# Patient Record
Sex: FEMALE | Race: WHITE | NOT HISPANIC OR LATINO | Employment: OTHER | URBAN - METROPOLITAN AREA
[De-identification: names, ages, dates, MRNs, and addresses within clinical notes are randomized per-mention and may not be internally consistent; named-entity substitution may affect disease eponyms.]

---

## 2017-01-12 ENCOUNTER — GENERIC CONVERSION - ENCOUNTER (OUTPATIENT)
Dept: OTHER | Facility: OTHER | Age: 62
End: 2017-01-12

## 2017-03-09 ENCOUNTER — TRANSCRIBE ORDERS (OUTPATIENT)
Dept: ADMINISTRATIVE | Facility: HOSPITAL | Age: 62
End: 2017-03-09

## 2017-03-09 ENCOUNTER — APPOINTMENT (OUTPATIENT)
Dept: LAB | Facility: HOSPITAL | Age: 62
End: 2017-03-09
Payer: COMMERCIAL

## 2017-03-09 DIAGNOSIS — I95.9 HYPOTENSION, UNSPECIFIED HYPOTENSION TYPE: Primary | ICD-10-CM

## 2017-03-09 DIAGNOSIS — I95.9 HYPOTENSION, UNSPECIFIED HYPOTENSION TYPE: ICD-10-CM

## 2017-03-09 LAB
ANION GAP SERPL CALCULATED.3IONS-SCNC: 7 MMOL/L (ref 4–13)
BUN SERPL-MCNC: 21 MG/DL (ref 5–25)
CALCIUM SERPL-MCNC: 9.7 MG/DL (ref 8.3–10.1)
CHLORIDE SERPL-SCNC: 102 MMOL/L (ref 100–108)
CO2 SERPL-SCNC: 32 MMOL/L (ref 21–32)
CREAT SERPL-MCNC: 0.96 MG/DL (ref 0.6–1.3)
GFR SERPL CREATININE-BSD FRML MDRD: 58.9 ML/MIN/1.73SQ M
GLUCOSE SERPL-MCNC: 84 MG/DL (ref 65–140)
POTASSIUM SERPL-SCNC: 4.3 MMOL/L (ref 3.5–5.3)
SODIUM SERPL-SCNC: 141 MMOL/L (ref 136–145)
TSH SERPL DL<=0.05 MIU/L-ACNC: 3.25 UIU/ML (ref 0.36–3.74)

## 2017-03-09 PROCEDURE — 80048 BASIC METABOLIC PNL TOTAL CA: CPT

## 2017-03-09 PROCEDURE — 84443 ASSAY THYROID STIM HORMONE: CPT

## 2017-03-09 PROCEDURE — 36415 COLL VENOUS BLD VENIPUNCTURE: CPT

## 2017-03-09 PROCEDURE — 84439 ASSAY OF FREE THYROXINE: CPT

## 2017-03-10 LAB — T4 FREE SERPL-MCNC: 1.09 NG/DL (ref 0.76–1.46)

## 2017-05-31 ENCOUNTER — APPOINTMENT (OUTPATIENT)
Dept: LAB | Facility: HOSPITAL | Age: 62
End: 2017-05-31
Payer: COMMERCIAL

## 2017-05-31 ENCOUNTER — TRANSCRIBE ORDERS (OUTPATIENT)
Dept: ADMINISTRATIVE | Facility: HOSPITAL | Age: 62
End: 2017-05-31

## 2017-05-31 ENCOUNTER — ALLSCRIPTS OFFICE VISIT (OUTPATIENT)
Dept: OTHER | Facility: OTHER | Age: 62
End: 2017-05-31

## 2017-05-31 DIAGNOSIS — E55.9 VITAMIN D DEFICIENCY DISEASE: ICD-10-CM

## 2017-05-31 DIAGNOSIS — D64.9 ANEMIA, UNSPECIFIED TYPE: ICD-10-CM

## 2017-05-31 DIAGNOSIS — D64.9 ANEMIA, UNSPECIFIED TYPE: Primary | ICD-10-CM

## 2017-05-31 LAB
ALBUMIN SERPL BCP-MCNC: 4.2 G/DL (ref 3.5–5)
ALP SERPL-CCNC: 97 U/L (ref 46–116)
ALT SERPL W P-5'-P-CCNC: 25 U/L (ref 12–78)
ANION GAP SERPL CALCULATED.3IONS-SCNC: 10 MMOL/L (ref 4–13)
AST SERPL W P-5'-P-CCNC: 14 U/L (ref 5–45)
BILIRUB SERPL-MCNC: 0.9 MG/DL (ref 0.2–1)
BUN SERPL-MCNC: 18 MG/DL (ref 5–25)
CALCIUM SERPL-MCNC: 9.5 MG/DL (ref 8.3–10.1)
CHLORIDE SERPL-SCNC: 102 MMOL/L (ref 100–108)
CO2 SERPL-SCNC: 27 MMOL/L (ref 21–32)
CREAT SERPL-MCNC: 1.03 MG/DL (ref 0.6–1.3)
ERYTHROCYTE [DISTWIDTH] IN BLOOD BY AUTOMATED COUNT: 12.4 % (ref 11.6–15.1)
GFR SERPL CREATININE-BSD FRML MDRD: 54.3 ML/MIN/1.73SQ M
GLUCOSE SERPL-MCNC: 88 MG/DL (ref 65–140)
HCT VFR BLD AUTO: 46.9 % (ref 34.8–46.1)
HGB BLD-MCNC: 15.6 G/DL (ref 11.5–15.4)
MCH RBC QN AUTO: 30 PG (ref 26.8–34.3)
MCHC RBC AUTO-ENTMCNC: 33.3 G/DL (ref 31.4–37.4)
MCV RBC AUTO: 90 FL (ref 82–98)
PLATELET # BLD AUTO: 257 THOUSANDS/UL (ref 149–390)
PMV BLD AUTO: 10.8 FL (ref 8.9–12.7)
POTASSIUM SERPL-SCNC: 4.4 MMOL/L (ref 3.5–5.3)
PROT SERPL-MCNC: 7 G/DL (ref 6.4–8.2)
RBC # BLD AUTO: 5.2 MILLION/UL (ref 3.81–5.12)
SODIUM SERPL-SCNC: 139 MMOL/L (ref 136–145)
T4 FREE SERPL-MCNC: 1.14 NG/DL (ref 0.76–1.46)
TSH SERPL DL<=0.05 MIU/L-ACNC: 1.96 UIU/ML (ref 0.36–3.74)
WBC # BLD AUTO: 5.63 THOUSAND/UL (ref 4.31–10.16)

## 2017-05-31 PROCEDURE — 80053 COMPREHEN METABOLIC PANEL: CPT

## 2017-05-31 PROCEDURE — 36415 COLL VENOUS BLD VENIPUNCTURE: CPT

## 2017-05-31 PROCEDURE — 85027 COMPLETE CBC AUTOMATED: CPT

## 2017-05-31 PROCEDURE — 82306 VITAMIN D 25 HYDROXY: CPT

## 2017-05-31 PROCEDURE — 84439 ASSAY OF FREE THYROXINE: CPT

## 2017-05-31 PROCEDURE — 84443 ASSAY THYROID STIM HORMONE: CPT

## 2017-06-04 LAB
25(OH)D2 SERPL-MCNC: 2 NG/ML
25(OH)D3 SERPL-MCNC: 59 NG/ML
25(OH)D3+25(OH)D2 SERPL-MCNC: 61 NG/ML

## 2017-06-22 ENCOUNTER — TRANSCRIBE ORDERS (OUTPATIENT)
Dept: ADMINISTRATIVE | Facility: HOSPITAL | Age: 62
End: 2017-06-22

## 2017-06-22 ENCOUNTER — ALLSCRIPTS OFFICE VISIT (OUTPATIENT)
Dept: OTHER | Facility: OTHER | Age: 62
End: 2017-06-22

## 2017-06-22 DIAGNOSIS — F32.A VASCULAR DEMENTIA WITH DEPRESSED MOOD (HCC): ICD-10-CM

## 2017-06-22 DIAGNOSIS — R07.9 CHEST PAIN: ICD-10-CM

## 2017-06-22 DIAGNOSIS — F01.50 VASCULAR DEMENTIA WITH DEPRESSED MOOD (HCC): ICD-10-CM

## 2017-06-22 DIAGNOSIS — R07.9 CHEST PAIN, UNSPECIFIED: Primary | ICD-10-CM

## 2017-06-22 DIAGNOSIS — F32.9 MAJOR DEPRESSIVE DISORDER, SINGLE EPISODE: ICD-10-CM

## 2017-06-26 ENCOUNTER — ALLSCRIPTS OFFICE VISIT (OUTPATIENT)
Dept: OTHER | Facility: OTHER | Age: 62
End: 2017-06-26

## 2017-08-01 ENCOUNTER — HOSPITAL ENCOUNTER (OUTPATIENT)
Dept: NON INVASIVE DIAGNOSTICS | Facility: CLINIC | Age: 62
Discharge: HOME | End: 2017-08-01
Attending: INTERNAL MEDICINE
Payer: COMMERCIAL

## 2017-08-01 DIAGNOSIS — R07.9 CHEST PAIN, UNSPECIFIED: ICD-10-CM

## 2017-08-01 DIAGNOSIS — F32.A VASCULAR DEMENTIA WITH DEPRESSED MOOD (HCC): ICD-10-CM

## 2017-08-01 DIAGNOSIS — F01.50 VASCULAR DEMENTIA WITH DEPRESSED MOOD (HCC): ICD-10-CM

## 2017-08-01 LAB
CHEST PAIN STATEMENT: NORMAL
MAX DIASTOLIC BP: 68 MMHG
MAX HEART RATE: 134 BPM
MAX PREDICTED HEART RATE: 158 BPM
MAX. SYSTOLIC BP: 122 MMHG
PROTOCOL NAME: NORMAL
REASON FOR TERMINATION: NORMAL
TARGET HR FORMULA: NORMAL
TEST INDICATION: NORMAL
TIME IN EXERCISE PHASE: 180 S

## 2017-08-01 PROCEDURE — 93017 CV STRESS TEST TRACING ONLY: CPT

## 2017-08-01 PROCEDURE — 78452 HT MUSCLE IMAGE SPECT MULT: CPT

## 2017-08-01 PROCEDURE — A9502 TC99M TETROFOSMIN: HCPCS

## 2017-08-01 RX ADMIN — REGADENOSON 0.4 MG: 0.08 INJECTION, SOLUTION INTRAVENOUS at 10:05

## 2017-08-07 ENCOUNTER — TRANSCRIBE ORDERS (OUTPATIENT)
Dept: ADMINISTRATIVE | Facility: HOSPITAL | Age: 62
End: 2017-08-07

## 2017-08-07 ENCOUNTER — ALLSCRIPTS OFFICE VISIT (OUTPATIENT)
Dept: OTHER | Facility: OTHER | Age: 62
End: 2017-08-07

## 2017-08-07 ENCOUNTER — APPOINTMENT (OUTPATIENT)
Dept: LAB | Facility: HOSPITAL | Age: 62
End: 2017-08-07
Payer: COMMERCIAL

## 2017-08-07 DIAGNOSIS — E03.9 UNSPECIFIED HYPOTHYROIDISM: ICD-10-CM

## 2017-08-07 DIAGNOSIS — E55.9 UNSPECIFIED VITAMIN D DEFICIENCY: Primary | ICD-10-CM

## 2017-08-07 DIAGNOSIS — E55.9 UNSPECIFIED VITAMIN D DEFICIENCY: ICD-10-CM

## 2017-08-07 LAB
ALBUMIN SERPL BCP-MCNC: 4.1 G/DL (ref 3.5–5)
ALP SERPL-CCNC: 69 U/L (ref 46–116)
ALT SERPL W P-5'-P-CCNC: 26 U/L (ref 12–78)
ANION GAP SERPL CALCULATED.3IONS-SCNC: 6 MMOL/L (ref 4–13)
AST SERPL W P-5'-P-CCNC: 15 U/L (ref 5–45)
BILIRUB SERPL-MCNC: 0.4 MG/DL (ref 0.2–1)
BUN SERPL-MCNC: 19 MG/DL (ref 5–25)
CALCIUM SERPL-MCNC: 9.4 MG/DL (ref 8.3–10.1)
CHLORIDE SERPL-SCNC: 102 MMOL/L (ref 100–108)
CO2 SERPL-SCNC: 31 MMOL/L (ref 21–32)
CREAT SERPL-MCNC: 0.8 MG/DL (ref 0.6–1.3)
ERYTHROCYTE [DISTWIDTH] IN BLOOD BY AUTOMATED COUNT: 13.6 % (ref 11.6–15.1)
GFR SERPL CREATININE-BSD FRML MDRD: 79 ML/MIN/1.73SQ M
GLUCOSE SERPL-MCNC: 81 MG/DL (ref 65–140)
HCT VFR BLD AUTO: 44.4 % (ref 34.8–46.1)
HGB BLD-MCNC: 14.8 G/DL (ref 11.5–15.4)
MCH RBC QN AUTO: 30.2 PG (ref 26.8–34.3)
MCHC RBC AUTO-ENTMCNC: 33.3 G/DL (ref 31.4–37.4)
MCV RBC AUTO: 91 FL (ref 82–98)
PLATELET # BLD AUTO: 265 THOUSANDS/UL (ref 149–390)
PMV BLD AUTO: 11.2 FL (ref 8.9–12.7)
POTASSIUM SERPL-SCNC: 4.5 MMOL/L (ref 3.5–5.3)
PROT SERPL-MCNC: 7 G/DL (ref 6.4–8.2)
RBC # BLD AUTO: 4.9 MILLION/UL (ref 3.81–5.12)
SODIUM SERPL-SCNC: 139 MMOL/L (ref 136–145)
TSH SERPL DL<=0.05 MIU/L-ACNC: 1.85 UIU/ML (ref 0.36–3.74)
WBC # BLD AUTO: 6.55 THOUSAND/UL (ref 4.31–10.16)

## 2017-08-07 PROCEDURE — 36415 COLL VENOUS BLD VENIPUNCTURE: CPT

## 2017-08-07 PROCEDURE — 84443 ASSAY THYROID STIM HORMONE: CPT

## 2017-08-07 PROCEDURE — 85027 COMPLETE CBC AUTOMATED: CPT

## 2017-08-07 PROCEDURE — 82306 VITAMIN D 25 HYDROXY: CPT

## 2017-08-07 PROCEDURE — 80053 COMPREHEN METABOLIC PANEL: CPT

## 2017-08-15 LAB
25(OH)D2 SERPL-MCNC: 1.7 NG/ML
25(OH)D3 SERPL-MCNC: 75 NG/ML
25(OH)D3+25(OH)D2 SERPL-MCNC: 77 NG/ML

## 2017-10-31 ENCOUNTER — TRANSCRIBE ORDERS (OUTPATIENT)
Dept: ADMINISTRATIVE | Facility: HOSPITAL | Age: 62
End: 2017-10-31

## 2017-10-31 DIAGNOSIS — Z12.31 ENCOUNTER FOR SCREENING MAMMOGRAM FOR MALIGNANT NEOPLASM OF BREAST: Primary | ICD-10-CM

## 2017-11-01 ENCOUNTER — TRANSCRIBE ORDERS (OUTPATIENT)
Dept: ADMINISTRATIVE | Facility: HOSPITAL | Age: 62
End: 2017-11-01

## 2017-11-01 ENCOUNTER — APPOINTMENT (OUTPATIENT)
Dept: LAB | Facility: HOSPITAL | Age: 62
End: 2017-11-01
Payer: COMMERCIAL

## 2017-11-01 DIAGNOSIS — E03.9 HYPOTHYROIDISM, ADULT: Primary | ICD-10-CM

## 2017-11-01 DIAGNOSIS — R53.83 FATIGUE, UNSPECIFIED TYPE: ICD-10-CM

## 2017-11-01 DIAGNOSIS — E55.9 VITAMIN D INSUFFICIENCY: ICD-10-CM

## 2017-11-01 DIAGNOSIS — E03.9 HYPOTHYROIDISM, ADULT: ICD-10-CM

## 2017-11-01 LAB
25(OH)D3 SERPL-MCNC: 60 NG/ML (ref 30–100)
ALBUMIN SERPL BCP-MCNC: 4.4 G/DL (ref 3.5–5)
ALP SERPL-CCNC: 91 U/L (ref 46–116)
ALT SERPL W P-5'-P-CCNC: 40 U/L (ref 12–78)
ANION GAP SERPL CALCULATED.3IONS-SCNC: 10 MMOL/L (ref 4–13)
AST SERPL W P-5'-P-CCNC: 20 U/L (ref 5–45)
BILIRUB SERPL-MCNC: 1 MG/DL (ref 0.2–1)
BUN SERPL-MCNC: 18 MG/DL (ref 5–25)
CALCIUM SERPL-MCNC: 9 MG/DL (ref 8.3–10.1)
CHLORIDE SERPL-SCNC: 103 MMOL/L (ref 100–108)
CO2 SERPL-SCNC: 28 MMOL/L (ref 21–32)
CREAT SERPL-MCNC: 0.89 MG/DL (ref 0.6–1.3)
ERYTHROCYTE [DISTWIDTH] IN BLOOD BY AUTOMATED COUNT: 12.8 % (ref 11.6–15.1)
GFR SERPL CREATININE-BSD FRML MDRD: 69 ML/MIN/1.73SQ M
GLUCOSE SERPL-MCNC: 74 MG/DL (ref 65–140)
HCT VFR BLD AUTO: 47.7 % (ref 34.8–46.1)
HGB BLD-MCNC: 16 G/DL (ref 11.5–15.4)
MCH RBC QN AUTO: 29.9 PG (ref 26.8–34.3)
MCHC RBC AUTO-ENTMCNC: 33.5 G/DL (ref 31.4–37.4)
MCV RBC AUTO: 89 FL (ref 82–98)
PLATELET # BLD AUTO: 287 THOUSANDS/UL (ref 149–390)
PMV BLD AUTO: 10.2 FL (ref 8.9–12.7)
POTASSIUM SERPL-SCNC: 4.1 MMOL/L (ref 3.5–5.3)
PROT SERPL-MCNC: 7.6 G/DL (ref 6.4–8.2)
RBC # BLD AUTO: 5.35 MILLION/UL (ref 3.81–5.12)
SODIUM SERPL-SCNC: 141 MMOL/L (ref 136–145)
T4 FREE SERPL-MCNC: 1.17 NG/DL (ref 0.76–1.46)
TSH SERPL DL<=0.05 MIU/L-ACNC: 2.43 UIU/ML (ref 0.36–3.74)
WBC # BLD AUTO: 5.99 THOUSAND/UL (ref 4.31–10.16)

## 2017-11-01 PROCEDURE — 36415 COLL VENOUS BLD VENIPUNCTURE: CPT

## 2017-11-01 PROCEDURE — 80053 COMPREHEN METABOLIC PANEL: CPT

## 2017-11-01 PROCEDURE — 84443 ASSAY THYROID STIM HORMONE: CPT

## 2017-11-01 PROCEDURE — 85027 COMPLETE CBC AUTOMATED: CPT

## 2017-11-01 PROCEDURE — 82306 VITAMIN D 25 HYDROXY: CPT

## 2017-11-01 PROCEDURE — 84439 ASSAY OF FREE THYROXINE: CPT

## 2017-11-22 ENCOUNTER — HOSPITAL ENCOUNTER (OUTPATIENT)
Dept: MAMMOGRAPHY | Facility: HOSPITAL | Age: 62
Discharge: HOME | End: 2017-11-22
Payer: COMMERCIAL

## 2017-11-22 DIAGNOSIS — Z12.31 ENCOUNTER FOR SCREENING MAMMOGRAM FOR MALIGNANT NEOPLASM OF BREAST: ICD-10-CM

## 2017-11-22 PROCEDURE — 77063 BREAST TOMOSYNTHESIS BI: CPT

## 2017-11-22 PROCEDURE — G0202 SCR MAMMO BI INCL CAD: HCPCS

## 2018-01-10 NOTE — PROGRESS NOTES
Chief Complaint  Patient here for Prolia injection as ordered by Cristhian Caicedo  Active Problems    1  Abnormal ECG (794 31) (R94 31)   2  Abnormal TSH (790 6) (R79 89)   3  Allergic rhinitis (477 9) (J30 9)   4  Chest pain, exertional (786 50) (R07 9)   5  Depression (311) (F32 9)   6  Dizziness (780 4) (R42)   7  Encounter for screening mammogram for breast cancer (V76 12) (Z12 31)   8  Esophageal reflux (530 81) (K21 9)   9  Factor V Leiden mutation (289 81) (D68 51)   10  Fatigue (780 79) (R53 83)   11  Fibromyalgia (729 1) (M79 7)   12  Hyperlipidemia (272 4) (E78 5)   13  Hypoglycemia (251 2) (E16 2)   14  Immunodeficiency disorder (279 3) (D84 9)   15  Insomnia (780 52) (G47 00)   16  Intervertebral Disc Degeneration (722 6)   17  Irritable bowel syndrome (564 1) (K58 9)   18  Leukocytosis (288 60) (D72 829)   19  Lipid screening (V77 91) (Z13 220)   20  Migraine headache (346 90) (G43 909)   21  Mitral valve prolapse (424 0) (I34 1)   22  Nausea with vomiting (787 01) (R11 2)   23  Need for influenza vaccination (V04 81) (Z23)   24  Obstructive sleep apnea (327 23) (G47 33)   25  Orthostatic hypotension (458 0) (I95 1)   26  Osteoarthritis (715 90) (M19 90)   27  Osteoporosis (733 00) (M81 0)   28  Palpitations (785 1) (R00 2)   29  Scoliosis (737 30) (M41 9)   30  Screening for condition (V82 9) (Z13 9)   31  Screening for malignant neoplasm of breast (V76 10) (Z12 39)   32  Skin lesion (709 9) (L98 9)   33  Sleep disorder (780 50) (G47 9)   34  Tachycardia (785 0) (R00 0)   35  Urge incontinence of urine (788 31) (N39 41)   36  Vitamin D deficiency (268 9) (E55 9)   37  Weight loss, unintentional (783 21) (R63 4)    Current Meds   1  Allegra Allergy 180 MG Oral Tablet Recorded   2  Clotrimazole 10 MG Mouth/Throat Jefe; allow 1 jefe to dissolve slowly in mouth 4   times daily Recorded   3  Compazine 10 MG TABS; Therapy: (Recorded:24Wec0344) to Recorded   4   Cyclobenzaprine HCl - 5 MG Oral Tablet; Take 1 tablet twice daily; Therapy: (Dierdre Pure) to Recorded   5  Fluticasone Propionate 50 MCG/ACT Nasal Suspension; USE 2 SPRAYS IN EACH   NOSTRIL ONCE DAILY  Requested for: 83OLB4221; Last Rx:95Hne7626 Ordered   6  Gaviscon CHEW; TAKE AS DIRECTED; Therapy: (Dierdre Pure) to Recorded   7  Levothyroxine Sodium 25 MCG Oral Tablet Recorded   8  Magnesium 500 MG Oral Tablet; take 2 tablet daily; Therapy: (Dierdre Pure) to Recorded   9  Midodrine HCl - 5 MG Oral Tablet; Take 2 tabs with breakfast and 1 tab with lunch and   dinner  Requested for: 21Wgf6414; Last Rx:50Amp2009 Ordered   10  Multiple Vitamins Oral Tablet Recorded   11  Omeprazole 20 MG Oral Capsule Delayed Release; TAKE 2 CAPSULES DAILY; Therapy: (Recorded:98Foz2404) to Recorded   12  Oxybutynin Chloride 5 MG Oral Tablet; TAKE 1/2 TABLET TWICE DAILY  Requested for:    55Ivr9002; Last Rx:89Etq4030 Ordered   13  Potassium 99 MG Oral Tablet Recorded   14  Qvar 80 MCG/ACT Inhalation Aerosol Solution Recorded   15  SUMAtriptan Succinate 4 MG/0 5ML Subcutaneous Solution Auto-injector Recorded   16  Vitamin D3 5000 UNIT Oral Capsule Recorded   17  Xopenex HFA 45 MCG/ACT Inhalation Aerosol; INHALE 2 PUFFS EVERY 4-6 HOURS    AS NEEDED; Therapy: 10IEJ1278 to 02 94 [phone] 46)  Requested for: 67ZVR5105; Last    Rx:07Akm3032 Ordered    Allergies    1  NSAIDs   2  Aspirin TABS   3  Ceftin TABS   4  Ciprofloxacin HCl TABS   5  Demerol TABS   6  Keflex CAPS   7  Levaquin TABS   8  LevoFLOXacin TABS   9  Macrodantin CAPS   10  Naproxen TABS   11  NexIUM CPDR   12  Sulfa Drugs   13  Zantac TABS    Assessment  Consent reviewed and signed  First injection, no questions  Patient currently taking Vitamin D3 5,000 iu daily  Prolia 60mg SQ given in right upper arm as ordered  Tolerated well       Plan  Osteoporosis    · Prolia 60 MG/ML Subcutaneous Solution    Future Appointments    Date/Time Provider Specialty Site   01/03/2018 01:00 PM EMERY Wood  Endocrinology [address] [facility] ENDOCRINOLOGY   01/03/2018 01:30 PM Endocrinology, Nurse Schedule  [address] [facility] ENDOCRINOLOGY   08/07/2017 12:40 PM EMERY Peace   Cardiology [address]     Signatures   Electronically signed by : Trey Bender, [place]; Jun 26 2017  1:55PM EST                       (Author)    Electronically signed by : EMERY Villalba ; Jun 26 2017  3:07PM EST                       (Author)

## 2018-01-12 NOTE — RESULT NOTES
Message   TSH and free T4 normal      Verified Results  (1) TSH [id number] 10:07AM Jerry Soriano   Patients undergoing fluorescein dye angiography may retain small amounts of fluorescein in the body for 48-72 hours post procedure  Samples containing fluorescein can produce falsely depressed TSH values  If the patient had this procedure,a specimen should be resubmitted post fluorescein clearance          The recommended reference ranges for TSH during pregnancy are as follows:  First trimester 0 1 to 2 5 uIU/mL  Second trimester  0 2 to 3 0 uIU/mL  Third trimester 0 3 to 3 0 uIU/m     Test Name Result Flag Reference   TSH 1 946 uIU/mL  0 358-3 740     (1) T4, FREE [id number] 10:07AM Jerry Soriano     Test Name Result Flag Reference   T4,FREE 0 93 ng/dL  0 76-1 46       Signatures   Electronically signed by : Catracho Mcgregor, ; Feb 7 2016  1:13PM EST                       (Author)

## 2018-01-13 VITALS
WEIGHT: 108.13 LBS | BODY MASS INDEX: 20.42 KG/M2 | HEART RATE: 72 BPM | HEIGHT: 61 IN | DIASTOLIC BLOOD PRESSURE: 70 MMHG | SYSTOLIC BLOOD PRESSURE: 110 MMHG

## 2018-01-14 VITALS
DIASTOLIC BLOOD PRESSURE: 70 MMHG | HEART RATE: 80 BPM | BODY MASS INDEX: 20.39 KG/M2 | SYSTOLIC BLOOD PRESSURE: 126 MMHG | WEIGHT: 108 LBS | HEIGHT: 61 IN

## 2018-01-15 VITALS
DIASTOLIC BLOOD PRESSURE: 68 MMHG | SYSTOLIC BLOOD PRESSURE: 117 MMHG | HEART RATE: 97 BPM | BODY MASS INDEX: 20.64 KG/M2 | HEIGHT: 61 IN | WEIGHT: 109.31 LBS

## 2018-01-15 NOTE — RESULT NOTES
Message   discuss on upcoming appointment      Verified Results  * DXA [address] [id number] 01:45PM Arch Sicard Order Number: [id number]    - Patient Instructions: To schedule this appointment, please contact Central Scheduling at [phone]  Test Name Result Flag Reference   DXA BONE DENSITY SPINE HIP AND PELVIS (Report)     CENTRAL DXA SCAN     CLINICAL HISTORY:  57year old post-menopausal  female  Osteoporosis  TECHNIQUE: Bone densitometry was performed using a [employer] A bone densitometer  Regions of interest appear properly placed  There is levoscoliosis and postoperative changes in lumbar spine which could limit the study  COMPARISON: 8/20/2014     RESULTS:    LUMBAR SPINE: L1-L3:   BMD 0 816 gm/cm2   T-score -1 8   Z-score -0 3     LEFT TOTAL HIP:   BMD 0 635 gm/cm2   T-score -2 5   Z-score -1 5     LEFT FEMORAL NECK:   BMD 0 565 gm/cm2   T-score -2 6   Z-score -1 2     RIGHT FOREARM :   BMD 0 616 gm/sq-cm,   T-score is -1 2   Z-score is 0 3          IMPRESSION:   1  Based on the [facility] classification, the T-score of -2 6 is consistent with osteoporosis  2  Since the prior study, there has been 0 048 g/sq cm (7 2%) decrease in the BMD of the forearm, 0 126 g/sq cm (13 4 %) decrease in the BMD of the spine, and 0 067 g/sq cm (9 6 %) decrease in the BMD of the hip  3  According to the [address] [place] [address], prescription therapy is recommended with a T-score of -2 5 or less in the spine or hip after appropriate evaluation to exclude secondary causes  4  A daily intake of at least 1200 mg Calcium and 800 - 1000 IU of Vitamin D, as well as weight bearing and muscle strengthening exercise, fall prevention and avoidance of tobacco and excessive alcohol intake as basic preventive measures are suggested  5  Repeat DXA scan in 18-24 months as clinically indicated            The 10 year risk of hip fracture is 4 2%, with the 10 year risk of major osteoporotic fracture being 32%, as calculated by the WHO fracture risk assessment tool (FRAX)  The current NOF guidelines recommend treating patients with FRAX 10 year risk score    of >3% for hip fracture and >20% for major osteoporotic fracture        WHO CLASSIFICATION:   Normal (a T-score of -1 0 or higher)   Low bone mineral density (a T-score of less than -1 0 but higher than -2 5)   Osteoporosis (a T-score of -2 5 or less)   Severe osteoporosis (a T-score of -2 5 or less with a fragility fracture)             Workstation performed: [id number]     Signed by:   Nina Gonzalez MD   10/19/16

## 2018-01-15 NOTE — RESULT NOTES
Message   vitamin d in good range , continue supplementations      Verified Results  (1) VITAMIN D 25-HYDROXY [id number] 10:45AM Jacquelyn Shorten     Test Name Result Flag Reference   VIT D 25-HYDROX 44 0 ng/mL  30 0-100 0   This assay is a certified procedure of the CDC Vitamin D Standardization Certification Program (VDSCP)     Deficiency <20ng/ml   Insufficiency 20-30ng/ml   Sufficient  ng/ml     *Patients undergoing fluorescein dye angiography may retain small amounts of fluorescein in the body for 48-72 hours post procedure  Samples containing fluorescein can produce falsely elevated Vitamin D values  If the patient had this procedure, a specimen should be resubmitted post fluorescein clearance

## 2018-03-05 ENCOUNTER — APPOINTMENT (EMERGENCY)
Dept: CT IMAGING | Facility: HOSPITAL | Age: 63
End: 2018-03-05
Payer: COMMERCIAL

## 2018-03-05 ENCOUNTER — HOSPITAL ENCOUNTER (EMERGENCY)
Facility: HOSPITAL | Age: 63
Discharge: HOME | End: 2018-03-05
Admitting: EMERGENCY MEDICINE
Payer: COMMERCIAL

## 2018-03-05 ENCOUNTER — APPOINTMENT (EMERGENCY)
Dept: RADIOLOGY | Facility: HOSPITAL | Age: 63
End: 2018-03-05
Payer: COMMERCIAL

## 2018-03-05 VITALS
HEART RATE: 83 BPM | SYSTOLIC BLOOD PRESSURE: 134 MMHG | WEIGHT: 111.99 LBS | TEMPERATURE: 97.2 F | OXYGEN SATURATION: 100 % | DIASTOLIC BLOOD PRESSURE: 87 MMHG | BODY MASS INDEX: 21.51 KG/M2 | RESPIRATION RATE: 23 BRPM

## 2018-03-05 DIAGNOSIS — M54.9 BACK PAIN: ICD-10-CM

## 2018-03-05 DIAGNOSIS — S40.022A ARM CONTUSION, LEFT, INITIAL ENCOUNTER: Primary | ICD-10-CM

## 2018-03-05 LAB
ANION GAP BLD CALC-SCNC: 15 MMOL/L (ref 4–13)
BUN BLD-MCNC: 14 MG/DL (ref 5–25)
CA-I BLD-SCNC: 1.25 MMOL/L (ref 1.12–1.32)
CHLORIDE BLD-SCNC: 104 MMOL/L (ref 100–108)
CREAT BLD-MCNC: 0.9 MG/DL (ref 0.6–1.3)
GFR SERPL CREATININE-BSD FRML MDRD: 68 ML/MIN/1.73SQ M
GLUCOSE SERPL-MCNC: 94 MG/DL (ref 65–140)
HCT VFR BLD CALC: 45 % (ref 34.8–46.1)
HGB BLDA-MCNC: 15.3 G/DL (ref 11.5–15.4)
PCO2 BLD: 28 MMOL/L (ref 21–32)
POTASSIUM BLD-SCNC: 4.1 MMOL/L (ref 3.5–5.3)
SODIUM BLD-SCNC: 142 MMOL/L (ref 136–145)
SPECIMEN SOURCE: ABNORMAL

## 2018-03-05 PROCEDURE — 73130 X-RAY EXAM OF HAND: CPT

## 2018-03-05 PROCEDURE — 74177 CT ABD & PELVIS W/CONTRAST: CPT

## 2018-03-05 PROCEDURE — 71260 CT THORAX DX C+: CPT

## 2018-03-05 PROCEDURE — 72125 CT NECK SPINE W/O DYE: CPT

## 2018-03-05 PROCEDURE — 96360 HYDRATION IV INFUSION INIT: CPT

## 2018-03-05 PROCEDURE — 85014 HEMATOCRIT: CPT

## 2018-03-05 PROCEDURE — 99284 EMERGENCY DEPT VISIT MOD MDM: CPT

## 2018-03-05 PROCEDURE — 80047 BASIC METABLC PNL IONIZED CA: CPT

## 2018-03-05 RX ADMIN — SODIUM CHLORIDE 1000 ML: 0.9 INJECTION, SOLUTION INTRAVENOUS at 12:44

## 2018-03-05 RX ADMIN — IOHEXOL 100 ML: 350 INJECTION, SOLUTION INTRAVENOUS at 12:42

## 2018-03-05 NOTE — ED PROVIDER NOTES
History  Chief Complaint   Patient presents with   Mavis Cousin Fall     pt lost footing and fell backwards 5 days ago  complaining of left wrist and low back pain     Patient presents to the emergency department today offering a chief complaint back pain and left wrist pain status post fall 5 days ago  Patient states she suffered a mechanical fall backwards striking her back as well as the left wrist off of an object  Patient denies striking her head however does describe a minor whiplash type incident  Also complains of some minimal left-sided muscular neck tenderness  Patient states pain is not subsiding over the last 5 days  She does take Flexeril via pain management for chronic back pain  She denies any headache blurred vision nausea or vomiting  She has been ambulatory since the incident and denies any lower extremity pain  Patient also admits to chronic abdominal pain however is unable to quantify if there is any new symptoms in the abdomen  Prior to Admission Medications   Prescriptions Last Dose Informant Patient Reported? Taking? HYDROcodone-acetaminophen (NORCO) 5-325 mg per tablet   Yes No   Sig: Take 1 tablet by mouth every 6 (six) hours as needed for moderate pain  clotrimazole (MYCELEX) 10 mg jefe   Yes No   Sig: Take 10 mg by mouth 5 (five) times a day  fentaNYL (DURAGESIC) 50 mcg/hr   Yes No   Sig: Place 1 patch on the skin every third day  levalbuterol (XOPENEX HFA) 45 mcg/act inhaler   Yes No   Sig: Inhale 1-2 puffs every 4 (four) hours as needed for wheezing  sertraline (ZOLOFT) 25 mg tablet   Yes No   Sig: Take 25 mg by mouth daily        Facility-Administered Medications: None       Past Medical History:   Diagnosis Date    Asthma     Back pain     Cardiac disease     Diverticulitis     Factor V Leiden ([facility] )     Fibromyalgia     GERD (gastroesophageal reflux disease)     Interstitial cystitis     Migraines     Osteoporosis     Sjogren's syndrome ([facility] ) Past Surgical History:   Procedure Laterality Date    BACK SURGERY      CHOLECYSTECTOMY      HYSTERECTOMY         History reviewed  No pertinent family history  I have reviewed and agree with the history as documented  Social History   Substance Use Topics    Smoking status: Never Smoker    Smokeless tobacco: Never Used    Alcohol use No        Review of Systems   Constitutional: Negative  HENT: Negative  Eyes: Negative  Respiratory: Negative  Cardiovascular: Negative  Gastrointestinal: Negative  Endocrine: Negative  Genitourinary: Negative  Musculoskeletal: Positive for back pain  Mid back pain as well as left hand pain   Skin: Positive for wound  Contusion midback   Allergic/Immunologic: Negative  Neurological: Negative  Hematological: Negative  Psychiatric/Behavioral: Negative  All other systems reviewed and are negative  Physical Exam  ED Triage Vitals   Temperature Pulse Respirations Blood Pressure SpO2   03/05/18 1159 03/05/18 1202 03/05/18 1202 03/05/18 1202 03/05/18 1202   (!) 97 2 °F (36 2 °C) 92 18 145/82 97 %      Temp Source Heart Rate Source Patient Position - Orthostatic VS BP Location FiO2 (%)   03/05/18 1159 03/05/18 1202 03/05/18 1202 03/05/18 1202 --   Temporal Right Sitting Right arm       Pain Score       03/05/18 1202       7           Orthostatic Vital Signs  Vitals:    03/05/18 1245 03/05/18 1300 03/05/18 1315 03/05/18 1330   BP: 120/58 128/65 128/68 134/87   Pulse: 79 79 71 83   Patient Position - Orthostatic VS: Sitting Lying Lying Lying       Physical Exam   Constitutional: She is oriented to person, place, and time  She appears well-developed and well-nourished  No distress  HENT:   Head: Normocephalic and atraumatic  Right Ear: External ear normal    Left Ear: External ear normal    Nose: Nose normal    Mouth/Throat: Oropharynx is clear and moist  No oropharyngeal exudate     Eyes: EOM are normal  Pupils are equal, round, and reactive to light  Neck: Normal range of motion  Neck supple  Cardiovascular: Normal rate  Pulmonary/Chest: Effort normal    Abdominal: Soft  Musculoskeletal:   Left hand tenderness over the metacarpal regions  There is no wrist tenderness  Patient has nontender contusion of the left distal humerus  Patient has contusion with moderate to severe tenderness over the mid thoracic spine  There is no reproducible cervical spine tenderness  Neurological: She is alert and oriented to person, place, and time  Skin: Skin is warm  Capillary refill takes less than 2 seconds  She is not diaphoretic  Psychiatric: She has a normal mood and affect  ED Medications  Medications   sodium chloride 0 9 % bolus 1,000 mL (0 mL Intravenous Stopped 3/5/18 1333)   iohexol (OMNIPAQUE) 350 MG/ML injection (SINGLE-DOSE) 100 mL (100 mL Intravenous Given 3/5/18 1242)       Diagnostic Studies  Results Reviewed     Procedure Component Value Units Date/Time    POCT Chem 8+ [[id number]]  (Abnormal) Collected:  03/05/18 1212    Lab Status:  Final result Updated:  03/05/18 1217     SODIUM, I-STAT 142 mmol/l      Potassium, i-STAT 4 1 mmol/L      Chloride, istat 104 mmol/L      CO2, i-STAT 28 mmol/L      Anion Gap, Istat 15 (H) mmol/L      Calcium, Ionized i-STAT 1 25 mmol/L      BUN, I-STAT 14 mg/dl      Creatinine, i-STAT 0 9 mg/dl      eGFR 68 ml/min/1 73sq m      Glucose, i-STAT 94 mg/dl      Hct, i-STAT 45 %      Hgb, i-STAT 15 3 g/dl      Specimen Type VENOUS                 XR hand 3+ views LEFT   ED Interpretation by Syed Page PA-C (03/05 1336)   Though I do not see any evidence of acute osseous abnormalities, probable chronic foreign body noted  Patient is tender after 5 days therefore will splint and recommend orthopedic follow-up      Final Result by KAROL Edmond MD (03/05 1341)      No acute osseous abnormality  Soft tissue metallic foreign body              Workstation performed: [id number] CT recon only thoracolumbar   Final Result by Thomas Bowling MD (03/05 1321)      No fracture or traumatic subluxation  Scoliotic deformity with moderate multilevel degenerative changes  Expected postsurgical changes at L4-L5 and L5-S1  Workstation performed: [id number]         CT chest abdomen pelvis w contrast   Final Result by Thomas Bowling MD (03/05 1318)      No visceral injury in the chest, abdomen or pelvis  No acute fracture  Workstation performed: [id number]         CT spine cervical without contrast   Final Result by Thomas Bowling MD (03/05 1303)      No cervical spine fracture or traumatic malalignment  Workstation performed: [id number]                    Procedures  Procedures       Phone Contacts  ED Phone Contact    ED Course  ED Course as of Mar 05 1347   Mon Mar 05, 2018   1238 Specimen Type: VENOUS   1238 Hemoglobin, Istat: 15 3   1238 Glucose, i-STAT: 94   1238 eGFR: 68   1238 BUN, I-STAT: 14   1238 Anion Gap, Istat: (!) 15   1238 CHLORIDE, ISTAT: 104   1238 SODIUM, I-STAT: 201   4619 FINDINGS:    ALIGNMENT:  There is straightening of cervical lordosis   There is left convex scoliotic curvature of lower cervical and upper thoracic spine   No compression deformity or subluxation  VERTEBRAL BODIES:  No fracture   No destructive osseous lesion  DEGENERATIVE CHANGES:  Moderate multilevel cervical degenerative changes are noted  No critical central canal stenosis  PREVERTEBRAL AND PARASPINAL SOFT TISSUES:  Unremarkable  THORACIC INLET:  Normal   Impression       No cervical spine fracture or traumatic malalignment          1331 FINDINGS:    CHEST    LUNGS:  Again noted is linear subsegmental atelectasis or scarring in the lateral left lung base   A punctate calcified 1 mm granuloma noted in the subpleural lateral right lung base   No suspicious pulmonary nodule or mass   No airspace opacity to   suggest pneumonia   No left proximal density in the lungs to suggest pulmonary contusion  PLEURA:  Unremarkable  HEART/GREAT VESSELS:  Unremarkable for patient's age  MEDIASTINUM AND HENRY:  Unremarkable  CHEST WALL AND LOWER NECK: A 14 mm lower pole left thyroid lobe nodule is noted   Incidental discovery of one or more thyroid nodule(s) measuring less than 1 5 cm and without suspicious features is noted in this patient who is above 28years old;   according to guidelines published in the February 2015 white paper on incidental thyroid nodules in the Journal of the [employer] of Radiology Betty Valero), no further evaluation is recommended  ABDOMEN    LIVER/BILIARY TREE:  Unremarkable  GALLBLADDER: Kendell Rosana is surgically absent  SPLEEN:  Unremarkable  PANCREAS:  Unremarkable  ADRENAL GLANDS:  Unremarkable  KIDNEYS/URETERS:  Unremarkable  No hydronephrosis  STOMACH AND BOWEL:  Unremarkable  APPENDIX:  No findings to suggest appendicitis  ABDOMINOPELVIC CAVITY:  No ascites or free intraperitoneal air  No lymphadenopathy  VESSELS:  Unremarkable for patient's age  PELVIS    REPRODUCTIVE ORGANS:  Patient is status post hysterectomy  URINARY BLADDER:  Unremarkable  ABDOMINAL WALL/INGUINAL REGIONS:  Unremarkable  OSSEOUS STRUCTURES:  No acute fracture or destructive osseous lesion   Specifically, no rib fractures   Lumbar surgical changes are noted   Scoliotic deformity is noted   Please see more accurate characterization of the spine in concurrent report of   reconstructed thoracolumbar CT  Impression       No visceral injury in the chest, abdomen or pelvis   No acute fracture  1332 Impression       No fracture or traumatic subluxation  Scoliotic deformity with moderate multilevel degenerative changes   Expected postsurgical changes at L4-L5 and L5-S1                                      [facility]  CritCare Time    Disposition  Final diagnoses:   Arm contusion, left, initial encounter   Back pain Time reflects when diagnosis was documented in both MDM as applicable and the Disposition within this note     Time User Action Codes Description Comment    3/5/2018  1:37 PM Petr Thomas Add [S40 022A] Arm contusion, left, initial encounter     3/5/2018  1:37 PM Petr Thomas Add [M54 9] Back pain       ED Disposition     ED Disposition Condition Comment    Discharge  Nereyda Crystal discharge to home/self care  Condition at discharge: Good        Follow-up Information     Follow up With Specialties Details Why Contact Info    Joslyn Guerrero MD Orthopedic Surgery Schedule an appointment as soon as possible for a visit  [address] [place] [address]  [phone]          Patient's Medications   Discharge Prescriptions    No medications on file     No discharge procedures on file      ED Provider  Electronically Signed by           Donta Henderson PA-C  03/05/18 9014

## 2018-03-05 NOTE — DISCHARGE INSTRUCTIONS
Back Pain   WHAT YOU NEED TO KNOW:   Back pain is common  It can be caused by many conditions, such as arthritis or the breakdown of spinal discs  Your risk for back pain is increased by injuries, lack of activity, or repeated bending and twisting  You may feel sore or stiff on one or both sides of your back  The pain may spread to your buttocks or thighs  DISCHARGE INSTRUCTIONS:   Medicines:   · NSAIDs  help decrease swelling and pain  This medicine is available with or without a doctor's order  NSAIDs can cause stomach bleeding or kidney problems in certain people  If you take blood thinner medicine, always ask your healthcare provider if NSAIDs are safe for you  Always read the medicine label and follow directions  · Acetaminophen  decreases pain  It is available without a doctor's order  Ask how much to take and how often to take it  Follow directions  Acetaminophen can cause liver damage if not taken correctly  · Prescription pain medicine  may be given  Ask your healthcare provider how to take this medicine safely  · Take your medicine as directed  Contact your healthcare provider if you think your medicine is not helping or if you have side effects  Tell him or her if you are allergic to any medicine  Keep a list of the medicines, vitamins, and herbs you take  Include the amounts, and when and why you take them  Bring the list or the pill bottles to follow-up visits  Carry your medicine list with you in case of an emergency  Follow up with your healthcare provider in 2 weeks, or as directed:  Write down your questions so you remember to ask them during your visits  How to manage your back pain:   · Apply ice  on your back or affected area for 15 to 20 minutes every hour or as directed  Use an ice pack, or put crushed ice in a plastic bag  Cover it with a towel  Ice helps prevent tissue damage and decreases pain      · Apply heat  on your back or affected area for 20 to 30 minutes every 2 hours for as many days as directed  Heat helps decrease pain and muscle spasms  · Stay active  as much as you can without causing more pain  Bed rest could make your back pain worse  Avoid heavy lifting until your pain is gone  Return to the emergency department if:   · You have pain, numbness, or weakness in one or both legs  · Your pain becomes so severe that you cannot walk  · You cannot control your urine or bowel movements  · You have severe back pain with chest pain  · You have severe back pain, nausea, and vomiting  · You have severe back pain that spreads to your side or genital area  Contact your healthcare provider if:   · You have back pain that does not get better with rest and pain medicine  · You have a fever  · You have pain that worsens when you are on your back or when you rest     · You have pain that worsens when you cough or sneeze  · You lose weight without trying  · You have questions or concerns about your condition or care  © 2017 [address] [employer] Information is for End User's use only and may not be sold, redistributed or otherwise used for commercial purposes  All illustrations and images included in CareNotes® are the copyrighted property of [employer] , [employer]  or Edmond Torres  The above information is an  only  It is not intended as medical advice for individual conditions or treatments  Talk to your doctor, nurse or pharmacist before following any medical regimen to see if it is safe and effective for you

## 2018-03-05 NOTE — ED PROCEDURE NOTE
Procedure  Static Splint Application  Date/Time: 3/5/2018 1:41 PM  Performed by: Nancy Sanchez  Authorized by: Nancy Sanchez     Patient location:  Bedside  Procedure performed by emergency physician: Yes    Consent:     Consent obtained:  Verbal    Consent given by:  Patient    Risks discussed:  Discoloration, numbness, pain and swelling    Alternatives discussed:  No treatment  Universal protocol:     Procedure explained and questions answered to patient or proxy's satisfaction: yes      Imaging studies available: yes      Immediately prior to procedure a time out was called: yes      Patient identity confirmed:  Verbally with patient and arm band  Indication:     Indications: sprain/strain    Pre-procedure details:     Sensation:  Normal  Procedure details:     Laterality:  Left    Location:  Hand    Hand:  L hand    Strapping: no      Splint type:  Ulnar gutter    Supplies:  Ortho-Glass  Post-procedure details:     Pain:  Unchanged    Sensation:  Normal    Neurovascular Exam: skin pink      Patient tolerance of procedure:   Tolerated well, no immediate complications                     Alek Ga PA-C  03/05/18 5347

## 2018-04-09 LAB
ALBUMIN SERPL BCP-MCNC: 5 G/DL (ref 3.5–5.7)
ALP SERPL-CCNC: 92 IU/L (ref 55–165)
ALT SERPL W P-5'-P-CCNC: 39 IU/L (ref 10–30)
ANION GAP SERPL CALCULATED.3IONS-SCNC: 12.7 MM/L
AST SERPL W P-5'-P-CCNC: 22 U/L (ref 7–26)
BILIRUB SERPL-MCNC: 0.6 MG/DL (ref 0.3–1)
BUN SERPL-MCNC: 20 MG/DL (ref 7–25)
CALCIUM SERPL-MCNC: 10.2 MG/DL (ref 8.6–10.5)
CHLORIDE SERPL-SCNC: 101 MM/L (ref 98–107)
CO2 SERPL-SCNC: 31 MM/L (ref 21–31)
CREAT SERPL-MCNC: 0.97 MG/DL (ref 0.6–1.2)
EGFR (HISTORICAL): 58 GFR
EGFR AFRICAN AMERICAN (HISTORICAL): > 60 GFR
GLUCOSE (HISTORICAL): 76 MG/DL (ref 65–99)
OSMOLALITY, SERUM (HISTORICAL): 283 MOSM (ref 262–291)
POTASSIUM SERPL-SCNC: 3.7 MM/L (ref 3.5–5.5)
SODIUM SERPL-SCNC: 141 MM/L (ref 134–143)
T4 FREE SERPL-MCNC: 0.99 NG/DL (ref 0.6–1.7)
TOTAL PROTEIN (HISTORICAL): 7.4 G/DL (ref 6.4–8.9)
TSH SERPL DL<=0.05 MIU/L-ACNC: 2.11 UIU/M (ref 0.45–5.33)

## 2018-06-04 LAB
ALBUMIN SERPL BCP-MCNC: 4.8 G/DL (ref 3.5–5.7)
ALP SERPL-CCNC: 79 IU/L (ref 55–165)
ALT SERPL W P-5'-P-CCNC: 25 IU/L (ref 10–30)
ANION GAP SERPL CALCULATED.3IONS-SCNC: 12.9 MM/L
AST SERPL W P-5'-P-CCNC: 21 U/L (ref 7–26)
BASOPHILS # BLD AUTO: 0 X3/UL (ref 0–0.3)
BASOPHILS # BLD AUTO: 0.4 % (ref 0–2)
BILIRUB SERPL-MCNC: 0.5 MG/DL (ref 0.3–1)
BUN SERPL-MCNC: 18 MG/DL (ref 7–25)
CALCIUM SERPL-MCNC: 9.7 MG/DL (ref 8.6–10.5)
CHLORIDE SERPL-SCNC: 102 MM/L (ref 98–107)
CO2 SERPL-SCNC: 30 MM/L (ref 21–31)
CREAT SERPL-MCNC: 0.89 MG/DL (ref 0.6–1.2)
DEPRECATED RDW RBC AUTO: 13.1 %
EGFR (HISTORICAL): > 60 GFR
EGFR AFRICAN AMERICAN (HISTORICAL): > 60 GFR
EOSINOPHIL # BLD AUTO: 0.2 X3/UL (ref 0–0.5)
EOSINOPHIL NFR BLD AUTO: 2.2 % (ref 0–5)
GLUCOSE (HISTORICAL): 79 MG/DL (ref 65–99)
HCT VFR BLD AUTO: 42.1 % (ref 37–47)
HGB BLD-MCNC: 14.5 G/DL (ref 12–16)
LYMPHOCYTES # BLD AUTO: 1.8 X3/UL (ref 1.2–4.2)
LYMPHOCYTES NFR BLD AUTO: 22.8 % (ref 20.5–51.1)
MCH RBC QN AUTO: 31.4 PG (ref 26–34)
MCHC RBC AUTO-ENTMCNC: 34.4 G/DL (ref 31–37)
MCV RBC AUTO: 91.3 FL (ref 81–99)
MONOCYTES # BLD AUTO: 0.6 X3/UL (ref 0–1)
MONOCYTES NFR BLD AUTO: 8.1 % (ref 1.7–12)
NEUTROPHILS # BLD AUTO: 5.2 X3/UL (ref 1.4–6.5)
NEUTS SEG NFR BLD AUTO: 66.5 % (ref 42.2–75.2)
OSMOLALITY, SERUM (HISTORICAL): 282 MOSM (ref 262–291)
PLATELET # BLD AUTO: 303 X3/UL (ref 130–400)
PMV BLD AUTO: 8 FL
POTASSIUM SERPL-SCNC: 3.9 MM/L (ref 3.5–5.5)
RBC # BLD AUTO: 4.62 X6/UL (ref 3.9–5.2)
SODIUM SERPL-SCNC: 141 MM/L (ref 134–143)
TOTAL PROTEIN (HISTORICAL): 7 G/DL (ref 6.4–8.9)
WBC # BLD AUTO: 7.9 X3/UL (ref 4.8–10.8)

## 2018-06-29 ENCOUNTER — OFFICE VISIT (OUTPATIENT)
Dept: CARDIOLOGY CLINIC | Facility: HOSPITAL | Age: 63
End: 2018-06-29
Payer: COMMERCIAL

## 2018-06-29 VITALS
DIASTOLIC BLOOD PRESSURE: 85 MMHG | WEIGHT: 120.8 LBS | SYSTOLIC BLOOD PRESSURE: 139 MMHG | HEIGHT: 62 IN | BODY MASS INDEX: 22.23 KG/M2 | HEART RATE: 86 BPM

## 2018-06-29 DIAGNOSIS — R07.89 CHEST PRESSURE: Primary | ICD-10-CM

## 2018-06-29 DIAGNOSIS — I95.1 ORTHOSTASIS: ICD-10-CM

## 2018-06-29 DIAGNOSIS — M79.7 FIBROMYALGIA: ICD-10-CM

## 2018-06-29 DIAGNOSIS — I44.7 LBBB (LEFT BUNDLE BRANCH BLOCK): ICD-10-CM

## 2018-06-29 PROCEDURE — 99213 OFFICE O/P EST LOW 20 MIN: CPT | Performed by: INTERNAL MEDICINE

## 2018-06-30 PROBLEM — M79.7 FIBROMYALGIA: Status: ACTIVE | Noted: 2018-06-30

## 2018-06-30 PROBLEM — I44.7 LBBB (LEFT BUNDLE BRANCH BLOCK): Status: ACTIVE | Noted: 2018-06-30

## 2018-06-30 PROBLEM — R07.89 CHEST PRESSURE: Status: ACTIVE | Noted: 2018-06-30

## 2018-06-30 PROBLEM — I95.1 ORTHOSTASIS: Status: ACTIVE | Noted: 2018-06-30

## 2018-06-30 NOTE — PROGRESS NOTES
Cardiology Follow Up    Braden Dana  [birth year]  [id number]  [address] [id number]    1  Chest pressure     2  LBBB (left bundle branch block)     3  Fibromyalgia     4  Orthostasis           Discussion/Summary: All of her assessed cardiac problems are stable  I have reviewed her medications and made no changes  No cardiac testing is ordered  RTO 1 year  Interval History: She has not had any cardiac problems since her last OV  She is more active and having much less chest tightness, palpitations, orthostasis  Both the Ranexa and Midodrine have helped  Previous cardiac testing including ECHO and stress testing have been unremarkable  Her last nuclear stress test was 6/2017  Patient Active Problem List   Diagnosis    Chest pressure    LBBB (left bundle branch block)    Fibromyalgia    Orthostasis     Past Medical History:   Diagnosis Date    Asthma     Back pain     Cardiac disease     Diverticulitis     Factor V Leiden ([facility] )     Fibromyalgia     GERD (gastroesophageal reflux disease)     Interstitial cystitis     Migraines     Osteoporosis     Sjogren's syndrome ([facility] )      Social History     Social History    Marital status:      Spouse name: N/A    Number of children: N/A    Years of education: N/A     Occupational History    Not on file  Social History Main Topics    Smoking status: Never Smoker    Smokeless tobacco: Never Used    Alcohol use No    Drug use: No    Sexual activity: Not on file     Other Topics Concern    Not on file     Social History Narrative    No narrative on file      History reviewed  No pertinent family history    Past Surgical History:   Procedure Laterality Date    BACK SURGERY      CHOLECYSTECTOMY      HYSTERECTOMY         Current Outpatient Prescriptions:     Alum Hydroxide-Mag Trisilicate (GAVISCON) 80-14 2 MG CHEW, Chew, Disp: , Rfl:     beclomethasone (QVAR) 80 MCG/ACT inhaler, Inhale, Disp: , Rfl:     Cholecalciferol (VITAMIN D3) 5000 units CAPS, Take by mouth, Disp: , Rfl:     cyclobenzaprine (FLEXERIL) 5 mg tablet, Take 1 tablet by mouth 2 (two) times a day, Disp: , Rfl:     fexofenadine (ALLEGRA) 180 MG tablet, Take by mouth, Disp: , Rfl:     fluticasone (FLONASE) 50 mcg/act nasal spray, 2 sprays into each nostril, Disp: , Rfl:     levalbuterol (XOPENEX HFA) 45 mcg/act inhaler, Inhale 1-2 puffs every 4 (four) hours as needed for wheezing , Disp: , Rfl:     levothyroxine 25 mcg tablet, Take by mouth, Disp: , Rfl:     Magnesium 500 MG CAPS, Take 2 tablets by mouth daily, Disp: , Rfl:     midodrine (PROAMATINE) 5 mg tablet, Take 5 mg by mouth  , Disp: , Rfl:     MULTIPLE VITAMINS-CALCIUM PO, Take by mouth, Disp: , Rfl:     omeprazole (PriLOSEC) 20 mg delayed release capsule, Take 2 capsules by mouth daily, Disp: , Rfl:     oxybutynin (DITROPAN) 5 mg tablet, Take 0 5 tablets by mouth 2 (two) times a day, Disp: , Rfl:     prochlorperazine (COMPAZINE) 10 mg tablet, Take by mouth, Disp: , Rfl:     SUMAtriptan (IMITREX) 4 mg/0 5 mL SOAJ, Inject under the skin, Disp: , Rfl:     clotrimazole (MYCELEX) 10 mg jefe, Take 10 mg by mouth 5 (five) times a day , Disp: , Rfl:     fentaNYL (DURAGESIC) 50 mcg/hr, Place 1 patch on the skin every third day , Disp: , Rfl:     HYDROcodone-acetaminophen (NORCO) 5-325 mg per tablet, Take 1 tablet by mouth every 6 (six) hours as needed for moderate pain , Disp: , Rfl:     Potassium 99 MG TABS, Take by mouth, Disp: , Rfl:     predniSONE 10 mg tablet, , Disp: , Rfl: 0    sertraline (ZOLOFT) 25 mg tablet, Take 25 mg by mouth daily  , Disp: , Rfl:   Allergies   Allergen Reactions    Aspirin     Ceftin [Cefuroxime]     Ciprofloxacin     Demerol [Meperidine]     Keflex [Cephalexin]     Levaquin [Levofloxacin]     Macrodantin [Nitrofurantoin]     Naprosyn [Naproxen]     Sulfa Antibiotics     Zithromax [Azithromycin]      Vitals:    06/29/18 1424   BP: 139/85   BP Location: Left arm   Patient Position: Sitting   Cuff Size: Standard   Pulse: 86   Weight: 54 8 kg (120 lb 12 8 oz)   Height: 5' 2" (1 575 m)     Weight (last 2 days)     Date/Time   Weight    06/29/18 1424  54 8 (120 8)             Blood pressure 139/85, pulse 86, height 5' 2" (1 575 m), weight 54 8 kg (120 lb 12 8 oz)  , Body mass index is 22 09 kg/m²      Labs:  Orders Only on 06/04/2018   Component Date Value    WBC 06/04/2018 7 9     RBC 06/04/2018 4 62     Hemoglobin 06/04/2018 14 5     Hematocrit 06/04/2018 42 1     MCV 06/04/2018 91 3     MCH 06/04/2018 31 4     MCHC 06/04/2018 34 4     RDW 06/04/2018 13 1     Platelets 97/33/5702 303     MPV 06/04/2018 8 0     Neutrophils Relative 06/04/2018 66 5     Lymphocytes Relative 06/04/2018 22 8     Monocytes Relative 06/04/2018 8 1     Eosinophils Relative 06/04/2018 2 2     Basophils Relative 06/04/2018 0 4     Neutrophils Absolute 06/04/2018 5 2     Lymphocytes Absolute 06/04/2018 1 8     Monocytes Absolute 06/04/2018 0 6     Eosinophils Absolute 06/04/2018 0 2     Basophils Absolute 06/04/2018 0 0     Glucose 06/04/2018 79     BUN 06/04/2018 18     Creatinine 06/04/2018 0 89     Sodium 06/04/2018 141     Potassium 06/04/2018 3 9     Chloride 06/04/2018 102     CO2 06/04/2018 30     Calcium 06/04/2018 9 7     Anion Gap 06/04/2018 12 9     OSMOLALITY, SERUM 06/04/2018 282     Total Protein 06/04/2018 7 0     Albumin 06/04/2018 4 8     Total Bilirubin 06/04/2018 0 5     AST 06/04/2018 21     ALT 06/04/2018 25     Alkaline Phosphatase 06/04/2018 79     EGFR (HISTORICAL) 06/04/2018 > 60     eGFR  06/04/2018 > 60    Orders Only on 04/09/2018   Component Date Value    Glucose 04/09/2018 76     BUN 04/09/2018 20     Creatinine 04/09/2018 0 97     Sodium 04/09/2018 141     Potassium 04/09/2018 3 7     Chloride 04/09/2018 101     CO2 04/09/2018 31     Calcium 04/09/2018 10 2     Anion Gap 04/09/2018 12 7     OSMOLALITY, SERUM 04/09/2018 283     Total Protein 04/09/2018 7 4     Albumin 04/09/2018 5 0     Total Bilirubin 04/09/2018 0 6     AST 04/09/2018 22     ALT 04/09/2018 39*    Alkaline Phosphatase 04/09/2018 92     EGFR (HISTORICAL) 04/09/2018 58*    eGFR  04/09/2018 > 60     TSH 3RD GENERATON 04/09/2018 2 11     Free T4 04/09/2018 0 99    Admission on 03/05/2018, Discharged on 03/05/2018   Component Date Value    SODIUM, I-STAT 03/05/2018 142     Potassium, i-STAT 03/05/2018 4 1     Chloride, istat 03/05/2018 104     CO2, i-STAT 03/05/2018 28     Anion Gap, Istat 03/05/2018 15*    Calcium, Ionized i-STAT 03/05/2018 1 25     BUN, I-STAT 03/05/2018 14     Creatinine, i-STAT 03/05/2018 0 9     eGFR 03/05/2018 68     Glucose, i-STAT 03/05/2018 94     Hct, i-STAT 03/05/2018 45     Hgb, i-STAT 03/05/2018 15 3     Specimen Type 03/05/2018 VENOUS      Imaging: No results found  Review of Systems:  Review of Systems   Constitutional: Negative for diaphoresis, fatigue, fever and unexpected weight change  HENT: Negative  Respiratory: Negative for cough, shortness of breath and wheezing  Cardiovascular: Negative for chest pain, palpitations and leg swelling  Gastrointestinal: Negative for abdominal pain, diarrhea and nausea  Musculoskeletal: Negative for gait problem and myalgias  Skin: Negative for rash  Neurological: Negative for dizziness and numbness  Psychiatric/Behavioral: Negative  Physical Exam:  Physical Exam   Constitutional: She is oriented to person, place, and time  She appears well-developed and well-nourished  HENT:   Head: Normocephalic and atraumatic  Eyes: Pupils are equal, round, and reactive to light  Neck: Normal range of motion  Neck supple  No JVD present     Cardiovascular: Regular rhythm, S1 normal, S2 normal and normal pulses  Pulses:       Carotid pulses are 2+ on the right side, and 2+ on the left side  Pulmonary/Chest: Effort normal and breath sounds normal  She has no wheezes  She has no rales  Abdominal: Soft  Bowel sounds are normal  There is no tenderness  Musculoskeletal: Normal range of motion  She exhibits no edema or tenderness  Neurological: She is alert and oriented to person, place, and time  She has normal reflexes  No cranial nerve deficit  Skin: Skin is warm  Psychiatric: She has a normal mood and affect

## 2018-08-01 ENCOUNTER — APPOINTMENT (OUTPATIENT)
Dept: LAB | Facility: CLINIC | Age: 63
End: 2018-08-01
Payer: COMMERCIAL

## 2018-08-01 ENCOUNTER — TRANSCRIBE ORDERS (OUTPATIENT)
Dept: LAB | Facility: CLINIC | Age: 63
End: 2018-08-01

## 2018-08-01 DIAGNOSIS — E05.90 HYPERTHYROIDISM: Primary | ICD-10-CM

## 2018-08-01 DIAGNOSIS — E05.90 HYPERTHYROIDISM: ICD-10-CM

## 2018-08-01 LAB
BASOPHILS # BLD AUTO: 0.05 THOUSANDS/ΜL (ref 0–0.1)
BASOPHILS NFR BLD AUTO: 1 % (ref 0–1)
EOSINOPHIL # BLD AUTO: 0.13 THOUSAND/ΜL (ref 0–0.61)
EOSINOPHIL NFR BLD AUTO: 2 % (ref 0–6)
ERYTHROCYTE [DISTWIDTH] IN BLOOD BY AUTOMATED COUNT: 12.3 % (ref 11.6–15.1)
HCT VFR BLD AUTO: 44 % (ref 34.8–46.1)
HGB BLD-MCNC: 14.3 G/DL (ref 11.5–15.4)
IMM GRANULOCYTES # BLD AUTO: 0.05 THOUSAND/UL (ref 0–0.2)
IMM GRANULOCYTES NFR BLD AUTO: 1 % (ref 0–2)
LYMPHOCYTES # BLD AUTO: 2.16 THOUSANDS/ΜL (ref 0.6–4.47)
LYMPHOCYTES NFR BLD AUTO: 30 % (ref 14–44)
MCH RBC QN AUTO: 30.2 PG (ref 26.8–34.3)
MCHC RBC AUTO-ENTMCNC: 32.5 G/DL (ref 31.4–37.4)
MCV RBC AUTO: 93 FL (ref 82–98)
MONOCYTES # BLD AUTO: 0.7 THOUSAND/ΜL (ref 0.17–1.22)
MONOCYTES NFR BLD AUTO: 10 % (ref 4–12)
NEUTROPHILS # BLD AUTO: 4.16 THOUSANDS/ΜL (ref 1.85–7.62)
NEUTS SEG NFR BLD AUTO: 56 % (ref 43–75)
NRBC BLD AUTO-RTO: 0 /100 WBCS
PLATELET # BLD AUTO: 307 THOUSANDS/UL (ref 149–390)
PMV BLD AUTO: 10 FL (ref 8.9–12.7)
RBC # BLD AUTO: 4.74 MILLION/UL (ref 3.81–5.12)
T4 FREE SERPL-MCNC: 1.15 NG/DL (ref 0.76–1.46)
TSH SERPL DL<=0.05 MIU/L-ACNC: 2.1 UIU/ML (ref 0.36–3.74)
WBC # BLD AUTO: 7.25 THOUSAND/UL (ref 4.31–10.16)

## 2018-08-01 PROCEDURE — 36415 COLL VENOUS BLD VENIPUNCTURE: CPT

## 2018-08-01 PROCEDURE — 84439 ASSAY OF FREE THYROXINE: CPT

## 2018-08-01 PROCEDURE — 84443 ASSAY THYROID STIM HORMONE: CPT

## 2018-08-01 PROCEDURE — 85025 COMPLETE CBC W/AUTO DIFF WBC: CPT

## 2018-10-31 ENCOUNTER — TRANSCRIBE ORDERS (OUTPATIENT)
Dept: LAB | Facility: CLINIC | Age: 63
End: 2018-10-31

## 2018-10-31 ENCOUNTER — APPOINTMENT (OUTPATIENT)
Dept: LAB | Facility: CLINIC | Age: 63
End: 2018-10-31
Payer: COMMERCIAL

## 2018-10-31 DIAGNOSIS — R53.83 FATIGUE, UNSPECIFIED TYPE: ICD-10-CM

## 2018-10-31 DIAGNOSIS — I51.9 MYXEDEMA HEART DISEASE: ICD-10-CM

## 2018-10-31 DIAGNOSIS — E03.9 MYXEDEMA HEART DISEASE: Primary | ICD-10-CM

## 2018-10-31 DIAGNOSIS — E03.9 MYXEDEMA HEART DISEASE: ICD-10-CM

## 2018-10-31 DIAGNOSIS — I51.9 MYXEDEMA HEART DISEASE: Primary | ICD-10-CM

## 2018-10-31 LAB
ALBUMIN SERPL BCP-MCNC: 4.3 G/DL (ref 3.5–5)
ALP SERPL-CCNC: 92 U/L (ref 46–116)
ALT SERPL W P-5'-P-CCNC: 33 U/L (ref 12–78)
ANION GAP SERPL CALCULATED.3IONS-SCNC: 4 MMOL/L (ref 4–13)
AST SERPL W P-5'-P-CCNC: 23 U/L (ref 5–45)
BILIRUB SERPL-MCNC: 0.56 MG/DL (ref 0.2–1)
BUN SERPL-MCNC: 21 MG/DL (ref 5–25)
CALCIUM SERPL-MCNC: 9.1 MG/DL (ref 8.3–10.1)
CHLORIDE SERPL-SCNC: 105 MMOL/L (ref 100–108)
CHOLEST SERPL-MCNC: 221 MG/DL (ref 50–200)
CO2 SERPL-SCNC: 27 MMOL/L (ref 21–32)
CREAT SERPL-MCNC: 1.07 MG/DL (ref 0.6–1.3)
ERYTHROCYTE [DISTWIDTH] IN BLOOD BY AUTOMATED COUNT: 13 % (ref 11.6–15.1)
GFR SERPL CREATININE-BSD FRML MDRD: 55 ML/MIN/1.73SQ M
GLUCOSE P FAST SERPL-MCNC: 94 MG/DL (ref 65–99)
HCT VFR BLD AUTO: 46.4 % (ref 34.8–46.1)
HDLC SERPL-MCNC: 65 MG/DL (ref 40–60)
HGB BLD-MCNC: 15.1 G/DL (ref 11.5–15.4)
LDLC SERPL CALC-MCNC: 137 MG/DL (ref 0–100)
MCH RBC QN AUTO: 29.8 PG (ref 26.8–34.3)
MCHC RBC AUTO-ENTMCNC: 32.5 G/DL (ref 31.4–37.4)
MCV RBC AUTO: 92 FL (ref 82–98)
NONHDLC SERPL-MCNC: 156 MG/DL
PLATELET # BLD AUTO: 294 THOUSANDS/UL (ref 149–390)
PMV BLD AUTO: 10.6 FL (ref 8.9–12.7)
POTASSIUM SERPL-SCNC: 4 MMOL/L (ref 3.5–5.3)
PROT SERPL-MCNC: 7.5 G/DL (ref 6.4–8.2)
RBC # BLD AUTO: 5.06 MILLION/UL (ref 3.81–5.12)
SODIUM SERPL-SCNC: 136 MMOL/L (ref 136–145)
T4 FREE SERPL-MCNC: 1.13 NG/DL (ref 0.76–1.46)
TRIGL SERPL-MCNC: 94 MG/DL
TSH SERPL DL<=0.05 MIU/L-ACNC: 2.03 UIU/ML (ref 0.36–3.74)
WBC # BLD AUTO: 6.81 THOUSAND/UL (ref 4.31–10.16)

## 2018-10-31 PROCEDURE — 80053 COMPREHEN METABOLIC PANEL: CPT

## 2018-10-31 PROCEDURE — 85027 COMPLETE CBC AUTOMATED: CPT

## 2018-10-31 PROCEDURE — 80061 LIPID PANEL: CPT

## 2018-10-31 PROCEDURE — 36415 COLL VENOUS BLD VENIPUNCTURE: CPT

## 2018-10-31 PROCEDURE — 84439 ASSAY OF FREE THYROXINE: CPT

## 2018-10-31 PROCEDURE — 84443 ASSAY THYROID STIM HORMONE: CPT

## 2018-12-11 ENCOUNTER — TRANSCRIBE ORDERS (OUTPATIENT)
Dept: LAB | Facility: CLINIC | Age: 63
End: 2018-12-11

## 2018-12-11 ENCOUNTER — APPOINTMENT (OUTPATIENT)
Dept: LAB | Facility: CLINIC | Age: 63
End: 2018-12-11
Payer: COMMERCIAL

## 2018-12-11 DIAGNOSIS — G47.30 SLEEP APNEA, UNSPECIFIED TYPE: ICD-10-CM

## 2018-12-11 DIAGNOSIS — E16.2 HYPOGLYCEMIA: ICD-10-CM

## 2018-12-11 DIAGNOSIS — R42 DIZZINESS: ICD-10-CM

## 2018-12-11 DIAGNOSIS — N39.0 URINARY TRACT INFECTION WITHOUT HEMATURIA, SITE UNSPECIFIED: ICD-10-CM

## 2018-12-11 DIAGNOSIS — N39.0 URINARY TRACT INFECTION WITHOUT HEMATURIA, SITE UNSPECIFIED: Primary | ICD-10-CM

## 2018-12-11 LAB
ALBUMIN SERPL BCP-MCNC: 4.1 G/DL (ref 3.5–5)
ALP SERPL-CCNC: 86 U/L (ref 46–116)
ALT SERPL W P-5'-P-CCNC: 28 U/L (ref 12–78)
ANION GAP SERPL CALCULATED.3IONS-SCNC: 5 MMOL/L (ref 4–13)
AST SERPL W P-5'-P-CCNC: 14 U/L (ref 5–45)
BILIRUB SERPL-MCNC: 0.41 MG/DL (ref 0.2–1)
BILIRUB UR QL STRIP: NEGATIVE
BUN SERPL-MCNC: 19 MG/DL (ref 5–25)
CALCIUM SERPL-MCNC: 9.7 MG/DL (ref 8.3–10.1)
CHLORIDE SERPL-SCNC: 106 MMOL/L (ref 100–108)
CLARITY UR: CLEAR
CO2 SERPL-SCNC: 28 MMOL/L (ref 21–32)
COLOR UR: YELLOW
CREAT SERPL-MCNC: 0.84 MG/DL (ref 0.6–1.3)
ERYTHROCYTE [DISTWIDTH] IN BLOOD BY AUTOMATED COUNT: 12.8 % (ref 11.6–15.1)
GFR SERPL CREATININE-BSD FRML MDRD: 74 ML/MIN/1.73SQ M
GLUCOSE P FAST SERPL-MCNC: 85 MG/DL (ref 65–99)
GLUCOSE UR STRIP-MCNC: NEGATIVE MG/DL
HCT VFR BLD AUTO: 42.6 % (ref 34.8–46.1)
HGB BLD-MCNC: 13.8 G/DL (ref 11.5–15.4)
HGB UR QL STRIP.AUTO: NEGATIVE
INSULIN SERPL-ACNC: 10.4 MU/L (ref 3–25)
KETONES UR STRIP-MCNC: NEGATIVE MG/DL
LEUKOCYTE ESTERASE UR QL STRIP: NEGATIVE
MCH RBC QN AUTO: 30.3 PG (ref 26.8–34.3)
MCHC RBC AUTO-ENTMCNC: 32.4 G/DL (ref 31.4–37.4)
MCV RBC AUTO: 93 FL (ref 82–98)
NITRITE UR QL STRIP: NEGATIVE
PH UR STRIP.AUTO: 6.5 [PH] (ref 4.5–8)
PLATELET # BLD AUTO: 265 THOUSANDS/UL (ref 149–390)
PMV BLD AUTO: 10 FL (ref 8.9–12.7)
POTASSIUM SERPL-SCNC: 3.9 MMOL/L (ref 3.5–5.3)
PROT SERPL-MCNC: 7.2 G/DL (ref 6.4–8.2)
PROT UR STRIP-MCNC: NEGATIVE MG/DL
RBC # BLD AUTO: 4.56 MILLION/UL (ref 3.81–5.12)
SODIUM SERPL-SCNC: 139 MMOL/L (ref 136–145)
SP GR UR STRIP.AUTO: 1.02 (ref 1–1.03)
UROBILINOGEN UR QL STRIP.AUTO: 0.2 E.U./DL
WBC # BLD AUTO: 7.96 THOUSAND/UL (ref 4.31–10.16)

## 2018-12-11 PROCEDURE — 84681 ASSAY OF C-PEPTIDE: CPT

## 2018-12-11 PROCEDURE — 85027 COMPLETE CBC AUTOMATED: CPT

## 2018-12-11 PROCEDURE — 83525 ASSAY OF INSULIN: CPT

## 2018-12-11 PROCEDURE — 36415 COLL VENOUS BLD VENIPUNCTURE: CPT

## 2018-12-11 PROCEDURE — 81003 URINALYSIS AUTO W/O SCOPE: CPT | Performed by: INTERNAL MEDICINE

## 2018-12-11 PROCEDURE — 87086 URINE CULTURE/COLONY COUNT: CPT

## 2018-12-11 PROCEDURE — 80053 COMPREHEN METABOLIC PANEL: CPT

## 2018-12-12 LAB
BACTERIA UR CULT: NORMAL
C PEPTIDE SERPL-MCNC: 3.7 NG/ML (ref 1.1–4.4)

## 2018-12-20 DIAGNOSIS — R00.0 TACHYCARDIA: Primary | ICD-10-CM

## 2018-12-20 DIAGNOSIS — I95.1 ORTHOSTASIS: Primary | ICD-10-CM

## 2018-12-21 RX ORDER — MIDODRINE HYDROCHLORIDE 5 MG/1
TABLET ORAL
Qty: 120 TABLET | Refills: 11 | Status: SHIPPED | OUTPATIENT
Start: 2018-12-21

## 2018-12-26 ENCOUNTER — HOSPITAL ENCOUNTER (OUTPATIENT)
Dept: NON INVASIVE DIAGNOSTICS | Facility: HOSPITAL | Age: 63
Discharge: HOME | End: 2018-12-26
Attending: INTERNAL MEDICINE
Payer: COMMERCIAL

## 2018-12-26 DIAGNOSIS — R00.0 TACHYCARDIA: ICD-10-CM

## 2018-12-26 PROCEDURE — 93225 XTRNL ECG REC<48 HRS REC: CPT

## 2018-12-26 PROCEDURE — 93226 XTRNL ECG REC<48 HR SCAN A/R: CPT

## 2018-12-28 PROCEDURE — 93227 XTRNL ECG REC<48 HR R&I: CPT | Performed by: INTERNAL MEDICINE

## 2019-01-22 ENCOUNTER — TELEPHONE (OUTPATIENT)
Dept: SLEEP CENTER | Facility: CLINIC | Age: 64
End: 2019-01-22

## 2019-01-24 ENCOUNTER — OFFICE VISIT (OUTPATIENT)
Dept: CARDIOLOGY CLINIC | Facility: HOSPITAL | Age: 64
End: 2019-01-24
Payer: COMMERCIAL

## 2019-01-24 VITALS
DIASTOLIC BLOOD PRESSURE: 80 MMHG | HEIGHT: 62 IN | SYSTOLIC BLOOD PRESSURE: 124 MMHG | HEART RATE: 73 BPM | WEIGHT: 128.8 LBS | BODY MASS INDEX: 23.7 KG/M2

## 2019-01-24 DIAGNOSIS — I44.7 LBBB (LEFT BUNDLE BRANCH BLOCK): Primary | ICD-10-CM

## 2019-01-24 DIAGNOSIS — R07.89 CHEST PRESSURE: ICD-10-CM

## 2019-01-24 DIAGNOSIS — I95.1 ORTHOSTATIC HYPOTENSION: ICD-10-CM

## 2019-01-24 PROCEDURE — 99214 OFFICE O/P EST MOD 30 MIN: CPT | Performed by: INTERNAL MEDICINE

## 2019-01-24 RX ORDER — GUAIFENESIN 600 MG
1200 TABLET, EXTENDED RELEASE 12 HR ORAL EVERY 12 HOURS SCHEDULED
COMMUNITY
End: 2019-01-31

## 2019-01-24 NOTE — LETTER
January 25, 2019     Norberto Coburn MD  [facility]    Patient: Tricia Jimenez   YOB: 1955   Date of Visit: 1/24/2019       Dear Dr JACOBSEN [facility]:    Thank you for referring Dorrie Gilford to me for evaluation  Below are my notes for this consultation  If you have questions, please do not hesitate to call me  I look forward to following your patient along with you  Sincerely,        Norberto Coburn MD        CC: MD Norberto Parrish MD  1/25/2019  9:36 AM  Sign at close encounter                                             Cardiology Follow Up    Tricia Jimenez  1955  [id number]  [address]  [address] [facility] [address] [place] [address]    1  LBBB (left bundle branch block)  POCT ECG   2  Chest pressure  POCT ECG   3  Orthostatic hypotension           Discussion/Summary: She is stable from a cardiac standpoint  Her CP is not cardiac and she is cleared to have injections for her chronic pain by Dr Lacy Melgoza  I have reviewed her medications and made no changes  No cardiac testing is ordered  Interval History: She has been battling an URI for the past several weeks associated with severe coughing and chest pain  She has noticed that her BP and HR have been higher  She denies any syncope and her orthostatic hypotension / dizziness are much improved since she is on Midodrine  She had a nuclear stress test in 8/2017 - normal EF, no ischemia  She has no cardiac arrhythmias from previous Holter Monitors  Previous ECHO was normal   ECG today - NSR, LAD, IVCD, criteria for old anteroseptal infarct    ( unchanged from previous ECG s )    Patient Active Problem List   Diagnosis    Chest pressure    LBBB (left bundle branch block)    Fibromyalgia    Orthostatic hypotension     Past Medical History:   Diagnosis Date    Asthma     Back pain     Cardiac disease     Diverticulitis     Factor TETO Johnson ([facility] )     Fibromyalgia     GERD (gastroesophageal reflux disease)     Interstitial cystitis     Migraines     Osteoporosis     Sjogren's syndrome ([facility] )      Social History     Social History    Marital status:      Spouse name: N/A    Number of children: N/A    Years of education: N/A     Occupational History    Not on file  Social History Main Topics    Smoking status: Never Smoker    Smokeless tobacco: Never Used    Alcohol use No    Drug use: No    Sexual activity: Not on file     Other Topics Concern    Not on file     Social History Narrative    No narrative on file      History reviewed  No pertinent family history    Past Surgical History:   Procedure Laterality Date    BACK SURGERY      CHOLECYSTECTOMY      HYSTERECTOMY         Current Outpatient Prescriptions:     Alum Hydroxide-Mag Trisilicate (GAVISCON) 01-46 8 MG CHEW, Chew, Disp: , Rfl:     beclomethasone (QVAR) 80 MCG/ACT inhaler, Inhale, Disp: , Rfl:     Cholecalciferol (VITAMIN D3) 5000 units CAPS, Take by mouth, Disp: , Rfl:     clotrimazole (MYCELEX) 10 mg jefe, Take 10 mg by mouth 5 (five) times a day , Disp: , Rfl:     fexofenadine (ALLEGRA) 180 MG tablet, Take by mouth, Disp: , Rfl:     fluticasone (FLONASE) 50 mcg/act nasal spray, 2 sprays into each nostril, Disp: , Rfl:     guaiFENesin (MUCINEX) 600 mg 12 hr tablet, Take 1,200 mg by mouth every 12 (twelve) hours, Disp: , Rfl:     levalbuterol (XOPENEX HFA) 45 mcg/act inhaler, Inhale 1-2 puffs every 4 (four) hours as needed for wheezing , Disp: , Rfl:     levothyroxine 25 mcg tablet, Take by mouth, Disp: , Rfl:     Magnesium 500 MG CAPS, Take 2 tablets by mouth daily, Disp: , Rfl:     midodrine (PROAMATINE) 5 mg tablet, TAKE 2 TABS PO IN AM, ONE TAB PO WITH LUNCH AND DINNER, Disp: 120 tablet, Rfl: 11    MULTIPLE VITAMINS-CALCIUM PO, Take by mouth, Disp: , Rfl:     omeprazole (PriLOSEC) 20 mg delayed release capsule, Take 2 capsules by mouth daily, Disp: , Rfl:     oxybutynin (DITROPAN) 5 mg tablet, Take 0 5 tablets by mouth 2 (two) times a day, Disp: , Rfl:     predniSONE 10 mg tablet, 8 mg  , Disp: , Rfl: 0    cyclobenzaprine (FLEXERIL) 5 mg tablet, Take 1 tablet by mouth 2 (two) times a day, Disp: , Rfl:     fentaNYL (DURAGESIC) 50 mcg/hr, Place 1 patch on the skin every third day , Disp: , Rfl:     HYDROcodone-acetaminophen (NORCO) 5-325 mg per tablet, Take 1 tablet by mouth every 6 (six) hours as needed for moderate pain , Disp: , Rfl:     Potassium 99 MG TABS, Take by mouth, Disp: , Rfl:     prochlorperazine (COMPAZINE) 10 mg tablet, Take by mouth, Disp: , Rfl:     sertraline (ZOLOFT) 25 mg tablet, Take 25 mg by mouth daily  , Disp: , Rfl:     SUMAtriptan (IMITREX) 4 mg/0 5 mL SOAJ, Inject under the skin, Disp: , Rfl:   Allergies   Allergen Reactions    Ambien [Zolpidem]     Aspirin     Ceftin [Cefuroxime]     Ciprofloxacin     Demerol [Meperidine]     Keflex [Cephalexin]     Levaquin [Levofloxacin]     Macrodantin [Nitrofurantoin]     Naprosyn [Naproxen]     Prolia [Denosumab]     Sulfa Antibiotics     Zithromax [Azithromycin]     Zoloft [Sertraline]      Vitals:    01/24/19 1520   BP: 124/80   BP Location: Left arm   Patient Position: Sitting   Cuff Size: Standard   Pulse: 73   Weight: 58 4 kg (128 lb 12 8 oz)   Height: 5' 1 5" (1 562 m)     Weight (last 2 days)     Date/Time   Weight    01/24/19 1520  58 4 (128 8)             Blood pressure 124/80, pulse 73, height 5' 1 5" (1 562 m), weight 58 4 kg (128 lb 12 8 oz)  , Body mass index is 23 94 kg/m²      Labs:  Appointment on 12/11/2018   Component Date Value    Urine Culture 12/11/2018 No Growth <1000 cfu/mL     WBC 12/11/2018 7 96     RBC 12/11/2018 4 56     Hemoglobin 12/11/2018 13 8     Hematocrit 12/11/2018 42 6     MCV 12/11/2018 93     MCH 12/11/2018 30 3     MCHC 12/11/2018 32 4     RDW 12/11/2018 12 8     Platelets 10/56/3772 265     MPV 12/11/2018 10 0  Sodium 12/11/2018 139     Potassium 12/11/2018 3 9     Chloride 12/11/2018 106     CO2 12/11/2018 28     ANION GAP 12/11/2018 5     BUN 12/11/2018 19     Creatinine 12/11/2018 0 84     Glucose, Fasting 12/11/2018 85     Calcium 12/11/2018 9 7     AST 12/11/2018 14     ALT 12/11/2018 28     Alkaline Phosphatase 12/11/2018 86     Total Protein 12/11/2018 7 2     Albumin 12/11/2018 4 1     Total Bilirubin 12/11/2018 0 41     eGFR 12/11/2018 74     C-Peptide 12/11/2018 3 7     Insulin 12/11/2018 10 4    Transcribe Orders on 12/11/2018   Component Date Value    Color, UA 12/11/2018 Yellow     Clarity, UA 12/11/2018 Clear     Specific Gravity, UA 12/11/2018 1 017     pH, UA 12/11/2018 6 5     Leukocytes, UA 12/11/2018 Negative     Nitrite, UA 12/11/2018 Negative     Protein, UA 12/11/2018 Negative     Glucose, UA 12/11/2018 Negative     Ketones, UA 12/11/2018 Negative     Urobilinogen, UA 12/11/2018 0 2     Bilirubin, UA 12/11/2018 Negative     Blood, UA 12/11/2018 Negative    Appointment on 10/31/2018   Component Date Value    Cholesterol 10/31/2018 221*    Triglycerides 10/31/2018 94     HDL, Direct 10/31/2018 65*    LDL Calculated 10/31/2018 137*    Non-HDL-Chol (CHOL-HDL) 10/31/2018 156     WBC 10/31/2018 6 81     RBC 10/31/2018 5 06     Hemoglobin 10/31/2018 15 1     Hematocrit 10/31/2018 46 4*    MCV 10/31/2018 92     MCH 10/31/2018 29 8     MCHC 10/31/2018 32 5     RDW 10/31/2018 13 0     Platelets 27/40/0932 294     MPV 10/31/2018 10 6     Sodium 10/31/2018 136     Potassium 10/31/2018 4 0     Chloride 10/31/2018 105     CO2 10/31/2018 27     ANION GAP 10/31/2018 4     BUN 10/31/2018 21     Creatinine 10/31/2018 1 07     Glucose, Fasting 10/31/2018 94     Calcium 10/31/2018 9 1     AST 10/31/2018 23     ALT 10/31/2018 33     Alkaline Phosphatase 10/31/2018 92     Total Protein 10/31/2018 7 5     Albumin 10/31/2018 4 3     Total Bilirubin 10/31/2018 0 56     eGFR 10/31/2018 55     TSH 3RD GENERATON 10/31/2018 2 030     Free T4 10/31/2018 1 13    Appointment on 08/01/2018   Component Date Value    TSH 3RD GENERATON 08/01/2018 2 100     Free T4 08/01/2018 1 15     WBC 08/01/2018 7 25     RBC 08/01/2018 4 74     Hemoglobin 08/01/2018 14 3     Hematocrit 08/01/2018 44 0     MCV 08/01/2018 93     MCH 08/01/2018 30 2     MCHC 08/01/2018 32 5     RDW 08/01/2018 12 3     MPV 08/01/2018 10 0     Platelets 63/51/9745 307     nRBC 08/01/2018 0     Neutrophils Relative 08/01/2018 56     Immat GRANS % 08/01/2018 1     Lymphocytes Relative 08/01/2018 30     Monocytes Relative 08/01/2018 10     Eosinophils Relative 08/01/2018 2     Basophils Relative 08/01/2018 1     Neutrophils Absolute 08/01/2018 4 16     Immature Grans Absolute 08/01/2018 0 05     Lymphocytes Absolute 08/01/2018 2 16     Monocytes Absolute 08/01/2018 0 70     Eosinophils Absolute 08/01/2018 0 13     Basophils Absolute 08/01/2018 0 05      Imaging: No results found  Review of Systems:  Review of Systems   Constitutional: Negative for diaphoresis, fatigue, fever and unexpected weight change  HENT: Negative  Respiratory: Negative for cough, shortness of breath and wheezing  Cardiovascular: Positive for chest pain  Negative for palpitations and leg swelling  Gastrointestinal: Negative for abdominal pain, diarrhea and nausea  Musculoskeletal: Negative for gait problem and myalgias  Skin: Negative for rash  Neurological: Negative for dizziness and numbness  Psychiatric/Behavioral: Negative  Physical Exam:  Physical Exam   Constitutional: She is oriented to person, place, and time  She appears well-developed and well-nourished  HENT:   Head: Normocephalic and atraumatic  Eyes: Pupils are equal, round, and reactive to light  Neck: Normal range of motion  Neck supple  No JVD present     Cardiovascular: Regular rhythm, S1 normal, S2 normal and normal pulses  Pulses:       Carotid pulses are 2+ on the right side, and 2+ on the left side  Pulmonary/Chest: Effort normal and breath sounds normal  She has no wheezes  She has no rales  Abdominal: Soft  Bowel sounds are normal  There is no tenderness  Musculoskeletal: Normal range of motion  She exhibits no edema or tenderness  Neurological: She is alert and oriented to person, place, and time  She has normal reflexes  No cranial nerve deficit  Skin: Skin is warm  Psychiatric: She has a normal mood and affect

## 2019-01-25 ENCOUNTER — TELEPHONE (OUTPATIENT)
Dept: RHEUMATOLOGY | Facility: CLINIC | Age: 64
End: 2019-01-25

## 2019-01-25 PROBLEM — I95.1 ORTHOSTASIS: Status: RESOLVED | Noted: 2018-06-30 | Resolved: 2019-01-25

## 2019-01-25 PROBLEM — I95.1 ORTHOSTATIC HYPOTENSION: Status: ACTIVE | Noted: 2019-01-25

## 2019-01-25 PROCEDURE — 93000 ELECTROCARDIOGRAM COMPLETE: CPT | Performed by: INTERNAL MEDICINE

## 2019-01-25 NOTE — PROGRESS NOTES
Cardiology Follow Up    Ally Nora  [birth year]  [id number]  [address] [facility]  [address] [facility] [place] [address]  [phone]    1  LBBB (left bundle branch block)  POCT ECG   2  Chest pressure  POCT ECG   3  Orthostatic hypotension           Discussion/Summary: She is stable from a cardiac standpoint  Her CP is not cardiac and she is cleared to have injections for her chronic pain by Dr Barrera Later  I have reviewed her medications and made no changes  No cardiac testing is ordered  Interval History: She has been battling an URI for the past several weeks associated with severe coughing and chest pain  She has noticed that her BP and HR have been higher  She denies any syncope and her orthostatic hypotension / dizziness are much improved since she is on Midodrine  She had a nuclear stress test in 8/2017 - normal EF, no ischemia  She has no cardiac arrhythmias from previous Holter Monitors  Previous ECHO was normal   ECG today - NSR, LAD, IVCD, criteria for old anteroseptal infarct  ( unchanged from previous ECG s )    Patient Active Problem List   Diagnosis    Chest pressure    LBBB (left bundle branch block)    Fibromyalgia    Orthostatic hypotension     Past Medical History:   Diagnosis Date    Asthma     Back pain     Cardiac disease     Diverticulitis     [facility] Leiden ([facility] )     Fibromyalgia     GERD (gastroesophageal reflux disease)     Interstitial cystitis     Migraines     Osteoporosis     [facility]      Social History     Social History    Marital status:      Spouse name: N/A    Number of children: N/A    Years of education: N/A     Occupational History    Not on file       Social History Main Topics    Smoking status: Never Smoker    Smokeless tobacco: Never Used    Alcohol use No    Drug use: No    Sexual activity: Not on file     Other Topics Concern    Not on file     Social History Narrative    No narrative on file      History reviewed  No pertinent family history    Past Surgical History:   Procedure Laterality Date    BACK SURGERY      CHOLECYSTECTOMY      HYSTERECTOMY         Current Outpatient Prescriptions:     Alum Hydroxide-Mag Trisilicate (GAVISCON) 37-34 4 MG CHEW, Chew, Disp: , Rfl:     beclomethasone (QVAR) 80 MCG/ACT inhaler, Inhale, Disp: , Rfl:     Cholecalciferol (VITAMIN D3) 5000 units CAPS, Take by mouth, Disp: , Rfl:     clotrimazole (MYCELEX) 10 mg jefe, Take 10 mg by mouth 5 (five) times a day , Disp: , Rfl:     fexofenadine (ALLEGRA) 180 MG tablet, Take by mouth, Disp: , Rfl:     fluticasone (FLONASE) 50 mcg/act nasal spray, 2 sprays into each nostril, Disp: , Rfl:     guaiFENesin (MUCINEX) 600 mg 12 hr tablet, Take 1,200 mg by mouth every 12 (twelve) hours, Disp: , Rfl:     levalbuterol (XOPENEX HFA) 45 mcg/act inhaler, Inhale 1-2 puffs every 4 (four) hours as needed for wheezing , Disp: , Rfl:     levothyroxine 25 mcg tablet, Take by mouth, Disp: , Rfl:     Magnesium 500 MG CAPS, Take 2 tablets by mouth daily, Disp: , Rfl:     midodrine (PROAMATINE) 5 mg tablet, TAKE 2 TABS PO IN AM, ONE TAB PO WITH LUNCH AND DINNER, Disp: 120 tablet, Rfl: 11    MULTIPLE VITAMINS-CALCIUM PO, Take by mouth, Disp: , Rfl:     omeprazole (PriLOSEC) 20 mg delayed release capsule, Take 2 capsules by mouth daily, Disp: , Rfl:     oxybutynin (DITROPAN) 5 mg tablet, Take 0 5 tablets by mouth 2 (two) times a day, Disp: , Rfl:     predniSONE 10 mg tablet, 8 mg  , Disp: , Rfl: 0    cyclobenzaprine (FLEXERIL) 5 mg tablet, Take 1 tablet by mouth 2 (two) times a day, Disp: , Rfl:     fentaNYL (DURAGESIC) 50 mcg/hr, Place 1 patch on the skin every third day , Disp: , Rfl:     HYDROcodone-acetaminophen (NORCO) 5-325 mg per tablet, Take 1 tablet by mouth every 6 (six) hours as needed for moderate pain , Disp: , Rfl:     Potassium 99 MG TABS, Take by mouth, Disp: , Rfl:     prochlorperazine (COMPAZINE) 10 mg tablet, Take by mouth, Disp: , Rfl:     sertraline (ZOLOFT) 25 mg tablet, Take 25 mg by mouth daily  , Disp: , Rfl:     SUMAtriptan (IMITREX) 4 mg/0 5 mL SOAJ, Inject under the skin, Disp: , Rfl:   Allergies   Allergen Reactions    Ambien [Zolpidem]     Aspirin     Ceftin [Cefuroxime]     Ciprofloxacin     Demerol [Meperidine]     Keflex [Cephalexin]     Levaquin [Levofloxacin]     Macrodantin [Nitrofurantoin]     Naprosyn [Naproxen]     Prolia [Denosumab]     Sulfa Antibiotics     Zithromax [Azithromycin]     Zoloft [Sertraline]      Vitals:    01/24/19 1520   BP: 124/80   BP Location: Left arm   Patient Position: Sitting   Cuff Size: Standard   Pulse: 73   Weight: 58 4 kg (128 lb 12 8 oz)   Height: 5' 1 5" (1 562 m)     Weight (last 2 days)     Date/Time   Weight    01/24/19 1520  58 4 (128 8)             Blood pressure 124/80, pulse 73, height 5' 1 5" (1 562 m), weight 58 4 kg (128 lb 12 8 oz)  , Body mass index is 23 94 kg/m²      Labs:  Appointment on 12/11/2018   Component Date Value    Urine Culture 12/11/2018 No Growth <1000 cfu/mL     WBC 12/11/2018 7 96     RBC 12/11/2018 4 56     Hemoglobin 12/11/2018 13 8     Hematocrit 12/11/2018 42 6     MCV 12/11/2018 93     MCH 12/11/2018 30 3     MCHC 12/11/2018 32 4     RDW 12/11/2018 12 8     Platelets 48/90/5919 265     MPV 12/11/2018 10 0     Sodium 12/11/2018 139     Potassium 12/11/2018 3 9     Chloride 12/11/2018 106     CO2 12/11/2018 28     ANION GAP 12/11/2018 5     BUN 12/11/2018 19     Creatinine 12/11/2018 0 84     Glucose, Fasting 12/11/2018 85     Calcium 12/11/2018 9 7     AST 12/11/2018 14     ALT 12/11/2018 28     Alkaline Phosphatase 12/11/2018 86     Total Protein 12/11/2018 7 2     Albumin 12/11/2018 4 1     Total Bilirubin 12/11/2018 0 41     eGFR 12/11/2018 74     C-Peptide 12/11/2018 3 7     Insulin 12/11/2018 10 4    Transcribe Orders on 12/11/2018   Component Date Value    Color, UA 12/11/2018 Yellow     Clarity, UA 12/11/2018 Clear     Specific Gravity, UA 12/11/2018 1 017     pH, UA 12/11/2018 6 5     Leukocytes, UA 12/11/2018 Negative     Nitrite, UA 12/11/2018 Negative     Protein, UA 12/11/2018 Negative     Glucose, UA 12/11/2018 Negative     Ketones, UA 12/11/2018 Negative     Urobilinogen, UA 12/11/2018 0 2     Bilirubin, UA 12/11/2018 Negative     Blood, UA 12/11/2018 Negative    Appointment on 10/31/2018   Component Date Value    Cholesterol 10/31/2018 221*    Triglycerides 10/31/2018 94     HDL, Direct 10/31/2018 65*    LDL Calculated 10/31/2018 137*    Non-HDL-Chol (CHOL-HDL) 10/31/2018 156     WBC 10/31/2018 6 81     RBC 10/31/2018 5 06     Hemoglobin 10/31/2018 15 1     Hematocrit 10/31/2018 46 4*    MCV 10/31/2018 92     MCH 10/31/2018 29 8     MCHC 10/31/2018 32 5     RDW 10/31/2018 13 0     Platelets 99/61/8934 294     MPV 10/31/2018 10 6     Sodium 10/31/2018 136     Potassium 10/31/2018 4 0     Chloride 10/31/2018 105     CO2 10/31/2018 27     ANION GAP 10/31/2018 4     BUN 10/31/2018 21     Creatinine 10/31/2018 1 07     Glucose, Fasting 10/31/2018 94     Calcium 10/31/2018 9 1     AST 10/31/2018 23     ALT 10/31/2018 33     Alkaline Phosphatase 10/31/2018 92     Total Protein 10/31/2018 7 5     Albumin 10/31/2018 4 3     Total Bilirubin 10/31/2018 0 56     eGFR 10/31/2018 55     TSH 3RD GENERATON 10/31/2018 2 030     Free T4 10/31/2018 1 13    Appointment on 08/01/2018   Component Date Value    TSH 3RD GENERATON 08/01/2018 2 100     Free T4 08/01/2018 1 15     WBC 08/01/2018 7 25     RBC 08/01/2018 4 74     Hemoglobin 08/01/2018 14 3     Hematocrit 08/01/2018 44 0     MCV 08/01/2018 93     MCH 08/01/2018 30 2     MCHC 08/01/2018 32 5     RDW 08/01/2018 12 3     MPV 08/01/2018 10 0     Platelets 93/97/6369 307     nRBC 08/01/2018 0     Neutrophils Relative 08/01/2018 56  Immat GRANS % 08/01/2018 1     Lymphocytes Relative 08/01/2018 30     Monocytes Relative 08/01/2018 10     Eosinophils Relative 08/01/2018 2     Basophils Relative 08/01/2018 1     Neutrophils Absolute 08/01/2018 4 16     Immature Grans Absolute 08/01/2018 0 05     Lymphocytes Absolute 08/01/2018 2 16     Monocytes Absolute 08/01/2018 0 70     Eosinophils Absolute 08/01/2018 0 13     Basophils Absolute 08/01/2018 0 05      Imaging: No results found  Review of Systems:  Review of Systems   Constitutional: Negative for diaphoresis, fatigue, fever and unexpected weight change  HENT: Negative  Respiratory: Negative for cough, shortness of breath and wheezing  Cardiovascular: Positive for chest pain  Negative for palpitations and leg swelling  Gastrointestinal: Negative for abdominal pain, diarrhea and nausea  Musculoskeletal: Negative for gait problem and myalgias  Skin: Negative for rash  Neurological: Negative for dizziness and numbness  Psychiatric/Behavioral: Negative  Physical Exam:  Physical Exam   Constitutional: She is oriented to person, place, and time  She appears well-developed and well-nourished  HENT:   Head: Normocephalic and atraumatic  Eyes: Pupils are equal, round, and reactive to light  Neck: Normal range of motion  Neck supple  No JVD present  Cardiovascular: Regular rhythm, S1 normal, S2 normal and normal pulses  Pulses:       Carotid pulses are 2+ on the right side, and 2+ on the left side  Pulmonary/Chest: Effort normal and breath sounds normal  She has no wheezes  She has no rales  Abdominal: Soft  Bowel sounds are normal  There is no tenderness  Musculoskeletal: Normal range of motion  She exhibits no edema or tenderness  Neurological: She is alert and oriented to person, place, and time  She has normal reflexes  No cranial nerve deficit  Skin: Skin is warm  Psychiatric: She has a normal mood and affect

## 2019-01-25 NOTE — TELEPHONE ENCOUNTER
Patient called looking to schedule an appointment for rheumatologist in the [facility], she have [facility]  She is requesting a call back with backup information on the doctor prior to scheduling please advise thanks                    Call back# [phone]

## 2019-01-28 ENCOUNTER — TELEPHONE (OUTPATIENT)
Dept: SLEEP CENTER | Facility: CLINIC | Age: 64
End: 2019-01-28

## 2019-01-28 NOTE — TELEPHONE ENCOUNTER
----- Message from Marky Garzon MD sent at 1/16/2019  3:59 PM EST -----  No data provided to justify study  ----- Message -----  From: Antonio Casarez MA  Sent: 1/16/2019   1:05 PM  To: Marky Garzon MD    This sleep study needs approval      If approved please sign and return to clerical pool  If denied please include reasons why  Also provide alternative testing if warranted  Please sign and return to clerical pool

## 2019-01-30 RX ORDER — LEVALBUTEROL INHALATION SOLUTION 1.25 MG/3ML
1.25 SOLUTION RESPIRATORY (INHALATION) EVERY 6 HOURS PRN
Refills: 2 | COMMUNITY
Start: 2019-01-07

## 2019-01-30 RX ORDER — BECLOMETHASONE DIPROPIONATE HFA 80 UG/1
AEROSOL, METERED RESPIRATORY (INHALATION)
Refills: 11 | COMMUNITY
Start: 2019-01-07 | End: 2019-04-18 | Stop reason: SDUPTHER

## 2019-01-31 ENCOUNTER — OFFICE VISIT (OUTPATIENT)
Dept: SLEEP CENTER | Facility: CLINIC | Age: 64
End: 2019-01-31
Payer: COMMERCIAL

## 2019-01-31 VITALS
WEIGHT: 131 LBS | SYSTOLIC BLOOD PRESSURE: 126 MMHG | DIASTOLIC BLOOD PRESSURE: 78 MMHG | OXYGEN SATURATION: 99 % | BODY MASS INDEX: 24.73 KG/M2 | HEIGHT: 61 IN

## 2019-01-31 DIAGNOSIS — G47.33 OSA (OBSTRUCTIVE SLEEP APNEA): Primary | ICD-10-CM

## 2019-01-31 DIAGNOSIS — J45.40 MODERATE PERSISTENT ASTHMA, UNSPECIFIED WHETHER COMPLICATED: ICD-10-CM

## 2019-01-31 DIAGNOSIS — M79.7 FIBROMYALGIA: ICD-10-CM

## 2019-01-31 DIAGNOSIS — F45.8 BRUXISM: ICD-10-CM

## 2019-01-31 DIAGNOSIS — F32.A ANXIETY AND DEPRESSION: ICD-10-CM

## 2019-01-31 DIAGNOSIS — F41.9 ANXIETY AND DEPRESSION: ICD-10-CM

## 2019-01-31 DIAGNOSIS — K21.9 GASTROESOPHAGEAL REFLUX DISEASE WITHOUT ESOPHAGITIS: ICD-10-CM

## 2019-01-31 DIAGNOSIS — I95.1 ORTHOSTATIC HYPOTENSION: ICD-10-CM

## 2019-01-31 DIAGNOSIS — G47.09 OTHER INSOMNIA: ICD-10-CM

## 2019-01-31 DIAGNOSIS — J31.0 CHRONIC RHINITIS: ICD-10-CM

## 2019-01-31 DIAGNOSIS — G25.81 RESTLESS LEG SYNDROME: ICD-10-CM

## 2019-01-31 DIAGNOSIS — G89.4 CHRONIC PAIN DISORDER: ICD-10-CM

## 2019-01-31 PROCEDURE — 99204 OFFICE O/P NEW MOD 45 MIN: CPT | Performed by: INTERNAL MEDICINE

## 2019-01-31 RX ORDER — OXYBUTYNIN CHLORIDE 5 MG/1
TABLET ORAL
Refills: 1 | COMMUNITY
Start: 2019-01-25 | End: 2019-04-18 | Stop reason: SDUPTHER

## 2019-01-31 NOTE — PROGRESS NOTES
Consultation - [facility]  58 y o  female  PWB:[id number]  QQN:[id number]    Physician Requesting Consult: Columba Moe MD     Reason for Consult : At your kind request I saw this patient for initial sleep evaluation today  She was diagnosed with severe sleep disordered breathing in the past  and prescribed BiPAP  Her machine was repossessed because of poor compliance  She returns because of more sleep issues  The patient had both diagnostic and therapeutic sleep studies in 2014: The diagnostic study confirmed severe obstructive sleep apnea:  RDI 94/hour  Intermittent snoring  of moderate intensity was noted  Minimum oxygen saturation 85 %  [and 62% of total sleep time was spent with saturations less than 90%  During the subsequent therapeutic study, sleep disordered breathing was sufficiently remediated with PAP at 16/10 cm H2O  She continued to have some central events for an index of 4 per hour but minimum oxygen saturation was maintained above 93%  PFSH, Problem List, Medications & Allergies were reviewed in EMR  She  has a past medical history of Asthma; Back pain; Cardiac disease; Diverticulitis; [facility] ); Fibromyalgia; GERD (gastroesophageal reflux disease); Interstitial cystitis; Migraines; Osteoporosis; and Sjogren's syndrome ([facility] )  She has a current medication list which includes the following prescription(s): alum hydroxide-mag trisilicate, beclomethasone, vitamin d3, clotrimazole, cyclobenzaprine, fexofenadine, fluticasone, levalbuterol, levalbuterol, levothyroxine, midodrine, multiple vitamins-calcium, omeprazole, oxybutynin, prochlorperazine, qvar redihaler, and virtussin a/c  HPI:  She reports awakening with coughing, choking or gasping and increased heart rate   (she has a diagnosis of POTS)  Her daughter has witnessed apneas  She is no longer on narcotics and is not using any sleep aids    Other Complaints:  Constant pain and excessive movement during sleep  Restless Leg Syndrome: has suggestive symptoms   Parasomnia activity: reports teeth grinding during sleep, but no other features of parasomnia  Sleep Routine: Typical Bedtime:  10:00 p m  Gets OOB:  6:00 a m   TIB:8 hrs Sleep latency: several hours Sleep Interruptions: 3-4 x/night and struggles to fall back asleep  Awakens: spontaneously and feels never [email or web address] hrs She has Excessive Daytime Sleepiness and is dozing off unintentionally  She estimates she is napping cumulatively for approximately 2 hours a day  [place] Sleepiness Scale rated at Total score: 10 /24  Habits: reports that she has never smoked  She has never used smokeless tobacco , reports that she does not drink alcohol ,  reports that she does not use drugs  ,Caffeine use: none , Exercise routine: regular    Family History:  She has suspects both her parents had obstructive sleep apnea  One of her siblings has obstructive sleep apnea  ROS: reviewed & multiple symptoms as attached  She has chronic pain but is intolerant of multiple medications  She has ongoing symptoms of anxiety and depression but also intolerant of medications that would trialed by her PCP  EXAM:    Vitals /78   Ht 5' 1" (1 549 m)   Wt 59 4 kg (131 lb)   SpO2 99%   BMI 24 75 kg/m²     General  Well groomed female, appears stated age, in no apparent distress  Psychiatric  Alert and cooperative  She is clearly anxious and has a constricted affect  Head   Craniofacial anatomy:normal Sinuses: non- tender  TMJ: Normal     Eyes   EOM's intact, conjunctiva/corneas clear         Nasal Airway  is patent Septum:central, Mucous membranes:appear normal     Turbinates:  are normal  There is no rhinorrhea; No PND     Oral   Airway   normal  Tongue:Modified Mallampati class II (hard and soft palate, upper portion of tonsils and uvula visible)  Palate: normal Tonsils: no hypertrophy  Teeth: normal       Neck    is lean; Neck Circumference: 34cm; Supple; no abnormal masses; Thyroid:normal  Trachea:central      Lymph    No Cervical or Submandibular Lymhadenopathy   Heart:    RRR; S1,S2 normal; no gallop; nomurmurs     Lungs   Respiratory Effort:normal  Air entry good bilaterally  No wheezes  No rales   Abdomen   Obese, Soft & non-tender     Extremities   No pedal edema  No clubbing or cyanosis  Skin   Skin is warm and dry; Color& Hydration good; no facial rashes or lesions    Neurologic  Speech is clear and coherent  CNII-XII intact  Muscskeltl    Muscle bulk, tone and power WNL Gait:  Ambulant with a cane          IMPRESSION: Primary Sleep/Secondary(to Medical or Psych conditions) & comorbidities   1  KELSI (obstructive sleep apnea)  CPAP Study   2  Other insomnia     3  Restless leg syndrome     4  Bruxism     5  Anxiety and depression     6  Chronic pain disorder     7  Fibromyalgia     8  Chronic rhinitis     9  Orthostatic hypotension     10  Moderate persistent asthma, unspecified whether complicated     11  Gastroesophageal reflux disease without esophagitis          PLAN:   1  Comprehensive counseling was provided on pathophysiology, diagnostic strategies & treatment options; effects on symptoms and comorbidities; risks of inadequate therapy; costs and insurance aspects  2  I advised on weight reduction, avoiding sleeping supine, using alcohol or sedating medications close to bed time and on safe driving practices  3  Cognitive behavioral therapy was initiated with advise on Sleep Hygiene and behavioral techniques to manage Insomnia  Specifically, keeping a regular routine, limiting time in bed 7 hours or less, avoiding daytime naps, regular exercise and on relaxation techniques  She will also need her underlying medical and psychiatric conditions that disturbing sleep to be addressed     Since options are limited, she may be a candidate for medical marijuana and will discuss this further with her care team   4  Repeat Nocturnal polysomnography is indicated and a diagnostic study has been scheduled by her PCP  5  Patient is willing to re-try Positive airway pressure therapy and will be scheduled for a titration study if indicated  6  Follow-up will be scheduled after the studies to review results, further details of treatment options and to initiate/adjust therapy  Thank you for allowing me to participate in the care of this patient  I will keep you apprised of developments      Sincerely,     Authenticated electronically by Zainab Salgado MD   on 89/23/38   Board Certified Specialist

## 2019-01-31 NOTE — PATIENT INSTRUCTIONS
What you can do to improve your sleep: (Sleep Hygiene) Basic rules for a good night's sleep    Create a regular sleep schedule  This will help you form a sleep routine  Keep a record of your sleep patterns, and any sleeping problems you have  Bring the record to follow-up visits with healthcare providers  Avoid prolonged use of light-emitting screens before bedtime or watching TV in bed  Avoid forcing sleep  Do not take naps  Naps could make it hard for you to fall asleep at bedtime  Deal with your worries before bedtime  Keep your bedroom cool, quiet, and dark  Turn on white noise, such as a fan, to help you relax  Do not use your bed for any activity that will keep you awake  Do not read, exercise, eat, or watch TV in your bedroom  Get up if you do not fall asleep within 20 minutes  Move to another room and do something relaxing until you become sleepy  Limit caffeine, alcohol, nicotine and food to earlier in the day  Only drink caffeine in the morning  Do not drink alcohol within 6 hours of bedtime  Do not eat a heavy meal right before you go to bed  Avoid smoking, especially in the evening  Exercise regularly  Daily exercise will help you sleep better  Do not exercise within 4 hours of bedtime  Stimulus control therapy rules  1  Go to bed only when sleepy  2  Do not watch television, read, eat, or worry while in bed  Use bed only for sleep and sex  3  Get out of bed if unable to fall asleep within 20 minutes and go to another room  Return to bed only when sleepy  Repeat this step as many times as necessary throughout the night  4  Set an alarm clock to wake up at a fixed time each morning, including weekends  5  Do not take a nap during the day  Data from: [address] [place] [address], 2200 scanR Nonpharmacologic treatments of insomnia  J Clin Psychiatry 7031; 53:37  Go to AASM website for more information: Sleepeducation  org     Recommended Reading:  Book by authors Rajni Lopez No More sleepless nights    What is KELSI? Obstructive sleep apnea is a common and serious sleep disorder that causes you to stop breathing during sleep  The airway repeatedly becomes blocked, limiting the amount of air that reaches your lungs  When this happens, you may snore loudly or making choking noises as you try to breathe  Your brain and body becomes oxygen deprived and you may wake up  This may happen a few times a night, or in more severe cases, several hundred times a night  Sleep apnea can make you wake up in the morning feeling tired or unrefreshed even though you have had a full night of sleep  During the day, you may feel fatigued, have difficulty concentrating or you may even unintentionally fall asleep  This is because your body is waking up numerous times throughout the night, even though you might not be conscious of each awakening  The lack of oxygen your body receives can have negative long-term consequences for your health  This includes:  High blood pressure  Heart disease  Irregular heart rhythms  Stroke  Pre-diabetes and diabetes  Depression    Testing  An objective evaluation of your sleep may be needed before your board certified sleep physician can make a diagnosis  Options include:   In-lab overnight sleep study  This type of sleep study requires you to stay overnight at a sleep center, in a bed that may resemble a hotel room  You will sleep with sensors hooked up to various parts of your body  These sensors record your brain waves, heartbeat, breathing and movement  An overnight sleep study also provides your doctor with the most complete information about your sleep   Learn more about an overnight sleep study      Home sleep apnea test  Some patients with high risk factors for obstructive sleep apnea and no other medical disorders may be candidates for a home sleep apnea test  The testing equipment differs in that it is less complicated than what is used in an overnight sleep study  As such, does not give all the data an in-lab will and if negative, may not mean you do not have the problem  Treatment for sleep apnea  includes using a continuous positive airway pressure (CPAP) machine to keep your airway open during sleep  A mask is placed over your nose and mouth, or just your nose  The mask is hooked to the CPAP machine that blows a gentle stream of air into the mask when you breathe  This helps keep your airway open so you can breathe more regularly  Extra oxygen may be given to you through the machine  You may be given a mouth device  It looks like a mouth guard or dental retainer and stops your tongue and mouth tissues from blocking your throat while you sleep  Surgery may be needed to remove extra tissues that block your mouth, throat, or nose  Manage sleep apnea:   Do not smoke  Nicotine and other chemicals in cigarettes and cigars can cause lung damage  Ask your healthcare provider for information if you currently smoke and need help to quit  E-cigarettes or smokeless tobacco still contain nicotine  Talk to your healthcare provider before you use these products  Do not drink alcohol or take sedative medicine before you go to sleep  Alcohol and sedatives can relax the muscles and tissues around your throat  This can block the airflow to your lungs  Maintain a healthy weight  Excess tissue around your throat may restrict your breathing  Ask your healthcare provider for information if you need to lose weight  Sleep on your side or use pillows designed to prevent sleep apnea  This prevents your tongue or other tissues from blocking your throat  You can also raise the head of your bed  Driving Safety  Refrain from driving when drowsy  Follow up with your healthcare provider as directed:  Write down your questions so you remember to ask them during your visits  Go to AASM website for more information: Sleepeducation  org     What is KELSI?    Obstructive sleep apnea is a common and serious sleep disorder that causes you to stop breathing during sleep  The airway repeatedly becomes blocked, limiting the amount of air that reaches your lungs  When this happens, you may snore loudly or making choking noises as you try to breathe  Your brain and body becomes oxygen deprived and you may wake up  This may happen a few times a night, or in more severe cases, several hundred times a night  Sleep apnea can make you wake up in the morning feeling tired or unrefreshed even though you have had a full night of sleep  During the day, you may feel fatigued, have difficulty concentrating or you may even unintentionally fall asleep  This is because your body is waking up numerous times throughout the night, even though you might not be conscious of each awakening  The lack of oxygen your body receives can have negative long-term consequences for your health  This includes:  High blood pressure  Heart disease  Irregular heart rhythms  Stroke  Pre-diabetes and diabetes  Depression    Testing  An objective evaluation of your sleep may be needed before your board certified sleep physician can make a diagnosis  Options include:   In-lab overnight sleep study  This type of sleep study requires you to stay overnight at a sleep center, in a bed that may resemble a hotel room  You will sleep with sensors hooked up to various parts of your body  These sensors record your brain waves, heartbeat, breathing and movement  An overnight sleep study also provides your doctor with the most complete information about your sleep  Learn more about an overnight sleep study      Home sleep apnea test  Some patients with high risk factors for obstructive sleep apnea and no other medical disorders may be candidates for a home sleep apnea test  The testing equipment differs in that it is less complicated than what is used in an overnight sleep study   As such, does not give all the data an in-lab will and if negative, may not mean you do not have the problem  Treatment for sleep apnea  includes using a continuous positive airway pressure (CPAP) machine to keep your airway open during sleep  A mask is placed over your nose and mouth, or just your nose  The mask is hooked to the CPAP machine that blows a gentle stream of air into the mask when you breathe  This helps keep your airway open so you can breathe more regularly  Extra oxygen may be given to you through the machine  You may be given a mouth device  It looks like a mouth guard or dental retainer and stops your tongue and mouth tissues from blocking your throat while you sleep  Surgery may be needed to remove extra tissues that block your mouth, throat, or nose  Manage sleep apnea:   Do not smoke  Nicotine and other chemicals in cigarettes and cigars can cause lung damage  Ask your healthcare provider for information if you currently smoke and need help to quit  E-cigarettes or smokeless tobacco still contain nicotine  Talk to your healthcare provider before you use these products  Do not drink alcohol or take sedative medicine before you go to sleep  Alcohol and sedatives can relax the muscles and tissues around your throat  This can block the airflow to your lungs  Maintain a healthy weight  Excess tissue around your throat may restrict your breathing  Ask your healthcare provider for information if you need to lose weight  Sleep on your side or use pillows designed to prevent sleep apnea  This prevents your tongue or other tissues from blocking your throat  You can also raise the head of your bed  Driving Safety  Refrain from driving when drowsy  Follow up with your healthcare provider as directed:  Write down your questions so you remember to ask them during your visits  Go to AASM website for more information: Sleepeducation  org

## 2019-01-31 NOTE — PROGRESS NOTES
Review of Systems      Genitourinary need to urinate more than twice a night, hot flashes at night and post menopausal (no peroids)   Cardiology Frequent chest pain or angina,  and palpitations/fluttering feeling in the chest   Gastrointestinal frequent heartburn/acid reflux and abdominal pain or cramping that disturb sleep    Neurology frequent headaches, awaken with headache, need to move extremities, numbness/tingling of an extremity, forgetfulness, poor concentration or confusion, , difficulty with memory and balance problems   Constitutional fatigue, excessive sweating at night and weight change   Integumentary rash or dry skin and itching   Psychiatry anxiety, depression, aggressiveness or irritability and mood change   Musculoskeletal joint pain, muscle aches, back pain, legs twitching/jerking, sciatica and leg cramps   Pulmonary shortness of breath with activity, chest tightness, wheezing, frequent cough and difficulty breathing when lying flat    ENT throat clearing and ringing in ears   Endocrine excessive thirst and frequent urination   Hematological none

## 2019-02-01 ENCOUNTER — TRANSCRIBE ORDERS (OUTPATIENT)
Dept: ADMINISTRATIVE | Facility: HOSPITAL | Age: 64
End: 2019-02-01

## 2019-02-01 DIAGNOSIS — Z12.39 SCREENING BREAST EXAMINATION: Primary | ICD-10-CM

## 2019-02-06 ENCOUNTER — HOSPITAL ENCOUNTER (OUTPATIENT)
Dept: SLEEP CENTER | Facility: HOSPITAL | Age: 64
Discharge: HOME | End: 2019-02-06
Attending: INTERNAL MEDICINE
Payer: COMMERCIAL

## 2019-02-06 DIAGNOSIS — G47.33 OSA (OBSTRUCTIVE SLEEP APNEA): ICD-10-CM

## 2019-02-06 PROCEDURE — 95810 POLYSOM 6/> YRS 4/> PARAM: CPT

## 2019-02-07 ENCOUNTER — HOSPITAL ENCOUNTER (OUTPATIENT)
Dept: MAMMOGRAPHY | Facility: HOSPITAL | Age: 64
Discharge: HOME | End: 2019-02-07
Payer: COMMERCIAL

## 2019-02-07 ENCOUNTER — TRANSCRIBE ORDERS (OUTPATIENT)
Dept: ADMINISTRATIVE | Facility: HOSPITAL | Age: 64
End: 2019-02-07

## 2019-02-07 ENCOUNTER — APPOINTMENT (OUTPATIENT)
Dept: LAB | Facility: HOSPITAL | Age: 64
End: 2019-02-07
Payer: COMMERCIAL

## 2019-02-07 VITALS — HEIGHT: 61 IN | BODY MASS INDEX: 24.73 KG/M2 | WEIGHT: 131 LBS

## 2019-02-07 DIAGNOSIS — I51.9 MYXEDEMA HEART DISEASE: Primary | ICD-10-CM

## 2019-02-07 DIAGNOSIS — E03.9 HYPOTHYROIDISM, UNSPECIFIED TYPE: ICD-10-CM

## 2019-02-07 DIAGNOSIS — R53.83 FATIGUE, UNSPECIFIED TYPE: ICD-10-CM

## 2019-02-07 DIAGNOSIS — E03.9 MYXEDEMA HEART DISEASE: Primary | ICD-10-CM

## 2019-02-07 DIAGNOSIS — Z12.39 SCREENING BREAST EXAMINATION: ICD-10-CM

## 2019-02-07 DIAGNOSIS — E03.9 MYXEDEMA HEART DISEASE: ICD-10-CM

## 2019-02-07 DIAGNOSIS — I51.9 MYXEDEMA HEART DISEASE: ICD-10-CM

## 2019-02-07 LAB
ALBUMIN SERPL BCP-MCNC: 4.1 G/DL (ref 3.5–5)
ALP SERPL-CCNC: 158 U/L (ref 46–116)
ALT SERPL W P-5'-P-CCNC: 81 U/L (ref 12–78)
ANION GAP SERPL CALCULATED.3IONS-SCNC: 12 MMOL/L (ref 4–13)
AST SERPL W P-5'-P-CCNC: 33 U/L (ref 5–45)
BILIRUB SERPL-MCNC: 0.6 MG/DL (ref 0.2–1)
BUN SERPL-MCNC: 21 MG/DL (ref 5–25)
CALCIUM SERPL-MCNC: 9.5 MG/DL (ref 8.3–10.1)
CHLORIDE SERPL-SCNC: 103 MMOL/L (ref 100–108)
CO2 SERPL-SCNC: 26 MMOL/L (ref 21–32)
CREAT SERPL-MCNC: 0.99 MG/DL (ref 0.6–1.3)
ERYTHROCYTE [DISTWIDTH] IN BLOOD BY AUTOMATED COUNT: 12.8 % (ref 11.6–15.1)
GFR SERPL CREATININE-BSD FRML MDRD: 60 ML/MIN/1.73SQ M
GLUCOSE P FAST SERPL-MCNC: 85 MG/DL (ref 65–99)
HCT VFR BLD AUTO: 45.8 % (ref 34.8–46.1)
HGB BLD-MCNC: 15.1 G/DL (ref 11.5–15.4)
MCH RBC QN AUTO: 31.1 PG (ref 26.8–34.3)
MCHC RBC AUTO-ENTMCNC: 33 G/DL (ref 31.4–37.4)
MCV RBC AUTO: 94 FL (ref 82–98)
PLATELET # BLD AUTO: 309 THOUSANDS/UL (ref 149–390)
PMV BLD AUTO: 9.8 FL (ref 8.9–12.7)
POTASSIUM SERPL-SCNC: 4 MMOL/L (ref 3.5–5.3)
PROT SERPL-MCNC: 7.3 G/DL (ref 6.4–8.2)
RBC # BLD AUTO: 4.86 MILLION/UL (ref 3.81–5.12)
SODIUM SERPL-SCNC: 141 MMOL/L (ref 136–145)
T4 FREE SERPL-MCNC: 1.06 NG/DL (ref 0.76–1.46)
TSH SERPL DL<=0.05 MIU/L-ACNC: 3.37 UIU/ML (ref 0.36–3.74)
WBC # BLD AUTO: 7.84 THOUSAND/UL (ref 4.31–10.16)

## 2019-02-07 PROCEDURE — 86376 MICROSOMAL ANTIBODY EACH: CPT

## 2019-02-07 PROCEDURE — 77067 SCR MAMMO BI INCL CAD: CPT

## 2019-02-07 PROCEDURE — 84439 ASSAY OF FREE THYROXINE: CPT

## 2019-02-07 PROCEDURE — 36415 COLL VENOUS BLD VENIPUNCTURE: CPT

## 2019-02-07 PROCEDURE — 77063 BREAST TOMOSYNTHESIS BI: CPT

## 2019-02-07 PROCEDURE — 85027 COMPLETE CBC AUTOMATED: CPT

## 2019-02-07 PROCEDURE — 80053 COMPREHEN METABOLIC PANEL: CPT

## 2019-02-07 PROCEDURE — 84443 ASSAY THYROID STIM HORMONE: CPT

## 2019-02-07 NOTE — PROGRESS NOTES
Sleep Study Documentation    Pre-Sleep Study       Sleep testing procedure explained to patient:YES    Patient napped prior to study:YES- more than 30 minutes  Napped after 2PM: no    Caffeine:Dayshift worker after 12PM   Caffeine use:NO    Alcohol:Dayshift workers after 5PM: Alcohol use:NO    Typical day for patient:YES       Study Documentation  Diagnostic   Snore:Mild  Supplemental O2: no    O2 flow rate (L/min) range N/A  O2 flow rate (L/min) final N/A  Minimum SaO2 89%  Baseline SaO2 94%        Mode of Therapy:    EKG abnormalities: no     EEG abnormalities: no    Study Terminated:no    Patient classification: disabled       Post-Sleep Study    Medication used at bedtime or during sleep study:NO    Patient reports time it took to fall asleep:20 to 30 minutes    Patient reports waking up during study:3 or more times  Patient reports returning to sleep in 10 to 30 minutes  Patient reports sleeping 4 to 6 hours without dreaming  Patient reports sleep during study:typical    Patient rated sleepiness: Very sleepy or tired    PAP treatment:no

## 2019-02-08 ENCOUNTER — TELEPHONE (OUTPATIENT)
Dept: SLEEP CENTER | Facility: CLINIC | Age: 64
End: 2019-02-08

## 2019-02-08 LAB — THYROPEROXIDASE AB SERPL-ACNC: 43 IU/ML (ref 0–34)

## 2019-02-08 NOTE — TELEPHONE ENCOUNTER
Left detailed message, sleep study does not reveal KELSI, will cancel 2/25/19 CPAP study, and keep 3/21/19 at 2 pm in [place] [facility] with Dr Stacy JUSTIN appt also cancelled 3/21/19

## 2019-03-11 ENCOUNTER — TRANSCRIBE ORDERS (OUTPATIENT)
Dept: ADMINISTRATIVE | Facility: HOSPITAL | Age: 64
End: 2019-03-11

## 2019-03-11 DIAGNOSIS — R19.7 DIARRHEA, UNSPECIFIED TYPE: ICD-10-CM

## 2019-03-11 DIAGNOSIS — R94.5 ABNORMAL LIVER FUNCTION: ICD-10-CM

## 2019-03-11 DIAGNOSIS — R10.11 RUQ PAIN: Primary | ICD-10-CM

## 2019-03-15 ENCOUNTER — HOSPITAL ENCOUNTER (OUTPATIENT)
Dept: ULTRASOUND IMAGING | Facility: HOSPITAL | Age: 64
Discharge: HOME | End: 2019-03-15
Payer: COMMERCIAL

## 2019-03-15 ENCOUNTER — TRANSCRIBE ORDERS (OUTPATIENT)
Dept: ADMINISTRATIVE | Facility: HOSPITAL | Age: 64
End: 2019-03-15

## 2019-03-15 ENCOUNTER — APPOINTMENT (OUTPATIENT)
Dept: LAB | Facility: HOSPITAL | Age: 64
End: 2019-03-15
Payer: COMMERCIAL

## 2019-03-15 DIAGNOSIS — R19.7 DIARRHEA, UNSPECIFIED TYPE: ICD-10-CM

## 2019-03-15 DIAGNOSIS — R89.9 ABNORMAL LABORATORY TEST: ICD-10-CM

## 2019-03-15 DIAGNOSIS — K21.00 REFLUX ESOPHAGITIS: ICD-10-CM

## 2019-03-15 DIAGNOSIS — M79.7 SCAPULOHUMERAL FIBROSITIS: Primary | ICD-10-CM

## 2019-03-15 DIAGNOSIS — R10.11 INTERMITTENT RIGHT UPPER QUADRANT ABDOMINAL PAIN: ICD-10-CM

## 2019-03-15 DIAGNOSIS — M79.7 SCAPULOHUMERAL FIBROSITIS: ICD-10-CM

## 2019-03-15 DIAGNOSIS — R94.5 ABNORMAL LIVER FUNCTION: ICD-10-CM

## 2019-03-15 DIAGNOSIS — R10.11 RUQ PAIN: ICD-10-CM

## 2019-03-15 LAB
ALBUMIN SERPL BCP-MCNC: 4 G/DL (ref 3.5–5)
ALP SERPL-CCNC: 117 U/L (ref 46–116)
ALT SERPL W P-5'-P-CCNC: 33 U/L (ref 12–78)
ANION GAP SERPL CALCULATED.3IONS-SCNC: 10 MMOL/L (ref 4–13)
AST SERPL W P-5'-P-CCNC: 27 U/L (ref 5–45)
BASOPHILS # BLD AUTO: 0.07 THOUSANDS/ΜL (ref 0–0.1)
BASOPHILS NFR BLD AUTO: 1 % (ref 0–1)
BILIRUB SERPL-MCNC: 0.5 MG/DL (ref 0.2–1)
BUN SERPL-MCNC: 23 MG/DL (ref 5–25)
CALCIUM SERPL-MCNC: 9.5 MG/DL (ref 8.3–10.1)
CHLORIDE SERPL-SCNC: 105 MMOL/L (ref 100–108)
CO2 SERPL-SCNC: 27 MMOL/L (ref 21–32)
CREAT SERPL-MCNC: 0.98 MG/DL (ref 0.6–1.3)
EOSINOPHIL # BLD AUTO: 0.15 THOUSAND/ΜL (ref 0–0.61)
EOSINOPHIL NFR BLD AUTO: 2 % (ref 0–6)
ERYTHROCYTE [DISTWIDTH] IN BLOOD BY AUTOMATED COUNT: 12.2 % (ref 11.6–15.1)
ERYTHROCYTE [SEDIMENTATION RATE] IN BLOOD: 5 MM/HOUR (ref 0–20)
GFR SERPL CREATININE-BSD FRML MDRD: 61 ML/MIN/1.73SQ M
GLUCOSE P FAST SERPL-MCNC: 91 MG/DL (ref 65–99)
HCT VFR BLD AUTO: 46.6 % (ref 34.8–46.1)
HGB BLD-MCNC: 15.3 G/DL (ref 11.5–15.4)
IMM GRANULOCYTES # BLD AUTO: 0.03 THOUSAND/UL (ref 0–0.2)
IMM GRANULOCYTES NFR BLD AUTO: 0 % (ref 0–2)
INR PPP: 1.02 (ref 0.86–1.17)
IRON SATN MFR SERPL: 33 %
IRON SERPL-MCNC: 107 UG/DL (ref 50–170)
LYMPHOCYTES # BLD AUTO: 1.94 THOUSANDS/ΜL (ref 0.6–4.47)
LYMPHOCYTES NFR BLD AUTO: 26 % (ref 14–44)
MCH RBC QN AUTO: 30.3 PG (ref 26.8–34.3)
MCHC RBC AUTO-ENTMCNC: 32.8 G/DL (ref 31.4–37.4)
MCV RBC AUTO: 92 FL (ref 82–98)
MONOCYTES # BLD AUTO: 0.73 THOUSAND/ΜL (ref 0.17–1.22)
MONOCYTES NFR BLD AUTO: 10 % (ref 4–12)
NEUTROPHILS # BLD AUTO: 4.7 THOUSANDS/ΜL (ref 1.85–7.62)
NEUTS SEG NFR BLD AUTO: 61 % (ref 43–75)
NRBC BLD AUTO-RTO: 0 /100 WBCS
PLATELET # BLD AUTO: 327 THOUSANDS/UL (ref 149–390)
PMV BLD AUTO: 10.1 FL (ref 8.9–12.7)
POTASSIUM SERPL-SCNC: 4 MMOL/L (ref 3.5–5.3)
PROT SERPL-MCNC: 7 G/DL (ref 6.4–8.2)
PROTHROMBIN TIME: 12.9 SECONDS (ref 11.8–14.2)
RBC # BLD AUTO: 5.05 MILLION/UL (ref 3.81–5.12)
SODIUM SERPL-SCNC: 142 MMOL/L (ref 136–145)
TIBC SERPL-MCNC: 323 UG/DL (ref 250–450)
WBC # BLD AUTO: 7.62 THOUSAND/UL (ref 4.31–10.16)

## 2019-03-15 PROCEDURE — 86148 ANTI-PHOSPHOLIPID ANTIBODY: CPT

## 2019-03-15 PROCEDURE — 80074 ACUTE HEPATITIS PANEL: CPT

## 2019-03-15 PROCEDURE — 86235 NUCLEAR ANTIGEN ANTIBODY: CPT

## 2019-03-15 PROCEDURE — 85732 THROMBOPLASTIN TIME PARTIAL: CPT

## 2019-03-15 PROCEDURE — 80053 COMPREHEN METABOLIC PANEL: CPT

## 2019-03-15 PROCEDURE — 86146 BETA-2 GLYCOPROTEIN ANTIBODY: CPT

## 2019-03-15 PROCEDURE — 86849 IMMUNOLOGY PROCEDURE: CPT

## 2019-03-15 PROCEDURE — 76705 ECHO EXAM OF ABDOMEN: CPT

## 2019-03-15 PROCEDURE — 86038 ANTINUCLEAR ANTIBODIES: CPT

## 2019-03-15 PROCEDURE — 86147 CARDIOLIPIN ANTIBODY EA IG: CPT

## 2019-03-15 PROCEDURE — 85705 THROMBOPLASTIN INHIBITION: CPT

## 2019-03-15 PROCEDURE — 85610 PROTHROMBIN TIME: CPT

## 2019-03-15 PROCEDURE — 86256 FLUORESCENT ANTIBODY TITER: CPT

## 2019-03-15 PROCEDURE — 85652 RBC SED RATE AUTOMATED: CPT

## 2019-03-15 PROCEDURE — 85613 RUSSELL VIPER VENOM DILUTED: CPT

## 2019-03-15 PROCEDURE — 36415 COLL VENOUS BLD VENIPUNCTURE: CPT

## 2019-03-15 PROCEDURE — 83550 IRON BINDING TEST: CPT

## 2019-03-15 PROCEDURE — 85025 COMPLETE CBC W/AUTO DIFF WBC: CPT

## 2019-03-15 PROCEDURE — 83540 ASSAY OF IRON: CPT

## 2019-03-16 LAB
ACTIN IGG SERPL-ACNC: 4 UNITS (ref 0–19)
HAV IGM SER QL: NORMAL
HBV CORE IGM SER QL: NORMAL
HBV SURFACE AG SER QL: NORMAL
HCV AB SER QL: NORMAL
MITOCHONDRIA M2 IGG SER-ACNC: <20 UNITS (ref 0–20)

## 2019-03-18 LAB — RYE IGE QN: NEGATIVE

## 2019-03-19 LAB
Lab: NORMAL
MISCELLANEOUS LAB TEST RESULT: NORMAL
STONE ANALYSIS-IMP: NORMAL

## 2019-03-21 ENCOUNTER — OFFICE VISIT (OUTPATIENT)
Dept: SLEEP CENTER | Facility: CLINIC | Age: 64
End: 2019-03-21
Payer: COMMERCIAL

## 2019-03-21 VITALS
OXYGEN SATURATION: 92 % | SYSTOLIC BLOOD PRESSURE: 116 MMHG | BODY MASS INDEX: 24.05 KG/M2 | WEIGHT: 127.4 LBS | HEIGHT: 61 IN | DIASTOLIC BLOOD PRESSURE: 70 MMHG

## 2019-03-21 DIAGNOSIS — F32.A ANXIETY AND DEPRESSION: ICD-10-CM

## 2019-03-21 DIAGNOSIS — G47.30 SLEEP-RELATED BREATHING DISORDER: ICD-10-CM

## 2019-03-21 DIAGNOSIS — F45.8 BRUXISM: ICD-10-CM

## 2019-03-21 DIAGNOSIS — F41.9 ANXIETY AND DEPRESSION: ICD-10-CM

## 2019-03-21 DIAGNOSIS — M79.7 FIBROMYALGIA: ICD-10-CM

## 2019-03-21 DIAGNOSIS — G89.4 CHRONIC PAIN DISORDER: ICD-10-CM

## 2019-03-21 DIAGNOSIS — J31.0 CHRONIC RHINITIS: ICD-10-CM

## 2019-03-21 DIAGNOSIS — G47.09 OTHER INSOMNIA: Primary | ICD-10-CM

## 2019-03-21 DIAGNOSIS — G25.81 RESTLESS LEG SYNDROME: ICD-10-CM

## 2019-03-21 DIAGNOSIS — J45.40 MODERATE PERSISTENT ASTHMA, UNSPECIFIED WHETHER COMPLICATED: ICD-10-CM

## 2019-03-21 PROCEDURE — 99214 OFFICE O/P EST MOD 30 MIN: CPT | Performed by: INTERNAL MEDICINE

## 2019-03-21 RX ORDER — TRAMADOL HYDROCHLORIDE 50 MG/1
50 TABLET ORAL EVERY 8 HOURS PRN
Status: ON HOLD | COMMUNITY
End: 2021-08-10 | Stop reason: ALTCHOICE

## 2019-03-21 RX ORDER — AMOXICILLIN 500 MG/1
500 CAPSULE ORAL EVERY 8 HOURS SCHEDULED
COMMUNITY
End: 2019-04-18 | Stop reason: ALTCHOICE

## 2019-03-21 NOTE — PROGRESS NOTES
Follow-up Note - [address] [facility]  58 y o  female  YOA:[birth year]  INJ:[id number]    I saw Niles Christianson in sleep clinic today for her sleep complaints & medical conditions  Patient recently had a diagnostic sleep study and is here to review results / treatment options  The study demonstrated no evidence for obstructive sleep apnea: AHI 2 /hour she had no stage REM  Nannette Byrd Intermittent snoring of moderate intensity was noted    Minimum oxygen saturation 89 %  sleep efficiency was reduced at 65 2%  Sleep latency was prolonged at 68 minutes  There was frequent spontaneous arousals for an index of 25 per hour  PFSH, Problem List, Medications & Allergies were reviewed in EMR  Interval changes: none reported  She  has a past medical history of Asthma, Back pain, Cardiac disease, Diverticulitis, Factor V Leiden ([facility] [address] ), Fibromyalgia, GERD (gastroesophageal reflux disease), Interstitial cystitis, Migraines, Osteoporosis, Scoliosis, and Sjogren's syndrome ([facility] [address] )  She has a current medication list which includes the following prescription(s): alum hydroxide-mag trisilicate, amoxicillin, beclomethasone, vitamin d3, clotrimazole, cyclobenzaprine, fexofenadine, fluticasone, levalbuterol, levalbuterol, levothyroxine, midodrine, multiple vitamins-calcium, omeprazole, oxybutynin, tramadol, prochlorperazine, qvar redihaler, and virtussin a/c     ROS: reviewed see attached  HPI:  She continues to report abdominal and back pain for which she is being evaluated  She has ongoing sleep difficulties and anxiety  She has chronic back pain and had recent radiofrequency ablation  She has peripheral neuropathy and restless leg symptoms  She just got a prescription for tramadol and after 1 nights use, felt she did sleep better  She uses a dental guard for bruxism    She is seeing rheumatologist for suspected Sjogren's, ENT, pain management, psychiatrists, neurologist and cardiologist   She attributes her anxiety and depression to her various maladies  Sleep Routine: No interval changes:  She continues to report difficulties initiating and maintaining sleep  She awakens feeling under refreshed  [She has excessive drowsiness and rated herself at Total score: 9 /24 on the [place] sleepiness scale ]  She tries to avoid napping  EXAM: /70   Ht 5' 1" (1 549 m)   Wt 57 8 kg (127 lb 6 4 oz)   SpO2 92%   BMI 24 07 kg/m²     Patient is well groomed; well appearing  Skin/Extrem: warm & dry; col & hydration normal; no edema  Psych: cooperativeand in no distress  Appears anxious and has a constricted affect  CNS: Alert, orientated, speech is pressured  H&N: EOMI; NC/AT:no facial pressure marks, no rashes  Neck Circumference: 36 cm  ENMT Mucus membranes appear normal Nasal airway:patent  Oral airway:  crowded  Resp:effort is normal CVS: RRR ABD:truncal obesity MSK:Gait normal     IMPRESSION: Primary Sleep/Secondary(to Medical or Psych conditions) & comorbidities   1  Other insomnia     2  Restless leg syndrome     3  Bruxism     4  Sleep-related breathing disorder     5  Anxiety and depression     6  Fibromyalgia     7  Chronic pain disorder     8  Chronic rhinitis     9  Moderate persistent asthma, unspecified whether complicated       PLAN:    1  I reviewed results of the Sleep studies with the patient  2  With respect to above conditions, I counseled on pathophysiology, diagnosis, treatment options, risks and benefits; inter-relationship and effects on symptoms and comorbidities; risks of no treatment; costs and insurance aspects  3  Positive airway pressure therapy is not warranted at this time  4  Cognitive behavioral therapy was continued, Sleep Hygiene and behavioral techniques to manage Insomnia were discussed and as outlined in her previous encounter  5  Tramadol is helping with her restless leg symptoms, pain and sleep       6  She working at getting her chronic pain, medical and psychiatric problems addressed  7  I have not scheduled any follow-up in Sleep Clinic at this time  Thank you for allowing me to participate in the care of this patient       Sincerely,    Authenticated electronically by Mian Duque MD on 32/10/93   Board Certified Specialist

## 2019-03-21 NOTE — PROGRESS NOTES
Review of Systems      Genitourinary need to urinate more than twice a night and post menopausal (no peroids)   Cardiology palpitations/fluttering feeling in the chest   Gastrointestinal frequent heartburn/acid reflux and abdominal pain or cramping that disturb sleep    Neurology frequent headaches, awaken with headache, need to move extremities, muscle weakness, numbness/tingling of an extremity, forgetfulness, poor concentration or confusion, , difficulty with memory and balance problems   Constitutional fatigue and excessive sweating at night   Integumentary rash or dry skin and itching   Psychiatry anxiety, aggressiveness or irritability and mood change   Musculoskeletal joint pain, muscle aches, back pain, legs twitching/jerking, sciatica and leg cramps   Pulmonary shortness of breath with activity, chest tightness, wheezing, frequent cough and difficulty breathing when lying flat    ENT throat clearing and ringing in ears   Endocrine excessive thirst   Hematological none

## 2019-04-04 ENCOUNTER — HOSPITAL ENCOUNTER (OUTPATIENT)
Dept: CT IMAGING | Facility: HOSPITAL | Age: 64
Discharge: HOME | End: 2019-04-04
Attending: OTOLARYNGOLOGY
Payer: COMMERCIAL

## 2019-04-04 DIAGNOSIS — G50.1 ATYPICAL FACIAL PAIN: ICD-10-CM

## 2019-04-04 PROCEDURE — 70486 CT MAXILLOFACIAL W/O DYE: CPT

## 2019-04-19 ENCOUNTER — ANESTHESIA EVENT (OUTPATIENT)
Dept: PERIOP | Facility: HOSPITAL | Age: 64
End: 2019-04-19
Payer: COMMERCIAL

## 2019-04-24 ENCOUNTER — ANESTHESIA (OUTPATIENT)
Dept: PERIOP | Facility: HOSPITAL | Age: 64
End: 2019-04-24
Payer: COMMERCIAL

## 2019-04-24 ENCOUNTER — APPOINTMENT (OUTPATIENT)
Dept: LAB | Facility: HOSPITAL | Age: 64
End: 2019-04-24
Attending: INTERNAL MEDICINE
Payer: COMMERCIAL

## 2019-04-24 ENCOUNTER — HOSPITAL ENCOUNTER (OUTPATIENT)
Facility: HOSPITAL | Age: 64
Setting detail: OUTPATIENT SURGERY
Discharge: HOME | End: 2019-04-24
Attending: INTERNAL MEDICINE | Admitting: INTERNAL MEDICINE
Payer: COMMERCIAL

## 2019-04-24 ENCOUNTER — TRANSCRIBE ORDERS (OUTPATIENT)
Dept: ADMINISTRATIVE | Facility: HOSPITAL | Age: 64
End: 2019-04-24

## 2019-04-24 VITALS
SYSTOLIC BLOOD PRESSURE: 125 MMHG | WEIGHT: 120 LBS | HEART RATE: 58 BPM | RESPIRATION RATE: 18 BRPM | BODY MASS INDEX: 22.66 KG/M2 | OXYGEN SATURATION: 99 % | HEIGHT: 61 IN | DIASTOLIC BLOOD PRESSURE: 63 MMHG | TEMPERATURE: 98 F

## 2019-04-24 DIAGNOSIS — E55.9 VITAMIN D DEFICIENCY: ICD-10-CM

## 2019-04-24 DIAGNOSIS — E03.9 HYPOTHYROIDISM, UNSPECIFIED TYPE: ICD-10-CM

## 2019-04-24 DIAGNOSIS — R11.0 NAUSEA: ICD-10-CM

## 2019-04-24 DIAGNOSIS — E03.9 HYPOTHYROIDISM, UNSPECIFIED TYPE: Primary | ICD-10-CM

## 2019-04-24 DIAGNOSIS — K21.9 GASTRO-ESOPHAGEAL REFLUX DISEASE WITHOUT ESOPHAGITIS: ICD-10-CM

## 2019-04-24 LAB
25(OH)D3 SERPL-MCNC: 69.6 NG/ML (ref 30–100)
ALBUMIN SERPL BCP-MCNC: 4.7 G/DL (ref 3.5–5.7)
ALP SERPL-CCNC: 86 U/L (ref 55–165)
ALT SERPL W P-5'-P-CCNC: 30 U/L (ref 7–52)
ANION GAP SERPL CALCULATED.3IONS-SCNC: 7 MMOL/L (ref 4–13)
AST SERPL W P-5'-P-CCNC: 33 U/L (ref 13–39)
BILIRUB SERPL-MCNC: 0.7 MG/DL (ref 0.2–1)
BUN SERPL-MCNC: 16 MG/DL (ref 7–25)
CALCIUM SERPL-MCNC: 9.9 MG/DL (ref 8.6–10.5)
CHLORIDE SERPL-SCNC: 104 MMOL/L (ref 98–107)
CO2 SERPL-SCNC: 28 MMOL/L (ref 21–31)
CREAT SERPL-MCNC: 0.95 MG/DL (ref 0.6–1.2)
ERYTHROCYTE [DISTWIDTH] IN BLOOD BY AUTOMATED COUNT: 12.5 % (ref 11.5–14.5)
GFR SERPL CREATININE-BSD FRML MDRD: 63 ML/MIN/1.73SQ M
GLUCOSE P FAST SERPL-MCNC: 98 MG/DL (ref 65–99)
HCT VFR BLD AUTO: 45.6 % (ref 42–47)
HGB BLD-MCNC: 15.4 G/DL (ref 12–16)
MCH RBC QN AUTO: 30.6 PG (ref 26–34)
MCHC RBC AUTO-ENTMCNC: 33.7 G/DL (ref 31–37)
MCV RBC AUTO: 91 FL (ref 81–99)
PLATELET # BLD AUTO: 287 THOUSANDS/UL (ref 149–390)
PMV BLD AUTO: 8.9 FL (ref 8.6–11.7)
POTASSIUM SERPL-SCNC: 4.1 MMOL/L (ref 3.5–5.5)
PROT SERPL-MCNC: 7 G/DL (ref 6.4–8.9)
RBC # BLD AUTO: 5.03 MILLION/UL (ref 3.9–5.2)
SODIUM SERPL-SCNC: 139 MMOL/L (ref 134–143)
T4 FREE SERPL-MCNC: 1.18 NG/DL (ref 0.76–1.46)
TSH SERPL DL<=0.05 MIU/L-ACNC: 4.51 UIU/ML (ref 0.45–5.33)
WBC # BLD AUTO: 8.6 THOUSAND/UL (ref 4.8–10.8)

## 2019-04-24 PROCEDURE — 80053 COMPREHEN METABOLIC PANEL: CPT

## 2019-04-24 PROCEDURE — 84439 ASSAY OF FREE THYROXINE: CPT

## 2019-04-24 PROCEDURE — 88305 TISSUE EXAM BY PATHOLOGIST: CPT | Performed by: PATHOLOGY

## 2019-04-24 PROCEDURE — C1726 CATH, BAL DIL, NON-VASCULAR: HCPCS | Performed by: INTERNAL MEDICINE

## 2019-04-24 PROCEDURE — 84443 ASSAY THYROID STIM HORMONE: CPT

## 2019-04-24 PROCEDURE — 36415 COLL VENOUS BLD VENIPUNCTURE: CPT

## 2019-04-24 PROCEDURE — 82306 VITAMIN D 25 HYDROXY: CPT

## 2019-04-24 PROCEDURE — 85027 COMPLETE CBC AUTOMATED: CPT

## 2019-04-24 RX ORDER — SODIUM CHLORIDE, SODIUM LACTATE, POTASSIUM CHLORIDE, CALCIUM CHLORIDE 600; 310; 30; 20 MG/100ML; MG/100ML; MG/100ML; MG/100ML
125 INJECTION, SOLUTION INTRAVENOUS CONTINUOUS
Status: DISCONTINUED | OUTPATIENT
Start: 2019-04-24 | End: 2019-04-24 | Stop reason: HOSPADM

## 2019-04-24 RX ORDER — CYCLOBENZAPRINE HCL 5 MG
5 TABLET ORAL 2 TIMES DAILY PRN
COMMUNITY
End: 2022-05-27 | Stop reason: SDUPTHER

## 2019-04-24 RX ORDER — LIDOCAINE HYDROCHLORIDE 10 MG/ML
INJECTION, SOLUTION INFILTRATION; PERINEURAL AS NEEDED
Status: DISCONTINUED | OUTPATIENT
Start: 2019-04-24 | End: 2019-04-24 | Stop reason: SURG

## 2019-04-24 RX ORDER — PROPOFOL 10 MG/ML
INJECTION, EMULSION INTRAVENOUS AS NEEDED
Status: DISCONTINUED | OUTPATIENT
Start: 2019-04-24 | End: 2019-04-24 | Stop reason: SURG

## 2019-04-24 RX ADMIN — LIDOCAINE HYDROCHLORIDE 100 MG: 10 INJECTION, SOLUTION INFILTRATION; PERINEURAL at 09:58

## 2019-04-24 RX ADMIN — SODIUM CHLORIDE, POTASSIUM CHLORIDE, SODIUM LACTATE AND CALCIUM CHLORIDE 125 ML/HR: 600; 310; 30; 20 INJECTION, SOLUTION INTRAVENOUS at 09:32

## 2019-04-24 RX ADMIN — PROPOFOL 120 MG: 10 INJECTION, EMULSION INTRAVENOUS at 09:58

## 2019-04-24 RX ADMIN — PROPOFOL 20 MG: 10 INJECTION, EMULSION INTRAVENOUS at 10:06

## 2019-07-15 ENCOUNTER — TELEPHONE (OUTPATIENT)
Dept: CARDIOLOGY CLINIC | Facility: HOSPITAL | Age: 64
End: 2019-07-15

## 2019-07-15 NOTE — TELEPHONE ENCOUNTER
Received a note via My chart stating Ebony Malloy will now be going to the [facility] for her cardiologist

## 2019-07-30 ENCOUNTER — APPOINTMENT (OUTPATIENT)
Dept: LAB | Facility: CLINIC | Age: 64
End: 2019-07-30
Payer: COMMERCIAL

## 2019-07-30 ENCOUNTER — TRANSCRIBE ORDERS (OUTPATIENT)
Dept: LAB | Facility: CLINIC | Age: 64
End: 2019-07-30

## 2019-07-30 DIAGNOSIS — E03.9 MYXEDEMA HEART DISEASE: ICD-10-CM

## 2019-07-30 DIAGNOSIS — E03.9 MYXEDEMA HEART DISEASE: Primary | ICD-10-CM

## 2019-07-30 DIAGNOSIS — I51.9 MYXEDEMA HEART DISEASE: Primary | ICD-10-CM

## 2019-07-30 DIAGNOSIS — I51.9 MYXEDEMA HEART DISEASE: ICD-10-CM

## 2019-07-30 LAB
ALBUMIN SERPL BCP-MCNC: 4 G/DL (ref 3.5–5)
ALP SERPL-CCNC: 101 U/L (ref 46–116)
ALT SERPL W P-5'-P-CCNC: 32 U/L (ref 12–78)
ANION GAP SERPL CALCULATED.3IONS-SCNC: 5 MMOL/L (ref 4–13)
AST SERPL W P-5'-P-CCNC: 18 U/L (ref 5–45)
BASOPHILS # BLD AUTO: 0.08 THOUSANDS/ΜL (ref 0–0.1)
BASOPHILS NFR BLD AUTO: 1 % (ref 0–1)
BILIRUB SERPL-MCNC: 0.37 MG/DL (ref 0.2–1)
BUN SERPL-MCNC: 22 MG/DL (ref 5–25)
CALCIUM SERPL-MCNC: 9.3 MG/DL (ref 8.3–10.1)
CHLORIDE SERPL-SCNC: 104 MMOL/L (ref 100–108)
CO2 SERPL-SCNC: 29 MMOL/L (ref 21–32)
CREAT SERPL-MCNC: 1.02 MG/DL (ref 0.6–1.3)
EOSINOPHIL # BLD AUTO: 0.15 THOUSAND/ΜL (ref 0–0.61)
EOSINOPHIL NFR BLD AUTO: 2 % (ref 0–6)
ERYTHROCYTE [DISTWIDTH] IN BLOOD BY AUTOMATED COUNT: 13 % (ref 11.6–15.1)
GFR SERPL CREATININE-BSD FRML MDRD: 58 ML/MIN/1.73SQ M
GLUCOSE P FAST SERPL-MCNC: 87 MG/DL (ref 65–99)
HCT VFR BLD AUTO: 46.5 % (ref 34.8–46.1)
HGB BLD-MCNC: 14.8 G/DL (ref 11.5–15.4)
IMM GRANULOCYTES # BLD AUTO: 0.06 THOUSAND/UL (ref 0–0.2)
IMM GRANULOCYTES NFR BLD AUTO: 1 % (ref 0–2)
LYMPHOCYTES # BLD AUTO: 2.94 THOUSANDS/ΜL (ref 0.6–4.47)
LYMPHOCYTES NFR BLD AUTO: 31 % (ref 14–44)
MCH RBC QN AUTO: 29.8 PG (ref 26.8–34.3)
MCHC RBC AUTO-ENTMCNC: 31.8 G/DL (ref 31.4–37.4)
MCV RBC AUTO: 94 FL (ref 82–98)
MONOCYTES # BLD AUTO: 0.72 THOUSAND/ΜL (ref 0.17–1.22)
MONOCYTES NFR BLD AUTO: 8 % (ref 4–12)
NEUTROPHILS # BLD AUTO: 5.46 THOUSANDS/ΜL (ref 1.85–7.62)
NEUTS SEG NFR BLD AUTO: 57 % (ref 43–75)
NRBC BLD AUTO-RTO: 0 /100 WBCS
PLATELET # BLD AUTO: 307 THOUSANDS/UL (ref 149–390)
PMV BLD AUTO: 11.1 FL (ref 8.9–12.7)
POTASSIUM SERPL-SCNC: 4 MMOL/L (ref 3.5–5.3)
PROT SERPL-MCNC: 7.4 G/DL (ref 6.4–8.2)
RBC # BLD AUTO: 4.97 MILLION/UL (ref 3.81–5.12)
SODIUM SERPL-SCNC: 138 MMOL/L (ref 136–145)
T3 SERPL-MCNC: 1.2 NG/ML (ref 0.6–1.8)
T4 FREE SERPL-MCNC: 1.06 NG/DL (ref 0.76–1.46)
TSH SERPL DL<=0.05 MIU/L-ACNC: 3.03 UIU/ML (ref 0.36–3.74)
WBC # BLD AUTO: 9.41 THOUSAND/UL (ref 4.31–10.16)

## 2019-07-30 PROCEDURE — 80053 COMPREHEN METABOLIC PANEL: CPT

## 2019-07-30 PROCEDURE — 84439 ASSAY OF FREE THYROXINE: CPT

## 2019-07-30 PROCEDURE — 84480 ASSAY TRIIODOTHYRONINE (T3): CPT

## 2019-07-30 PROCEDURE — 85025 COMPLETE CBC W/AUTO DIFF WBC: CPT

## 2019-07-30 PROCEDURE — 36415 COLL VENOUS BLD VENIPUNCTURE: CPT

## 2019-07-30 PROCEDURE — 84443 ASSAY THYROID STIM HORMONE: CPT

## 2019-07-30 PROCEDURE — 86376 MICROSOMAL ANTIBODY EACH: CPT

## 2019-07-31 LAB — THYROPEROXIDASE AB SERPL-ACNC: 39 IU/ML (ref 0–34)

## 2019-08-27 ENCOUNTER — TRANSCRIBE ORDERS (OUTPATIENT)
Dept: LAB | Facility: HOSPITAL | Age: 64
End: 2019-08-27

## 2019-08-27 ENCOUNTER — APPOINTMENT (OUTPATIENT)
Dept: LAB | Facility: HOSPITAL | Age: 64
End: 2019-08-27
Attending: INTERNAL MEDICINE
Payer: COMMERCIAL

## 2019-08-27 ENCOUNTER — TRANSCRIBE ORDERS (OUTPATIENT)
Dept: ADMINISTRATIVE | Facility: HOSPITAL | Age: 64
End: 2019-08-27

## 2019-08-27 ENCOUNTER — APPOINTMENT (OUTPATIENT)
Dept: RADIOLOGY | Facility: HOSPITAL | Age: 64
End: 2019-08-27
Attending: INTERNAL MEDICINE
Payer: COMMERCIAL

## 2019-08-27 DIAGNOSIS — E03.9 MYXEDEMA HEART DISEASE: Primary | ICD-10-CM

## 2019-08-27 DIAGNOSIS — R53.83 FATIGUE, UNSPECIFIED TYPE: ICD-10-CM

## 2019-08-27 DIAGNOSIS — I51.9 MYXEDEMA HEART DISEASE: Primary | ICD-10-CM

## 2019-08-27 DIAGNOSIS — R05.9 COUGH: Primary | ICD-10-CM

## 2019-08-27 DIAGNOSIS — E03.9 MYXEDEMA HEART DISEASE: ICD-10-CM

## 2019-08-27 DIAGNOSIS — I51.9 MYXEDEMA HEART DISEASE: ICD-10-CM

## 2019-08-27 DIAGNOSIS — R05.9 COUGH: ICD-10-CM

## 2019-08-27 LAB
ALBUMIN SERPL BCP-MCNC: 4.6 G/DL (ref 3.5–5.7)
ALP SERPL-CCNC: 74 U/L (ref 55–165)
ALT SERPL W P-5'-P-CCNC: 13 U/L (ref 7–52)
ANION GAP SERPL CALCULATED.3IONS-SCNC: 8 MMOL/L (ref 4–13)
AST SERPL W P-5'-P-CCNC: 12 U/L (ref 13–39)
BILIRUB SERPL-MCNC: 0.4 MG/DL (ref 0.2–1)
BUN SERPL-MCNC: 16 MG/DL (ref 7–25)
CALCIUM SERPL-MCNC: 10.1 MG/DL (ref 8.6–10.5)
CHLORIDE SERPL-SCNC: 100 MMOL/L (ref 98–107)
CO2 SERPL-SCNC: 30 MMOL/L (ref 21–31)
CREAT SERPL-MCNC: 1.09 MG/DL (ref 0.6–1.2)
ERYTHROCYTE [DISTWIDTH] IN BLOOD BY AUTOMATED COUNT: 13.2 % (ref 11.5–14.5)
GFR SERPL CREATININE-BSD FRML MDRD: 54 ML/MIN/1.73SQ M
GLUCOSE SERPL-MCNC: 139 MG/DL (ref 65–99)
HCT VFR BLD AUTO: 44 % (ref 42–47)
HGB BLD-MCNC: 14.9 G/DL (ref 12–16)
MCH RBC QN AUTO: 30.5 PG (ref 26–34)
MCHC RBC AUTO-ENTMCNC: 33.8 G/DL (ref 31–37)
MCV RBC AUTO: 90 FL (ref 81–99)
PLATELET # BLD AUTO: 318 THOUSANDS/UL (ref 149–390)
PMV BLD AUTO: 8.3 FL (ref 8.6–11.7)
POTASSIUM SERPL-SCNC: 4.1 MMOL/L (ref 3.5–5.5)
PROT SERPL-MCNC: 7 G/DL (ref 6.4–8.9)
RBC # BLD AUTO: 4.88 MILLION/UL (ref 3.9–5.2)
SODIUM SERPL-SCNC: 138 MMOL/L (ref 134–143)
T3 SERPL-MCNC: 0.8 NG/ML (ref 0.6–1.8)
T4 FREE SERPL-MCNC: 1.22 NG/DL (ref 0.76–1.46)
TSH SERPL DL<=0.05 MIU/L-ACNC: 0.45 UIU/ML (ref 0.45–5.33)
WBC # BLD AUTO: 12.6 THOUSAND/UL (ref 4.8–10.8)

## 2019-08-27 PROCEDURE — 80053 COMPREHEN METABOLIC PANEL: CPT

## 2019-08-27 PROCEDURE — 85027 COMPLETE CBC AUTOMATED: CPT

## 2019-08-27 PROCEDURE — 84480 ASSAY TRIIODOTHYRONINE (T3): CPT

## 2019-08-27 PROCEDURE — 36415 COLL VENOUS BLD VENIPUNCTURE: CPT

## 2019-08-27 PROCEDURE — 71046 X-RAY EXAM CHEST 2 VIEWS: CPT

## 2019-08-27 PROCEDURE — 84439 ASSAY OF FREE THYROXINE: CPT

## 2019-08-27 PROCEDURE — 84443 ASSAY THYROID STIM HORMONE: CPT

## 2019-10-31 ENCOUNTER — TRANSCRIBE ORDERS (OUTPATIENT)
Dept: LAB | Facility: CLINIC | Age: 64
End: 2019-10-31

## 2019-10-31 ENCOUNTER — APPOINTMENT (OUTPATIENT)
Dept: LAB | Facility: CLINIC | Age: 64
End: 2019-10-31
Payer: MEDICARE

## 2019-10-31 DIAGNOSIS — E03.9 HYPOTHYROIDISM, UNSPECIFIED TYPE: ICD-10-CM

## 2019-10-31 DIAGNOSIS — E03.9 HYPOTHYROIDISM, UNSPECIFIED TYPE: Primary | ICD-10-CM

## 2019-10-31 LAB
T4 FREE SERPL-MCNC: 0.91 NG/DL (ref 0.76–1.46)
TSH SERPL DL<=0.05 MIU/L-ACNC: 3.22 UIU/ML (ref 0.36–3.74)

## 2019-10-31 PROCEDURE — 84443 ASSAY THYROID STIM HORMONE: CPT

## 2019-10-31 PROCEDURE — 36415 COLL VENOUS BLD VENIPUNCTURE: CPT

## 2019-10-31 PROCEDURE — 84439 ASSAY OF FREE THYROXINE: CPT

## 2020-01-02 ENCOUNTER — APPOINTMENT (OUTPATIENT)
Dept: LAB | Facility: CLINIC | Age: 65
End: 2020-01-02
Payer: MEDICARE

## 2020-01-02 ENCOUNTER — TRANSCRIBE ORDERS (OUTPATIENT)
Dept: LAB | Facility: CLINIC | Age: 65
End: 2020-01-02

## 2020-01-02 DIAGNOSIS — J45.902 EXTRINSIC ASTHMA WITH STATUS ASTHMATICUS, UNSPECIFIED ASTHMA SEVERITY, UNSPECIFIED WHETHER PERSISTENT: ICD-10-CM

## 2020-01-02 DIAGNOSIS — R53.83 FATIGUE, UNSPECIFIED TYPE: Primary | ICD-10-CM

## 2020-01-02 DIAGNOSIS — R53.83 FATIGUE, UNSPECIFIED TYPE: ICD-10-CM

## 2020-01-02 LAB
ALBUMIN SERPL BCP-MCNC: 4 G/DL (ref 3.5–5)
ALP SERPL-CCNC: 122 U/L (ref 46–116)
ALT SERPL W P-5'-P-CCNC: 38 U/L (ref 12–78)
ANION GAP SERPL CALCULATED.3IONS-SCNC: 8 MMOL/L (ref 4–13)
AST SERPL W P-5'-P-CCNC: 26 U/L (ref 5–45)
BILIRUB SERPL-MCNC: 0.65 MG/DL (ref 0.2–1)
BUN SERPL-MCNC: 22 MG/DL (ref 5–25)
CALCIUM SERPL-MCNC: 9.4 MG/DL (ref 8.3–10.1)
CHLORIDE SERPL-SCNC: 109 MMOL/L (ref 100–108)
CO2 SERPL-SCNC: 25 MMOL/L (ref 21–32)
CREAT SERPL-MCNC: 0.96 MG/DL (ref 0.6–1.3)
ERYTHROCYTE [DISTWIDTH] IN BLOOD BY AUTOMATED COUNT: 12.6 % (ref 11.6–15.1)
GFR SERPL CREATININE-BSD FRML MDRD: 62 ML/MIN/1.73SQ M
GLUCOSE P FAST SERPL-MCNC: 94 MG/DL (ref 65–99)
HCT VFR BLD AUTO: 45.3 % (ref 34.8–46.1)
HGB BLD-MCNC: 14.4 G/DL (ref 11.5–15.4)
MCH RBC QN AUTO: 29.4 PG (ref 26.8–34.3)
MCHC RBC AUTO-ENTMCNC: 31.8 G/DL (ref 31.4–37.4)
MCV RBC AUTO: 93 FL (ref 82–98)
PLATELET # BLD AUTO: 299 THOUSANDS/UL (ref 149–390)
PMV BLD AUTO: 9.7 FL (ref 8.9–12.7)
POTASSIUM SERPL-SCNC: 3.8 MMOL/L (ref 3.5–5.3)
PROT SERPL-MCNC: 7.1 G/DL (ref 6.4–8.2)
RBC # BLD AUTO: 4.89 MILLION/UL (ref 3.81–5.12)
SODIUM SERPL-SCNC: 142 MMOL/L (ref 136–145)
WBC # BLD AUTO: 8.34 THOUSAND/UL (ref 4.31–10.16)

## 2020-01-02 PROCEDURE — 36415 COLL VENOUS BLD VENIPUNCTURE: CPT

## 2020-01-02 PROCEDURE — 85027 COMPLETE CBC AUTOMATED: CPT

## 2020-01-02 PROCEDURE — 80053 COMPREHEN METABOLIC PANEL: CPT

## 2020-01-29 ENCOUNTER — TRANSCRIBE ORDERS (OUTPATIENT)
Dept: ADMINISTRATIVE | Facility: HOSPITAL | Age: 65
End: 2020-01-29

## 2020-01-29 DIAGNOSIS — M81.0 OSTEOPOROSIS, UNSPECIFIED OSTEOPOROSIS TYPE, UNSPECIFIED PATHOLOGICAL FRACTURE PRESENCE: Primary | ICD-10-CM

## 2020-01-29 DIAGNOSIS — Z12.31 ENCOUNTER FOR SCREENING MAMMOGRAM FOR MALIGNANT NEOPLASM OF BREAST: Primary | ICD-10-CM

## 2020-02-21 ENCOUNTER — HOSPITAL ENCOUNTER (OUTPATIENT)
Dept: BONE DENSITY | Facility: HOSPITAL | Age: 65
Discharge: HOME | End: 2020-02-21
Payer: MEDICARE

## 2020-02-21 ENCOUNTER — TRANSCRIBE ORDERS (OUTPATIENT)
Dept: ADMINISTRATIVE | Facility: HOSPITAL | Age: 65
End: 2020-02-21

## 2020-02-21 ENCOUNTER — HOSPITAL ENCOUNTER (OUTPATIENT)
Dept: RADIOLOGY | Facility: HOSPITAL | Age: 65
Discharge: HOME | End: 2020-02-21
Payer: MEDICARE

## 2020-02-21 ENCOUNTER — HOSPITAL ENCOUNTER (OUTPATIENT)
Dept: MAMMOGRAPHY | Facility: HOSPITAL | Age: 65
Discharge: HOME | End: 2020-02-21
Payer: MEDICARE

## 2020-02-21 VITALS — WEIGHT: 120 LBS | BODY MASS INDEX: 22.66 KG/M2 | HEIGHT: 61 IN

## 2020-02-21 DIAGNOSIS — J98.4 LUNG DENSITY ON X-RAY: ICD-10-CM

## 2020-02-21 DIAGNOSIS — Z12.31 ENCOUNTER FOR SCREENING MAMMOGRAM FOR MALIGNANT NEOPLASM OF BREAST: ICD-10-CM

## 2020-02-21 DIAGNOSIS — M81.0 OSTEOPOROSIS, UNSPECIFIED OSTEOPOROSIS TYPE, UNSPECIFIED PATHOLOGICAL FRACTURE PRESENCE: ICD-10-CM

## 2020-02-21 DIAGNOSIS — J98.4 LUNG DENSITY ON X-RAY: Primary | ICD-10-CM

## 2020-02-21 PROCEDURE — 77080 DXA BONE DENSITY AXIAL: CPT

## 2020-02-21 PROCEDURE — 71046 X-RAY EXAM CHEST 2 VIEWS: CPT

## 2020-02-21 PROCEDURE — 77067 SCR MAMMO BI INCL CAD: CPT

## 2020-02-21 PROCEDURE — 77063 BREAST TOMOSYNTHESIS BI: CPT

## 2020-04-28 ENCOUNTER — TRANSCRIBE ORDERS (OUTPATIENT)
Dept: ADMINISTRATIVE | Facility: HOSPITAL | Age: 65
End: 2020-04-28

## 2020-04-28 ENCOUNTER — APPOINTMENT (OUTPATIENT)
Dept: LAB | Facility: CLINIC | Age: 65
End: 2020-04-28
Payer: MEDICARE

## 2020-04-28 DIAGNOSIS — E55.9 VITAMIN D DEFICIENCY: ICD-10-CM

## 2020-04-28 DIAGNOSIS — E03.9 HYPOTHYROIDISM, UNSPECIFIED TYPE: ICD-10-CM

## 2020-04-28 DIAGNOSIS — I25.10 CVD (CARDIOVASCULAR DISEASE): ICD-10-CM

## 2020-04-28 DIAGNOSIS — E55.9 VITAMIN D DEFICIENCY: Primary | ICD-10-CM

## 2020-04-28 LAB
25(OH)D3 SERPL-MCNC: 41.6 NG/ML (ref 30–100)
ALBUMIN SERPL BCP-MCNC: 4.1 G/DL (ref 3.5–5)
ALP SERPL-CCNC: 128 U/L (ref 46–116)
ALT SERPL W P-5'-P-CCNC: 57 U/L (ref 12–78)
ANION GAP SERPL CALCULATED.3IONS-SCNC: 5 MMOL/L (ref 4–13)
AST SERPL W P-5'-P-CCNC: 32 U/L (ref 5–45)
BILIRUB SERPL-MCNC: 0.5 MG/DL (ref 0.2–1)
BUN SERPL-MCNC: 16 MG/DL (ref 5–25)
CALCIUM SERPL-MCNC: 9.8 MG/DL (ref 8.3–10.1)
CHLORIDE SERPL-SCNC: 105 MMOL/L (ref 100–108)
CO2 SERPL-SCNC: 30 MMOL/L (ref 21–32)
CREAT SERPL-MCNC: 1.01 MG/DL (ref 0.6–1.3)
ERYTHROCYTE [DISTWIDTH] IN BLOOD BY AUTOMATED COUNT: 13.3 % (ref 11.6–15.1)
GFR SERPL CREATININE-BSD FRML MDRD: 59 ML/MIN/1.73SQ M
GLUCOSE P FAST SERPL-MCNC: 77 MG/DL (ref 65–99)
HCT VFR BLD AUTO: 45.3 % (ref 34.8–46.1)
HGB BLD-MCNC: 14.6 G/DL (ref 11.5–15.4)
MCH RBC QN AUTO: 29.9 PG (ref 26.8–34.3)
MCHC RBC AUTO-ENTMCNC: 32.2 G/DL (ref 31.4–37.4)
MCV RBC AUTO: 93 FL (ref 82–98)
PLATELET # BLD AUTO: 332 THOUSANDS/UL (ref 149–390)
PMV BLD AUTO: 10 FL (ref 8.9–12.7)
POTASSIUM SERPL-SCNC: 3.9 MMOL/L (ref 3.5–5.3)
PROT SERPL-MCNC: 7.6 G/DL (ref 6.4–8.2)
RBC # BLD AUTO: 4.89 MILLION/UL (ref 3.81–5.12)
SODIUM SERPL-SCNC: 140 MMOL/L (ref 136–145)
T4 FREE SERPL-MCNC: 1.09 NG/DL (ref 0.76–1.46)
TSH SERPL DL<=0.05 MIU/L-ACNC: 3.31 UIU/ML (ref 0.36–3.74)
WBC # BLD AUTO: 9.63 THOUSAND/UL (ref 4.31–10.16)

## 2020-04-28 PROCEDURE — 36415 COLL VENOUS BLD VENIPUNCTURE: CPT

## 2020-04-28 PROCEDURE — 84439 ASSAY OF FREE THYROXINE: CPT

## 2020-04-28 PROCEDURE — 85027 COMPLETE CBC AUTOMATED: CPT

## 2020-04-28 PROCEDURE — 82306 VITAMIN D 25 HYDROXY: CPT

## 2020-04-28 PROCEDURE — 84443 ASSAY THYROID STIM HORMONE: CPT

## 2020-04-28 PROCEDURE — 80053 COMPREHEN METABOLIC PANEL: CPT

## 2020-05-14 ENCOUNTER — APPOINTMENT (OUTPATIENT)
Dept: LAB | Facility: CLINIC | Age: 65
End: 2020-05-14
Payer: MEDICARE

## 2020-05-14 ENCOUNTER — TRANSCRIBE ORDERS (OUTPATIENT)
Dept: ADMINISTRATIVE | Facility: HOSPITAL | Age: 65
End: 2020-05-14

## 2020-05-14 DIAGNOSIS — J45.40 MODERATE PERSISTENT ASTHMA WITHOUT COMPLICATION: Primary | ICD-10-CM

## 2020-05-14 DIAGNOSIS — J31.0 CHRONIC RHINITIS: ICD-10-CM

## 2020-05-14 DIAGNOSIS — J45.40 MODERATE PERSISTENT ASTHMA WITHOUT COMPLICATION: ICD-10-CM

## 2020-05-14 DIAGNOSIS — J98.4 DISORDER OF LUNG PARENCHYMA: Primary | ICD-10-CM

## 2020-05-14 LAB
ALBUMIN SERPL BCP-MCNC: 4.1 G/DL (ref 3.5–5)
ALP SERPL-CCNC: 122 U/L (ref 46–116)
ALT SERPL W P-5'-P-CCNC: 47 U/L (ref 12–78)
ANION GAP SERPL CALCULATED.3IONS-SCNC: 3 MMOL/L (ref 4–13)
AST SERPL W P-5'-P-CCNC: 27 U/L (ref 5–45)
BACTERIA UR QL AUTO: NORMAL /HPF
BASOPHILS # BLD AUTO: 0.09 THOUSANDS/ΜL (ref 0–0.1)
BASOPHILS NFR BLD AUTO: 1 % (ref 0–1)
BILIRUB SERPL-MCNC: 0.52 MG/DL (ref 0.2–1)
BILIRUB UR QL STRIP: NEGATIVE
BUN SERPL-MCNC: 14 MG/DL (ref 5–25)
CALCIUM SERPL-MCNC: 9.1 MG/DL (ref 8.3–10.1)
CHLORIDE SERPL-SCNC: 104 MMOL/L (ref 100–108)
CLARITY UR: CLEAR
CO2 SERPL-SCNC: 31 MMOL/L (ref 21–32)
COLOR UR: YELLOW
CREAT SERPL-MCNC: 1.01 MG/DL (ref 0.6–1.3)
EOSINOPHIL # BLD AUTO: 0.14 THOUSAND/ΜL (ref 0–0.61)
EOSINOPHIL NFR BLD AUTO: 1 % (ref 0–6)
ERYTHROCYTE [DISTWIDTH] IN BLOOD BY AUTOMATED COUNT: 12.9 % (ref 11.6–15.1)
GFR SERPL CREATININE-BSD FRML MDRD: 59 ML/MIN/1.73SQ M
GLUCOSE SERPL-MCNC: 82 MG/DL (ref 65–140)
GLUCOSE UR STRIP-MCNC: NEGATIVE MG/DL
HCT VFR BLD AUTO: 43.1 % (ref 34.8–46.1)
HGB BLD-MCNC: 14.2 G/DL (ref 11.5–15.4)
HGB UR QL STRIP.AUTO: NEGATIVE
IMM GRANULOCYTES # BLD AUTO: 0.06 THOUSAND/UL (ref 0–0.2)
IMM GRANULOCYTES NFR BLD AUTO: 1 % (ref 0–2)
KETONES UR STRIP-MCNC: NEGATIVE MG/DL
LEUKOCYTE ESTERASE UR QL STRIP: NEGATIVE
LYMPHOCYTES # BLD AUTO: 2.4 THOUSANDS/ΜL (ref 0.6–4.47)
LYMPHOCYTES NFR BLD AUTO: 23 % (ref 14–44)
MCH RBC QN AUTO: 30 PG (ref 26.8–34.3)
MCHC RBC AUTO-ENTMCNC: 32.9 G/DL (ref 31.4–37.4)
MCV RBC AUTO: 91 FL (ref 82–98)
MONOCYTES # BLD AUTO: 0.84 THOUSAND/ΜL (ref 0.17–1.22)
MONOCYTES NFR BLD AUTO: 8 % (ref 4–12)
NEUTROPHILS # BLD AUTO: 6.83 THOUSANDS/ΜL (ref 1.85–7.62)
NEUTS SEG NFR BLD AUTO: 66 % (ref 43–75)
NITRITE UR QL STRIP: NEGATIVE
NON-SQ EPI CELLS URNS QL MICRO: NORMAL /HPF
NRBC BLD AUTO-RTO: 0 /100 WBCS
PH UR STRIP.AUTO: 6.5 [PH]
PLATELET # BLD AUTO: 353 THOUSANDS/UL (ref 149–390)
PMV BLD AUTO: 10.3 FL (ref 8.9–12.7)
POTASSIUM SERPL-SCNC: 3.9 MMOL/L (ref 3.5–5.3)
PROT SERPL-MCNC: 7.2 G/DL (ref 6.4–8.2)
PROT UR STRIP-MCNC: NEGATIVE MG/DL
RBC # BLD AUTO: 4.73 MILLION/UL (ref 3.81–5.12)
RBC #/AREA URNS AUTO: NORMAL /HPF
SODIUM SERPL-SCNC: 138 MMOL/L (ref 136–145)
SP GR UR STRIP.AUTO: 1.01 (ref 1–1.03)
UROBILINOGEN UR QL STRIP.AUTO: 0.2 E.U./DL
WBC # BLD AUTO: 10.36 THOUSAND/UL (ref 4.31–10.16)
WBC #/AREA URNS AUTO: NORMAL /HPF

## 2020-05-14 PROCEDURE — 36415 COLL VENOUS BLD VENIPUNCTURE: CPT

## 2020-05-14 PROCEDURE — 85025 COMPLETE CBC W/AUTO DIFF WBC: CPT

## 2020-05-14 PROCEDURE — 86255 FLUORESCENT ANTIBODY SCREEN: CPT

## 2020-05-14 PROCEDURE — 86602 ANTINOMYCES ANTIBODY: CPT

## 2020-05-14 PROCEDURE — 86331 IMMUNODIFFUSION OUCHTERLONY: CPT

## 2020-05-14 PROCEDURE — 80053 COMPREHEN METABOLIC PANEL: CPT

## 2020-05-14 PROCEDURE — 86606 ASPERGILLUS ANTIBODY: CPT

## 2020-05-14 PROCEDURE — 81001 URINALYSIS AUTO W/SCOPE: CPT | Performed by: INTERNAL MEDICINE

## 2020-05-14 PROCEDURE — 86671 FUNGUS NES ANTIBODY: CPT

## 2020-05-14 PROCEDURE — 82785 ASSAY OF IGE: CPT

## 2020-05-14 PROCEDURE — 82787 IGG 1 2 3 OR 4 EACH: CPT

## 2020-05-14 PROCEDURE — 82784 ASSAY IGA/IGD/IGG/IGM EACH: CPT

## 2020-05-15 LAB
IGG SERPL-MCNC: 773 MG/DL (ref 586–1602)
IGG1 SER-MCNC: 367 MG/DL (ref 248–810)
IGG2 SER-MCNC: 306 MG/DL (ref 130–555)
IGG3 SER-MCNC: 10 MG/DL (ref 15–102)
IGG4 SER-MCNC: 10 MG/DL (ref 2–96)
TOTAL IGE SMQN RAST: 10.4 KU/L (ref 0–113)

## 2020-05-20 ENCOUNTER — HOSPITAL ENCOUNTER (OUTPATIENT)
Dept: CT IMAGING | Facility: HOSPITAL | Age: 65
Discharge: HOME | End: 2020-05-20

## 2020-05-20 DIAGNOSIS — J98.4 DISORDER OF LUNG PARENCHYMA: ICD-10-CM

## 2020-05-20 LAB
A FUMIGATUS1 AB SER QL ID: NEGATIVE
A PULLULANS AB SER QL: NEGATIVE
C-ANCA TITR SER IF: NORMAL TITER
LACEYELLA SACCHARI AB SER QL: NEGATIVE
MYELOPEROXIDASE AB SER IA-ACNC: <9 U/ML (ref 0–9)
P-ANCA ATYPICAL TITR SER IF: NORMAL TITER
P-ANCA TITR SER IF: NORMAL TITER
PIGEON SERUM AB QL ID: NEGATIVE
PROTEINASE3 AB SER IA-ACNC: <3.5 U/ML (ref 0–3.5)
S RECTIVIRGULA AB SER QL ID: NEGATIVE
T VULGARIS AB SER QL ID: NEGATIVE

## 2020-05-26 ENCOUNTER — HOSPITAL ENCOUNTER (OUTPATIENT)
Dept: CT IMAGING | Facility: HOSPITAL | Age: 65
Discharge: HOME | End: 2020-05-26
Payer: MEDICARE

## 2020-05-26 DIAGNOSIS — J98.4 DISORDER OF LUNG PARENCHYMA: ICD-10-CM

## 2020-05-26 PROCEDURE — 71250 CT THORAX DX C-: CPT

## 2020-06-04 ENCOUNTER — DOCUMENTATION (OUTPATIENT)
Dept: OTHER | Facility: HOSPITAL | Age: 65
End: 2020-06-04

## 2020-06-04 ENCOUNTER — TRANSCRIBE ORDERS (OUTPATIENT)
Dept: ADMINISTRATIVE | Facility: HOSPITAL | Age: 65
End: 2020-06-04

## 2020-06-04 DIAGNOSIS — K22.2 STRICTURE OF ESOPHAGUS: ICD-10-CM

## 2020-06-04 DIAGNOSIS — R13.10 DYSPHAGIA, UNSPECIFIED TYPE: ICD-10-CM

## 2020-06-04 DIAGNOSIS — R13.10 PROBLEMS WITH SWALLOWING AND MASTICATION: ICD-10-CM

## 2020-06-04 DIAGNOSIS — K21.9 GASTROESOPHAGEAL REFLUX DISEASE WITHOUT ESOPHAGITIS: ICD-10-CM

## 2020-06-04 DIAGNOSIS — R13.10 DYSPHAGIA, UNSPECIFIED TYPE: Primary | ICD-10-CM

## 2020-06-04 DIAGNOSIS — K22.2 ESOPHAGEAL OBSTRUCTION: ICD-10-CM

## 2020-06-04 DIAGNOSIS — R74.8 ABNORMAL LEVELS OF OTHER SERUM ENZYMES: ICD-10-CM

## 2020-06-04 DIAGNOSIS — R10.11 RIGHT UPPER QUADRANT PAIN: ICD-10-CM

## 2020-06-04 PROCEDURE — U0003 INFECTIOUS AGENT DETECTION BY NUCLEIC ACID (DNA OR RNA); SEVERE ACUTE RESPIRATORY SYNDROME CORONAVIRUS 2 (SARS-COV-2) (CORONAVIRUS DISEASE [COVID-19]), AMPLIFIED PROBE TECHNIQUE, MAKING USE OF HIGH THROUGHPUT TECHNOLOGIES AS DESCRIBED BY CMS-2020-01-R: HCPCS

## 2020-06-07 LAB — SARS-COV-2 RNA SPEC QL NAA+PROBE: NOT DETECTED

## 2020-06-08 ENCOUNTER — APPOINTMENT (OUTPATIENT)
Dept: LAB | Facility: CLINIC | Age: 65
End: 2020-06-08
Payer: MEDICARE

## 2020-06-08 ENCOUNTER — TRANSCRIBE ORDERS (OUTPATIENT)
Dept: LAB | Facility: CLINIC | Age: 65
End: 2020-06-08

## 2020-06-08 DIAGNOSIS — R74.8 ABNORMAL LEVELS OF OTHER SERUM ENZYMES: ICD-10-CM

## 2020-06-08 DIAGNOSIS — R10.11 ABDOMINAL PAIN, RIGHT UPPER QUADRANT: ICD-10-CM

## 2020-06-08 DIAGNOSIS — R13.10 DYSPHAGIA, UNSPECIFIED TYPE: Primary | ICD-10-CM

## 2020-06-08 DIAGNOSIS — K21.9 GASTRO-ESOPHAGEAL REFLUX DISEASE WITHOUT ESOPHAGITIS: ICD-10-CM

## 2020-06-08 DIAGNOSIS — K22.2 ESOPHAGEAL OBSTRUCTION: ICD-10-CM

## 2020-06-08 DIAGNOSIS — R13.10 DYSPHAGIA, UNSPECIFIED TYPE: ICD-10-CM

## 2020-06-10 ENCOUNTER — ANESTHESIA EVENT (OUTPATIENT)
Dept: GASTROENTEROLOGY | Facility: HOSPITAL | Age: 65
End: 2020-06-10

## 2020-06-10 ENCOUNTER — HOSPITAL ENCOUNTER (OUTPATIENT)
Dept: GASTROENTEROLOGY | Facility: HOSPITAL | Age: 65
Setting detail: OUTPATIENT SURGERY
Discharge: HOME | End: 2020-06-10
Attending: INTERNAL MEDICINE | Admitting: INTERNAL MEDICINE
Payer: MEDICARE

## 2020-06-10 ENCOUNTER — ANESTHESIA (OUTPATIENT)
Dept: GASTROENTEROLOGY | Facility: HOSPITAL | Age: 65
End: 2020-06-10

## 2020-06-10 VITALS
BODY MASS INDEX: 22.28 KG/M2 | HEIGHT: 61 IN | TEMPERATURE: 96.1 F | DIASTOLIC BLOOD PRESSURE: 63 MMHG | HEART RATE: 95 BPM | OXYGEN SATURATION: 100 % | SYSTOLIC BLOOD PRESSURE: 137 MMHG | WEIGHT: 118 LBS | RESPIRATION RATE: 18 BRPM

## 2020-06-10 DIAGNOSIS — R13.10 DYSPHAGIA, UNSPECIFIED: ICD-10-CM

## 2020-06-10 PROCEDURE — 88305 TISSUE EXAM BY PATHOLOGIST: CPT | Performed by: PATHOLOGY

## 2020-06-10 PROCEDURE — C1726 CATH, BAL DIL, NON-VASCULAR: HCPCS

## 2020-06-10 RX ORDER — LIDOCAINE HYDROCHLORIDE 20 MG/ML
INJECTION, SOLUTION EPIDURAL; INFILTRATION; INTRACAUDAL; PERINEURAL AS NEEDED
Status: DISCONTINUED | OUTPATIENT
Start: 2020-06-10 | End: 2020-06-10 | Stop reason: SURG

## 2020-06-10 RX ORDER — ONDANSETRON 2 MG/ML
4 INJECTION INTRAMUSCULAR; INTRAVENOUS ONCE AS NEEDED
Status: CANCELLED | OUTPATIENT
Start: 2020-06-10

## 2020-06-10 RX ORDER — DIPHENHYDRAMINE HYDROCHLORIDE 50 MG/ML
12.5 INJECTION INTRAMUSCULAR; INTRAVENOUS ONCE AS NEEDED
Status: CANCELLED | OUTPATIENT
Start: 2020-06-10

## 2020-06-10 RX ORDER — PANTOPRAZOLE SODIUM 40 MG/1
40 TABLET, DELAYED RELEASE ORAL 2 TIMES DAILY
COMMUNITY
End: 2022-04-04

## 2020-06-10 RX ORDER — LIDOCAINE HYDROCHLORIDE 10 MG/ML
0.5 INJECTION, SOLUTION EPIDURAL; INFILTRATION; INTRACAUDAL; PERINEURAL ONCE AS NEEDED
Status: DISCONTINUED | OUTPATIENT
Start: 2020-06-10 | End: 2020-06-14 | Stop reason: HOSPADM

## 2020-06-10 RX ORDER — METOCLOPRAMIDE HYDROCHLORIDE 5 MG/ML
10 INJECTION INTRAMUSCULAR; INTRAVENOUS ONCE AS NEEDED
Status: CANCELLED | OUTPATIENT
Start: 2020-06-10

## 2020-06-10 RX ORDER — SODIUM CHLORIDE, SODIUM LACTATE, POTASSIUM CHLORIDE, CALCIUM CHLORIDE 600; 310; 30; 20 MG/100ML; MG/100ML; MG/100ML; MG/100ML
125 INJECTION, SOLUTION INTRAVENOUS CONTINUOUS
Status: DISCONTINUED | OUTPATIENT
Start: 2020-06-10 | End: 2020-06-14 | Stop reason: HOSPADM

## 2020-06-10 RX ORDER — PROPOFOL 10 MG/ML
INJECTION, EMULSION INTRAVENOUS AS NEEDED
Status: DISCONTINUED | OUTPATIENT
Start: 2020-06-10 | End: 2020-06-10 | Stop reason: SURG

## 2020-06-10 RX ADMIN — PROPOFOL 20 MG: 10 INJECTION, EMULSION INTRAVENOUS at 09:23

## 2020-06-10 RX ADMIN — LIDOCAINE HYDROCHLORIDE 100 MG: 20 INJECTION, SOLUTION EPIDURAL; INFILTRATION; INTRACAUDAL; PERINEURAL at 09:16

## 2020-06-10 RX ADMIN — PROPOFOL 120 MG: 10 INJECTION, EMULSION INTRAVENOUS at 09:16

## 2020-06-10 RX ADMIN — PROPOFOL 30 MG: 10 INJECTION, EMULSION INTRAVENOUS at 09:22

## 2020-06-10 RX ADMIN — PROPOFOL 30 MG: 10 INJECTION, EMULSION INTRAVENOUS at 09:19

## 2020-06-10 RX ADMIN — SODIUM CHLORIDE, SODIUM LACTATE, POTASSIUM CHLORIDE, AND CALCIUM CHLORIDE 125 ML/HR: .6; .31; .03; .02 INJECTION, SOLUTION INTRAVENOUS at 08:31

## 2020-06-15 ENCOUNTER — HOSPITAL ENCOUNTER (OUTPATIENT)
Dept: ULTRASOUND IMAGING | Facility: HOSPITAL | Age: 65
Discharge: HOME | End: 2020-06-15
Payer: MEDICARE

## 2020-06-15 DIAGNOSIS — R10.11 RIGHT UPPER QUADRANT PAIN: ICD-10-CM

## 2020-06-15 DIAGNOSIS — R74.8 ABNORMAL LEVELS OF OTHER SERUM ENZYMES: ICD-10-CM

## 2020-06-15 DIAGNOSIS — K21.9 GASTROESOPHAGEAL REFLUX DISEASE WITHOUT ESOPHAGITIS: ICD-10-CM

## 2020-06-15 DIAGNOSIS — R13.10 DYSPHAGIA, UNSPECIFIED TYPE: ICD-10-CM

## 2020-06-15 DIAGNOSIS — K22.2 ESOPHAGEAL OBSTRUCTION: ICD-10-CM

## 2020-06-15 DIAGNOSIS — R13.10 PROBLEMS WITH SWALLOWING AND MASTICATION: ICD-10-CM

## 2020-06-15 PROCEDURE — 76700 US EXAM ABDOM COMPLETE: CPT

## 2020-06-16 ENCOUNTER — TRANSCRIBE ORDERS (OUTPATIENT)
Dept: ADMINISTRATIVE | Facility: HOSPITAL | Age: 65
End: 2020-06-16

## 2020-06-16 DIAGNOSIS — K76.9 LESION OF LIVER: ICD-10-CM

## 2020-06-16 DIAGNOSIS — R74.8 ELEVATED ALKALINE PHOSPHATASE LEVEL: ICD-10-CM

## 2020-06-16 DIAGNOSIS — R93.5 ABNORMAL US (ULTRASOUND) OF ABDOMEN: Primary | ICD-10-CM

## 2020-06-27 ENCOUNTER — HOSPITAL ENCOUNTER (OUTPATIENT)
Dept: MRI IMAGING | Facility: HOSPITAL | Age: 65
Discharge: HOME | End: 2020-06-27
Payer: MEDICARE

## 2020-06-27 DIAGNOSIS — R74.8 ELEVATED ALKALINE PHOSPHATASE LEVEL: ICD-10-CM

## 2020-06-27 DIAGNOSIS — R93.5 ABNORMAL US (ULTRASOUND) OF ABDOMEN: ICD-10-CM

## 2020-06-27 DIAGNOSIS — K76.9 LESION OF LIVER: ICD-10-CM

## 2020-06-27 PROCEDURE — A9585 GADOBUTROL INJECTION: HCPCS | Performed by: PHYSICIAN ASSISTANT

## 2020-06-27 PROCEDURE — 76376 3D RENDER W/INTRP POSTPROCES: CPT

## 2020-06-27 PROCEDURE — 74183 MRI ABD W/O CNTR FLWD CNTR: CPT

## 2020-06-27 RX ADMIN — GADOBUTROL 5 ML: 604.72 INJECTION INTRAVENOUS at 11:15

## 2020-07-06 ENCOUNTER — TRANSCRIBE ORDERS (OUTPATIENT)
Dept: LAB | Facility: CLINIC | Age: 65
End: 2020-07-06

## 2020-07-06 ENCOUNTER — APPOINTMENT (OUTPATIENT)
Dept: LAB | Facility: CLINIC | Age: 65
End: 2020-07-06
Payer: MEDICARE

## 2020-07-06 DIAGNOSIS — Z80.0 FAMILY HISTORY OF PANCREATIC CANCER: ICD-10-CM

## 2020-07-06 DIAGNOSIS — K86.2 PANCREATIC CYST: ICD-10-CM

## 2020-07-06 DIAGNOSIS — Z80.0 FAMILY HISTORY OF PANCREATIC CANCER: Primary | ICD-10-CM

## 2020-07-06 PROCEDURE — 36415 COLL VENOUS BLD VENIPUNCTURE: CPT

## 2020-07-06 PROCEDURE — 86301 IMMUNOASSAY TUMOR CA 19-9: CPT

## 2020-07-08 LAB — CANCER AG19-9 SERPL-ACNC: 76 U/ML (ref 0–35)

## 2020-09-29 ENCOUNTER — TRANSCRIBE ORDERS (OUTPATIENT)
Dept: LAB | Facility: CLINIC | Age: 65
End: 2020-09-29

## 2020-09-29 ENCOUNTER — APPOINTMENT (OUTPATIENT)
Dept: LAB | Facility: CLINIC | Age: 65
End: 2020-09-29
Payer: MEDICARE

## 2020-09-29 DIAGNOSIS — E78.5 HYPERLIPIDEMIA, UNSPECIFIED HYPERLIPIDEMIA TYPE: ICD-10-CM

## 2020-09-29 DIAGNOSIS — E03.9 MYXEDEMA HEART DISEASE: ICD-10-CM

## 2020-09-29 DIAGNOSIS — I51.9 MYXEDEMA HEART DISEASE: ICD-10-CM

## 2020-09-29 DIAGNOSIS — E78.5 HYPERLIPIDEMIA, UNSPECIFIED HYPERLIPIDEMIA TYPE: Primary | ICD-10-CM

## 2020-09-29 DIAGNOSIS — I10 ESSENTIAL HYPERTENSION, MALIGNANT: ICD-10-CM

## 2020-09-29 LAB
ALBUMIN SERPL BCP-MCNC: 4.3 G/DL (ref 3.5–5)
ALP SERPL-CCNC: 120 U/L (ref 46–116)
ALT SERPL W P-5'-P-CCNC: 33 U/L (ref 12–78)
ANION GAP SERPL CALCULATED.3IONS-SCNC: 6 MMOL/L (ref 4–13)
AST SERPL W P-5'-P-CCNC: 17 U/L (ref 5–45)
BILIRUB SERPL-MCNC: 0.51 MG/DL (ref 0.2–1)
BUN SERPL-MCNC: 15 MG/DL (ref 5–25)
CALCIUM SERPL-MCNC: 9.8 MG/DL (ref 8.3–10.1)
CHLORIDE SERPL-SCNC: 106 MMOL/L (ref 100–108)
CHOLEST SERPL-MCNC: 238 MG/DL (ref 50–200)
CO2 SERPL-SCNC: 29 MMOL/L (ref 21–32)
CREAT SERPL-MCNC: 0.98 MG/DL (ref 0.6–1.3)
ERYTHROCYTE [DISTWIDTH] IN BLOOD BY AUTOMATED COUNT: 12.7 % (ref 11.6–15.1)
GFR SERPL CREATININE-BSD FRML MDRD: 60 ML/MIN/1.73SQ M
GLUCOSE P FAST SERPL-MCNC: 104 MG/DL (ref 65–99)
HCT VFR BLD AUTO: 44 % (ref 34.8–46.1)
HDLC SERPL-MCNC: 84 MG/DL
HGB BLD-MCNC: 14.4 G/DL (ref 11.5–15.4)
LDLC SERPL CALC-MCNC: 138 MG/DL (ref 0–100)
MCH RBC QN AUTO: 30.4 PG (ref 26.8–34.3)
MCHC RBC AUTO-ENTMCNC: 32.7 G/DL (ref 31.4–37.4)
MCV RBC AUTO: 93 FL (ref 82–98)
NONHDLC SERPL-MCNC: 154 MG/DL
PLATELET # BLD AUTO: 295 THOUSANDS/UL (ref 149–390)
PMV BLD AUTO: 10.3 FL (ref 8.9–12.7)
POTASSIUM SERPL-SCNC: 3.8 MMOL/L (ref 3.5–5.3)
PROT SERPL-MCNC: 7.6 G/DL (ref 6.4–8.2)
RBC # BLD AUTO: 4.73 MILLION/UL (ref 3.81–5.12)
SODIUM SERPL-SCNC: 141 MMOL/L (ref 136–145)
T4 FREE SERPL-MCNC: 1.18 NG/DL (ref 0.76–1.46)
TRIGL SERPL-MCNC: 79 MG/DL
TSH SERPL DL<=0.05 MIU/L-ACNC: 3.48 UIU/ML (ref 0.36–3.74)
WBC # BLD AUTO: 7.64 THOUSAND/UL (ref 4.31–10.16)

## 2020-09-29 PROCEDURE — 85027 COMPLETE CBC AUTOMATED: CPT

## 2020-09-29 PROCEDURE — 84439 ASSAY OF FREE THYROXINE: CPT

## 2020-09-29 PROCEDURE — 84443 ASSAY THYROID STIM HORMONE: CPT

## 2020-09-29 PROCEDURE — 80053 COMPREHEN METABOLIC PANEL: CPT

## 2020-09-29 PROCEDURE — 36415 COLL VENOUS BLD VENIPUNCTURE: CPT

## 2020-09-29 PROCEDURE — 80061 LIPID PANEL: CPT

## 2020-09-30 ENCOUNTER — TELEPHONE (OUTPATIENT)
Dept: INFUSION CENTER | Facility: HOSPITAL | Age: 65
End: 2020-09-30

## 2020-10-01 ENCOUNTER — TELEPHONE (OUTPATIENT)
Dept: INTERVENTIONAL RADIOLOGY/VASCULAR | Facility: HOSPITAL | Age: 65
End: 2020-10-01

## 2020-10-02 ENCOUNTER — HOSPITAL ENCOUNTER (OUTPATIENT)
Dept: INTERVENTIONAL RADIOLOGY/VASCULAR | Facility: HOSPITAL | Age: 65
Discharge: HOME | End: 2020-10-02
Attending: RADIOLOGY
Payer: MEDICARE

## 2020-10-02 DIAGNOSIS — E86.9: ICD-10-CM

## 2020-10-02 DIAGNOSIS — E86.0 DEHYDRATION SYNDROME: Primary | ICD-10-CM

## 2020-10-02 PROCEDURE — 36573 INSJ PICC RS&I 5 YR+: CPT

## 2020-10-02 PROCEDURE — NC001 PR NO CHARGE: Performed by: RADIOLOGY

## 2020-10-02 PROCEDURE — 36556 INSERT NON-TUNNEL CV CATH: CPT | Performed by: RADIOLOGY

## 2020-10-02 PROCEDURE — 77001 FLUOROGUIDE FOR VEIN DEVICE: CPT | Performed by: RADIOLOGY

## 2020-10-02 PROCEDURE — C1751 CATH, INF, PER/CENT/MIDLINE: HCPCS

## 2020-10-02 PROCEDURE — 76937 US GUIDE VASCULAR ACCESS: CPT | Performed by: RADIOLOGY

## 2020-10-02 RX ORDER — LIDOCAINE HYDROCHLORIDE 10 MG/ML
INJECTION, SOLUTION EPIDURAL; INFILTRATION; INTRACAUDAL; PERINEURAL CODE/TRAUMA/SEDATION MEDICATION
Status: COMPLETED | OUTPATIENT
Start: 2020-10-02 | End: 2020-10-02

## 2020-10-02 RX ADMIN — LIDOCAINE HYDROCHLORIDE 10 ML: 10 INJECTION, SOLUTION EPIDURAL; INFILTRATION; INTRACAUDAL; PERINEURAL at 11:09

## 2020-10-09 ENCOUNTER — HOSPITAL ENCOUNTER (OUTPATIENT)
Dept: INFUSION CENTER | Facility: HOSPITAL | Age: 65
Discharge: HOME | End: 2020-10-09
Payer: MEDICARE

## 2020-10-09 PROCEDURE — 96360 HYDRATION IV INFUSION INIT: CPT

## 2020-10-09 PROCEDURE — 96361 HYDRATE IV INFUSION ADD-ON: CPT

## 2020-10-09 RX ADMIN — SODIUM CHLORIDE 1000 ML: 0.9 INJECTION, SOLUTION INTRAVENOUS at 10:16

## 2020-10-12 ENCOUNTER — HOSPITAL ENCOUNTER (OUTPATIENT)
Dept: INFUSION CENTER | Facility: HOSPITAL | Age: 65
Discharge: HOME | End: 2020-10-12
Payer: MEDICARE

## 2020-10-12 VITALS
HEART RATE: 68 BPM | SYSTOLIC BLOOD PRESSURE: 140 MMHG | RESPIRATION RATE: 16 BRPM | DIASTOLIC BLOOD PRESSURE: 74 MMHG | TEMPERATURE: 97.2 F

## 2020-10-12 PROCEDURE — 96361 HYDRATE IV INFUSION ADD-ON: CPT

## 2020-10-12 PROCEDURE — 96360 HYDRATION IV INFUSION INIT: CPT

## 2020-10-12 RX ADMIN — SODIUM CHLORIDE 1000 ML: 0.9 INJECTION, SOLUTION INTRAVENOUS at 11:25

## 2020-10-14 ENCOUNTER — HOSPITAL ENCOUNTER (OUTPATIENT)
Dept: GASTROENTEROLOGY | Facility: HOSPITAL | Age: 65
Setting detail: OUTPATIENT SURGERY
Discharge: HOME | End: 2020-10-14
Attending: INTERNAL MEDICINE
Payer: MEDICARE

## 2020-10-14 ENCOUNTER — ANESTHESIA (OUTPATIENT)
Dept: GASTROENTEROLOGY | Facility: HOSPITAL | Age: 65
End: 2020-10-14

## 2020-10-14 ENCOUNTER — ANESTHESIA EVENT (OUTPATIENT)
Dept: GASTROENTEROLOGY | Facility: HOSPITAL | Age: 65
End: 2020-10-14

## 2020-10-14 VITALS
OXYGEN SATURATION: 98 % | WEIGHT: 118 LBS | HEIGHT: 61 IN | SYSTOLIC BLOOD PRESSURE: 140 MMHG | HEART RATE: 72 BPM | RESPIRATION RATE: 16 BRPM | DIASTOLIC BLOOD PRESSURE: 73 MMHG | BODY MASS INDEX: 22.28 KG/M2 | TEMPERATURE: 98.9 F

## 2020-10-14 VITALS — HEART RATE: 119 BPM

## 2020-10-14 DIAGNOSIS — R13.10 DYSPHAGIA, UNSPECIFIED TYPE: ICD-10-CM

## 2020-10-14 PROCEDURE — C1726 CATH, BAL DIL, NON-VASCULAR: HCPCS

## 2020-10-14 RX ORDER — LEVOTHYROXINE SODIUM 25 UG/1
CAPSULE ORAL DAILY
Status: ON HOLD | COMMUNITY
End: 2021-08-10 | Stop reason: ALTCHOICE

## 2020-10-14 RX ORDER — SODIUM CHLORIDE, SODIUM LACTATE, POTASSIUM CHLORIDE, CALCIUM CHLORIDE 600; 310; 30; 20 MG/100ML; MG/100ML; MG/100ML; MG/100ML
125 INJECTION, SOLUTION INTRAVENOUS CONTINUOUS
Status: DISCONTINUED | OUTPATIENT
Start: 2020-10-14 | End: 2020-10-18 | Stop reason: HOSPADM

## 2020-10-14 RX ORDER — PROPOFOL 10 MG/ML
INJECTION, EMULSION INTRAVENOUS AS NEEDED
Status: DISCONTINUED | OUTPATIENT
Start: 2020-10-14 | End: 2020-10-14

## 2020-10-14 RX ADMIN — PROPOFOL 50 MG: 10 INJECTION, EMULSION INTRAVENOUS at 11:56

## 2020-10-14 RX ADMIN — PROPOFOL 50 MG: 10 INJECTION, EMULSION INTRAVENOUS at 12:01

## 2020-10-14 RX ADMIN — PROPOFOL 50 MG: 10 INJECTION, EMULSION INTRAVENOUS at 11:58

## 2020-10-14 RX ADMIN — SODIUM CHLORIDE, SODIUM LACTATE, POTASSIUM CHLORIDE, AND CALCIUM CHLORIDE 125 ML/HR: .6; .31; .03; .02 INJECTION, SOLUTION INTRAVENOUS at 11:26

## 2020-10-16 ENCOUNTER — TRANSCRIBE ORDERS (OUTPATIENT)
Dept: INFUSION CENTER | Facility: HOSPITAL | Age: 65
End: 2020-10-16

## 2020-10-16 ENCOUNTER — HOSPITAL ENCOUNTER (OUTPATIENT)
Dept: INFUSION CENTER | Facility: HOSPITAL | Age: 65
Discharge: HOME | End: 2020-10-16
Payer: MEDICARE

## 2020-10-16 VITALS
RESPIRATION RATE: 18 BRPM | SYSTOLIC BLOOD PRESSURE: 146 MMHG | TEMPERATURE: 97.2 F | HEART RATE: 76 BPM | DIASTOLIC BLOOD PRESSURE: 80 MMHG

## 2020-10-16 LAB
ALBUMIN SERPL BCP-MCNC: 4.4 G/DL (ref 3.5–5.7)
ALP SERPL-CCNC: 87 U/L (ref 55–165)
ALT SERPL W P-5'-P-CCNC: 17 U/L (ref 7–52)
ANION GAP SERPL CALCULATED.3IONS-SCNC: 9 MMOL/L (ref 4–13)
AST SERPL W P-5'-P-CCNC: 17 U/L (ref 13–39)
BILIRUB SERPL-MCNC: 0.7 MG/DL (ref 0.2–1)
BUN SERPL-MCNC: 12 MG/DL (ref 7–25)
CALCIUM SERPL-MCNC: 9.4 MG/DL (ref 8.6–10.5)
CHLORIDE SERPL-SCNC: 105 MMOL/L (ref 98–107)
CO2 SERPL-SCNC: 27 MMOL/L (ref 21–31)
CREAT SERPL-MCNC: 0.84 MG/DL (ref 0.6–1.2)
GFR SERPL CREATININE-BSD FRML MDRD: 73 ML/MIN/1.73SQ M
GLUCOSE SERPL-MCNC: 90 MG/DL (ref 65–99)
POTASSIUM SERPL-SCNC: 3.7 MMOL/L (ref 3.5–5.5)
PROT SERPL-MCNC: 6.7 G/DL (ref 6.4–8.9)
SODIUM SERPL-SCNC: 141 MMOL/L (ref 134–143)

## 2020-10-16 PROCEDURE — 80053 COMPREHEN METABOLIC PANEL: CPT | Performed by: INTERNAL MEDICINE

## 2020-10-16 PROCEDURE — 96361 HYDRATE IV INFUSION ADD-ON: CPT

## 2020-10-16 PROCEDURE — 96360 HYDRATION IV INFUSION INIT: CPT

## 2020-10-16 RX ADMIN — SODIUM CHLORIDE 1000 ML: 0.9 INJECTION, SOLUTION INTRAVENOUS at 11:23

## 2020-10-19 ENCOUNTER — HOSPITAL ENCOUNTER (OUTPATIENT)
Dept: INFUSION CENTER | Facility: HOSPITAL | Age: 65
Discharge: HOME | End: 2020-10-19
Payer: MEDICARE

## 2020-10-19 VITALS
RESPIRATION RATE: 16 BRPM | SYSTOLIC BLOOD PRESSURE: 145 MMHG | DIASTOLIC BLOOD PRESSURE: 80 MMHG | HEART RATE: 68 BPM | TEMPERATURE: 98.6 F

## 2020-10-19 PROCEDURE — 96360 HYDRATION IV INFUSION INIT: CPT

## 2020-10-19 PROCEDURE — 96361 HYDRATE IV INFUSION ADD-ON: CPT

## 2020-10-19 RX ADMIN — SODIUM CHLORIDE 1000 ML: 0.9 INJECTION, SOLUTION INTRAVENOUS at 11:23

## 2020-10-22 ENCOUNTER — TELEPHONE (OUTPATIENT)
Dept: INFUSION CENTER | Facility: HOSPITAL | Age: 65
End: 2020-10-22

## 2020-10-23 ENCOUNTER — HOSPITAL ENCOUNTER (OUTPATIENT)
Dept: INFUSION CENTER | Facility: HOSPITAL | Age: 65
Discharge: HOME | End: 2020-10-23

## 2020-10-27 ENCOUNTER — HOSPITAL ENCOUNTER (OUTPATIENT)
Dept: INFUSION CENTER | Facility: HOSPITAL | Age: 65
Discharge: HOME | End: 2020-10-27
Payer: MEDICARE

## 2020-10-27 VITALS
TEMPERATURE: 98.2 F | RESPIRATION RATE: 18 BRPM | HEART RATE: 77 BPM | SYSTOLIC BLOOD PRESSURE: 136 MMHG | OXYGEN SATURATION: 97 % | DIASTOLIC BLOOD PRESSURE: 76 MMHG

## 2020-10-27 PROCEDURE — 96360 HYDRATION IV INFUSION INIT: CPT

## 2020-10-27 PROCEDURE — 96361 HYDRATE IV INFUSION ADD-ON: CPT

## 2020-10-27 RX ADMIN — SODIUM CHLORIDE 1000 ML: 0.9 INJECTION, SOLUTION INTRAVENOUS at 09:38

## 2020-10-30 ENCOUNTER — HOSPITAL ENCOUNTER (OUTPATIENT)
Dept: INFUSION CENTER | Facility: HOSPITAL | Age: 65
Discharge: HOME | End: 2020-10-30
Payer: MEDICARE

## 2020-10-30 VITALS
SYSTOLIC BLOOD PRESSURE: 137 MMHG | HEART RATE: 102 BPM | RESPIRATION RATE: 18 BRPM | DIASTOLIC BLOOD PRESSURE: 79 MMHG | TEMPERATURE: 97.9 F | OXYGEN SATURATION: 98 %

## 2020-10-30 PROCEDURE — 96361 HYDRATE IV INFUSION ADD-ON: CPT

## 2020-10-30 PROCEDURE — 96360 HYDRATION IV INFUSION INIT: CPT

## 2020-10-30 RX ADMIN — SODIUM CHLORIDE 1000 ML: 0.9 INJECTION, SOLUTION INTRAVENOUS at 09:17

## 2020-11-02 ENCOUNTER — HOSPITAL ENCOUNTER (OUTPATIENT)
Dept: INFUSION CENTER | Facility: HOSPITAL | Age: 65
Discharge: HOME | End: 2020-11-02
Payer: MEDICARE

## 2020-11-02 VITALS
TEMPERATURE: 97.7 F | RESPIRATION RATE: 16 BRPM | DIASTOLIC BLOOD PRESSURE: 73 MMHG | HEART RATE: 99 BPM | SYSTOLIC BLOOD PRESSURE: 142 MMHG

## 2020-11-02 PROCEDURE — 96361 HYDRATE IV INFUSION ADD-ON: CPT

## 2020-11-02 PROCEDURE — 96360 HYDRATION IV INFUSION INIT: CPT

## 2020-11-02 RX ADMIN — SODIUM CHLORIDE 1000 ML: 0.9 INJECTION, SOLUTION INTRAVENOUS at 09:35

## 2020-11-06 ENCOUNTER — HOSPITAL ENCOUNTER (OUTPATIENT)
Dept: INFUSION CENTER | Facility: HOSPITAL | Age: 65
Discharge: HOME | End: 2020-11-06
Payer: MEDICARE

## 2020-11-06 VITALS
DIASTOLIC BLOOD PRESSURE: 72 MMHG | HEART RATE: 99 BPM | TEMPERATURE: 96.5 F | RESPIRATION RATE: 16 BRPM | SYSTOLIC BLOOD PRESSURE: 135 MMHG | OXYGEN SATURATION: 98 %

## 2020-11-06 PROCEDURE — 96361 HYDRATE IV INFUSION ADD-ON: CPT

## 2020-11-06 PROCEDURE — 96360 HYDRATION IV INFUSION INIT: CPT

## 2020-11-06 RX ORDER — SODIUM CHLORIDE 9 MG/ML
250 INJECTION, SOLUTION INTRAVENOUS ONCE
Status: DISCONTINUED | OUTPATIENT
Start: 2020-11-10 | End: 2020-11-10

## 2020-11-06 RX ADMIN — SODIUM CHLORIDE 1000 ML: 0.9 INJECTION, SOLUTION INTRAVENOUS at 09:14

## 2020-11-10 ENCOUNTER — HOSPITAL ENCOUNTER (OUTPATIENT)
Dept: INFUSION CENTER | Facility: HOSPITAL | Age: 65
Discharge: HOME | End: 2020-11-10
Attending: INTERNAL MEDICINE | Admitting: INTERNAL MEDICINE
Payer: MEDICARE

## 2020-11-10 VITALS
RESPIRATION RATE: 16 BRPM | SYSTOLIC BLOOD PRESSURE: 140 MMHG | HEART RATE: 74 BPM | DIASTOLIC BLOOD PRESSURE: 66 MMHG | TEMPERATURE: 99.7 F

## 2020-11-10 LAB — PTH-INTACT SERPL-MCNC: 73.4 PG/ML (ref 18.4–80.1)

## 2020-11-10 PROCEDURE — 83970 ASSAY OF PARATHORMONE: CPT | Performed by: INTERNAL MEDICINE

## 2020-11-10 PROCEDURE — 96360 HYDRATION IV INFUSION INIT: CPT

## 2020-11-10 PROCEDURE — 96361 HYDRATE IV INFUSION ADD-ON: CPT

## 2020-11-10 RX ORDER — SODIUM CHLORIDE 9 MG/ML
250 INJECTION, SOLUTION INTRAVENOUS ONCE
Status: COMPLETED | OUTPATIENT
Start: 2020-11-10 | End: 2020-11-10

## 2020-11-10 RX ADMIN — SODIUM CHLORIDE 250 ML/HR: 0.9 INJECTION, SOLUTION INTRAVENOUS at 10:20

## 2020-11-11 RX ORDER — SODIUM CHLORIDE 9 MG/ML
250 INJECTION, SOLUTION INTRAVENOUS ONCE
Status: COMPLETED | OUTPATIENT
Start: 2020-11-12 | End: 2020-11-12

## 2020-11-12 ENCOUNTER — HOSPITAL ENCOUNTER (OUTPATIENT)
Dept: INFUSION CENTER | Facility: HOSPITAL | Age: 65
Discharge: HOME | End: 2020-11-12
Attending: INTERNAL MEDICINE | Admitting: INTERNAL MEDICINE
Payer: MEDICARE

## 2020-11-12 VITALS
TEMPERATURE: 98.7 F | HEART RATE: 66 BPM | DIASTOLIC BLOOD PRESSURE: 74 MMHG | SYSTOLIC BLOOD PRESSURE: 166 MMHG | RESPIRATION RATE: 16 BRPM

## 2020-11-12 PROCEDURE — 96360 HYDRATION IV INFUSION INIT: CPT

## 2020-11-12 PROCEDURE — 96361 HYDRATE IV INFUSION ADD-ON: CPT

## 2020-11-12 RX ADMIN — SODIUM CHLORIDE 250 ML/HR: 0.9 INJECTION, SOLUTION INTRAVENOUS at 09:41

## 2020-11-16 ENCOUNTER — HOSPITAL ENCOUNTER (OUTPATIENT)
Dept: INFUSION CENTER | Facility: HOSPITAL | Age: 65
Discharge: HOME | End: 2020-11-16
Payer: MEDICARE

## 2020-11-16 VITALS
OXYGEN SATURATION: 95 % | HEART RATE: 72 BPM | DIASTOLIC BLOOD PRESSURE: 69 MMHG | SYSTOLIC BLOOD PRESSURE: 143 MMHG | TEMPERATURE: 97.9 F | RESPIRATION RATE: 18 BRPM

## 2020-11-16 PROCEDURE — 96361 HYDRATE IV INFUSION ADD-ON: CPT

## 2020-11-16 PROCEDURE — 96360 HYDRATION IV INFUSION INIT: CPT

## 2020-11-16 RX ADMIN — SODIUM CHLORIDE 1000 ML: 0.9 INJECTION, SOLUTION INTRAVENOUS at 08:16

## 2020-11-18 RX ORDER — SODIUM CHLORIDE 9 MG/ML
250 INJECTION, SOLUTION INTRAVENOUS ONCE
Status: COMPLETED | OUTPATIENT
Start: 2020-11-20 | End: 2020-11-20

## 2020-11-20 ENCOUNTER — HOSPITAL ENCOUNTER (OUTPATIENT)
Dept: INFUSION CENTER | Facility: HOSPITAL | Age: 65
Discharge: HOME | End: 2020-11-20
Payer: MEDICARE

## 2020-11-20 VITALS
RESPIRATION RATE: 18 BRPM | SYSTOLIC BLOOD PRESSURE: 144 MMHG | HEART RATE: 109 BPM | OXYGEN SATURATION: 96 % | DIASTOLIC BLOOD PRESSURE: 84 MMHG | TEMPERATURE: 98.8 F

## 2020-11-20 PROCEDURE — 96360 HYDRATION IV INFUSION INIT: CPT

## 2020-11-20 PROCEDURE — 96361 HYDRATE IV INFUSION ADD-ON: CPT

## 2020-11-20 RX ADMIN — SODIUM CHLORIDE 250 ML/HR: 0.9 INJECTION, SOLUTION INTRAVENOUS at 09:59

## 2020-11-21 RX ORDER — SODIUM CHLORIDE 9 MG/ML
250 INJECTION, SOLUTION INTRAVENOUS ONCE
Status: DISCONTINUED | OUTPATIENT
Start: 2020-11-21 | End: 2020-11-21

## 2020-11-24 ENCOUNTER — HOSPITAL ENCOUNTER (OUTPATIENT)
Dept: INFUSION CENTER | Facility: HOSPITAL | Age: 65
Discharge: HOME | End: 2020-11-24
Payer: MEDICARE

## 2020-11-24 VITALS
TEMPERATURE: 97.5 F | RESPIRATION RATE: 16 BRPM | OXYGEN SATURATION: 97 % | HEART RATE: 90 BPM | SYSTOLIC BLOOD PRESSURE: 148 MMHG | DIASTOLIC BLOOD PRESSURE: 75 MMHG

## 2020-11-24 PROCEDURE — 96360 HYDRATION IV INFUSION INIT: CPT

## 2020-11-24 PROCEDURE — 96361 HYDRATE IV INFUSION ADD-ON: CPT

## 2020-11-24 RX ADMIN — SODIUM CHLORIDE 1000 ML: 0.9 INJECTION, SOLUTION INTRAVENOUS at 08:19

## 2020-11-27 ENCOUNTER — HOSPITAL ENCOUNTER (OUTPATIENT)
Dept: INFUSION CENTER | Facility: HOSPITAL | Age: 65
Discharge: HOME | End: 2020-11-27
Payer: MEDICARE

## 2020-11-27 VITALS
TEMPERATURE: 98.4 F | DIASTOLIC BLOOD PRESSURE: 76 MMHG | SYSTOLIC BLOOD PRESSURE: 169 MMHG | HEART RATE: 78 BPM | RESPIRATION RATE: 18 BRPM | OXYGEN SATURATION: 95 %

## 2020-11-27 PROCEDURE — 96360 HYDRATION IV INFUSION INIT: CPT

## 2020-11-27 PROCEDURE — 96361 HYDRATE IV INFUSION ADD-ON: CPT

## 2020-11-27 RX ORDER — SODIUM CHLORIDE 9 MG/ML
250 INJECTION, SOLUTION INTRAVENOUS ONCE
Status: COMPLETED | OUTPATIENT
Start: 2020-11-30 | End: 2020-11-30

## 2020-11-27 RX ADMIN — SODIUM CHLORIDE 1000 ML: 0.9 INJECTION, SOLUTION INTRAVENOUS at 11:50

## 2020-11-30 ENCOUNTER — HOSPITAL ENCOUNTER (OUTPATIENT)
Dept: INFUSION CENTER | Facility: HOSPITAL | Age: 65
Discharge: HOME | End: 2020-11-30
Payer: MEDICARE

## 2020-11-30 VITALS
HEART RATE: 80 BPM | SYSTOLIC BLOOD PRESSURE: 146 MMHG | RESPIRATION RATE: 20 BRPM | TEMPERATURE: 99 F | OXYGEN SATURATION: 99 % | DIASTOLIC BLOOD PRESSURE: 75 MMHG

## 2020-11-30 PROCEDURE — 96361 HYDRATE IV INFUSION ADD-ON: CPT

## 2020-11-30 PROCEDURE — 96360 HYDRATION IV INFUSION INIT: CPT

## 2020-11-30 RX ADMIN — SODIUM CHLORIDE 250 ML/HR: 0.9 INJECTION, SOLUTION INTRAVENOUS at 09:33

## 2020-12-04 ENCOUNTER — HOSPITAL ENCOUNTER (OUTPATIENT)
Dept: INFUSION CENTER | Facility: HOSPITAL | Age: 65
Discharge: HOME | End: 2020-12-04
Payer: MEDICARE

## 2020-12-04 VITALS
SYSTOLIC BLOOD PRESSURE: 137 MMHG | OXYGEN SATURATION: 96 % | RESPIRATION RATE: 18 BRPM | DIASTOLIC BLOOD PRESSURE: 79 MMHG | TEMPERATURE: 98 F | HEART RATE: 94 BPM

## 2020-12-04 PROCEDURE — 96361 HYDRATE IV INFUSION ADD-ON: CPT

## 2020-12-04 PROCEDURE — 96360 HYDRATION IV INFUSION INIT: CPT

## 2020-12-04 RX ORDER — SODIUM CHLORIDE 9 MG/ML
250 INJECTION, SOLUTION INTRAVENOUS ONCE
Status: COMPLETED | OUTPATIENT
Start: 2020-12-07 | End: 2020-12-07

## 2020-12-04 RX ADMIN — SODIUM CHLORIDE 1000 ML: 0.9 INJECTION, SOLUTION INTRAVENOUS at 10:04

## 2020-12-07 ENCOUNTER — HOSPITAL ENCOUNTER (OUTPATIENT)
Dept: INFUSION CENTER | Facility: HOSPITAL | Age: 65
Discharge: HOME | End: 2020-12-07
Payer: MEDICARE

## 2020-12-07 VITALS
TEMPERATURE: 97.8 F | HEART RATE: 113 BPM | OXYGEN SATURATION: 96 % | RESPIRATION RATE: 18 BRPM | SYSTOLIC BLOOD PRESSURE: 143 MMHG | DIASTOLIC BLOOD PRESSURE: 88 MMHG

## 2020-12-07 PROCEDURE — 96360 HYDRATION IV INFUSION INIT: CPT

## 2020-12-07 PROCEDURE — 96361 HYDRATE IV INFUSION ADD-ON: CPT

## 2020-12-07 RX ADMIN — SODIUM CHLORIDE 250 ML/HR: 0.9 INJECTION, SOLUTION INTRAVENOUS at 09:58

## 2020-12-10 RX ORDER — SODIUM CHLORIDE 9 MG/ML
250 INJECTION, SOLUTION INTRAVENOUS ONCE
Status: DISCONTINUED | OUTPATIENT
Start: 2020-12-14 | End: 2020-12-18 | Stop reason: HOSPADM

## 2020-12-11 ENCOUNTER — HOSPITAL ENCOUNTER (OUTPATIENT)
Dept: INFUSION CENTER | Facility: HOSPITAL | Age: 65
Discharge: HOME | End: 2020-12-11
Payer: MEDICARE

## 2020-12-11 VITALS
OXYGEN SATURATION: 98 % | SYSTOLIC BLOOD PRESSURE: 140 MMHG | RESPIRATION RATE: 20 BRPM | HEART RATE: 86 BPM | DIASTOLIC BLOOD PRESSURE: 83 MMHG | TEMPERATURE: 96.8 F

## 2020-12-11 PROCEDURE — 96360 HYDRATION IV INFUSION INIT: CPT

## 2020-12-11 PROCEDURE — 96361 HYDRATE IV INFUSION ADD-ON: CPT

## 2020-12-11 RX ADMIN — SODIUM CHLORIDE 1000 ML: 0.9 INJECTION, SOLUTION INTRAVENOUS at 10:59

## 2020-12-14 ENCOUNTER — HOSPITAL ENCOUNTER (OUTPATIENT)
Dept: INFUSION CENTER | Facility: HOSPITAL | Age: 65
Discharge: HOME | End: 2020-12-14

## 2020-12-18 ENCOUNTER — HOSPITAL ENCOUNTER (OUTPATIENT)
Dept: INFUSION CENTER | Facility: HOSPITAL | Age: 65
Discharge: HOME | End: 2020-12-18
Payer: MEDICARE

## 2020-12-18 VITALS
OXYGEN SATURATION: 98 % | RESPIRATION RATE: 18 BRPM | SYSTOLIC BLOOD PRESSURE: 166 MMHG | DIASTOLIC BLOOD PRESSURE: 74 MMHG | TEMPERATURE: 97.8 F | HEART RATE: 79 BPM

## 2020-12-18 PROCEDURE — 96361 HYDRATE IV INFUSION ADD-ON: CPT

## 2020-12-18 PROCEDURE — 96360 HYDRATION IV INFUSION INIT: CPT

## 2020-12-18 RX ADMIN — SODIUM CHLORIDE 1000 ML: 0.9 INJECTION, SOLUTION INTRAVENOUS at 08:40

## 2020-12-21 ENCOUNTER — HOSPITAL ENCOUNTER (OUTPATIENT)
Dept: INFUSION CENTER | Facility: HOSPITAL | Age: 65
Discharge: HOME | End: 2020-12-21
Payer: MEDICARE

## 2020-12-21 VITALS
HEART RATE: 85 BPM | SYSTOLIC BLOOD PRESSURE: 144 MMHG | TEMPERATURE: 97.3 F | RESPIRATION RATE: 16 BRPM | DIASTOLIC BLOOD PRESSURE: 76 MMHG

## 2020-12-21 PROCEDURE — 96360 HYDRATION IV INFUSION INIT: CPT

## 2020-12-21 PROCEDURE — 96361 HYDRATE IV INFUSION ADD-ON: CPT

## 2020-12-21 RX ADMIN — SODIUM CHLORIDE 1000 ML: 0.9 INJECTION, SOLUTION INTRAVENOUS at 10:37

## 2020-12-24 ENCOUNTER — HOSPITAL ENCOUNTER (OUTPATIENT)
Dept: INFUSION CENTER | Facility: HOSPITAL | Age: 65
Discharge: HOME | End: 2020-12-24
Payer: MEDICARE

## 2020-12-24 VITALS
TEMPERATURE: 97.6 F | SYSTOLIC BLOOD PRESSURE: 151 MMHG | RESPIRATION RATE: 16 BRPM | DIASTOLIC BLOOD PRESSURE: 71 MMHG | HEART RATE: 85 BPM

## 2020-12-24 PROCEDURE — 96360 HYDRATION IV INFUSION INIT: CPT

## 2020-12-24 PROCEDURE — 96361 HYDRATE IV INFUSION ADD-ON: CPT

## 2020-12-24 RX ORDER — VENLAFAXINE HYDROCHLORIDE 37.5 MG/1
37.5 CAPSULE, EXTENDED RELEASE ORAL DAILY
Status: ON HOLD | COMMUNITY
End: 2021-08-10 | Stop reason: ALTCHOICE

## 2020-12-24 RX ADMIN — SODIUM CHLORIDE 1000 ML: 0.9 INJECTION, SOLUTION INTRAVENOUS at 08:11

## 2020-12-28 ENCOUNTER — HOSPITAL ENCOUNTER (OUTPATIENT)
Dept: INFUSION CENTER | Facility: HOSPITAL | Age: 65
Discharge: HOME | End: 2020-12-28
Payer: MEDICARE

## 2020-12-28 VITALS
RESPIRATION RATE: 17 BRPM | DIASTOLIC BLOOD PRESSURE: 73 MMHG | SYSTOLIC BLOOD PRESSURE: 141 MMHG | HEART RATE: 79 BPM | TEMPERATURE: 97.2 F

## 2020-12-28 PROCEDURE — 96360 HYDRATION IV INFUSION INIT: CPT

## 2020-12-28 PROCEDURE — 96361 HYDRATE IV INFUSION ADD-ON: CPT

## 2020-12-28 RX ADMIN — SODIUM CHLORIDE 1000 ML: 0.9 INJECTION, SOLUTION INTRAVENOUS at 09:35

## 2020-12-31 ENCOUNTER — HOSPITAL ENCOUNTER (OUTPATIENT)
Dept: INFUSION CENTER | Facility: HOSPITAL | Age: 65
Discharge: HOME | End: 2020-12-31
Payer: MEDICARE

## 2020-12-31 VITALS
SYSTOLIC BLOOD PRESSURE: 135 MMHG | RESPIRATION RATE: 17 BRPM | DIASTOLIC BLOOD PRESSURE: 66 MMHG | OXYGEN SATURATION: 99 % | TEMPERATURE: 97.5 F | HEART RATE: 80 BPM

## 2020-12-31 PROCEDURE — 96361 HYDRATE IV INFUSION ADD-ON: CPT

## 2020-12-31 PROCEDURE — 96360 HYDRATION IV INFUSION INIT: CPT

## 2020-12-31 RX ADMIN — SODIUM CHLORIDE 1000 ML: 0.9 INJECTION, SOLUTION INTRAVENOUS at 08:15

## 2020-12-31 NOTE — PLAN OF CARE
Problem: INFECTION - ADULT  Goal: Absence of fever/infection during neutropenic period  Description: INTERVENTIONS:  - Monitor WBC    Outcome: Progressing

## 2020-12-31 NOTE — PROGRESS NOTES
Pt tolerated infusion well  Dressing and end cap changed  Pt discharged in satisfactory condition  Next appointment 1/4/2021 at [facility]

## 2021-01-04 ENCOUNTER — HOSPITAL ENCOUNTER (OUTPATIENT)
Dept: INFUSION CENTER | Facility: HOSPITAL | Age: 66
Discharge: HOME | End: 2021-01-04
Payer: MEDICARE

## 2021-01-04 PROCEDURE — 96361 HYDRATE IV INFUSION ADD-ON: CPT

## 2021-01-04 PROCEDURE — 96360 HYDRATION IV INFUSION INIT: CPT

## 2021-01-04 RX ORDER — SODIUM CHLORIDE 9 MG/ML
250 INJECTION, SOLUTION INTRAVENOUS CONTINUOUS
Status: DISCONTINUED | OUTPATIENT
Start: 2021-01-06 | End: 2021-01-04

## 2021-01-04 RX ADMIN — SODIUM CHLORIDE 1000 ML: 0.9 INJECTION, SOLUTION INTRAVENOUS at 12:20

## 2021-01-04 NOTE — PLAN OF CARE
Problem: Potential for Falls  Goal: Patient will remain free of falls  Description: INTERVENTIONS:  - Assess patient frequently for physical needs  -  Identify cognitive and physical deficits and behaviors that affect risk of falls    -  [place] fall precautions as indicated by assessment   - Educate patient/family on patient safety including physical limitations  - Instruct patient to call for assistance with activity based on assessment  - Modify environment to reduce risk of injury  - Consider OT/PT consult to assist with strengthening/mobility  Outcome: Progressing

## 2021-01-06 ENCOUNTER — HOSPITAL ENCOUNTER (OUTPATIENT)
Dept: INFUSION CENTER | Facility: HOSPITAL | Age: 66
Discharge: HOME | End: 2021-01-06
Payer: MEDICARE

## 2021-01-06 VITALS
SYSTOLIC BLOOD PRESSURE: 162 MMHG | DIASTOLIC BLOOD PRESSURE: 98 MMHG | TEMPERATURE: 98.3 F | RESPIRATION RATE: 18 BRPM | HEART RATE: 102 BPM

## 2021-01-06 PROCEDURE — 96361 HYDRATE IV INFUSION ADD-ON: CPT

## 2021-01-06 PROCEDURE — 96360 HYDRATION IV INFUSION INIT: CPT

## 2021-01-06 RX ADMIN — SODIUM CHLORIDE 1000 ML: 0.9 INJECTION, SOLUTION INTRAVENOUS at 11:59

## 2021-01-06 NOTE — PLAN OF CARE
Problem: Potential for Falls  Goal: Patient will remain free of falls  Description: INTERVENTIONS:  - Assess patient frequently for physical needs  -  Identify cognitive and physical deficits and behaviors that affect risk of falls    -  [place] fall precautions as indicated by assessment   - Educate patient/family on patient safety including physical limitations  - Instruct patient to call for assistance with activity based on assessment  - Modify environment to reduce risk of injury  - Consider OT/PT consult to assist with strengthening/mobility  1/6/2021 1458 by Shira Royal RN  Outcome: Progressing  1/6/2021 1452 by Shira Royal RN  Outcome: Progressing

## 2021-01-06 NOTE — PLAN OF CARE
Problem: Potential for Falls  Goal: Patient will remain free of falls  Description: INTERVENTIONS:  - Assess patient frequently for physical needs  -  Identify cognitive and physical deficits and behaviors that affect risk of falls    -  [place] fall precautions as indicated by assessment   - Educate patient/family on patient safety including physical limitations  - Instruct patient to call for assistance with activity based on assessment  - Modify environment to reduce risk of injury  - Consider OT/PT consult to assist with strengthening/mobility  Outcome: Progressing

## 2021-01-07 RX ORDER — SODIUM CHLORIDE 9 MG/ML
250 INJECTION, SOLUTION INTRAVENOUS ONCE
Status: COMPLETED | OUTPATIENT
Start: 2021-01-08 | End: 2021-01-08

## 2021-01-08 ENCOUNTER — HOSPITAL ENCOUNTER (OUTPATIENT)
Dept: INFUSION CENTER | Facility: HOSPITAL | Age: 66
Discharge: HOME | End: 2021-01-08
Payer: MEDICARE

## 2021-01-08 VITALS
DIASTOLIC BLOOD PRESSURE: 93 MMHG | SYSTOLIC BLOOD PRESSURE: 154 MMHG | HEART RATE: 99 BPM | TEMPERATURE: 98.7 F | RESPIRATION RATE: 18 BRPM

## 2021-01-08 PROCEDURE — 96361 HYDRATE IV INFUSION ADD-ON: CPT

## 2021-01-08 PROCEDURE — 96360 HYDRATION IV INFUSION INIT: CPT

## 2021-01-08 RX ORDER — SODIUM CHLORIDE 9 MG/ML
250 INJECTION, SOLUTION INTRAVENOUS ONCE
Status: COMPLETED | OUTPATIENT
Start: 2021-01-11 | End: 2021-01-11

## 2021-01-08 RX ADMIN — SODIUM CHLORIDE 250 ML/HR: 0.9 INJECTION, SOLUTION INTRAVENOUS at 11:16

## 2021-01-08 NOTE — PROGRESS NOTES
Pt given 4 hr hydration as per orders  PICC line redressed using sterile technique  Caps changed  Pt refused chlorahexadine dressing as she feels she gets "itchy and scratches in her sleep"   Many allergies noted by pt  Discharged ambulatory aware f upcoming appts

## 2021-01-11 ENCOUNTER — HOSPITAL ENCOUNTER (OUTPATIENT)
Dept: INFUSION CENTER | Facility: HOSPITAL | Age: 66
Discharge: HOME | End: 2021-01-11
Payer: MEDICARE

## 2021-01-11 VITALS
HEART RATE: 108 BPM | TEMPERATURE: 98.3 F | SYSTOLIC BLOOD PRESSURE: 166 MMHG | DIASTOLIC BLOOD PRESSURE: 93 MMHG | RESPIRATION RATE: 18 BRPM

## 2021-01-11 PROCEDURE — 96360 HYDRATION IV INFUSION INIT: CPT

## 2021-01-11 PROCEDURE — 96361 HYDRATE IV INFUSION ADD-ON: CPT

## 2021-01-11 RX ADMIN — SODIUM CHLORIDE 250 ML/HR: 0.9 INJECTION, SOLUTION INTRAVENOUS at 10:39

## 2021-01-11 NOTE — PLAN OF CARE
Problem: INFECTION - ADULT  Goal: Absence or prevention of progression during hospitalization  Description: INTERVENTIONS:  - Assess and monitor for signs and symptoms of infection  - Monitor lab/diagnostic results  - Monitor all insertion sites, i e  indwelling lines, tubes, and drains  - Monitor endotracheal if appropriate and nasal secretions for changes in amount and color  - [place] appropriate cooling/warming therapies per order  - Administer medications as ordered  - Instruct and encourage patient and family to use good hand hygiene technique  - Identify and instruct in appropriate isolation precautions for identified infection/condition  Outcome: Progressing

## 2021-01-11 NOTE — PROGRESS NOTES
Pt tolerated hydration well  pICC line dressing dry & intact due for change on Friday  Pt reports "a slight pink area where the catheter goes in the skin" however site is without redness, swelling, or drainage  Did discuss with pt signs of infection to watch for as she deferred her chlorahexadine patch this past week  Pt verbalized understanding   Discharged ambulatory with avs

## 2021-01-13 ENCOUNTER — HOSPITAL ENCOUNTER (OUTPATIENT)
Dept: INFUSION CENTER | Facility: HOSPITAL | Age: 66
Discharge: HOME | End: 2021-01-13
Payer: MEDICARE

## 2021-01-13 VITALS
SYSTOLIC BLOOD PRESSURE: 142 MMHG | RESPIRATION RATE: 16 BRPM | DIASTOLIC BLOOD PRESSURE: 70 MMHG | TEMPERATURE: 98.4 F | HEART RATE: 83 BPM | OXYGEN SATURATION: 95 %

## 2021-01-13 PROCEDURE — 96361 HYDRATE IV INFUSION ADD-ON: CPT

## 2021-01-13 PROCEDURE — 96360 HYDRATION IV INFUSION INIT: CPT

## 2021-01-13 RX ADMIN — SODIUM CHLORIDE 1000 ML: 0.9 INJECTION, SOLUTION INTRAVENOUS at 10:35

## 2021-01-13 NOTE — PLAN OF CARE
Problem: INFECTION - ADULT  Goal: Absence or prevention of progression during hospitalization  Description: INTERVENTIONS:  - Assess and monitor for signs and symptoms of infection  - Monitor lab/diagnostic results  - Monitor all insertion sites, i e  indwelling lines, tubes, and drains  - Monitor endotracheal if appropriate and nasal secretions for changes in amount and color  - [place] appropriate cooling/warming therapies per order  - Administer medications as ordered  - Instruct and encourage patient and family to use good hand hygiene technique  - Identify and instruct in appropriate isolation precautions for identified infection/condition  Outcome: Progressing     Problem: Knowledge Deficit  Goal: Patient/family/caregiver demonstrates understanding of disease process, treatment plan, medications, and discharge instructions  Description: Complete learning assessment and assess knowledge base    Interventions:  - Provide teaching at level of understanding  - Provide teaching via preferred learning methods  Outcome: Progressing

## 2021-01-15 ENCOUNTER — HOSPITAL ENCOUNTER (OUTPATIENT)
Dept: INFUSION CENTER | Facility: HOSPITAL | Age: 66
Discharge: HOME | End: 2021-01-15
Payer: MEDICARE

## 2021-01-15 VITALS
SYSTOLIC BLOOD PRESSURE: 137 MMHG | TEMPERATURE: 98.4 F | DIASTOLIC BLOOD PRESSURE: 63 MMHG | RESPIRATION RATE: 16 BRPM | HEART RATE: 93 BPM

## 2021-01-15 PROCEDURE — 96361 HYDRATE IV INFUSION ADD-ON: CPT

## 2021-01-15 PROCEDURE — 96360 HYDRATION IV INFUSION INIT: CPT

## 2021-01-15 RX ORDER — SODIUM CHLORIDE 9 MG/ML
250 INJECTION, SOLUTION INTRAVENOUS ONCE
Status: COMPLETED | OUTPATIENT
Start: 2021-01-18 | End: 2021-01-18

## 2021-01-15 RX ORDER — GALCANEZUMAB 120 MG/ML
INJECTION, SOLUTION SUBCUTANEOUS
COMMUNITY

## 2021-01-15 RX ADMIN — SODIUM CHLORIDE 1000 ML: 0.9 INJECTION, SOLUTION INTRAVENOUS at 10:03

## 2021-01-15 NOTE — PLAN OF CARE
Problem: INFECTION - ADULT  Goal: Absence or prevention of progression during hospitalization  Description: INTERVENTIONS:  - Assess and monitor for signs and symptoms of infection  - Monitor lab/diagnostic results  - Monitor all insertion sites, i e  indwelling lines, tubes, and drains  - Monitor endotracheal if appropriate and nasal secretions for changes in amount and color  - [place] appropriate cooling/warming therapies per order  - Administer medications as ordered  - Instruct and encourage patient and family to use good hand hygiene technique  - Identify and instruct in appropriate isolation precautions for identified infection/condition  Outcome: Progressing     Problem: Knowledge Deficit  Goal: Patient/family/caregiver demonstrates understanding of disease process, treatment plan, medications, and discharge instructions  Description: Complete learning assessment and assess knowledge base    Interventions:  - Provide teaching at level of understanding  - Provide teaching via preferred learning methods  Outcome: Progressing

## 2021-01-18 ENCOUNTER — HOSPITAL ENCOUNTER (OUTPATIENT)
Dept: INFUSION CENTER | Facility: HOSPITAL | Age: 66
Discharge: HOME | End: 2021-01-18
Payer: MEDICARE

## 2021-01-18 VITALS — HEART RATE: 99 BPM | TEMPERATURE: 97.5 F | DIASTOLIC BLOOD PRESSURE: 87 MMHG | SYSTOLIC BLOOD PRESSURE: 145 MMHG

## 2021-01-18 LAB
ALBUMIN SERPL BCP-MCNC: 4.6 G/DL (ref 3.5–5.7)
ALP SERPL-CCNC: 121 U/L (ref 55–165)
ALT SERPL W P-5'-P-CCNC: 29 U/L (ref 7–52)
ANION GAP SERPL CALCULATED.3IONS-SCNC: 7 MMOL/L (ref 4–13)
AST SERPL W P-5'-P-CCNC: 24 U/L (ref 13–39)
BASOPHILS # BLD AUTO: 0.1 THOUSANDS/ΜL (ref 0–0.1)
BASOPHILS NFR BLD AUTO: 1 % (ref 0–2)
BILIRUB SERPL-MCNC: 0.5 MG/DL (ref 0.2–1)
BUN SERPL-MCNC: 15 MG/DL (ref 7–25)
CALCIUM SERPL-MCNC: 10 MG/DL (ref 8.6–10.5)
CHLORIDE SERPL-SCNC: 102 MMOL/L (ref 98–107)
CO2 SERPL-SCNC: 28 MMOL/L (ref 21–31)
CREAT SERPL-MCNC: 0.91 MG/DL (ref 0.6–1.2)
EOSINOPHIL # BLD AUTO: 0.1 THOUSAND/ΜL (ref 0–0.61)
EOSINOPHIL NFR BLD AUTO: 2 % (ref 0–5)
ERYTHROCYTE [DISTWIDTH] IN BLOOD BY AUTOMATED COUNT: 13 % (ref 11.5–14.5)
GFR SERPL CREATININE-BSD FRML MDRD: 66 ML/MIN/1.73SQ M
GLUCOSE SERPL-MCNC: 101 MG/DL (ref 65–99)
HCT VFR BLD AUTO: 43 % (ref 42–47)
HGB BLD-MCNC: 14.6 G/DL (ref 12–16)
LYMPHOCYTES # BLD AUTO: 1.9 THOUSANDS/ΜL (ref 0.6–4.47)
LYMPHOCYTES NFR BLD AUTO: 24 % (ref 21–51)
MCH RBC QN AUTO: 29.7 PG (ref 26–34)
MCHC RBC AUTO-ENTMCNC: 33.9 G/DL (ref 31–37)
MCV RBC AUTO: 88 FL (ref 81–99)
MONOCYTES # BLD AUTO: 0.7 THOUSAND/ΜL (ref 0.17–1.22)
MONOCYTES NFR BLD AUTO: 9 % (ref 2–12)
NEUTROPHILS # BLD AUTO: 5 THOUSANDS/ΜL (ref 1.4–6.5)
NEUTS SEG NFR BLD AUTO: 65 % (ref 42–75)
PLATELET # BLD AUTO: 276 THOUSANDS/UL (ref 149–390)
PMV BLD AUTO: 8.2 FL (ref 8.6–11.7)
POTASSIUM SERPL-SCNC: 3.9 MMOL/L (ref 3.5–5.5)
PROT SERPL-MCNC: 7.2 G/DL (ref 6.4–8.9)
RBC # BLD AUTO: 4.91 MILLION/UL (ref 3.9–5.2)
SODIUM SERPL-SCNC: 137 MMOL/L (ref 134–143)
TSH SERPL DL<=0.05 MIU/L-ACNC: 2.54 UIU/ML (ref 0.45–5.33)
WBC # BLD AUTO: 7.7 THOUSAND/UL (ref 4.8–10.8)

## 2021-01-18 PROCEDURE — 96360 HYDRATION IV INFUSION INIT: CPT

## 2021-01-18 PROCEDURE — 80053 COMPREHEN METABOLIC PANEL: CPT | Performed by: INTERNAL MEDICINE

## 2021-01-18 PROCEDURE — 85025 COMPLETE CBC W/AUTO DIFF WBC: CPT | Performed by: INTERNAL MEDICINE

## 2021-01-18 PROCEDURE — 96361 HYDRATE IV INFUSION ADD-ON: CPT

## 2021-01-18 PROCEDURE — 84443 ASSAY THYROID STIM HORMONE: CPT | Performed by: INTERNAL MEDICINE

## 2021-01-18 RX ADMIN — SODIUM CHLORIDE 250 ML/HR: 0.9 INJECTION, SOLUTION INTRAVENOUS at 12:00

## 2021-01-18 NOTE — PROGRESS NOTES
Patient tolerated infusion without adverse reaction  Voices no questions or concerns  Declined AVS aware of future appointments

## 2021-01-18 NOTE — PLAN OF CARE
Problem: PAIN - ADULT  Goal: Verbalizes/displays adequate comfort level or baseline comfort level  Description: Interventions:  - Encourage patient to monitor pain and request assistance  - Assess pain using appropriate pain scale  - Administer analgesics based on type and severity of pain and evaluate response  - Implement non-pharmacological measures as appropriate and evaluate response  - Consider cultural and social influences on pain and pain management  - Notify physician/advanced practitioner if interventions unsuccessful or patient reports new pain  Outcome: Progressing     Problem: SAFETY ADULT  Goal: Patient will remain free of falls  Description: INTERVENTIONS:  - Assess patient frequently for physical needs  -  Identify cognitive and physical deficits and behaviors that affect risk of falls    -  [place] fall precautions as indicated by assessment   - Educate patient/family on patient safety including physical limitations  - Instruct patient to call for assistance with activity based on assessment  - Modify environment to reduce risk of injury  - Consider OT/PT consult to assist with strengthening/mobility  Outcome: Progressing     Problem: DISCHARGE PLANNING  Goal: Discharge to home or other facility with appropriate resources  Description: INTERVENTIONS:  - Identify barriers to discharge w/patient and caregiver  - Arrange for needed discharge resources and transportation as appropriate  - Identify discharge learning needs (meds, wound care, etc )  - Arrange for interpretive services to assist at discharge as needed  - Refer to Case Management Department for coordinating discharge planning if the patient needs post-hospital services based on physician/advanced practitioner order or complex needs related to functional status, cognitive ability, or social support system  Outcome: Progressing

## 2021-01-20 ENCOUNTER — ANESTHESIA (OUTPATIENT)
Dept: GASTROENTEROLOGY | Facility: HOSPITAL | Age: 66
End: 2021-01-20

## 2021-01-20 ENCOUNTER — ANESTHESIA EVENT (OUTPATIENT)
Dept: GASTROENTEROLOGY | Facility: HOSPITAL | Age: 66
End: 2021-01-20

## 2021-01-20 ENCOUNTER — HOSPITAL ENCOUNTER (OUTPATIENT)
Dept: GASTROENTEROLOGY | Facility: HOSPITAL | Age: 66
Setting detail: OUTPATIENT SURGERY
Discharge: HOME | End: 2021-01-20
Attending: INTERNAL MEDICINE | Admitting: INTERNAL MEDICINE
Payer: MEDICARE

## 2021-01-20 VITALS
OXYGEN SATURATION: 98 % | SYSTOLIC BLOOD PRESSURE: 131 MMHG | RESPIRATION RATE: 18 BRPM | WEIGHT: 119 LBS | BODY MASS INDEX: 22.47 KG/M2 | HEART RATE: 88 BPM | HEIGHT: 61 IN | DIASTOLIC BLOOD PRESSURE: 72 MMHG | TEMPERATURE: 97.2 F

## 2021-01-20 VITALS — HEART RATE: 88 BPM

## 2021-01-20 DIAGNOSIS — R13.10 DYSPHAGIA, UNSPECIFIED: ICD-10-CM

## 2021-01-20 PROCEDURE — 88305 TISSUE EXAM BY PATHOLOGIST: CPT | Performed by: PATHOLOGY

## 2021-01-20 PROCEDURE — C1726 CATH, BAL DIL, NON-VASCULAR: HCPCS

## 2021-01-20 RX ORDER — PROPOFOL 10 MG/ML
INJECTION, EMULSION INTRAVENOUS AS NEEDED
Status: DISCONTINUED | OUTPATIENT
Start: 2021-01-20 | End: 2021-01-20

## 2021-01-20 RX ORDER — LIDOCAINE HYDROCHLORIDE 10 MG/ML
INJECTION, SOLUTION EPIDURAL; INFILTRATION; INTRACAUDAL; PERINEURAL AS NEEDED
Status: DISCONTINUED | OUTPATIENT
Start: 2021-01-20 | End: 2021-01-20

## 2021-01-20 RX ORDER — SODIUM CHLORIDE, SODIUM LACTATE, POTASSIUM CHLORIDE, CALCIUM CHLORIDE 600; 310; 30; 20 MG/100ML; MG/100ML; MG/100ML; MG/100ML
INJECTION, SOLUTION INTRAVENOUS CONTINUOUS PRN
Status: DISCONTINUED | OUTPATIENT
Start: 2021-01-20 | End: 2021-01-20

## 2021-01-20 RX ORDER — SODIUM CHLORIDE, SODIUM LACTATE, POTASSIUM CHLORIDE, CALCIUM CHLORIDE 600; 310; 30; 20 MG/100ML; MG/100ML; MG/100ML; MG/100ML
125 INJECTION, SOLUTION INTRAVENOUS CONTINUOUS
Status: DISCONTINUED | OUTPATIENT
Start: 2021-01-20 | End: 2021-01-24 | Stop reason: HOSPADM

## 2021-01-20 RX ADMIN — SODIUM CHLORIDE, SODIUM LACTATE, POTASSIUM CHLORIDE, AND CALCIUM CHLORIDE: .6; .31; .03; .02 INJECTION, SOLUTION INTRAVENOUS at 08:12

## 2021-01-20 RX ADMIN — LIDOCAINE HYDROCHLORIDE 80 MG: 10 INJECTION, SOLUTION EPIDURAL; INFILTRATION; INTRACAUDAL; PERINEURAL at 08:16

## 2021-01-20 RX ADMIN — PROPOFOL 30 MG: 10 INJECTION, EMULSION INTRAVENOUS at 08:21

## 2021-01-20 RX ADMIN — PROPOFOL 30 MG: 10 INJECTION, EMULSION INTRAVENOUS at 08:22

## 2021-01-20 RX ADMIN — PROPOFOL 20 MG: 10 INJECTION, EMULSION INTRAVENOUS at 08:12

## 2021-01-20 RX ADMIN — PROPOFOL 40 MG: 10 INJECTION, EMULSION INTRAVENOUS at 08:20

## 2021-01-20 RX ADMIN — PROPOFOL 100 MG: 10 INJECTION, EMULSION INTRAVENOUS at 08:19

## 2021-01-20 NOTE — INTERVAL H&P NOTE
H&P reviewed  After examining the patient I find no changes in the patients condition since the H&P had been written      Vitals:    01/20/21 0732   BP: 155/75   Pulse: 72   Resp: 16   Temp: 97 8 °F (36 6 °C)   SpO2: 98%

## 2021-01-20 NOTE — DISCHARGE INSTRUCTIONS
Upper Endoscopy   WHAT YOU NEED TO KNOW:   An upper endoscopy is also called an upper gastrointestinal (GI) endoscopy, or an esophagogastroduodenoscopy (EGD)  You may feel bloated, gassy, or have some abdominal discomfort after your procedure  Your throat may be sore for 24 to 36 hours  You may burp or pass gas from air that is still inside your body  DISCHARGE INSTRUCTIONS:   Call 911 for any of the following:   · You have sudden chest pain or trouble breathing  Seek care immediately if:   · You feel dizzy or faint  · You have trouble swallowing  · Your bowel movements are very dark or black  · Your abdomen is hard and firm and you have severe pain  · You vomit blood  Contact your healthcare provider if:   · You feel full or bloated and cannot burp or pass gas  · You have not had a bowel movement for 3 days after your procedure  · You have neck pain  · You have a fever or chills  · You have nausea or are vomiting  · You have a rash or hives  · You have questions or concerns about your endoscopy  Relieve a sore throat:  Suck on throat lozenges or crushed ice  Gargle with a small amount of warm salt water  Mix 1 teaspoon of salt and 1 cup of warm water to make salt water  Relieve gas and discomfort from bloating:  Lie on your right side with a heating pad on your abdomen  Take short walks to help pass gas  Eat small meals until bloating is relieved  Rest after your procedure: You have been given medicine to relax you  Do not  drive or make important decisions until the day after your procedure  Return to your normal activity as directed  You can usually return to work the day after your procedure  Follow up with your healthcare provider as directed:  Write down your questions so you remember to ask them during your visits     © 2017 [address] is for End User's use only and may not be sold, redistributed or otherwise used for commercial purposes  All illustrations and images included in CareNotes® are the copyrighted property of [employer] D [employer] M , Inc  or Edmond Torres  The above information is an  only  It is not intended as medical advice for individual conditions or treatments  Talk to your doctor, nurse or pharmacist before following any medical regimen to see if it is safe and effective for you  Esophageal Dilation   WHAT YOU NEED TO KNOW:   Esophageal dilation is a procedure to widen a narrow part of your esophagus  Your healthcare provider will use a dilator (inflatable balloon or another tool that expands) to make the area wider  He or she may also do an endoscopy before or during your esophageal dilation  During an endoscopy, your healthcare provider will use a scope to see inside your esophagus  DISCHARGE INSTRUCTIONS:   Call your local emergency number (911 in the [address] [place] [address],[address]) if:   · You vomit blood  · You have a fast heartbeat, chest pain, or sudden trouble breathing  · Your abdomen suddenly becomes tender and hard  Call your doctor if:   · You are not able to swallow any food  · You have a fever  · You feel very full or bloated  · You have nausea or are vomiting  · You have questions or concerns about your condition or care  Medicines:   · Medicines  may be given to decrease stomach acid that can irritate your esophagus  · Take your medicine as directed  Contact your healthcare provider if you think your medicine is not helping or if you have side effects  Tell him or her if you are allergic to any medicine  Keep a list of the medicines, vitamins, and herbs you take  Include the amounts, and when and why you take them  Bring the list or the pill bottles to follow-up visits  Carry your medicine list with you in case of an emergency  Nutrition:  Your healthcare provider will tell you how long to wait after the procedure before you eat or drink anything   You may need to wait until any numbness in your throat is gone  When it is okay to eat, chew your food well  Eat soft foods if you still have problems swallowing  Soft foods include applesauce, bananas, cooked cereal, cottage cheese, eggs, pudding, and yogurt  Follow up with your healthcare provider as directed:  Write down your questions so you remember to ask them during your visits  © Copyright [address] Information is for End User's use only and may not be sold, redistributed or otherwise used for commercial purposes  All illustrations and images included in CareNotes® are the copyrighted property of A D A M , Inc  or [employer]   The above information is an  only  It is not intended as medical advice for individual conditions or treatments  Talk to your doctor, nurse or pharmacist before following any medical regimen to see if it is safe and effective for you  Esophageal Dilation   WHAT YOU NEED TO KNOW:   Esophageal dilation is a procedure to widen a narrow part of your esophagus  Your healthcare provider will use a dilator (inflatable balloon or another tool that expands) to make the area wider  He or she may also do an endoscopy before or during your esophageal dilation  During an endoscopy, your healthcare provider will use a scope to see inside your esophagus  DISCHARGE INSTRUCTIONS:   Call your local emergency number (911 in the [address] [facility],[address]) if:   · You vomit blood  · You have a fast heartbeat, chest pain, or sudden trouble breathing  · Your abdomen suddenly becomes tender and hard  Call your doctor if:   · You are not able to swallow any food  · You have a fever  · You feel very full or bloated  · You have nausea or are vomiting  · You have questions or concerns about your condition or care  Medicines:   · Medicines  may be given to decrease stomach acid that can irritate your esophagus  · Take your medicine as directed    Contact your healthcare provider if you think your medicine is not helping or if you have side effects  Tell him or her if you are allergic to any medicine  Keep a list of the medicines, vitamins, and herbs you take  Include the amounts, and when and why you take them  Bring the list or the pill bottles to follow-up visits  Carry your medicine list with you in case of an emergency  Nutrition:  Your healthcare provider will tell you how long to wait after the procedure before you eat or drink anything  You may need to wait until any numbness in your throat is gone  When it is okay to eat, chew your food well  Eat soft foods if you still have problems swallowing  Soft foods include applesauce, bananas, cooked cereal, cottage cheese, eggs, pudding, and yogurt  Follow up with your healthcare provider as directed:  Write down your questions so you remember to ask them during your visits  © Copyright [address] Information is for End User's use only and may not be sold, redistributed or otherwise used for commercial purposes  All illustrations and images included in CareNotes® are the copyrighted property of [employer] , Inc  or [place] Reece Sadler   The above information is an  only  It is not intended as medical advice for individual conditions or treatments  Talk to your doctor, nurse or pharmacist before following any medical regimen to see if it is safe and effective for you

## 2021-01-20 NOTE — ANESTHESIA PREPROCEDURE EVALUATION
Procedure:  EGD    Relevant Problems   CARDIO   (+) LBBB (left bundle branch block)      GI/HEPATIC   (+) Gastroesophageal reflux disease without esophagitis      MUSCULOSKELETAL   (+) Fibromyalgia      NEURO/PSYCH   (+) Anxiety and depression   (+) Chronic pain disorder   (+) Fibromyalgia      PULMONARY   (+) Moderate persistent asthma      Other   (+) Bruxism   (+) Chest pressure   (+) Chronic rhinitis   (+) Orthostatic hypotension   (+) Restless leg syndrome   (+) Sleep-related breathing disorder        Physical Exam    Airway    Mallampati score: II  TM Distance: >3 FB  Neck ROM: full     Dental   No notable dental hx     Cardiovascular  Cardiovascular exam normal    Pulmonary  Pulmonary exam normal Breath sounds clear to auscultation,     Other Findings        Anesthesia Plan  ASA Score- 2     Anesthesia Type- IV sedation with anesthesia with ASA Monitors  Additional Monitors:   Airway Plan:           Plan Factors-Exercise tolerance (METS): >4 METS  EKG reviewed  Imaging results reviewed  Existing labs reviewed  Patient summary reviewed  Patient is not a current smoker  Patient instructed to abstain from smoking on day of procedure  Patient did not smoke on day of surgery  Obstructive sleep apnea risk education given perioperatively  Induction- intravenous  Postoperative Plan-     Informed Consent- Anesthetic plan and risks discussed with patient  I personally reviewed this patient with the CRNA  Discussed and agreed on the Anesthesia Plan with the CRNA             Lab Results   Component Value Date    WBC 7 70 01/18/2021    HGB 14 6 01/18/2021    HCT 43 0 01/18/2021    MCV 88 01/18/2021     01/18/2021     Lab Results   Component Value Date    GLUCOSE 94 03/05/2018    CALCIUM 10 0 01/18/2021     06/04/2018    K 3 9 01/18/2021    CO2 28 01/18/2021     01/18/2021    BUN 15 01/18/2021    CREATININE 0 91 01/18/2021     Lab Results   Component Value Date    INR 1 02 03/15/2019    PROTIME 12 9 03/15/2019     No results found for: PTT        Discussed with pt the benefits/alternatives and risks or General Anesthesia including breathing tube remaining in place if not strong enough, PONV, damage to lips and teeth, sore throat, eye injury or blindness    I, Dr Shandra Hernandez, the attending physician, have personally seen and evaluated the patient prior to anesthetic care  I have reviewed the pre-anesthetic record, and other medical records if appropriate to the anesthetic care  If a CRNA is involved in the case, I have reviewed the CRNA assessment, if present, and agree  The patient is in a suitable condition to proceed with my formulated anesthetic plan

## 2021-01-20 NOTE — ANESTHESIA POSTPROCEDURE EVALUATION
Post-Op Assessment Note    CV Status:  Stable       Mental Status:  Sleepy   Hydration Status:  Stable   PONV Controlled:  None      Post Op Vitals Reviewed: Yes      Staff: Anesthesiologist, with CRNAs         No complications documented      BP      Temp      Pulse     Resp      SpO2

## 2021-01-20 NOTE — ANESTHESIA POSTPROCEDURE EVALUATION
Post-Op Assessment Note    No complications documented      BP   128/64   Temp      Pulse  92   Resp      SpO2   96

## 2021-01-20 NOTE — NURSING NOTE
I flushed pt's picc line-it flushed easily and had excellent blood return-and I flushed the line with another 10cc of NS-drsg intact and clean

## 2021-01-21 RX ORDER — SODIUM CHLORIDE 9 MG/ML
250 INJECTION, SOLUTION INTRAVENOUS ONCE
Status: COMPLETED | OUTPATIENT
Start: 2021-01-25 | End: 2021-01-25

## 2021-01-22 ENCOUNTER — HOSPITAL ENCOUNTER (OUTPATIENT)
Dept: INFUSION CENTER | Facility: HOSPITAL | Age: 66
Discharge: HOME | End: 2021-01-22
Payer: MEDICARE

## 2021-01-22 VITALS
RESPIRATION RATE: 18 BRPM | SYSTOLIC BLOOD PRESSURE: 161 MMHG | HEART RATE: 83 BPM | DIASTOLIC BLOOD PRESSURE: 89 MMHG | OXYGEN SATURATION: 96 % | TEMPERATURE: 97.5 F

## 2021-01-22 PROCEDURE — 96361 HYDRATE IV INFUSION ADD-ON: CPT

## 2021-01-22 PROCEDURE — 96360 HYDRATION IV INFUSION INIT: CPT

## 2021-01-22 RX ADMIN — SODIUM CHLORIDE 1000 ML: 0.9 INJECTION, SOLUTION INTRAVENOUS at 10:59

## 2021-01-22 NOTE — PLAN OF CARE
Problem: INFECTION - ADULT  Goal: Absence or prevention of progression during hospitalization  Description: INTERVENTIONS:  - Assess and monitor for signs and symptoms of infection  - Monitor lab/diagnostic results  - Monitor all insertion sites, i e  indwelling lines, tubes, and drains  - Monitor endotracheal if appropriate and nasal secretions for changes in amount and color  - [place] appropriate cooling/warming therapies per order  - Administer medications as ordered  - Instruct and encourage patient and family to use good hand hygiene technique  - Identify and instruct in appropriate isolation precautions for identified infection/condition  Outcome: Progressing     Problem: Knowledge Deficit  Goal: Patient/family/caregiver demonstrates understanding of disease process, treatment plan, medications, and discharge instructions  Description: Complete learning assessment and assess knowledge base    Interventions:  - Provide teaching at level of understanding  - Provide teaching via preferred learning methods  Outcome: Progressing

## 2021-01-25 ENCOUNTER — HOSPITAL ENCOUNTER (OUTPATIENT)
Dept: INFUSION CENTER | Facility: HOSPITAL | Age: 66
Discharge: HOME | End: 2021-01-25
Payer: MEDICARE

## 2021-01-25 VITALS
OXYGEN SATURATION: 98 % | SYSTOLIC BLOOD PRESSURE: 172 MMHG | DIASTOLIC BLOOD PRESSURE: 88 MMHG | HEART RATE: 83 BPM | TEMPERATURE: 98.3 F | RESPIRATION RATE: 18 BRPM

## 2021-01-25 PROCEDURE — 96361 HYDRATE IV INFUSION ADD-ON: CPT

## 2021-01-25 PROCEDURE — 96360 HYDRATION IV INFUSION INIT: CPT

## 2021-01-25 RX ORDER — SODIUM CHLORIDE 9 MG/ML
250 INJECTION, SOLUTION INTRAVENOUS ONCE
Status: COMPLETED | OUTPATIENT
Start: 2021-01-27 | End: 2021-01-27

## 2021-01-25 RX ORDER — SUCRALFATE ORAL 1 G/10ML
1 SUSPENSION ORAL 2 TIMES DAILY
Status: ON HOLD | COMMUNITY
End: 2021-08-10 | Stop reason: ALTCHOICE

## 2021-01-25 RX ADMIN — SODIUM CHLORIDE 250 ML/HR: 0.9 INJECTION, SOLUTION INTRAVENOUS at 10:14

## 2021-01-25 NOTE — PLAN OF CARE
Problem: Potential for Falls  Goal: Patient will remain free of falls  Description: INTERVENTIONS:  - Assess patient frequently for physical needs  -  Identify cognitive and physical deficits and behaviors that affect risk of falls    -  [place] fall precautions as indicated by assessment   - Educate patient/family on patient safety including physical limitations  - Instruct patient to call for assistance with activity based on assessment  - Modify environment to reduce risk of injury  - Consider OT/PT consult to assist with strengthening/mobility  Outcome: Progressing

## 2021-01-27 ENCOUNTER — HOSPITAL ENCOUNTER (OUTPATIENT)
Dept: INFUSION CENTER | Facility: HOSPITAL | Age: 66
Discharge: HOME | End: 2021-01-27
Payer: MEDICARE

## 2021-01-27 VITALS
SYSTOLIC BLOOD PRESSURE: 164 MMHG | DIASTOLIC BLOOD PRESSURE: 74 MMHG | TEMPERATURE: 96.9 F | RESPIRATION RATE: 18 BRPM | HEART RATE: 86 BPM | OXYGEN SATURATION: 99 %

## 2021-01-27 PROCEDURE — 96361 HYDRATE IV INFUSION ADD-ON: CPT

## 2021-01-27 PROCEDURE — 96360 HYDRATION IV INFUSION INIT: CPT

## 2021-01-27 RX ORDER — SODIUM CHLORIDE 9 MG/ML
250 INJECTION, SOLUTION INTRAVENOUS ONCE
Status: COMPLETED | OUTPATIENT
Start: 2021-01-29 | End: 2021-01-29

## 2021-01-27 RX ADMIN — SODIUM CHLORIDE 250 ML/HR: 0.9 INJECTION, SOLUTION INTRAVENOUS at 08:54

## 2021-01-27 NOTE — PLAN OF CARE
Problem: Potential for Falls  Goal: Patient will remain free of falls  Description: INTERVENTIONS:  - Assess patient frequently for physical needs  -  Identify cognitive and physical deficits and behaviors that affect risk of falls    -  [place] fall precautions as indicated by assessment   - Educate patient/family on patient safety including physical limitations  - Instruct patient to call for assistance with activity based on assessment  - Modify environment to reduce risk of injury  - Consider OT/PT consult to assist with strengthening/mobility  Outcome: Progressing

## 2021-01-29 ENCOUNTER — HOSPITAL ENCOUNTER (OUTPATIENT)
Dept: INFUSION CENTER | Facility: HOSPITAL | Age: 66
Discharge: HOME | End: 2021-01-29
Payer: MEDICARE

## 2021-01-29 VITALS
SYSTOLIC BLOOD PRESSURE: 158 MMHG | RESPIRATION RATE: 18 BRPM | HEART RATE: 95 BPM | OXYGEN SATURATION: 99 % | DIASTOLIC BLOOD PRESSURE: 81 MMHG | TEMPERATURE: 97.6 F

## 2021-01-29 PROCEDURE — 96360 HYDRATION IV INFUSION INIT: CPT

## 2021-01-29 PROCEDURE — 96361 HYDRATE IV INFUSION ADD-ON: CPT

## 2021-01-29 RX ADMIN — SODIUM CHLORIDE 250 ML/HR: 0.9 INJECTION, SOLUTION INTRAVENOUS at 11:20

## 2021-01-29 NOTE — PLAN OF CARE
Problem: Potential for Falls  Goal: Patient will remain free of falls  Description: INTERVENTIONS:  - Assess patient frequently for physical needs  -  Identify cognitive and physical deficits and behaviors that affect risk of falls    -  [place] fall precautions as indicated by assessment   - Educate patient/family on patient safety including physical limitations  - Instruct patient to call for assistance with activity based on assessment  - Modify environment to reduce risk of injury  - Consider OT/PT consult to assist with strengthening/mobility  Outcome: Progressing

## 2021-02-01 ENCOUNTER — HOSPITAL ENCOUNTER (OUTPATIENT)
Dept: INFUSION CENTER | Facility: HOSPITAL | Age: 66
Discharge: HOME | End: 2021-02-01

## 2021-02-04 RX ORDER — SODIUM CHLORIDE 9 MG/ML
250 INJECTION, SOLUTION INTRAVENOUS CONTINUOUS
Status: DISCONTINUED | OUTPATIENT
Start: 2021-02-05 | End: 2021-02-09 | Stop reason: HOSPADM

## 2021-02-05 ENCOUNTER — HOSPITAL ENCOUNTER (OUTPATIENT)
Dept: INFUSION CENTER | Facility: HOSPITAL | Age: 66
Discharge: HOME | End: 2021-02-05
Payer: MEDICARE

## 2021-02-05 VITALS
RESPIRATION RATE: 18 BRPM | DIASTOLIC BLOOD PRESSURE: 82 MMHG | TEMPERATURE: 98.7 F | SYSTOLIC BLOOD PRESSURE: 134 MMHG | HEART RATE: 90 BPM

## 2021-02-05 PROCEDURE — 96360 HYDRATION IV INFUSION INIT: CPT

## 2021-02-05 PROCEDURE — 96361 HYDRATE IV INFUSION ADD-ON: CPT

## 2021-02-05 RX ORDER — SODIUM CHLORIDE 9 MG/ML
250 INJECTION, SOLUTION INTRAVENOUS ONCE
Status: COMPLETED | OUTPATIENT
Start: 2021-02-08 | End: 2021-02-08

## 2021-02-05 RX ADMIN — SODIUM CHLORIDE 250 ML/HR: 0.9 INJECTION, SOLUTION INTRAVENOUS at 10:03

## 2021-02-08 ENCOUNTER — HOSPITAL ENCOUNTER (OUTPATIENT)
Dept: INFUSION CENTER | Facility: HOSPITAL | Age: 66
Discharge: HOME | End: 2021-02-08
Payer: MEDICARE

## 2021-02-08 VITALS
SYSTOLIC BLOOD PRESSURE: 139 MMHG | DIASTOLIC BLOOD PRESSURE: 81 MMHG | TEMPERATURE: 98.4 F | HEART RATE: 78 BPM | OXYGEN SATURATION: 99 % | RESPIRATION RATE: 18 BRPM

## 2021-02-08 PROCEDURE — 96360 HYDRATION IV INFUSION INIT: CPT

## 2021-02-08 PROCEDURE — 96361 HYDRATE IV INFUSION ADD-ON: CPT

## 2021-02-08 RX ADMIN — SODIUM CHLORIDE 250 ML/HR: 0.9 INJECTION, SOLUTION INTRAVENOUS at 11:27

## 2021-02-08 NOTE — PLAN OF CARE
Problem: Potential for Falls  Goal: Patient will remain free of falls  Description: INTERVENTIONS:  - Assess patient frequently for physical needs  -  Identify cognitive and physical deficits and behaviors that affect risk of falls    -  [place] fall precautions as indicated by assessment   - Educate patient/family on patient safety including physical limitations  - Instruct patient to call for assistance with activity based on assessment  - Modify environment to reduce risk of injury  - Consider OT/PT consult to assist with strengthening/mobility  Outcome: Progressing

## 2021-02-09 RX ORDER — SODIUM CHLORIDE 9 MG/ML
250 INJECTION, SOLUTION INTRAVENOUS ONCE
Status: COMPLETED | OUTPATIENT
Start: 2021-02-10 | End: 2021-02-10

## 2021-02-10 ENCOUNTER — APPOINTMENT (OUTPATIENT)
Dept: LAB | Facility: HOSPITAL | Age: 66
End: 2021-02-10
Payer: MEDICARE

## 2021-02-10 ENCOUNTER — HOSPITAL ENCOUNTER (OUTPATIENT)
Dept: INFUSION CENTER | Facility: HOSPITAL | Age: 66
Discharge: HOME | End: 2021-02-10
Payer: MEDICARE

## 2021-02-10 ENCOUNTER — TRANSCRIBE ORDERS (OUTPATIENT)
Dept: LAB | Facility: HOSPITAL | Age: 66
End: 2021-02-10

## 2021-02-10 DIAGNOSIS — M35.00 SICCA SYNDROME (HCC): Primary | ICD-10-CM

## 2021-02-10 DIAGNOSIS — M35.00 SICCA SYNDROME (HCC): ICD-10-CM

## 2021-02-10 LAB
25(OH)D3 SERPL-MCNC: 42 NG/ML (ref 30–100)
ALBUMIN SERPL BCP-MCNC: 4.8 G/DL (ref 3.5–5.7)
ALP SERPL-CCNC: 107 U/L (ref 55–165)
ALT SERPL W P-5'-P-CCNC: 27 U/L (ref 7–52)
ANION GAP SERPL CALCULATED.3IONS-SCNC: 10 MMOL/L (ref 4–13)
AST SERPL W P-5'-P-CCNC: 28 U/L (ref 13–39)
BASOPHILS # BLD AUTO: 0 THOUSANDS/ΜL (ref 0–0.1)
BASOPHILS NFR BLD AUTO: 1 % (ref 0–2)
BILIRUB SERPL-MCNC: 0.5 MG/DL (ref 0.2–1)
BILIRUB UR QL STRIP: NEGATIVE
BUN SERPL-MCNC: 11 MG/DL (ref 7–25)
C3 SERPL-MCNC: 118 MG/DL (ref 90–180)
C4 SERPL-MCNC: 30 MG/DL (ref 10–40)
CALCIUM SERPL-MCNC: 10.4 MG/DL (ref 8.6–10.5)
CHLORIDE SERPL-SCNC: 98 MMOL/L (ref 98–107)
CLARITY UR: CLEAR
CO2 SERPL-SCNC: 30 MMOL/L (ref 21–31)
COLOR UR: YELLOW
CREAT SERPL-MCNC: 0.94 MG/DL (ref 0.6–1.2)
CREAT UR-MCNC: 24.3 MG/DL
CRP SERPL QL: <5 MG/L
EOSINOPHIL # BLD AUTO: 0.2 THOUSAND/ΜL (ref 0–0.61)
EOSINOPHIL NFR BLD AUTO: 3 % (ref 0–5)
ERYTHROCYTE [DISTWIDTH] IN BLOOD BY AUTOMATED COUNT: 12.9 % (ref 11.5–14.5)
ERYTHROCYTE [SEDIMENTATION RATE] IN BLOOD: 14 MM/HOUR (ref 0–29)
GFR SERPL CREATININE-BSD FRML MDRD: 63 ML/MIN/1.73SQ M
GLUCOSE P FAST SERPL-MCNC: 83 MG/DL (ref 65–99)
GLUCOSE UR STRIP-MCNC: NEGATIVE MG/DL
HBV CORE AB SER QL: NORMAL
HBV CORE IGM SER QL: NORMAL
HBV SURFACE AB SER-ACNC: <3.1 MIU/ML
HBV SURFACE AG SER QL: NORMAL
HCT VFR BLD AUTO: 42 % (ref 42–47)
HGB BLD-MCNC: 14.4 G/DL (ref 12–16)
HGB UR QL STRIP.AUTO: NEGATIVE
KETONES UR STRIP-MCNC: NEGATIVE MG/DL
LEUKOCYTE ESTERASE UR QL STRIP: NEGATIVE
LYMPHOCYTES # BLD AUTO: 2.4 THOUSANDS/ΜL (ref 0.6–4.47)
LYMPHOCYTES NFR BLD AUTO: 31 % (ref 21–51)
MCH RBC QN AUTO: 30.1 PG (ref 26–34)
MCHC RBC AUTO-ENTMCNC: 34.2 G/DL (ref 31–37)
MCV RBC AUTO: 88 FL (ref 81–99)
MONOCYTES # BLD AUTO: 0.7 THOUSAND/ΜL (ref 0.17–1.22)
MONOCYTES NFR BLD AUTO: 9 % (ref 2–12)
NEUTROPHILS # BLD AUTO: 4.5 THOUSANDS/ΜL (ref 1.4–6.5)
NEUTS SEG NFR BLD AUTO: 58 % (ref 42–75)
NITRITE UR QL STRIP: NEGATIVE
PH UR STRIP.AUTO: 6 [PH]
PLATELET # BLD AUTO: 289 THOUSANDS/UL (ref 149–390)
PMV BLD AUTO: 8.8 FL (ref 8.6–11.7)
POTASSIUM SERPL-SCNC: 4 MMOL/L (ref 3.5–5.5)
PROT SERPL-MCNC: 7.5 G/DL (ref 6.4–8.9)
PROT UR STRIP-MCNC: NEGATIVE MG/DL
PROT UR-MCNC: <6 MG/DL
PROT/CREAT UR: <0.25 MG/G{CREAT} (ref 0–0.1)
RBC # BLD AUTO: 4.77 MILLION/UL (ref 3.9–5.2)
SODIUM SERPL-SCNC: 138 MMOL/L (ref 134–143)
SP GR UR STRIP.AUTO: <=1.005 (ref 1–1.03)
UROBILINOGEN UR QL STRIP.AUTO: 0.2 E.U./DL
WBC # BLD AUTO: 7.8 THOUSAND/UL (ref 4.8–10.8)

## 2021-02-10 PROCEDURE — 96360 HYDRATION IV INFUSION INIT: CPT

## 2021-02-10 PROCEDURE — 87340 HEPATITIS B SURFACE AG IA: CPT

## 2021-02-10 PROCEDURE — 81003 URINALYSIS AUTO W/O SCOPE: CPT

## 2021-02-10 PROCEDURE — 86160 COMPLEMENT ANTIGEN: CPT

## 2021-02-10 PROCEDURE — 86705 HEP B CORE ANTIBODY IGM: CPT

## 2021-02-10 PROCEDURE — 86140 C-REACTIVE PROTEIN: CPT

## 2021-02-10 PROCEDURE — 86704 HEP B CORE ANTIBODY TOTAL: CPT

## 2021-02-10 PROCEDURE — 84165 PROTEIN E-PHORESIS SERUM: CPT

## 2021-02-10 PROCEDURE — 84165 PROTEIN E-PHORESIS SERUM: CPT | Performed by: PATHOLOGY

## 2021-02-10 PROCEDURE — 36415 COLL VENOUS BLD VENIPUNCTURE: CPT

## 2021-02-10 PROCEDURE — 86706 HEP B SURFACE ANTIBODY: CPT

## 2021-02-10 PROCEDURE — 96361 HYDRATE IV INFUSION ADD-ON: CPT

## 2021-02-10 PROCEDURE — 82570 ASSAY OF URINE CREATININE: CPT

## 2021-02-10 PROCEDURE — 86803 HEPATITIS C AB TEST: CPT

## 2021-02-10 PROCEDURE — 85652 RBC SED RATE AUTOMATED: CPT

## 2021-02-10 PROCEDURE — 86430 RHEUMATOID FACTOR TEST QUAL: CPT

## 2021-02-10 PROCEDURE — 85025 COMPLETE CBC W/AUTO DIFF WBC: CPT

## 2021-02-10 PROCEDURE — 86200 CCP ANTIBODY: CPT

## 2021-02-10 PROCEDURE — 80053 COMPREHEN METABOLIC PANEL: CPT

## 2021-02-10 PROCEDURE — 86235 NUCLEAR ANTIGEN ANTIBODY: CPT

## 2021-02-10 PROCEDURE — 86480 TB TEST CELL IMMUN MEASURE: CPT

## 2021-02-10 PROCEDURE — 82306 VITAMIN D 25 HYDROXY: CPT

## 2021-02-10 PROCEDURE — 84156 ASSAY OF PROTEIN URINE: CPT

## 2021-02-10 RX ORDER — SODIUM CHLORIDE 9 MG/ML
250 INJECTION, SOLUTION INTRAVENOUS ONCE
Status: COMPLETED | OUTPATIENT
Start: 2021-02-12 | End: 2021-02-12

## 2021-02-10 RX ADMIN — SODIUM CHLORIDE 250 ML/HR: 0.9 INJECTION, SOLUTION INTRAVENOUS at 12:41

## 2021-02-11 LAB
ALBUMIN SERPL ELPH-MCNC: 4.88 G/DL (ref 3.5–5)
ALBUMIN SERPL ELPH-MCNC: 65 % (ref 52–65)
ALPHA1 GLOB SERPL ELPH-MCNC: 0.32 G/DL (ref 0.1–0.4)
ALPHA1 GLOB SERPL ELPH-MCNC: 4.3 % (ref 2.5–5)
ALPHA2 GLOB SERPL ELPH-MCNC: 0.74 G/DL (ref 0.4–1.2)
ALPHA2 GLOB SERPL ELPH-MCNC: 9.9 % (ref 7–13)
BETA GLOB ABNORMAL SERPL ELPH-MCNC: 0.42 G/DL (ref 0.4–0.8)
BETA1 GLOB SERPL ELPH-MCNC: 5.6 % (ref 5–13)
BETA2 GLOB SERPL ELPH-MCNC: 4.2 % (ref 2–8)
BETA2+GAMMA GLOB SERPL ELPH-MCNC: 0.32 G/DL (ref 0.2–0.5)
ENA SS-A AB SER-ACNC: <0.2 AI (ref 0–0.9)
ENA SS-B AB SER-ACNC: <0.2 AI (ref 0–0.9)
GAMMA GLOB ABNORMAL SERPL ELPH-MCNC: 0.83 G/DL (ref 0.5–1.6)
GAMMA GLOB SERPL ELPH-MCNC: 11 % (ref 12–22)
IGG/ALB SER: 1.86 {RATIO} (ref 1.1–1.8)
PROT PATTERN SERPL ELPH-IMP: ABNORMAL
PROT SERPL-MCNC: 7.5 G/DL (ref 6.4–8.2)
RHEUMATOID FACT SER QL LA: NEGATIVE

## 2021-02-12 ENCOUNTER — HOSPITAL ENCOUNTER (OUTPATIENT)
Dept: INFUSION CENTER | Facility: HOSPITAL | Age: 66
Discharge: HOME | End: 2021-02-12
Payer: MEDICARE

## 2021-02-12 VITALS
SYSTOLIC BLOOD PRESSURE: 145 MMHG | DIASTOLIC BLOOD PRESSURE: 84 MMHG | HEART RATE: 90 BPM | TEMPERATURE: 97.8 F | RESPIRATION RATE: 18 BRPM

## 2021-02-12 LAB
CCP IGA+IGG SERPL IA-ACNC: 5 UNITS (ref 0–19)
GAMMA INTERFERON BACKGROUND BLD IA-ACNC: 0.04 IU/ML
M TB IFN-G BLD-IMP: NEGATIVE
M TB IFN-G CD4+ BCKGRND COR BLD-ACNC: 0.01 IU/ML
M TB IFN-G CD4+ BCKGRND COR BLD-ACNC: 0.02 IU/ML
MITOGEN IGNF BCKGRD COR BLD-ACNC: >10 IU/ML

## 2021-02-12 PROCEDURE — 96361 HYDRATE IV INFUSION ADD-ON: CPT

## 2021-02-12 PROCEDURE — 96360 HYDRATION IV INFUSION INIT: CPT

## 2021-02-12 RX ORDER — SODIUM CHLORIDE 9 MG/ML
250 INJECTION, SOLUTION INTRAVENOUS ONCE
Status: COMPLETED | OUTPATIENT
Start: 2021-02-15 | End: 2021-02-15

## 2021-02-12 RX ADMIN — SODIUM CHLORIDE 250 ML/HR: 0.9 INJECTION, SOLUTION INTRAVENOUS at 11:15

## 2021-02-12 NOTE — PLAN OF CARE
Problem: Potential for Falls  Goal: Patient will remain free of falls  Description: INTERVENTIONS:  - Assess patient frequently for physical needs  -  Identify cognitive and physical deficits and behaviors that affect risk of falls    -  [place] fall precautions as indicated by assessment   - Educate patient/family on patient safety including physical limitations  - Instruct patient to call for assistance with activity based on assessment  - Modify environment to reduce risk of injury  - Consider OT/PT consult to assist with strengthening/mobility  Outcome: Progressing

## 2021-02-12 NOTE — PROGRESS NOTES
Pt tolerated IV hydration and dressing change without incident  Aware of next appointment   Declines AVS

## 2021-02-15 ENCOUNTER — HOSPITAL ENCOUNTER (OUTPATIENT)
Dept: INFUSION CENTER | Facility: HOSPITAL | Age: 66
Discharge: HOME | End: 2021-02-15
Payer: MEDICARE

## 2021-02-15 VITALS
DIASTOLIC BLOOD PRESSURE: 90 MMHG | SYSTOLIC BLOOD PRESSURE: 142 MMHG | TEMPERATURE: 97.6 F | RESPIRATION RATE: 18 BRPM | OXYGEN SATURATION: 99 % | HEART RATE: 88 BPM

## 2021-02-15 PROCEDURE — 96361 HYDRATE IV INFUSION ADD-ON: CPT

## 2021-02-15 PROCEDURE — 96360 HYDRATION IV INFUSION INIT: CPT

## 2021-02-15 RX ORDER — SODIUM CHLORIDE 9 MG/ML
250 INJECTION, SOLUTION INTRAVENOUS ONCE
Status: COMPLETED | OUTPATIENT
Start: 2021-02-17 | End: 2021-02-17

## 2021-02-15 RX ADMIN — SODIUM CHLORIDE 250 ML/HR: 0.9 INJECTION, SOLUTION INTRAVENOUS at 08:11

## 2021-02-15 NOTE — PLAN OF CARE
Problem: Potential for Falls  Goal: Patient will remain free of falls  Description: INTERVENTIONS:  - Assess patient frequently for physical needs  -  Identify cognitive and physical deficits and behaviors that affect risk of falls    -  [place] fall precautions as indicated by assessment   - Educate patient/family on patient safety including physical limitations  - Instruct patient to call for assistance with activity based on assessment  - Modify environment to reduce risk of injury  - Consider OT/PT consult to assist with strengthening/mobility  Outcome: Progressing

## 2021-02-17 ENCOUNTER — HOSPITAL ENCOUNTER (OUTPATIENT)
Dept: INFUSION CENTER | Facility: HOSPITAL | Age: 66
Discharge: HOME | End: 2021-02-17
Payer: MEDICARE

## 2021-02-17 VITALS
RESPIRATION RATE: 16 BRPM | DIASTOLIC BLOOD PRESSURE: 74 MMHG | TEMPERATURE: 96.7 F | OXYGEN SATURATION: 97 % | HEART RATE: 97 BPM | SYSTOLIC BLOOD PRESSURE: 147 MMHG

## 2021-02-17 PROCEDURE — 96361 HYDRATE IV INFUSION ADD-ON: CPT

## 2021-02-17 PROCEDURE — 96360 HYDRATION IV INFUSION INIT: CPT

## 2021-02-17 RX ADMIN — SODIUM CHLORIDE 250 ML/HR: 0.9 INJECTION, SOLUTION INTRAVENOUS at 10:11

## 2021-02-17 NOTE — PLAN OF CARE
Problem: INFECTION - ADULT  Goal: Absence or prevention of progression during hospitalization  Description: INTERVENTIONS:  - Assess and monitor for signs and symptoms of infection  - Monitor lab/diagnostic results  - Monitor all insertion sites, i e  indwelling lines, tubes, and drains  - Monitor endotracheal if appropriate and nasal secretions for changes in amount and color  - [place] appropriate cooling/warming therapies per order  - Administer medications as ordered  - Instruct and encourage patient and family to use good hand hygiene technique  - Identify and instruct in appropriate isolation precautions for identified infection/condition  Outcome: Progressing     Problem: Knowledge Deficit  Goal: Patient/family/caregiver demonstrates understanding of disease process, treatment plan, medications, and discharge instructions  Description: Complete learning assessment and assess knowledge base    Interventions:  - Provide teaching at level of understanding  - Provide teaching via preferred learning methods  Outcome: Progressing

## 2021-02-17 NOTE — PROGRESS NOTES
Patient tolerated infusion well, patient's dressing changed today due to impending weather conditions  Patient left in stable condition  AVS declined

## 2021-02-18 ENCOUNTER — TRANSCRIBE ORDERS (OUTPATIENT)
Dept: ADMINISTRATIVE | Facility: HOSPITAL | Age: 66
End: 2021-02-18

## 2021-02-18 DIAGNOSIS — Z12.31 ENCOUNTER FOR SCREENING MAMMOGRAM FOR MALIGNANT NEOPLASM OF BREAST: Primary | ICD-10-CM

## 2021-02-18 RX ORDER — SODIUM CHLORIDE 9 MG/ML
250 INJECTION, SOLUTION INTRAVENOUS ONCE
Status: DISCONTINUED | OUTPATIENT
Start: 2021-02-19 | End: 2021-02-22 | Stop reason: HOSPADM

## 2021-02-19 ENCOUNTER — HOSPITAL ENCOUNTER (OUTPATIENT)
Dept: INFUSION CENTER | Facility: HOSPITAL | Age: 66
Discharge: HOME | End: 2021-02-19

## 2021-02-19 RX ORDER — SODIUM CHLORIDE 9 MG/ML
250 INJECTION, SOLUTION INTRAVENOUS ONCE
Status: COMPLETED | OUTPATIENT
Start: 2021-02-22 | End: 2021-02-22

## 2021-02-22 ENCOUNTER — HOSPITAL ENCOUNTER (OUTPATIENT)
Dept: INFUSION CENTER | Facility: HOSPITAL | Age: 66
Discharge: HOME | End: 2021-02-22
Payer: MEDICARE

## 2021-02-22 VITALS
DIASTOLIC BLOOD PRESSURE: 92 MMHG | RESPIRATION RATE: 16 BRPM | TEMPERATURE: 99 F | HEART RATE: 101 BPM | SYSTOLIC BLOOD PRESSURE: 165 MMHG

## 2021-02-22 PROCEDURE — 96360 HYDRATION IV INFUSION INIT: CPT

## 2021-02-22 PROCEDURE — 96361 HYDRATE IV INFUSION ADD-ON: CPT

## 2021-02-22 RX ORDER — HYDROXYZINE HCL 10 MG/5 ML
20 SOLUTION, ORAL ORAL
COMMUNITY

## 2021-02-22 RX ADMIN — SODIUM CHLORIDE 250 ML/HR: 0.9 INJECTION, SOLUTION INTRAVENOUS at 10:01

## 2021-02-22 NOTE — PROGRESS NOTES
Pt in unit today for 4 hours of hydration as per paper order  No recent changes in health except for physician adding hydroxyzine po at home due to depression  Pt tolerated po well  Ambulating with steady gait   Discharged ambulatory deferring avs

## 2021-02-22 NOTE — PLAN OF CARE
Problem: Potential for Falls  Goal: Patient will remain free of falls  Description: INTERVENTIONS:  - Assess patient frequently for physical needs  -  Identify cognitive and physical deficits and behaviors that affect risk of falls    -  [place] fall precautions as indicated by assessment   - Educate patient/family on patient safety including physical limitations  - Instruct patient to call for assistance with activity based on assessment  - Modify environment to reduce risk of injury  - Consider OT/PT consult to assist with strengthening/mobility  Outcome: Progressing

## 2021-02-24 ENCOUNTER — HOSPITAL ENCOUNTER (OUTPATIENT)
Dept: INFUSION CENTER | Facility: HOSPITAL | Age: 66
Discharge: HOME | End: 2021-02-24
Payer: MEDICARE

## 2021-02-24 VITALS
RESPIRATION RATE: 16 BRPM | DIASTOLIC BLOOD PRESSURE: 79 MMHG | HEART RATE: 94 BPM | TEMPERATURE: 98 F | SYSTOLIC BLOOD PRESSURE: 157 MMHG

## 2021-02-24 PROCEDURE — 96360 HYDRATION IV INFUSION INIT: CPT

## 2021-02-24 PROCEDURE — 96361 HYDRATE IV INFUSION ADD-ON: CPT

## 2021-02-24 RX ORDER — SODIUM CHLORIDE 9 MG/ML
250 INJECTION, SOLUTION INTRAVENOUS ONCE
Status: COMPLETED | OUTPATIENT
Start: 2021-02-24 | End: 2021-02-24

## 2021-02-24 RX ADMIN — SODIUM CHLORIDE 250 ML/HR: 0.9 INJECTION, SOLUTION INTRAVENOUS at 12:00

## 2021-02-25 LAB — MISCELLANEOUS LAB TEST RESULT: NORMAL

## 2021-02-25 RX ORDER — SODIUM CHLORIDE 9 MG/ML
250 INJECTION, SOLUTION INTRAVENOUS ONCE
Status: COMPLETED | OUTPATIENT
Start: 2021-02-26 | End: 2021-02-26

## 2021-02-26 ENCOUNTER — HOSPITAL ENCOUNTER (OUTPATIENT)
Dept: INFUSION CENTER | Facility: HOSPITAL | Age: 66
Discharge: HOME | End: 2021-02-26
Payer: MEDICARE

## 2021-02-26 VITALS
SYSTOLIC BLOOD PRESSURE: 147 MMHG | TEMPERATURE: 98.5 F | HEART RATE: 87 BPM | RESPIRATION RATE: 16 BRPM | DIASTOLIC BLOOD PRESSURE: 88 MMHG

## 2021-02-26 PROCEDURE — 96361 HYDRATE IV INFUSION ADD-ON: CPT

## 2021-02-26 PROCEDURE — 96360 HYDRATION IV INFUSION INIT: CPT

## 2021-02-26 RX ORDER — SODIUM CHLORIDE 9 MG/ML
250 INJECTION, SOLUTION INTRAVENOUS ONCE
Status: DISCONTINUED | OUTPATIENT
Start: 2021-03-01 | End: 2021-03-05 | Stop reason: HOSPADM

## 2021-02-26 RX ADMIN — SODIUM CHLORIDE 250 ML/HR: 0.9 INJECTION, SOLUTION INTRAVENOUS at 10:26

## 2021-03-01 ENCOUNTER — HOSPITAL ENCOUNTER (OUTPATIENT)
Dept: INFUSION CENTER | Facility: HOSPITAL | Age: 66
Discharge: HOME | End: 2021-03-01

## 2021-03-01 RX ORDER — SODIUM CHLORIDE 9 MG/ML
250 INJECTION, SOLUTION INTRAVENOUS ONCE
Status: COMPLETED | OUTPATIENT
Start: 2021-03-03 | End: 2021-03-03

## 2021-03-03 ENCOUNTER — HOSPITAL ENCOUNTER (OUTPATIENT)
Dept: INFUSION CENTER | Facility: HOSPITAL | Age: 66
Discharge: HOME | End: 2021-03-03
Payer: MEDICARE

## 2021-03-03 VITALS
HEART RATE: 94 BPM | SYSTOLIC BLOOD PRESSURE: 166 MMHG | DIASTOLIC BLOOD PRESSURE: 81 MMHG | RESPIRATION RATE: 16 BRPM | OXYGEN SATURATION: 99 % | TEMPERATURE: 97.2 F

## 2021-03-03 PROCEDURE — 96361 HYDRATE IV INFUSION ADD-ON: CPT

## 2021-03-03 PROCEDURE — 96360 HYDRATION IV INFUSION INIT: CPT

## 2021-03-03 RX ORDER — SODIUM CHLORIDE 9 MG/ML
250 INJECTION, SOLUTION INTRAVENOUS ONCE
Status: COMPLETED | OUTPATIENT
Start: 2021-03-05 | End: 2021-03-05

## 2021-03-03 RX ADMIN — SODIUM CHLORIDE 250 ML/HR: 0.9 INJECTION, SOLUTION INTRAVENOUS at 10:54

## 2021-03-03 NOTE — PLAN OF CARE
Problem: Potential for Falls  Goal: Patient will remain free of falls  Description: INTERVENTIONS:  - Assess patient frequently for physical needs  -  Identify cognitive and physical deficits and behaviors that affect risk of falls    -  [place] fall precautions as indicated by assessment   - Educate patient/family on patient safety including physical limitations  - Instruct patient to call for assistance with activity based on assessment  - Modify environment to reduce risk of injury  - Consider OT/PT consult to assist with strengthening/mobility  Outcome: Progressing

## 2021-03-05 ENCOUNTER — HOSPITAL ENCOUNTER (OUTPATIENT)
Dept: INFUSION CENTER | Facility: HOSPITAL | Age: 66
Discharge: HOME | End: 2021-03-05
Payer: MEDICARE

## 2021-03-05 VITALS
TEMPERATURE: 98.3 F | DIASTOLIC BLOOD PRESSURE: 97 MMHG | HEART RATE: 88 BPM | RESPIRATION RATE: 20 BRPM | SYSTOLIC BLOOD PRESSURE: 175 MMHG

## 2021-03-05 PROCEDURE — 96360 HYDRATION IV INFUSION INIT: CPT

## 2021-03-05 PROCEDURE — 96361 HYDRATE IV INFUSION ADD-ON: CPT

## 2021-03-05 RX ORDER — SODIUM CHLORIDE 9 MG/ML
250 INJECTION, SOLUTION INTRAVENOUS ONCE
Status: COMPLETED | OUTPATIENT
Start: 2021-03-08 | End: 2021-03-08

## 2021-03-05 RX ADMIN — SODIUM CHLORIDE 250 ML/HR: 0.9 INJECTION, SOLUTION INTRAVENOUS at 12:01

## 2021-03-05 NOTE — PLAN OF CARE
Problem: INFECTION - ADULT  Goal: Absence or prevention of progression during hospitalization  Description: INTERVENTIONS:  - Assess and monitor for signs and symptoms of infection  - Monitor lab/diagnostic results  - Monitor all insertion sites, i e  indwelling lines, tubes, and drains  - Monitor endotracheal if appropriate and nasal secretions for changes in amount and color  - [place] appropriate cooling/warming therapies per order  - Administer medications as ordered  - Instruct and encourage patient and family to use good hand hygiene technique  - Identify and instruct in appropriate isolation precautions for identified infection/condition  Outcome: Progressing

## 2021-03-08 ENCOUNTER — HOSPITAL ENCOUNTER (OUTPATIENT)
Dept: INFUSION CENTER | Facility: HOSPITAL | Age: 66
Discharge: HOME | End: 2021-03-08
Payer: MEDICARE

## 2021-03-08 VITALS
OXYGEN SATURATION: 99 % | TEMPERATURE: 96.8 F | RESPIRATION RATE: 20 BRPM | SYSTOLIC BLOOD PRESSURE: 140 MMHG | HEART RATE: 84 BPM | DIASTOLIC BLOOD PRESSURE: 95 MMHG

## 2021-03-08 PROCEDURE — 96360 HYDRATION IV INFUSION INIT: CPT

## 2021-03-08 PROCEDURE — 96361 HYDRATE IV INFUSION ADD-ON: CPT

## 2021-03-08 RX ADMIN — SODIUM CHLORIDE 250 ML/HR: 0.9 INJECTION, SOLUTION INTRAVENOUS at 11:04

## 2021-03-08 NOTE — PLAN OF CARE
Problem: Potential for Falls  Goal: Patient will remain free of falls  Description: INTERVENTIONS:  - Assess patient frequently for physical needs  -  Identify cognitive and physical deficits and behaviors that affect risk of falls    -  [place] fall precautions as indicated by assessment   - Educate patient/family on patient safety including physical limitations  - Instruct patient to call for assistance with activity based on assessment  - Modify environment to reduce risk of injury  - Consider OT/PT consult to assist with strengthening/mobility  Outcome: Progressing

## 2021-03-08 NOTE — PLAN OF CARE
Problem: Potential for Falls  Goal: Patient will remain free of falls  Description: INTERVENTIONS:  - Assess patient frequently for physical needs  -  Identify cognitive and physical deficits and behaviors that affect risk of falls    -  [place] fall precautions as indicated by assessment   - Educate patient/family on patient safety including physical limitations  - Instruct patient to call for assistance with activity based on assessment  - Modify environment to reduce risk of injury  - Consider OT/PT consult to assist with strengthening/mobility  Outcome: Progressing

## 2021-03-09 RX ORDER — SODIUM CHLORIDE 9 MG/ML
250 INJECTION, SOLUTION INTRAVENOUS ONCE
Status: COMPLETED | OUTPATIENT
Start: 2021-03-11 | End: 2021-03-11

## 2021-03-10 DIAGNOSIS — Z23 ENCOUNTER FOR IMMUNIZATION: ICD-10-CM

## 2021-03-11 ENCOUNTER — HOSPITAL ENCOUNTER (OUTPATIENT)
Dept: MAMMOGRAPHY | Facility: HOSPITAL | Age: 66
Discharge: HOME | End: 2021-03-11
Attending: INTERNAL MEDICINE
Payer: MEDICARE

## 2021-03-11 ENCOUNTER — HOSPITAL ENCOUNTER (OUTPATIENT)
Dept: INFUSION CENTER | Facility: HOSPITAL | Age: 66
Discharge: HOME | End: 2021-03-11
Payer: MEDICARE

## 2021-03-11 VITALS
DIASTOLIC BLOOD PRESSURE: 73 MMHG | RESPIRATION RATE: 20 BRPM | SYSTOLIC BLOOD PRESSURE: 143 MMHG | TEMPERATURE: 97.6 F | HEART RATE: 91 BPM

## 2021-03-11 VITALS — WEIGHT: 118 LBS | BODY MASS INDEX: 22.28 KG/M2 | HEIGHT: 61 IN

## 2021-03-11 DIAGNOSIS — Z12.31 ENCOUNTER FOR SCREENING MAMMOGRAM FOR MALIGNANT NEOPLASM OF BREAST: ICD-10-CM

## 2021-03-11 PROCEDURE — 77063 BREAST TOMOSYNTHESIS BI: CPT

## 2021-03-11 PROCEDURE — 96360 HYDRATION IV INFUSION INIT: CPT

## 2021-03-11 PROCEDURE — 77067 SCR MAMMO BI INCL CAD: CPT

## 2021-03-11 PROCEDURE — 96361 HYDRATE IV INFUSION ADD-ON: CPT

## 2021-03-11 RX ORDER — SODIUM CHLORIDE 9 MG/ML
250 INJECTION, SOLUTION INTRAVENOUS ONCE
Status: COMPLETED | OUTPATIENT
Start: 2021-03-12 | End: 2021-03-12

## 2021-03-11 RX ADMIN — SODIUM CHLORIDE 250 ML/HR: 0.9 INJECTION, SOLUTION INTRAVENOUS at 12:22

## 2021-03-11 NOTE — PLAN OF CARE
Problem: Potential for Falls  Goal: Patient will remain free of falls  Description: INTERVENTIONS:  - Assess patient frequently for physical needs  -  Identify cognitive and physical deficits and behaviors that affect risk of falls    -  [place] fall precautions as indicated by assessment   - Educate patient/family on patient safety including physical limitations  - Instruct patient to call for assistance with activity based on assessment  - Modify environment to reduce risk of injury  - Consider OT/PT consult to assist with strengthening/mobility  Outcome: Progressing

## 2021-03-12 ENCOUNTER — HOSPITAL ENCOUNTER (OUTPATIENT)
Dept: INFUSION CENTER | Facility: HOSPITAL | Age: 66
Discharge: HOME | End: 2021-03-12
Payer: MEDICARE

## 2021-03-12 VITALS
RESPIRATION RATE: 18 BRPM | HEART RATE: 95 BPM | SYSTOLIC BLOOD PRESSURE: 162 MMHG | DIASTOLIC BLOOD PRESSURE: 71 MMHG | TEMPERATURE: 98.3 F

## 2021-03-12 PROCEDURE — 96360 HYDRATION IV INFUSION INIT: CPT

## 2021-03-12 PROCEDURE — 96361 HYDRATE IV INFUSION ADD-ON: CPT

## 2021-03-12 RX ORDER — SODIUM CHLORIDE 9 MG/ML
250 INJECTION, SOLUTION INTRAVENOUS ONCE
Status: COMPLETED | OUTPATIENT
Start: 2021-03-15 | End: 2021-03-15

## 2021-03-12 RX ADMIN — SODIUM CHLORIDE 250 ML/HR: 0.9 INJECTION, SOLUTION INTRAVENOUS at 11:44

## 2021-03-12 NOTE — PLAN OF CARE
Problem: Potential for Falls  Goal: Patient will remain free of falls  Description: INTERVENTIONS:  - Assess patient frequently for physical needs  -  Identify cognitive and physical deficits and behaviors that affect risk of falls    -  [place] fall precautions as indicated by assessment   - Educate patient/family on patient safety including physical limitations  - Instruct patient to call for assistance with activity based on assessment  - Modify environment to reduce risk of injury  - Consider OT/PT consult to assist with strengthening/mobility  Outcome: Progressing

## 2021-03-15 ENCOUNTER — HOSPITAL ENCOUNTER (OUTPATIENT)
Dept: INFUSION CENTER | Facility: HOSPITAL | Age: 66
Discharge: HOME | End: 2021-03-15
Payer: MEDICARE

## 2021-03-15 VITALS
RESPIRATION RATE: 18 BRPM | OXYGEN SATURATION: 98 % | HEART RATE: 96 BPM | SYSTOLIC BLOOD PRESSURE: 150 MMHG | TEMPERATURE: 97.3 F | DIASTOLIC BLOOD PRESSURE: 75 MMHG

## 2021-03-15 PROCEDURE — 96360 HYDRATION IV INFUSION INIT: CPT

## 2021-03-15 PROCEDURE — 96361 HYDRATE IV INFUSION ADD-ON: CPT

## 2021-03-15 RX ADMIN — SODIUM CHLORIDE 250 ML/HR: 0.9 INJECTION, SOLUTION INTRAVENOUS at 10:49

## 2021-03-15 NOTE — PLAN OF CARE
Problem: Potential for Falls  Goal: Patient will remain free of falls  Description: INTERVENTIONS:  - Assess patient frequently for physical needs  -  Identify cognitive and physical deficits and behaviors that affect risk of falls    -  [place] fall precautions as indicated by assessment   - Educate patient/family on patient safety including physical limitations  - Instruct patient to call for assistance with activity based on assessment  - Modify environment to reduce risk of injury  - Consider OT/PT consult to assist with strengthening/mobility  Outcome: Progressing

## 2021-03-16 ENCOUNTER — TRANSCRIBE ORDERS (OUTPATIENT)
Dept: ADMINISTRATIVE | Facility: HOSPITAL | Age: 66
End: 2021-03-16

## 2021-03-16 DIAGNOSIS — N30.10 CHRONIC INTERSTITIAL CYSTITIS: ICD-10-CM

## 2021-03-16 DIAGNOSIS — R31.9 HEMATURIA, UNSPECIFIED TYPE: Primary | ICD-10-CM

## 2021-03-17 RX ORDER — SODIUM CHLORIDE 9 MG/ML
250 INJECTION, SOLUTION INTRAVENOUS ONCE
Status: COMPLETED | OUTPATIENT
Start: 2021-03-19 | End: 2021-03-19

## 2021-03-17 RX ORDER — SODIUM CHLORIDE 9 MG/ML
250 INJECTION, SOLUTION INTRAVENOUS ONCE
Status: COMPLETED | OUTPATIENT
Start: 2021-03-18 | End: 2021-03-18

## 2021-03-18 ENCOUNTER — HOSPITAL ENCOUNTER (OUTPATIENT)
Dept: INFUSION CENTER | Facility: HOSPITAL | Age: 66
Discharge: HOME | End: 2021-03-18
Payer: MEDICARE

## 2021-03-18 VITALS
DIASTOLIC BLOOD PRESSURE: 70 MMHG | SYSTOLIC BLOOD PRESSURE: 150 MMHG | HEART RATE: 93 BPM | OXYGEN SATURATION: 100 % | TEMPERATURE: 96.5 F | RESPIRATION RATE: 18 BRPM

## 2021-03-18 PROCEDURE — 96361 HYDRATE IV INFUSION ADD-ON: CPT

## 2021-03-18 PROCEDURE — 96360 HYDRATION IV INFUSION INIT: CPT

## 2021-03-18 RX ADMIN — SODIUM CHLORIDE 250 ML/HR: 0.9 INJECTION, SOLUTION INTRAVENOUS at 10:20

## 2021-03-18 NOTE — PLAN OF CARE
Problem: Potential for Falls  Goal: Patient will remain free of falls  Description: INTERVENTIONS:  - Assess patient frequently for physical needs  -  Identify cognitive and physical deficits and behaviors that affect risk of falls    -  [place] fall precautions as indicated by assessment   - Educate patient/family on patient safety including physical limitations  - Instruct patient to call for assistance with activity based on assessment  - Modify environment to reduce risk of injury  - Consider OT/PT consult to assist with strengthening/mobility  Outcome: Progressing

## 2021-03-19 ENCOUNTER — HOSPITAL ENCOUNTER (OUTPATIENT)
Dept: INFUSION CENTER | Facility: HOSPITAL | Age: 66
Discharge: HOME | End: 2021-03-19
Payer: MEDICARE

## 2021-03-19 ENCOUNTER — HOSPITAL ENCOUNTER (OUTPATIENT)
Dept: INFUSION CENTER | Facility: HOSPITAL | Age: 66
End: 2021-03-19

## 2021-03-19 VITALS
OXYGEN SATURATION: 99 % | DIASTOLIC BLOOD PRESSURE: 68 MMHG | RESPIRATION RATE: 16 BRPM | HEART RATE: 100 BPM | SYSTOLIC BLOOD PRESSURE: 135 MMHG | TEMPERATURE: 96.8 F

## 2021-03-19 PROCEDURE — 96361 HYDRATE IV INFUSION ADD-ON: CPT

## 2021-03-19 PROCEDURE — 96360 HYDRATION IV INFUSION INIT: CPT

## 2021-03-19 RX ORDER — SODIUM CHLORIDE 9 MG/ML
250 INJECTION, SOLUTION INTRAVENOUS ONCE
Status: COMPLETED | OUTPATIENT
Start: 2021-03-22 | End: 2021-03-22

## 2021-03-19 RX ADMIN — SODIUM CHLORIDE 250 ML/HR: 0.9 INJECTION, SOLUTION INTRAVENOUS at 08:51

## 2021-03-22 ENCOUNTER — HOSPITAL ENCOUNTER (OUTPATIENT)
Dept: INFUSION CENTER | Facility: HOSPITAL | Age: 66
Discharge: HOME | End: 2021-03-22
Payer: MEDICARE

## 2021-03-22 PROCEDURE — 96361 HYDRATE IV INFUSION ADD-ON: CPT

## 2021-03-22 PROCEDURE — 96360 HYDRATION IV INFUSION INIT: CPT

## 2021-03-22 RX ADMIN — SODIUM CHLORIDE 250 ML/HR: 0.9 INJECTION, SOLUTION INTRAVENOUS at 09:19

## 2021-03-22 NOTE — PROGRESS NOTES
Pt tolerated todays hydration well  Voiding frequently  picc line dressing d&i  Flushed well  Good blood return  Order obtained for 6 hr hydration twice weekly  All appts adjusted to accommodate new physician orders  Discharged ambulatory  avs provided

## 2021-03-23 RX ORDER — SODIUM CHLORIDE 9 MG/ML
333 INJECTION, SOLUTION INTRAVENOUS CONTINUOUS
Status: DISPENSED | OUTPATIENT
Start: 2021-03-24 | End: 2021-03-24

## 2021-03-24 ENCOUNTER — HOSPITAL ENCOUNTER (OUTPATIENT)
Dept: INFUSION CENTER | Facility: HOSPITAL | Age: 66
Discharge: HOME | End: 2021-03-24
Payer: MEDICARE

## 2021-03-24 ENCOUNTER — APPOINTMENT (OUTPATIENT)
Dept: LAB | Facility: HOSPITAL | Age: 66
End: 2021-03-24
Payer: MEDICARE

## 2021-03-24 ENCOUNTER — HOSPITAL ENCOUNTER (OUTPATIENT)
Dept: CT IMAGING | Facility: HOSPITAL | Age: 66
Discharge: HOME | End: 2021-03-24
Payer: MEDICARE

## 2021-03-24 ENCOUNTER — TRANSCRIBE ORDERS (OUTPATIENT)
Dept: LAB | Facility: HOSPITAL | Age: 66
End: 2021-03-24

## 2021-03-24 VITALS
SYSTOLIC BLOOD PRESSURE: 143 MMHG | RESPIRATION RATE: 16 BRPM | DIASTOLIC BLOOD PRESSURE: 84 MMHG | TEMPERATURE: 97.3 F | HEART RATE: 94 BPM

## 2021-03-24 DIAGNOSIS — R31.9 HEMATURIA, UNSPECIFIED TYPE: ICD-10-CM

## 2021-03-24 DIAGNOSIS — N30.10 CHRONIC INTERSTITIAL CYSTITIS: ICD-10-CM

## 2021-03-24 DIAGNOSIS — N30.10 CHRONIC INTERSTITIAL CYSTITIS: Primary | ICD-10-CM

## 2021-03-24 LAB
ANION GAP SERPL CALCULATED.3IONS-SCNC: 8 MMOL/L (ref 4–13)
APTT PPP: 30 SECONDS (ref 23–37)
BASOPHILS # BLD AUTO: 0.1 THOUSANDS/ΜL (ref 0–0.1)
BASOPHILS NFR BLD AUTO: 1 % (ref 0–2)
BILIRUB UR QL STRIP: NEGATIVE
BUN SERPL-MCNC: 20 MG/DL (ref 7–25)
CALCIUM SERPL-MCNC: 9.6 MG/DL (ref 8.6–10.5)
CHLORIDE SERPL-SCNC: 101 MMOL/L (ref 98–107)
CLARITY UR: CLEAR
CO2 SERPL-SCNC: 29 MMOL/L (ref 21–31)
COLOR UR: YELLOW
CREAT SERPL-MCNC: 0.95 MG/DL (ref 0.6–1.2)
EOSINOPHIL # BLD AUTO: 0.1 THOUSAND/ΜL (ref 0–0.61)
EOSINOPHIL NFR BLD AUTO: 1 % (ref 0–5)
ERYTHROCYTE [DISTWIDTH] IN BLOOD BY AUTOMATED COUNT: 13.2 % (ref 11.5–14.5)
GFR SERPL CREATININE-BSD FRML MDRD: 63 ML/MIN/1.73SQ M
GLUCOSE P FAST SERPL-MCNC: 105 MG/DL (ref 65–99)
GLUCOSE UR STRIP-MCNC: NEGATIVE MG/DL
HCT VFR BLD AUTO: 40.5 % (ref 42–47)
HGB BLD-MCNC: 13.6 G/DL (ref 12–16)
HGB UR QL STRIP.AUTO: NEGATIVE
INR PPP: 0.96 (ref 0.84–1.19)
KETONES UR STRIP-MCNC: NEGATIVE MG/DL
LEUKOCYTE ESTERASE UR QL STRIP: NEGATIVE
LYMPHOCYTES # BLD AUTO: 2.2 THOUSANDS/ΜL (ref 0.6–4.47)
LYMPHOCYTES NFR BLD AUTO: 24 % (ref 21–51)
MCH RBC QN AUTO: 29.9 PG (ref 26–34)
MCHC RBC AUTO-ENTMCNC: 33.6 G/DL (ref 31–37)
MCV RBC AUTO: 89 FL (ref 81–99)
MONOCYTES # BLD AUTO: 0.8 THOUSAND/ΜL (ref 0.17–1.22)
MONOCYTES NFR BLD AUTO: 9 % (ref 2–12)
NEUTROPHILS # BLD AUTO: 5.8 THOUSANDS/ΜL (ref 1.4–6.5)
NEUTS SEG NFR BLD AUTO: 65 % (ref 42–75)
NITRITE UR QL STRIP: NEGATIVE
PH UR STRIP.AUTO: 6.5 [PH]
PLATELET # BLD AUTO: 287 THOUSANDS/UL (ref 149–390)
PMV BLD AUTO: 8 FL (ref 8.6–11.7)
POTASSIUM SERPL-SCNC: 3.9 MMOL/L (ref 3.5–5.5)
PROT UR STRIP-MCNC: NEGATIVE MG/DL
PROTHROMBIN TIME: 12.7 SECONDS (ref 11.6–14.5)
RBC # BLD AUTO: 4.56 MILLION/UL (ref 3.9–5.2)
SODIUM SERPL-SCNC: 138 MMOL/L (ref 134–143)
SP GR UR STRIP.AUTO: <=1.005 (ref 1–1.03)
UROBILINOGEN UR QL STRIP.AUTO: 0.2 E.U./DL
WBC # BLD AUTO: 8.9 THOUSAND/UL (ref 4.8–10.8)

## 2021-03-24 PROCEDURE — 81003 URINALYSIS AUTO W/O SCOPE: CPT

## 2021-03-24 PROCEDURE — 80048 BASIC METABOLIC PNL TOTAL CA: CPT

## 2021-03-24 PROCEDURE — 96361 HYDRATE IV INFUSION ADD-ON: CPT

## 2021-03-24 PROCEDURE — 85730 THROMBOPLASTIN TIME PARTIAL: CPT

## 2021-03-24 PROCEDURE — 74178 CT ABD&PLV WO CNTR FLWD CNTR: CPT

## 2021-03-24 PROCEDURE — 36415 COLL VENOUS BLD VENIPUNCTURE: CPT

## 2021-03-24 PROCEDURE — 87086 URINE CULTURE/COLONY COUNT: CPT

## 2021-03-24 PROCEDURE — 96360 HYDRATION IV INFUSION INIT: CPT

## 2021-03-24 PROCEDURE — 85610 PROTHROMBIN TIME: CPT

## 2021-03-24 PROCEDURE — 85025 COMPLETE CBC W/AUTO DIFF WBC: CPT

## 2021-03-24 RX ADMIN — IOHEXOL 100 ML: 350 INJECTION, SOLUTION INTRAVENOUS at 15:55

## 2021-03-24 RX ADMIN — SODIUM CHLORIDE 333 ML/HR: 0.9 INJECTION, SOLUTION INTRAVENOUS at 08:59

## 2021-03-24 NOTE — PLAN OF CARE
Problem: Potential for Falls  Goal: Patient will remain free of falls  Description: INTERVENTIONS:  - Assess patient frequently for physical needs  -  Identify cognitive and physical deficits and behaviors that affect risk of falls  -  [place] fall precautions as indicated by assessment   - Educate patient/family on patient safety including physical limitations  - Instruct patient to call for assistance with activity based on assessment  - Modify environment to reduce risk of injury  - Consider OT/PT consult to assist with strengthening/mobility  Outcome: Progressing     Problem: Knowledge Deficit  Goal: Patient/family/caregiver demonstrates understanding of disease process, treatment plan, medications, and discharge instructions  Description: Complete learning assessment and assess knowledge base    Interventions:  - Provide teaching at level of understanding  - Provide teaching via preferred learning methods  Outcome: Progressing

## 2021-03-24 NOTE — PROGRESS NOTES
Pt tolerated IV hydration well without incident  Pt discharged in stable condition and is aware of next infusion appointment  AVS declined

## 2021-03-24 NOTE — PROGRESS NOTES
PICC line dressing changed per protocol without incident  Pt refuses chlorhexidine dressing - transparent dressing applied  Central labs drawn via PICC line per pt's request  No complaints noted

## 2021-03-25 LAB — BACTERIA UR CULT: NORMAL

## 2021-03-26 RX ORDER — SODIUM CHLORIDE 9 MG/ML
333 INJECTION, SOLUTION INTRAVENOUS CONTINUOUS
Status: DISPENSED | OUTPATIENT
Start: 2021-03-29 | End: 2021-03-29

## 2021-03-29 ENCOUNTER — HOSPITAL ENCOUNTER (OUTPATIENT)
Dept: INFUSION CENTER | Facility: HOSPITAL | Age: 66
Discharge: HOME | End: 2021-03-29
Payer: MEDICARE

## 2021-03-29 ENCOUNTER — OFFICE VISIT (OUTPATIENT)
Dept: LAB | Facility: HOSPITAL | Age: 66
End: 2021-03-29
Payer: MEDICARE

## 2021-03-29 VITALS
DIASTOLIC BLOOD PRESSURE: 65 MMHG | SYSTOLIC BLOOD PRESSURE: 133 MMHG | HEART RATE: 66 BPM | TEMPERATURE: 98.3 F | RESPIRATION RATE: 18 BRPM

## 2021-03-29 DIAGNOSIS — N30.10 CHRONIC INTERSTITIAL CYSTITIS: ICD-10-CM

## 2021-03-29 PROCEDURE — 96361 HYDRATE IV INFUSION ADD-ON: CPT

## 2021-03-29 PROCEDURE — 96360 HYDRATION IV INFUSION INIT: CPT

## 2021-03-29 PROCEDURE — 93005 ELECTROCARDIOGRAM TRACING: CPT

## 2021-03-29 RX ADMIN — SODIUM CHLORIDE 333 ML/HR: 0.9 INJECTION, SOLUTION INTRAVENOUS at 09:40

## 2021-03-29 NOTE — PROGRESS NOTES
Pt received hydration as per orders for 6 hours  Voided frequently  picc line dressing d&i with good blood return from line   picc flushed and pt discharged ambulatory deferring avs

## 2021-03-29 NOTE — PLAN OF CARE
Problem: Potential for Falls  Goal: Patient will remain free of falls  Description: INTERVENTIONS:  - Assess patient frequently for physical needs  -  Identify cognitive and physical deficits and behaviors that affect risk of falls    -  [place] fall precautions as indicated by assessment   - Educate patient/family on patient safety including physical limitations  - Instruct patient to call for assistance with activity based on assessment  - Modify environment to reduce risk of injury  - Consider OT/PT consult to assist with strengthening/mobility  Outcome: Progressing

## 2021-03-30 LAB
ATRIAL RATE: 77 BPM
P AXIS: 35 DEGREES
PR INTERVAL: 150 MS
QRS AXIS: -35 DEGREES
QRSD INTERVAL: 114 MS
QT INTERVAL: 432 MS
QTC INTERVAL: 488 MS
T WAVE AXIS: 95 DEGREES
VENTRICULAR RATE: 77 BPM

## 2021-03-30 PROCEDURE — 93010 ELECTROCARDIOGRAM REPORT: CPT | Performed by: INTERNAL MEDICINE

## 2021-04-01 RX ORDER — SODIUM CHLORIDE 9 MG/ML
333.33 INJECTION, SOLUTION INTRAVENOUS ONCE
Status: DISCONTINUED | OUTPATIENT
Start: 2021-04-02 | End: 2021-04-05

## 2021-04-02 ENCOUNTER — HOSPITAL ENCOUNTER (OUTPATIENT)
Dept: INFUSION CENTER | Facility: HOSPITAL | Age: 66
End: 2021-04-02

## 2021-04-02 ENCOUNTER — HOSPITAL ENCOUNTER (OUTPATIENT)
Dept: INFUSION CENTER | Facility: HOSPITAL | Age: 66
Discharge: HOME | End: 2021-04-02

## 2021-04-02 ENCOUNTER — HOSPITAL ENCOUNTER (OUTPATIENT)
Dept: INFUSION CENTER | Facility: HOSPITAL | Age: 66
Discharge: HOME | End: 2021-04-02
Payer: MEDICARE

## 2021-04-02 VITALS
OXYGEN SATURATION: 99 % | TEMPERATURE: 97.2 F | SYSTOLIC BLOOD PRESSURE: 157 MMHG | HEART RATE: 66 BPM | DIASTOLIC BLOOD PRESSURE: 70 MMHG | RESPIRATION RATE: 18 BRPM

## 2021-04-02 PROCEDURE — 96361 HYDRATE IV INFUSION ADD-ON: CPT

## 2021-04-02 PROCEDURE — 96360 HYDRATION IV INFUSION INIT: CPT

## 2021-04-02 RX ORDER — SODIUM CHLORIDE 9 MG/ML
333.33 INJECTION, SOLUTION INTRAVENOUS ONCE
Status: DISCONTINUED | OUTPATIENT
Start: 2021-04-05 | End: 2021-04-05

## 2021-04-02 RX ORDER — SODIUM CHLORIDE 9 MG/ML
333.33 INJECTION, SOLUTION INTRAVENOUS ONCE
Status: COMPLETED | OUTPATIENT
Start: 2021-04-02 | End: 2021-04-02

## 2021-04-02 RX ADMIN — SODIUM CHLORIDE 333.33 ML/HR: 0.9 INJECTION, SOLUTION INTRAVENOUS at 08:51

## 2021-04-02 NOTE — PLAN OF CARE
Problem: Potential for Falls  Goal: Patient will remain free of falls  Description: INTERVENTIONS:  - Assess patient frequently for physical needs  -  Identify cognitive and physical deficits and behaviors that affect risk of falls    -  [place] fall precautions as indicated by assessment   - Educate patient/family on patient safety including physical limitations  - Instruct patient to call for assistance with activity based on assessment  - Modify environment to reduce risk of injury  - Consider OT/PT consult to assist with strengthening/mobility  Outcome: Progressing

## 2021-04-02 NOTE — PROGRESS NOTES
Pt tolerated 6 hours of hydration without incident  PICC line redressed per routine with the exception that pt refuses chlorahexadine dressing  Central line redress kit without chlorahexadine used  picc flushed well with good blood return and no signs of infection  Discharged ambulatory

## 2021-04-05 ENCOUNTER — HOSPITAL ENCOUNTER (OUTPATIENT)
Dept: INFUSION CENTER | Facility: HOSPITAL | Age: 66
Discharge: HOME | End: 2021-04-05
Payer: MEDICARE

## 2021-04-05 VITALS
RESPIRATION RATE: 18 BRPM | OXYGEN SATURATION: 99 % | HEART RATE: 92 BPM | DIASTOLIC BLOOD PRESSURE: 79 MMHG | TEMPERATURE: 98.1 F | SYSTOLIC BLOOD PRESSURE: 143 MMHG

## 2021-04-05 PROCEDURE — 96361 HYDRATE IV INFUSION ADD-ON: CPT

## 2021-04-05 PROCEDURE — 96360 HYDRATION IV INFUSION INIT: CPT

## 2021-04-05 RX ORDER — SODIUM CHLORIDE 9 MG/ML
333.33 INJECTION, SOLUTION INTRAVENOUS ONCE
Status: COMPLETED | OUTPATIENT
Start: 2021-04-05 | End: 2021-04-05

## 2021-04-05 RX ADMIN — SODIUM CHLORIDE 333.33 ML/HR: 0.9 INJECTION, SOLUTION INTRAVENOUS at 09:39

## 2021-04-05 NOTE — PLAN OF CARE
Problem: Potential for Falls  Goal: Patient will remain free of falls  Description: INTERVENTIONS:  - Assess patient frequently for physical needs  -  Identify cognitive and physical deficits and behaviors that affect risk of falls    -  [place] fall precautions as indicated by assessment   - Educate patient/family on patient safety including physical limitations  - Instruct patient to call for assistance with activity based on assessment  - Modify environment to reduce risk of injury  - Consider OT/PT consult to assist with strengthening/mobility  Outcome: Progressing

## 2021-04-07 RX ORDER — SODIUM CHLORIDE 9 MG/ML
333.33 INJECTION, SOLUTION INTRAVENOUS ONCE
Status: COMPLETED | OUTPATIENT
Start: 2021-04-08 | End: 2021-04-08

## 2021-04-08 ENCOUNTER — HOSPITAL ENCOUNTER (OUTPATIENT)
Dept: INFUSION CENTER | Facility: HOSPITAL | Age: 66
Discharge: HOME | End: 2021-04-08
Payer: MEDICARE

## 2021-04-08 VITALS
HEART RATE: 87 BPM | RESPIRATION RATE: 18 BRPM | SYSTOLIC BLOOD PRESSURE: 129 MMHG | OXYGEN SATURATION: 99 % | DIASTOLIC BLOOD PRESSURE: 77 MMHG | TEMPERATURE: 97.9 F

## 2021-04-08 PROCEDURE — 96360 HYDRATION IV INFUSION INIT: CPT

## 2021-04-08 PROCEDURE — 96361 HYDRATE IV INFUSION ADD-ON: CPT

## 2021-04-08 RX ADMIN — SODIUM CHLORIDE 333.33 ML/HR: 0.9 INJECTION, SOLUTION INTRAVENOUS at 09:52

## 2021-04-08 NOTE — PLAN OF CARE
Problem: Potential for Falls  Goal: Patient will remain free of falls  Description: INTERVENTIONS:  - Assess patient frequently for physical needs  -  Identify cognitive and physical deficits and behaviors that affect risk of falls    -  [place] fall precautions as indicated by assessment   - Educate patient/family on patient safety including physical limitations  - Instruct patient to call for assistance with activity based on assessment  - Modify environment to reduce risk of injury  - Consider OT/PT consult to assist with strengthening/mobility  Outcome: Progressing

## 2021-04-09 RX ORDER — SODIUM CHLORIDE 9 MG/ML
333.33 INJECTION, SOLUTION INTRAVENOUS ONCE
Status: COMPLETED | OUTPATIENT
Start: 2021-04-12 | End: 2021-04-12

## 2021-04-12 ENCOUNTER — HOSPITAL ENCOUNTER (OUTPATIENT)
Dept: INFUSION CENTER | Facility: HOSPITAL | Age: 66
Discharge: HOME | End: 2021-04-12
Payer: MEDICARE

## 2021-04-12 VITALS
HEART RATE: 92 BPM | OXYGEN SATURATION: 99 % | RESPIRATION RATE: 18 BRPM | TEMPERATURE: 97 F | SYSTOLIC BLOOD PRESSURE: 139 MMHG | DIASTOLIC BLOOD PRESSURE: 67 MMHG

## 2021-04-12 PROCEDURE — 96360 HYDRATION IV INFUSION INIT: CPT

## 2021-04-12 PROCEDURE — 96361 HYDRATE IV INFUSION ADD-ON: CPT

## 2021-04-12 RX ADMIN — SODIUM CHLORIDE 333.33 ML/HR: 0.9 INJECTION, SOLUTION INTRAVENOUS at 09:13

## 2021-04-12 NOTE — PLAN OF CARE
Problem: Potential for Falls  Goal: Patient will remain free of falls  Description: INTERVENTIONS:  - Assess patient frequently for physical needs  -  Identify cognitive and physical deficits and behaviors that affect risk of falls  -  [place] fall precautions as indicated by assessment   - Educate patient/family on patient safety including physical limitations  - Instruct patient to call for assistance with activity based on assessment  - Modify environment to reduce risk of injury  - Consider OT/PT consult to assist with strengthening/mobility  Outcome: Progressing     Problem: INFECTION - ADULT  Goal: Absence or prevention of progression during hospitalization  Description: INTERVENTIONS:  - Assess and monitor for signs and symptoms of infection  - Monitor lab/diagnostic results  - Monitor all insertion sites, i e  indwelling lines, tubes, and drains  - Monitor endotracheal if appropriate and nasal secretions for changes in amount and color  - [place] appropriate cooling/warming therapies per order  - Administer medications as ordered  - Instruct and encourage patient and family to use good hand hygiene technique  - Identify and instruct in appropriate isolation precautions for identified infection/condition  Outcome: Progressing     Problem: Knowledge Deficit  Goal: Patient/family/caregiver demonstrates understanding of disease process, treatment plan, medications, and discharge instructions  Description: Complete learning assessment and assess knowledge base    Interventions:  - Provide teaching at level of understanding  - Provide teaching via preferred learning methods  Outcome: Progressing

## 2021-04-14 RX ORDER — SODIUM CHLORIDE 9 MG/ML
333.33 INJECTION, SOLUTION INTRAVENOUS CONTINUOUS
Status: DISPENSED | OUTPATIENT
Start: 2021-04-16 | End: 2021-04-16

## 2021-04-16 ENCOUNTER — HOSPITAL ENCOUNTER (OUTPATIENT)
Dept: ULTRASOUND IMAGING | Facility: HOSPITAL | Age: 66
Discharge: HOME | End: 2021-04-16
Payer: MEDICARE

## 2021-04-16 ENCOUNTER — HOSPITAL ENCOUNTER (OUTPATIENT)
Dept: INFUSION CENTER | Facility: HOSPITAL | Age: 66
Discharge: HOME | End: 2021-04-16
Payer: MEDICARE

## 2021-04-16 VITALS
RESPIRATION RATE: 18 BRPM | OXYGEN SATURATION: 99 % | HEART RATE: 82 BPM | DIASTOLIC BLOOD PRESSURE: 79 MMHG | SYSTOLIC BLOOD PRESSURE: 131 MMHG | TEMPERATURE: 97.8 F

## 2021-04-16 DIAGNOSIS — N13.30 HYDRONEPHROSIS OF RIGHT KIDNEY: ICD-10-CM

## 2021-04-16 DIAGNOSIS — Z87.898 HISTORY OF URINARY RETENTION: ICD-10-CM

## 2021-04-16 PROCEDURE — 96361 HYDRATE IV INFUSION ADD-ON: CPT

## 2021-04-16 PROCEDURE — 76770 US EXAM ABDO BACK WALL COMP: CPT

## 2021-04-16 PROCEDURE — 96360 HYDRATION IV INFUSION INIT: CPT

## 2021-04-16 RX ORDER — SODIUM CHLORIDE 9 MG/ML
333.33 INJECTION, SOLUTION INTRAVENOUS CONTINUOUS
Status: DISPENSED | OUTPATIENT
Start: 2021-04-19 | End: 2021-04-19

## 2021-04-16 RX ADMIN — SODIUM CHLORIDE 333.33 ML/HR: 0.9 INJECTION, SOLUTION INTRAVENOUS at 09:43

## 2021-04-16 NOTE — PLAN OF CARE
Problem: Potential for Falls  Goal: Patient will remain free of falls  Description: INTERVENTIONS:  - Assess patient frequently for physical needs  -  Identify cognitive and physical deficits and behaviors that affect risk of falls    -  [place] fall precautions as indicated by assessment   - Educate patient/family on patient safety including physical limitations  - Instruct patient to call for assistance with activity based on assessment  - Modify environment to reduce risk of injury  - Consider OT/PT consult to assist with strengthening/mobility  Outcome: Progressing

## 2021-04-19 ENCOUNTER — HOSPITAL ENCOUNTER (OUTPATIENT)
Dept: INFUSION CENTER | Facility: HOSPITAL | Age: 66
Discharge: HOME | End: 2021-04-19
Payer: MEDICARE

## 2021-04-19 VITALS
OXYGEN SATURATION: 98 % | HEART RATE: 79 BPM | TEMPERATURE: 97 F | DIASTOLIC BLOOD PRESSURE: 81 MMHG | SYSTOLIC BLOOD PRESSURE: 153 MMHG | RESPIRATION RATE: 18 BRPM

## 2021-04-19 PROCEDURE — 96360 HYDRATION IV INFUSION INIT: CPT

## 2021-04-19 PROCEDURE — 96361 HYDRATE IV INFUSION ADD-ON: CPT

## 2021-04-19 RX ADMIN — SODIUM CHLORIDE 333.33 ML/HR: 0.9 INJECTION, SOLUTION INTRAVENOUS at 10:01

## 2021-04-19 NOTE — PLAN OF CARE
Problem: Potential for Falls  Goal: Patient will remain free of falls  Description: INTERVENTIONS:  - Assess patient frequently for physical needs  -  Identify cognitive and physical deficits and behaviors that affect risk of falls    -  [place] fall precautions as indicated by assessment   - Educate patient/family on patient safety including physical limitations  - Instruct patient to call for assistance with activity based on assessment  - Modify environment to reduce risk of injury  - Consider OT/PT consult to assist with strengthening/mobility  Outcome: Progressing

## 2021-04-20 ENCOUNTER — TRANSCRIBE ORDERS (OUTPATIENT)
Dept: RADIOLOGY | Facility: HOSPITAL | Age: 66
End: 2021-04-20

## 2021-04-20 ENCOUNTER — TRANSCRIBE ORDERS (OUTPATIENT)
Dept: ADMINISTRATIVE | Facility: HOSPITAL | Age: 66
End: 2021-04-20

## 2021-04-20 DIAGNOSIS — N28.82 MEGALOURETER: Primary | ICD-10-CM

## 2021-04-21 ENCOUNTER — HOSPITAL ENCOUNTER (OUTPATIENT)
Dept: RADIOLOGY | Facility: HOSPITAL | Age: 66
Discharge: HOME | End: 2021-04-21
Payer: MEDICARE

## 2021-04-21 DIAGNOSIS — N28.82 MEGALOURETER: ICD-10-CM

## 2021-04-21 PROCEDURE — A9562 TC99M MERTIATIDE: HCPCS

## 2021-04-21 PROCEDURE — 78708 K FLOW/FUNCT IMAGE W/DRUG: CPT

## 2021-04-21 RX ORDER — SODIUM CHLORIDE 9 MG/ML
333.33 INJECTION, SOLUTION INTRAVENOUS CONTINUOUS
Status: DISPENSED | OUTPATIENT
Start: 2021-04-23 | End: 2021-04-23

## 2021-04-23 ENCOUNTER — HOSPITAL ENCOUNTER (OUTPATIENT)
Dept: INFUSION CENTER | Facility: HOSPITAL | Age: 66
Discharge: HOME | End: 2021-04-23
Payer: MEDICARE

## 2021-04-23 VITALS
DIASTOLIC BLOOD PRESSURE: 87 MMHG | SYSTOLIC BLOOD PRESSURE: 154 MMHG | RESPIRATION RATE: 18 BRPM | HEART RATE: 91 BPM | TEMPERATURE: 97.7 F

## 2021-04-23 PROCEDURE — 96360 HYDRATION IV INFUSION INIT: CPT

## 2021-04-23 PROCEDURE — 96361 HYDRATE IV INFUSION ADD-ON: CPT

## 2021-04-23 RX ORDER — SODIUM CHLORIDE 9 MG/ML
333.33 INJECTION, SOLUTION INTRAVENOUS ONCE
Status: COMPLETED | OUTPATIENT
Start: 2021-04-26 | End: 2021-04-26

## 2021-04-23 RX ADMIN — SODIUM CHLORIDE 333.33 ML/HR: 0.9 INJECTION, SOLUTION INTRAVENOUS at 09:46

## 2021-04-23 NOTE — PLAN OF CARE
Problem: Potential for Falls  Goal: Patient will remain free of falls  Description: INTERVENTIONS:  - Assess patient frequently for physical needs  -  Identify cognitive and physical deficits and behaviors that affect risk of falls    -  [place] fall precautions as indicated by assessment   - Educate patient/family on patient safety including physical limitations  - Instruct patient to call for assistance with activity based on assessment  - Modify environment to reduce risk of injury  - Consider OT/PT consult to assist with strengthening/mobility  Outcome: Progressing

## 2021-04-26 ENCOUNTER — APPOINTMENT (OUTPATIENT)
Dept: LAB | Facility: HOSPITAL | Age: 66
End: 2021-04-26
Attending: INTERNAL MEDICINE
Payer: MEDICARE

## 2021-04-26 ENCOUNTER — HOSPITAL ENCOUNTER (OUTPATIENT)
Dept: INFUSION CENTER | Facility: HOSPITAL | Age: 66
Discharge: HOME | End: 2021-04-26
Payer: MEDICARE

## 2021-04-26 ENCOUNTER — TRANSCRIBE ORDERS (OUTPATIENT)
Dept: LAB | Facility: HOSPITAL | Age: 66
End: 2021-04-26

## 2021-04-26 VITALS
HEART RATE: 87 BPM | RESPIRATION RATE: 18 BRPM | DIASTOLIC BLOOD PRESSURE: 85 MMHG | TEMPERATURE: 97.4 F | SYSTOLIC BLOOD PRESSURE: 151 MMHG

## 2021-04-26 DIAGNOSIS — I10 HYPERTENSION, UNSPECIFIED TYPE: ICD-10-CM

## 2021-04-26 DIAGNOSIS — E03.9 HYPOTHYROIDISM, UNSPECIFIED TYPE: ICD-10-CM

## 2021-04-26 DIAGNOSIS — I10 HYPERTENSION, UNSPECIFIED TYPE: Primary | ICD-10-CM

## 2021-04-26 LAB
25(OH)D3 SERPL-MCNC: 24.8 NG/ML (ref 30–100)
ALBUMIN SERPL BCP-MCNC: 4.4 G/DL (ref 3.5–5.7)
ALP SERPL-CCNC: 131 U/L (ref 55–165)
ALT SERPL W P-5'-P-CCNC: 34 U/L (ref 7–52)
ANION GAP SERPL CALCULATED.3IONS-SCNC: 6 MMOL/L (ref 4–13)
AST SERPL W P-5'-P-CCNC: 24 U/L (ref 13–39)
BILIRUB SERPL-MCNC: 0.5 MG/DL (ref 0.2–1)
BUN SERPL-MCNC: 15 MG/DL (ref 7–25)
CALCIUM SERPL-MCNC: 9.6 MG/DL (ref 8.6–10.5)
CHLORIDE SERPL-SCNC: 104 MMOL/L (ref 98–107)
CO2 SERPL-SCNC: 29 MMOL/L (ref 21–31)
CREAT SERPL-MCNC: 0.99 MG/DL (ref 0.6–1.2)
GFR SERPL CREATININE-BSD FRML MDRD: 60 ML/MIN/1.73SQ M
GLUCOSE P FAST SERPL-MCNC: 102 MG/DL (ref 65–99)
MAGNESIUM SERPL-MCNC: 2.1 MG/DL (ref 1.9–2.7)
POTASSIUM SERPL-SCNC: 4.1 MMOL/L (ref 3.5–5.5)
PROT SERPL-MCNC: 6.7 G/DL (ref 6.4–8.9)
SODIUM SERPL-SCNC: 139 MMOL/L (ref 134–143)
T4 FREE SERPL-MCNC: 0.99 NG/DL (ref 0.76–1.46)
TSH SERPL DL<=0.05 MIU/L-ACNC: 1.91 UIU/ML (ref 0.45–5.33)

## 2021-04-26 PROCEDURE — 36415 COLL VENOUS BLD VENIPUNCTURE: CPT

## 2021-04-26 PROCEDURE — 80053 COMPREHEN METABOLIC PANEL: CPT

## 2021-04-26 PROCEDURE — 96360 HYDRATION IV INFUSION INIT: CPT

## 2021-04-26 PROCEDURE — 96361 HYDRATE IV INFUSION ADD-ON: CPT

## 2021-04-26 PROCEDURE — 84439 ASSAY OF FREE THYROXINE: CPT

## 2021-04-26 PROCEDURE — 82306 VITAMIN D 25 HYDROXY: CPT

## 2021-04-26 PROCEDURE — 83735 ASSAY OF MAGNESIUM: CPT

## 2021-04-26 PROCEDURE — 84443 ASSAY THYROID STIM HORMONE: CPT

## 2021-04-26 RX ADMIN — SODIUM CHLORIDE 333.33 ML/HR: 0.9 INJECTION, SOLUTION INTRAVENOUS at 09:05

## 2021-04-26 NOTE — PROGRESS NOTES
Pt hydrated as per orders for 6 hours  Picc line dressing dry and intact  Picc with good blood return    Discharged ambulatory deferring avs

## 2021-04-26 NOTE — PLAN OF CARE
Problem: Potential for Falls  Goal: Patient will remain free of falls  Description: INTERVENTIONS:  - Assess patient frequently for physical needs  -  Identify cognitive and physical deficits and behaviors that affect risk of falls    -  [place] fall precautions as indicated by assessment   - Educate patient/family on patient safety including physical limitations  - Instruct patient to call for assistance with activity based on assessment  - Modify environment to reduce risk of injury  - Consider OT/PT consult to assist with strengthening/mobility  Outcome: Progressing

## 2021-04-28 RX ORDER — SODIUM CHLORIDE 9 MG/ML
333.33 INJECTION, SOLUTION INTRAVENOUS ONCE
Status: COMPLETED | OUTPATIENT
Start: 2021-04-29 | End: 2021-04-29

## 2021-04-29 ENCOUNTER — HOSPITAL ENCOUNTER (OUTPATIENT)
Dept: INFUSION CENTER | Facility: HOSPITAL | Age: 66
Discharge: HOME | End: 2021-04-29
Payer: MEDICARE

## 2021-04-29 VITALS
OXYGEN SATURATION: 98 % | SYSTOLIC BLOOD PRESSURE: 145 MMHG | HEART RATE: 102 BPM | DIASTOLIC BLOOD PRESSURE: 69 MMHG | TEMPERATURE: 98.3 F | RESPIRATION RATE: 18 BRPM

## 2021-04-29 PROCEDURE — 96361 HYDRATE IV INFUSION ADD-ON: CPT

## 2021-04-29 PROCEDURE — 96360 HYDRATION IV INFUSION INIT: CPT

## 2021-04-29 RX ADMIN — SODIUM CHLORIDE 333.33 ML/HR: 0.9 INJECTION, SOLUTION INTRAVENOUS at 09:20

## 2021-05-03 RX ORDER — SODIUM CHLORIDE 9 MG/ML
333.33 INJECTION, SOLUTION INTRAVENOUS CONTINUOUS
Status: DISPENSED | OUTPATIENT
Start: 2021-05-04 | End: 2021-05-04

## 2021-05-04 ENCOUNTER — HOSPITAL ENCOUNTER (OUTPATIENT)
Dept: INFUSION CENTER | Facility: HOSPITAL | Age: 66
Discharge: HOME | End: 2021-05-04
Payer: MEDICARE

## 2021-05-04 VITALS
TEMPERATURE: 98.1 F | DIASTOLIC BLOOD PRESSURE: 95 MMHG | HEART RATE: 85 BPM | SYSTOLIC BLOOD PRESSURE: 154 MMHG | RESPIRATION RATE: 18 BRPM

## 2021-05-04 PROCEDURE — 96360 HYDRATION IV INFUSION INIT: CPT

## 2021-05-04 PROCEDURE — 96361 HYDRATE IV INFUSION ADD-ON: CPT

## 2021-05-04 RX ADMIN — SODIUM CHLORIDE 333.33 ML/HR: 0.9 INJECTION, SOLUTION INTRAVENOUS at 09:37

## 2021-05-05 RX ORDER — SODIUM CHLORIDE 9 MG/ML
333.33 INJECTION, SOLUTION INTRAVENOUS CONTINUOUS
Status: DISPENSED | OUTPATIENT
Start: 2021-05-07 | End: 2021-05-07

## 2021-05-07 ENCOUNTER — HOSPITAL ENCOUNTER (OUTPATIENT)
Dept: INFUSION CENTER | Facility: HOSPITAL | Age: 66
Discharge: HOME | End: 2021-05-07
Payer: MEDICARE

## 2021-05-07 VITALS
RESPIRATION RATE: 18 BRPM | DIASTOLIC BLOOD PRESSURE: 79 MMHG | HEART RATE: 90 BPM | TEMPERATURE: 98.7 F | SYSTOLIC BLOOD PRESSURE: 134 MMHG

## 2021-05-07 PROCEDURE — 96360 HYDRATION IV INFUSION INIT: CPT

## 2021-05-07 PROCEDURE — 96361 HYDRATE IV INFUSION ADD-ON: CPT

## 2021-05-07 RX ORDER — SODIUM CHLORIDE 9 MG/ML
333.33 INJECTION, SOLUTION INTRAVENOUS CONTINUOUS
Status: DISPENSED | OUTPATIENT
Start: 2021-05-10 | End: 2021-05-10

## 2021-05-07 RX ADMIN — SODIUM CHLORIDE 333.33 ML/HR: 0.9 INJECTION, SOLUTION INTRAVENOUS at 08:28

## 2021-05-07 NOTE — PROGRESS NOTES
Hydrated as per orders for 6 hrs  PICC line to rt chest redressed for7 day central line protocol  Pt refuses chlorahexadine dressing therefore sterile central line dressing applied without patch  Discharged ambulatory aware of appts next week

## 2021-05-10 ENCOUNTER — APPOINTMENT (OUTPATIENT)
Dept: LAB | Facility: HOSPITAL | Age: 66
End: 2021-05-10
Attending: INTERNAL MEDICINE
Payer: MEDICARE

## 2021-05-10 ENCOUNTER — TRANSCRIBE ORDERS (OUTPATIENT)
Dept: LAB | Facility: HOSPITAL | Age: 66
End: 2021-05-10

## 2021-05-10 ENCOUNTER — HOSPITAL ENCOUNTER (OUTPATIENT)
Dept: INFUSION CENTER | Facility: HOSPITAL | Age: 66
Discharge: HOME | End: 2021-05-10
Payer: MEDICARE

## 2021-05-10 VITALS
SYSTOLIC BLOOD PRESSURE: 146 MMHG | HEART RATE: 87 BPM | DIASTOLIC BLOOD PRESSURE: 84 MMHG | RESPIRATION RATE: 18 BRPM | TEMPERATURE: 97.8 F

## 2021-05-10 DIAGNOSIS — D64.9 ANEMIA, UNSPECIFIED TYPE: Primary | ICD-10-CM

## 2021-05-10 DIAGNOSIS — D64.9 ANEMIA, UNSPECIFIED TYPE: ICD-10-CM

## 2021-05-10 LAB
ERYTHROCYTE [DISTWIDTH] IN BLOOD BY AUTOMATED COUNT: 12.7 % (ref 11.5–14.5)
FERRITIN SERPL-MCNC: 54 NG/ML (ref 8–388)
HCT VFR BLD AUTO: 40 % (ref 42–47)
HGB BLD-MCNC: 13.6 G/DL (ref 12–16)
IRON SATN MFR SERPL: 31 %
IRON SERPL-MCNC: 89 UG/DL (ref 50–170)
MCH RBC QN AUTO: 29.9 PG (ref 26–34)
MCHC RBC AUTO-ENTMCNC: 34 G/DL (ref 31–37)
MCV RBC AUTO: 88 FL (ref 81–99)
PLATELET # BLD AUTO: 298 THOUSANDS/UL (ref 149–390)
PMV BLD AUTO: 8.5 FL (ref 8.6–11.7)
RBC # BLD AUTO: 4.55 MILLION/UL (ref 3.9–5.2)
TIBC SERPL-MCNC: 285 UG/DL (ref 250–450)
WBC # BLD AUTO: 8.7 THOUSAND/UL (ref 4.8–10.8)

## 2021-05-10 PROCEDURE — 83540 ASSAY OF IRON: CPT

## 2021-05-10 PROCEDURE — 36415 COLL VENOUS BLD VENIPUNCTURE: CPT

## 2021-05-10 PROCEDURE — 96361 HYDRATE IV INFUSION ADD-ON: CPT

## 2021-05-10 PROCEDURE — 85027 COMPLETE CBC AUTOMATED: CPT

## 2021-05-10 PROCEDURE — 96360 HYDRATION IV INFUSION INIT: CPT

## 2021-05-10 PROCEDURE — 83550 IRON BINDING TEST: CPT

## 2021-05-10 PROCEDURE — 82728 ASSAY OF FERRITIN: CPT

## 2021-05-10 RX ADMIN — SODIUM CHLORIDE 333.33 ML/HR: 0.9 INJECTION, SOLUTION INTRAVENOUS at 09:12

## 2021-05-10 NOTE — PLAN OF CARE
Problem: INFECTION - ADULT  Goal: Absence or prevention of progression during hospitalization  Description: INTERVENTIONS:  - Assess and monitor for signs and symptoms of infection  - Monitor lab/diagnostic results  - Monitor all insertion sites, i e  indwelling lines, tubes, and drains  - Monitor endotracheal if appropriate and nasal secretions for changes in amount and color  - [place] appropriate cooling/warming therapies per order  - Administer medications as ordered  - Instruct and encourage patient and family to use good hand hygiene technique  - Identify and instruct in appropriate isolation precautions for identified infection/condition  Outcome: Progressing     Problem: Knowledge Deficit  Goal: Patient/family/caregiver demonstrates understanding of disease process, treatment plan, medications, and discharge instructions  Description: Complete learning assessment and assess knowledge base    Interventions:  - Provide teaching at level of understanding  - Provide teaching via preferred learning methods  Outcome: Progressing

## 2021-05-12 RX ORDER — SODIUM CHLORIDE 9 MG/ML
333.33 INJECTION, SOLUTION INTRAVENOUS ONCE
Status: COMPLETED | OUTPATIENT
Start: 2021-05-14 | End: 2021-05-14

## 2021-05-14 ENCOUNTER — HOSPITAL ENCOUNTER (OUTPATIENT)
Dept: INFUSION CENTER | Facility: HOSPITAL | Age: 66
Discharge: HOME | End: 2021-05-14
Payer: MEDICARE

## 2021-05-14 VITALS
RESPIRATION RATE: 18 BRPM | HEART RATE: 111 BPM | DIASTOLIC BLOOD PRESSURE: 76 MMHG | TEMPERATURE: 97.7 F | SYSTOLIC BLOOD PRESSURE: 147 MMHG

## 2021-05-14 RX ADMIN — SODIUM CHLORIDE 333.33 ML/HR: 0.9 INJECTION, SOLUTION INTRAVENOUS at 09:12

## 2021-05-14 NOTE — PROGRESS NOTES
PICC line redressed as per protocol  Insertion site without signs of infection  Flushed well with great blood return  Hydration as per orders  Next appts reviewed   Discharged ambulatory

## 2021-05-15 RX ORDER — SODIUM CHLORIDE 9 MG/ML
333.33 INJECTION, SOLUTION INTRAVENOUS ONCE
Status: DISCONTINUED | OUTPATIENT
Start: 2021-05-17 | End: 2021-05-15

## 2021-05-17 ENCOUNTER — HOSPITAL ENCOUNTER (OUTPATIENT)
Dept: INFUSION CENTER | Facility: HOSPITAL | Age: 66
Discharge: HOME | End: 2021-05-17
Payer: MEDICARE

## 2021-05-17 VITALS
SYSTOLIC BLOOD PRESSURE: 150 MMHG | TEMPERATURE: 98.2 F | DIASTOLIC BLOOD PRESSURE: 72 MMHG | RESPIRATION RATE: 16 BRPM | HEART RATE: 91 BPM

## 2021-05-17 PROCEDURE — 96360 HYDRATION IV INFUSION INIT: CPT

## 2021-05-17 PROCEDURE — 96361 HYDRATE IV INFUSION ADD-ON: CPT

## 2021-05-17 RX ADMIN — SODIUM CHLORIDE 2000 ML: 0.9 INJECTION, SOLUTION INTRAVENOUS at 08:52

## 2021-05-17 NOTE — PLAN OF CARE
Problem: INFECTION - ADULT  Goal: Absence or prevention of progression during hospitalization  Description: INTERVENTIONS:  - Assess and monitor for signs and symptoms of infection  - Monitor lab/diagnostic results  - Monitor all insertion sites, i e  indwelling lines, tubes, and drains  - Monitor endotracheal if appropriate and nasal secretions for changes in amount and color  - [place] appropriate cooling/warming therapies per order  - Administer medications as ordered  - Instruct and encourage patient and family to use good hand hygiene technique  - Identify and instruct in appropriate isolation precautions for identified infection/condition  Outcome: Progressing     Problem: Knowledge Deficit  Goal: Patient/family/caregiver demonstrates understanding of disease process, treatment plan, medications, and discharge instructions  Description: Complete learning assessment and assess knowledge base    Interventions:  - Provide teaching at level of understanding  - Provide teaching via preferred learning methods  Outcome: Progressing

## 2021-05-20 RX ORDER — SODIUM CHLORIDE 9 MG/ML
333.33 INJECTION, SOLUTION INTRAVENOUS ONCE
Status: COMPLETED | OUTPATIENT
Start: 2021-05-21 | End: 2021-05-21

## 2021-05-21 ENCOUNTER — HOSPITAL ENCOUNTER (OUTPATIENT)
Dept: INFUSION CENTER | Facility: HOSPITAL | Age: 66
Discharge: HOME | End: 2021-05-21
Payer: MEDICARE

## 2021-05-21 VITALS
TEMPERATURE: 97.3 F | SYSTOLIC BLOOD PRESSURE: 149 MMHG | RESPIRATION RATE: 16 BRPM | HEART RATE: 93 BPM | DIASTOLIC BLOOD PRESSURE: 93 MMHG

## 2021-05-21 PROCEDURE — 96360 HYDRATION IV INFUSION INIT: CPT

## 2021-05-21 PROCEDURE — 96361 HYDRATE IV INFUSION ADD-ON: CPT

## 2021-05-21 RX ORDER — FAMOTIDINE 10 MG
40 TABLET ORAL 2 TIMES DAILY
COMMUNITY

## 2021-05-21 RX ORDER — MELATONIN
1000 DAILY
COMMUNITY

## 2021-05-21 RX ADMIN — SODIUM CHLORIDE 333.33 ML/HR: 0.9 INJECTION, SOLUTION INTRAVENOUS at 09:13

## 2021-05-21 NOTE — PROGRESS NOTES
Pt offers no complaints  Tolerated IV hydration well without incident and pt discharged in stable condition  AVS declined but pt aware of next infusion appointment on Monday 5/24 at [facility]

## 2021-05-24 ENCOUNTER — HOSPITAL ENCOUNTER (OUTPATIENT)
Dept: INFUSION CENTER | Facility: HOSPITAL | Age: 66
Discharge: HOME | End: 2021-05-24
Payer: MEDICARE

## 2021-05-24 VITALS
TEMPERATURE: 96.5 F | DIASTOLIC BLOOD PRESSURE: 85 MMHG | HEART RATE: 80 BPM | SYSTOLIC BLOOD PRESSURE: 165 MMHG | RESPIRATION RATE: 16 BRPM

## 2021-05-24 PROCEDURE — 96360 HYDRATION IV INFUSION INIT: CPT

## 2021-05-24 PROCEDURE — 96361 HYDRATE IV INFUSION ADD-ON: CPT

## 2021-05-24 RX ADMIN — SODIUM CHLORIDE 2000 ML: 0.9 INJECTION, SOLUTION INTRAVENOUS at 08:20

## 2021-05-24 NOTE — PLAN OF CARE
Problem: INFECTION - ADULT  Goal: Absence or prevention of progression during hospitalization  Description: INTERVENTIONS:  - Assess and monitor for signs and symptoms of infection  - Monitor lab/diagnostic results  - Monitor all insertion sites, i e  indwelling lines, tubes, and drains  - Monitor endotracheal if appropriate and nasal secretions for changes in amount and color  - [place] appropriate cooling/warming therapies per order  - Administer medications as ordered  - Instruct and encourage patient and family to use good hand hygiene technique  - Identify and instruct in appropriate isolation precautions for identified infection/condition  Outcome: Progressing     Problem: Knowledge Deficit  Goal: Patient/family/caregiver demonstrates understanding of disease process, treatment plan, medications, and discharge instructions  Description: Complete learning assessment and assess knowledge base    Interventions:  - Provide teaching at level of understanding  - Provide teaching via preferred learning methods  Outcome: Progressing

## 2021-05-27 ENCOUNTER — HOSPITAL ENCOUNTER (OUTPATIENT)
Dept: INFUSION CENTER | Facility: HOSPITAL | Age: 66
Discharge: HOME | End: 2021-05-27
Payer: MEDICARE

## 2021-05-27 VITALS
TEMPERATURE: 97.7 F | DIASTOLIC BLOOD PRESSURE: 83 MMHG | HEART RATE: 90 BPM | SYSTOLIC BLOOD PRESSURE: 140 MMHG | RESPIRATION RATE: 16 BRPM

## 2021-05-27 PROCEDURE — 96360 HYDRATION IV INFUSION INIT: CPT

## 2021-05-27 PROCEDURE — 96361 HYDRATE IV INFUSION ADD-ON: CPT

## 2021-05-27 RX ADMIN — SODIUM CHLORIDE 2000 ML: 0.9 INJECTION, SOLUTION INTRAVENOUS at 08:25

## 2021-05-27 NOTE — PLAN OF CARE
Problem: Potential for Falls  Goal: Patient will remain free of falls  Description: INTERVENTIONS:  - Assess patient frequently for physical needs  -  Identify cognitive and physical deficits and behaviors that affect risk of falls    -  [place] fall precautions as indicated by assessment   - Educate patient/family on patient safety including physical limitations  - Instruct patient to call for assistance with activity based on assessment  - Modify environment to reduce risk of injury  - Consider OT/PT consult to assist with strengthening/mobility  Outcome: Progressing

## 2021-06-02 ENCOUNTER — HOSPITAL ENCOUNTER (OUTPATIENT)
Dept: INFUSION CENTER | Facility: HOSPITAL | Age: 66
Discharge: HOME | End: 2021-06-02
Payer: MEDICARE

## 2021-06-02 VITALS
HEART RATE: 84 BPM | TEMPERATURE: 97.3 F | DIASTOLIC BLOOD PRESSURE: 90 MMHG | SYSTOLIC BLOOD PRESSURE: 145 MMHG | RESPIRATION RATE: 16 BRPM

## 2021-06-02 PROCEDURE — 96361 HYDRATE IV INFUSION ADD-ON: CPT

## 2021-06-02 PROCEDURE — 96360 HYDRATION IV INFUSION INIT: CPT

## 2021-06-02 RX ADMIN — SODIUM CHLORIDE 2000 ML: 0.9 INJECTION, SOLUTION INTRAVENOUS at 09:04

## 2021-06-02 NOTE — PLAN OF CARE
Problem: Potential for Falls  Goal: Patient will remain free of falls  Description: INTERVENTIONS:  - Assess patient frequently for physical needs  -  Identify cognitive and physical deficits and behaviors that affect risk of falls    -  [place] fall precautions as indicated by assessment   - Educate patient/family on patient safety including physical limitations  - Instruct patient to call for assistance with activity based on assessment  - Modify environment to reduce risk of injury  - Consider OT/PT consult to assist with strengthening/mobility  Outcome: Progressing

## 2021-06-04 ENCOUNTER — HOSPITAL ENCOUNTER (OUTPATIENT)
Dept: INFUSION CENTER | Facility: HOSPITAL | Age: 66
Discharge: HOME | End: 2021-06-04
Payer: MEDICARE

## 2021-06-04 VITALS
RESPIRATION RATE: 16 BRPM | SYSTOLIC BLOOD PRESSURE: 147 MMHG | HEART RATE: 77 BPM | TEMPERATURE: 96.8 F | DIASTOLIC BLOOD PRESSURE: 68 MMHG

## 2021-06-04 PROCEDURE — 96360 HYDRATION IV INFUSION INIT: CPT

## 2021-06-04 PROCEDURE — 96361 HYDRATE IV INFUSION ADD-ON: CPT

## 2021-06-04 RX ADMIN — SODIUM CHLORIDE 2000 ML: 0.9 INJECTION, SOLUTION INTRAVENOUS at 08:43

## 2021-06-04 NOTE — PLAN OF CARE
Problem: INFECTION - ADULT  Goal: Absence or prevention of progression during hospitalization  Description: INTERVENTIONS:  - Assess and monitor for signs and symptoms of infection  - Monitor lab/diagnostic results  - Monitor all insertion sites, i e  indwelling lines, tubes, and drains  - Monitor endotracheal if appropriate and nasal secretions for changes in amount and color  - [place] appropriate cooling/warming therapies per order  - Administer medications as ordered  - Instruct and encourage patient and family to use good hand hygiene technique  - Identify and instruct in appropriate isolation precautions for identified infection/condition  Outcome: Progressing

## 2021-06-07 RX ORDER — SODIUM CHLORIDE 9 MG/ML
333.3 INJECTION, SOLUTION INTRAVENOUS ONCE
Status: COMPLETED | OUTPATIENT
Start: 2021-06-08 | End: 2021-06-08

## 2021-06-08 ENCOUNTER — HOSPITAL ENCOUNTER (OUTPATIENT)
Dept: INFUSION CENTER | Facility: HOSPITAL | Age: 66
Discharge: HOME | End: 2021-06-08
Payer: MEDICARE

## 2021-06-08 VITALS
SYSTOLIC BLOOD PRESSURE: 134 MMHG | HEART RATE: 88 BPM | RESPIRATION RATE: 16 BRPM | DIASTOLIC BLOOD PRESSURE: 83 MMHG | TEMPERATURE: 98.5 F

## 2021-06-08 PROCEDURE — 96360 HYDRATION IV INFUSION INIT: CPT

## 2021-06-08 PROCEDURE — 96361 HYDRATE IV INFUSION ADD-ON: CPT

## 2021-06-08 RX ADMIN — SODIUM CHLORIDE 333.3 ML/HR: 0.9 INJECTION, SOLUTION INTRAVENOUS at 08:39

## 2021-06-08 NOTE — PROGRESS NOTES
Pt tolerated IV hydration well without incident  Discharged in stable condition and pt aware of next infusion appointment  AVS declined

## 2021-06-08 NOTE — PLAN OF CARE
Problem: Potential for Falls  Goal: Patient will remain free of falls  Description: INTERVENTIONS:  - Assess patient frequently for physical needs  -  Identify cognitive and physical deficits and behaviors that affect risk of falls  -  [place] fall precautions as indicated by assessment   - Educate patient/family on patient safety including physical limitations  - Instruct patient to call for assistance with activity based on assessment  - Modify environment to reduce risk of injury  - Consider OT/PT consult to assist with strengthening/mobility  Outcome: Progressing     Problem: Knowledge Deficit  Goal: Patient/family/caregiver demonstrates understanding of disease process, treatment plan, medications, and discharge instructions  Description: Complete learning assessment and assess knowledge base    Interventions:  - Provide teaching at level of understanding  - Provide teaching via preferred learning methods  Outcome: Progressing

## 2021-06-09 RX ORDER — SODIUM CHLORIDE 9 MG/ML
333.3 INJECTION, SOLUTION INTRAVENOUS ONCE
Status: COMPLETED | OUTPATIENT
Start: 2021-06-10 | End: 2021-06-10

## 2021-06-10 ENCOUNTER — HOSPITAL ENCOUNTER (OUTPATIENT)
Dept: INFUSION CENTER | Facility: HOSPITAL | Age: 66
Discharge: HOME | End: 2021-06-10
Payer: MEDICARE

## 2021-06-10 VITALS
SYSTOLIC BLOOD PRESSURE: 128 MMHG | HEART RATE: 81 BPM | RESPIRATION RATE: 16 BRPM | DIASTOLIC BLOOD PRESSURE: 83 MMHG | TEMPERATURE: 97.6 F

## 2021-06-10 PROCEDURE — 96360 HYDRATION IV INFUSION INIT: CPT

## 2021-06-10 PROCEDURE — 96361 HYDRATE IV INFUSION ADD-ON: CPT

## 2021-06-10 RX ADMIN — SODIUM CHLORIDE 333.3 ML/HR: 0.9 INJECTION, SOLUTION INTRAVENOUS at 09:17

## 2021-06-10 NOTE — PLAN OF CARE
Problem: Potential for Falls  Goal: Patient will remain free of falls  Description: INTERVENTIONS:  - Assess patient frequently for physical needs  -  Identify cognitive and physical deficits and behaviors that affect risk of falls    -  [place] fall precautions as indicated by assessment   - Educate patient/family on patient safety including physical limitations  - Instruct patient to call for assistance with activity based on assessment  - Modify environment to reduce risk of injury  - Consider OT/PT consult to assist with strengthening/mobility  Outcome: Progressing

## 2021-06-10 NOTE — PROGRESS NOTES
Pt hydrated as per orders  7 day PICC line dressing changed per policy  Good blood return noted  New caps applied    Discharged ambulatory deferring avs

## 2021-06-11 RX ORDER — SODIUM CHLORIDE 9 MG/ML
333.3 INJECTION, SOLUTION INTRAVENOUS ONCE
Status: COMPLETED | OUTPATIENT
Start: 2021-06-14 | End: 2021-06-14

## 2021-06-14 ENCOUNTER — HOSPITAL ENCOUNTER (OUTPATIENT)
Dept: INFUSION CENTER | Facility: HOSPITAL | Age: 66
Discharge: HOME | End: 2021-06-14
Payer: MEDICARE

## 2021-06-14 VITALS
TEMPERATURE: 97.2 F | RESPIRATION RATE: 16 BRPM | DIASTOLIC BLOOD PRESSURE: 78 MMHG | HEART RATE: 85 BPM | SYSTOLIC BLOOD PRESSURE: 120 MMHG

## 2021-06-14 PROCEDURE — 96361 HYDRATE IV INFUSION ADD-ON: CPT

## 2021-06-14 PROCEDURE — 96360 HYDRATION IV INFUSION INIT: CPT

## 2021-06-14 RX ADMIN — SODIUM CHLORIDE 333.3 ML/HR: 0.9 INJECTION, SOLUTION INTRAVENOUS at 09:12

## 2021-06-14 NOTE — PROGRESS NOTES
Pt offers no complaints  Tolerated IV hydration well without incident and pt discharged in stable condition  AVS declined and pt aware of next infusion appointment

## 2021-06-14 NOTE — PLAN OF CARE
Problem: Potential for Falls  Goal: Patient will remain free of falls  Description: INTERVENTIONS:  - Educate patient/family on patient safety including physical limitations  - Instruct patient to call for assistance with activity   - Consult OT/PT to assist with strengthening/mobility   - Keep Call bell within reach  - Keep bed low and locked with side rails adjusted as appropriate  - Keep care items and personal belongings within reach  - Initiate and maintain comfort rounds  - Make Fall Risk Sign visible to staff  - Apply yellow socks and bracelet for high fall risk patients  - Consider moving patient to room near nurses station  Outcome: Progressing     Problem: Knowledge Deficit  Goal: Patient/family/caregiver demonstrates understanding of disease process, treatment plan, medications, and discharge instructions  Description: Complete learning assessment and assess knowledge base    Interventions:  - Provide teaching at level of understanding  - Provide teaching via preferred learning methods  Outcome: Progressing

## 2021-06-15 ENCOUNTER — TRANSCRIBE ORDERS (OUTPATIENT)
Dept: ADMINISTRATIVE | Facility: HOSPITAL | Age: 66
End: 2021-06-15

## 2021-06-15 DIAGNOSIS — K22.2 ESOPHAGEAL OBSTRUCTION: ICD-10-CM

## 2021-06-15 DIAGNOSIS — K44.9 DIAPHRAGMATIC HERNIA WITHOUT OBSTRUCTION OR GANGRENE: ICD-10-CM

## 2021-06-15 DIAGNOSIS — K58.2 MIXED IRRITABLE BOWEL SYNDROME: ICD-10-CM

## 2021-06-15 DIAGNOSIS — K21.9 GASTROESOPHAGEAL REFLUX DISEASE WITHOUT ESOPHAGITIS: Primary | ICD-10-CM

## 2021-06-15 DIAGNOSIS — Z80.0 FAMILY HISTORY OF MALIGNANT NEOPLASM OF DIGESTIVE ORGAN: ICD-10-CM

## 2021-06-15 DIAGNOSIS — K86.2 CYST OF PANCREAS: ICD-10-CM

## 2021-06-17 ENCOUNTER — HOSPITAL ENCOUNTER (OUTPATIENT)
Dept: INFUSION CENTER | Facility: HOSPITAL | Age: 66
Discharge: HOME | End: 2021-06-17
Payer: MEDICARE

## 2021-06-17 VITALS
RESPIRATION RATE: 16 BRPM | SYSTOLIC BLOOD PRESSURE: 134 MMHG | HEART RATE: 91 BPM | DIASTOLIC BLOOD PRESSURE: 83 MMHG | TEMPERATURE: 97.8 F

## 2021-06-17 PROCEDURE — 96360 HYDRATION IV INFUSION INIT: CPT

## 2021-06-17 PROCEDURE — 96361 HYDRATE IV INFUSION ADD-ON: CPT

## 2021-06-17 RX ADMIN — SODIUM CHLORIDE 2000 ML: 0.9 INJECTION, SOLUTION INTRAVENOUS at 08:47

## 2021-06-17 NOTE — PROGRESS NOTES
Pt tolerated todays hydration without incident  picc line flushed and redressed per policy  New cap applied   Discharged ambulatory deferring avs

## 2021-06-21 ENCOUNTER — HOSPITAL ENCOUNTER (OUTPATIENT)
Dept: INFUSION CENTER | Facility: HOSPITAL | Age: 66
Discharge: HOME | End: 2021-06-21
Payer: MEDICARE

## 2021-06-21 VITALS
RESPIRATION RATE: 16 BRPM | DIASTOLIC BLOOD PRESSURE: 85 MMHG | HEART RATE: 94 BPM | TEMPERATURE: 98 F | SYSTOLIC BLOOD PRESSURE: 157 MMHG

## 2021-06-21 PROCEDURE — 96361 HYDRATE IV INFUSION ADD-ON: CPT

## 2021-06-21 PROCEDURE — 96360 HYDRATION IV INFUSION INIT: CPT

## 2021-06-21 RX ADMIN — SODIUM CHLORIDE 2000 ML: 0.9 INJECTION, SOLUTION INTRAVENOUS at 09:03

## 2021-06-21 NOTE — PROGRESS NOTES
Pt tolerated IV hydration well without incident  Central line dressing changed per protocol without incident - pt allergic to chlorhexidine - transparent dressing applied  Pt discharged in stable condition and is aware of next infusion appointment  AVS declined

## 2021-06-25 ENCOUNTER — HOSPITAL ENCOUNTER (OUTPATIENT)
Dept: INFUSION CENTER | Facility: HOSPITAL | Age: 66
Discharge: HOME | End: 2021-06-25
Payer: MEDICARE

## 2021-06-25 VITALS
RESPIRATION RATE: 16 BRPM | TEMPERATURE: 97.9 F | SYSTOLIC BLOOD PRESSURE: 150 MMHG | HEART RATE: 83 BPM | DIASTOLIC BLOOD PRESSURE: 95 MMHG

## 2021-06-25 PROCEDURE — 96360 HYDRATION IV INFUSION INIT: CPT

## 2021-06-25 PROCEDURE — 96361 HYDRATE IV INFUSION ADD-ON: CPT

## 2021-06-25 RX ADMIN — SODIUM CHLORIDE 2000 ML: 0.9 INJECTION, SOLUTION INTRAVENOUS at 08:04

## 2021-06-25 NOTE — PROGRESS NOTES
Pt tolerated IV hydration well without incident  Discharged in stable condition offering no complaints   AVS declined but pt aware of next infusion appointment on Monday 6/28 at [address]

## 2021-06-28 ENCOUNTER — APPOINTMENT (OUTPATIENT)
Dept: LAB | Facility: HOSPITAL | Age: 66
End: 2021-06-28
Attending: PHYSICIAN ASSISTANT
Payer: MEDICARE

## 2021-06-28 ENCOUNTER — HOSPITAL ENCOUNTER (OUTPATIENT)
Dept: MRI IMAGING | Facility: HOSPITAL | Age: 66
Discharge: HOME | End: 2021-06-28
Attending: PHYSICIAN ASSISTANT
Payer: MEDICARE

## 2021-06-28 ENCOUNTER — HOSPITAL ENCOUNTER (OUTPATIENT)
Dept: INFUSION CENTER | Facility: HOSPITAL | Age: 66
Discharge: HOME | End: 2021-06-28
Payer: MEDICARE

## 2021-06-28 VITALS
TEMPERATURE: 97.8 F | SYSTOLIC BLOOD PRESSURE: 142 MMHG | HEART RATE: 84 BPM | DIASTOLIC BLOOD PRESSURE: 82 MMHG | RESPIRATION RATE: 16 BRPM

## 2021-06-28 DIAGNOSIS — K21.9 GASTROESOPHAGEAL REFLUX DISEASE WITHOUT ESOPHAGITIS: ICD-10-CM

## 2021-06-28 DIAGNOSIS — K58.2 MIXED IRRITABLE BOWEL SYNDROME: ICD-10-CM

## 2021-06-28 DIAGNOSIS — Z80.0 FAMILY HISTORY OF MALIGNANT NEOPLASM OF DIGESTIVE ORGAN: ICD-10-CM

## 2021-06-28 DIAGNOSIS — K44.9 DIAPHRAGMATIC HERNIA WITHOUT OBSTRUCTION OR GANGRENE: ICD-10-CM

## 2021-06-28 DIAGNOSIS — K22.2 ESOPHAGEAL OBSTRUCTION: ICD-10-CM

## 2021-06-28 DIAGNOSIS — K86.2 CYST OF PANCREAS: ICD-10-CM

## 2021-06-28 PROCEDURE — 96360 HYDRATION IV INFUSION INIT: CPT

## 2021-06-28 PROCEDURE — 36415 COLL VENOUS BLD VENIPUNCTURE: CPT

## 2021-06-28 PROCEDURE — 96361 HYDRATE IV INFUSION ADD-ON: CPT

## 2021-06-28 PROCEDURE — 86301 IMMUNOASSAY TUMOR CA 19-9: CPT

## 2021-06-28 PROCEDURE — 74181 MRI ABDOMEN W/O CONTRAST: CPT

## 2021-06-28 RX ORDER — SODIUM CHLORIDE 9 MG/ML
333.3 INJECTION, SOLUTION INTRAVENOUS ONCE
Status: COMPLETED | OUTPATIENT
Start: 2021-06-28 | End: 2021-06-28

## 2021-06-28 RX ADMIN — SODIUM CHLORIDE 333.3 ML/HR: 0.9 INJECTION, SOLUTION INTRAVENOUS at 08:13

## 2021-06-28 NOTE — PROGRESS NOTES
Pt tolerated IV hydration well without incident  Central line dressing changed per protocol without incident, and blood work collected  Pt discharged in stable condition and is aware of next infusion appointment  AVS declined

## 2021-06-29 LAB — CANCER AG19-9 SERPL-ACNC: 17 U/ML (ref 0–35)

## 2021-07-01 ENCOUNTER — HOSPITAL ENCOUNTER (OUTPATIENT)
Dept: INFUSION CENTER | Facility: HOSPITAL | Age: 66
Discharge: HOME | End: 2021-07-01
Payer: MEDICARE

## 2021-07-01 VITALS
SYSTOLIC BLOOD PRESSURE: 141 MMHG | RESPIRATION RATE: 16 BRPM | DIASTOLIC BLOOD PRESSURE: 84 MMHG | TEMPERATURE: 98.2 F | HEART RATE: 74 BPM

## 2021-07-01 PROCEDURE — 96361 HYDRATE IV INFUSION ADD-ON: CPT

## 2021-07-01 PROCEDURE — 96360 HYDRATION IV INFUSION INIT: CPT

## 2021-07-01 RX ADMIN — SODIUM CHLORIDE 2000 ML: 0.9 INJECTION, SOLUTION INTRAVENOUS at 08:14

## 2021-07-01 NOTE — PROGRESS NOTES
Pt hydrated as per orders  No current changes in health  picc line to rt chest flushed and capped  Discharged ambulatory aware of upcoming appts  [place]

## 2021-07-01 NOTE — PLAN OF CARE
Problem: Potential for Falls  Goal: Patient will remain free of falls  Description: INTERVENTIONS:  - Educate patient/family on patient safety including physical limitations  - Instruct patient to call for assistance with activity   - Consult OT/PT to assist with strengthening/mobility   - Keep Call bell within reach  - Keep bed low and locked with side rails adjusted as appropriate  - Keep care items and personal belongings within reach  - Initiate and maintain comfort rounds  - Make Fall Risk Sign visible to staff  - Offer Toileting every 1 Hours, in advance of need  - Apply yellow socks and bracelet for high fall risk patients  - Consider moving patient to room near nurses station  Outcome: Progressing

## 2021-07-06 ENCOUNTER — HOSPITAL ENCOUNTER (OUTPATIENT)
Dept: INFUSION CENTER | Facility: HOSPITAL | Age: 66
Discharge: HOME | End: 2021-07-06
Payer: MEDICARE

## 2021-07-06 VITALS
HEART RATE: 79 BPM | SYSTOLIC BLOOD PRESSURE: 129 MMHG | TEMPERATURE: 98 F | RESPIRATION RATE: 16 BRPM | DIASTOLIC BLOOD PRESSURE: 82 MMHG

## 2021-07-06 PROCEDURE — 96361 HYDRATE IV INFUSION ADD-ON: CPT

## 2021-07-06 PROCEDURE — 96360 HYDRATION IV INFUSION INIT: CPT

## 2021-07-06 RX ADMIN — SODIUM CHLORIDE 2000 ML: 0.9 INJECTION, SOLUTION INTRAVENOUS at 09:12

## 2021-07-08 ENCOUNTER — HOSPITAL ENCOUNTER (OUTPATIENT)
Dept: INFUSION CENTER | Facility: HOSPITAL | Age: 66
Discharge: HOME | End: 2021-07-08
Payer: MEDICARE

## 2021-07-08 VITALS
DIASTOLIC BLOOD PRESSURE: 86 MMHG | RESPIRATION RATE: 16 BRPM | SYSTOLIC BLOOD PRESSURE: 159 MMHG | TEMPERATURE: 98 F | HEART RATE: 81 BPM

## 2021-07-08 PROCEDURE — 96361 HYDRATE IV INFUSION ADD-ON: CPT

## 2021-07-08 PROCEDURE — 96360 HYDRATION IV INFUSION INIT: CPT

## 2021-07-08 RX ADMIN — SODIUM CHLORIDE 2000 ML: 0.9 INJECTION, SOLUTION INTRAVENOUS at 08:40

## 2021-07-08 NOTE — PLAN OF CARE
Problem: INFECTION - ADULT  Goal: Absence or prevention of progression during hospitalization  Description: INTERVENTIONS:  - Assess and monitor for signs and symptoms of infection  - Monitor lab/diagnostic results  - Monitor all insertion sites, i e  indwelling lines, tubes, and drains  - Monitor endotracheal if appropriate and nasal secretions for changes in amount and color  - [place] appropriate cooling/warming therapies per order  - Administer medications as ordered  - Instruct and encourage patient and family to use good hand hygiene technique  - Identify and instruct in appropriate isolation precautions for identified infection/condition  Outcome: Progressing     Problem: Knowledge Deficit  Goal: Patient/family/caregiver demonstrates understanding of disease process, treatment plan, medications, and discharge instructions  Description: Complete learning assessment and assess knowledge base    Interventions:  - Provide teaching at level of understanding  - Provide teaching via preferred learning methods  Outcome: Progressing

## 2021-07-12 ENCOUNTER — HOSPITAL ENCOUNTER (OUTPATIENT)
Dept: INFUSION CENTER | Facility: HOSPITAL | Age: 66
Discharge: HOME | End: 2021-07-12
Payer: MEDICARE

## 2021-07-12 VITALS
DIASTOLIC BLOOD PRESSURE: 68 MMHG | RESPIRATION RATE: 16 BRPM | TEMPERATURE: 96.8 F | SYSTOLIC BLOOD PRESSURE: 145 MMHG | HEART RATE: 86 BPM

## 2021-07-12 PROCEDURE — 96361 HYDRATE IV INFUSION ADD-ON: CPT

## 2021-07-12 PROCEDURE — 96360 HYDRATION IV INFUSION INIT: CPT

## 2021-07-12 RX ADMIN — SODIUM CHLORIDE 2000 ML: 0.9 INJECTION, SOLUTION INTRAVENOUS at 09:03

## 2021-07-15 ENCOUNTER — HOSPITAL ENCOUNTER (OUTPATIENT)
Dept: INFUSION CENTER | Facility: HOSPITAL | Age: 66
Discharge: HOME | End: 2021-07-15
Payer: MEDICARE

## 2021-07-15 VITALS
SYSTOLIC BLOOD PRESSURE: 144 MMHG | DIASTOLIC BLOOD PRESSURE: 79 MMHG | RESPIRATION RATE: 18 BRPM | HEART RATE: 85 BPM | TEMPERATURE: 97.3 F

## 2021-07-15 PROCEDURE — 96360 HYDRATION IV INFUSION INIT: CPT

## 2021-07-15 PROCEDURE — 96361 HYDRATE IV INFUSION ADD-ON: CPT

## 2021-07-15 RX ADMIN — SODIUM CHLORIDE 2000 ML: 0.9 INJECTION, SOLUTION INTRAVENOUS at 08:17

## 2021-07-19 ENCOUNTER — HOSPITAL ENCOUNTER (OUTPATIENT)
Dept: INFUSION CENTER | Facility: HOSPITAL | Age: 66
Discharge: HOME | End: 2021-07-19
Payer: MEDICARE

## 2021-07-19 VITALS
RESPIRATION RATE: 18 BRPM | SYSTOLIC BLOOD PRESSURE: 132 MMHG | TEMPERATURE: 97 F | HEART RATE: 80 BPM | DIASTOLIC BLOOD PRESSURE: 88 MMHG

## 2021-07-19 PROCEDURE — 96361 HYDRATE IV INFUSION ADD-ON: CPT

## 2021-07-19 PROCEDURE — 96360 HYDRATION IV INFUSION INIT: CPT

## 2021-07-19 RX ADMIN — SODIUM CHLORIDE 2000 ML: 0.9 INJECTION, SOLUTION INTRAVENOUS at 08:27

## 2021-07-19 NOTE — PROGRESS NOTES
Pt tolerated IV hydration without difficulty  PICC dsg changed per protocol  Flushed post hydration with brisk blood return noted  Next appt confirmed    Left ambulatory declining AVS

## 2021-07-22 ENCOUNTER — HOSPITAL ENCOUNTER (OUTPATIENT)
Dept: INFUSION CENTER | Facility: HOSPITAL | Age: 66
Discharge: HOME | End: 2021-07-22
Payer: MEDICARE

## 2021-07-22 VITALS
RESPIRATION RATE: 18 BRPM | DIASTOLIC BLOOD PRESSURE: 92 MMHG | HEART RATE: 92 BPM | SYSTOLIC BLOOD PRESSURE: 163 MMHG | TEMPERATURE: 97.7 F

## 2021-07-22 PROCEDURE — 96361 HYDRATE IV INFUSION ADD-ON: CPT

## 2021-07-22 PROCEDURE — 96360 HYDRATION IV INFUSION INIT: CPT

## 2021-07-22 RX ADMIN — SODIUM CHLORIDE 2000 ML: 0.9 INJECTION, SOLUTION INTRAVENOUS at 08:19

## 2021-07-26 ENCOUNTER — APPOINTMENT (OUTPATIENT)
Dept: LAB | Facility: HOSPITAL | Age: 66
End: 2021-07-26
Attending: INTERNAL MEDICINE
Payer: MEDICARE

## 2021-07-26 ENCOUNTER — HOSPITAL ENCOUNTER (OUTPATIENT)
Dept: INFUSION CENTER | Facility: HOSPITAL | Age: 66
Discharge: HOME | End: 2021-07-26
Payer: MEDICARE

## 2021-07-26 VITALS
HEART RATE: 79 BPM | RESPIRATION RATE: 18 BRPM | TEMPERATURE: 97.6 F | DIASTOLIC BLOOD PRESSURE: 83 MMHG | SYSTOLIC BLOOD PRESSURE: 140 MMHG

## 2021-07-26 DIAGNOSIS — D64.9 ANEMIA, UNSPECIFIED TYPE: ICD-10-CM

## 2021-07-26 DIAGNOSIS — E03.9 MYXEDEMA HEART DISEASE: ICD-10-CM

## 2021-07-26 DIAGNOSIS — E55.9 VITAMIN D DEFICIENCY: ICD-10-CM

## 2021-07-26 DIAGNOSIS — I51.9 MYXEDEMA HEART DISEASE: ICD-10-CM

## 2021-07-26 LAB
25(OH)D3 SERPL-MCNC: 56.2 NG/ML (ref 30–100)
ALBUMIN SERPL BCP-MCNC: 4.3 G/DL (ref 3.5–5.7)
ALP SERPL-CCNC: 96 U/L (ref 55–165)
ALT SERPL W P-5'-P-CCNC: 22 U/L (ref 7–52)
ANION GAP SERPL CALCULATED.3IONS-SCNC: 6 MMOL/L (ref 4–13)
AST SERPL W P-5'-P-CCNC: 21 U/L (ref 13–39)
BASOPHILS # BLD AUTO: 0.1 THOUSANDS/ΜL (ref 0–0.1)
BASOPHILS NFR BLD AUTO: 1 % (ref 0–2)
BILIRUB SERPL-MCNC: 0.4 MG/DL (ref 0.2–1)
BUN SERPL-MCNC: 18 MG/DL (ref 7–25)
CALCIUM SERPL-MCNC: 9.4 MG/DL (ref 8.6–10.5)
CHLORIDE SERPL-SCNC: 99 MMOL/L (ref 98–107)
CO2 SERPL-SCNC: 30 MMOL/L (ref 21–31)
CREAT SERPL-MCNC: 0.99 MG/DL (ref 0.6–1.2)
EOSINOPHIL # BLD AUTO: 0.1 THOUSAND/ΜL (ref 0–0.61)
EOSINOPHIL NFR BLD AUTO: 1 % (ref 0–5)
ERYTHROCYTE [DISTWIDTH] IN BLOOD BY AUTOMATED COUNT: 13.4 % (ref 11.5–14.5)
GFR SERPL CREATININE-BSD FRML MDRD: 60 ML/MIN/1.73SQ M
GLUCOSE SERPL-MCNC: 98 MG/DL (ref 65–99)
HCT VFR BLD AUTO: 41.2 % (ref 42–47)
HGB BLD-MCNC: 13.7 G/DL (ref 12–16)
LYMPHOCYTES # BLD AUTO: 3.1 THOUSANDS/ΜL (ref 0.6–4.47)
LYMPHOCYTES NFR BLD AUTO: 30 % (ref 21–51)
MCH RBC QN AUTO: 29.1 PG (ref 26–34)
MCHC RBC AUTO-ENTMCNC: 33.2 G/DL (ref 31–37)
MCV RBC AUTO: 88 FL (ref 81–99)
MONOCYTES # BLD AUTO: 0.8 THOUSAND/ΜL (ref 0.17–1.22)
MONOCYTES NFR BLD AUTO: 8 % (ref 2–12)
NEUTROPHILS # BLD AUTO: 6.1 THOUSANDS/ΜL (ref 1.4–6.5)
NEUTS SEG NFR BLD AUTO: 61 % (ref 42–75)
PLATELET # BLD AUTO: 336 THOUSANDS/UL (ref 149–390)
PMV BLD AUTO: 8.4 FL (ref 8.6–11.7)
POTASSIUM SERPL-SCNC: 3.9 MMOL/L (ref 3.5–5.5)
PROT SERPL-MCNC: 7 G/DL (ref 6.4–8.9)
RBC # BLD AUTO: 4.69 MILLION/UL (ref 3.9–5.2)
SODIUM SERPL-SCNC: 135 MMOL/L (ref 134–143)
T4 FREE SERPL-MCNC: 0.92 NG/DL (ref 0.76–1.46)
TSH SERPL DL<=0.05 MIU/L-ACNC: 3.76 UIU/ML (ref 0.45–5.33)
WBC # BLD AUTO: 10.1 THOUSAND/UL (ref 4.8–10.8)

## 2021-07-26 PROCEDURE — 84443 ASSAY THYROID STIM HORMONE: CPT

## 2021-07-26 PROCEDURE — 82306 VITAMIN D 25 HYDROXY: CPT

## 2021-07-26 PROCEDURE — 84439 ASSAY OF FREE THYROXINE: CPT

## 2021-07-26 PROCEDURE — 96361 HYDRATE IV INFUSION ADD-ON: CPT

## 2021-07-26 PROCEDURE — 96360 HYDRATION IV INFUSION INIT: CPT

## 2021-07-26 PROCEDURE — 85025 COMPLETE CBC W/AUTO DIFF WBC: CPT

## 2021-07-26 PROCEDURE — 80053 COMPREHEN METABOLIC PANEL: CPT

## 2021-07-26 PROCEDURE — 36415 COLL VENOUS BLD VENIPUNCTURE: CPT

## 2021-07-26 RX ADMIN — SODIUM CHLORIDE 2000 ML: 0.9 INJECTION, SOLUTION INTRAVENOUS at 08:41

## 2021-07-26 NOTE — PROGRESS NOTES
Patient tolerated IV hydration without issue  PICC dressing changed   Discharged in stable condition, declined AVS

## 2021-07-29 ENCOUNTER — HOSPITAL ENCOUNTER (OUTPATIENT)
Dept: INFUSION CENTER | Facility: HOSPITAL | Age: 66
Discharge: HOME | End: 2021-07-29
Payer: MEDICARE

## 2021-07-29 VITALS
DIASTOLIC BLOOD PRESSURE: 77 MMHG | SYSTOLIC BLOOD PRESSURE: 160 MMHG | TEMPERATURE: 97.3 F | RESPIRATION RATE: 18 BRPM | HEART RATE: 84 BPM

## 2021-07-29 PROCEDURE — 96361 HYDRATE IV INFUSION ADD-ON: CPT

## 2021-07-29 PROCEDURE — 96360 HYDRATION IV INFUSION INIT: CPT

## 2021-07-29 RX ADMIN — SODIUM CHLORIDE 2000 ML: 0.9 INJECTION, SOLUTION INTRAVENOUS at 08:29

## 2021-07-29 NOTE — PROGRESS NOTES
Pt offers no complaints  Tolerated IV hydration well without incident  Discharged in stable condition and AVS declined  Pt aware of next infusion appointment tomorrow at [facility]

## 2021-07-30 ENCOUNTER — HOSPITAL ENCOUNTER (OUTPATIENT)
Dept: INFUSION CENTER | Facility: HOSPITAL | Age: 66
Discharge: HOME | End: 2021-07-30
Payer: MEDICARE

## 2021-07-30 VITALS
DIASTOLIC BLOOD PRESSURE: 85 MMHG | OXYGEN SATURATION: 97 % | SYSTOLIC BLOOD PRESSURE: 134 MMHG | TEMPERATURE: 96.9 F | HEART RATE: 106 BPM | RESPIRATION RATE: 18 BRPM

## 2021-07-30 PROCEDURE — 96360 HYDRATION IV INFUSION INIT: CPT

## 2021-07-30 PROCEDURE — 96361 HYDRATE IV INFUSION ADD-ON: CPT

## 2021-07-30 RX ADMIN — SODIUM CHLORIDE 2000 ML: 0.9 INJECTION, SOLUTION INTRAVENOUS at 09:06

## 2021-07-30 NOTE — PROGRESS NOTES
Tolerated hydration well  Discharged in stable condition 
no nasal obstruction/no hearing difficulty/no post-nasal discharge/no tinnitus/no vertigo/no nasal congestion/no nose bleeds/no sinus symptoms/no ear pain/no nasal discharge

## 2021-08-02 ENCOUNTER — HOSPITAL ENCOUNTER (OUTPATIENT)
Dept: INFUSION CENTER | Facility: HOSPITAL | Age: 66
Discharge: HOME | End: 2021-08-02
Payer: MEDICARE

## 2021-08-02 VITALS
RESPIRATION RATE: 18 BRPM | HEART RATE: 81 BPM | DIASTOLIC BLOOD PRESSURE: 86 MMHG | SYSTOLIC BLOOD PRESSURE: 142 MMHG | TEMPERATURE: 97.3 F

## 2021-08-02 PROCEDURE — 96361 HYDRATE IV INFUSION ADD-ON: CPT

## 2021-08-02 PROCEDURE — 96360 HYDRATION IV INFUSION INIT: CPT

## 2021-08-02 RX ADMIN — SODIUM CHLORIDE 2000 ML: 0.9 INJECTION, SOLUTION INTRAVENOUS at 09:15

## 2021-08-04 ENCOUNTER — HOSPITAL ENCOUNTER (OUTPATIENT)
Dept: INFUSION CENTER | Facility: HOSPITAL | Age: 66
Discharge: HOME | End: 2021-08-04
Payer: MEDICARE

## 2021-08-04 VITALS
TEMPERATURE: 98.3 F | HEART RATE: 88 BPM | RESPIRATION RATE: 16 BRPM | DIASTOLIC BLOOD PRESSURE: 71 MMHG | SYSTOLIC BLOOD PRESSURE: 142 MMHG

## 2021-08-04 PROCEDURE — 96360 HYDRATION IV INFUSION INIT: CPT

## 2021-08-04 PROCEDURE — 96361 HYDRATE IV INFUSION ADD-ON: CPT

## 2021-08-04 RX ADMIN — SODIUM CHLORIDE 2000 ML: 0.9 INJECTION, SOLUTION INTRAVENOUS at 08:03

## 2021-08-04 NOTE — PLAN OF CARE
Problem: INFECTION - ADULT  Goal: Absence or prevention of progression during hospitalization  Description: INTERVENTIONS:  - Assess and monitor for signs and symptoms of infection  - Monitor lab/diagnostic results  - Monitor all insertion sites, i e  indwelling lines, tubes, and drains  - Monitor endotracheal if appropriate and nasal secretions for changes in amount and color  - [place] appropriate cooling/warming therapies per order  - Administer medications as ordered  - Instruct and encourage patient and family to use good hand hygiene technique  - Identify and instruct in appropriate isolation precautions for identified infection/condition  Outcome: Progressing     Problem: Knowledge Deficit  Goal: Patient/family/caregiver demonstrates understanding of disease process, treatment plan, medications, and discharge instructions  Description: Complete learning assessment and assess knowledge base    Interventions:  - Provide teaching at level of understanding  - Provide teaching via preferred learning methods  Outcome: Progressing

## 2021-08-05 RX ORDER — SODIUM CHLORIDE 9 MG/ML
333.3 INJECTION, SOLUTION INTRAVENOUS ONCE
Status: COMPLETED | OUTPATIENT
Start: 2021-08-06 | End: 2021-08-06

## 2021-08-06 ENCOUNTER — HOSPITAL ENCOUNTER (OUTPATIENT)
Dept: INFUSION CENTER | Facility: HOSPITAL | Age: 66
Discharge: HOME | End: 2021-08-06
Payer: MEDICARE

## 2021-08-06 VITALS
DIASTOLIC BLOOD PRESSURE: 64 MMHG | HEART RATE: 87 BPM | TEMPERATURE: 98.2 F | SYSTOLIC BLOOD PRESSURE: 136 MMHG | RESPIRATION RATE: 16 BRPM

## 2021-08-06 PROCEDURE — 96360 HYDRATION IV INFUSION INIT: CPT

## 2021-08-06 PROCEDURE — 96361 HYDRATE IV INFUSION ADD-ON: CPT

## 2021-08-06 RX ORDER — SODIUM CHLORIDE 9 MG/ML
333.3 INJECTION, SOLUTION INTRAVENOUS ONCE
Status: DISCONTINUED | OUTPATIENT
Start: 2021-08-09 | End: 2021-08-13 | Stop reason: HOSPADM

## 2021-08-06 RX ADMIN — SODIUM CHLORIDE 333.3 ML/HR: 0.9 INJECTION, SOLUTION INTRAVENOUS at 08:57

## 2021-08-06 NOTE — PROGRESS NOTES
Pt offers no complaints  Tolerated IV hydration well without incident  Discharged in stable condition and pt aware of next infusion appointment  AVS declined

## 2021-08-09 ENCOUNTER — HOSPITAL ENCOUNTER (OUTPATIENT)
Dept: INFUSION CENTER | Facility: HOSPITAL | Age: 66
Discharge: HOME | End: 2021-08-09

## 2021-08-10 ENCOUNTER — APPOINTMENT (EMERGENCY)
Dept: CT IMAGING | Facility: HOSPITAL | Age: 66
DRG: 314 | End: 2021-08-10
Payer: MEDICARE

## 2021-08-10 ENCOUNTER — APPOINTMENT (EMERGENCY)
Dept: RADIOLOGY | Facility: HOSPITAL | Age: 66
DRG: 314 | End: 2021-08-10
Payer: MEDICARE

## 2021-08-10 ENCOUNTER — HOSPITAL ENCOUNTER (INPATIENT)
Facility: HOSPITAL | Age: 66
LOS: 1 days | Discharge: HOME | DRG: 314 | End: 2021-08-12
Attending: EMERGENCY MEDICINE | Admitting: INTERNAL MEDICINE
Payer: MEDICARE

## 2021-08-10 DIAGNOSIS — Z95.828 S/P PICC CENTRAL LINE PLACEMENT: ICD-10-CM

## 2021-08-10 DIAGNOSIS — R10.9 ABDOMINAL PAIN: Primary | ICD-10-CM

## 2021-08-10 DIAGNOSIS — A41.9 SEPSIS (HCC): ICD-10-CM

## 2021-08-10 DIAGNOSIS — R11.2 NAUSEA AND VOMITING: ICD-10-CM

## 2021-08-10 DIAGNOSIS — R74.01 TRANSAMINITIS: ICD-10-CM

## 2021-08-10 PROBLEM — I49.8 POSTURAL ORTHOSTATIC TACHYCARDIA SYNDROME: Chronic | Status: ACTIVE | Noted: 2019-01-25

## 2021-08-10 PROBLEM — K21.9 GASTROESOPHAGEAL REFLUX DISEASE WITHOUT ESOPHAGITIS: Chronic | Status: ACTIVE | Noted: 2019-01-31

## 2021-08-10 PROBLEM — R07.89 CHEST PRESSURE: Status: RESOLVED | Noted: 2018-06-30 | Resolved: 2021-08-10

## 2021-08-10 PROBLEM — F41.9 ANXIETY AND DEPRESSION: Chronic | Status: ACTIVE | Noted: 2019-01-31

## 2021-08-10 PROBLEM — F32.A ANXIETY AND DEPRESSION: Chronic | Status: ACTIVE | Noted: 2019-01-31

## 2021-08-10 PROBLEM — G90.A POSTURAL ORTHOSTATIC TACHYCARDIA SYNDROME: Chronic | Status: ACTIVE | Noted: 2019-01-25

## 2021-08-10 PROBLEM — J45.40 MODERATE PERSISTENT ASTHMA: Chronic | Status: ACTIVE | Noted: 2019-01-31

## 2021-08-10 LAB
ALBUMIN SERPL BCP-MCNC: 4.5 G/DL (ref 3.5–5.7)
ALP SERPL-CCNC: 194 U/L (ref 55–165)
ALT SERPL W P-5'-P-CCNC: 106 U/L (ref 7–52)
ANION GAP SERPL CALCULATED.3IONS-SCNC: 12 MMOL/L (ref 4–13)
APTT PPP: 23 SECONDS (ref 23–37)
AST SERPL W P-5'-P-CCNC: 64 U/L (ref 13–39)
BILIRUB SERPL-MCNC: 0.7 MG/DL (ref 0.2–1)
BILIRUB UR QL STRIP: NEGATIVE
BUN SERPL-MCNC: 14 MG/DL (ref 7–25)
CALCIUM SERPL-MCNC: 10 MG/DL (ref 8.6–10.5)
CHLORIDE SERPL-SCNC: 101 MMOL/L (ref 98–107)
CLARITY UR: CLEAR
CO2 SERPL-SCNC: 26 MMOL/L (ref 21–31)
COLOR UR: YELLOW
CREAT SERPL-MCNC: 0.98 MG/DL (ref 0.6–1.2)
ERYTHROCYTE [DISTWIDTH] IN BLOOD BY AUTOMATED COUNT: 13.1 % (ref 11.5–14.5)
GFR SERPL CREATININE-BSD FRML MDRD: 60 ML/MIN/1.73SQ M
GLUCOSE SERPL-MCNC: 111 MG/DL (ref 65–99)
GLUCOSE UR STRIP-MCNC: NEGATIVE MG/DL
HCT VFR BLD AUTO: 43.1 % (ref 42–47)
HGB BLD-MCNC: 14.2 G/DL (ref 12–16)
HGB UR QL STRIP.AUTO: NEGATIVE
INR PPP: 0.85 (ref 0.84–1.19)
KETONES UR STRIP-MCNC: NEGATIVE MG/DL
LACTATE SERPL-SCNC: 1.9 MMOL/L (ref 0.5–2)
LEUKOCYTE ESTERASE UR QL STRIP: NEGATIVE
LIPASE SERPL-CCNC: 34 U/L (ref 11–82)
LYMPHOCYTES # BLD AUTO: 0.94 THOUSAND/UL (ref 0.6–4.47)
LYMPHOCYTES # BLD AUTO: 8 % (ref 20–51)
MCH RBC QN AUTO: 28.9 PG (ref 26–34)
MCHC RBC AUTO-ENTMCNC: 33 G/DL (ref 31–37)
MCV RBC AUTO: 88 FL (ref 81–99)
MONOCYTES # BLD AUTO: 0.7 THOUSAND/UL (ref 0–1.22)
MONOCYTES NFR BLD AUTO: 6 % (ref 4–12)
NEUTS BAND NFR BLD MANUAL: 3 % (ref 0–8)
NEUTS SEG # BLD: 10.06 THOUSAND/UL (ref 1.81–6.82)
NEUTS SEG NFR BLD AUTO: 83 % (ref 42–75)
NITRITE UR QL STRIP: NEGATIVE
PH UR STRIP.AUTO: 7.5 [PH]
PLATELET # BLD AUTO: 204 THOUSANDS/UL (ref 149–390)
PLATELET BLD QL SMEAR: ADEQUATE
PMV BLD AUTO: 7.3 FL (ref 8.6–11.7)
POTASSIUM SERPL-SCNC: 3.6 MMOL/L (ref 3.5–5.5)
PROCALCITONIN SERPL-MCNC: 0.88 NG/ML
PROT SERPL-MCNC: 6.9 G/DL (ref 6.4–8.9)
PROT UR STRIP-MCNC: NEGATIVE MG/DL
PROTHROMBIN TIME: 11.6 SECONDS (ref 11.6–14.5)
RBC # BLD AUTO: 4.92 MILLION/UL (ref 3.9–5.2)
RBC MORPH BLD: NORMAL
SARS-COV-2 RNA RESP QL NAA+PROBE: NEGATIVE
SODIUM SERPL-SCNC: 139 MMOL/L (ref 134–143)
SP GR UR STRIP.AUTO: 1.01 (ref 1–1.03)
TOTAL CELLS COUNTED SPEC: 100
TROPONIN I SERPL-MCNC: <0.03 NG/ML
UROBILINOGEN UR QL STRIP.AUTO: 0.2 E.U./DL
WBC # BLD AUTO: 11.7 THOUSAND/UL (ref 4.8–10.8)

## 2021-08-10 PROCEDURE — 80053 COMPREHEN METABOLIC PANEL: CPT | Performed by: EMERGENCY MEDICINE

## 2021-08-10 PROCEDURE — 96365 THER/PROPH/DIAG IV INF INIT: CPT

## 2021-08-10 PROCEDURE — 85027 COMPLETE CBC AUTOMATED: CPT | Performed by: EMERGENCY MEDICINE

## 2021-08-10 PROCEDURE — 93005 ELECTROCARDIOGRAM TRACING: CPT

## 2021-08-10 PROCEDURE — 94640 AIRWAY INHALATION TREATMENT: CPT

## 2021-08-10 PROCEDURE — 71275 CT ANGIOGRAPHY CHEST: CPT

## 2021-08-10 PROCEDURE — 83605 ASSAY OF LACTIC ACID: CPT | Performed by: EMERGENCY MEDICINE

## 2021-08-10 PROCEDURE — 85730 THROMBOPLASTIN TIME PARTIAL: CPT | Performed by: EMERGENCY MEDICINE

## 2021-08-10 PROCEDURE — U0005 INFEC AGEN DETEC AMPLI PROBE: HCPCS | Performed by: EMERGENCY MEDICINE

## 2021-08-10 PROCEDURE — 87040 BLOOD CULTURE FOR BACTERIA: CPT | Performed by: EMERGENCY MEDICINE

## 2021-08-10 PROCEDURE — 96361 HYDRATE IV INFUSION ADD-ON: CPT

## 2021-08-10 PROCEDURE — 1123F ACP DISCUSS/DSCN MKR DOCD: CPT | Performed by: EMERGENCY MEDICINE

## 2021-08-10 PROCEDURE — 87070 CULTURE OTHR SPECIMN AEROBIC: CPT | Performed by: INTERNAL MEDICINE

## 2021-08-10 PROCEDURE — 87186 SC STD MICRODIL/AGAR DIL: CPT | Performed by: INTERNAL MEDICINE

## 2021-08-10 PROCEDURE — 99220 PR INITIAL OBSERVATION CARE/DAY 70 MINUTES: CPT | Performed by: FAMILY MEDICINE

## 2021-08-10 PROCEDURE — 94664 DEMO&/EVAL PT USE INHALER: CPT

## 2021-08-10 PROCEDURE — 99285 EMERGENCY DEPT VISIT HI MDM: CPT | Performed by: EMERGENCY MEDICINE

## 2021-08-10 PROCEDURE — G1004 CDSM NDSC: HCPCS

## 2021-08-10 PROCEDURE — 85007 BL SMEAR W/DIFF WBC COUNT: CPT | Performed by: EMERGENCY MEDICINE

## 2021-08-10 PROCEDURE — 36415 COLL VENOUS BLD VENIPUNCTURE: CPT | Performed by: EMERGENCY MEDICINE

## 2021-08-10 PROCEDURE — 81003 URINALYSIS AUTO W/O SCOPE: CPT | Performed by: EMERGENCY MEDICINE

## 2021-08-10 PROCEDURE — 74177 CT ABD & PELVIS W/CONTRAST: CPT

## 2021-08-10 PROCEDURE — 70450 CT HEAD/BRAIN W/O DYE: CPT

## 2021-08-10 PROCEDURE — 85610 PROTHROMBIN TIME: CPT | Performed by: EMERGENCY MEDICINE

## 2021-08-10 PROCEDURE — U0003 INFECTIOUS AGENT DETECTION BY NUCLEIC ACID (DNA OR RNA); SEVERE ACUTE RESPIRATORY SYNDROME CORONAVIRUS 2 (SARS-COV-2) (CORONAVIRUS DISEASE [COVID-19]), AMPLIFIED PROBE TECHNIQUE, MAKING USE OF HIGH THROUGHPUT TECHNOLOGIES AS DESCRIBED BY CMS-2020-01-R: HCPCS | Performed by: EMERGENCY MEDICINE

## 2021-08-10 PROCEDURE — 71045 X-RAY EXAM CHEST 1 VIEW: CPT

## 2021-08-10 PROCEDURE — 84484 ASSAY OF TROPONIN QUANT: CPT | Performed by: EMERGENCY MEDICINE

## 2021-08-10 PROCEDURE — 99285 EMERGENCY DEPT VISIT HI MDM: CPT

## 2021-08-10 PROCEDURE — 84145 PROCALCITONIN (PCT): CPT | Performed by: EMERGENCY MEDICINE

## 2021-08-10 PROCEDURE — 96376 TX/PRO/DX INJ SAME DRUG ADON: CPT

## 2021-08-10 PROCEDURE — 94760 N-INVAS EAR/PLS OXIMETRY 1: CPT

## 2021-08-10 PROCEDURE — 87077 CULTURE AEROBIC IDENTIFY: CPT | Performed by: INTERNAL MEDICINE

## 2021-08-10 PROCEDURE — 83690 ASSAY OF LIPASE: CPT | Performed by: EMERGENCY MEDICINE

## 2021-08-10 PROCEDURE — 96375 TX/PRO/DX INJ NEW DRUG ADDON: CPT

## 2021-08-10 RX ORDER — FLUTICASONE PROPIONATE 110 UG/1
1 AEROSOL, METERED RESPIRATORY (INHALATION) EVERY 12 HOURS SCHEDULED
Status: DISCONTINUED | OUTPATIENT
Start: 2021-08-10 | End: 2021-08-11

## 2021-08-10 RX ORDER — HYDROXYZINE HYDROCHLORIDE 10 MG/1
10 TABLET, FILM COATED ORAL EVERY 6 HOURS PRN
Status: DISCONTINUED | OUTPATIENT
Start: 2021-08-10 | End: 2021-08-11

## 2021-08-10 RX ORDER — ONDANSETRON 2 MG/ML
4 INJECTION INTRAMUSCULAR; INTRAVENOUS ONCE
Status: COMPLETED | OUTPATIENT
Start: 2021-08-10 | End: 2021-08-10

## 2021-08-10 RX ORDER — MINERAL OIL AND PETROLATUM 150; 830 MG/G; MG/G
OINTMENT OPHTHALMIC
Status: DISCONTINUED | OUTPATIENT
Start: 2021-08-10 | End: 2021-08-11

## 2021-08-10 RX ORDER — LEVOTHYROXINE SODIUM 0.03 MG/1
25 TABLET ORAL
Status: DISCONTINUED | OUTPATIENT
Start: 2021-08-11 | End: 2021-08-11

## 2021-08-10 RX ORDER — IPRATROPIUM BROMIDE 21 UG/1
2 SPRAY, METERED NASAL 4 TIMES DAILY PRN
COMMUNITY

## 2021-08-10 RX ORDER — MIDODRINE HYDROCHLORIDE 5 MG/1
15 TABLET ORAL 3 TIMES DAILY
Status: DISCONTINUED | OUTPATIENT
Start: 2021-08-10 | End: 2021-08-10

## 2021-08-10 RX ORDER — MORPHINE SULFATE 4 MG/ML
4 INJECTION, SOLUTION INTRAMUSCULAR; INTRAVENOUS ONCE
Status: COMPLETED | OUTPATIENT
Start: 2021-08-10 | End: 2021-08-10

## 2021-08-10 RX ORDER — TRAMADOL HYDROCHLORIDE 50 MG/1
50 TABLET ORAL EVERY 6 HOURS PRN
Status: DISCONTINUED | OUTPATIENT
Start: 2021-08-10 | End: 2021-08-11

## 2021-08-10 RX ORDER — HYDROCODONE BITARTRATE AND ACETAMINOPHEN 5; 325 MG/1; MG/1
1 TABLET ORAL EVERY 6 HOURS PRN
Status: DISCONTINUED | OUTPATIENT
Start: 2021-08-10 | End: 2021-08-11

## 2021-08-10 RX ORDER — CYCLOBENZAPRINE HCL 10 MG
5 TABLET ORAL 2 TIMES DAILY PRN
Status: DISCONTINUED | OUTPATIENT
Start: 2021-08-10 | End: 2021-08-12 | Stop reason: HOSPADM

## 2021-08-10 RX ORDER — FLUTICASONE PROPIONATE 44 UG/1
1 AEROSOL, METERED RESPIRATORY (INHALATION) EVERY 12 HOURS SCHEDULED
Status: DISCONTINUED | OUTPATIENT
Start: 2021-08-10 | End: 2021-08-10 | Stop reason: SDUPTHER

## 2021-08-10 RX ORDER — SODIUM CHLORIDE 9 MG/ML
125 INJECTION, SOLUTION INTRAVENOUS CONTINUOUS
Status: DISCONTINUED | OUTPATIENT
Start: 2021-08-10 | End: 2021-08-12 | Stop reason: HOSPADM

## 2021-08-10 RX ORDER — ACETAMINOPHEN 325 MG/1
650 TABLET ORAL EVERY 4 HOURS PRN
Status: DISCONTINUED | OUTPATIENT
Start: 2021-08-10 | End: 2021-08-12 | Stop reason: HOSPADM

## 2021-08-10 RX ORDER — LEVALBUTEROL 1.25 MG/.5ML
1.25 SOLUTION, CONCENTRATE RESPIRATORY (INHALATION) EVERY 8 HOURS PRN
Status: DISCONTINUED | OUTPATIENT
Start: 2021-08-10 | End: 2021-08-12 | Stop reason: HOSPADM

## 2021-08-10 RX ORDER — VANCOMYCIN HYDROCHLORIDE 1 G/200ML
20 INJECTION, SOLUTION INTRAVENOUS EVERY 24 HOURS
Status: DISCONTINUED | OUTPATIENT
Start: 2021-08-10 | End: 2021-08-12 | Stop reason: HOSPADM

## 2021-08-10 RX ORDER — MIDODRINE HYDROCHLORIDE 5 MG/1
15 TABLET ORAL
Status: DISCONTINUED | OUTPATIENT
Start: 2021-08-11 | End: 2021-08-12 | Stop reason: HOSPADM

## 2021-08-10 RX ORDER — BUDESONIDE 0.5 MG/2ML
0.5 INHALANT ORAL 2 TIMES DAILY
COMMUNITY

## 2021-08-10 RX ORDER — SODIUM CHLORIDE FOR INHALATION 0.9 %
3 VIAL, NEBULIZER (ML) INHALATION EVERY 8 HOURS PRN
Status: DISCONTINUED | OUTPATIENT
Start: 2021-08-10 | End: 2021-08-12 | Stop reason: HOSPADM

## 2021-08-10 RX ORDER — POLYETHYLENE GLYCOL 3350 17 G/17G
17 POWDER, FOR SOLUTION ORAL DAILY PRN
COMMUNITY

## 2021-08-10 RX ORDER — CEFEPIME HYDROCHLORIDE 2 G/50ML
2000 INJECTION, SOLUTION INTRAVENOUS EVERY 12 HOURS
Status: DISCONTINUED | OUTPATIENT
Start: 2021-08-10 | End: 2021-08-10

## 2021-08-10 RX ORDER — OMEGA-3-ACID ETHYL ESTERS 1 G/1
1600 CAPSULE, LIQUID FILLED ORAL DAILY
COMMUNITY
End: 2022-04-04

## 2021-08-10 RX ORDER — AZELASTINE 1 MG/ML
1 SPRAY, METERED NASAL 2 TIMES DAILY
Status: DISCONTINUED | OUTPATIENT
Start: 2021-08-10 | End: 2021-08-11

## 2021-08-10 RX ORDER — PANTOPRAZOLE SODIUM 40 MG/1
40 TABLET, DELAYED RELEASE ORAL 2 TIMES DAILY
Status: DISCONTINUED | OUTPATIENT
Start: 2021-08-10 | End: 2021-08-12 | Stop reason: HOSPADM

## 2021-08-10 RX ORDER — HYDROCODONE BITARTRATE AND ACETAMINOPHEN 5; 325 MG/1; MG/1
0.5 TABLET ORAL EVERY 6 HOURS PRN
COMMUNITY
End: 2022-04-11

## 2021-08-10 RX ORDER — AZELASTINE 1 MG/ML
2 SPRAY, METERED NASAL 2 TIMES DAILY
COMMUNITY

## 2021-08-10 RX ORDER — FLUTICASONE PROPIONATE 50 MCG
2 SPRAY, SUSPENSION (ML) NASAL DAILY
COMMUNITY

## 2021-08-10 RX ORDER — FLUTICASONE PROPIONATE 50 MCG
1 SPRAY, SUSPENSION (ML) NASAL DAILY
Status: DISCONTINUED | OUTPATIENT
Start: 2021-08-10 | End: 2021-08-11

## 2021-08-10 RX ORDER — ONDANSETRON 2 MG/ML
4 INJECTION INTRAMUSCULAR; INTRAVENOUS EVERY 6 HOURS PRN
Status: DISCONTINUED | OUTPATIENT
Start: 2021-08-10 | End: 2021-08-12 | Stop reason: HOSPADM

## 2021-08-10 RX ADMIN — HYDROCODONE BITARTRATE AND ACETAMINOPHEN 1 TABLET: 5; 325 TABLET ORAL at 22:54

## 2021-08-10 RX ADMIN — VANCOMYCIN HYDROCHLORIDE 1000 MG: 1 INJECTION, SOLUTION INTRAVENOUS at 14:17

## 2021-08-10 RX ADMIN — SODIUM CHLORIDE 125 ML/HR: 0.9 INJECTION, SOLUTION INTRAVENOUS at 16:32

## 2021-08-10 RX ADMIN — PANTOPRAZOLE SODIUM 40 MG: 40 TABLET, DELAYED RELEASE ORAL at 14:04

## 2021-08-10 RX ADMIN — UBROGEPANT 100 MG: 100 TABLET ORAL at 15:08

## 2021-08-10 RX ADMIN — SODIUM CHLORIDE 1000 ML: 0.9 INJECTION, SOLUTION INTRAVENOUS at 04:25

## 2021-08-10 RX ADMIN — MORPHINE SULFATE 4 MG: 4 INJECTION INTRAVENOUS at 06:18

## 2021-08-10 RX ADMIN — ONDANSETRON 4 MG: 2 INJECTION INTRAMUSCULAR; INTRAVENOUS at 06:19

## 2021-08-10 RX ADMIN — PIPERACILLIN AND TAZOBACTAM 3.38 G: 3; .375 INJECTION, POWDER, FOR SOLUTION INTRAVENOUS at 18:20

## 2021-08-10 RX ADMIN — PANTOPRAZOLE SODIUM 40 MG: 40 TABLET, DELAYED RELEASE ORAL at 18:21

## 2021-08-10 RX ADMIN — HYDROCODONE BITARTRATE AND ACETAMINOPHEN 1 TABLET: 5; 325 TABLET ORAL at 14:05

## 2021-08-10 RX ADMIN — FLUTICASONE PROPIONATE 1 SPRAY: 50 SPRAY, METERED NASAL at 15:06

## 2021-08-10 RX ADMIN — SODIUM CHLORIDE 125 ML/HR: 0.9 INJECTION, SOLUTION INTRAVENOUS at 07:09

## 2021-08-10 RX ADMIN — Medication 3.38 G: at 04:58

## 2021-08-10 RX ADMIN — PIPERACILLIN AND TAZOBACTAM 3.38 G: 3; .375 INJECTION, POWDER, FOR SOLUTION INTRAVENOUS at 23:46

## 2021-08-10 RX ADMIN — ONDANSETRON 4 MG: 2 INJECTION INTRAMUSCULAR; INTRAVENOUS at 14:05

## 2021-08-10 RX ADMIN — HYDROXYZINE HYDROCHLORIDE 10 MG: 10 TABLET, FILM COATED ORAL at 22:16

## 2021-08-10 RX ADMIN — MORPHINE SULFATE 4 MG: 4 INJECTION INTRAVENOUS at 04:29

## 2021-08-10 RX ADMIN — LEVALBUTEROL HYDROCHLORIDE 1.25 MG: 1.25 SOLUTION, CONCENTRATE RESPIRATORY (INHALATION) at 23:22

## 2021-08-10 RX ADMIN — AZELASTINE HYDROCHLORIDE 1 SPRAY: 137 SPRAY, METERED NASAL at 15:06

## 2021-08-10 RX ADMIN — IOHEXOL 100 ML: 350 INJECTION, SOLUTION INTRAVENOUS at 05:22

## 2021-08-10 RX ADMIN — PIPERACILLIN AND TAZOBACTAM 3.38 G: 3; .375 INJECTION, POWDER, FOR SOLUTION INTRAVENOUS at 12:15

## 2021-08-10 RX ADMIN — ONDANSETRON 4 MG: 2 INJECTION INTRAMUSCULAR; INTRAVENOUS at 04:28

## 2021-08-10 RX ADMIN — SODIUM CHLORIDE 125 ML/HR: 0.9 INJECTION, SOLUTION INTRAVENOUS at 08:17

## 2021-08-10 RX ADMIN — SODIUM CHLORIDE 1000 ML: 0.9 INJECTION, SOLUTION INTRAVENOUS at 05:00

## 2021-08-10 RX ADMIN — ISODIUM CHLORIDE 3 ML: 0.03 SOLUTION RESPIRATORY (INHALATION) at 23:21

## 2021-08-10 RX ADMIN — MIDODRINE HYDROCHLORIDE 15 MG: 5 TABLET ORAL at 16:22

## 2021-08-10 RX ADMIN — CYCLOBENZAPRINE HYDROCHLORIDE 5 MG: 10 TABLET, FILM COATED ORAL at 14:05

## 2021-08-10 NOTE — ASSESSMENT & PLAN NOTE
· Noted by tachycardia and fever present on admission  · Unclear of infection  Possible PICC line disorders  · Continue antibiotics and IV fluids started in the ER  · Follow-up blood culture procalcitonin level  · Discussed  with ID  Advised to pull picc line today  · Will consult IR for PICC replacement on Thursday if blood cultures negative    Will continue with vancomycin and Zosyn

## 2021-08-10 NOTE — ED PROCEDURE NOTE
PROCEDURE  CriticalCare Time  Performed by: Devendra Kahn MD  Authorized by: Devendra Kahn MD     Critical care provider statement:     Critical care time (minutes):  35    Critical care start time:  8/10/2021 4:10 AM    Critical care end time:  8/10/2021 6:19 AM    Critical care was necessary to treat or prevent imminent or life-threatening deterioration of the following conditions:  Dehydration and sepsis    Critical care was time spent personally by me on the following activities:  Review of old charts, re-evaluation of patient's condition, ordering and review of radiographic studies, ordering and review of laboratory studies, ordering and performing treatments and interventions, examination of patient, evaluation of patient's response to treatment, discussions with primary provider, discussions with consultants, development of treatment plan with patient or surrogate and obtaining history from patient or surrogate         Devendra Kahn MD  08/10/21 0709

## 2021-08-10 NOTE — H&P
[facility]  H&P- Campbell Velasco 1955, 76 y o  female MRN: [id number]  Unit/Bed#: ED 09 Encounter: [id number]  Primary Care Provider: Aram Ramos MD   Date and time admitted to hospital: 8/10/2021  3:47 AM    * Sepsis [facility])  Assessment & Plan  · Noted by tachycardia and fever present on admission  · Unclear of infection  · Continue antibiotics and IV fluids started in the ER  · Follow-up blood culture procalcitonin level    Hypothyroidism  Assessment & Plan  · Continue levothyroxine    Gastroesophageal reflux disease without esophagitis  Assessment & Plan  · Continue PPI    Moderate persistent asthma  Assessment & Plan  · No exacerbation  · Continue home medication    Anxiety and depression  Assessment & Plan  · Continue home medications    Postural orthostatic tachycardia syndrome  Assessment & Plan  · Receives IV hydration several times a week    VTE Prophylaxis: Enoxaparin (Lovenox)  / sequential compression device   Code Status:  Full code  POLST: POLST form is not discussed and not completed at this time  Discussion with family:  With patient    Anticipated Length of Stay:  Patient will be admitted on an Observation basis with an anticipated length of stay of less than 2 midnights  Justification for Hospital Stay:  As above    Total Time for Visit, including Counseling / Coordination of Care: 45 minutes  Greater than 50% of this total time spent on direct patient counseling and coordination of care  Chief Complaint:   Muscle aches, runny nose    History of Present Illness: Campbell Velasco is a 76 y o  female with past medical history significant for post to rule orthostatic tachycardia syndrome, anxiety depression, hypothyroidism who presents with 4 day history of muscle aches, runny nose and fever today  Denies any nausea or vomiting  Denies any abdominal pain, dizziness, cough denies any dysuria or frequency  Patient does have a PICC line on for transfusions  He was placed by Dr Ochoa Has heard in October 2020  Review of Systems:    Review of Systems   Constitutional: Positive for fatigue and fever  Negative for chills and unexpected weight change  HENT: Negative for hearing loss, nosebleeds and sore throat  Eyes: Negative for pain, redness and visual disturbance  Respiratory: Negative for cough, shortness of breath and wheezing  Cardiovascular: Negative for chest pain, palpitations and leg swelling  Gastrointestinal: Negative for abdominal pain, nausea and vomiting  Endocrine: Negative for polydipsia and polyuria  Genitourinary: Negative for dysuria and hematuria  Musculoskeletal: Positive for myalgias  Negative for arthralgias and joint swelling  Skin: Negative for rash and wound  Neurological: Negative for dizziness, numbness and headaches  Psychiatric/Behavioral: Negative for decreased concentration and suicidal ideas  The patient is not nervous/anxious          Past Medical and Surgical History:     Past Medical History:   Diagnosis Date    Allergic rhinitis     Anxiety     Asthma     Back pain     Cardiac disease     Cardiopathy     EF 45%    Cough     Diverticulitis     Factor V Leiden (HCC)     Fibromyalgia     GERD (gastroesophageal reflux disease)     Hashimoto's thyroiditis     Hx of degenerative disc disease     Hypotension     pots - postural orthostatic hypotension    Interstitial cystitis     Irregular heart beat     LBBB    Irritable bowel syndrome     Migraines     Mitral valve disease     "thickening"    Myocardial infarction ([facility] [address] )     possible but not sure when    Neuropathy     bilateral legs    Osteoporosis     Postural orthostatic tachycardia syndrome     must drink a lot of water and salt    Rheumatic fever 1967    Scoliosis     Sjogren's syndrome (HCC)     TMJ (dislocation of temporomandibular joint)        Past Surgical History:   Procedure Laterality Date    ABLATION MICROWAVE Left     lumbar area  BACK SURGERY  10/1998     SECTION  1992    CHOLECYSTECTOMY  2016    COLONOSCOPY  2016    DILATION AND CURETTAGE OF UTERUS  1996    EGD  2016    FOOT SURGERY  1968    removal of bone and neuroma    HYSTERECTOMY  1997    age 54  PRICE ooph    IR PICC PLACEMENT SINGLE LUMEN  10/2/2020    LAPAROSCOPY  1996    endometriosis    UT EGD TRANSORAL BIOPSY SINGLE/MULTIPLE N/A 2019    Procedure: ESOPHAGOGASTRODUODENOSCOPY (EGD) with multiple bx and dilation;  Surgeon: Sanjiv Sandoval MD;  Location: [facility] GI LAB; Service: Gastroenterology    TUNNELED VENOUS CATHETER PLACEMENT         Meds/Allergies:    Prior to Admission medications    Medication Sig Start Date End Date Taking? Authorizing Provider   beclomethasone (QVAR) 40 MCG/ACT inhaler Inhale 1 puff 2 (two) times a day as needed Rinse mouth after use  Historical Provider, MD   cholecalciferol (VITAMIN D3) 1,000 units tablet Take 50,000 Units by mouth once a week Weekly x8 weeks starting 5/15/21    Historical Provider, MD   cyclobenzaprine (FLEXERIL) 5 mg tablet Take 5 mg by mouth 2 (two) times a day as needed for muscle spasms    Historical Provider, MD   dexlansoprazole (Dexilant) 60 MG capsule Take 60 mg by mouth daily    Historical Provider, MD   famotidine (PEPCID) 10 mg tablet Take 10 mg by mouth 2 (two) times a day    Historical Provider, MD   fexofenadine (ALLEGRA) 180 MG tablet Take 180 mg by mouth 2 (two) times a day     Historical Provider, MD   fluticasone (Arnuity Ellipta) 200 MCG/ACT AEPB inhaler Inhale 1 puff daily Rinse mouth after use  Historical Provider, MD   Galcanezumab-gnlm (Emgality) 120 MG/ML SOAJ Inject under the skin    Historical Provider, MD   hydrOXYzine (ATARAX) 10 mg/5 mL syrup Take 10 mg by mouth 3 (three) times a day    Historical Provider, MD   levalbuterol (XOPENEX HFA) 45 mcg/act inhaler Inhale 1-2 puffs every 4 (four) hours as needed for wheezing      Historical Provider, MD   levalbuterol Monica Latham) 1 25 mg/3 mL nebulizer solution Take 1 25 mg by nebulization every 8 (eight) hours as needed  1/7/19   Historical Provider, MD   Levothyroxine Sodium (Tirosint) 25 MCG CAPS Take by mouth daily    Historical Provider, MD   Magnesium 300 MG CAPS Take 2 capsules by mouth 2 (two) times a day     Historical Provider, MD   midodrine (PROAMATINE) 5 mg tablet TAKE 2 TABS PO IN AM, ONE TAB PO WITH LUNCH AND DINNER  Patient taking differently: TAKE 2 TABS PO IN AM, ONE TAB PO WITH LUNCH AND DINNER 12/21/18   Yana Taylor MD   MULTIPLE VITAMINS-CALCIUM PO Take 1 capsule by mouth every morning     Historical Provider, MD   oxybutynin (DITROPAN) 5 mg tablet Take 1 tablet by mouth daily as needed     Historical Provider, MD   pantoprazole (PROTONIX) 40 mg tablet Take 40 mg by mouth 2 (two) times a day    Historical Provider, MD   Polyethyl Glycol-Propyl Glycol (SYSTANE OP) Apply 2 drops to eye 4 (four) times a day    Historical Provider, MD   Probiotic Product (PROBIOTIC DAILY PO) Take 1 tablet by mouth daily    Historical Provider, MD   sucralfate (CARAFATE) 1 g/10 mL suspension Take 1 g by mouth 2 (two) times a day    Historical Provider, MD   thyroid (ARMOUR) 32 5 MG tablet Take 32 5 mg by mouth daily    Historical Provider, MD   traMADol (ULTRAM) 50 mg tablet Take 50 mg by mouth every 8 (eight) hours as needed 1/2 tab     Historical Provider, MD   Ubrogepant Georganne Faes) 100 MG tablet Take 100 mg by mouth Take 1 tablet (100 mg) one time as needed for migraine  May repeat one additional tablet (100 mg) at least two hours after the first dose  Do not use more than two doses per day, or for more than eight days per month  Historical Provider, MD   venlafaxine (EFFEXOR-XR) 37 5 mg 24 hr capsule Take 37 5 mg by mouth daily    Historical Provider, MD   Vortioxetine HBr (Trintellix) 5 MG tablet Take 5 mg by mouth daily    Historical Provider, MD ANDRADE have reviewed home medications with patient personally      Allergies: Allergies   Allergen Reactions    Melatonin Shortness Of Breath    Nexium [Esomeprazole] Shortness Of Breath    Nsaids Shortness Of Breath    Singulair [Montelukast] Shortness Of Breath and Cough    Zomig [Zolmitriptan] Shortness Of Breath    Dexilant [Dexlansoprazole] Nausea Only and Vomiting    Albuterol     Ambien [Zolpidem]     Amitriptyline Drowsiness    Aspirin     Bactrim [Sulfamethoxazole-Trimethoprim] Hives    Ceftin [Cefuroxime]     Celebrex [Celecoxib]     Ciprofloxacin     Demerol [Meperidine]     Epinephrine Dizziness    Ergotamine Nausea Only and Headache    Keflex [Cephalexin]     Klonopin [Clonazepam] Nausea Only and Dizziness    Levaquin [Levofloxacin]     Lexapro [Escitalopram]     Lyrica [Pregabalin] Fatigue    Macrodantin [Nitrofurantoin]     Morphine Nausea Only    Movantik [Naloxegol] Nausea Only    Mushroom Extract Complex - Food Allergy      Eye itchy, asthma  attack    Naprosyn [Naproxen]     Neurontin [Gabapentin] Dizziness    Prolia [Denosumab]     Prozac [Fluoxetine]     Serevent [Salmeterol] Dizziness    Sudafed [Pseudoephedrine] Hives    Sulfa Antibiotics     Trazodone     Ultram [Tramadol] Nausea Only, Dizziness and Headache    Vioxx [Rofecoxib]     Zithromax [Azithromycin] Hives    Zoloft [Sertraline]     Zantac [Ranitidine] Rash and Dizziness       Social History:     Marital Status:    Occupation:   Patient Pre-hospital Living Situation:  Living with daughter  Patient Pre-hospital Level of Mobility:  Normal  Patient Pre-hospital Diet Restrictions:  None  Substance Use History:   Social History     Substance and Sexual Activity   Alcohol Use No     Social History     Tobacco Use   Smoking Status Never Smoker   Smokeless Tobacco Never Used     Social History     Substance and Sexual Activity   Drug Use No       Family History:    non-contributory    Physical Exam:     Vitals:   Blood Pressure: 110/61 (08/10/21 0800)  Pulse: (!) 106 (08/10/21 0800)  Temperature: 98 2 °F (36 8 °C) (08/10/21 0800)  Temp Source: Oral (08/10/21 0800)  Respirations: 15 (08/10/21 0800)  SpO2: 97 % (08/10/21 0800)    Physical Exam  Vitals and nursing note reviewed  Constitutional:       General: She is not in acute distress  HENT:      Head: Normocephalic  Nose: Nose normal       Mouth/Throat:      Pharynx: Oropharynx is clear  Eyes:      Conjunctiva/sclera: Conjunctivae normal    Cardiovascular:      Rate and Rhythm: Normal rate and regular rhythm  Heart sounds: No murmur heard  Comments: PICC line in place  Pulmonary:      Effort: Pulmonary effort is normal  No respiratory distress  Breath sounds: Normal breath sounds  No wheezing  Abdominal:      General: There is no distension  Tenderness: There is no abdominal tenderness  There is no guarding  Musculoskeletal:         General: No swelling  Normal range of motion  Right lower leg: No edema  Skin:     General: Skin is warm  Capillary Refill: Capillary refill takes less than 2 seconds  Neurological:      General: No focal deficit present  Mental Status: She is alert and oriented to person, place, and time  Cranial Nerves: No cranial nerve deficit  Psychiatric:         Mood and Affect: Mood normal              Additional Data:     Lab Results: I have personally reviewed pertinent reports        Results from last 7 days   Lab Units 08/10/21  0404   WBC Thousand/uL 11 70*   HEMOGLOBIN g/dL 14 2   HEMATOCRIT % 43 1   PLATELETS Thousands/uL 204   BANDS PCT % 3   LYMPHO PCT % 8*   MONO PCT % 6     Results from last 7 days   Lab Units 08/10/21  0404   SODIUM mmol/L 139   POTASSIUM mmol/L 3 6   CHLORIDE mmol/L 101   CO2 mmol/L 26   BUN mg/dL 14   CREATININE mg/dL 0 98   ANION GAP mmol/L 12   CALCIUM mg/dL 10 0   ALBUMIN g/dL 4 5   TOTAL BILIRUBIN mg/dL 0 70   ALK PHOS U/L 194*   ALT U/L 106*   AST U/L 64*   GLUCOSE RANDOM mg/dL 111*     Results from last 7 days Lab Units 08/10/21  0404   INR  0 85             Results from last 7 days   Lab Units 08/10/21  0404   LACTIC ACID mmol/L 1 9   PROCALCITONIN ng/ml 0 88*       Imaging: I have personally reviewed pertinent reports  PE Study with CT abdomen & pelvis with contrast   Final Result by Grace Box MD (08/10 5168)      No evidence of acute pathology throughout the chest, abdomen or pelvis  Stable appearance of right renal collecting system with right hydronephrosis and moderate to severe right mid hydroureter without evidence of obstructing etiology                     Workstation performed: [id number]         CT head without contrast   Final Result by Ni Swete MD (08/10 6909)      No acute intracranial abnormality  Workstation performed: [id number]         XR chest portable   ED Interpretation by Alyce Leon MD (02/35 4531)   COMPARISON: NONE    EXAM PERFORMED/VIEWS:  XR CHEST PA/LAT        FINDINGS:     Cardiomediastinal silhouette appears unremarkable      The lungs are clear  No pneumothorax or pleural effusion      Visualized osseous structures appear unremarkable for the patient's age      IMPRESSION:     No focal consolidation, pleural effusion, or pneumothorax                  Final Result by Zeke Tolliver MD (08/10 2148)    IMPRESSION:       No acute cardiopulmonary disease  Workstation performed: [id number]             EKG, Pathology, and Other Studies Reviewed on Admission:   · EKG:  Sinus tachycardia, nonspecific ST abnormality    Allscripts / Epic Records Reviewed: Yes     ** Please Note: This note has been constructed using a voice recognition system   **

## 2021-08-10 NOTE — PROGRESS NOTES
Vancomycin Assessment    Sunita Martinez is a 76 y o  female who is currently receiving vancomycin 750mg IV Q12H for other sepsis   Relevant clinical data and objective history reviewed:  Creatinine   Date Value Ref Range Status   08/10/2021 0 98 0 60 - 1 20 mg/dL Final     Comment:     Standardized to IDMS reference method   07/26/2021 0 99 0 60 - 1 20 mg/dL Final     Comment:     Standardized to IDMS reference method   04/26/2021 0 99 0 60 - 1 20 mg/dL Final     Comment:     Standardized to IDMS reference method   06/04/2018 0 89 0 6 - 1 2 MG/DL Final     Comment:     IDMS method performed  The drugs Metamizole, Sulfasalazine, and Sulfapyridine may interfere and  result in false low results  04/09/2018 0 97 0 6 - 1 2 MG/DL Final     Comment:     IDMS method performed  The drugs Metamizole, Sulfasalazine, and Sulfapyridine may interfere and  result in false low results  12/16/2015 0 88 0 60 - 1 30 mg/dL Final     Comment:     Standardized to IDMS reference method     /83 (BP Location: Right arm)   Pulse 102   Temp 98 7 °F (37 1 °C) (Tympanic)   Resp 18   Ht 5' 1" (1 549 m)   Wt 55 8 kg (123 lb 2 oz)   SpO2 98%   BMI 23 26 kg/m²   No intake/output data recorded    Lab Results   Component Value Date/Time    BUN 14 08/10/2021 04:04 AM    BUN 18 06/04/2018 04:23 PM    WBC 11 70 (H) 08/10/2021 04:04 AM    WBC 7 9 06/04/2018 04:23 PM    HGB 14 2 08/10/2021 04:04 AM    HGB 14 5 06/04/2018 04:23 PM    HCT 43 1 08/10/2021 04:04 AM    HCT 42 1 06/04/2018 04:23 PM    MCV 88 08/10/2021 04:04 AM    MCV 91 3 06/04/2018 04:23 PM     08/10/2021 04:04 AM     06/04/2018 04:23 PM     Temp Readings from Last 3 Encounters:   08/10/21 98 7 °F (37 1 °C) (Tympanic)   08/06/21 98 2 °F (36 8 °C) (Temporal)   08/04/21 98 3 °F (36 8 °C) (Tympanic)     Vancomycin Days of Therapy: 1    Assessment/Plan  The patient is currently on vancomycin utilizing scheduled dosing based on actual body weight  Baseline risks associated with therapy include: pre-existing renal impairment, advanced age, and dehydration  The patient is currently receiving 750mg IV Q12H and after clinical evaluation will be changed to 1000mg IV Q24H  Pharmacy will also follow closely for s/sx of nephrotoxicity, infusion reactions, and appropriateness of therapy  BMP and CBC will be ordered per protocol  Plan for trough as patient approaches steady state, prior to the 4th  dose at approximately 1300 on 08/13/2021  Due to infection severity, will target a trough of 15-20 (appropriate for most indications)   Pharmacy will continue to follow the patients culture results and clinical progress daily      Alphonso Leyden, Pharmacist

## 2021-08-10 NOTE — ED PROCEDURE NOTE
PROCEDURE  ECG 12 Lead Documentation Only    Date/Time: 8/10/2021 4:17 AM  Performed by: Dorothy Irving MD  Authorized by: Dorothy Irving MD     Indications / Diagnosis:  Sepsis   ECG reviewed by me, the ED Provider: yes    Patient location:  ED  Previous ECG:     Comparison to cardiac monitor: Yes    Interpretation:     Interpretation: abnormal    Rate:     ECG rate:  133    ECG rate assessment: tachycardic    Rhythm:     Rhythm: sinus tachycardia    Ectopy:     Ectopy: none    ST segments:     ST segments:  Non-specific  T waves:     T waves: non-specific           Dorothy Irving MD  08/10/21 2070

## 2021-08-10 NOTE — ED PROVIDER NOTES
History  Chief Complaint   Patient presents with    Vomiting    Pain       75 yo F    Here for flu like symptoms that have been worsening for several days  She has a np cough and malaise for several days, headache, body aches  She has a picc line in the right chest where she gets weekly infusions for Guzman syndrome    States that she also gets epidural injections for cervical radiculopathy    States that she had some left sided abdominal pain with several eisodoes of non bloody vomiting pta      History provided by:  Patient  Fever - 9 weeks to 74 years  Temp source:  Oral  Severity:  Moderate  Onset quality:  Gradual  Progression:  Worsening  Worsened by:  Nothing  Associated symptoms: chills, cough, headaches, myalgias, nausea and vomiting    Associated symptoms: no chest pain, no confusion, no congestion, no diarrhea, no dysuria, no ear pain, no rash, no rhinorrhea, no somnolence and no sore throat        Prior to Admission Medications   Prescriptions Last Dose Informant Patient Reported? Taking? Galcanezumab-gnlm (Emgality) 120 MG/ML SOAJ   Yes No   Sig: Inject under the skin   Levothyroxine Sodium (Tirosint) 25 MCG CAPS  Self Yes No   Sig: Take by mouth daily   MULTIPLE VITAMINS-CALCIUM PO  Self Yes No   Sig: Take 1 capsule by mouth every morning    Magnesium 300 MG CAPS  Self Yes No   Sig: Take 2 capsules by mouth 2 (two) times a day    Polyethyl Glycol-Propyl Glycol (SYSTANE OP)  Self Yes No   Sig: Apply 2 drops to eye 4 (four) times a day   Probiotic Product (PROBIOTIC DAILY PO)  Self Yes No   Sig: Take 1 tablet by mouth daily   Ubrogepant (Ubrelvy) 100 MG tablet   Yes No   Sig: Take 100 mg by mouth Take 1 tablet (100 mg) one time as needed for migraine  May repeat one additional tablet (100 mg) at least two hours after the first dose  Do not use more than two doses per day, or for more than eight days per month     Vortioxetine HBr (Trintellix) 5 MG tablet  Self Yes No   Sig: Take 5 mg by mouth daily beclomethasone (QVAR) 40 MCG/ACT inhaler  Self Yes No   Sig: Inhale 1 puff 2 (two) times a day as needed Rinse mouth after use  cholecalciferol (VITAMIN D3) 1,000 units tablet   Yes No   Sig: Take 50,000 Units by mouth once a week Weekly x8 weeks starting 5/15/21   cyclobenzaprine (FLEXERIL) 5 mg tablet  Self Yes No   Sig: Take 5 mg by mouth 2 (two) times a day as needed for muscle spasms   dexlansoprazole (Dexilant) 60 MG capsule   Yes No   Sig: Take 60 mg by mouth daily   famotidine (PEPCID) 10 mg tablet   Yes No   Sig: Take 10 mg by mouth 2 (two) times a day   fexofenadine (ALLEGRA) 180 MG tablet  Self Yes No   Sig: Take 180 mg by mouth 2 (two) times a day    fluticasone (Arnuity Ellipta) 200 MCG/ACT AEPB inhaler  Self Yes No   Sig: Inhale 1 puff daily Rinse mouth after use    hydrOXYzine (ATARAX) 10 mg/5 mL syrup  Self Yes No   Sig: Take 10 mg by mouth 3 (three) times a day   levalbuterol (XOPENEX HFA) 45 mcg/act inhaler  Self Yes No   Sig: Inhale 1-2 puffs every 4 (four) hours as needed for wheezing     levalbuterol (XOPENEX) 1 25 mg/3 mL nebulizer solution  Self Yes No   Sig: Take 1 25 mg by nebulization every 8 (eight) hours as needed    midodrine (PROAMATINE) 5 mg tablet  Self No No   Sig: TAKE 2 TABS PO IN AM, ONE TAB PO WITH LUNCH AND DINNER   Patient taking differently: TAKE 2 TABS PO IN AM, ONE TAB PO WITH LUNCH AND DINNER   oxybutynin (DITROPAN) 5 mg tablet  Self Yes No   Sig: Take 1 tablet by mouth daily as needed    pantoprazole (PROTONIX) 40 mg tablet  Self Yes No   Sig: Take 40 mg by mouth 2 (two) times a day   sucralfate (CARAFATE) 1 g/10 mL suspension  Self Yes No   Sig: Take 1 g by mouth 2 (two) times a day   thyroid (ARMOUR) 32 5 MG tablet  Self Yes No   Sig: Take 32 5 mg by mouth daily   traMADol (ULTRAM) 50 mg tablet  Self Yes No   Sig: Take 50 mg by mouth every 8 (eight) hours as needed 1/2 tab    venlafaxine (EFFEXOR-XR) 37 5 mg 24 hr capsule  Self Yes No   Sig: Take 37 5 mg by mouth daily      Facility-Administered Medications: None       Past Medical History:   Diagnosis Date    Allergic rhinitis     Anxiety     Asthma     Back pain     Cardiac disease     Cardiopathy     EF 45%    Cough     Diverticulitis     Factor V Leiden (HCC)     Fibromyalgia     GERD (gastroesophageal reflux disease)     Hashimoto's thyroiditis     Hx of degenerative disc disease     Hypotension     pots - postural orthostatic hypotension    Interstitial cystitis     Irregular heart beat     LBBB    Irritable bowel syndrome     Migraines     Mitral valve disease     "thickening"    Myocardial infarction [facility])     possible but not sure when    Neuropathy     bilateral legs    Osteoporosis     Postural orthostatic tachycardia syndrome     must drink a lot of water and salt    Rheumatic fever 1967    Scoliosis     Sjogren's syndrome ([address] )     TMJ (dislocation of temporomandibular joint)        Past Surgical History:   Procedure Laterality Date    ABLATION MICROWAVE Left     lumbar area    BACK SURGERY  10/1998     SECTION      CHOLECYSTECTOMY  2016    COLONOSCOPY      DILATION AND CURETTAGE OF UTERUS  1996    EGD      FOOT SURGERY  1968    removal of bone and neuroma    HYSTERECTOMY  1997    age 54  PRICE ooph    IR PICC PLACEMENT SINGLE LUMEN  10/2/2020    LAPAROSCOPY  1996    endometriosis    [place] EGD TRANSORAL BIOPSY SINGLE/MULTIPLE N/A 2019    Procedure: ESOPHAGOGASTRODUODENOSCOPY (EGD) with multiple bx and dilation;  Surgeon: Brendia Oppenheim, MD;  Location: [address] [place] [address] GI LAB;   Service: Gastroenterology    TUNNELED VENOUS CATHETER PLACEMENT         Family History   Problem Relation Age of Onset    Cancer Mother         urinary bladder     Thyroid cancer Sister     Colon cancer Maternal Grandfather     Breast cancer Maternal Aunt         over 48 yrs old     Endometrial cancer Maternal Aunt     Multiple myeloma Maternal Aunt     No Known Problems Father  No Known Problems Daughter     Heart attack Sister     No Known Problems Sister     No Known Problems Sister     No Known Problems Sister     No Known Problems Sister     No Known Problems Daughter     Hypertension Paternal Aunt      I have reviewed and agree with the history as documented  E-Cigarette/Vaping    E-Cigarette Use Never User      E-Cigarette/Vaping Substances    Nicotine No     THC No     CBD No     Flavoring No     Other No     Unknown No      Social History     Tobacco Use    Smoking status: Never Smoker    Smokeless tobacco: Never Used   Vaping Use    Vaping Use: Never used   Substance Use Topics    Alcohol use: No    Drug use: No       Review of Systems   Constitutional: Positive for chills and fever  HENT: Negative for congestion, ear pain, facial swelling, rhinorrhea, sinus pressure, sinus pain, sneezing, sore throat and trouble swallowing  Eyes: Negative for photophobia, pain, discharge, redness, itching and visual disturbance  Respiratory: Positive for cough  Cardiovascular: Negative for chest pain  Gastrointestinal: Positive for nausea and vomiting  Negative for diarrhea  Genitourinary: Negative for difficulty urinating, dysuria, flank pain and frequency  Musculoskeletal: Positive for myalgias  Skin: Negative for rash and wound  Neurological: Positive for headaches  Psychiatric/Behavioral: Negative for confusion  All other systems reviewed and are negative  Physical Exam  Physical Exam  Constitutional:       General: She is not in acute distress  Appearance: She is well-developed  She is ill-appearing  She is not toxic-appearing or diaphoretic  HENT:      Head: Normocephalic and atraumatic  Nose: Nose normal  No congestion or rhinorrhea  Mouth/Throat:      Pharynx: No oropharyngeal exudate  Eyes:      General: No scleral icterus  Right eye: No discharge  Left eye: No discharge        Conjunctiva/sclera: Conjunctivae normal       Pupils: Pupils are equal, round, and reactive to light  Neck:      Vascular: No JVD  Trachea: No tracheal deviation  Cardiovascular:      Rate and Rhythm: Regular rhythm  Tachycardia present  Comments: Full exam deferred due to use of PAPR  Pulmonary:      Effort: Pulmonary effort is normal  No respiratory distress  Breath sounds: No stridor  Comments: Full exam deferred due to use of PAPR  Abdominal:      General: Bowel sounds are normal  There is no distension  Palpations: Abdomen is soft  There is no mass  Tenderness: There is no abdominal tenderness  There is no right CVA tenderness, left CVA tenderness, guarding or rebound  Hernia: No hernia is present  Comments: Full exam deferred due to use of PAPR   Musculoskeletal:         General: No swelling, tenderness, deformity or signs of injury  Normal range of motion  Cervical back: Normal range of motion and neck supple  No rigidity or tenderness  Right lower leg: No edema  Left lower leg: No edema  Lymphadenopathy:      Cervical: No cervical adenopathy  Skin:     General: Skin is warm  Capillary Refill: Capillary refill takes less than 2 seconds  Coloration: Skin is not jaundiced or pale  Findings: No bruising, erythema, lesion or rash  Neurological:      General: No focal deficit present  Mental Status: She is alert and oriented to person, place, and time  Mental status is at baseline  Cranial Nerves: No cranial nerve deficit  Sensory: No sensory deficit  Motor: No weakness or abnormal muscle tone  Coordination: Coordination normal    Psychiatric:         Behavior: Behavior normal          Thought Content:  Thought content normal          Judgment: Judgment normal          Vital Signs  ED Triage Vitals [08/10/21 0352]   Temperature Pulse Respirations Blood Pressure SpO2   (!) 102 7 °F (39 3 °C) (!) 149 18 137/84 96 %      Temp Source Heart Rate Source Patient Position - Orthostatic VS BP Location FiO2 (%)   Oral Monitor Lying Left arm --      Pain Score       Worst Possible Pain           Vitals:    08/10/21 0352   BP: 137/84   Pulse: (!) 149   Patient Position - Orthostatic VS: Lying         Visual Acuity      ED Medications  Medications   sodium chloride 0 9 % bolus 1,000 mL (0 mL Intravenous Stopped 8/10/21 0458)     Followed by   sodium chloride 0 9 % bolus 1,000 mL (1,000 mL Intravenous New Bag 8/10/21 0500)   ondansetron (ZOFRAN) injection 4 mg (4 mg Intravenous Given 8/10/21 0428)   morphine (PF) 4 mg/mL injection 4 mg (4 mg Intravenous Given 8/10/21 0429)   piperacillin-tazobactam (ZOSYN) 3 375 g in sodium chloride 0 9 % 100 mL IVPB (0 g Intravenous Stopped 8/10/21 0608)   iohexol (OMNIPAQUE) 350 MG/ML injection (SINGLE-DOSE) 100 mL (100 mL Intravenous Given 8/10/21 0522)   morphine (PF) 4 mg/mL injection 4 mg (4 mg Intravenous Given 8/10/21 0618)   ondansetron (ZOFRAN) injection 4 mg (4 mg Intravenous Given 8/10/21 0049)       Diagnostic Studies  Results Reviewed     Procedure Component Value Units Date/Time    UA w Reflex to Microscopic w Reflex to Culture [[id number]]  (Normal) Collected: 08/10/21 0543    Lab Status: Final result Specimen: Urine, Clean Catch Updated: 08/10/21 0551     Color, UA Yellow     Clarity, UA Clear     Specific Gravity, UA 1 010     pH, UA 7 5     Leukocytes, UA Negative     Nitrite, UA Negative     Protein, UA Negative mg/dl      Glucose, UA Negative mg/dl      Ketones, UA Negative mg/dl      Urobilinogen, UA 0 2 E U /dl      Bilirubin, UA Negative     Blood, UA Negative    Blood culture #2 [[id number]] Collected: 08/10/21 0539    Lab Status:  In process Specimen: Blood from Arm, Left Updated: 08/10/21 0546    Novel Coronavirus (Covid-19),PCR [facility] - 2 Hour Stat [[id number]]  (Normal) Collected: 08/10/21 0429    Lab Status: Final result Specimen: Nares from Nose Updated: 08/10/21 0529     SARS-CoV-2 Negative Narrative: The specimen collection materials, transport medium, and/or testing methodology utilized in the production of these test results have been proven to be reliable in a limited validation with an abbreviated program under the Emergency Utilization Authorization provided by the FDA  Testing reported as "Presumptive positive" will be confirmed with secondary testing to ensure result accuracy  Clinical caution and judgement should be used with the interpretation of these results with consideration of the clinical impression and other laboratory testing  Testing reported as "Positive" or "Negative" has been proven to be accurate according to standard laboratory validation requirements  All testing is performed with control materials showing appropriate reactivity at standard intervals        [facility] [[id number]]  (Abnormal) Collected: 08/10/21 0404    Lab Status: Final result Specimen: Blood from Arm, Left Updated: 08/10/21 0432     Sodium 139 mmol/L      Potassium 3 6 mmol/L      Chloride 101 mmol/L      CO2 26 mmol/L      ANION GAP 12 mmol/L      BUN 14 mg/dL      Creatinine 0 98 mg/dL      Glucose 111 mg/dL      Calcium 10 0 mg/dL      AST 64 U/L       U/L      Alkaline Phosphatase 194 U/L      Total Protein 6 9 g/dL      Albumin 4 5 g/dL      Total Bilirubin 0 70 mg/dL      eGFR 60 ml/min/1 73sq m     Narrative:      National Kidney Disease Foundation guidelines for Chronic Kidney Disease (CKD):     Stage 1 with normal or high GFR (GFR > 90 mL/min/1 73 square meters)    Stage 2 Mild CKD (GFR = 60-89 mL/min/1 73 square meters)    Stage 3A Moderate CKD (GFR = 45-59 mL/min/1 73 square meters)    Stage 3B Moderate CKD (GFR = 30-44 mL/min/1 73 square meters)    Stage 4 Severe CKD (GFR = 15-29 mL/min/1 73 square meters)    Stage 5 End Stage CKD (GFR <15 mL/min/1 73 square meters)  Note: GFR calculation is accurate only with a steady state creatinine    Manual Differential (Non Wam) [[id number]] (Abnormal) Collected: 08/10/21 0404    Lab Status: Final result Specimen: Blood from Arm, Left Updated: 08/10/21 0432     Segmented % 83 %      Bands % 3 %      Lymphocytes % 8 %      Monocytes % 6 %      Neutrophils Absolute 10 06 Thousand/uL      Lymphocytes Absolute 0 94 Thousand/uL      Monocytes Absolute 0 70 Thousand/uL      Total Counted 100     RBC Morphology Normal     Platelet Estimate Adequate    Lipase [[id number]]  (Normal) Collected: 08/10/21 0404    Lab Status: Final result Specimen: Blood from Arm, Left Updated: 08/10/21 0432     Lipase 34 u/L     Lactic acid [[id number]]  (Normal) Collected: 08/10/21 0404    Lab Status: Final result Specimen: Blood from Arm, Left Updated: 08/10/21 0432     LACTIC ACID 1 9 mmol/L     Narrative:      Result may be elevated if tourniquet was used during collection  Troponin I [[id number]]  (Normal) Collected: 08/10/21 0404    Lab Status: Final result Specimen: Blood from Arm, Left Updated: 08/10/21 0432     Troponin I <0 03 ng/mL     Protime-INR [[id number]]  (Normal) Collected: 08/10/21 0404    Lab Status: Final result Specimen: Blood from Arm, Left Updated: 08/10/21 0425     Protime 11 6 seconds      INR 0 85    APTT [[id number]]  (Normal) Collected: 08/10/21 0404    Lab Status: Final result Specimen: Blood from Arm, Left Updated: 08/10/21 0425     PTT 23 seconds     CBC and differential [[id number]]  (Abnormal) Collected: 08/10/21 0404    Lab Status: Final result Specimen: Blood from Arm, Left Updated: 08/10/21 0418     WBC 11 70 Thousand/uL      RBC 4 92 Million/uL      Hemoglobin 14 2 g/dL      Hematocrit 43 1 %      MCV 88 fL      MCH 28 9 pg      MCHC 33 0 g/dL      RDW 13 1 %      MPV 7 3 fL      Platelets 571 Thousands/uL     Procalcitonin with AM Reflex [[id number]] Collected: 08/10/21 0404    Lab Status: In process Specimen: Blood from Arm, Left Updated: 08/10/21 0412    Blood culture #1 [[id number]] Collected: 08/10/21 0404    Lab Status:  In process Specimen: Blood from Arm, Left Updated: 08/10/21 0412                 PE Study with CT abdomen & pelvis with contrast   Final Result by Mahad العراقي MD (08/10 0404)      No evidence of acute pathology throughout the chest, abdomen or pelvis  Stable appearance of right renal collecting system with right hydronephrosis and moderate to severe right mid hydroureter without evidence of obstructing etiology                     Workstation performed: [id number]         CT head without contrast   Final Result by Tasia Rosen MD (08/10 1193)      No acute intracranial abnormality  Workstation performed: [id number]         XR chest portable   ED Interpretation by Ji Hogue MD (55/92 06)   COMPARISON: NONE    EXAM PERFORMED/VIEWS:  XR CHEST PA/LAT        FINDINGS:     Cardiomediastinal silhouette appears unremarkable      The lungs are clear  No pneumothorax or pleural effusion      Visualized osseous structures appear unremarkable for the patient's age      IMPRESSION:     No focal consolidation, pleural effusion, or pneumothorax                             Procedures  Procedures         ED Course  ED Course as of Aug 10 0627   Tue Aug 10, 2021   0453 Troponin I: <0 03   0453 PTT: 23   0453 CBC and differential(!)   0453 INR: 0 85   0453 Lipase: 34   0453 LACTIC ACID: 1 9   0453 CMP(!)   0453 Manual Differential (Non Wam)(!)   0455 Cxr unremarkable  0544 CT BRAIN - WITHOUT CONTRAST     INDICATION:   Headache, intracranial hemorrhage suspected  headache      COMPARISON:  4/4/2019      TECHNIQUE:  CT examination of the brain was performed  In addition to axial images, sagittal and coronal 2D reformatted images were created and submitted for interpretation      Radiation dose length product (DLP) for this visit:  813 2 mGy-cm     This examination, like all CT scans performed in the [facility], was performed utilizing techniques to minimize radiation dose exposure, including the use of iterative   reconstruction and automated exposure control        IMAGE QUALITY:  Diagnostic      FINDINGS:     PARENCHYMA: Decreased attenuation is noted in periventricular and subcortical white matter demonstrating an appearance that is statistically most likely to represent mild microangiopathic change      No CT signs of acute infarction  No intracranial mass, mass effect or midline shift  No acute parenchymal hemorrhage      VENTRICLES AND EXTRA-AXIAL SPACES:  Normal for the patient's age      VISUALIZED ORBITS AND PARANASAL SINUSES:  Unremarkable      CALVARIUM AND EXTRACRANIAL SOFT TISSUES:  Normal      IMPRESSION:     No acute intracranial abnormality       0606 UA w Reflex to Microscopic w Reflex to Culture   0606 SARS-COV-2: Negative   0606 CT PULMONARY ANGIOGRAM OF THE CHEST AND CT ABDOMEN AND PELVIS WITH INTRAVENOUS CONTRAST       FINDINGS:     CHEST     PULMONARY ARTERIAL TREE:  No pulmonary embolus is seen      LUNGS:  Mild scarring/atelectasis at the bilateral lower lobes  There is no tracheal or endobronchial lesion      PLEURA:  Unremarkable      HEART/AORTA:  Unremarkable for patient's age      MEDIASTINUM AND HENRY:  Unremarkable      CHEST WALL AND LOWER NECK:   Unremarkable      ABDOMEN     LIVER/BILIARY TREE:  There is prominence of common bile duct and central intrahepatic biliary tree most consistent with the sequela of prior cholecystectomy  Liver is otherwise unremarkable      GALLBLADDER:  Gallbladder is surgically absent      SPLEEN:  Unremarkable      PANCREAS:  Unremarkable      ADRENAL GLANDS:  Unremarkable      KIDNEYS/URETERS:  Right hydronephrosis with moderate to severe hydroureter at the level of the mid ureter without evidence of obstructing etiology     STOMACH AND BOWEL:  Prominent stool retention     APPENDIX:  No findings to suggest appendicitis      ABDOMINOPELVIC CAVITY:  No ascites  No pneumoperitoneum    No lymphadenopathy      VESSELS:  Unremarkable for patient's age      PELVIS     REPRODUCTIVE ORGANS:  Surgical changes of prior hysterectomy      URINARY BLADDER:  Unremarkable      ABDOMINAL WALL/INGUINAL REGIONS:  Unremarkable      OSSEOUS STRUCTURES:  No acute fracture or destructive osseous lesion  Severe lumbar levoscoliosis    Intervertebral disc spaces at L4-L5 and L5-S1      IMPRESSION:     No evidence of acute pathology throughout the chest, abdomen or pelvis      Stable appearance of right renal collecting system with right hydronephrosis and moderate to severe right mid hydroureter without evidence of obstructing etiology      0610 Pt stable  She understands work up results  She still feels very ill  I d/w pt plan of admission for sepsis work up  She is agreeable      [address] for admission       0618 Joi Maciel will pass along the admission to day shift                 HEART Risk Score      Most Recent Value   Heart Score Risk Calculator   History  0 Filed at: 08/10/2021 0421   ECG  1 Filed at: 08/10/2021 0421   Age  2 Filed at: 08/10/2021 0421   Risk Factors  1 Filed at: 08/10/2021 0421   Troponin  0 Filed at: 08/10/2021 0421   HEART Score  4 Filed at: 08/10/2021 4355                          Wells' Criteria for PE      Most Recent Value   Wells' Criteria for PE   Clinical signs and symptoms of DVT  3 Filed at: 08/10/2021 5398   PE is primary diagnosis or equally likely  3 Filed at: 08/10/2021 0421   HR >100  1 5 Filed at: 08/10/2021 0421   Immobilization at least 3 days or Surgery in the previous 4 weeks  1 5 Filed at: 08/10/2021 0421   Previous, objectively diagnosed PE or DVT  0 Filed at: 08/10/2021 0421   Hemoptysis  0 Filed at: 08/10/2021 0421   Malignancy with treatment within 6 months or palliative  0 Filed at: 08/10/2021 0421   Wells' Criteria Total  9 Filed at: 08/10/2021 0421                MDM    Disposition  Final diagnoses:   Abdominal pain   Nausea and vomiting   Sepsis ([facility] [address] )     Time reflects when diagnosis was documented in both MDM as applicable and the Disposition within this note     Time User Action Codes Description Comment    8/10/2021  6:18 AM Brian Benes Add [R10 9] Abdominal pain     8/10/2021  6:18 AM Brian Benes Add [R11 2] Nausea and vomiting     8/10/2021  6:18 AM Brian Benes Add [A41 9] Sepsis [facility])       ED Disposition     ED Disposition Condition Date/Time Comment    Admit Stable Tue Aug 10, 2021  6:18 AM Case was discussed with Everette Herrera and the patient's admission status was agreed to be Admission Status: observation status to the service of Dr Elsy Zaman   Follow-up Information    None         Patient's Medications   Discharge Prescriptions    No medications on file     No discharge procedures on file      PDMP Review     None          ED Provider  Electronically Signed by           Nancy Cramer MD  08/10/21 9904

## 2021-08-10 NOTE — NURSING NOTE
picc removed as ordered according to protocol without complications  cathether measures 18cm  2 inch tip of catheter cut at a diagonal  Sent for culture  Pt denies pain to site  Pt made comfortable, personal needs and aliya bell within reach, will continue to monitor

## 2021-08-10 NOTE — PLAN OF CARE
Problem: Nutrition/Hydration-ADULT  Goal: Nutrient/Hydration intake appropriate for improving, restoring or maintaining nutritional needs  Description: Monitor and assess patient's nutrition/hydration status for malnutrition  Collaborate with interdisciplinary team and initiate plan and interventions as ordered  Monitor patient's weight and dietary intake as ordered or per policy  Utilize nutrition screening tool and intervene as necessary  Determine patient's food preferences and provide high-protein, high-caloric foods as appropriate       INTERVENTIONS:  - Monitor oral intake, urinary output, labs, and treatment plans  - Assess nutrition and hydration status and recommend course of action  - Evaluate amount of meals eaten  - Assist patient with eating if necessary   - Allow adequate time for meals  - Recommend/ encourage appropriate diets, oral nutritional supplements, and vitamin/mineral supplements  - Order, calculate, and assess calorie counts as needed  - Recommend, monitor, and adjust tube feedings and TPN/PPN based on assessed needs  - Assess need for intravenous fluids  - Provide specific nutrition/hydration education as appropriate  - Include patient/family/caregiver in decisions related to nutrition  Outcome: Progressing     Problem: MOBILITY - ADULT  Goal: Maintain or return to baseline ADL function  Description: INTERVENTIONS:  -  Assess patient's ability to carry out ADLs; assess patient's baseline for ADL function and identify physical deficits which impact ability to perform ADLs (bathing, care of mouth/teeth, toileting, grooming, dressing, etc )  - Assess/evaluate cause of self-care deficits   - Assess range of motion  - Assess patient's mobility; develop plan if impaired  - Assess patient's need for assistive devices and provide as appropriate  - Encourage maximum independence but intervene and supervise when necessary  - Involve family in performance of ADLs  - Assess for home care needs following discharge   - Consider OT consult to assist with ADL evaluation and planning for discharge  - Provide patient education as appropriate  Outcome: Progressing       Problem: PAIN - ADULT  Goal: Verbalizes/displays adequate comfort level or baseline comfort level  Description: Interventions:  - Encourage patient to monitor pain and request assistance  - Assess pain using appropriate pain scale  - Administer analgesics based on type and severity of pain and evaluate response  - Implement non-pharmacological measures as appropriate and evaluate response  - Consider cultural and social influences on pain and pain management  - Notify physician/advanced practitioner if interventions unsuccessful or patient reports new pain  Outcome: Progressing     Problem: INFECTION - ADULT  Goal: Absence or prevention of progression during hospitalization  Description: INTERVENTIONS:  - Assess and monitor for signs and symptoms of infection  - Monitor lab/diagnostic results  - Monitor all insertion sites, i e  indwelling lines, tubes, and drains  - Monitor endotracheal if appropriate and nasal secretions for changes in amount and color  - [place] appropriate cooling/warming therapies per order  - Administer medications as ordered  - Instruct and encourage patient and family to use good hand hygiene technique  - Identify and instruct in appropriate isolation precautions for identified infection/condition  Outcome: Progressing  Goal: Absence of fever/infection during neutropenic period  Description: INTERVENTIONS:  - Monitor WBC    Outcome: Progressing     Problem: SAFETY ADULT  Goal: Maintain or return to baseline ADL function  Description: INTERVENTIONS:  -  Assess patient's ability to carry out ADLs; assess patient's baseline for ADL function and identify physical deficits which impact ability to perform ADLs (bathing, care of mouth/teeth, toileting, grooming, dressing, etc )  - Assess/evaluate cause of self-care deficits - Assess range of motion  - Assess patient's mobility; develop plan if impaired  - Assess patient's need for assistive devices and provide as appropriate  - Encourage maximum independence but intervene and supervise when necessary  - Involve family in performance of ADLs  - Assess for home care needs following discharge   - Consider OT consult to assist with ADL evaluation and planning for discharge  - Provide patient education as appropriate  Outcome: Progressing       Problem: DISCHARGE PLANNING  Goal: Discharge to home or other facility with appropriate resources  Description: INTERVENTIONS:  - Identify barriers to discharge w/patient and caregiver  - Arrange for needed discharge resources and transportation as appropriate  - Identify discharge learning needs (meds, wound care, etc )  - Arrange for interpretive services to assist at discharge as needed  - Refer to Case Management Department for coordinating discharge planning if the patient needs post-hospital services based on physician/advanced practitioner order or complex needs related to functional status, cognitive ability, or social support system  Outcome: Progressing     Problem: Knowledge Deficit  Goal: Patient/family/caregiver demonstrates understanding of disease process, treatment plan, medications, and discharge instructions  Description: Complete learning assessment and assess knowledge base  Interventions:  - Provide teaching at level of understanding  - Provide teaching via preferred learning methods  Outcome: Progressing     Problem: NEUROSENSORY - ADULT  Goal: Achieves maximal functionality and self care  Description: INTERVENTIONS  - Monitor swallowing and airway patency with patient fatigue and changes in neurological status  - Encourage and assist patient to increase activity and self care     - Encourage visually impaired, hearing impaired and aphasic patients to use assistive/communication devices  Outcome: Progressing     Problem: CARDIOVASCULAR - ADULT  Goal: Maintains optimal cardiac output and hemodynamic stability  Description: INTERVENTIONS:  - Monitor I/O, vital signs and rhythm  - Monitor for S/S and trends of decreased cardiac output  - Administer and titrate ordered vasoactive medications to optimize hemodynamic stability  - Assess quality of pulses, skin color and temperature  - Assess for signs of decreased coronary artery perfusion  - Instruct patient to report change in severity of symptoms  Outcome: Progressing     Problem: RESPIRATORY - ADULT  Goal: Achieves optimal ventilation and oxygenation  Description: INTERVENTIONS:  - Assess for changes in respiratory status  - Assess for changes in mentation and behavior  - Position to facilitate oxygenation and minimize respiratory effort  - Oxygen administered by appropriate delivery if ordered  - Initiate smoking cessation education as indicated  - Encourage broncho-pulmonary hygiene including cough, deep breathe, Incentive Spirometry  - Assess the need for suctioning and aspirate as needed  - Assess and instruct to report SOB or any respiratory difficulty  - Respiratory Therapy support as indicated  Outcome: Progressing     Problem: GASTROINTESTINAL - ADULT  Goal: Minimal or absence of nausea and/or vomiting  Description: INTERVENTIONS:  - Administer IV fluids if ordered to ensure adequate hydration  - Maintain NPO status until nausea and vomiting are resolved  - Nasogastric tube if ordered  - Administer ordered antiemetic medications as needed  - Provide nonpharmacologic comfort measures as appropriate  - Advance diet as tolerated, if ordered  - Consider nutrition services referral to assist patient with adequate nutrition and appropriate food choices  Outcome: Progressing  Goal: Maintains or returns to baseline bowel function  Description: INTERVENTIONS:  - Assess bowel function  - Encourage oral fluids to ensure adequate hydration  - Administer IV fluids if ordered to ensure adequate hydration  - Administer ordered medications as needed  - Encourage mobilization and activity  - Consider nutritional services referral to assist patient with adequate nutrition and appropriate food choices  Outcome: Progressing  Goal: Maintains adequate nutritional intake  Description: INTERVENTIONS:  - Monitor percentage of each meal consumed  - Identify factors contributing to decreased intake, treat as appropriate  - Assist with meals as needed  - Monitor I&O, weight, and lab values if indicated  - Obtain nutrition services referral as needed  Outcome: Progressing  Goal: Oral mucous membranes remain intact  Description: INTERVENTIONS  - Assess oral mucosa and hygiene practices  - Implement preventative oral hygiene regimen  - Implement oral medicated treatments as ordered  - Initiate Nutrition services referral as needed  Outcome: Progressing     Problem: GENITOURINARY - ADULT  Goal: Maintains or returns to baseline urinary function  Description: INTERVENTIONS:  - Assess urinary function  - Encourage oral fluids to ensure adequate hydration if ordered  - Administer IV fluids as ordered to ensure adequate hydration  - Administer ordered medications as needed  - Offer frequent toileting  - Follow urinary retention protocol if ordered  Outcome: Progressing  Goal: Absence of urinary retention  Description: INTERVENTIONS:  - Assess patients ability to void and empty bladder  - Monitor I/O  - Bladder scan as needed  - Discuss with physician/AP medications to alleviate retention as needed  - Discuss catheterization for long term situations as appropriate  Outcome: Progressing     Problem: METABOLIC, FLUID AND ELECTROLYTES - ADULT  Goal: Electrolytes maintained within normal limits  Description: INTERVENTIONS:  - Monitor labs and assess patient for signs and symptoms of electrolyte imbalances  - Administer electrolyte replacement as ordered  - Monitor response to electrolyte replacements, including repeat lab results as appropriate  - Instruct patient on fluid and nutrition as appropriate  Outcome: Progressing  Goal: Fluid balance maintained  Description: INTERVENTIONS:  - Monitor labs   - Monitor I/O and WT  - Instruct patient on fluid and nutrition as appropriate  - Assess for signs & symptoms of volume excess or deficit  Outcome: Progressing  Goal: Glucose maintained within target range  Description: INTERVENTIONS:  - Monitor Blood Glucose as ordered  - Assess for signs and symptoms of hyperglycemia and hypoglycemia  - Administer ordered medications to maintain glucose within target range  - Assess nutritional intake and initiate nutrition service referral as needed  Outcome: Progressing        Problem: MUSCULOSKELETAL - ADULT  Goal: Maintain or return mobility to safest level of function  Description: INTERVENTIONS:  - Assess patient's ability to carry out ADLs; assess patient's baseline for ADL function and identify physical deficits which impact ability to perform ADLs (bathing, care of mouth/teeth, toileting, grooming, dressing, etc )  - Assess/evaluate cause of self-care deficits   - Assess range of motion  - Assess patient's mobility  - Assess patient's need for assistive devices and provide as appropriate  - Encourage maximum independence but intervene and supervise when necessary  - Involve family in performance of ADLs  - Assess for home care needs following discharge   - Consider OT consult to assist with ADL evaluation and planning for discharge  - Provide patient education as appropriate  Outcome: Progressing

## 2021-08-11 ENCOUNTER — APPOINTMENT (INPATIENT)
Dept: ULTRASOUND IMAGING | Facility: HOSPITAL | Age: 66
DRG: 314 | End: 2021-08-11
Payer: MEDICARE

## 2021-08-11 PROBLEM — R74.01 TRANSAMINITIS: Status: ACTIVE | Noted: 2021-08-11

## 2021-08-11 PROBLEM — N13.39 OTHER HYDRONEPHROSIS: Status: ACTIVE | Noted: 2021-08-11

## 2021-08-11 LAB
ALBUMIN SERPL BCP-MCNC: 2.8 G/DL (ref 3.5–5)
ALP SERPL-CCNC: 171 U/L (ref 46–116)
ALT SERPL W P-5'-P-CCNC: 105 U/L (ref 12–78)
ANION GAP SERPL CALCULATED.3IONS-SCNC: 5 MMOL/L (ref 4–13)
AST SERPL W P-5'-P-CCNC: 45 U/L (ref 5–45)
ATRIAL RATE: 133 BPM
BILIRUB DIRECT SERPL-MCNC: 0.19 MG/DL (ref 0–0.2)
BILIRUB SERPL-MCNC: 0.53 MG/DL (ref 0.2–1)
BUN SERPL-MCNC: 9 MG/DL (ref 7–25)
CALCIUM SERPL-MCNC: 8.9 MG/DL (ref 8.6–10.5)
CHLORIDE SERPL-SCNC: 109 MMOL/L (ref 98–107)
CO2 SERPL-SCNC: 27 MMOL/L (ref 21–31)
CREAT SERPL-MCNC: 0.97 MG/DL (ref 0.6–1.2)
ERYTHROCYTE [DISTWIDTH] IN BLOOD BY AUTOMATED COUNT: 13.4 % (ref 11.5–14.5)
GFR SERPL CREATININE-BSD FRML MDRD: 61 ML/MIN/1.73SQ M
GLUCOSE SERPL-MCNC: 93 MG/DL (ref 65–99)
HCT VFR BLD AUTO: 35.2 % (ref 42–47)
HGB BLD-MCNC: 11.7 G/DL (ref 12–16)
MCH RBC QN AUTO: 29.6 PG (ref 26–34)
MCHC RBC AUTO-ENTMCNC: 33.3 G/DL (ref 31–37)
MCV RBC AUTO: 89 FL (ref 81–99)
P AXIS: 67 DEGREES
PLATELET # BLD AUTO: 168 THOUSANDS/UL (ref 149–390)
PMV BLD AUTO: 7.8 FL (ref 8.6–11.7)
POTASSIUM SERPL-SCNC: 4.1 MMOL/L (ref 3.5–5.5)
PR INTERVAL: 136 MS
PROCALCITONIN SERPL-MCNC: 6.44 NG/ML
PROT SERPL-MCNC: 5.9 G/DL (ref 6.4–8.2)
QRS AXIS: 13 DEGREES
QRSD INTERVAL: 102 MS
QT INTERVAL: 322 MS
QTC INTERVAL: 479 MS
RBC # BLD AUTO: 3.97 MILLION/UL (ref 3.9–5.2)
SODIUM SERPL-SCNC: 141 MMOL/L (ref 134–143)
T WAVE AXIS: 129 DEGREES
VENTRICULAR RATE: 133 BPM
WBC # BLD AUTO: 9.4 THOUSAND/UL (ref 4.8–10.8)

## 2021-08-11 PROCEDURE — 76700 US EXAM ABDOM COMPLETE: CPT

## 2021-08-11 PROCEDURE — 85027 COMPLETE CBC AUTOMATED: CPT | Performed by: FAMILY MEDICINE

## 2021-08-11 PROCEDURE — 94640 AIRWAY INHALATION TREATMENT: CPT

## 2021-08-11 PROCEDURE — 93010 ELECTROCARDIOGRAM REPORT: CPT | Performed by: INTERNAL MEDICINE

## 2021-08-11 PROCEDURE — 94760 N-INVAS EAR/PLS OXIMETRY 1: CPT

## 2021-08-11 PROCEDURE — 99233 SBSQ HOSP IP/OBS HIGH 50: CPT | Performed by: PHYSICIAN ASSISTANT

## 2021-08-11 PROCEDURE — 80076 HEPATIC FUNCTION PANEL: CPT | Performed by: PHYSICIAN ASSISTANT

## 2021-08-11 PROCEDURE — 80048 BASIC METABOLIC PNL TOTAL CA: CPT | Performed by: FAMILY MEDICINE

## 2021-08-11 PROCEDURE — 84145 PROCALCITONIN (PCT): CPT | Performed by: FAMILY MEDICINE

## 2021-08-11 RX ORDER — THYROID,PORK 100 % USP
32 POWDER (GRAM) MISCELLANEOUS DAILY
COMMUNITY

## 2021-08-11 RX ORDER — HYDROCODONE BITARTRATE AND ACETAMINOPHEN 5; 325 MG/1; MG/1
0.5 TABLET ORAL EVERY 6 HOURS PRN
Status: DISCONTINUED | OUTPATIENT
Start: 2021-08-11 | End: 2021-08-12 | Stop reason: HOSPADM

## 2021-08-11 RX ORDER — AZELASTINE 1 MG/ML
2 SPRAY, METERED NASAL 2 TIMES DAILY
Status: DISCONTINUED | OUTPATIENT
Start: 2021-08-11 | End: 2021-08-12 | Stop reason: HOSPADM

## 2021-08-11 RX ORDER — MINERAL OIL AND PETROLATUM 150; 830 MG/G; MG/G
OINTMENT OPHTHALMIC 4 TIMES DAILY PRN
Status: DISCONTINUED | OUTPATIENT
Start: 2021-08-11 | End: 2021-08-12 | Stop reason: HOSPADM

## 2021-08-11 RX ORDER — FAMOTIDINE 10 MG
10 TABLET ORAL 2 TIMES DAILY
Status: DISCONTINUED | OUTPATIENT
Start: 2021-08-11 | End: 2021-08-12 | Stop reason: HOSPADM

## 2021-08-11 RX ORDER — FAMOTIDINE 10 MG
10 TABLET ORAL 2 TIMES DAILY
Status: DISCONTINUED | OUTPATIENT
Start: 2021-08-11 | End: 2021-08-11

## 2021-08-11 RX ORDER — CHLORAL HYDRATE 500 MG
1000 CAPSULE ORAL DAILY
Status: DISCONTINUED | OUTPATIENT
Start: 2021-08-11 | End: 2021-08-12 | Stop reason: HOSPADM

## 2021-08-11 RX ORDER — POLYETHYLENE GLYCOL 3350 17 G/17G
17 POWDER, FOR SOLUTION ORAL DAILY PRN
Status: DISCONTINUED | OUTPATIENT
Start: 2021-08-11 | End: 2021-08-12 | Stop reason: HOSPADM

## 2021-08-11 RX ORDER — BUDESONIDE 0.5 MG/2ML
0.5 INHALANT ORAL
Status: DISCONTINUED | OUTPATIENT
Start: 2021-08-11 | End: 2021-08-12 | Stop reason: HOSPADM

## 2021-08-11 RX ORDER — BUDESONIDE 0.5 MG/2ML
0.5 INHALANT ORAL
Status: DISCONTINUED | OUTPATIENT
Start: 2021-08-11 | End: 2021-08-11

## 2021-08-11 RX ORDER — HYDROXYZINE HYDROCHLORIDE 10 MG/1
20 TABLET, FILM COATED ORAL
Status: DISCONTINUED | OUTPATIENT
Start: 2021-08-12 | End: 2021-08-11

## 2021-08-11 RX ORDER — SACCHAROMYCES BOULARDII 250 MG
250 CAPSULE ORAL
Status: DISCONTINUED | OUTPATIENT
Start: 2021-08-11 | End: 2021-08-12 | Stop reason: HOSPADM

## 2021-08-11 RX ORDER — ALUMINUM HYDROXIDE AND MAGNESIUM TRISILICATE 80; 14.2 MG/1; MG/1
1 TABLET, CHEWABLE ORAL 4 TIMES DAILY PRN
COMMUNITY

## 2021-08-11 RX ORDER — LEVALBUTEROL TARTRATE 45 UG/1
2 AEROSOL, METERED ORAL EVERY 4 HOURS PRN
COMMUNITY

## 2021-08-11 RX ORDER — OXYBUTYNIN CHLORIDE 5 MG/1
2.5 TABLET ORAL DAILY PRN
Status: DISCONTINUED | OUTPATIENT
Start: 2021-08-11 | End: 2021-08-12 | Stop reason: HOSPADM

## 2021-08-11 RX ORDER — HYDROXYZINE HYDROCHLORIDE 10 MG/1
20 TABLET, FILM COATED ORAL
Status: DISCONTINUED | OUTPATIENT
Start: 2021-08-11 | End: 2021-08-12 | Stop reason: HOSPADM

## 2021-08-11 RX ORDER — HYDROXYZINE HYDROCHLORIDE 10 MG/1
20 TABLET, FILM COATED ORAL EVERY 6 HOURS PRN
Status: DISCONTINUED | OUTPATIENT
Start: 2021-08-11 | End: 2021-08-11

## 2021-08-11 RX ORDER — LEVALBUTEROL 1.25 MG/.5ML
1.25 SOLUTION, CONCENTRATE RESPIRATORY (INHALATION)
Status: DISCONTINUED | OUTPATIENT
Start: 2021-08-11 | End: 2021-08-12 | Stop reason: HOSPADM

## 2021-08-11 RX ORDER — FEXOFENADINE HCL 180 MG/1
180 TABLET ORAL DAILY
Status: DISCONTINUED | OUTPATIENT
Start: 2021-08-11 | End: 2021-08-12 | Stop reason: HOSPADM

## 2021-08-11 RX ORDER — FLUTICASONE PROPIONATE 50 MCG
2 SPRAY, SUSPENSION (ML) NASAL DAILY
Status: DISCONTINUED | OUTPATIENT
Start: 2021-08-11 | End: 2021-08-12 | Stop reason: HOSPADM

## 2021-08-11 RX ORDER — MELATONIN
1000 DAILY
Status: DISCONTINUED | OUTPATIENT
Start: 2021-08-11 | End: 2021-08-12 | Stop reason: HOSPADM

## 2021-08-11 RX ORDER — OXYBUTYNIN CHLORIDE 5 MG/1
2.5 TABLET ORAL 2 TIMES DAILY
COMMUNITY

## 2021-08-11 RX ORDER — OXYBUTYNIN CHLORIDE 5 MG/1
2.5 TABLET ORAL
Status: DISCONTINUED | OUTPATIENT
Start: 2021-08-11 | End: 2021-08-12 | Stop reason: HOSPADM

## 2021-08-11 RX ORDER — FAMOTIDINE 20 MG/1
10 TABLET, FILM COATED ORAL
Status: DISCONTINUED | OUTPATIENT
Start: 2021-08-11 | End: 2021-08-11

## 2021-08-11 RX ORDER — THYROID,PORK 100 % USP
32 POWDER (GRAM) MISCELLANEOUS
Status: DISCONTINUED | OUTPATIENT
Start: 2021-08-11 | End: 2021-08-12 | Stop reason: HOSPADM

## 2021-08-11 RX ORDER — DOCUSATE SODIUM 100 MG/1
100 CAPSULE, LIQUID FILLED ORAL DAILY PRN
COMMUNITY

## 2021-08-11 RX ADMIN — PIPERACILLIN AND TAZOBACTAM 3.38 G: 3; .375 INJECTION, POWDER, FOR SOLUTION INTRAVENOUS at 06:13

## 2021-08-11 RX ADMIN — CYCLOBENZAPRINE HYDROCHLORIDE 5 MG: 10 TABLET, FILM COATED ORAL at 09:17

## 2021-08-11 RX ADMIN — MIDODRINE HYDROCHLORIDE 15 MG: 5 TABLET ORAL at 11:58

## 2021-08-11 RX ADMIN — SODIUM CHLORIDE 125 ML/HR: 0.9 INJECTION, SOLUTION INTRAVENOUS at 23:14

## 2021-08-11 RX ADMIN — Medication 32 MG: at 18:16

## 2021-08-11 RX ADMIN — OMEGA-3 FATTY ACIDS CAP 1000 MG 1000 MG: 1000 CAP at 09:17

## 2021-08-11 RX ADMIN — PANTOPRAZOLE SODIUM 40 MG: 40 TABLET, DELAYED RELEASE ORAL at 08:30

## 2021-08-11 RX ADMIN — Medication 2 SPRAY: at 09:19

## 2021-08-11 RX ADMIN — HYDROXYZINE HYDROCHLORIDE 20 MG: 10 TABLET, FILM COATED ORAL at 21:21

## 2021-08-11 RX ADMIN — IBUPROFEN, DIPHENHYDRAMINE HCL 10 MG: 200; 25 CAPSULE, LIQUID FILLED ORAL at 11:58

## 2021-08-11 RX ADMIN — BUDESONIDE 0.5 MG: 0.5 INHALANT ORAL at 19:35

## 2021-08-11 RX ADMIN — BUDESONIDE 0.5 MG: 0.5 INHALANT ORAL at 07:40

## 2021-08-11 RX ADMIN — VANCOMYCIN HYDROCHLORIDE 1000 MG: 1 INJECTION, SOLUTION INTRAVENOUS at 14:30

## 2021-08-11 RX ADMIN — LEVALBUTEROL HYDROCHLORIDE 1.25 MG: 1.25 SOLUTION, CONCENTRATE RESPIRATORY (INHALATION) at 19:34

## 2021-08-11 RX ADMIN — AZELASTINE 2 SPRAY: 1 SPRAY, METERED NASAL at 09:19

## 2021-08-11 RX ADMIN — Medication 1000 UNITS: at 09:17

## 2021-08-11 RX ADMIN — SODIUM CHLORIDE 125 ML/HR: 0.9 INJECTION, SOLUTION INTRAVENOUS at 11:49

## 2021-08-11 RX ADMIN — ISODIUM CHLORIDE 3 ML: 0.03 SOLUTION RESPIRATORY (INHALATION) at 17:19

## 2021-08-11 RX ADMIN — LEVALBUTEROL HYDROCHLORIDE 1.25 MG: 1.25 SOLUTION, CONCENTRATE RESPIRATORY (INHALATION) at 17:19

## 2021-08-11 RX ADMIN — HYDROCODONE BITARTRATE AND ACETAMINOPHEN 0.5 TABLET: 5; 325 TABLET ORAL at 09:17

## 2021-08-11 RX ADMIN — PANTOPRAZOLE SODIUM 40 MG: 40 TABLET, DELAYED RELEASE ORAL at 17:03

## 2021-08-11 RX ADMIN — PIPERACILLIN AND TAZOBACTAM 3.38 G: 3; .375 INJECTION, POWDER, FOR SOLUTION INTRAVENOUS at 12:21

## 2021-08-11 RX ADMIN — HYDROCODONE BITARTRATE AND ACETAMINOPHEN 0.5 TABLET: 5; 325 TABLET ORAL at 14:49

## 2021-08-11 RX ADMIN — LEVALBUTEROL HYDROCHLORIDE 1.25 MG: 1.25 SOLUTION, CONCENTRATE RESPIRATORY (INHALATION) at 07:40

## 2021-08-11 RX ADMIN — OXYBUTYNIN CHLORIDE 2.5 MG: 5 TABLET ORAL at 00:33

## 2021-08-11 RX ADMIN — AZELASTINE 2 SPRAY: 1 SPRAY, METERED NASAL at 17:04

## 2021-08-11 RX ADMIN — FEXOFENADINE HYDROCHLORIDE 180 MG: 180 TABLET, FILM COATED ORAL at 10:26

## 2021-08-11 RX ADMIN — POLYETHYLENE GLYCOL 3350 17 G: 17 POWDER, FOR SOLUTION ORAL at 09:20

## 2021-08-11 RX ADMIN — PIPERACILLIN AND TAZOBACTAM 3.38 G: 3; .375 INJECTION, POWDER, FOR SOLUTION INTRAVENOUS at 23:15

## 2021-08-11 RX ADMIN — CYCLOBENZAPRINE HYDROCHLORIDE 5 MG: 10 TABLET, FILM COATED ORAL at 20:17

## 2021-08-11 RX ADMIN — OXYBUTYNIN CHLORIDE 2.5 MG: 5 TABLET ORAL at 21:25

## 2021-08-11 RX ADMIN — SODIUM CHLORIDE 125 ML/HR: 0.9 INJECTION, SOLUTION INTRAVENOUS at 02:51

## 2021-08-11 RX ADMIN — MIDODRINE HYDROCHLORIDE 15 MG: 5 TABLET ORAL at 16:52

## 2021-08-11 RX ADMIN — Medication 250 MG: at 11:58

## 2021-08-11 RX ADMIN — IBUPROFEN, DIPHENHYDRAMINE HCL 10 MG: 200; 25 CAPSULE, LIQUID FILLED ORAL at 21:24

## 2021-08-11 RX ADMIN — PIPERACILLIN AND TAZOBACTAM 3.38 G: 3; .375 INJECTION, POWDER, FOR SOLUTION INTRAVENOUS at 17:47

## 2021-08-11 NOTE — CASE MANAGEMENT
Case Management Assessment & Discharge Planning Note    Patient name Ayaan Peters  Location /-83 MRN [id number]  : 1955 Date 2021       Current Admission Date: 8/10/2021  Current Admission Diagnosis:  Sepsis [facility])   Patient Active Problem List   Diagnosis    LBBB (left bundle branch block)    Fibromyalgia    Postural orthostatic tachycardia syndrome    Sleep-related breathing disorder    Other insomnia    Restless leg syndrome    Bruxism    Anxiety and depression    Chronic rhinitis    Moderate persistent asthma    Gastroesophageal reflux disease without esophagitis    Chronic pain disorder    Sepsis ([facility] )    Factor V Leiden mutation ([facility] )    Hypothyroidism    Previous Admission - Discharge Date:19   LOS (days): 0  Geometric Mean LOS (GMLOS) (days):   Days to GMLOS: Previous Discharge Diagnosis:  There are no discharge diagnoses documented for the most recent discharge  Risk of Unplanned Readmission Score  Predictive Model Details   No score data available for Risk of Unplanned Readmission        BUNDLE:      Reason for Referral:    OBJECTIVE:  Pt is a 76y o  year old , white or  [1], female with Orthodox preference of [address] admitted on 5344  3:47 AM  Pt is admitted to [facility] 112-[address] at [address] [place] [address] with complaints of Sepsis ([facility] )     Current admission status: Observation  Referral Reason: Other (Discharge planning)    Preferred Pharmacy:   Elba Peoples [address] [place] [id number]  Phone: [phone] Fax: [phone]    [facility]/pharmacy #[id number]- [place], [place] - [address] [place] [address]  [address] [place] [address] Ellen Meeks [address]  Phone: [phone] Fax: [phone]    Primary Care Provider: Chen Watt MD    Primary Insurance: MEDICARE  Secondary Insurance: Asad Junk:  Prabhu Carlisle, [address] [employer] [place] Representative - Daughter   Primary Phone: [phone] (Mobile)  Home Phone: [address] [place] of Residence: [place]    Readmission Root Cause  30 Day Readmission: No    Patient Information  Mental Status: Alert  During Assessment patient was accompanied by: Not accompanied during assessment  Assessment information provided by[de-identified] Patient  Primary Caregiver: Self  Support Systems: Daughter  Home entry access options   Select all that apply : Other access (Comment) (ramp)  Type of Current Residence: Apartment  Floor Level:  (elevator)  Upon entering residence, is there a bedroom on the main floor (no further steps)?: Yes  Upon entering residence, is there a bathroom on the main floor (no further steps)?: Yes  Living Arrangements: Lives w/ Daughter  Is patient a ?: No    Activities of Daily Living Prior to Admission  Functional Status: Independent  Completes ADLs independently?: Yes  Ambulates independently?: Yes  Does patient use assisted devices?: No  Does patient currently own DME?: Yes  What DME does the patient currently own?: Nebulizer  Does patient have a history of Outpatient Therapy (PT/OT)?: No  Does the patient have a history of Short-Term Rehab?: No  Does patient currently have [address]?: No         Patient Information Continued  Income Source: SSI/SSD  Does patient have prescription coverage?: Yes  Does patient receive dialysis treatments?: No  Does patient have a history of substance abuse?: No  Does patient have a history of Mental Health Diagnosis?: Yes  Is patient receiving treatment for mental health?: Yes  Has patient received inpatient treatment related to mental health in the last 2 years?: No         Means of Transportation  [facility] Drives Self    DISCHARGE DETAILS:    Discharge planning discussed with[de-identified] Patient  Freedom of Choice: Yes         [employer]         Is the patient interested in [address] at discharge?: No    DME Referral Provided  Referral made for DME?: No    Other Referral/Resources/Interventions Provided:  Referrals Provided[de-identified] [employer] (housing and financial assistance information provided)  Government Services[de-identified] Housing    We would like to be able to fill any required prescriptions on discharge at our [address] [place] [address] and have them delivered to you at discharge in your room    Would you like to participate in this program? : No - Declined    Discharge Destination Plan[de-identified] Home  Transportation at Discharge?: Yes (Daughter)

## 2021-08-11 NOTE — ASSESSMENT & PLAN NOTE
· Tachycardia, fever   · Unclear of infection    Possible PICC line disorder - has been removed, picc tip cultured - too young to grow, reincubate  · Continue Zosyn & Vanco and continue IVF hydration  · Follow-up blood culture - No growth @ 24 hours  · procalcitonin 0 88 - 6 44 - continue to trend with WBC & fever  · IR has been consulted for picc replacement Thursday

## 2021-08-11 NOTE — ASSESSMENT & PLAN NOTE
· Receives IV hydration several times a week, asking if she can know dispo between tomorrow vs Friday so she can call infusion center to cancel the Friday appointment  · Continue IVF  · Tachycardia has improved since treatment for sepsis with IVF & abx    · Continue to monitor  · Follows with Dr Isael Crockett in [facility]

## 2021-08-11 NOTE — ASSESSMENT & PLAN NOTE
· Continue PPI  · Has h/o intermittent nausea and vomiting   Has OP gastro f/u for which she has underwent multiple EGD in the last year showing gastritis  · Continue OP follow up

## 2021-08-11 NOTE — ASSESSMENT & PLAN NOTE
AST 45 (64)   (106)  Alk phos 171 (194)    · S/p IVF today, improving liver enzymes  · Follows with gastro in OP setting [place]  Consult GI  · US abdomen today - pending read    · Abdominal exam is benign, monitor sx & labs

## 2021-08-11 NOTE — ASSESSMENT & PLAN NOTE
· No urinary complaints  · CT chest/abd&pelvis - Stable appearance of right renal collecting system with right hydronephrosis and moderate to severe right mid hydroureter without evidence of obstructing etiology  · Follows with urology at [facility], Dr Virginia Bentley  · Recently had extensive work up for [employer], intermittent cystitis including Cystoscopy in May 2021 which patient explains showed decreased filling capacity of bladder & adhesions between bladder walls which were removed & Bx  Bx non-revealing according to pt  · UA is without evidence of UTI  · Bladder scan once

## 2021-08-11 NOTE — ASSESSMENT & PLAN NOTE
· Continue home medications  · This patient sees OP gastro, dysautonomia doctor, palliative care, pulm, cardiology, rheum, allergist, pain management, pain psychology, neurology for migraines, urology  She is researching OP appointments to go to [facility] for mast cell disorder

## 2021-08-11 NOTE — NURSING NOTE
Patient down for ultrasound in wheelchair  IV disconnected for patient to go down  Pain controlled at this time  Will continue to monitor

## 2021-08-11 NOTE — PROGRESS NOTES
Vancomycin IV Pharmacy-to-Dose Consultation    Paul Hernandez is a 76 y o  female who is currently receiving Vancomycin IV with management by the Pharmacy Consult service  Assessment/Plan:  The patient was reviewed  Renal function is stable and no signs or symptoms of nephrotoxicity and/or infusion reactions were documented in the chart  Based on todays assessment, continue current vancomycin (day # 2) dosing of 1000mg IV Q12H, with a plan for trough to be drawn at 1300 on 08/13/2021  We will continue to follow the patients culture results and clinical progress daily      Nighat Aguiar, Pharmacist

## 2021-08-11 NOTE — PLAN OF CARE
Problem: Nutrition/Hydration-ADULT  Goal: Nutrient/Hydration intake appropriate for improving, restoring or maintaining nutritional needs  Description: Monitor and assess patient's nutrition/hydration status for malnutrition  Collaborate with interdisciplinary team and initiate plan and interventions as ordered  Monitor patient's weight and dietary intake as ordered or per policy  Utilize nutrition screening tool and intervene as necessary  Determine patient's food preferences and provide high-protein, high-caloric foods as appropriate       INTERVENTIONS:  - Monitor oral intake, urinary output, labs, and treatment plans  - Assess nutrition and hydration status and recommend course of action  - Evaluate amount of meals eaten  - Assist patient with eating if necessary   - Allow adequate time for meals  - Recommend/ encourage appropriate diets, oral nutritional supplements, and vitamin/mineral supplements  - Order, calculate, and assess calorie counts as needed  - Recommend, monitor, and adjust tube feedings and TPN/PPN based on assessed needs  - Assess need for intravenous fluids  - Provide specific nutrition/hydration education as appropriate  - Include patient/family/caregiver in decisions related to nutrition  Outcome: Progressing     Problem: PAIN - ADULT  Goal: Verbalizes/displays adequate comfort level or baseline comfort level  Description: Interventions:  - Encourage patient to monitor pain and request assistance  - Assess pain using appropriate pain scale  - Administer analgesics based on type and severity of pain and evaluate response  - Implement non-pharmacological measures as appropriate and evaluate response  - Consider cultural and social influences on pain and pain management  - Notify physician/advanced practitioner if interventions unsuccessful or patient reports new pain  Outcome: Progressing

## 2021-08-11 NOTE — RESPIRATORY THERAPY NOTE
RT Protocol Note  Marbin De Los Santos 76 y o  female MRN: [id number]  Unit/Bed#: -01 Encounter: [id number]    Assessment    Principal Problem:    Sepsis ([facility] )  Active Problems:    Postural orthostatic tachycardia syndrome    Anxiety and depression    Moderate persistent asthma    Gastroesophageal reflux disease without esophagitis    Hypothyroidism      Home Pulmonary Medications:  Pulmicort BID  Levalbuterol inhalation solution Q8PRN  Levalbuterol HFA MDI Q4PRN  Ipatropium Bromide Spray  Qvar BID       Past Medical History:   Diagnosis Date    Allergic rhinitis     Anxiety     Asthma     Back pain     Cardiac disease     Cardiopathy     EF 45%    Cough     Diverticulitis     [facility] V Leiden ([facility])     Fibromyalgia     GERD (gastroesophageal reflux disease)     Hashimoto's thyroiditis     Hx of degenerative disc disease     Hypotension     pots - postural orthostatic hypotension    Interstitial cystitis     Irregular heart beat     LBBB    Irritable bowel syndrome     Migraines     Mitral valve disease     "thickening"    Myocardial infarction ([facility] )     possible but not sure when    Neuropathy     bilateral legs    Osteoporosis     Postural orthostatic tachycardia syndrome     must drink a lot of water and salt    Rheumatic fever 1967    Scoliosis     Sjogren's syndrome ([facility])     TMJ (dislocation of temporomandibular joint)      Social History     Socioeconomic History    Marital status:      Spouse name: Not on file    Number of children: Not on file    Years of education: Not on file    Highest education level: Not on file   Occupational History    Not on file   Tobacco Use    Smoking status: Never Smoker    Smokeless tobacco: Never Used   Vaping Use    Vaping Use: Never used   Substance and Sexual Activity    Alcohol use: Never    Drug use: No    Sexual activity: Not on file   Other Topics Concern    Not on file   Social History Narrative    Not on file     Social Determinants of Health     Financial Resource Strain:     Difficulty of Paying Living Expenses:    Food Insecurity:     Worried About Running Out of Food in the Last Year:     [address] in the Last Year:    Transportation Needs:     Lack of Transportation (Medical):  Lack of Transportation (Non-Medical):    Physical Activity:     Days of Exercise per Week:     Minutes of Exercise per Session:    Stress:     Feeling of Stress :    Social Connections:     Frequency of Communication with Friends and Family:     Frequency of Social Gatherings with Friends and Family:     Attends Voodoo Services:     Active Member of Clubs or Organizations:     Attends Club or Organization Meetings:     Marital Status:    Intimate Partner Violence:     Fear of Current or Ex-Partner:     Emotionally Abused:     Physically Abused:     Sexually Abused:        Subjective         Objective    Physical Exam:   Assessment Type: Post-treatment  General Appearance: Alert, Awake  Respiratory Pattern: Spontaneous, Normal  Chest Assessment: Chest expansion symmetrical, Trachea midline  Bilateral Breath Sounds: Clear  Cough: Strong, Dry, Non-productive  O2 Device: RA    Vitals:  Blood pressure 129/65, pulse 82, temperature 98 8 °F (37 1 °C), temperature source Temporal, resp  rate 18, height 5' 1" (1 549 m), weight 55 8 kg (123 lb 2 oz), SpO2 99 %  Imaging and other studies: I have personally reviewed pertinent reports  O2 Device: RA     Plan    Respiratory Plan: No distress/Pulmonary history, Home Bronchodilator Patient pathway        Resp Comments: Pt assessed as per protocol for new PRN udn orders  Pt observed resting on room air; complains of feeling "tight" after not having a UDN since admission early this AM    Prior hospitalist, Dr Mariela Porter,  did not initiate respiratory protocol  Maxim Liang PA-C current Hospitalist, placed respiratory protocol orders at this time    BBS clear/decreased with a strong dry NPC  Pt uses several MDI's at home and UDN at least BID  PRN tx given as per pt request   Kimberlee Varela from Pharmacy along with Maybelle Gowers are working to unravel pt's medications for future use  She will use her own Qvar while an inpatient with Pulmicort and Levalbuterol UDN BID  Will continue to monitor as per protocol

## 2021-08-11 NOTE — NURSING NOTE
Patient voided in toilet and then bladder scanned for 134 ml  Patient currently fasting at this time for ultrasound in the afternoon

## 2021-08-11 NOTE — PROGRESS NOTES
[facility]  Progress Note - Meka Tong 1955, 76 y o  female MRN: [id number]  Unit/Bed#: -01 Encounter: [id number]  Primary Care Provider: Evert Morin MD   Date and time admitted to hospital: 8/10/2021  3:47 AM    * Sepsis [facility])  Assessment & Plan  · Tachycardia, fever   · Unclear of infection  Possible PICC line disorder - has been removed, picc tip cultured - too young to grow, reincubate  · Continue Zosyn & Vanco and continue IVF hydration  · Follow-up blood culture - No growth @ 24 hours  · procalcitonin 0 88 - 6 44 - continue to trend with WBC & fever  · IR has been consulted for picc replacement Thursday    Other hydronephrosis  Assessment & Plan  · No urinary complaints  · CT chest/abd&pelvis - Stable appearance of right renal collecting system with right hydronephrosis and moderate to severe right mid hydroureter without evidence of obstructing etiology  · Follows with urology at [facility], Dr Wilber Oro  · Recently had extensive work up for [employer], intermittent cystitis including Cystoscopy in May 2021 which patient explains showed decreased filling capacity of bladder & adhesions between bladder walls which were removed & Bx  Bx non-revealing according to pt  · UA is without evidence of UTI  · Bladder scan once  Transaminitis  Assessment & Plan  AST 45 (64)   (106)  Alk phos 171 (194)    · S/p IVF today, improving liver enzymes  · Follows with gastro in OP setting [place]  Consult GI  · US abdomen today - pending read  · Abdominal exam is benign, monitor sx & labs    Gastroesophageal reflux disease without esophagitis  Assessment & Plan  · Continue PPI  · Has h/o intermittent nausea and vomiting   Has OP gastro f/u for which she has underwent multiple EGD in the last year showing gastritis  · Continue OP follow up    Moderate persistent asthma  Assessment & Plan  · No exacerbation  · Continue home medication which is pulmicort & xopenex scheduled nebs    Anxiety and depression  Assessment & Plan  · Continue home medications  · This patient sees OP gastro, dysautonomia doctor, palliative care, pulm, cardiology, rheum, allergist, pain management, pain psychology, neurology for migraines, urology  She is researching OP appointments to go to [facility] for mast cell disorder  Postural orthostatic tachycardia syndrome  Assessment & Plan  · Receives IV hydration several times a week, asking if she can know dispo between tomorrow vs Friday so she can call infusion center to cancel the Friday appointment  · Continue IVF  · Tachycardia has improved since treatment for sepsis with IVF & abx  · Continue to monitor  · Follows with Dr Sly Ledezma in [facility]      VTE Pharmacologic Prophylaxis: VTE Score: 3 Moderate Risk (Score 3-4) - Pharmacological DVT Prophylaxis Ordered: enoxaparin (Lovenox)  Patient Centered Rounds: I performed bedside rounds with nursing staff today  Discussions with Specialists or Other Care Team Provider: urology, gi    Education and Discussions with Family / Patient: Patient declined call to   Time Spent for Care: 30 minutes  More than 50% of total time spent on counseling and coordination of care as described above  Current Length of Stay: 0 day(s)  Current Patient Status: Inpatient   Certification Statement: The patient will continue to require additional inpatient hospital stay due to growth of blood cultures, iv abx, gi consult  Discharge Plan: Anticipate discharge tomorrow to home  Code Status: Level 1 - Full Code    Subjective:   Patient denies nausea or vomiting  Patient gets waves of feeling cool and then feeling clammy which is normal for her she says since she has POTS disease  Has been eating, has good appetite  Denies abd pain, flank pain, denies urinary complaints including urinary urgency, frequency, pelvic pain or pressure, denies dysuria  Denies headache      Objective:     Vitals:   Temp (24hrs), Av 2 °F (36 8 °C), Min:97 6 °F (36 4 °C), Max:98 8 °F (37 1 °C)    Temp:  [97 6 °F (36 4 °C)-98 8 °F (37 1 °C)] 98 2 °F (36 8 °C)  HR:  [74-83] 74  Resp:  [18] 18  BP: (123-169)/(65-84) 169/81  SpO2:  [95 %-99 %] 99 %  Body mass index is 23 26 kg/m²  Input and Output Summary (last 24 hours):   No intake or output data in the 24 hours ending 21 1703    Physical Exam:   Physical Exam  Vitals and nursing note reviewed  Constitutional:       General: She is not in acute distress  Appearance: She is well-developed  Comments: NAD, isidoro   HENT:      Head: Normocephalic and atraumatic  Mouth/Throat:      Mouth: Mucous membranes are moist    Eyes:      Conjunctiva/sclera: Conjunctivae normal    Cardiovascular:      Rate and Rhythm: Normal rate and regular rhythm  Heart sounds: Normal heart sounds  No murmur heard  Pulmonary:      Effort: Pulmonary effort is normal  No respiratory distress  Breath sounds: Normal breath sounds  Abdominal:      General: There is no distension  Palpations: Abdomen is soft  Tenderness: There is no abdominal tenderness  Musculoskeletal:         General: No swelling or tenderness  Cervical back: Neck supple  Skin:     General: Skin is warm and dry  Neurological:      General: No focal deficit present  Mental Status: She is alert  Psychiatric:         Behavior: Behavior normal       Comments: Patient tells me her entire medial history since 2019   She is anxious          Additional Data:     Labs:  Results from last 7 days   Lab Units 21  0606 08/10/21  0404   WBC Thousand/uL 9 40 11 70*   HEMOGLOBIN g/dL 11 7* 14 2   HEMATOCRIT % 35 2* 43 1   PLATELETS Thousands/uL 168 204   BANDS PCT %  --  3   LYMPHO PCT %  --  8*   MONO PCT %  --  6     Results from last 7 days   Lab Units 21  0606   SODIUM mmol/L 141   POTASSIUM mmol/L 4 1   CHLORIDE mmol/L 109*   CO2 mmol/L 27   BUN mg/dL 9   CREATININE mg/dL 0 97 ANION GAP mmol/L 5   CALCIUM mg/dL 8 9   ALBUMIN g/dL 2 8*   TOTAL BILIRUBIN mg/dL 0 53   ALK PHOS U/L 171*   ALT U/L 105*   AST U/L 45   GLUCOSE RANDOM mg/dL 93     Results from last 7 days   Lab Units 08/10/21  0404   INR  0 85             Results from last 7 days   Lab Units 08/11/21  0606 08/10/21  0404   LACTIC ACID mmol/L  --  1 9   PROCALCITONIN ng/ml 6 44* 0 88*       Lines/Drains:  Invasive Devices     Peripheral Intravenous Line            Peripheral IV 08/10/21 Left Wrist 1 day                      Imaging: No pertinent imaging reviewed  Recent Cultures (last 7 days):   Results from last 7 days   Lab Units 08/10/21  0539 08/10/21  0404   BLOOD CULTURE  No Growth at 24 hrs  No Growth at 24 hrs         Last 24 Hours Medication List:   Current Facility-Administered Medications   Medication Dose Route Frequency Provider Last Rate    acetaminophen  650 mg Oral Q4H PRN Hank Lesches, MD      artificial tear   Both Eyes 4x Daily PRN Ameena Keita PA-C      azelastine  2 spray Each Nare BID Danii Levine PA-C      budesonide  0 5 mg Nebulization Q12H Teofilo Aguilar MD      cholecalciferol  1,000 Units Oral Daily [place], [place]      cyclobenzaprine  5 mg Oral BID PRN Hank Lesches, MD      enoxaparin  40 mg Subcutaneous Daily Hank Lesches, MD      famotidine  10 mg Oral BID Ameena Keita PA-C      fexofenadine  180 mg Oral Daily [place], [place]      fish oil  1,000 mg Oral Daily [place], [place]      fluticasone  2 spray Each Nare Daily [place], [place]      HYDROcodone-acetaminophen  0 5 tablet Oral Q6H PRN Danii Levine PA-C      [START ON 8/12/2021] hydrOXYzine HCL  20 mg Oral HS PRN Danii Levine PA-C      levalbuterol  1 25 mg Nebulization Q8H PRN [place], [place]      levalbuterol  1 25 mg Nebulization BID Teofilo Aguilar MD      midodrine  15 mg Oral BID AC Merrill Cates PA-C      ondansetron  4 mg Intravenous Q6H PRN Juwan Dubois MD      oxybutynin  2 5 mg Oral HS [place], [place]      oxybutynin  2 5 mg Oral Daily PRN Merrill Cates PA-C      pantoprazole  40 mg Oral BID Juwan Dubois MD      piperacillin-tazobactam  3 375 g Intravenous Q6H Juwan Dubois MD 3 375 g (08/11/21 1221)    polyethylene glycol  17 g Oral Daily PRN Merrill Cates PA-C      saccharomyces boulardii  250 mg Oral Daily [address] [place], [place]      sodium chloride  125 mL/hr Intravenous Continuous Juwan Dubois  mL/hr (08/11/21 1149)    sodium chloride  3 mL Nebulization Q8H PRN Gemma Caldwell MD      Ubrogepant  100 mg Oral BID PRN Juwan Dubois MD      vancomycin  20 mg/kg Intravenous Q24H Juwan Dubois MD 1,000 mg (08/11/21 1430)     Facility-Administered Medications Ordered in Other Encounters   Medication Dose Route Frequency Provider Last Rate    sodium chloride  333 3 mL/hr Intravenous Once Naomy Rincon MD          Today, Patient Was Seen By: Linda Sierra PA-C    **Please Note: This note may have been constructed using a voice recognition system  **

## 2021-08-11 NOTE — ASSESSMENT & PLAN NOTE
AST 24 (45)  ALT 68 (105)  Alk phos 152 (171)    · S/p IVF, improving liver enzymes suspect this was secondary to dehydration as well as reactive with sepsis  · US abdomen today - Normal  · Abdominal exam is benign, monitor sx & labs  · Hepatits panel acute nl, Mono spot nl, EBV & CMV abs - in process

## 2021-08-12 ENCOUNTER — HOSPITAL ENCOUNTER (OUTPATIENT)
Dept: INFUSION CENTER | Facility: HOSPITAL | Age: 66
Discharge: HOME | End: 2021-08-12

## 2021-08-12 ENCOUNTER — APPOINTMENT (INPATIENT)
Dept: INTERVENTIONAL RADIOLOGY/VASCULAR | Facility: HOSPITAL | Age: 66
DRG: 314 | End: 2021-08-12
Attending: FAMILY MEDICINE
Payer: MEDICARE

## 2021-08-12 VITALS
OXYGEN SATURATION: 97 % | DIASTOLIC BLOOD PRESSURE: 73 MMHG | WEIGHT: 123.13 LBS | TEMPERATURE: 97.5 F | BODY MASS INDEX: 23.25 KG/M2 | SYSTOLIC BLOOD PRESSURE: 146 MMHG | RESPIRATION RATE: 18 BRPM | HEIGHT: 61 IN | HEART RATE: 77 BPM

## 2021-08-12 PROBLEM — A41.9 SEPSIS (HCC): Status: RESOLVED | Noted: 2021-08-10 | Resolved: 2021-08-12

## 2021-08-12 PROBLEM — R74.01 TRANSAMINITIS: Status: RESOLVED | Noted: 2021-08-11 | Resolved: 2021-08-12

## 2021-08-12 PROBLEM — N13.39 OTHER HYDRONEPHROSIS: Status: RESOLVED | Noted: 2021-08-11 | Resolved: 2021-08-12

## 2021-08-12 LAB
ALBUMIN SERPL BCP-MCNC: 4 G/DL (ref 3.5–5.7)
ALP SERPL-CCNC: 152 U/L (ref 55–165)
ALT SERPL W P-5'-P-CCNC: 68 U/L (ref 7–52)
ANION GAP SERPL CALCULATED.3IONS-SCNC: 8 MMOL/L (ref 4–13)
AST SERPL W P-5'-P-CCNC: 24 U/L (ref 13–39)
BILIRUB SERPL-MCNC: 0.5 MG/DL (ref 0.2–1)
BUN SERPL-MCNC: 9 MG/DL (ref 7–25)
CALCIUM SERPL-MCNC: 9.4 MG/DL (ref 8.6–10.5)
CHLORIDE SERPL-SCNC: 107 MMOL/L (ref 98–107)
CO2 SERPL-SCNC: 26 MMOL/L (ref 21–31)
CREAT SERPL-MCNC: 0.91 MG/DL (ref 0.6–1.2)
ERYTHROCYTE [DISTWIDTH] IN BLOOD BY AUTOMATED COUNT: 13.4 % (ref 11.5–14.5)
GFR SERPL CREATININE-BSD FRML MDRD: 66 ML/MIN/1.73SQ M
GLUCOSE SERPL-MCNC: 96 MG/DL (ref 65–99)
HAV IGM SER QL: NORMAL
HBV CORE IGM SER QL: NORMAL
HBV SURFACE AG SER QL: NORMAL
HCT VFR BLD AUTO: 39 % (ref 42–47)
HCV AB SER QL: NORMAL
HETEROPH AB SER QL: NEGATIVE
HGB BLD-MCNC: 13.2 G/DL (ref 12–16)
MAGNESIUM SERPL-MCNC: 2 MG/DL (ref 1.9–2.7)
MCH RBC QN AUTO: 29.5 PG (ref 26–34)
MCHC RBC AUTO-ENTMCNC: 33.7 G/DL (ref 31–37)
MCV RBC AUTO: 88 FL (ref 81–99)
PLATELET # BLD AUTO: 205 THOUSANDS/UL (ref 149–390)
PMV BLD AUTO: 7.5 FL (ref 8.6–11.7)
POTASSIUM SERPL-SCNC: 3.7 MMOL/L (ref 3.5–5.5)
PROCALCITONIN SERPL-MCNC: 3.3 NG/ML
PROT SERPL-MCNC: 6.2 G/DL (ref 6.4–8.9)
RBC # BLD AUTO: 4.45 MILLION/UL (ref 3.9–5.2)
SODIUM SERPL-SCNC: 141 MMOL/L (ref 134–143)
WBC # BLD AUTO: 7.1 THOUSAND/UL (ref 4.8–10.8)

## 2021-08-12 PROCEDURE — 99239 HOSP IP/OBS DSCHRG MGMT >30: CPT | Performed by: PHYSICIAN ASSISTANT

## 2021-08-12 PROCEDURE — 80053 COMPREHEN METABOLIC PANEL: CPT | Performed by: PHYSICIAN ASSISTANT

## 2021-08-12 PROCEDURE — 05HM33Z INSERTION OF INFUSION DEVICE INTO RIGHT INTERNAL JUGULAR VEIN, PERCUTANEOUS APPROACH: ICD-10-PCS | Performed by: RADIOLOGY

## 2021-08-12 PROCEDURE — 84145 PROCALCITONIN (PCT): CPT | Performed by: PHYSICIAN ASSISTANT

## 2021-08-12 PROCEDURE — 86665 EPSTEIN-BARR CAPSID VCA: CPT | Performed by: PHYSICIAN ASSISTANT

## 2021-08-12 PROCEDURE — 36558 INSERT TUNNELED CV CATH: CPT | Performed by: RADIOLOGY

## 2021-08-12 PROCEDURE — 75827 VEIN X-RAY CHEST: CPT

## 2021-08-12 PROCEDURE — 76937 US GUIDE VASCULAR ACCESS: CPT | Performed by: RADIOLOGY

## 2021-08-12 PROCEDURE — 85027 COMPLETE CBC AUTOMATED: CPT | Performed by: PHYSICIAN ASSISTANT

## 2021-08-12 PROCEDURE — 36558 INSERT TUNNELED CV CATH: CPT

## 2021-08-12 PROCEDURE — 77001 FLUOROGUIDE FOR VEIN DEVICE: CPT

## 2021-08-12 PROCEDURE — 83735 ASSAY OF MAGNESIUM: CPT | Performed by: PHYSICIAN ASSISTANT

## 2021-08-12 PROCEDURE — 76937 US GUIDE VASCULAR ACCESS: CPT

## 2021-08-12 PROCEDURE — 86664 EPSTEIN-BARR NUCLEAR ANTIGEN: CPT | Performed by: PHYSICIAN ASSISTANT

## 2021-08-12 PROCEDURE — 86645 CMV ANTIBODY IGM: CPT | Performed by: PHYSICIAN ASSISTANT

## 2021-08-12 PROCEDURE — NC001 PR NO CHARGE: Performed by: RADIOLOGY

## 2021-08-12 PROCEDURE — 0JH63XZ INSERTION OF TUNNELED VASCULAR ACCESS DEVICE INTO CHEST SUBCUTANEOUS TISSUE AND FASCIA, PERCUTANEOUS APPROACH: ICD-10-PCS | Performed by: RADIOLOGY

## 2021-08-12 PROCEDURE — C1894 INTRO/SHEATH, NON-LASER: HCPCS

## 2021-08-12 PROCEDURE — 86663 EPSTEIN-BARR ANTIBODY: CPT | Performed by: PHYSICIAN ASSISTANT

## 2021-08-12 PROCEDURE — 86644 CMV ANTIBODY: CPT | Performed by: PHYSICIAN ASSISTANT

## 2021-08-12 PROCEDURE — 77001 FLUOROGUIDE FOR VEIN DEVICE: CPT | Performed by: RADIOLOGY

## 2021-08-12 PROCEDURE — 86308 HETEROPHILE ANTIBODY SCREEN: CPT | Performed by: PHYSICIAN ASSISTANT

## 2021-08-12 PROCEDURE — 80074 ACUTE HEPATITIS PANEL: CPT | Performed by: PHYSICIAN ASSISTANT

## 2021-08-12 PROCEDURE — 94760 N-INVAS EAR/PLS OXIMETRY 1: CPT

## 2021-08-12 RX ORDER — LIDOCAINE WITH 8.4% SOD BICARB 0.9%(10ML)
SYRINGE (ML) INJECTION CODE/TRAUMA/SEDATION MEDICATION
Status: COMPLETED | OUTPATIENT
Start: 2021-08-12 | End: 2021-08-12

## 2021-08-12 RX ADMIN — Medication 10 ML: at 09:20

## 2021-08-12 RX ADMIN — FEXOFENADINE HYDROCHLORIDE 180 MG: 180 TABLET, FILM COATED ORAL at 08:16

## 2021-08-12 RX ADMIN — Medication 32 MG: at 05:19

## 2021-08-12 RX ADMIN — IBUPROFEN, DIPHENHYDRAMINE HCL 10 MG: 200; 25 CAPSULE, LIQUID FILLED ORAL at 11:50

## 2021-08-12 RX ADMIN — PANTOPRAZOLE SODIUM 40 MG: 40 TABLET, DELAYED RELEASE ORAL at 08:16

## 2021-08-12 RX ADMIN — AZELASTINE 2 SPRAY: 1 SPRAY, METERED NASAL at 08:17

## 2021-08-12 RX ADMIN — Medication 1000 UNITS: at 08:16

## 2021-08-12 RX ADMIN — HYDROCODONE BITARTRATE AND ACETAMINOPHEN 0.5 TABLET: 5; 325 TABLET ORAL at 08:24

## 2021-08-12 RX ADMIN — MIDODRINE HYDROCHLORIDE 15 MG: 5 TABLET ORAL at 11:50

## 2021-08-12 RX ADMIN — PIPERACILLIN AND TAZOBACTAM 3.38 G: 3; .375 INJECTION, POWDER, FOR SOLUTION INTRAVENOUS at 11:54

## 2021-08-12 RX ADMIN — CYCLOBENZAPRINE HYDROCHLORIDE 5 MG: 10 TABLET, FILM COATED ORAL at 12:01

## 2021-08-12 RX ADMIN — Medication 2 SPRAY: at 08:16

## 2021-08-12 RX ADMIN — Medication 250 MG: at 11:50

## 2021-08-12 RX ADMIN — BUDESONIDE 1 MG: 0.5 INHALANT ORAL at 07:25

## 2021-08-12 RX ADMIN — LEVALBUTEROL HYDROCHLORIDE 1.25 MG: 1.25 SOLUTION, CONCENTRATE RESPIRATORY (INHALATION) at 07:25

## 2021-08-12 RX ADMIN — PIPERACILLIN AND TAZOBACTAM 3.38 G: 3; .375 INJECTION, POWDER, FOR SOLUTION INTRAVENOUS at 05:20

## 2021-08-12 RX ADMIN — IOHEXOL 10 ML: 300 INJECTION, SOLUTION INTRAVENOUS at 09:52

## 2021-08-12 RX ADMIN — OMEGA-3 FATTY ACIDS CAP 1000 MG 1000 MG: 1000 CAP at 08:16

## 2021-08-12 NOTE — ASSESSMENT & PLAN NOTE
· Continue home medications  · This patient sees OP gastro, dysautonomia doctor, palliative care, pulm, cardiology, rheum, allergist, pain management, pain psychology, neurology for migraines, urology  She is researching OP appointments to go to [facility] for mast cell disorder

## 2021-08-12 NOTE — PROCEDURES
Central Line Insertion    Date/Time: 8/12/2021 2:24 PM  Performed by: Eben Garcia MD  Authorized by: Eben Garcia MD     Patient location:  IR  Consent:     Consent obtained:  Written    Consent given by:  Patient    Risks discussed:  Bleeding, infection and pneumothorax    Alternatives discussed:  No treatment and delayed treatment  Universal protocol:     Procedure explained and questions answered to patient or proxy's satisfaction: yes      Relevant documents present and verified: yes      Test results available and properly labeled: yes      Radiology Images displayed and confirmed  If images not available, report reviewed: yes      Required blood products, implants, devices, and special equipment available: yes      Site/side marked: yes      Immediately prior to procedure, a time out was called: yes      Patient identity confirmed:  Verbally with patient and arm band  Pre-procedure details:     Hand hygiene: Hand hygiene performed prior to insertion      Sterile barrier technique: All elements of maximal sterile technique followed      Skin preparation:  2% chlorhexidine    Skin preparation agent: Skin preparation agent completely dried prior to procedure    Indications:     Central line indications: medications requiring central line      Site selection rationale:  No peripheral vein  Sedation:     Sedation type: Moderate (conscious) sedation  Anesthesia (see MAR for exact dosages):      Anesthesia method:  Local infiltration    Local anesthetic:  Lidocaine 1% w/o epi  Procedure details:     Location:  Right internal jugular    Vessel type: vein      Laterality:  Right    Approach: percutaneous technique used      Patient position:  Flat    Catheter type:  Single lumen    Catheter size:  4 Fr    Landmarks identified: yes      Ultrasound guidance: yes      Sterile ultrasound techniques: Sterile gel and sterile probe covers were used      Number of attempts:  1    Successful placement: yes      Vessel of catheter tip end:  SVC/RA  Post-procedure details:     Post-procedure:  Dressing applied and line sutured    Assessment:  Blood return through all ports, no pneumothorax on x-ray and placement verified by x-ray    Post-procedure complications: none      Patient tolerance of procedure:   Tolerated well, no immediate complications

## 2021-08-12 NOTE — DISCHARGE INSTRUCTIONS
[address]  Interventional Radiology  [address]: [phone] (M-F 7:30am - 4:00pm)  Off hours or no answer: [phone] (Ask for IR on call)       Peripherally Inserted Central Catheter     WHAT [place]:   A PICC is an IV placed into a large blood vessel near your heart  It is usually inserted through a blood vessel in your arm  Your PICC may have multiple ports  Ports are tubes where you can inject medicine  A PICC can stay in place for several weeks or months  You may need a PICC to get nutrition, medicine, or fluids  Blood samples can be removed from your PICC and sent to the lab for tests  DISCHARGE INSTRUCTIONS:    [facility] patients,    Contact Interventional Radiology at [phone] PATIENTS: Contact Interventional Radiology at [phone]   [facility] PATIENTS: Contact Interventional Radiology at [phone] if:  · Blood soaks through your bandage  · Your arm or leg feels warm, tender, and painful  It may look swollen and red  · You have trouble moving your arm  · Your catheter falls out  · You have a fever or swelling, redness, pain, or pus where the catheter was inserted  · Persistent nausea or vomiting  · You cannot flush your catheter, or you feel pain when you flush your catheter  · You see a hole or crack in the tubing of your catheter  · You see fluid leaking from the insertion site  · You run out of supplies to care for your catheter  · You have questions or concerns about your condition or care

## 2021-08-12 NOTE — DISCHARGE SUMMARY
[facility]  Discharge- Ana Cristina Settdeepa 1955, 76 y o  female MRN: [id number]  Unit/Bed#: -01 Encounter: [id number]  Primary Care Provider: Germán Singh MD   Date and time admitted to hospital: 8/10/2021  3:47 AM    * Sepsis (HCC)-resolved as of 8/12/2021  Assessment & Plan  · Tachycardia, fever   · PICC line culture growing Gram-negative rods as well as greater than 100 colonies of Staph coag-negative  Since then it was exchanged by IR today  · Number need for IV antibiotics  · Follow-up blood culture - No growth @ 48 hours  · procalcitonin 0 88 - 6 44 - continue to trend with WBC & fever    Gastroesophageal reflux disease without esophagitis  Assessment & Plan  · Continue PPI  · Has h/o intermittent nausea and vomiting  Has OP gastro f/u for which she has underwent multiple EGD in the last year showing gastritis  · Continue OP follow up    Moderate persistent asthma  Assessment & Plan  · No exacerbation  · Continue home medication which is pulmicort & xopenex scheduled nebs    Anxiety and depression  Assessment & Plan  · Continue home medications  · This patient sees OP gastro, dysautonomia doctor, palliative care, pulm, cardiology, rheum, allergist, pain management, pain psychology, neurology for migraines, urology  She is researching OP appointments to [facility] for mast cell disorder  Postural orthostatic tachycardia syndrome  Assessment & Plan  · Continue IVF  · Tachycardia has improved since treatment for sepsis with IVF & abx  · Continue to monitor  · Follows with Dr Edmundo Rivas in [facility]    Other hydronephrosis-resolved as of 8/12/2021  Assessment & Plan  · No urinary complaints  · CT chest/abd&pelvis - Stable appearance of right renal collecting system with right hydronephrosis and moderate to severe right mid hydroureter without evidence of obstructing etiology  · Follows with urology at [facility], Dr Joyce Segal    · Recently had extensive work up for [employer], intermittent cystitis including Cystoscopy in May 2021 which patient explains showed decreased filling capacity of bladder & adhesions between bladder walls which were removed & Bx  Bx non-revealing according to pt  · UA is without evidence of UTI  · Ultrasound of the abdomen does not show hydronephrosis  Discussed with Urology and hydronephrosis on CT likely artifact, either way compared to previous CT, this seems to be a chronic issue  Transaminitis-resolved as of 8/12/2021  Assessment & Plan  AST 24 (45)  ALT 68 (105)  Alk phos 152 (171)    · S/p IVF, improving liver enzymes suspect this was secondary to dehydration as well as reactive with sepsis  · US abdomen today - Normal  · Abdominal exam is benign, monitor sx & labs  · Hepatits panel acute nl, Mono spot nl, EBV & CMV abs - in process      Medical Problems     Resolved Problems  Date Reviewed: 1/20/2021        Resolved    * (Principal) Sepsis ([facility] [address] ) 8/12/2021     Resolved by  Tamar Thomas PA-C    Transaminitis 8/12/2021     Resolved by  Tamar Thomas PA-C    Other hydronephrosis 8/12/2021     Resolved by  Tamar Thomas PA-C              Discharging Physician / Practitioner: Tamar Thomas PA-C  PCP: Wilfrido Gunderson MD  Admission Date:   Admission Orders (From admission, onward)     Ordered        08/11/21 1333  Inpatient Admission  Once         08/10/21 0619  Place in Observation  Once                   Discharge Date: 08/12/21    Consultations During Hospital Stay:  · IR    Procedures Performed:   · None    Significant Findings / Test Results:   · Initial CBC with leukocytosis of 11 7, resolved prior to day of discharge  · CMP with elevated liver enzymes on admission, improved or resolved before discharge    · Procalcitonin on admission 0 8, up trended to 6 44   · PICC catheter tip culture growing 70 colonies Gram-negative melita, greater than 100 colonies Staphylococcus coagulase negative  · Mono screen normal  · Hepatitis acute panel normal  · CXR 8/10 no acute cardiopulmonary disease  · CT head no acute intracranial abnormality  · CT abdomen and pelvis, no evidence of acute pathology throughout the chest abdomen or pelvis  Stable appearance of right renal collecting system with right hydronephrosis moderate to severe right mid hydroureter without evidence of obstructing etiology  · US abdomen complete is normal    Incidental Findings:   · None    Test Results Pending at Discharge (will require follow up): · Blood cultures x2 negative at 48 hours  · CMV antibodies in process  · EBV acute panel in process     Outpatient Tests Requested:  · None    Complications:  None    Reason for Admission: n/v    Hospital Course: Carlos Garcia is a 76 y o  female patient past medical history of hypothyroidism, GERD, asthma, postural-orthostatic tachycardia syndrome, anxiety who originally presented to the hospital on 8/10/2021 due to myalgia, runny nose, fever, nausea and vomiting  Patient found to meet sepsis criteria, started on IV fluids and IV antibiotics  This patient has PICC line in place for infusion therapy for POTS  The PICC line was pulled in the ED, catheter tip was cultured  Imaging from the emergency room showed stable hydronephrosis of the right kidney, did not identify other infectious processes in the chest abdomen or pelvis  Chest x-ray was normal   This patient had leukocytosis on original lab work, which resolved with IV fluids  The patient also had elevated liver enzymes on CMP  She was evaluated with ultrasound of the abdomen which showed no hydronephrosis, also showed no other abnormality in the abdomen or the liver, gallbladder or pancreas  This patient's liver enzymes down trended with IV fluid  The PICC catheter tip culture started growing Gram-negative rods as well as Staphylococcus coagulase negative  Blood cultures are negative at 48 hours  This patient had new PICC placed by IR on 08/12 successfully    Since this patient had good source control and blood cultures are continually negative, she does not need to be discharged on antibiotics  This hospitalist service will follow with blood culture results for 5 days as it grows out and call the patient with any changes to the plan  At this point patient's symptoms have resolved with IV fluid  She has had all of her questions answered and she is amenable to discharge to home today  Please see above list of diagnoses and related plan for additional information  Condition at Discharge: good    Discharge Day Visit / Exam:   Subjective:  No complaints today besides some soreness  At site of picc line placement improved with ice pack & tylenol  Vitals: Blood Pressure: 146/73 (08/12/21 0755)  Pulse: 77 (08/12/21 0755)  Temperature: 97 5 °F (36 4 °C) (08/12/21 0755)  Temp Source: Temporal (08/12/21 0755)  Respirations: 18 (08/12/21 0755)  Height: 5' 1" (154 9 cm) (08/10/21 1230)  Weight - Scale: 55 8 kg (123 lb 2 oz) (08/10/21 1230)  SpO2: 97 % (08/12/21 0755)  Exam:   Physical Exam  Vitals and nursing note reviewed  Constitutional:       General: She is not in acute distress  Appearance: Normal appearance  She is well-developed  She is not ill-appearing, toxic-appearing or diaphoretic  Comments: NAD, awake and alert   HENT:      Head: Normocephalic and atraumatic  Nose: Nose normal       Mouth/Throat:      Mouth: Mucous membranes are moist    Eyes:      Conjunctiva/sclera: Conjunctivae normal    Neck:      Comments: PICC line at the site of right IJ with small purple colored surrounding bruise, no bleeding, erythema, mass or edema at the site  Cardiovascular:      Rate and Rhythm: Regular rhythm  Tachycardia present  Heart sounds: No murmur heard  Pulmonary:      Effort: Pulmonary effort is normal  No respiratory distress  Breath sounds: Normal breath sounds  Abdominal:      General: Bowel sounds are normal  There is no distension        Palpations: Abdomen is soft       Tenderness: There is no abdominal tenderness  There is no right CVA tenderness or left CVA tenderness  Musculoskeletal:         General: No swelling or tenderness  Cervical back: Neck supple  Comments: Compression stockings in place   Skin:     General: Skin is warm and dry  Coloration: Skin is pale  Neurological:      General: No focal deficit present  Mental Status: She is alert  Psychiatric:         Mood and Affect: Mood normal          Behavior: Behavior normal           Discussion with Family: Patient declined call to   Discharge instructions/Information to patient and family:   See after visit summary for information provided to patient and family  Provisions for Follow-Up Care:  See after visit summary for information related to follow-up care and any pertinent home health orders  Disposition:   Home    Planned Readmission: none     Discharge Statement:  I spent 45 minutes discharging the patient  This time was spent on the day of discharge  I had direct contact with the patient on the day of discharge  Greater than 50% of the total time was spent examining patient, answering all patient questions, arranging and discussing plan of care with patient as well as directly providing post-discharge instructions  Additional time then spent on discharge activities  Discharge Medications:  See after visit summary for reconciled discharge medications provided to patient and/or family        **Please Note: This note may have been constructed using a voice recognition system**

## 2021-08-12 NOTE — ASSESSMENT & PLAN NOTE
· Continue IVF  · Tachycardia has improved since treatment for sepsis with IVF & abx    · Continue to monitor  · Follows with Dr Holli Fish in [facility]

## 2021-08-12 NOTE — ASSESSMENT & PLAN NOTE
· Tachycardia, fever   · PICC line culture growing Gram-negative rods as well as greater than 100 colonies of Staph coag-negative  Since then it was exchanged by IR today    · Number need for IV antibiotics  · Follow-up blood culture - No growth @ 48 hours  · procalcitonin 0 88 - 6 44 - continue to trend with WBC & fever

## 2021-08-12 NOTE — DISCHARGE INSTR - AVS FIRST PAGE
Dear Sabrina Earl,     It was our pleasure to care for you here at [facility], [facility]  It is our hope that we were always able to exceed the expected standards for your care during your stay  You were hospitalized due to sepsis  You were cared for on the 1st floor by Tamar Thomas PA-C under the service of Tacho Reeves, * with the [facility] Hospitalist Group who covers for your primary care physician (PCP), Wilfrido Gunderson MD, while you were hospitalized  If you have any questions or concerns related to this hospitalization, you may contact us at [phone]  For follow up as well as any medication refills, we recommend that you follow up with your primary care physician  A registered nurse will reach out to you by phone within a few days after your discharge to answer any additional questions that you may have after going home  However, at this time we provide for you here, the most important instructions / recommendations at discharge:     · Notable Medication Adjustments -   · None  · Testing Required after Discharge -   · None  · Important follow up information -   · Please call your pcp to alert him of your recent hospitalization in case he wants to have a post-discharge follow-up visit  · [facility] hospitalist group will call you if blood culture results change from negative to positive  The blood cultures grow for 5 days total and we will continue to monitor for growth  Any change and you will get a phone call , but as of now, you can be safely discharged without antibiotics! · Other Instructions -   · None  · Please review this entire after visit summary as additional general instructions including medication list, appointments, activity, diet, any pertinent wound care, and other additional recommendations from your care team that may be provided for you        Sincerely,     Tamar Thomas PA-C

## 2021-08-12 NOTE — RESPIRATORY THERAPY NOTE
RT Protocol Note  Usama Mena 76 y o  female MRN: [id number]  Unit/Bed#: -01 Encounter: [id number]    Assessment    Principal Problem:    [facility]  Active Problems:    Postural orthostatic tachycardia syndrome    Anxiety and depression    Moderate persistent asthma    Gastroesophageal reflux disease without esophagitis    Transaminitis    Other hydronephrosis      Home Pulmonary Medications:         Past Medical History:   Diagnosis Date    Allergic rhinitis     Anxiety     Asthma     Back pain     Cardiac disease     Cardiopathy     EF 45%    Cough     Diverticulitis     [facility]     Fibromyalgia     GERD (gastroesophageal reflux disease)     Hashimoto's thyroiditis     Hx of degenerative disc disease     Hypotension     pots - postural orthostatic hypotension    Interstitial cystitis     Irregular heart beat     LBBB    Irritable bowel syndrome     Migraines     Mitral valve disease     "thickening"    [facility]     possible but not sure when    Neuropathy     bilateral legs    Osteoporosis     Postural orthostatic tachycardia syndrome     must drink a lot of water and salt    Rheumatic fever 1967    Scoliosis     S[facility]ogren's syndrome [facility])     TMJ (dislocation of temporomandibular joint)      Social History     Socioeconomic History    Marital status:      Spouse name: Not on file    Number of children: Not on file    Years of education: Not on file    Highest education level: Not on file   Occupational History    Not on file   Tobacco Use    Smoking status: Never Smoker    Smokeless tobacco: Never Used   Vaping Use    Vaping Use: Never used   Substance and Sexual Activity    Alcohol use: Never    Drug use: No    Sexual activity: Not on file   Other Topics Concern    Not on file   Social History Narrative    Not on file     Social Determinants of Health     Financial Resource Strain:     Difficulty of Paying Living Expenses:    Food Insecurity:     Worried About Running Out of Food in the Last Year:     [address] in the Last Year:    Transportation Needs:     Lack of Transportation (Medical):  Lack of Transportation (Non-Medical):    Physical Activity:     Days of Exercise per Week:     Minutes of Exercise per Session:    Stress:     Feeling of Stress :    Social Connections:     Frequency of Communication with Friends and Family:     Frequency of Social Gatherings with Friends and Family:     Attends [employer] Services:     Active Member of Clubs or Organizations:     Attends Club or Organization Meetings:     Marital Status:    Intimate Partner Violence:     Fear of Current or Ex-Partner:     Emotionally Abused:     Physically Abused:     Sexually Abused:        Subjective         Objective    Physical Exam:   Assessment Type: Pre-treatment  General Appearance: Alert, Awake  Respiratory Pattern: Normal  Chest Assessment: Chest expansion symmetrical  Bilateral Breath Sounds: Clear  Cough: Dry, Non-productive    Vitals:  Blood pressure 146/73, pulse 77, temperature 97 5 °F (36 4 °C), temperature source Temporal, resp  rate 18, height 5' 1" (1 549 m), weight 55 8 kg (123 lb 2 oz), SpO2 97 %  Imaging and other studies: I have personally reviewed pertinent reports        O2 Device: RA     Plan    Respiratory Plan: No distress/Pulmonary history, Home Bronchodilator Patient pathway        Resp Comments: Pt c/o "tightness" No s/s of resp distress

## 2021-08-12 NOTE — ASSESSMENT & PLAN NOTE
· No urinary complaints  · CT chest/abd&pelvis - Stable appearance of right renal collecting system with right hydronephrosis and moderate to severe right mid hydroureter without evidence of obstructing etiology  · Follows with urology at [facility], Dr Kody Preston  · Recently had extensive work up for [employer], intermittent cystitis including Cystoscopy in May 2021 which patient explains showed decreased filling capacity of bladder & adhesions between bladder walls which were removed & Bx  Bx non-revealing according to pt  · UA is without evidence of UTI  · Ultrasound of the abdomen does not show hydronephrosis  Discussed with Urology and hydronephrosis on CT likely artifact, either way compared to previous CT, this seems to be a chronic issue

## 2021-08-12 NOTE — NURSING NOTE
Discharge instructions given to pt by prior RN  Pt denies any questions about discharge  Daughter picked up pt

## 2021-08-12 NOTE — PROGRESS NOTES
Vancomycin IV Pharmacy-to-Dose Consultation    Tarik Mohan is a 76 y o  female who is currently receiving Vancomycin IV with management by the Pharmacy Consult service  Assessment/Plan:  The patient was reviewed  Renal function is stable and no signs or symptoms of nephrotoxicity and/or infusion reactions were documented in the chart  Based on todays assessment, continue current vancomycin (day # 3) dosing of 1000 mg IV q24, with a plan for trough to be drawn at 1300 on 8/13/21  We will continue to follow the patients culture results and clinical progress daily      Yanira Anderson, Pharmacist

## 2021-08-13 ENCOUNTER — HOSPITAL ENCOUNTER (OUTPATIENT)
Dept: INFUSION CENTER | Facility: HOSPITAL | Age: 66
Discharge: HOME | End: 2021-08-13
Payer: MEDICARE

## 2021-08-13 VITALS
SYSTOLIC BLOOD PRESSURE: 170 MMHG | TEMPERATURE: 97.5 F | DIASTOLIC BLOOD PRESSURE: 78 MMHG | RESPIRATION RATE: 18 BRPM | HEART RATE: 77 BPM | OXYGEN SATURATION: 98 %

## 2021-08-13 LAB
CMV IGG SERPL IA-ACNC: <0.6 U/ML (ref 0–0.59)
CMV IGM SERPL IA-ACNC: <30 AU/ML (ref 0–29.9)
EBV NA IGG SER IA-ACNC: 24.8 U/ML (ref 0–17.9)
EBV VCA IGG SER IA-ACNC: >600 U/ML (ref 0–17.9)
EBV VCA IGM SER IA-ACNC: <36 U/ML (ref 0–35.9)
INTERPRETATION: ABNORMAL

## 2021-08-13 PROCEDURE — 96360 HYDRATION IV INFUSION INIT: CPT

## 2021-08-13 PROCEDURE — 96361 HYDRATE IV INFUSION ADD-ON: CPT

## 2021-08-13 RX ADMIN — SODIUM CHLORIDE 2000 ML: 0.9 INJECTION, SOLUTION INTRAVENOUS at 10:27

## 2021-08-13 NOTE — PLAN OF CARE
Problem: Potential for Falls  Goal: Patient will remain free of falls  Description: INTERVENTIONS:  - Educate patient/family on patient safety including physical limitations  - Instruct patient to call for assistance with activity   - Consult OT/PT to assist with strengthening/mobility   - Keep Call bell within reach  - Keep bed low and locked with side rails adjusted as appropriate  - Keep care items and personal belongings within reach  - Initiate and maintain comfort rounds  - Make Fall Risk Sign visible to staff  - Apply yellow socks and bracelet for high fall risk patients  - Consider moving patient to room near nurses station  Outcome: Progressing     Problem: INFECTION - ADULT  Goal: Absence or prevention of progression during hospitalization  Description: INTERVENTIONS:  - Assess and monitor for signs and symptoms of infection  - Monitor lab/diagnostic results  - Monitor all insertion sites, i e  indwelling lines, tubes, and drains  - Monitor endotracheal if appropriate and nasal secretions for changes in amount and color  - Allen appropriate cooling/warming therapies per order  - Administer medications as ordered  - Instruct and encourage patient and family to use good hand hygiene technique  - Identify and instruct in appropriate isolation precautions for identified infection/condition  Outcome: Progressing     Problem: Knowledge Deficit  Goal: Patient/family/caregiver demonstrates understanding of disease process, treatment plan, medications, and discharge instructions  Description: Complete learning assessment and assess knowledge base    Interventions:  - Provide teaching at level of understanding  - Provide teaching via preferred learning methods  Outcome: Progressing

## 2021-08-14 LAB
BACTERIA CATH TIP CULT: ABNORMAL

## 2021-08-15 LAB
BACTERIA BLD CULT: NORMAL
BACTERIA BLD CULT: NORMAL

## 2021-08-16 ENCOUNTER — HOSPITAL ENCOUNTER (OUTPATIENT)
Dept: INFUSION CENTER | Facility: HOSPITAL | Age: 66
Discharge: HOME | End: 2021-08-16
Payer: MEDICARE

## 2021-08-16 VITALS
SYSTOLIC BLOOD PRESSURE: 168 MMHG | HEART RATE: 87 BPM | TEMPERATURE: 96.9 F | OXYGEN SATURATION: 99 % | DIASTOLIC BLOOD PRESSURE: 89 MMHG

## 2021-08-16 PROCEDURE — 96361 HYDRATE IV INFUSION ADD-ON: CPT

## 2021-08-16 PROCEDURE — 96360 HYDRATION IV INFUSION INIT: CPT

## 2021-08-16 RX ADMIN — SODIUM CHLORIDE 2000 ML: 0.9 INJECTION, SOLUTION INTRAVENOUS at 08:32

## 2021-08-16 NOTE — PROGRESS NOTES
Patient presented for hydration and stated that the sutures had poked through her PICC dressing  She had placed a sterile gauze and tegaderm dressing over the sutures  PICC dressing changed, due to chlorhexidine allergy, site cleaned with alcohol  Patient stated that the hospital had applied a chlorhexidine dressing after PICC placement and that she has not had an issue with it so far  Chlorhexidine dressing applied  Discharged in stable condition, declined AVS but aware of next appointment

## 2021-08-16 NOTE — PHYSICIAN ADVISOR
Current patient class: Inpatient  The patient is currently on Hospital Day: 3 at [address]      The patient was admitted to the hospital at  1:33 PM on 8/11/21 for the following diagnosis:  Vomiting [R11 10]  Abdominal pain [R10 9]  Nausea and vomiting [R11 2]  Sepsis ([address] ) [A41 9]       There is documentation in the medical record of an expected length of stay of at least 2 midnights  The patient is therefore expected to satisfy the 2 midnight benchmark and given the 2 midnight presumption is appropriate for INPATIENT ADMISSION  Given this expectation of a satisfying stay, CMS instructs us that the patient is most often appropriate for inpatient admission under part A provided medical necessity is documented in the chart  After review of the relevant documentation, labs, vital signs and test results, the patient is appropriate for INPATIENT ADMISSION  Admission to the hospital as an inpatient is a complex decision making process which requires the practitioner to consider the patients presenting complaint, history and physical examination and all relevant testing  With this in mind, in this case, the patient was deemed appropriate for INPATIENT ADMISSION  After review of the documentation and testing available at the time of the admission I concur with this clinical determination of medical necessity  Rationale is as follows: The patient is a 76 yrs old Female who presented to the ED at 8/10/2021  3:47 AM with a chief complaint of Vomiting and Pain Patient noted to be febrile to 102 7 on admission with tachycardia to 149  Patient past medical history of hypothyroidism, GERD, asthma, postural-orthostatic tachycardia syndrome, anxiety  Patient found to meet sepsis criteria, started on IV fluids and IV Zosyn  This patient has PICC line in place for infusion therapy for postural orthostatic tachycardia syndrome    The PICC line was pulled in the ED, catheter tip was cultured  Imaging from the emergency room showed stable hydronephrosis of the right kidney, did not identify other infectious processes in the chest abdomen or pelvis  Chest x-ray was normal   This patient had leukocytosis on original lab work  The patient also had elevated liver enzymes on CMP  She was evaluated with ultrasound of the abdomen which showed no hydronephrosis, also showed no other abnormality in the abdomen or the liver, gallbladder or pancreas  The PICC catheter tip culture started growing Gram-negative rods as well as Staphylococcus coagulase negative  Blood cultures are negative at 48 hours  This patient had new PICC placed by IR on 08/12 successfully  She was treated with IVF and IV zosyn throughout her hospital course  She remained afebrile and was discharged home        The patients vitals on arrival were   ED Triage Vitals [08/10/21 0352]   Temperature Pulse Respirations Blood Pressure SpO2   (!) 102 7 °F (39 3 °C) (!) 149 18 137/84 96 %      Temp Source Heart Rate Source Patient Position - Orthostatic VS BP Location FiO2 (%)   Oral Monitor Lying Left arm --      Pain Score       Worst Possible Pain           Past Medical History:   Diagnosis Date    Allergic rhinitis     Anxiety     Asthma     Back pain     Cardiac disease     Cardiopathy     EF 45%    Cough     Diverticulitis     Factor V Leiden (HCC)     Fibromyalgia     GERD (gastroesophageal reflux disease)     Hashimoto's thyroiditis     Hx of degenerative disc disease     Hypotension     pots - postural orthostatic hypotension    Interstitial cystitis     Irregular heart beat     LBBB    Irritable bowel syndrome     Migraines     Mitral valve disease     "thickening"    Myocardial infarction [facility])     possible but not sure when    Neuropathy     bilateral legs    Osteoporosis     Postural orthostatic tachycardia syndrome     must drink a lot of water and salt    Rheumatic fever 1967    Scoliosis     Sjogren's syndrome ([facility] [address] )     TMJ (dislocation of temporomandibular joint)      Past Surgical History:   Procedure Laterality Date    ABLATION MICROWAVE Left     lumbar area    BACK SURGERY  10/1998     SECTION  1992    CHOLECYSTECTOMY  2016    COLONOSCOPY  2016   [facility] [address]    EGD     [facility]    removal of bone and neuroma    HYSTERECTOMY  1997    age 54  PRICE ooph    IR PICC PLACEMENT SINGLE LUMEN  10/2/2020    IR PICC PLACEMENT SINGLE LUMEN  2021    LAPAROSCOPY  1996    endometriosis    MD EGD TRANSORAL BIOPSY SINGLE/MULTIPLE N/A 2019    Procedure: ESOPHAGOGASTRODUODENOSCOPY (EGD) with multiple bx and dilation;  Surgeon: Mirian Alex MD;  Location: [address] [place] [address] GI LAB; Service: Gastroenterology    TUNNELED VENOUS CATHETER PLACEMENT             Consults have been placed to:   None    Vitals:    21 1935 21 2255 21 0729 21 0755   BP:  150/69  146/73   BP Location:    Right arm   Pulse:  90  77   Resp:  18  18   Temp:  (!) 97 4 °F (36 3 °C)  97 5 °F (36 4 °C)   TempSrc:  Temporal  Temporal   SpO2: 100% 99% 98% 97%   Weight:       Height:           Most recent labs:    No results for input(s): WBC, HGB, HCT, PLT, K, NA, CALCIUM, BUN, CREATININE, LIPASE, AMYLASE, INR, TROPONINI, CKTOTAL, AST, ALT, ALKPHOS, BILITOT in the last 72 hours  Scheduled Meds:  Facility-Administered Medications Ordered in Other Encounters   Medication Dose Route Frequency Provider Last Rate    sodium chloride  2,000 mL Intravenous Once Gray Raman MD 2,000 mL (21 [id number])     Continuous Infusions:No current facility-administered medications for this encounter  PRN Meds:      Surgical procedures (if appropriate):

## 2021-08-19 ENCOUNTER — HOSPITAL ENCOUNTER (OUTPATIENT)
Dept: INFUSION CENTER | Facility: HOSPITAL | Age: 66
Discharge: HOME | End: 2021-08-19
Payer: MEDICARE

## 2021-08-19 VITALS
TEMPERATURE: 97.3 F | RESPIRATION RATE: 18 BRPM | SYSTOLIC BLOOD PRESSURE: 140 MMHG | HEART RATE: 75 BPM | DIASTOLIC BLOOD PRESSURE: 87 MMHG

## 2021-08-19 PROCEDURE — 96360 HYDRATION IV INFUSION INIT: CPT

## 2021-08-19 PROCEDURE — 96361 HYDRATE IV INFUSION ADD-ON: CPT

## 2021-08-19 RX ORDER — CEFDINIR 300 MG/1
300 CAPSULE ORAL EVERY 12 HOURS SCHEDULED
COMMUNITY
Start: 2021-08-18 | End: 2021-08-25

## 2021-08-19 RX ADMIN — SODIUM CHLORIDE 2000 ML: 0.9 INJECTION, SOLUTION INTRAVENOUS at 08:09

## 2021-08-19 NOTE — PROGRESS NOTES
Pt tolerated IV hydration well without incident and was discharged in stable condition  Aware of next infusion appointment tomorrow at [facility] infusion and AVS declined

## 2021-08-20 ENCOUNTER — HOSPITAL ENCOUNTER (OUTPATIENT)
Dept: INFUSION CENTER | Facility: HOSPITAL | Age: 66
Discharge: HOME | End: 2021-08-20
Payer: MEDICARE

## 2021-08-20 VITALS
SYSTOLIC BLOOD PRESSURE: 169 MMHG | HEART RATE: 80 BPM | DIASTOLIC BLOOD PRESSURE: 81 MMHG | TEMPERATURE: 97.4 F | RESPIRATION RATE: 16 BRPM

## 2021-08-20 PROCEDURE — 96361 HYDRATE IV INFUSION ADD-ON: CPT

## 2021-08-20 PROCEDURE — 96360 HYDRATION IV INFUSION INIT: CPT

## 2021-08-20 RX ADMIN — SODIUM CHLORIDE 2000 ML: 0.9 INJECTION, SOLUTION INTRAVENOUS at 08:01

## 2021-08-20 NOTE — PLAN OF CARE
Problem: INFECTION - ADULT  Goal: Absence or prevention of progression during hospitalization  Description: INTERVENTIONS:  - Assess and monitor for signs and symptoms of infection  - Monitor lab/diagnostic results  - Monitor all insertion sites, i e  indwelling lines, tubes, and drains  - Monitor endotracheal if appropriate and nasal secretions for changes in amount and color  - [place] appropriate cooling/warming therapies per order  - Administer medications as ordered  - Instruct and encourage patient and family to use good hand hygiene technique  - Identify and instruct in appropriate isolation precautions for identified infection/condition  Outcome: Progressing     Problem: Knowledge Deficit  Goal: Patient/family/caregiver demonstrates understanding of disease process, treatment plan, medications, and discharge instructions  Description: Complete learning assessment and assess knowledge base    Interventions:  - Provide teaching at level of understanding  - Provide teaching via preferred learning methods  Outcome: Progressing

## 2021-08-23 ENCOUNTER — PATIENT OUTREACH (OUTPATIENT)
Dept: CASE MANAGEMENT | Facility: HOSPITAL | Age: 66
End: 2021-08-23

## 2021-08-23 ENCOUNTER — HOSPITAL ENCOUNTER (OUTPATIENT)
Dept: INFUSION CENTER | Facility: HOSPITAL | Age: 66
Discharge: HOME | End: 2021-08-23
Payer: MEDICARE

## 2021-08-23 VITALS
HEART RATE: 84 BPM | SYSTOLIC BLOOD PRESSURE: 156 MMHG | OXYGEN SATURATION: 97 % | DIASTOLIC BLOOD PRESSURE: 90 MMHG | RESPIRATION RATE: 16 BRPM | TEMPERATURE: 98.8 F

## 2021-08-23 PROCEDURE — 96360 HYDRATION IV INFUSION INIT: CPT

## 2021-08-23 PROCEDURE — 96361 HYDRATE IV INFUSION ADD-ON: CPT

## 2021-08-23 RX ADMIN — SODIUM CHLORIDE 2000 ML: 0.9 INJECTION, SOLUTION INTRAVENOUS at 08:30

## 2021-08-23 NOTE — PROGRESS NOTES
Oncology LSW was approached by infusion staff who explained that PT had reported that her saline IV that she gets three times per week for six hours each time was not covered by her insurance  LSW explained that, while she was not able to directly assist PT due to her not being a cancer patient, LSW did stated that she would reach out to the [address] [place] [address] and request that she be mailed a pauline care application  LSW also explained that PT could look into the PACE program for medication coverage assistance  LSW emailed Erving Duverney with the [address] [place] [address] and requested that PT be sent a pauline care application

## 2021-08-26 ENCOUNTER — HOSPITAL ENCOUNTER (OUTPATIENT)
Dept: INFUSION CENTER | Facility: HOSPITAL | Age: 66
Discharge: HOME | End: 2021-08-26

## 2021-08-26 RX ORDER — SODIUM CHLORIDE 9 MG/ML
333.33 INJECTION, SOLUTION INTRAVENOUS ONCE
Status: DISCONTINUED | OUTPATIENT
Start: 2021-08-27 | End: 2021-08-31 | Stop reason: HOSPADM

## 2021-08-27 ENCOUNTER — HOSPITAL ENCOUNTER (OUTPATIENT)
Dept: INFUSION CENTER | Facility: HOSPITAL | Age: 66
Discharge: HOME | End: 2021-08-27

## 2021-08-27 RX ORDER — SODIUM CHLORIDE 9 MG/ML
333 INJECTION, SOLUTION INTRAVENOUS ONCE
Status: COMPLETED | OUTPATIENT
Start: 2021-08-30 | End: 2021-08-30

## 2021-08-30 ENCOUNTER — HOSPITAL ENCOUNTER (OUTPATIENT)
Dept: INFUSION CENTER | Facility: HOSPITAL | Age: 66
Discharge: HOME | End: 2021-08-30
Payer: MEDICARE

## 2021-08-30 PROCEDURE — 96361 HYDRATE IV INFUSION ADD-ON: CPT

## 2021-08-30 PROCEDURE — 96360 HYDRATION IV INFUSION INIT: CPT

## 2021-08-30 RX ADMIN — SODIUM CHLORIDE 333 ML/HR: 0.9 INJECTION, SOLUTION INTRAVENOUS at 09:11

## 2021-08-30 NOTE — PROGRESS NOTES
Pt arrived to dept today for 2 liters NSS  PICC line redressed however pt refused chlorahexadine dressing as she has pruritis when using  Is aware of benefits to chg dressings  PICC line flushed well  Good blood return noted  Tolerated fluids well  Out of room to void without issue   Discharged ambulatory deferring avs

## 2021-09-01 ENCOUNTER — HOSPITAL ENCOUNTER (OUTPATIENT)
Dept: INFUSION CENTER | Facility: HOSPITAL | Age: 66
Discharge: HOME | End: 2021-09-01

## 2021-09-02 RX ORDER — SODIUM CHLORIDE 9 MG/ML
333.33 INJECTION, SOLUTION INTRAVENOUS ONCE
Status: COMPLETED | OUTPATIENT
Start: 2021-09-03 | End: 2021-09-03

## 2021-09-03 ENCOUNTER — HOSPITAL ENCOUNTER (OUTPATIENT)
Dept: INFUSION CENTER | Facility: HOSPITAL | Age: 66
Discharge: HOME | End: 2021-09-03
Payer: MEDICARE

## 2021-09-03 VITALS
OXYGEN SATURATION: 97 % | DIASTOLIC BLOOD PRESSURE: 78 MMHG | RESPIRATION RATE: 16 BRPM | SYSTOLIC BLOOD PRESSURE: 140 MMHG | HEART RATE: 103 BPM | TEMPERATURE: 97.6 F

## 2021-09-03 PROCEDURE — 96360 HYDRATION IV INFUSION INIT: CPT

## 2021-09-03 PROCEDURE — 96361 HYDRATE IV INFUSION ADD-ON: CPT

## 2021-09-03 RX ORDER — SODIUM CHLORIDE 9 MG/ML
333.33 INJECTION, SOLUTION INTRAVENOUS ONCE
Status: COMPLETED | OUTPATIENT
Start: 2021-09-07 | End: 2021-09-07

## 2021-09-03 RX ADMIN — SODIUM CHLORIDE 333.33 ML/HR: 0.9 INJECTION, SOLUTION INTRAVENOUS at 08:49

## 2021-09-03 NOTE — PROGRESS NOTES
Patient tolerated 2 liters NSS hydration without issue  Discharged in stable condition, declined AVS but aware of next appointment

## 2021-09-07 ENCOUNTER — HOSPITAL ENCOUNTER (OUTPATIENT)
Dept: INFUSION CENTER | Facility: HOSPITAL | Age: 66
Discharge: HOME | End: 2021-09-07
Payer: MEDICARE

## 2021-09-07 VITALS
HEART RATE: 82 BPM | SYSTOLIC BLOOD PRESSURE: 157 MMHG | TEMPERATURE: 98.6 F | RESPIRATION RATE: 16 BRPM | OXYGEN SATURATION: 97 % | DIASTOLIC BLOOD PRESSURE: 71 MMHG

## 2021-09-07 PROCEDURE — 96361 HYDRATE IV INFUSION ADD-ON: CPT

## 2021-09-07 PROCEDURE — 96360 HYDRATION IV INFUSION INIT: CPT

## 2021-09-07 RX ADMIN — SODIUM CHLORIDE 333.33 ML/HR: 0.9 INJECTION, SOLUTION INTRAVENOUS at 08:19

## 2021-09-07 NOTE — PROGRESS NOTES
Hydrated for 6 hours as per orders  PICC line also redressed as per policy  Excellent blood return noted   Discharged ambulatory deferring avs

## 2021-09-08 RX ORDER — SODIUM CHLORIDE 9 MG/ML
333.33 INJECTION, SOLUTION INTRAVENOUS ONCE
Status: COMPLETED | OUTPATIENT
Start: 2021-09-09 | End: 2021-09-09

## 2021-09-09 ENCOUNTER — HOSPITAL ENCOUNTER (OUTPATIENT)
Dept: INFUSION CENTER | Facility: HOSPITAL | Age: 66
Discharge: HOME | End: 2021-09-09
Payer: MEDICARE

## 2021-09-09 VITALS
RESPIRATION RATE: 16 BRPM | DIASTOLIC BLOOD PRESSURE: 63 MMHG | OXYGEN SATURATION: 98 % | SYSTOLIC BLOOD PRESSURE: 138 MMHG | HEART RATE: 91 BPM | TEMPERATURE: 97.4 F

## 2021-09-09 PROCEDURE — 96360 HYDRATION IV INFUSION INIT: CPT

## 2021-09-09 PROCEDURE — 96361 HYDRATE IV INFUSION ADD-ON: CPT

## 2021-09-09 RX ORDER — SODIUM CHLORIDE 9 MG/ML
333.33 INJECTION, SOLUTION INTRAVENOUS ONCE
Status: COMPLETED | OUTPATIENT
Start: 2021-09-10 | End: 2021-09-10

## 2021-09-09 RX ADMIN — SODIUM CHLORIDE 333.33 ML/HR: 0.9 INJECTION, SOLUTION INTRAVENOUS at 08:14

## 2021-09-10 ENCOUNTER — HOSPITAL ENCOUNTER (OUTPATIENT)
Dept: INFUSION CENTER | Facility: HOSPITAL | Age: 66
Discharge: HOME | End: 2021-09-10
Payer: MEDICARE

## 2021-09-10 VITALS
RESPIRATION RATE: 16 BRPM | OXYGEN SATURATION: 100 % | HEART RATE: 96 BPM | SYSTOLIC BLOOD PRESSURE: 143 MMHG | TEMPERATURE: 97.8 F | DIASTOLIC BLOOD PRESSURE: 73 MMHG

## 2021-09-10 PROCEDURE — 96360 HYDRATION IV INFUSION INIT: CPT

## 2021-09-10 PROCEDURE — 96361 HYDRATE IV INFUSION ADD-ON: CPT

## 2021-09-10 RX ORDER — SODIUM CHLORIDE 9 MG/ML
333.33 INJECTION, SOLUTION INTRAVENOUS ONCE
Status: COMPLETED | OUTPATIENT
Start: 2021-09-13 | End: 2021-09-13

## 2021-09-10 RX ADMIN — SODIUM CHLORIDE 333.33 ML/HR: 0.9 INJECTION, SOLUTION INTRAVENOUS at 08:18

## 2021-09-13 ENCOUNTER — HOSPITAL ENCOUNTER (OUTPATIENT)
Dept: INFUSION CENTER | Facility: HOSPITAL | Age: 66
Discharge: HOME | End: 2021-09-13
Payer: MEDICARE

## 2021-09-13 VITALS
TEMPERATURE: 97.5 F | OXYGEN SATURATION: 96 % | RESPIRATION RATE: 16 BRPM | SYSTOLIC BLOOD PRESSURE: 152 MMHG | HEART RATE: 88 BPM | DIASTOLIC BLOOD PRESSURE: 85 MMHG

## 2021-09-13 PROCEDURE — 96360 HYDRATION IV INFUSION INIT: CPT

## 2021-09-13 PROCEDURE — 96361 HYDRATE IV INFUSION ADD-ON: CPT

## 2021-09-13 RX ADMIN — SODIUM CHLORIDE 333.33 ML/HR: 0.9 INJECTION, SOLUTION INTRAVENOUS at 08:55

## 2021-09-13 NOTE — PROGRESS NOTES
^ hour hydration infused as ordered  No recent changes in health  PICC line redressed without chlorahexadine dressing per pt request  Good blood return noted   Discharged ambulatory deferring avs

## 2021-09-14 RX ORDER — SODIUM CHLORIDE 9 MG/ML
333.33 INJECTION, SOLUTION INTRAVENOUS ONCE
Status: DISCONTINUED | OUTPATIENT
Start: 2021-09-15 | End: 2021-09-19 | Stop reason: HOSPADM

## 2021-09-15 ENCOUNTER — HOSPITAL ENCOUNTER (OUTPATIENT)
Dept: INFUSION CENTER | Facility: HOSPITAL | Age: 66
Discharge: HOME | End: 2021-09-15

## 2021-09-15 PROCEDURE — U0005 INFEC AGEN DETEC AMPLI PROBE: HCPCS | Performed by: INTERNAL MEDICINE

## 2021-09-15 PROCEDURE — U0003 INFECTIOUS AGENT DETECTION BY NUCLEIC ACID (DNA OR RNA); SEVERE ACUTE RESPIRATORY SYNDROME CORONAVIRUS 2 (SARS-COV-2) (CORONAVIRUS DISEASE [COVID-19]), AMPLIFIED PROBE TECHNIQUE, MAKING USE OF HIGH THROUGHPUT TECHNOLOGIES AS DESCRIBED BY CMS-2020-01-R: HCPCS | Performed by: INTERNAL MEDICINE

## 2021-09-16 RX ORDER — SODIUM CHLORIDE 9 MG/ML
333.3 INJECTION, SOLUTION INTRAVENOUS ONCE
Status: DISCONTINUED | OUTPATIENT
Start: 2021-09-17 | End: 2021-09-21 | Stop reason: HOSPADM

## 2021-09-17 ENCOUNTER — HOSPITAL ENCOUNTER (OUTPATIENT)
Dept: INFUSION CENTER | Facility: HOSPITAL | Age: 66
Discharge: HOME | End: 2021-09-17

## 2021-09-17 RX ORDER — SODIUM CHLORIDE 9 MG/ML
333.33 INJECTION, SOLUTION INTRAVENOUS ONCE
Status: DISCONTINUED | OUTPATIENT
Start: 2021-09-20 | End: 2021-09-24 | Stop reason: HOSPADM

## 2021-09-20 ENCOUNTER — APPOINTMENT (EMERGENCY)
Dept: RADIOLOGY | Facility: HOSPITAL | Age: 66
DRG: 948 | End: 2021-09-20
Payer: MEDICARE

## 2021-09-20 ENCOUNTER — HOSPITAL ENCOUNTER (OUTPATIENT)
Dept: INFUSION CENTER | Facility: HOSPITAL | Age: 66
Discharge: HOME | End: 2021-09-20

## 2021-09-20 ENCOUNTER — HOSPITAL ENCOUNTER (INPATIENT)
Facility: HOSPITAL | Age: 66
LOS: 1 days | Discharge: HOME | DRG: 948 | End: 2021-09-21
Attending: EMERGENCY MEDICINE | Admitting: INTERNAL MEDICINE
Payer: MEDICARE

## 2021-09-20 DIAGNOSIS — R68.83 CHILLS: ICD-10-CM

## 2021-09-20 DIAGNOSIS — R53.1 GENERALIZED WEAKNESS: Primary | ICD-10-CM

## 2021-09-20 DIAGNOSIS — J06.9 URI (UPPER RESPIRATORY INFECTION): ICD-10-CM

## 2021-09-20 PROBLEM — R05.9 COUGH: Status: ACTIVE | Noted: 2021-09-20

## 2021-09-20 LAB
ALBUMIN SERPL BCP-MCNC: 4.6 G/DL (ref 3.5–5.7)
ALP SERPL-CCNC: 108 U/L (ref 55–165)
ALT SERPL W P-5'-P-CCNC: 35 U/L (ref 7–52)
ANION GAP SERPL CALCULATED.3IONS-SCNC: 7 MMOL/L (ref 4–13)
APTT PPP: 29 SECONDS (ref 23–37)
AST SERPL W P-5'-P-CCNC: 42 U/L (ref 13–39)
BASOPHILS # BLD AUTO: 0.1 THOUSANDS/ΜL (ref 0–0.1)
BASOPHILS NFR BLD AUTO: 1 % (ref 0–2)
BILIRUB SERPL-MCNC: 0.5 MG/DL (ref 0.2–1)
BILIRUB UR QL STRIP: NEGATIVE
BUN SERPL-MCNC: 20 MG/DL (ref 7–25)
CALCIUM SERPL-MCNC: 9.9 MG/DL (ref 8.6–10.5)
CHLORIDE SERPL-SCNC: 102 MMOL/L (ref 98–107)
CLARITY UR: CLEAR
CO2 SERPL-SCNC: 30 MMOL/L (ref 21–31)
COLOR UR: YELLOW
CREAT SERPL-MCNC: 1.05 MG/DL (ref 0.6–1.2)
D DIMER PPP FEU-MCNC: 0.32 UG/ML FEU
EOSINOPHIL # BLD AUTO: 0.2 THOUSAND/ΜL (ref 0–0.61)
EOSINOPHIL NFR BLD AUTO: 2 % (ref 0–5)
ERYTHROCYTE [DISTWIDTH] IN BLOOD BY AUTOMATED COUNT: 13.6 % (ref 11.5–14.5)
GFR SERPL CREATININE-BSD FRML MDRD: 55 ML/MIN/1.73SQ M
GLUCOSE SERPL-MCNC: 83 MG/DL (ref 65–99)
GLUCOSE UR STRIP-MCNC: NEGATIVE MG/DL
HCT VFR BLD AUTO: 43.1 % (ref 42–47)
HGB BLD-MCNC: 14.4 G/DL (ref 12–16)
HGB UR QL STRIP.AUTO: NEGATIVE
INR PPP: 0.92 (ref 0.84–1.19)
KETONES UR STRIP-MCNC: NEGATIVE MG/DL
LACTATE SERPL-SCNC: 0.5 MMOL/L (ref 0.5–2)
LEUKOCYTE ESTERASE UR QL STRIP: NEGATIVE
LYMPHOCYTES # BLD AUTO: 2.3 THOUSANDS/ΜL (ref 0.6–4.47)
LYMPHOCYTES NFR BLD AUTO: 27 % (ref 21–51)
MCH RBC QN AUTO: 29.6 PG (ref 26–34)
MCHC RBC AUTO-ENTMCNC: 33.3 G/DL (ref 31–37)
MCV RBC AUTO: 89 FL (ref 81–99)
MONOCYTES # BLD AUTO: 0.6 THOUSAND/ΜL (ref 0.17–1.22)
MONOCYTES NFR BLD AUTO: 7 % (ref 2–12)
NEUTROPHILS # BLD AUTO: 5.4 THOUSANDS/ΜL (ref 1.4–6.5)
NEUTS SEG NFR BLD AUTO: 64 % (ref 42–75)
NITRITE UR QL STRIP: NEGATIVE
PH UR STRIP.AUTO: 6 [PH]
PLATELET # BLD AUTO: 288 THOUSANDS/UL (ref 149–390)
PMV BLD AUTO: 8 FL (ref 8.6–11.7)
POTASSIUM SERPL-SCNC: 4.6 MMOL/L (ref 3.5–5.5)
PROT SERPL-MCNC: 7.3 G/DL (ref 6.4–8.9)
PROT UR STRIP-MCNC: NEGATIVE MG/DL
PROTHROMBIN TIME: 12.5 SECONDS (ref 11.6–14.5)
RBC # BLD AUTO: 4.85 MILLION/UL (ref 3.9–5.2)
SARS-COV-2 RNA RESP QL NAA+PROBE: NEGATIVE
SODIUM SERPL-SCNC: 139 MMOL/L (ref 134–143)
SP GR UR STRIP.AUTO: <=1.005 (ref 1–1.03)
TROPONIN I SERPL-MCNC: <0.03 NG/ML
UROBILINOGEN UR QL STRIP.AUTO: 0.2 E.U./DL
WBC # BLD AUTO: 8.5 THOUSAND/UL (ref 4.8–10.8)

## 2021-09-20 PROCEDURE — 85379 FIBRIN DEGRADATION QUANT: CPT | Performed by: EMERGENCY MEDICINE

## 2021-09-20 PROCEDURE — 94760 N-INVAS EAR/PLS OXIMETRY 1: CPT

## 2021-09-20 PROCEDURE — 85730 THROMBOPLASTIN TIME PARTIAL: CPT | Performed by: EMERGENCY MEDICINE

## 2021-09-20 PROCEDURE — U0005 INFEC AGEN DETEC AMPLI PROBE: HCPCS | Performed by: EMERGENCY MEDICINE

## 2021-09-20 PROCEDURE — 83605 ASSAY OF LACTIC ACID: CPT | Performed by: EMERGENCY MEDICINE

## 2021-09-20 PROCEDURE — 36415 COLL VENOUS BLD VENIPUNCTURE: CPT | Performed by: EMERGENCY MEDICINE

## 2021-09-20 PROCEDURE — 99285 EMERGENCY DEPT VISIT HI MDM: CPT | Performed by: EMERGENCY MEDICINE

## 2021-09-20 PROCEDURE — 81003 URINALYSIS AUTO W/O SCOPE: CPT | Performed by: EMERGENCY MEDICINE

## 2021-09-20 PROCEDURE — 94664 DEMO&/EVAL PT USE INHALER: CPT

## 2021-09-20 PROCEDURE — 99222 1ST HOSP IP/OBS MODERATE 55: CPT | Performed by: INTERNAL MEDICINE

## 2021-09-20 PROCEDURE — 84484 ASSAY OF TROPONIN QUANT: CPT | Performed by: EMERGENCY MEDICINE

## 2021-09-20 PROCEDURE — 85610 PROTHROMBIN TIME: CPT | Performed by: EMERGENCY MEDICINE

## 2021-09-20 PROCEDURE — 94640 AIRWAY INHALATION TREATMENT: CPT

## 2021-09-20 PROCEDURE — 87040 BLOOD CULTURE FOR BACTERIA: CPT | Performed by: EMERGENCY MEDICINE

## 2021-09-20 PROCEDURE — 85025 COMPLETE CBC W/AUTO DIFF WBC: CPT | Performed by: EMERGENCY MEDICINE

## 2021-09-20 PROCEDURE — 93005 ELECTROCARDIOGRAM TRACING: CPT

## 2021-09-20 PROCEDURE — 96365 THER/PROPH/DIAG IV INF INIT: CPT

## 2021-09-20 PROCEDURE — 71045 X-RAY EXAM CHEST 1 VIEW: CPT

## 2021-09-20 PROCEDURE — 84145 PROCALCITONIN (PCT): CPT | Performed by: EMERGENCY MEDICINE

## 2021-09-20 PROCEDURE — U0003 INFECTIOUS AGENT DETECTION BY NUCLEIC ACID (DNA OR RNA); SEVERE ACUTE RESPIRATORY SYNDROME CORONAVIRUS 2 (SARS-COV-2) (CORONAVIRUS DISEASE [COVID-19]), AMPLIFIED PROBE TECHNIQUE, MAKING USE OF HIGH THROUGHPUT TECHNOLOGIES AS DESCRIBED BY CMS-2020-01-R: HCPCS | Performed by: EMERGENCY MEDICINE

## 2021-09-20 PROCEDURE — 80053 COMPREHEN METABOLIC PANEL: CPT | Performed by: EMERGENCY MEDICINE

## 2021-09-20 PROCEDURE — 99285 EMERGENCY DEPT VISIT HI MDM: CPT

## 2021-09-20 RX ORDER — LEVALBUTEROL INHALATION SOLUTION 1.25 MG/3ML
1.25 SOLUTION RESPIRATORY (INHALATION) EVERY 6 HOURS PRN
Status: DISCONTINUED | OUTPATIENT
Start: 2021-09-20 | End: 2021-09-20

## 2021-09-20 RX ORDER — HEPARIN SODIUM 5000 [USP'U]/ML
5000 INJECTION, SOLUTION INTRAVENOUS; SUBCUTANEOUS EVERY 8 HOURS SCHEDULED
Status: DISCONTINUED | OUTPATIENT
Start: 2021-09-20 | End: 2021-09-21 | Stop reason: HOSPADM

## 2021-09-20 RX ORDER — OXYBUTYNIN CHLORIDE 5 MG/1
5 TABLET ORAL DAILY
Status: DISCONTINUED | OUTPATIENT
Start: 2021-09-20 | End: 2021-09-21 | Stop reason: HOSPADM

## 2021-09-20 RX ORDER — FEXOFENADINE HCL 180 MG/1
180 TABLET ORAL 2 TIMES DAILY
Status: DISCONTINUED | OUTPATIENT
Start: 2021-09-20 | End: 2021-09-21 | Stop reason: HOSPADM

## 2021-09-20 RX ORDER — DOCUSATE SODIUM 100 MG/1
100 CAPSULE, LIQUID FILLED ORAL DAILY PRN
Status: DISCONTINUED | OUTPATIENT
Start: 2021-09-20 | End: 2021-09-21 | Stop reason: HOSPADM

## 2021-09-20 RX ORDER — LEVALBUTEROL 1.25 MG/.5ML
1.25 SOLUTION, CONCENTRATE RESPIRATORY (INHALATION)
Status: DISCONTINUED | OUTPATIENT
Start: 2021-09-20 | End: 2021-09-21 | Stop reason: HOSPADM

## 2021-09-20 RX ORDER — CYCLOBENZAPRINE HCL 10 MG
5 TABLET ORAL 2 TIMES DAILY PRN
Status: DISCONTINUED | OUTPATIENT
Start: 2021-09-20 | End: 2021-09-21 | Stop reason: HOSPADM

## 2021-09-20 RX ORDER — MIDODRINE HYDROCHLORIDE 5 MG/1
15 TABLET ORAL
Status: DISCONTINUED | OUTPATIENT
Start: 2021-09-20 | End: 2021-09-21 | Stop reason: HOSPADM

## 2021-09-20 RX ORDER — SODIUM CHLORIDE FOR INHALATION 0.9 %
3 VIAL, NEBULIZER (ML) INHALATION
Status: DISCONTINUED | OUTPATIENT
Start: 2021-09-20 | End: 2021-09-21 | Stop reason: HOSPADM

## 2021-09-20 RX ORDER — IPRATROPIUM BROMIDE 21 UG/1
2 SPRAY, METERED NASAL 4 TIMES DAILY PRN
Status: DISCONTINUED | OUTPATIENT
Start: 2021-09-20 | End: 2021-09-21 | Stop reason: HOSPADM

## 2021-09-20 RX ORDER — HYDROCODONE BITARTRATE AND ACETAMINOPHEN 5; 325 MG/1; MG/1
0.5 TABLET ORAL EVERY 12 HOURS PRN
Status: DISCONTINUED | OUTPATIENT
Start: 2021-09-20 | End: 2021-09-21 | Stop reason: HOSPADM

## 2021-09-20 RX ORDER — PANTOPRAZOLE SODIUM 40 MG/1
40 TABLET, DELAYED RELEASE ORAL
Status: DISCONTINUED | OUTPATIENT
Start: 2021-09-21 | End: 2021-09-21 | Stop reason: HOSPADM

## 2021-09-20 RX ORDER — SODIUM CHLORIDE FOR INHALATION 0.9 %
3 VIAL, NEBULIZER (ML) INHALATION EVERY 6 HOURS PRN
Status: DISCONTINUED | OUTPATIENT
Start: 2021-09-20 | End: 2021-09-20

## 2021-09-20 RX ORDER — FLUTICASONE PROPIONATE 110 UG/1
1 AEROSOL, METERED RESPIRATORY (INHALATION) EVERY 12 HOURS SCHEDULED
Status: DISCONTINUED | OUTPATIENT
Start: 2021-09-20 | End: 2021-09-20

## 2021-09-20 RX ORDER — BUDESONIDE 0.5 MG/2ML
0.5 INHALANT ORAL 2 TIMES DAILY
Status: DISCONTINUED | OUTPATIENT
Start: 2021-09-20 | End: 2021-09-21

## 2021-09-20 RX ORDER — LEVALBUTEROL 1.25 MG/.5ML
1.25 SOLUTION, CONCENTRATE RESPIRATORY (INHALATION) EVERY 6 HOURS PRN
Status: DISCONTINUED | OUTPATIENT
Start: 2021-09-20 | End: 2021-09-20

## 2021-09-20 RX ORDER — FAMOTIDINE 20 MG/1
10 TABLET, FILM COATED ORAL DAILY
Status: DISCONTINUED | OUTPATIENT
Start: 2021-09-21 | End: 2021-09-21 | Stop reason: HOSPADM

## 2021-09-20 RX ORDER — LEVALBUTEROL INHALATION SOLUTION 0.63 MG/3ML
0.63 SOLUTION RESPIRATORY (INHALATION) EVERY 6 HOURS PRN
Status: DISCONTINUED | OUTPATIENT
Start: 2021-09-20 | End: 2021-09-21 | Stop reason: HOSPADM

## 2021-09-20 RX ADMIN — LEVALBUTEROL HYDROCHLORIDE 1.25 MG: 1.25 SOLUTION, CONCENTRATE RESPIRATORY (INHALATION) at 21:50

## 2021-09-20 RX ADMIN — PIPERACILLIN AND TAZOBACTAM 3.38 G: 3; .375 INJECTION, POWDER, FOR SOLUTION INTRAVENOUS at 18:14

## 2021-09-20 RX ADMIN — OXYBUTYNIN CHLORIDE 5 MG: 5 TABLET ORAL at 22:30

## 2021-09-20 RX ADMIN — SODIUM CHLORIDE 1000 ML: 0.9 INJECTION, SOLUTION INTRAVENOUS at 17:46

## 2021-09-20 RX ADMIN — BUDESONIDE 0.5 MG: 0.5 INHALANT RESPIRATORY (INHALATION) at 21:50

## 2021-09-20 NOTE — ASSESSMENT & PLAN NOTE
Patient has history of Pott's disease and has a PICC line in place for infusions she receives 3 times a week  -admission from August reviewed, noted Pseudomonas bacteremia from a culture from her PICC line  -patient stated she has not been feeling well and has been on 3 weeks of oral antibiotics and is not improving  She states that her PCP told to come to the emergency room for evaluation    She was given a dose of Zosyn and admitted, blood cultures were drawn prior to antibiotic administration     -as the patient has absolutely no signs of clinical infection or any lab abnormalities, I will not treat the patient with antibiotic  -I will observe the patient off antibiotics and wait for blood culture results

## 2021-09-20 NOTE — ED PROVIDER NOTES
History  Chief Complaint   Patient presents with    Cough     Patient is a 41-year-old female with history of anxiety, fibromyalgia, heterozygous factor 5 Leiden, POTS that presents for evaluation of weakness  Patient says that she was diagnosed with Pseudomonas bacteremia off of a culture from her PICC line in August when she was admitted  Patient presented with sepsis at that time and had PICC catheter replaced  Patient has a PICC line in place because she requires 2 L IV infusion 3 times a week secondary to history of POTS  Patient says that she has continued to have generalized weakness, fatigue, rigors, chills, night sweats since the discharge from the hospital   This is been worsening over the past 2 weeks  Patient has been on 3 courses of [address] by her PCP she tells me  I am unable to confirm that as the patient's PCP is out of the office right now  Patient also says that she has frequent episodes of dyspnea that is exertional in nature with sharp/stabbing chest pain particularly at night  Patient takes her albuterol with some relief  Patient is heterozygous factor 5 Leiden, otherwise denies PE or DVT risk factors  T-max has been 99 she tells me  Patient has also had nonproductive cough  Essentially, patient is sent in to rule out bacteremia secondary to PICC line  Prior to Admission Medications   Prescriptions Last Dose Informant Patient Reported? Taking?    Alum Hydroxide-Mag Trisilicate (Gaviscon) 85-51 5 MG CHEW   Yes No   Sig: Chew 1 tablet 4 (four) times a day as needed With meals and as needed   Galcanezumab-gnlm (Emgality) 120 MG/ML SOAJ   Yes No   Sig: Inject under the skin every 30 (thirty) days    HYDROcodone-acetaminophen (NORCO) 5-325 mg per tablet  Self Yes No   Sig: Take 0 5 tablets by mouth every 6 (six) hours as needed for pain Allowed 2 pills a day   MULTIPLE VITAMINS-CALCIUM PO  Self Yes No   Sig: Take 1 capsule by mouth every morning    Magnesium 400 MG CAPS  Self Yes No   Sig: Take 1 capsule by mouth 2 (two) times a day    Probiotic Product (PROBIOTIC DAILY PO)  Self Yes No   Sig: Take 1 tablet by mouth daily At lunch   Thyroid, Porcine, POWD  Self Yes No   Sig: Use 32 mg daily   Ubrogepant (Ubrelvy) 100 MG tablet   Yes No   Sig: Take 100 mg by mouth Take 1 tablet (100 mg) one time as needed for migraine  May repeat one additional tablet (100 mg) at least two hours after the first dose  Do not use more than two doses per day, or for more than eight days per month  azelastine (ASTELIN) 0 1 % nasal spray   Yes No   Si sprays into each nostril 2 (two) times a day Use in each nostril as directed    beclomethasone (QVAR) 40 MCG/ACT inhaler  Self Yes No   Sig: Inhale 1 puff daily as needed (only when unable to nebulize pulmicort) Rinse mouth after use  budesonide (PULMICORT) 0 5 mg/2 mL nebulizer solution  Self Yes No   Sig: Take 0 5 mg by nebulization 2 (two) times a day Rinse mouth after use     cholecalciferol (VITAMIN D3) 1,000 units tablet   Yes No   Sig: Take 1,000 Units by mouth daily    cyclobenzaprine (FLEXERIL) 5 mg tablet  Self Yes No   Sig: Take 5 mg by mouth 2 (two) times a day as needed for muscle spasms   docusate sodium (COLACE) 100 mg capsule   Yes No   Sig: Take 100 mg by mouth daily as needed for constipation   famotidine (PEPCID) 10 mg tablet   Yes No   Sig: Take 10 mg by mouth 2 (two) times a day   fexofenadine (ALLEGRA) 180 MG tablet  Self Yes No   Sig: Take 180 mg by mouth 2 (two) times a day    fluticasone (FLONASE) 50 mcg/act nasal spray  Self Yes No   Si sprays into each nostril daily    hydrOXYzine (ATARAX) 10 mg/5 mL syrup  Self Yes No   Sig: Take 20 mg by mouth daily at bedtime    ipratropium (ATROVENT) 0 03 % nasal spray  Self Yes No   Si sprays into each nostril 4 (four) times a day as needed for rhinitis    levalbuterol (XOPENEX HFA) 45 mcg/act inhaler   Yes No   Sig: Inhale 2 puffs every 4 (four) hours as needed (when unable to nebulize)   levalbuterol (XOPENEX) 1 25 mg/3 mL nebulizer solution  Self Yes No   Sig: Take 1 25 mg by nebulization every 6 (six) hours as needed    midodrine (PROAMATINE) 5 mg tablet  Self No No   Sig: TAKE 2 TABS PO IN AM, ONE TAB PO WITH LUNCH AND DINNER   Patient taking differently: 15 mg 3 (three) times a day One hour after thyroid medication, around 1200 and 1600, TAKE 2 TABS PO IN AM, ONE TAB PO WITH LUNCH AND DINNER   omega-3-acid ethyl esters (LOVAZA) 1 g capsule  Self Yes No   Sig: Take 1,600 mg by mouth daily    oxybutynin (DITROPAN) 5 mg tablet  Self Yes No   Sig: Take 2 5 mg by mouth daily at bedtime    oxybutynin (DITROPAN) 5 mg tablet   Yes No   Sig: Take 2 5 mg by mouth daily as needed (overactive bladder) In addition to the HS dose   pantoprazole (PROTONIX) 40 mg tablet  Self Yes No   Sig: Take 40 mg by mouth 2 (two) times a day   polyethylene glycol (MIRALAX) 17 g packet  Self Yes No   Sig: Take 17 g by mouth daily as needed    thyroid (ARMOUR) 32 5 MG tablet  Self Yes No   Sig: Take 32 5 mg by mouth daily      Facility-Administered Medications: None       Past Medical History:   Diagnosis Date    Allergic rhinitis     Anxiety     Asthma     Back pain     Cardiac disease     Cardiopathy     EF 45%    Cough     Diverticulitis     Factor V Leiden (HCC)     Fibromyalgia     GERD (gastroesophageal reflux disease)     Hashimoto's thyroiditis     Hx of degenerative disc disease     Hypotension     pots - postural orthostatic hypotension    Interstitial cystitis     Irregular heart beat     LBBB    Irritable bowel syndrome     Migraines     Mitral valve disease     "thickening"    Myocardial infarction ([address] )     possible but not sure when    Neuropathy     bilateral legs    Osteoporosis     Postural orthostatic tachycardia syndrome     must drink a lot of water and salt    Rheumatic fever 1967    Scoliosis     Sjogren's syndrome (HCC)     TMJ (dislocation of temporomandibular joint)        Past Surgical History:   Procedure Laterality Date    ABLATION MICROWAVE Left     lumbar area    BACK SURGERY  10/1998     SECTION  1992    CHOLECYSTECTOMY  2016    COLONOSCOPY  2016    DILATION AND CURETTAGE OF UTERUS  1996    EGD     [facility]    removal of bone and neuroma    HYSTERECTOMY  1997    age 54  PRICE ooph    IR PICC PLACEMENT SINGLE LUMEN  10/2/2020    IR PICC PLACEMENT SINGLE LUMEN  2021    LAPAROSCOPY  1996    endometriosis    MD EGD TRANSORAL BIOPSY SINGLE/MULTIPLE N/A 2019    Procedure: ESOPHAGOGASTRODUODENOSCOPY (EGD) with multiple bx and dilation;  Surgeon: Ubaldo Libman, MD;  Location: [address] [place] [address] GI LAB; Service: Gastroenterology    TUNNELED VENOUS CATHETER PLACEMENT         Family History   Problem Relation Age of Onset    Cancer Mother         urinary bladder     Thyroid cancer Sister     Colon cancer Maternal Grandfather     Breast cancer Maternal Aunt         over 48 yrs old     Endometrial cancer Maternal Aunt     Multiple myeloma Maternal Aunt     No Known Problems Father     No Known Problems Daughter     Heart attack Sister     No Known Problems Sister     No Known Problems Sister     No Known Problems Sister     No Known Problems Sister     No Known Problems Daughter     Hypertension Paternal Aunt      I have reviewed and agree with the history as documented  E-Cigarette/Vaping    E-Cigarette Use Never User      E-Cigarette/Vaping Substances    Nicotine No     THC No     CBD No     Flavoring No     Other No     Unknown No      Social History     Tobacco Use    Smoking status: Never Smoker    Smokeless tobacco: Never Used   Vaping Use    Vaping Use: Never used   Substance Use Topics    Alcohol use: Never    Drug use: No       Review of Systems   Constitutional: Positive for chills and fatigue  Negative for fever  HENT: Negative for sore throat      Respiratory: Positive for cough and shortness of breath  Cardiovascular: Positive for chest pain  Gastrointestinal: Negative for abdominal pain  Genitourinary: Negative for dysuria  Musculoskeletal: Negative for back pain  Skin: Negative for rash  Neurological: Positive for weakness  Negative for light-headedness  Psychiatric/Behavioral: Negative for agitation  All other systems reviewed and are negative  Physical Exam  Physical Exam  Vitals reviewed  Constitutional:       General: She is not in acute distress  Appearance: She is well-developed  HENT:      Head: Normocephalic  Eyes:      Pupils: Pupils are equal, round, and reactive to light  Cardiovascular:      Rate and Rhythm: Normal rate and regular rhythm  Heart sounds: Normal heart sounds  Pulmonary:      Effort: Pulmonary effort is normal       Breath sounds: Normal breath sounds  Abdominal:      General: Bowel sounds are normal  There is no distension  Palpations: Abdomen is soft  Tenderness: There is no abdominal tenderness  There is no guarding  Musculoskeletal:         General: No tenderness or deformity  Normal range of motion  Cervical back: Normal range of motion and neck supple  Skin:     General: Skin is warm and dry  Capillary Refill: Capillary refill takes less than 2 seconds  Neurological:      Mental Status: She is alert and oriented to person, place, and time  Cranial Nerves: No cranial nerve deficit  Sensory: No sensory deficit  Psychiatric:         Behavior: Behavior normal          Thought Content:  Thought content normal          Judgment: Judgment normal          Vital Signs  ED Triage Vitals [09/20/21 1633]   Temperature Pulse Respirations Blood Pressure SpO2   98 6 °F (37 °C) (!) 112 19 118/80 98 %      Temp Source Heart Rate Source Patient Position - Orthostatic VS BP Location FiO2 (%)   Oral Monitor Sitting Left arm --      Pain Score       --           Vitals:    09/20/21 1633 09/20/21 2123   BP: 118/80 Pulse: (!) 112 92   Patient Position - Orthostatic VS: Sitting          Visual Acuity      ED Medications  Medications   cyclobenzaprine (FLEXERIL) tablet 5 mg (has no administration in time range)   budesonide (PULMICORT) inhalation solution 0 5 mg (0 5 mg Nebulization Given 9/20/21 2150)   docusate sodium (COLACE) capsule 100 mg (has no administration in time range)   famotidine (PEPCID) tablet 10 mg (has no administration in time range)   fexofenadine (ALLEGRA) tablet 180 mg (180 mg Oral Not Given 9/20/21 2109)   HYDROcodone-acetaminophen (NORCO) 5-325 mg per tablet 0 5 tablet (has no administration in time range)   ipratropium (ATROVENT) 0 03 % nasal spray 2 spray (has no administration in time range)   midodrine (PROAMATINE) tablet 15 mg (15 mg Oral Not Given 9/20/21 2116)   pantoprazole (PROTONIX) EC tablet 40 mg (has no administration in time range)   oxybutynin (DITROPAN) tablet 5 mg (has no administration in time range)   heparin (porcine) subcutaneous injection 5,000 Units (5,000 Units Subcutaneous Not Given 9/20/21 2108)   levalbuterol (XOPENEX) inhalation solution 1 25 mg (1 25 mg Nebulization Given 9/20/21 2150)     And   sodium chloride 0 9 % inhalation solution 3 mL ( Nebulization Canceled Entry 9/20/21 2150)   levalbuterol (XOPENEX) inhalation solution 0 63 mg (has no administration in time range)   sodium chloride 0 9 % bolus 1,000 mL (0 mL Intravenous Stopped 9/20/21 2027)   piperacillin-tazobactam (ZOSYN) IVPB 3 375 g (0 g Intravenous Stopped 9/20/21 1909)       Diagnostic Studies  Results Reviewed     Procedure Component Value Units Date/Time    Novel Coronavirus [facility] [[id number]]  (Normal) Collected: 09/20/21 1730    Lab Status: Final result Specimen: Nares from Nose Updated: 09/20/21 1838     SARS-CoV-2 Negative    Narrative:       The specimen collection materials, transport medium, and/or testing methodology utilized in the production of these test results have been proven to be reliable in a limited validation with an abbreviated program under the Emergency Utilization Authorization provided by the FDA  Testing reported as "Presumptive positive" will be confirmed with secondary testing to ensure result accuracy  Clinical caution and judgement should be used with the interpretation of these results with consideration of the clinical impression and other laboratory testing  Testing reported as "Positive" or "Negative" has been proven to be accurate according to standard laboratory validation requirements  All testing is performed with control materials showing appropriate reactivity at standard intervals        Comprehensive metabolic panel [[id number]]  (Abnormal) Collected: 09/20/21 1730    Lab Status: Final result Specimen: Blood from Arm, Right Updated: 09/20/21 1808     Sodium 139 mmol/L      Potassium 4 6 mmol/L      Chloride 102 mmol/L      CO2 30 mmol/L      ANION GAP 7 mmol/L      BUN 20 mg/dL      Creatinine 1 05 mg/dL      Glucose 83 mg/dL      Calcium 9 9 mg/dL      AST 42 U/L      ALT 35 U/L      Alkaline Phosphatase 108 U/L      Total Protein 7 3 g/dL      Albumin 4 6 g/dL      Total Bilirubin 0 50 mg/dL      eGFR 55 ml/min/1 73sq m     Narrative:      [place] guidelines for Chronic Kidney Disease (CKD):     Stage 1 with normal or high GFR (GFR > 90 mL/min/1 73 square meters)    Stage 2 Mild CKD (GFR = 60-89 mL/min/1 73 square meters)    Stage 3A Moderate CKD (GFR = 45-59 mL/min/1 73 square meters)    Stage 3B Moderate CKD (GFR = 30-44 mL/min/1 73 square meters)    Stage 4 Severe CKD (GFR = 15-29 mL/min/1 73 square meters)    Stage 5 End Stage CKD (GFR <15 mL/min/1 73 square meters)  Note: GFR calculation is accurate only with a steady state creatinine    Troponin I [[id number]]  (Normal) Collected: 09/20/21 1730    Lab Status: Final result Specimen: Blood from Arm, Right Updated: 09/20/21 1805     Troponin I <0 03 ng/mL     Lactic acid [[id number]]  (Normal) Collected: 09/20/21 1730    Lab Status: Final result Specimen: Blood from Arm, Right Updated: 09/20/21 1802     LACTIC ACID 0 5 mmol/L     Narrative:      Result may be elevated if tourniquet was used during collection  D-dimer, quantitative [[id number]]  (Normal) Collected: 09/20/21 1730    Lab Status: Final result Specimen: Blood from Arm, Right Updated: 09/20/21 1800     D-Dimer, Quant 0 32 ug/ml FEU     Protime-INR [[id number]]  (Normal) Collected: 09/20/21 1730    Lab Status: Final result Specimen: Blood from Arm, Right Updated: 09/20/21 1758     Protime 12 5 seconds      INR 0 92    APTT [[id number]]  (Normal) Collected: 09/20/21 1730    Lab Status: Final result Specimen: Blood from Arm, Right Updated: 09/20/21 1758     PTT 29 seconds     Blood culture [[id number]] Collected: 09/20/21 1730    Lab Status: In process Specimen: Blood from Arm, Right Updated: 09/20/21 1750    Blood culture #1 [[id number]] Collected: 09/20/21 1731    Lab Status: In process Specimen: Blood from Line, Venous Updated: 09/20/21 1750    Blood culture #2 [[id number]] Collected: 09/20/21 1731    Lab Status:  In process Specimen: Blood from Hand, Left Updated: 09/20/21 1750    CBC and differential [[id number]]  (Abnormal) Collected: 09/20/21 1730    Lab Status: Final result Specimen: Blood from Arm, Right Updated: 09/20/21 1748     WBC 8 50 Thousand/uL      RBC 4 85 Million/uL      Hemoglobin 14 4 g/dL      Hematocrit 43 1 %      MCV 89 fL      MCH 29 6 pg      MCHC 33 3 g/dL      RDW 13 6 %      MPV 8 0 fL      Platelets 072 Thousands/uL      Neutrophils Relative 64 %      Lymphocytes Relative 27 %      Monocytes Relative 7 %      Eosinophils Relative 2 %      Basophils Relative 1 %      Neutrophils Absolute 5 40 Thousands/µL      Lymphocytes Absolute 2 30 Thousands/µL      Monocytes Absolute 0 60 Thousand/µL      Eosinophils Absolute 0 20 Thousand/µL      Basophils Absolute 0 10 Thousands/µL     Procalcitonin with AM Reflex [[id number]] Collected: 09/20/21 1730    Lab Status: In process Specimen: Blood from Arm, Right Updated: 09/20/21 1742    UA w Reflex to Microscopic w Reflex to Culture [[id number]]  (Abnormal) Collected: 09/20/21 1713    Lab Status: Final result Specimen: Urine, Clean Catch Updated: 09/20/21 1725     Color, UA Yellow     Clarity, UA Clear     Specific Gravity, UA <=1 005     pH, UA 6 0     Leukocytes, UA Negative     Nitrite, UA Negative     Protein, UA Negative mg/dl      Glucose, UA Negative mg/dl      Ketones, UA Negative mg/dl      Urobilinogen, UA 0 2 E U /dl      Bilirubin, UA Negative     Blood, UA Negative                 XR chest 1 view portable    (Results Pending)              Procedures  ECG 12 Lead Documentation Only    Date/Time: 9/20/2021 6:39 PM  Performed by: Rosalind Galo MD  Authorized by: Rosalind Galo MD     ECG reviewed by me, the ED Provider: yes    Patient location:  ED  Previous ECG:     Previous ECG:  Compared to current    Similarity:  No change    Comparison to cardiac monitor: Yes    Interpretation:     Interpretation: normal    Rate:     ECG rate assessment: normal    Ectopy:     Ectopy: none    QRS:     QRS axis:  Left    QRS intervals:  Normal  Conduction:     Conduction: abnormal      Abnormal conduction: incomplete LBBB    ST segments:     ST segments:  Normal  T waves:     T waves: normal               ED Course  ED Course as of Sep 20 2209   Mon Sep 20, 2021   1801 D-Dimer, Quant: 0 32                             SBIRT 22yo+      Most Recent Value   SBIRT (25 yo +)   In order to provide better care to our patients, we are screening all of our patients for alcohol and drug use  Would it be okay to ask you these screening questions?   Unable to answer at this time Filed at: 09/20/2021 2027          Wells' Criteria for PE      Most Recent Value   Wells' Criteria for PE   Clinical signs and symptoms of DVT  0 Filed at: 09/20/2021 2292   PE is primary diagnosis or equally likely  0 Filed at: 09/20/2021 1802   HR >100  1 5 Filed at: 09/20/2021 1802   Immobilization at least 3 days or Surgery in the previous 4 weeks  0 Filed at: 09/20/2021 1802   Previous, objectively diagnosed PE or DVT  0 Filed at: 09/20/2021 1802   Hemoptysis  0 Filed at: 09/20/2021 1802   Malignancy with treatment within 6 months or palliative  0 Filed at: 09/20/2021 1802   Wells' Criteria Total  1 5 Filed at: 09/20/2021 1802                [facility]  Number of Diagnoses or Management Options  Chills  Generalized weakness  URI (upper respiratory infection)  Diagnosis management comments: Patient is a 41-year-old female who appears clinically well that presents for evaluation of generalized weakness, chills, rigors  Patient tells me that PCP is sending her in for rule out bacteremia  Patient appears very clinically well and I doubt significantly the patient is bacteremic  However, patient has been on prolonged p o  Antibiotic regimen and will require IV antibiotics pending culture results  Patient does not meet sirs criteria and is not currently septic  Disposition  Final diagnoses:   Generalized weakness   URI (upper respiratory infection)   Chills     Time reflects when diagnosis was documented in both MDM as applicable and the Disposition within this note     Time User Action Codes Description Comment    9/20/2021  6:28 PM Joseph Shrestha Add [R53 [id number]] Generalized weakness     9/20/2021  6:28 PM Vonnie Lepe Add [J06 9] URI (upper respiratory infection)     9/20/2021  6:28 PM Joseph Shrestha Add [R68 [id number]] Chills       ED Disposition     ED Disposition Condition Date/Time Comment    Admit Stable Mon Sep 20, 2021  6:28 PM Case was discussed with FERCHO and the patient's admission status was agreed to be Admission Status: inpatient status to the service of Dr Erica Ramirez           Follow-up Information    None         Patient's Medications   Discharge Prescriptions    No medications on file     No discharge procedures on file      PDMP Review     None          ED Provider  Electronically Signed by           Latha Alfredo MD  09/20/21 200 Commodore Sujey MD  09/20/21 8324

## 2021-09-21 VITALS
RESPIRATION RATE: 18 BRPM | SYSTOLIC BLOOD PRESSURE: 147 MMHG | HEART RATE: 88 BPM | DIASTOLIC BLOOD PRESSURE: 78 MMHG | BODY MASS INDEX: 23.22 KG/M2 | OXYGEN SATURATION: 98 % | WEIGHT: 123 LBS | TEMPERATURE: 98.2 F | HEIGHT: 61 IN

## 2021-09-21 LAB
ATRIAL RATE: 89 BPM
P AXIS: 66 DEGREES
PR INTERVAL: 156 MS
PROCALCITONIN SERPL-MCNC: <0.05 NG/ML
PROCALCITONIN SERPL-MCNC: <0.05 NG/ML
QRS AXIS: -47 DEGREES
QRSD INTERVAL: 112 MS
QT INTERVAL: 388 MS
QTC INTERVAL: 472 MS
T WAVE AXIS: 91 DEGREES
VENTRICULAR RATE: 89 BPM

## 2021-09-21 PROCEDURE — 84145 PROCALCITONIN (PCT): CPT | Performed by: EMERGENCY MEDICINE

## 2021-09-21 PROCEDURE — 36415 COLL VENOUS BLD VENIPUNCTURE: CPT | Performed by: EMERGENCY MEDICINE

## 2021-09-21 PROCEDURE — 93010 ELECTROCARDIOGRAM REPORT: CPT | Performed by: INTERNAL MEDICINE

## 2021-09-21 PROCEDURE — 99239 HOSP IP/OBS DSCHRG MGMT >30: CPT | Performed by: INTERNAL MEDICINE

## 2021-09-21 PROCEDURE — 94760 N-INVAS EAR/PLS OXIMETRY 1: CPT

## 2021-09-21 PROCEDURE — 94640 AIRWAY INHALATION TREATMENT: CPT

## 2021-09-21 RX ORDER — BUDESONIDE 0.5 MG/2ML
0.5 INHALANT ORAL
Status: DISCONTINUED | OUTPATIENT
Start: 2021-09-21 | End: 2021-09-21 | Stop reason: HOSPADM

## 2021-09-21 RX ADMIN — BUDESONIDE 0.5 MG: 0.5 INHALANT ORAL at 09:17

## 2021-09-21 RX ADMIN — PANTOPRAZOLE SODIUM 40 MG: 40 TABLET, DELAYED RELEASE ORAL at 06:00

## 2021-09-21 RX ADMIN — LEVALBUTEROL HYDROCHLORIDE 1.25 MG: 1.25 SOLUTION, CONCENTRATE RESPIRATORY (INHALATION) at 09:18

## 2021-09-21 RX ADMIN — ISODIUM CHLORIDE 3 ML: 0.03 SOLUTION RESPIRATORY (INHALATION) at 09:19

## 2021-09-21 NOTE — RESPIRATORY THERAPY NOTE
RT Protocol Note  Sunita Martinez 78 y o  female MRN: [id number]  Unit/Bed#: Z1 H2 Encounter: [id number]    Assessment    Principal Problem:    Weakness  Active Problems:    Fibromyalgia    Moderate persistent asthma    Gastroesophageal reflux disease without esophagitis    Factor V Leiden mutation (HCC)    Hypothyroidism    Cough      Home Pulmonary Medications:    Home Devices/Therapy: Other (Comment) (nebs bid and mdi prn)    Past Medical History:   Diagnosis Date    Allergic rhinitis     Anxiety     Asthma     Back pain     Cardiac disease     Cardiopathy     EF 45%    Cough     Diverticulitis     Factor V Leiden (HCC)     Fibromyalgia     GERD (gastroesophageal reflux disease)     Hashimoto's thyroiditis     Hx of degenerative disc disease     Hypotension     pots - postural orthostatic hypotension    Interstitial cystitis     Irregular heart beat     LBBB    Irritable bowel syndrome     Migraines     Mitral valve disease     "thickening"    Myocardial infarction ([address] )     possible but not sure when    Neuropathy     bilateral legs    Osteoporosis     Postural orthostatic tachycardia syndrome     must drink a lot of water and salt    Rheumatic fever 1967    Scoliosis     Sjogren's syndrome (HCC)     TMJ (dislocation of temporomandibular joint)      Social History     Socioeconomic History    Marital status:      Spouse name: None    Number of children: None    Years of education: None    Highest education level: None   Occupational History    None   Tobacco Use    Smoking status: Never Smoker    Smokeless tobacco: Never Used   Vaping Use    Vaping Use: Never used   Substance and Sexual Activity    Alcohol use: Never    Drug use: No    Sexual activity: None   Other Topics Concern    None   Social History Narrative    None     Social Determinants of Health     Financial Resource Strain:     Difficulty of Paying Living Expenses:    Food Insecurity:     Worried About Running Out of Food in the Last Year:     Pam of Food in the Last Year:    Transportation Needs:     Lack of Transportation (Medical):  Lack of Transportation (Non-Medical):    Physical Activity:     Days of Exercise per Week:     Minutes of Exercise per Session:    Stress:     Feeling of Stress :    Social Connections:     Frequency of Communication with Friends and Family:     Frequency of Social Gatherings with Friends and Family:     Attends Restorationist Services:     Active Member of Clubs or Organizations:     Attends Club or Organization Meetings:     Marital Status:    Intimate Partner Violence:     Fear of Current or Ex-Partner:     Emotionally Abused:     Physically Abused:     Sexually Abused:        Subjective         Objective    Physical Exam:   Assessment Type: Pre-treatment  General Appearance: Alert, Awake  Respiratory Pattern: Normal  Chest Assessment: Chest expansion symmetrical  Cough: None    Vitals:  Blood pressure 118/80, pulse 92, temperature 98 6 °F (37 °C), temperature source Oral, resp  rate 16, height 5' 1" (1 549 m), weight 55 8 kg (123 lb), SpO2 99 %  Imaging and other studies: I have personally reviewed pertinent reports              Plan    Respiratory Plan: Home Bronchodilator Patient pathway        Resp Comments: pt assessed as per protocol, pt denies having any home o2 or cpap/bipap, pt does state she uses a nebulizer at least twice a day w/ pulmicort and xopenex, she also uses the xopenex more frequently t/o the day if needed which she states she has increased her use of that recently to about every 6 hours, pt also has a xopenex mdi which she states she uses when she leaves home, pt states she was switched to xopenex from albdue to alb increasing her hr, adjustments made to pts orders to match her home use, pt is currently in ed as a hold awaiting a bed, pt has clear bs and sao2 is high 90's on ra, pt is in no distress currently

## 2021-09-21 NOTE — DISCHARGE SUMMARY
[address] [facility]  Discharge- Paul Hernandez 1955, 78 y o  female MRN: [id number]  Unit/Bed#: ED 08 Encounter: [id number]  Primary Care Provider: Ashvin Gillette MD   Date and time admitted to hospital: 9/20/2021  5:19 PM    * Weakness  Assessment & Plan  · Patient with recent bacteremia secondary to PICC line which was replaced during last admission last month  · Patient had blood cultures drawn in the ER and given a dose of IV antibiotics  · Infectious workup has been negative thus far including normal WBC count, normal procalcitonin, and patient is currently afebrile  · Spoke with patient's PCP over the phone regarding outpatient follow-up  · Patient discharged home and advised to follow up with PCP for blood cultures    Hypothyroidism  Assessment & Plan  Stable  Continue home thyroid supplementation    Gastroesophageal reflux disease without esophagitis  Assessment & Plan  Stable  Continue Protonix    Moderate persistent asthma  Assessment & Plan  Stable  Continue home medications    Fibromyalgia  Assessment & Plan  Stable  Continue home medications    Discharging Physician / Practitioner: Joy Robison MD  PCP: Ashvin Gillette MD  Admission Date:   Admission Orders (From admission, onward)     Ordered        09/20/21 [address]  Inpatient Admission  Once                   Discharge Date: 09/21/21    Medical Problems     Resolved Problems  Date Reviewed: 9/20/2021    None                Consultations During Hospital Stay:  · None    Procedures Performed:   · None    Significant Findings / Test Results:   · Weakness    Incidental Findings:   · None     Test Results Pending at Discharge (will require follow up): · Blood culture results     Outpatient Tests Requested:  · Routine labs with PCP as outpatient    Complications:     None    Reason for Admission:  Generalized weakness    Hospital Course:      Paul Hernandez is a 78 y o  female patient who originally presented to the hospital on 9/20/2021 due to weakness  Patient with recent bacterial infection secondary to PICC line was replaced last month by IR  Patient completed course of antibiotics and was started on another course as outpatient with PCP  Patient sent to the ER for evaluation of possible recurrent bacterial infection  Cultures were drawn  Patient currently afebrile with no leukocytosis  Patient discharged home and advised to follow up with PCP regarding blood culture results  Please see above list of diagnoses and related plan for additional information  Condition at Discharge: stable     Discharge Day Visit / Exam:     Subjective:  Patient doing well today  Currently afebrile  Tolerating diet  Ambulating well    Vitals: Blood Pressure: 147/78 (09/21/21 1212)  Pulse: 88 (09/21/21 1212)  Temperature: 98 2 °F (36 8 °C) (09/21/21 1212)  Temp Source: Oral (09/21/21 1212)  Respirations: 18 (09/21/21 1212)  Height: 5' 1" (154 9 cm) (09/20/21 1633)  Weight - Scale: 55 8 kg (123 lb) (09/20/21 1633)  SpO2: 98 % (09/21/21 1212)  Exam:   Physical Exam  Constitutional:       General: She is not in acute distress  HENT:      Head: Normocephalic and atraumatic  Nose: Nose normal       Mouth/Throat:      Mouth: Mucous membranes are moist    Eyes:      Extraocular Movements: Extraocular movements intact  Conjunctiva/sclera: Conjunctivae normal    Cardiovascular:      Rate and Rhythm: Normal rate and regular rhythm  Pulmonary:      Effort: Pulmonary effort is normal  No respiratory distress  Abdominal:      Palpations: Abdomen is soft  Tenderness: There is no abdominal tenderness  Musculoskeletal:         General: Normal range of motion  Cervical back: Normal range of motion and neck supple  Skin:     General: Skin is warm and dry  Neurological:      General: No focal deficit present  Mental Status: She is alert  Cranial Nerves: No cranial nerve deficit     Psychiatric:         Mood and Affect: Mood normal          Behavior: Behavior normal          Discharge instructions/Information to patient and family:   See after visit summary for information provided to patient and family  Provisions for Follow-Up Care:  See after visit summary for information related to follow-up care and any pertinent home health orders  Disposition:     Home      Planned Readmission:    no     Discharge Statement:  I spent 35 minutes discharging the patient  This time was spent on the day of discharge  I had direct contact with the patient on the day of discharge  Greater than 50% of the total time was spent examining patient, answering all patient questions, arranging and discussing plan of care with patient as well as directly providing post-discharge instructions  Additional time then spent on discharge activities  Discharge Medications:  See after visit summary for reconciled discharge medications provided to patient and family        ** Please Note: This note has been constructed using a voice recognition system **

## 2021-09-21 NOTE — ASSESSMENT & PLAN NOTE
· Patient with recent bacteremia secondary to PICC line which was replaced during last admission last month  · Patient had blood cultures drawn in the ER and given a dose of IV antibiotics  · Infectious workup has been negative thus far including normal WBC count, normal procalcitonin, and patient is currently afebrile  · Spoke with patient's PCP over the phone regarding outpatient follow-up  · Patient discharged home and advised to follow up with PCP for blood cultures

## 2021-09-21 NOTE — DISCHARGE INSTR - AVS FIRST PAGE
Dear Ana Cristina Blankenship,     It was our pleasure to care for you here at [facility]  It is our hope that we were always able to exceed the expected standards for your care during your stay  You were hospitalized due to rule out infection  You were cared for on the medical floor by Segun Norris MD with the [facility] who covers for your primary care physician (PCP), Germán Singh MD, while you were hospitalized  If you have any questions or concerns related to this hospitalization, you may contact us at [phone]  For follow up as well as any medication refills, we recommend that you follow up with your primary care physician  A registered nurse will reach out to you by phone within a few days after your discharge to answer any additional questions that you may have after going home  However, at this time we provide for you here, the most important instructions / recommendations at discharge:     · Notable Medication Adjustments -   · No medication changes  · Testing Required after Discharge -   · Routine labs with PCP  · Important follow up information -   · Follow-up with primary care doctor regarding blood culture results  · Follow-up with primary care doctor within 1 week of discharge for routine follow-up  · Other Instructions -   · Please see discharge instructions  · Please review this entire after visit summary as additional general instructions including medication list, appointments, activity, diet, any pertinent wound care, and other additional recommendations from your care team that may be provided for you        Sincerely,     Segun Norris MD

## 2021-09-21 NOTE — H&P
[facility]  H&P- Orlando Belcher 1955, 78 y o  female MRN: [id number]  Unit/Bed#: Michelle Adam Encounter: [id number]  Primary Care Provider: Renata Britton MD   Date and time admitted to hospital: 9/20/2021  5:19 PM    Hypothyroidism  Assessment & Plan  Patient brought in her own thyroid supplementation    Gastroesophageal reflux disease without esophagitis  Assessment & Plan  Continue home medications    Moderate persistent asthma  Assessment & Plan  Continue home meds respiratory protocol    Fibromyalgia  Assessment & Plan  Noted history of fibromyalgia    * Weakness  Assessment & Plan  Patient has history of Pott's disease and has a PICC line in place for infusions she receives 3 times a week  -admission from August reviewed, noted Pseudomonas bacteremia from a culture from her PICC line  -patient stated she has not been feeling well and has been on 3 weeks of oral antibiotics and is not improving  She states that her PCP told to come to the emergency room for evaluation  She was given a dose of Zosyn and admitted, blood cultures were drawn prior to antibiotic administration     -as the patient has absolutely no signs of clinical infection or any lab abnormalities, I will not treat the patient with antibiotic  -I will observe the patient off antibiotics and wait for blood culture results         I also see no reason the currently remove the PICC line unless blood cultures returned positive  VTE Pharmacologic Prophylaxis: VTE Score: 3 Moderate Risk (Score 3-4) - Pharmacological DVT Prophylaxis Ordered: heparin  Code Status: Prior level 1  Discussion with family: Patient declined call to   Anticipated Length of Stay: Patient will be admitted on an inpatient basis with an anticipated length of stay of greater than 2 midnights secondary to concern for bacteremia, blood cultures pending      Total Time for Visit, including Counseling / Coordination of Care: 30 minutes Greater than 50% of this total time spent on direct patient counseling and coordination of care  Chief Complaint: weakness    History of Present Illness: Peña Membreno is a 78 y o  female with a PMH of factor 5 Leiden, pots, anxiety, asthma, fibromyalgia presents with weakness and other complaints/   Patient states that she has a PICC line in place because she requires infusion due to her history of POTS  Patient reports that she was admitted in August for sepsis and was treated for Pseudomonas  She she reports that she was diagnosed with Pseudomonas bacteremia based on a culture from her PICC line at that time  Unfortunately the patient developed weakness, fatigue, fever chills and night sweats for the past several weeks, which have not been responding to cefdinir which was prescribed by her PCP  None of this has been confirmed as needed in knee or the ED physician could reach the patient's physician  Patient received 1 dose of Zosyn  The patient is requesting more IV antibiotics, I discussed with the patient that I do not currently see any need for further antibiotic therapy pending culture results and observation of her clinical status  Review of Systems:  Review of Systems   Constitutional: Positive for chills and fatigue  Negative for fever  HENT: Negative for ear pain and sore throat  Eyes: Negative for pain and visual disturbance  Respiratory: Positive for cough and shortness of breath  Cardiovascular: Positive for chest pain  Negative for palpitations  Gastrointestinal: Negative for abdominal pain, constipation, diarrhea, nausea and vomiting  Endocrine: Negative for polydipsia, polyphagia and polyuria  Genitourinary: Negative for dysuria, frequency, hematuria and urgency  Musculoskeletal: Negative for arthralgias and back pain  Skin: Negative for color change and rash  Neurological: Negative for dizziness, seizures, syncope, light-headedness and headaches     All other systems reviewed and are negative  Past Medical and Surgical History:   Past Medical History:   Diagnosis Date    Allergic rhinitis     Anxiety     Asthma     Back pain     Cardiac disease     Cardiopathy     EF 45%    Cough     Diverticulitis     Factor V Leiden ([facility] [address] [facility]     Fibromyalgia     GERD (gastroesophageal reflux disease)     Hashimoto's thyroiditis     Hx of degenerative disc disease     Hypotension     pots - postural orthostatic hypotension    Interstitial cystitis     Irregular heart beat     LBBB    Irritable bowel syndrome     Migraines     Mitral valve disease     "thickening"    Myocardial infarction ([facility] [address] )     possible but not sure when    Neuropathy     bilateral legs    Osteoporosis     Postural orthostatic tachycardia syndrome     must drink a lot of water and salt    Rheumatic fever 1967    Scoliosis     Sjogren's syndrome (HCC)     TMJ (dislocation of temporomandibular joint)        Past Surgical History:   Procedure Laterality Date    ABLATION MICROWAVE Left     lumbar area    BACK SURGERY  10/1998   [address]    CHOLECYSTECTOMY  04/2016    COLONOSCOPY  2016    DILATION AND CURETTAGE OF UTERUS  1996    EGD  2016    FOOT SURGERY  1968    removal of bone and neuroma    HYSTERECTOMY  1997    age 54  PRICE ooph    IR PICC PLACEMENT SINGLE LUMEN  10/2/2020    IR PICC PLACEMENT SINGLE LUMEN  8/12/2021    LAPAROSCOPY  1996    endometriosis    LA EGD TRANSORAL BIOPSY SINGLE/MULTIPLE N/A 4/24/2019    Procedure: ESOPHAGOGASTRODUODENOSCOPY (EGD) with multiple bx and dilation;  Surgeon: Trevin English MD;  Location: [address] [place] [address] GI LAB; Service: Gastroenterology    TUNNELED VENOUS CATHETER PLACEMENT  2016       Meds/Allergies:  Prior to Admission medications    Medication Sig Start Date End Date Taking?  Authorizing Provider   Alum Hydroxide-Mag Trisilicate (Gaviscon) 40-25 8 MG CHEW Chew 1 tablet 4 (four) times a day as needed With meals and as needed Historical Provider, MD   azelastine (ASTELIN) 0 1 % nasal spray 2 sprays into each nostril 2 (two) times a day Use in each nostril as directed     Historical Provider, MD   beclomethasone (QVAR) 40 MCG/ACT inhaler Inhale 1 puff daily as needed (only when unable to nebulize pulmicort) Rinse mouth after use  Historical Provider, MD   budesonide (PULMICORT) 0 5 mg/2 mL nebulizer solution Take 0 5 mg by nebulization 2 (two) times a day Rinse mouth after use      Historical Provider, MD   cholecalciferol (VITAMIN D3) 1,000 units tablet Take 1,000 Units by mouth daily     Historical Provider, MD   cyclobenzaprine (FLEXERIL) 5 mg tablet Take 5 mg by mouth 2 (two) times a day as needed for muscle spasms    Historical Provider, MD   docusate sodium (COLACE) 100 mg capsule Take 100 mg by mouth daily as needed for constipation    Historical Provider, MD   famotidine (PEPCID) 10 mg tablet Take 10 mg by mouth 2 (two) times a day    Historical Provider, MD   fexofenadine (ALLEGRA) 180 MG tablet Take 180 mg by mouth 2 (two) times a day     Historical Provider, MD   fluticasone (FLONASE) 50 mcg/act nasal spray 2 sprays into each nostril daily     Historical Provider, MD   Galcanezumab-gnlm (Emgality) 120 MG/ML SOAJ Inject under the skin every 30 (thirty) days     Historical Provider, MD   HYDROcodone-acetaminophen (NORCO) 5-325 mg per tablet Take 0 5 tablets by mouth every 6 (six) hours as needed for pain Allowed 2 pills a day    Historical Provider, MD   hydrOXYzine (ATARAX) 10 mg/5 mL syrup Take 20 mg by mouth daily at bedtime     Historical Provider, MD   ipratropium (ATROVENT) 0 03 % nasal spray 2 sprays into each nostril 4 (four) times a day as needed for rhinitis     Historical Provider, MD   levalbuterol (XOPENEX HFA) 45 mcg/act inhaler Inhale 2 puffs every 4 (four) hours as needed (when unable to nebulize)    Historical Provider, MD   levalbuterol (XOPENEX) 1 25 mg/3 mL nebulizer solution Take 1 25 mg by nebulization every 6 (six) hours as needed  1/7/19   Historical Provider, MD   Magnesium 400 MG CAPS Take 1 capsule by mouth 2 (two) times a day     Historical Provider, MD   midodrine (PROAMATINE) 5 mg tablet TAKE 2 TABS PO IN AM, ONE TAB PO WITH LUNCH AND DINNER  Patient taking differently: 15 mg 3 (three) times a day One hour after thyroid medication, around 1200 and 1600, TAKE 2 TABS PO IN AM, ONE TAB PO WITH LUNCH AND DINNER 12/21/18   Lorna Wharton MD   MULTIPLE VITAMINS-CALCIUM PO Take 1 capsule by mouth every morning     Historical Provider, MD   omega-3-acid ethyl esters (LOVAZA) 1 g capsule Take 1,600 mg by mouth daily     Historical Provider, MD   oxybutynin (DITROPAN) 5 mg tablet Take 2 5 mg by mouth daily at bedtime     Historical Provider, MD   oxybutynin (DITROPAN) 5 mg tablet Take 2 5 mg by mouth daily as needed (overactive bladder) In addition to the HS dose    Historical Provider, MD   pantoprazole (PROTONIX) 40 mg tablet Take 40 mg by mouth 2 (two) times a day    Historical Provider, MD   polyethylene glycol (MIRALAX) 17 g packet Take 17 g by mouth daily as needed     Historical Provider, MD   Probiotic Product (PROBIOTIC DAILY PO) Take 1 tablet by mouth daily At lunch    Historical Provider, MD   thyroid (ARMOUR) 32 5 MG tablet Take 32 5 mg by mouth daily    Historical Provider, MD   Thyroid, Porcine, POWD Use 32 mg daily    Historical Provider, MD   Ubrogepant Andre Broussard) 100 MG tablet Take 100 mg by mouth Take 1 tablet (100 mg) one time as needed for migraine  May repeat one additional tablet (100 mg) at least two hours after the first dose  Do not use more than two doses per day, or for more than eight days per month  Historical Provider, MD     I have reviewed home medications with patient personally  Allergies:    Allergies   Allergen Reactions    Melatonin Shortness Of Breath    Nexium [Esomeprazole] Shortness Of Breath    Nsaids Shortness Of Breath    Singulair [Montelukast] Shortness Of Breath and Cough    Zomig [Zolmitriptan] Shortness Of Breath    Dexilant [Dexlansoprazole] Nausea Only and Vomiting    Albuterol     Ambien [Zolpidem]     Amitriptyline Drowsiness    Aspirin     Bactrim [Sulfamethoxazole-Trimethoprim] Hives    Banana - Food Allergy Dermatitis    Ceftin [Cefuroxime]     Celebrex [Celecoxib]     Ciprofloxacin     Cranberry-C [Ascorbate - Food Allergy] GI Intolerance    Demerol [Meperidine]     Epinephrine Dizziness    Ergotamine Nausea Only and Headache    Keflex [Cephalexin]     Klonopin [Clonazepam] Nausea Only and Dizziness    Levaquin [Levofloxacin]     Lexapro [Escitalopram]     Lyrica [Pregabalin] Fatigue    Macrodantin [Nitrofurantoin]     Morphine Nausea Only    Movantik [Naloxegol] Nausea Only    Mushroom Extract Complex - Food Allergy      Eye itchy, asthma  attack    Naprosyn [Naproxen]     Neurontin [Gabapentin] Dizziness    Pineapple - Food Allergy GI Intolerance    Prolia [Denosumab]     Prozac [Fluoxetine]     Serevent [Salmeterol] Dizziness    Sudafed [Pseudoephedrine] Hives    Sulfa Antibiotics     Tomato - Food Allergy GI Intolerance    Trazodone     Ultram [Tramadol] Nausea Only, Dizziness and Headache    Vioxx [Rofecoxib]     Zithromax [Azithromycin] Hives    Zoloft [Sertraline]     Zantac [Ranitidine] Rash and Dizziness       Social History:  Marital Status:    Occupation:   Patient Pre-hospital Living Situation: Home  Patient Pre-hospital Level of Mobility: walks  Patient Pre-hospital Diet Restrictions:   Substance Use History:   Social History     Substance and Sexual Activity   Alcohol Use Never     Social History     Tobacco Use   Smoking Status Never Smoker   Smokeless Tobacco Never Used     Social History     Substance and Sexual Activity   Drug Use No       Family History:  Family History   Problem Relation Age of Onset    Cancer Mother         urinary bladder     Thyroid cancer Sister     Colon cancer Maternal Grandfather     Breast cancer Maternal Aunt         over 48 yrs old     Endometrial cancer Maternal Aunt     Multiple myeloma Maternal Aunt     No Known Problems Father     No Known Problems Daughter     Heart attack Sister     No Known Problems Sister     No Known Problems Sister     No Known Problems Sister     No Known Problems Sister     No Known Problems Daughter     Hypertension Paternal Aunt        Physical Exam:     Vitals:   Blood Pressure: 118/80 (09/20/21 1633)  Pulse: (!) 112 (09/20/21 1633)  Temperature: 98 6 °F (37 °C) (09/20/21 1633)  Temp Source: Oral (09/20/21 1633)  Respirations: 19 (09/20/21 1633)  Height: 5' 1" (154 9 cm) (09/20/21 1633)  Weight - Scale: 55 8 kg (123 lb) (09/20/21 1633)  SpO2: 98 % (09/20/21 1633)    Physical Exam  Vitals and nursing note reviewed  Constitutional:       General: She is not in acute distress  Appearance: Normal appearance  She is well-developed  She is not ill-appearing, toxic-appearing or diaphoretic  HENT:      Head: Normocephalic and atraumatic  Eyes:      General: No scleral icterus  Conjunctiva/sclera: Conjunctivae normal    Cardiovascular:      Rate and Rhythm: Normal rate and regular rhythm  Heart sounds: No murmur heard  No friction rub  No gallop  Pulmonary:      Effort: Pulmonary effort is normal  No respiratory distress  Breath sounds: Normal breath sounds  No stridor  No wheezing, rhonchi or rales  Chest:      Chest wall: No tenderness  Abdominal:      General: There is no distension  Palpations: Abdomen is soft  There is no mass  Tenderness: There is no abdominal tenderness  There is no right CVA tenderness, left CVA tenderness, guarding or rebound  Hernia: No hernia is present  Musculoskeletal:         General: No swelling, tenderness, deformity or signs of injury  Cervical back: Neck supple  Right lower leg: No edema  Left lower leg: No edema        Comments: picc line without any surrounding erythema   Skin:     General: Skin is warm and dry  Coloration: Skin is not jaundiced or pale  Findings: No bruising, erythema, lesion or rash  Neurological:      Mental Status: She is alert and oriented to person, place, and time  Additional Data:     Lab Results:  Results from last 7 days   Lab Units 09/20/21  1730   WBC Thousand/uL 8 50   HEMOGLOBIN g/dL 14 4   HEMATOCRIT % 43 1   PLATELETS Thousands/uL 288   NEUTROS PCT % 64   LYMPHS PCT % 27   MONOS PCT % 7   EOS PCT % 2     Results from last 7 days   Lab Units 09/20/21  1730   SODIUM mmol/L 139   POTASSIUM mmol/L 4 6   CHLORIDE mmol/L 102   CO2 mmol/L 30   BUN mg/dL 20   CREATININE mg/dL 1 05   ANION GAP mmol/L 7   CALCIUM mg/dL 9 9   ALBUMIN g/dL 4 6   TOTAL BILIRUBIN mg/dL 0 50   ALK PHOS U/L 108   ALT U/L 35   AST U/L 42*   GLUCOSE RANDOM mg/dL 83     Results from last 7 days   Lab Units 09/20/21  1730   INR  0 92             Results from last 7 days   Lab Units 09/20/21  1730   LACTIC ACID mmol/L 0 5       Imaging: Reviewed radiology reports from this admission including: chest xray  XR chest 1 view portable    (Results Pending)       EKG and Other Studies Reviewed on Admission:   · EKG:    ** Please Note: This note has been constructed using a voice recognition system   **

## 2021-09-22 ENCOUNTER — HOSPITAL ENCOUNTER (OUTPATIENT)
Dept: INFUSION CENTER | Facility: HOSPITAL | Age: 66
Discharge: HOME | End: 2021-09-22
Payer: MEDICARE

## 2021-09-22 PROCEDURE — 96360 HYDRATION IV INFUSION INIT: CPT

## 2021-09-22 PROCEDURE — 96361 HYDRATE IV INFUSION ADD-ON: CPT

## 2021-09-22 RX ORDER — SODIUM CHLORIDE 9 MG/ML
333.33 INJECTION, SOLUTION INTRAVENOUS ONCE
Status: COMPLETED | OUTPATIENT
Start: 2021-09-22 | End: 2021-09-22

## 2021-09-22 RX ADMIN — SODIUM CHLORIDE 333.33 ML/HR: 0.9 INJECTION, SOLUTION INTRAVENOUS at 09:01

## 2021-09-22 NOTE — PROGRESS NOTES
6 hours of IVF given as per orders  PICC line redressed as per routine however pt defers chlorahexadine dressing due to potential allergy   Discharged ambulatory deferring avs

## 2021-09-23 RX ORDER — SODIUM CHLORIDE 9 MG/ML
333.33 INJECTION, SOLUTION INTRAVENOUS ONCE
Status: COMPLETED | OUTPATIENT
Start: 2021-09-24 | End: 2021-09-24

## 2021-09-24 ENCOUNTER — HOSPITAL ENCOUNTER (OUTPATIENT)
Dept: INFUSION CENTER | Facility: HOSPITAL | Age: 66
Discharge: HOME | End: 2021-09-24
Payer: MEDICARE

## 2021-09-24 VITALS
DIASTOLIC BLOOD PRESSURE: 88 MMHG | TEMPERATURE: 98.9 F | RESPIRATION RATE: 18 BRPM | HEART RATE: 92 BPM | SYSTOLIC BLOOD PRESSURE: 154 MMHG

## 2021-09-24 PROCEDURE — 96360 HYDRATION IV INFUSION INIT: CPT

## 2021-09-24 PROCEDURE — 96361 HYDRATE IV INFUSION ADD-ON: CPT

## 2021-09-24 RX ORDER — SODIUM CHLORIDE 9 MG/ML
333.33 INJECTION, SOLUTION INTRAVENOUS ONCE
Status: COMPLETED | OUTPATIENT
Start: 2021-09-27 | End: 2021-09-27

## 2021-09-24 RX ADMIN — SODIUM CHLORIDE 333.33 ML/HR: 0.9 INJECTION, SOLUTION INTRAVENOUS at 08:07

## 2021-09-24 NOTE — PROGRESS NOTES
Pt offers no complaints  Tolerated IV hydration well without incident  Discharged in stable condition and aware of next infusion appointment  AVS declined

## 2021-09-26 LAB
BACTERIA BLD CULT: NORMAL

## 2021-09-27 ENCOUNTER — HOSPITAL ENCOUNTER (OUTPATIENT)
Dept: INFUSION CENTER | Facility: HOSPITAL | Age: 66
Discharge: HOME | End: 2021-09-27
Payer: MEDICARE

## 2021-09-27 VITALS
RESPIRATION RATE: 18 BRPM | SYSTOLIC BLOOD PRESSURE: 152 MMHG | DIASTOLIC BLOOD PRESSURE: 69 MMHG | TEMPERATURE: 97 F | OXYGEN SATURATION: 99 % | HEART RATE: 84 BPM

## 2021-09-27 PROCEDURE — 96360 HYDRATION IV INFUSION INIT: CPT

## 2021-09-27 PROCEDURE — 96361 HYDRATE IV INFUSION ADD-ON: CPT

## 2021-09-27 RX ADMIN — SODIUM CHLORIDE 333.33 ML/HR: 0.9 INJECTION, SOLUTION INTRAVENOUS at 08:00

## 2021-09-29 ENCOUNTER — HOSPITAL ENCOUNTER (OUTPATIENT)
Dept: INFUSION CENTER | Facility: HOSPITAL | Age: 66
Discharge: HOME | End: 2021-09-29
Payer: MEDICARE

## 2021-09-29 VITALS
HEART RATE: 103 BPM | DIASTOLIC BLOOD PRESSURE: 65 MMHG | TEMPERATURE: 98.2 F | RESPIRATION RATE: 16 BRPM | SYSTOLIC BLOOD PRESSURE: 149 MMHG

## 2021-09-29 PROCEDURE — 96361 HYDRATE IV INFUSION ADD-ON: CPT

## 2021-09-29 PROCEDURE — 96360 HYDRATION IV INFUSION INIT: CPT

## 2021-09-29 RX ADMIN — SODIUM CHLORIDE 2000 ML: 0.9 INJECTION, SOLUTION INTRAVENOUS at 08:12

## 2021-09-29 NOTE — PHYSICIAN ADVISOR
Current patient class: Inpatient  The patient is currently on Hospital Day: 2 at [address]      The patient was admitted to the hospital  on 9/20/21 at  6:35 PM for the following diagnosis:  Cough [R05]     After review of the relevant documentation, labs, vital signs and test results, this is a PROVIDER LIABLE case  In this particular case the patient was admitted to the hospital as an inpatient  The patient however failed to satisfy the 2 midnight benchmark and closer scrutiny of the case was warranted  After review of the patient presentation and relevant labs the patient was most appropriate for observation or outpatient class at the time of admission  Given that this patient has already been discharged prior to this review they become a provider liable case  Rationale is as follows: The patient is a 78 yrs   Female who presented to the ED at 9/20/2021  5:19 PM with a chief complaint of Cough  Patient came in for weakness as well  The patient does have a history of bacteremia with chronic PICC line  Concern was for bacteremia however the patient did not meet any sepsis criteria  The patient was then discharged next day with outpatient follow-up with blood cultures  Given the above the patient was most appropriate for observation status on admission  This case is liable      The patients vitals on arrival were   ED Triage Vitals   Temperature Pulse Respirations Blood Pressure SpO2   09/20/21 1633 09/20/21 1633 09/20/21 1633 09/20/21 1633 09/20/21 1633   98 6 °F (37 °C) (!) 112 19 118/80 98 %      Temp Source Heart Rate Source Patient Position - Orthostatic VS BP Location FiO2 (%)   09/20/21 1633 09/20/21 1633 09/20/21 1633 09/20/21 1633 --   Oral Monitor Sitting Left arm       Pain Score       09/20/21 2358       No Pain           Past Medical History:   Diagnosis Date    Allergic rhinitis     Anxiety     Asthma     Back pain     Cardiac disease     Cardiopathy     EF 45%    Cough     Diverticulitis     Factor V Leiden (HCC)     Fibromyalgia     GERD (gastroesophageal reflux disease)     Hashimoto's thyroiditis     Hx of degenerative disc disease     Hypotension     pots - postural orthostatic hypotension    Interstitial cystitis     Irregular heart beat     LBBB    Irritable bowel syndrome     Migraines     Mitral valve disease     "thickening"    Myocardial infarction [facility])     possible but not sure when    Neuropathy     bilateral legs    Osteoporosis     Postural orthostatic tachycardia syndrome     must drink a lot of water and salt    Rheumatic fever 1967    Scoliosis     Sjogren's syndrome (HCC)     TMJ (dislocation of temporomandibular joint)      Past Surgical History:   Procedure Laterality Date    ABLATION MICROWAVE Left     lumbar area    BACK SURGERY  10/1998   [address]    CHOLECYSTECTOMY  04/2016    COLONOSCOPY  2016    DILATION AND CURETTAGE OF UTERUS  1996    EGD  2016    FOOT SURGERY  1968    removal of bone and neuroma    HYSTERECTOMY  1997    age 54  PRICE ooph    IR PICC PLACEMENT SINGLE LUMEN  10/2/2020    IR PICC PLACEMENT SINGLE LUMEN  8/12/2021    LAPAROSCOPY  1996    endometriosis    OR EGD TRANSORAL BIOPSY SINGLE/MULTIPLE N/A 4/24/2019    Procedure: ESOPHAGOGASTRODUODENOSCOPY (EGD) with multiple bx and dilation;  Surgeon: Anthony Taylor MD;  Location: [facility] GI LAB;   Service: Gastroenterology    TUNNELED VENOUS CATHETER PLACEMENT  2016           Consults have been placed to:   None    Vitals:    09/21/21 0600 09/21/21 0919 09/21/21 0920 09/21/21 1212   BP: 165/87  162/83 147/78   BP Location: Left arm   Left arm   Pulse: 90  93 88   Resp: 16   18   Temp:    98 2 °F (36 8 °C)   TempSrc:    Oral   SpO2: 99% 100% 100% 98%   Weight:       Height:           Most recent labs:    No results for input(s): WBC, HGB, HCT, PLT, K, NA, CALCIUM, BUN, CREATININE, LIPASE, AMYLASE, INR, TROPONINI, CKTOTAL, AST, ALT, ALKPHOS, BILITOT in the last 72 hours  Scheduled Meds:  Facility-Administered Medications Ordered in Other Encounters   Medication Dose Route Frequency Provider Last Rate    sodium chloride  2,000 mL Intravenous Once Freddy Bird MD 2,000 mL (09/29/21 4301)   Richard Schumacher [START ON 9/30/2021] sodium chloride  2,000 mL Intravenous Once Freddy Bird MD       Continuous Infusions:No current facility-administered medications for this encounter  PRN Meds:      Surgical procedures (if appropriate):

## 2021-09-30 ENCOUNTER — HOSPITAL ENCOUNTER (OUTPATIENT)
Dept: INFUSION CENTER | Facility: HOSPITAL | Age: 66
Discharge: HOME | End: 2021-09-30
Payer: MEDICARE

## 2021-09-30 PROCEDURE — 96361 HYDRATE IV INFUSION ADD-ON: CPT

## 2021-09-30 PROCEDURE — 96360 HYDRATION IV INFUSION INIT: CPT

## 2021-09-30 RX ADMIN — SODIUM CHLORIDE 2000 ML: 0.9 INJECTION, SOLUTION INTRAVENOUS at 08:39

## 2021-10-04 ENCOUNTER — HOSPITAL ENCOUNTER (OUTPATIENT)
Dept: INFUSION CENTER | Facility: HOSPITAL | Age: 66
Discharge: HOME | End: 2021-10-04
Payer: MEDICARE

## 2021-10-04 VITALS
TEMPERATURE: 98.1 F | DIASTOLIC BLOOD PRESSURE: 77 MMHG | SYSTOLIC BLOOD PRESSURE: 143 MMHG | OXYGEN SATURATION: 96 % | HEART RATE: 94 BPM | RESPIRATION RATE: 16 BRPM

## 2021-10-04 PROCEDURE — 96361 HYDRATE IV INFUSION ADD-ON: CPT

## 2021-10-04 PROCEDURE — 96360 HYDRATION IV INFUSION INIT: CPT

## 2021-10-04 RX ADMIN — SODIUM CHLORIDE 2000 ML: 0.9 INJECTION, SOLUTION INTRAVENOUS at 08:10

## 2021-10-06 ENCOUNTER — HOSPITAL ENCOUNTER (OUTPATIENT)
Dept: INFUSION CENTER | Facility: HOSPITAL | Age: 66
Discharge: HOME | End: 2021-10-06
Payer: MEDICARE

## 2021-10-06 VITALS
TEMPERATURE: 97.6 F | OXYGEN SATURATION: 100 % | RESPIRATION RATE: 16 BRPM | DIASTOLIC BLOOD PRESSURE: 79 MMHG | SYSTOLIC BLOOD PRESSURE: 162 MMHG | HEART RATE: 74 BPM

## 2021-10-06 PROCEDURE — 96361 HYDRATE IV INFUSION ADD-ON: CPT

## 2021-10-06 PROCEDURE — 96360 HYDRATION IV INFUSION INIT: CPT

## 2021-10-06 RX ADMIN — SODIUM CHLORIDE 2000 ML: 0.9 INJECTION, SOLUTION INTRAVENOUS at 08:19

## 2021-10-08 ENCOUNTER — HOSPITAL ENCOUNTER (OUTPATIENT)
Dept: INFUSION CENTER | Facility: HOSPITAL | Age: 66
Discharge: HOME | End: 2021-10-08
Payer: MEDICARE

## 2021-10-08 VITALS
TEMPERATURE: 98.8 F | OXYGEN SATURATION: 100 % | RESPIRATION RATE: 16 BRPM | HEART RATE: 81 BPM | SYSTOLIC BLOOD PRESSURE: 154 MMHG | DIASTOLIC BLOOD PRESSURE: 77 MMHG

## 2021-10-08 PROCEDURE — 96361 HYDRATE IV INFUSION ADD-ON: CPT

## 2021-10-08 PROCEDURE — 96360 HYDRATION IV INFUSION INIT: CPT

## 2021-10-08 RX ORDER — SODIUM CHLORIDE 9 MG/ML
333.33 INJECTION, SOLUTION INTRAVENOUS ONCE
Status: COMPLETED | OUTPATIENT
Start: 2021-10-11 | End: 2021-10-11

## 2021-10-08 RX ADMIN — SODIUM CHLORIDE 2000 ML: 0.9 INJECTION, SOLUTION INTRAVENOUS at 08:17

## 2021-10-11 ENCOUNTER — HOSPITAL ENCOUNTER (OUTPATIENT)
Dept: INFUSION CENTER | Facility: HOSPITAL | Age: 66
Discharge: HOME | End: 2021-10-11
Payer: MEDICARE

## 2021-10-11 VITALS
SYSTOLIC BLOOD PRESSURE: 146 MMHG | HEART RATE: 89 BPM | RESPIRATION RATE: 16 BRPM | OXYGEN SATURATION: 99 % | DIASTOLIC BLOOD PRESSURE: 70 MMHG | TEMPERATURE: 98.9 F

## 2021-10-11 PROCEDURE — 96360 HYDRATION IV INFUSION INIT: CPT

## 2021-10-11 PROCEDURE — 96361 HYDRATE IV INFUSION ADD-ON: CPT

## 2021-10-11 RX ADMIN — SODIUM CHLORIDE 333.33 ML/HR: 0.9 INJECTION, SOLUTION INTRAVENOUS at 08:37

## 2021-10-12 RX ORDER — SODIUM CHLORIDE 9 MG/ML
333.3 INJECTION, SOLUTION INTRAVENOUS ONCE
Status: COMPLETED | OUTPATIENT
Start: 2021-10-13 | End: 2021-10-13

## 2021-10-13 ENCOUNTER — HOSPITAL ENCOUNTER (OUTPATIENT)
Dept: INFUSION CENTER | Facility: HOSPITAL | Age: 66
Discharge: HOME | End: 2021-10-13
Payer: MEDICARE

## 2021-10-13 VITALS
DIASTOLIC BLOOD PRESSURE: 88 MMHG | HEART RATE: 72 BPM | RESPIRATION RATE: 16 BRPM | SYSTOLIC BLOOD PRESSURE: 140 MMHG | TEMPERATURE: 98.5 F

## 2021-10-13 PROCEDURE — 96361 HYDRATE IV INFUSION ADD-ON: CPT

## 2021-10-13 PROCEDURE — 96360 HYDRATION IV INFUSION INIT: CPT

## 2021-10-13 RX ADMIN — SODIUM CHLORIDE 333.3 ML/HR: 0.9 INJECTION, SOLUTION INTRAVENOUS at 08:22

## 2021-10-15 ENCOUNTER — HOSPITAL ENCOUNTER (OUTPATIENT)
Dept: INFUSION CENTER | Facility: HOSPITAL | Age: 66
Discharge: HOME | End: 2021-10-15
Payer: MEDICARE

## 2021-10-15 VITALS
HEART RATE: 69 BPM | DIASTOLIC BLOOD PRESSURE: 84 MMHG | TEMPERATURE: 97.9 F | SYSTOLIC BLOOD PRESSURE: 137 MMHG | RESPIRATION RATE: 16 BRPM

## 2021-10-15 PROCEDURE — 96361 HYDRATE IV INFUSION ADD-ON: CPT

## 2021-10-15 PROCEDURE — 96360 HYDRATION IV INFUSION INIT: CPT

## 2021-10-15 RX ORDER — SODIUM CHLORIDE 9 MG/ML
333.33 INJECTION, SOLUTION INTRAVENOUS ONCE
Status: COMPLETED | OUTPATIENT
Start: 2021-10-18 | End: 2021-10-18

## 2021-10-15 RX ADMIN — SODIUM CHLORIDE 2000 ML: 0.9 INJECTION, SOLUTION INTRAVENOUS at 08:15

## 2021-10-18 ENCOUNTER — HOSPITAL ENCOUNTER (OUTPATIENT)
Dept: INFUSION CENTER | Facility: HOSPITAL | Age: 66
Discharge: HOME | End: 2021-10-18
Payer: MEDICARE

## 2021-10-18 VITALS
OXYGEN SATURATION: 100 % | DIASTOLIC BLOOD PRESSURE: 70 MMHG | SYSTOLIC BLOOD PRESSURE: 125 MMHG | HEART RATE: 102 BPM | RESPIRATION RATE: 16 BRPM | TEMPERATURE: 98.2 F

## 2021-10-18 PROCEDURE — 96360 HYDRATION IV INFUSION INIT: CPT

## 2021-10-18 PROCEDURE — 96361 HYDRATE IV INFUSION ADD-ON: CPT

## 2021-10-18 RX ADMIN — SODIUM CHLORIDE 333.33 ML/HR: 0.9 INJECTION, SOLUTION INTRAVENOUS at 10:21

## 2021-10-19 ENCOUNTER — TELEPHONE (OUTPATIENT)
Dept: SURGERY | Facility: HOSPITAL | Age: 66
End: 2021-10-19

## 2021-10-19 ENCOUNTER — HOSPITAL ENCOUNTER (OUTPATIENT)
Dept: INFUSION CENTER | Facility: HOSPITAL | Age: 66
Discharge: HOME | End: 2021-10-19
Payer: MEDICARE

## 2021-10-19 ENCOUNTER — APPOINTMENT (OUTPATIENT)
Dept: LAB | Facility: HOSPITAL | Age: 66
End: 2021-10-19
Attending: INTERNAL MEDICINE
Payer: MEDICARE

## 2021-10-19 VITALS
DIASTOLIC BLOOD PRESSURE: 80 MMHG | SYSTOLIC BLOOD PRESSURE: 149 MMHG | OXYGEN SATURATION: 99 % | TEMPERATURE: 97.6 F | RESPIRATION RATE: 16 BRPM | HEART RATE: 94 BPM

## 2021-10-19 DIAGNOSIS — E03.9 HYPOTHYROIDISM, UNSPECIFIED TYPE: ICD-10-CM

## 2021-10-19 DIAGNOSIS — D64.9 ANEMIA, UNSPECIFIED TYPE: ICD-10-CM

## 2021-10-19 LAB
ERYTHROCYTE [DISTWIDTH] IN BLOOD BY AUTOMATED COUNT: 13.5 % (ref 11.5–14.5)
HCT VFR BLD AUTO: 40.3 % (ref 42–47)
HGB BLD-MCNC: 13.4 G/DL (ref 12–16)
MCH RBC QN AUTO: 29.4 PG (ref 26–34)
MCHC RBC AUTO-ENTMCNC: 33.2 G/DL (ref 31–37)
MCV RBC AUTO: 89 FL (ref 81–99)
PLATELET # BLD AUTO: 293 THOUSANDS/UL (ref 149–390)
PMV BLD AUTO: 8.6 FL (ref 8.6–11.7)
RBC # BLD AUTO: 4.54 MILLION/UL (ref 3.9–5.2)
T4 FREE SERPL-MCNC: 1.03 NG/DL (ref 0.76–1.46)
TSH SERPL DL<=0.05 MIU/L-ACNC: 2.49 UIU/ML (ref 0.45–5.33)
WBC # BLD AUTO: 7.4 THOUSAND/UL (ref 4.8–10.8)

## 2021-10-19 PROCEDURE — 84439 ASSAY OF FREE THYROXINE: CPT

## 2021-10-19 PROCEDURE — 84443 ASSAY THYROID STIM HORMONE: CPT

## 2021-10-19 PROCEDURE — 96360 HYDRATION IV INFUSION INIT: CPT

## 2021-10-19 PROCEDURE — 36415 COLL VENOUS BLD VENIPUNCTURE: CPT

## 2021-10-19 PROCEDURE — 96361 HYDRATE IV INFUSION ADD-ON: CPT

## 2021-10-19 PROCEDURE — 85027 COMPLETE CBC AUTOMATED: CPT

## 2021-10-19 RX ADMIN — SODIUM CHLORIDE 2000 ML: 0.9 INJECTION, SOLUTION INTRAVENOUS at 08:19

## 2021-10-20 ENCOUNTER — ANESTHESIA EVENT (OUTPATIENT)
Dept: GASTROENTEROLOGY | Facility: HOSPITAL | Age: 66
End: 2021-10-20

## 2021-10-20 ENCOUNTER — HOSPITAL ENCOUNTER (OUTPATIENT)
Dept: GASTROENTEROLOGY | Facility: HOSPITAL | Age: 66
Setting detail: OUTPATIENT SURGERY
Discharge: HOME | End: 2021-10-20
Attending: INTERNAL MEDICINE
Payer: MEDICARE

## 2021-10-20 ENCOUNTER — ANESTHESIA (OUTPATIENT)
Dept: GASTROENTEROLOGY | Facility: HOSPITAL | Age: 66
End: 2021-10-20

## 2021-10-20 VITALS
OXYGEN SATURATION: 98 % | DIASTOLIC BLOOD PRESSURE: 70 MMHG | HEIGHT: 61 IN | RESPIRATION RATE: 18 BRPM | TEMPERATURE: 98 F | SYSTOLIC BLOOD PRESSURE: 131 MMHG | HEART RATE: 95 BPM | BODY MASS INDEX: 23.41 KG/M2 | WEIGHT: 124 LBS

## 2021-10-20 DIAGNOSIS — R13.10 DYSPHAGIA, UNSPECIFIED: ICD-10-CM

## 2021-10-20 DIAGNOSIS — K21.9 GASTRO-ESOPHAGEAL REFLUX DISEASE WITHOUT ESOPHAGITIS: ICD-10-CM

## 2021-10-20 DIAGNOSIS — K22.2 ESOPHAGEAL OBSTRUCTION: ICD-10-CM

## 2021-10-20 PROCEDURE — C1726 CATH, BAL DIL, NON-VASCULAR: HCPCS

## 2021-10-20 RX ORDER — SODIUM CHLORIDE 9 MG/ML
125 INJECTION, SOLUTION INTRAVENOUS CONTINUOUS
Status: DISCONTINUED | OUTPATIENT
Start: 2021-10-20 | End: 2021-10-24 | Stop reason: HOSPADM

## 2021-10-20 RX ORDER — PROPOFOL 10 MG/ML
INJECTION, EMULSION INTRAVENOUS AS NEEDED
Status: DISCONTINUED | OUTPATIENT
Start: 2021-10-20 | End: 2021-10-20

## 2021-10-20 RX ORDER — LIDOCAINE HYDROCHLORIDE 10 MG/ML
INJECTION, SOLUTION EPIDURAL; INFILTRATION; INTRACAUDAL; PERINEURAL AS NEEDED
Status: DISCONTINUED | OUTPATIENT
Start: 2021-10-20 | End: 2021-10-20

## 2021-10-20 RX ADMIN — PROPOFOL 30 MG: 10 INJECTION, EMULSION INTRAVENOUS at 13:46

## 2021-10-20 RX ADMIN — SODIUM CHLORIDE 125 ML/HR: 0.9 INJECTION, SOLUTION INTRAVENOUS at 12:28

## 2021-10-20 RX ADMIN — PROPOFOL 30 MG: 10 INJECTION, EMULSION INTRAVENOUS at 13:50

## 2021-10-20 RX ADMIN — PROPOFOL 30 MG: 10 INJECTION, EMULSION INTRAVENOUS at 13:51

## 2021-10-20 RX ADMIN — SODIUM CHLORIDE: 0.9 INJECTION, SOLUTION INTRAVENOUS at 13:44

## 2021-10-20 RX ADMIN — PROPOFOL 30 MG: 10 INJECTION, EMULSION INTRAVENOUS at 13:49

## 2021-10-20 RX ADMIN — LIDOCAINE HYDROCHLORIDE 50 MG: 10 INJECTION, SOLUTION EPIDURAL; INFILTRATION; INTRACAUDAL; PERINEURAL at 13:44

## 2021-10-20 RX ADMIN — PROPOFOL 90 MG: 10 INJECTION, EMULSION INTRAVENOUS at 13:44

## 2021-10-20 RX ADMIN — PROPOFOL 30 MG: 10 INJECTION, EMULSION INTRAVENOUS at 13:48

## 2021-10-22 ENCOUNTER — HOSPITAL ENCOUNTER (OUTPATIENT)
Dept: INFUSION CENTER | Facility: HOSPITAL | Age: 66
Discharge: HOME | End: 2021-10-22
Payer: MEDICARE

## 2021-10-22 VITALS
RESPIRATION RATE: 18 BRPM | DIASTOLIC BLOOD PRESSURE: 84 MMHG | TEMPERATURE: 97.6 F | SYSTOLIC BLOOD PRESSURE: 139 MMHG | HEART RATE: 60 BPM

## 2021-10-22 PROCEDURE — 96361 HYDRATE IV INFUSION ADD-ON: CPT

## 2021-10-22 PROCEDURE — 96360 HYDRATION IV INFUSION INIT: CPT

## 2021-10-22 RX ADMIN — SODIUM CHLORIDE 2000 ML: 0.9 INJECTION, SOLUTION INTRAVENOUS at 08:16

## 2021-10-25 ENCOUNTER — HOSPITAL ENCOUNTER (OUTPATIENT)
Dept: INFUSION CENTER | Facility: HOSPITAL | Age: 66
Discharge: HOME | End: 2021-10-25
Payer: MEDICARE

## 2021-10-25 VITALS
SYSTOLIC BLOOD PRESSURE: 148 MMHG | OXYGEN SATURATION: 99 % | TEMPERATURE: 96.9 F | HEART RATE: 91 BPM | RESPIRATION RATE: 18 BRPM | DIASTOLIC BLOOD PRESSURE: 87 MMHG

## 2021-10-25 PROCEDURE — 96360 HYDRATION IV INFUSION INIT: CPT

## 2021-10-25 PROCEDURE — 96361 HYDRATE IV INFUSION ADD-ON: CPT

## 2021-10-25 RX ADMIN — SODIUM CHLORIDE 2000 ML: 0.9 INJECTION, SOLUTION INTRAVENOUS at 08:24

## 2021-10-27 ENCOUNTER — HOSPITAL ENCOUNTER (OUTPATIENT)
Dept: INFUSION CENTER | Facility: HOSPITAL | Age: 66
Discharge: HOME | End: 2021-10-27
Payer: MEDICARE

## 2021-10-27 VITALS
SYSTOLIC BLOOD PRESSURE: 145 MMHG | RESPIRATION RATE: 18 BRPM | TEMPERATURE: 98.2 F | DIASTOLIC BLOOD PRESSURE: 83 MMHG | HEART RATE: 85 BPM | OXYGEN SATURATION: 97 %

## 2021-10-27 PROCEDURE — 96360 HYDRATION IV INFUSION INIT: CPT

## 2021-10-27 PROCEDURE — 96361 HYDRATE IV INFUSION ADD-ON: CPT

## 2021-10-27 RX ADMIN — SODIUM CHLORIDE 2000 ML: 0.9 INJECTION, SOLUTION INTRAVENOUS at 09:40

## 2021-10-29 ENCOUNTER — HOSPITAL ENCOUNTER (OUTPATIENT)
Dept: INFUSION CENTER | Facility: HOSPITAL | Age: 66
Discharge: HOME | End: 2021-10-29
Payer: MEDICARE

## 2021-10-29 VITALS
SYSTOLIC BLOOD PRESSURE: 165 MMHG | DIASTOLIC BLOOD PRESSURE: 77 MMHG | TEMPERATURE: 98.3 F | HEART RATE: 88 BPM | RESPIRATION RATE: 18 BRPM

## 2021-10-29 PROCEDURE — 96360 HYDRATION IV INFUSION INIT: CPT

## 2021-10-29 PROCEDURE — 96361 HYDRATE IV INFUSION ADD-ON: CPT

## 2021-10-29 RX ADMIN — SODIUM CHLORIDE 2000 ML: 0.9 INJECTION, SOLUTION INTRAVENOUS at 08:23

## 2021-11-01 ENCOUNTER — HOSPITAL ENCOUNTER (OUTPATIENT)
Dept: INFUSION CENTER | Facility: HOSPITAL | Age: 66
Discharge: HOME | End: 2021-11-01
Payer: MEDICARE

## 2021-11-01 VITALS
OXYGEN SATURATION: 98 % | DIASTOLIC BLOOD PRESSURE: 85 MMHG | TEMPERATURE: 98.3 F | RESPIRATION RATE: 18 BRPM | SYSTOLIC BLOOD PRESSURE: 145 MMHG | HEART RATE: 80 BPM

## 2021-11-01 PROCEDURE — 96361 HYDRATE IV INFUSION ADD-ON: CPT

## 2021-11-01 PROCEDURE — 96360 HYDRATION IV INFUSION INIT: CPT

## 2021-11-01 RX ADMIN — SODIUM CHLORIDE 2000 ML: 0.9 INJECTION, SOLUTION INTRAVENOUS at 10:19

## 2021-11-02 RX ORDER — SODIUM CHLORIDE 9 MG/ML
333.33 INJECTION, SOLUTION INTRAVENOUS ONCE
Status: COMPLETED | OUTPATIENT
Start: 2021-11-03 | End: 2021-11-03

## 2021-11-03 ENCOUNTER — HOSPITAL ENCOUNTER (OUTPATIENT)
Dept: INFUSION CENTER | Facility: HOSPITAL | Age: 66
Discharge: HOME | End: 2021-11-03
Payer: MEDICARE

## 2021-11-03 VITALS
RESPIRATION RATE: 18 BRPM | HEART RATE: 89 BPM | SYSTOLIC BLOOD PRESSURE: 147 MMHG | TEMPERATURE: 98.5 F | OXYGEN SATURATION: 98 % | DIASTOLIC BLOOD PRESSURE: 88 MMHG

## 2021-11-03 PROCEDURE — 96361 HYDRATE IV INFUSION ADD-ON: CPT

## 2021-11-03 PROCEDURE — 96360 HYDRATION IV INFUSION INIT: CPT

## 2021-11-03 RX ADMIN — SODIUM CHLORIDE 333.33 ML/HR: 0.9 INJECTION, SOLUTION INTRAVENOUS at 08:44

## 2021-11-04 RX ORDER — SODIUM CHLORIDE 9 MG/ML
333.33 INJECTION, SOLUTION INTRAVENOUS ONCE
Status: COMPLETED | OUTPATIENT
Start: 2021-11-05 | End: 2021-11-05

## 2021-11-05 ENCOUNTER — HOSPITAL ENCOUNTER (OUTPATIENT)
Dept: INFUSION CENTER | Facility: HOSPITAL | Age: 66
Discharge: HOME | End: 2021-11-05
Payer: MEDICARE

## 2021-11-05 VITALS
HEART RATE: 83 BPM | DIASTOLIC BLOOD PRESSURE: 70 MMHG | RESPIRATION RATE: 16 BRPM | SYSTOLIC BLOOD PRESSURE: 135 MMHG | TEMPERATURE: 98.4 F

## 2021-11-05 PROCEDURE — 96361 HYDRATE IV INFUSION ADD-ON: CPT

## 2021-11-05 PROCEDURE — 96360 HYDRATION IV INFUSION INIT: CPT

## 2021-11-05 RX ORDER — SODIUM CHLORIDE 9 MG/ML
333.33 INJECTION, SOLUTION INTRAVENOUS ONCE
Status: COMPLETED | OUTPATIENT
Start: 2021-11-08 | End: 2021-11-08

## 2021-11-05 RX ADMIN — SODIUM CHLORIDE 333.33 ML/HR: 0.9 INJECTION, SOLUTION INTRAVENOUS at 08:13

## 2021-11-08 ENCOUNTER — HOSPITAL ENCOUNTER (OUTPATIENT)
Dept: INFUSION CENTER | Facility: HOSPITAL | Age: 66
Discharge: HOME | End: 2021-11-08
Payer: MEDICARE

## 2021-11-08 VITALS
RESPIRATION RATE: 18 BRPM | HEART RATE: 67 BPM | SYSTOLIC BLOOD PRESSURE: 145 MMHG | DIASTOLIC BLOOD PRESSURE: 74 MMHG | TEMPERATURE: 97.9 F | OXYGEN SATURATION: 98 %

## 2021-11-08 PROCEDURE — 96361 HYDRATE IV INFUSION ADD-ON: CPT

## 2021-11-08 PROCEDURE — 96360 HYDRATION IV INFUSION INIT: CPT

## 2021-11-08 RX ORDER — IVABRADINE 5 MG/1
TABLET, FILM COATED ORAL 2 TIMES DAILY
COMMUNITY
Start: 2021-10-28

## 2021-11-08 RX ADMIN — SODIUM CHLORIDE 333.33 ML/HR: 0.9 INJECTION, SOLUTION INTRAVENOUS at 10:29

## 2021-11-10 ENCOUNTER — HOSPITAL ENCOUNTER (OUTPATIENT)
Dept: INFUSION CENTER | Facility: HOSPITAL | Age: 66
Discharge: HOME | End: 2021-11-10
Payer: MEDICARE

## 2021-11-10 VITALS
DIASTOLIC BLOOD PRESSURE: 74 MMHG | RESPIRATION RATE: 18 BRPM | SYSTOLIC BLOOD PRESSURE: 173 MMHG | OXYGEN SATURATION: 99 % | HEART RATE: 73 BPM | TEMPERATURE: 97.6 F

## 2021-11-10 PROCEDURE — 96361 HYDRATE IV INFUSION ADD-ON: CPT

## 2021-11-10 PROCEDURE — 96360 HYDRATION IV INFUSION INIT: CPT

## 2021-11-10 RX ORDER — SODIUM CHLORIDE 9 MG/ML
333.33 INJECTION, SOLUTION INTRAVENOUS ONCE
Status: COMPLETED | OUTPATIENT
Start: 2021-11-10 | End: 2021-11-10

## 2021-11-10 RX ADMIN — SODIUM CHLORIDE 333.33 ML/HR: 0.9 INJECTION, SOLUTION INTRAVENOUS at 08:25

## 2021-11-12 RX ORDER — SODIUM CHLORIDE 9 MG/ML
333.33 INJECTION, SOLUTION INTRAVENOUS ONCE
Status: DISCONTINUED | OUTPATIENT
Start: 2021-11-15 | End: 2021-11-19 | Stop reason: HOSPADM

## 2021-11-15 ENCOUNTER — APPOINTMENT (EMERGENCY)
Dept: NON INVASIVE DIAGNOSTICS | Facility: HOSPITAL | Age: 66
End: 2021-11-15
Payer: MEDICARE

## 2021-11-15 ENCOUNTER — HOSPITAL ENCOUNTER (OUTPATIENT)
Dept: INFUSION CENTER | Facility: HOSPITAL | Age: 66
Discharge: HOME | End: 2021-11-15
Payer: MEDICARE

## 2021-11-15 ENCOUNTER — APPOINTMENT (EMERGENCY)
Dept: RADIOLOGY | Facility: HOSPITAL | Age: 66
End: 2021-11-15
Payer: MEDICARE

## 2021-11-15 ENCOUNTER — HOSPITAL ENCOUNTER (EMERGENCY)
Facility: HOSPITAL | Age: 66
Discharge: HOME | End: 2021-11-15
Attending: EMERGENCY MEDICINE | Admitting: EMERGENCY MEDICINE
Payer: MEDICARE

## 2021-11-15 VITALS
BODY MASS INDEX: 23.39 KG/M2 | SYSTOLIC BLOOD PRESSURE: 137 MMHG | WEIGHT: 123.9 LBS | RESPIRATION RATE: 20 BRPM | HEIGHT: 61 IN | OXYGEN SATURATION: 99 % | TEMPERATURE: 97.5 F | HEART RATE: 62 BPM | DIASTOLIC BLOOD PRESSURE: 65 MMHG

## 2021-11-15 VITALS
OXYGEN SATURATION: 98 % | RESPIRATION RATE: 18 BRPM | HEART RATE: 69 BPM | DIASTOLIC BLOOD PRESSURE: 81 MMHG | TEMPERATURE: 96.9 F | SYSTOLIC BLOOD PRESSURE: 150 MMHG

## 2021-11-15 DIAGNOSIS — L53.9 CHEST WALL ERYTHEMA: Primary | ICD-10-CM

## 2021-11-15 LAB
ALBUMIN SERPL BCP-MCNC: 4.6 G/DL (ref 3.5–5.7)
ALP SERPL-CCNC: 89 U/L (ref 55–165)
ALT SERPL W P-5'-P-CCNC: 21 U/L (ref 7–52)
ANION GAP SERPL CALCULATED.3IONS-SCNC: 7 MMOL/L (ref 4–13)
APTT PPP: 26 SECONDS (ref 23–37)
AST SERPL W P-5'-P-CCNC: 25 U/L (ref 13–39)
ATRIAL RATE: 63 BPM
BASOPHILS # BLD AUTO: 0.1 THOUSANDS/ΜL (ref 0–0.1)
BASOPHILS NFR BLD AUTO: 1 % (ref 0–2)
BILIRUB SERPL-MCNC: 0.4 MG/DL (ref 0.2–1)
BUN SERPL-MCNC: 15 MG/DL (ref 7–25)
CALCIUM SERPL-MCNC: 9.8 MG/DL (ref 8.6–10.5)
CARDIAC TROPONIN I PNL SERPL HS: 5 NG/L
CHLORIDE SERPL-SCNC: 101 MMOL/L (ref 98–107)
CO2 SERPL-SCNC: 30 MMOL/L (ref 21–31)
CREAT SERPL-MCNC: 1.13 MG/DL (ref 0.6–1.2)
EOSINOPHIL # BLD AUTO: 0.2 THOUSAND/ΜL (ref 0–0.61)
EOSINOPHIL NFR BLD AUTO: 2 % (ref 0–5)
ERYTHROCYTE [DISTWIDTH] IN BLOOD BY AUTOMATED COUNT: 12.7 % (ref 11.5–14.5)
GFR SERPL CREATININE-BSD FRML MDRD: 50 ML/MIN/1.73SQ M
GLUCOSE SERPL-MCNC: 90 MG/DL (ref 65–99)
HCT VFR BLD AUTO: 43 % (ref 42–47)
HGB BLD-MCNC: 14.2 G/DL (ref 12–16)
INR PPP: 0.93 (ref 0.84–1.19)
LACTATE SERPL-SCNC: 0.7 MMOL/L (ref 0.5–2)
LYMPHOCYTES # BLD AUTO: 3.1 THOUSANDS/ΜL (ref 0.6–4.47)
LYMPHOCYTES NFR BLD AUTO: 31 % (ref 21–51)
MCH RBC QN AUTO: 29.3 PG (ref 26–34)
MCHC RBC AUTO-ENTMCNC: 33 G/DL (ref 31–37)
MCV RBC AUTO: 89 FL (ref 81–99)
MONOCYTES # BLD AUTO: 0.7 THOUSAND/ΜL (ref 0.17–1.22)
MONOCYTES NFR BLD AUTO: 7 % (ref 2–12)
NEUTROPHILS # BLD AUTO: 6.1 THOUSANDS/ΜL (ref 1.4–6.5)
NEUTS SEG NFR BLD AUTO: 60 % (ref 42–75)
P AXIS: 19 DEGREES
PLATELET # BLD AUTO: 315 THOUSANDS/UL (ref 149–390)
PMV BLD AUTO: 8 FL (ref 8.6–11.7)
POTASSIUM SERPL-SCNC: 3.8 MMOL/L (ref 3.5–5.5)
PR INTERVAL: 136 MS
PROT SERPL-MCNC: 7.2 G/DL (ref 6.4–8.9)
PROTHROMBIN TIME: 12.6 SECONDS (ref 11.6–14.5)
QRS AXIS: -37 DEGREES
QRSD INTERVAL: 120 MS
QT INTERVAL: 464 MS
QTC INTERVAL: 474 MS
RBC # BLD AUTO: 4.85 MILLION/UL (ref 3.9–5.2)
SODIUM SERPL-SCNC: 138 MMOL/L (ref 134–143)
T WAVE AXIS: 111 DEGREES
VENTRICULAR RATE: 63 BPM
WBC # BLD AUTO: 10.1 THOUSAND/UL (ref 4.8–10.8)

## 2021-11-15 PROCEDURE — 84484 ASSAY OF TROPONIN QUANT: CPT | Performed by: EMERGENCY MEDICINE

## 2021-11-15 PROCEDURE — 80053 COMPREHEN METABOLIC PANEL: CPT | Performed by: EMERGENCY MEDICINE

## 2021-11-15 PROCEDURE — 36415 COLL VENOUS BLD VENIPUNCTURE: CPT | Performed by: EMERGENCY MEDICINE

## 2021-11-15 PROCEDURE — 93971 EXTREMITY STUDY: CPT

## 2021-11-15 PROCEDURE — 71045 X-RAY EXAM CHEST 1 VIEW: CPT

## 2021-11-15 PROCEDURE — 83605 ASSAY OF LACTIC ACID: CPT | Performed by: EMERGENCY MEDICINE

## 2021-11-15 PROCEDURE — 85610 PROTHROMBIN TIME: CPT | Performed by: EMERGENCY MEDICINE

## 2021-11-15 PROCEDURE — 85730 THROMBOPLASTIN TIME PARTIAL: CPT | Performed by: EMERGENCY MEDICINE

## 2021-11-15 PROCEDURE — 85025 COMPLETE CBC W/AUTO DIFF WBC: CPT | Performed by: EMERGENCY MEDICINE

## 2021-11-15 PROCEDURE — 87040 BLOOD CULTURE FOR BACTERIA: CPT | Performed by: EMERGENCY MEDICINE

## 2021-11-15 PROCEDURE — 93010 ELECTROCARDIOGRAM REPORT: CPT | Performed by: INTERNAL MEDICINE

## 2021-11-15 PROCEDURE — 99284 EMERGENCY DEPT VISIT MOD MDM: CPT

## 2021-11-15 PROCEDURE — 93005 ELECTROCARDIOGRAM TRACING: CPT

## 2021-11-15 PROCEDURE — 93971 EXTREMITY STUDY: CPT | Performed by: SURGERY

## 2021-11-15 PROCEDURE — 99285 EMERGENCY DEPT VISIT HI MDM: CPT | Performed by: EMERGENCY MEDICINE

## 2021-11-17 ENCOUNTER — HOSPITAL ENCOUNTER (OUTPATIENT)
Dept: INFUSION CENTER | Facility: HOSPITAL | Age: 66
Discharge: HOME | End: 2021-11-17
Payer: MEDICARE

## 2021-11-17 VITALS
OXYGEN SATURATION: 96 % | RESPIRATION RATE: 18 BRPM | HEART RATE: 71 BPM | TEMPERATURE: 97.6 F | DIASTOLIC BLOOD PRESSURE: 83 MMHG | SYSTOLIC BLOOD PRESSURE: 147 MMHG

## 2021-11-17 PROCEDURE — 96361 HYDRATE IV INFUSION ADD-ON: CPT

## 2021-11-17 PROCEDURE — 96360 HYDRATION IV INFUSION INIT: CPT

## 2021-11-17 RX ADMIN — SODIUM CHLORIDE 2000 ML: 0.9 INJECTION, SOLUTION INTRAVENOUS at 09:40

## 2021-11-19 ENCOUNTER — HOSPITAL ENCOUNTER (OUTPATIENT)
Dept: INFUSION CENTER | Facility: HOSPITAL | Age: 66
Discharge: HOME | End: 2021-11-19
Payer: MEDICARE

## 2021-11-19 VITALS
HEART RATE: 72 BPM | TEMPERATURE: 97.2 F | SYSTOLIC BLOOD PRESSURE: 153 MMHG | DIASTOLIC BLOOD PRESSURE: 94 MMHG | OXYGEN SATURATION: 97 % | RESPIRATION RATE: 18 BRPM

## 2021-11-19 PROCEDURE — 96360 HYDRATION IV INFUSION INIT: CPT

## 2021-11-19 PROCEDURE — 96361 HYDRATE IV INFUSION ADD-ON: CPT

## 2021-11-19 RX ORDER — SODIUM CHLORIDE 9 MG/ML
333 INJECTION, SOLUTION INTRAVENOUS ONCE
Status: COMPLETED | OUTPATIENT
Start: 2021-11-22 | End: 2021-11-22

## 2021-11-19 RX ADMIN — SODIUM CHLORIDE 2000 ML: 0.9 INJECTION, SOLUTION INTRAVENOUS at 08:56

## 2021-11-20 LAB
BACTERIA BLD CULT: NORMAL

## 2021-11-22 ENCOUNTER — HOSPITAL ENCOUNTER (OUTPATIENT)
Dept: INFUSION CENTER | Facility: HOSPITAL | Age: 66
Discharge: HOME | End: 2021-11-22
Payer: MEDICARE

## 2021-11-22 VITALS
DIASTOLIC BLOOD PRESSURE: 90 MMHG | TEMPERATURE: 98.2 F | SYSTOLIC BLOOD PRESSURE: 160 MMHG | RESPIRATION RATE: 18 BRPM | HEART RATE: 70 BPM

## 2021-11-22 PROCEDURE — 96360 HYDRATION IV INFUSION INIT: CPT

## 2021-11-22 PROCEDURE — 96361 HYDRATE IV INFUSION ADD-ON: CPT

## 2021-11-22 RX ADMIN — SODIUM CHLORIDE 333 ML/HR: 0.9 INJECTION, SOLUTION INTRAVENOUS at 08:54

## 2021-11-23 RX ORDER — SODIUM CHLORIDE 9 MG/ML
333.33 INJECTION, SOLUTION INTRAVENOUS ONCE
Status: COMPLETED | OUTPATIENT
Start: 2021-11-24 | End: 2021-11-24

## 2021-11-24 ENCOUNTER — HOSPITAL ENCOUNTER (OUTPATIENT)
Dept: INFUSION CENTER | Facility: HOSPITAL | Age: 66
Discharge: HOME | End: 2021-11-24
Payer: MEDICARE

## 2021-11-24 VITALS
DIASTOLIC BLOOD PRESSURE: 89 MMHG | TEMPERATURE: 97.4 F | RESPIRATION RATE: 18 BRPM | OXYGEN SATURATION: 97 % | SYSTOLIC BLOOD PRESSURE: 152 MMHG | HEART RATE: 76 BPM

## 2021-11-24 PROCEDURE — 96361 HYDRATE IV INFUSION ADD-ON: CPT

## 2021-11-24 PROCEDURE — 96360 HYDRATION IV INFUSION INIT: CPT

## 2021-11-24 RX ORDER — SODIUM CHLORIDE 9 MG/ML
333.33 INJECTION, SOLUTION INTRAVENOUS ONCE
Status: COMPLETED | OUTPATIENT
Start: 2021-11-26 | End: 2021-11-26

## 2021-11-24 RX ADMIN — SODIUM CHLORIDE 333.33 ML/HR: 0.9 INJECTION, SOLUTION INTRAVENOUS at 08:41

## 2021-11-26 ENCOUNTER — HOSPITAL ENCOUNTER (OUTPATIENT)
Dept: INFUSION CENTER | Facility: HOSPITAL | Age: 66
Discharge: HOME | End: 2021-11-26
Payer: MEDICARE

## 2021-11-26 VITALS
SYSTOLIC BLOOD PRESSURE: 156 MMHG | DIASTOLIC BLOOD PRESSURE: 87 MMHG | RESPIRATION RATE: 18 BRPM | OXYGEN SATURATION: 97 % | HEART RATE: 76 BPM | TEMPERATURE: 97.3 F

## 2021-11-26 PROCEDURE — 96361 HYDRATE IV INFUSION ADD-ON: CPT

## 2021-11-26 PROCEDURE — 96360 HYDRATION IV INFUSION INIT: CPT

## 2021-11-26 RX ORDER — SODIUM CHLORIDE 9 MG/ML
333.33 INJECTION, SOLUTION INTRAVENOUS ONCE
Status: COMPLETED | OUTPATIENT
Start: 2021-11-29 | End: 2021-11-29

## 2021-11-26 RX ADMIN — SODIUM CHLORIDE 333.33 ML/HR: 0.9 INJECTION, SOLUTION INTRAVENOUS at 10:30

## 2021-11-29 ENCOUNTER — HOSPITAL ENCOUNTER (OUTPATIENT)
Dept: INFUSION CENTER | Facility: HOSPITAL | Age: 66
Discharge: HOME | End: 2021-11-29
Payer: MEDICARE

## 2021-11-29 PROCEDURE — 96361 HYDRATE IV INFUSION ADD-ON: CPT

## 2021-11-29 PROCEDURE — 96360 HYDRATION IV INFUSION INIT: CPT

## 2021-11-29 RX ADMIN — SODIUM CHLORIDE 333.33 ML/HR: 0.9 INJECTION, SOLUTION INTRAVENOUS at 09:51

## 2021-12-01 ENCOUNTER — HOSPITAL ENCOUNTER (OUTPATIENT)
Dept: INFUSION CENTER | Facility: HOSPITAL | Age: 66
Discharge: HOME | End: 2021-12-01

## 2021-12-01 PROCEDURE — U0003 INFECTIOUS AGENT DETECTION BY NUCLEIC ACID (DNA OR RNA); SEVERE ACUTE RESPIRATORY SYNDROME CORONAVIRUS 2 (SARS-COV-2) (CORONAVIRUS DISEASE [COVID-19]), AMPLIFIED PROBE TECHNIQUE, MAKING USE OF HIGH THROUGHPUT TECHNOLOGIES AS DESCRIBED BY CMS-2020-01-R: HCPCS | Performed by: INTERNAL MEDICINE

## 2021-12-01 PROCEDURE — U0005 INFEC AGEN DETEC AMPLI PROBE: HCPCS | Performed by: INTERNAL MEDICINE

## 2021-12-03 ENCOUNTER — HOSPITAL ENCOUNTER (OUTPATIENT)
Dept: INFUSION CENTER | Facility: HOSPITAL | Age: 66
Discharge: HOME | End: 2021-12-03

## 2021-12-06 ENCOUNTER — HOSPITAL ENCOUNTER (OUTPATIENT)
Dept: INFUSION CENTER | Facility: HOSPITAL | Age: 66
Discharge: HOME | End: 2021-12-06
Payer: MEDICARE

## 2021-12-06 VITALS
SYSTOLIC BLOOD PRESSURE: 145 MMHG | DIASTOLIC BLOOD PRESSURE: 85 MMHG | OXYGEN SATURATION: 96 % | RESPIRATION RATE: 18 BRPM | HEART RATE: 71 BPM | TEMPERATURE: 97.6 F

## 2021-12-06 DIAGNOSIS — K21.9 GASTROESOPHAGEAL REFLUX DISEASE WITHOUT ESOPHAGITIS: Primary | ICD-10-CM

## 2021-12-06 DIAGNOSIS — I49.8 POSTURAL ORTHOSTATIC TACHYCARDIA SYNDROME: Primary | Chronic | ICD-10-CM

## 2021-12-06 PROCEDURE — 96361 HYDRATE IV INFUSION ADD-ON: CPT

## 2021-12-06 PROCEDURE — 96360 HYDRATION IV INFUSION INIT: CPT

## 2021-12-06 RX ADMIN — SODIUM CHLORIDE 2000 ML: 0.9 INJECTION, SOLUTION INTRAVENOUS at 09:40

## 2021-12-07 ENCOUNTER — HOSPITAL ENCOUNTER (OUTPATIENT)
Dept: RADIOLOGY | Facility: HOSPITAL | Age: 66
Discharge: HOME | End: 2021-12-07
Attending: RADIOLOGY
Payer: MEDICARE

## 2021-12-07 DIAGNOSIS — K21.9 GASTROESOPHAGEAL REFLUX DISEASE WITHOUT ESOPHAGITIS: ICD-10-CM

## 2021-12-07 PROCEDURE — 36584 COMPL RPLCMT PICC RS&I: CPT

## 2021-12-07 PROCEDURE — 77001 FLUOROGUIDE FOR VEIN DEVICE: CPT | Performed by: RADIOLOGY

## 2021-12-07 PROCEDURE — 36580 REPLACE CVAD CATH: CPT | Performed by: RADIOLOGY

## 2021-12-07 PROCEDURE — C1751 CATH, INF, PER/CENT/MIDLINE: HCPCS

## 2021-12-07 RX ORDER — LIDOCAINE WITH 8.4% SOD BICARB 0.9%(10ML)
SYRINGE (ML) INJECTION CODE/TRAUMA/SEDATION MEDICATION
Status: COMPLETED | OUTPATIENT
Start: 2021-12-07 | End: 2021-12-07

## 2021-12-07 RX ADMIN — Medication 10 ML: at 13:09

## 2021-12-08 ENCOUNTER — HOSPITAL ENCOUNTER (OUTPATIENT)
Dept: INFUSION CENTER | Facility: HOSPITAL | Age: 66
Discharge: HOME | End: 2021-12-08
Payer: MEDICARE

## 2021-12-08 VITALS
SYSTOLIC BLOOD PRESSURE: 173 MMHG | DIASTOLIC BLOOD PRESSURE: 82 MMHG | OXYGEN SATURATION: 99 % | RESPIRATION RATE: 16 BRPM | TEMPERATURE: 98.5 F | HEART RATE: 88 BPM

## 2021-12-08 PROCEDURE — 96360 HYDRATION IV INFUSION INIT: CPT

## 2021-12-08 PROCEDURE — 96361 HYDRATE IV INFUSION ADD-ON: CPT

## 2021-12-08 RX ADMIN — SODIUM CHLORIDE 2000 ML: 0.9 INJECTION, SOLUTION INTRAVENOUS at 08:19

## 2021-12-09 RX ORDER — SODIUM CHLORIDE 9 MG/ML
333.33 INJECTION, SOLUTION INTRAVENOUS ONCE
Status: COMPLETED | OUTPATIENT
Start: 2021-12-13 | End: 2021-12-13

## 2021-12-10 ENCOUNTER — HOSPITAL ENCOUNTER (OUTPATIENT)
Dept: INFUSION CENTER | Facility: HOSPITAL | Age: 66
Discharge: HOME | End: 2021-12-10
Payer: MEDICARE

## 2021-12-10 VITALS
SYSTOLIC BLOOD PRESSURE: 147 MMHG | HEART RATE: 75 BPM | DIASTOLIC BLOOD PRESSURE: 82 MMHG | RESPIRATION RATE: 18 BRPM | TEMPERATURE: 97.9 F | OXYGEN SATURATION: 99 %

## 2021-12-10 PROCEDURE — 96360 HYDRATION IV INFUSION INIT: CPT

## 2021-12-10 PROCEDURE — 96361 HYDRATE IV INFUSION ADD-ON: CPT

## 2021-12-10 RX ORDER — SODIUM CHLORIDE 9 MG/ML
333.3 INJECTION, SOLUTION INTRAVENOUS ONCE
Status: COMPLETED | OUTPATIENT
Start: 2021-12-10 | End: 2021-12-10

## 2021-12-10 RX ADMIN — SODIUM CHLORIDE 333.3 ML/HR: 0.9 INJECTION, SOLUTION INTRAVENOUS at 08:32

## 2021-12-13 ENCOUNTER — HOSPITAL ENCOUNTER (OUTPATIENT)
Dept: INFUSION CENTER | Facility: HOSPITAL | Age: 66
Discharge: HOME | End: 2021-12-13
Payer: MEDICARE

## 2021-12-13 VITALS
SYSTOLIC BLOOD PRESSURE: 123 MMHG | RESPIRATION RATE: 18 BRPM | DIASTOLIC BLOOD PRESSURE: 78 MMHG | HEART RATE: 82 BPM | TEMPERATURE: 97.7 F | OXYGEN SATURATION: 98 %

## 2021-12-13 PROCEDURE — 96360 HYDRATION IV INFUSION INIT: CPT

## 2021-12-13 PROCEDURE — 96361 HYDRATE IV INFUSION ADD-ON: CPT

## 2021-12-13 RX ADMIN — SODIUM CHLORIDE 333.33 ML/HR: 0.9 INJECTION, SOLUTION INTRAVENOUS at 08:41

## 2021-12-14 RX ORDER — SODIUM CHLORIDE 9 MG/ML
333.33 INJECTION, SOLUTION INTRAVENOUS ONCE
Status: COMPLETED | OUTPATIENT
Start: 2021-12-15 | End: 2021-12-15

## 2021-12-15 ENCOUNTER — HOSPITAL ENCOUNTER (OUTPATIENT)
Dept: INFUSION CENTER | Facility: HOSPITAL | Age: 66
Discharge: HOME | End: 2021-12-15
Payer: MEDICARE

## 2021-12-15 VITALS
HEART RATE: 71 BPM | OXYGEN SATURATION: 98 % | RESPIRATION RATE: 18 BRPM | SYSTOLIC BLOOD PRESSURE: 144 MMHG | DIASTOLIC BLOOD PRESSURE: 95 MMHG | TEMPERATURE: 97.6 F

## 2021-12-15 PROCEDURE — 96361 HYDRATE IV INFUSION ADD-ON: CPT

## 2021-12-15 PROCEDURE — 96360 HYDRATION IV INFUSION INIT: CPT

## 2021-12-15 RX ADMIN — SODIUM CHLORIDE 333.33 ML/HR: 0.9 INJECTION, SOLUTION INTRAVENOUS at 08:14

## 2021-12-16 RX ORDER — SODIUM CHLORIDE 9 MG/ML
333.33 INJECTION, SOLUTION INTRAVENOUS ONCE
Status: COMPLETED | OUTPATIENT
Start: 2021-12-17 | End: 2021-12-17

## 2021-12-17 ENCOUNTER — HOSPITAL ENCOUNTER (OUTPATIENT)
Dept: INFUSION CENTER | Facility: HOSPITAL | Age: 66
Discharge: HOME | End: 2021-12-17
Payer: MEDICARE

## 2021-12-17 VITALS
OXYGEN SATURATION: 99 % | DIASTOLIC BLOOD PRESSURE: 87 MMHG | TEMPERATURE: 97.5 F | HEART RATE: 78 BPM | SYSTOLIC BLOOD PRESSURE: 150 MMHG | RESPIRATION RATE: 18 BRPM

## 2021-12-17 PROCEDURE — 96360 HYDRATION IV INFUSION INIT: CPT

## 2021-12-17 PROCEDURE — 96361 HYDRATE IV INFUSION ADD-ON: CPT

## 2021-12-17 RX ADMIN — SODIUM CHLORIDE 333.33 ML/HR: 0.9 INJECTION, SOLUTION INTRAVENOUS at 08:20

## 2021-12-20 ENCOUNTER — HOSPITAL ENCOUNTER (OUTPATIENT)
Dept: INFUSION CENTER | Facility: HOSPITAL | Age: 66
Discharge: HOME | End: 2021-12-20
Payer: MEDICARE

## 2021-12-20 VITALS
SYSTOLIC BLOOD PRESSURE: 147 MMHG | TEMPERATURE: 98.8 F | OXYGEN SATURATION: 98 % | RESPIRATION RATE: 18 BRPM | DIASTOLIC BLOOD PRESSURE: 82 MMHG | HEART RATE: 83 BPM

## 2021-12-20 PROCEDURE — 96360 HYDRATION IV INFUSION INIT: CPT

## 2021-12-20 PROCEDURE — 96361 HYDRATE IV INFUSION ADD-ON: CPT

## 2021-12-20 RX ADMIN — SODIUM CHLORIDE 2000 ML: 0.9 INJECTION, SOLUTION INTRAVENOUS at 09:30

## 2021-12-21 RX ORDER — SODIUM CHLORIDE 9 MG/ML
333.33 INJECTION, SOLUTION INTRAVENOUS ONCE
Status: COMPLETED | OUTPATIENT
Start: 2021-12-22 | End: 2021-12-22

## 2021-12-22 ENCOUNTER — HOSPITAL ENCOUNTER (OUTPATIENT)
Dept: INFUSION CENTER | Facility: HOSPITAL | Age: 66
Discharge: HOME | End: 2021-12-22
Payer: MEDICARE

## 2021-12-22 VITALS
TEMPERATURE: 98.3 F | HEART RATE: 83 BPM | OXYGEN SATURATION: 97 % | DIASTOLIC BLOOD PRESSURE: 89 MMHG | RESPIRATION RATE: 18 BRPM | SYSTOLIC BLOOD PRESSURE: 146 MMHG

## 2021-12-22 PROCEDURE — 96360 HYDRATION IV INFUSION INIT: CPT

## 2021-12-22 PROCEDURE — 96361 HYDRATE IV INFUSION ADD-ON: CPT

## 2021-12-22 RX ADMIN — SODIUM CHLORIDE 333.33 ML/HR: 0.9 INJECTION, SOLUTION INTRAVENOUS at 08:30

## 2021-12-23 ENCOUNTER — HOSPITAL ENCOUNTER (OUTPATIENT)
Dept: INFUSION CENTER | Facility: HOSPITAL | Age: 66
Discharge: HOME | End: 2021-12-23
Payer: MEDICARE

## 2021-12-23 VITALS
OXYGEN SATURATION: 97 % | HEART RATE: 71 BPM | DIASTOLIC BLOOD PRESSURE: 79 MMHG | TEMPERATURE: 98 F | SYSTOLIC BLOOD PRESSURE: 172 MMHG | RESPIRATION RATE: 18 BRPM

## 2021-12-23 PROCEDURE — 96360 HYDRATION IV INFUSION INIT: CPT

## 2021-12-23 PROCEDURE — 96361 HYDRATE IV INFUSION ADD-ON: CPT

## 2021-12-23 RX ADMIN — SODIUM CHLORIDE 2000 ML: 0.9 INJECTION, SOLUTION INTRAVENOUS at 09:30

## 2021-12-27 ENCOUNTER — HOSPITAL ENCOUNTER (OUTPATIENT)
Dept: INFUSION CENTER | Facility: HOSPITAL | Age: 66
Discharge: HOME | End: 2021-12-27

## 2021-12-27 RX ORDER — SODIUM CHLORIDE 9 MG/ML
333.33 INJECTION, SOLUTION INTRAVENOUS ONCE
Status: COMPLETED | OUTPATIENT
Start: 2021-12-29 | End: 2021-12-29

## 2021-12-29 ENCOUNTER — HOSPITAL ENCOUNTER (OUTPATIENT)
Dept: INFUSION CENTER | Facility: HOSPITAL | Age: 66
Discharge: HOME | End: 2021-12-29
Payer: MEDICARE

## 2021-12-29 VITALS
HEART RATE: 76 BPM | SYSTOLIC BLOOD PRESSURE: 136 MMHG | DIASTOLIC BLOOD PRESSURE: 64 MMHG | RESPIRATION RATE: 20 BRPM | TEMPERATURE: 97.5 F

## 2021-12-29 LAB
ALBUMIN SERPL BCP-MCNC: 4.3 G/DL (ref 3.5–5)
ALP SERPL-CCNC: 115 U/L (ref 34–104)
ALT SERPL W P-5'-P-CCNC: 53 U/L (ref 7–52)
ANION GAP SERPL CALCULATED.3IONS-SCNC: 6 MMOL/L (ref 4–13)
AST SERPL W P-5'-P-CCNC: 58 U/L (ref 13–39)
BILIRUB SERPL-MCNC: 0.38 MG/DL (ref 0.2–1)
BUN SERPL-MCNC: 23 MG/DL (ref 5–25)
CALCIUM SERPL-MCNC: 9.5 MG/DL (ref 8.4–10.2)
CHLORIDE SERPL-SCNC: 101 MMOL/L (ref 96–108)
CO2 SERPL-SCNC: 30 MMOL/L (ref 21–32)
CREAT SERPL-MCNC: 1.07 MG/DL (ref 0.6–1.3)
GFR SERPL CREATININE-BSD FRML MDRD: 53 ML/MIN/1.73SQ M
GLUCOSE SERPL-MCNC: 101 MG/DL (ref 65–140)
POTASSIUM SERPL-SCNC: 4.2 MMOL/L (ref 3.5–5.3)
PROT SERPL-MCNC: 6.8 G/DL (ref 6.4–8.4)
SODIUM SERPL-SCNC: 137 MMOL/L (ref 135–147)
TSH SERPL DL<=0.05 MIU/L-ACNC: 3.98 UIU/ML (ref 0.45–5.33)

## 2021-12-29 PROCEDURE — 82306 VITAMIN D 25 HYDROXY: CPT | Performed by: INTERNAL MEDICINE

## 2021-12-29 PROCEDURE — 84443 ASSAY THYROID STIM HORMONE: CPT | Performed by: INTERNAL MEDICINE

## 2021-12-29 PROCEDURE — 80053 COMPREHEN METABOLIC PANEL: CPT | Performed by: INTERNAL MEDICINE

## 2021-12-29 PROCEDURE — 96360 HYDRATION IV INFUSION INIT: CPT

## 2021-12-29 PROCEDURE — 96361 HYDRATE IV INFUSION ADD-ON: CPT

## 2021-12-29 RX ADMIN — SODIUM CHLORIDE 333.33 ML/HR: 0.9 INJECTION, SOLUTION INTRAVENOUS at 08:55

## 2021-12-30 ENCOUNTER — HOSPITAL ENCOUNTER (OUTPATIENT)
Dept: INFUSION CENTER | Facility: HOSPITAL | Age: 66
Discharge: HOME | End: 2021-12-30
Payer: MEDICARE

## 2021-12-30 VITALS
HEART RATE: 84 BPM | SYSTOLIC BLOOD PRESSURE: 145 MMHG | RESPIRATION RATE: 16 BRPM | TEMPERATURE: 97.9 F | DIASTOLIC BLOOD PRESSURE: 72 MMHG

## 2021-12-30 PROCEDURE — 96361 HYDRATE IV INFUSION ADD-ON: CPT

## 2021-12-30 PROCEDURE — 96360 HYDRATION IV INFUSION INIT: CPT

## 2021-12-30 RX ADMIN — SODIUM CHLORIDE 2000 ML: 0.9 INJECTION, SOLUTION INTRAVENOUS at 09:11

## 2022-01-03 ENCOUNTER — HOSPITAL ENCOUNTER (OUTPATIENT)
Dept: INFUSION CENTER | Facility: HOSPITAL | Age: 67
Discharge: HOME | End: 2022-01-03
Payer: MEDICARE

## 2022-01-03 VITALS
HEART RATE: 82 BPM | DIASTOLIC BLOOD PRESSURE: 97 MMHG | RESPIRATION RATE: 18 BRPM | TEMPERATURE: 99.3 F | SYSTOLIC BLOOD PRESSURE: 167 MMHG

## 2022-01-03 PROCEDURE — 96360 HYDRATION IV INFUSION INIT: CPT

## 2022-01-03 PROCEDURE — 96361 HYDRATE IV INFUSION ADD-ON: CPT

## 2022-01-03 RX ADMIN — SODIUM CHLORIDE 2000 ML: 0.9 INJECTION, SOLUTION INTRAVENOUS at 09:25

## 2022-01-03 NOTE — PROGRESS NOTES
Patient tolerated IV hydration without issue   Discharged in stable condition, declined AVS but aware of next appointment

## 2022-01-04 LAB
25(OH)D2 SERPL-MCNC: <1 NG/ML
25(OH)D3 SERPL-MCNC: 45 NG/ML
25(OH)D3+25(OH)D2 SERPL-MCNC: 46 NG/ML

## 2022-01-04 RX ORDER — SODIUM CHLORIDE 9 MG/ML
333.33 INJECTION, SOLUTION INTRAVENOUS ONCE
Status: COMPLETED | OUTPATIENT
Start: 2022-01-05 | End: 2022-01-05

## 2022-01-05 ENCOUNTER — HOSPITAL ENCOUNTER (OUTPATIENT)
Dept: INFUSION CENTER | Facility: HOSPITAL | Age: 67
Discharge: HOME | End: 2022-01-05
Payer: MEDICARE

## 2022-01-05 VITALS
DIASTOLIC BLOOD PRESSURE: 86 MMHG | HEART RATE: 73 BPM | RESPIRATION RATE: 18 BRPM | TEMPERATURE: 97.6 F | OXYGEN SATURATION: 98 % | SYSTOLIC BLOOD PRESSURE: 141 MMHG

## 2022-01-05 PROCEDURE — 96360 HYDRATION IV INFUSION INIT: CPT

## 2022-01-05 PROCEDURE — 96361 HYDRATE IV INFUSION ADD-ON: CPT

## 2022-01-05 RX ADMIN — SODIUM CHLORIDE 333.33 ML/HR: 0.9 INJECTION, SOLUTION INTRAVENOUS at 08:25

## 2022-01-05 NOTE — PROGRESS NOTES
PICC dressing changed, site cleaned with alcohol and transparent dressing applied at patients request due to chlorhexidine allergy  Patient tolerated IV hydration without issue   Discharged in stable condition, declined AVS

## 2022-01-06 ENCOUNTER — HOSPITAL ENCOUNTER (EMERGENCY)
Facility: HOSPITAL | Age: 67
Discharge: HOME | End: 2022-01-06
Attending: EMERGENCY MEDICINE
Payer: MEDICARE

## 2022-01-06 ENCOUNTER — APPOINTMENT (EMERGENCY)
Dept: CT IMAGING | Facility: HOSPITAL | Age: 67
End: 2022-01-06
Payer: MEDICARE

## 2022-01-06 VITALS
HEART RATE: 77 BPM | WEIGHT: 123.9 LBS | HEIGHT: 61 IN | SYSTOLIC BLOOD PRESSURE: 172 MMHG | BODY MASS INDEX: 23.39 KG/M2 | RESPIRATION RATE: 20 BRPM | DIASTOLIC BLOOD PRESSURE: 88 MMHG | OXYGEN SATURATION: 97 % | TEMPERATURE: 98 F

## 2022-01-06 DIAGNOSIS — L03.313 CELLULITIS OF CHEST WALL: Primary | ICD-10-CM

## 2022-01-06 LAB
ALBUMIN SERPL BCP-MCNC: 4.4 G/DL (ref 3.5–5)
ALP SERPL-CCNC: 169 U/L (ref 46–116)
ALT SERPL W P-5'-P-CCNC: 56 U/L (ref 12–78)
ANION GAP SERPL CALCULATED.3IONS-SCNC: 12 MMOL/L (ref 4–13)
AST SERPL W P-5'-P-CCNC: 24 U/L (ref 5–45)
BASOPHILS # BLD AUTO: 0.07 THOUSANDS/ΜL (ref 0–0.1)
BASOPHILS NFR BLD AUTO: 1 % (ref 0–1)
BILIRUB SERPL-MCNC: 0.64 MG/DL (ref 0.2–1)
BUN SERPL-MCNC: 20 MG/DL (ref 5–25)
CALCIUM SERPL-MCNC: 9.7 MG/DL (ref 8.3–10.1)
CHLORIDE SERPL-SCNC: 104 MMOL/L (ref 100–108)
CO2 SERPL-SCNC: 26 MMOL/L (ref 21–32)
CREAT SERPL-MCNC: 1.18 MG/DL (ref 0.6–1.3)
EOSINOPHIL # BLD AUTO: 0.23 THOUSAND/ΜL (ref 0–0.61)
EOSINOPHIL NFR BLD AUTO: 2 % (ref 0–6)
ERYTHROCYTE [DISTWIDTH] IN BLOOD BY AUTOMATED COUNT: 13 % (ref 11.6–15.1)
GFR SERPL CREATININE-BSD FRML MDRD: 47 ML/MIN/1.73SQ M
GLUCOSE SERPL-MCNC: 85 MG/DL (ref 65–140)
HCT VFR BLD AUTO: 44.8 % (ref 34.8–46.1)
HGB BLD-MCNC: 14.9 G/DL (ref 11.5–15.4)
IMM GRANULOCYTES # BLD AUTO: 0.02 THOUSAND/UL (ref 0–0.2)
IMM GRANULOCYTES NFR BLD AUTO: 0 % (ref 0–2)
LACTATE SERPL-SCNC: 1 MMOL/L (ref 0.5–2)
LYMPHOCYTES # BLD AUTO: 2.56 THOUSANDS/ΜL (ref 0.6–4.47)
LYMPHOCYTES NFR BLD AUTO: 22 % (ref 14–44)
MCH RBC QN AUTO: 29.4 PG (ref 26.8–34.3)
MCHC RBC AUTO-ENTMCNC: 33.3 G/DL (ref 31.4–37.4)
MCV RBC AUTO: 89 FL (ref 82–98)
MONOCYTES # BLD AUTO: 0.96 THOUSAND/ΜL (ref 0.17–1.22)
MONOCYTES NFR BLD AUTO: 8 % (ref 4–12)
NEUTROPHILS # BLD AUTO: 7.97 THOUSANDS/ΜL (ref 1.85–7.62)
NEUTS SEG NFR BLD AUTO: 67 % (ref 43–75)
NRBC BLD AUTO-RTO: 0 /100 WBCS
PLATELET # BLD AUTO: 321 THOUSANDS/UL (ref 149–390)
PMV BLD AUTO: 9.4 FL (ref 8.9–12.7)
POTASSIUM SERPL-SCNC: 4 MMOL/L (ref 3.5–5.3)
PROT SERPL-MCNC: 8 G/DL (ref 6.4–8.2)
RBC # BLD AUTO: 5.06 MILLION/UL (ref 3.81–5.12)
SODIUM SERPL-SCNC: 142 MMOL/L (ref 136–145)
WBC # BLD AUTO: 11.81 THOUSAND/UL (ref 4.31–10.16)

## 2022-01-06 PROCEDURE — 87186 SC STD MICRODIL/AGAR DIL: CPT | Performed by: PHYSICIAN ASSISTANT

## 2022-01-06 PROCEDURE — 71260 CT THORAX DX C+: CPT

## 2022-01-06 PROCEDURE — 87205 SMEAR GRAM STAIN: CPT | Performed by: PHYSICIAN ASSISTANT

## 2022-01-06 PROCEDURE — 87070 CULTURE OTHR SPECIMN AEROBIC: CPT | Performed by: PHYSICIAN ASSISTANT

## 2022-01-06 PROCEDURE — 85025 COMPLETE CBC W/AUTO DIFF WBC: CPT | Performed by: PHYSICIAN ASSISTANT

## 2022-01-06 PROCEDURE — 96360 HYDRATION IV INFUSION INIT: CPT

## 2022-01-06 PROCEDURE — 96374 THER/PROPH/DIAG INJ IV PUSH: CPT

## 2022-01-06 PROCEDURE — G1004 CDSM NDSC: HCPCS

## 2022-01-06 PROCEDURE — 80053 COMPREHEN METABOLIC PANEL: CPT | Performed by: PHYSICIAN ASSISTANT

## 2022-01-06 PROCEDURE — 83605 ASSAY OF LACTIC ACID: CPT | Performed by: PHYSICIAN ASSISTANT

## 2022-01-06 PROCEDURE — 99284 EMERGENCY DEPT VISIT MOD MDM: CPT | Performed by: PHYSICIAN ASSISTANT

## 2022-01-06 PROCEDURE — 36415 COLL VENOUS BLD VENIPUNCTURE: CPT | Performed by: PHYSICIAN ASSISTANT

## 2022-01-06 PROCEDURE — 99284 EMERGENCY DEPT VISIT MOD MDM: CPT

## 2022-01-06 RX ORDER — DOXYCYCLINE HYCLATE 100 MG/1
100 CAPSULE ORAL ONCE
Status: COMPLETED | OUTPATIENT
Start: 2022-01-06 | End: 2022-01-06

## 2022-01-06 RX ORDER — DOXYCYCLINE 100 MG/1
100 TABLET ORAL 2 TIMES DAILY
Qty: 14 TABLET | Refills: 0 | Status: SHIPPED | OUTPATIENT
Start: 2022-01-06 | End: 2022-01-13

## 2022-01-06 RX ORDER — SODIUM CHLORIDE 9 MG/ML
333.33 INJECTION, SOLUTION INTRAVENOUS ONCE
Status: DISCONTINUED | OUTPATIENT
Start: 2022-01-07 | End: 2022-01-11 | Stop reason: HOSPADM

## 2022-01-06 RX ORDER — SODIUM CHLORIDE 9 MG/ML
333.33 INJECTION, SOLUTION INTRAVENOUS ONCE
Status: DISCONTINUED | OUTPATIENT
Start: 2022-01-10 | End: 2022-01-11 | Stop reason: HOSPADM

## 2022-01-06 RX ORDER — SODIUM CHLORIDE 9 MG/ML
5 INJECTION INTRAVENOUS ONCE
Status: COMPLETED | OUTPATIENT
Start: 2022-01-06 | End: 2022-01-06

## 2022-01-06 RX ADMIN — IOHEXOL 85 ML: 350 INJECTION, SOLUTION INTRAVENOUS at 20:41

## 2022-01-06 RX ADMIN — SODIUM CHLORIDE 1000 ML: 0.9 INJECTION, SOLUTION INTRAVENOUS at 19:35

## 2022-01-06 RX ADMIN — SODIUM CHLORIDE 5 ML: 9 INJECTION, SOLUTION INTRAMUSCULAR; INTRAVENOUS; SUBCUTANEOUS at 22:00

## 2022-01-06 RX ADMIN — DOXYCYCLINE HYCLATE 100 MG: 100 CAPSULE ORAL at 21:51

## 2022-01-07 ENCOUNTER — HOSPITAL ENCOUNTER (OUTPATIENT)
Dept: INTERVENTIONAL RADIOLOGY/VASCULAR | Facility: HOSPITAL | Age: 67
Discharge: HOME | End: 2022-01-07
Attending: EMERGENCY MEDICINE
Payer: MEDICARE

## 2022-01-07 ENCOUNTER — HOSPITAL ENCOUNTER (OUTPATIENT)
Dept: INFUSION CENTER | Facility: HOSPITAL | Age: 67
Discharge: HOME | End: 2022-01-07

## 2022-01-07 DIAGNOSIS — E86.9: ICD-10-CM

## 2022-01-07 PROCEDURE — 99212 OFFICE O/P EST SF 10 MIN: CPT | Performed by: INTERNAL MEDICINE

## 2022-01-07 NOTE — BRIEF OP NOTE (RAD/CATH)
INTERVENTIONAL RADIOLOGY PROCEDURE NOTE    Date: 1/7/2022    Procedure: IR OTHER    Preoperative diagnosis:   1  Fluid depletion         Postoperative diagnosis: Same  Surgeon: Jennifer Dillard MD     Assistant: None  No qualified resident was available  Blood loss: Minimal    Specimens: None     Findings:     78year old female with POTS syndrome, required long term venous access for infusions  Existing right tunneled PICC with concern for infection  Patient was seen in ED last night and discharged for outpatient follow up  1 week course of abx prescribed  The line is functioning properly  CT from yesterday demonstrates the line in appropriate position  Today on exam, there is mild erythema at the line exit site  No obvious infection present at this time  No pus present on bandage, no pus able to be expressed  No swelling  She reports discomfort with the line  No systemic symptoms  Discussed options with the patient, including removing the line and placing it on the contralateral side, vs keeping the line with outpatient abx and symptom monitoring  We decided to keep the line for now  If symptoms worsen, she was instructed to contact IR  We will then remove the right IJ line, and place another tunneled PICC line on the left side  Considering the frequency and potentially long term need for central venous access, a port could be considered in the future as well  Complications: None immediate      Anesthesia: none

## 2022-01-07 NOTE — ED PROVIDER NOTES
History  Chief Complaint   Patient presents with    PICC Line Problem     patient states she receives frequent PICC infusions for POTS; patient had IJ PICC line placed 12/7/21 and received infusion yesterday; patient states since yesterday after infusion redness, pain, swelling developed around the dressing; per patient it is flushing with good blood return      This is a 65-year-old female who presents for evaluation of right internal jugular catheter  States she suffers from POTS syndrome and receives IV hydration regularly, she has had multiple PICC lines in place over the years  She states her PICC line has been in place since December 7th which was placed by our Interventional Radiology Department  She states she received an infusion yesterday post infusion she noted that the area was red painful required topical ice application  She states the pain has been present throughout the day yet today as well as redness that she states is tracking up to her neck  She states it feels warm  She denies chills or sweats  She states yesterday the light did function well for the infusion best of her knowledge  Prior to Admission Medications   Prescriptions Last Dose Informant Patient Reported? Taking?    Alum Hydroxide-Mag Trisilicate (Gaviscon) 95-20 6 MG CHEW   Yes No   Sig: Chew 1 tablet 4 (four) times a day as needed With meals and as needed   Galcanezumab-gnlm (Emgality) 120 MG/ML SOAJ   Yes No   Sig: Inject under the skin every 30 (thirty) days    HYDROcodone-acetaminophen (NORCO) 5-325 mg per tablet  Self Yes No   Sig: Take 0 5 tablets by mouth every 6 (six) hours as needed for pain Allowed 2 pills a day   MULTIPLE VITAMINS-CALCIUM PO  Self Yes No   Sig: Take 1 capsule by mouth every morning    Magnesium 400 MG CAPS  Self Yes No   Sig: Take 1 capsule by mouth 2 (two) times a day    Probiotic Product (PROBIOTIC DAILY PO)  Self Yes No   Sig: Take 1 tablet by mouth daily At lunch   Thyroid, Porcine, POWD Self Yes No   Sig: Use 32 mg daily   Ubrogepant (Ubrelvy) 100 MG tablet   Yes No   Sig: Take 100 mg by mouth Take 1 tablet (100 mg) one time as needed for migraine  May repeat one additional tablet (100 mg) at least two hours after the first dose  Do not use more than two doses per day, or for more than eight days per month  azelastine (ASTELIN) 0 1 % nasal spray   Yes No   Si sprays into each nostril 2 (two) times a day Use in each nostril as directed    beclomethasone (QVAR) 40 MCG/ACT inhaler  Self Yes No   Sig: Inhale 1 puff daily as needed (only when unable to nebulize pulmicort) Rinse mouth after use  budesonide (PULMICORT) 0 5 mg/2 mL nebulizer solution  Self Yes No   Sig: Take 0 5 mg by nebulization 2 (two) times a day Rinse mouth after use     cholecalciferol (VITAMIN D3) 1,000 units tablet   Yes No   Sig: Take 1,000 Units by mouth daily    cyclobenzaprine (FLEXERIL) 5 mg tablet  Self Yes No   Sig: Take 5 mg by mouth 2 (two) times a day as needed for muscle spasms   docusate sodium (COLACE) 100 mg capsule   Yes No   Sig: Take 100 mg by mouth daily as needed for constipation   famotidine (PEPCID) 10 mg tablet   Yes No   Sig: Take 10 mg by mouth 2 (two) times a day   fexofenadine (ALLEGRA) 180 MG tablet  Self Yes No   Sig: Take 180 mg by mouth 2 (two) times a day    fluticasone (FLONASE) 50 mcg/act nasal spray  Self Yes No   Si sprays into each nostril daily    hydrOXYzine (ATARAX) 10 mg/5 mL syrup  Self Yes No   Sig: Take 20 mg by mouth daily at bedtime    ipratropium (ATROVENT) 0 03 % nasal spray  Self Yes No   Si sprays into each nostril 4 (four) times a day as needed for rhinitis    ivabradine HCl (Corlanor) 5 MG tablet   Yes No   levalbuterol (XOPENEX HFA) 45 mcg/act inhaler   Yes No   Sig: Inhale 2 puffs every 4 (four) hours as needed (when unable to nebulize)   levalbuterol (XOPENEX) 1 25 mg/3 mL nebulizer solution  Self Yes No   Sig: Take 1 25 mg by nebulization every 6 (six) hours as needed    midodrine (PROAMATINE) 5 mg tablet  Self No No   Sig: TAKE 2 TABS PO IN AM, ONE TAB PO WITH LUNCH AND DINNER   Patient taking differently: 15 mg 3 (three) times a day One hour after thyroid medication, around 1200 and 1600, TAKE 2 TABS PO IN AM, ONE TAB PO WITH LUNCH AND DINNER   omega-3-acid ethyl esters (LOVAZA) 1 g capsule  Self Yes No   Sig: Take 1,600 mg by mouth daily    oxybutynin (DITROPAN) 5 mg tablet  Self Yes No   Sig: Take 2 5 mg by mouth daily at bedtime    oxybutynin (DITROPAN) 5 mg tablet   Yes No   Sig: Take 2 5 mg by mouth daily as needed (overactive bladder) In addition to the HS dose   pantoprazole (PROTONIX) 40 mg tablet  Self Yes No   Sig: Take 40 mg by mouth 2 (two) times a day   polyethylene glycol (MIRALAX) 17 g packet  Self Yes No   Sig: Take 17 g by mouth daily as needed    thyroid (ARMOUR) 32 5 MG tablet  Self Yes No   Sig: Take 32 5 mg by mouth daily      Facility-Administered Medications: None       Past Medical History:   Diagnosis Date    Allergic rhinitis     Anxiety     Asthma     Back pain     Cardiac disease     Cardiopathy     EF 45%    Cough     Diverticulitis     Factor V Leiden (HCC)     Fibromyalgia     GERD (gastroesophageal reflux disease)     Hashimoto's thyroiditis     Hx of degenerative disc disease     Hypotension     pots - postural orthostatic hypotension    Interstitial cystitis     Irregular heart beat     LBBB    Irritable bowel syndrome     Migraines     Mitral valve disease     "thickening"    Myocardial infarction ([address] )     possible but not sure when    Neuropathy     bilateral legs    Osteoporosis     Postural orthostatic tachycardia syndrome     must drink a lot of water and salt    Rheumatic fever 1967    Scoliosis     Sjogren's syndrome (HCC)     TMJ (dislocation of temporomandibular joint)        Past Surgical History:   Procedure Laterality Date    ABLATION MICROWAVE Left     lumbar area    BACK SURGERY  10/1998     SECTION  1992    CHOLECYSTECTOMY  2016    COLONOSCOPY  2016    DILATION AND CURETTAGE OF UTERUS  1996    EGD  2016    FOOT SURGERY  1968    removal of bone and neuroma    HYSTERECTOMY  1997    age 54  PRICE ooph    IR PICC PLACEMENT SINGLE LUMEN  10/2/2020    IR PICC PLACEMENT SINGLE LUMEN  2021    IR PICC REPOSITION  2021    LAPAROSCOPY  1996    endometriosis    [place] EGD TRANSORAL BIOPSY SINGLE/MULTIPLE N/A 2019    Procedure: ESOPHAGOGASTRODUODENOSCOPY (EGD) with multiple bx and dilation;  Surgeon: Lizzette Scott MD;  Location: [facility] GI LAB; Service: Gastroenterology    TUNNELED VENOUS CATHETER PLACEMENT         Family History   Problem Relation Age of Onset    Cancer Mother         urinary bladder     Thyroid cancer Sister     Colon cancer Maternal Grandfather     Breast cancer Maternal Aunt         over 48 yrs old     Endometrial cancer Maternal Aunt     Multiple myeloma Maternal Aunt     No Known Problems Father     No Known Problems Daughter     Heart attack Sister     No Known Problems Sister     No Known Problems Sister     No Known Problems Sister     No Known Problems Sister     No Known Problems Daughter     Hypertension Paternal Aunt      I have reviewed and agree with the history as documented  E-Cigarette/Vaping    E-Cigarette Use Never User      E-Cigarette/Vaping Substances    Nicotine No     THC No     CBD No     Flavoring No     Other No     Unknown No      Social History     Tobacco Use    Smoking status: Never Smoker    Smokeless tobacco: Never Used   Vaping Use    Vaping Use: Never used   Substance Use Topics    Alcohol use: Never    Drug use: No       Review of Systems   Constitutional: Negative for chills and fever  HENT: Negative for ear pain and sore throat  Eyes: Negative for pain and visual disturbance  Respiratory: Negative for cough and shortness of breath      Cardiovascular: Negative for chest pain and palpitations  Gastrointestinal: Negative for abdominal pain and vomiting  Genitourinary: Negative for dysuria and hematuria  Musculoskeletal: Negative for arthralgias and back pain  Skin: Positive for rash  Negative for color change  Redness right upper chest   Neurological: Negative for seizures and syncope  All other systems reviewed and are negative  Physical Exam  Physical Exam  Vitals reviewed  Constitutional:       General: She is not in acute distress  Appearance: Normal appearance  She is normal weight  She is not ill-appearing, toxic-appearing or diaphoretic  HENT:      Head: Normocephalic and atraumatic  Eyes:      General: No scleral icterus  Right eye: No discharge  Left eye: No discharge  Extraocular Movements: Extraocular movements intact  Conjunctiva/sclera: Conjunctivae normal       Pupils: Pupils are equal, round, and reactive to light  Neck:      Vascular: No carotid bruit  Cardiovascular:      Rate and Rhythm: Normal rate  Heart sounds: No murmur heard  No friction rub  No gallop  Pulmonary:      Effort: Pulmonary effort is normal  No respiratory distress  Breath sounds: Normal breath sounds  No stridor  No wheezing, rhonchi or rales  Chest:      Chest wall: No tenderness  Musculoskeletal:         General: Normal range of motion  Cervical back: Normal range of motion  No rigidity or tenderness  Lymphadenopathy:      Cervical: No cervical adenopathy  Skin:     General: Skin is warm  Findings: Erythema present  No lesion  Comments: There is a mild amount of erythema without noticeable abscess just superior to the right-sided internal jugular catheter on site insertion point  No active bleeding  Please refer to separate section regarding port flush   Neurological:      General: No focal deficit present  Mental Status: She is alert and oriented to person, place, and time     Psychiatric: Mood and Affect: Mood normal          Behavior: Behavior normal          Thought Content: Thought content normal          Judgment: Judgment normal          Vital Signs  ED Triage Vitals [01/06/22 1719]   Temperature Pulse Respirations Blood Pressure SpO2   98 °F (36 7 °C) 77 20 (!) 172/88 97 %      Temp Source Heart Rate Source Patient Position - Orthostatic VS BP Location FiO2 (%)   Temporal Monitor Sitting Right arm --      Pain Score       7           Vitals:    01/06/22 1719   BP: (!) 172/88   Pulse: 77   Patient Position - Orthostatic VS: Sitting         Visual Acuity      ED Medications  Medications   sodium chloride (PF) 0 9 % injection 5 mL (has no administration in time range)   doxycycline hyclate (VIBRAMYCIN) capsule 100 mg (has no administration in time range)   sodium chloride 0 9 % bolus 1,000 mL (0 mL Intravenous Stopped 1/6/22 2037)   iohexol (OMNIPAQUE) 350 MG/ML injection (SINGLE-DOSE) 85 mL (85 mL Intravenous Given 1/6/22 2041)       Diagnostic Studies  Results Reviewed     Procedure Component Value Units Date/Time    Wound culture and Gram stain [[id number]]     Lab Status: No result Specimen: Wound     Lactic acid [[id number]]  (Normal) Collected: 01/06/22 1935    Lab Status: Final result Specimen: Blood from Arm, Right Updated: 01/06/22 2006     LACTIC ACID 1 0 mmol/L     Narrative:      Result may be elevated if tourniquet was used during collection      Comprehensive metabolic panel [[id number]]  (Abnormal) Collected: 01/06/22 1935    Lab Status: Final result Specimen: Blood from Arm, Right Updated: 01/06/22 2003     Sodium 142 mmol/L      Potassium 4 0 mmol/L      Chloride 104 mmol/L      CO2 26 mmol/L      ANION GAP 12 mmol/L      BUN 20 mg/dL      Creatinine 1 18 mg/dL      Glucose 85 mg/dL      Calcium 9 7 mg/dL      AST 24 U/L      ALT 56 U/L      Alkaline Phosphatase 169 U/L      Total Protein 8 0 g/dL      Albumin 4 4 g/dL      Total Bilirubin 0 64 mg/dL      eGFR 47 ml/min/1 73sq m Narrative:      National Kidney Disease Foundation guidelines for Chronic Kidney Disease (CKD):     Stage 1 with normal or high GFR (GFR > 90 mL/min/1 73 square meters)    Stage 2 Mild CKD (GFR = 60-89 mL/min/1 73 square meters)    Stage 3A Moderate CKD (GFR = 45-59 mL/min/1 73 square meters)    Stage 3B Moderate CKD (GFR = 30-44 mL/min/1 73 square meters)    Stage 4 Severe CKD (GFR = 15-29 mL/min/1 73 square meters)    Stage 5 End Stage CKD (GFR <15 mL/min/1 73 square meters)  Note: GFR calculation is accurate only with a steady state creatinine    CBC and differential [[id number]]  (Abnormal) Collected: 01/06/22 1935    Lab Status: Final result Specimen: Blood from Arm, Right Updated: 01/06/22 1940     WBC 11 81 Thousand/uL      RBC 5 06 Million/uL      Hemoglobin 14 9 g/dL      Hematocrit 44 8 %      MCV 89 fL      MCH 29 4 pg      MCHC 33 3 g/dL      RDW 13 0 %      MPV 9 4 fL      Platelets 074 Thousands/uL      nRBC 0 /100 WBCs      Neutrophils Relative 67 %      Immat GRANS % 0 %      Lymphocytes Relative 22 %      Monocytes Relative 8 %      Eosinophils Relative 2 %      Basophils Relative 1 %      Neutrophils Absolute 7 97 Thousands/µL      Immature Grans Absolute 0 02 Thousand/uL      Lymphocytes Absolute 2 56 Thousands/µL      Monocytes Absolute 0 96 Thousand/µL      Eosinophils Absolute 0 23 Thousand/µL      Basophils Absolute 0 07 Thousands/µL                  CT chest with contrast   Final Result by Ilya Mackenzie DO (01/06 2108)      No abnormal fluid collection along the course of the central venous catheter  Mild infiltration of the subcutaneous tissues about the catheter site in the chest wall is nonspecific  Incidental thyroid nodule(s) for which nonemergent thyroid ultrasound is recommended  The study was marked in [facility] for immediate notification        Workstation performed: [id number]                    Procedures  Procedures     I did personally flushed patient's single-lumen PICC line  Upon removal of the initial dressing there was mild amount of erythema on soft tissue swelling at the superior entry point of the calf full on  Sutures were intact  The lower lock was cleansed with alcohol x2, I was able to utilizing a sterile 10 cc syringe draw back blood easily from the port site, I then flushed approximately 8 cc of normal saline through the flushed without difficulty  Upon direct inspection at the actual insertion site there was a small amount of purulent discharge, this was cultured  Area cleansed and dressing placed  In my opinion based upon clinical judgment, she has a very mild elevation of white count, she has redness at the site with purulent discharge which is minimal in the setting of CT scan of some mild superficial infiltration this is suggestive of infection at the site, we will treat with antibiotics  ED Course  ED Course as of 01/06/22 2145   Thu Jan 06, 2022 1930 SpO2: 97 %   1930 Respirations: 20   1930 Pulse: 77   1930 Temperature: 98 °F (36 7 °C)   1930 Blood Pressure(!): 172/88   2004 WBC(!): 11 81   2004 Hemoglobin: 14 9   2004 Platelet Count: 966   2004 Sodium: 142   2004 eGFR: 47   2016 LACTIC ACID: 1 0   2050 Awaiting ct read   2112 IMPRESSION:     No abnormal fluid collection along the course of the central venous catheter    Mild infiltration of the subcutaneous tissues about the catheter site in the chest wall is nonspecific      Incidental thyroid nodule(s) for which nonemergent thyroid ultrasound is recommended                                                 MDM    Disposition  Final diagnoses:   Cellulitis of chest wall     Time reflects when diagnosis was documented in both MDM as applicable and the Disposition within this note     Time User Action Codes Description Comment    1/6/2022  9:41 PM Jaiden TOVAR Add [Q80 971] Cellulitis of chest wall       ED Disposition     ED Disposition Condition Date/Time Comment    Discharge Stable Thu Jan 6, 2022  9:41 PM Eric Ramey discharge to home/self care  Follow-up Information     Follow up With Specialties Details Why Contact Info    Bryan Wheat MD Internal Medicine Schedule an appointment as soon as possible for a visit  As needed [address] [place] [address]  [phone]            Patient's Medications   Discharge Prescriptions    DOXYCYCLINE (ADOXA) 100 MG TABLET    Take 1 tablet (100 mg total) by mouth 2 (two) times a day for 7 days       Start Date: 1/6/2022  End Date: 1/13/2022       Order Dose: 100 mg       Quantity: 14 tablet    Refills: 0       No discharge procedures on file      PDMP Review     None          ED Provider  Electronically Signed by           Tiago Johnson PA-C  01/06/22 2144       Tiago Johnson PA-C  01/06/22 2140

## 2022-01-07 NOTE — DISCHARGE INSTRUCTIONS
[address]  Interventional Radiology    [address]: ([phone]) [address] Krystyna Sanchez: [phone]   Off hours or no answer/[id number] (Ask for IR on call)

## 2022-01-09 LAB
BACTERIA WND AEROBE CULT: ABNORMAL
GRAM STN SPEC: ABNORMAL

## 2022-01-10 ENCOUNTER — HOSPITAL ENCOUNTER (OUTPATIENT)
Dept: INFUSION CENTER | Facility: HOSPITAL | Age: 67
Discharge: HOME | End: 2022-01-10

## 2022-01-10 RX ORDER — SODIUM CHLORIDE 9 MG/ML
333.33 INJECTION, SOLUTION INTRAVENOUS ONCE
Status: COMPLETED | OUTPATIENT
Start: 2022-01-12 | End: 2022-01-12

## 2022-01-12 ENCOUNTER — HOSPITAL ENCOUNTER (OUTPATIENT)
Dept: INFUSION CENTER | Facility: HOSPITAL | Age: 67
Discharge: HOME | End: 2022-01-12
Payer: MEDICARE

## 2022-01-12 VITALS
HEART RATE: 73 BPM | RESPIRATION RATE: 18 BRPM | SYSTOLIC BLOOD PRESSURE: 141 MMHG | DIASTOLIC BLOOD PRESSURE: 82 MMHG | TEMPERATURE: 97.9 F | OXYGEN SATURATION: 97 %

## 2022-01-12 PROCEDURE — 96361 HYDRATE IV INFUSION ADD-ON: CPT

## 2022-01-12 PROCEDURE — 96360 HYDRATION IV INFUSION INIT: CPT

## 2022-01-12 RX ORDER — SODIUM CHLORIDE 9 MG/ML
333.33 INJECTION, SOLUTION INTRAVENOUS ONCE
Status: COMPLETED | OUTPATIENT
Start: 2022-01-14 | End: 2022-01-14

## 2022-01-12 RX ADMIN — SODIUM CHLORIDE 333.33 ML/HR: 0.9 INJECTION, SOLUTION INTRAVENOUS at 08:58

## 2022-01-12 NOTE — PROGRESS NOTES
Patient tolerated IV hydration without incident  Patient declined AVS   She is aware of next appointment  Patient ambulated off the unit without incident  All personal belongings taken with patient

## 2022-01-12 NOTE — PROGRESS NOTES
Pt reports that she had been in the ER after her last appt with us for swelling at PICC insertion site  Also reports redness at all sutures  Small amt of redness noted at rt suture  Small dried scab noted directly at insertion site  No swelling noted at all today  Excellent blood return noted  Pt reports that she has changed her dressing for DR Dara Cast as well as at the ER  Hydration initiated as per orders  Discussed briefly PORT option however pt is very hesitant as she has a latex and metal allergy and feels this may not be the best option for pt

## 2022-01-14 ENCOUNTER — HOSPITAL ENCOUNTER (OUTPATIENT)
Dept: INFUSION CENTER | Facility: HOSPITAL | Age: 67
Discharge: HOME | End: 2022-01-14
Payer: MEDICARE

## 2022-01-14 VITALS
SYSTOLIC BLOOD PRESSURE: 137 MMHG | HEART RATE: 76 BPM | OXYGEN SATURATION: 97 % | TEMPERATURE: 98.1 F | RESPIRATION RATE: 18 BRPM | DIASTOLIC BLOOD PRESSURE: 85 MMHG

## 2022-01-14 PROCEDURE — 96360 HYDRATION IV INFUSION INIT: CPT

## 2022-01-14 PROCEDURE — 96361 HYDRATE IV INFUSION ADD-ON: CPT

## 2022-01-14 RX ADMIN — SODIUM CHLORIDE 333.33 ML/HR: 0.9 INJECTION, SOLUTION INTRAVENOUS at 09:10

## 2022-01-17 ENCOUNTER — HOSPITAL ENCOUNTER (OUTPATIENT)
Dept: INFUSION CENTER | Facility: HOSPITAL | Age: 67
Discharge: HOME | End: 2022-01-17

## 2022-01-19 ENCOUNTER — HOSPITAL ENCOUNTER (OUTPATIENT)
Dept: INFUSION CENTER | Facility: HOSPITAL | Age: 67
Discharge: HOME | End: 2022-01-19
Payer: MEDICARE

## 2022-01-19 VITALS
HEART RATE: 90 BPM | RESPIRATION RATE: 18 BRPM | OXYGEN SATURATION: 97 % | TEMPERATURE: 98 F | SYSTOLIC BLOOD PRESSURE: 146 MMHG | DIASTOLIC BLOOD PRESSURE: 84 MMHG

## 2022-01-19 PROCEDURE — 96360 HYDRATION IV INFUSION INIT: CPT

## 2022-01-19 PROCEDURE — 96361 HYDRATE IV INFUSION ADD-ON: CPT

## 2022-01-19 RX ORDER — SODIUM CHLORIDE 9 MG/ML
333.33 INJECTION, SOLUTION INTRAVENOUS ONCE
Status: COMPLETED | OUTPATIENT
Start: 2022-01-21 | End: 2022-01-21

## 2022-01-19 RX ADMIN — SODIUM CHLORIDE 2000 ML: 0.9 INJECTION, SOLUTION INTRAVENOUS at 08:57

## 2022-01-19 NOTE — PROGRESS NOTES
PICC dressing sight is C/D/I no redness noted  Patient states that she longer feels tenderness at insertion site  PICC dressing changed

## 2022-01-21 ENCOUNTER — HOSPITAL ENCOUNTER (OUTPATIENT)
Dept: INFUSION CENTER | Facility: HOSPITAL | Age: 67
Discharge: HOME | End: 2022-01-21
Payer: MEDICARE

## 2022-01-21 VITALS
HEART RATE: 81 BPM | DIASTOLIC BLOOD PRESSURE: 85 MMHG | TEMPERATURE: 96.7 F | SYSTOLIC BLOOD PRESSURE: 148 MMHG | RESPIRATION RATE: 18 BRPM

## 2022-01-21 PROCEDURE — 96361 HYDRATE IV INFUSION ADD-ON: CPT

## 2022-01-21 PROCEDURE — 96360 HYDRATION IV INFUSION INIT: CPT

## 2022-01-21 RX ORDER — SODIUM CHLORIDE 9 MG/ML
333.33 INJECTION, SOLUTION INTRAVENOUS ONCE
Status: DISCONTINUED | OUTPATIENT
Start: 2022-01-24 | End: 2022-01-28 | Stop reason: HOSPADM

## 2022-01-21 RX ADMIN — SODIUM CHLORIDE 333.33 ML/HR: 0.9 INJECTION, SOLUTION INTRAVENOUS at 08:49

## 2022-01-21 NOTE — PROGRESS NOTES
Patient tolerated IV hydration without issues  AVS declined  Patient aware of next appointment  Patient ambulated off unit without incident  All personal belongings taken with patient

## 2022-01-24 ENCOUNTER — HOSPITAL ENCOUNTER (OUTPATIENT)
Dept: INFUSION CENTER | Facility: HOSPITAL | Age: 67
Discharge: HOME | End: 2022-01-24

## 2022-01-25 RX ORDER — SODIUM CHLORIDE 9 MG/ML
333.33 INJECTION, SOLUTION INTRAVENOUS ONCE
Status: COMPLETED | OUTPATIENT
Start: 2022-01-26 | End: 2022-01-26

## 2022-01-26 ENCOUNTER — HOSPITAL ENCOUNTER (OUTPATIENT)
Dept: INFUSION CENTER | Facility: HOSPITAL | Age: 67
Discharge: HOME | End: 2022-01-26
Payer: MEDICARE

## 2022-01-26 VITALS
SYSTOLIC BLOOD PRESSURE: 141 MMHG | HEART RATE: 81 BPM | OXYGEN SATURATION: 100 % | RESPIRATION RATE: 18 BRPM | DIASTOLIC BLOOD PRESSURE: 63 MMHG | TEMPERATURE: 97.7 F

## 2022-01-26 PROCEDURE — 96361 HYDRATE IV INFUSION ADD-ON: CPT

## 2022-01-26 PROCEDURE — 96360 HYDRATION IV INFUSION INIT: CPT

## 2022-01-26 RX ADMIN — SODIUM CHLORIDE 333.33 ML/HR: 0.9 INJECTION, SOLUTION INTRAVENOUS at 08:44

## 2022-01-26 NOTE — PROGRESS NOTES
Patient presented to infusion center for IV hydration and stated that the dressing of her PICC line had gotten wet when she had showered yesterday so she changed the dressing at home  Patient also stated that she thought there was a scab at the insertion site  Upon assessment there was a scant amount of dry bloody drainage and what looks like a pocket of fluid at the insertion site  Area cleaned with alcohol at patients request due to chlorhexidine allergy and site redressed with sterile 2x2 and Tegaderm dressing  PIV started for today's hydration  Dr Shayy Evans notified and sent a picture of site and responded that the site was fine

## 2022-01-27 RX ORDER — SODIUM CHLORIDE 9 MG/ML
333.33 INJECTION, SOLUTION INTRAVENOUS ONCE
Status: COMPLETED | OUTPATIENT
Start: 2022-01-28 | End: 2022-01-28

## 2022-01-28 ENCOUNTER — HOSPITAL ENCOUNTER (OUTPATIENT)
Dept: INFUSION CENTER | Facility: HOSPITAL | Age: 67
Discharge: HOME | End: 2022-01-28
Payer: MEDICARE

## 2022-01-28 VITALS
DIASTOLIC BLOOD PRESSURE: 62 MMHG | SYSTOLIC BLOOD PRESSURE: 144 MMHG | TEMPERATURE: 98.2 F | HEART RATE: 88 BPM | OXYGEN SATURATION: 97 % | RESPIRATION RATE: 18 BRPM

## 2022-01-28 PROCEDURE — 96361 HYDRATE IV INFUSION ADD-ON: CPT

## 2022-01-28 PROCEDURE — 96360 HYDRATION IV INFUSION INIT: CPT

## 2022-01-28 RX ORDER — SODIUM CHLORIDE 9 MG/ML
333.33 INJECTION, SOLUTION INTRAVENOUS ONCE
Status: COMPLETED | OUTPATIENT
Start: 2022-01-31 | End: 2022-01-31

## 2022-01-28 RX ADMIN — SODIUM CHLORIDE 333.33 ML/HR: 0.9 INJECTION, SOLUTION INTRAVENOUS at 08:43

## 2022-01-31 ENCOUNTER — HOSPITAL ENCOUNTER (OUTPATIENT)
Dept: INFUSION CENTER | Facility: HOSPITAL | Age: 67
Discharge: HOME | End: 2022-01-31
Payer: MEDICARE

## 2022-01-31 VITALS
OXYGEN SATURATION: 98 % | TEMPERATURE: 98.8 F | DIASTOLIC BLOOD PRESSURE: 72 MMHG | SYSTOLIC BLOOD PRESSURE: 141 MMHG | HEART RATE: 87 BPM | RESPIRATION RATE: 18 BRPM

## 2022-01-31 PROCEDURE — 96361 HYDRATE IV INFUSION ADD-ON: CPT

## 2022-01-31 PROCEDURE — 96360 HYDRATION IV INFUSION INIT: CPT

## 2022-01-31 RX ADMIN — SODIUM CHLORIDE 333.33 ML/HR: 0.9 INJECTION, SOLUTION INTRAVENOUS at 08:40

## 2022-02-01 RX ORDER — SODIUM CHLORIDE 9 MG/ML
333.33 INJECTION, SOLUTION INTRAVENOUS ONCE
Status: COMPLETED | OUTPATIENT
Start: 2022-02-02 | End: 2022-02-02

## 2022-02-02 ENCOUNTER — HOSPITAL ENCOUNTER (OUTPATIENT)
Dept: INFUSION CENTER | Facility: HOSPITAL | Age: 67
Discharge: HOME | End: 2022-02-02
Payer: MEDICARE

## 2022-02-02 PROCEDURE — 96360 HYDRATION IV INFUSION INIT: CPT

## 2022-02-02 PROCEDURE — 96361 HYDRATE IV INFUSION ADD-ON: CPT

## 2022-02-02 RX ADMIN — SODIUM CHLORIDE 333.33 ML/HR: 0.9 INJECTION, SOLUTION INTRAVENOUS at 09:53

## 2022-02-02 NOTE — PLAN OF CARE
Problem: Knowledge Deficit  Goal: Patient/family/caregiver demonstrates understanding of disease process, treatment plan, medications, and discharge instructions  Description: Complete learning assessment and assess knowledge base    Interventions:  - Provide teaching at level of understanding  - Provide teaching via preferred learning methods  Outcome: Progressing Yes

## 2022-02-02 NOTE — PROGRESS NOTES
Pt tolerated todays hydration well  PICC line redressed on admit for 7 day dressing change  Great blood return noted   Discharged ambulatory deferring avs

## 2022-02-04 ENCOUNTER — HOSPITAL ENCOUNTER (OUTPATIENT)
Dept: INFUSION CENTER | Facility: HOSPITAL | Age: 67
Discharge: HOME | End: 2022-02-04

## 2022-02-09 ENCOUNTER — APPOINTMENT (EMERGENCY)
Dept: RADIOLOGY | Facility: HOSPITAL | Age: 67
End: 2022-02-09
Payer: MEDICARE

## 2022-02-09 ENCOUNTER — HOSPITAL ENCOUNTER (OUTPATIENT)
Dept: INFUSION CENTER | Facility: HOSPITAL | Age: 67
Discharge: HOME | End: 2022-02-09
Payer: MEDICARE

## 2022-02-09 ENCOUNTER — HOSPITAL ENCOUNTER (EMERGENCY)
Facility: HOSPITAL | Age: 67
Discharge: HOME | End: 2022-02-09
Attending: EMERGENCY MEDICINE
Payer: MEDICARE

## 2022-02-09 ENCOUNTER — APPOINTMENT (EMERGENCY)
Dept: NON INVASIVE DIAGNOSTICS | Facility: HOSPITAL | Age: 67
End: 2022-02-09
Payer: MEDICARE

## 2022-02-09 VITALS
SYSTOLIC BLOOD PRESSURE: 188 MMHG | RESPIRATION RATE: 18 BRPM | HEART RATE: 84 BPM | DIASTOLIC BLOOD PRESSURE: 88 MMHG | TEMPERATURE: 97.4 F | OXYGEN SATURATION: 99 %

## 2022-02-09 VITALS
SYSTOLIC BLOOD PRESSURE: 136 MMHG | WEIGHT: 123.9 LBS | HEART RATE: 65 BPM | RESPIRATION RATE: 21 BRPM | TEMPERATURE: 97.5 F | OXYGEN SATURATION: 96 % | BODY MASS INDEX: 23.39 KG/M2 | HEIGHT: 61 IN | DIASTOLIC BLOOD PRESSURE: 84 MMHG

## 2022-02-09 DIAGNOSIS — M25.511 RIGHT SHOULDER PAIN: Primary | ICD-10-CM

## 2022-02-09 DIAGNOSIS — M54.2 NECK PAIN ON RIGHT SIDE: ICD-10-CM

## 2022-02-09 LAB
ANION GAP SERPL CALCULATED.3IONS-SCNC: 7 MMOL/L (ref 4–13)
BASOPHILS # BLD AUTO: 0.05 THOUSANDS/ΜL (ref 0–0.1)
BASOPHILS NFR BLD AUTO: 1 % (ref 0–1)
BUN SERPL-MCNC: 18 MG/DL (ref 5–25)
CALCIUM SERPL-MCNC: 8.9 MG/DL (ref 8.3–10.1)
CARDIAC TROPONIN I PNL SERPL HS: 4 NG/L
CHLORIDE SERPL-SCNC: 104 MMOL/L (ref 100–108)
CO2 SERPL-SCNC: 27 MMOL/L (ref 21–32)
CREAT SERPL-MCNC: 1.03 MG/DL (ref 0.6–1.3)
EOSINOPHIL # BLD AUTO: 0.19 THOUSAND/ΜL (ref 0–0.61)
EOSINOPHIL NFR BLD AUTO: 2 % (ref 0–6)
ERYTHROCYTE [DISTWIDTH] IN BLOOD BY AUTOMATED COUNT: 12.9 % (ref 11.6–15.1)
GFR SERPL CREATININE-BSD FRML MDRD: 56 ML/MIN/1.73SQ M
GLUCOSE SERPL-MCNC: 87 MG/DL (ref 65–140)
HCT VFR BLD AUTO: 44.3 % (ref 34.8–46.1)
HGB BLD-MCNC: 14.3 G/DL (ref 11.5–15.4)
IMM GRANULOCYTES # BLD AUTO: 0.04 THOUSAND/UL (ref 0–0.2)
IMM GRANULOCYTES NFR BLD AUTO: 0 % (ref 0–2)
LYMPHOCYTES # BLD AUTO: 2.55 THOUSANDS/ΜL (ref 0.6–4.47)
LYMPHOCYTES NFR BLD AUTO: 27 % (ref 14–44)
MCH RBC QN AUTO: 29 PG (ref 26.8–34.3)
MCHC RBC AUTO-ENTMCNC: 32.3 G/DL (ref 31.4–37.4)
MCV RBC AUTO: 90 FL (ref 82–98)
MONOCYTES # BLD AUTO: 0.58 THOUSAND/ΜL (ref 0.17–1.22)
MONOCYTES NFR BLD AUTO: 6 % (ref 4–12)
NEUTROPHILS # BLD AUTO: 6.13 THOUSANDS/ΜL (ref 1.85–7.62)
NEUTS SEG NFR BLD AUTO: 64 % (ref 43–75)
NRBC BLD AUTO-RTO: 0 /100 WBCS
PLATELET # BLD AUTO: 308 THOUSANDS/UL (ref 149–390)
PMV BLD AUTO: 10.4 FL (ref 8.9–12.7)
POTASSIUM SERPL-SCNC: 4.2 MMOL/L (ref 3.5–5.3)
RBC # BLD AUTO: 4.93 MILLION/UL (ref 3.81–5.12)
SODIUM SERPL-SCNC: 138 MMOL/L (ref 136–145)
WBC # BLD AUTO: 9.54 THOUSAND/UL (ref 4.31–10.16)

## 2022-02-09 PROCEDURE — 73030 X-RAY EXAM OF SHOULDER: CPT

## 2022-02-09 PROCEDURE — 99284 EMERGENCY DEPT VISIT MOD MDM: CPT

## 2022-02-09 PROCEDURE — 96365 THER/PROPH/DIAG IV INF INIT: CPT

## 2022-02-09 PROCEDURE — 96360 HYDRATION IV INFUSION INIT: CPT

## 2022-02-09 PROCEDURE — 99285 EMERGENCY DEPT VISIT HI MDM: CPT | Performed by: EMERGENCY MEDICINE

## 2022-02-09 PROCEDURE — 85025 COMPLETE CBC W/AUTO DIFF WBC: CPT | Performed by: EMERGENCY MEDICINE

## 2022-02-09 PROCEDURE — 93005 ELECTROCARDIOGRAM TRACING: CPT

## 2022-02-09 PROCEDURE — 93971 EXTREMITY STUDY: CPT

## 2022-02-09 PROCEDURE — 96366 THER/PROPH/DIAG IV INF ADDON: CPT

## 2022-02-09 PROCEDURE — 36415 COLL VENOUS BLD VENIPUNCTURE: CPT | Performed by: EMERGENCY MEDICINE

## 2022-02-09 PROCEDURE — 84484 ASSAY OF TROPONIN QUANT: CPT | Performed by: EMERGENCY MEDICINE

## 2022-02-09 PROCEDURE — 80048 BASIC METABOLIC PNL TOTAL CA: CPT | Performed by: EMERGENCY MEDICINE

## 2022-02-09 PROCEDURE — 96361 HYDRATE IV INFUSION ADD-ON: CPT

## 2022-02-09 PROCEDURE — 87040 BLOOD CULTURE FOR BACTERIA: CPT | Performed by: EMERGENCY MEDICINE

## 2022-02-09 RX ORDER — SODIUM CHLORIDE, SODIUM GLUCONATE, SODIUM ACETATE, POTASSIUM CHLORIDE, MAGNESIUM CHLORIDE, SODIUM PHOSPHATE, DIBASIC, AND POTASSIUM PHOSPHATE .53; .5; .37; .037; .03; .012; .00082 G/100ML; G/100ML; G/100ML; G/100ML; G/100ML; G/100ML; G/100ML
1000 INJECTION, SOLUTION INTRAVENOUS ONCE
Status: COMPLETED | OUTPATIENT
Start: 2022-02-09 | End: 2022-02-09

## 2022-02-09 RX ORDER — OXYCODONE HYDROCHLORIDE 5 MG/1
2.5 TABLET ORAL ONCE
Status: COMPLETED | OUTPATIENT
Start: 2022-02-09 | End: 2022-02-09

## 2022-02-09 RX ADMIN — OXYCODONE HYDROCHLORIDE 2.5 MG: 5 TABLET ORAL at 16:51

## 2022-02-09 RX ADMIN — SODIUM CHLORIDE 2000 ML: 0.9 INJECTION, SOLUTION INTRAVENOUS at 09:15

## 2022-02-09 RX ADMIN — SODIUM CHLORIDE, SODIUM GLUCONATE, SODIUM ACETATE, POTASSIUM CHLORIDE, MAGNESIUM CHLORIDE, SODIUM PHOSPHATE, DIBASIC, AND POTASSIUM PHOSPHATE 1000 ML: .53; .5; .37; .037; .03; .012; .00082 INJECTION, SOLUTION INTRAVENOUS at 13:53

## 2022-02-09 NOTE — PROGRESS NOTES
Patient contacted Dr Claudeen Poplar from IR who requested a picture of PICC be sent to him  Picture sent via tiger text  Dr Claudeen Poplar requested that patient come to [facility] so he could evaluate  Patient agreeable and hydration stopped  Patient discharged in stable condition

## 2022-02-09 NOTE — DISCHARGE INSTRUCTIONS
Your EKG today does not reveal evidence of a heart attack, the ultrasound of your right upper extremity reveals no evidence of a deep vein thrombosis, and your labs today do not suggest a systemic infection  Your right shoulder x-ray has some degenerative arthritic changes, however, there is no severe arthritis  Please monitor the PICC insertion site for worsening redness or drainage  Please follow-up with Dr Viki Rios as well as with Dr Hakan Arias

## 2022-02-09 NOTE — ED PROVIDER NOTES
EMERGENCY DEPARTMENT ENCOUNTER NOTE    This note has been generated using a voice recognition software  There may be typographic, grammatic, or word substitution errors that have escaped editorial review  ? CHIEF COMPLAINT  Chief Complaint   Patient presents with    PICC Line Problem     pt was sent over from infusion to due drainage and pain from PICC line, pain radiates down arm and into neck, pt uses for PICC line for hydration due to POTS, was not able to get fluids today, PMH of previous PICC line infections        HPI  Paul Jules is a 78 y o  female with complex past medical history including POTS syndrome, cardiomyopathy, Factor V Leiden, presenting with right neck and shoulder pain  Patient has a central venous catheter in her right brachial/axillary vein  She had a catheter dressing change today at the infusion center and reports drainage and redness around the catheter site  There is pain radiating between the lateral right neck and shoulder  There is no arm swelling  There is no fever  There is no chest pain  No shortness of breath  Patient has scheduled outpatient fluid hydration given history of POTS      REVIEW OF SYSTEMS    Constitutional: denies fevers   Visual/Eyes: no changes in vision  HENT: no rhinorrhea, no sore throat  Cardiac: Right shoulder pain  Respiratory: no shortness of breath, no cough  GI: no abdominal pain   Heme/Onc: no easy bruising  Endocrine: no diabetes  Neuro: no focal weakness or numbness     Ten systems reviewed and negative unless otherwise noted in HPI and above    PAST MEDICAL HISTORY  Past Medical History:   Diagnosis Date    Allergic rhinitis     Anxiety     Asthma     Back pain     Cardiac disease     Cardiopathy     EF 45%    Cough     Diverticulitis     Factor V Leiden (HCC)     Fibromyalgia     GERD (gastroesophageal reflux disease)     Hashimoto's thyroiditis     Hx of degenerative disc disease     Hypotension     pots - postural orthostatic hypotension    Interstitial cystitis     Irregular heart beat     LBBB    Irritable bowel syndrome     Migraines     Mitral valve disease     "thickening"    Myocardial infarction [facility])     possible but not sure when    Neuropathy     bilateral legs    Osteoporosis     Postural orthostatic tachycardia syndrome     must drink a lot of water and salt    Rheumatic fever 1967    Scoliosis     Sjogren's syndrome ([address] )     TMJ (dislocation of temporomandibular joint)        SURGICAL HISTORY  Past Surgical History:   Procedure Laterality Date    ABLATION MICROWAVE Left     lumbar area    BACK SURGERY  10/1998     SECTION  1992    CHOLECYSTECTOMY  2016    COLONOSCOPY      [facility],7Gws EGD     [facility]    removal of bone and neuroma   [address]     age 54  PRICE ooph    IR OTHER  2022    IR PICC PLACEMENT SINGLE LUMEN  10/2/2020    IR PICC PLACEMENT SINGLE LUMEN  2021    IR PICC REPOSITION  2021    LAPAROSCOPY  1996    endometriosis    MS EGD TRANSORAL BIOPSY SINGLE/MULTIPLE N/A 2019    Procedure: ESOPHAGOGASTRODUODENOSCOPY (EGD) with multiple bx and dilation;  Surgeon: Jose Aguayo MD;  Location: [address] [place] [address] GI LAB;   Service: Gastroenterology    TUNNELED VENOUS CATHETER PLACEMENT  2016       FAMILY HISTORY  Family History   Problem Relation Age of Onset    Cancer Mother         urinary bladder     Thyroid cancer Sister     Colon cancer Maternal Grandfather     Breast cancer Maternal Aunt         over 48 yrs old     Endometrial cancer Maternal Aunt     Multiple myeloma Maternal Aunt     No Known Problems Father     No Known Problems Daughter     Heart attack Sister     No Known Problems Sister     No Known Problems Sister     No Known Problems Sister     No Known Problems Sister     No Known Problems Daughter     Hypertension Paternal Aunt         CURRENT MEDICATIONS  Current Facility-Administered Medications on File Prior to Encounter   Medication    [COMPLETED] sodium chloride 0 9 % bolus 2,000 mL     Current Outpatient Medications on File Prior to Encounter   Medication Sig    Alum Hydroxide-Mag Trisilicate (Gaviscon) 22-18 3 MG CHEW Chew 1 tablet 4 (four) times a day as needed With meals and as needed    azelastine (ASTELIN) 0 1 % nasal spray 2 sprays into each nostril 2 (two) times a day Use in each nostril as directed     beclomethasone (QVAR) 40 MCG/ACT inhaler Inhale 1 puff daily as needed (only when unable to nebulize pulmicort) Rinse mouth after use   budesonide (PULMICORT) 0 5 mg/2 mL nebulizer solution Take 0 5 mg by nebulization 2 (two) times a day Rinse mouth after use      cholecalciferol (VITAMIN D3) 1,000 units tablet Take 1,000 Units by mouth daily     cyclobenzaprine (FLEXERIL) 5 mg tablet Take 5 mg by mouth 2 (two) times a day as needed for muscle spasms    docusate sodium (COLACE) 100 mg capsule Take 100 mg by mouth daily as needed for constipation    famotidine (PEPCID) 10 mg tablet Take 10 mg by mouth 2 (two) times a day    fexofenadine (ALLEGRA) 180 MG tablet Take 180 mg by mouth 2 (two) times a day     fluticasone (FLONASE) 50 mcg/act nasal spray 2 sprays into each nostril daily     Galcanezumab-gnlm (Emgality) 120 MG/ML SOAJ Inject under the skin every 30 (thirty) days     HYDROcodone-acetaminophen (NORCO) 5-325 mg per tablet Take 0 5 tablets by mouth every 6 (six) hours as needed for pain Allowed 2 pills a day    hydrOXYzine (ATARAX) 10 mg/5 mL syrup Take 20 mg by mouth daily at bedtime     ipratropium (ATROVENT) 0 03 % nasal spray 2 sprays into each nostril 4 (four) times a day as needed for rhinitis     ivabradine HCl (Corlanor) 5 MG tablet     levalbuterol (XOPENEX HFA) 45 mcg/act inhaler Inhale 2 puffs every 4 (four) hours as needed (when unable to nebulize)    levalbuterol (XOPENEX) 1 25 mg/3 mL nebulizer solution Take 1 25 mg by nebulization every 6 (six) hours as needed     Magnesium 400 MG CAPS Take 1 capsule by mouth 2 (two) times a day     midodrine (PROAMATINE) 5 mg tablet TAKE 2 TABS PO IN AM, ONE TAB PO WITH LUNCH AND DINNER (Patient taking differently: 15 mg 3 (three) times a day One hour after thyroid medication, around 1200 and 1600, TAKE 2 TABS PO IN AM, ONE TAB PO WITH LUNCH AND DINNER)    MULTIPLE VITAMINS-CALCIUM PO Take 1 capsule by mouth every morning     omega-3-acid ethyl esters (LOVAZA) 1 g capsule Take 1,600 mg by mouth daily     oxybutynin (DITROPAN) 5 mg tablet Take 2 5 mg by mouth daily at bedtime     oxybutynin (DITROPAN) 5 mg tablet Take 2 5 mg by mouth daily as needed (overactive bladder) In addition to the HS dose    pantoprazole (PROTONIX) 40 mg tablet Take 40 mg by mouth 2 (two) times a day    polyethylene glycol (MIRALAX) 17 g packet Take 17 g by mouth daily as needed     Probiotic Product (PROBIOTIC DAILY PO) Take 1 tablet by mouth daily At lunch    thyroid (ARMOUR) 32 5 MG tablet Take 32 5 mg by mouth daily    Thyroid, Porcine, POWD Use 32 mg daily    Ubrogepant (Ubrelvy) 100 MG tablet Take 100 mg by mouth Take 1 tablet (100 mg) one time as needed for migraine  May repeat one additional tablet (100 mg) at least two hours after the first dose  Do not use more than two doses per day, or for more than eight days per month         ALLERGIES  Allergies   Allergen Reactions    Imipramine Confusion, Fatigue, Irritability, Palpitations, Shortness Of Breath, Tachycardia and Visual Disturbance    Melatonin Shortness Of Breath    Nexium [Esomeprazole] Shortness Of Breath    Nsaids Shortness Of Breath    Singulair [Montelukast] Shortness Of Breath and Cough    Zomig [Zolmitriptan] Shortness Of Breath    Dexilant [Dexlansoprazole] Nausea Only and Vomiting    Albuterol     Ambien [Zolpidem]     Amitriptyline Drowsiness    Aspirin     Bactrim [Sulfamethoxazole-Trimethoprim] Hives    Banana - Food Allergy Dermatitis    Ceftin [Cefuroxime]     Celebrex [Celecoxib]     Ciprofloxacin     Cranberry-C [Ascorbate - Food Allergy] GI Intolerance    Demerol [Meperidine]     Epinephrine Dizziness    Ergotamine Nausea Only and Headache    Keflex [Cephalexin]     Klonopin [Clonazepam] Nausea Only and Dizziness    Levaquin [Levofloxacin]     Lexapro [Escitalopram]     Lyrica [Pregabalin] Fatigue    Macrodantin [Nitrofurantoin]     Morphine Nausea Only    Movantik [Naloxegol] Nausea Only    Mushroom Extract Complex - Food Allergy      Eye itchy, asthma  attack    Naprosyn [Naproxen]     Neurontin [Gabapentin] Dizziness    Pineapple - Food Allergy GI Intolerance    Prolia [Denosumab]     Prozac [Fluoxetine]     Serevent [Salmeterol] Dizziness    Sudafed [Pseudoephedrine] Hives    Sulfa Antibiotics     Tomato - Food Allergy GI Intolerance    Trazodone     Ultram [Tramadol] Nausea Only, Dizziness and Headache    Vioxx [Rofecoxib]     Zithromax [Azithromycin] Hives    Zoloft [Sertraline]     Zantac [Ranitidine] Rash and Dizziness       SOCIAL HISTORY  Social History     Socioeconomic History    Marital status:      Spouse name: None    Number of children: None    Years of education: None    Highest education level: None   Occupational History    None   Tobacco Use    Smoking status: Never Smoker    Smokeless tobacco: Never Used   Vaping Use    Vaping Use: Never used   Substance and Sexual Activity    Alcohol use: Never    Drug use: No    Sexual activity: None   Other Topics Concern    None   Social History Narrative    None     Social Determinants of Health     Financial Resource Strain: Not on file   Food Insecurity: Not on file   Transportation Needs: Not on file   Physical Activity: Not on file   Stress: Not on file   Social Connections: Not on file   Intimate Partner Violence: Not on file   Housing Stability: Not on file       PHYSICAL EXAM    /83 (BP Location: Left arm)   Pulse 69   Temp 97 5 °F (36 4 °C) (Temporal)   Resp 18   Ht 5' 1" (1 549 m)   Wt 56 2 kg (123 lb 14 4 oz)   SpO2 97%   BMI 23 41 kg/m²   Vital signs and nursing notes reviewed    CONSTITUTIONAL: female appearing stated age resting in bed, in no acute distress  HEENT: atraumatic, normocephalic  Sclera anicteric, conjunctiva are not injected  Moist oral mucosa  CARDIOVASCULAR/CHEST: RRR, no M/R/G  2+ radial pulses  PULMONARY: Breathing comfortably on RA  Breath sounds are equal and clear to auscultation  ABDOMEN: non-distended  BS present, normoactive  Non-tender  MSK: Shoulders are symmetric  There is no erythema, edema or deformity of either glenohumeral or AC joint bilaterally  There is tenderness with palpation along anterolateral right neck without appreciable lymphadenopathy or cords  RUE is without edema  2+ radial pulse  Sensation to light touch is intact in median, radial and ulnar nn distributions  RUE PICC line is with mild hyperemia at insertion site, there is no spreading erythema  There is some tenderness to palpation at the insertion site  Moves all extremities, no deformities, no peripheral edema, no calf asymmetry  NEURO: Awake, alert, and oriented x 3  Face symmetric  Moves all extremities spontaneously  No focal neurologic deficits  SKIN: Warm, appears well-perfused  MENTAL STATUS: Normal affect      ?   LABS AND TESTS    Results Reviewed     Procedure Component Value Units Date/Time    HS Troponin 0hr (reflex protocol) [[id number]]  (Normal) Collected: 02/09/22 1323    Lab Status: Final result Specimen: Blood from Arm, Left Updated: 02/09/22 1427     hs TnI 0hr 4 ng/L     Basic metabolic panel [[id number]] Collected: 02/09/22 1323    Lab Status: Final result Specimen: Blood from Arm, Left Updated: 02/09/22 1409     Sodium 138 mmol/L      Potassium 4 2 mmol/L      Chloride 104 mmol/L      CO2 27 mmol/L      ANION GAP 7 mmol/L      BUN 18 mg/dL      Creatinine 1 03 mg/dL      Glucose 87 mg/dL      Calcium 8 9 mg/dL eGFR 56 ml/min/1 73sq m     Narrative:      [place] guidelines for Chronic Kidney Disease (CKD):     Stage 1 with normal or high GFR (GFR > 90 mL/min/1 73 square meters)    Stage 2 Mild CKD (GFR = 60-89 mL/min/1 73 square meters)    Stage 3A Moderate CKD (GFR = 45-59 mL/min/1 73 square meters)    Stage 3B Moderate CKD (GFR = 30-44 mL/min/1 73 square meters)    Stage 4 Severe CKD (GFR = 15-29 mL/min/1 73 square meters)    Stage 5 End Stage CKD (GFR <15 mL/min/1 73 square meters)  Note: GFR calculation is accurate only with a steady state creatinine    CBC and differential [[id number]] Collected: 02/09/22 1323    Lab Status: Final result Specimen: Blood from Arm, Left Updated: 02/09/22 1357     WBC 9 54 Thousand/uL      RBC 4 93 Million/uL      Hemoglobin 14 3 g/dL      Hematocrit 44 3 %      MCV 90 fL      MCH 29 0 pg      MCHC 32 3 g/dL      RDW 12 9 %      MPV 10 4 fL      Platelets 374 Thousands/uL      nRBC 0 /100 WBCs      Neutrophils Relative 64 %      Immat GRANS % 0 %      Lymphocytes Relative 27 %      Monocytes Relative 6 %      Eosinophils Relative 2 %      Basophils Relative 1 %      Neutrophils Absolute 6 13 Thousands/µL      Immature Grans Absolute 0 04 Thousand/uL      Lymphocytes Absolute 2 55 Thousands/µL      Monocytes Absolute 0 58 Thousand/µL      Eosinophils Absolute 0 19 Thousand/µL      Basophils Absolute 0 05 Thousands/µL     Blood culture #2 [[id number]] Collected: 02/09/22 1323    Lab Status: In process Specimen: Blood from Arm, Right Updated: 02/09/22 1351    Blood culture #1 [[id number]] Collected: 02/09/22 1323    Lab Status: In process Specimen: Blood from Arm, Left Updated: 02/09/22 1351          XR shoulder 2+ views RIGHT   ED Interpretation by Sharyn Mejias MD (02/09 1345)   Mild degenerative changes, no acute fracture or dislocation      Final Result by Katrina Landa MD (02/09 1511)      No acute osseous abnormality  Additional findings as above  Workstation performed: [id number]         VAS upper limb venous duplex scan, unilateral/limited    (Results Pending)       ED [facility]  ECG 12 Lead Documentation Only    Date/Time: 2/9/2022 1:25 PM  Performed by: Soila Ackerman MD  Authorized by: Soila Ackerman MD     Comments:      Normal sinus rhythm, ventricular rate 69, NE interval 138,  with left bundle branch block morphology, , left axis deviation, no excessive ST/T-wave discordance for concordance to suggest ischemia, no significant change from prior EKG dated 11/15/2021  Medications   multi-electrolyte (ISOLYTE-S PH 7 4) bolus 1,000 mL (0 mL Intravenous Stopped 2/9/22 1806)   oxyCODONE (ROXICODONE) IR tablet 2 5 mg (2 5 mg Oral Given 2/9/22 121)     25-year-old female with complex past medical history presenting with pain to right shoulder and right anterolateral neck in setting of having a PICC line in place  Vital signs reviewed, afebrile and within normal limits  No hypotension  No fever  Given history of prior PICC line infection, blood cultures obtained and sent, labs reveal unremarkable BMP, normal troponin x1, unremarkable CBC without leukocytosis  EKG obtained, as reviewed by me, as above  I do suspect that patient's symptoms may be in part due to arthritis and possibly due to some degree of cervical radiculopathy  PICC line associated pain is on differential  Right shoulder x-ray is with mild degenerative changes, formal read is as above  Case discussed with Dr Marlon Hammans with IR who evaluated the patient at bedside  Right upper extremity venous duplex obtained revealing no evidence of acute or chronic deep vein thrombosis  Based on discussion between Dr Marlon Hammans and the patient, given issues with right shoulder and right lateral neck pain, PICC line was removed     Patient discharged to home with recommendations for follow-up, return precautions, and recommendations for symptom control  MDM  Number of Diagnoses or Management Options  Neck pain on right side: new and requires workup  Right shoulder pain: new and requires workup     Amount and/or Complexity of Data Reviewed  Clinical lab tests: ordered and reviewed  Tests in the radiology section of CPT®: ordered and reviewed  Tests in the medicine section of CPT®: ordered and reviewed  Review and summarize past medical records: yes  Discuss the patient with other providers: yes (IR)  Independent visualization of images, tracings, or specimens: yes    Risk of Complications, Morbidity, and/or Mortality  Presenting problems: high  Diagnostic procedures: moderate  Management options: moderate    Patient Progress  Patient progress: stable      CLINICAL IMPRESSION  Final diagnoses:   Right shoulder pain   Neck pain on right side       DISPOSITION  Time reflects when diagnosis was documented in both MDM as applicable and the Disposition within this note     Time User Action Codes Description Comment    2/9/2022  3:55 PM Ethan Mata Add [M25 511] Right shoulder pain     2/9/2022  3:56 PM Ethan Mata Add [M54 2] Neck pain on right side       ED Disposition     ED Disposition Condition Date/Time Comment    Discharge Stable Wed Feb 9, 2022  3:55 PM Dori Yu discharge to home/self care              Follow-up Information     Follow up With Specialties Details Why Contact Info    Landy Sommer MD Pain Medicine Call in 1 day Emergency Room Follow-up [address] [facility]      Terri Blandon MD Internal Medicine Call in 1 day Emergency Room Follow-up [phone] Nancy Guerrier MD  03/05/22 3999

## 2022-02-09 NOTE — PROGRESS NOTES
Upon admit patient reported that she has been having decreased range of motion and worsening pain in right shoulder that is uncontrol with usual pain medicine and muscle relaxers  Upon assessment of PICC line there is pink around the insertion site and a scant amount dry brown drainage on old dressing  Patient reports tenderness at site  Site cleaned with alcohol at patients request due to chlorhexidine allergy and new sterile 2x2 and Tegaderm dressing applied  Patient requested to have a peripheral IV placed to receive hydration today and that she will call her PCP regarding PICC line

## 2022-02-10 LAB
ATRIAL RATE: 69 BPM
P AXIS: 25 DEGREES
PR INTERVAL: 138 MS
QRS AXIS: -52 DEGREES
QRSD INTERVAL: 112 MS
QT INTERVAL: 446 MS
QTC INTERVAL: 477 MS
T WAVE AXIS: 93 DEGREES
VENTRICULAR RATE: 69 BPM

## 2022-02-10 PROCEDURE — 93010 ELECTROCARDIOGRAM REPORT: CPT | Performed by: INTERNAL MEDICINE

## 2022-02-10 PROCEDURE — 93971 EXTREMITY STUDY: CPT | Performed by: SURGERY

## 2022-02-11 ENCOUNTER — HOSPITAL ENCOUNTER (OUTPATIENT)
Dept: INFUSION CENTER | Facility: HOSPITAL | Age: 67
Discharge: HOME | End: 2022-02-11
Payer: MEDICARE

## 2022-02-11 VITALS
SYSTOLIC BLOOD PRESSURE: 162 MMHG | OXYGEN SATURATION: 98 % | RESPIRATION RATE: 18 BRPM | DIASTOLIC BLOOD PRESSURE: 88 MMHG | TEMPERATURE: 96.8 F | HEART RATE: 83 BPM

## 2022-02-11 PROCEDURE — 96360 HYDRATION IV INFUSION INIT: CPT

## 2022-02-11 PROCEDURE — 96361 HYDRATE IV INFUSION ADD-ON: CPT

## 2022-02-11 RX ADMIN — SODIUM CHLORIDE 2000 ML: 0.9 INJECTION, SOLUTION INTRAVENOUS at 09:03

## 2022-02-11 NOTE — PROGRESS NOTES
Upon admission patient reported that her PICC line had been removed during ER visit on 2/9/22  Peripheral IV placed for today's hydration

## 2022-02-14 ENCOUNTER — HOSPITAL ENCOUNTER (OUTPATIENT)
Dept: INFUSION CENTER | Facility: HOSPITAL | Age: 67
Discharge: HOME | End: 2022-02-14
Payer: MEDICARE

## 2022-02-14 VITALS
OXYGEN SATURATION: 98 % | TEMPERATURE: 96.9 F | HEART RATE: 84 BPM | DIASTOLIC BLOOD PRESSURE: 88 MMHG | SYSTOLIC BLOOD PRESSURE: 163 MMHG | RESPIRATION RATE: 18 BRPM

## 2022-02-14 LAB
BACTERIA BLD CULT: NORMAL
BACTERIA BLD CULT: NORMAL

## 2022-02-14 PROCEDURE — 96361 HYDRATE IV INFUSION ADD-ON: CPT

## 2022-02-14 PROCEDURE — 96360 HYDRATION IV INFUSION INIT: CPT

## 2022-02-14 RX ADMIN — SODIUM CHLORIDE 2000 ML: 0.9 INJECTION, SOLUTION INTRAVENOUS at 09:00

## 2022-02-14 NOTE — PROGRESS NOTES
Pt tolerated todays hydration without incident  peripheral iv disconnected jelco intact   discharged ambulatory deferring avs

## 2022-02-15 ENCOUNTER — OFFICE VISIT (OUTPATIENT)
Dept: OBGYN CLINIC | Facility: CLINIC | Age: 67
End: 2022-02-15
Payer: MEDICARE

## 2022-02-15 VITALS
HEART RATE: 76 BPM | SYSTOLIC BLOOD PRESSURE: 163 MMHG | BODY MASS INDEX: 23.41 KG/M2 | HEIGHT: 61 IN | WEIGHT: 124 LBS | DIASTOLIC BLOOD PRESSURE: 91 MMHG

## 2022-02-15 DIAGNOSIS — G89.29 CHRONIC RIGHT SHOULDER PAIN: ICD-10-CM

## 2022-02-15 DIAGNOSIS — R29.898 WEAKNESS OF RIGHT UPPER EXTREMITY: Primary | ICD-10-CM

## 2022-02-15 DIAGNOSIS — M25.511 CHRONIC RIGHT SHOULDER PAIN: ICD-10-CM

## 2022-02-15 PROCEDURE — 99204 OFFICE O/P NEW MOD 45 MIN: CPT | Performed by: FAMILY MEDICINE

## 2022-02-15 RX ORDER — SODIUM CHLORIDE 9 MG/ML
333.33 INJECTION, SOLUTION INTRAVENOUS ONCE
Status: COMPLETED | OUTPATIENT
Start: 2022-02-16 | End: 2022-02-16

## 2022-02-15 NOTE — PROGRESS NOTES
[facility] [address] [facility]  [address]  [address] [address] [phone]      Chief Complaint:  Chief Complaint   Patient presents with    Right Shoulder - Pain       Vitals:  /91 (BP Location: Left arm, Patient Position: Sitting, Cuff Size: Standard)   Pulse 76   Ht 5' 1" (1 549 m) Comment: verbal  Wt 56 2 kg (124 lb)   BMI 23 43 kg/m²     The following portions of the patient's history were reviewed and updated as appropriate: allergies, current medications, past family history, past medical history, past social history, past surgical history, and problem list       Subjective:   Patient ID: Eric Ramey is a 78 y o  female  Here c/o R shoulder pain  Pain started about 6 months ago- achey pain  1/14/22- covid vax- couldn't move arm for 5 days  Hurts to move her arm forward/reaching back  Cant reach back  Sharp pain  Icing/heat  Taking vicodin- helps take the edge off  She was reaching behind herself in bed to adjust a  Heating pain and felt pain and a tear  Weak since then    RIGHT SHOULDER     INDICATION:   right shoulder pain      COMPARISON:  None     VIEWS:  XR SHOULDER 2+ VW RIGHT  Images: 3     FINDINGS:     There is no acute fracture or dislocation      Minimal glenohumeral joint osteoarthrosis and mild acromioclavicular joint osteoarthrosis  There is diffuse osteopenia      Tiny intraosseous ganglia suspected in the humeral head      Right central venous catheter terminating at the cavoatrial junction      IMPRESSION:     No acute osseous abnormality  Additional findings as above          Review of Systems   Constitutional: Negative for fatigue and fever  Respiratory: Positive for chest tightness and shortness of breath  Cardiovascular: Negative for chest pain  Gastrointestinal: Positive for abdominal pain and constipation  Negative for nausea  Genitourinary: Negative for dysuria  Musculoskeletal: Positive for arthralgias  Skin: Negative for rash and wound  Neurological: Negative for weakness and headaches  Objective:  Right Shoulder Exam     Tenderness   The patient is experiencing tenderness in the acromioclavicular joint and acromion  Range of Motion   Active abduction:  90 abnormal   Passive abduction:  160 abnormal   Extension: normal   External rotation: normal   Forward flexion:  90 abnormal     Muscle Strength   Abduction: 2/5   Internal rotation: 5/5   External rotation: 5/5   Supraspinatus: 2/5     Tests   Hahn test: positive    Comments:  Pos empty can/push off test            Physical Exam  Vitals and nursing note reviewed  Constitutional:       Appearance: Normal appearance  She is well-developed  HENT:      Head: Normocephalic  Mouth/Throat:      Mouth: Mucous membranes are moist    Eyes:      Extraocular Movements: Extraocular movements intact  Cardiovascular:      Rate and Rhythm: Normal rate and regular rhythm  Heart sounds: Normal heart sounds  Pulmonary:      Effort: Pulmonary effort is normal       Breath sounds: Normal breath sounds  Abdominal:      General: Bowel sounds are normal       Palpations: Abdomen is soft  Musculoskeletal:         General: Tenderness present  Cervical back: Normal range of motion  Skin:     General: Skin is warm and dry  Neurological:      General: No focal deficit present  Mental Status: She is alert and oriented to person, place, and time  Psychiatric:         Mood and Affect: Mood normal          Behavior: Behavior normal          Thought Content: Thought content normal          I have personally reviewed pertinent films in PACS and my interpretation is XR R shoulder= no fx  mild DJD  Assessment/Plan:  Assessment/Plan   Diagnoses and all orders for this visit:    Right shoulder pain  -     Ambulatory Referral to Orthopedic Surgery        No follow-ups on file       Oliverio Alexander MD

## 2022-02-16 ENCOUNTER — HOSPITAL ENCOUNTER (OUTPATIENT)
Dept: INFUSION CENTER | Facility: HOSPITAL | Age: 67
Discharge: HOME | End: 2022-02-16
Payer: MEDICARE

## 2022-02-16 VITALS
HEART RATE: 80 BPM | SYSTOLIC BLOOD PRESSURE: 128 MMHG | TEMPERATURE: 96.9 F | OXYGEN SATURATION: 97 % | RESPIRATION RATE: 18 BRPM | DIASTOLIC BLOOD PRESSURE: 90 MMHG

## 2022-02-16 PROCEDURE — 96360 HYDRATION IV INFUSION INIT: CPT

## 2022-02-16 PROCEDURE — 96361 HYDRATE IV INFUSION ADD-ON: CPT

## 2022-02-16 RX ADMIN — SODIUM CHLORIDE 333.33 ML/HR: 0.9 INJECTION, SOLUTION INTRAVENOUS at 09:18

## 2022-02-17 ENCOUNTER — HOSPITAL ENCOUNTER (OUTPATIENT)
Dept: MRI IMAGING | Facility: HOSPITAL | Age: 67
Discharge: HOME | End: 2022-02-17
Attending: FAMILY MEDICINE
Payer: MEDICARE

## 2022-02-17 ENCOUNTER — HOSPITAL ENCOUNTER (OUTPATIENT)
Dept: ULTRASOUND IMAGING | Facility: HOSPITAL | Age: 67
Discharge: HOME | End: 2022-02-17
Attending: INTERNAL MEDICINE
Payer: MEDICARE

## 2022-02-17 DIAGNOSIS — R29.898 WEAKNESS OF RIGHT UPPER EXTREMITY: ICD-10-CM

## 2022-02-17 DIAGNOSIS — G89.29 CHRONIC RIGHT SHOULDER PAIN: ICD-10-CM

## 2022-02-17 DIAGNOSIS — E04.1 THYROID NODULE: ICD-10-CM

## 2022-02-17 DIAGNOSIS — M25.511 CHRONIC RIGHT SHOULDER PAIN: ICD-10-CM

## 2022-02-17 PROCEDURE — 73221 MRI JOINT UPR EXTREM W/O DYE: CPT

## 2022-02-17 PROCEDURE — 76536 US EXAM OF HEAD AND NECK: CPT

## 2022-02-17 RX ORDER — SODIUM CHLORIDE 9 MG/ML
333.33 INJECTION, SOLUTION INTRAVENOUS ONCE
Status: COMPLETED | OUTPATIENT
Start: 2022-02-18 | End: 2022-02-18

## 2022-02-18 ENCOUNTER — TELEPHONE (OUTPATIENT)
Dept: OBGYN CLINIC | Facility: OTHER | Age: 67
End: 2022-02-18

## 2022-02-18 ENCOUNTER — HOSPITAL ENCOUNTER (OUTPATIENT)
Dept: INFUSION CENTER | Facility: HOSPITAL | Age: 67
Discharge: HOME | End: 2022-02-18
Payer: MEDICARE

## 2022-02-18 VITALS
OXYGEN SATURATION: 97 % | DIASTOLIC BLOOD PRESSURE: 88 MMHG | TEMPERATURE: 97.2 F | SYSTOLIC BLOOD PRESSURE: 167 MMHG | RESPIRATION RATE: 18 BRPM | HEART RATE: 83 BPM

## 2022-02-18 PROCEDURE — 96361 HYDRATE IV INFUSION ADD-ON: CPT

## 2022-02-18 PROCEDURE — 96360 HYDRATION IV INFUSION INIT: CPT

## 2022-02-18 RX ORDER — SODIUM CHLORIDE 9 MG/ML
333.33 INJECTION, SOLUTION INTRAVENOUS ONCE
Status: COMPLETED | OUTPATIENT
Start: 2022-02-21 | End: 2022-02-21

## 2022-02-18 RX ADMIN — SODIUM CHLORIDE 333.33 ML/HR: 0.9 INJECTION, SOLUTION INTRAVENOUS at 09:25

## 2022-02-18 NOTE — PROGRESS NOTES
Pt tolerated todays hydration well  No complaints offered  Pt states she has multiple tears in her right shoulder  Has a follow up appt with her doctor in the near future  Peripheral iv discontinued jelco intact   Discharged ambulatory with avs

## 2022-02-18 NOTE — TELEPHONE ENCOUNTER
Patient called in , she saw her MRI Results on [facility] and would like to know how to proceed, she sees you on 02-28 but she said "I know I will need surgery, should I just go see Dr Rosalina Valdivia"    C/b # [phone]

## 2022-02-21 ENCOUNTER — HOSPITAL ENCOUNTER (OUTPATIENT)
Dept: INFUSION CENTER | Facility: HOSPITAL | Age: 67
Discharge: HOME | End: 2022-02-21
Payer: MEDICARE

## 2022-02-21 PROCEDURE — 96360 HYDRATION IV INFUSION INIT: CPT

## 2022-02-21 PROCEDURE — 96361 HYDRATE IV INFUSION ADD-ON: CPT

## 2022-02-21 RX ADMIN — SODIUM CHLORIDE 333.33 ML/HR: 0.9 INJECTION, SOLUTION INTRAVENOUS at 08:37

## 2022-02-21 NOTE — PLAN OF CARE
Problem: INFECTION - ADULT  Goal: Absence or prevention of progression during hospitalization  Description: INTERVENTIONS:  - Assess and monitor for signs and symptoms of infection  - Monitor lab/diagnostic results  - Monitor all insertion sites, i e  indwelling lines, tubes, and drains  - Monitor endotracheal if appropriate and nasal secretions for changes in amount and color  - [place] appropriate cooling/warming therapies per order  - Administer medications as ordered  - Instruct and encourage patient and family to use good hand hygiene technique  - Identify and instruct in appropriate isolation precautions for identified infection/condition  Outcome: Progressing     Problem: Knowledge Deficit  Goal: Patient/family/caregiver demonstrates understanding of disease process, treatment plan, medications, and discharge instructions  Description: Complete learning assessment and assess knowledge base    Interventions:  - Provide teaching at level of understanding  - Provide teaching via preferred learning methods  Outcome: Progressing

## 2022-02-22 RX ORDER — SODIUM CHLORIDE 9 MG/ML
333.33 INJECTION, SOLUTION INTRAVENOUS ONCE
Status: COMPLETED | OUTPATIENT
Start: 2022-02-23 | End: 2022-02-23

## 2022-02-22 NOTE — TELEPHONE ENCOUNTER
Patient is calling back again stating is it neccessary to follow up with Dr Edy Coles? She wants to move forward with the shoulder specialist and doesn't want to delay this any longer          CB: [phone]

## 2022-02-22 NOTE — TELEPHONE ENCOUNTER
Attempted to call patient but went right to voicemail      Looks like she already has a follow up with Dr Chapin Morgan scheduled for 02-28

## 2022-02-22 NOTE — TELEPHONE ENCOUNTER
Left detailed msg for patient with above information  Asked her to call back for sooner appt if she is interested in sooner than 2/28

## 2022-02-23 ENCOUNTER — HOSPITAL ENCOUNTER (OUTPATIENT)
Dept: INFUSION CENTER | Facility: HOSPITAL | Age: 67
Discharge: HOME | End: 2022-02-23
Payer: MEDICARE

## 2022-02-23 VITALS
HEART RATE: 80 BPM | DIASTOLIC BLOOD PRESSURE: 88 MMHG | SYSTOLIC BLOOD PRESSURE: 158 MMHG | RESPIRATION RATE: 16 BRPM | TEMPERATURE: 97.3 F

## 2022-02-23 PROCEDURE — 96361 HYDRATE IV INFUSION ADD-ON: CPT

## 2022-02-23 PROCEDURE — 96360 HYDRATION IV INFUSION INIT: CPT

## 2022-02-23 RX ADMIN — SODIUM CHLORIDE 333.33 ML/HR: 0.9 INJECTION, SOLUTION INTRAVENOUS at 09:13

## 2022-02-23 NOTE — PROGRESS NOTES
Hydrated over 6 hours with 2 liters of nss via peripheral iv  Feels well   Discharged ambulatory deferring avs

## 2022-02-25 ENCOUNTER — HOSPITAL ENCOUNTER (OUTPATIENT)
Dept: INFUSION CENTER | Facility: HOSPITAL | Age: 67
Discharge: HOME | End: 2022-02-25

## 2022-02-28 ENCOUNTER — HOSPITAL ENCOUNTER (OUTPATIENT)
Dept: INFUSION CENTER | Facility: HOSPITAL | Age: 67
Discharge: HOME | End: 2022-02-28
Payer: MEDICARE

## 2022-02-28 ENCOUNTER — OFFICE VISIT (OUTPATIENT)
Dept: OBGYN CLINIC | Facility: CLINIC | Age: 67
End: 2022-02-28
Payer: MEDICARE

## 2022-02-28 VITALS
WEIGHT: 124 LBS | BODY MASS INDEX: 23.41 KG/M2 | DIASTOLIC BLOOD PRESSURE: 97 MMHG | HEART RATE: 97 BPM | SYSTOLIC BLOOD PRESSURE: 163 MMHG | HEIGHT: 61 IN

## 2022-02-28 VITALS
DIASTOLIC BLOOD PRESSURE: 84 MMHG | HEART RATE: 61 BPM | SYSTOLIC BLOOD PRESSURE: 145 MMHG | TEMPERATURE: 96.9 F | RESPIRATION RATE: 16 BRPM

## 2022-02-28 DIAGNOSIS — S46.811A TRAUMATIC TEAR OF SUPRASPINATUS TENDON OF RIGHT SHOULDER, INITIAL ENCOUNTER: Primary | ICD-10-CM

## 2022-02-28 DIAGNOSIS — M25.511 CHRONIC RIGHT SHOULDER PAIN: ICD-10-CM

## 2022-02-28 DIAGNOSIS — R29.898 WEAKNESS OF RIGHT UPPER EXTREMITY: ICD-10-CM

## 2022-02-28 DIAGNOSIS — G89.29 CHRONIC RIGHT SHOULDER PAIN: ICD-10-CM

## 2022-02-28 PROCEDURE — 99214 OFFICE O/P EST MOD 30 MIN: CPT | Performed by: FAMILY MEDICINE

## 2022-02-28 PROCEDURE — 96360 HYDRATION IV INFUSION INIT: CPT

## 2022-02-28 PROCEDURE — 96361 HYDRATE IV INFUSION ADD-ON: CPT

## 2022-02-28 RX ADMIN — SODIUM CHLORIDE 2000 ML: 0.9 INJECTION, SOLUTION INTRAVENOUS at 08:45

## 2022-02-28 NOTE — PROGRESS NOTES
Patient tolerated IV hydration without issue  Discharged in stable condition, declined AVS but aware of next appointment  Medical Necessity Clause: This procedure was medically necessary because the lesions that were treated were: thickened and crusty. Paring Method: 15 blade scalpel 15 blade

## 2022-02-28 NOTE — PATIENT INSTRUCTIONS
F/u here as needed  Referral to Dr Verito Manrique- patient prefers  Icing/heat/OTC pain meds as needed

## 2022-02-28 NOTE — PROGRESS NOTES
[facility] SPECIALISTS [place] [address]  [facility] [id number] [phone]      Chief Complaint:  Chief Complaint   Patient presents with    Right Shoulder - Follow-up       Vitals:  /97   Pulse 97   Ht 5' 1" (1 549 m)   Wt 56 2 kg (124 lb)   BMI 23 43 kg/m²     The following portions of the patient's history were reviewed and updated as appropriate: allergies, current medications, past family history, past medical history, past social history, past surgical history, and problem list       Subjective:   Patient ID: Haily Mcelroy is a 78 y o  female  Here for f/u R shoulder pain/MRI  - achey pain  Hurts to move her arm forward/reaching back  Cant reach back  Sharp pain  Icing/heat  Taking vicodin- helps take the edge off  She was reaching behind herself in bed to adjust a  Heating pain and felt pain and a tear  Still feeling weak    MRI RIGHT SHOULDER     INDICATION:   M25 511: Pain in right shoulder  G89 29: Other chronic pain  R29 898: Other symptoms and signs involving the musculoskeletal system  Dr Duncan Chavez note from 2/15/2022 was reviewed    Patient has right shoulder pain for the last 6 months      COMPARISON:  Right shoulder plain films from 2/9/2022      TECHNIQUE:   The following MR sequences were obtained of the right shoulder: Localizer, axial GRE/PD fat sat, oblique coronal T2 fat sat, oblique sagittal T1/T2 fat sat       Gadolinium was not used      FINDINGS:     SUBCUTANEOUS TISSUES: Normal     JOINT EFFUSION: None      ACROMION PROCESS: Normal      ROTATOR CUFF: Complete tear of the supraspinatus with torn tendon edge retracted to the level of the humeral head apex and associated with mild muscle atrophy (series 5 images 9-13) The infraspinatus, subscapularis, and teres minor are intact      SUBACROMIAL/SUBDELTOID BURSA: Normal       LONG HEAD OF BICEPS TENDON: High grade partial tear of the long head of biceps tendon (series 3 images 10-18 )     GLENOID LABRUM: Intact      GLENOHUMERAL JOINT: Intact      ACROMIOCLAVICULAR JOINT:  There is mild osteoarthritis      BONES: Normal      IMPRESSION:     Complete tear of the supraspinatus with torn tendon edge retracted to the level of the humeral head apex and associated with mild muscle atrophy (series 5 images 9-13)      High grade partial tear of the long head of biceps tendon (series 3 images 10-18 )         Review of Systems   Constitutional: Negative for fatigue and fever  Respiratory: Negative for shortness of breath  Cardiovascular: Negative for chest pain  Gastrointestinal: Negative for abdominal pain and nausea  Genitourinary: Negative for dysuria  Musculoskeletal: Positive for arthralgias  Skin: Negative for rash and wound  Neurological: Negative for weakness and headaches  Objective:  Right Shoulder Exam     Tenderness   The patient is experiencing tenderness in the biceps tendon and acromion  Range of Motion   Active abduction: abnormal   Passive abduction: abnormal   Extension: normal   External rotation: normal   Forward flexion: abnormal     Muscle Strength   Abduction: 2/5   Supraspinatus: 2/5   Biceps: 3/5     Tests   Hahn test: positive            Physical Exam  Constitutional:       Appearance: Normal appearance  She is normal weight  HENT:      Head: Normocephalic  Eyes:      Extraocular Movements: Extraocular movements intact  Pulmonary:      Effort: Pulmonary effort is normal    Musculoskeletal:      Cervical back: Normal range of motion  Skin:     General: Skin is warm and dry  Neurological:      General: No focal deficit present  Mental Status: She is alert and oriented to person, place, and time  Mental status is at baseline  Psychiatric:         Mood and Affect: Mood normal          Behavior: Behavior normal          Thought Content:  Thought content normal          Judgment: Judgment normal          I have personally reviewed pertinent films in PACS and my interpretation is MR- R shoulder- complete tear of supraspinatus and patial tear of long head of biceps  Assessment/Plan:  Assessment/Plan   There are no diagnoses linked to this encounter  No follow-ups on file       Jay Jay Patterson MD

## 2022-03-01 ENCOUNTER — TELEPHONE (OUTPATIENT)
Dept: OBGYN CLINIC | Facility: HOSPITAL | Age: 67
End: 2022-03-01

## 2022-03-01 RX ORDER — SODIUM CHLORIDE 9 MG/ML
333.33 INJECTION, SOLUTION INTRAVENOUS ONCE
Status: COMPLETED | OUTPATIENT
Start: 2022-03-02 | End: 2022-03-02

## 2022-03-02 ENCOUNTER — HOSPITAL ENCOUNTER (OUTPATIENT)
Dept: INFUSION CENTER | Facility: HOSPITAL | Age: 67
Discharge: HOME | End: 2022-03-02
Payer: MEDICARE

## 2022-03-02 ENCOUNTER — TELEPHONE (OUTPATIENT)
Dept: OBGYN CLINIC | Facility: MEDICAL CENTER | Age: 67
End: 2022-03-02

## 2022-03-02 VITALS
TEMPERATURE: 96.9 F | RESPIRATION RATE: 16 BRPM | SYSTOLIC BLOOD PRESSURE: 165 MMHG | DIASTOLIC BLOOD PRESSURE: 102 MMHG | HEART RATE: 72 BPM

## 2022-03-02 PROCEDURE — 96360 HYDRATION IV INFUSION INIT: CPT

## 2022-03-02 PROCEDURE — 96361 HYDRATE IV INFUSION ADD-ON: CPT

## 2022-03-02 RX ADMIN — SODIUM CHLORIDE 333.33 ML/HR: 9 INJECTION, SOLUTION INTRAVENOUS at 09:13

## 2022-03-02 NOTE — TELEPHONE ENCOUNTER
Considering we cannot provide advise on patient's we haven't seen and evaluated  Hypothetically if she needed a surgery, End of March is how far Dr Lo Muhammad is booked as of today    She isn't scheduled for a week to see him, so that can change

## 2022-03-02 NOTE — TELEPHONE ENCOUNTER
Patient is scheduled with Dr Verito Manrique on 3/8  Patient calling because she knows she needs surgery on her right shoulder, injury was over almost a year, she seen Dr Moshe Royal and Isi Ndiaye and MRI are in our [employer] system already  She is asking if she needs surgery, can she be given an approximate time frame of how far out? She know she has not seen Dr Verito Manrique, but all her imaging is in the system and she is concerned of tearing her supra spinatus and bicep more, a lot of pain and no movement       # [phone]

## 2022-03-04 ENCOUNTER — HOSPITAL ENCOUNTER (OUTPATIENT)
Dept: INFUSION CENTER | Facility: HOSPITAL | Age: 67
Discharge: HOME | End: 2022-03-04
Payer: MEDICARE

## 2022-03-04 VITALS
DIASTOLIC BLOOD PRESSURE: 74 MMHG | SYSTOLIC BLOOD PRESSURE: 162 MMHG | RESPIRATION RATE: 18 BRPM | TEMPERATURE: 97.3 F | HEART RATE: 86 BPM

## 2022-03-04 PROCEDURE — 96361 HYDRATE IV INFUSION ADD-ON: CPT

## 2022-03-04 PROCEDURE — 96360 HYDRATION IV INFUSION INIT: CPT

## 2022-03-04 RX ADMIN — SODIUM CHLORIDE 2000 ML: 9 INJECTION, SOLUTION INTRAVENOUS at 09:10

## 2022-03-07 ENCOUNTER — HOSPITAL ENCOUNTER (OUTPATIENT)
Dept: INFUSION CENTER | Facility: HOSPITAL | Age: 67
Discharge: HOME | End: 2022-03-07
Payer: MEDICARE

## 2022-03-07 VITALS
HEART RATE: 83 BPM | TEMPERATURE: 97.4 F | SYSTOLIC BLOOD PRESSURE: 125 MMHG | RESPIRATION RATE: 18 BRPM | DIASTOLIC BLOOD PRESSURE: 73 MMHG

## 2022-03-07 PROCEDURE — 96360 HYDRATION IV INFUSION INIT: CPT

## 2022-03-07 PROCEDURE — 96361 HYDRATE IV INFUSION ADD-ON: CPT

## 2022-03-07 RX ADMIN — SODIUM CHLORIDE 2000 ML: 0.9 INJECTION, SOLUTION INTRAVENOUS at 09:19

## 2022-03-07 NOTE — PROGRESS NOTES
Pt hydrated as per orders  Peripheral iv discontinued jelco intact   Discharged ambulatory with avs

## 2022-03-08 ENCOUNTER — OFFICE VISIT (OUTPATIENT)
Dept: OBGYN CLINIC | Facility: HOSPITAL | Age: 67
End: 2022-03-08
Payer: MEDICARE

## 2022-03-08 VITALS
WEIGHT: 123 LBS | SYSTOLIC BLOOD PRESSURE: 176 MMHG | BODY MASS INDEX: 23.22 KG/M2 | HEIGHT: 61 IN | DIASTOLIC BLOOD PRESSURE: 98 MMHG | HEART RATE: 83 BPM

## 2022-03-08 DIAGNOSIS — S46.811A TRAUMATIC TEAR OF SUPRASPINATUS TENDON OF RIGHT SHOULDER, INITIAL ENCOUNTER: Primary | ICD-10-CM

## 2022-03-08 PROCEDURE — 99214 OFFICE O/P EST MOD 30 MIN: CPT | Performed by: ORTHOPAEDIC SURGERY

## 2022-03-08 RX ORDER — SODIUM CHLORIDE 9 MG/ML
333.3 INJECTION, SOLUTION INTRAVENOUS ONCE
Status: DISCONTINUED | OUTPATIENT
Start: 2022-03-09 | End: 2022-03-13 | Stop reason: HOSPADM

## 2022-03-08 RX ORDER — CHLORHEXIDINE GLUCONATE 0.12 MG/ML
15 RINSE ORAL ONCE
Status: CANCELLED | OUTPATIENT
Start: 2022-03-08 | End: 2022-03-08

## 2022-03-08 RX ORDER — CEFAZOLIN SODIUM 2 G/50ML
2000 SOLUTION INTRAVENOUS ONCE
Status: CANCELLED | OUTPATIENT
Start: 2022-03-08 | End: 2022-03-08
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
## 2022-03-08 NOTE — PATIENT INSTRUCTIONS
You are being scheduled for a shoulder arthroscopy to treat your symptoms  Below are some instructions and information on what to expect before and after your surgery  Pre-Surgical Preparation for Arthroscopic Shoulder Surgery: You will be contacted the evening prior to your surgery to confirm the scheduled time of the procedure and when to arrive at the hospital    Do not eat or drink anything after midnight the night before your surgery  Since you are having out-patient surgery, make sure that you have someone who can drive you home later in the day  Also, prepare that person for a long day, as the process of safely preparing for and recovering from the procedure is more time consuming than the actual procedure! As you will be in a sling after surgery, please wear or bring a loose fitting button-down shirt so that you can easily place this over the sling when you leave the surgical suite  This avoids having to place the operative arm in a sleeve  Most patients find that this is the easiest outfit to wear for the first week or so after surgery so you may want to plan accordingly  Most patients find that lying down in bed after shoulder surgery accentuates their discomfort  This is likely related to the effect of gravity on the swelling in the shoulder  As a result, most patients sleep better in a recliner or in bed with pillows propped up behind their back for the first few days or weeks after surgery  It is a good idea to plan for this ahead of time so there will be less hassle getting things set up the night after surgery  What to Expect After Arthroscopic Shoulder Surgery: It is normal to have swelling and discomfort in the shoulder for several days or a week after surgery  It is also normal to have a small amount of drainage from the surgical wounds (especially the first few days after surgery), as we put fluid into the shoulder to visualize the structures during surgery  It is NOT normal to have foul smelling, purulent drainage and if this is noted, please contact the office immediately or proceed to the emergency room for evaluation as this may indicate an infection  Applying ice bags to the shoulder may help with pain that is not controlled by the regional block  Ice should be applied 20-30 minutes at a time, every hour or two  Make sure to put a thin towel or T-shirt next to your skin to avoid direct contact of the ice with the skin  Icing is most helpful in the first 48 hours, although many people find that continuing past this time frame lessens their postoperative pain  Please note that your post-operative dressing is not conductive to ice, so if you need to, it is okay to remove that dressing even the night after surgery and place band-aids over the wounds in order for the ice to take effect  Pain Control    Most patients will receive a nerve block, the local anesthetic may keep your whole arm numb for up to 4 days  You will be given a prescription for narcotic pain medication when you are discharged from the hospital   With the newer nerve block that is being utilized, patients are rarely requiring the use of this narcotic pain medication  If you find you do not tolerate that type of pain medicine well, call our office and we will try another one  In addition to the narcotic pain medication, it is safe to use an anti-inflammatory (unless the patient has a medical condition that would not allow safe use of this mediation)  This includes the Advil, Motrin, Ibuprofen and Alleve category of medications  Simply follow the over the counter dosing on the package and use as indicated as another adjunct  Importantly since these medications are all very similar, use only one of them  Tylenol is a separate medication that can be utilized as well and can be taken at the same time as the other medication or given in a "staggered" manner    Just make sure that you follow the dosing on the over the counter bottle instructions  Also make sure that the pain medication prescribed by Dr Demian Blakely team does not contain acetaminophen (this is found in Percocet and Vicodin)  Typically we do not prescribe those types of pain medications but if for some reason that has been prescribed DO NOT add more Tylenol (acetaminophen) as you could end up taking too much of that medication  As mentioned above, most patients find that lying down accentuates their discomfort  You might sleep better in a recliner, or propped up in bed  Dr Charli Goff encourages patients to safely ambulate around the house as much as possible in the first few days after the procedure as this can help with blood circulation in the legs  While the incidence of blood clots is very rare following shoulder surgery, early ambulation is a great way to help decrease the already low rate  24 hours after the surgery you may remove the bandage and cover incisions with Band-Aids if needed  At that time you may shower, the wounds will have a surgical glue that will protect them from shower water but do not submerge your incisions directly (bathing or swimming) until at least 2 weeks post-operatively  It is safe to let the arm hang at the side and take a shower and put on a shirt without the sling on  Just make sure that you do not use the operative are to reach out and grab anything as that may damage the repair  When you are done showering and getting dressed please return the operative arm to the sling  Unless noted otherwise in your discharge paperwork, Dr Charli Goff uses absorbable sutures so they do not need to be removed  Dr Javan Torres physician assistant (PA) will see you in the office a few days after the procedure to review the intra-operative findings and to initiate physical therapy if appropriate    A post-operative appointment should have been scheduled for you already, but if for some reason this did not happen, please call the office to make one  Physical therapy is important after nearly all shoulder surgeries and a detailed rehabilitation plan based on the specific intra-operative findings and procedures will be provided to your therapist at the first post-operative office visit  Most patients have post-operative therapy appointments scheduled pre-operatively, but if you do not, that will be handled at the first post-operative office visit  Unless expressly directed otherwise it is safe to remove the sling even the first day after the surgery and let the arm hang by the side  This allows patients to shower and even put a shirt on (bad arm in the sleeve first)  It is also safe to flex and extend their wrist, hand and fingers as much as possible when the block wears off  These simple motions can serve to pump fluid out of the forearm and decrease swelling in the arm
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
## 2022-03-09 ENCOUNTER — HOSPITAL ENCOUNTER (OUTPATIENT)
Dept: INFUSION CENTER | Facility: HOSPITAL | Age: 67
Discharge: HOME | End: 2022-03-09

## 2022-03-09 RX ORDER — SODIUM CHLORIDE 9 MG/ML
333.33 INJECTION, SOLUTION INTRAVENOUS ONCE
Status: COMPLETED | OUTPATIENT
Start: 2022-03-10 | End: 2022-03-10

## 2022-03-10 ENCOUNTER — TELEPHONE (OUTPATIENT)
Dept: OBGYN CLINIC | Facility: MEDICAL CENTER | Age: 67
End: 2022-03-10

## 2022-03-10 ENCOUNTER — HOSPITAL ENCOUNTER (OUTPATIENT)
Dept: INFUSION CENTER | Facility: HOSPITAL | Age: 67
Discharge: HOME | End: 2022-03-10
Payer: MEDICARE

## 2022-03-10 VITALS
OXYGEN SATURATION: 100 % | RESPIRATION RATE: 18 BRPM | DIASTOLIC BLOOD PRESSURE: 75 MMHG | SYSTOLIC BLOOD PRESSURE: 161 MMHG | HEART RATE: 91 BPM | TEMPERATURE: 97.4 F

## 2022-03-10 PROCEDURE — 96361 HYDRATE IV INFUSION ADD-ON: CPT

## 2022-03-10 PROCEDURE — 96360 HYDRATION IV INFUSION INIT: CPT

## 2022-03-10 RX ADMIN — SODIUM CHLORIDE 333.33 ML/HR: 0.9 INJECTION, SOLUTION INTRAVENOUS at 08:39

## 2022-03-10 NOTE — TELEPHONE ENCOUNTER
Left message for patient advising her the soonest we can get her in is 4/6 or 4/8      I gave her my number to call me back at [phone]

## 2022-03-10 NOTE — TELEPHONE ENCOUNTER
Patient sees Dr Ga Anderson  Patient is calling to schedule her surgery for her right shoulder  She did sign the consents and looking to schedule surgery  Looking for a call back       # [phone]

## 2022-03-11 ENCOUNTER — HOSPITAL ENCOUNTER (OUTPATIENT)
Dept: INFUSION CENTER | Facility: HOSPITAL | Age: 67
End: 2022-03-11

## 2022-03-11 RX ORDER — SODIUM CHLORIDE 9 MG/ML
333.33 INJECTION, SOLUTION INTRAVENOUS ONCE
Status: COMPLETED | OUTPATIENT
Start: 2022-03-14 | End: 2022-03-14

## 2022-03-14 ENCOUNTER — HOSPITAL ENCOUNTER (OUTPATIENT)
Dept: INFUSION CENTER | Facility: HOSPITAL | Age: 67
Discharge: HOME | End: 2022-03-14
Payer: MEDICARE

## 2022-03-14 ENCOUNTER — HOSPITAL ENCOUNTER (OUTPATIENT)
Dept: MAMMOGRAPHY | Facility: HOSPITAL | Age: 67
Discharge: HOME | End: 2022-03-14
Attending: INTERNAL MEDICINE
Payer: MEDICARE

## 2022-03-14 VITALS — HEIGHT: 61 IN | WEIGHT: 123 LBS | BODY MASS INDEX: 23.22 KG/M2

## 2022-03-14 VITALS
OXYGEN SATURATION: 97 % | HEART RATE: 95 BPM | TEMPERATURE: 96.7 F | RESPIRATION RATE: 18 BRPM | DIASTOLIC BLOOD PRESSURE: 73 MMHG | SYSTOLIC BLOOD PRESSURE: 144 MMHG

## 2022-03-14 DIAGNOSIS — Z12.31 ENCOUNTER FOR SCREENING MAMMOGRAM FOR MALIGNANT NEOPLASM OF BREAST: ICD-10-CM

## 2022-03-14 PROCEDURE — 77063 BREAST TOMOSYNTHESIS BI: CPT

## 2022-03-14 PROCEDURE — 77067 SCR MAMMO BI INCL CAD: CPT

## 2022-03-14 PROCEDURE — 96360 HYDRATION IV INFUSION INIT: CPT

## 2022-03-14 PROCEDURE — 96361 HYDRATE IV INFUSION ADD-ON: CPT

## 2022-03-14 RX ADMIN — SODIUM CHLORIDE 333.33 ML/HR: 0.9 INJECTION, SOLUTION INTRAVENOUS at 08:19

## 2022-03-14 NOTE — PROGRESS NOTES
Pt hydrated as per orders over 6 hours  No recent changes in health  Pt feels well but does admit to a POTS fflair over the weekend in which she had to "really force the fluids"  Peripheral iv discontinued jelco intact   Discharged ambulatory deferring avs

## 2022-03-16 ENCOUNTER — TELEPHONE (OUTPATIENT)
Dept: PAIN MEDICINE | Facility: CLINIC | Age: 67
End: 2022-03-16

## 2022-03-16 ENCOUNTER — HOSPITAL ENCOUNTER (OUTPATIENT)
Dept: INFUSION CENTER | Facility: HOSPITAL | Age: 67
Discharge: HOME | End: 2022-03-16
Payer: MEDICARE

## 2022-03-16 VITALS
DIASTOLIC BLOOD PRESSURE: 89 MMHG | OXYGEN SATURATION: 98 % | HEART RATE: 90 BPM | SYSTOLIC BLOOD PRESSURE: 170 MMHG | RESPIRATION RATE: 18 BRPM | TEMPERATURE: 96.6 F

## 2022-03-16 PROCEDURE — 96361 HYDRATE IV INFUSION ADD-ON: CPT

## 2022-03-16 PROCEDURE — 96360 HYDRATION IV INFUSION INIT: CPT

## 2022-03-16 RX ADMIN — SODIUM CHLORIDE 2000 ML: 0.9 INJECTION, SOLUTION INTRAVENOUS at 09:10

## 2022-03-16 NOTE — TELEPHONE ENCOUNTER
Patient is in the office here today asking if she needs Labs and an EKG prior to surgery   Please advise

## 2022-03-16 NOTE — PROGRESS NOTES
Patient tolerated IV hydration without incident  AVS declined  Patient is aware of next appointment  Patient ambulated off unit without incident  All personal belongings taken with patient

## 2022-03-17 ENCOUNTER — TELEPHONE (OUTPATIENT)
Dept: OBGYN CLINIC | Facility: HOSPITAL | Age: 67
End: 2022-03-17

## 2022-03-17 NOTE — PROGRESS NOTES
PT Evaluation     Today's date: 3/17/2022  Patient name: Myron Galindo  : [birth year]  MRN: [id number]  Referring provider: Falguni Vásquez  Dx:   Encounter Diagnosis     ICD-10-CM    1  Traumatic tear of supraspinatus tendon of right shoulder, subsequent encounter  S46 175Z                   Assessment  Assessment details: Myron Galindo is a 78 y o  female presenting to outpatient physical therapy with noted impairments including pain, impaired soft tissue mobility, reduced range of motion, reduced strength, reduced postural awareness, and reduced activity tolerance  Signs and symptoms at present are consistent with referring diagnosis of R shld rot cuff tear   Due to noted impairments, the patient's present functional limitations include difficulty with ADLs with increased need for assistance, reliance on medication and/or modalities for pain relief, reduced tolerance for functional mobility and activity, and difficulty completing HH and self care responsibilities  Patient to benefit from skilled outpatient physical therapy 2x/week for  4 weeks in order to reduce pain, maximize pain free range of motion, increase strength and stability, and improve functional mobility/functional activity in order to maximize return to prior level of function with reduced limitations  Home exercise program was provided and all questions answered to patient's level of satisfaction  Thank you for your referral         Impairments: abnormal or restricted ROM, abnormal movement, activity intolerance, impaired physical strength and lacks appropriate home exercise program  Barriers to therapy: Multiple comorbidities including POTS, severe scoliosis-wears brace  Understanding of Dx/Px/POC: good   Prognosis: good    Goals  STGs to be achieved in 4 weeks:  1   Pt to demonstrate reduced subjective pain rating "at worst" by at least 2-3 points from Initial Eval in order to allow for reduced pain with ADLs and improved functional activity tolerance  2  Pt to demonstrate increased AROM of R shld by at least 5-10 degrees in order to allow for greater ease and independence with ADLs and functional mobility  3  Pt to demonstrate full PROM of R shldin order to maximize joint mobility and function and allow for progression of exercise program and achievement of goals  4  Pt to demonstrate increased MMT of RUE by at least 1/2-1 grade in order to improve safety and stability with ADLs and functional mobility  LTGs to be achieved in 6-8 weeks:  1  Pt will be I with HEP in order to continue to improve quality of life and independence and reduce risk for re-injury  2  Pt to demonstrate return to [facility] chores and self care without limitations or restrictions  3  Pt to demonstrate improved function as noted by achieving or exceeding predicted score on FOTO outcomes assessment tool  Plan  Patient would benefit from: skilled physical therapy  Planned modality interventions: cryotherapy  Other planned modality interventions: Modalities prn for symptom management  Planned therapy interventions: manual therapy, neuromuscular re-education, therapeutic activities, therapeutic exercise, strengthening, stretching and home exercise program  Frequency: 2x week  Plan of Care beginning date: 3/18/2022  Plan of Care expiration date: 2022  Treatment plan discussed with: PTA and patient        Subjective Evaluation    History of Present Illness  Mechanism of injury: trauma  Mechanism of injury: Pt tore her R supraspinatus tendon completely with a partial tear of her bicep in Feb   Is scheduled for surgery 22             Not a recurrent problem   Quality of life: fair    Pain  Current pain ratin  At best pain ratin  At worst pain rating: 10  Quality: dull ache, sharp, tight and radiating  Relieving factors: medications, ice, support and rest  Aggravating factors: overhead activity and lifting (any movement, driving)  Progression: worsening    Social Support  Lives in: apartment  Lives with: adult children    Employment status: not working  Hand dominance: left      Diagnostic Tests  MRI studies: abnormal  Treatments  Current treatment: physical therapy  Patient Goals  Patient goals for therapy: decreased pain, increased motion, increased strength and independence with ADLs/IADLs  Patient goal: be able to drive,        Objective     Cervical/Thoracic Screen   Cervical range of motion within normal limits with the following exceptions: Pt has cerv DDD and receives injections for same, limited ROM  Thoracic range of motion within normal limits with the following exceptions: Pt has severe scoliosis and wears brace, not a surgical candidate due to osteoporosis    Active Range of Motion     Right Shoulder   Flexion: 50 degrees   Extension: 31 degrees   Abduction: 55 degrees   External rotation 45°: 57 degrees   Internal rotation 45°: 58 degrees     Strength/Myotome Testing     Right Shoulder     Planes of Motion   Flexion: 2   Extension: 3   Abduction: 2   External rotation at 45°: 2+   Internal rotation at 45°: 3-     Tests     Right Shoulder   Positive drop arm                Precautions: POTS, severe scoliosis,fibromyalgia,GI issues,lumbar spinal cage, cerv DDD,arachnoiditis  Arachnoid cysts    Re-eval Date:  1st post op appointment    Date 3/18       Visit Count 1       FOTO  comp  3/18       Pain In        Pain Out                Manuals  3/18                                       Neuro Re-Ed        scap retraction        shld shrugs/rolls                                                Ther Ex        Table slides  Flex, arc 10 ea dbl UEs       Wand ex  Flex, ER, EXT, IR Abd 10x ea       Wall slides Flex dbl  UEs  10       Pulleys  3 way        UBE        MTPs/LTPs        Bicep curls  3 way        Prone flex, HA, rows, ext        Ther Activity                        Gait Training                        Modalities CP to R shld                 * Pt instr in HEP consisting of documented ex on flow chart of 3/18/22  Demonstrated good understanding

## 2022-03-17 NOTE — TELEPHONE ENCOUNTER
Patient sees Dr Mikael Guy  Patient is calling stating she spoke to someone in the [place] [address] office yesterday while she was there getting her brace with some questions about her  Surgery  They advised her they would send a message with her questions but I do not see anything in the chart  Patient states she has a heart condition and every other procedure she has had she needed an EKG, does she need one prior to her surgery? Also, patient would like to know if she needs bloodwork?       CB: [phone]

## 2022-03-17 NOTE — TELEPHONE ENCOUNTER
Called patient and advised her she does not need any bloodwork or EKGs prior to surgery      I did advise her I will check with her cardiologist for a clearance

## 2022-03-18 ENCOUNTER — EVALUATION (OUTPATIENT)
Dept: PHYSICAL THERAPY | Facility: CLINIC | Age: 67
End: 2022-03-18
Payer: MEDICARE

## 2022-03-18 ENCOUNTER — HOSPITAL ENCOUNTER (OUTPATIENT)
Dept: INFUSION CENTER | Facility: HOSPITAL | Age: 67
Discharge: HOME | End: 2022-03-18
Payer: MEDICARE

## 2022-03-18 VITALS
RESPIRATION RATE: 20 BRPM | HEART RATE: 81 BPM | DIASTOLIC BLOOD PRESSURE: 84 MMHG | TEMPERATURE: 97.5 F | SYSTOLIC BLOOD PRESSURE: 179 MMHG | OXYGEN SATURATION: 99 %

## 2022-03-18 DIAGNOSIS — S46.811D TRAUMATIC TEAR OF SUPRASPINATUS TENDON OF RIGHT SHOULDER, SUBSEQUENT ENCOUNTER: Primary | ICD-10-CM

## 2022-03-18 PROCEDURE — 96360 HYDRATION IV INFUSION INIT: CPT

## 2022-03-18 PROCEDURE — 96361 HYDRATE IV INFUSION ADD-ON: CPT

## 2022-03-18 PROCEDURE — 97110 THERAPEUTIC EXERCISES: CPT

## 2022-03-18 PROCEDURE — 97163 PT EVAL HIGH COMPLEX 45 MIN: CPT

## 2022-03-18 RX ADMIN — SODIUM CHLORIDE 2000 ML: 0.9 INJECTION, SOLUTION INTRAVENOUS at 09:50

## 2022-03-18 NOTE — PLAN OF CARE
Problem: Potential for Falls  Goal: Patient will remain free of falls  Description: INTERVENTIONS:  - Educate patient/family on patient safety including physical limitations  - Instruct patient to call for assistance with activity   - Consult OT/PT to assist with strengthening/mobility   - Keep Call bell within reach  - Keep bed low and locked with side rails adjusted as appropriate  - Keep care items and personal belongings within reach  - Initiate and maintain comfort rounds  - Make Fall Risk Sign visible to staff  - Offer Toileting every  Hours, in advance of need  - Apply yellow socks and bracelet for high fall risk patients  - Consider moving patient to room near nurses station  Outcome: Progressing

## 2022-03-21 ENCOUNTER — OFFICE VISIT (OUTPATIENT)
Dept: PHYSICAL THERAPY | Facility: CLINIC | Age: 67
End: 2022-03-21
Payer: MEDICARE

## 2022-03-21 ENCOUNTER — HOSPITAL ENCOUNTER (OUTPATIENT)
Dept: INFUSION CENTER | Facility: HOSPITAL | Age: 67
Discharge: HOME | End: 2022-03-21
Payer: MEDICARE

## 2022-03-21 VITALS
DIASTOLIC BLOOD PRESSURE: 76 MMHG | TEMPERATURE: 96.7 F | OXYGEN SATURATION: 98 % | HEART RATE: 83 BPM | SYSTOLIC BLOOD PRESSURE: 158 MMHG | RESPIRATION RATE: 18 BRPM

## 2022-03-21 DIAGNOSIS — S46.811D TRAUMATIC TEAR OF SUPRASPINATUS TENDON OF RIGHT SHOULDER, SUBSEQUENT ENCOUNTER: Primary | ICD-10-CM

## 2022-03-21 PROCEDURE — 96361 HYDRATE IV INFUSION ADD-ON: CPT

## 2022-03-21 PROCEDURE — 97112 NEUROMUSCULAR REEDUCATION: CPT

## 2022-03-21 PROCEDURE — 97110 THERAPEUTIC EXERCISES: CPT

## 2022-03-21 PROCEDURE — 96360 HYDRATION IV INFUSION INIT: CPT

## 2022-03-21 RX ADMIN — SODIUM CHLORIDE 2000 ML: 9 INJECTION, SOLUTION INTRAVENOUS at 09:09

## 2022-03-21 NOTE — PROGRESS NOTES
Daily Note     Today's date: 3/21/2022  Patient name: Santa Sanchez  :   MRN: [id number]  Referring provider: Alejandra Mcmullen  Dx:   Encounter Diagnosis     ICD-10-CM    1  Traumatic tear of supraspinatus tendon of right shoulder, subsequent encounter  S46 184K                   Subjective: Pt states she is a little sore from her HEP  Notes spasm RUT into R cerv area  Pt instr in self UT stretch  Objective: See treatment diary below      Assessment: Tolerated treatment fair  Patient demonstrated fatigue post treatment, exhibited good technique with therapeutic exercises and would benefit from continued PT   Pt req'd freq rests due to pain  Notes pain R bicep tendon area  Pt tired from 6 1/2 hr infusion prior to PT  Plan: Continue per plan of care        Precautions: POTS, severe scoliosis,fibromyalgia,GI issues,lumbar spinal cage, cerv DDD,arachnoiditis  Arachnoid cysts    Re-eval Date:  1st post op appointment    Date 3/18   3/21      Visit Count 1 2      FOTO  comp  3/18       Pain In        Pain Out                Manuals  3/18 3/21      PROM R shld                                Neuro Re-Ed        scap retraction  10x 5"      shld shrugs/rolls  10 ea                                              Ther Ex    3/21      Table slides  Flex, arc 10 ea dbl UEs 10 ea dir      Wand ex  Flex, ER, EXT, IR Abd 10x ea 10x ea      Wall slides Flex dbl  UEs  10 Flex dbl  UEs  10      Pulleys  2 way  2 min flex/scap      UBE        MTPs/LTPs  Red  20x ea      Bicep curls  3 way  2# 10x ea      Prone flex, HA, rows, ext        Ther Activity                        Gait Training                        Modalities        CP to R shld

## 2022-03-21 NOTE — PROGRESS NOTES
Patient tolerated IV hydration without issues  AVS declined  Patient ambulated off unitm without incident  All personal belongings taken with patient

## 2022-03-23 ENCOUNTER — OFFICE VISIT (OUTPATIENT)
Dept: PHYSICAL THERAPY | Facility: CLINIC | Age: 67
End: 2022-03-23
Payer: MEDICARE

## 2022-03-23 ENCOUNTER — HOSPITAL ENCOUNTER (OUTPATIENT)
Dept: INFUSION CENTER | Facility: HOSPITAL | Age: 67
Discharge: HOME | End: 2022-03-23
Payer: MEDICARE

## 2022-03-23 VITALS
DIASTOLIC BLOOD PRESSURE: 82 MMHG | TEMPERATURE: 97.3 F | SYSTOLIC BLOOD PRESSURE: 166 MMHG | HEART RATE: 82 BPM | OXYGEN SATURATION: 97 % | RESPIRATION RATE: 18 BRPM

## 2022-03-23 DIAGNOSIS — S46.811D TRAUMATIC TEAR OF SUPRASPINATUS TENDON OF RIGHT SHOULDER, SUBSEQUENT ENCOUNTER: Primary | ICD-10-CM

## 2022-03-23 PROCEDURE — 97110 THERAPEUTIC EXERCISES: CPT

## 2022-03-23 PROCEDURE — 97140 MANUAL THERAPY 1/> REGIONS: CPT

## 2022-03-23 PROCEDURE — 96361 HYDRATE IV INFUSION ADD-ON: CPT

## 2022-03-23 PROCEDURE — 96360 HYDRATION IV INFUSION INIT: CPT

## 2022-03-23 RX ADMIN — SODIUM CHLORIDE 2000 ML: 0.9 INJECTION, SOLUTION INTRAVENOUS at 09:21

## 2022-03-23 NOTE — PROGRESS NOTES
Pt hydrated as per orders  Peripheral iv discontinued jelco intact   Discharged ambulatory deferring avs

## 2022-03-23 NOTE — PROGRESS NOTES
Daily Note     Today's date: 3/23/2022  Patient name: Remington Floyd  :   MRN: [id number]  Referring provider: Casper Aaron  Dx:   Encounter Diagnosis     ICD-10-CM    1  Traumatic tear of supraspinatus tendon of right shoulder, subsequent encounter  S46 811D        Start Time:   Stop Time: 1720  Total time in clinic (min): 45 minutes    Subjective: "I took a hydrocodone before coming in  I am having more pain and spasms "      Objective: See treatment diary below      Assessment: Tolerated treatment fair  Pain with all movements  Limited PROM in all directions with end range pain  Thoracic spasm noted during bicep curls  Would benefit from continued skilled therapy to address deficits  Patient demonstrated fatigue post treatment and would benefit from continued PT      Plan: Continue per plan of care  Progress treatment as tolerated         Precautions: POTS, severe scoliosis,fibromyalgia,GI issues,lumbar spinal cage, cerv DDD,arachnoiditis  Arachnoid cysts    Re-eval Date:  1st post op appointment    Date 3/18   3/21 3/23     Visit Count 1 2 3     FOTO  comp  3/18       Pain In   8-910     Pain Out   8-9/10             Manuals  3/18 3/21 3/23     PROM R shld   PROM R shld to avail end range 10'                             Neuro Re-Ed        scap retraction  10x 5" 2x10/3-5'     shld shrugs/rolls  10 ea                                              Ther Ex    3/21      Table slides  Flex, arc 10 ea dbl UEs 10 ea dir 10x10"     Wand ex  Flex, ER, EXT, IR Abd 10x ea 10x ea      Wall slides Flex dbl  UEs  10 Flex dbl  UEs  10      Pulleys  2 way  2 min flex/scap 2 min flex/scap     UBE        MTPs/LTPs  Red  20x ea Red  20x ea     Bicep curls  3 way  2# 10x ea 2# 2x10 ea     Prone flex, HA, rows, ext        Ther Activity                        Gait Training                        Modalities        CP to R shld

## 2022-03-24 RX ORDER — SODIUM CHLORIDE 9 MG/ML
333.33 INJECTION, SOLUTION INTRAVENOUS ONCE
Status: COMPLETED | OUTPATIENT
Start: 2022-03-25 | End: 2022-03-25

## 2022-03-25 ENCOUNTER — HOSPITAL ENCOUNTER (OUTPATIENT)
Dept: INFUSION CENTER | Facility: HOSPITAL | Age: 67
Discharge: HOME | End: 2022-03-25
Payer: MEDICARE

## 2022-03-25 VITALS
RESPIRATION RATE: 18 BRPM | SYSTOLIC BLOOD PRESSURE: 177 MMHG | TEMPERATURE: 97.8 F | HEART RATE: 88 BPM | DIASTOLIC BLOOD PRESSURE: 80 MMHG

## 2022-03-25 PROCEDURE — 96361 HYDRATE IV INFUSION ADD-ON: CPT

## 2022-03-25 PROCEDURE — 96360 HYDRATION IV INFUSION INIT: CPT

## 2022-03-25 RX ORDER — SODIUM CHLORIDE 9 MG/ML
333.33 INJECTION, SOLUTION INTRAVENOUS ONCE
Status: COMPLETED | OUTPATIENT
Start: 2022-03-28 | End: 2022-03-28

## 2022-03-25 RX ADMIN — SODIUM CHLORIDE 333.33 ML/HR: 0.9 INJECTION, SOLUTION INTRAVENOUS at 09:13

## 2022-03-28 ENCOUNTER — HOSPITAL ENCOUNTER (OUTPATIENT)
Dept: INFUSION CENTER | Facility: HOSPITAL | Age: 67
Discharge: HOME | End: 2022-03-28
Payer: MEDICARE

## 2022-03-28 ENCOUNTER — OFFICE VISIT (OUTPATIENT)
Dept: PHYSICAL THERAPY | Facility: CLINIC | Age: 67
End: 2022-03-28
Payer: MEDICARE

## 2022-03-28 VITALS
SYSTOLIC BLOOD PRESSURE: 162 MMHG | RESPIRATION RATE: 18 BRPM | OXYGEN SATURATION: 98 % | TEMPERATURE: 98.5 F | DIASTOLIC BLOOD PRESSURE: 84 MMHG | HEART RATE: 76 BPM

## 2022-03-28 DIAGNOSIS — S46.811D TRAUMATIC TEAR OF SUPRASPINATUS TENDON OF RIGHT SHOULDER, SUBSEQUENT ENCOUNTER: Primary | ICD-10-CM

## 2022-03-28 PROCEDURE — 96361 HYDRATE IV INFUSION ADD-ON: CPT

## 2022-03-28 PROCEDURE — 96360 HYDRATION IV INFUSION INIT: CPT

## 2022-03-28 PROCEDURE — 97140 MANUAL THERAPY 1/> REGIONS: CPT

## 2022-03-28 PROCEDURE — 97110 THERAPEUTIC EXERCISES: CPT

## 2022-03-28 RX ADMIN — SODIUM CHLORIDE 333.33 ML/HR: 0.9 INJECTION, SOLUTION INTRAVENOUS at 09:09

## 2022-03-28 NOTE — PROGRESS NOTES
Patient tolerated IV hydration without incident  AVS declined  Patient ambulated off unit without incident  All personal belongings taken with patient

## 2022-03-28 NOTE — PROGRESS NOTES
Daily Note     Today's date: 3/28/2022  Patient name: Alexey Rodriguez  :   MRN: [id number]  Referring provider: Hever Garcia  Dx:   Encounter Diagnosis     ICD-10-CM    1  Traumatic tear of supraspinatus tendon of right shoulder, subsequent encounter  S46 811D                   Subjective: I have surgery 22  Getting home ready for post surgery  R shoulder pain keeps me up at night  Using ice PM      Objective: See treatment diary below      Assessment: Tolerated treatment well  R shoulder PROM mary fairly well pt encourage verbal feedback/submax  Limited all planes R shoulder with full elbow ROM   Patient demonstrated fatigue post treatment and would benefit from continued PT      Plan: Continue per plan of care        Precautions: POTS, severe scoliosis,fibromyalgia,GI issues,lumbar spinal cage, cerv DDD,arachnoiditis  Arachnoid cysts    Re-eval Date:  1st post op appointment    Date 3/18   3/21 3/23 3/28    Visit Count 1 2 3 4    FOTO  comp  3/18       Pain In   8-9/10 8-9    Pain Out   8-9/10 8            Manuals  3/18 3/21 3/23     PROM R shld   PROM R shld to avail end range 10' PROM R shld to avail end range 10'                            Neuro Re-Ed        scap retraction  10x 5" 2x10/3-5' 3x10/3-5'    shld shrugs/rolls  10 ea  10x ea                                             Ther Ex    3/21      Table slides  Flex, arc 10 ea dbl UEs 10 ea dir 10x10" 10x10"    Wand ex  Flex, ER, EXT, IR Abd 10x ea 10x ea  review    Wall slides Flex dbl  UEs  10 Flex dbl  UEs  10  review    Pulleys  2 way  2 min flex/scap 2 min flex/scap 2 min, 3-5 "  Sub max flex/scap    UBE        MTPs/LTPs  Red  20x ea Red  20x ea Red  3x10    Bicep curls  3 way  2# 10x ea 2# 2x10 ea 2# 2x10 ea    Prone flex, HA, rows, ext        Ther Activity                        Gait Training                        Modalities        CP to R shld

## 2022-03-29 RX ORDER — SODIUM CHLORIDE 9 MG/ML
333.33 INJECTION, SOLUTION INTRAVENOUS ONCE
Status: COMPLETED | OUTPATIENT
Start: 2022-03-30 | End: 2022-03-30

## 2022-03-30 ENCOUNTER — OFFICE VISIT (OUTPATIENT)
Dept: PHYSICAL THERAPY | Facility: CLINIC | Age: 67
End: 2022-03-30
Payer: MEDICARE

## 2022-03-30 ENCOUNTER — HOSPITAL ENCOUNTER (OUTPATIENT)
Dept: INFUSION CENTER | Facility: HOSPITAL | Age: 67
Discharge: HOME | End: 2022-03-30
Payer: MEDICARE

## 2022-03-30 VITALS
OXYGEN SATURATION: 98 % | DIASTOLIC BLOOD PRESSURE: 95 MMHG | HEART RATE: 77 BPM | SYSTOLIC BLOOD PRESSURE: 157 MMHG | RESPIRATION RATE: 18 BRPM | TEMPERATURE: 97.6 F

## 2022-03-30 DIAGNOSIS — S46.811D TRAUMATIC TEAR OF SUPRASPINATUS TENDON OF RIGHT SHOULDER, SUBSEQUENT ENCOUNTER: Primary | ICD-10-CM

## 2022-03-30 LAB
ALBUMIN SERPL BCP-MCNC: 4.2 G/DL (ref 3.5–5)
ALP SERPL-CCNC: 99 U/L (ref 34–104)
ALT SERPL W P-5'-P-CCNC: 20 U/L (ref 7–52)
ANION GAP SERPL CALCULATED.3IONS-SCNC: 7 MMOL/L (ref 4–13)
AST SERPL W P-5'-P-CCNC: 22 U/L (ref 13–39)
BASOPHILS # BLD AUTO: 0.07 THOUSANDS/ΜL (ref 0–0.1)
BASOPHILS NFR BLD AUTO: 1 % (ref 0–1)
BILIRUB SERPL-MCNC: 0.37 MG/DL (ref 0.2–1)
BUN SERPL-MCNC: 19 MG/DL (ref 5–25)
CALCIUM SERPL-MCNC: 10 MG/DL (ref 8.4–10.2)
CHLORIDE SERPL-SCNC: 100 MMOL/L (ref 96–108)
CO2 SERPL-SCNC: 28 MMOL/L (ref 21–32)
CREAT SERPL-MCNC: 1.1 MG/DL (ref 0.6–1.3)
EOSINOPHIL # BLD AUTO: 0.16 THOUSAND/ΜL (ref 0–0.61)
EOSINOPHIL NFR BLD AUTO: 2 % (ref 0–6)
ERYTHROCYTE [DISTWIDTH] IN BLOOD BY AUTOMATED COUNT: 12.8 % (ref 11.6–15.1)
GFR SERPL CREATININE-BSD FRML MDRD: 52 ML/MIN/1.73SQ M
GLUCOSE SERPL-MCNC: 100 MG/DL (ref 65–140)
HCT VFR BLD AUTO: 42.3 % (ref 34.8–46.1)
HGB BLD-MCNC: 14 G/DL (ref 11.5–15.4)
IMM GRANULOCYTES # BLD AUTO: 0.02 THOUSAND/UL (ref 0–0.2)
IMM GRANULOCYTES NFR BLD AUTO: 0 % (ref 0–2)
LYMPHOCYTES # BLD AUTO: 2.45 THOUSANDS/ΜL (ref 0.6–4.47)
LYMPHOCYTES NFR BLD AUTO: 32 % (ref 14–44)
MCH RBC QN AUTO: 29.9 PG (ref 26.8–34.3)
MCHC RBC AUTO-ENTMCNC: 33.1 G/DL (ref 31.4–37.4)
MCV RBC AUTO: 90 FL (ref 82–98)
MONOCYTES # BLD AUTO: 0.65 THOUSAND/ΜL (ref 0.17–1.22)
MONOCYTES NFR BLD AUTO: 9 % (ref 4–12)
NEUTROPHILS # BLD AUTO: 4.28 THOUSANDS/ΜL (ref 1.85–7.62)
NEUTS SEG NFR BLD AUTO: 56 % (ref 43–75)
NRBC BLD AUTO-RTO: 0 /100 WBCS
PLATELET # BLD AUTO: 280 THOUSANDS/UL (ref 149–390)
PMV BLD AUTO: 10.1 FL (ref 8.9–12.7)
POTASSIUM SERPL-SCNC: 4.2 MMOL/L (ref 3.5–5.3)
PROT SERPL-MCNC: 6.7 G/DL (ref 6.4–8.4)
RBC # BLD AUTO: 4.69 MILLION/UL (ref 3.81–5.12)
SODIUM SERPL-SCNC: 135 MMOL/L (ref 135–147)
TSH SERPL DL<=0.05 MIU/L-ACNC: 2.34 UIU/ML (ref 0.45–5.33)
WBC # BLD AUTO: 7.63 THOUSAND/UL (ref 4.31–10.16)

## 2022-03-30 PROCEDURE — 96361 HYDRATE IV INFUSION ADD-ON: CPT

## 2022-03-30 PROCEDURE — 97140 MANUAL THERAPY 1/> REGIONS: CPT

## 2022-03-30 PROCEDURE — 96360 HYDRATION IV INFUSION INIT: CPT

## 2022-03-30 PROCEDURE — 80053 COMPREHEN METABOLIC PANEL: CPT | Performed by: INTERNAL MEDICINE

## 2022-03-30 PROCEDURE — 85025 COMPLETE CBC W/AUTO DIFF WBC: CPT | Performed by: INTERNAL MEDICINE

## 2022-03-30 PROCEDURE — 84443 ASSAY THYROID STIM HORMONE: CPT | Performed by: INTERNAL MEDICINE

## 2022-03-30 RX ADMIN — SODIUM CHLORIDE 333.33 ML/HR: 0.9 INJECTION, SOLUTION INTRAVENOUS at 09:52

## 2022-03-30 NOTE — PROGRESS NOTES
Daily Note     Today's date: 3/30/2022  Patient name: Jose Roberto Pritchard  : [birth year]  MRN: [id number]  Referring provider: Caroline Knutson  Dx:   Encounter Diagnosis     ICD-10-CM    1  Traumatic tear of supraspinatus tendon of right shoulder, subsequent encounter  S46 811D        Start Time: 0554  Stop Time: 1630  Total time in clinic (min): 45 minutes    Subjective: "I am over doing it home and not sleeping well  My arm is hurting more  I am trying to get ready for surgery next week  I had to take a hydrocodone before coming in "      Objective: See treatment diary below      Assessment: Tolerated treatment fair  Pt able to complete program despite high pain levels  Limitations with motion 2* pain in all directions at end range  Pt scheduled for surgery 22  Pt has 1 appt next week prior then will return post op  Will continue as able  Patient demonstrated fatigue post treatment and would benefit from continued PT      Plan: Continue per plan of care  Progress treatment as tolerated         Precautions: POTS, severe scoliosis,fibromyalgia,GI issues,lumbar spinal cage, cerv DDD,arachnoiditis  Arachnoid cysts    Re-eval Date:  1st post op appointment    Date 3/18   3/21 3/23 3/28 3/30   Visit Count 1 2 3 4 5   FOTO  comp  3/18       Pain In   8-9/10 8-9 8-9   Pain Out   8-910 8 8-9           Manuals  3/18 3/21 3/23  3/30   PROM R shld   PROM R shld to avail end range 10' PROM R shld to avail end range 10' PROM R shld to avail end range 10'                           Neuro Re-Ed        scap retraction  10x 5" 2x10/3-5' 3x10/3-5' 3x10/3-5'   shld shrugs/rolls  10 ea  10x ea                                             Ther Ex    3/21      Table slides  Flex, arc 10 ea dbl UEs 10 ea dir 10x10" 10x10" 10x10"   Wand ex  Flex, ER, EXT, IR Abd 10x ea 10x ea  review    Wall slides Flex dbl  UEs  10 Flex dbl  UEs  10  review    Pulleys  2 way  2 min flex/scap 2 min flex/scap 2 min, 3-5 "  Sub max flex/scap 2 min, 3-5 "  Sub max flex/scap   UBE        MTPs/LTPs  Red  20x ea Red  20x ea Red  3x10 Red  3x10   Bicep curls  3 way  2# 10x ea 2# 2x10 ea 2# 2x10 ea 2# 2x10 ea   Prone flex, HA, rows, ext        Ther Activity                        Gait Training                        Modalities        ROSITA to CHRISTINE freire

## 2022-03-31 RX ORDER — SODIUM CHLORIDE 9 MG/ML
333.33 INJECTION, SOLUTION INTRAVENOUS ONCE
Status: COMPLETED | OUTPATIENT
Start: 2022-04-01 | End: 2022-04-01

## 2022-04-01 ENCOUNTER — HOSPITAL ENCOUNTER (OUTPATIENT)
Dept: INFUSION CENTER | Facility: HOSPITAL | Age: 67
Discharge: HOME | End: 2022-04-01
Payer: MEDICARE

## 2022-04-01 VITALS
OXYGEN SATURATION: 97 % | TEMPERATURE: 98 F | HEART RATE: 69 BPM | SYSTOLIC BLOOD PRESSURE: 165 MMHG | DIASTOLIC BLOOD PRESSURE: 90 MMHG | RESPIRATION RATE: 18 BRPM

## 2022-04-01 PROCEDURE — 96361 HYDRATE IV INFUSION ADD-ON: CPT

## 2022-04-01 PROCEDURE — 96360 HYDRATION IV INFUSION INIT: CPT

## 2022-04-01 RX ORDER — SODIUM CHLORIDE 9 MG/ML
333.33 INJECTION, SOLUTION INTRAVENOUS ONCE
Status: COMPLETED | OUTPATIENT
Start: 2022-04-04 | End: 2022-04-04

## 2022-04-01 RX ADMIN — SODIUM CHLORIDE 333.33 ML/HR: 0.9 INJECTION, SOLUTION INTRAVENOUS at 09:00

## 2022-04-01 NOTE — PROGRESS NOTES
Patient tolerated IV hydration without issues  AVS declined  Patient ambulated off unit without incident  All personal belongings taken with patient

## 2022-04-04 ENCOUNTER — HOSPITAL ENCOUNTER (OUTPATIENT)
Dept: INFUSION CENTER | Facility: HOSPITAL | Age: 67
Discharge: HOME | End: 2022-04-04
Payer: MEDICARE

## 2022-04-04 ENCOUNTER — OFFICE VISIT (OUTPATIENT)
Dept: PHYSICAL THERAPY | Facility: CLINIC | Age: 67
End: 2022-04-04
Payer: MEDICARE

## 2022-04-04 ENCOUNTER — ANESTHESIA EVENT (OUTPATIENT)
Dept: PERIOP | Facility: AMBULARY SURGERY CENTER | Age: 67
End: 2022-04-04
Payer: MEDICARE

## 2022-04-04 VITALS
SYSTOLIC BLOOD PRESSURE: 159 MMHG | RESPIRATION RATE: 18 BRPM | HEART RATE: 83 BPM | OXYGEN SATURATION: 96 % | DIASTOLIC BLOOD PRESSURE: 86 MMHG | TEMPERATURE: 98.1 F

## 2022-04-04 DIAGNOSIS — S46.811D TRAUMATIC TEAR OF SUPRASPINATUS TENDON OF RIGHT SHOULDER, SUBSEQUENT ENCOUNTER: Primary | ICD-10-CM

## 2022-04-04 PROCEDURE — 96361 HYDRATE IV INFUSION ADD-ON: CPT

## 2022-04-04 PROCEDURE — 97110 THERAPEUTIC EXERCISES: CPT

## 2022-04-04 PROCEDURE — 96360 HYDRATION IV INFUSION INIT: CPT

## 2022-04-04 RX ORDER — OMEGA-3-ACID ETHYL ESTERS 1 G/1
2 CAPSULE, LIQUID FILLED ORAL 2 TIMES DAILY
COMMUNITY

## 2022-04-04 RX ADMIN — SODIUM CHLORIDE 333.33 ML/HR: 0.9 INJECTION, SOLUTION INTRAVENOUS at 08:47

## 2022-04-04 NOTE — PROGRESS NOTES
Pt hydrated over 6 hours as per orders  Peripheral iv discontinued jelco intact  Discharged ambulatory aware of fridays appt

## 2022-04-04 NOTE — PROGRESS NOTES
Daily Note     Today's date: 2022  Patient name: Benito Macias  : [birth year]  MRN: [id number]  Referring provider: Edith Noel  Dx:   Encounter Diagnosis     ICD-10-CM    1  Traumatic tear of supraspinatus tendon of right shoulder, subsequent encounter  S46 044U                   Subjective: Pt states she is scheduled for R rotator cuff repair surgery on 22  Objective: See treatment diary below      Assessment: Tolerated treatment well  Patient exhibited good technique with therapeutic exercises and would benefit from continued PT post surgery for rot cuff repair  Pt educated as to what to expect post surgery and in use of ice  Pt advised she will have no AROM R shld for 4 weeks  Instructed in use of brace        Plan: Pt will be seen for post-op re-eval on 22    Precautions: POTS, severe scoliosis,fibromyalgia,GI issues,lumbar spinal cage, cerv DDD,arachnoiditis  Arachnoid cysts    Re-eval Date:  1st post op appointment    Date 4/4   3/21 3/23 3/28 3/30   Visit Count 6 2 3 4 5   FOTO  comp  3/18       Pain In   8-910 8-9 8-9   Pain Out   8-9/10 8 8-9           Manuals  4/4 3/21 3/23  3/30   PROM R shld   PROM R shld to avail end range 10' PROM R shld to avail end range 10' PROM R shld to avail end range 10'                           Neuro Re-Ed        scap retraction 3 x 10/5" 10x 5" 2x10/3-5' 3x10/3-5' 3x10/3-5'   shld shrugs/rolls 20 10 ea  10x ea                                             Ther Ex 4/4   3/21      Table slides  Flex, arc 10 ea dbl UEs 10 ea dir 10x10" 10x10" 10x10"   Wand ex  Flex, ER, EXT, IR Abd 20x ea 10x ea  review    Wall slides Flex dbl UE   10x Flex dbl  UEs  10  review    Pulleys  2 way 2 min, 3-5 "  Sub max flex/scap 2 min flex/scap 2 min flex/scap 2 min, 3-5 "  Sub max flex/scap 2 min, 3-5 "  Sub max flex/scap    rpm 8' alt       MTPs/LTPs Red 30x ea Red  20x ea Red  20x ea Red  3x10 Red  3x10   Bicep curls  3 way 2#  30x 2# 10x ea 2# 2x10 ea 2# 2x10 ea 2# 2x10 ea   Prone flex, HA, rows, ext        Ther Activity                        Gait Training                        Modalities        CP to R shld

## 2022-04-04 NOTE — PRE-PROCEDURE INSTRUCTIONS
Pre-Surgery Instructions:   Medication Instructions    Alum Hydroxide-Mag Trisilicate (Gaviscon) 88-57 7 MG CHEW Do not take DOS    azelastine (ASTELIN) 0 1 % nasal spray Use PRN    beclomethasone (QVAR) 40 MCG/ACT inhaler Use PRN    budesonide (PULMICORT) 0 5 mg/2 mL nebulizer solution Use PRN    cholecalciferol (VITAMIN D3) 1,000 units tablet Hold starting 4/4    cyclobenzaprine (FLEXERIL) 5 mg tablet Use PRN, including DOS with sips of H20    docusate sodium (COLACE) 100 mg capsule Do not take DOS    famotidine (PEPCID) 10 mg tablet Take, including DOS with sips of H20    fexofenadine (ALLEGRA) 180 MG tablet Take including DOS with sips of H20    fluticasone (FLONASE) 50 mcg/act nasal spray Use PRN    Galcanezumab-gnlm (Emgality) 120 MG/ML SOAJ Weekly    HYDROcodone-acetaminophen (NORCO) 5-325 mg per tablet Use PRN, including DOS with sips of H20    hydrOXYzine (ATARAX) 10 mg/5 mL syrup Use PRN, including DOS with sips of H20    ipratropium (ATROVENT) 0 03 % nasal spray Use PRN    ivabradine HCl (Corlanor) 5 MG tablet Needs to take with food- instructed to wait until after surgery    levalbuterol (XOPENEX HFA) 45 mcg/act inhaler Use PRN    levalbuterol (XOPENEX) 1 25 mg/3 mL nebulizer solution Use PRN    Magnesium 400 MG CAPS Last dose 4/4    midodrine (PROAMATINE) 5 mg tablet Take, including DOS with sips of H20    MULTIPLE VITAMINS-CALCIUM PO Last dose 4/4    omega-3-acid ethyl esters (LOVAZA) 1 g capsule Per pt has been holding since 4/1    polyethylene glycol (MIRALAX) 17 g packet Do not take DOS    Probiotic Product (PROBIOTIC DAILY PO) Do not take DOS    psyllium (METAMUCIL) 0 52 g capsule Do not take DOS    Thyroid, Porcine, POWD Capsules - take DOS with sips of H20    Ubrogepant (Ubrelvy) 100 MG tablet Use PRN, including sips   Spoke with pt via phone, COVID screening negative       Advised hospital location will call with arrival time night before surgery and NPO after midnight night before  Advised one vaccinated visitor is allowed to accompany pt to wait during the procedure  Instructed to leave contacts/jewelery/valuables at home  Instructed to avoid all ASA and OTC Vit/Supp 1 week prior to surgery and to avoid NSAIDs 3 days prior to surgery per anesthesia instructions  Tylenol ok to take prn  Reviewed above medication instructions and showering instructions  Patient verbalized understanding of all of the above

## 2022-04-06 ENCOUNTER — ANESTHESIA (OUTPATIENT)
Dept: PERIOP | Facility: AMBULARY SURGERY CENTER | Age: 67
End: 2022-04-06
Payer: MEDICARE

## 2022-04-06 ENCOUNTER — HOSPITAL ENCOUNTER (OUTPATIENT)
Facility: AMBULARY SURGERY CENTER | Age: 67
Setting detail: OUTPATIENT SURGERY
Discharge: HOME | End: 2022-04-06
Attending: ORTHOPAEDIC SURGERY | Admitting: ORTHOPAEDIC SURGERY
Payer: MEDICARE

## 2022-04-06 VITALS
RESPIRATION RATE: 18 BRPM | OXYGEN SATURATION: 97 % | BODY MASS INDEX: 22.28 KG/M2 | TEMPERATURE: 97 F | SYSTOLIC BLOOD PRESSURE: 150 MMHG | DIASTOLIC BLOOD PRESSURE: 72 MMHG | HEART RATE: 62 BPM | HEIGHT: 61 IN | WEIGHT: 118 LBS

## 2022-04-06 DIAGNOSIS — S46.811D TRAUMATIC TEAR OF SUPRASPINATUS TENDON OF RIGHT SHOULDER, SUBSEQUENT ENCOUNTER: Primary | ICD-10-CM

## 2022-04-06 PROCEDURE — 29823 SHO ARTHRS SRG XTNSV DBRDMT: CPT | Performed by: ORTHOPAEDIC SURGERY

## 2022-04-06 PROCEDURE — C9290 INJ, BUPIVACAINE LIPOSOME: HCPCS | Performed by: ANESTHESIOLOGY

## 2022-04-06 PROCEDURE — 29827 SHO ARTHRS SRG RT8TR CUF RPR: CPT | Performed by: ORTHOPAEDIC SURGERY

## 2022-04-06 PROCEDURE — C1713 ANCHOR/SCREW BN/BN,TIS/BN: HCPCS | Performed by: ORTHOPAEDIC SURGERY

## 2022-04-06 PROCEDURE — 29828 SHO ARTHRS SRG BICP TENODSIS: CPT | Performed by: ORTHOPAEDIC SURGERY

## 2022-04-06 PROCEDURE — 29827 SHO ARTHRS SRG RT8TR CUF RPR: CPT | Performed by: PHYSICIAN ASSISTANT

## 2022-04-06 PROCEDURE — 29828 SHO ARTHRS SRG BICP TENODSIS: CPT | Performed by: PHYSICIAN ASSISTANT

## 2022-04-06 PROCEDURE — 29823 SHO ARTHRS SRG XTNSV DBRDMT: CPT | Performed by: PHYSICIAN ASSISTANT

## 2022-04-06 DEVICE — ANCHOR SUT 4.75 X 22MM SWIVELOCK C DBL LOADED: Type: IMPLANTABLE DEVICE | Site: SHOULDER | Status: FUNCTIONAL

## 2022-04-06 DEVICE — ANCHOR SUT 4.75 X 19.1MM W/CLD EYELET SWIVELOCK STRL: Type: IMPLANTABLE DEVICE | Site: SHOULDER | Status: FUNCTIONAL

## 2022-04-06 RX ORDER — PROPOFOL 10 MG/ML
INJECTION, EMULSION INTRAVENOUS CONTINUOUS PRN
Status: DISCONTINUED | OUTPATIENT
Start: 2022-04-06 | End: 2022-04-06

## 2022-04-06 RX ORDER — MIDAZOLAM HYDROCHLORIDE 2 MG/2ML
INJECTION, SOLUTION INTRAMUSCULAR; INTRAVENOUS AS NEEDED
Status: DISCONTINUED | OUTPATIENT
Start: 2022-04-06 | End: 2022-04-06

## 2022-04-06 RX ORDER — OXYCODONE HYDROCHLORIDE 5 MG/1
5 TABLET ORAL EVERY 4 HOURS PRN
Status: DISCONTINUED | OUTPATIENT
Start: 2022-04-06 | End: 2022-04-06 | Stop reason: HOSPADM

## 2022-04-06 RX ORDER — ACETAMINOPHEN 325 MG/1
650 TABLET ORAL EVERY 6 HOURS PRN
Status: DISCONTINUED | OUTPATIENT
Start: 2022-04-06 | End: 2022-04-06 | Stop reason: HOSPADM

## 2022-04-06 RX ORDER — SODIUM CHLORIDE, SODIUM LACTATE, POTASSIUM CHLORIDE, CALCIUM CHLORIDE 600; 310; 30; 20 MG/100ML; MG/100ML; MG/100ML; MG/100ML
125 INJECTION, SOLUTION INTRAVENOUS CONTINUOUS
Status: DISCONTINUED | OUTPATIENT
Start: 2022-04-06 | End: 2022-04-06 | Stop reason: HOSPADM

## 2022-04-06 RX ORDER — ONDANSETRON 2 MG/ML
4 INJECTION INTRAMUSCULAR; INTRAVENOUS ONCE AS NEEDED
Status: DISCONTINUED | OUTPATIENT
Start: 2022-04-06 | End: 2022-04-06 | Stop reason: HOSPADM

## 2022-04-06 RX ORDER — OXYCODONE HYDROCHLORIDE 5 MG/1
TABLET ORAL
Qty: 13 TABLET | Refills: 0 | Status: SHIPPED | OUTPATIENT
Start: 2022-04-06

## 2022-04-06 RX ORDER — LIDOCAINE HYDROCHLORIDE 10 MG/ML
0.5 INJECTION, SOLUTION EPIDURAL; INFILTRATION; INTRACAUDAL; PERINEURAL ONCE AS NEEDED
Status: DISCONTINUED | OUTPATIENT
Start: 2022-04-06 | End: 2022-04-06 | Stop reason: HOSPADM

## 2022-04-06 RX ORDER — ONDANSETRON 2 MG/ML
4 INJECTION INTRAMUSCULAR; INTRAVENOUS EVERY 6 HOURS PRN
Status: DISCONTINUED | OUTPATIENT
Start: 2022-04-06 | End: 2022-04-06 | Stop reason: HOSPADM

## 2022-04-06 RX ORDER — CHLORHEXIDINE GLUCONATE 0.12 MG/ML
15 RINSE ORAL ONCE
Status: DISCONTINUED | OUTPATIENT
Start: 2022-04-06 | End: 2022-04-06 | Stop reason: HOSPADM

## 2022-04-06 RX ORDER — LIDOCAINE HYDROCHLORIDE 10 MG/ML
INJECTION, SOLUTION EPIDURAL; INFILTRATION; INTRACAUDAL; PERINEURAL
Status: COMPLETED | OUTPATIENT
Start: 2022-04-06 | End: 2022-04-06

## 2022-04-06 RX ORDER — CEFAZOLIN SODIUM 2 G/50ML
2000 SOLUTION INTRAVENOUS ONCE
Status: COMPLETED | OUTPATIENT
Start: 2022-04-06 | End: 2022-04-06

## 2022-04-06 RX ORDER — LIDOCAINE HYDROCHLORIDE 10 MG/ML
INJECTION, SOLUTION EPIDURAL; INFILTRATION; INTRACAUDAL; PERINEURAL AS NEEDED
Status: DISCONTINUED | OUTPATIENT
Start: 2022-04-06 | End: 2022-04-06

## 2022-04-06 RX ORDER — BUPIVACAINE HYDROCHLORIDE 5 MG/ML
INJECTION, SOLUTION PERINEURAL
Status: COMPLETED | OUTPATIENT
Start: 2022-04-06 | End: 2022-04-06

## 2022-04-06 RX ORDER — DEXAMETHASONE SODIUM PHOSPHATE 10 MG/ML
INJECTION, SOLUTION INTRAMUSCULAR; INTRAVENOUS AS NEEDED
Status: DISCONTINUED | OUTPATIENT
Start: 2022-04-06 | End: 2022-04-06

## 2022-04-06 RX ORDER — PROPOFOL 10 MG/ML
INJECTION, EMULSION INTRAVENOUS AS NEEDED
Status: DISCONTINUED | OUTPATIENT
Start: 2022-04-06 | End: 2022-04-06

## 2022-04-06 RX ORDER — FENTANYL CITRATE/PF 50 MCG/ML
25 SYRINGE (ML) INJECTION
Status: DISCONTINUED | OUTPATIENT
Start: 2022-04-06 | End: 2022-04-06 | Stop reason: HOSPADM

## 2022-04-06 RX ADMIN — BUPIVACAINE 20 ML: 13.3 INJECTION, SUSPENSION, LIPOSOMAL INFILTRATION at 10:49

## 2022-04-06 RX ADMIN — MIDAZOLAM HYDROCHLORIDE 2 MG: 1 INJECTION, SOLUTION INTRAMUSCULAR; INTRAVENOUS at 10:49

## 2022-04-06 RX ADMIN — PROPOFOL 160 MG: 10 INJECTION, EMULSION INTRAVENOUS at 11:01

## 2022-04-06 RX ADMIN — SODIUM CHLORIDE, SODIUM LACTATE, POTASSIUM CHLORIDE, AND CALCIUM CHLORIDE: .6; .31; .03; .02 INJECTION, SOLUTION INTRAVENOUS at 10:39

## 2022-04-06 RX ADMIN — LIDOCAINE HYDROCHLORIDE 50 MG: 10 INJECTION, SOLUTION EPIDURAL; INFILTRATION; INTRACAUDAL; PERINEURAL at 11:01

## 2022-04-06 RX ADMIN — DEXAMETHASONE SODIUM PHOSPHATE 10 MG: 10 INJECTION, SOLUTION INTRAMUSCULAR; INTRAVENOUS at 11:01

## 2022-04-06 RX ADMIN — PROPOFOL 120 MCG/KG/MIN: 10 INJECTION, EMULSION INTRAVENOUS at 11:08

## 2022-04-06 RX ADMIN — LIDOCAINE HYDROCHLORIDE 2 ML: 10 INJECTION, SOLUTION EPIDURAL; INFILTRATION; INTRACAUDAL at 10:49

## 2022-04-06 RX ADMIN — BUPIVACAINE HYDROCHLORIDE 5 ML: 5 INJECTION, SOLUTION PERINEURAL at 10:49

## 2022-04-06 RX ADMIN — CEFAZOLIN SODIUM 2000 MG: 2 SOLUTION INTRAVENOUS at 10:56

## 2022-04-06 NOTE — ANESTHESIA POSTPROCEDURE EVALUATION
Patient educated on the use of peanut ball, birthing ball, frequent position changes and ambulation to help progress labor. Patient declines at this time. RN will continue to monitor.    Post-Op Assessment Note    CV Status:  Stable  Pain Score: 0    Pain management: adequate     Mental Status:  Awake and alert   Hydration Status:  Stable   PONV Controlled:  None   Airway Patency:  Patent and adequate      Post Op Vitals Reviewed: Yes      Staff: CRNA, Anesthesiologist         No complications documented      BP   165/75   Temp  97 8   Pulse  60   Resp   16   SpO2   100%

## 2022-04-06 NOTE — OP NOTE
OPERATIVE REPORT  PATIENT NAME: Katt Hanks    :  [birth year]  MRN: [id number]  Pt Location: [facility] OR ROOM 06    SURGERY DATE: 2022     SURGEON: Toro Onofre MD     ASSISTANT: Lidia Zavala PA-C     NOTE: Lidia Zavala PA-C was present throughout the entire procedure and performed essential assistance with patient prepping, draping, positioning, suture management, wound closure, sterile dressing application and sling application, all under my direct supervision  NOTE: No qualified resident physician was available for assistance    PREOPERATIVE DIAGNOSIS:  Right Shoulder Supraspinatus Tear and Long Head Biceps Tendon Partial Tear    POSTOPERATIVE DIAGNOSIS: Same plus Early Glenohumeral DJD    PROCEDURES: Surgical Arthroscopy Right Shoulder with Rotator Cuff Repair, Long Head Biceps Tenodesis and Extensive Debridement    ANESTHESIA STAFF: Radha Diaz MD     ANESTHESIA TYPE: General LMA with ultrasound guided interscalene block (Exparel)  The interscalene block was provided by the anesthesia staff per my request for postoperative pain control and to decrease the use of postoperative narcotic medication for pain control  COMPLICATIONS: None    FINDINGS: Supraspinatus Tear, Long Head Biceps Tendon Partial Tear and Early Glenohumeral Osteoarthritis    SPECIMEN(S):  None    ESTIMATED BLOOD LOSS: Minimal    INDICATION:  Briefly, the patient is a 78 y o   female with right shoulder pain  MRI scan confirmed a full thickness supraspinatus tear with an associated long-head biceps tendon partial tear  The patient elected for arthroscopic treatment  Informed consent was obtained after a thorough discussion of the risks and benefits of the procedure, as well as alternatives to the procedure  OPERATIVE TECHNIQUE:  On the day of surgery, I identified the patients right shoulder and marked it with my initials   The patient was taken to the operating room where anesthesia was induced and 2 grams of IV Cefazolin were given  The patient was examined in the supine position and was found to have full range of motion of the right shoulder with no instability   The patient was then positioned in the [address] [place] [address] chair position  All bony prominences were padded  The shoulder was prepped and draped in normal sterile fashion  After a time-out for safety, a standard posterolateral arthroscopic portal was made  Glenohumeral evaluation revealed early degenerative changes humeral head and glenoid with no full-thickness cartilage loss and no loose bodies  There was a full thickness, complete supraspinatus tear with an associated long-head biceps tendon partial tear  There was superior border fraying of the subscapularis but was not detached from lesser tuberosity and the infraspinatus was intact  The supraspinatus tear did have a reverse L type configuration with the short limb posterior  The circumferential glenoid labrum had reciprocal degenerative changes of the articular cartilage of the glenoid and humeral head  An anterior cannula was established and extensive debridement of the glenohumeral joint including the humeral head articular cartilage, the glenoid articular cartilage, the anterior posterior and superior labrum at the long-head biceps tendon complex were debrided to a stable border without complication  After the intra-articular work was completed, the scope was then placed in the subacromial space through the same portal where a thorough bursectomy was performed  The full thickness supraspinatus tear was found to measure 2 0 cm from anterior to posterior and was able to be easily reduced to the tuberosity consistent with an acute injury    The tuberosity was prepared in routine fashion and a double row suture bridge configuration repair with two medial double loaded 4 75 mm Ar[employer]rex Bio[employer] SwiveLock anchors and one lateral 4 75 mm Arthrex Bio[employer]posite SwiveLock anchor using six stiches through the tendon in horizontal mattress fashion was performed achieving anatomic reduction of the rotator cuff tendon to the tuberosity  The long head of biceps tendon was indicated for tenodesis and it was incorporated into the rotator cuff repair by using the anterior limb of the most anterior anchor through and around the long head of biceps in a loop whipstitch fashion; the long head of biceps was then released  When the medial row of the rotator cuff repair was tied down, this incorporated the long head biceps tenodesis into our repair  The CA ligament was frayed so it was debrided to a stable border but not released as we do not wish to predispose to anterior superior escape  The area was then irrigated  Scope was withdrawn  Wounds were closed with 4-0 Monocryl and Histoacryl  Sterile dressings and a sling with an abduction pillow was placed  The patient was awoken without complication and returned to the recovery room in good condition  We will see the patient back in the office next week to initiate therapy following standard rotator cuff repair rehabilitation protocol  At the end of procedure, the counts were correct       PATIENT DISPOSITION:  Stable to PACU      SIGNATURE: Nighat Zamorano MD  DATE: April 6, 2022  TIME: 11:58 AM

## 2022-04-06 NOTE — ANESTHESIA PROCEDURE NOTES
Peripheral Block    Patient location during procedure: holding area  Start time: 4/6/2022 10:49 AM  Reason for block: at surgeon's request and post-op pain management  Staffing  Performed: Anesthesiologist   Anesthesiologist: Irene Martin MD  Preanesthetic Checklist  Completed: patient identified, IV checked, site marked, risks and benefits discussed, surgical consent, monitors and equipment checked, pre-op evaluation and timeout performed  Peripheral Block  Patient position: supine  Prep: alcohol swabs and site prepped and draped  Patient monitoring: continuous pulse ox, frequent blood pressure checks, cardiac monitor and heart rate  Block type: interscalene  Laterality: right  Injection technique: single-shot  Procedures: ultrasound guided, Ultrasound guidance required for the procedure to increase accuracy and safety of medication placement and decrease risk of complications    Ultrasound permanent image savedbupivacaine (MARCAINE) 0 5 % perineural infiltration, 5 mL  lidocaine (PF) (XYLOCAINE-MPF) 1 % infiltration, 2 mL  Needle  Needle type: Stimuplex   Needle gauge: 22 G  Needle length: 10 cm  Needle localization: ultrasound guidance  Test dose: negative  Assessment  Injection assessment: incremental injection, local visualized surrounding nerve on ultrasound, negative aspiration for CSF, negative aspiration for heme and no paresthesia on injection  Paresthesia pain: none  Heart rate change: no  Slow fractionated injection: yes  Post-procedure:  site cleaned  patient tolerated the procedure well with no immediate complications  Additional Notes  With 20mL exparel Progress Note - PICU   Luan Loving 14 y o  male MRN: [id number]  Unit/Bed#: PICU 332-01 Encounter: [id number]      HPI/24hr events:  Emil Cao became increasingly hypotensive early in the day yesterday so a central venous line was placed and he was initiated on an epinephrine infusion  An arterial line was also placed  His milrinone was uptitrated to 0 75mcg/kg/min  Epinephrine was titrated up for MAPs below 60, and he is currently on 7 5mcg/min  He had a repeat echo which showed improved function compared to prior, normal EF of 60% (though this was while on epi and milrinone)  His troponin is slightly increased from prior this morning  No rhythm issues  He received intermittent lasix dosing with robust urine output response and drops in blood pressure  He received supplemental oxygen intermittently for borderline low sats and tachypnea  Chest xray this morning demonstrates slight increase in pulmonary edema  He has been allowed to PO and is drinking fairly well  LFTs have slighlty uptrended on this morning's labs  Cultures/titers continue to be negative, although he is spiking higher fevers over the past 24 hours  He received IVIG but infusion was stopped approx 15% of the way into it due to high fever and chills  Lactate checked at time of fever/chills and was elevated, however down to normal on this morning's labs  Vitals:    20 1915 20 2030 20 2100   BP:       BP Location:       Pulse: (!) 121 (!) 116 (!) 109 (!) 122   Resp: (!) 39 (!) 41 (!) 41 (!) 28   Temp: (!) 100 3 °F (37 9 °C)   (!) 99 9 °F (37 7 °C)   TempSrc: Oral   Oral   SpO2: 95% 97% 97% 95%   Weight:       Height:         Arterial Line BP: 103/60  Arterial Line MAP (mmHg): 73 mmHg      Temperature:   Temp (24hrs), Av 1 °F (37 8 °C), Min:97 9 °F (36 6 °C), Max:103 2 °F (39 6 °C)    Current: Temperature: (!) 99 9 °F (37 7 °C)    Weights:   IBW: 56 9 kg    Body mass index is 22 96 kg/m²    Weight (last 2 days) Date/Time   Weight    02/23/20 2100       Comment rows:    OBSERV: dozing at 02/23/20 2100    02/23/20 1915       Comment rows:    OBSERV: watching TV at 02/23/20 1915    02/23/20 0500       Comment rows:    OBSERV: sleeping at 02/23/20 0500    02/23/20 0400       Comment rows:    OBSERV: resting at 02/23/20 0400    02/23/20 0300       Comment rows:    OBSERV: sleeping at 02/23/20 0300    02/23/20 0200       Comment rows:    OBSERV: sleeping (restless) at 02/23/20 0200    02/23/20 0100       Comment rows:    OBSERV: sleeping at 02/23/20 0100    02/23/20 0000       Comment rows:    OBSERV: resting at 02/23/20 0000    02/22/20 2300       Comment rows:    OBSERV: sleeping at 02/22/20 2300    02/22/20 2200       Comment rows:    OBSERV: sleeping at 02/22/20 2200    02/21/20 0104   58 8 (129 63)                Physical Exam:    General:  alert, interactive, appears mildly uncomfortable but not in acute distress  Head:  normocephalic, atraumatic  Eyes:  conjunctiva injected, PERRL  Throat:  moist mucous membranes without erythema, exudates or petechiae; two sores visible on gums   Neck:  adenopathy present: multiple, anterior cervical, medium, mobile, tender  Lungs: breathing unlabored, breath sounds diminished at the bases bilaterally  Heart:  Rate:  tachycardic, rhythm regular, normal S1 and S2, no gallop heard, no murmur; 2+ peripheral pulses, cap refill < 2 sec  Abdomen: soft, non-distended, hepatomegaly present - liver edge palpable 3 below costal margin, mildly tender  Neuro:  normal without focal findings  Musculoskeletal:  moves all extremities equally, full range of motion  Skin:  macular non-coalescing rash persists, but more faint than prior        Allergies: No Known Allergies    Medications:   Scheduled Meds:  Current Facility-Administered Medications:  acetaminophen 650 mg Oral Q4H PRN Alyx Scanlon MD    albumin human 25 g Intravenous Q6H Divya Ocasio MD Last Rate: Stopped (02/23/20 1788)   EPINEPHrine 6000 mcg (DOUBLE CONCENTRATION) IV in sodium chloride 0 9% 250 mL 2 94-9 99 mcg/min Intravenous Titrated Rita Linn MD Last Rate: 3 mcg/min (02/23/20 2044)   famotidine 20 mg Intravenous Q12H Rita Linn MD    furosemide 2 94 mg/hr Intravenous Continuous Rita Linn MD Last Rate: 2 94 mg/hr (02/23/20 1146)   sodium fluid builder (brooke)  Intravenous Continuous Gary Dukes MD Last Rate: 3 mL/hr at 02/22/20 1031   heparin lock flush 1 mL Intracatheter Q8H Gary Dukes MD    heparin lock flush 1 mL Intracatheter Q1H PRN Gary Dukes MD    ketorolac 20 mg Intravenous Q6H PRN Gary Dukes MD    [facility]) infusion 0 75 mcg/kg/min Intravenous Continuous Gary Dukes MD Last Rate: 0 75 mcg/kg/min (02/23/20 1357)   potassium chloride 30 mEq Intravenous Once Rita Linn MD Last Rate: 30 mEq (02/23/20 1922)     Continuous Infusions:  EPINEPHrine 6000 mcg (DOUBLE CONCENTRATION) IV in sodium chloride 0 9% 250 mL 2 94-9 99 mcg/min Last Rate: 3 mcg/min (02/23/20 2044)   furosemide 2 94 mg/hr Last Rate: 2 94 mg/hr (02/23/20 1146)   sodium fluid builder (brooke)  Last Rate: 3 mL/hr at 02/22/20 1031   [facility]) infusion 0 75 mcg/kg/min Last Rate: 0 75 mcg/kg/min (02/23/20 1357)     PRN Meds:    acetaminophen 650 mg Q4H PRN   heparin lock flush 1 mL Q1H PRN   ketorolac 20 mg Q6H PRN         Invasive lines and devices:   Invasive Devices     Central Venous Catheter Line            CVC Central Lines 02/22/20 1 day          Peripheral Intravenous Line            Peripheral IV 02/21/20 Right Arm 2 days          Arterial Line            Arterial Line 02/22/20 Radial 1 day                  Non-Invasive/Invasive Ventilation Settings:  Respiratory    Lab Data (Last 4 hours)    None         O2/Vent Data (Last 4 hours)    None                SpO2: SpO2: 95 %      Intake and Outputs:  I/O       02/22 0701 - 02/23 0700 02/23 0701 - 02/24 0700    P  O  450 1020    I V  (mL/kg) 920 9 (15 7) 315 4 (5 4)    IV Piggyback 1300 384  3    Total Intake(mL/kg) 2670 9 (45 4) 1719 7 (29 2)    Urine (mL/kg/hr) 3875 (2 7) 2850 (3 4)    Emesis/NG output 150 400    Stool      Total Output 4025 3250    Net -1354 1 -1530 3              UOP: 2 7cc/kg/hour          Labs:  Results from last 7 days   Lab Units 02/23/20  0533 02/22/20  1622 02/22/20  1044 02/22/20  0558 02/21/20  1653   WBC Thousand/uL 11 44 14 18*  --  6 87 6 79   HEMOGLOBIN g/dL 10 3* 10 5*  --  10 2* 11 5   I STAT HEMOGLOBIN g/dl  --   --  10 9*  --   --    HEMATOCRIT % 30 2 31 4  --  30 4 34 7   HEMATOCRIT, ISTAT %  --   --  32  --   --    PLATELETS Thousands/uL 185 156  --  106* 104*   NEUTROS PCT % 83*  --   --  81* 84*   MONOS PCT % 3*  --   --  2* 1*   MONO PCT %  --  3*  --   --   --     Results from last 7 days   Lab Units 02/23/20  1635 02/23/20  0533 02/22/20  1622 02/22/20  1044 02/22/20  0558 02/21/20  1654   SODIUM mmol/L 138 141 141  --  136 140   POTASSIUM mmol/L 3 1* 2 8* 3 0*  --  4 5 3 3*   CHLORIDE mmol/L 103 108 111*  --  111* 112*   CO2 mmol/L 28 25 17*  --  18* 21   CO2, I-STAT mmol/L  --   --   --  17*  --   --    BUN mg/dL 5 6 8  --  10 12   CREATININE mg/dL 0 46* 0 48* 0 80  --  0 50* 0 50*   CALCIUM mg/dL 8 1* 7 9* 8 0*  --  7 8* 8 5   ALK PHOS U/L  --  151  --   --  164 188   ALT U/L  --  104*  --   --  70 88*   AST U/L  --  115*  --   --  79* 62*   GLUCOSE, ISTAT mg/dl  --   --   --  114  --   --      Results from last 7 days   Lab Units 02/23/20  1635 02/22/20  1622 02/21/20  1654   MAGNESIUM mg/dL 2 4 1 8 1 8     Results from last 7 days   Lab Units 02/23/20  1635 02/22/20  1622 02/21/20  1654   PHOSPHORUS mg/dL 3 3 2 4* 2 4*      Results from last 7 days   Lab Units 02/21/20  0614   INR  1 15   PTT seconds 40*     Results from last 7 days   Lab Units 02/23/20  1635   LACTIC ACID mmol/L 1 1         Micro:  Lab Results   Component Value Date    BLOODCX No Growth at 24 hrs  02/22/2020    BLOODCX No Growth at 24 hrs  02/22/2020    BLOODCX No Growth at 48 hrs  02/21/2020         Imaging:  I have personally reviewed pertinent reports  and I have personally reviewed pertinent films in PACS - Slight increase in cardiomegaly, slight increase in pulmonary edema, increases left lower lung atelectasis      Assessment:  Jannet Lima is a 14year old male with likely viral illness, systemic inflammation, and myocarditis with diminished function, showing improvement (both clinically and on echo) on afterload reduction, inotropic support, and diuresis  End organ perfusion continues to be good with low lactate levels, improving BUN/Cr, normal mentation  LFTs uptrending, possibly due to hypotensive hit, but may also be related to viral process      Plan:                 Neuro:    [ ] Tylenol PRN fever   [ ] NSAIDs PRN pain                 CV:    [ ] Titrate epinephrine ggt for goal MAPs 60-75, ideally wean off if possible    [ ] Echo tomorrow AM   [ ] Continue milrinone, consider uptitrating to 1 if BP tolerates with weaning of epi   [ ] Repeat troponin in AM, BNP later in the week   [ ] Transition from intermittent lasix to lasix ggt to reduce swings in BP                 Resp:    [ ] supplemental oxygen as needed   [ ] repeat CXR in AM                 FEN/GI:   [ ] Monitor electrolytes closely (at least Q8hr), keep optimized to reduce potential for arrhythmia    [ ] Albumin 25% 0 5g/kg Q6hr x4 doses for low albumin   [ ] allow to PO as tolerating, and as long as not increasing to high dose epi    [ ] repeat LFTs in AM                 ID:    [ ] will d/c antibiotics today, per ID recommendations   [ ] continue to follow cultures, viral titers   [ ] monitor fever curve                 Heme:    [ ] thrombocytopenia, likely secondary to viral suppression, now improving; repeat CBC in AM                 Endo:    [ ] check TFTs and cortisol level                            Msk/Skin:    [ ] rash improving, monitor                 Disposition: PICU      Counseling / Coordination of Care  Time spent with patient 120 minutes   Total Critical Care time spent 75 minutes excluding procedures, teaching and family updates  I have seen and examined this patient   My note adresses my time spent in assessment of the patient's clinical condition, my treatment plan and medical decision making and my presence, activity, and involvement with this patient throughout the day    Code Status: Level 1 - Full Code        Vazquez Lee MD

## 2022-04-06 NOTE — DISCHARGE INSTRUCTIONS
You are being scheduled for a shoulder arthroscopy to treat your symptoms  Below are some instructions and information on what to expect before and after your surgery  Pre-Surgical Preparation for Arthroscopic Shoulder Surgery: You will be contacted the evening prior to your surgery to confirm the scheduled time of the procedure and when to arrive at the hospital    Do not eat or drink anything after midnight the night before your surgery  Since you are having out-patient surgery, make sure that you have someone who can drive you home later in the day  Also, prepare that person for a long day, as the process of safely preparing for and recovering from the procedure is more time consuming than the actual procedure! As you will be in a sling after surgery, please wear or bring a loose fitting button-down shirt so that you can easily place this over the sling when you leave the surgical suite  This avoids having to place the operative arm in a sleeve  Most patients find that this is the easiest outfit to wear for the first week or so after surgery so you may want to plan accordingly  Most patients find that lying down in bed after shoulder surgery accentuates their discomfort  This is likely related to the effect of gravity on the swelling in the shoulder  As a result, most patients sleep better in a recliner or in bed with pillows propped up behind their back for the first few days or weeks after surgery  It is a good idea to plan for this ahead of time so there will be less hassle getting things set up the night after surgery  What to Expect After Arthroscopic Shoulder Surgery: It is normal to have swelling and discomfort in the shoulder for several days or a week after surgery  It is also normal to have a small amount of drainage from the surgical wounds (especially the first few days after surgery), as we put fluid into the shoulder to visualize the structures during surgery  It is NOT normal to have foul smelling, purulent drainage and if this is noted, please contact the office immediately or proceed to the emergency room for evaluation as this may indicate an infection  Applying ice bags to the shoulder may help with pain that is not controlled by the regional block  Ice should be applied 20-30 minutes at a time, every hour or two  Make sure to put a thin towel or T-shirt next to your skin to avoid direct contact of the ice with the skin  Icing is most helpful in the first 48 hours, although many people find that continuing past this time frame lessens their postoperative pain  Please note that your post-operative dressing is not conductive to ice, so if you need to, it is okay to remove that dressing even the night after surgery and place band-aids over the wounds in order for the ice to take effect  Pain Control    Most patients will receive a nerve block, the local anesthetic may keep your whole arm numb for up to 4 days  You will be given a prescription for narcotic pain medication when you are discharged from the hospital   With the newer nerve block that is being utilized, patients are rarely requiring the use of this narcotic pain medication  If you find you do not tolerate that type of pain medicine well, call our office and we will try another one  In addition to the narcotic pain medication, it is safe to use an anti-inflammatory (unless the patient has a medical condition that would not allow safe use of this mediation)  This includes the Advil, Motrin, Ibuprofen and Alleve category of medications  Simply follow the over the counter dosing on the package and use as indicated as another adjunct  Importantly since these medications are all very similar, use only one of them  Tylenol is a separate medication that can be utilized as well and can be taken at the same time as the other medication or given in a "staggered" manner    Just make sure that you follow the dosing on the over the counter bottle instructions  Also make sure that the pain medication prescribed by Dr Dwayne aMgana team does not contain acetaminophen (this is found in Percocet and Vicodin)  Typically we do not prescribe those types of pain medications but if for some reason that has been prescribed DO NOT add more Tylenol (acetaminophen) as you could end up taking too much of that medication  As mentioned above, most patients find that lying down accentuates their discomfort  You might sleep better in a recliner, or propped up in bed  Dr Jermaine Dunne encourages patients to safely ambulate around the house as much as possible in the first few days after the procedure as this can help with blood circulation in the legs  While the incidence of blood clots is very rare following shoulder surgery, early ambulation is a great way to help decrease the already low rate  24 hours after the surgery you may remove the bandage and cover incisions with Band-Aids if needed  At that time you may shower, the wounds will have a surgical glue that will protect them from shower water but do not submerge your incisions directly (bathing or swimming) until at least 2 weeks post-operatively  It is safe to let the arm hang at the side and take a shower and put on a shirt without the sling on  Just make sure that you do not use the operative are to reach out and grab anything as that may damage the repair  When you are done showering and getting dressed please return the operative arm to the sling  Unless noted otherwise in your discharge paperwork, Dr Jermaine Dunne uses absorbable sutures so they do not need to be removed  Dr John Fine physician assistant (PA) will see you in the office a few days after the procedure to review the intra-operative findings and to initiate physical therapy if appropriate    A post-operative appointment should have been scheduled for you already, but if for some reason this did not happen, please call the office to make one  Physical therapy is important after nearly all shoulder surgeries and a detailed rehabilitation plan based on the specific intra-operative findings and procedures will be provided to your therapist at the first post-operative office visit  Most patients have post-operative therapy appointments scheduled pre-operatively, but if you do not, that will be handled at the first post-operative office visit  Unless expressly directed otherwise it is safe to remove the sling even the first day after the surgery and let the arm hang by the side  This allows patients to shower and even put a shirt on (bad arm in the sleeve first)  It is also safe to flex and extend their wrist, hand and fingers as much as possible when the block wears off  These simple motions can serve to pump fluid out of the forearm and decrease swelling in the arm

## 2022-04-06 NOTE — INTERVAL H&P NOTE
H&P reviewed  After examining the patient I find no changes in the patients condition since the H&P had been written      Vitals:    04/06/22 1010   BP: 168/78   Pulse: 72   Resp: 18   Temp: (!) 97 3 °F (36 3 °C)   SpO2: 99%

## 2022-04-06 NOTE — ANESTHESIA PREPROCEDURE EVALUATION
Procedure:  SHOULDER ARTHROSCOPIC ROTATOR CUFF REPAIR (Right Shoulder)    Relevant Problems   CARDIO   (+) LBBB (left bundle branch block)      ENDO   (+) Hypothyroidism      GI/HEPATIC   (+) Gastroesophageal reflux disease without esophagitis      MUSCULOSKELETAL   (+) Fibromyalgia      NEURO/PSYCH   (+) Anxiety and depression   (+) Chronic pain disorder   (+) Fibromyalgia      PULMONARY   (+) Moderate persistent asthma      Other   (+) Factor V Leiden mutation [facility])        Physical Exam    Airway    Mallampati score: I  TM Distance: >3 FB  Neck ROM: full     Dental   No notable dental hx     Cardiovascular  Cardiovascular exam normal    Pulmonary  Pulmonary exam normal     Other Findings        Anesthesia Plan  ASA Score- 2     Anesthesia Type- general with ASA Monitors  Additional Monitors:   Airway Plan: LMA  Comment: Interscalene nerve block with exparel  Plan Factors-    Chart reviewed  Patient summary reviewed  Induction- intravenous  Postoperative Plan-     Informed Consent- Anesthetic plan and risks discussed with patient  I personally reviewed this patient with the CRNA  Discussed and agreed on the Anesthesia Plan with the CRNA  Naif Haywood

## 2022-04-07 NOTE — PROGRESS NOTES
PT Re-Evaluation     Today's date: 22  Patient name: Lieutenant Vargas  : [id number]  MRN: [id number]  Referring provider: Guille Alejandre  Dx:   Encounter Diagnosis     ICD-10-CM    1  Traumatic tear of supraspinatus tendon of right shoulder, subsequent encounter  S46 811D    2  S/P right rotator cuff repair  Z98 [id number]                   Assessment  Assessment details: Lieutenant Vargas is a 78 y o  female presenting to outpatient physical therapy with noted impairments including pain, impaired soft tissue mobility, reduced range of motion, reduced strength, reduced postural awareness, and reduced activity tolerance  Signs and symptoms at present are consistent with referring diagnosis of R shld rot cuff repairon 22  Due to noted impairments, the patient's present functional limitations include difficulty with ADLs with increased need for assistance, reliance on medication and/or modalities for pain relief, reduced tolerance for functional mobility and activity, and difficulty completing HH and self care responsibilities  Patient to benefit from skilled outpatient physical therapy 2x/week for  12 weeks in order to reduce pain, maximize pain free range of motion, increase strength and stability, and improve functional mobility/functional activity in order to maximize return to prior level of function with reduced limitations  Home exercise program was provided and all questions answered to patient's level of satisfaction  Thank you for your referral         Impairments: abnormal or restricted ROM, abnormal movement, activity intolerance, impaired physical strength and lacks appropriate home exercise program  Barriers to therapy: Multiple comorbidities including POTS, severe scoliosis-wears brace  Understanding of Dx/Px/POC: good   Prognosis: good    Goals  STGs to be achieved in 4 weeks:  1   Pt to demonstrate reduced subjective pain rating "at worst" by at least 2-3 points from Initial Eval in order to allow for reduced pain with ADLs and improved functional activity tolerance  2  Pt to demonstrate improved PROM of R shld by 20* in order to maximize joint mobility and function and allow for progression of exercise program and achievement of goals  3  Pt to improve wrist and elbow strength to 5/5 in order to improve function RUE  LTGs to be achieved in 6-8 weeks:  1  Pt will be I with HEP in order to continue to improve quality of life and independence and reduce risk for re-injury  2  Pt to demonstrate return to [facility] chores and self care without limitations or restrictions  3  Pt to demonstrate improved function as noted by achieving or exceeding predicted score on FOTO outcomes assessment tool  4     Pt to demonstrate increased AROM of R shld by at least 5-10 degrees in order to allow for greater ease and independence with ADLs and functional mobility  5   Pt to demonstrate increased MMT of RUE by at least 1/2-1 grade in order to improve safety and stability with ADLs and functional mobility  Plan  Plan details: Pt to complete HEP 3x/day  HEP consists of all ex on flow chart for this date  Pt demonstrated good understanding of same    Patient would benefit from: skilled physical therapy  Planned modality interventions: cryotherapy  Other planned modality interventions: Modalities prn for symptom management  Planned therapy interventions: manual therapy, neuromuscular re-education, therapeutic activities, therapeutic exercise, strengthening, stretching and home exercise program  Frequency: 2x week  Duration in weeks: 12  Plan of Care beginning date: 4/8/2022  Plan of Care expiration date: 7/1/2022  Treatment plan discussed with: PTA and patient        Subjective Evaluation    History of Present Illness  Mechanism of injury: trauma  Mechanism of injury: Pt tore her R supraspinatus tendon completely with a partial tear of her bicep in Feb   Is scheduled for surgery 4/6/22 4/8/22 UPDATE: Pt underwent R rot guff repair 22  Pt notes nerve block wore off yesterday  Pt removed surgical dressing , cleaned up the shld and applied band aids to incision sites  Phys told pt after surgery her arthritis is much worse than the xrays showed, and he should have done a total shoulder  Pt having R shld pain rated 8/10 at present, arm in sling and swathe  Pt having pain from shld up into L cerv area  Has been applying ice to shld  Having R UT spasms  Not a recurrent problem   Quality of life: fair    Pain  Current pain ratin  At best pain ratin  At worst pain rating: 10  Quality: dull ache, sharp, tight and radiating  Relieving factors: medications, ice, support and rest  Aggravating factors: overhead activity and lifting (any movement, driving)  Progression: worsening    Social Support  Lives in: apartment  Lives with: adult children    Employment status: not working  Hand dominance: left      Diagnostic Tests  MRI studies: abnormal  Treatments  Current treatment: physical therapy  Patient Goals  Patient goals for therapy: decreased pain, increased motion, increased strength and independence with ADLs/IADLs  Patient goal: be able to drive,        Objective     Postural Observations  Seated posture: good  Standing posture: fair    Additional Postural Observation Details  Severe scoliosis    Observations     Right Shoulder  Positive for edema and incision  Negative for drainage       Cervical/Thoracic Screen   Cervical range of motion within normal limits with the following exceptions: Pt has cerv DDD and receives injections for same, limited ROM  R UT spasms and R cerv pain  W/brace on    Thoracic range of motion within normal limits with the following exceptions: Pt has severe scoliosis and wears brace, not a surgical candidate due to osteoporosis    Active Range of Motion     Right Shoulder   Flexion: 50 degrees   Extension: 31 degrees   Abduction: 55 degrees   External rotation 45°: 57 degrees Internal rotation 45°: 58 degrees     Additional Active Range of Motion Details  Measurements are pre-surgery  NO AROM allowed at this time as per protocol    Strength/Myotome Testing     Right Shoulder     Planes of Motion   Flexion: 2   Extension: 3   Abduction: 2   External rotation at 45°: 2+   Internal rotation at 45°: 3-     Additional Strength Details  Strength assessments are pre-surgical, pt not allowed AROM or MMT at this time as per protocol  Will re-assess when appropriate    Tests     Right Shoulder   Negative drop arm  Precautions: POTS, severe scoliosis,fibromyalgia,GI issues,lumbar spinal cage, cerv DDD,arachnoiditis  Arachnoid cysts    Re-eval Date:  5/6/22    Date   4/8 RE       Visit Count  8       FOTO Comp 4/8       Pain In 8       Pain Out                Manuals 4/8/22       PROM R SHLD                                Neuro Re-Ed        scap retraction                                                        Ther Ex        Wrist AROM  30 ea       Elbow ROM AAROM 30x        strengthening Fisting 30x       cerv AROM 10x ea dir       Pendulum ex  20 ea                               Table slides  Flex, arc        Wand ex  Flex, ER, EXT, IR Abd        Wall slides        Pulleys  3 way        UBE        MTPs/LTPs        Bicep curls  3 way        Prone flex, HA, rows, ext        Ther Activity                        Gait Training                        Modalities        CP to R shld                 * Pt instr in HEP consisting of documented ex on flow chart of 3/18/22  Demonstrated good understanding

## 2022-04-08 ENCOUNTER — OFFICE VISIT (OUTPATIENT)
Dept: PHYSICAL THERAPY | Facility: CLINIC | Age: 67
End: 2022-04-08
Payer: MEDICARE

## 2022-04-08 ENCOUNTER — HOSPITAL ENCOUNTER (OUTPATIENT)
Dept: INFUSION CENTER | Facility: HOSPITAL | Age: 67
Discharge: HOME | End: 2022-04-08
Payer: MEDICARE

## 2022-04-08 VITALS
DIASTOLIC BLOOD PRESSURE: 72 MMHG | HEART RATE: 67 BPM | SYSTOLIC BLOOD PRESSURE: 171 MMHG | OXYGEN SATURATION: 99 % | RESPIRATION RATE: 18 BRPM | TEMPERATURE: 97.9 F

## 2022-04-08 DIAGNOSIS — S46.811D TRAUMATIC TEAR OF SUPRASPINATUS TENDON OF RIGHT SHOULDER, SUBSEQUENT ENCOUNTER: Primary | ICD-10-CM

## 2022-04-08 DIAGNOSIS — Z98.890 S/P RIGHT ROTATOR CUFF REPAIR: ICD-10-CM

## 2022-04-08 PROCEDURE — 97110 THERAPEUTIC EXERCISES: CPT

## 2022-04-08 PROCEDURE — 96361 HYDRATE IV INFUSION ADD-ON: CPT

## 2022-04-08 PROCEDURE — 96360 HYDRATION IV INFUSION INIT: CPT

## 2022-04-08 PROCEDURE — 97164 PT RE-EVAL EST PLAN CARE: CPT

## 2022-04-08 RX ADMIN — SODIUM CHLORIDE 2000 ML: 0.9 INJECTION, SOLUTION INTRAVENOUS at 09:19

## 2022-04-11 ENCOUNTER — OFFICE VISIT (OUTPATIENT)
Dept: OBGYN CLINIC | Facility: OTHER | Age: 67
End: 2022-04-11

## 2022-04-11 ENCOUNTER — OFFICE VISIT (OUTPATIENT)
Dept: PHYSICAL THERAPY | Facility: CLINIC | Age: 67
End: 2022-04-11
Payer: MEDICARE

## 2022-04-11 VITALS
SYSTOLIC BLOOD PRESSURE: 167 MMHG | HEIGHT: 61 IN | WEIGHT: 118 LBS | BODY MASS INDEX: 22.28 KG/M2 | DIASTOLIC BLOOD PRESSURE: 80 MMHG | HEART RATE: 74 BPM

## 2022-04-11 DIAGNOSIS — Z98.890 S/P RIGHT ROTATOR CUFF REPAIR: ICD-10-CM

## 2022-04-11 DIAGNOSIS — Z47.89 AFTERCARE FOLLOWING SURGERY OF THE MUSCULOSKELETAL SYSTEM: Primary | ICD-10-CM

## 2022-04-11 DIAGNOSIS — S46.811D TRAUMATIC TEAR OF SUPRASPINATUS TENDON OF RIGHT SHOULDER, SUBSEQUENT ENCOUNTER: Primary | ICD-10-CM

## 2022-04-11 PROCEDURE — 99024 POSTOP FOLLOW-UP VISIT: CPT | Performed by: PHYSICIAN ASSISTANT

## 2022-04-11 PROCEDURE — 97140 MANUAL THERAPY 1/> REGIONS: CPT | Performed by: PHYSICAL THERAPIST

## 2022-04-11 PROCEDURE — 97110 THERAPEUTIC EXERCISES: CPT | Performed by: PHYSICAL THERAPIST

## 2022-04-11 RX ORDER — HYDROCODONE BITARTRATE AND ACETAMINOPHEN 5; 325 MG/1; MG/1
1 TABLET ORAL EVERY 6 HOURS PRN
Qty: 12 TABLET | Refills: 0 | Status: SHIPPED | OUTPATIENT
Start: 2022-04-11 | End: 2022-04-18 | Stop reason: SDUPTHER

## 2022-04-11 NOTE — PROGRESS NOTES
Assessment:       1  Aftercare following surgery of the musculoskeletal system          Plan:        Patient is improving postoperatively  Operative note, images, and standard rotator cuff repair rehab protocol were discussed  All questions were addressed to the patient's satisfaction  Patient started PT  A prescription for Junior Vieira was sent to the pharmacy of record  She should follow-up with pain management to help manage her symptoms  She should also follow-up with her primary care provider regarding her high blood pressure noted during today's visit  Follow-up will be in 2 months to assess patient's progress  Subjective:     Patient ID: Amanda Mcconnell is a 78 y o  female  Chief Complaint:    HPI    Patient presents to the office for follow-up status post right shoulder arthroscopy with rotator cuff repair and biceps tenodesis on 04/06/2022  She has started PT   she has regained sensation following anesthetic block  She reports her pain is controlled with narcotic analgesic  Social History     Occupational History    Not on file   Tobacco Use    Smoking status: Never Smoker    Smokeless tobacco: Never Used   Vaping Use    Vaping Use: Never used   Substance and Sexual Activity    Alcohol use: Never    Drug use: No    Sexual activity: Not on file      Review of Systems   Constitutional: Negative  Respiratory: Negative  Cardiovascular: Negative  Musculoskeletal: Positive for arthralgias, back pain, myalgias, neck pain and neck stiffness  Skin: Positive for wound  Neurological: Positive for weakness  Psychiatric/Behavioral: Negative  Objective:     Ortho ExamPhysical Exam  HENT:      Head: Atraumatic  Cardiovascular:      Pulses: Normal pulses  Pulmonary:      Effort: Pulmonary effort is normal    Musculoskeletal:      Comments: Right shoulder range of motion not tested  Skin:     General: Skin is warm and dry        Capillary Refill: Capillary refill takes less than 2 seconds  Findings: Bruising present  Comments: Surgical incisions dry and clean  Ecchymosis near surgical area noted  Neurological:      Mental Status: She is alert and oriented to person, place, and time  Sensory: No sensory deficit     Psychiatric:         Mood and Affect: Mood normal          Behavior: Behavior normal

## 2022-04-11 NOTE — PROGRESS NOTES
Daily Note     Today's date: 2022  Patient name: Monalisa Jj  :   MRN: [id number]  Referring provider: Army Scheuermann  Dx:   Encounter Diagnosis     ICD-10-CM    1  Traumatic tear of supraspinatus tendon of right shoulder, subsequent encounter  S46 811D    2  S/P right rotator cuff repair  Z98 [id number]                   Subjective: The patient states that her nerve block wore off  Has been doing her exercises at home  Reports that she will be going to see her surgeon this afternoon for her follow up appointment  Notes that she has not had any IV since last week, had flare up of POTS over the weekend  Won't be getting another IV until Wednesday  Objective: See treatment diary below  Inspected portal sites today, healing  No active bleeding or drainage noted, no redness or warmth  Bruising is noted along anterior shoulder and into upper arm  Assessment: Patient 15 minutes late for her appointment today, patient accommodated  Tolerated treatment fair secondary to flare up of POTS  She is going to ice her shoulder in the car on the way to the surgeon's office  Patient would benefit from continued PT  Plan: Continue per plan of care        Precautions: POTS, severe scoliosis,fibromyalgia,GI issues,lumbar spinal cage, cerv DDD,arachnoiditis  Arachnoid cysts    Re-eval Date:  22    Date    RE       Visit Count  8 9      FOTO Comp        Pain In 8 7/10      Pain Out  7/10              Manuals 22      PROM R SHLD  15 mins                              Neuro Re-Ed        scap retraction                                                        Ther Ex        Wrist AROM  30 ea 30x ea      Elbow ROM AAROM 30x AAROM 30x       strengthening Fisting 30x Digiflex Yellow  30x      cerv AROM 10x ea dir 10x ea dir      Pendulum ex  20 ea 20x ea                              Table slides  Flex, arc        Wand ex  Flex, ER, EXT, IR Abd        Wall slides Pulleys  3 way        UBE        MTPs/LTPs        Bicep curls  3 way        Prone flex, HA, rows, ext        Ther Activity                        Gait Training                        Modalities        CP to R rachelld

## 2022-04-12 RX ORDER — SODIUM CHLORIDE 9 MG/ML
333.33 INJECTION, SOLUTION INTRAVENOUS ONCE
Status: COMPLETED | OUTPATIENT
Start: 2022-04-13 | End: 2022-04-13

## 2022-04-13 ENCOUNTER — HOSPITAL ENCOUNTER (OUTPATIENT)
Dept: INFUSION CENTER | Facility: HOSPITAL | Age: 67
Discharge: HOME | End: 2022-04-13
Payer: MEDICARE

## 2022-04-13 VITALS
TEMPERATURE: 96.5 F | SYSTOLIC BLOOD PRESSURE: 161 MMHG | OXYGEN SATURATION: 98 % | HEART RATE: 76 BPM | DIASTOLIC BLOOD PRESSURE: 81 MMHG | RESPIRATION RATE: 18 BRPM

## 2022-04-13 PROCEDURE — 96360 HYDRATION IV INFUSION INIT: CPT

## 2022-04-13 PROCEDURE — 96361 HYDRATE IV INFUSION ADD-ON: CPT

## 2022-04-13 RX ADMIN — SODIUM CHLORIDE 333.33 ML/HR: 0.9 INJECTION, SOLUTION INTRAVENOUS at 08:41

## 2022-04-15 ENCOUNTER — OFFICE VISIT (OUTPATIENT)
Dept: PHYSICAL THERAPY | Facility: CLINIC | Age: 67
End: 2022-04-15
Payer: MEDICARE

## 2022-04-15 ENCOUNTER — HOSPITAL ENCOUNTER (OUTPATIENT)
Dept: INFUSION CENTER | Facility: HOSPITAL | Age: 67
Discharge: HOME | End: 2022-04-15
Payer: MEDICARE

## 2022-04-15 VITALS
DIASTOLIC BLOOD PRESSURE: 90 MMHG | TEMPERATURE: 98.9 F | SYSTOLIC BLOOD PRESSURE: 170 MMHG | HEART RATE: 80 BPM | RESPIRATION RATE: 18 BRPM

## 2022-04-15 DIAGNOSIS — Z98.890 S/P RIGHT ROTATOR CUFF REPAIR: ICD-10-CM

## 2022-04-15 DIAGNOSIS — S46.811D TRAUMATIC TEAR OF SUPRASPINATUS TENDON OF RIGHT SHOULDER, SUBSEQUENT ENCOUNTER: Primary | ICD-10-CM

## 2022-04-15 PROCEDURE — 97140 MANUAL THERAPY 1/> REGIONS: CPT

## 2022-04-15 PROCEDURE — 96361 HYDRATE IV INFUSION ADD-ON: CPT

## 2022-04-15 PROCEDURE — 96360 HYDRATION IV INFUSION INIT: CPT

## 2022-04-15 PROCEDURE — 97110 THERAPEUTIC EXERCISES: CPT

## 2022-04-15 RX ORDER — SODIUM CHLORIDE 9 MG/ML
333.33 INJECTION, SOLUTION INTRAVENOUS ONCE
Status: COMPLETED | OUTPATIENT
Start: 2022-04-15 | End: 2022-04-15

## 2022-04-15 RX ORDER — SODIUM CHLORIDE 9 MG/ML
333.33 INJECTION, SOLUTION INTRAVENOUS ONCE
Status: COMPLETED | OUTPATIENT
Start: 2022-04-18 | End: 2022-04-18

## 2022-04-15 RX ADMIN — SODIUM CHLORIDE 333.33 ML/HR: 0.9 INJECTION, SOLUTION INTRAVENOUS at 09:25

## 2022-04-15 NOTE — PROGRESS NOTES
Daily Note     Today's date: 4/15/2022  Patient name: Lieutenant Vargas  : [id number]  MRN: [id number]  Referring provider: Guille Alejandre  Dx:   Encounter Diagnosis     ICD-10-CM    1  Traumatic tear of supraspinatus tendon of right shoulder, subsequent encounter  S46 811D    2  S/P right rotator cuff repair  [id number] [id number]                   Subjective: Pt states she got 13 more pain pills at her appointment with the PA  States her pain is 7-8/10 and notes "pain flies in her shld with certain mvmts during PROM"      Objective: See treatment diary below      Assessment: Tolerated treatment well  Patient exhibited good technique with therapeutic exercises and would benefit from continued PT  Pt has cont'd RUT and cerv pain  Is performing cerv ROM ex at home  Also uses heat to her neck for relief  Pt has good PROM in sitting and has most pain and limitation with ER  Plan: Continue per plan of care        Precautions: POTS, severe scoliosis,fibromyalgia,GI issues,lumbar spinal cage, cerv DDD,arachnoiditis  Arachnoid cysts    Re-eval Date:  22    Date    RE 4/11   4/15     Visit Count  8 9  10     FOTO Comp        Pain In 8 7/10  7-8/10     Pain Out  7/10              Manuals 4/8/22 4/11 4/15     PROM R SHLD  15 mins 10 min                             Neuro Re-Ed        scap retraction                                                        Ther Ex        Wrist AROM  30 ea 30x ea 30x ea     Elbow ROM AAROM 30x AAROM 30x AAROM  30x      strengthening Fisting 30x Digiflex Yellow  30x digiflex  Red  30x     cerv AROM 10x ea dir 10x ea dir 10x ea dir     Pendulum ex  20 ea 20x ea  20x                             Table slides  Flex, arc        Wand ex  Flex, ER, EXT, IR Abd        Wall slides        Pulleys  3 way        UBE        MTPs/LTPs        Bicep curls  3 way        Prone flex, HA, rows, ext        Ther Activity                        Gait Training                        Modalities        CP to R shld

## 2022-04-15 NOTE — PROGRESS NOTES
Presented today for IV hydration, tolerated same well   Will return for next appointment as scheduled

## 2022-04-18 ENCOUNTER — HOSPITAL ENCOUNTER (OUTPATIENT)
Dept: INFUSION CENTER | Facility: HOSPITAL | Age: 67
Discharge: HOME | End: 2022-04-18
Payer: MEDICARE

## 2022-04-18 ENCOUNTER — TELEPHONE (OUTPATIENT)
Dept: OBGYN CLINIC | Facility: CLINIC | Age: 67
End: 2022-04-18

## 2022-04-18 ENCOUNTER — OFFICE VISIT (OUTPATIENT)
Dept: OBGYN CLINIC | Facility: OTHER | Age: 67
End: 2022-04-18

## 2022-04-18 ENCOUNTER — APPOINTMENT (OUTPATIENT)
Dept: PHYSICAL THERAPY | Facility: CLINIC | Age: 67
End: 2022-04-18
Payer: MEDICARE

## 2022-04-18 VITALS
HEART RATE: 78 BPM | TEMPERATURE: 98.4 F | DIASTOLIC BLOOD PRESSURE: 80 MMHG | SYSTOLIC BLOOD PRESSURE: 165 MMHG | RESPIRATION RATE: 18 BRPM

## 2022-04-18 VITALS
DIASTOLIC BLOOD PRESSURE: 112 MMHG | HEIGHT: 61 IN | HEART RATE: 93 BPM | BODY MASS INDEX: 22.28 KG/M2 | SYSTOLIC BLOOD PRESSURE: 178 MMHG | WEIGHT: 118 LBS

## 2022-04-18 DIAGNOSIS — M35.00 SICCA SYNDROME (HCC): ICD-10-CM

## 2022-04-18 DIAGNOSIS — Z47.89 AFTERCARE FOLLOWING SURGERY OF THE MUSCULOSKELETAL SYSTEM: ICD-10-CM

## 2022-04-18 DIAGNOSIS — S46.811D TRAUMATIC TEAR OF SUPRASPINATUS TENDON OF RIGHT SHOULDER, SUBSEQUENT ENCOUNTER: Primary | ICD-10-CM

## 2022-04-18 PROCEDURE — 96361 HYDRATE IV INFUSION ADD-ON: CPT

## 2022-04-18 PROCEDURE — 99024 POSTOP FOLLOW-UP VISIT: CPT | Performed by: ORTHOPAEDIC SURGERY

## 2022-04-18 PROCEDURE — 96360 HYDRATION IV INFUSION INIT: CPT

## 2022-04-18 RX ORDER — HYDROCODONE BITARTRATE AND ACETAMINOPHEN 5; 325 MG/1; MG/1
1 TABLET ORAL EVERY 6 HOURS PRN
Qty: 15 TABLET | Refills: 0 | Status: SHIPPED | OUTPATIENT
Start: 2022-04-18 | End: 2022-04-25 | Stop reason: SDUPTHER

## 2022-04-18 RX ORDER — SODIUM CHLORIDE 9 MG/ML
333.33 INJECTION, SOLUTION INTRAVENOUS ONCE
Status: COMPLETED | OUTPATIENT
Start: 2022-04-20 | End: 2022-04-20

## 2022-04-18 RX ADMIN — SODIUM CHLORIDE 333.33 ML/HR: 0.9 INJECTION, SOLUTION INTRAVENOUS at 08:30

## 2022-04-18 NOTE — PROGRESS NOTES
Patient arrived rating right shoulder pain 9/10  Patient stated that she was trying to reach her Orthopedic doctor for a refill on her pain medication  Patient is day 12 post op right shoulder surgery  Patient was given an appointment for 2:45 PM with her Orthopedic MD   Patient's IV hydration was ended early today so patient could make that appointment  Patient received 1650 ml or NSS today due to ending early  AVS declined  Patient is aware of next hydration appointment  Patient ambulated off unit with a cane without incident  All personal belongings taken with patient

## 2022-04-18 NOTE — TELEPHONE ENCOUNTER
Patient given above information and verbalized understanding  She has a chronic pain issue and will be seeing a new pain mgt doc  Advised that she should check in with her PCP in the meantime while she waits for her chronic pain mgt dr centeno  Verbalized understanding

## 2022-04-18 NOTE — TELEPHONE ENCOUNTER
Pt contacted Call Center requested refill of their medication  Medication Name: Hydrocodone Acetaminophen      Dosage of Med: 5-325mg      Frequency of Med: 1 every 6 hrs      Remaining Medication: 0      Pharmacy and Location: [facility] [place]        Pt  Preferred Callback Phone Number:  [phone]       Thank you

## 2022-04-18 NOTE — TELEPHONE ENCOUNTER
Narcotics are a one time medication for acute post-op pain  We do not refill those medications      Recommendation is Tylenol (up to 1000 mg) every 8 hours - do not exceed 3000mg daily  Ice 20 minutes on and 20 minutes off

## 2022-04-18 NOTE — PROGRESS NOTES
Assessment:  1  Traumatic tear of supraspinatus tendon of right shoulder, subsequent encounter     2  Aftercare following surgery of the musculoskeletal system  HYDROcodone-acetaminophen (Norco) 5-325 mg per tablet   3  Sicca syndrome [facility])           Surgery: SHOULDER ARTHROSCOPIC ROTATOR CUFF REPAIR Biceps Tenodisis - Right  Date of Surgery: 4/6/2022        Discussion and Plan:   · Discussed that based on her examination today does appear that her cervical symptoms are the main issue today  This is based on her localization of her pain to her trapezius as well as radiating pain down the arm  · Discussed with the patient that her elevated pain response is related to her chronic narcotic use and I did take the time to review the [address] with her showing the medications that were prescribed by her former pain management doctor  Explained it is hard to manage post-operative pain in patient who is taking narcotics prior to surgery and we are attempting do our best without harming  · I was unaware that her pain mgmt physician stop practicing prior to her surgery and if we were where we would have rescheduled her surgery to after she developed a new relationship with a pain management doctor but the timing was such that she cannot get in to see the new pain management doctor until this upcoming Monday  · Patient provided with a back pack strap for the sling to remove tension on the neck  · Patient was provided with a refill of Norco until she follows up with pain mgmt on 4/25/22 where I would defer further narcotic management to the pain management specialist   · Hopefully the pain management specialist can also help her further her treatment and evaluation of her cervical spine the thing that would be significantly beneficial for    · I do not see any evidence of a complication from her surgery at this time including no evidence of infection or failure of the rotator cuff and indeed she does feel her shoulder symptoms have improved since the surgery     Follow Up:  Return for as scheduled  CHIEF COMPLAINT:  Chief Complaint   Patient presents with    Right Shoulder - Pain         SUBJECTIVE:  Paulino Nassar is a 78y o  year old female who presents for follow up after SHOULDER ARTHROSCOPIC ROTATOR CUFF REPAIR Biceps Tenodisis - Right  Today patient presents with increased pain  Patient has been on chronic Vicodin  She reports her pain mgmt physician left the beginning of April 2022 and she is not able to get into another physician until next Monday  She reports the new physical will not prescribe any medication until he sees her          PHYSICAL EXAMINATION:  General: well developed and well nourished, alert, oriented times 3 and appears comfortable  Psychiatric: Normal    MUSCULOSKELETAL EXAMINATION:  Incision: Clean, dry, intact normal PO ecchymosis and swelling   Surgery Site: normal, no evidence of infection   Range of Motion: As expected  Neurovascular status: Neuro intact, good cap refill  Activity Restrictions: sling         Scribe Attestation    I,:  [employer] am acting as a scribe while in the presence of the attending physician :       I,:  Maria Luz Olivares MD personally performed the services described in this documentation    as scribed in my presence :

## 2022-04-20 ENCOUNTER — HOSPITAL ENCOUNTER (OUTPATIENT)
Dept: INFUSION CENTER | Facility: HOSPITAL | Age: 67
Discharge: HOME | End: 2022-04-20
Payer: MEDICARE

## 2022-04-20 VITALS
DIASTOLIC BLOOD PRESSURE: 116 MMHG | HEART RATE: 82 BPM | TEMPERATURE: 97.8 F | SYSTOLIC BLOOD PRESSURE: 170 MMHG | RESPIRATION RATE: 18 BRPM | OXYGEN SATURATION: 97 %

## 2022-04-20 PROCEDURE — 96360 HYDRATION IV INFUSION INIT: CPT

## 2022-04-20 PROCEDURE — 96361 HYDRATE IV INFUSION ADD-ON: CPT

## 2022-04-20 RX ADMIN — SODIUM CHLORIDE 333.33 ML/HR: 0.9 INJECTION, SOLUTION INTRAVENOUS at 08:54

## 2022-04-20 NOTE — PLAN OF CARE
Problem: Potential for Falls  Goal: Patient will remain free of falls  Description: INTERVENTIONS:     - Keep Call bell within reach  - Keep care items and personal belongings within reach  Outcome: Progressing     Problem: INFECTION - ADULT  Goal: Absence or prevention of progression during hospitalization  Description: INTERVENTIONS:  - Assess and monitor for signs and symptoms of infection  - Monitor lab/diagnostic results  - Monitor all insertion sites, i e  indwelling lines, tubes, and drains  - Monitor endotracheal if appropriate and nasal secretions for changes in amount and color  - [place] appropriate cooling/warming therapies per order  - Administer medications as ordered  - Instruct and encourage patient and family to use good hand hygiene technique  - Identify and instruct in appropriate isolation precautions for identified infection/condition  Outcome: Progressing     Problem: Knowledge Deficit  Goal: Patient/family/caregiver demonstrates understanding of disease process, treatment plan, medications, and discharge instructions  Description: Complete learning assessment and assess knowledge base    Interventions:  - Provide teaching at level of understanding  - Provide teaching via preferred learning methods  Outcome: Progressing

## 2022-04-21 RX ORDER — SODIUM CHLORIDE 9 MG/ML
333.33 INJECTION, SOLUTION INTRAVENOUS ONCE
Status: COMPLETED | OUTPATIENT
Start: 2022-04-22 | End: 2022-04-22

## 2022-04-22 ENCOUNTER — HOSPITAL ENCOUNTER (OUTPATIENT)
Dept: INFUSION CENTER | Facility: HOSPITAL | Age: 67
Discharge: HOME | End: 2022-04-22
Payer: MEDICARE

## 2022-04-22 ENCOUNTER — OFFICE VISIT (OUTPATIENT)
Dept: PHYSICAL THERAPY | Facility: CLINIC | Age: 67
End: 2022-04-22
Payer: MEDICARE

## 2022-04-22 VITALS
TEMPERATURE: 97.6 F | OXYGEN SATURATION: 99 % | DIASTOLIC BLOOD PRESSURE: 73 MMHG | HEART RATE: 82 BPM | RESPIRATION RATE: 18 BRPM | SYSTOLIC BLOOD PRESSURE: 151 MMHG

## 2022-04-22 DIAGNOSIS — Z98.890 S/P RIGHT ROTATOR CUFF REPAIR: ICD-10-CM

## 2022-04-22 DIAGNOSIS — S46.811D TRAUMATIC TEAR OF SUPRASPINATUS TENDON OF RIGHT SHOULDER, SUBSEQUENT ENCOUNTER: Primary | ICD-10-CM

## 2022-04-22 PROCEDURE — 96360 HYDRATION IV INFUSION INIT: CPT

## 2022-04-22 PROCEDURE — 97140 MANUAL THERAPY 1/> REGIONS: CPT

## 2022-04-22 PROCEDURE — 96361 HYDRATE IV INFUSION ADD-ON: CPT

## 2022-04-22 PROCEDURE — 97110 THERAPEUTIC EXERCISES: CPT

## 2022-04-22 RX ORDER — SODIUM CHLORIDE 9 MG/ML
333.33 INJECTION, SOLUTION INTRAVENOUS ONCE
Status: COMPLETED | OUTPATIENT
Start: 2022-04-25 | End: 2022-04-25

## 2022-04-22 RX ADMIN — SODIUM CHLORIDE 333.33 ML/HR: 0.9 INJECTION, SOLUTION INTRAVENOUS at 09:13

## 2022-04-22 NOTE — PROGRESS NOTES
Daily Note     Today's date: 2022  Patient name: Benito Macias  :   MRN: [id number]  Referring provider: Edith Noel  Dx:   Encounter Diagnosis     ICD-10-CM    1  Traumatic tear of supraspinatus tendon of right shoulder, subsequent encounter  S46 811D    2  S/P right rotator cuff repair  Z98 [id number]                   Subjective: Pt states she saw Dr Tierney Coronado Monday to obtain more pain meds  States she got enough until she sees Pain Mgmt on   Pt states her fibromyalgia is flared and this makes her pain worse  Objective: See treatment diary below      Assessment: Tolerated treatment fair  Patient exhibited good technique with therapeutic exercises and would benefit from continued PT  Pt provided with dry massage to RUT area x 10 min for pain relief  Pt has pain with passive ER and descending mvmt of passive flex  Pt notes it feels like something catches  Plan: Continue per plan of care        Precautions: POTS, severe scoliosis,fibromyalgia,GI issues,lumbar spinal cage, cerv DDD,arachnoiditis  Arachnoid cysts    Re-eval Date:  22    Date    RE 4/11   4/15 4/22    Visit Count  8 9  10 11    FOTO Comp        Pain In 8 7/10  7-8/10   710    Pain Out  7/10              Manuals 4/8/22 4/11 4/15  4/22    PROM R SHLD  15 mins 10 min 10min    Mass RUT    10 min                    Neuro Re-Ed        scap retraction                                                        Ther Ex        Wrist AROM  30 ea 30x ea 30x ea  30x ea    Elbow ROM AAROM 30x AAROM 30x AAROM  30x AROM  30     strengthening Fisting 30x Digiflex Yellow  30x digiflex  Red  30x digiflex green   30x  Ball red 30x    cerv AROM 10x ea dir 10x ea dir 10x ea dir 10 x 10"    Pendulum ex  20 ea 20x ea  20x 30x                            Table slides  Flex, arc        Wand ex  Flex, ER, EXT, IR Abd        Wall slides        Pulleys  3 way        UBE        MTPs/LTPs        Bicep curls  3 way        Prone flex, HA, rows, ext        Ther Activity                        Gait Training                        Modalities        CP to R shld

## 2022-04-25 ENCOUNTER — HOSPITAL ENCOUNTER (OUTPATIENT)
Dept: INFUSION CENTER | Facility: HOSPITAL | Age: 67
Discharge: HOME | End: 2022-04-25
Payer: MEDICARE

## 2022-04-25 ENCOUNTER — CONSULT (OUTPATIENT)
Dept: PAIN MEDICINE | Facility: CLINIC | Age: 67
End: 2022-04-25
Payer: MEDICARE

## 2022-04-25 VITALS
BODY MASS INDEX: 24.09 KG/M2 | SYSTOLIC BLOOD PRESSURE: 182 MMHG | HEART RATE: 90 BPM | DIASTOLIC BLOOD PRESSURE: 97 MMHG | HEIGHT: 61 IN | WEIGHT: 127.6 LBS

## 2022-04-25 VITALS
SYSTOLIC BLOOD PRESSURE: 146 MMHG | TEMPERATURE: 97.8 F | RESPIRATION RATE: 18 BRPM | DIASTOLIC BLOOD PRESSURE: 82 MMHG | OXYGEN SATURATION: 98 % | HEART RATE: 93 BPM

## 2022-04-25 DIAGNOSIS — G89.4 CHRONIC PAIN SYNDROME: Primary | ICD-10-CM

## 2022-04-25 DIAGNOSIS — F11.20 UNCOMPLICATED OPIOID DEPENDENCE (HCC): ICD-10-CM

## 2022-04-25 DIAGNOSIS — Z47.89 AFTERCARE FOLLOWING SURGERY OF THE MUSCULOSKELETAL SYSTEM: ICD-10-CM

## 2022-04-25 DIAGNOSIS — Z79.891 LONG-TERM CURRENT USE OF OPIATE ANALGESIC: ICD-10-CM

## 2022-04-25 PROCEDURE — 96361 HYDRATE IV INFUSION ADD-ON: CPT

## 2022-04-25 PROCEDURE — 96360 HYDRATION IV INFUSION INIT: CPT

## 2022-04-25 PROCEDURE — 99204 OFFICE O/P NEW MOD 45 MIN: CPT | Performed by: ANESTHESIOLOGY

## 2022-04-25 RX ORDER — HYDROCODONE BITARTRATE AND ACETAMINOPHEN 5; 325 MG/1; MG/1
1 TABLET ORAL 2 TIMES DAILY PRN
Qty: 60 TABLET | Refills: 0 | Status: SHIPPED | OUTPATIENT
Start: 2022-04-25 | End: 2022-05-25 | Stop reason: SDUPTHER

## 2022-04-25 RX ADMIN — SODIUM CHLORIDE 333.33 ML/HR: 0.9 INJECTION, SOLUTION INTRAVENOUS at 08:34

## 2022-04-25 NOTE — PROGRESS NOTES
Patient needed to leave early for her ride due to having to use community transport  Patient tolerated hydration well, patient left in stable condition    AVS declined

## 2022-04-25 NOTE — PLAN OF CARE
Problem: INFECTION - ADULT  Goal: Absence or prevention of progression during hospitalization  Description: INTERVENTIONS:  - Assess and monitor for signs and symptoms of infection  - Monitor lab/diagnostic results  - Monitor all insertion sites, i e  indwelling lines, tubes, and drains  - Monitor endotracheal if appropriate and nasal secretions for changes in amount and color  - [place] appropriate cooling/warming therapies per order  - Administer medications as ordered  - Instruct and encourage patient and family to use good hand hygiene technique  - Identify and instruct in appropriate isolation precautions for identified infection/condition  Outcome: Progressing     Problem: Knowledge Deficit  Goal: Patient/family/caregiver demonstrates understanding of disease process, treatment plan, medications, and discharge instructions  Description: Complete learning assessment and assess knowledge base    Interventions:  - Provide teaching at level of understanding  - Provide teaching via preferred learning methods  Outcome: Progressing

## 2022-04-25 NOTE — PATIENT INSTRUCTIONS

## 2022-04-26 RX ORDER — SODIUM CHLORIDE 9 MG/ML
333.33 INJECTION, SOLUTION INTRAVENOUS ONCE
Status: DISCONTINUED | OUTPATIENT
Start: 2022-04-27 | End: 2022-05-01 | Stop reason: HOSPADM

## 2022-04-27 ENCOUNTER — HOSPITAL ENCOUNTER (OUTPATIENT)
Dept: INFUSION CENTER | Facility: HOSPITAL | Age: 67
Discharge: HOME | End: 2022-04-27

## 2022-04-28 LAB
7AMINOCLONAZEPAM SAL QL CFM: NEGATIVE NG/ML
AMPHET SAL QL CFM: NEGATIVE NG/ML
BUPRENORPHINE SAL QL SCN: NEGATIVE NG/ML
CARBOXYTHC SAL QL CFM: NEGATIVE NG/ML
CCP IGG SERPL-ACNC: NEGATIVE
COCAINE SAL QL CFM: NEGATIVE NG/ML
CODEINE SAL QL CFM: NEGATIVE NG/ML
EDDP SAL QL CFM: NEGATIVE NG/ML
HYDROCODONE SAL QL CFM: NORMAL NG/ML
HYDROCODONE SAL QL CFM: NORMAL NG/ML
HYDROMORPHONE SAL QL CFM: NEGATIVE NG/ML
LEUKEMIA MARKERS BLD-IMP: NEGATIVE NG/ML
M PROTEIN 3 UR ELPH-MCNC: ABNORMAL NG/ML
M TB TUBERC IGNF/MITOGEN IGNF CONTROL: NEGATIVE NG/ML
METHADONE SAL QL CFM: NEGATIVE NG/ML
MORPHINE SAL QL CFM: NEGATIVE NG/ML
MORPHINE SAL QL CFM: NEGATIVE NG/ML
OXYMORPHONE SAL QL CFM: ABNORMAL NG/ML
OXYMORPHONE SAL QL CFM: ABNORMAL NG/ML
PCP SAL QL CFM: NEGATIVE NG/ML
RESULT ALL_PRESCRIBED MEDS AND SPECIAL INSTRUCTIONS: NORMAL
SL AMB 6-MAM (HEROIN METABOLITE) QUANTIFICATION: NEGATIVE NG/ML
SL AMB ALPRAZOLAM QUANTIFICATION: NEGATIVE NG/ML
SL AMB ATOMOXETINE QUANTIFICATION: NORMAL
SL AMB CLONAZEPAM QUANTIFICATION: NEGATIVE NG/ML
SL AMB DIAZEPAM QUANTIFICATION: NEGATIVE NG/ML
SL AMB FENTANYL QUANTIFICATION: NEGATIVE NG/ML
SL AMB MDMA QUANTIFICATION: NEGATIVE NG/ML
SL AMB N-DESMETHYL-TRAMADOL QUANTIFICATION SALIVA: NEGATIVE NG/ML
SL AMB NORBUPRENORPHINE QUANTIFICATION: NEGATIVE NG/ML
SL AMB NORDIAZEPAM QUANTIFICATION: NEGATIVE NG/ML
SL AMB NORFENTANYL QUANTIFICATION: NEGATIVE NG/ML
SL AMB NORHYDROCODONE QUANTIFICATION: NEGATIVE NG/ML
SL AMB NORHYDROCODONE QUANTIFICATION: NEGATIVE NG/ML
SL AMB NORMEPERIDINE QUANTIFICATION: NEGATIVE NG/ML
SL AMB NOROXYCODONE QUANTIFICATION: ABNORMAL NG/ML
SL AMB OXAZEPAM QUANTIFICATION: NEGATIVE NG/ML
SL AMB RITALINIC ACID QUANTIFICATION: NEGATIVE
SL AMB TEMAZEPAM QUANTIFICATION: NEGATIVE NG/ML
SL AMB TEMAZEPAM QUANTIFICATION: NEGATIVE NG/ML
SL AMB TRAMADOL QUANTIFICATION: NEGATIVE NG/ML
SQUAMOUS #/AREA URNS HPF: NEGATIVE NG/ML

## 2022-04-29 ENCOUNTER — HOSPITAL ENCOUNTER (OUTPATIENT)
Dept: INFUSION CENTER | Facility: HOSPITAL | Age: 67
Discharge: HOME | End: 2022-04-29
Payer: MEDICARE

## 2022-04-29 ENCOUNTER — OFFICE VISIT (OUTPATIENT)
Dept: PHYSICAL THERAPY | Facility: CLINIC | Age: 67
End: 2022-04-29
Payer: MEDICARE

## 2022-04-29 VITALS — TEMPERATURE: 97.7 F | OXYGEN SATURATION: 100 % | RESPIRATION RATE: 18 BRPM | HEART RATE: 77 BPM

## 2022-04-29 DIAGNOSIS — S46.811D TRAUMATIC TEAR OF SUPRASPINATUS TENDON OF RIGHT SHOULDER, SUBSEQUENT ENCOUNTER: Primary | ICD-10-CM

## 2022-04-29 DIAGNOSIS — Z98.890 S/P RIGHT ROTATOR CUFF REPAIR: ICD-10-CM

## 2022-04-29 PROCEDURE — 97140 MANUAL THERAPY 1/> REGIONS: CPT

## 2022-04-29 PROCEDURE — 96361 HYDRATE IV INFUSION ADD-ON: CPT

## 2022-04-29 PROCEDURE — 96360 HYDRATION IV INFUSION INIT: CPT

## 2022-04-29 PROCEDURE — 97110 THERAPEUTIC EXERCISES: CPT

## 2022-04-29 RX ADMIN — SODIUM CHLORIDE 2000 ML: 0.9 INJECTION, SOLUTION INTRAVENOUS at 09:26

## 2022-05-02 ENCOUNTER — HOSPITAL ENCOUNTER (OUTPATIENT)
Dept: INFUSION CENTER | Facility: HOSPITAL | Age: 67
Discharge: HOME | End: 2022-05-02
Payer: MEDICARE

## 2022-05-02 ENCOUNTER — OFFICE VISIT (OUTPATIENT)
Dept: PHYSICAL THERAPY | Facility: CLINIC | Age: 67
End: 2022-05-02
Payer: MEDICARE

## 2022-05-02 VITALS
HEART RATE: 85 BPM | SYSTOLIC BLOOD PRESSURE: 141 MMHG | OXYGEN SATURATION: 98 % | RESPIRATION RATE: 18 BRPM | DIASTOLIC BLOOD PRESSURE: 73 MMHG | TEMPERATURE: 97.4 F

## 2022-05-02 DIAGNOSIS — S46.811D TRAUMATIC TEAR OF SUPRASPINATUS TENDON OF RIGHT SHOULDER, SUBSEQUENT ENCOUNTER: Primary | ICD-10-CM

## 2022-05-02 DIAGNOSIS — Z98.890 S/P RIGHT ROTATOR CUFF REPAIR: ICD-10-CM

## 2022-05-02 PROCEDURE — 97110 THERAPEUTIC EXERCISES: CPT

## 2022-05-02 PROCEDURE — 97140 MANUAL THERAPY 1/> REGIONS: CPT

## 2022-05-02 PROCEDURE — 96360 HYDRATION IV INFUSION INIT: CPT

## 2022-05-02 PROCEDURE — 96361 HYDRATE IV INFUSION ADD-ON: CPT

## 2022-05-02 RX ORDER — SODIUM CHLORIDE 9 MG/ML
333.33 INJECTION, SOLUTION INTRAVENOUS ONCE
Status: COMPLETED | OUTPATIENT
Start: 2022-05-02 | End: 2022-05-02

## 2022-05-02 RX ADMIN — SODIUM CHLORIDE 333.33 ML/HR: 0.9 INJECTION, SOLUTION INTRAVENOUS at 08:57

## 2022-05-02 NOTE — PLAN OF CARE
Problem: Knowledge Deficit  Goal: Patient/family/caregiver demonstrates understanding of disease process, treatment plan, medications, and discharge instructions  Description: Complete learning assessment and assess knowledge base    Interventions:  - Provide teaching at level of understanding  - Provide teaching via preferred learning methods  5/2/2022 0852 by Kendy Franklin RN  Outcome: Progressing  5/2/2022 0844 by Kendy Franklin RN  Outcome: Progressing

## 2022-05-02 NOTE — PROGRESS NOTES
Daily Note     Today's date: 2022  Patient name: Christopher Low  :   MRN: [id number]  Referring provider: Luis Perez  Dx:   Encounter Diagnosis     ICD-10-CM    1  Traumatic tear of supraspinatus tendon of right shoulder, subsequent encounter  S46 811D    2  S/P right rotator cuff repair  Z98 [id number]                   Subjective:  Pt  reports pain level @ R shoulder is = "6"/10  Says however, she got more sleep last night than she's been getting in the past  (6 hrs vs 2 hours)  Objective: See treatment diary below      Assessment: Tolerated treatment Fair to Fair+ @ best  Pain with most exercises and Passive range applied  Received CP applic Well  Plan: Con't services 2x/week           Precautions: POTS, severe scoliosis,fibromyalgia,GI issues,lumbar spinal cage, cerv DDD,arachnoiditis  Arachnoid cysts    Re-eval Date:  22    Date  5 2 22 4/11   4/15 4/22   4/29   Visit Count  13 9  10 11   12   FOTO Comp   *Due NV       Pain In 6/10 7/10  7-8/10   7/10    Pain Out Less after CP applic               Manuals 5 2 22 4/11 4/15  4/22   4/29   PROM R SHLD 10 min  Light, gentle, and to tolerance 15 mins 10 min 10min 10 min   Mass RUT    10 min                    Neuro Re-Ed 5  22       scap retraction *Upcoming                                                       Ther Ex 5 2 22       Wrist AROM  40 ea 30x ea 30x ea  30x ea 30x   Elbow ROM AROM 40x AAROM 30x AAROM  30x AROM  30 AROM 30    strengthening digiflex green   40x/3-5"  Ball GREEN 30x/3-5" Digiflex Yellow  30x digiflex  Red  30x digiflex green   30x  Ball red 30x digiflex green   30x  Ball red 30x   cerv AROM 10x10" ea dir   10x ea dir 10x ea dir 10 x 10"  10 x 10"   Pendulum ex  40 ea   20x ea  20x 30x  30x                           Table slides  Flex, arc        Wand ex  Flex, ER, EXT, IR Abd        Wall slides        Pulleys  3 way        UBE        MTPs/LTPs        Bicep curls  3 way        Prone flex, HA, rows, ext        Ther Activity                        Gait Training                        Modalities 5 2 22       CP to R shld 10 min

## 2022-05-04 ENCOUNTER — HOSPITAL ENCOUNTER (OUTPATIENT)
Dept: INFUSION CENTER | Facility: HOSPITAL | Age: 67
Discharge: HOME | End: 2022-05-04
Payer: MEDICARE

## 2022-05-04 VITALS
SYSTOLIC BLOOD PRESSURE: 146 MMHG | OXYGEN SATURATION: 99 % | DIASTOLIC BLOOD PRESSURE: 74 MMHG | RESPIRATION RATE: 18 BRPM | HEART RATE: 76 BPM | TEMPERATURE: 97 F

## 2022-05-04 PROCEDURE — 96360 HYDRATION IV INFUSION INIT: CPT

## 2022-05-04 PROCEDURE — 96361 HYDRATE IV INFUSION ADD-ON: CPT

## 2022-05-04 RX ADMIN — SODIUM CHLORIDE 2000 ML: 0.9 INJECTION, SOLUTION INTRAVENOUS at 08:53

## 2022-05-04 NOTE — PROGRESS NOTES
Patient tolerated IV hydration without reaction or issues  AVS declined  Patient is aware of next appointment  Patient ambulated off unit without incident  All personal belongings taken with patient

## 2022-05-05 RX ORDER — SODIUM CHLORIDE 9 MG/ML
333.33 INJECTION, SOLUTION INTRAVENOUS ONCE
Status: COMPLETED | OUTPATIENT
Start: 2022-05-06 | End: 2022-05-06

## 2022-05-06 ENCOUNTER — EVALUATION (OUTPATIENT)
Dept: PHYSICAL THERAPY | Facility: CLINIC | Age: 67
End: 2022-05-06
Payer: MEDICARE

## 2022-05-06 ENCOUNTER — HOSPITAL ENCOUNTER (OUTPATIENT)
Dept: INFUSION CENTER | Facility: HOSPITAL | Age: 67
Discharge: HOME | End: 2022-05-06
Payer: MEDICARE

## 2022-05-06 DIAGNOSIS — S46.811D TRAUMATIC TEAR OF SUPRASPINATUS TENDON OF RIGHT SHOULDER, SUBSEQUENT ENCOUNTER: Primary | ICD-10-CM

## 2022-05-06 DIAGNOSIS — Z98.890 S/P RIGHT ROTATOR CUFF REPAIR: ICD-10-CM

## 2022-05-06 PROCEDURE — 96361 HYDRATE IV INFUSION ADD-ON: CPT

## 2022-05-06 PROCEDURE — 97140 MANUAL THERAPY 1/> REGIONS: CPT

## 2022-05-06 PROCEDURE — 97110 THERAPEUTIC EXERCISES: CPT

## 2022-05-06 PROCEDURE — 96360 HYDRATION IV INFUSION INIT: CPT

## 2022-05-06 RX ADMIN — SODIUM CHLORIDE 333.33 ML/HR: 0.9 INJECTION, SOLUTION INTRAVENOUS at 08:59

## 2022-05-06 NOTE — PROGRESS NOTES
PT Re-Evaluation     Today's date: 2022  Patient name: Aristeo Hurst  :   MRN: [id number]  Referring provider: Mary Kate Garcia  Dx:   Encounter Diagnosis     ICD-10-CM    1  Traumatic tear of supraspinatus tendon of right shoulder, subsequent encounter  S46 811D    2  S/P right rotator cuff repair  Z98 [id number]                   Assessment/Plan   Assessment details: Aristeo Hurst is a 78 y o  female presenting to outpatient physical therapy with noted impairments including pain, impaired soft tissue mobility, reduced range of motion, reduced strength, reduced postural awareness, and reduced activity tolerance  Signs and symptoms at present are consistent with referring diagnosis of R shld rot cuff repair on 22  R shoulder PROM assessed per protocol, pt able to tolerate 90 degrees flexion, 60 degrees abduction, 16 degrees ER at 0 degrees abduction, and IR to body at 0 degrees abduction  Pt is currently in week 4 of post-op protocol, as such AROM and MMT assessments held  Next progression in protocol phase scheduled for 22  Due to noted impairments, the patient's present functional limitations include difficulty with ADLs with increased need for assistance, reliance on medication and/or modalities for pain relief, reduced tolerance for functional mobility and activity, and difficulty completing HH and self care responsibilities  Patient to benefit from skilled outpatient physical therapy 2x/week for  12 weeks in order to reduce pain, maximize pain free range of motion, increase strength and stability, and improve functional mobility/functional activity in order to maximize return to prior level of function with reduced limitations  Pt instructed to continue w/ current HEP        Impairments: abnormal or restricted ROM, abnormal movement, activity intolerance, impaired physical strength and lacks appropriate home exercise program  Barriers to therapy: Multiple comorbidities including POTS, severe scoliosis-wears brace  Understanding of Dx/Px/POC: good   Prognosis: good    Goals  STGs to be achieved in 4 weeks:  1  Pt to demonstrate reduced subjective pain rating "at worst" by at least 2-3 points from Initial Eval in order to allow for reduced pain with ADLs and improved functional activity tolerance  Progressing, continue  2  Pt to demonstrate improved PROM of R shld by 20* in order to maximize joint mobility and function and allow for progression of exercise program and achievement of goals  Progressing, continue  3  Pt to improve wrist and elbow strength to 5/5 in order to improve function RUE  Progressing, continue    LTGs to be achieved in 6-8 weeks:  1  Pt will be I with HEP in order to continue to improve quality of life and independence and reduce risk for re-injury  Progressing, continue  2  Pt to demonstrate return to [facility] chores and self care without limitations or restrictions  Progressing, continue  3  Pt to demonstrate improved function as noted by achieving or exceeding predicted score on FOTO outcomes assessment tool  Progressing, continue   4  Pt to demonstrate increased AROM of R shld by at least 5-10 degrees in order to allow for greater ease and independence with ADLs and functional mobility  Progressing, continue  5  Pt to demonstrate increased MMT of RUE by at least 1/2-1 grade in order to improve safety and stability with ADLs and functional mobility  Progressing, continue      Subjective     Mechanism of injury: Pt tore her R supraspinatus tendon completely with a partial tear of her bicep in Feb   Is scheduled for surgery 4/6/22 4/8/22 UPDATE: Pt underwent R rot guff repair 4/6/22  Pt notes nerve block wore off yesterday  Pt removed surgical dressing , cleaned up the shld and applied band aids to incision sites  Phys told pt after surgery her arthritis is much worse than the xrays showed, and he should have done a total shoulder   Pt having R shld pain rated 8/10 at present, arm in sling and swathe  Pt having pain from shld up into L cerv area  Has been applying ice to shld  Having R UT spasms  5/6/22 RE: Pt reports overall improvements but notes slow progress  She is able to do more w/ R wrist/elbow than last PN and HEP has been going well  Pt does continue to note that B shoulders and neck are achy when in sling too long; continues to experience R UT spasms  R shoulder continues to be painful w/ PROM  Objective  Right Shoulder  Positive for edema and incision  Negative for drainage  Cervical/Thoracic Screen   At IE: Cervical range of motion within normal limits with the following exceptions: Pt has cerv DDD and receives injections for same, limited ROM  4/8/22: R UT spasms and R cerv pain  W/brace on  5/6/22: R UT spasms and R cerv pain  W/brace on    Thoracic range of motion within normal limits with the following exceptions: Pt has severe scoliosis and wears brace, not a surgical candidate due to osteoporosis    Active Range of Motion     Right Shoulder   Flexion: 50 degrees   Extension: 31 degrees   Abduction: 55 degrees   External rotation 45°: 57 degrees   Internal rotation 45°: 58 degrees     Additional Active Range of Motion Details  4/8/22: Measurements are pre-surgery  NO AROM allowed at this time as per protocol  5/6/22: Measurements are pre-surgery  NO AROM allowed at this time as per protocol    Passive Range of Motion    5/6/22:  Right Shoulder   Flexion: 90 degrees   Abduction: 60 degrees   External rotation 0°: 16 degrees   Internal rotation 0°: to body      Strength/Myotome Testing     Right Shoulder     Planes of Motion   Flexion: 2   Extension: 3   Abduction: 2   External rotation at 45°: 2+   Internal rotation at 45°: 3-     Additional Strength Details  4/8/22: Strength assessments are pre-surgical, pt not allowed AROM or MMT at this time as per protocol    Will re-assess when appropriate  5/6/22:  Strength assessments are pre-surgical, pt not allowed AROM or MMT at this time as per protocol    Will re-assess when appropriate           Precautions: POTS, severe scoliosis,fibromyalgia,GI issues,lumbar spinal cage, cerv DDD, arachnoiditis  Arachnoid cysts  DOS: 4/6/22; Start next protocol phase (Week 6): 5/18/22      Date  5 2 22 5/6/22   4/15 4/22   4/29   Visit Count  13 14  10 11   12   FOTO Comp 4/8  *Due NV Completed      Pain In 6/10 6/10  7-8/10   7/10    Pain Out Less after CP applic 2/46              Manuals 5 2 22 5/2/22 4/15  4/22   4/29   PROM R SHLD 10 min  Light, gentle, and to tolerance  10 min 10min 10 min   Mass RUT    10 min                    Neuro Re-Ed 5 2 22 5/6/22      scap retraction *Upcoming                                                       Ther Ex 5 2 22 5/6/22      Wrist AROM  40 ea 40x 30x ea  30x ea 30x   Elbow ROM AROM 40x AAROM 40x AAROM  30x AROM  30 AROM 30    strengthening digiflex green   40x/3-5"  Ball GREEN 30x/3-5" Digiflex green  40x/3-5"  Ball GREEN 30x/3-5" digiflex  Red  30x digiflex green   30x  Ball red 30x digiflex green   30x  Ball red 30x   cerv AROM 10x10" ea dir   10x ea dir 10x ea dir 10 x 10"  10 x 10"   Pendulum ex  40 ea   40x ea  20x 30x  30x                           Table slides  Flex, arc        Wand ex  Flex, ER, EXT, IR Abd        Wall slides        Pulleys  3 way        UBE        MTPs/LTPs        Bicep curls  3 way        Prone flex, HA, rows, ext        Ther Activity                        Gait Training                        Modalities 5 2 22 5/6/22      CP to R shld 10 min Declined

## 2022-05-09 ENCOUNTER — HOSPITAL ENCOUNTER (OUTPATIENT)
Dept: INFUSION CENTER | Facility: HOSPITAL | Age: 67
Discharge: HOME | End: 2022-05-09
Payer: MEDICARE

## 2022-05-09 ENCOUNTER — OFFICE VISIT (OUTPATIENT)
Dept: PHYSICAL THERAPY | Facility: CLINIC | Age: 67
End: 2022-05-09
Payer: MEDICARE

## 2022-05-09 VITALS
HEART RATE: 77 BPM | DIASTOLIC BLOOD PRESSURE: 81 MMHG | RESPIRATION RATE: 18 BRPM | SYSTOLIC BLOOD PRESSURE: 133 MMHG | TEMPERATURE: 97 F

## 2022-05-09 DIAGNOSIS — S46.811D TRAUMATIC TEAR OF SUPRASPINATUS TENDON OF RIGHT SHOULDER, SUBSEQUENT ENCOUNTER: Primary | ICD-10-CM

## 2022-05-09 DIAGNOSIS — Z98.890 S/P RIGHT ROTATOR CUFF REPAIR: ICD-10-CM

## 2022-05-09 PROCEDURE — 97010 HOT OR COLD PACKS THERAPY: CPT

## 2022-05-09 PROCEDURE — 96360 HYDRATION IV INFUSION INIT: CPT

## 2022-05-09 PROCEDURE — 97110 THERAPEUTIC EXERCISES: CPT

## 2022-05-09 PROCEDURE — 96361 HYDRATE IV INFUSION ADD-ON: CPT

## 2022-05-09 PROCEDURE — 97140 MANUAL THERAPY 1/> REGIONS: CPT

## 2022-05-09 RX ADMIN — SODIUM CHLORIDE 2000 ML: 0.9 INJECTION, SOLUTION INTRAVENOUS at 09:00

## 2022-05-09 NOTE — PROGRESS NOTES
Daily Note     Today's date: 2022  Patient name: Lesli Hamilton  :   MRN: [id number]  Referring provider: Gail Flores  Dx:   Encounter Diagnosis     ICD-10-CM    1  Traumatic tear of supraspinatus tendon of right shoulder, subsequent encounter  S46 811D    2  S/P right rotator cuff repair  Z98 [id number]        Start Time: 4436  Stop Time: 8856  Total time in clinic (min): 50 minutes    Subjective: Patient reports she is running late due to her infusion appointment  Objective: See treatment diary below      Assessment: Tolerated treatment well  Nadeem Bishop participated in skilled PT session focused on passive ROM of shoulder, AROM of elbow and wrist, and strengthening  Patient presents with decrease right shoulder ER passively  Patient would continue to benefit from skilled PT interventions to address right shoulder ROM, strengthening  Patient demonstrated fatigue post treatment      Plan: Continue per plan of care        Precautions: POTS, severe scoliosis,fibromyalgia,GI issues,lumbar spinal cage, cerv DDD, arachnoiditis  Arachnoid cysts  DOS: 22; Start next protocol phase (Week 6): 22      Date  22   Visit Count  13 14  15 11   12   FOTO Comp   *Due NV Completed      Pain In 6/10 6/10  6/10   7/10    Pain Out Less after CP applic               Manuals 22   PROM R SHLD 10 min  Light, gentle, and to tolerance  10 min 10min 10 min   Mass RUT    10 min                    Neuro Re-Ed 22     scap retraction *Upcoming                                                       Ther Ex 22     Wrist AROM  40 ea 40x 40 x ea  30x ea 30x   Elbow ROM AROM 40x AAROM 40x AAROM  40x AROM  30 AROM 30    strengthening digiflex green   40x/3-5"  Ball GREEN 30x/3-5" Digiflex green  40x/3-5"  Ball GREEN 30x/3-5" digiflex  Green  40x 5" digiflex green   30x  Ball red 30x digiflex green 30x  Ball red 30x   cerv AROM 10x10" ea dir   10x ea dir UT/LV St 20"x4  Cerv AROM  10x ea dir 10 x 10"  10 x 10"   Pendulum ex  40 ea   40x ea  40x 30x  30x                           Table slides  Flex, arc        Wand ex  Flex, ER, EXT, IR Abd        Wall slides        Pulleys  3 way        UBE        MTPs/LTPs        Bicep curls  3 way        Prone flex, HA, rows, ext        Ther Activity                        Gait Training                        Modalities 5 2 22 5/6/22 5/9/22     CP to R shld 10 min Declined 10 min

## 2022-05-10 RX ORDER — SODIUM CHLORIDE 9 MG/ML
333.33 INJECTION, SOLUTION INTRAVENOUS ONCE
Status: COMPLETED | OUTPATIENT
Start: 2022-05-11 | End: 2022-05-11

## 2022-05-11 ENCOUNTER — HOSPITAL ENCOUNTER (OUTPATIENT)
Dept: INFUSION CENTER | Facility: HOSPITAL | Age: 67
Discharge: HOME | End: 2022-05-11
Payer: MEDICARE

## 2022-05-11 VITALS
HEART RATE: 85 BPM | TEMPERATURE: 97.5 F | DIASTOLIC BLOOD PRESSURE: 81 MMHG | SYSTOLIC BLOOD PRESSURE: 168 MMHG | RESPIRATION RATE: 18 BRPM | OXYGEN SATURATION: 99 %

## 2022-05-11 PROCEDURE — 96361 HYDRATE IV INFUSION ADD-ON: CPT

## 2022-05-11 PROCEDURE — 96360 HYDRATION IV INFUSION INIT: CPT

## 2022-05-11 RX ADMIN — SODIUM CHLORIDE 333.33 ML/HR: 0.9 INJECTION, SOLUTION INTRAVENOUS at 08:48

## 2022-05-12 RX ORDER — SODIUM CHLORIDE 9 MG/ML
333.33 INJECTION, SOLUTION INTRAVENOUS ONCE
Status: COMPLETED | OUTPATIENT
Start: 2022-05-13 | End: 2022-05-13

## 2022-05-13 ENCOUNTER — OFFICE VISIT (OUTPATIENT)
Dept: PHYSICAL THERAPY | Facility: CLINIC | Age: 67
End: 2022-05-13
Payer: MEDICARE

## 2022-05-13 ENCOUNTER — HOSPITAL ENCOUNTER (OUTPATIENT)
Dept: INFUSION CENTER | Facility: HOSPITAL | Age: 67
Discharge: HOME | End: 2022-05-13
Payer: MEDICARE

## 2022-05-13 VITALS
SYSTOLIC BLOOD PRESSURE: 158 MMHG | OXYGEN SATURATION: 98 % | RESPIRATION RATE: 18 BRPM | HEART RATE: 102 BPM | DIASTOLIC BLOOD PRESSURE: 83 MMHG | TEMPERATURE: 98.3 F

## 2022-05-13 DIAGNOSIS — S46.811D TRAUMATIC TEAR OF SUPRASPINATUS TENDON OF RIGHT SHOULDER, SUBSEQUENT ENCOUNTER: Primary | ICD-10-CM

## 2022-05-13 DIAGNOSIS — Z98.890 S/P RIGHT ROTATOR CUFF REPAIR: ICD-10-CM

## 2022-05-13 PROCEDURE — 97110 THERAPEUTIC EXERCISES: CPT

## 2022-05-13 PROCEDURE — 96361 HYDRATE IV INFUSION ADD-ON: CPT

## 2022-05-13 PROCEDURE — 96360 HYDRATION IV INFUSION INIT: CPT

## 2022-05-13 RX ADMIN — SODIUM CHLORIDE 333.33 ML/HR: 0.9 INJECTION, SOLUTION INTRAVENOUS at 08:49

## 2022-05-13 NOTE — PROGRESS NOTES
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  No visible bruising noted, just mild increases in tenderness to area where shoulder struck  Objective: See treatment diary below      Assessment: Tolerated treatment well  Shoulder PROM to tolerance; approx  90 degrees flexion, 60 degrees abduction, and 15 degrees ER tolerated today  Pt able to progress per protocol to AAROM next week (22)  Cold pack declined post treatment, as pt had another medical appointment to attend  Patient would benefit from continued PT  Plan: Continue per plan of care        Precautions: POTS, severe scoliosis,fibromyalgia,GI issues,lumbar spinal cage, cerv DDD, arachnoiditis  Arachnoid cysts  DOS: 22; Start next protocol phase (Week 6): 22      Date  22   Visit Count  13 14  15 16   12   FOTO Comp   *Due NV Completed      Pain In 6/10 6/10  6/10   6/10    Pain Out Less after CP applic 050  0            Manuals 22   PROM R SHLD 10 min  Light, gentle, and to tolerance  10 min 10min 10 min   Mass RUT                        Neuro Re-Ed 22    scap retraction *Upcoming                                                       Ther Ex 22    Wrist AROM  40 ea 40x 40 x ea x40 30x   Elbow ROM AROM 40x AAROM 40x AAROM  40x AROM  x40 AROM 30    strengthening digiflex green   40x/3-5"  Ball GREEN 30x/3-5" Digiflex green  40x/3-5"  Ball GREEN 30x/3-5" digiflex  Green  40x 5" digiflex green   30x  Ball green 40x 5" digiflex green   30x  Ball red 30x   cerv AROM 10x10" ea dir   10x ea dir UT/LV St 20"x4  Cerv AROM  10x ea dir cerv AROM 10x5"  UT/LS stretch 20"x2  10 x 10" Pendulum ex  40 ea   40x ea  40x 40x ea  30x                           Table slides  Flex, arc        Wand ex  Flex, ER, EXT, IR Abd        Wall slides        Pulleys  3 way        UBE        MTPs/LTPs        Bicep curls  3 way        Prone flex, HA, rows, ext        Ther Activity                        Gait Training                        Modalities 5 2 22 5/6/22 5/9/22 5/13/22    CP to R shld 10 min Declined 10 min Declined

## 2022-05-16 ENCOUNTER — HOSPITAL ENCOUNTER (OUTPATIENT)
Dept: INFUSION CENTER | Facility: HOSPITAL | Age: 67
Discharge: HOME | End: 2022-05-16
Payer: MEDICARE

## 2022-05-16 ENCOUNTER — OFFICE VISIT (OUTPATIENT)
Dept: PHYSICAL THERAPY | Facility: CLINIC | Age: 67
End: 2022-05-16
Payer: MEDICARE

## 2022-05-16 VITALS
DIASTOLIC BLOOD PRESSURE: 92 MMHG | TEMPERATURE: 98.6 F | SYSTOLIC BLOOD PRESSURE: 163 MMHG | HEART RATE: 80 BPM | RESPIRATION RATE: 18 BRPM | OXYGEN SATURATION: 97 %

## 2022-05-16 DIAGNOSIS — S46.811D TRAUMATIC TEAR OF SUPRASPINATUS TENDON OF RIGHT SHOULDER, SUBSEQUENT ENCOUNTER: Primary | ICD-10-CM

## 2022-05-16 PROCEDURE — 97110 THERAPEUTIC EXERCISES: CPT

## 2022-05-16 PROCEDURE — 96360 HYDRATION IV INFUSION INIT: CPT

## 2022-05-16 PROCEDURE — 96361 HYDRATE IV INFUSION ADD-ON: CPT

## 2022-05-16 PROCEDURE — 97140 MANUAL THERAPY 1/> REGIONS: CPT

## 2022-05-16 RX ADMIN — SODIUM CHLORIDE 2000 ML: 0.9 INJECTION, SOLUTION INTRAVENOUS at 08:21

## 2022-05-16 NOTE — PROGRESS NOTES
Daily Note     Today's date: 2022  Patient name: Aryan Stoddard  : [id number]  MRN: [id number]  Referring provider: Abby Bravo  Dx:   Encounter Diagnosis     ICD-10-CM    1  Traumatic tear of supraspinatus tendon of right shoulder, subsequent encounter  S46 533R                    Subjective:  No new c/o  Pain level @ R shoulder reported to = "5-6"/10 (with Meds)  Objective: See treatment diary below      Assessment: Tolerated treatment Fairly Well overall with performance of ther exer and Tolerance to MT  Received CP applic for R shoulder Well  Pain level decreased to "9"/15 after CP applic  Plan: Con't services 2x/week           Precautions: POTS, severe scoliosis,fibromyalgia,GI issues,lumbar spinal cage, cerv DDD, arachnoiditis  Arachnoid cysts  DOS: 22; Start next protocol phase (Week 6): 22      Date  5 22  5 16    Visit Count  13 14  15 16   17   FOTO Comp   *Due NV Completed      Pain In 6/10 6/10  6/10   6/10 5-6/10   Pain Out Less after CP applic  No change during Ther exer or MT     after CP applic           Manuals 5 22 5 16 22   PROM R SHLD 10 min  Light, gentle, and to tolerance  10 min 10min 10 min   Mass RUT                        Neuro Re-Ed 22 5 16 22   scap retraction *Upcoming                                                       Ther Ex 22 5 16 22   Wrist AROM  40 ea 40x 40 x ea x40 45x     Elbow ROM AROM 40x AAROM 40x AAROM  40x AROM  x40 AROM 45    strengthening digiflex green   40x/3-5"  Ball GREEN 30x/3-5" Digiflex green  40x/3-5"  Ball GREEN 30x/3-5" digiflex  Green  40x 5" digiflex green   30x  Ball green 40x 5" digiflex green   40x/5"  Ball green 40x 5"   cerv AROM 10x10" ea dir   10x ea dir UT/LV St 20"x4  Cerv AROM  10x ea dir cerv AROM 10x5"  UT/LS stretch 20"x2 cerv AROM 10x5"  UT/LS stretch 20"x3   Pendulum ex  40 ea   40x ea  40x 40x ea  45x                           Table slides  Flex, arc        Wand ex  Flex, ER, EXT, IR Abd        Wall slides        Pulleys  3 way        UBE        MTPs/LTPs        Bicep curls  3 way        Prone flex, HA, rows, ext        Ther Activity                        Gait Training                        Modalities 5 2 22 5/6/22 5/9/22 5/13/22 5 16 22   CP to R shld 10 min Declined 10 min Declined  10 min

## 2022-05-17 RX ORDER — SODIUM CHLORIDE 9 MG/ML
333.33 INJECTION, SOLUTION INTRAVENOUS ONCE
Status: COMPLETED | OUTPATIENT
Start: 2022-05-18 | End: 2022-05-18

## 2022-05-18 ENCOUNTER — HOSPITAL ENCOUNTER (OUTPATIENT)
Dept: INFUSION CENTER | Facility: HOSPITAL | Age: 67
Discharge: HOME | End: 2022-05-18
Payer: MEDICARE

## 2022-05-18 VITALS
DIASTOLIC BLOOD PRESSURE: 91 MMHG | RESPIRATION RATE: 18 BRPM | HEART RATE: 75 BPM | TEMPERATURE: 98.2 F | OXYGEN SATURATION: 98 % | SYSTOLIC BLOOD PRESSURE: 153 MMHG

## 2022-05-18 PROCEDURE — 96360 HYDRATION IV INFUSION INIT: CPT

## 2022-05-18 PROCEDURE — 96361 HYDRATE IV INFUSION ADD-ON: CPT

## 2022-05-18 RX ADMIN — SODIUM CHLORIDE 333.33 ML/HR: 0.9 INJECTION, SOLUTION INTRAVENOUS at 08:55

## 2022-05-20 ENCOUNTER — OFFICE VISIT (OUTPATIENT)
Dept: PHYSICAL THERAPY | Facility: CLINIC | Age: 67
End: 2022-05-20
Payer: MEDICARE

## 2022-05-20 ENCOUNTER — HOSPITAL ENCOUNTER (OUTPATIENT)
Dept: INFUSION CENTER | Facility: HOSPITAL | Age: 67
Discharge: HOME | End: 2022-05-20
Payer: MEDICARE

## 2022-05-20 VITALS
RESPIRATION RATE: 18 BRPM | HEART RATE: 80 BPM | TEMPERATURE: 97.5 F | DIASTOLIC BLOOD PRESSURE: 88 MMHG | OXYGEN SATURATION: 98 % | SYSTOLIC BLOOD PRESSURE: 149 MMHG

## 2022-05-20 DIAGNOSIS — S46.811D TRAUMATIC TEAR OF SUPRASPINATUS TENDON OF RIGHT SHOULDER, SUBSEQUENT ENCOUNTER: Primary | ICD-10-CM

## 2022-05-20 PROCEDURE — 96360 HYDRATION IV INFUSION INIT: CPT

## 2022-05-20 PROCEDURE — 96361 HYDRATE IV INFUSION ADD-ON: CPT

## 2022-05-20 PROCEDURE — 97140 MANUAL THERAPY 1/> REGIONS: CPT

## 2022-05-20 PROCEDURE — 97110 THERAPEUTIC EXERCISES: CPT

## 2022-05-20 RX ORDER — MIDODRINE HYDROCHLORIDE 2.5 MG/1
TABLET ORAL
COMMUNITY
Start: 2022-05-18

## 2022-05-20 RX ADMIN — SODIUM CHLORIDE 2000 ML: 0.9 INJECTION, SOLUTION INTRAVENOUS at 09:14

## 2022-05-20 NOTE — PROGRESS NOTES
Daily Note     Today's date: 2022  Patient name: Benito Macias  :   MRN: [id number]  Referring provider: Edith Noel  Dx:   Encounter Diagnosis     ICD-10-CM    1  Traumatic tear of supraspinatus tendon of right shoulder, subsequent encounter  S46 626E                   Subjective:  Pt reports pain level @ R shoulder is = "4-5"/10  Also notes "spasming" @ cervical and LB regions  Objective: See treatment diary below      Assessment: Tolerated treatment Fairly Well to Well overall with performance of there exer and tolerance to MT    Pt  Progressing thus far within RTC    Plan: Con't services 2x/week as per POC/Goals, and within protocol/guidelines          Precautions: POTS, severe scoliosis,fibromyalgia,GI issues,lumbar spinal cage, cerv DDD, arachnoiditis  Arachnoid cysts  DOS: 22; Start next protocol phase (Week 6): 22      Date  22 16    Visit Count  18 14  15 16   17   FOTO Comp    Completed      Pain In -5/10 6/10  6/10   6/10 5-6/10   Pain Out  after CP applic  No change during Ther exer or MT     after CP applic           Manuals 22 5 16 22   PROM R SHLD 10 min  Light, gentle, and to tolerance  10 min 10min 10 min   Mass RUT                        Neuro Re-Ed 22 5 16 22   scap retraction **10x/5"                                                       Ther Ex 22 5 16 22   Wrist AROM  45 ea 40x 40 x ea x40 45x     Elbow ROM AROM 45x AAROM 40x AAROM  40x AROM  x40 AROM 45    strengthening digiflex green   30x/3-5"  Ball GREEN 45x/3-5" Digiflex green  40x/3-5"  Ball GREEN 30x/3-5" digiflex  Green  40x 5" digiflex green   30x  Ball green 40x 5" digiflex green   40x/5"  Ball green 40x 5"   cerv AROM cerv AROM 10x5"  UT/LS stretch 20"x3 10x ea dir UT/LV St 20"x4  Cerv AROM  10x ea dir cerv AROM 10x5"  UT/LS stretch 20"x2 cerv AROM 10x5"  UT/LS stretch 20"x3   Pendulum ex  45 ea   40x ea  40x 40x ea  45x                           Table slides  Flex, arc **10x/5"       Wand ex  Flex, ER, EXT, IR Abd        Wall slides        Pulleys  3 way        UBE        MTPs/LTPs        Bicep curls  3 way        Prone flex, HA, rows, ext        Ther Activity                        Gait Training                        Modalities 5 20 22 5/6/22 5/9/22 5/13/22 5 16 22   CP to R shld 10 min Declined 10 min Declined  10 min

## 2022-05-23 ENCOUNTER — OFFICE VISIT (OUTPATIENT)
Dept: PHYSICAL THERAPY | Facility: CLINIC | Age: 67
End: 2022-05-23
Payer: MEDICARE

## 2022-05-23 ENCOUNTER — HOSPITAL ENCOUNTER (OUTPATIENT)
Dept: INFUSION CENTER | Facility: HOSPITAL | Age: 67
Discharge: HOME | End: 2022-05-23
Payer: MEDICARE

## 2022-05-23 VITALS
HEART RATE: 87 BPM | SYSTOLIC BLOOD PRESSURE: 153 MMHG | RESPIRATION RATE: 18 BRPM | TEMPERATURE: 97.7 F | DIASTOLIC BLOOD PRESSURE: 81 MMHG

## 2022-05-23 DIAGNOSIS — S46.811D TRAUMATIC TEAR OF SUPRASPINATUS TENDON OF RIGHT SHOULDER, SUBSEQUENT ENCOUNTER: Primary | ICD-10-CM

## 2022-05-23 DIAGNOSIS — Z98.890 S/P RIGHT ROTATOR CUFF REPAIR: ICD-10-CM

## 2022-05-23 PROCEDURE — 97110 THERAPEUTIC EXERCISES: CPT

## 2022-05-23 PROCEDURE — 97140 MANUAL THERAPY 1/> REGIONS: CPT

## 2022-05-23 PROCEDURE — 97010 HOT OR COLD PACKS THERAPY: CPT

## 2022-05-23 PROCEDURE — 96360 HYDRATION IV INFUSION INIT: CPT

## 2022-05-23 PROCEDURE — 96361 HYDRATE IV INFUSION ADD-ON: CPT

## 2022-05-23 RX ADMIN — SODIUM CHLORIDE 2000 ML: 0.9 INJECTION, SOLUTION INTRAVENOUS at 08:58

## 2022-05-23 NOTE — PROGRESS NOTES
Daily Note     Today's date: 2022  Patient name: Juliann Rust  :   MRN: [id number]  Referring provider: Jami Trivedi  Dx:   Encounter Diagnosis     ICD-10-CM    1  Traumatic tear of supraspinatus tendon of right shoulder, subsequent encounter  S46 [id number]    2  S/P right rotator cuff repair  [id number]                   Subjective: Pt w/ mild c/o pain at start of session  She reports improvements in pain overall  Has f/u w/ pain management tomorrow  Objective: See treatment diary below      Assessment: Tolerated treatment fair  Initiated shoulder sub-maximal isometrics for pain relief and wand exercises today per protocol  Pt reports "grabbing" and "stretching" in anterior aspect of shoulder w/ wand exercises  Only able to tolerate 8 reps into flexion and 5 reps into abduction  Patient demonstrated fatigue post treatment and would benefit from continued PT  Plan: Continue per plan of care        Precautions: POTS, severe scoliosis,fibromyalgia,GI issues,lumbar spinal cage, cerv DDD, arachnoiditis  Arachnoid cysts  DOS: 22; Start next protocol phase (Week 6): 22      Date  22  5 16 22   Visit Count  18 19  15 16   17   FOTO Comp    Completed      Pain In -5/10 4/10  6/10   6/10 5-6/10   Pain Out  after CP applic  No change during Ther exer or MT     after CP applic           Manuals 22 5 16 22   PROM R SHLD 10 min  Light, gentle, and to tolerance 10 min 10 min 10min 10 min   Mass RUT                        Neuro Re-Ed 22 5 16 22   scap retraction **10x/5" 10x5"                                                      Ther Ex 22 5 16 22   Wrist AROM  45 ea D/c to home 40 x ea x40 45x     Elbow ROM AROM 45x AROM 45x AAROM  40x AROM  x40 AROM 45    strengthening digiflex green   30x/3-5"  Ball GREEN 45x/3-5" digiflex lue 30x3-5"  Ball blue 30x3-5" digiflex  Green  40x 5" digiflex green   30x  Ball green 40x 5" digiflex green   40x/5"  Ball green 40x 5"   cerv AROM cerv AROM 10x5"  UT/LS stretch 20"x3 D/c to home UT/LV St 20"x4  Cerv AROM  10x ea dir cerv AROM 10x5"  UT/LS stretch 20"x2 cerv AROM 10x5"  UT/LS stretch 20"x3   Pendulum ex  45 ea   40x ea  40x 40x ea  45x   Shoulder isometrics (5 way)   5x5" ea      Supine wand exercises (flex, abd, ER)  x8 flex, x5 abd, *ER NV              Table slides  Flex, arc **10x/5" 10x5"      Wand ex  Flex, ER, EXT, IR Abd        Wall slides        Pulleys  3 way        UBE        MTPs/LTPs        Bicep curls  3 way        Prone flex, HA, rows, ext        Ther Activity                        Gait Training                        Modalities 5 20 22 5/23/22 5/9/22 5/13/22 5 16 22   CP to R shld 10 min 8 min 10 min Declined  10 min

## 2022-05-24 ENCOUNTER — TELEPHONE (OUTPATIENT)
Dept: PAIN MEDICINE | Facility: CLINIC | Age: 67
End: 2022-05-24

## 2022-05-24 RX ORDER — SODIUM CHLORIDE 9 MG/ML
333.33 INJECTION, SOLUTION INTRAVENOUS ONCE
Status: COMPLETED | OUTPATIENT
Start: 2022-05-25 | End: 2022-05-25

## 2022-05-24 NOTE — TELEPHONE ENCOUNTER
Patient called in for a refill of :  Name of medication: HYDROcodone-acetaminophen (Norco) 5-325 mg per tablet     Frequency: Take 1 tablet by mouth 2 (two) times a day as needed for pain for up to 30 doses Max Daily Amount: 2 tablets    How many left: 1 pill    Pharmacy:  [address] [place], [place] - [address],     [facility] call back # [phone]  Pt aware it can take 24-48 hrs to process refills- thank you

## 2022-05-25 ENCOUNTER — HOSPITAL ENCOUNTER (OUTPATIENT)
Dept: INFUSION CENTER | Facility: HOSPITAL | Age: 67
Discharge: HOME | End: 2022-05-25
Payer: MEDICARE

## 2022-05-25 VITALS
TEMPERATURE: 98.3 F | SYSTOLIC BLOOD PRESSURE: 132 MMHG | DIASTOLIC BLOOD PRESSURE: 79 MMHG | RESPIRATION RATE: 18 BRPM | HEART RATE: 85 BPM

## 2022-05-25 DIAGNOSIS — Z47.89 AFTERCARE FOLLOWING SURGERY OF THE MUSCULOSKELETAL SYSTEM: ICD-10-CM

## 2022-05-25 PROCEDURE — 96360 HYDRATION IV INFUSION INIT: CPT

## 2022-05-25 PROCEDURE — 96361 HYDRATE IV INFUSION ADD-ON: CPT

## 2022-05-25 RX ORDER — HYDROCODONE BITARTRATE AND ACETAMINOPHEN 5; 325 MG/1; MG/1
1 TABLET ORAL 2 TIMES DAILY PRN
Qty: 60 TABLET | Refills: 0 | Status: SHIPPED | OUTPATIENT
Start: 2022-05-25 | End: 2022-05-27 | Stop reason: SDUPTHER

## 2022-05-25 RX ADMIN — SODIUM CHLORIDE 333.33 ML/HR: 0.9 INJECTION, SOLUTION INTRAVENOUS at 08:17

## 2022-05-25 NOTE — PLAN OF CARE
Problem: Potential for Falls  Goal: Patient will remain free of falls  Description: INTERVENTIONS:    - Keep Call bell within reach  - Keep bed low and locked with side rails adjusted as appropriate  - Keep care items and personal belongings within reach    Outcome: Progressing     Problem: INFECTION - ADULT  Goal: Absence or prevention of progression during hospitalization  Description: INTERVENTIONS:  - Assess and monitor for signs and symptoms of infection  - Monitor lab/diagnostic results  - Monitor all insertion sites, i e  indwelling lines, tubes, and drains  - Monitor endotracheal if appropriate and nasal secretions for changes in amount and color  - [place] appropriate cooling/warming therapies per order  - Administer medications as ordered  - Instruct and encourage patient and family to use good hand hygiene technique  - Identify and instruct in appropriate isolation precautions for identified infection/condition  Outcome: Progressing     Problem: Knowledge Deficit  Goal: Patient/family/caregiver demonstrates understanding of disease process, treatment plan, medications, and discharge instructions  Description: Complete learning assessment and assess knowledge base    Interventions:  - Provide teaching at level of understanding  - Provide teaching via preferred learning methods  Outcome: Progressing

## 2022-05-25 NOTE — PROGRESS NOTES
Pt tolerated IV hydration well without incident and was discharged in stable condition  AVS declined but pt aware of next infusion appointment

## 2022-05-26 RX ORDER — SODIUM CHLORIDE 9 MG/ML
333.33 INJECTION, SOLUTION INTRAVENOUS ONCE
Status: COMPLETED | OUTPATIENT
Start: 2022-05-27 | End: 2022-05-27

## 2022-05-27 ENCOUNTER — OFFICE VISIT (OUTPATIENT)
Dept: PAIN MEDICINE | Facility: CLINIC | Age: 67
End: 2022-05-27
Payer: MEDICARE

## 2022-05-27 ENCOUNTER — HOSPITAL ENCOUNTER (OUTPATIENT)
Dept: INFUSION CENTER | Facility: HOSPITAL | Age: 67
Discharge: HOME | End: 2022-05-27
Payer: MEDICARE

## 2022-05-27 ENCOUNTER — OFFICE VISIT (OUTPATIENT)
Dept: PHYSICAL THERAPY | Facility: CLINIC | Age: 67
End: 2022-05-27
Payer: MEDICARE

## 2022-05-27 VITALS
HEART RATE: 92 BPM | SYSTOLIC BLOOD PRESSURE: 153 MMHG | BODY MASS INDEX: 23.79 KG/M2 | DIASTOLIC BLOOD PRESSURE: 85 MMHG | WEIGHT: 126 LBS | HEIGHT: 61 IN

## 2022-05-27 VITALS
HEART RATE: 80 BPM | TEMPERATURE: 99.2 F | DIASTOLIC BLOOD PRESSURE: 82 MMHG | SYSTOLIC BLOOD PRESSURE: 142 MMHG | RESPIRATION RATE: 18 BRPM

## 2022-05-27 DIAGNOSIS — S46.811D TRAUMATIC TEAR OF SUPRASPINATUS TENDON OF RIGHT SHOULDER, SUBSEQUENT ENCOUNTER: Primary | ICD-10-CM

## 2022-05-27 DIAGNOSIS — G89.4 CHRONIC PAIN DISORDER: Primary | ICD-10-CM

## 2022-05-27 DIAGNOSIS — R52 GENERALIZED PAIN: ICD-10-CM

## 2022-05-27 DIAGNOSIS — M79.18 MYOFASCIAL PAIN SYNDROME: ICD-10-CM

## 2022-05-27 DIAGNOSIS — G03.9 ARACHNOIDITIS: ICD-10-CM

## 2022-05-27 DIAGNOSIS — Z98.890 HISTORY OF LUMBAR SURGERY: ICD-10-CM

## 2022-05-27 PROCEDURE — 96360 HYDRATION IV INFUSION INIT: CPT

## 2022-05-27 PROCEDURE — 97110 THERAPEUTIC EXERCISES: CPT

## 2022-05-27 PROCEDURE — 96361 HYDRATE IV INFUSION ADD-ON: CPT

## 2022-05-27 PROCEDURE — 99214 OFFICE O/P EST MOD 30 MIN: CPT | Performed by: NURSE PRACTITIONER

## 2022-05-27 PROCEDURE — 97140 MANUAL THERAPY 1/> REGIONS: CPT

## 2022-05-27 RX ORDER — HYDROCODONE BITARTRATE AND ACETAMINOPHEN 5; 325 MG/1; MG/1
1 TABLET ORAL 2 TIMES DAILY PRN
Qty: 60 TABLET | Refills: 0 | Status: SHIPPED | OUTPATIENT
Start: 2022-05-27 | End: 2022-07-22 | Stop reason: SDUPTHER

## 2022-05-27 RX ORDER — CYCLOBENZAPRINE HCL 5 MG
5 TABLET ORAL 2 TIMES DAILY PRN
Qty: 60 TABLET | Refills: 2 | Status: SHIPPED | OUTPATIENT
Start: 2022-05-27

## 2022-05-27 RX ADMIN — SODIUM CHLORIDE 333.33 ML/HR: 0.9 INJECTION, SOLUTION INTRAVENOUS at 09:07

## 2022-05-27 NOTE — PROGRESS NOTES
Assessment:  1  Chronic pain disorder    2  Generalized pain    3  Arachnoiditis    4  History of lumbar surgery    5  Myofascial pain syndrome        Plan:  While the patient was in the office today, I did have a thorough conversation regarding their chronic pain syndrome, medication management, and treatment plan options  Patient is being seen for follow-up visit  She was initially seen here for consultation on 04/25/2022  During that visit a baseline drug screen oral swab was obtained in anticipation of taking over her prescription for hydrocodone  Hydrocodone was previously prescribed by Dr Hortencia Mccoy in who recently retired  Patient reports about 30% improvement in her pain between the use of hydrocodone and Flexeril  She denies side effects from these medications  A prescription for hydrocodone was sent to her pharmacy on 05/25/2022  I sent and additional prescription for hydrocodone with a do not fill date of 06/22/2022  Renewed Flexeril 5 mg which he can take twice daily if needed for spasms  There are risks associated with opioid medications, including dependence, addiction and tolerance  The patient understands and agrees to use these medications only as prescribed  Potential side effects of the medications include, but are not limited to, constipation, drowsiness, addiction, impaired judgment and risk of fatal overdose if not taken as prescribed  The patient was warned against driving while taking sedation medications  Sharing medications is a felony  At this point in time, the patient is showing no signs of addiction, abuse, diversion or suicidal ideation  [place] Prescription Drug Monitoring Program report was reviewed and was appropriate     The patient will follow-up in 2 months for medication prescription refill and reevaluation  The patient was advised to contact the office should their symptoms worsen in the interim  The patient was agreeable and verbalized an understanding  History of Present Illness: The patient is a 78 y o  female who presents for a follow up office visit in regards to Back Pain, Neck Pain, Leg Pain, and Arm Pain  The patients current symptoms include complaints of generalized body pain  Current pain level is an 8/10  Quality pain is described as burning, dull, aching, cramping, shooting, numb, pins and needles  Current pain medications includes:  Hydrocodone 5/325 twice daily if needed for pain, Flexeril 5 mg twice daily if needed for spasms  The patient reports that this regimen is providing 30 % pain relief  The patient is reporting no side effects from this pain medication regimen  I have personally reviewed and/or updated the patient's past medical history, past surgical history, family history, social history, current medications, allergies, and vital signs today  Review of Systems  Review of Systems   Constitutional: Negative for fatigue and unexpected weight change  HENT: Negative for dental problem, ear pain, hearing loss and sneezing  Eyes: Negative for visual disturbance  Respiratory: Positive for shortness of breath  Negative for cough and chest tightness  Cardiovascular: Negative for leg swelling  Gastrointestinal: Positive for constipation  Negative for anal bleeding  Endocrine: Negative for heat intolerance  Genitourinary: Negative for flank pain and genital sores  Musculoskeletal: Positive for gait problem  Decreased range of motion  Joint stiffness  Swelling: ankles, face after IV's  Pain in extremity: both arms, legs, numbness and burning   Skin: Negative for wound  Allergic/Immunologic: Negative for immunocompromised state  Neurological: Positive for dizziness  Negative for speech difficulty and light-headedness  Hematological: Negative for adenopathy  Psychiatric/Behavioral: Negative for confusion  The patient is not hyperactive  All other systems reviewed and are negative          Past Medical History:   Diagnosis Date    Allergic rhinitis     Anxiety     Asthma     Back pain     Cardiac disease     Cardiopathy     EF 45%    Cough     Diverticulitis     Factor V Leiden (HCC)     Fibromyalgia     GERD (gastroesophageal reflux disease)     Hashimoto's thyroiditis     Hx of degenerative disc disease     Hypotension     pots - postural orthostatic hypotension    Interstitial cystitis     Irregular heart beat     LBBB    Irritable bowel syndrome     Migraines     Mitral valve disease     "thickening"    Myocardial infarction [facility])     possible but not sure when    Neuropathy     bilateral legs    Osteoporosis     Postural orthostatic tachycardia syndrome     must drink a lot of water and salt    Pott's disease     Rheumatic fever 1967    Scoliosis     Sepsis ([facility] [address] )     associated with PICC line    Sjogren's syndrome ([facility] [address] )     TMJ (dislocation of temporomandibular joint)        Past Surgical History:   Procedure Laterality Date    ABLATION MICROWAVE Left     lumbar area    BACK SURGERY  10/1998     SECTION  1992    CHOLECYSTECTOMY  2016    COLONOSCOPY      DILATION AND CURETTAGE OF UTERUS  1996    EGD      FOOT SURGERY  1968    removal of bone and neuroma    HYSTERECTOMY  1997    age 54  PRICE ooph    IR OTHER  2022    IR PICC PLACEMENT SINGLE LUMEN  10/2/2020    IR PICC PLACEMENT SINGLE LUMEN  2021    IR PICC REPOSITION  2021    LAPAROSCOPY  1996    endometriosis    MD EGD TRANSORAL BIOPSY SINGLE/MULTIPLE N/A 2019    Procedure: ESOPHAGOGASTRODUODENOSCOPY (EGD) with multiple bx and dilation;  Surgeon: Arlette Culp MD;  Location: [address] [place] [address] GI LAB; Service: Gastroenterology    MD SHLDR ARTHROSCOP,SURG,W/ROTAT CUFF REPR Right 2022    Procedure: SHOULDER ARTHROSCOPIC ROTATOR CUFF REPAIR Biceps Tenodisis;   Surgeon: Emily Gentile MD;  Location: [facility] MAIN OR;  Service: Orthopedics    TUNNELED VENOUS CATHETER PLACEMENT  2016       Family History   Problem Relation Age of Onset    Cancer Mother         urinary bladder     Thyroid cancer Sister     Colon cancer Maternal Grandfather     Breast cancer Maternal Aunt         over 48 yrs old     Endometrial cancer Maternal Aunt     Multiple myeloma Maternal Aunt     No Known Problems Father     No Known Problems Daughter     Heart attack Sister     No Known Problems Sister     No Known Problems Sister     No Known Problems Sister     No Known Problems Sister     No Known Problems Daughter     Hypertension Paternal Aunt        Social History     Occupational History    Not on file   Tobacco Use    Smoking status: Never Smoker    Smokeless tobacco: Never Used   Vaping Use    Vaping Use: Never used   Substance and Sexual Activity    Alcohol use: Never    Drug use: No    Sexual activity: Not on file         Current Outpatient Medications:     Alum Hydroxide-Mag Trisilicate (Gaviscon) 12-19 7 MG CHEW, Chew 1 tablet 4 (four) times a day as needed With meals and as needed, Disp: , Rfl:     azelastine (ASTELIN) 0 1 % nasal spray, 2 sprays into each nostril 2 (two) times a day Use in each nostril as directed, Disp: , Rfl:     beclomethasone (QVAR) 40 MCG/ACT inhaler, Inhale 1 puff daily as needed (only when unable to nebulize pulmicort) Rinse mouth after use , Disp: , Rfl:     budesonide (PULMICORT) 0 5 mg/2 mL nebulizer solution, Take 0 5 mg by nebulization 2 (two) times a day Rinse mouth after use , Disp: , Rfl:     cholecalciferol (VITAMIN D3) 1,000 units tablet, Take 1,000 Units by mouth daily , Disp: , Rfl:     cyclobenzaprine (FLEXERIL) 5 mg tablet, Take 1 tablet (5 mg total) by mouth 2 (two) times a day as needed for muscle spasms, Disp: 60 tablet, Rfl: 2    docusate sodium (COLACE) 100 mg capsule, Take 100 mg by mouth daily as needed for constipation, Disp: , Rfl:     famotidine (PEPCID) 10 mg tablet, Take 40 mg by mouth 2 (two) times a day  , Disp: , Rfl:     fexofenadine (ALLEGRA) 180 MG tablet, Take 180 mg by mouth 2 (two) times a day , Disp: , Rfl:     fluticasone (FLONASE) 50 mcg/act nasal spray, 2 sprays into each nostril daily , Disp: , Rfl:     Galcanezumab-gnlm (Emgality) 120 MG/ML SOAJ, Inject under the skin every 30 (thirty) days , Disp: , Rfl:     HYDROcodone-acetaminophen (Norco) 5-325 mg per tablet, Take 1 tablet by mouth 2 (two) times a day as needed for pain for up to 30 doses Do not fill until 6/22/2022 Max Daily Amount: 2 tablets, Disp: 60 tablet, Rfl: 0    hydrOXYzine (ATARAX) 10 mg/5 mL syrup, Take 20 mg by mouth daily at bedtime , Disp: , Rfl:     ipratropium (ATROVENT) 0 03 % nasal spray, 2 sprays into each nostril 4 (four) times a day as needed for rhinitis , Disp: , Rfl:     ivabradine HCl (Corlanor) 5 MG tablet, 2 (two) times a day  , Disp: , Rfl:     levalbuterol (XOPENEX HFA) 45 mcg/act inhaler, Inhale 2 puffs every 4 (four) hours as needed (when unable to nebulize), Disp: , Rfl:     levalbuterol (XOPENEX) 1 25 mg/3 mL nebulizer solution, Take 1 25 mg by nebulization every 6 (six) hours as needed , Disp: , Rfl: 2    Magnesium 400 MG CAPS, Take 1 capsule by mouth 2 (two) times a day , Disp: , Rfl:     midodrine (PROAMATINE) 2 5 mg tablet, , Disp: , Rfl:     midodrine (PROAMATINE) 5 mg tablet, TAKE 2 TABS PO IN AM, ONE TAB PO WITH LUNCH AND DINNER (Patient taking differently: 10 mg 3 (three) times a day One hour after thyroid medication, around 1200 and 1600, TAKE 2 TABS PO IN AM, ONE TAB PO WITH LUNCH AND DINNER), Disp: 120 tablet, Rfl: 11    MULTIPLE VITAMINS-CALCIUM PO, Take 1 capsule by mouth every morning , Disp: , Rfl:     omega-3-acid ethyl esters (LOVAZA) 1 g capsule, Take 2 g by mouth 2 (two) times a day 1600mg daily, Disp: , Rfl:     oxybutynin (DITROPAN) 5 mg tablet, Take 2 5 mg by mouth 2 (two) times a day Once in the morning and one at HS, Disp: , Rfl:     polyethylene glycol (MIRALAX) 17 g packet, Take 17 g by mouth daily as needed , Disp: , Rfl:     Probiotic Product (PROBIOTIC DAILY PO), Take 1 tablet by mouth daily At lunch, Disp: , Rfl:     psyllium (METAMUCIL) 0 52 g capsule, Take 0 52 g by mouth daily, Disp: , Rfl:     Thyroid, Porcine, POWD, Use 32 mg daily, Disp: , Rfl:     Ubrogepant (UBRELVY) 100 MG tablet, Take 100 mg by mouth Take 1 tablet (100 mg) one time as needed for migraine  May repeat one additional tablet (100 mg) at least two hours after the first dose  Do not use more than two doses per day, or for more than eight days per month , Disp: , Rfl:     oxyCODONE (ROXICODONE) 5 immediate release tablet, 1 tablets every 8 hours as needed for severe shoulder pain ONLY  (Patient not taking: Reported on 4/25/2022 ), Disp: 13 tablet, Rfl: 0  No current facility-administered medications for this visit      Facility-Administered Medications Ordered in Other Visits:     sodium chloride 0 9 % infusion, 333 33 mL/hr, Intravenous, Once, Carlton Montoya MD    Allergies   Allergen Reactions    Imipramine Confusion, Fatigue, Irritability, Palpitations, Shortness Of Breath, Tachycardia and Visual Disturbance    Melatonin Shortness Of Breath    Nexium [Esomeprazole] Shortness Of Breath    Nsaids Shortness Of Breath    Singulair [Montelukast] Shortness Of Breath and Cough    Zomig [Zolmitriptan] Shortness Of Breath    Dexilant [Dexlansoprazole] Nausea Only and Vomiting    Albuterol     Ambien [Zolpidem]     Amitriptyline Drowsiness    Aspirin     Bactrim [Sulfamethoxazole-Trimethoprim] Hives    Banana - Food Allergy Dermatitis    Ceftin [Cefuroxime]     Celebrex [Celecoxib]     Ciprofloxacin     Cranberry-C [Ascorbate - Food Allergy] GI Intolerance    Demerol [Meperidine]     Epinephrine Dizziness    Ergotamine Nausea Only and Headache    Keflex [Cephalexin]     Klonopin [Clonazepam] Nausea Only and Dizziness    Levaquin [Levofloxacin]     Lexapro [Escitalopram]     Lyrica [Pregabalin] Fatigue  Macrodantin [Nitrofurantoin]     Morphine Nausea Only    Movantik [Naloxegol] Nausea Only    Mushroom Extract Complex - Food Allergy      Eye itchy, asthma  attack    Naprosyn [Naproxen]     Neurontin [Gabapentin] Dizziness    Pineapple - Food Allergy GI Intolerance    Prolia [Denosumab]     Prozac [Fluoxetine]     Serevent [Salmeterol] Dizziness    Sudafed [Pseudoephedrine] Hives    Sulfa Antibiotics     Tomato - Food Allergy GI Intolerance    Trazodone     Ultram [Tramadol] Nausea Only, Dizziness and Headache    Vioxx [Rofecoxib]     Zithromax [Azithromycin] Hives    Zoloft [Sertraline]     Zantac [Ranitidine] Rash and Dizziness       Physical Exam:    /85   Pulse 92   Ht 5' 1" (1 549 m)   Wt 57 2 kg (126 lb)   BMI 23 81 kg/m²     Constitutional:normal, well developed, well nourished, alert, in no distress and non-toxic and no overt pain behavior  Eyes:anicteric  HEENT:grossly intact  Neck:supple, symmetric, trachea midline and no masses   Pulmonary:even and unlabored  Cardiovascular:No edema or pitting edema present  Skin:Normal without rashes or lesions and well hydrated  Psychiatric:Mood and affect appropriate  Neurologic:Cranial Nerves II-XII grossly intact  Musculoskeletal:normal    Imaging  No orders to display       No orders of the defined types were placed in this encounter

## 2022-05-27 NOTE — PLAN OF CARE
Problem: INFECTION - ADULT  Goal: Absence or prevention of progression during hospitalization  Description: INTERVENTIONS:  - Assess and monitor for signs and symptoms of infection  - Monitor lab/diagnostic results  - Monitor all insertion sites, i e  indwelling lines, tubes, and drains  - Monitor endotracheal if appropriate and nasal secretions for changes in amount and color  - [place] appropriate cooling/warming therapies per order  - Administer medications as ordered  - Instruct and encourage patient and family to use good hand hygiene technique  - Identify and instruct in appropriate isolation precautions for identified infection/condition  Outcome: Progressing     Problem: Knowledge Deficit  Goal: Patient/family/caregiver demonstrates understanding of disease process, treatment plan, medications, and discharge instructions  Description: Complete learning assessment and assess knowledge base    Interventions:  - Provide teaching at level of understanding  - Provide teaching via preferred learning methods  Outcome: Progressing

## 2022-05-27 NOTE — PATIENT INSTRUCTIONS

## 2022-05-27 NOTE — PROGRESS NOTES
Daily Note     Today's date: 2022  Patient name: Aristeo Hurst  :   MRN: [id number]  Referring provider: Mary Kate Garcia  Dx:   Encounter Diagnosis     ICD-10-CM    1  Traumatic tear of supraspinatus tendon of right shoulder, subsequent encounter  S46 785F                   Subjective:  Pt reports pain level = "8"/10 at this present time  Says she did not sleep well last night, and the meds she took earlier this morning have worn off  Objective: See treatment diary below      Assessment: Tolerated treatment Fairly Well overall with performance of ther exer and tolerance to MT (PROM)  Pain level increased to "9"/10 with exer, however decreased to 2/10 at end of session after MHP/CP applications  Plan:  Con't services 2x/week            Precautions: POTS, severe scoliosis,fibromyalgia,GI issues,lumbar spinal cage, cerv DDD, arachnoiditis  Arachnoid cysts  DOS: 22; Start next protocol phase (Week 6): 22      Date  22  5 16 22   Visit Count  18   20 16   17   FOTO Comp    Completed      Pain In -5/10 4/10 8/10  8-9/10 with exer   6/10 5-6/10   Pain Out  after CP applic  after MHP/CP applic  No change during Ther exer or MT     after CP applic           Manuals 22 5 22 5 16 22   PROM R SHLD 10 min  Light, gentle, and to tolerance 10 min 10 min 10min 10 min   Mass RUT                        Neuro Re-Ed 22 22 22 5 16 22   scap retraction **10x/5" 10x5" 15x5"                                                     Ther Ex 22 22 22 5 16 22   Wrist AROM  45 ea D/c to home *HEP x40 45x     Elbow ROM AROM 45x AROM 45x AROM  45x AROM  x40 AROM 45    strengthening digiflex green   30x/3-5"  Ball GREEN 45x/3-5" digiflex lue 30x3-5"  Ball blue 30x3-5" digiflex blue 30x3-5"    Ball blue 30x3-5" digiflex green   30x  Ball green 40x 5" digiflex green 40x/5"  Ball green 40x 5"   cerv AROM cerv AROM 10x5"  UT/LS stretch 20"x3 D/c to home UT/LV St 20"x4  Cerv AROM  10x ea dir cerv AROM 10x5"  UT/LS stretch 20"x2 cerv AROM 10x5"  UT/LS stretch 20"x3   Pendulum ex  45 ea   40x ea  45x 40x ea  45x   Shoulder isometrics (5 way)   5x5" ea 5x5" ea     Supine wand exercises (flex, abd, ER)  x8 flex, x5 abd, *ER NV x8 flex, x5 abd, *ER NV             Table slides  Flex, arc **10x/5" 10x5" 10x5"     Wand ex  Flex, ER, EXT, IR Abd        Wall slides        Pulleys  3 way        UBE        MTPs/LTPs        Bicep curls  3 way        Prone flex, HA, rows, ext        Ther Activity                        Gait Training                        Modalities 5 20 22 5/23/22 5 27 22 5/13/22 5 16 22   CP to R shld 10 min 8 min 10 min Declined  10 min      *MHP cervical/B UT  10 min  No adverse reaction

## 2022-05-31 ENCOUNTER — HOSPITAL ENCOUNTER (OUTPATIENT)
Dept: INFUSION CENTER | Facility: HOSPITAL | Age: 67
Discharge: HOME | End: 2022-05-31
Payer: MEDICARE

## 2022-05-31 ENCOUNTER — OFFICE VISIT (OUTPATIENT)
Dept: PHYSICAL THERAPY | Facility: CLINIC | Age: 67
End: 2022-05-31
Payer: MEDICARE

## 2022-05-31 VITALS
TEMPERATURE: 98.4 F | OXYGEN SATURATION: 98 % | SYSTOLIC BLOOD PRESSURE: 134 MMHG | HEART RATE: 74 BPM | DIASTOLIC BLOOD PRESSURE: 74 MMHG | RESPIRATION RATE: 18 BRPM

## 2022-05-31 DIAGNOSIS — Z98.890 S/P RIGHT ROTATOR CUFF REPAIR: ICD-10-CM

## 2022-05-31 DIAGNOSIS — S46.811D TRAUMATIC TEAR OF SUPRASPINATUS TENDON OF RIGHT SHOULDER, SUBSEQUENT ENCOUNTER: Primary | ICD-10-CM

## 2022-05-31 PROCEDURE — 96361 HYDRATE IV INFUSION ADD-ON: CPT

## 2022-05-31 PROCEDURE — 96360 HYDRATION IV INFUSION INIT: CPT

## 2022-05-31 PROCEDURE — 97140 MANUAL THERAPY 1/> REGIONS: CPT

## 2022-05-31 PROCEDURE — 97110 THERAPEUTIC EXERCISES: CPT

## 2022-05-31 RX ORDER — SODIUM CHLORIDE 9 MG/ML
333.33 INJECTION, SOLUTION INTRAVENOUS ONCE
Status: COMPLETED | OUTPATIENT
Start: 2022-06-01 | End: 2022-06-01

## 2022-05-31 RX ADMIN — SODIUM CHLORIDE 2000 ML: 0.9 INJECTION, SOLUTION INTRAVENOUS at 08:56

## 2022-05-31 NOTE — PROGRESS NOTES
Daily Note     Today's date: 2022  Patient name: Korina Varner  :   MRN: [id number]  Referring provider: Su Garza  Dx:   Encounter Diagnosis     ICD-10-CM    1  Traumatic tear of supraspinatus tendon of right shoulder, subsequent encounter  S46 811D    2  S/P right rotator cuff repair  [id number]                   Subjective: Patient reports she had a very busy weekend doing light activities around the house and she says the R shoulder held up great  She says it's a little achy from the activity, but nothing major  However, her neck and back are very sore from this increased activity  Objective: See treatment diary below      Assessment: Unable to complete TE program today as patient needed to leave right at 8:30 for another appointment  Progress as able  Tolerated treatment well with no significant increase in R shoulder pain  Patient's exercise tolerance limited today by pre-existing neck and back pain  Patient most challenged by wand abduction today  Patient would benefit from continued PT to increase R shoulder strength and mobility for improved function in ADLs  Plan: Continue per plan of care        Precautions: POTS, severe scoliosis,fibromyalgia,GI issues,lumbar spinal cage, cerv DDD, arachnoiditis  Arachnoid cysts  DOS: 22; Start next protocol phase (Week 6): 22      Date  22  5 16 22   Visit Count  18 19  20 21   17   FOTO Comp    Completed      Pain In -5/10 4/10 8/10  8-9/10 with exer 3/10 R shlr, 8/10 nack/back -6/10   Pain Out  after CP applic 3/07 1/09 after MHP/CP applic  No change during Ther exer or MT     after CP applic           Manuals 5 22 5 27 22  5 16 22   PROM R SHLD 10 min  Light, gentle, and to tolerance 10 min 10 min 10 min 10 min   Mass RUT                        Neuro Re-Ed 22 5 27 22  5 16 22   scap retraction **10x/5" 10x5" 15x5" 15x5" Ther Ex 5 20 22 5/23/22 5 27 22  5 16 22   Wrist AROM  45 ea D/c to home *HEP - 45x     Elbow ROM AROM 45x AROM 45x - AROM 45x AROM 45    strengthening digiflex green   30x/3-5"  Ball GREEN 45x/3-5" digiflex lue 30x3-5"  Ball blue 30x3-5" digiflex blue 30x3-5"    Ball blue 30x3-5" -      - digiflex green   40x/5"  Ball green 40x 5"   cerv AROM cerv AROM 10x5"  UT/LS stretch 20"x3 D/c to home UT/LV St 20"x4  Cerv AROM  10x ea dir UT/[facility] 20"x4  Cerv AROM  10x ea dir cerv AROM 10x5"  UT/LS stretch 20"x3   Pendulum ex  45 ea   40x ea  45x 45x ea  45x   Shoulder isometrics (5 way)   5x5" ea 5x5" ea np d/t time    Supine wand exercises (flex, abd, ER)  x8 flex, x5 abd, *ER NV x8 flex, x5 abd, *ER NV Flex, abd, x10 ea, ER 5x            Table slides  Flex, arc **10x/5" 10x5" 10x5" 5"x10    Wand ex  Flex, ER, EXT, IR Abd        Wall slides    *nv    Pulleys  3 way        UBE        MTPs/LTPs        Bicep curls  3 way        Prone flex, HA, rows, ext        Ther Activity                        Gait Training                        Modalities 5 20 22 5/23/22 5 27 22  5 16 22   CP to R shld 10 min 8 min 10 min 10 min 10 min      *MHP cervical/B UT  10 min  No adverse reaction 10 min HP to cervical

## 2022-05-31 NOTE — PLAN OF CARE
Problem: INFECTION - ADULT  Goal: Absence or prevention of progression during hospitalization  Description: INTERVENTIONS:  - Assess and monitor for signs and symptoms of infection  - Monitor lab/diagnostic results  - Monitor all insertion sites, i e  indwelling lines, tubes, and drains  - Monitor endotracheal if appropriate and nasal secretions for changes in amount and color  - [place] appropriate cooling/warming therapies per order  - Administer medications as ordered  - Instruct and encourage patient and family to use good hand hygiene technique  - Identify and instruct in appropriate isolation precautions for identified infection/condition  Outcome: Progressing     Problem: Knowledge Deficit  Goal: Patient/family/caregiver demonstrates understanding of disease process, treatment plan, medications, and discharge instructions  Description: Complete learning assessment and assess knowledge base    Interventions:  - Provide teaching at level of understanding  - Provide teaching via preferred learning methods  Outcome: Progressing

## 2022-06-01 ENCOUNTER — APPOINTMENT (OUTPATIENT)
Dept: LAB | Facility: HOSPITAL | Age: 67
End: 2022-06-01
Payer: MEDICARE

## 2022-06-01 ENCOUNTER — HOSPITAL ENCOUNTER (OUTPATIENT)
Dept: INFUSION CENTER | Facility: HOSPITAL | Age: 67
Discharge: HOME | End: 2022-06-01
Payer: MEDICARE

## 2022-06-01 ENCOUNTER — TELEPHONE (OUTPATIENT)
Dept: OBGYN CLINIC | Facility: CLINIC | Age: 67
End: 2022-06-01

## 2022-06-01 VITALS
DIASTOLIC BLOOD PRESSURE: 95 MMHG | SYSTOLIC BLOOD PRESSURE: 164 MMHG | RESPIRATION RATE: 18 BRPM | OXYGEN SATURATION: 99 % | HEART RATE: 78 BPM | TEMPERATURE: 98.4 F

## 2022-06-01 DIAGNOSIS — J45.40 MODERATE PERSISTENT ASTHMA WITHOUT COMPLICATION: ICD-10-CM

## 2022-06-01 LAB
BASOPHILS # BLD AUTO: 0.04 THOUSANDS/ΜL (ref 0–0.1)
BASOPHILS NFR BLD AUTO: 1 % (ref 0–1)
EOSINOPHIL # BLD AUTO: 0.12 THOUSAND/ΜL (ref 0–0.61)
EOSINOPHIL NFR BLD AUTO: 2 % (ref 0–6)
ERYTHROCYTE [DISTWIDTH] IN BLOOD BY AUTOMATED COUNT: 13.3 % (ref 11.6–15.1)
HCT VFR BLD AUTO: 44.3 % (ref 34.8–46.1)
HGB BLD-MCNC: 14.2 G/DL (ref 11.5–15.4)
IMM GRANULOCYTES # BLD AUTO: 0.03 THOUSAND/UL (ref 0–0.2)
IMM GRANULOCYTES NFR BLD AUTO: 0 % (ref 0–2)
LYMPHOCYTES # BLD AUTO: 2.21 THOUSANDS/ΜL (ref 0.6–4.47)
LYMPHOCYTES NFR BLD AUTO: 28 % (ref 14–44)
MCH RBC QN AUTO: 29.3 PG (ref 26.8–34.3)
MCHC RBC AUTO-ENTMCNC: 32.1 G/DL (ref 31.4–37.4)
MCV RBC AUTO: 92 FL (ref 82–98)
MONOCYTES # BLD AUTO: 0.57 THOUSAND/ΜL (ref 0.17–1.22)
MONOCYTES NFR BLD AUTO: 7 % (ref 4–12)
NEUTROPHILS # BLD AUTO: 4.88 THOUSANDS/ΜL (ref 1.85–7.62)
NEUTS SEG NFR BLD AUTO: 62 % (ref 43–75)
NRBC BLD AUTO-RTO: 0 /100 WBCS
PLATELET # BLD AUTO: 269 THOUSANDS/UL (ref 149–390)
PMV BLD AUTO: 10.6 FL (ref 8.9–12.7)
RBC # BLD AUTO: 4.84 MILLION/UL (ref 3.81–5.12)
WBC # BLD AUTO: 7.85 THOUSAND/UL (ref 4.31–10.16)

## 2022-06-01 PROCEDURE — 85025 COMPLETE CBC W/AUTO DIFF WBC: CPT

## 2022-06-01 PROCEDURE — 96361 HYDRATE IV INFUSION ADD-ON: CPT

## 2022-06-01 PROCEDURE — 82785 ASSAY OF IGE: CPT

## 2022-06-01 PROCEDURE — 96360 HYDRATION IV INFUSION INIT: CPT

## 2022-06-01 PROCEDURE — 36415 COLL VENOUS BLD VENIPUNCTURE: CPT

## 2022-06-01 RX ADMIN — SODIUM CHLORIDE 333.33 ML/HR: 0.9 INJECTION, SOLUTION INTRAVENOUS at 08:50

## 2022-06-02 LAB — TOTAL IGE SMQN RAST: 13.3 KU/L (ref 0–113)

## 2022-06-02 RX ORDER — SODIUM CHLORIDE 9 MG/ML
375 INJECTION, SOLUTION INTRAVENOUS ONCE
Status: COMPLETED | OUTPATIENT
Start: 2022-06-03 | End: 2022-06-03

## 2022-06-02 RX ORDER — SODIUM CHLORIDE 9 MG/ML
333.33 INJECTION, SOLUTION INTRAVENOUS ONCE
Status: DISCONTINUED | OUTPATIENT
Start: 2022-06-03 | End: 2022-06-02

## 2022-06-03 ENCOUNTER — HOSPITAL ENCOUNTER (OUTPATIENT)
Dept: INFUSION CENTER | Facility: HOSPITAL | Age: 67
Discharge: HOME | End: 2022-06-03
Payer: MEDICARE

## 2022-06-03 ENCOUNTER — OFFICE VISIT (OUTPATIENT)
Dept: PHYSICAL THERAPY | Facility: CLINIC | Age: 67
End: 2022-06-03
Payer: MEDICARE

## 2022-06-03 VITALS
HEART RATE: 97 BPM | RESPIRATION RATE: 16 BRPM | SYSTOLIC BLOOD PRESSURE: 142 MMHG | DIASTOLIC BLOOD PRESSURE: 83 MMHG | TEMPERATURE: 97.8 F

## 2022-06-03 DIAGNOSIS — Z98.890 S/P RIGHT ROTATOR CUFF REPAIR: ICD-10-CM

## 2022-06-03 DIAGNOSIS — S46.811D TRAUMATIC TEAR OF SUPRASPINATUS TENDON OF RIGHT SHOULDER, SUBSEQUENT ENCOUNTER: Primary | ICD-10-CM

## 2022-06-03 PROCEDURE — 97110 THERAPEUTIC EXERCISES: CPT

## 2022-06-03 PROCEDURE — 97140 MANUAL THERAPY 1/> REGIONS: CPT

## 2022-06-03 PROCEDURE — 96361 HYDRATE IV INFUSION ADD-ON: CPT

## 2022-06-03 PROCEDURE — 96360 HYDRATION IV INFUSION INIT: CPT

## 2022-06-03 RX ORDER — SODIUM CHLORIDE 9 MG/ML
375 INJECTION, SOLUTION INTRAVENOUS ONCE
Status: COMPLETED | OUTPATIENT
Start: 2022-06-06 | End: 2022-06-06

## 2022-06-03 RX ADMIN — SODIUM CHLORIDE 375 ML/HR: 0.9 INJECTION, SOLUTION INTRAVENOUS at 09:14

## 2022-06-03 NOTE — PROGRESS NOTES
Daily Note     Today's date: 6/3/2022  Patient name: Magdiel Kyle  :   MRN: [id number]  Referring provider: Eder Cunningham  Dx:   Encounter Diagnosis     ICD-10-CM    1  Traumatic tear of supraspinatus tendon of right shoulder, subsequent encounter  S46 811D    2  S/P right rotator cuff repair  [id number]                   Subjective: Patient reports she was in bed all day yesterday with a bad migraine  She says she still has quite a bit of soreness with AROM throughout the day  She reports that she sees improvements with PT regarding ROM and strength in simply feeling her arm is not as heavy as previously  Patient states she hasn't taken pain meds since 8:30pm as she had to drive this morning  Objective: See treatment diary below      Assessment: Tolerated treatment fair  Patient continues to be challenged by [facility] with moderate limitation in ROM in all directions especially abduction  Patient significantly challenged by wall slide additions today  Poor tolerance to PROM with moderate apprehension noted throuhgout  Patient would benefit from continued PT to increase R shoulder strength and ROM as able per protocol for improved function in ADLs  Plan: Continue per plan of care        Precautions: POTS, severe scoliosis,fibromyalgia,GI issues,lumbar spinal cage, cerv DDD, arachnoiditis  Arachnoid cysts  DOS: 22; Start next protocol phase (Week 6): 22      Date  5 20 22 5/23/22   5 27 22 5/31 6/3   Visit Count  18 19  20 21 22   FOTO Comp    Completed      Pain In 4-5/10 4/10 8/10  8-9/10 with exer 3/10 R shlr, 8/10 nack/back 4/10   Pain Out 033 after CP applic 3/58 9/87 after MHP/CP applic  same           Manuals 22 6/3   PROM R SHLD 10 min  Light, gentle, and to tolerance 10 min 10 min 10 min 10 min   Mass RUT                        Neuro Re-Ed 22     scap retraction **10x/5" 10x5" 15x5" 15x5" 15x5" Ther Ex 5 20 22 5/23/22 5 27 22     Wrist AROM  45 ea D/c to home *HEP -    Elbow ROM AROM 45x AROM 45x - AROM 45x AROM 45x    strengthening digiflex green   30x/3-5"  Ball GREEN 45x/3-5" digiflex lue 30x3-5"  Ball blue 30x3-5" digiflex blue 30x3-5"    Ball blue 30x3-5" -      -    cerv AROM cerv AROM 10x5"  UT/LS stretch 20"x3 D/c to home [facility] 20"x4  Cerv AROM  10x ea dir UT/[facility] 20"x4  Cerv AROM  10x ea dir UT/[facility] 20"x4  Cerv AROM  10x ea dir   Pendulum ex  45 ea   40x ea  45x 45x ea 45x ea   Shoulder isometrics (5 way)   5x5" ea 5x5" ea np d/t time 5x5" ea   Supine wand exercises (flex, abd, ER)  x8 flex, x5 abd, *ER NV x8 flex, x5 abd, *ER NV Flex, abd, x10 ea, ER 5x 10x ea           Table slides  Flex, arc **10x/5" 10x5" 10x5" 5"x10 5"x10   Wand ex  Flex, ER, EXT, IR Abd        Wall slides    *nv 2 UE Flex 5"x10, abd 5"x5   Pulleys  3 way     3' flex   UBE        MTPs/LTPs        Bicep curls  3 way        Prone flex, HA, rows, ext        Ther Activity                        Gait Training                        Modalities 5 20 22 5/23/22 5 27 22     CP to R shld 10 min 8 min 10 min 10 min  5 min post      *MHP cervical/B UT  10 min  No adverse reaction 10 min HP to cervical 5 min post

## 2022-06-06 ENCOUNTER — HOSPITAL ENCOUNTER (OUTPATIENT)
Dept: INFUSION CENTER | Facility: HOSPITAL | Age: 67
Discharge: HOME | End: 2022-06-06
Payer: MEDICARE

## 2022-06-06 ENCOUNTER — OFFICE VISIT (OUTPATIENT)
Dept: PHYSICAL THERAPY | Facility: CLINIC | Age: 67
End: 2022-06-06
Payer: MEDICARE

## 2022-06-06 VITALS
OXYGEN SATURATION: 97 % | TEMPERATURE: 97.5 F | SYSTOLIC BLOOD PRESSURE: 148 MMHG | RESPIRATION RATE: 16 BRPM | HEART RATE: 95 BPM | DIASTOLIC BLOOD PRESSURE: 70 MMHG

## 2022-06-06 DIAGNOSIS — S46.811D TRAUMATIC TEAR OF SUPRASPINATUS TENDON OF RIGHT SHOULDER, SUBSEQUENT ENCOUNTER: Primary | ICD-10-CM

## 2022-06-06 DIAGNOSIS — Z98.890 S/P RIGHT ROTATOR CUFF REPAIR: ICD-10-CM

## 2022-06-06 PROCEDURE — 96361 HYDRATE IV INFUSION ADD-ON: CPT

## 2022-06-06 PROCEDURE — 96360 HYDRATION IV INFUSION INIT: CPT

## 2022-06-06 PROCEDURE — 97140 MANUAL THERAPY 1/> REGIONS: CPT | Performed by: PHYSICAL THERAPIST

## 2022-06-06 PROCEDURE — 97110 THERAPEUTIC EXERCISES: CPT | Performed by: PHYSICAL THERAPIST

## 2022-06-06 RX ADMIN — SODIUM CHLORIDE 375 ML/HR: 0.9 INJECTION, SOLUTION INTRAVENOUS at 08:46

## 2022-06-06 NOTE — PLAN OF CARE
Problem: INFECTION - ADULT  Goal: Absence or prevention of progression during hospitalization  Description: INTERVENTIONS:  - Assess and monitor for signs and symptoms of infection  - Monitor lab/diagnostic results  - Monitor all insertion sites, i e  indwelling lines, tubes, and drains  - Monitor endotracheal if appropriate and nasal secretions for changes in amount and color  - [place] appropriate cooling/warming therapies per order  - Administer medications as ordered  - Instruct and encourage patient and family to use good hand hygiene technique  - Identify and instruct in appropriate isolation precautions for identified infection/condition  Outcome: Progressing     Problem: Knowledge Deficit  Goal: Patient/family/caregiver demonstrates understanding of disease process, treatment plan, medications, and discharge instructions  Description: Complete learning assessment and assess knowledge base    Interventions:  - Provide teaching at level of understanding  - Provide teaching via preferred learning methods  Outcome: Progressing

## 2022-06-06 NOTE — PROGRESS NOTES
Daily Note     Today's date: 2022  Patient name: Oscar Miller  :   MRN: [id number]  Referring provider: Gladys Rosenberg  Dx:   Encounter Diagnosis     ICD-10-CM    1  Traumatic tear of supraspinatus tendon of right shoulder, subsequent encounter  S46 811D    2  S/P right rotator cuff repair  [id number]                   Subjective: Pt reports that she was doing well until Saturday when she had to unload groceries and she utilized her surgical arm to support the grocery and feels she has over done it because she has had increased sharp pains ever since Saturday  Objective: See treatment diary below      Assessment: Due to patient's increased pain since over the weekend focused on improving PROM and decreasing sx' irritability  Pt demonstrated empty end feels in all directions with PROM about 90 deg with abduction, 110 flexion and 45 ER/IR  She did require gentle mobilizations to decrease pain this visit  Instructed patient on increasing the reps of table slides and showed them in flexion, scaption, abduction, & ER  She was also instructed to increase the frequency of her stretching to 5 or 6 times t/o the day to improve her ROM  Due to increased did not add other 8 week exercises into program  Will add into program as able to  Plan: Continue per plan of care  Progress treament per protocol        Precautions: POTS, severe scoliosis,fibromyalgia,GI issues,lumbar spinal cage, cerv DDD, arachnoiditis  Arachnoid cysts  DOS: 22; Start next protocol phase (Week 6): 22      Date 6/4    5 27 22 5/31 6/3   Visit Count 23   20 21 22   FOTO        Pain In   8/10  8-9/10 with exer 3/10 R shlr, 8/10 nack/back 4/10   Pain Out   2/10 after MHP/CP applic  same           Manuals 6/4  5 27 22 5/31 6/3   PROM R SHLD KB  10 min 10 min 10 min   Mass RUT        Supine gentle GH post/inf for pain KB gr 1-2                Neuro Re-Ed   22     scap retraction   15x5" 15x5" 15x5" Ther Ex   5 27 22     Wrist AROM   *HEP -    Elbow ROM   - AROM 45x AROM 45x    strengthening   digiflex blue 30x3-5"    Ball blue 30x3-5" -      -    cerv AROM   UT/LV [facility] 20"x4  Cerv AROM  10x ea dir UT/[facility] 20"x4  Cerv AROM  10x ea dir UT/[facility] 20"x4  Cerv AROM  10x ea dir   Pendulum ex HEP   45x 45x ea 45x ea   Shoulder isometrics (5 way)    5x5" ea np d/t time 5x5" ea   Supine wand exercises (flex, abd, ER)   x8 flex, x5 abd, *ER NV Flex, abd, x10 ea, ER 5x 10x ea           Table slides  Flex, arc 5" x30 flex, scap, abd, ER  10x5" 5"x10 5"x10   Wand ex  Flex, ER, EXT, IR Abd        Wall slides    *nv 2 UE Flex 5"x10, abd 5"x5   Pulleys  3 way 3' flex    3' flex   UBE        MTPs/LTPs        Bicep curls  3 way        Prone flex, HA, rows, ext NV if able        Side lying ER & ABD NV if able        Ther Activity                        Gait Training                        Modalities   5 27 22     CP to R shld   10 min 10 min  5 min post      *MHP cervical/B UT  10 min  No adverse reaction 10 min HP to cervical 5 min post

## 2022-06-07 RX ORDER — SODIUM CHLORIDE 9 MG/ML
375 INJECTION, SOLUTION INTRAVENOUS ONCE
Status: COMPLETED | OUTPATIENT
Start: 2022-06-08 | End: 2022-06-08

## 2022-06-08 ENCOUNTER — HOSPITAL ENCOUNTER (OUTPATIENT)
Dept: INFUSION CENTER | Facility: HOSPITAL | Age: 67
Discharge: HOME | End: 2022-06-08
Payer: MEDICARE

## 2022-06-08 ENCOUNTER — EVALUATION (OUTPATIENT)
Dept: PHYSICAL THERAPY | Facility: CLINIC | Age: 67
End: 2022-06-08
Payer: MEDICARE

## 2022-06-08 VITALS
DIASTOLIC BLOOD PRESSURE: 80 MMHG | SYSTOLIC BLOOD PRESSURE: 166 MMHG | TEMPERATURE: 97.5 F | OXYGEN SATURATION: 97 % | RESPIRATION RATE: 16 BRPM | HEART RATE: 95 BPM

## 2022-06-08 DIAGNOSIS — S46.811D TRAUMATIC TEAR OF SUPRASPINATUS TENDON OF RIGHT SHOULDER, SUBSEQUENT ENCOUNTER: ICD-10-CM

## 2022-06-08 DIAGNOSIS — Z98.890 S/P RIGHT ROTATOR CUFF REPAIR: Primary | ICD-10-CM

## 2022-06-08 PROCEDURE — 96360 HYDRATION IV INFUSION INIT: CPT

## 2022-06-08 PROCEDURE — 97110 THERAPEUTIC EXERCISES: CPT | Performed by: PHYSICAL THERAPIST

## 2022-06-08 PROCEDURE — 97140 MANUAL THERAPY 1/> REGIONS: CPT | Performed by: PHYSICAL THERAPIST

## 2022-06-08 PROCEDURE — 96361 HYDRATE IV INFUSION ADD-ON: CPT

## 2022-06-08 RX ORDER — SODIUM CHLORIDE 9 MG/ML
375 INJECTION, SOLUTION INTRAVENOUS ONCE
Status: COMPLETED | OUTPATIENT
Start: 2022-06-09 | End: 2022-06-09

## 2022-06-08 RX ADMIN — SODIUM CHLORIDE 375 ML/HR: 0.9 INJECTION, SOLUTION INTRAVENOUS at 09:02

## 2022-06-08 NOTE — PLAN OF CARE
Problem: Knowledge Deficit  Goal: Patient/family/caregiver demonstrates understanding of disease process, treatment plan, medications, and discharge instructions  Description: Complete learning assessment and assess knowledge base    Interventions:  - Provide teaching at level of understanding  - Provide teaching via preferred learning methods  Outcome: Progressing     Problem: SAFETY ADULT  Goal: Patient will remain free of falls  Description: INTERVENTIONS:  - Educate patient/family on patient safety including physical limitations  - Instruct patient to call for assistance with activity   - Consult OT/PT to assist with strengthening/mobility   - Keep Call bell within reach  - Keep bed low and locked with side rails adjusted as appropriate  - Keep care items and personal belongings within reach  - Initiate and maintain comfort rounds  - Make Fall Risk Sign visible to staff    - Apply yellow socks and bracelet for high fall risk patients  - Consider moving patient to room near nurses station  Outcome: Progressing

## 2022-06-08 NOTE — PROGRESS NOTES
Hydrated as per orders  Peripheral iv discontinued jelco intact   Discharged ambulatory deferring avs

## 2022-06-08 NOTE — PROGRESS NOTES
Progress Note     Today's date: 2022  Patient name: Marianna Rodney  :   MRN: [id number]  Referring provider: Mehreen Rodas  Dx:   Encounter Diagnosis     ICD-10-CM    1  Traumatic tear of supraspinatus tendon of right shoulder, subsequent encounter  S46 811D    2  S/P right rotator cuff repair  [id number]                     Assessment: Progress note completed this session and pt is currently week 9 post op  She demo significant improvement in R [address] PROM compared to last session and also had subjective report of improved mobility and UE function with her ADLs and iADLs  Pt is currently in week 9 of post-op protocol so MMT assessment held  She is progressing well with PT and would benefit from continued PT to maximize function and return to PLOF  Subjective: Started doing more exercises so flared up shoulder last session  Has been really sore  Right now feeling good though  Cannot get arm up all the way yet  Making progress  No pain currently  Will be seeing surgeon on Tuesday  Plan  Patient would benefit from: skilled physical therapy  Planned modality interventions: cryotherapy  Other planned modality interventions: Modalities prn for symptom management  Planned therapy interventions: manual therapy, neuromuscular re-education, therapeutic activities, therapeutic exercise, strengthening, stretching and home exercise program  Frequency: 2x week for 12 weeks   Plan of Care beginning date: 2022  Plan of Care expiration date: 2022    Goals  STGs to be achieved in 4 weeks:  1  Pt to demonstrate reduced subjective pain rating "at worst" by at least 2-3 points from Initial Eval in order to allow for reduced pain with ADLs and improved functional activity tolerance  Progressing, continue  2  Pt to demonstrate improved PROM of R shld by 20* in order to maximize joint mobility and function and allow for progression of exercise program and achievement of goals   Progressing, continue  3  Pt to improve wrist and elbow strength to 5/5 in order to improve function RUE  Progressing, continue    LTGs to be achieved in 6-8 weeks:  1  Pt will be I with HEP in order to continue to improve quality of life and independence and reduce risk for re-injury  Progressing, continue  2  Pt to demonstrate return to [facility] chores and self care without limitations or restrictions  Progressing, continue  3  Pt to demonstrate improved function as noted by achieving or exceeding predicted score on FOTO outcomes assessment tool  Progressing, continue   4  Pt to demonstrate increased AROM of R shld by at least 5-10 degrees in order to allow for greater ease and independence with ADLs and functional mobility  MET  5  Pt to demonstrate increased MMT of RUE by at least 1/2-1 grade in order to improve safety and stability with ADLs and functional mobility  Progressing, continue      Objective     Active Range of Motion     6/8/22:   Right Shoulder   Flexion: 108 degrees   Extension: 42 degrees   Abduction: 82 degrees   External rotation 45°: 40 degrees   Internal rotation 45°: 55 degrees     Pre-surgery measurements  Right Shoulder   Flexion: 50 degrees   Extension: 31 degrees   Abduction: 55 degrees   External rotation 45°: 57 degrees   Internal rotation 45°: 58 degrees     Additional Active Range of Motion Details  4/8/22: Measurements are pre-surgery  NO AROM allowed at this time as per protocol  5/6/22: Measurements are pre-surgery   NO AROM allowed at this time as per protocol     Passive Range of Motion    6/8/22:  Right Shoulder   Flexion: 132 degrees   Abduction: 105 degrees   External rotation 0°: 55 degrees   Internal rotation 0°: 52 to body       5/6/22:  Right Shoulder   Flexion: 90 degrees   Abduction: 60 degrees   External rotation 0°: 16 degrees   Internal rotation 0°: to body        Strength/Myotome Testing     Right Shoulder      Planes of Motion   Flexion: 2   Extension: 3   Abduction: 2   External rotation at 45°: 2+   Internal rotation at 45°: 3-     Additional Strength Details  4/8/22: Strength assessments are pre-surgical, pt not allowed AROM or MMT at this time as per protocol  Will re-assess when appropriate  5/6/22:  Strength assessments are pre-surgical, pt not allowed AROM or MMT at this time as per protocol  Will re-assess when appropriate  6/8/22:  Strength assessments are pre-surgical, pt not allowed AROM or MMT at this time as per protocol    Will re-assess when appropriate        Precautions: POTS, severe scoliosis,fibromyalgia,GI issues,lumbar spinal cage, cerv DDD, arachnoiditis  Arachnoid cysts  DOS: 4/6/22; (Week 9): 5/18/22      Date 6/4 6/8   5 27 22 5/31 6/3   Visit Count 23 24  20 21 22   FOTO        Pain In   8/10  8-9/10 with exer 3/10 R shlr, 8/10 nack/back 4/10   Pain Out   2/10 after MHP/CP applic  same           Manuals 6/4 6/8 5 27 22 5/31 6/3   PROM R SHLD KB SL 10 min  10 min 10 min 10 min   Mass RUT        Supine gentle GH post/inf for pain KB gr 1-2                Neuro Re-Ed   5 27 22     scap retraction   15x5" 15x5" 15x5"                   Ther Ex   5 27 22     Wrist AROM   *HEP -    Elbow ROM   - AROM 45x AROM 45x    strengthening   digiflex blue 30x3-5"    Ball blue 30x3-5" -      -    cerv AROM   UT/LV [facility] 20"x4  Cerv AROM  10x ea dir UT/[facility] 20"x4  Cerv AROM  10x ea dir UT/[facility] 20"x4  Cerv AROM  10x ea dir   Pendulum ex HEP   45x 45x ea 45x ea   Shoulder isometrics (5 way)    5x5" ea np d/t time 5x5" ea   Supine wand exercises (flex, abd, ER)   x8 flex, x5 abd, *ER NV Flex, abd, x10 ea, ER 5x 10x ea           Table slides  Flex, arc 5" x30 flex, scap, abd, ER  10x5" 5"x10 5"x10   Wand ex  Flex, ER, EXT, IR Abd        Wall slides    *nv 2 UE Flex 5"x10, abd 5"x5   Pulleys  3 way 3' flex    3' flex   UBE        MTPs/LTPs        Bicep curls  3 way        Prone flex, HA, rows, ext NV if able        Side lying ER & ABD NV if able        Ther Activity Gait Training                        Modalities   5 27 22     CP to R shld   10 min 10 min  5 min post      *MHP cervical/B UT  10 min  No adverse reaction 10 min HP to cervical 5 min post

## 2022-06-09 ENCOUNTER — HOSPITAL ENCOUNTER (OUTPATIENT)
Dept: INFUSION CENTER | Facility: HOSPITAL | Age: 67
Discharge: HOME | End: 2022-06-09
Payer: MEDICARE

## 2022-06-09 VITALS
RESPIRATION RATE: 16 BRPM | DIASTOLIC BLOOD PRESSURE: 82 MMHG | HEART RATE: 78 BPM | TEMPERATURE: 97.2 F | SYSTOLIC BLOOD PRESSURE: 153 MMHG | OXYGEN SATURATION: 97 %

## 2022-06-09 PROCEDURE — 96360 HYDRATION IV INFUSION INIT: CPT

## 2022-06-09 PROCEDURE — 96361 HYDRATE IV INFUSION ADD-ON: CPT

## 2022-06-09 RX ADMIN — SODIUM CHLORIDE 375 ML/HR: 0.9 INJECTION, SOLUTION INTRAVENOUS at 09:39

## 2022-06-10 ENCOUNTER — HOSPITAL ENCOUNTER (OUTPATIENT)
Dept: INFUSION CENTER | Facility: HOSPITAL | Age: 67
Discharge: HOME | End: 2022-06-10
Payer: MEDICARE

## 2022-06-10 VITALS
RESPIRATION RATE: 16 BRPM | HEART RATE: 82 BPM | SYSTOLIC BLOOD PRESSURE: 162 MMHG | DIASTOLIC BLOOD PRESSURE: 79 MMHG | OXYGEN SATURATION: 99 % | TEMPERATURE: 97.3 F

## 2022-06-10 PROCEDURE — 96361 HYDRATE IV INFUSION ADD-ON: CPT

## 2022-06-10 PROCEDURE — 96360 HYDRATION IV INFUSION INIT: CPT

## 2022-06-10 RX ADMIN — SODIUM CHLORIDE 1500 ML: 0.9 INJECTION, SOLUTION INTRAVENOUS at 08:48

## 2022-06-13 ENCOUNTER — OFFICE VISIT (OUTPATIENT)
Dept: PHYSICAL THERAPY | Facility: CLINIC | Age: 67
End: 2022-06-13
Payer: MEDICARE

## 2022-06-13 ENCOUNTER — HOSPITAL ENCOUNTER (OUTPATIENT)
Dept: INFUSION CENTER | Facility: HOSPITAL | Age: 67
Discharge: HOME | End: 2022-06-13
Payer: MEDICARE

## 2022-06-13 VITALS
TEMPERATURE: 97.6 F | RESPIRATION RATE: 16 BRPM | HEART RATE: 107 BPM | SYSTOLIC BLOOD PRESSURE: 136 MMHG | DIASTOLIC BLOOD PRESSURE: 99 MMHG

## 2022-06-13 DIAGNOSIS — S46.811D TRAUMATIC TEAR OF SUPRASPINATUS TENDON OF RIGHT SHOULDER, SUBSEQUENT ENCOUNTER: Primary | ICD-10-CM

## 2022-06-13 PROCEDURE — 97112 NEUROMUSCULAR REEDUCATION: CPT

## 2022-06-13 PROCEDURE — 97110 THERAPEUTIC EXERCISES: CPT

## 2022-06-13 PROCEDURE — 96361 HYDRATE IV INFUSION ADD-ON: CPT

## 2022-06-13 PROCEDURE — 97140 MANUAL THERAPY 1/> REGIONS: CPT

## 2022-06-13 PROCEDURE — 96360 HYDRATION IV INFUSION INIT: CPT

## 2022-06-13 RX ORDER — SODIUM CHLORIDE 9 MG/ML
375 INJECTION, SOLUTION INTRAVENOUS ONCE
Status: COMPLETED | OUTPATIENT
Start: 2022-06-15 | End: 2022-06-15

## 2022-06-13 RX ADMIN — SODIUM CHLORIDE 1500 ML: 0.9 INJECTION, SOLUTION INTRAVENOUS at 08:43

## 2022-06-13 NOTE — PROGRESS NOTES
Daily Note     Today's date: 2022  Patient name: Benito Macias  :   MRN: [id number]  Referring provider: Edith Noel  Dx:   Encounter Diagnosis     ICD-10-CM    1  Traumatic tear of supraspinatus tendon of right shoulder, subsequent encounter  S46 151C                    Subjective:  Pt  Denies any pain @ R shoulder, but rather describes sx of "soreness/achiness" @ R bicep and pec  regions  Objective: See treatment diary below      Assessment: Tolerated treatment Fairly Well overall with performance of ther exer and tolerance to MT   Pt  with less sx overall by end of treatment session, as per her feedback  Pt  progressing consistently and within protocol/guidelines  Plan:  Pt will follow-up w/Dr Tierney Coronado tomorrow  Con't services 2x/week as directed            Precautions: POTS, severe scoliosis,fibromyalgia,GI issues,lumbar spinal cage, cerv DDD, arachnoiditis  Arachnoid cysts  DOS: 22; (Week 9): 22      Date   6 13 22 5/31 6/3   Visit Count 23 24  25 21 22   FOTO        Pain In   "sore/achey" 3/10 R shlr, 8/10 nack/back 4/10   Pain Out   Less sx  same           Manuals  6 13 22 5/31 6/3   PROM R SHLD KB SL 10 min  MJD  10 min 10 min 10 min   Mass RUT        Supine gentle GH post/inf for pain KB gr 1-2                Neuro Re-Ed   6 13 22     scap retraction   20x5" 15x5" 15x5"                   Ther Ex   6 13 22     Wrist AROM   *HEP -    Elbow ROM   AROM 45x   AROM 45x AROM 45x    strengthening     _________ -      -    cerv AROM   UT/LV St 20"x4  Cerv AROM  20x ea dir UT/[facility] 20"x4  Cerv AROM  10x ea dir UT/[facility] 20"x4  Cerv AROM  10x ea dir   Pendulum ex HEP   45x ea 45x ea   Shoulder isometrics (5 way)    7x5" ea np d/t time 5x5" ea   Supine wand exercises (flex, abd, ER)   x10 flex, x5 abd, *ER NV Flex, abd, x10 ea, ER 5x 10x ea           Table slides  Flex, arc 5" x30 flex, scap, abd, ER  5" x30 flex, scap, abd, ER 5"x10 5"x10   Wand ex  Flex, ER, EXT, IR Abd        Wall slides   2 UE Flex 5"x10,   abd 5"x5 *nv 2 UE Flex 5"x10, abd 5"x5   Pulleys  3 way 3' flex  3 min Flex  3' flex   UBE        MTPs/LTPs   *Upcoming       Bicep curls  3 way   *Upcoming     Prone flex, HA, rows, ext NV if able   **R 10x ea     Side lying ER & ABD NV if able   **R 20x ea     Ther Activity                        Gait Training                        Modalities   6 13 22     CP to R shld   10 min   10 min  5 min post      *MHP cervical/B UT  10 min  No adverse reaction 10 min HP to cervical 5 min post

## 2022-06-14 ENCOUNTER — OFFICE VISIT (OUTPATIENT)
Dept: OBGYN CLINIC | Facility: OTHER | Age: 67
End: 2022-06-14

## 2022-06-14 VITALS
HEART RATE: 87 BPM | BODY MASS INDEX: 23.79 KG/M2 | HEIGHT: 61 IN | SYSTOLIC BLOOD PRESSURE: 151 MMHG | DIASTOLIC BLOOD PRESSURE: 80 MMHG | WEIGHT: 126 LBS

## 2022-06-14 DIAGNOSIS — S46.811D TRAUMATIC TEAR OF SUPRASPINATUS TENDON OF RIGHT SHOULDER, SUBSEQUENT ENCOUNTER: Primary | ICD-10-CM

## 2022-06-14 PROCEDURE — 99024 POSTOP FOLLOW-UP VISIT: CPT | Performed by: ORTHOPAEDIC SURGERY

## 2022-06-14 NOTE — PROGRESS NOTES
Assessment:  1  Traumatic tear of supraspinatus tendon of right shoulder, subsequent encounter           Surgery: SHOULDER ARTHROSCOPIC ROTATOR CUFF REPAIR Biceps Tenodisis - Right  Date of Surgery: 4/6/2022        Discussion and Plan:   · Patient is progressing nicely on examination today  · Continue physical therapy per protocol   · Discussed I see no issue regarding her right shoulder rehab or progress with the patient having a port placed at this time  It does appear she does need venous access for the infusions she is receiving and given her progress, lack of pain and amount of time since surgery I feel it is safe for them to consider placing a new access port in the right subclavian vein    Follow Up:  Return if symptoms worsen or fail to improve  CHIEF COMPLAINT:  Chief Complaint   Patient presents with    Right Shoulder - Post-op         SUBJECTIVE:  Barrera German is a 78y o  year old female who presents for follow up after SHOULDER ARTHROSCOPIC ROTATOR CUFF REPAIR Biceps Tenodisis - Right  Today patient states overall she is doing well        PHYSICAL EXAMINATION:  General: well developed and well nourished, alert, oriented times 3 and appears comfortable  Psychiatric: Normal    MUSCULOSKELETAL EXAMINATION:  Incision: Clean, dry, intact and healed  Surgery Site: normal, no evidence of infection   Range of Motion: As expected with good resistance   Neurovascular status: Neuro intact, good cap refill  Activity Restrictions: No restrictions      Scribe Attestation    I,:  Hieu Nolan am acting as a scribe while in the presence of the attending physician :       I,:  Fransisca Perkins MD personally performed the services described in this documentation    as scribed in my presence :

## 2022-06-14 NOTE — H&P (VIEW-ONLY)
Assessment:  1  Traumatic tear of supraspinatus tendon of right shoulder, subsequent encounter           Surgery: SHOULDER ARTHROSCOPIC ROTATOR CUFF REPAIR Biceps Tenodisis - Right  Date of Surgery: 4/6/2022        Discussion and Plan:   · Patient is progressing nicely on examination today  · Continue physical therapy per protocol   · Discussed I see no issue regarding her right shoulder rehab or progress with the patient having a port placed at this time  It does appear she does need venous access for the infusions she is receiving and given her progress, lack of pain and amount of time since surgery I feel it is safe for them to consider placing a new access port in the right subclavian vein    Follow Up:  Return if symptoms worsen or fail to improve  CHIEF COMPLAINT:  Chief Complaint   Patient presents with    Right Shoulder - Post-op         SUBJECTIVE:  Lieutenant Vargas is a 78y o  year old female who presents for follow up after SHOULDER ARTHROSCOPIC ROTATOR CUFF REPAIR Biceps Tenodisis - Right  Today patient states overall she is doing well        PHYSICAL EXAMINATION:  General: well developed and well nourished, alert, oriented times 3 and appears comfortable  Psychiatric: Normal    MUSCULOSKELETAL EXAMINATION:  Incision: Clean, dry, intact and healed  Surgery Site: normal, no evidence of infection   Range of Motion: As expected with good resistance   Neurovascular status: Neuro intact, good cap refill  Activity Restrictions: No restrictions      Scribe Attestation    I,:  Beverley Hendrickson am acting as a scribe while in the presence of the attending physician :       I,:  Angel Stauffer MD personally performed the services described in this documentation    as scribed in my presence :

## 2022-06-15 ENCOUNTER — HOSPITAL ENCOUNTER (OUTPATIENT)
Dept: INFUSION CENTER | Facility: HOSPITAL | Age: 67
Discharge: HOME | End: 2022-06-15
Payer: MEDICARE

## 2022-06-15 VITALS
RESPIRATION RATE: 16 BRPM | SYSTOLIC BLOOD PRESSURE: 142 MMHG | TEMPERATURE: 98.4 F | OXYGEN SATURATION: 99 % | DIASTOLIC BLOOD PRESSURE: 89 MMHG | HEART RATE: 92 BPM

## 2022-06-15 DIAGNOSIS — E87.8 IMPAIRED HYDRATION: Primary | ICD-10-CM

## 2022-06-15 PROCEDURE — 96360 HYDRATION IV INFUSION INIT: CPT

## 2022-06-15 PROCEDURE — 96361 HYDRATE IV INFUSION ADD-ON: CPT

## 2022-06-15 RX ORDER — SODIUM CHLORIDE 9 MG/ML
375 INJECTION, SOLUTION INTRAVENOUS ONCE
Status: COMPLETED | OUTPATIENT
Start: 2022-06-16 | End: 2022-06-16

## 2022-06-15 RX ADMIN — SODIUM CHLORIDE 375 ML/HR: 0.9 INJECTION, SOLUTION INTRAVENOUS at 08:49

## 2022-06-15 NOTE — PROGRESS NOTES
Patient arrived to unit without complaint  Patient tolerated IV hydration over 4 hours as ordered without incident  AVS declined and patient aware of next appointment  Patient left in stable condition

## 2022-06-16 ENCOUNTER — HOSPITAL ENCOUNTER (OUTPATIENT)
Dept: INFUSION CENTER | Facility: HOSPITAL | Age: 67
Discharge: HOME | End: 2022-06-16
Payer: MEDICARE

## 2022-06-16 VITALS
SYSTOLIC BLOOD PRESSURE: 136 MMHG | TEMPERATURE: 97.8 F | HEART RATE: 83 BPM | DIASTOLIC BLOOD PRESSURE: 75 MMHG | RESPIRATION RATE: 16 BRPM | OXYGEN SATURATION: 100 %

## 2022-06-16 PROCEDURE — 96361 HYDRATE IV INFUSION ADD-ON: CPT

## 2022-06-16 PROCEDURE — 96360 HYDRATION IV INFUSION INIT: CPT

## 2022-06-16 RX ORDER — SODIUM CHLORIDE 9 MG/ML
375 INJECTION, SOLUTION INTRAVENOUS ONCE
Status: COMPLETED | OUTPATIENT
Start: 2022-06-17 | End: 2022-06-17

## 2022-06-16 RX ORDER — SODIUM CHLORIDE 9 MG/ML
375 INJECTION, SOLUTION INTRAVENOUS ONCE
Status: COMPLETED | OUTPATIENT
Start: 2022-06-20 | End: 2022-06-20

## 2022-06-16 RX ADMIN — SODIUM CHLORIDE 375 ML/HR: 0.9 INJECTION, SOLUTION INTRAVENOUS at 09:45

## 2022-06-17 ENCOUNTER — OFFICE VISIT (OUTPATIENT)
Dept: PHYSICAL THERAPY | Facility: CLINIC | Age: 67
End: 2022-06-17
Payer: MEDICARE

## 2022-06-17 ENCOUNTER — HOSPITAL ENCOUNTER (OUTPATIENT)
Dept: INFUSION CENTER | Facility: HOSPITAL | Age: 67
Discharge: HOME | End: 2022-06-17
Payer: MEDICARE

## 2022-06-17 DIAGNOSIS — S46.811D TRAUMATIC TEAR OF SUPRASPINATUS TENDON OF RIGHT SHOULDER, SUBSEQUENT ENCOUNTER: Primary | ICD-10-CM

## 2022-06-17 DIAGNOSIS — Z98.890 S/P RIGHT ROTATOR CUFF REPAIR: ICD-10-CM

## 2022-06-17 PROCEDURE — 96360 HYDRATION IV INFUSION INIT: CPT

## 2022-06-17 PROCEDURE — 97110 THERAPEUTIC EXERCISES: CPT

## 2022-06-17 PROCEDURE — 97140 MANUAL THERAPY 1/> REGIONS: CPT

## 2022-06-17 PROCEDURE — 96361 HYDRATE IV INFUSION ADD-ON: CPT

## 2022-06-17 RX ADMIN — SODIUM CHLORIDE 375 ML/HR: 0.9 INJECTION, SOLUTION INTRAVENOUS at 08:31

## 2022-06-17 NOTE — PROGRESS NOTES
Daily Note     Today's date: 2022  Patient name: Alfonzo Bosworth  :   MRN: [id number]  Referring provider: Liang Paula  Dx:   Encounter Diagnosis     ICD-10-CM    1  Traumatic tear of supraspinatus tendon of right shoulder, subsequent encounter  S46 811D    2  S/P right rotator cuff repair  [id number] [id number]                   Subjective: Pt reported that she did have a f/u with her ortho  He was pleased with her progress this far  She continues to feel "achy" in her R shoulder  Objective: See treatment diary below      Assessment: Tolerated treatment well  Patient demonstrated fatigue post treatment, exhibited good technique with therapeutic exercises and would benefit from continued PT  No adverse affect to CP or MHP post Tx  Plan: Continue per plan of care  Progress treament per protocol        Precautions: POTS, severe scoliosis,fibromyalgia,GI issues,lumbar spinal cage, cerv DDD, arachnoiditis  Arachnoid cysts  DOS: 22; (Week 9): 22      Date   6 13 22 6/17 6/3   Visit Count 23 24  25 26 22   FOTO        Pain In   "sore/achey"  /10   Pain Out   Less sx  same           Manuals  6 13 22 6/17 6/3   PROM R SHLD KB SL 10 min  MJD  10 min TM   10 min 10 min   Mass RUT        Supine gentle GH post/inf for pain KB gr 1-2                Neuro Re-Ed   6 13 22     scap retraction   20x5" 15x5" 15x5"                   Ther Ex   6 13 22     Wrist AROM   *HEP -    Elbow ROM   AROM 45x   AROM 45x AROM 45x    strengthening     _________ -      -    cerv AROM   UT/LV St 20"x4  Cerv AROM  20x ea dir  UT/LV St 20"x4  Cerv AROM  10x ea dir   Pendulum ex HEP    45x ea   Shoulder isometrics (5 way)    7x5" ea 10x5" ea 5x5" ea   Supine wand exercises (flex, abd, ER)   x10 flex, x5 abd, *ER NV Flex, abd, x10 ea, ER 5x 10x ea           Table slides  Flex, arc 5" x30 flex, scap, abd, ER  5" x30 flex, scap, abd, ER 5"x10 5"x10   Wand ex  Flex, ER, EXT, IR Abd        Wall slides 2 UE Flex 5"x10,   abd 5"x5 5" x10 Flex/Abd 2 UE Flex 5"x10, abd 5"x5   Pulleys  3 way 3' flex  3 min Flex 3 min Flex 3' flex   UBE        MTPs/LTPs   *Upcoming       Bicep curls  3 way   *Upcoming     Prone flex, HA, rows, ext NV if able   **R 10x ea     Side lying ER & ABD NV if able   **R 20x ea     Ther Activity                        Gait Training                        Modalities   6 13 22     CP to R shld   10 min   10 min  5 min post      *MHP cervical/B UT  10 min  No adverse reaction 10 min HP to cervical 5 min post

## 2022-06-20 ENCOUNTER — HOSPITAL ENCOUNTER (OUTPATIENT)
Dept: INFUSION CENTER | Facility: HOSPITAL | Age: 67
Discharge: HOME | End: 2022-06-20
Payer: MEDICARE

## 2022-06-20 ENCOUNTER — HOSPITAL ENCOUNTER (OUTPATIENT)
Dept: INTERVENTIONAL RADIOLOGY/VASCULAR | Facility: HOSPITAL | Age: 67
Discharge: HOME | End: 2022-06-20
Attending: RADIOLOGY | Admitting: RADIOLOGY
Payer: MEDICARE

## 2022-06-20 ENCOUNTER — APPOINTMENT (OUTPATIENT)
Dept: PHYSICAL THERAPY | Facility: CLINIC | Age: 67
End: 2022-06-20
Payer: MEDICARE

## 2022-06-20 VITALS
SYSTOLIC BLOOD PRESSURE: 167 MMHG | HEART RATE: 85 BPM | RESPIRATION RATE: 16 BRPM | TEMPERATURE: 97.5 F | OXYGEN SATURATION: 100 % | DIASTOLIC BLOOD PRESSURE: 80 MMHG

## 2022-06-20 DIAGNOSIS — E87.8 IMPAIRED HYDRATION: ICD-10-CM

## 2022-06-20 PROCEDURE — 96360 HYDRATION IV INFUSION INIT: CPT

## 2022-06-20 PROCEDURE — C1751 CATH, INF, PER/CENT/MIDLINE: HCPCS

## 2022-06-20 PROCEDURE — 36558 INSERT TUNNELED CV CATH: CPT

## 2022-06-20 PROCEDURE — NC001 PR NO CHARGE: Performed by: RADIOLOGY

## 2022-06-20 PROCEDURE — 77001 FLUOROGUIDE FOR VEIN DEVICE: CPT

## 2022-06-20 PROCEDURE — 76937 US GUIDE VASCULAR ACCESS: CPT

## 2022-06-20 PROCEDURE — 96361 HYDRATE IV INFUSION ADD-ON: CPT

## 2022-06-20 RX ORDER — CEFAZOLIN SODIUM 1 G/50ML
1000 SOLUTION INTRAVENOUS EVERY 8 HOURS
Status: DISCONTINUED | OUTPATIENT
Start: 2022-06-20 | End: 2022-06-24 | Stop reason: HOSPADM

## 2022-06-20 RX ORDER — LIDOCAINE WITH 8.4% SOD BICARB 0.9%(10ML)
SYRINGE (ML) INJECTION CODE/TRAUMA/SEDATION MEDICATION
Status: COMPLETED | OUTPATIENT
Start: 2022-06-20 | End: 2022-06-20

## 2022-06-20 RX ORDER — CEFAZOLIN SODIUM 1 G/50ML
SOLUTION INTRAVENOUS
Status: COMPLETED
Start: 2022-06-20 | End: 2022-06-20

## 2022-06-20 RX ORDER — SODIUM CHLORIDE 9 MG/ML
375 INJECTION, SOLUTION INTRAVENOUS ONCE
Status: DISCONTINUED | OUTPATIENT
Start: 2022-06-22 | End: 2022-06-22

## 2022-06-20 RX ADMIN — CEFAZOLIN SODIUM 1000 MG: 1 SOLUTION INTRAVENOUS at 12:12

## 2022-06-20 RX ADMIN — SODIUM CHLORIDE 375 ML/HR: 0.9 INJECTION, SOLUTION INTRAVENOUS at 08:20

## 2022-06-20 RX ADMIN — Medication 10 ML: at 12:11

## 2022-06-20 NOTE — INTERVAL H&P NOTE
Update: (This section must be completed if the H&P was completed greater than 24 hrs to procedure or admission)    H&P reviewed  After examining the patient, I find no changed to the H&P since it had been written  50-year-old female with cardiovascular disease requires frequent fluid infusions    With multiple prior lines for access most recently right tunneled central venous catheter removed for infection several months ago    Increasing difficulty with peripheral access    Tunnel catheter placement indicated    Risks benefits alternatives reviewed    Very large number of documented allergies  We will give a dose of antibiotics since there has been prior infection      Patient wishese to proceed    Consent obtained    Patient re-evaluated   Accept as history and physical     Gilbert Silva MD/June 20, 2022/12:00 PM

## 2022-06-20 NOTE — PROCEDURES
Central Line    Date/Time: 6/20/2022 1:44 PM  Performed by: Magda Bernard MD  Authorized by: Magda Bernard MD     Patient location:  IR  Other Assisting Provider: No    Consent:     Consent obtained:  Written    Consent given by:  Patient    Risks discussed:  Arterial puncture, bleeding and infection    Alternatives discussed:  No treatment and delayed treatment  Universal protocol:     Procedure explained and questions answered to patient or proxy's satisfaction: yes      Relevant documents present and verified: yes      Test results available and properly labeled: yes      Radiology Images displayed and confirmed  If images not available, report reviewed: yes      Required blood products, implants, devices, and special equipment available: yes      Site/side marked: yes      Immediately prior to procedure, a time out was called: yes      Patient identity confirmed:  Verbally with patient and arm band  Pre-procedure details:     Hand hygiene: Hand hygiene performed prior to insertion      Sterile barrier technique:  All elements of maximal sterile technique followed      Skin preparation:  2% chlorhexidine    Skin preparation agent: Skin preparation agent completely dried prior to procedure    Indications:     Central line indications: dialysis and no peripheral vascular access    Procedure details:     Location:  Right internal jugular    Vessel type: vein      Laterality:  Right    Approach: percutaneous technique used      Patient position:  Flat    Catheter type:  Single lumen    Catheter size:  6 Fr    Landmarks identified: yes      Ultrasound guidance: yes      Sterile ultrasound techniques: Sterile gel and sterile probe covers were used      Number of attempts:  1    Successful placement: yes    Post-procedure details:     Post-procedure:  Dressing applied and line sutured    Assessment:  Placement verified by x-ray and blood return through all ports    Post-procedure complications: none      Patient tolerance of procedure:   Tolerated well, no immediate complications  Comments:      22 cm single lumen 6 fr catheter inserted  Tunneled cuffed

## 2022-06-20 NOTE — PLAN OF CARE
Problem: Knowledge Deficit  Goal: Patient/family/caregiver demonstrates understanding of disease process, treatment plan, medications, and discharge instructions  Description: Complete learning assessment and assess knowledge base    Interventions:  - Provide teaching at level of understanding  - Provide teaching via preferred learning methods  6/20/2022 0930 by Brian Cid RN  Outcome: Progressing  6/20/2022 0814 by Brian Cid RN  Outcome: Progressing

## 2022-06-22 ENCOUNTER — HOSPITAL ENCOUNTER (OUTPATIENT)
Dept: INFUSION CENTER | Facility: HOSPITAL | Age: 67
Discharge: HOME | End: 2022-06-22
Payer: MEDICARE

## 2022-06-22 ENCOUNTER — HOSPITAL ENCOUNTER (OUTPATIENT)
Dept: INFUSION CENTER | Facility: HOSPITAL | Age: 67
Discharge: HOME | End: 2022-06-22

## 2022-06-22 VITALS
RESPIRATION RATE: 16 BRPM | DIASTOLIC BLOOD PRESSURE: 92 MMHG | OXYGEN SATURATION: 98 % | HEART RATE: 84 BPM | TEMPERATURE: 98.4 F | SYSTOLIC BLOOD PRESSURE: 161 MMHG

## 2022-06-22 PROCEDURE — 96360 HYDRATION IV INFUSION INIT: CPT

## 2022-06-22 PROCEDURE — 96361 HYDRATE IV INFUSION ADD-ON: CPT

## 2022-06-22 RX ADMIN — SODIUM CHLORIDE 1500 ML: 0.9 INJECTION, SOLUTION INTRAVENOUS at 08:13

## 2022-06-23 ENCOUNTER — APPOINTMENT (OUTPATIENT)
Dept: PHYSICAL THERAPY | Facility: CLINIC | Age: 67
End: 2022-06-23
Payer: MEDICARE

## 2022-06-23 ENCOUNTER — HOSPITAL ENCOUNTER (OUTPATIENT)
Dept: INFUSION CENTER | Facility: HOSPITAL | Age: 67
Discharge: HOME | End: 2022-06-23
Payer: MEDICARE

## 2022-06-23 VITALS
HEART RATE: 80 BPM | DIASTOLIC BLOOD PRESSURE: 86 MMHG | SYSTOLIC BLOOD PRESSURE: 155 MMHG | RESPIRATION RATE: 16 BRPM | TEMPERATURE: 97.9 F | OXYGEN SATURATION: 100 %

## 2022-06-23 PROCEDURE — 96360 HYDRATION IV INFUSION INIT: CPT

## 2022-06-23 PROCEDURE — 96361 HYDRATE IV INFUSION ADD-ON: CPT

## 2022-06-23 RX ADMIN — SODIUM CHLORIDE 1500 ML: 0.9 INJECTION, SOLUTION INTRAVENOUS at 09:02

## 2022-06-23 NOTE — PLAN OF CARE
Problem: INFECTION - ADULT  Goal: Absence or prevention of progression during hospitalization  Description: INTERVENTIONS:  - Assess and monitor for signs and symptoms of infection  - Monitor lab/diagnostic results  - Monitor all insertion sites, i e  indwelling lines, tubes, and drains  - Monitor endotracheal if appropriate and nasal secretions for changes in amount and color  - [place] appropriate cooling/warming therapies per order  - Administer medications as ordered  - Instruct and encourage patient and family to use good hand hygiene technique  - Identify and instruct in appropriate isolation precautions for identified infection/condition  Outcome: Progressing     Problem: Knowledge Deficit  Goal: Patient/family/caregiver demonstrates understanding of disease process, treatment plan, medications, and discharge instructions  Description: Complete learning assessment and assess knowledge base    Interventions:  - Provide teaching at level of understanding  - Provide teaching via preferred learning methods  Outcome: Progressing

## 2022-06-24 ENCOUNTER — HOSPITAL ENCOUNTER (OUTPATIENT)
Dept: INFUSION CENTER | Facility: HOSPITAL | Age: 67
Discharge: HOME | End: 2022-06-24
Payer: MEDICARE

## 2022-06-24 ENCOUNTER — APPOINTMENT (OUTPATIENT)
Dept: PHYSICAL THERAPY | Facility: CLINIC | Age: 67
End: 2022-06-24
Payer: MEDICARE

## 2022-06-24 VITALS
TEMPERATURE: 98.8 F | RESPIRATION RATE: 18 BRPM | HEART RATE: 91 BPM | OXYGEN SATURATION: 99 % | DIASTOLIC BLOOD PRESSURE: 102 MMHG | SYSTOLIC BLOOD PRESSURE: 168 MMHG

## 2022-06-24 PROCEDURE — 96361 HYDRATE IV INFUSION ADD-ON: CPT

## 2022-06-24 PROCEDURE — 96360 HYDRATION IV INFUSION INIT: CPT

## 2022-06-24 RX ADMIN — SODIUM CHLORIDE 1500 ML: 0.9 INJECTION, SOLUTION INTRAVENOUS at 08:37

## 2022-06-27 ENCOUNTER — HOSPITAL ENCOUNTER (OUTPATIENT)
Dept: INFUSION CENTER | Facility: HOSPITAL | Age: 67
Discharge: HOME | End: 2022-06-27
Payer: MEDICARE

## 2022-06-27 ENCOUNTER — APPOINTMENT (OUTPATIENT)
Dept: PHYSICAL THERAPY | Facility: CLINIC | Age: 67
End: 2022-06-27
Payer: MEDICARE

## 2022-06-27 VITALS
RESPIRATION RATE: 16 BRPM | TEMPERATURE: 97.7 F | DIASTOLIC BLOOD PRESSURE: 69 MMHG | SYSTOLIC BLOOD PRESSURE: 157 MMHG | OXYGEN SATURATION: 100 % | HEART RATE: 85 BPM

## 2022-06-27 PROCEDURE — 96361 HYDRATE IV INFUSION ADD-ON: CPT

## 2022-06-27 PROCEDURE — 96360 HYDRATION IV INFUSION INIT: CPT

## 2022-06-27 RX ADMIN — SODIUM CHLORIDE 1500 ML: 0.9 INJECTION, SOLUTION INTRAVENOUS at 08:42

## 2022-06-28 ENCOUNTER — HOSPITAL ENCOUNTER (OUTPATIENT)
Dept: PULMONOLOGY | Facility: HOSPITAL | Age: 67
Discharge: HOME | End: 2022-06-28
Payer: MEDICARE

## 2022-06-28 DIAGNOSIS — J45.40 MODERATE PERSISTENT ASTHMA WITHOUT COMPLICATION: ICD-10-CM

## 2022-06-28 PROCEDURE — 94010 BREATHING CAPACITY TEST: CPT

## 2022-06-28 PROCEDURE — 94760 N-INVAS EAR/PLS OXIMETRY 1: CPT

## 2022-06-28 PROCEDURE — 94010 BREATHING CAPACITY TEST: CPT | Performed by: INTERNAL MEDICINE

## 2022-06-28 RX ORDER — SODIUM CHLORIDE 9 MG/ML
375 INJECTION, SOLUTION INTRAVENOUS ONCE
Status: COMPLETED | OUTPATIENT
Start: 2022-06-29 | End: 2022-06-29

## 2022-06-29 ENCOUNTER — HOSPITAL ENCOUNTER (OUTPATIENT)
Dept: INFUSION CENTER | Facility: HOSPITAL | Age: 67
Discharge: HOME | End: 2022-06-29
Payer: MEDICARE

## 2022-06-29 VITALS
HEART RATE: 88 BPM | RESPIRATION RATE: 16 BRPM | TEMPERATURE: 97.9 F | SYSTOLIC BLOOD PRESSURE: 146 MMHG | DIASTOLIC BLOOD PRESSURE: 84 MMHG | OXYGEN SATURATION: 99 %

## 2022-06-29 PROCEDURE — 96361 HYDRATE IV INFUSION ADD-ON: CPT

## 2022-06-29 PROCEDURE — 96360 HYDRATION IV INFUSION INIT: CPT

## 2022-06-29 RX ORDER — SODIUM CHLORIDE 9 MG/ML
375 INJECTION, SOLUTION INTRAVENOUS ONCE
Status: COMPLETED | OUTPATIENT
Start: 2022-06-30 | End: 2022-06-30

## 2022-06-29 RX ADMIN — SODIUM CHLORIDE 375 ML/HR: 0.9 INJECTION, SOLUTION INTRAVENOUS at 08:07

## 2022-06-30 ENCOUNTER — APPOINTMENT (OUTPATIENT)
Dept: PHYSICAL THERAPY | Facility: CLINIC | Age: 67
End: 2022-06-30
Payer: MEDICARE

## 2022-06-30 ENCOUNTER — HOSPITAL ENCOUNTER (OUTPATIENT)
Dept: INFUSION CENTER | Facility: HOSPITAL | Age: 67
Discharge: HOME | End: 2022-06-30
Payer: MEDICARE

## 2022-06-30 VITALS
OXYGEN SATURATION: 100 % | DIASTOLIC BLOOD PRESSURE: 68 MMHG | TEMPERATURE: 97.7 F | SYSTOLIC BLOOD PRESSURE: 145 MMHG | RESPIRATION RATE: 16 BRPM | HEART RATE: 97 BPM

## 2022-06-30 PROCEDURE — 96360 HYDRATION IV INFUSION INIT: CPT

## 2022-06-30 PROCEDURE — 96361 HYDRATE IV INFUSION ADD-ON: CPT

## 2022-06-30 RX ORDER — SODIUM CHLORIDE 9 MG/ML
375 INJECTION, SOLUTION INTRAVENOUS ONCE
Status: DISCONTINUED | OUTPATIENT
Start: 2022-07-05 | End: 2022-07-05 | Stop reason: HOSPADM

## 2022-06-30 RX ORDER — SODIUM CHLORIDE 9 MG/ML
375 INJECTION, SOLUTION INTRAVENOUS ONCE
Status: COMPLETED | OUTPATIENT
Start: 2022-07-01 | End: 2022-07-01

## 2022-06-30 RX ADMIN — SODIUM CHLORIDE 375 ML/HR: 0.9 INJECTION, SOLUTION INTRAVENOUS at 08:32

## 2022-07-01 ENCOUNTER — APPOINTMENT (OUTPATIENT)
Dept: PHYSICAL THERAPY | Facility: CLINIC | Age: 67
End: 2022-07-01
Payer: MEDICARE

## 2022-07-01 ENCOUNTER — HOSPITAL ENCOUNTER (OUTPATIENT)
Dept: INFUSION CENTER | Facility: HOSPITAL | Age: 67
Discharge: HOME | End: 2022-07-01
Payer: MEDICARE

## 2022-07-01 VITALS
DIASTOLIC BLOOD PRESSURE: 71 MMHG | SYSTOLIC BLOOD PRESSURE: 152 MMHG | HEART RATE: 85 BPM | RESPIRATION RATE: 18 BRPM | TEMPERATURE: 98.3 F | OXYGEN SATURATION: 99 %

## 2022-07-01 PROCEDURE — 96360 HYDRATION IV INFUSION INIT: CPT

## 2022-07-01 PROCEDURE — 96361 HYDRATE IV INFUSION ADD-ON: CPT

## 2022-07-01 RX ORDER — SODIUM CHLORIDE 9 MG/ML
375 INJECTION, SOLUTION INTRAVENOUS ONCE
Status: COMPLETED | OUTPATIENT
Start: 2022-07-06 | End: 2022-07-06

## 2022-07-01 RX ADMIN — SODIUM CHLORIDE 375 ML/HR: 0.9 INJECTION, SOLUTION INTRAVENOUS at 08:48

## 2022-07-05 ENCOUNTER — HOSPITAL ENCOUNTER (OUTPATIENT)
Dept: INFUSION CENTER | Facility: HOSPITAL | Age: 67
Discharge: HOME | End: 2022-07-05
Payer: MEDICARE

## 2022-07-05 ENCOUNTER — OFFICE VISIT (OUTPATIENT)
Dept: PHYSICAL THERAPY | Facility: CLINIC | Age: 67
End: 2022-07-05
Payer: MEDICARE

## 2022-07-05 VITALS
DIASTOLIC BLOOD PRESSURE: 72 MMHG | RESPIRATION RATE: 18 BRPM | HEART RATE: 96 BPM | SYSTOLIC BLOOD PRESSURE: 132 MMHG | TEMPERATURE: 97.3 F | OXYGEN SATURATION: 98 %

## 2022-07-05 DIAGNOSIS — Z98.890 S/P RIGHT ROTATOR CUFF REPAIR: ICD-10-CM

## 2022-07-05 DIAGNOSIS — S46.811D TRAUMATIC TEAR OF SUPRASPINATUS TENDON OF RIGHT SHOULDER, SUBSEQUENT ENCOUNTER: Primary | ICD-10-CM

## 2022-07-05 PROCEDURE — 96360 HYDRATION IV INFUSION INIT: CPT

## 2022-07-05 PROCEDURE — 97110 THERAPEUTIC EXERCISES: CPT

## 2022-07-05 PROCEDURE — 96361 HYDRATE IV INFUSION ADD-ON: CPT

## 2022-07-05 PROCEDURE — 97140 MANUAL THERAPY 1/> REGIONS: CPT

## 2022-07-05 PROCEDURE — 97112 NEUROMUSCULAR REEDUCATION: CPT

## 2022-07-05 RX ADMIN — SODIUM CHLORIDE 1500 ML: 0.9 INJECTION, SOLUTION INTRAVENOUS at 08:20

## 2022-07-05 NOTE — PROGRESS NOTES
Daily Note     Today's date: 2022  Patient name: Marisa Becerril  : [id number]  MRN: [id number]  Referring provider: Omar Rios  Dx:   Encounter Diagnosis     ICD-10-CM    1  Traumatic tear of supraspinatus tendon of right shoulder, subsequent encounter  S46 811D    2  S/P right rotator cuff repair  [id number] [id number]        Start Time: 1300  Stop Time: 1400  Total time in clinic (min): 60 minutes    Subjective: "Take it easy on me today, I just got a catheter put in and its still very sore " Patient reports she has a 5lb weight limit  Patient very apprehensive about catheter and making sure that cervical stretches and exercises do not dislodge it  Patient advised to contact doctor with any concerns and to always inform therapists if she has discomfort with exercises  Patient states her doctor advised she "perform to tolerance "       Objective: See treatment diary below      Assessment: Session modified due to patient's complaints  All exercises and PROM performed within patient's tolerance  Plan: Continue per plan of care        Precautions: POTS, severe scoliosis,fibromyalgia,GI issues,lumbar spinal cage, cerv DDD, arachnoiditis  Arachnoid cysts  DOS: 22; (Week 9): 22      Date    Visit Count 23 24  25 26 27   FOTO        Pain In   "sore/achey"  Was 9/10 today, went down with pain medication   Pain Out   Less sx             Manuals  6 13  7   PROM R SHLD KB SL 10 min  MJD  10 min TM   10 min JG  10'    Mass RUT        Supine gentle GH post/inf for pain KB gr 1-2                Neuro Re-Ed   6 13 22     scap retraction   20x5" 15x5" 15x5"                   Ther Ex   6 13 22     Wrist AROM   *HEP -    Elbow ROM   AROM 45x   AROM 45x AROM x40    strengthening     _________ -      -    cerv AROM   UT/LV St 20"x4  Cerv AROM  20x ea dir  Held today   Pendulum ex HEP       Shoulder isometrics (5 way)    7x5" ea 10x5" ea 5"x10 ea   Supine wand exercises (flex, abd, ER)   x10 flex, x5 abd, *ER NV Flex, abd, x10 ea, ER 5x Flexion 5"x10    ER 5x           Table slides  Flex, arc 5" x30 flex, scap, abd, ER  5" x30 flex, scap, abd, ER 5"x10 Table Slides with bolster 5"x10 flex/scap   Wand ex  Flex, ER, EXT, IR Abd        Wall slides   2 UE Flex 5"x10,   abd 5"x5 5" x10 Flex/Abd 5"x10 flex   Pulleys  3 way 3' flex  3 min Flex 3 min Flex 3' flex   UBE        MTPs/LTPs   *Upcoming       Bicep curls  3 way   *Upcoming     Prone flex, HA, rows, ext NV if able   **R 10x ea     Side lying ER & ABD NV if able   **R 20x ea     Ther Activity                        Gait Training                        Modalities   6 13 22     CP to R shld   10 min   10 min 10'       *MHP cervical/B UT  10 min  No adverse reaction 10 min HP to cervical MHP pre Tx 10' b/l UT    No adverse reaction

## 2022-07-06 ENCOUNTER — HOSPITAL ENCOUNTER (OUTPATIENT)
Dept: INFUSION CENTER | Facility: HOSPITAL | Age: 67
Discharge: HOME | End: 2022-07-06
Payer: MEDICARE

## 2022-07-06 VITALS
RESPIRATION RATE: 16 BRPM | SYSTOLIC BLOOD PRESSURE: 141 MMHG | DIASTOLIC BLOOD PRESSURE: 67 MMHG | TEMPERATURE: 98.3 F | HEART RATE: 82 BPM

## 2022-07-06 PROCEDURE — 96360 HYDRATION IV INFUSION INIT: CPT

## 2022-07-06 PROCEDURE — 96361 HYDRATE IV INFUSION ADD-ON: CPT

## 2022-07-06 RX ADMIN — SODIUM CHLORIDE 375 ML/HR: 0.9 INJECTION, SOLUTION INTRAVENOUS at 09:03

## 2022-07-06 NOTE — PLAN OF CARE
Problem: INFECTION - ADULT  Goal: Absence or prevention of progression during hospitalization  Description: INTERVENTIONS:  - Assess and monitor for signs and symptoms of infection  - Monitor lab/diagnostic results  - Monitor all insertion sites, i e  indwelling lines, tubes, and drains  - Monitor endotracheal if appropriate and nasal secretions for changes in amount and color  - [place] appropriate cooling/warming therapies per order  - Administer medications as ordered  - Instruct and encourage patient and family to use good hand hygiene technique  - Identify and instruct in appropriate isolation precautions for identified infection/condition  Outcome: Progressing     Problem: Knowledge Deficit  Goal: Patient/family/caregiver demonstrates understanding of disease process, treatment plan, medications, and discharge instructions  Description: Complete learning assessment and assess knowledge base    Interventions:  - Provide teaching at level of understanding  - Provide teaching via preferred learning methods  Outcome: Progressing

## 2022-07-07 ENCOUNTER — APPOINTMENT (OUTPATIENT)
Dept: LAB | Facility: HOSPITAL | Age: 67
End: 2022-07-07
Attending: INTERNAL MEDICINE
Payer: MEDICARE

## 2022-07-07 ENCOUNTER — HOSPITAL ENCOUNTER (OUTPATIENT)
Dept: INFUSION CENTER | Facility: HOSPITAL | Age: 67
Discharge: HOME | End: 2022-07-07
Payer: MEDICARE

## 2022-07-07 ENCOUNTER — OFFICE VISIT (OUTPATIENT)
Dept: PHYSICAL THERAPY | Facility: CLINIC | Age: 67
End: 2022-07-07
Payer: MEDICARE

## 2022-07-07 VITALS
RESPIRATION RATE: 16 BRPM | TEMPERATURE: 97.5 F | SYSTOLIC BLOOD PRESSURE: 139 MMHG | HEART RATE: 84 BPM | DIASTOLIC BLOOD PRESSURE: 87 MMHG

## 2022-07-07 DIAGNOSIS — Z98.890 S/P RIGHT ROTATOR CUFF REPAIR: ICD-10-CM

## 2022-07-07 DIAGNOSIS — Z80.0 FAMILY HISTORY OF MALIGNANT NEOPLASM OF GASTROINTESTINAL TRACT: ICD-10-CM

## 2022-07-07 DIAGNOSIS — R74.8 ACID PHOSPHATASE ELEVATED: ICD-10-CM

## 2022-07-07 DIAGNOSIS — K44.9 HIATAL HERNIA: ICD-10-CM

## 2022-07-07 DIAGNOSIS — S46.811D TRAUMATIC TEAR OF SUPRASPINATUS TENDON OF RIGHT SHOULDER, SUBSEQUENT ENCOUNTER: Primary | ICD-10-CM

## 2022-07-07 DIAGNOSIS — K86.2 CYST AND PSEUDOCYST OF PANCREAS: ICD-10-CM

## 2022-07-07 DIAGNOSIS — E03.9 HYPOTHYROIDISM, UNSPECIFIED TYPE: ICD-10-CM

## 2022-07-07 DIAGNOSIS — G90.A POTS (POSTURAL ORTHOSTATIC TACHYCARDIA SYNDROME): ICD-10-CM

## 2022-07-07 DIAGNOSIS — K58.2 IRRITABLE BOWEL SYNDROME WITH CONSTIPATION AND DIARRHEA: ICD-10-CM

## 2022-07-07 DIAGNOSIS — N18.9 CHRONIC KIDNEY DISEASE, UNSPECIFIED CKD STAGE: ICD-10-CM

## 2022-07-07 DIAGNOSIS — I51.9 MYXEDEMA HEART DISEASE: ICD-10-CM

## 2022-07-07 DIAGNOSIS — E03.9 MYXEDEMA HEART DISEASE: ICD-10-CM

## 2022-07-07 DIAGNOSIS — R53.83 FATIGUE, UNSPECIFIED TYPE: ICD-10-CM

## 2022-07-07 DIAGNOSIS — K21.9 GASTROESOPHAGEAL REFLUX DISEASE, UNSPECIFIED WHETHER ESOPHAGITIS PRESENT: ICD-10-CM

## 2022-07-07 DIAGNOSIS — K22.2 STRICTURE AND STENOSIS OF ESOPHAGUS: ICD-10-CM

## 2022-07-07 DIAGNOSIS — K86.3 CYST AND PSEUDOCYST OF PANCREAS: ICD-10-CM

## 2022-07-07 LAB
ALBUMIN SERPL BCP-MCNC: 3.8 G/DL (ref 3.5–5)
ALP SERPL-CCNC: 113 U/L (ref 46–116)
ALT SERPL W P-5'-P-CCNC: 31 U/L (ref 12–78)
ANION GAP SERPL CALCULATED.3IONS-SCNC: 3 MMOL/L (ref 4–13)
AST SERPL W P-5'-P-CCNC: 25 U/L (ref 5–45)
BILIRUB SERPL-MCNC: 0.53 MG/DL (ref 0.2–1)
BUN SERPL-MCNC: 14 MG/DL (ref 5–25)
CALCIUM SERPL-MCNC: 9.5 MG/DL (ref 8.3–10.1)
CHLORIDE SERPL-SCNC: 109 MMOL/L (ref 100–108)
CO2 SERPL-SCNC: 27 MMOL/L (ref 21–32)
CREAT SERPL-MCNC: 1 MG/DL (ref 0.6–1.3)
ERYTHROCYTE [DISTWIDTH] IN BLOOD BY AUTOMATED COUNT: 13.2 % (ref 11.6–15.1)
GFR SERPL CREATININE-BSD FRML MDRD: 58 ML/MIN/1.73SQ M
GLUCOSE SERPL-MCNC: 100 MG/DL (ref 65–140)
HCT VFR BLD AUTO: 42.4 % (ref 34.8–46.1)
HGB BLD-MCNC: 14.1 G/DL (ref 11.5–15.4)
MCH RBC QN AUTO: 29.2 PG (ref 26.8–34.3)
MCHC RBC AUTO-ENTMCNC: 33.3 G/DL (ref 31.4–37.4)
MCV RBC AUTO: 88 FL (ref 82–98)
PLATELET # BLD AUTO: 286 THOUSANDS/UL (ref 149–390)
PMV BLD AUTO: 10.4 FL (ref 8.9–12.7)
POTASSIUM SERPL-SCNC: 3.9 MMOL/L (ref 3.5–5.3)
PROT SERPL-MCNC: 7.2 G/DL (ref 6.4–8.2)
RBC # BLD AUTO: 4.83 MILLION/UL (ref 3.81–5.12)
SODIUM SERPL-SCNC: 139 MMOL/L (ref 136–145)
T4 FREE SERPL-MCNC: 1.09 NG/DL (ref 0.76–1.46)
TSH SERPL DL<=0.05 MIU/L-ACNC: 1.42 UIU/ML (ref 0.45–4.5)
WBC # BLD AUTO: 6.45 THOUSAND/UL (ref 4.31–10.16)

## 2022-07-07 PROCEDURE — 80053 COMPREHEN METABOLIC PANEL: CPT

## 2022-07-07 PROCEDURE — 86301 IMMUNOASSAY TUMOR CA 19-9: CPT

## 2022-07-07 PROCEDURE — 96360 HYDRATION IV INFUSION INIT: CPT

## 2022-07-07 PROCEDURE — 84439 ASSAY OF FREE THYROXINE: CPT

## 2022-07-07 PROCEDURE — 85027 COMPLETE CBC AUTOMATED: CPT

## 2022-07-07 PROCEDURE — 97140 MANUAL THERAPY 1/> REGIONS: CPT | Performed by: PHYSICAL THERAPIST

## 2022-07-07 PROCEDURE — 84443 ASSAY THYROID STIM HORMONE: CPT

## 2022-07-07 PROCEDURE — 96361 HYDRATE IV INFUSION ADD-ON: CPT

## 2022-07-07 PROCEDURE — 97110 THERAPEUTIC EXERCISES: CPT | Performed by: PHYSICAL THERAPIST

## 2022-07-07 RX ADMIN — SODIUM CHLORIDE 1500 ML: 0.9 INJECTION, SOLUTION INTRAVENOUS at 08:16

## 2022-07-07 NOTE — PLAN OF CARE
Problem: INFECTION - ADULT  Goal: Absence or prevention of progression during hospitalization  Description: INTERVENTIONS:  - Assess and monitor for signs and symptoms of infection  - Monitor lab/diagnostic results  - Monitor all insertion sites, i e  indwelling lines, tubes, and drains  - Monitor endotracheal if appropriate and nasal secretions for changes in amount and color  - [place] appropriate cooling/warming therapies per order  - Administer medications as ordered  - Instruct and encourage patient and family to use good hand hygiene technique  - Identify and instruct in appropriate isolation precautions for identified infection/condition  Outcome: Progressing     Problem: Knowledge Deficit  Goal: Patient/family/caregiver demonstrates understanding of disease process, treatment plan, medications, and discharge instructions  Description: Complete learning assessment and assess knowledge base    Interventions:  - Provide teaching at level of understanding  - Provide teaching via preferred learning methods  Outcome: Progressing

## 2022-07-07 NOTE — PROGRESS NOTES
Daily Note     Today's date: 2022  Patient name: Joseline Harry  :   MRN: [id number]  Referring provider: Gildardo Ferrer  Dx:   Encounter Diagnosis     ICD-10-CM    1  Traumatic tear of supraspinatus tendon of right shoulder, subsequent encounter  S46 811D    2  S/P right rotator cuff repair  Z98 [id number]        Start Time: 9480  Stop Time: 4563  Total time in clinic (min): 55 minutes    Subjective: Patient reports significant increase in soreness following last session  Objective: See treatment diary below      Assessment: Patient demonstrated improved motion with performance of table slides today but did note increased discomfort post activity  Continue to resume prescribed exercises, as able  Plan: Continue per plan of care        Precautions: POTS, severe scoliosis,fibromyalgia,GI issues,lumbar spinal cage, cerv DDD, arachnoiditis  Arachnoid cysts  DOS: 22; (Week 9): 22      Date    Visit Count 28   25 26 27   FOTO        Pain In   "sore/achey"  Was 9/10 today, went down with pain medication   Pain Out   Less sx             Manuals 22  6 13 22    PROM R SHLD JAB 15 min w/ scap mobs/STM  MJD  10 min TM   10 min JG  10'    Mass RUT        Supine gentle GH post/inf for pain                Neuro Re-Ed 22  6 13 22     scap retraction 15x5"   20x5" 15x5" 15x5"                   Ther Ex   6 13 22     Wrist AROM   *HEP -    Elbow ROM   AROM 45x   AROM 45x AROM x40    strengthening     _________ -      -    cerv AROM   UT/LV St 20"x4  Cerv AROM  20x ea dir  Held today   Pendulum ex        Shoulder isometrics (5 way)  5"x10 ea  7x5" ea 10x5" ea 5"x10 ea   Supine wand exercises (flex, abd, ER)   x10 flex, x5 abd, *ER NV Flex, abd, x10 ea, ER 5x Flexion 5"x10    ER 5x           Table slides  Flex, arc Table Slides with bolster 5"x10 flex/scap  5" x30 flex, scap, abd, ER 5"x10 Table Slides with bolster 5"x10 flex/scap   Wand ex  Flex, ER, EXT, IR Abd        Wall slides   2 UE Flex 5"x10,   abd 5"x5 5" x10 Flex/Abd 5"x10 flex   Pulleys  3 way 3 min flex   3 min Flex 3 min Flex 3' flex   UBE        MTPs/LTPs   *Upcoming       Bicep curls  3 way   *Upcoming     Prone flex, HA, rows, ext   **R 10x ea     Side lying ER & ABD   **R 20x ea     Ther Activity                        Gait Training                        Modalities   6 13 22     CP to R shld 10'  10 min   10 min 10'     MHP 10' b/l UT   No adverse reaction   *MHP cervical/B UT  10 min  No adverse reaction 10 min HP to cervical MHP pre Tx 10' b/l UT    No adverse reaction

## 2022-07-08 ENCOUNTER — HOSPITAL ENCOUNTER (OUTPATIENT)
Dept: INFUSION CENTER | Facility: HOSPITAL | Age: 67
Discharge: HOME | End: 2022-07-08
Payer: MEDICARE

## 2022-07-08 VITALS
HEART RATE: 81 BPM | DIASTOLIC BLOOD PRESSURE: 72 MMHG | OXYGEN SATURATION: 98 % | SYSTOLIC BLOOD PRESSURE: 157 MMHG | RESPIRATION RATE: 16 BRPM | TEMPERATURE: 97.3 F

## 2022-07-08 LAB — CANCER AG19-9 SERPL-ACNC: 23 U/ML (ref 0–35)

## 2022-07-08 PROCEDURE — 96360 HYDRATION IV INFUSION INIT: CPT

## 2022-07-08 PROCEDURE — 96361 HYDRATE IV INFUSION ADD-ON: CPT

## 2022-07-08 RX ADMIN — SODIUM CHLORIDE 1500 ML: 0.9 INJECTION, SOLUTION INTRAVENOUS at 08:55

## 2022-07-10 RX ORDER — SODIUM CHLORIDE 9 MG/ML
375 INJECTION, SOLUTION INTRAVENOUS ONCE
Status: COMPLETED | OUTPATIENT
Start: 2022-07-12 | End: 2022-07-12

## 2022-07-11 ENCOUNTER — HOSPITAL ENCOUNTER (OUTPATIENT)
Dept: INFUSION CENTER | Facility: HOSPITAL | Age: 67
Discharge: HOME | End: 2022-07-11
Payer: MEDICARE

## 2022-07-11 VITALS
SYSTOLIC BLOOD PRESSURE: 141 MMHG | RESPIRATION RATE: 18 BRPM | HEART RATE: 92 BPM | TEMPERATURE: 97.1 F | DIASTOLIC BLOOD PRESSURE: 83 MMHG

## 2022-07-11 PROCEDURE — 96361 HYDRATE IV INFUSION ADD-ON: CPT

## 2022-07-11 PROCEDURE — 96360 HYDRATION IV INFUSION INIT: CPT

## 2022-07-11 RX ADMIN — SODIUM CHLORIDE 1500 ML: 0.9 INJECTION, SOLUTION INTRAVENOUS at 08:59

## 2022-07-11 NOTE — PROGRESS NOTES
Pt offers no complaints  Tolerated IV hydration well without incident and pt discharged in stable condition  AVS declined

## 2022-07-12 ENCOUNTER — HOSPITAL ENCOUNTER (OUTPATIENT)
Dept: INFUSION CENTER | Facility: HOSPITAL | Age: 67
Discharge: HOME | End: 2022-07-12
Payer: MEDICARE

## 2022-07-12 VITALS
HEART RATE: 86 BPM | RESPIRATION RATE: 18 BRPM | OXYGEN SATURATION: 100 % | TEMPERATURE: 97.7 F | SYSTOLIC BLOOD PRESSURE: 141 MMHG | DIASTOLIC BLOOD PRESSURE: 66 MMHG

## 2022-07-12 PROCEDURE — 96360 HYDRATION IV INFUSION INIT: CPT

## 2022-07-12 PROCEDURE — 96361 HYDRATE IV INFUSION ADD-ON: CPT

## 2022-07-12 RX ORDER — SODIUM CHLORIDE 9 MG/ML
375 INJECTION, SOLUTION INTRAVENOUS ONCE
Status: COMPLETED | OUTPATIENT
Start: 2022-07-14 | End: 2022-07-14

## 2022-07-12 RX ADMIN — SODIUM CHLORIDE 375 ML/HR: 0.9 INJECTION, SOLUTION INTRAVENOUS at 08:34

## 2022-07-12 NOTE — PROGRESS NOTES
Pt hydrated as per orders without incident  PICC line flushed well at conclusion  Good blood return noted and dressing was intact (due for change thursday)   Discharged ambulatory deferring avs

## 2022-07-13 ENCOUNTER — OFFICE VISIT (OUTPATIENT)
Dept: PHYSICAL THERAPY | Facility: CLINIC | Age: 67
End: 2022-07-13
Payer: MEDICARE

## 2022-07-13 DIAGNOSIS — Z98.890 S/P RIGHT ROTATOR CUFF REPAIR: ICD-10-CM

## 2022-07-13 DIAGNOSIS — S46.811D TRAUMATIC TEAR OF SUPRASPINATUS TENDON OF RIGHT SHOULDER, SUBSEQUENT ENCOUNTER: Primary | ICD-10-CM

## 2022-07-13 PROCEDURE — 97110 THERAPEUTIC EXERCISES: CPT

## 2022-07-13 PROCEDURE — 97140 MANUAL THERAPY 1/> REGIONS: CPT

## 2022-07-13 PROCEDURE — 97112 NEUROMUSCULAR REEDUCATION: CPT

## 2022-07-13 NOTE — PROGRESS NOTES
Daily Note     Today's date: 2022  Patient name: Myron Galindo  :   MRN: [id number]  Referring provider: Falguni Vásquez  Dx:   Encounter Diagnosis     ICD-10-CM    1  Traumatic tear of supraspinatus tendon of right shoulder, subsequent encounter  S46 811D    2  S/P right rotator cuff repair  Z98 [id number]                   Subjective:  Pt  reports pain level currently = "6"/10, which she attributes to signif  activity yesterday  Objective: See treatment diary below      Assessment: Tolerated treatment Fairly Well overall with performance of ther exer  Tolerated MT Well  Received MHP and CP applications Well  Pain level/sx decreased to "5" after ther exer and MT  Decreased further to "3" after CP/MHP applic  -      Plan: Con't services 2x/week            Precautions: POTS, severe scoliosis,fibromyalgia,GI issues,lumbar spinal cage, cerv DDD, arachnoiditis  Arachnoid cysts  DOS: 22; (Week 9): 22      Date  7 13 22  6 13    Visit Count 28 29  25 26 27   FOTO        Pain In  610 w/o meds    /10 after Ther   Ex/MT "sore/achey"  Was 9/10 today, went down with pain medication   Pain Out  3-4/10 Less sx             Manuals 22 7 13 22 6 13 22    PROM R SHLD JAB 15 min w/ scap mobs/STM MJD  10 min MJD  10 min TM   10 min JG  10'    Mass RUT        Supine gentle GH post/inf for pain                Neuro Re-Ed 22 7 13 22 6 13 22     scap retraction 15x5"  20x5"  20x5" 15x5" 15x5"                   Ther Ex  7 13 22 6 13 22     Wrist AROM   *HEP -    Elbow ROM  AROM 45x AROM 45x   AROM 45x AROM x40    strengthening     _________ -      -    cerv AROM   UT/LV St 20"x4  Cerv AROM  20x ea dir  Held today   Pendulum ex        Shoulder isometrics (5 way)  5"x10 ea 5"x10 ea 7x5" ea 10x5" ea 5"x10 ea   Supine wand exercises (flex, abd, ER)  Flexion 5"x10    ER 10x x10 flex, x5 abd, *ER NV Flex, abd, x10 ea, ER 5x Flexion 5"x10    ER 5x           Table slides  Flex, arc Table Slides with bolster 5"x10 flex/scap Table Slides  5"x15 flex/scap 5" x30 flex, scap, abd, ER 5"x10 Table Slides with bolster 5"x10 flex/scap   Wand ex  Flex, ER, EXT, IR Abd  **NV      Wall slides  5"x10 flex 2 UE Flex 5"x10,   abd 5"x5 5" x10 Flex/Abd 5"x10 flex   Pulleys  3 way 3 min flex  4 min flex    3 min Flex 3 min Flex 3' flex   UBE        MTPs/LTPs  *Upcoming *Upcoming       Bicep curls  3 way  *Upcoming *Upcoming     Prone flex, HA, rows, ext  resume **R 10x ea     Side lying ER & ABD  resume **R 20x ea     Ther Activity                        Gait Training                        Modalities  7 13 22 6 13 22     CP to R shld 10' 10 min 10 min   10 min 10'     MHP 10' b/l UT   No adverse reaction  MHP 10' b/l UT/cerv   No adverse reaction  *MHP cervical/B UT  10 min  No adverse reaction 10 min HP to cervical MHP pre Tx 10' b/l UT    No adverse reaction

## 2022-07-14 ENCOUNTER — HOSPITAL ENCOUNTER (OUTPATIENT)
Dept: INFUSION CENTER | Facility: HOSPITAL | Age: 67
Discharge: HOME | End: 2022-07-14
Payer: MEDICARE

## 2022-07-14 VITALS
DIASTOLIC BLOOD PRESSURE: 88 MMHG | SYSTOLIC BLOOD PRESSURE: 141 MMHG | HEART RATE: 74 BPM | RESPIRATION RATE: 18 BRPM | TEMPERATURE: 98.7 F

## 2022-07-14 PROCEDURE — 96360 HYDRATION IV INFUSION INIT: CPT

## 2022-07-14 PROCEDURE — 96361 HYDRATE IV INFUSION ADD-ON: CPT

## 2022-07-14 RX ORDER — SODIUM CHLORIDE 9 MG/ML
375 INJECTION, SOLUTION INTRAVENOUS ONCE
Status: COMPLETED | OUTPATIENT
Start: 2022-07-15 | End: 2022-07-15

## 2022-07-14 RX ADMIN — SODIUM CHLORIDE 375 ML/HR: 0.9 INJECTION, SOLUTION INTRAVENOUS at 08:26

## 2022-07-15 ENCOUNTER — TELEPHONE (OUTPATIENT)
Dept: PAIN MEDICINE | Facility: CLINIC | Age: 67
End: 2022-07-15

## 2022-07-15 ENCOUNTER — HOSPITAL ENCOUNTER (OUTPATIENT)
Dept: INFUSION CENTER | Facility: HOSPITAL | Age: 67
Discharge: HOME | End: 2022-07-15
Payer: MEDICARE

## 2022-07-15 VITALS
SYSTOLIC BLOOD PRESSURE: 148 MMHG | HEART RATE: 92 BPM | RESPIRATION RATE: 16 BRPM | TEMPERATURE: 97.3 F | DIASTOLIC BLOOD PRESSURE: 101 MMHG

## 2022-07-15 PROCEDURE — 96360 HYDRATION IV INFUSION INIT: CPT

## 2022-07-15 PROCEDURE — 96361 HYDRATE IV INFUSION ADD-ON: CPT

## 2022-07-15 RX ORDER — SODIUM CHLORIDE 9 MG/ML
375 INJECTION, SOLUTION INTRAVENOUS ONCE
Status: COMPLETED | OUTPATIENT
Start: 2022-07-18 | End: 2022-07-18

## 2022-07-15 RX ADMIN — SODIUM CHLORIDE 375 ML/HR: 0.9 INJECTION, SOLUTION INTRAVENOUS at 08:25

## 2022-07-15 NOTE — TELEPHONE ENCOUNTER
Pt contacted Call Center requested refill of their medication  Medication Name:  Hydrocodone   Dosage of Med:  5-325mg    Frequency of Med:  2 x a day    Remaining Medication:    5 days left  Pharmacy and Location:    [facility] on file    Pt  Preferred Callback Phone Number:  [phone]    Thank you

## 2022-07-18 ENCOUNTER — HOSPITAL ENCOUNTER (OUTPATIENT)
Dept: INFUSION CENTER | Facility: HOSPITAL | Age: 67
Discharge: HOME | End: 2022-07-18
Payer: MEDICARE

## 2022-07-18 ENCOUNTER — EVALUATION (OUTPATIENT)
Dept: PHYSICAL THERAPY | Facility: CLINIC | Age: 67
End: 2022-07-18
Payer: MEDICARE

## 2022-07-18 VITALS
TEMPERATURE: 97.4 F | SYSTOLIC BLOOD PRESSURE: 131 MMHG | RESPIRATION RATE: 16 BRPM | DIASTOLIC BLOOD PRESSURE: 85 MMHG | OXYGEN SATURATION: 100 % | HEART RATE: 88 BPM

## 2022-07-18 DIAGNOSIS — G03.9 ARACHNOIDITIS: ICD-10-CM

## 2022-07-18 DIAGNOSIS — R52 GENERALIZED PAIN: ICD-10-CM

## 2022-07-18 DIAGNOSIS — S46.811D TRAUMATIC TEAR OF SUPRASPINATUS TENDON OF RIGHT SHOULDER, SUBSEQUENT ENCOUNTER: Primary | ICD-10-CM

## 2022-07-18 DIAGNOSIS — Z98.890 S/P RIGHT ROTATOR CUFF REPAIR: ICD-10-CM

## 2022-07-18 DIAGNOSIS — G89.4 CHRONIC PAIN DISORDER: ICD-10-CM

## 2022-07-18 PROCEDURE — 97110 THERAPEUTIC EXERCISES: CPT

## 2022-07-18 PROCEDURE — 97530 THERAPEUTIC ACTIVITIES: CPT

## 2022-07-18 PROCEDURE — 97140 MANUAL THERAPY 1/> REGIONS: CPT

## 2022-07-18 PROCEDURE — 96360 HYDRATION IV INFUSION INIT: CPT

## 2022-07-18 PROCEDURE — 96361 HYDRATE IV INFUSION ADD-ON: CPT

## 2022-07-18 RX ADMIN — SODIUM CHLORIDE 375 ML/HR: 0.9 INJECTION, SOLUTION INTRAVENOUS at 09:04

## 2022-07-18 NOTE — TELEPHONE ENCOUNTER
I am confused medication was filled on 6/22 and Willey Harada is on 7/22 

## 2022-07-18 NOTE — PROGRESS NOTES
Progress Note     Today's date: 2022  Patient name: Zane Benavides  : [id number]  MRN: [id number]  Referring provider: Mary Moreno  Dx:   Encounter Diagnosis     ICD-10-CM    1  Traumatic tear of supraspinatus tendon of right shoulder, subsequent encounter  S46 811D    2  S/P right rotator cuff repair  [id number]        Start Time: 1350  Stop Time: 1440  Total time in clinic (min): 50 minutes      Assessment: Progress note completed this session and pt is currently week 12 post op  Pt had a little bit of soreness after cleaning up her living room yesterday, so her ROM was a little bit tighter today but her flexion loosened up after MT, with noted improvements  Pt demonstrates improved strength but still has weakness as she is now able to start in the next phase of the protocol with emphasis on continuation of ROM progression and strengthening  Pt still has limitations with use of her RUE with ADLs and lifting, especially with reaching overhead  Pt will benefit from continued skilled PT intervention with emphasis on progression of program as permitted by protocol  Pt is to follow up with NP this week  Subjective: Pt reports she is a little sore today as her daughter was over this weekend and left a mess in her living room, and pt had to clean up, picking some things and felt soreness afterwards with muscle spasms  Plan  Patient would benefit from: skilled physical therapy  Planned modality interventions: cryotherapy  Other planned modality interventions: Modalities prn for symptom management  Planned therapy interventions: manual therapy, neuromuscular re-education, therapeutic activities, therapeutic exercise, strengthening, stretching and home exercise program  Frequency: 2x week for 12 weeks   Plan of Care beginning date: 2022  Plan of Care expiration date: 10/10/2022    Goals  STGs to be achieved in 4 weeks:  1   Pt to demonstrate reduced subjective pain rating "at worst" by at least 2-3 points from Initial Eval in order to allow for reduced pain with ADLs and improved functional activity tolerance  Progressing, continue  2  Pt to demonstrate improved PROM of R shld by 20* in order to maximize joint mobility and function and allow for progression of exercise program and achievement of goals  Progressing, continue  3  Pt to improve wrist and elbow strength to 5/5 in order to improve function RUE  Progressing, continue    LTGs to be achieved in 6-8 weeks:  1  Pt will be I with HEP in order to continue to improve quality of life and independence and reduce risk for re-injury  Progressing, continue  2  Pt to demonstrate return to [facility] chores and self care without limitations or restrictions  Progressing, continue  3  Pt to demonstrate improved function as noted by achieving or exceeding predicted score on FOTO outcomes assessment tool  Progressing, continue   4  Pt to demonstrate increased AROM of R shld by at least 5-10 degrees in order to allow for greater ease and independence with ADLs and functional mobility  MET  5  Pt to demonstrate increased MMT of RUE by at least 1/2-1 grade in order to improve safety and stability with ADLs and functional mobility  Progressing, continue      Objective     Active Range of Motion     7/18/22:  Right shoulder  Flexion:  Extension:  Abduction:  External rotation at 45 degrees:  Internal rotation at 45 degrees:     6/8/22:   Right Shoulder   Flexion: 108 degrees   Extension: 42 degrees   Abduction: 82 degrees   External rotation 45°: 40 degrees   Internal rotation 45°: 55 degrees     Pre-surgery measurements  Right Shoulder   Flexion: 50 degrees   Extension: 31 degrees   Abduction: 55 degrees   External rotation 45°: 57 degrees   Internal rotation 45°: 58 degrees     Additional Active Range of Motion Details  4/8/22: Measurements are pre-surgery  NO AROM allowed at this time as per protocol  5/6/22: Measurements are pre-surgery   NO AROM allowed at this time as per protocol     Passive Range of Motion  7/18/22:  Right shoulder  Flexion: 134 degrees   Abduction: 105 degrees   External rotation 0 degrees: 55 degrees   Internal rotation 0 degrees: 52 to body       6/8/22:  Right Shoulder   Flexion: 140 degrees   Abduction: 105 degrees   External rotation 0°: 55 degrees   Internal rotation 0°: 52 to body       5/6/22:  Right Shoulder   Flexion: 90 degrees   Abduction: 60 degrees   External rotation 0°: 16 degrees   Internal rotation 0°: to body        Strength/Myotome Testing     Right Shoulder     Additional Strength Details  4/8/22: Strength assessments are pre-surgical, pt not allowed AROM or MMT at this time as per protocol  Will re-assess when appropriate  5/6/22:  Strength assessments are pre-surgical, pt not allowed AROM or MMT at this time as per protocol  Will re-assess when appropriate  6/8/22:  Strength assessments are pre-surgical, pt not allowed AROM or MMT at this time as per protocol    Will re-assess when appropriate  7/18/22: Strength of R shoulder grossly 4/5 t/o          Precautions: POTS, severe scoliosis,fibromyalgia,GI issues,lumbar spinal cage, cerv DDD, arachnoiditis  Arachnoid cysts - no lifting more than 5#   DOS: 4/6/22; (Week 9): 5/18/22  Precautions: POTS, severe scoliosis,fibromyalgia,GI issues,lumbar spinal cage, cerv DDD, arachnoiditis  Arachnoid cysts  DOS: 4/6/22; (Week 9): 5/18/22      Date 7/7 7 13 22 7/18/22     Visit Count 28 29 30     FOTO        Pain In  6/10 w/o meds    5/10 after Ther   Ex/MT 6/10     4/10 after MT     Pain Out  3-4/10              Manuals 7/7/22 7 13 22 7/18/22     PROM R SHLD JAB 15 min w/ scap mobs/STM MJD  10 min NA 10 min (along with GH mobs)     Mass RUT        Supine gentle GH post/inf for pain   NA             Neuro Re-Ed 7/7/22 7 13 22 7/18/22     scap retraction 15x5"  20x5"  20x5"                      Ther Ex  7 13 22 7/18/22     Supine serratus punches    2x10 with cane      Elbow ROM  AROM 45x 1# 20x       strengthening          cerv AROM        Pendulum ex        Shoulder isometrics (5 way)  5"x10 ea 5"x10 ea Resume nv      Supine wand exercises (flex, abd, ER)  Flexion 5"x10    ER 10x Flexion 5"x10     ER 5"x10             Table slides  Flex, arc Table Slides with bolster 5"x10 flex/scap Table Slides  5"x15 flex/scap Table slides 5"x15  Flex/scap      Wand ex  Flex, ER, EXT, IR Abd  **NV nv      Wall slides  5"x10 flex Resume nv      Pulleys  3 way 3 min flex  4 min flex    Resume nv      UBE        MTPs/LTPs  *Upcoming *Upcoming       Bicep curls  3 way  *Upcoming *Upcoming     Prone flex, HA, rows, ext  resume Resume nv     Side lying ER & ABD  resume Resume nv     Ther Activity                        Gait Training                        Modalities  7 13 22 7/18/22     CP to R shld 10' 10 min 10 min    10 min 10'     MHP 10' b/l UT   No adverse reaction  MHP 10' b/l UT/cerv   No adverse reaction   MHP 10' b/l UT/cerv   No adverse reaction  10 min HP to cervical MHP pre Tx 10' b/l UT    No adverse reaction

## 2022-07-19 ENCOUNTER — HOSPITAL ENCOUNTER (OUTPATIENT)
Dept: INFUSION CENTER | Facility: HOSPITAL | Age: 67
Discharge: HOME | End: 2022-07-19
Payer: MEDICARE

## 2022-07-19 VITALS
RESPIRATION RATE: 16 BRPM | SYSTOLIC BLOOD PRESSURE: 152 MMHG | TEMPERATURE: 96.7 F | HEART RATE: 81 BPM | DIASTOLIC BLOOD PRESSURE: 84 MMHG

## 2022-07-19 PROCEDURE — 96360 HYDRATION IV INFUSION INIT: CPT

## 2022-07-19 PROCEDURE — 96361 HYDRATE IV INFUSION ADD-ON: CPT

## 2022-07-19 RX ADMIN — SODIUM CHLORIDE 1500 ML: 0.9 INJECTION, SOLUTION INTRAVENOUS at 08:05

## 2022-07-19 NOTE — PLAN OF CARE
Problem: INFECTION - ADULT  Goal: Absence or prevention of progression during hospitalization  Description: INTERVENTIONS:  - Assess and monitor for signs and symptoms of infection  - Monitor lab/diagnostic results  - Monitor all insertion sites, i e  indwelling lines, tubes, and drains  - Monitor endotracheal if appropriate and nasal secretions for changes in amount and color  - [place]yce appropriate cooling/warming therapies per order  - Administer medications as ordered  - Instruct and encourage patient and family to use good hand hygiene technique  - Identify and instruct in appropriate isolation precautions for identified infection/condition  Outcome: Progressing     Problem: Knowledge Deficit  Goal: Patient/family/caregiver demonstrates understanding of disease process, treatment plan, medications, and discharge instructions  Description: Complete learning assessment and assess knowledge base    Interventions:  - Provide teaching at level of understanding  - Provide teaching via preferred learning methods  Outcome: Progressing

## 2022-07-19 NOTE — TELEPHONE ENCOUNTER
Patient called back she was stating she called per instructions 5 days in advance to refill medication so she wont run out  Please advise     Patient can be reached at [phone] and to leave detail message if voicemail comes up   TY

## 2022-07-19 NOTE — TELEPHONE ENCOUNTER
S/w pt, confirmed norco bid w/ + relief, no se's  Pt confirmed fu ov w/ bk on Friday 7/22  Pt stated that she has enough medication - she is new to the practice and doing as she was told  Per pt, she was told to call the office 5 days before running out of medication  Advised pt, in general, at your ov the provider will review your medications and send rx's w/ dnf dates - no refills on norco  Advised pt that this is enough medication to get to your next ov, barring any issues or complications  No need to call the office each month  Advised pt that is a generalization - please discuss with Yodit at your ov on Friday  Pt verbalized understanding and appreciation  Stated that she will fu w/ Yodit as scheduled

## 2022-07-20 ENCOUNTER — OFFICE VISIT (OUTPATIENT)
Dept: PHYSICAL THERAPY | Facility: CLINIC | Age: 67
End: 2022-07-20
Payer: MEDICARE

## 2022-07-20 DIAGNOSIS — S46.811D TRAUMATIC TEAR OF SUPRASPINATUS TENDON OF RIGHT SHOULDER, SUBSEQUENT ENCOUNTER: Primary | ICD-10-CM

## 2022-07-20 DIAGNOSIS — Z98.890 S/P RIGHT ROTATOR CUFF REPAIR: ICD-10-CM

## 2022-07-20 PROCEDURE — 97112 NEUROMUSCULAR REEDUCATION: CPT

## 2022-07-20 PROCEDURE — 97140 MANUAL THERAPY 1/> REGIONS: CPT

## 2022-07-20 PROCEDURE — 97110 THERAPEUTIC EXERCISES: CPT

## 2022-07-20 RX ORDER — SODIUM CHLORIDE 9 MG/ML
375 INJECTION, SOLUTION INTRAVENOUS ONCE
Status: COMPLETED | OUTPATIENT
Start: 2022-07-22 | End: 2022-07-22

## 2022-07-20 NOTE — PROGRESS NOTES
Daily Note     Today's date: 2022  Patient name: Chivo Maurice  : [birth year]  MRN: [id number]  Referring provider: Kristy Renteria  Dx:   Encounter Diagnosis     ICD-10-CM    1  Traumatic tear of supraspinatus tendon of right shoulder, subsequent encounter  S46 811D    2  S/P right rotator cuff repair  [id number]        Start Time: 1100  Stop Time: 0591  Total time in clinic (min): 65 minutes    Subjective: "I woke up with a migraine and am having spasm down my neck into my shoulders, R>L  I am sore from Monday's appt with the stretching to get measurements  I think I am doing well for how bad my shoulder was  I am released from the surgery and it's up to me when to stop therapy "      Objective: See treatment diary below      Assessment: Tolerated treatment well  Progressed with scapular strengthening this date  Pt with good tolerance, with quick muscle fatigue  Good overall A/PROM in all directions, rotations most limited  Pt would benefit from continues strengthening to return to PLOF  Patient demonstrated fatigue post treatment and would benefit from continued PT      Plan: Continue per plan of care  Progress treatment as tolerated         Precautions: POTS, severe scoliosis,fibromyalgia,GI issues,lumbar spinal cage, cerv DDD, arachnoiditis  Arachnoid cysts - no lifting more than 5#   DOS: 22; (Week 9): 22  Precautions: POTS, severe scoliosis,fibromyalgia,GI issues,lumbar spinal cage, cerv DDD, arachnoiditis  Arachnoid cysts  DOS: 22; (Week 9): 22      Date 22    Visit Count 28 29 30 31    FOTO        Pain In  6/10 w/o meds    5/10 after Ther   Ex/MT 6/10     4/10 after MT 10 w meds    Pain Out  3-4/10              Manuals 22    PROM R SHLD JAB 15 min w/ scap mobs/STM MJD  10 min NA 10 min (along with GH mobs) PROM in all directions to end range 10'    Mass RUT        Supine gentle GH post/inf for pain   NA Neuro Re-Ed 7/7/22 7 13 22 7/18/22     scap retraction 15x5"  20x5"  20x5"                      Ther Ex  7 13 22 7/18/22     Supine serratus punches    2x10 with cane      Elbow ROM  AROM 45x 1# 20x  2# 30x     strengthening          cerv AROM        Pendulum ex        Shoulder isometrics (5 way)  5"x10 ea 5"x10 ea Resume nv  Reviewed HEP    Supine wand exercises (flex, abd, ER)  Flexion 5"x10    ER 10x Flexion 5"x10     ER 5"x10 Reviewed HEP            Table slides  Flex, arc Table Slides with bolster 5"x10 flex/scap Table Slides  5"x15 flex/scap Table slides 5"x15  Flex/scap  Reviewed HEP    Wand ex  Flex, ER, EXT, IR Abd  **NV nv      Wall slides  5"x10 flex Resume nv  10x10"    Pulleys  3 way 3 min flex  4 min flex    Resume nv  Flex/scap 3'    UBE        MTPs/LTPs  *Upcoming *Upcoming   grn 2x10/3-5 cueing    Bicep curls  3 way  *Upcoming *Upcoming     Prone flex, HA, rows, ext  resume Resume nv NV    Side lying ER & ABD  resume Resume nv ER 1# 2x10/3-5"    ABD 0# 2x10/3-5"    Ther Activity                        Gait Training                        Modalities  7 13 22 7/18/22     CP to R shld 10' 10 min 10 min         MHP 10' b/l UT   No adverse reaction  MHP 10' b/l UT/cerv   No adverse reaction   MHP 10' b/l UT/cerv   No adverse reaction  10 min HP no adverse reactions

## 2022-07-21 ENCOUNTER — HOSPITAL ENCOUNTER (OUTPATIENT)
Dept: INFUSION CENTER | Facility: HOSPITAL | Age: 67
Discharge: HOME | End: 2022-07-21
Payer: MEDICARE

## 2022-07-21 VITALS
RESPIRATION RATE: 16 BRPM | SYSTOLIC BLOOD PRESSURE: 164 MMHG | HEART RATE: 94 BPM | DIASTOLIC BLOOD PRESSURE: 100 MMHG | TEMPERATURE: 96.4 F

## 2022-07-21 PROCEDURE — 96360 HYDRATION IV INFUSION INIT: CPT

## 2022-07-21 PROCEDURE — 96361 HYDRATE IV INFUSION ADD-ON: CPT

## 2022-07-21 RX ADMIN — SODIUM CHLORIDE 1500 ML: 0.9 INJECTION, SOLUTION INTRAVENOUS at 08:18

## 2022-07-21 NOTE — PROGRESS NOTES
Central line dressing changed as ordered  Patient tolerated IV hydration without issues  AVS declined  Patient ambulated off unit without incident  All personal belongings taken with patient

## 2022-07-21 NOTE — PLAN OF CARE
Problem: Knowledge Deficit  Goal: Patient/family/caregiver demonstrates understanding of disease process, treatment plan, medications, and discharge instructions  Description: Complete learning assessment and assess knowledge base    Interventions:  - Provide teaching at level of understanding  - Provide teaching via preferred learning methods  Outcome: Progressing     Problem: Potential for Falls  Goal: Patient will remain free of falls  Description: INTERVENTIONS:  - Educate patient/family on patient safety including physical limitations  - Instruct patient to call for assistance with activity   - Consult OT/PT to assist with strengthening/mobility   - Keep Call bell within reach  - Keep bed low and locked with side rails adjusted as appropriate  - Keep care items and personal belongings within reach  - Initiate and maintain comfort rounds  - Make Fall Risk Sign visible to staff  - Apply yellow socks and bracelet for high fall risk patients  - Consider moving patient to room near nurses station  Outcome: Progressing

## 2022-07-22 ENCOUNTER — OFFICE VISIT (OUTPATIENT)
Dept: PAIN MEDICINE | Facility: CLINIC | Age: 67
End: 2022-07-22
Payer: MEDICARE

## 2022-07-22 ENCOUNTER — HOSPITAL ENCOUNTER (OUTPATIENT)
Dept: INFUSION CENTER | Facility: HOSPITAL | Age: 67
Discharge: HOME | End: 2022-07-22
Payer: MEDICARE

## 2022-07-22 VITALS
DIASTOLIC BLOOD PRESSURE: 95 MMHG | HEART RATE: 88 BPM | HEIGHT: 61 IN | WEIGHT: 127 LBS | SYSTOLIC BLOOD PRESSURE: 156 MMHG | BODY MASS INDEX: 23.98 KG/M2

## 2022-07-22 VITALS
SYSTOLIC BLOOD PRESSURE: 152 MMHG | OXYGEN SATURATION: 100 % | RESPIRATION RATE: 16 BRPM | HEART RATE: 76 BPM | TEMPERATURE: 98.5 F | DIASTOLIC BLOOD PRESSURE: 76 MMHG

## 2022-07-22 DIAGNOSIS — G89.4 CHRONIC PAIN DISORDER: Primary | ICD-10-CM

## 2022-07-22 DIAGNOSIS — R52 GENERALIZED PAIN: ICD-10-CM

## 2022-07-22 DIAGNOSIS — M54.16 LUMBAR RADICULOPATHY: ICD-10-CM

## 2022-07-22 DIAGNOSIS — G89.29 CHRONIC BILATERAL LOW BACK PAIN WITH BILATERAL SCIATICA: ICD-10-CM

## 2022-07-22 DIAGNOSIS — M54.41 CHRONIC BILATERAL LOW BACK PAIN WITH BILATERAL SCIATICA: ICD-10-CM

## 2022-07-22 DIAGNOSIS — M54.12 CERVICAL RADICULOPATHY: ICD-10-CM

## 2022-07-22 DIAGNOSIS — F11.20 UNCOMPLICATED OPIOID DEPENDENCE (HCC): ICD-10-CM

## 2022-07-22 DIAGNOSIS — M79.18 MYOFASCIAL PAIN SYNDROME: ICD-10-CM

## 2022-07-22 DIAGNOSIS — G03.9 ARACHNOIDITIS: ICD-10-CM

## 2022-07-22 DIAGNOSIS — M54.42 CHRONIC BILATERAL LOW BACK PAIN WITH BILATERAL SCIATICA: ICD-10-CM

## 2022-07-22 DIAGNOSIS — Z79.891 ENCOUNTER FOR LONG-TERM OPIATE ANALGESIC USE: ICD-10-CM

## 2022-07-22 DIAGNOSIS — M54.2 NECK PAIN: ICD-10-CM

## 2022-07-22 DIAGNOSIS — Z98.890 HISTORY OF LUMBAR SURGERY: ICD-10-CM

## 2022-07-22 DIAGNOSIS — Z79.891 LONG-TERM CURRENT USE OF OPIATE ANALGESIC: ICD-10-CM

## 2022-07-22 PROCEDURE — 80305 DRUG TEST PRSMV DIR OPT OBS: CPT | Performed by: NURSE PRACTITIONER

## 2022-07-22 PROCEDURE — 96360 HYDRATION IV INFUSION INIT: CPT

## 2022-07-22 PROCEDURE — 99214 OFFICE O/P EST MOD 30 MIN: CPT | Performed by: NURSE PRACTITIONER

## 2022-07-22 PROCEDURE — 96361 HYDRATE IV INFUSION ADD-ON: CPT

## 2022-07-22 RX ORDER — HYDROCODONE BITARTRATE AND ACETAMINOPHEN 5; 325 MG/1; MG/1
1 TABLET ORAL 2 TIMES DAILY PRN
Qty: 60 TABLET | Refills: 0 | Status: SHIPPED | OUTPATIENT
Start: 2022-07-22 | End: 2022-09-30 | Stop reason: SDUPTHER

## 2022-07-22 RX ORDER — SODIUM CHLORIDE 9 MG/ML
375 INJECTION, SOLUTION INTRAVENOUS ONCE
Status: COMPLETED | OUTPATIENT
Start: 2022-07-26 | End: 2022-07-26

## 2022-07-22 RX ORDER — HYDROCODONE BITARTRATE AND ACETAMINOPHEN 5; 325 MG/1; MG/1
1 TABLET ORAL 2 TIMES DAILY PRN
Qty: 60 TABLET | Refills: 0 | Status: SHIPPED | OUTPATIENT
Start: 2022-07-22 | End: 2022-09-16 | Stop reason: SDUPTHER

## 2022-07-22 RX ADMIN — SODIUM CHLORIDE 375 ML/HR: 0.9 INJECTION, SOLUTION INTRAVENOUS at 09:03

## 2022-07-22 NOTE — PATIENT INSTRUCTIONS

## 2022-07-22 NOTE — PROGRESS NOTES
Assessment:  1  Chronic pain disorder    2  Neck pain    3  Cervical radiculopathy    4  Myofascial pain syndrome    5  History of lumbar surgery    6  Chronic bilateral low back pain with bilateral sciatica    7  Lumbar radiculopathy    8  Generalized pain    9  Arachnoiditis    10  Encounter for long-term opiate analgesic use    11  Uncomplicated opioid dependence ([address] )        Plan:  While the patient was in the office today, I did have a thorough conversation regarding their chronic pain syndrome, medication management, and treatment plan options  Patient is being seen for follow-up visit she is complaining of increasing pain in her neck radiating to both upper extremities  Also, increased back and lower extremity pain  She believes that the pain has been increased because she is attending physical therapy for her shoulder  Some of the exercises seemed to aggravate her chronic neck and back pain  Schedule C7-T1 interlaminar epidural steroid injection in the near future  Complete risks and benefits including bleeding, infection, tissue reaction, nerve injury and allergic reaction were discussed  The approach was demonstrated using models and literature was provided  Verbal and written consent was obtained  Continue hydrocodone 5/325 twice daily if needed for pain  The patient's opioid scripts were sent to their pharmacy electronically and was given a 2 month supply of prescriptions with a Do Not Fill date(s) of 07/22/2022, 08/19/2022  Continue Flexeril 5 mg twice daily if needed for spasms  She did not require refill this medication during today's visit  There are risks associated with opioid medications, including dependence, addiction and tolerance  The patient understands and agrees to use these medications only as prescribed   Potential side effects of the medications include, but are not limited to, constipation, drowsiness, addiction, impaired judgment and risk of fatal overdose if not taken as prescribed  The patient was warned against driving while taking sedation medications  Sharing medications is a felony  At this point in time, the patient is showing no signs of addiction, abuse, diversion or suicidal ideation  A urine drug screen was collected at today's office visit as part of our medication management protocol  The point of care testing results were appropriate for what was being prescribed  The specimen will be sent for confirmatory testing  The drug screen is medically necessary because the patient is either dependent on opioid medication or is being considered for opioid medication therapy and the results could impact ongoing or future treatment  The drug screen is to evaluate for the presences or absence of prescribed, non-prescribed, and/or illicit drugs/substances  [place] Prescription Drug Monitoring Program report was reviewed and was appropriate     The patient will follow-up in 2 months for medication prescription refill and reevaluation  The patient was advised to contact the office should their symptoms worsen in the interim  The patient was agreeable and verbalized an understanding  History of Present Illness: The patient is a 78 y o  female who presents for a follow up office visit in regards to Hip Pain, Headache, Leg Pain, Ankle Pain, Back Pain, Buttocks Pain, Arm Pain, Foot Pain, Shoulder Pain, Groin Pain, Elbow Pain, Hand Pain, Knee Pain, Neck Pain, and Wrist Pain  The patients current symptoms include complaints of neck pain that radiates to both upper extremities and low back pain that radiates down both lower extremities  Current pain level is an 8-9/10  Quality pain is described as burning, sharp, cramping, shooting, pins and needles  Current pain medications includes:  Hydrocodone 5/325 twice daily if needed for pain, Flexeril 5 mg twice daily if needed for spasm  The patient reports that this regimen is providing 5-10 % pain relief  The patient is reporting no side effects from this pain medication regimen  I have personally reviewed and/or updated the patient's past medical history, past surgical history, family history, social history, current medications, allergies, and vital signs today  Review of Systems  Review of Systems   Respiratory: Positive for shortness of breath  Cardiovascular: Positive for chest pain  Gastrointestinal: Positive for constipation and nausea  Negative for diarrhea and vomiting  Musculoskeletal: Positive for arthralgias, gait problem, joint swelling (face/ankle's after IV) and myalgias  Decreased range of motion  Joint stiffness  Pain in extremity    Neurological: Positive for dizziness  Negative for seizures and weakness  All other systems reviewed and are negative          Past Medical History:   Diagnosis Date    Allergic rhinitis     Anxiety     Asthma     Back pain     Cardiac disease     Cardiopathy     EF 45%    Cough     Diverticulitis     Factor V Leiden (HCC)     Fibromyalgia     GERD (gastroesophageal reflux disease)     Hashimoto's thyroiditis     Hx of degenerative disc disease     Hypotension     pots - postural orthostatic hypotension    Interstitial cystitis     Irregular heart beat     LBBB    Irritable bowel syndrome     Migraines     Mitral valve disease     "thickening"    Myocardial infarction [facility])     possible but not sure when    Neuropathy     bilateral legs    Osteoporosis     Postural orthostatic tachycardia syndrome     must drink a lot of water and salt    Pott's disease     Rheumatic fever 1967    Scoliosis     Sepsis ([address] )     associated with PICC line    Sjogren's syndrome ([facility] [address] [facility]     TMJ (dislocation of temporomandibular joint)        Past Surgical History:   Procedure Laterality Date    ABLATION MICROWAVE Left     lumbar area   Lia Nine BACK SURGERY  10/1998     SECTION      CHOLECYSTECTOMY  2016    COLONOSCOPY 2016    DILATION AND CURETTAGE OF UTERUS  1996    EGD  2016    FOOT SURGERY  1968    removal of bone and neuroma    HYSTERECTOMY  1997    age 54  PRICE ooph    IR OTHER  01/07/2022    IR PICC PLACEMENT SINGLE LUMEN  10/02/2020    IR PICC PLACEMENT SINGLE LUMEN  08/12/2021    IR PICC REPOSITION  12/07/2021    IR TUNNELED CENTRAL LINE PLACEMENT  06/20/2022    LAPAROSCOPY  1996    endometriosis    MOUTH BIOPSY      lip    GA EGD TRANSORAL BIOPSY SINGLE/MULTIPLE N/A 04/24/2019    Procedure: ESOPHAGOGASTRODUODENOSCOPY (EGD) with multiple bx and dilation;  Surgeon: Radha Weiner MD;  Location: [address] [place] [address] GI LAB; Service: Gastroenterology    GA SHLDR ARTHROSCOP,SURG,W/ROTAT CUFF REPR Right 04/06/2022    Procedure: SHOULDER ARTHROSCOPIC ROTATOR CUFF REPAIR Biceps Tenodisis;   Surgeon: Jacqui Gifford MD;  [facility];  Service: Orthopedics    TUNNELED VENOUS CATHETER PLACEMENT  2016       Family History   Problem Relation Age of Onset    Cancer Mother         urinary bladder     Thyroid cancer Sister     Colon cancer Maternal Grandfather     Breast cancer Maternal Aunt         over 48 yrs old     Endometrial cancer Maternal Aunt     Multiple myeloma Maternal Aunt     No Known Problems Father     No Known Problems Daughter     Heart attack Sister     No Known Problems Sister     No Known Problems Sister     No Known Problems Sister     No Known Problems Sister     No Known Problems Daughter     Hypertension Paternal Aunt        Social History     Occupational History    Not on file   Tobacco Use    Smoking status: Never Smoker    Smokeless tobacco: Never Used   Vaping Use    Vaping Use: Never used   Substance and Sexual Activity    Alcohol use: Never    Drug use: No    Sexual activity: Not on file         Current Outpatient Medications:     Alum Hydroxide-Mag Trisilicate (Gaviscon) 95-84 6 MG CHEW, Chew 1 tablet 4 (four) times a day as needed With meals and as needed, Disp: , Rfl:     azelastine (ASTELIN) 0 1 % nasal spray, 2 sprays into each nostril 2 (two) times a day Use in each nostril as directed, Disp: , Rfl:     beclomethasone (QVAR) 40 MCG/ACT inhaler, Inhale 1 puff daily as needed (only when unable to nebulize pulmicort) Rinse mouth after use , Disp: , Rfl:     budesonide (PULMICORT) 0 5 mg/2 mL nebulizer solution, Take 0 5 mg by nebulization 2 (two) times a day Rinse mouth after use , Disp: , Rfl:     cholecalciferol (VITAMIN D3) 1,000 units tablet, Take 1,000 Units by mouth daily , Disp: , Rfl:     cyclobenzaprine (FLEXERIL) 5 mg tablet, Take 1 tablet (5 mg total) by mouth 2 (two) times a day as needed for muscle spasms, Disp: 60 tablet, Rfl: 2    docusate sodium (COLACE) 100 mg capsule, Take 100 mg by mouth daily as needed for constipation, Disp: , Rfl:     famotidine (PEPCID) 10 mg tablet, Take 40 mg by mouth 2 (two) times a day  , Disp: , Rfl:     fexofenadine (ALLEGRA) 180 MG tablet, Take 180 mg by mouth 2 (two) times a day , Disp: , Rfl:     fluticasone (FLONASE) 50 mcg/act nasal spray, 2 sprays into each nostril daily , Disp: , Rfl:     Galcanezumab-gnlm (Emgality) 120 MG/ML SOAJ, Inject under the skin every 30 (thirty) days , Disp: , Rfl:     HYDROcodone-acetaminophen (Norco) 5-325 mg per tablet, Take 1 tablet by mouth 2 (two) times a day as needed for pain for up to 30 doses Do not fill until 8/19/2022 Max Daily Amount: 2 tablets, Disp: 60 tablet, Rfl: 0    HYDROcodone-acetaminophen (NORCO) 5-325 mg per tablet, Take 1 tablet by mouth 2 (two) times a day as needed for pain Max Daily Amount: 2 tablets, Disp: 60 tablet, Rfl: 0    hydrOXYzine (ATARAX) 10 mg/5 mL syrup, Take 20 mg by mouth daily at bedtime , Disp: , Rfl:     ipratropium (ATROVENT) 0 03 % nasal spray, 2 sprays into each nostril 4 (four) times a day as needed for rhinitis , Disp: , Rfl:     ivabradine HCl (Corlanor) 5 MG tablet, 2 (two) times a day  , Disp: , Rfl:     levalbuterol (Juanita Lush HFA) 45 mcg/act inhaler, Inhale 2 puffs every 4 (four) hours as needed (when unable to nebulize), Disp: , Rfl:     levalbuterol (XOPENEX) 1 25 mg/3 mL nebulizer solution, Take 1 25 mg by nebulization every 6 (six) hours as needed , Disp: , Rfl: 2    Magnesium 400 MG CAPS, Take 1 capsule by mouth 2 (two) times a day , Disp: , Rfl:     midodrine (PROAMATINE) 2 5 mg tablet, , Disp: , Rfl:     MULTIPLE VITAMINS-CALCIUM PO, Take 1 capsule by mouth every morning , Disp: , Rfl:     omega-3-acid ethyl esters (LOVAZA) 1 g capsule, Take 2 g by mouth 2 (two) times a day 1600mg daily, Disp: , Rfl:     oxybutynin (DITROPAN) 5 mg tablet, Take 2 5 mg by mouth 2 (two) times a day Once in the morning and one at HS, Disp: , Rfl:     polyethylene glycol (MIRALAX) 17 g packet, Take 17 g by mouth daily as needed , Disp: , Rfl:     Probiotic Product (PROBIOTIC DAILY PO), Take 1 tablet by mouth daily At lunch, Disp: , Rfl:     psyllium (METAMUCIL) 0 52 g capsule, Take 0 52 g by mouth daily, Disp: , Rfl:     Thyroid, Porcine, POWD, Use 32 mg daily, Disp: , Rfl:     Ubrogepant (UBRELVY) 100 MG tablet, Take 100 mg by mouth Take 1 tablet (100 mg) one time as needed for migraine  May repeat one additional tablet (100 mg) at least two hours after the first dose  Do not use more than two doses per day, or for more than eight days per month , Disp: , Rfl:     midodrine (PROAMATINE) 5 mg tablet, TAKE 2 TABS PO IN AM, ONE TAB PO WITH LUNCH AND DINNER (Patient not taking: Reported on 7/22/2022), Disp: 120 tablet, Rfl: 11    oxyCODONE (ROXICODONE) 5 immediate release tablet, 1 tablets every 8 hours as needed for severe shoulder pain ONLY  (Patient not taking: Reported on 4/25/2022 ), Disp: 13 tablet, Rfl: 0  No current facility-administered medications for this visit      Facility-Administered Medications Ordered in Other Visits:     sodium chloride 0 9 % infusion, 375 mL/hr, Intravenous, Once, Christine Payne MD    Allergies   Allergen Reactions    Imipramine Confusion, Fatigue, Irritability, Palpitations, Shortness Of Breath, Tachycardia and Visual Disturbance    Melatonin Shortness Of Breath    Nexium [Esomeprazole] Shortness Of Breath    Nsaids Shortness Of Breath    Singulair [Montelukast] Shortness Of Breath and Cough    Zomig [Zolmitriptan] Shortness Of Breath    Dexilant [Dexlansoprazole] Nausea Only and Vomiting    Albuterol     Ambien [Zolpidem]     Amitriptyline Drowsiness    Aspirin     Bactrim [Sulfamethoxazole-Trimethoprim] Hives    Banana - Food Allergy Dermatitis    Ceftin [Cefuroxime]     Celebrex [Celecoxib]     Ciprofloxacin     Cranberry-C [Ascorbate - Food Allergy] GI Intolerance    Demerol [Meperidine]     Epinephrine Dizziness    Ergotamine Nausea Only and Headache    Keflex [Cephalexin]     Klonopin [Clonazepam] Nausea Only and Dizziness    Levaquin [Levofloxacin]     Lexapro [Escitalopram]     Lyrica [Pregabalin] Fatigue    Macrodantin [Nitrofurantoin]     Morphine Nausea Only    Movantik [Naloxegol] Nausea Only    Mushroom Extract Complex - Food Allergy      Eye itchy, asthma  attack    Naprosyn [Naproxen]     Neurontin [Gabapentin] Dizziness    Pineapple - Food Allergy GI Intolerance    Prolia [Denosumab]     Prozac [Fluoxetine]     Serevent [Salmeterol] Dizziness    Sudafed [Pseudoephedrine] Hives    Sulfa Antibiotics     Tomato - Food Allergy GI Intolerance    Trazodone     Ultram [Tramadol] Nausea Only, Dizziness and Headache    Vioxx [Rofecoxib]     Zithromax [Azithromycin] Hives    Zoloft [Sertraline]     Zantac [Ranitidine] Rash and Dizziness       Physical Exam:    /95 (BP Location: Left arm, Patient Position: Sitting, Cuff Size: Standard)   Pulse 88   Ht 5' 1" (1 549 m)   Wt 57 6 kg (127 lb)   BMI 24 00 kg/m²     Constitutional:normal, well developed, well nourished, alert, in no distress and non-toxic and no overt pain behavior    Eyes:anicteric  HEENT:grossly intact  Neck:supple, symmetric, trachea midline and no masses   Pulmonary:even and unlabored  Cardiovascular:No edema or pitting edema present  Skin:Normal without rashes or lesions and well hydrated  Psychiatric:Mood and affect appropriate  Neurologic:Cranial Nerves II-XII grossly intact  Musculoskeletal:normal    Imaging  FL spine and pain procedure    (Results Pending)       Orders Placed This Encounter   Procedures    FL spine and pain procedure

## 2022-07-25 ENCOUNTER — OFFICE VISIT (OUTPATIENT)
Dept: PHYSICAL THERAPY | Facility: CLINIC | Age: 67
End: 2022-07-25
Payer: MEDICARE

## 2022-07-25 DIAGNOSIS — S46.811D TRAUMATIC TEAR OF SUPRASPINATUS TENDON OF RIGHT SHOULDER, SUBSEQUENT ENCOUNTER: Primary | ICD-10-CM

## 2022-07-25 DIAGNOSIS — Z98.890 S/P RIGHT ROTATOR CUFF REPAIR: ICD-10-CM

## 2022-07-25 PROCEDURE — 97140 MANUAL THERAPY 1/> REGIONS: CPT

## 2022-07-25 PROCEDURE — 97110 THERAPEUTIC EXERCISES: CPT

## 2022-07-25 RX ORDER — SODIUM CHLORIDE 9 MG/ML
375 INJECTION, SOLUTION INTRAVENOUS ONCE
Status: COMPLETED | OUTPATIENT
Start: 2022-07-27 | End: 2022-07-27

## 2022-07-25 NOTE — PROGRESS NOTES
Daily Note     Today's date: 2022  Patient name: Haily Mcelroy  :   MRN: [id number]  Referring provider: Leopold Barker  Dx:   Encounter Diagnosis     ICD-10-CM    1  Traumatic tear of supraspinatus tendon of right shoulder, subsequent encounter  S46 811D    2  S/P right rotator cuff repair  [id number] [id number]                   Subjective: I was in bed for 2 days but doing all the normal things around the house  Tenderness R shoulder with mm cramping down entire R arm        Objective: See treatment diary below      Assessment: Tolerated treatment fairly well  Pt reports occ mm spasms R scap region   Pt encourage verbal feedback t/o rx session  Limited PROM all planes R shoulder with flex/scap < 100* review isometric RTC / encourage carryover with HEP  Patient would benefit from continued PT      Plan: Continue per plan of care        Precautions: POTS, severe scoliosis,fibromyalgia,GI issues,lumbar spinal cage, cerv DDD, arachnoiditis  Arachnoid cysts - no lifting more than 5#   DOS: 22; (Week 15): 22      Date 22   Visit Count 28 29 30 31 32   FOTO        Pain In  6/10 w/o meds    5/10 after Ther   Ex/MT 6/10     4/10 after MT 6/10 w meds 8-9 w/o meds   Pain Out  3-/10              Manuals 22 7 22    PROM R SHLD JAB 15 min w/ scap mobs/STM MJD  10 min NA 10 min (along with [address] mobs) PROM in all directions to end range 10' PROM in all directions to end range 15'   Mass RUT        Supine gentle GH post/inf for pain   NA             Neuro Re-Ed 22 7 13 22     scap retraction 15x5"  20x5"  20x5"   review                   Ther Ex  22     Supine serratus punches    2x10 with cane   review   Elbow ROM  AROM 45x 1# 20x  2# 30x 2# 30x    strengthening          cerv AROM        Pendulum ex        Shoulder isometrics (5 way)  5"x10 ea 5"x10 ea Resume nv  Reviewed HEP Reviewed HEP   Supine wand exercises (flex, abd, ER) Flexion 5"x10    ER 10x Flexion 5"x10     ER 5"x10 Reviewed HEP Reviewed HEP           Table slides  Flex, arc Table Slides with sachier 5"x10 flex/scap Table Slides  5"x15 flex/scap Table slides 5"x15  Flex/scap  Reviewed HEP    Wand ex  Flex, ER, EXT, IR Abd  **NV nv      Wall slides  5"x10 flex Resume nv  10x10" 10x10"   Pulleys  3 way 3 min flex  4 min flex    Resume nv  Flex/scap 3' Flex/scap 3'   UBE        MTPs/LTPs  *Upcoming *Upcoming   grn 2x10/3-5 cueing grn 2x10/3-5 cueing   Bicep curls  3 way  *Upcoming *Upcoming     Prone flex, HA, rows, ext  resume Resume nv NV Pt def  Resume upcoming as able    Side lying ER & ABD  resume Resume nv ER 1# 2x10/3-5"    ABD 0# 2x10/3-5" ER 1# 2x10/3-5"    ABD 0# 2x10/3-5"   FWD/Scap Raises     0#  10x ea                    Ther Activity                        Gait Training                        Modalities  7 13 22 7/18/22     CP to R shld 10' 10 min 10 min         MHP 10' b/l UT   No adverse reaction  MHP 10' b/l UT/cerv   No adverse reaction   MHP 10' b/l UT/cerv   No adverse reaction  10 min HP no adverse reactions Pt def

## 2022-07-26 ENCOUNTER — HOSPITAL ENCOUNTER (OUTPATIENT)
Dept: INFUSION CENTER | Facility: HOSPITAL | Age: 67
Discharge: HOME | End: 2022-07-26
Payer: MEDICARE

## 2022-07-26 LAB
6MAM UR QL CFM: NEGATIVE NG/ML
7AMINOCLONAZEPAM UR QL CFM: NEGATIVE NG/ML
A-OH ALPRAZ UR QL CFM: NEGATIVE NG/ML
ACCEPTABLE CREAT UR QL: ABNORMAL MG/DL
ACCEPTIBLE SP GR UR QL: ABNORMAL
AMPHET UR QL CFM: NEGATIVE NG/ML
AMPHET UR QL CFM: NEGATIVE NG/ML
BUPRENORPHINE UR QL CFM: NEGATIVE NG/ML
BUTALBITAL UR QL CFM: NEGATIVE NG/ML
BZE UR QL CFM: NEGATIVE NG/ML
CODEINE UR QL CFM: NEGATIVE NG/ML
DESIPRAMINE UR QL CFM: NEGATIVE NG/ML
DESIPRAMINE UR QL CFM: NEGATIVE NG/ML
EDDP UR QL CFM: NEGATIVE NG/ML
ETHYL GLUCURONIDE UR QL CFM: NEGATIVE NG/ML
ETHYL SULFATE UR QL SCN: NEGATIVE NG/ML
EUTYLONE UR QL: NEGATIVE NG/ML
FENTANYL UR QL CFM: NEGATIVE NG/ML
GLIADIN IGG SER IA-ACNC: NEGATIVE NG/ML
GLUCOSE 30M P 50 G LAC PO SERPL-MCNC: NEGATIVE NG/ML
HYDROCODONE UR QL CFM: ABNORMAL NG/ML
HYDROCODONE UR QL CFM: ABNORMAL NG/ML
HYDROMORPHONE UR QL CFM: ABNORMAL NG/ML
IMIPRAMINE UR QL CFM: NEGATIVE NG/ML
LORAZEPAM UR QL CFM: NEGATIVE NG/ML
MDMA UR QL CFM: NEGATIVE NG/ML
ME-PHENIDATE UR QL CFM: NEGATIVE NG/ML
MEPERIDINE UR QL CFM: NEGATIVE NG/ML
METHADONE UR QL CFM: NEGATIVE NG/ML
METHAMPHET UR QL CFM: NEGATIVE NG/ML
MORPHINE UR QL CFM: NEGATIVE NG/ML
MORPHINE UR QL CFM: NEGATIVE NG/ML
NITRITE UR QL: NORMAL UG/ML
NORBUPRENORPHINE UR QL CFM: NEGATIVE NG/ML
NORDIAZEPAM UR QL CFM: NEGATIVE NG/ML
NORFENTANYL UR QL CFM: NEGATIVE NG/ML
NORHYDROCODONE UR QL CFM: ABNORMAL NG/ML
NORHYDROCODONE UR QL CFM: ABNORMAL NG/ML
NORMEPERIDINE UR QL CFM: NEGATIVE NG/ML
NOROXYCODONE UR QL CFM: ABNORMAL NG/ML
OLANZAPINE QUANTIFICATION: NEGATIVE NG/ML
OPC-3373 QUANTIFICATION: NEGATIVE
OXAZEPAM UR QL CFM: NEGATIVE NG/ML
OXYCODONE UR QL CFM: ABNORMAL NG/ML
OXYMORPHONE UR QL CFM: ABNORMAL NG/ML
OXYMORPHONE UR QL CFM: ABNORMAL NG/ML
PCP UR QL CFM: NEGATIVE NG/ML
PHENOBARB UR QL CFM: NEGATIVE NG/ML
RESULT ALL_PRESCRIBED MEDS AND SPECIAL INSTRUCTIONS: NORMAL
SECOBARBITAL UR QL CFM: NEGATIVE NG/ML
SL AMB 3-METHYL-FENTANYL QUANTIFICATION: NORMAL NG/ML
SL AMB 4-ANPP QUANTIFICATION: NORMAL NG/ML
SL AMB 4-FIBF QUANTIFICATION: NORMAL NG/ML
SL AMB 5F-ADB-M7 METABOLITE QUANTIFICATION: NEGATIVE NG/ML
SL AMB 7-OH-MITRAGYNINE (KRATOM ALKALOID) QUANTIFICATION: NEGATIVE NG/ML
SL AMB AB-FUBINACA-M3 METABOLITE QUANTIFICATION: NEGATIVE NG/ML
SL AMB ACETYL FENTANYL QUANTIFICATION: NORMAL NG/ML
SL AMB ACETYL NORFENTANYL QUANTIFICATION: NORMAL NG/ML
SL AMB ACRYL FENTANYL QUANTIFICATION: NORMAL NG/ML
SL AMB BUTRYL FENTANYL QUANTIFICATION: NORMAL NG/ML
SL AMB CARFENTANIL QUANTIFICATION: NORMAL NG/ML
SL AMB CLOZAPINE QUANTIFICATION: NEGATIVE NG/ML
SL AMB CTHC (MARIJUANA METABOLITE) QUANTIFICATION: NEGATIVE NG/ML
SL AMB CYCLOPROPYL FENTANYL QUANTIFICATION: NORMAL NG/ML
SL AMB DEXTROMETHORPHAN QUANTIFICATION: NEGATIVE NG/ML
SL AMB DEXTRORPHAN (DEXTROMETHORPHAN METABOLITE) QUANT: NEGATIVE NG/ML
SL AMB DEXTRORPHAN (DEXTROMETHORPHAN METABOLITE) QUANT: NEGATIVE NG/ML
SL AMB FURANYL FENTANYL QUANTIFICATION: NORMAL NG/ML
SL AMB HALOPERIDOL  QUANTIFICATION: NEGATIVE NG/ML
SL AMB HALOPERIDOL METABOLITE QUANTIFICATION: NEGATIVE NG/ML
SL AMB HYDROXYRISPERIDONE QUANTIFICATION: NEGATIVE NG/ML
SL AMB JWH018 METABOLITE QUANTIFICATION: NEGATIVE NG/ML
SL AMB JWH073 METABOLITE QUANTIFICATION: NEGATIVE NG/ML
SL AMB MDMB-FUBINACA-M1 METABOLITE QUANTIFICATION: NEGATIVE NG/ML
SL AMB METHOXYACETYL FENTANYL QUANTIFICATION: NORMAL NG/ML
SL AMB METHYLONE QUANTIFICATION: NEGATIVE NG/ML
SL AMB N-DESMETHYL U-47700 QUANTIFICATION: NORMAL NG/ML
SL AMB N-DESMETHYL-TRAMADOL QUANTIFICATION: NEGATIVE NG/ML
SL AMB N-DESMETHYLCLOZAPINE QUANTIFICATION: NEGATIVE NG/ML
SL AMB NORQUETIAPINE QUANTIFICATION: NEGATIVE NG/ML
SL AMB PHENTERMINE QUANTIFICATION: NEGATIVE NG/ML
SL AMB QUETIAPINE QUANTIFICATION: NEGATIVE NG/ML
SL AMB RCS4 METABOLITE QUANTIFICATION: NEGATIVE NG/ML
SL AMB RISPERIDONE QUANTIFICATION: NEGATIVE NG/ML
SL AMB RITALINIC ACID QUANTIFICATION: NEGATIVE NG/ML
SL AMB U-47700 QUANTIFICATION: NORMAL NG/ML
SPECIMEN DRAWN SERPL: NEGATIVE NG/ML
SPECIMEN PH ACCEPTABLE UR: NORMAL
TAPENTADOL UR QL CFM: NEGATIVE NG/ML
TEMAZEPAM UR QL CFM: NEGATIVE NG/ML
TEMAZEPAM UR QL CFM: NEGATIVE NG/ML
TRAMADOL UR QL CFM: NEGATIVE NG/ML
URATE/CREAT 24H UR: NEGATIVE NG/ML

## 2022-07-26 PROCEDURE — 96360 HYDRATION IV INFUSION INIT: CPT

## 2022-07-26 PROCEDURE — 96361 HYDRATE IV INFUSION ADD-ON: CPT

## 2022-07-26 RX ADMIN — SODIUM CHLORIDE 375 ML/HR: 0.9 INJECTION, SOLUTION INTRAVENOUS at 08:28

## 2022-07-26 NOTE — PLAN OF CARE
Problem: INFECTION - ADULT  Goal: Absence or prevention of progression during hospitalization  Description: INTERVENTIONS:  - Assess and monitor for signs and symptoms of infection  - Monitor lab/diagnostic results  - Monitor all insertion sites, i e  indwelling lines, tubes, and drains  - Monitor endotracheal if appropriate and nasal secretions for changes in amount and color  - [place] appropriate cooling/warming therapies per order  - Administer medications as ordered  - Instruct and encourage patient and family to use good hand hygiene technique  - Identify and instruct in appropriate isolation precautions for identified infection/condition  Outcome: Progressing     Problem: Knowledge Deficit  Goal: Patient/family/caregiver demonstrates understanding of disease process, treatment plan, medications, and discharge instructions  Description: Complete learning assessment and assess knowledge base    Interventions:  - Provide teaching at level of understanding  - Provide teaching via preferred learning methods  Outcome: Progressing

## 2022-07-27 ENCOUNTER — OFFICE VISIT (OUTPATIENT)
Dept: PHYSICAL THERAPY | Facility: CLINIC | Age: 67
End: 2022-07-27
Payer: MEDICARE

## 2022-07-27 ENCOUNTER — HOSPITAL ENCOUNTER (OUTPATIENT)
Dept: INFUSION CENTER | Facility: HOSPITAL | Age: 67
Discharge: HOME | End: 2022-07-27
Payer: MEDICARE

## 2022-07-27 VITALS
TEMPERATURE: 97.3 F | SYSTOLIC BLOOD PRESSURE: 145 MMHG | OXYGEN SATURATION: 99 % | HEART RATE: 73 BPM | RESPIRATION RATE: 16 BRPM | DIASTOLIC BLOOD PRESSURE: 81 MMHG

## 2022-07-27 DIAGNOSIS — S46.811D TRAUMATIC TEAR OF SUPRASPINATUS TENDON OF RIGHT SHOULDER, SUBSEQUENT ENCOUNTER: Primary | ICD-10-CM

## 2022-07-27 PROCEDURE — 96360 HYDRATION IV INFUSION INIT: CPT

## 2022-07-27 PROCEDURE — 97140 MANUAL THERAPY 1/> REGIONS: CPT

## 2022-07-27 PROCEDURE — 97110 THERAPEUTIC EXERCISES: CPT

## 2022-07-27 PROCEDURE — 96361 HYDRATE IV INFUSION ADD-ON: CPT

## 2022-07-27 RX ORDER — SODIUM CHLORIDE 9 MG/ML
375 INJECTION, SOLUTION INTRAVENOUS ONCE
Status: COMPLETED | OUTPATIENT
Start: 2022-07-28 | End: 2022-07-28

## 2022-07-27 RX ADMIN — SODIUM CHLORIDE 375 ML/HR: 0.9 INJECTION, SOLUTION INTRAVENOUS at 09:11

## 2022-07-27 NOTE — PROGRESS NOTES
Daily Note     Today's date: 2022  Patient name: Zane Benavides  :   MRN: [id number]  Referring provider: Mary Moreno  Dx:   Encounter Diagnosis     ICD-10-CM    1  Traumatic tear of supraspinatus tendon of right shoulder, subsequent encounter  S46 845U                   Subjective:  Pt  states her pain level is down today from 9/10-5/10, however that is due to just coming from her IV infusion treatment, and also taking her pain meds earlier today  Objective: See treatment diary below      Assessment: Tolerated treatment Fairly Well overall with performance of ther exer, however pain level did increase to 6-7/10 with performance of a few exercises, and with MT applic  Level did then decrease to 3-4/10 with/after CP applic  Plan: Con't services 2x/week             Precautions: POTS, severe scoliosis,fibromyalgia,GI issues,lumbar spinal cage, cerv DDD, arachnoiditis  Arachnoid cysts - no lifting more than 5#   DOS: 22; (Week 15): 22      Date 22   Visit Count 33 29 30 31 32   FOTO        Pain In 5/10  w/meds and IV infusion     6-7/10 with some ther exer and MT 6/10 w/o meds    5/10 after Ther   Ex/MT 6/10     4/10 after MT 6/10 w meds 8-9 w/o meds   Pain Out 3-4/10 with/after CP applic 3-              Manuals 7  7 13 22    PROM R SHLD PROM in all directions to end range 10'  To tolerance  MJD  10 min NA 10 min (along with GH mobs) PROM in all directions to end range 10' PROM in all directions to end range 15'   Mass RUT        Supine gentle GH post/inf for pain   NA             Neuro Re-Ed 7  7 13 22     scap retraction *HEP 20x5"  20x5"   review                   Ther Ex 7  7 13 22 22     Supine serratus punches  *HEP  2x10 with cane   review   Elbow ROM 2# 40x   AROM 45x 1# 20x  2# 30x 2# 30x    strengthening          cerv AROM        Pendulum ex        Shoulder isometrics (5 way) *HEP 5"x10 ea Resume nv  Reviewed HEP Reviewed HEP   Supine wand exercises (flex, abd, ER) *HEP Flexion 5"x10    ER 10x Flexion 5"x10     ER 5"x10 Reviewed HEP Reviewed HEP           Table slides  Flex, arc *HEP Table Slides  5"x15 flex/scap Table slides 5"x15  Flex/scap  Reviewed HEP    Wand ex  Flex, ER, EXT, IR Abd **NV **NV nv      Wall slides 10x10" 5"x10 flex Resume nv  10x10" 10x10"   Pulleys  3 way 4 min flex/scap  4 min flex    Resume nv  Flex/scap 3' Flex/scap 3'   UBE        MTPs/LTPs grn 2x15/3-5 *Upcoming *Upcoming   grn 2x10/3-5 cueing grn 2x10/3-5 cueing   Bicep curls  3 way **2# 3x10 ea *Upcoming *Upcoming     Prone flex, HA, rows, ext Hold yet resume Resume nv NV Pt def  Resume upcoming as able    Side lying ER & ABD ER 2# 2x10/3-5"    ABD 0# 3x10/3-5" resume Resume nv ER 1# 2x10/3-5"    ABD 0# 2x10/3-5" ER 1# 2x10/3-5"    ABD 0# 2x10/3-5"   FWD/Scap Raises 0#  3x5 ea    0#  10x ea                    Ther Activity                        Gait Training                        Modalities 7 27 22 7 13 22 7/18/22     CP to R shld 10' 10 min 10 min          MHP 10' b/l UT/cerv   No adverse reaction   MHP 10' b/l UT/cerv   No adverse reaction  10 min HP no adverse reactions Pt def

## 2022-07-28 ENCOUNTER — HOSPITAL ENCOUNTER (OUTPATIENT)
Dept: INFUSION CENTER | Facility: HOSPITAL | Age: 67
Discharge: HOME | End: 2022-07-28
Payer: MEDICARE

## 2022-07-28 VITALS
TEMPERATURE: 97.4 F | DIASTOLIC BLOOD PRESSURE: 94 MMHG | SYSTOLIC BLOOD PRESSURE: 143 MMHG | OXYGEN SATURATION: 100 % | HEART RATE: 91 BPM | RESPIRATION RATE: 16 BRPM

## 2022-07-28 PROCEDURE — 96361 HYDRATE IV INFUSION ADD-ON: CPT

## 2022-07-28 PROCEDURE — 96360 HYDRATION IV INFUSION INIT: CPT

## 2022-07-28 RX ORDER — SODIUM CHLORIDE 9 MG/ML
375 INJECTION, SOLUTION INTRAVENOUS ONCE
Status: COMPLETED | OUTPATIENT
Start: 2022-07-29 | End: 2022-07-29

## 2022-07-28 RX ADMIN — SODIUM CHLORIDE 375 ML/HR: 0.9 INJECTION, SOLUTION INTRAVENOUS at 09:56

## 2022-07-29 ENCOUNTER — HOSPITAL ENCOUNTER (OUTPATIENT)
Dept: INFUSION CENTER | Facility: HOSPITAL | Age: 67
Discharge: HOME | End: 2022-07-29
Payer: MEDICARE

## 2022-07-29 VITALS
RESPIRATION RATE: 18 BRPM | HEART RATE: 102 BPM | SYSTOLIC BLOOD PRESSURE: 166 MMHG | TEMPERATURE: 98.3 F | DIASTOLIC BLOOD PRESSURE: 77 MMHG

## 2022-07-29 PROCEDURE — 96360 HYDRATION IV INFUSION INIT: CPT

## 2022-07-29 PROCEDURE — 96361 HYDRATE IV INFUSION ADD-ON: CPT

## 2022-07-29 RX ADMIN — SODIUM CHLORIDE 375 ML/HR: 9 INJECTION, SOLUTION INTRAVENOUS at 08:38

## 2022-08-01 ENCOUNTER — APPOINTMENT (OUTPATIENT)
Dept: PHYSICAL THERAPY | Facility: CLINIC | Age: 67
End: 2022-08-01
Payer: MEDICARE

## 2022-08-02 ENCOUNTER — HOSPITAL ENCOUNTER (OUTPATIENT)
Dept: INFUSION CENTER | Facility: HOSPITAL | Age: 67
Discharge: HOME | End: 2022-08-02
Payer: MEDICARE

## 2022-08-02 VITALS
TEMPERATURE: 97.9 F | RESPIRATION RATE: 18 BRPM | HEART RATE: 102 BPM | SYSTOLIC BLOOD PRESSURE: 144 MMHG | DIASTOLIC BLOOD PRESSURE: 90 MMHG

## 2022-08-02 PROCEDURE — 96361 HYDRATE IV INFUSION ADD-ON: CPT

## 2022-08-02 PROCEDURE — 96360 HYDRATION IV INFUSION INIT: CPT

## 2022-08-02 RX ADMIN — SODIUM CHLORIDE 1500 ML: 0.9 INJECTION, SOLUTION INTRAVENOUS at 08:45

## 2022-08-04 ENCOUNTER — HOSPITAL ENCOUNTER (OUTPATIENT)
Dept: INFUSION CENTER | Facility: HOSPITAL | Age: 67
Discharge: HOME | End: 2022-08-04
Payer: MEDICARE

## 2022-08-04 ENCOUNTER — APPOINTMENT (OUTPATIENT)
Dept: PHYSICAL THERAPY | Facility: CLINIC | Age: 67
End: 2022-08-04
Payer: MEDICARE

## 2022-08-04 VITALS
HEART RATE: 87 BPM | DIASTOLIC BLOOD PRESSURE: 88 MMHG | OXYGEN SATURATION: 100 % | SYSTOLIC BLOOD PRESSURE: 164 MMHG | RESPIRATION RATE: 16 BRPM | TEMPERATURE: 98.2 F

## 2022-08-04 PROCEDURE — 96360 HYDRATION IV INFUSION INIT: CPT

## 2022-08-04 PROCEDURE — 96361 HYDRATE IV INFUSION ADD-ON: CPT

## 2022-08-04 RX ADMIN — SODIUM CHLORIDE 1500 ML: 0.9 INJECTION, SOLUTION INTRAVENOUS at 09:39

## 2022-08-05 ENCOUNTER — HOSPITAL ENCOUNTER (OUTPATIENT)
Dept: INFUSION CENTER | Facility: HOSPITAL | Age: 67
Discharge: HOME | End: 2022-08-05
Payer: MEDICARE

## 2022-08-05 VITALS
OXYGEN SATURATION: 99 % | TEMPERATURE: 98.9 F | DIASTOLIC BLOOD PRESSURE: 85 MMHG | SYSTOLIC BLOOD PRESSURE: 151 MMHG | HEART RATE: 91 BPM | RESPIRATION RATE: 18 BRPM

## 2022-08-05 PROCEDURE — 96361 HYDRATE IV INFUSION ADD-ON: CPT

## 2022-08-05 PROCEDURE — 96360 HYDRATION IV INFUSION INIT: CPT

## 2022-08-05 RX ADMIN — SODIUM CHLORIDE 1500 ML: 0.9 INJECTION, SOLUTION INTRAVENOUS at 09:30

## 2022-08-08 ENCOUNTER — APPOINTMENT (OUTPATIENT)
Dept: PHYSICAL THERAPY | Facility: CLINIC | Age: 67
End: 2022-08-08
Payer: MEDICARE

## 2022-08-08 ENCOUNTER — HOSPITAL ENCOUNTER (OUTPATIENT)
Dept: INFUSION CENTER | Facility: HOSPITAL | Age: 67
Discharge: HOME | End: 2022-08-08
Payer: MEDICARE

## 2022-08-08 VITALS
OXYGEN SATURATION: 98 % | HEART RATE: 98 BPM | SYSTOLIC BLOOD PRESSURE: 142 MMHG | TEMPERATURE: 96.9 F | RESPIRATION RATE: 18 BRPM | DIASTOLIC BLOOD PRESSURE: 83 MMHG

## 2022-08-08 DIAGNOSIS — S46.811D TRAUMATIC TEAR OF SUPRASPINATUS TENDON OF RIGHT SHOULDER, SUBSEQUENT ENCOUNTER: Primary | ICD-10-CM

## 2022-08-08 DIAGNOSIS — Z98.890 S/P RIGHT ROTATOR CUFF REPAIR: ICD-10-CM

## 2022-08-08 PROCEDURE — 96360 HYDRATION IV INFUSION INIT: CPT

## 2022-08-08 PROCEDURE — 96361 HYDRATE IV INFUSION ADD-ON: CPT

## 2022-08-08 RX ADMIN — SODIUM CHLORIDE 1500 ML: 0.9 INJECTION, SOLUTION INTRAVENOUS at 08:32

## 2022-08-08 NOTE — PROGRESS NOTES
Pt tolerated todays hydration without incident  Picc line flushed and deaccessd per routine   Discharged ambulatory deferring avs

## 2022-08-08 NOTE — PROGRESS NOTES
Daily Note     Today's date: 2022  Patient name: Santa Sanchez  : [birth year]  MRN: [id number]  Referring provider: Alejandra Mcmullen  Dx:   Encounter Diagnosis     ICD-10-CM    1  Traumatic tear of supraspinatus tendon of right shoulder, subsequent encounter  S46 811D    2  S/P right rotator cuff repair  [id number]                   Subjective: ***      Objective: See treatment diary below      Assessment: Tolerated treatment {Tolerated treatment :[id number]}   Patient {assessment:}      Plan: {PLAN:[id number]}     Precautions: POTS, severe scoliosis,fibromyalgia,GI issues,lumbar spinal cage, cerv DDD, arachnoiditis  Arachnoid cysts - no lifting more than 5#   DOS: 22; (Week 15): 22      Date 22       Visit Count 33  34      FOTO        Pain In 5/10  w/meds and IV infusion     6-7/10 with some ther exer and MT       Pain Out 3-10 with/after CP applic               Manuals 22    PROM R SHLD PROM in all directions to end range 10'  To tolerance   NA 10 min (along with GH mobs) PROM in all directions to end range 10' PROM in all directions to end range 15'   Mass RUT        Supine gentle GH post/inf for pain   NA             Neuro Re-Ed 22     scap retraction *HEP  20x5"   review                   Ther Ex 22     Supine serratus punches  *HEP  2x10 with cane   review   Elbow ROM 2# 40x    1# 20x  2# 30x 2# 30x    strengthening          cerv AROM        Pendulum ex        Shoulder isometrics (5 way)  *HEP  Resume nv  Reviewed HEP Reviewed HEP   Supine wand exercises (flex, abd, ER) *HEP  Flexion 5"x10     ER 5"x10 Reviewed HEP Reviewed HEP           Table slides  Flex, arc *HEP  Table slides 5"x15  Flex/scap  Reviewed HEP    Wand ex  Flex, ER, EXT, IR Abd **NV  nv      Wall slides 10x10"  Resume nv  10x10" 10x10"   Pulleys  3 way 4 min flex/scap   Resume nv  Flex/scap 3' Flex/scap 3'   UBE        MTPs/LTPs grn 2x15/3-5  *Upcoming grn 2x10/3-5 cueing grn 2x10/3-5 cueing   Bicep curls  3 way **2# 3x10 ea  *Upcoming     Prone flex, HA, rows, ext Hold yet  Resume nv NV Pt def  Resume upcoming as able    Side lying ER & ABD ER 2# 2x10/3-5"    ABD 0# 3x10/3-5"  Resume nv ER 1# 2x10/3-5"    ABD 0# 2x10/3-5" ER 1# 2x10/3-5"    ABD 0# 2x10/3-5"   FWD/Scap Raises 0#  3x5 ea    0#  10x ea                    Ther Activity                        Gait Training                        Modalities 7 27 22       CP to R shld 10'

## 2022-08-09 ENCOUNTER — HOSPITAL ENCOUNTER (OUTPATIENT)
Dept: INFUSION CENTER | Facility: HOSPITAL | Age: 67
Discharge: HOME | End: 2022-08-09
Payer: MEDICARE

## 2022-08-09 VITALS
SYSTOLIC BLOOD PRESSURE: 151 MMHG | DIASTOLIC BLOOD PRESSURE: 93 MMHG | HEART RATE: 88 BPM | RESPIRATION RATE: 16 BRPM | OXYGEN SATURATION: 98 % | TEMPERATURE: 98.2 F

## 2022-08-09 PROCEDURE — 96361 HYDRATE IV INFUSION ADD-ON: CPT

## 2022-08-09 PROCEDURE — 96360 HYDRATION IV INFUSION INIT: CPT

## 2022-08-09 RX ADMIN — SODIUM CHLORIDE 1500 ML: 0.9 INJECTION, SOLUTION INTRAVENOUS at 08:48

## 2022-08-09 NOTE — PLAN OF CARE
Problem: INFECTION - ADULT  Goal: Absence or prevention of progression during hospitalization  Description: INTERVENTIONS:  - Assess and monitor for signs and symptoms of infection  - Monitor lab/diagnostic results  - Monitor all insertion sites, i e  indwelling lines, tubes, and drains  - Monitor endotracheal if appropriate and nasal secretions for changes in amount and color  - [place] appropriate cooling/warming therapies per order  - Administer medications as ordered  - Instruct and encourage patient and family to use good hand hygiene technique  - Identify and instruct in appropriate isolation precautions for identified infection/condition  Outcome: Progressing     Problem: Knowledge Deficit  Goal: Patient/family/caregiver demonstrates understanding of disease process, treatment plan, medications, and discharge instructions  Description: Complete learning assessment and assess knowledge base    Interventions:  - Provide teaching at level of understanding  - Provide teaching via preferred learning methods  Outcome: Progressing

## 2022-08-10 RX ORDER — SODIUM CHLORIDE 9 MG/ML
375 INJECTION, SOLUTION INTRAVENOUS ONCE
Status: COMPLETED | OUTPATIENT
Start: 2022-08-11 | End: 2022-08-11

## 2022-08-11 ENCOUNTER — HOSPITAL ENCOUNTER (OUTPATIENT)
Dept: INFUSION CENTER | Facility: HOSPITAL | Age: 67
Discharge: HOME | End: 2022-08-11
Payer: MEDICARE

## 2022-08-11 ENCOUNTER — OFFICE VISIT (OUTPATIENT)
Dept: PHYSICAL THERAPY | Facility: CLINIC | Age: 67
End: 2022-08-11
Payer: MEDICARE

## 2022-08-11 VITALS
TEMPERATURE: 97.4 F | RESPIRATION RATE: 16 BRPM | HEART RATE: 93 BPM | DIASTOLIC BLOOD PRESSURE: 89 MMHG | OXYGEN SATURATION: 98 % | SYSTOLIC BLOOD PRESSURE: 168 MMHG

## 2022-08-11 DIAGNOSIS — S46.811D TRAUMATIC TEAR OF SUPRASPINATUS TENDON OF RIGHT SHOULDER, SUBSEQUENT ENCOUNTER: Primary | ICD-10-CM

## 2022-08-11 DIAGNOSIS — Z98.890 S/P RIGHT ROTATOR CUFF REPAIR: ICD-10-CM

## 2022-08-11 PROCEDURE — 96361 HYDRATE IV INFUSION ADD-ON: CPT

## 2022-08-11 PROCEDURE — 97110 THERAPEUTIC EXERCISES: CPT

## 2022-08-11 PROCEDURE — 96360 HYDRATION IV INFUSION INIT: CPT

## 2022-08-11 PROCEDURE — 97112 NEUROMUSCULAR REEDUCATION: CPT

## 2022-08-11 PROCEDURE — 97140 MANUAL THERAPY 1/> REGIONS: CPT

## 2022-08-11 RX ORDER — SODIUM CHLORIDE 9 MG/ML
375 INJECTION, SOLUTION INTRAVENOUS ONCE
Status: DISCONTINUED | OUTPATIENT
Start: 2022-08-12 | End: 2022-08-16 | Stop reason: HOSPADM

## 2022-08-11 RX ADMIN — SODIUM CHLORIDE 375 ML/HR: 0.9 INJECTION, SOLUTION INTRAVENOUS at 08:19

## 2022-08-11 NOTE — PROGRESS NOTES
Daily Note     Today's date: 2022  Patient name: Larissa Gonzalez  :   MRN: [id number]  Referring provider: Tiffani Chambers  Dx:   Encounter Diagnosis     ICD-10-CM    1  Traumatic tear of supraspinatus tendon of right shoulder, subsequent encounter  S46 811D    2  S/P right rotator cuff repair  [id number]                   Subjective: Pt states her R shldr is doing well and has a slight ache in it with twinges of pain  States she has been doing more lifting than she should  Pt notes continuous improvement in ROM and strength to date  Objective: See treatment diary below      Assessment: Tolerated treatment well  Patient demonstrated fatigue post treatment, exhibited good technique with therapeutic exercises and would benefit from continued PT Pt notes she is helping to care for her mother who is in a nsg home  Plan: Continue per plan of care        Precautions: POTS, severe scoliosis,fibromyalgia,GI issues,lumbar spinal cage, cerv DDD, arachnoiditis  Arachnoid cysts - no lifting more than 5#   DOS: 22; (Week 15): 22      Date 22      Visit Count 33  34      FOTO        Pain In 10  w/meds and IV infusion     6-7/10 with some ther exer and MT  /10 shld      Pain Out 3-4/10 with/after CP applic               Manuals 22    PROM R SHLD PROM in all directions to end range 10'  To tolerance  PROM in all directions to end range 10'  To tolerance NA 10 min (along with [address] mobs) PROM in all directions to end range 10' PROM in all directions to end range 15'   Mass RUT        Supine gentle GH post/inf for pain   NA             Neuro Re-Ed 22     scap retraction *HEP  20x5"   review                   Ther Ex 22     Supine serratus punches  *HEP  2x10 with cane   review   Elbow ROM 2# 40x   Bicep curls as below 1# 20x  2# 30x 2# 30x    strengthening          cerv AROM        Pendulum ex        Shoulder isometrics (5 way)  *HEP  Resume nv  Reviewed HEP Reviewed HEP   Supine wand exercises (flex, abd, ER) *HEP  Flexion 5"x10     ER 5"x10 Reviewed HEP Reviewed HEP           Table slides  Flex, arc *HEP  Table slides 5"x15  Flex/scap  Reviewed HEP    Wand ex  Flex, ER,  EXT, IR Abd **NV 20x ea nv      Wall slides 10x10" 10 x 10"     Dbl UE Resume nv  10x10" 10x10"   Pulleys  3 way 4 min flex/scap  4 min ea   flex/scap Resume nv  Flex/scap 3' Flex/scap 3'   UBE        MTPs/LTPs grn 2x15/3-5 Green 2 x 15 *Upcoming   grn 2x10/3-5 cueing grn 2x10/3-5 cueing   Bicep curls  3 way **2# 3x10 ea 3#  30 ea dir *Upcoming     Prone flex, HA, rows, ext Hold yet  Resume nv NV Pt def  Resume upcoming as able    Side lying ER & ABD ER 2# 2x10/3-5"    ABD 0# 3x10/3-5" ER 2# 3x10/3-5"    ABD 0# 3x10/3-5 Resume nv ER 1# 2x10/3-5"    ABD 0# 2x10/3-5" ER 1# 2x10/3-5"    ABD 0# 2x10/3-5"   FWD/Scap Raises 0#  3x5 ea   0#  20x ea   0#  10x ea                    Ther Activity                        Gait Training                        Modalities 7 27 22 8/11      CP to R shld 10' 10'

## 2022-08-11 NOTE — PLAN OF CARE
Problem: Potential for Falls  Goal: Patient will remain free of falls  Description: INTERVENTIONS:  - Educate patient/family on patient safety including physical limitations  - Instruct patient to call for assistance with activity   - Consult OT/PT to assist with strengthening/mobility   - Keep Call bell within reach  - Keep bed low and locked with side rails adjusted as appropriate  - Keep care items and personal belongings within reach  - Initiate and maintain comfort rounds  - Make Fall Risk Sign visible to staff  -- Apply yellow socks and bracelet for high fall risk patients  - Consider moving patient to room near nurses station  Outcome: Progressing [Nasal Discharge] : nasal discharge [Sore Throat] : sore throat [Cough] : cough [Fever] : no fever [Fatigue] : no fatigue [Chest Pain] : no chest pain [Shortness Of Breath] : no shortness of breath [Wheezing] : no wheezing

## 2022-08-12 ENCOUNTER — HOSPITAL ENCOUNTER (OUTPATIENT)
Dept: INFUSION CENTER | Facility: HOSPITAL | Age: 67
Discharge: HOME | End: 2022-08-12

## 2022-08-12 RX ORDER — SODIUM CHLORIDE 9 MG/ML
375 INJECTION, SOLUTION INTRAVENOUS ONCE
Status: COMPLETED | OUTPATIENT
Start: 2022-08-16 | End: 2022-08-16

## 2022-08-15 ENCOUNTER — OFFICE VISIT (OUTPATIENT)
Dept: PHYSICAL THERAPY | Facility: CLINIC | Age: 67
End: 2022-08-15
Payer: MEDICARE

## 2022-08-15 ENCOUNTER — HOSPITAL ENCOUNTER (OUTPATIENT)
Dept: INFUSION CENTER | Facility: HOSPITAL | Age: 67
Discharge: HOME | End: 2022-08-15
Payer: MEDICARE

## 2022-08-15 VITALS
TEMPERATURE: 98 F | HEART RATE: 101 BPM | RESPIRATION RATE: 16 BRPM | SYSTOLIC BLOOD PRESSURE: 169 MMHG | DIASTOLIC BLOOD PRESSURE: 80 MMHG

## 2022-08-15 DIAGNOSIS — S46.811D TRAUMATIC TEAR OF SUPRASPINATUS TENDON OF RIGHT SHOULDER, SUBSEQUENT ENCOUNTER: Primary | ICD-10-CM

## 2022-08-15 PROCEDURE — 97112 NEUROMUSCULAR REEDUCATION: CPT

## 2022-08-15 PROCEDURE — 96360 HYDRATION IV INFUSION INIT: CPT

## 2022-08-15 PROCEDURE — 97110 THERAPEUTIC EXERCISES: CPT

## 2022-08-15 PROCEDURE — 96361 HYDRATE IV INFUSION ADD-ON: CPT

## 2022-08-15 PROCEDURE — 97140 MANUAL THERAPY 1/> REGIONS: CPT

## 2022-08-15 RX ORDER — SODIUM CHLORIDE 9 MG/ML
375 INJECTION, SOLUTION INTRAVENOUS ONCE
Status: COMPLETED | OUTPATIENT
Start: 2022-08-15 | End: 2022-08-15

## 2022-08-15 RX ADMIN — SODIUM CHLORIDE 375 ML/HR: 0.9 INJECTION, SOLUTION INTRAVENOUS at 08:50

## 2022-08-15 NOTE — PLAN OF CARE
Problem: INFECTION - ADULT  Goal: Absence or prevention of progression during hospitalization  Description: INTERVENTIONS:  - Assess and monitor for signs and symptoms of infection  - Monitor lab/diagnostic results  - Monitor all insertion sites, i e  indwelling lines, tubes, and drains  - Monitor endotracheal if appropriate and nasal secretions for changes in amount and color  - [place] appropriate cooling/warming therapies per order  - Administer medications as ordered  - Instruct and encourage patient and family to use good hand hygiene technique  - Identify and instruct in appropriate isolation precautions for identified infection/condition  Outcome: Progressing     Problem: Knowledge Deficit  Goal: Patient/family/caregiver demonstrates understanding of disease process, treatment plan, medications, and discharge instructions  Description: Complete learning assessment and assess knowledge base    Interventions:  - Provide teaching at level of understanding  - Provide teaching via preferred learning methods  Outcome: Progressing

## 2022-08-15 NOTE — PROGRESS NOTES
Daily Note     Today's date: 8/15/2022  Patient name: Nereyda Crystal  : [id number]  MRN: [id number]  Referring provider: Howard Brownlee  Dx:   Encounter Diagnosis     ICD-10-CM    1  Traumatic tear of supraspinatus tendon of right shoulder, subsequent encounter  S46 628R                   Subjective:  Pt reports pain level ="4"/10  Says she took pain meds earlier this  morning, but "it's just about wore off now"  Objective: See treatment diary below      Assessment: Tolerated treatment Fairly Well to Well overall with performance of ther exer and tolerance to MT     Pt's ROM improving consistently, as per pt feedback  Plan:  Con't services 2x/week            Precautions: POTS, severe scoliosis,fibromyalgia,GI issues,lumbar spinal cage, cerv DDD, arachnoiditis  Arachnoid cysts - no lifting more than 5#   DOS: 22; (Week 15): 22      Date 22 8 15 22     Visit Count 33  34 35     FOTO        Pain In 10  w/meds and IV infusion     -7/10 with some ther exer and MT  1/10 shld 4/10 w/o meds     Pain Out 3-4/10 with/after CP applic  -  with/after CP applic             Manuals  8 15 22     PROM R SHLD PROM in all directions to end range 10'  To tolerance  PROM in all directions to end range 10'  To tolerance PROM in all directions to end range 10'  To tolerance PROM in all directions to end range 10' PROM in all directions to end range 15'   Mass RUT        Supine gentle GH post/inf for pain                Neuro Re-Ed  8 15 22     scap retraction *HEP     review                   Ther Ex  8 15 22     Supine serratus punches  *HEP    review   Elbow ROM 2# 40x   Bicep curls as below Bicep curls as below 2# 30x 2# 30x    strengthening          cerv AROM        Pendulum ex        Shoulder isometrics (5 way)  *HEP    Reviewed HEP Reviewed HEP   Supine wand exercises (flex, abd, ER) *HEP   Reviewed HEP Reviewed HEP           Table slides  Flex, arc *HEP   Reviewed HEP    Wand ex  Flex, ER,  EXT, IR Abd **NV 20x ea 20x ea     Wall slides 10x10" 10 x 10"     Dbl UE 10 x 10"     Dbl UE 10x10" 10x10"   Pulleys  3 way 4 min flex/scap  4 min ea   flex/scap  4 min ea   flex/scap Flex/scap 3' Flex/scap 3'   UBE        MTPs/LTPs grn 2x15/3-5 Green 2 x 15 Green 2 x 15 grn 2x10/3-5 cueing grn 2x10/3-5 cueing   Bicep curls  3 way **2# 3x10 ea 3#  30 ea dir 3#  30x ea dir     Prone flex, HA, rows, ext Hold yet   NV Pt def  Resume upcoming as able    Side lying ER & ABD ER 2# 2x10/3-5"    ABD 0# 3x10/3-5" ER 2# 3x10/3-5"    ABD 0# 3x10/3-5 ER 2# 3x10/3-5"    ABD 0# 3x15/3-5 ER 1# 2x10/3-5"    ABD 0# 2x10/3-5" ER 1# 2x10/3-5"    ABD 0# 2x10/3-5"   FWD/Scap Raises 0#  3x5 ea   0#  20x ea 0#  20x ea  0#  10x ea                    Ther Activity                        Gait Training                        Modalities 7 27 22 8/11 8 15 22     CP to R shld 10' 10' 10 min

## 2022-08-15 NOTE — PROGRESS NOTES
Pt tolerated hydration without incident  PICC line flushed and capped per routine  Will return tomorrow   Defers avs

## 2022-08-16 ENCOUNTER — HOSPITAL ENCOUNTER (OUTPATIENT)
Dept: INFUSION CENTER | Facility: HOSPITAL | Age: 67
Discharge: HOME | End: 2022-08-16
Payer: MEDICARE

## 2022-08-16 VITALS
TEMPERATURE: 96.6 F | SYSTOLIC BLOOD PRESSURE: 163 MMHG | DIASTOLIC BLOOD PRESSURE: 77 MMHG | HEART RATE: 75 BPM | RESPIRATION RATE: 16 BRPM

## 2022-08-16 PROCEDURE — 96361 HYDRATE IV INFUSION ADD-ON: CPT

## 2022-08-16 PROCEDURE — 96360 HYDRATION IV INFUSION INIT: CPT

## 2022-08-16 RX ORDER — SODIUM CHLORIDE 9 MG/ML
375 INJECTION, SOLUTION INTRAVENOUS CONTINUOUS
Status: DISPENSED | OUTPATIENT
Start: 2022-08-18 | End: 2022-08-18

## 2022-08-16 RX ADMIN — SODIUM CHLORIDE 375 ML/HR: 0.9 INJECTION, SOLUTION INTRAVENOUS at 08:10

## 2022-08-18 ENCOUNTER — APPOINTMENT (EMERGENCY)
Dept: CT IMAGING | Facility: HOSPITAL | Age: 67
End: 2022-08-18
Payer: MEDICARE

## 2022-08-18 ENCOUNTER — HOSPITAL ENCOUNTER (OUTPATIENT)
Dept: INFUSION CENTER | Facility: HOSPITAL | Age: 67
Discharge: HOME | End: 2022-08-18
Payer: MEDICARE

## 2022-08-18 ENCOUNTER — OFFICE VISIT (OUTPATIENT)
Dept: PHYSICAL THERAPY | Facility: CLINIC | Age: 67
End: 2022-08-18
Payer: MEDICARE

## 2022-08-18 ENCOUNTER — HOSPITAL ENCOUNTER (EMERGENCY)
Facility: HOSPITAL | Age: 67
Discharge: HOME | End: 2022-08-18
Attending: EMERGENCY MEDICINE | Admitting: EMERGENCY MEDICINE
Payer: MEDICARE

## 2022-08-18 VITALS
DIASTOLIC BLOOD PRESSURE: 86 MMHG | TEMPERATURE: 97.9 F | HEART RATE: 103 BPM | RESPIRATION RATE: 18 BRPM | SYSTOLIC BLOOD PRESSURE: 127 MMHG

## 2022-08-18 VITALS
HEIGHT: 61 IN | SYSTOLIC BLOOD PRESSURE: 151 MMHG | RESPIRATION RATE: 16 BRPM | BODY MASS INDEX: 23.98 KG/M2 | TEMPERATURE: 98.5 F | DIASTOLIC BLOOD PRESSURE: 68 MMHG | HEART RATE: 81 BPM | WEIGHT: 127 LBS | OXYGEN SATURATION: 98 %

## 2022-08-18 DIAGNOSIS — R10.9 RIGHT FLANK PAIN: Primary | ICD-10-CM

## 2022-08-18 DIAGNOSIS — S46.811D TRAUMATIC TEAR OF SUPRASPINATUS TENDON OF RIGHT SHOULDER, SUBSEQUENT ENCOUNTER: Primary | ICD-10-CM

## 2022-08-18 DIAGNOSIS — Z98.890 S/P RIGHT ROTATOR CUFF REPAIR: ICD-10-CM

## 2022-08-18 DIAGNOSIS — K59.00 CONSTIPATION: ICD-10-CM

## 2022-08-18 LAB
ALBUMIN SERPL BCP-MCNC: 4.5 G/DL (ref 3.5–5)
ALP SERPL-CCNC: 96 U/L (ref 34–104)
ALT SERPL W P-5'-P-CCNC: 21 U/L (ref 7–52)
ANION GAP SERPL CALCULATED.3IONS-SCNC: 8 MMOL/L (ref 4–13)
AST SERPL W P-5'-P-CCNC: 22 U/L (ref 13–39)
BASOPHILS # BLD AUTO: 0.04 THOUSANDS/ΜL (ref 0–0.1)
BASOPHILS NFR BLD AUTO: 0 % (ref 0–1)
BILIRUB DIRECT SERPL-MCNC: 0.1 MG/DL (ref 0–0.2)
BILIRUB SERPL-MCNC: 0.61 MG/DL (ref 0.2–1)
BILIRUB UR QL STRIP: NEGATIVE
BUN SERPL-MCNC: 19 MG/DL (ref 5–25)
CALCIUM SERPL-MCNC: 9.5 MG/DL (ref 8.4–10.2)
CHLORIDE SERPL-SCNC: 103 MMOL/L (ref 96–108)
CLARITY UR: CLEAR
CO2 SERPL-SCNC: 28 MMOL/L (ref 21–32)
COLOR UR: YELLOW
CREAT SERPL-MCNC: 1.18 MG/DL (ref 0.6–1.3)
EOSINOPHIL # BLD AUTO: 0.14 THOUSAND/ΜL (ref 0–0.61)
EOSINOPHIL NFR BLD AUTO: 2 % (ref 0–6)
ERYTHROCYTE [DISTWIDTH] IN BLOOD BY AUTOMATED COUNT: 11.9 % (ref 11.6–15.1)
GFR SERPL CREATININE-BSD FRML MDRD: 47 ML/MIN/1.73SQ M
GLUCOSE SERPL-MCNC: 93 MG/DL (ref 65–140)
GLUCOSE UR STRIP-MCNC: NEGATIVE MG/DL
HCT VFR BLD AUTO: 45.5 % (ref 34.8–46.1)
HGB BLD-MCNC: 14.6 G/DL (ref 11.5–15.4)
HGB UR QL STRIP.AUTO: NEGATIVE
IMM GRANULOCYTES # BLD AUTO: 0.03 THOUSAND/UL (ref 0–0.2)
IMM GRANULOCYTES NFR BLD AUTO: 0 % (ref 0–2)
KETONES UR STRIP-MCNC: NEGATIVE MG/DL
LEUKOCYTE ESTERASE UR QL STRIP: NEGATIVE
LIPASE SERPL-CCNC: 33 U/L (ref 11–82)
LYMPHOCYTES # BLD AUTO: 2.73 THOUSANDS/ΜL (ref 0.6–4.47)
LYMPHOCYTES NFR BLD AUTO: 30 % (ref 14–44)
MCH RBC QN AUTO: 29.7 PG (ref 26.8–34.3)
MCHC RBC AUTO-ENTMCNC: 32.1 G/DL (ref 31.4–37.4)
MCV RBC AUTO: 93 FL (ref 82–98)
MONOCYTES # BLD AUTO: 0.68 THOUSAND/ΜL (ref 0.17–1.22)
MONOCYTES NFR BLD AUTO: 8 % (ref 4–12)
NEUTROPHILS # BLD AUTO: 5.41 THOUSANDS/ΜL (ref 1.85–7.62)
NEUTS SEG NFR BLD AUTO: 60 % (ref 43–75)
NITRITE UR QL STRIP: NEGATIVE
NRBC BLD AUTO-RTO: 0 /100 WBCS
PH UR STRIP.AUTO: 6 [PH]
PLATELET # BLD AUTO: 291 THOUSANDS/UL (ref 149–390)
PMV BLD AUTO: 9.8 FL (ref 8.9–12.7)
POTASSIUM SERPL-SCNC: 3.7 MMOL/L (ref 3.5–5.3)
PROT SERPL-MCNC: 6.9 G/DL (ref 6.4–8.4)
PROT UR STRIP-MCNC: NEGATIVE MG/DL
RBC # BLD AUTO: 4.91 MILLION/UL (ref 3.81–5.12)
SODIUM SERPL-SCNC: 139 MMOL/L (ref 135–147)
SP GR UR STRIP.AUTO: 1.01 (ref 1–1.03)
TSH SERPL DL<=0.05 MIU/L-ACNC: 1.95 UIU/ML (ref 0.45–4.5)
UROBILINOGEN UR QL STRIP.AUTO: 0.2 E.U./DL
WBC # BLD AUTO: 9.03 THOUSAND/UL (ref 4.31–10.16)

## 2022-08-18 PROCEDURE — 84443 ASSAY THYROID STIM HORMONE: CPT | Performed by: EMERGENCY MEDICINE

## 2022-08-18 PROCEDURE — 36415 COLL VENOUS BLD VENIPUNCTURE: CPT | Performed by: EMERGENCY MEDICINE

## 2022-08-18 PROCEDURE — 80076 HEPATIC FUNCTION PANEL: CPT | Performed by: EMERGENCY MEDICINE

## 2022-08-18 PROCEDURE — 83690 ASSAY OF LIPASE: CPT | Performed by: EMERGENCY MEDICINE

## 2022-08-18 PROCEDURE — G1004 CDSM NDSC: HCPCS

## 2022-08-18 PROCEDURE — 74177 CT ABD & PELVIS W/CONTRAST: CPT

## 2022-08-18 PROCEDURE — 81003 URINALYSIS AUTO W/O SCOPE: CPT | Performed by: EMERGENCY MEDICINE

## 2022-08-18 PROCEDURE — 99284 EMERGENCY DEPT VISIT MOD MDM: CPT

## 2022-08-18 PROCEDURE — 97110 THERAPEUTIC EXERCISES: CPT

## 2022-08-18 PROCEDURE — 80048 BASIC METABOLIC PNL TOTAL CA: CPT | Performed by: EMERGENCY MEDICINE

## 2022-08-18 PROCEDURE — 96361 HYDRATE IV INFUSION ADD-ON: CPT

## 2022-08-18 PROCEDURE — 96360 HYDRATION IV INFUSION INIT: CPT

## 2022-08-18 PROCEDURE — 99284 EMERGENCY DEPT VISIT MOD MDM: CPT | Performed by: EMERGENCY MEDICINE

## 2022-08-18 PROCEDURE — 85025 COMPLETE CBC W/AUTO DIFF WBC: CPT | Performed by: EMERGENCY MEDICINE

## 2022-08-18 RX ADMIN — SODIUM CHLORIDE 375 ML/HR: 0.9 INJECTION, SOLUTION INTRAVENOUS at 08:12

## 2022-08-18 RX ADMIN — IOHEXOL 85 ML: 350 INJECTION, SOLUTION INTRAVENOUS at 18:12

## 2022-08-18 NOTE — ED PROVIDER NOTES
History  Chief Complaint   Patient presents with    Flank Pain     Pt reports right flank pain since since end of July  Pt reports she started having urinary urgency and difficulty emptying her bladder since yesterday     79-year-old female presents to the ED for right flank discomfort which he states has been ongoing since the end of July  States that it is worse when she feels like she needs to empty her bladder and she get some mild relief when she urinates  Denies fevers, night sweats, chills, sore throat, cough, chest pain, shortness of breath, nausea or vomiting, diarrhea constipation, hematuria  Patient states she sees a urologist at [facility] and was diagnosed with hydroureter nephrosis the past   Patient states that she has taken oxycodone and Flexeril which she takes daily which does not seem to help the pain  States the pain comes and goes  Prior to Admission Medications   Prescriptions Last Dose Informant Patient Reported? Taking?    Alum Hydroxide-Mag Trisilicate (Gaviscon) 48-91 0 MG CHEW   Yes No   Sig: Chew 1 tablet 4 (four) times a day as needed With meals and as needed   Galcanezumab-gnlm (Emgality) 120 MG/ML SOAJ   Yes No   Sig: Inject under the skin every 30 (thirty) days    HYDROcodone-acetaminophen (NORCO) 5-325 mg per tablet 8/18/2022 at Unknown time  No Yes   Sig: Take 1 tablet by mouth 2 (two) times a day as needed for pain Max Daily Amount: 2 tablets   HYDROcodone-acetaminophen (Norco) 5-325 mg per tablet   No No   Sig: Take 1 tablet by mouth 2 (two) times a day as needed for pain for up to 30 doses Do not fill until 8/19/2022 Max Daily Amount: 2 tablets   MULTIPLE VITAMINS-CALCIUM PO  Self Yes No   Sig: Take 1 capsule by mouth every morning    Magnesium 400 MG CAPS  Self Yes No   Sig: Take 1 capsule by mouth 2 (two) times a day    Probiotic Product (PROBIOTIC DAILY PO)  Self Yes No   Sig: Take 1 tablet by mouth daily At lunch   Thyroid, Porcine, POWD  Self Yes No Sig: Use 32 mg daily   Ubrogepant (UBRELVY) 100 MG tablet   Yes No   Sig: Take 100 mg by mouth Take 1 tablet (100 mg) one time as needed for migraine  May repeat one additional tablet (100 mg) at least two hours after the first dose  Do not use more than two doses per day, or for more than eight days per month  azelastine (ASTELIN) 0 1 % nasal spray   Yes No   Si sprays into each nostril 2 (two) times a day Use in each nostril as directed   beclomethasone (QVAR) 40 MCG/ACT inhaler  Self Yes No   Sig: Inhale 1 puff daily as needed (only when unable to nebulize pulmicort) Rinse mouth after use  budesonide (PULMICORT) 0 5 mg/2 mL nebulizer solution  Self Yes No   Sig: Take 0 5 mg by nebulization 2 (two) times a day Rinse mouth after use     cholecalciferol (VITAMIN D3) 1,000 units tablet   Yes No   Sig: Take 1,000 Units by mouth daily    cyclobenzaprine (FLEXERIL) 5 mg tablet   No No   Sig: Take 1 tablet (5 mg total) by mouth 2 (two) times a day as needed for muscle spasms   docusate sodium (COLACE) 100 mg capsule   Yes No   Sig: Take 100 mg by mouth daily as needed for constipation   famotidine (PEPCID) 10 mg tablet   Yes No   Sig: Take 40 mg by mouth 2 (two) times a day     fexofenadine (ALLEGRA) 180 MG tablet  Self Yes No   Sig: Take 180 mg by mouth 2 (two) times a day    fluticasone (FLONASE) 50 mcg/act nasal spray  Self Yes No   Si sprays into each nostril daily    hydrOXYzine (ATARAX) 10 mg/5 mL syrup  Self Yes No   Sig: Take 20 mg by mouth daily at bedtime    ipratropium (ATROVENT) 0 03 % nasal spray  Self Yes No   Si sprays into each nostril 4 (four) times a day as needed for rhinitis    ivabradine HCl (Corlanor) 5 MG tablet   Yes No   Si (two) times a day     levalbuterol (XOPENEX HFA) 45 mcg/act inhaler   Yes No   Sig: Inhale 2 puffs every 4 (four) hours as needed (when unable to nebulize)   levalbuterol (XOPENEX) 1 25 mg/3 mL nebulizer solution  Self Yes No   Sig: Take 1 25 mg by nebulization every 6 (six) hours as needed    midodrine (PROAMATINE) 2 5 mg tablet   Yes No   midodrine (PROAMATINE) 5 mg tablet   No No   Sig: TAKE 2 TABS PO IN AM, ONE TAB PO WITH LUNCH AND DINNER   Patient not taking: Reported on 2022   omega-3-acid ethyl esters (LOVAZA) 1 g capsule   Yes No   Sig: Take 2 g by mouth 2 (two) times a day 1600mg daily   oxyCODONE (ROXICODONE) 5 immediate release tablet   No No   Si tablets every 8 hours as needed for severe shoulder pain ONLY     Patient not taking: Reported on 2022    oxybutynin (DITROPAN) 5 mg tablet   Yes No   Sig: Take 2 5 mg by mouth 2 (two) times a day Once in the morning and one at HS   polyethylene glycol (MIRALAX) 17 g packet  Self Yes No   Sig: Take 17 g by mouth daily as needed    psyllium (METAMUCIL) 0 52 g capsule   Yes No   Sig: Take 0 52 g by mouth daily      Facility-Administered Medications: None       Past Medical History:   Diagnosis Date    Allergic rhinitis     Anxiety     Asthma     Back pain     Cardiac disease     Cardiopathy     EF 45%    Cough     Diverticulitis     Factor V Leiden ([address]     Fibromyalgia     GERD (gastroesophageal reflux disease)     Hashimoto's thyroiditis     Hx of degenerative disc disease     Hypotension     pots - postural orthostatic hypotension    Interstitial cystitis     Irregular heart beat     LBBB    Irritable bowel syndrome     Migraines     Mitral valve disease     "thickening"    Myocardial infarction ([address] )     possible but not sure when    Neuropathy     bilateral legs    Osteoporosis     Postural orthostatic tachycardia syndrome     must drink a lot of water and salt    Pott's disease     Rheumatic fever     Scoliosis     Sepsis ([address] )     associated with PICC line    Sjogren's syndrome ([address] )     TMJ (dislocation of temporomandibular joint)        Past Surgical History:   Procedure Laterality Date    ABLATION MICROWAVE Left     lumbar area    BACK SURGERY 10/1998     SECTION  1992    CHOLECYSTECTOMY  2016    COLONOSCOPY  2016    DILATION AND CURETTAGE OF UTERUS  1996    EGD      FOOT SURGERY  1968    removal of bone and neuroma    HYSTERECTOMY  1997    age 54  PRICE ooph    IR OTHER  2022    IR PICC PLACEMENT SINGLE LUMEN  10/02/2020    IR PICC PLACEMENT SINGLE LUMEN  2021    IR PICC REPOSITION  2021    IR TUNNELED CENTRAL LINE PLACEMENT  2022    LAPAROSCOPY  1996    endometriosis    MOUTH BIOPSY      lip    NM EGD TRANSORAL BIOPSY SINGLE/MULTIPLE N/A 2019    Procedure: ESOPHAGOGASTRODUODENOSCOPY (EGD) with multiple bx and dilation;  Surgeon: Antonina Stevens MD;  Location: [address] [place] [address] GI LAB; Service: Gastroenterology    NM SHLDR ARTHROSCOP,SURG,W/ROTAT CUFF REPR Right 2022    Procedure: SHOULDER ARTHROSCOPIC ROTATOR CUFF REPAIR Biceps Tenodisis; Surgeon: Black Jarvis MD;  [facility] MA[facility];  Service: Orthopedics    TUNNELED VENOUS CATHETER PLACEMENT         Family History   Problem Relation Age of Onset    Cancer Mother         urinary bladder     Thyroid cancer Sister     Colon cancer Maternal Grandfather     Breast cancer Maternal Aunt         over 48 yrs old     Endometrial cancer Maternal Aunt     Multiple myeloma Maternal Aunt     No Known Problems Father     No Known Problems Daughter     Heart attack Sister     No Known Problems Sister     No Known Problems Sister     No Known Problems Sister     No Known Problems Sister     No Known Problems Daughter     Hypertension Paternal Aunt      I have reviewed and agree with the history as documented      E-Cigarette/Vaping    E-Cigarette Use Never User      E-Cigarette/Vaping Substances    Nicotine No     THC No     CBD No     Flavoring No     Other No     Unknown No      Social History     Tobacco Use    Smoking status: Never Smoker    Smokeless tobacco: Never Used   Vaping Use    Vaping Use: Never used Substance Use Topics    Alcohol use: Never    Drug use: No       Review of Systems   Genitourinary: Positive for flank pain and frequency  All other systems reviewed and are negative  Physical Exam  Physical Exam  Vitals and nursing note reviewed  Constitutional:       General: She is not in acute distress  Appearance: She is well-developed  She is not diaphoretic  HENT:      Head: Normocephalic and atraumatic  Right Ear: External ear normal       Left Ear: External ear normal       Nose: Nose normal    Eyes:      General: No scleral icterus  Right eye: No discharge  Left eye: No discharge  Conjunctiva/sclera: Conjunctivae normal       Pupils: Pupils are equal, round, and reactive to light  Neck:      Vascular: No JVD  Trachea: No tracheal deviation  Cardiovascular:      Rate and Rhythm: Normal rate and regular rhythm  Heart sounds: Normal heart sounds  No murmur heard  No friction rub  No gallop  Pulmonary:      Effort: Pulmonary effort is normal  No respiratory distress  Breath sounds: Normal breath sounds  No stridor  No wheezing or rales  Abdominal:      General: Bowel sounds are normal  There is no distension  Palpations: Abdomen is soft  There is no mass  Tenderness: There is no abdominal tenderness  There is right CVA tenderness  There is no left CVA tenderness or guarding  Musculoskeletal:         General: No tenderness or deformity  Normal range of motion  Cervical back: Normal range of motion and neck supple  Skin:     General: Skin is warm and dry  Coloration: Skin is not pale  Findings: No erythema or rash  Neurological:      Mental Status: She is alert and oriented to person, place, and time  Cranial Nerves: No cranial nerve deficit  Sensory: No sensory deficit  Motor: No abnormal muscle tone  Psychiatric:         Behavior: Behavior normal          Thought Content:  Thought content normal          Judgment: Judgment normal          Vital Signs  ED Triage Vitals   Temperature Pulse Respirations Blood Pressure SpO2   08/18/22 1643 08/18/22 1643 08/18/22 1643 08/18/22 1643 08/18/22 1643   98 5 °F (36 9 °C) 92 18 (!) 175/95 99 %      Temp Source Heart Rate Source Patient Position - Orthostatic VS BP Location FiO2 (%)   08/18/22 1643 08/18/22 1658 08/18/22 1658 08/18/22 1643 --   Tympanic Monitor Sitting Right arm       Pain Score       08/18/22 1658       5           Vitals:    08/18/22 1643 08/18/22 1658 08/18/22 1700 08/18/22 1730   BP: (!) 175/95 (!) 176/88 (!) 186/91 151/68   Pulse: 92 86 84 81   Patient Position - Orthostatic VS:  Sitting Sitting Sitting         Visual Acuity      ED Medications  Medications - No data to display    Diagnostic Studies  Results Reviewed     Procedure Component Value Units Date/Time    Hepatic function panel [[id number]] Collected: 08/18/22 1705    Lab Status: In process Specimen: Blood from Arm, Left Updated: 08/18/22 1718    Lipase [[id number]] Collected: 08/18/22 1705    Lab Status:  In process Specimen: Blood from Arm, Left Updated: 08/18/22 1717    UA w Reflex to Microscopic w Reflex to Culture [[id number]]  (Normal) Collected: 08/18/22 1705    Lab Status: Final result Specimen: Urine, Clean Catch Updated: 08/18/22 1712     Color, UA Yellow     Clarity, UA Clear     Specific Gravity, UA 1 010     pH, UA 6 0     Leukocytes, UA Negative     Nitrite, UA Negative     Protein, UA Negative mg/dl      Glucose, UA Negative mg/dl      Ketones, UA Negative mg/dl      Urobilinogen, UA 0 2 E U /dl      Bilirubin, UA Negative     Occult Blood, UA Negative    CBC and differential [[id number]] Collected: 08/18/22 1705    Lab Status: Final result Specimen: Blood from Arm, Left Updated: 08/18/22 1712     WBC 9 03 Thousand/uL      RBC 4 91 Million/uL      Hemoglobin 14 6 g/dL      Hematocrit 45 5 %      MCV 93 fL      MCH 29 7 pg      MCHC 32 1 g/dL      RDW 11 9 %      MPV 9 8 fL      Platelets 933 Thousands/uL      nRBC 0 /100 WBCs      Neutrophils Relative 60 %      Immat GRANS % 0 %      Lymphocytes Relative 30 %      Monocytes Relative 8 %      Eosinophils Relative 2 %      Basophils Relative 0 %      Neutrophils Absolute 5 41 Thousands/µL      Immature Grans Absolute 0 03 Thousand/uL      Lymphocytes Absolute 2 73 Thousands/µL      Monocytes Absolute 0 68 Thousand/µL      Eosinophils Absolute 0 14 Thousand/µL      Basophils Absolute 0 04 Thousands/µL     Basic metabolic panel [[id number]] Collected: 08/18/22 1705    Lab Status: In process Specimen: Blood from Arm, Left Updated: 08/18/22 1707    TSH, 3rd generation with Free T4 reflex [[id number]] Collected: 08/18/22 1705    Lab Status: In process Specimen: Blood from Arm, Left Updated: 08/18/22 1707                 CT abdomen pelvis with contrast    (Results Pending)              Procedures  Procedures         ED Course  ED Course as of 08/18/22 1857   Thu Aug 18, 2022   1802 Patient ambulatory to restroom without requiring assistance  1851 Discussed pain medications with patient multiple times during the visit and she states that due to her allergies on the pain being not too bad right now that she does not want pain medications  1851 Went over results with patient at bedside  Discussed the CT scan shows possible constipation  Stressed follow-up with her nephrologist and urologist   Strict return precautions discussed  Patient states her understanding of this and states she would like to go home at this time  SBIRT 22yo+    Flowsheet Row Most Recent Value   SBIRT (23 yo +)    In order to provide better care to our patients, we are screening all of our patients for alcohol and drug use  Would it be okay to ask you these screening questions? Yes Filed at: 08/18/2022 1657   Initial Alcohol Screen: US AUDIT-C     1  How often do you have a drink containing alcohol? 0 Filed at: 08/18/2022 1657   2   How many drinks containing alcohol do you have on a typical day you are drinking? 0 Filed at: 08/18/2022 1657   3a  Male UNDER 65: How often do you have five or more drinks on one occasion? 0 Filed at: 08/18/2022 1657   3b  FEMALE Any Age, or MALE 65+: How often do you have 4 or more drinks on one occassion? 0 Filed at: 08/18/2022 1657   Audit-C Score 0 Filed at: 08/18/2022 1657   [place]: How many times in the past year have you    Used an illegal drug or used a prescription medication for non-medical reasons? Never Filed at: 08/18/2022 1657                    MDM  Number of Diagnoses or Management Options  Constipation  Right flank pain  Diagnosis management comments: 59-year-old female history of chronic ureter dilation and distension  Presenting for few weeks right flank pain  States pain is not terrible right now but will comes in waves  CT scan and basic labs  Discussed all results with patient  Patient has follow-up with her nephrologist in approximately 2 weeks  Stressed strict return precautions and continued follow-up with Nephrology and Urology  Disposition  Final diagnoses:   None     ED Disposition     None      Follow-up Information    None         Patient's Medications   Discharge Prescriptions    No medications on file       No discharge procedures on file      PDMP Review       Value Time User    PDMP Reviewed  Yes 7/22/2022  7:29 AM Melvin Galvan          ED Provider  Electronically Signed by           Keyona Rogers DO  08/18/22 152

## 2022-08-18 NOTE — PROGRESS NOTES
Central line dressing change performed per protocol without incident  Pt tolerated IV hydration well without incident and was discharged in stable condition  AVS declined but pt aware of next infusion appointment

## 2022-08-18 NOTE — ED NOTES
Patient transported to [address] [place] [address] [place], RN  08/18/22 1800

## 2022-08-18 NOTE — PROGRESS NOTES
Daily Note     Today's date: 2022  Patient name: Smita Garza  :   MRN: [id number]  Referring provider: Chelle Amor  Dx:   Encounter Diagnosis     ICD-10-CM    1  Traumatic tear of supraspinatus tendon of right shoulder, subsequent encounter  S46 811D    2  S/P right rotator cuff repair  [id number]                   Subjective: Pt notes she is having R flank pain and her doctor feels it is a flare up of hydronephrosis and a twisted ureter  Pt states she should not lift her arms overhead  Pt notes she is able to  Lift things easier and has better mobility in the shld  Notes steady improvement  "It's really starting to feel good "      Objective: See treatment diary below      Assessment: Tolerated treatment fair  Patient exhibited good technique with therapeutic exercises and would benefit from continued PT  All ex performed to shld height only due to flank/kidney issue so as not to aggravate symptoms  Plan: Continue per plan of care        Precautions: POTS, severe scoliosis,fibromyalgia,GI issues,lumbar spinal cage, cerv DDD, arachnoiditis  Arachnoid cysts - no lifting more than 5#   DOS: 22; (Week 15): 22      Date 22 8 15 22   8/18    Visit Count 33  34 35 36    FOTO        Pain In 10  w/meds and IV infusion     -7/10 with some there                    exer and MT  1/10 shld 4/10 w/o meds  0/10    Pain Out 3-4/10 with/after CP applic  -  with/after CP applic              Manuals 7  8 15     PROM R SHLD PROM in all directions to end range 10'  To tolerance  PROM in all directions to end range 10'  To tolerance PROM in all directions to end range 10'  To tolerance Held today PROM in all directions to end range 15'   Mass RUT        Supine gentle [address] post/inf for pain                Neuro Re-Ed 7  8 15     scap retraction *HEP     review                   Ther Ex  8 15 22     Supine serratus punches  *HEP review   Elbow ROM 2# 40x   Bicep curls as below Bicep curls as below Bicep curls as below 2# 30x    strengthening          cerv AROM        Pendulum ex        Shoulder isometrics (5 way)  *HEP    Reviewed HEP Reviewed HEP   Supine wand exercises (flex, abd, ER) *HEP   Reviewed HEP Reviewed HEP           Table slides  Flex, arc *HEP   Reviewed HEP    Wand ex  Flex, ER,  EXT, IR Abd **NV 20x ea 20x ea 20x ea  shld height    Wall slides 10x10" 10 x 10"     Dbl UE 10 x 10"     Dbl UE   hold 10x10"   Pulleys  3 way 4 min flex/scap  4 min ea   flex/scap  4 min ea   flex/scap hold Flex/scap 3'   UBE        MTPs/LTPs grn 2x15/3-5 Green 2 x 15 Green 2 x 15 grn 2x10/3-5 cueing grn 2x10/3-5 cueing   Bicep curls  3 way **2# 3x10 ea 3#  30 ea dir 3#  30x ea dir 3#  30 ea dir    Prone flex, HA, rows, ext Hold yet    Pt def  Resume upcoming as able    Side lying ER & ABD ER 2# 2x10/3-5"    ABD 0# 3x10/3-5" ER 2# 3x10/3-5"    ABD 0# 3x10/3-5 ER 2# 3x10/3-5"    ABD 0# 3x15/3-5 ER 2# 2x10/3-5"    ABD 0# 2x10/3-5" ER 1# 2x10/3-5"    ABD 0# 2x10/3-5"   FWD/Scap Raises 0#  3x5 ea   0#  20x ea 0#  20x ea   0#  20x ea   shld height 0#  10x ea                    Ther Activity                        Gait Training                        Modalities 7 27 22 8/11 8 15 22     CP to R shld 10' 10' 10 min

## 2022-08-19 ENCOUNTER — HOSPITAL ENCOUNTER (OUTPATIENT)
Dept: INFUSION CENTER | Facility: HOSPITAL | Age: 67
Discharge: HOME | End: 2022-08-19
Payer: MEDICARE

## 2022-08-19 ENCOUNTER — TELEPHONE (OUTPATIENT)
Dept: UROLOGY | Facility: AMBULATORY SURGERY CENTER | Age: 67
End: 2022-08-19

## 2022-08-19 VITALS
SYSTOLIC BLOOD PRESSURE: 141 MMHG | RESPIRATION RATE: 16 BRPM | DIASTOLIC BLOOD PRESSURE: 86 MMHG | TEMPERATURE: 97 F | HEART RATE: 102 BPM

## 2022-08-19 PROCEDURE — 96360 HYDRATION IV INFUSION INIT: CPT

## 2022-08-19 PROCEDURE — 96361 HYDRATE IV INFUSION ADD-ON: CPT

## 2022-08-19 RX ORDER — SODIUM CHLORIDE 9 MG/ML
375 INJECTION, SOLUTION INTRAVENOUS ONCE
Status: COMPLETED | OUTPATIENT
Start: 2022-08-23 | End: 2022-08-23

## 2022-08-19 RX ADMIN — SODIUM CHLORIDE 1500 ML: 0.9 INJECTION, SOLUTION INTRAVENOUS at 08:43

## 2022-08-19 NOTE — TELEPHONE ENCOUNTER
Ct: IMPRESSION     No acute findings in the abdomen or pelvis      Chronic distention of the mid right ureter without hydronephrosis or proximal/distal right hydroureter  No ureteral calculi      Prominent colonic stool suggestive of constipation  Patient had a cystoscopy last may with Jax Maurice 5/25/21 cystoscopy , Dil bladder, they did not address the ureter at that time   Patients has POTS and goes for IV treatment for that  Started with difficulty to get stream started, has to push to get urine to flow  Waking her up in the middle of night  Patient is taking ditropan which is not working for her     Appt given in our [facility] location for 8/25/22/ Ed precaution reviewed

## 2022-08-19 NOTE — TELEPHONE ENCOUNTER
Please Triage  New Patient    What is the reason for the patients appointment? Patient was seen in the ER for pain in the bladder and ureter pain  She stated she has had urinary urgency and difficulty emptying her bladder  She stated that this has been going on for a while but it is getting worse  What office location does the patient prefer? [facility]     Imaging/Lab Results: Epic     Do we accept the patient's insurance or is the patient Self-Pay? Insurance Provider: Medicare   Plan Type/Number:  Member ID#: Has the patient had any previous Urologist(s)? Niall all in epic     Have patient records been requested? If not are records showing in Epic: epic    Has the patient had any outside testing done?epic     Does the patient have a personal history of cancer?  NO    Patient can be reached at [phone]

## 2022-08-22 ENCOUNTER — OFFICE VISIT (OUTPATIENT)
Dept: PHYSICAL THERAPY | Facility: CLINIC | Age: 67
End: 2022-08-22
Payer: MEDICARE

## 2022-08-22 ENCOUNTER — HOSPITAL ENCOUNTER (OUTPATIENT)
Dept: INFUSION CENTER | Facility: HOSPITAL | Age: 67
Discharge: HOME | End: 2022-08-22
Payer: MEDICARE

## 2022-08-22 VITALS
OXYGEN SATURATION: 100 % | SYSTOLIC BLOOD PRESSURE: 132 MMHG | HEART RATE: 85 BPM | DIASTOLIC BLOOD PRESSURE: 67 MMHG | RESPIRATION RATE: 16 BRPM | TEMPERATURE: 91.2 F

## 2022-08-22 DIAGNOSIS — Z98.890 S/P RIGHT ROTATOR CUFF REPAIR: ICD-10-CM

## 2022-08-22 DIAGNOSIS — S46.811D TRAUMATIC TEAR OF SUPRASPINATUS TENDON OF RIGHT SHOULDER, SUBSEQUENT ENCOUNTER: Primary | ICD-10-CM

## 2022-08-22 PROCEDURE — 97110 THERAPEUTIC EXERCISES: CPT

## 2022-08-22 PROCEDURE — 97140 MANUAL THERAPY 1/> REGIONS: CPT

## 2022-08-22 PROCEDURE — 96361 HYDRATE IV INFUSION ADD-ON: CPT

## 2022-08-22 PROCEDURE — 96360 HYDRATION IV INFUSION INIT: CPT

## 2022-08-22 RX ADMIN — SODIUM CHLORIDE 1500 ML: 0.9 INJECTION, SOLUTION INTRAVENOUS at 09:12

## 2022-08-22 NOTE — PROGRESS NOTES
Daily Note     Today's date: 2022  Patient name: Alison Paiz  : [birth year]  MRN: [id number]  Referring provider: Ni Merrill  Dx:   Encounter Diagnosis     ICD-10-CM    1  Traumatic tear of supraspinatus tendon of right shoulder, subsequent encounter  S46 811D    2  S/P right rotator cuff repair  [id number]                   Subjective: Pt notes no pain prior to PT and tightness/soreness during ex program       Objective: See treatment diary below      Assessment: Tolerated treatment well  Patient demonstrated fatigue post treatment, exhibited good technique with therapeutic exercises and would benefit from continued PT  Pt remains limited in all planes of motion but continues to show gains in strength and PROM  Plan: Continue per plan of care        Precautions: POTS, severe scoliosis,fibromyalgia,GI issues,lumbar spinal cage, cerv DDD, arachnoiditis  Arachnoid cysts - no lifting more than 5#   DOS: 22; (Week 15): 22      Date 7 27 22 8/11/22 8 15 22   8/18   8/22   Visit Count 33  34 35 36   37   FOTO        Pain In 5/10  w/meds and IV infusion     -7/10 with some there                    exer and MT  1/10 shld 4/10 w/o meds  010    Pain Out 3-4/10 with/after CP applic  -3/34  with/after CP applic              Manuals 7  8 15 22 8/18 8/22   PROM R SHLD PROM in all directions to end range 10'  To tolerance  PROM in all directions to end range 10'  To tolerance PROM in all directions to end range 10'  To tolerance Held today PROM in all directions to end range 15'   Mass RUT        Supine gentle GH post/inf for pain                Neuro Re-Ed 7  8 15 22 8/18    scap retraction *HEP     review                   Ther Ex 7  8 15 22     Supine serratus punches  *HEP    review   Elbow ROM 2# 40x   Bicep curls as below Bicep curls as below Bicep curls as below Bicep curls as below    strengthening          cerv AROM        Pendulum ex Shoulder isometrics (5 way)  *HEP    Reviewed HEP Reviewed HEP   Supine wand exercises (flex, abd, ER) *HEP   Reviewed HEP Reviewed HEP           Table slides  Flex, arc *HEP   Reviewed HEP    Wand ex  Flex, ER,  EXT, IR Abd **NV 20x ea 20x ea 20x ea  shld height 20x ea   Wall slides 10x10" 10 x 10"     Dbl UE 10 x 10"     Dbl UE   hold 10x10"   Pulleys  3 way 4 min flex/scap  4 min ea   flex/scap  4 min ea   flex/scap hold Flex/scap 3'ea   UBE        MTPs/LTPs grn 2x15/3-5 Green 2 x 15 Green 2 x 15 grn 2x10/3-5 cueing grn 3x10/3-5 cueing   Bicep curls  3 way **2# 3x10 ea 3#  30 ea dir 3#  30x ea dir 3#  30 ea dir 3#  30 ea   Prone flex, HA, rows, ext Hold yet    Pt def  Resume upcoming as able    Side lying ER & ABD ER 2# 2x10/3-5"    ABD 0# 3x10/3-5" ER 2# 3x10/3-5"    ABD 0# 3x10/3-5 ER 2# 3x10/3-5"    ABD 0# 3x15/3-5 ER 2# 2x10/3-5"    ABD 0# 2x10/3-5" ER 2# 3x10/3-5"    ABD 1# 2x10/3-5"   FWD/Scap Raises 0#  3x5 ea   0#  20x ea 0#  20x ea   0#  20x ea   shld height   1#  20x ea                     Ther Activity                        Gait Training                        Modalities 7 27 22 8/11 8 15 22     CP to R shld 10' 10' 10 min

## 2022-08-23 ENCOUNTER — HOSPITAL ENCOUNTER (OUTPATIENT)
Dept: INFUSION CENTER | Facility: HOSPITAL | Age: 67
Discharge: HOME | End: 2022-08-23
Payer: MEDICARE

## 2022-08-23 VITALS
DIASTOLIC BLOOD PRESSURE: 63 MMHG | SYSTOLIC BLOOD PRESSURE: 144 MMHG | RESPIRATION RATE: 16 BRPM | OXYGEN SATURATION: 97 % | HEART RATE: 71 BPM | TEMPERATURE: 96.9 F

## 2022-08-23 DIAGNOSIS — N17.9 AKI (ACUTE KIDNEY INJURY) (HCC): Primary | ICD-10-CM

## 2022-08-23 PROCEDURE — 96361 HYDRATE IV INFUSION ADD-ON: CPT

## 2022-08-23 PROCEDURE — 96360 HYDRATION IV INFUSION INIT: CPT

## 2022-08-23 RX ORDER — SODIUM CHLORIDE 9 MG/ML
375 INJECTION, SOLUTION INTRAVENOUS ONCE
Status: COMPLETED | OUTPATIENT
Start: 2022-08-24 | End: 2022-08-24

## 2022-08-23 RX ADMIN — SODIUM CHLORIDE 375 ML/HR: 0.9 INJECTION, SOLUTION INTRAVENOUS at 08:06

## 2022-08-23 NOTE — PLAN OF CARE
Problem: INFECTION - ADULT  Goal: Absence or prevention of progression during hospitalization  Description: INTERVENTIONS:  - Assess and monitor for signs and symptoms of infection  - Monitor lab/diagnostic results  - Monitor all insertion sites, i e  indwelling lines, tubes, and drains  - Monitor endotracheal if appropriate and nasal secretions for changes in amount and color  - [place] appropriate cooling/warming therapies per order  - Administer medications as ordered  - Instruct and encourage patient and family to use good hand hygiene technique  - Identify and instruct in appropriate isolation precautions for identified infection/condition  Outcome: Progressing     Problem: Knowledge Deficit  Goal: Patient/family/caregiver demonstrates understanding of disease process, treatment plan, medications, and discharge instructions  Description: Complete learning assessment and assess knowledge base    Interventions:  - Provide teaching at level of understanding  - Provide teaching via preferred learning methods  Outcome: Progressing

## 2022-08-24 ENCOUNTER — HOSPITAL ENCOUNTER (OUTPATIENT)
Dept: INFUSION CENTER | Facility: HOSPITAL | Age: 67
Discharge: HOME | End: 2022-08-24
Payer: MEDICARE

## 2022-08-24 VITALS
OXYGEN SATURATION: 97 % | HEART RATE: 80 BPM | RESPIRATION RATE: 16 BRPM | DIASTOLIC BLOOD PRESSURE: 79 MMHG | TEMPERATURE: 98 F | SYSTOLIC BLOOD PRESSURE: 146 MMHG

## 2022-08-24 PROCEDURE — 96360 HYDRATION IV INFUSION INIT: CPT

## 2022-08-24 PROCEDURE — 96361 HYDRATE IV INFUSION ADD-ON: CPT

## 2022-08-24 RX ADMIN — SODIUM CHLORIDE 375 ML/HR: 0.9 INJECTION, SOLUTION INTRAVENOUS at 08:46

## 2022-08-24 NOTE — PROGRESS NOTES
Pt tolerated todays hydration without incident  PICC line has excellent blood return   No complaints offered

## 2022-08-25 ENCOUNTER — OFFICE VISIT (OUTPATIENT)
Dept: UROLOGY | Facility: MEDICAL CENTER | Age: 67
End: 2022-08-25
Payer: MEDICARE

## 2022-08-25 VITALS
BODY MASS INDEX: 22.84 KG/M2 | WEIGHT: 121 LBS | HEART RATE: 86 BPM | SYSTOLIC BLOOD PRESSURE: 150 MMHG | HEIGHT: 61 IN | DIASTOLIC BLOOD PRESSURE: 80 MMHG

## 2022-08-25 DIAGNOSIS — N13.4: Primary | ICD-10-CM

## 2022-08-25 LAB
SL AMB  POCT GLUCOSE, UA: NORMAL
SL AMB LEUKOCYTE ESTERASE,UA: NORMAL
SL AMB POCT BILIRUBIN,UA: NORMAL
SL AMB POCT BLOOD,UA: NORMAL
SL AMB POCT CLARITY,UA: CLEAR
SL AMB POCT COLOR,UA: YELLOW
SL AMB POCT KETONES,UA: NORMAL
SL AMB POCT NITRITE,UA: NORMAL
SL AMB POCT PH,UA: 7
SL AMB POCT SPECIFIC GRAVITY,UA: 1.01
SL AMB POCT URINE PROTEIN: NORMAL
SL AMB POCT UROBILINOGEN: 0.2

## 2022-08-25 PROCEDURE — 99204 OFFICE O/P NEW MOD 45 MIN: CPT | Performed by: UROLOGY

## 2022-08-25 PROCEDURE — 81003 URINALYSIS AUTO W/O SCOPE: CPT | Performed by: UROLOGY

## 2022-08-25 RX ORDER — BECLOMETHASONE DIPROPIONATE HFA 40 UG/1
AEROSOL, METERED RESPIRATORY (INHALATION)
COMMUNITY
Start: 2022-06-05

## 2022-08-25 RX ORDER — OMEPRAZOLE 20 MG/1
20 CAPSULE, DELAYED RELEASE ORAL 2 TIMES DAILY
COMMUNITY
End: 2022-12-06

## 2022-08-25 RX ORDER — PROMETHAZINE HYDROCHLORIDE 25 MG/1
TABLET ORAL
COMMUNITY
End: 2023-08-31

## 2022-08-25 RX ORDER — FAMOTIDINE 40 MG/1
TABLET, FILM COATED ORAL
COMMUNITY
Start: 2022-08-22

## 2022-08-25 RX ORDER — IPRATROPIUM BROMIDE 42 UG/1
SPRAY, METERED NASAL
COMMUNITY
Start: 2022-07-13 | End: 2022-11-15

## 2022-08-25 NOTE — PROGRESS NOTES
UROLOGY NEW CONSULT NOTE     CHIEF COMPLAINT   Smita Garza is a 78 y o  female with a complaint of No chief complaint on file  History of Present Illness:     78 y o  female with a longstanding diagnosis of multiple medical comorbidities and complicated bowel and bladder history  Carries diagnosis of interstitial cystitis, diagnosed in 1993 by a Dr Malachi Manrique in [place], [place]  Patient was treated with urethral dilations  These were unhelpful  Cystoscopic evaluation was reported to show friable bladder mucosa  Patient has previously undergone hysterectomy and BSO in 1996  Patient was managed with bladder diet and Ditropan in the past   In 2021, patient had symptom recurrence with painful bladder spasms and incontinence  Patient has established with Dr Clotilde Cardoso at [place], last visit 6/2021  Prior to this, the patient was treated with cystoscopy, hydro distention, bladder biopsy and fulguration in May of 2021  Pathology was benign  Patient has trialed Elavil in the past but had side effects  Patient has been on hydroxyzine 20 mg at night with improvement in nocturia  Has not tried Elmiron  Of note, patient has a history of imaging that demonstrates mild right pelviectasis  Has undergone renal scan in the past that does not show obstruction  Since her last visit in [place], the patient has had a long disease-free interval and good control with her medical therapy  She recently noted some worsening of her interstitial cystitis and bladder pain  She has had no corresponding bowel issues  She has also started to develop some right-sided flank pain  She was seen in the emergency room and a CT scan shows persistent right collecting system dilation  No progression from prior  Renal function is essentially stable  The patient contacted her primary urologist in [place] but the next available appointment was in September    Patient presents today to review her renal scan and urologic history  Past Medical History:     Past Medical History:   Diagnosis Date    Allergic rhinitis     Anxiety     Asthma     Back pain     Cardiac disease     Cardiopathy     EF 45%    Cough     Diverticulitis     Factor V Leiden (HCC)     Fibromyalgia     GERD (gastroesophageal reflux disease)     Hashimoto's thyroiditis     Hx of degenerative disc disease     Hypotension     pots - postural orthostatic hypotension    Interstitial cystitis     Irregular heart beat     LBBB    Irritable bowel syndrome     Migraines     Mitral valve disease     "thickening"    Myocardial infarction [facility])     possible but not sure when    Neuropathy     bilateral legs    Osteoporosis     Postural orthostatic tachycardia syndrome     must drink a lot of water and salt    Pott's disease     Rheumatic fever 1967    Scoliosis     Sepsis ([address] )     associated with PICC line    S[address]ogren's syndrome ([facility] [address] )     TMJ (dislocation of temporomandibular joint)        PAST SURGICAL HISTORY:     Past Surgical History:   Procedure Laterality Date    ABLATION MICROWAVE Left     lumbar area    BACK SURGERY  10/1998     SECTION  1992    CHOLECYSTECTOMY  2016    COLONOSCOPY  2016   [facility] [address]    EGD  2016    FOOT SURGERY  1968    removal of bone and neuroma    HYSTERECTOMY  1997    age 54  PRICE ooph    IR OTHER  2022    IR PICC PLACEMENT SINGLE LUMEN  10/02/2020    IR PICC PLACEMENT SINGLE LUMEN  2021    IR PICC REPOSITION  2021    IR TUNNELED CENTRAL LINE PLACEMENT  2022    LAPAROSCOPY  1996    endometriosis    MOUTH BIOPSY      lip    MO EGD TRANSORAL BIOPSY SINGLE/MULTIPLE N/A 2019    Procedure: ESOPHAGOGASTRODUODENOSCOPY (EGD) with multiple bx and dilation;  Surgeon: William Sears MD;  Location: [facility] GI LAB;   Service: Gastroenterology    MO SHLDR ARTHROSCOP,SURG,W/ROTAT CUFF REPR Right 2022    Procedure: SHOULDER ARTHROSCOPIC ROTATOR CUFF REPAIR Biceps Tenodisis; Surgeon: Panchito Eaton MD;  [facility] MAIN [facility];  Service: Orthopedics    TUNNELED VENOUS CATHETER PLACEMENT  2016       CURRENT MEDICATIONS:     Current Outpatient Medications   Medication Sig Dispense Refill    Alum Hydroxide-Mag Trisilicate (Gaviscon) 18-19 1 MG CHEW Chew 1 tablet 4 (four) times a day as needed With meals and as needed      azelastine (ASTELIN) 0 1 % nasal spray 2 sprays into each nostril 2 (two) times a day Use in each nostril as directed      beclomethasone (QVAR) 40 MCG/ACT inhaler Inhale 1 puff daily as needed (only when unable to nebulize pulmicort) Rinse mouth after use   budesonide (PULMICORT) 0 5 mg/2 mL nebulizer solution Take 0 5 mg by nebulization 2 (two) times a day Rinse mouth after use        cholecalciferol (VITAMIN D3) 1,000 units tablet Take 1,000 Units by mouth daily       cyclobenzaprine (FLEXERIL) 5 mg tablet Take 1 tablet (5 mg total) by mouth 2 (two) times a day as needed for muscle spasms 60 tablet 2    docusate sodium (COLACE) 100 mg capsule Take 100 mg by mouth daily as needed for constipation      famotidine (PEPCID) 40 MG tablet       fexofenadine (ALLEGRA) 180 MG tablet Take 180 mg by mouth 2 (two) times a day       fluticasone (FLONASE) 50 mcg/act nasal spray 2 sprays into each nostril daily       Galcanezumab-gnlm (Emgality) 120 MG/ML SOAJ Inject under the skin every 30 (thirty) days       HYDROcodone-acetaminophen (Norco) 5-325 mg per tablet Take 1 tablet by mouth 2 (two) times a day as needed for pain for up to 30 doses Do not fill until 8/19/2022 Max Daily Amount: 2 tablets 60 tablet 0    hydrOXYzine (ATARAX) 10 mg/5 mL syrup Take 20 mg by mouth daily at bedtime       ipratropium (ATROVENT) 0 03 % nasal spray 2 sprays into each nostril 4 (four) times a day as needed for rhinitis       ivabradine HCl (Corlanor) 5 MG tablet 2 (two) times a day        levalbuterol (XOPENEX HFA) 45 mcg/act inhaler Inhale 2 puffs every 4 (four) hours as needed (when unable to nebulize)      levalbuterol (XOPENEX) 1 25 mg/3 mL nebulizer solution Take 1 25 mg by nebulization every 6 (six) hours as needed   2    Magnesium 400 MG CAPS Take 1 capsule by mouth 2 (two) times a day       midodrine (PROAMATINE) 2 5 mg tablet       Multiple Vitamins-Iron TABS Take by mouth      omega-3-acid ethyl esters (LOVAZA) 1 g capsule Take 2 g by mouth 2 (two) times a day 1600mg daily      oxybutynin (DITROPAN) 5 mg tablet Take 2 5 mg by mouth 2 (two) times a day Once in the morning and one at HS      polyethylene glycol (MIRALAX) 17 g packet Take 17 g by mouth daily as needed       Probiotic Product (PROBIOTIC DAILY PO) Take 1 tablet by mouth daily At lunch      psyllium (METAMUCIL) 0 52 g capsule Take 0 52 g by mouth daily      Qvar RediHaler 40 MCG/ACT inhaler PLEASE SEE ATTACHED FOR DETAILED DIRECTIONS      Thyroid, Porcine, POWD Use 32 mg daily      Ubrogepant (UBRELVY) 100 MG tablet Take 100 mg by mouth Take 1 tablet (100 mg) one time as needed for migraine  May repeat one additional tablet (100 mg) at least two hours after the first dose  Do not use more than two doses per day, or for more than eight days per month   famotidine (PEPCID) 10 mg tablet Take 40 mg by mouth 2 (two) times a day        HYDROcodone-acetaminophen (NORCO) 5-325 mg per tablet Take 1 tablet by mouth 2 (two) times a day as needed for pain Max Daily Amount: 2 tablets 60 tablet 0    ipratropium (ATROVENT) 0 06 % nasal spray PLEASE SEE ATTACHED FOR DETAILED DIRECTIONS      midodrine (PROAMATINE) 5 mg tablet TAKE 2 TABS PO IN AM, ONE TAB PO WITH LUNCH AND DINNER 120 tablet 11    MULTIPLE VITAMINS-CALCIUM PO Take 1 capsule by mouth every morning       omeprazole (PriLOSEC) 20 mg delayed release capsule Take 20 mg by mouth 2 (two) times a day       No current facility-administered medications for this visit         ALLERGIES:     Allergies Allergen Reactions    Imipramine Confusion, Fatigue, Irritability, Palpitations, Shortness Of Breath, Tachycardia and Visual Disturbance    Melatonin Shortness Of Breath    Mirtazapine Anxiety, Dizziness, GI Intolerance, Nausea Only, Palpitations and Shortness Of Breath    Nexium [Esomeprazole] Shortness Of Breath    Nsaids Shortness Of Breath    Singulair [Montelukast] Shortness Of Breath and Cough    Zomig [Zolmitriptan] Shortness Of Breath    Dexilant [Dexlansoprazole] Nausea Only and Vomiting    Albuterol     Ambien [Zolpidem]     Amitriptyline Drowsiness    Aspirin     Bactrim [Sulfamethoxazole-Trimethoprim] Hives    Banana - Food Allergy Dermatitis    Ceftin [Cefuroxime]     Celebrex [Celecoxib]     Ciprofloxacin     Cranberry-C [Ascorbate - Food Allergy] GI Intolerance    Demerol [Meperidine]     Epinephrine Dizziness    Ergotamine Nausea Only and Headache    Keflex [Cephalexin]     Klonopin [Clonazepam] Nausea Only and Dizziness    Latex Hives    Levaquin [Levofloxacin]     Lexapro [Escitalopram]     Lyrica [Pregabalin] Fatigue    Macrodantin [Nitrofurantoin]     Morphine Nausea Only    Movantik [Naloxegol] Nausea Only    Mushroom Extract Complex - Food Allergy      Eye itchy, asthma  attack    Naprosyn [Naproxen]     Neurontin [Gabapentin] Dizziness    Pineapple - Food Allergy GI Intolerance    Plecanatide Abdominal Pain, Diarrhea and GI Intolerance    Prolia [Denosumab]     Prozac [Fluoxetine]     Serevent [Salmeterol] Dizziness    Sudafed [Pseudoephedrine] Hives    Sulfa Antibiotics     Tomato - Food Allergy GI Intolerance    Trazodone     Ultram [Tramadol] Nausea Only, Dizziness and Headache    Vilazodone Hcl Abdominal Pain, Dizziness, GI Intolerance, Headache and Other (See Comments)    Vioxx [Rofecoxib]     Vortioxetine Drowsiness, Fatigue, GI Intolerance and Irritability    Zithromax [Azithromycin] Hives    Zoloft [Sertraline]     Zantac [Ranitidine] Rash and Dizziness       SOCIAL HISTORY:     Social History     Socioeconomic History    Marital status:      Spouse name: None    Number of children: None    Years of education: None    Highest education level: None   Occupational History    None   Tobacco Use    Smoking status: Never Smoker    Smokeless tobacco: Never Used   Vaping Use    Vaping Use: Never used   Substance and Sexual Activity    Alcohol use: Never    Drug use: No    Sexual activity: None   Other Topics Concern    None   Social History Narrative    None     Social Determinants of Health     Financial Resource Strain: Not on file   Food Insecurity: Not on file   Transportation Needs: Not on file   Physical Activity: Not on file   Stress: Not on file   Social Connections: Not on file   Intimate Partner Violence: Not on file   Housing Stability: Not on file       SOCIAL HISTORY:     Family History   Problem Relation Age of Onset    Cancer Mother         urinary bladder     Thyroid cancer Sister     Colon cancer Maternal Grandfather     Breast cancer Maternal Aunt         over 48 yrs old     Endometrial cancer Maternal Aunt     Multiple myeloma Maternal Aunt     No Known Problems Father     No Known Problems Daughter     Heart attack Sister     No Known Problems Sister     No Known Problems Sister     No Known Problems Sister     No Known Problems Sister     No Known Problems Daughter     Hypertension Paternal Aunt        REVIEW OF SYSTEMS:     Review of Systems   Constitutional: Positive for activity change  Respiratory: Negative  Cardiovascular: Negative  Gastrointestinal: Positive for abdominal pain and constipation  Genitourinary: Positive for difficulty urinating, flank pain, frequency, pelvic pain and urgency  Musculoskeletal: Positive for back pain and gait problem  Skin: Negative  Psychiatric/Behavioral: Negative            PHYSICAL EXAM:     /80   Pulse 86   Ht 5' 1" (1 549 m)   Wt 54 9 kg (121 lb)   BMI 22 86 kg/m²     General:  Healthy appearing female in no acute distress  They have a normal affect  There is not appear to be any gross neurologic defects or abnormalities  HEENT:  Normocephalic, atraumatic  Neck is supple without any palpable lymphadenopathy  Cardiovascular:  Patient has normal palpable distal radial pulses  There is no significant peripheral edema  No JVD is noted  Respiratory:  Patient has unlabored respirations  There is no audible wheeze or rhonchi  Abdomen:  Abdomen is with healed surgical scars  Abdomen is soft and nontender  There is no tympany  Inguinal and umbilical hernia are not appreciated  Genitourinary:  Deferred    Musculoskeletal:  Walks with a cane  Dermatologic:  Patient has no skin abnormalities or rashes  LABS:     CBC:   Lab Results   Component Value Date    WBC 9 03 2022    HGB 14 6 2022    HCT 45 5 2022    MCV 93 2022     2022       BMP:   Lab Results   Component Value Date    GLUCOSE 94 2018    CALCIUM 9 5 2022     2018    K 3 7 2022    CO2 28 2022     2022    BUN 19 2022    CREATININE 1 18 2022       IMAGIN/18/22  CT ABDOMEN AND PELVIS WITH IV CONTRAST     INDICATION:   r flank discomfort  "Pt reports right flank pain since since end of July  Pt reports she started having urinary urgency and difficulty emptying her bladder since yesterday"      COMPARISON:  CTA chest CT abdomen pelvis 8/10/2021     TECHNIQUE:  CT examination of the abdomen and pelvis was performed  Axial, sagittal, and coronal 2D reformatted images were created from the source data and submitted for interpretation      Radiation dose length product (DLP) for this visit:  526 mGy-cm     This examination, like all CT scans performed in the [facility], was performed utilizing techniques to minimize radiation dose exposure, including the use of iterative   reconstruction and automated exposure control      IV Contrast:  85 mL of iohexol (OMNIPAQUE)  Enteric Contrast:  Enteric contrast was not administered      FINDINGS:     ABDOMEN     LOWER CHEST:  Mild left basilar scarring      LIVER/BILIARY TREE:  Normal appearance of the hepatic parenchyma  Mild intrahepatic biliary prominence attributable to postcholecystectomy      GALLBLADDER:  Gallbladder is surgically absent      SPLEEN:  Unremarkable      PANCREAS:  Unremarkable      ADRENAL GLANDS:  Unremarkable      KIDNEYS/URETERS:  No hydronephrosis  Diffuse distention of the mid ureter unchanged from the prior study without obstructive calculi  Nondistended proximal and distal ureter      STOMACH AND BOWEL:  Prominent colonic stool suggestive of constipation  No bowel obstruction      APPENDIX:  No findings to suggest appendicitis      ABDOMINOPELVIC CAVITY:  No ascites  No pneumoperitoneum  No lymphadenopathy      VESSELS:  Unremarkable for patient's age      PELVIS     REPRODUCTIVE ORGANS:  Hysterectomy      URINARY BLADDER:  Unremarkable      ABDOMINAL WALL/INGUINAL REGIONS:  Unremarkable      OSSEOUS STRUCTURES:  No acute fracture or destructive osseous lesion  Lumbar postsurgical changes  Severe scoliosis      IMPRESSION:     No acute findings in the abdomen or pelvis      Chronic distention of the mid right ureter without hydronephrosis or proximal/distal right hydroureter  No ureteral calculi      Prominent colonic stool suggestive of constipation  4/21/21  NUCLEAR RENAL SCAN      INDICATION: [id number]: Megaloureter     COMPARISON:   CT 3/24/2021     TECHNIQUE:   The study was obtained following the intravenous administration of 10 4 mCi Tc-99m MAG-3  Posterior, dynamic flow images were obtained at one minute intervals for thirty minutes   Static, posterior, pre and post void images were then obtained       FINDINGS:     PERFUSION:     Left kidney:  62%  Right kidney: 38%        UPTAKE/EXCRETION:     Time to 1/2 Peak:  Left kidney:  Indeterminate but appearing normal   Right kidney:  0 04 minutes        Time to Peak:  Left kidney:  2 5 minutes  Right kidney:  2 5 minutes        Peak to 1/2 Peak:  Left kidney:  5 minutes  Right kidney:  8 minutes        Split renal Function:  Left kidney:  56%  Right kidney:  44%     Evaluation of both kidneys demonstrate prompt perfusion, cortical uptake, transit and excretion  There is minimal fullness of the right renal pelvis however this clears by the end of the study  There is no obstructive uropathy      IMPRESSION:     1  Unremarkable study  No obstructive uropathy  Relatively symmetric renal function  PROCEDURE:     Recent Results (from the past 2 hour(s))   POCT urine dip auto non-scope    Collection Time: 08/25/22 10:32 AM   Result Value Ref Range     COLOR,UA yellow     CLARITY,UA clear     SPECIFIC GRAVITY,UA 1 010      PH,UA 7 0     LEUKOCYTE ESTERASE,UA neg     NITRITE,UA neg     GLUCOSE, UA neg]     KETONES,UA neg     BILIRUBIN,UA neg     BLOOD,UA neg     POCT URINE PROTEIN neg     SL AMB POCT UROBILINOGEN 0 2         ASSESSMENT:     78 y o  female with longstanding bladder pain syndrome, mild right collecting system dilation without fulminant obstruction, new right flank pain    PLAN:     I reassured the patient about the stability of the CT scan and essentially stability of her renal function  At this point time, I think it would be prudent to repeat her renal scan with Lasix last performed in April of 2021  She has her follow-up with her primary urologist within the next month and it would be prudent to have the results of this film on hand for that visit  I would be reticent to consider any intervention such as a retrograde and indwelling stent as this is likely to further exacerbate the patient's bladder pain syndrome    As long as the patient is uninfected with stable renal function, I would avoid any stent placement  She is enquiring about reflux  Although we can test for vesicoureteral reflux with a VCUG or cystogram, again, it is unclear to me that this information would be acutely impactful  Treatment of reflux is usually reserved for patients with deterioration of their renal unit or chronic upper tract infections  The patient has not had either of these issues  Injection of Deflux or reconstruction of ureter and bladder would again put her at significant risk  I will defer discussion of urodynamic testing and resting pressures to the patient's primary Urology team     I also discussed with the patient that I do not have a high-volume interstitial cystitis practice and because she has had such excellent care at the [facility], will defer management of her bladder pain syndrome to her primary Urology team   Patient's urine dip today is negative for infection blood or inflammatory cells  I have not recommended any adjustment to medication based therapy at this time  I will be more than happy to order and follow the nuclear medicine renal scan with Lasix and provide the results for the patient for her upcoming appointment in [place]

## 2022-08-25 NOTE — LETTER
August 25, 2022     Bryan Wheat MD  [address]    Patient: Eric Ramey   YOB: 1955   Date of Visit: 8/25/2022       Dear Dr Riki Edmonds:    Thank you for referring Yash Bennett to me for evaluation  Below are my notes for this consultation  If you have questions, please do not hesitate to call me  I look forward to following your patient along with you  Sincerely,        Eduar Orozco MD        CC: MD Eduar Lugo MD  8/25/2022 10:58 AM  Sign when Signing Visit    UROLOGY NEW CONSULT NOTE     CHIEF Everitt Mcardle is a 78 y o  female with a complaint of No chief complaint on file  History of Present Illness:     78 y o  female with a longstanding diagnosis of multiple medical comorbidities and complicated bowel and bladder history  Carries diagnosis of interstitial cystitis, diagnosed in 1993 by a Dr Deana Lennon in [place], [place]  Patient was treated with urethral dilations  These were unhelpful  Cystoscopic evaluation was reported to show friable bladder mucosa  Patient has previously undergone hysterectomy and BSO in 1996  Patient was managed with bladder diet and Ditropan in the past   In 2021, patient had symptom recurrence with painful bladder spasms and incontinence  Patient has established with Dr Patricia Lemons at [facility], last visit 6/2021  Prior to this, the patient was treated with cystoscopy, hydro distention, bladder biopsy and fulguration in May of 2021  Pathology was benign  Patient has trialed Elavil in the past but had side effects  Patient has been on hydroxyzine 20 mg at night with improvement in nocturia  Has not tried Elmiron  Of note, patient has a history of imaging that demonstrates mild right pelviectasis  Has undergone renal scan in the past that does not show obstruction      Since her last visit in [place], the patient has had a long disease-free interval and good control with her medical therapy  She recently noted some worsening of her interstitial cystitis and bladder pain  She has had no corresponding bowel issues  She has also started to develop some right-sided flank pain  She was seen in the emergency room and a CT scan shows persistent right collecting system dilation  No progression from prior  Renal function is essentially stable  The patient contacted her primary urologist in [place] but the next available appointment was in September  Patient presents today to review her renal scan and urologic history      Past Medical History:     Past Medical History:   Diagnosis Date    Allergic rhinitis     Anxiety     Asthma     Back pain     Cardiac disease     Cardiopathy     EF 45%    Cough     Diverticulitis     Factor V Leiden (HCC)     Fibromyalgia     GERD (gastroesophageal reflux disease)     Hashimoto's thyroiditis     Hx of degenerative disc disease     Hypotension     pots - postural orthostatic hypotension    Interstitial cystitis     Irregular heart beat     LBBB    Irritable bowel syndrome     Migraines     Mitral valve disease     "thickening"    Myocardial infarction [facility])     possible but not sure when    Neuropathy     bilateral legs    Osteoporosis     Postural orthostatic tachycardia syndrome     must drink a lot of water and salt    Pott's disease     Rheumatic fever 1967    Scoliosis     Sepsis ([address] )     associated with PICC line    Sjogren's syndrome ([facility] [address] )     TMJ (dislocation of temporomandibular joint)        PAST SURGICAL HISTORY:     Past Surgical History:   Procedure Laterality Date    ABLATION MICROWAVE Left     lumbar area    BACK SURGERY  10/1998     SECTION      CHOLECYSTECTOMY  2016    COLONOSCOPY      DILATION AND CURETTAGE OF UTERUS      EGD      FOOT SURGERY  1968    removal of bone and neuroma    HYSTERECTOMY      age 54  PRICE ooph    IR OTHER  2022    IR PICC PLACEMENT SINGLE LUMEN  10/02/2020    IR PICC PLACEMENT SINGLE LUMEN  08/12/2021    IR PICC REPOSITION  12/07/2021    IR TUNNELED CENTRAL LINE PLACEMENT  06/20/2022    LAPAROSCOPY  1996    endometriosis    MOUTH BIOPSY      lip    WI EGD TRANSORAL BIOPSY SINGLE/MULTIPLE N/A 04/24/2019    Procedure: ESOPHAGOGASTRODUODENOSCOPY (EGD) with multiple bx and dilation;  Surgeon: Willie Boyce MD;  Location: [facility] GI LAB; Service: Gastroenterology    WI SHLDR ARTHROSCOP,SURG,W/ROTAT CUFF REPR Right 04/06/2022    Procedure: SHOULDER ARTHROSCOPIC ROTATOR CUFF REPAIR Biceps Tenodisis; Surgeon: Manoj Olmedo MD;  Location: [facility] MAIN [facility];  Service: Orthopedics    TUNNELED VENOUS CATHETER PLACEMENT  2016       CURRENT MEDICATIONS:     Current Outpatient Medications   Medication Sig Dispense Refill    Alum Hydroxide-Mag Trisilicate (Gaviscon) 27-20 3 MG CHEW Chew 1 tablet 4 (four) times a day as needed With meals and as needed      azelastine (ASTELIN) 0 1 % nasal spray 2 sprays into each nostril 2 (two) times a day Use in each nostril as directed      beclomethasone (QVAR) 40 MCG/ACT inhaler Inhale 1 puff daily as needed (only when unable to nebulize pulmicort) Rinse mouth after use   budesonide (PULMICORT) 0 5 mg/2 mL nebulizer solution Take 0 5 mg by nebulization 2 (two) times a day Rinse mouth after use        cholecalciferol (VITAMIN D3) 1,000 units tablet Take 1,000 Units by mouth daily       cyclobenzaprine (FLEXERIL) 5 mg tablet Take 1 tablet (5 mg total) by mouth 2 (two) times a day as needed for muscle spasms 60 tablet 2    docusate sodium (COLACE) 100 mg capsule Take 100 mg by mouth daily as needed for constipation      famotidine (PEPCID) 40 MG tablet       fexofenadine (ALLEGRA) 180 MG tablet Take 180 mg by mouth 2 (two) times a day       fluticasone (FLONASE) 50 mcg/act nasal spray 2 sprays into each nostril daily       Galcanezumab-gnlm (Emgality) 120 MG/ML SOAJ Inject under the skin every 30 (thirty) days       HYDROcodone-acetaminophen (Norco) 5-325 mg per tablet Take 1 tablet by mouth 2 (two) times a day as needed for pain for up to 30 doses Do not fill until 8/19/2022 Max Daily Amount: 2 tablets 60 tablet 0    hydrOXYzine (ATARAX) 10 mg/5 mL syrup Take 20 mg by mouth daily at bedtime       ipratropium (ATROVENT) 0 03 % nasal spray 2 sprays into each nostril 4 (four) times a day as needed for rhinitis       ivabradine HCl (Corlanor) 5 MG tablet 2 (two) times a day        levalbuterol (XOPENEX HFA) 45 mcg/act inhaler Inhale 2 puffs every 4 (four) hours as needed (when unable to nebulize)      levalbuterol (XOPENEX) 1 25 mg/3 mL nebulizer solution Take 1 25 mg by nebulization every 6 (six) hours as needed   2    Magnesium 400 MG CAPS Take 1 capsule by mouth 2 (two) times a day       midodrine (PROAMATINE) 2 5 mg tablet       Multiple Vitamins-Iron TABS Take by mouth      omega-3-acid ethyl esters (LOVAZA) 1 g capsule Take 2 g by mouth 2 (two) times a day 1600mg daily      oxybutynin (DITROPAN) 5 mg tablet Take 2 5 mg by mouth 2 (two) times a day Once in the morning and one at HS      polyethylene glycol (MIRALAX) 17 g packet Take 17 g by mouth daily as needed       Probiotic Product (PROBIOTIC DAILY PO) Take 1 tablet by mouth daily At lunch      psyllium (METAMUCIL) 0 52 g capsule Take 0 52 g by mouth daily      Qvar RediHaler 40 MCG/ACT inhaler PLEASE SEE ATTACHED FOR DETAILED DIRECTIONS      Thyroid, Porcine, POWD Use 32 mg daily      Ubrogepant (UBRELVY) 100 MG tablet Take 100 mg by mouth Take 1 tablet (100 mg) one time as needed for migraine  May repeat one additional tablet (100 mg) at least two hours after the first dose  Do not use more than two doses per day, or for more than eight days per month        famotidine (PEPCID) 10 mg tablet Take 40 mg by mouth 2 (two) times a day        HYDROcodone-acetaminophen (NORCO) 5-325 mg per tablet Take 1 tablet by mouth 2 (two) times a day as needed for pain Max Daily Amount: 2 tablets 60 tablet 0    ipratropium (ATROVENT) 0 06 % nasal spray PLEASE SEE ATTACHED FOR DETAILED DIRECTIONS      midodrine (PROAMATINE) 5 mg tablet TAKE 2 TABS PO IN AM, ONE TAB PO WITH LUNCH AND DINNER 120 tablet 11    MULTIPLE VITAMINS-CALCIUM PO Take 1 capsule by mouth every morning       omeprazole (PriLOSEC) 20 mg delayed release capsule Take 20 mg by mouth 2 (two) times a day       No current facility-administered medications for this visit         ALLERGIES:     Allergies   Allergen Reactions    Imipramine Confusion, Fatigue, Irritability, Palpitations, Shortness Of Breath, Tachycardia and Visual Disturbance    Melatonin Shortness Of Breath    Mirtazapine Anxiety, Dizziness, GI Intolerance, Nausea Only, Palpitations and Shortness Of Breath    Nexium [Esomeprazole] Shortness Of Breath    Nsaids Shortness Of Breath    Singulair [Montelukast] Shortness Of Breath and Cough    Zomig [Zolmitriptan] Shortness Of Breath    Dexilant [Dexlansoprazole] Nausea Only and Vomiting    Albuterol     Ambien [Zolpidem]     Amitriptyline Drowsiness    Aspirin     Bactrim [Sulfamethoxazole-Trimethoprim] Hives    Banana - Food Allergy Dermatitis    Ceftin [Cefuroxime]     Celebrex [Celecoxib]     Ciprofloxacin     Cranberry-C [Ascorbate - Food Allergy] GI Intolerance    Demerol [Meperidine]     Epinephrine Dizziness    Ergotamine Nausea Only and Headache    Keflex [Cephalexin]     Klonopin [Clonazepam] Nausea Only and Dizziness    Latex Hives    Levaquin [Levofloxacin]     Lexapro [Escitalopram]     Lyrica [Pregabalin] Fatigue    Macrodantin [Nitrofurantoin]     Morphine Nausea Only    Movantik [Naloxegol] Nausea Only    Mushroom Extract Complex - Food Allergy      Eye itchy, asthma  attack    Naprosyn [Naproxen]     Neurontin [Gabapentin] Dizziness    Pineapple - Food Allergy GI Intolerance    Plecanatide Abdominal Pain, Diarrhea and GI Intolerance    Prolia [Denosumab]     Prozac [Fluoxetine]     Serevent [Salmeterol] Dizziness    Sudafed [Pseudoephedrine] Hives    Sulfa Antibiotics     Tomato - Food Allergy GI Intolerance    Trazodone     Ultram [Tramadol] Nausea Only, Dizziness and Headache    Vilazodone Hcl Abdominal Pain, Dizziness, GI Intolerance, Headache and Other (See Comments)    Vioxx [Rofecoxib]     Vortioxetine Drowsiness, Fatigue, GI Intolerance and Irritability    Zithromax [Azithromycin] Hives    Zoloft [Sertraline]     Zantac [Ranitidine] Rash and Dizziness       SOCIAL HISTORY:     Social History     Socioeconomic History    Marital status:      Spouse name: None    Number of children: None    Years of education: None    Highest education level: None   Occupational History    None   Tobacco Use    Smoking status: Never Smoker    Smokeless tobacco: Never Used   Vaping Use    Vaping Use: Never used   Substance and Sexual Activity    Alcohol use: Never    Drug use: No    Sexual activity: None   Other Topics Concern    None   Social History Narrative    None     Social Determinants of Health     Financial Resource Strain: Not on file   Food Insecurity: Not on file   Transportation Needs: Not on file   Physical Activity: Not on file   Stress: Not on file   Social Connections: Not on file   Intimate Partner Violence: Not on file   Housing Stability: Not on file       SOCIAL HISTORY:     Family History   Problem Relation Age of Onset    Cancer Mother         urinary bladder     Thyroid cancer Sister     Colon cancer Maternal Grandfather     Breast cancer Maternal Aunt         over 48 yrs old     Endometrial cancer Maternal Aunt     Multiple myeloma Maternal Aunt     No Known Problems Father     No Known Problems Daughter     Heart attack Sister     No Known Problems Sister     No Known Problems Sister     No Known Problems Sister     No Known Problems Sister     No Known Problems Daughter     Hypertension Paternal Aunt        REVIEW OF SYSTEMS:     Review of Systems   Constitutional: Positive for activity change  Respiratory: Negative  Cardiovascular: Negative  Gastrointestinal: Positive for abdominal pain and constipation  Genitourinary: Positive for difficulty urinating, flank pain, frequency, pelvic pain and urgency  Musculoskeletal: Positive for back pain and gait problem  Skin: Negative  Psychiatric/Behavioral: Negative  PHYSICAL EXAM:     /80   Pulse 86   Ht 5' 1" (1 549 m)   Wt 54 9 kg (121 lb)   BMI 22 86 kg/m²     General:  Healthy appearing female in no acute distress  They have a normal affect  There is not appear to be any gross neurologic defects or abnormalities  HEENT:  Normocephalic, atraumatic  Neck is supple without any palpable lymphadenopathy  Cardiovascular:  Patient has normal palpable distal radial pulses  There is no significant peripheral edema  No JVD is noted  Respiratory:  Patient has unlabored respirations  There is no audible wheeze or rhonchi  Abdomen:  Abdomen is with healed surgical scars  Abdomen is soft and nontender  There is no tympany  Inguinal and umbilical hernia are not appreciated  Genitourinary:  Deferred    Musculoskeletal:  Walks with a cane  Dermatologic:  Patient has no skin abnormalities or rashes  LABS:     CBC:   Lab Results   Component Value Date    WBC 9 03 2022    HGB 14 6 2022    HCT 45 5 2022    MCV 93 2022     2022       BMP:   Lab Results   Component Value Date    GLUCOSE 94 2018    CALCIUM 9 5 2022     2018    K 3 7 2022    CO2 28 2022     2022    BUN 19 2022    CREATININE 1 18 2022       IMAGIN/18/22  CT ABDOMEN AND PELVIS WITH IV CONTRAST     INDICATION:   r flank discomfort  "Pt reports right flank pain since since end of July   Pt reports she started having urinary urgency and difficulty emptying her bladder since yesterday"      COMPARISON:  CTA chest CT abdomen pelvis 8/10/2021     TECHNIQUE:  CT examination of the abdomen and pelvis was performed  Axial, sagittal, and coronal 2D reformatted images were created from the source data and submitted for interpretation      Radiation dose length product (DLP) for this visit:  526 mGy-cm   This examination, like all CT scans performed in the [facility], was performed utilizing techniques to minimize radiation dose exposure, including the use of iterative   reconstruction and automated exposure control      IV Contrast:  85 mL of iohexol (OMNIPAQUE)  Enteric Contrast:  Enteric contrast was not administered      FINDINGS:     ABDOMEN     LOWER CHEST:  Mild left basilar scarring      LIVER/BILIARY TREE:  Normal appearance of the hepatic parenchyma  Mild intrahepatic biliary prominence attributable to postcholecystectomy      GALLBLADDER:  Gallbladder is surgically absent      SPLEEN:  Unremarkable      PANCREAS:  Unremarkable      ADRENAL GLANDS:  Unremarkable      KIDNEYS/URETERS:  No hydronephrosis  Diffuse distention of the mid ureter unchanged from the prior study without obstructive calculi  Nondistended proximal and distal ureter      STOMACH AND BOWEL:  Prominent colonic stool suggestive of constipation  No bowel obstruction      APPENDIX:  No findings to suggest appendicitis      ABDOMINOPELVIC CAVITY:  No ascites  No pneumoperitoneum  No lymphadenopathy      VESSELS:  Unremarkable for patient's age      PELVIS     REPRODUCTIVE ORGANS:  Hysterectomy      URINARY BLADDER:  Unremarkable      ABDOMINAL WALL/INGUINAL REGIONS:  Unremarkable      OSSEOUS STRUCTURES:  No acute fracture or destructive osseous lesion  Lumbar postsurgical changes    Severe scoliosis      IMPRESSION:     No acute findings in the abdomen or pelvis      Chronic distention of the mid right ureter without hydronephrosis or proximal/distal right hydroureter  No ureteral calculi      Prominent colonic stool suggestive of constipation  4/21/21  NUCLEAR RENAL SCAN      INDICATION: [id number]: Megaloureter     COMPARISON:   CT 3/24/2021     TECHNIQUE:   The study was obtained following the intravenous administration of 10 4 mCi Tc-99m MAG-3  Posterior, dynamic flow images were obtained at one minute intervals for thirty minutes  Static, posterior, pre and post void images were then obtained       FINDINGS:     PERFUSION:     Left kidney:  62%  Right kidney:  38%        UPTAKE/EXCRETION:     Time to 1/2 Peak:  Left kidney:  Indeterminate but appearing normal   Right kidney:  0 04 minutes        Time to Peak:  Left kidney:  2 5 minutes  Right kidney:  2 5 minutes        Peak to 1/2 Peak:  Left kidney:  5 minutes  Right kidney:  8 minutes        Split renal Function:  Left kidney:  56%  Right kidney:  44%     Evaluation of both kidneys demonstrate prompt perfusion, cortical uptake, transit and excretion  There is minimal fullness of the right renal pelvis however this clears by the end of the study  There is no obstructive uropathy      IMPRESSION:     1  Unremarkable study  No obstructive uropathy  Relatively symmetric renal function        PROCEDURE:     Recent Results (from the past 2 hour(s))   POCT urine dip auto non-scope    Collection Time: 08/25/22 10:32 AM   Result Value Ref Range     COLOR,UA yellow     CLARITY,UA clear     SPECIFIC GRAVITY,UA 1 010      PH,UA 7 0     LEUKOCYTE ESTERASE,UA neg     NITRITE,UA neg     GLUCOSE, UA neg]     KETONES,UA neg     BILIRUBIN,UA neg     BLOOD,UA neg     POCT URINE PROTEIN neg     SL AMB POCT UROBILINOGEN 0 2         ASSESSMENT:     78 y o  female with longstanding bladder pain syndrome, mild right collecting system dilation without fulminant obstruction, new right flank pain    PLAN:     I reassured the patient about the stability of the CT scan and essentially stability of her renal function  At this point time, I think it would be prudent to repeat her renal scan with Lasix last performed in April of 2021  She has her follow-up with her primary urologist within the next month and it would be prudent to have the results of this film on hand for that visit  I would be reticent to consider any intervention such as a retrograde and indwelling stent as this is likely to further exacerbate the patient's bladder pain syndrome  As long as the patient is uninfected with stable renal function, I would avoid any stent placement  She is enquiring about reflux  Although we can test for vesicoureteral reflux with a VCUG or cystogram, again, it is unclear to me that this information would be acutely impactful  Treatment of reflux is usually reserved for patients with deterioration of their renal unit or chronic upper tract infections  The patient has not had either of these issues  Injection of Deflux or reconstruction of ureter and bladder would again put her at significant risk  I will defer discussion of urodynamic testing and resting pressures to the patient's primary Urology team     I also discussed with the patient that I do not have a high-volume interstitial cystitis practice and because she has had such excellent care at the [facility], will defer management of her bladder pain syndrome to her primary Urology team   Patient's urine dip today is negative for infection blood or inflammatory cells  I have not recommended any adjustment to medication based therapy at this time  I will be more than happy to order and follow the nuclear medicine renal scan with Lasix and provide the results for the patient for her upcoming appointment in [place]

## 2022-08-26 ENCOUNTER — HOSPITAL ENCOUNTER (OUTPATIENT)
Dept: INFUSION CENTER | Facility: HOSPITAL | Age: 67
End: 2022-08-26
Payer: MEDICARE

## 2022-08-26 VITALS
RESPIRATION RATE: 16 BRPM | TEMPERATURE: 96.9 F | DIASTOLIC BLOOD PRESSURE: 75 MMHG | OXYGEN SATURATION: 99 % | HEART RATE: 83 BPM | SYSTOLIC BLOOD PRESSURE: 128 MMHG

## 2022-08-26 PROCEDURE — 96361 HYDRATE IV INFUSION ADD-ON: CPT

## 2022-08-26 PROCEDURE — 96360 HYDRATION IV INFUSION INIT: CPT

## 2022-08-26 RX ADMIN — SODIUM CHLORIDE 1500 ML: 0.9 INJECTION, SOLUTION INTRAVENOUS at 11:41

## 2022-08-29 ENCOUNTER — HOSPITAL ENCOUNTER (OUTPATIENT)
Dept: NUCLEAR MEDICINE | Facility: HOSPITAL | Age: 67
Discharge: HOME | End: 2022-08-29
Attending: UROLOGY
Payer: MEDICARE

## 2022-08-29 ENCOUNTER — HOSPITAL ENCOUNTER (OUTPATIENT)
Dept: INFUSION CENTER | Facility: HOSPITAL | Age: 67
Discharge: HOME | End: 2022-08-29
Payer: MEDICARE

## 2022-08-29 VITALS
SYSTOLIC BLOOD PRESSURE: 141 MMHG | TEMPERATURE: 97.8 F | HEART RATE: 71 BPM | DIASTOLIC BLOOD PRESSURE: 70 MMHG | RESPIRATION RATE: 18 BRPM

## 2022-08-29 DIAGNOSIS — N13.4: ICD-10-CM

## 2022-08-29 PROCEDURE — 96361 HYDRATE IV INFUSION ADD-ON: CPT

## 2022-08-29 PROCEDURE — G1004 CDSM NDSC: HCPCS

## 2022-08-29 PROCEDURE — 96360 HYDRATION IV INFUSION INIT: CPT

## 2022-08-29 PROCEDURE — A9562 TC99M MERTIATIDE: HCPCS

## 2022-08-29 PROCEDURE — 78708 K FLOW/FUNCT IMAGE W/DRUG: CPT

## 2022-08-29 RX ORDER — FUROSEMIDE 10 MG/ML
40 INJECTION INTRAMUSCULAR; INTRAVENOUS
Status: DISCONTINUED | OUTPATIENT
Start: 2022-08-29 | End: 2022-09-02 | Stop reason: HOSPADM

## 2022-08-29 RX ADMIN — SODIUM CHLORIDE 1500 ML: 0.9 INJECTION, SOLUTION INTRAVENOUS at 08:44

## 2022-08-29 NOTE — PROGRESS NOTES
Pt tolerated IV hydration well without incident  Discharged in stable condition and is aware of next infusion appointment  AVS declined

## 2022-08-30 ENCOUNTER — HOSPITAL ENCOUNTER (OUTPATIENT)
Dept: INFUSION CENTER | Facility: HOSPITAL | Age: 67
Discharge: HOME | End: 2022-08-30
Payer: MEDICARE

## 2022-08-30 ENCOUNTER — OFFICE VISIT (OUTPATIENT)
Dept: PHYSICAL THERAPY | Facility: CLINIC | Age: 67
End: 2022-08-30
Payer: MEDICARE

## 2022-08-30 VITALS
HEART RATE: 96 BPM | TEMPERATURE: 98.3 F | SYSTOLIC BLOOD PRESSURE: 145 MMHG | DIASTOLIC BLOOD PRESSURE: 78 MMHG | RESPIRATION RATE: 18 BRPM

## 2022-08-30 DIAGNOSIS — S46.811D TRAUMATIC TEAR OF SUPRASPINATUS TENDON OF RIGHT SHOULDER, SUBSEQUENT ENCOUNTER: Primary | ICD-10-CM

## 2022-08-30 PROCEDURE — 96360 HYDRATION IV INFUSION INIT: CPT

## 2022-08-30 PROCEDURE — 97140 MANUAL THERAPY 1/> REGIONS: CPT

## 2022-08-30 PROCEDURE — 97110 THERAPEUTIC EXERCISES: CPT

## 2022-08-30 PROCEDURE — 96361 HYDRATE IV INFUSION ADD-ON: CPT

## 2022-08-30 RX ADMIN — SODIUM CHLORIDE 1500 ML: 0.9 INJECTION, SOLUTION INTRAVENOUS at 08:31

## 2022-08-30 NOTE — PROGRESS NOTES
Daily Note     Today's date: 2022  Patient name: Dori Yu  :   MRN: [id number]  Referring provider: Santino Recinos  Dx:   Encounter Diagnosis     ICD-10-CM    1  Traumatic tear of supraspinatus tendon of right shoulder, subsequent encounter  S46 006K                   Subjective:  Pt has c/o several issues today incl  pain/tightness @ LB, cervical, and R Bicep regions  Objective: See treatment diary below      Assessment: Tolerated treatment Fair+ overall with performance of ther exer  Received MT Fairly Well,  and MHP/CP applications Well  Plan: Con't services 2x/week            Precautions: POTS, severe scoliosis,fibromyalgia,GI issues,lumbar spinal cage, cerv DDD, arachnoiditis  Arachnoid cysts - no lifting more than 5#   DOS: 22; (Week 15): 22      Date 8 22 8 15 22   8/18   8/22   Visit Count 38  34 35 36   37   FOTO        Pain In LB -8/10  cerv -7/10  R shld 2/10  1/10 shld 4/10 w/o meds  0/10    Pain Out Better after MHP/CP applic  -  with/after CP applic              Manuals 8  8 15 22 8/18 8/22   PROM R SHLD PROM in all directions to end range 10'  To tolerance  PROM in all directions to end range 10'  To tolerance PROM in all directions to end range 10'  To tolerance Held today PROM in all directions to end range 15'   Mass RUT        Supine gentle GH post/inf for pain                Neuro Re-Ed  8 15     scap retraction HEP     review                   Ther Ex 8  8 15 22     Supine serratus punches  HEP    review   Elbow ROM *Hold   Bicep curls as below Bicep curls as below Bicep curls as below Bicep curls as below    strengthening          cerv AROM        Pendulum ex        Shoulder isometrics (5 way)  HEP    Reviewed HEP Reviewed HEP   Supine wand exercises (flex, abd, ER) HEP   Reviewed HEP Reviewed HEP           Table slides  Flex, arc HEP   Reviewed HEP    Wand ex  Flex, ER, EXT, IR Abd 25x 20x ea 20x ea 20x ea  shld height 20x ea   Wall slides 10x10"  Dbl UE  Short range for flex 10 x 10"     Dbl UE 10 x 10"     Dbl UE   hold 10x10"   Pulleys  3 way 4 min flex/scap    4 min ea   flex/scap  4 min ea   flex/scap hold Flex/scap 3'ea   UBE        MTPs/LTPs grn 2x15/3-5 Green 2 x 15 Green 2 x 15 grn 2x10/3-5 cueing grn 3x10/3-5 cueing   Bicep curls  3 way *held 3#  30 ea dir 3#  30x ea dir 3#  30 ea dir 3#  30 ea   Prone flex, HA, rows, ext Hold yet    Pt def  Resume upcoming as able    Side lying ER & ABD ER 2# 4x10/3-5"  ABD 0# 3x10/3-5" ER 2# 3x10/3-5"    ABD 0# 3x10/3-5 ER 2# 3x10/3-5"    ABD 0# 3x15/3-5 ER 2# 2x10/3-5"    ABD 0# 2x10/3-5" ER 2# 3x10/3-5"    ABD 1# 2x10/3-5"   FWD/Scap Raises 1#  20x ea   0#  20x ea 0#  20x ea   0#  20x ea   shld height   1#  20x ea                     Ther Activity                        Gait Training                        Modalities 8 30 22 8/11 8 15 22     CP to R shld Cerv 10' 10' 10 min     MHP To LB and R Bicep 10 min

## 2022-08-31 ENCOUNTER — CONSULT (OUTPATIENT)
Dept: NEPHROLOGY | Facility: CLINIC | Age: 67
End: 2022-08-31
Payer: MEDICARE

## 2022-08-31 VITALS
SYSTOLIC BLOOD PRESSURE: 148 MMHG | HEART RATE: 76 BPM | WEIGHT: 123 LBS | OXYGEN SATURATION: 99 % | HEIGHT: 61 IN | BODY MASS INDEX: 23.22 KG/M2 | DIASTOLIC BLOOD PRESSURE: 82 MMHG

## 2022-08-31 DIAGNOSIS — N18.31 STAGE 3A CHRONIC KIDNEY DISEASE (HCC): Primary | ICD-10-CM

## 2022-08-31 DIAGNOSIS — G90.A POTS (POSTURAL ORTHOSTATIC TACHYCARDIA SYNDROME): ICD-10-CM

## 2022-08-31 DIAGNOSIS — I42.9 CARDIOMYOPATHY, UNSPECIFIED TYPE (HCC): ICD-10-CM

## 2022-08-31 DIAGNOSIS — R32 URINARY INCONTINENCE, UNSPECIFIED TYPE: ICD-10-CM

## 2022-08-31 LAB
SL AMB  POCT GLUCOSE, UA: NEGATIVE
SL AMB LEUKOCYTE ESTERASE,UA: NEGATIVE
SL AMB POCT BILIRUBIN,UA: NEGATIVE
SL AMB POCT BLOOD,UA: NEGATIVE
SL AMB POCT CLARITY,UA: CLEAR
SL AMB POCT COLOR,UA: NORMAL
SL AMB POCT KETONES,UA: NEGATIVE
SL AMB POCT NITRITE,UA: NEGATIVE
SL AMB POCT PH,UA: 5
SL AMB POCT SPECIFIC GRAVITY,UA: 1.01
SL AMB POCT URINE PROTEIN: NEGATIVE
SL AMB POCT UROBILINOGEN: NEGATIVE

## 2022-08-31 PROCEDURE — 99204 OFFICE O/P NEW MOD 45 MIN: CPT | Performed by: INTERNAL MEDICINE

## 2022-08-31 PROCEDURE — 99214 OFFICE O/P EST MOD 30 MIN: CPT | Performed by: INTERNAL MEDICINE

## 2022-08-31 PROCEDURE — 81002 URINALYSIS NONAUTO W/O SCOPE: CPT | Performed by: INTERNAL MEDICINE

## 2022-08-31 NOTE — PROGRESS NOTES
[facility]  Consultation - Nephrology    Haily Mcelroy 78 y o  female MRN: [id number]      A/P:  1      1  Stage 3a chronic kidney disease ([facility] )  -     Aldosterone/Renin Ratio; Future; Expected date: 11/16/2022  -     Aldosterone; Future; Expected date: 11/16/2022  -     Metanephrine, Fractionated Plasma Free; Future; Expected date: 11/16/2022  -     Microalbumin / creatinine urine ratio; Future; Expected date: 11/16/2022  -     Protein / creatinine ratio, urine; Future; Expected date: 11/16/2022  -     PTH, intact; Future; Expected date: 11/16/2022  -     Magnesium; Future; Expected date: 11/16/2022  -     Comprehensive metabolic panel; Future; Expected date: 11/16/2022  -     Uric acid; Future; Expected date: 11/16/2022  -     Urinalysis with microscopic; Future; Expected date: 11/16/2022  -     VAS renal artery complete; Future; Expected date: 11/16/2022    2  POTS (postural orthostatic tachycardia syndrome)  -     POCT urine dip    3  Cardiomyopathy, unspecified type ([facility] )    4  Urinary incontinence, unspecified type       CKD3a:  -Baseline Cr: 1 0-1 1, recent Cr 8/18/22 at baseline of 1 1 with eGFR 47ml/min   -Etiology: vasomotor effect of BP, nephrosclerosis from HTN, obstructive uropathy potentially   -BP:148/82, elevated at present but she has POTS and is maintained on midodrine and corlanor     8/18/22  -Electrolyte/acid-base: Cl 103, TCO2 28 WNL, goal TCO2>22, no need for sodium bicarb   -K 3 7 WNL   -sodium; 139 WNL   -CKD MBD:check PTH   -Anemia of chronic disease: hgb 14 6 WNl   UA bland with negative protein or blood, specific gravity 1 010       Recommend  1  Reviewed CKD stages, eGFR and Cr trends  Reviewed previous labs and imaging with pt    2  Quantify proteinuria for staging  3  Check PTH  4  Reviewed that role of general nephrologist here would be to follow for CKD management   Can potentially assist in infusion management depending on thoughts of cardiologist and primary as she is trying to establish local care  Ongoing imaging and management of interstitial cystitis will need to be done by urologist   Her BP history is quite complicated and beyond the scope of a general nephrologist likely  She is following quite closely with her cardiologist to manage POTS and should continue to do so  I reviewed at length that I will not be adjusting her medications and she needs to review her BP trends with her cardiologist   I will speak with her primary to inform him of such  Follow up in 3 months for CKD management  2  POTS, orthostatic hypotension, vasovagal syncope  She is managed by [address] Cardi[address] Dr Ely Burkett, last seen by telemedicine visit 1/21/22  Will not titrate or adjust her regimen of corlanor and midodrine at this time given her close relationship and fu with cardiologist  An autonomic specialist and her current cardiologist would be best suited to management of her BP  She is holding midodrine appropriately with elevated BP and recommend holding if SBP>130  She follows her BP with close frequency  Continue fluid infusions 4 time weekly with 1 5 L IVF  Previously on higher volume she reported CHF symptoms  Nephrology can assist with ordering infusions depending on cardiology input  I reviewed with patient that is best served by having her cardiologist/ autonomic specialist managing BP and she verbalized understanding to this  She is concerned for her BP elevations recently, I did state for completeness sake can check hormonal workup for sec HTN although I suspect this a common side effect of corlanor  Check for hyperaldo and pheo  Can check LUIS with vascular US  3  Cardiomyopathy no current HF symptoms  Continue corlanor, midodrine being weaned down  IVF infusions as above with RIJ tunneled line  Follow for infection  4  Urinary incontinence, interstitial cystitis   [facility] urology  Continue Interstitial cystitis diet  Will defer ongoing imaging and management to urology  Reviewed with patient regarding urology to manage condition  The patient and any family members present were counseled today on risks benefits and alternatives of any medications, and were agreeable to the treatment plan  They were counseled on diagnostic testing if necessary, and projected outcomes as are available and medically indicated  All questions were asked and answered during the encounter  If more questions are to arise the patient will call the office  Alarm and return precautions discussed and accepted  Thank you for allowing us to participate in the care of your patient  History of Present Illness   Physician Requesting Consult: No att  providers found    HPI: Myron Galindo is a 78y o  year old female who presents for evaluation of hydronephrosis and BP management  She has a complicated medical history with previous interstitial cystitis diagnosed in 1993 by Dr Dorothea Serrano in [place]  She was treated unsuccessfully with urethral dilations  She had BSO in 1996 and hysterectomy  In 2021 she had bladder spasms and incontinence and has followed with Dr Damion Estes at [place] last visit 6/2021  She followed with local urologist in August as well  Renal flow scan was performed on 8/29 that showed left kidney 65 5% and right kidney 34 5% function  Right kidney had stasis without mechanical obstruction and excretion prior to diuretic worsened since prior exam  Does not currently follow with nephrology unclear if she had nephrology consulted on a previous occasion  Diagnosed with POTS/vasovagal syncope several years ago treated unsuccessfully with BB and fludrocortisone  Started on midodrine with improvement and dose has been cut back from 15mg TID to 2 5-5mg  She has not been routinely taking due to elevated BP trends since starting corlanor  Her cardiologist from [place] has been mainly managing her POTS   She brought in a list of BP trends which are very extensive and show a very wide range of BP, recently diastolic have been above 561 and systolic BP ranging from 323-439 in past several days  But again there are extensive records with a wide splay  Her cardiologist from [address] recommended IV infusions M,T,TH,F to concerns for poor absorption of fluid intake  Her GI tract became irritable  Initially started on 2 days of IVF fluid which advanced to 3 days of IVF 2 L over 6 hours  Now on 4 days a week of 1 5 L over 4 hours  Her cardiologist recommends it and her family physician prescribes it  Previously had PICC but now RIJ tunneled catheter without signs of infection at this time  Still experiencing bladder spasms and is due to follow with [facility] urologist this work for guidance  She reports stress recently as her parents are ill and attributes recent elevation in BP due to this and due to corlanor  She wants guidance on midodrine and corlanor management  She states there are not many autonomic specialists in the area and she is trying to find someone locally  She check vitals few times every day  Reports migraines and palpiations  Constitutional ROS- Denies fatigue, fever, chills, night sweats, lost 10 lb since June  HEENT ROS- +migraines   Endocrine ROS- No history diabetes mellitus, hypothyroidism   Cardiovascular ROS- tachycardia , palpitations   Pulmonary ROS-  Denies cough, hemoptysis, shortness of breath  GI ROS- Denies abdominal pain, diarrhea,swallowing problems, vomiting, , blood in stools, fecal incontinence  + nausea, constipation   Hematological ROS- Denies history of easy bruising, blood clots, bleeding or blood transfusions  Genitourinary ROS- flank pain, interstitial cystitis   Lymphatic ROS- Denies lymphadenopathy  Musculoskeletal ROS- Denies history of muscle weakness, joint pain  Dermatological ROS- Denies rash, wounds, ulcers, itching, jaundice  Psychiatric ROS- Denies hallucinations, disorientation    Neurological ROS- No stroke or TIA symptoms  Historical Information   Past Medical History:   Diagnosis Date    Allergic rhinitis     Anxiety     Asthma     Back pain     Cardiac disease     Cardiopathy     EF 45%    Cough     Diverticulitis     Factor V Leiden (HCC)     Fibromyalgia     GERD (gastroesophageal reflux disease)     Hashimoto's thyroiditis     Hx of degenerative disc disease     Hypotension     pots - postural orthostatic hypotension    Interstitial cystitis     Irregular heart beat     LBBB    Irritable bowel syndrome     Migraines     Mitral valve disease     "thickening"    Myocardial infarction [facility])     possible but not sure when    Neuropathy     bilateral legs    Osteoporosis     Postural orthostatic tachycardia syndrome     must drink a lot of water and salt    Pott's disease     Rheumatic fever 1967    Scoliosis     Sepsis ([address] )     associated with PICC line    Sjogren's syndrome ([facility] )     TMJ (dislocation of temporomandibular joint)      Past Surgical History:   Procedure Laterality Date    ABLATION MICROWAVE Left     lumbar area    BACK SURGERY  10/1998     SECTION  1992    CHOLECYSTECTOMY  2016    COLONOSCOPY      DILATION AND CURETTAGE OF UTERUS      EGD      FOOT SURGERY  1968    removal of bone and neuroma    HYSTERECTOMY  1997    age 54  PRICE ooph    IR OTHER  2022    IR PICC PLACEMENT SINGLE LUMEN  10/02/2020    IR PICC PLACEMENT SINGLE LUMEN  2021    IR PICC REPOSITION  2021    IR TUNNELED CENTRAL LINE PLACEMENT  2022    LAPAROSCOPY  1996    endometriosis    MOUTH BIOPSY      lip    OH EGD TRANSORAL BIOPSY SINGLE/MULTIPLE N/A 2019    Procedure: ESOPHAGOGASTRODUODENOSCOPY (EGD) with multiple bx and dilation;  Surgeon: Marilyn Mendoza MD;  Location: [address] [place] [address] GI LAB;   Service: Gastroenterology    OH SHLDR ARTHROSCOP,SURG,W/ROTAT CUFF REPR Right 2022    Procedure: SHOULDER ARTHROSCOPIC ROTATOR CUFF REPAIR Biceps Tenodisis;   Surgeon: Diann Martin MD;  [facility];  Service: Orthopedics    TUNNELED VENOUS CATHETER PLACEMENT  2016     Social History   Social History     Substance and Sexual Activity   Alcohol Use Never     Social History     Substance and Sexual Activity   Drug Use No     Social History     Tobacco Use   Smoking Status Never Smoker   Smokeless Tobacco Never Used     Family History   Problem Relation Age of Onset    Cancer Mother         urinary bladder     Thyroid cancer Sister     Colon cancer Maternal Grandfather     Breast cancer Maternal Aunt         over 48 yrs old     Endometrial cancer Maternal Aunt     Multiple myeloma Maternal Aunt     No Known Problems Father     No Known Problems Daughter     Heart attack Sister     No Known Problems Sister     No Known Problems Sister     No Known Problems Sister     No Known Problems Sister     No Known Problems Daughter     Hypertension Paternal Aunt        Meds/Allergies   all current active meds have been reviewed, current meds:     Current Outpatient Medications:     Alum Hydroxide-Mag Trisilicate (Gaviscon) 83-49 1 MG CHEW, Chew 1 tablet 4 (four) times a day as needed With meals and as needed, Disp: , Rfl:     azelastine (ASTELIN) 0 1 % nasal spray, 2 sprays into each nostril 2 (two) times a day Use in each nostril as directed, Disp: , Rfl:     beclomethasone (QVAR) 40 MCG/ACT inhaler, Inhale 1 puff daily as needed (only when unable to nebulize pulmicort) Rinse mouth after use , Disp: , Rfl:     budesonide (PULMICORT) 0 5 mg/2 mL nebulizer solution, Take 0 5 mg by nebulization 2 (two) times a day Rinse mouth after use , Disp: , Rfl:     cholecalciferol (VITAMIN D3) 1,000 units tablet, Take 1,000 Units by mouth daily , Disp: , Rfl:     cyclobenzaprine (FLEXERIL) 5 mg tablet, Take 1 tablet (5 mg total) by mouth 2 (two) times a day as needed for muscle spasms, Disp: 60 tablet, Rfl: 2    docusate sodium (COLACE) 100 mg capsule, Take 100 mg by mouth daily as needed for constipation, Disp: , Rfl:     famotidine (PEPCID) 40 MG tablet, Twice a day, Disp: , Rfl:     fexofenadine (ALLEGRA) 180 MG tablet, Take 180 mg by mouth 2 (two) times a day , Disp: , Rfl:     fluticasone (FLONASE) 50 mcg/act nasal spray, 2 sprays into each nostril daily , Disp: , Rfl:     Galcanezumab-gnlm (Emgality) 120 MG/ML SOAJ, Inject under the skin every 30 (thirty) days , Disp: , Rfl:     HYDROcodone-acetaminophen (Norco) 5-325 mg per tablet, Take 1 tablet by mouth 2 (two) times a day as needed for pain for up to 30 doses Do not fill until 8/19/2022 Max Daily Amount: 2 tablets, Disp: 60 tablet, Rfl: 0    HYDROcodone-acetaminophen (NORCO) 5-325 mg per tablet, Take 1 tablet by mouth 2 (two) times a day as needed for pain Max Daily Amount: 2 tablets, Disp: 60 tablet, Rfl: 0    hydrOXYzine (ATARAX) 10 mg/5 mL syrup, Take 20 mg by mouth daily at bedtime , Disp: , Rfl:     ipratropium (ATROVENT) 0 03 % nasal spray, 2 sprays into each nostril 4 (four) times a day as needed for rhinitis , Disp: , Rfl:     ipratropium (ATROVENT) 0 06 % nasal spray, PLEASE SEE ATTACHED FOR DETAILED DIRECTIONS, Disp: , Rfl:     ivabradine HCl (Corlanor) 5 MG tablet, 2 (two) times a day  , Disp: , Rfl:     levalbuterol (XOPENEX HFA) 45 mcg/act inhaler, Inhale 2 puffs every 4 (four) hours as needed (when unable to nebulize), Disp: , Rfl:     levalbuterol (XOPENEX) 1 25 mg/3 mL nebulizer solution, Take 1 25 mg by nebulization every 6 (six) hours as needed , Disp: , Rfl: 2    Magnesium 400 MG CAPS, Take 1 capsule by mouth 2 (two) times a day , Disp: , Rfl:     midodrine (PROAMATINE) 2 5 mg tablet, , Disp: , Rfl:     Multiple Vitamins-Iron TABS, Take by mouth, Disp: , Rfl:     omega-3-acid ethyl esters (LOVAZA) 1 g capsule, Take 2 g by mouth 2 (two) times a day 1600mg daily, Disp: , Rfl:     oxybutynin (DITROPAN) 5 mg tablet, Take 2 5 mg by mouth 2 (two) times a day Once in the morning and one at HS, Disp: , Rfl:     polyethylene glycol (MIRALAX) 17 g packet, Take 17 g by mouth daily as needed , Disp: , Rfl:     Probiotic Product (PROBIOTIC DAILY PO), Take 1 tablet by mouth daily At lunch, Disp: , Rfl:     psyllium (METAMUCIL) 0 52 g capsule, Take 0 52 g by mouth daily, Disp: , Rfl:     Qvar RediHaler 40 MCG/ACT inhaler, PLEASE SEE ATTACHED FOR DETAILED DIRECTIONS, Disp: , Rfl:     Thyroid, Porcine, POWD, Use 32 mg daily, Disp: , Rfl:     Ubrogepant (UBRELVY) 100 MG tablet, Take 100 mg by mouth Take 1 tablet (100 mg) one time as needed for migraine  May repeat one additional tablet (100 mg) at least two hours after the first dose  Do not use more than two doses per day, or for more than eight days per month , Disp: , Rfl:     famotidine (PEPCID) 10 mg tablet, Take 40 mg by mouth 2 (two) times a day   (Patient not taking: Reported on 8/31/2022), Disp: , Rfl:     midodrine (PROAMATINE) 5 mg tablet, TAKE 2 TABS PO IN AM, ONE TAB PO WITH LUNCH AND DINNER (Patient not taking: Reported on 8/31/2022), Disp: 120 tablet, Rfl: 11    MULTIPLE VITAMINS-CALCIUM PO, Take 1 capsule by mouth every morning , Disp: , Rfl:     omeprazole (PriLOSEC) 20 mg delayed release capsule, Take 20 mg by mouth 2 (two) times a day (Patient not taking: Reported on 8/31/2022), Disp: , Rfl:   No current facility-administered medications for this visit      Facility-Administered Medications Ordered in Other Visits:     sodium chloride 0 9 % bolus 1,500 mL, 1,500 mL, Intravenous, Once, Yanet Soria MD, Last Rate: 375 mL/hr at 09/02/22 1046, 1,500 mL at 09/02/22 1046    [START ON 9/6/2022] sodium chloride 0 9 % infusion, 375 mL/hr, Intravenous, Once, Yanet Soria MD     Allergies   Allergen Reactions    Imipramine Confusion, Fatigue, Irritability, Palpitations, Shortness Of Breath, Tachycardia and Visual Disturbance    Melatonin Shortness Of Breath    Mirtazapine Anxiety, Dizziness, GI Intolerance, Nausea Only, Palpitations and Shortness Of Breath    Nexium [Esomeprazole] Shortness Of Breath    Nsaids Shortness Of Breath    Singulair [Montelukast] Shortness Of Breath and Cough    Zomig [Zolmitriptan] Shortness Of Breath    Dexilant [Dexlansoprazole] Nausea Only and Vomiting    Albuterol     Ambien [Zolpidem]     Amitriptyline Drowsiness    Aspirin     Bactrim [Sulfamethoxazole-Trimethoprim] Hives    Banana - Food Allergy Dermatitis    Ceftin [Cefuroxime]     Celebrex [Celecoxib]     Ciprofloxacin     Cranberry-C [Ascorbate - Food Allergy] GI Intolerance    Demerol [Meperidine]     Epinephrine Dizziness    Ergotamine Nausea Only and Headache    Keflex [Cephalexin]     Klonopin [Clonazepam] Nausea Only and Dizziness    Latex Hives    Levaquin [Levofloxacin]     Lexapro [Escitalopram]     Lyrica [Pregabalin] Fatigue    Macrodantin [Nitrofurantoin]     Morphine Nausea Only    Movantik [Naloxegol] Nausea Only    Mushroom Extract Complex - Food Allergy      Eye itchy, asthma  attack    Naprosyn [Naproxen]     Neurontin [Gabapentin] Dizziness    Pineapple - Food Allergy GI Intolerance    Plecanatide Abdominal Pain, Diarrhea and GI Intolerance    Prolia [Denosumab]     Prozac [Fluoxetine]     Serevent [Salmeterol] Dizziness    Sudafed [Pseudoephedrine] Hives    Sulfa Antibiotics     Tomato - Food Allergy GI Intolerance    Trazodone     Ultram [Tramadol] Nausea Only, Dizziness and Headache    Vilazodone Hcl Abdominal Pain, Dizziness, GI Intolerance, Headache and Other (See Comments)    Vioxx [Rofecoxib]     Vortioxetine Drowsiness, Fatigue, GI Intolerance and Irritability    Zithromax [Azithromycin] Hives    Zoloft [Sertraline]     Zantac [Ranitidine] Rash and Dizziness       Objective     Vitals:    08/31/22 0806   BP: 148/82   Pulse: 76   SpO2: 99%       General Appearance:    No acute distress  Cooperative  Appears stated age  Head:    Normocephalic  Atraumatic  Eyes:    Lids, conjunctiva normal  No scleral icterus  Ears:    Normal external ears  Nose:   Nares normal  No drainage  Mouth:   Lips, tongue normal  Mucosa normal     Neck:   Supple  Symmetrical    Back:     Symmetric  No CVA tenderness  Lungs:     Normal respiratory effort  Clear to auscultation bilaterally  RIJ tunneled catheter c/d/i    Chest wall:    No tenderness or deformity  Heart:    Regular rate and rhythm  Normal S1 and S2  No murmur  No JVD  No edema  Abdomen:     Soft  Non-tender  Bowel sounds active  Genitourinary:   No Poole catheter present  Extremities:   Extremities normal  Atraumatic  No cyanosis  Skin:   Warm and dry  No pallor, jaundice, rash, ecchymoses  Neurologic:   Alert and oriented to person, place, time  No focal deficit  Current Weight: Weight - Scale: 55 8 kg (123 lb)    First Weight:    Wt Readings from Last 3 Encounters:   08/31/22 55 8 kg (123 lb)   08/25/22 54 9 kg (121 lb)   08/18/22 57 6 kg (127 lb)        Lab Results:  I have personally reviewed pertinent labs  8/18/22  Na 139 K 3 7 Cl 103 TCO2 28 BUN 19 Cr 1 18 eGFR 47 ml/min  Ca 9 5    Radiology review:  Procedure: NM kidney w flow and function w rx    Result Date: 8/30/2022  Narrative: RENAL SCAN WITH LASIX INDICATION:  N13 4: Hydroureter COMPARISON:   Comparison is made to the relevant components of CT of abdomen and pelvis dated 8/18/2022 as well as nuclear medicine renal scan performed 4/21/2021 TECHNIQUE: The study was obtained following the intravenous administration of 10 9 mCi Tc-99m MAG-3  Posterior, dynamic flow images were obtained at one minute intervals for thirty minutes  Static, posterior, pre and post void images were then obtained  After the intravenous administration of 40 mg Lasix, additional imaging was performed for 30 minutes  FINDINGS: PERFUSION:   Left kidney:  65 5% Right kidney:  34 5% There is prompt and symmetric normal flow to both kidneys  UPTAKE/EXCRETION: Time to 1/2 Peak: Left kidney:  0 05 minutes Right kidney:  0 6 minutes Time to Peak: Left kidney:  2 48 minutes Right kidney:  1 48 minutes Peak to 1/2 Peak: Left kidney:  9 minutes Right kidney:  16 minutes, previously 8 minutes There is normal cortical transit time involving both kidneys  There is asymmetric fullness to the right renal collecting system and right ureter  Split renal Function: Left kidney:  63% Right kidney:  37% Post Lasix Clearance: Diuretic T 1/2 Time: Left kidney:  3 minutes Right kidney:  3 minutes There is minimal residual tracer activity within left renal collecting system prior to Lasix administration  There is excellent response to diuresis involving the right kidney  Impression: 1  Split function of 63% on the left and 37% on the right  2   Left kidney: Normal renogram 3  Right kidney: Findings consistent with stasis without mechanical obstruction  Excretion prior to diuretic administration has worsened since the prior exam  Workstation performed: [id number]           Counseling / Coordination of Care  Greater than 50% of total time was spent with the patient and / or family counseling and / or coordination of care  52 min     DO Jd      This consultation note was produced in part using a dictation device which may document imprecise wording from author's original intent

## 2022-08-31 NOTE — PATIENT INSTRUCTIONS
Reviewed CKD stages, eGFR and Cr trends  No changes made to your medications  Follow up in 3 months

## 2022-09-01 ENCOUNTER — HOSPITAL ENCOUNTER (OUTPATIENT)
Dept: INFUSION CENTER | Facility: HOSPITAL | Age: 67
End: 2022-09-01
Payer: MEDICARE

## 2022-09-01 ENCOUNTER — OFFICE VISIT (OUTPATIENT)
Dept: PHYSICAL THERAPY | Facility: CLINIC | Age: 67
End: 2022-09-01
Payer: MEDICARE

## 2022-09-01 VITALS
DIASTOLIC BLOOD PRESSURE: 80 MMHG | RESPIRATION RATE: 18 BRPM | OXYGEN SATURATION: 95 % | SYSTOLIC BLOOD PRESSURE: 155 MMHG | HEART RATE: 84 BPM | TEMPERATURE: 97.8 F

## 2022-09-01 DIAGNOSIS — S46.811D TRAUMATIC TEAR OF SUPRASPINATUS TENDON OF RIGHT SHOULDER, SUBSEQUENT ENCOUNTER: Primary | ICD-10-CM

## 2022-09-01 PROCEDURE — 96361 HYDRATE IV INFUSION ADD-ON: CPT

## 2022-09-01 PROCEDURE — 96360 HYDRATION IV INFUSION INIT: CPT

## 2022-09-01 PROCEDURE — 97140 MANUAL THERAPY 1/> REGIONS: CPT

## 2022-09-01 PROCEDURE — 97110 THERAPEUTIC EXERCISES: CPT

## 2022-09-01 RX ORDER — SODIUM CHLORIDE 9 MG/ML
375 INJECTION, SOLUTION INTRAVENOUS ONCE
Status: COMPLETED | OUTPATIENT
Start: 2022-09-06 | End: 2022-09-06

## 2022-09-01 RX ADMIN — SODIUM CHLORIDE 1500 ML: 0.9 INJECTION, SOLUTION INTRAVENOUS at 08:13

## 2022-09-01 NOTE — PROGRESS NOTES
Daily Note     Today's date: 2022  Patient name: Loren Momin  : [id number]  MRN: [id number]  Referring provider: Ginny Hammonds  Dx:   Encounter Diagnosis     ICD-10-CM    1  Traumatic tear of supraspinatus tendon of right shoulder, subsequent encounter  [id number]                   Subjective:  Pt  reports B UT Mm tightness/soreness today  Objective: See treatment diary below      Assessment: Tolerated treatment Fair+ to Fairly Well overall with performance of ther exer and tolerance to MT  Received MHP applications Well  Plan:  Con't services 2x/week          Precautions: POTS, severe scoliosis,fibromyalgia,GI issues,lumbar spinal cage, cerv DDD, arachnoiditis  Arachnoid cysts - no lifting more than 5#   DOS: 22; (Week 15): 22      Date 8  22 9 1 22 8 15    Visit Count 38  39 35 36   37   FOTO        Pain In LB 7-8/10  cerv 6-7/10  R shld 2/10 "tight/sore"   B UT 4/10 w/o meds  0/10    Pain Out Better after MHP/CP applic Less sx after MHP applic    with/after CP applic              Manuals 8 30 22 9 1 22 8 15    PROM R SHLD PROM in all directions to end range 10'  To tolerance  PROM in all directions to end range 10'  To tolerance PROM in all directions to end range 10'  To tolerance Held today PROM in all directions to end range 15'   Mass RUT        Supine gentle GH post/inf for pain                Neuro Re-Ed 8 30 22 9 1 22 8 15     scap retraction HEP     review                   Ther Ex 8 30 22 9 1 22 8 15 22     Supine serratus punches  HEP    review   Elbow ROM *Hold   Bicep curls as below Bicep curls as below Bicep curls as below Bicep curls as below    strengthening          cerv AROM        Pendulum ex        Shoulder isometrics (5 way)  HEP    Reviewed HEP Reviewed HEP   Supine wand exercises (flex, abd, ER) HEP   Reviewed HEP Reviewed HEP           Table slides  Flex, arc HEP   Reviewed HEP    Wand ex  Flex, ER, EXT, IR Abd 25x 25x ea 20x ea 20x ea  shld height 20x ea   Wall slides 10x10"  Dbl UE  Short range for flex 10 x 10"     Dbl UE  Short range for flex 10 x 10"     Dbl UE   hold 10x10"   Pulleys  3 way 4 min flex/scap    5 min ea   flex/scap  4 min ea   flex/scap hold Flex/scap 3'ea   UBE        MTPs/LTPs grn 2x15/3-5 Green 2 x 15 3-5" Green 2 x 15 grn 2x10/3-5 cueing grn 3x10/3-5 cueing   Bicep curls  3 way *held 3#  20 ea dir 3#  30x ea dir 3#  30 ea dir 3#  30 ea   Prone flex, HA, rows, ext Hold yet    Pt def  Resume upcoming as able    Side lying ER & ABD ER 2# 4x10/3-5"  ABD 0# 3x10/3-5" resume ER 2# 3x10/3-5"    ABD 0# 3x15/3-5 ER 2# 2x10/3-5"    ABD 0# 2x10/3-5" ER 2# 3x10/3-5"    ABD 1# 2x10/3-5"   FWD/Scap Raises 1#  20x ea   0#  30x ea 0#  20x ea   0#  20x ea   shld height   1#  20x ea                     Ther Activity                        Gait Training                        Modalities 8 30 22 9 1 22 8 15 22     CP to R shld Cerv 10' N/A 10 min     MHP To LB and R Bicep 10 min To LB, cerv,  and R Bicep 10 min

## 2022-09-02 ENCOUNTER — HOSPITAL ENCOUNTER (OUTPATIENT)
Dept: INFUSION CENTER | Facility: HOSPITAL | Age: 67
End: 2022-09-02
Payer: MEDICARE

## 2022-09-02 VITALS
SYSTOLIC BLOOD PRESSURE: 165 MMHG | DIASTOLIC BLOOD PRESSURE: 74 MMHG | RESPIRATION RATE: 16 BRPM | TEMPERATURE: 97.9 F | HEART RATE: 88 BPM

## 2022-09-02 PROBLEM — N17.9 AKI (ACUTE KIDNEY INJURY) (HCC): Status: ACTIVE | Noted: 2022-09-02

## 2022-09-02 PROBLEM — N18.31 STAGE 3A CHRONIC KIDNEY DISEASE (HCC): Status: ACTIVE | Noted: 2022-09-02

## 2022-09-02 PROBLEM — R32 URINARY INCONTINENCE: Status: ACTIVE | Noted: 2022-09-02

## 2022-09-02 PROCEDURE — 96360 HYDRATION IV INFUSION INIT: CPT

## 2022-09-02 PROCEDURE — 96361 HYDRATE IV INFUSION ADD-ON: CPT

## 2022-09-02 RX ORDER — SODIUM CHLORIDE 9 MG/ML
375 INJECTION, SOLUTION INTRAVENOUS ONCE
Status: COMPLETED | OUTPATIENT
Start: 2022-09-07 | End: 2022-09-07

## 2022-09-02 RX ADMIN — SODIUM CHLORIDE 1500 ML: 9 INJECTION, SOLUTION INTRAVENOUS at 10:46

## 2022-09-02 NOTE — PROGRESS NOTES
PICC line dressing done per orders  Patient has a chlorhexidine allergy  Alcohol swabsticks used to cleanse PICC site  Transparent dressing used  Patient tolerated IV hydration without issues  AVS declined  Patient ambulated off unit without incident  All personal belongings taken with patient

## 2022-09-06 ENCOUNTER — HOSPITAL ENCOUNTER (OUTPATIENT)
Dept: RADIOLOGY | Facility: HOSPITAL | Age: 67
Discharge: HOME | End: 2022-09-06
Payer: MEDICARE

## 2022-09-06 ENCOUNTER — HOSPITAL ENCOUNTER (OUTPATIENT)
Dept: INFUSION CENTER | Facility: HOSPITAL | Age: 67
Discharge: HOME | End: 2022-09-06
Payer: MEDICARE

## 2022-09-06 VITALS
RESPIRATION RATE: 18 BRPM | HEART RATE: 76 BPM | TEMPERATURE: 97.2 F | SYSTOLIC BLOOD PRESSURE: 178 MMHG | OXYGEN SATURATION: 98 % | DIASTOLIC BLOOD PRESSURE: 91 MMHG

## 2022-09-06 VITALS
SYSTOLIC BLOOD PRESSURE: 138 MMHG | RESPIRATION RATE: 16 BRPM | HEART RATE: 71 BPM | DIASTOLIC BLOOD PRESSURE: 80 MMHG | TEMPERATURE: 98 F | OXYGEN SATURATION: 100 %

## 2022-09-06 DIAGNOSIS — G89.4 CHRONIC PAIN DISORDER: ICD-10-CM

## 2022-09-06 DIAGNOSIS — M54.12 CERVICAL RADICULOPATHY: ICD-10-CM

## 2022-09-06 DIAGNOSIS — M54.2 NECK PAIN: ICD-10-CM

## 2022-09-06 PROCEDURE — 96360 HYDRATION IV INFUSION INIT: CPT

## 2022-09-06 PROCEDURE — 96361 HYDRATE IV INFUSION ADD-ON: CPT

## 2022-09-06 PROCEDURE — 62321 NJX INTERLAMINAR CRV/THRC: CPT | Performed by: ANESTHESIOLOGY

## 2022-09-06 RX ORDER — PAPAVERINE HCL 150 MG
15 CAPSULE, EXTENDED RELEASE ORAL ONCE
Status: COMPLETED | OUTPATIENT
Start: 2022-09-06 | End: 2022-09-06

## 2022-09-06 RX ORDER — LIDOCAINE HYDROCHLORIDE 10 MG/ML
1 INJECTION, SOLUTION EPIDURAL; INFILTRATION; INTRACAUDAL; PERINEURAL ONCE
Status: COMPLETED | OUTPATIENT
Start: 2022-09-06 | End: 2022-09-06

## 2022-09-06 RX ADMIN — IOHEXOL 0.5 ML: 300 INJECTION, SOLUTION INTRAVENOUS at 13:44

## 2022-09-06 RX ADMIN — LIDOCAINE HYDROCHLORIDE 1 ML: 10 INJECTION, SOLUTION EPIDURAL; INFILTRATION; INTRACAUDAL; PERINEURAL at 13:42

## 2022-09-06 RX ADMIN — SODIUM CHLORIDE 375 ML/HR: 0.9 INJECTION, SOLUTION INTRAVENOUS at 08:07

## 2022-09-06 RX ADMIN — Medication 15 MG: at 13:45

## 2022-09-06 NOTE — DISCHARGE INSTR - LAB
Epidural Steroid Injection   WHAT YOU NEED TO KNOW:   An epidural steroid injection (ABBEY) is a procedure to inject steroid medicine into the epidural space  The epidural space is between your spinal cord and vertebrae  Steroids reduce inflammation and fluid buildup in your spine that may be causing pain  You may be given pain medicine along with the steroids  ACTIVITY  Do not drive or operate machinery today  No strenuous activity today - bending, lifting, etc   You may resume normal activites starting tomorrow - start slowly and as tolerated  You may shower today, but no tub baths or hot tubs  You may have numbness for several hours from the local anesthetic  Please use caution and common sense, especially with weight-bearing activities  CARE OF THE INJECTION SITE  If you have soreness or pain, apply ice to the area today (20 minutes on/20 minutes off)  Starting tomorrow, you may use warm, moist heat or ice if needed  You may have an increase or change in your discomfort for 36-48 hours after your treatment  Apply ice and continue with any pain medication you have been prescribed  Notify [facility] if you have any of the following: redness, drainage, swelling, headache, stiff neck or fever above 100°F     SPECIAL INSTRUCTIONS  Our office will contact you in approximately 7 days for a progress report  MEDICATIONS  Continue to take all routine medications  Our office may have instructed you to hold some medications  As no general anesthesia was used in today's procedure, you should not experience any side effects related to anesthesia  If you are diabetic, the steroids used in today's injection may temporarily increase your blood sugar levels after the first few days after your injection  Please keep a close eye on your sugars and alert the doctor who manages your diabetes if your sugars are significantly high from your baseline or you are symptomatic       If you have a problem specifically related to your procedure, please call our office at [phone]  Problems not related to your procedure should be directed to your primary care physician

## 2022-09-06 NOTE — H&P
Assessment:  1  Chronic pain disorder  FL spine and pain procedure    FL spine and pain procedure   2  Neck pain  FL spine and pain procedure    FL spine and pain procedure   3  Cervical radiculopathy  FL spine and pain procedure    FL spine and pain procedure       Plan:  Frances Campoverde is a 78 y o  female with complaints of neck and arm pain presents to surgical center for procedure  We will perform a C7-T1 interlaminar epidural steroid injection  2  Follow-up 1 month after injection    Complete risks and benefits including bleeding, infection, tissue reaction, nerve injury and allergic reaction were discussed  The approach was demonstrated using models and literature was provided  Verbal and written consent was obtained  My impressions and treatment recommendations were discussed in detail with the patient who verbalized understanding and had no further questions  Discharge instructions were provided  I personally saw and examined the patient and I agree with the above discussed plan of care  Orders Placed This Encounter   Procedures    FL spine and pain procedure     Standing Status:   Standing     Number of Occurrences:   1     Order Specific Question:   Reason for Exam:     Answer:   C7-T1 IESI     Order Specific Question:   Anticoagulant hold needed? Answer:   no     No orders of the defined types were placed in this encounter  History of Present Illness: Frances Campoverde is a 78 y o  female who presents for a follow up office visit in regards to neck and arm pain  The patients current symptoms include a 8/10 constant sharp, stabbing, throbbing pain without any particular time pattern  I have personally reviewed and/or updated the patient's past medical history, past surgical history, family history, social history, current medications, allergies, and vital signs today  Review of Systems   Musculoskeletal: Positive for neck pain and neck stiffness     All other systems reviewed and are negative        Patient Active Problem List   Diagnosis    LBBB (left bundle branch block)    Fibromyalgia    Postural orthostatic tachycardia syndrome    Sleep-related breathing disorder    Other insomnia    Restless leg syndrome    Bruxism    Anxiety and depression    Chronic rhinitis    Moderate persistent asthma    Gastroesophageal reflux disease without esophagitis    Chronic pain disorder    Factor V Leiden mutation ([facility])    Hypothyroidism    Cough    Weakness    Traumatic tear of supraspinatus tendon of right shoulder    Aftercare following surgery of the musculoskeletal system    Sicca syndrome ([facility] )    History of lumbar surgery    Generalized pain    Myofascial pain syndrome    Arachnoiditis    Neck pain    Cervical radiculopathy    Chronic bilateral low back pain with bilateral sciatica    Lumbar radiculopathy    Encounter for long-term opiate analgesic use    Uncomplicated opioid dependence ([facility] )    Cardiomyopathy, unspecified type ([place] [address] )    Stage 3a chronic kidney disease ([facility] )    SHANA (acute kidney injury) ([place] [address] )    Urinary incontinence       Past Medical History:   Diagnosis Date    Allergic rhinitis     Anxiety     Asthma     Back pain     Cardiac disease     Cardiopathy     EF 45%    Cough     Diverticulitis     Factor V Leiden ([facility] )     Fibromyalgia     GERD (gastroesophageal reflux disease)     Hashimoto's thyroiditis     Hx of degenerative disc disease     Hypotension     pots - postural orthostatic hypotension    Interstitial cystitis     Irregular heart beat     LBBB    Irritable bowel syndrome     Migraines     Mitral valve disease     "thickening"    Myocardial infarction [facility])     possible but not sure when    Neuropathy     bilateral legs    Osteoporosis     Postural orthostatic tachycardia syndrome     must drink a lot of water and salt    Pott's disease     Rheumatic fever 1967    Scoliosis     Sepsis ([facility] ) 2021    associated with PICC line    Sjogren's syndrome ([facility] )     TMJ (dislocation of temporomandibular joint)        Past Surgical History:   Procedure Laterality Date    ABLATION MICROWAVE Left     lumbar area    BACK SURGERY  10/1998     SECTION  1992    CHOLECYSTECTOMY  2016    COLONOSCOPY  2016    DILATION AND CURETTAGE OF UTERUS      EGD      FOOT SURGERY  1968    removal of bone and neuroma    HYSTERECTOMY  1997    age 54  PRICE ooph    IR OTHER  2022    IR PICC PLACEMENT SINGLE LUMEN  10/02/2020    IR PICC PLACEMENT SINGLE LUMEN  2021    IR PICC REPOSITION  2021    IR TUNNELED CENTRAL LINE PLACEMENT  2022    LAPAROSCOPY  1996    endometriosis    MOUTH BIOPSY      lip    DE EGD TRANSORAL BIOPSY SINGLE/MULTIPLE N/A 2019    Procedure: ESOPHAGOGASTRODUODENOSCOPY (EGD) with multiple bx and dilation;  Surgeon: Ame Dickerson MD;  Location: [facility] GI LAB; Service: Gastroenterology    DE SHLDR ARTHROSCOP,SURG,W/ROTAT CUFF REPR Right 2022    Procedure: SHOULDER ARTHROSCOPIC ROTATOR CUFF REPAIR Biceps Tenodisis;   Surgeon: Sil Palacio MD;  [facility]: [facility] MA[facility];  Service: Orthopedics    TUNNELED VENOUS CATHETER PLACEMENT         Family History   Problem Relation Age of Onset    Cancer Mother         urinary bladder     Thyroid cancer Sister     Colon cancer Maternal Grandfather     Breast cancer Maternal Aunt         over 48 yrs old     Endometrial cancer Maternal Aunt     Multiple myeloma Maternal Aunt     No Known Problems Father     No Known Problems Daughter     Heart attack Sister     No Known Problems Sister     No Known Problems Sister     No Known Problems Sister     No Known Problems Sister     No Known Problems Daughter     Hypertension Paternal Aunt        Social History     Occupational History    Not on file   Tobacco Use    Smoking status: Never Smoker    Smokeless tobacco: Never Used   Vaping Use    Vaping Use: Never used   Substance and Sexual Activity    Alcohol use: Never    Drug use: No    Sexual activity: Not on file       Current Outpatient Medications on File Prior to Encounter   Medication Sig    Alum Hydroxide-Mag Trisilicate (Gaviscon) 18-65 0 MG CHEW Chew 1 tablet 4 (four) times a day as needed With meals and as needed    azelastine (ASTELIN) 0 1 % nasal spray 2 sprays into each nostril 2 (two) times a day Use in each nostril as directed    beclomethasone (QVAR) 40 MCG/ACT inhaler Inhale 1 puff daily as needed (only when unable to nebulize pulmicort) Rinse mouth after use  budesonide (PULMICORT) 0 5 mg/2 mL nebulizer solution Take 0 5 mg by nebulization 2 (two) times a day Rinse mouth after use      cholecalciferol (VITAMIN D3) 1,000 units tablet Take 1,000 Units by mouth daily     cyclobenzaprine (FLEXERIL) 5 mg tablet Take 1 tablet (5 mg total) by mouth 2 (two) times a day as needed for muscle spasms    docusate sodium (COLACE) 100 mg capsule Take 100 mg by mouth daily as needed for constipation    famotidine (PEPCID) 10 mg tablet Take 40 mg by mouth 2 (two) times a day   (Patient not taking: Reported on 8/31/2022)    famotidine (PEPCID) 40 MG tablet Twice a day    fexofenadine (ALLEGRA) 180 MG tablet Take 180 mg by mouth 2 (two) times a day     fluticasone (FLONASE) 50 mcg/act nasal spray 2 sprays into each nostril daily     Galcanezumab-gnlm (Emgality) 120 MG/ML SOAJ Inject under the skin every 30 (thirty) days     HYDROcodone-acetaminophen (Norco) 5-325 mg per tablet Take 1 tablet by mouth 2 (two) times a day as needed for pain for up to 30 doses Do not fill until 8/19/2022 Max Daily Amount: 2 tablets    HYDROcodone-acetaminophen (NORCO) 5-325 mg per tablet Take 1 tablet by mouth 2 (two) times a day as needed for pain Max Daily Amount: 2 tablets    hydrOXYzine (ATARAX) 10 mg/5 mL syrup Take 20 mg by mouth daily at bedtime     ipratropium (ATROVENT) 0 03 % nasal spray 2 sprays into each nostril 4 (four) times a day as needed for rhinitis     ipratropium (ATROVENT) 0 06 % nasal spray PLEASE SEE ATTACHED FOR DETAILED DIRECTIONS    ivabradine HCl (Corlanor) 5 MG tablet 2 (two) times a day      levalbuterol (XOPENEX HFA) 45 mcg/act inhaler Inhale 2 puffs every 4 (four) hours as needed (when unable to nebulize)    levalbuterol (XOPENEX) 1 25 mg/3 mL nebulizer solution Take 1 25 mg by nebulization every 6 (six) hours as needed     Magnesium 400 MG CAPS Take 1 capsule by mouth 2 (two) times a day     midodrine (PROAMATINE) 2 5 mg tablet     midodrine (PROAMATINE) 5 mg tablet TAKE 2 TABS PO IN AM, ONE TAB PO WITH LUNCH AND DINNER (Patient not taking: Reported on 8/31/2022)    MULTIPLE VITAMINS-CALCIUM PO Take 1 capsule by mouth every morning     Multiple Vitamins-Iron TABS Take by mouth    omega-3-acid ethyl esters (LOVAZA) 1 g capsule Take 2 g by mouth 2 (two) times a day 1600mg daily    omeprazole (PriLOSEC) 20 mg delayed release capsule Take 20 mg by mouth 2 (two) times a day (Patient not taking: Reported on 8/31/2022)    oxybutynin (DITROPAN) 5 mg tablet Take 2 5 mg by mouth 2 (two) times a day Once in the morning and one at HS    polyethylene glycol (MIRALAX) 17 g packet Take 17 g by mouth daily as needed     Probiotic Product (PROBIOTIC DAILY PO) Take 1 tablet by mouth daily At lunch    psyllium (METAMUCIL) 0 52 g capsule Take 0 52 g by mouth daily    Qvar RediHaler 40 MCG/ACT inhaler PLEASE SEE ATTACHED FOR DETAILED DIRECTIONS    Thyroid, Porcine, POWD Use 32 mg daily    Ubrogepant (UBRELVY) 100 MG tablet Take 100 mg by mouth Take 1 tablet (100 mg) one time as needed for migraine  May repeat one additional tablet (100 mg) at least two hours after the first dose  Do not use more than two doses per day, or for more than eight days per month  No current facility-administered medications on file prior to encounter         Allergies   Allergen Reactions    Imipramine Confusion, Fatigue, Irritability, Palpitations, Shortness Of Breath, Tachycardia and Visual Disturbance    Melatonin Shortness Of Breath Mirtazapine Anxiety, Dizziness, GI Intolerance, Nausea Only, Palpitations and Shortness Of Breath    Nexium [Esomeprazole] Shortness Of Breath    Nsaids Shortness Of Breath    Singulair [Montelukast] Shortness Of Breath and Cough    Zomig [Zolmitriptan] Shortness Of Breath    Dexilant [Dexlansoprazole] Nausea Only and Vomiting    Albuterol     Ambien [Zolpidem]     Amitriptyline Drowsiness    Aspirin     Bactrim [Sulfamethoxazole-Trimethoprim] Hives    Banana - Food Allergy Dermatitis    Ceftin [Cefuroxime]     Celebrex [Celecoxib]     Ciprofloxacin     Cranberry-C [Ascorbate - Food Allergy] GI Intolerance    Demerol [Meperidine]     Epinephrine Dizziness    Ergotamine Nausea Only and Headache    Keflex [Cephalexin]     Klonopin [Clonazepam] Nausea Only and Dizziness    Latex Hives    Levaquin [Levofloxacin]     Lexapro [Escitalopram]     Lyrica [Pregabalin] Fatigue    Macrodantin [Nitrofurantoin]     Morphine Nausea Only    Movantik [Naloxegol] Nausea Only    Mushroom Extract Complex - Food Allergy      Eye itchy, asthma  attack    Naprosyn [Naproxen]     Neurontin [Gabapentin] Dizziness    Pineapple - Food Allergy GI Intolerance    Plecanatide Abdominal Pain, Diarrhea and GI Intolerance    Prolia [Denosumab]     Prozac [Fluoxetine]     Serevent [Salmeterol] Dizziness    Sudafed [Pseudoephedrine] Hives    Sulfa Antibiotics     Tomato - Food Allergy GI Intolerance    Trazodone     Ultram [Tramadol] Nausea Only, Dizziness and Headache    Vilazodone Hcl Abdominal Pain, Dizziness, GI Intolerance, Headache and Other (See Comments)    Vioxx [Rofecoxib]     Vortioxetine Drowsiness, Fatigue, GI Intolerance and Irritability    Zithromax [Azithromycin] Hives    Zoloft [Sertraline]     Zantac [Ranitidine] Rash and Dizziness       Physical Exam:    BP (!) 178/91 (BP Location: Left arm)   Pulse 76   Temp (!) 97 2 °F (36 2 °C) (Temporal)   Resp 18   SpO2 98%     Constitutional:normal, well developed, well nourished, alert, in no distress and non-toxic and no overt pain behavior    Eyes:anicteric  HEENT:grossly intact  Neck:supple, symmetric, trachea midline and no masses   Pulmonary:even and unlabored  Cardiovascular:No edema or pitting edema present  Skin:Normal without rashes or lesions and well hydrated  Psychiatric:Mood and affect appropriate  Neurologic:Cranial Nerves II-XII grossly intact  Musculoskeletal:normal

## 2022-09-07 ENCOUNTER — HOSPITAL ENCOUNTER (OUTPATIENT)
Dept: INFUSION CENTER | Facility: HOSPITAL | Age: 67
Discharge: HOME | End: 2022-09-07
Payer: MEDICARE

## 2022-09-07 VITALS
DIASTOLIC BLOOD PRESSURE: 73 MMHG | HEART RATE: 76 BPM | SYSTOLIC BLOOD PRESSURE: 128 MMHG | TEMPERATURE: 97.8 F | RESPIRATION RATE: 18 BRPM | OXYGEN SATURATION: 96 %

## 2022-09-07 PROCEDURE — 96360 HYDRATION IV INFUSION INIT: CPT

## 2022-09-07 PROCEDURE — 96361 HYDRATE IV INFUSION ADD-ON: CPT

## 2022-09-07 RX ORDER — SODIUM CHLORIDE 9 MG/ML
375 INJECTION, SOLUTION INTRAVENOUS ONCE
Status: COMPLETED | OUTPATIENT
Start: 2022-09-08 | End: 2022-09-08

## 2022-09-07 RX ORDER — SODIUM CHLORIDE 9 MG/ML
375 INJECTION, SOLUTION INTRAVENOUS ONCE
Status: COMPLETED | OUTPATIENT
Start: 2022-09-09 | End: 2022-09-09

## 2022-09-07 RX ADMIN — SODIUM CHLORIDE 375 ML/HR: 0.9 INJECTION, SOLUTION INTRAVENOUS at 08:10

## 2022-09-07 NOTE — PROGRESS NOTES
Pt tolerated todays hydration well  No complaints offered other than shoulder and neck pain which is chronic  Pt did have an epidural block done yesterday  PICC line dressing is dry and intact  Due for change Friday   Discharged ambulatory deferring avs

## 2022-09-08 ENCOUNTER — HOSPITAL ENCOUNTER (OUTPATIENT)
Dept: INFUSION CENTER | Facility: HOSPITAL | Age: 67
End: 2022-09-08
Payer: MEDICARE

## 2022-09-08 VITALS
DIASTOLIC BLOOD PRESSURE: 84 MMHG | TEMPERATURE: 98.2 F | RESPIRATION RATE: 18 BRPM | SYSTOLIC BLOOD PRESSURE: 149 MMHG | HEART RATE: 71 BPM | OXYGEN SATURATION: 99 %

## 2022-09-08 PROCEDURE — 96360 HYDRATION IV INFUSION INIT: CPT

## 2022-09-08 PROCEDURE — 96361 HYDRATE IV INFUSION ADD-ON: CPT

## 2022-09-08 RX ORDER — SODIUM CHLORIDE 9 MG/ML
375 INJECTION, SOLUTION INTRAVENOUS ONCE
Status: COMPLETED | OUTPATIENT
Start: 2022-09-12 | End: 2022-09-12

## 2022-09-08 RX ADMIN — SODIUM CHLORIDE 375 ML/HR: 9 INJECTION, SOLUTION INTRAVENOUS at 09:18

## 2022-09-09 ENCOUNTER — HOSPITAL ENCOUNTER (OUTPATIENT)
Dept: INFUSION CENTER | Facility: HOSPITAL | Age: 67
End: 2022-09-09
Payer: MEDICARE

## 2022-09-09 VITALS
HEART RATE: 73 BPM | RESPIRATION RATE: 18 BRPM | TEMPERATURE: 97.4 F | DIASTOLIC BLOOD PRESSURE: 83 MMHG | OXYGEN SATURATION: 100 % | SYSTOLIC BLOOD PRESSURE: 166 MMHG

## 2022-09-09 PROCEDURE — 96361 HYDRATE IV INFUSION ADD-ON: CPT

## 2022-09-09 PROCEDURE — 96360 HYDRATION IV INFUSION INIT: CPT

## 2022-09-09 RX ADMIN — SODIUM CHLORIDE 375 ML/HR: 0.9 INJECTION, SOLUTION INTRAVENOUS at 09:31

## 2022-09-12 ENCOUNTER — HOSPITAL ENCOUNTER (OUTPATIENT)
Dept: INFUSION CENTER | Facility: HOSPITAL | Age: 67
Discharge: HOME | End: 2022-09-12
Payer: MEDICARE

## 2022-09-12 VITALS
TEMPERATURE: 97.6 F | HEART RATE: 84 BPM | RESPIRATION RATE: 18 BRPM | SYSTOLIC BLOOD PRESSURE: 137 MMHG | DIASTOLIC BLOOD PRESSURE: 70 MMHG

## 2022-09-12 PROCEDURE — 96361 HYDRATE IV INFUSION ADD-ON: CPT

## 2022-09-12 PROCEDURE — 96360 HYDRATION IV INFUSION INIT: CPT

## 2022-09-12 RX ORDER — SODIUM CHLORIDE 9 MG/ML
375 INJECTION, SOLUTION INTRAVENOUS ONCE
Status: COMPLETED | OUTPATIENT
Start: 2022-09-13 | End: 2022-09-13

## 2022-09-12 RX ADMIN — SODIUM CHLORIDE 375 ML/HR: 0.9 INJECTION, SOLUTION INTRAVENOUS at 08:02

## 2022-09-12 NOTE — PROGRESS NOTES
Pt offers no complaints  Tolerated IV hydration well without incident  Discharged in stable condition and pt aware of next infusion appointment

## 2022-09-13 ENCOUNTER — HOSPITAL ENCOUNTER (OUTPATIENT)
Dept: INFUSION CENTER | Facility: HOSPITAL | Age: 67
Discharge: HOME | End: 2022-09-13
Payer: MEDICARE

## 2022-09-13 VITALS
RESPIRATION RATE: 18 BRPM | DIASTOLIC BLOOD PRESSURE: 92 MMHG | TEMPERATURE: 98.3 F | HEART RATE: 72 BPM | SYSTOLIC BLOOD PRESSURE: 151 MMHG

## 2022-09-13 PROCEDURE — 96361 HYDRATE IV INFUSION ADD-ON: CPT

## 2022-09-13 PROCEDURE — 96360 HYDRATION IV INFUSION INIT: CPT

## 2022-09-13 RX ADMIN — SODIUM CHLORIDE 375 ML/HR: 0.9 INJECTION, SOLUTION INTRAVENOUS at 08:11

## 2022-09-14 ENCOUNTER — HOSPITAL ENCOUNTER (OUTPATIENT)
Dept: NON INVASIVE DIAGNOSTICS | Facility: HOSPITAL | Age: 67
Discharge: HOME | End: 2022-09-14
Attending: INTERNAL MEDICINE
Payer: MEDICARE

## 2022-09-14 ENCOUNTER — APPOINTMENT (OUTPATIENT)
Dept: LAB | Facility: HOSPITAL | Age: 67
End: 2022-09-14
Attending: INTERNAL MEDICINE
Payer: MEDICARE

## 2022-09-14 DIAGNOSIS — N18.31 STAGE 3A CHRONIC KIDNEY DISEASE (HCC): ICD-10-CM

## 2022-09-14 LAB
ALBUMIN SERPL BCP-MCNC: 4.6 G/DL (ref 3.5–5)
ALP SERPL-CCNC: 107 U/L (ref 34–104)
ALT SERPL W P-5'-P-CCNC: 32 U/L (ref 7–52)
ANION GAP SERPL CALCULATED.3IONS-SCNC: 6 MMOL/L (ref 4–13)
AST SERPL W P-5'-P-CCNC: 23 U/L (ref 13–39)
BACTERIA UR QL AUTO: ABNORMAL /HPF
BILIRUB SERPL-MCNC: 0.66 MG/DL (ref 0.2–1)
BILIRUB UR QL STRIP: NEGATIVE
BUN SERPL-MCNC: 12 MG/DL (ref 5–25)
CALCIUM SERPL-MCNC: 10 MG/DL (ref 8.4–10.2)
CHLORIDE SERPL-SCNC: 102 MMOL/L (ref 96–108)
CLARITY UR: CLEAR
CO2 SERPL-SCNC: 30 MMOL/L (ref 21–32)
COLOR UR: YELLOW
CREAT SERPL-MCNC: 0.96 MG/DL (ref 0.6–1.3)
CREAT UR-MCNC: 54.6 MG/DL
CREAT UR-MCNC: 54.6 MG/DL
GFR SERPL CREATININE-BSD FRML MDRD: 61 ML/MIN/1.73SQ M
GLUCOSE P FAST SERPL-MCNC: 89 MG/DL (ref 65–99)
GLUCOSE UR STRIP-MCNC: NEGATIVE MG/DL
HGB UR QL STRIP.AUTO: NEGATIVE
KETONES UR STRIP-MCNC: NEGATIVE MG/DL
LEUKOCYTE ESTERASE UR QL STRIP: NEGATIVE
MAGNESIUM SERPL-MCNC: 2.2 MG/DL (ref 1.9–2.7)
MICROALBUMIN UR-MCNC: <5 MG/L (ref 0–20)
MICROALBUMIN/CREAT 24H UR: <9 MG/G CREATININE (ref 0–30)
NITRITE UR QL STRIP: NEGATIVE
NON-SQ EPI CELLS URNS QL MICRO: ABNORMAL /HPF
PH UR STRIP.AUTO: 7 [PH]
POTASSIUM SERPL-SCNC: 4.6 MMOL/L (ref 3.5–5.3)
PROT SERPL-MCNC: 6.8 G/DL (ref 6.4–8.4)
PROT UR STRIP-MCNC: NEGATIVE MG/DL
PROT UR-MCNC: <6 MG/DL
PROT/CREAT UR: <0.11 MG/G{CREAT} (ref 0–0.1)
PTH-INTACT SERPL-MCNC: 70.1 PG/ML (ref 18.4–80.1)
RBC #/AREA URNS AUTO: ABNORMAL /HPF
SODIUM SERPL-SCNC: 138 MMOL/L (ref 135–147)
SP GR UR STRIP.AUTO: 1.01 (ref 1–1.03)
URATE SERPL-MCNC: 4.8 MG/DL (ref 2–7.5)
UROBILINOGEN UR QL STRIP.AUTO: 0.2 E.U./DL
WBC #/AREA URNS AUTO: ABNORMAL /HPF

## 2022-09-14 PROCEDURE — 80053 COMPREHEN METABOLIC PANEL: CPT

## 2022-09-14 PROCEDURE — 81001 URINALYSIS AUTO W/SCOPE: CPT

## 2022-09-14 PROCEDURE — 93975 VASCULAR STUDY: CPT

## 2022-09-14 PROCEDURE — 36415 COLL VENOUS BLD VENIPUNCTURE: CPT

## 2022-09-14 PROCEDURE — 82088 ASSAY OF ALDOSTERONE: CPT

## 2022-09-14 PROCEDURE — 83970 ASSAY OF PARATHORMONE: CPT

## 2022-09-14 PROCEDURE — 84156 ASSAY OF PROTEIN URINE: CPT

## 2022-09-14 PROCEDURE — 82570 ASSAY OF URINE CREATININE: CPT

## 2022-09-14 PROCEDURE — 84550 ASSAY OF BLOOD/URIC ACID: CPT

## 2022-09-14 PROCEDURE — 83735 ASSAY OF MAGNESIUM: CPT

## 2022-09-14 PROCEDURE — 83835 ASSAY OF METANEPHRINES: CPT

## 2022-09-14 PROCEDURE — 84244 ASSAY OF RENIN: CPT

## 2022-09-14 PROCEDURE — 82043 UR ALBUMIN QUANTITATIVE: CPT

## 2022-09-14 RX ORDER — SODIUM CHLORIDE 9 MG/ML
375 INJECTION, SOLUTION INTRAVENOUS ONCE
Status: COMPLETED | OUTPATIENT
Start: 2022-09-15 | End: 2022-09-15

## 2022-09-15 ENCOUNTER — HOSPITAL ENCOUNTER (OUTPATIENT)
Dept: INFUSION CENTER | Facility: HOSPITAL | Age: 67
End: 2022-09-15
Payer: MEDICARE

## 2022-09-15 VITALS
HEART RATE: 84 BPM | RESPIRATION RATE: 18 BRPM | SYSTOLIC BLOOD PRESSURE: 148 MMHG | DIASTOLIC BLOOD PRESSURE: 84 MMHG | TEMPERATURE: 97.6 F

## 2022-09-15 PROCEDURE — 93975 VASCULAR STUDY: CPT | Performed by: SURGERY

## 2022-09-15 PROCEDURE — 96361 HYDRATE IV INFUSION ADD-ON: CPT

## 2022-09-15 PROCEDURE — 96360 HYDRATION IV INFUSION INIT: CPT

## 2022-09-15 RX ADMIN — SODIUM CHLORIDE 375 ML/HR: 0.9 INJECTION, SOLUTION INTRAVENOUS at 08:16

## 2022-09-15 NOTE — RESULT ENCOUNTER NOTE
Please call patient and tell her that vascular US of kidneys showed no evidence of significant  kidney artery narrowing which is normal

## 2022-09-16 ENCOUNTER — TELEPHONE (OUTPATIENT)
Dept: PAIN MEDICINE | Facility: CLINIC | Age: 67
End: 2022-09-16

## 2022-09-16 ENCOUNTER — HOSPITAL ENCOUNTER (OUTPATIENT)
Dept: INFUSION CENTER | Facility: HOSPITAL | Age: 67
End: 2022-09-16
Attending: INTERNAL MEDICINE
Payer: MEDICARE

## 2022-09-16 VITALS
DIASTOLIC BLOOD PRESSURE: 71 MMHG | SYSTOLIC BLOOD PRESSURE: 159 MMHG | OXYGEN SATURATION: 100 % | TEMPERATURE: 97.6 F | RESPIRATION RATE: 18 BRPM | HEART RATE: 75 BPM

## 2022-09-16 DIAGNOSIS — M54.2 NECK PAIN: ICD-10-CM

## 2022-09-16 DIAGNOSIS — M54.16 LUMBAR RADICULOPATHY: ICD-10-CM

## 2022-09-16 DIAGNOSIS — M79.18 MYOFASCIAL PAIN SYNDROME: ICD-10-CM

## 2022-09-16 DIAGNOSIS — M54.12 CERVICAL RADICULOPATHY: ICD-10-CM

## 2022-09-16 DIAGNOSIS — G89.29 CHRONIC BILATERAL LOW BACK PAIN WITH BILATERAL SCIATICA: ICD-10-CM

## 2022-09-16 DIAGNOSIS — M54.42 CHRONIC BILATERAL LOW BACK PAIN WITH BILATERAL SCIATICA: ICD-10-CM

## 2022-09-16 DIAGNOSIS — Z98.890 HISTORY OF LUMBAR SURGERY: ICD-10-CM

## 2022-09-16 DIAGNOSIS — R52 GENERALIZED PAIN: ICD-10-CM

## 2022-09-16 DIAGNOSIS — G03.9 ARACHNOIDITIS: ICD-10-CM

## 2022-09-16 DIAGNOSIS — G89.4 CHRONIC PAIN DISORDER: ICD-10-CM

## 2022-09-16 DIAGNOSIS — M54.41 CHRONIC BILATERAL LOW BACK PAIN WITH BILATERAL SCIATICA: ICD-10-CM

## 2022-09-16 PROCEDURE — 96361 HYDRATE IV INFUSION ADD-ON: CPT

## 2022-09-16 PROCEDURE — 96360 HYDRATION IV INFUSION INIT: CPT

## 2022-09-16 RX ORDER — CYCLOBENZAPRINE HCL 5 MG
5 TABLET ORAL 2 TIMES DAILY PRN
Qty: 60 TABLET | Refills: 0 | Status: SHIPPED | OUTPATIENT
Start: 2022-09-16

## 2022-09-16 RX ORDER — HYDROCODONE BITARTRATE AND ACETAMINOPHEN 5; 325 MG/1; MG/1
1 TABLET ORAL 2 TIMES DAILY PRN
Qty: 28 TABLET | Refills: 0 | Status: SHIPPED | OUTPATIENT
Start: 2022-09-16 | End: 2022-09-30 | Stop reason: SDUPTHER

## 2022-09-16 RX ADMIN — SODIUM CHLORIDE 1500 ML: 0.9 INJECTION, SOLUTION INTRAVENOUS at 09:02

## 2022-09-16 NOTE — TELEPHONE ENCOUNTER
S/w pt  Pt was last seen on 7/22 and next ov is 9/30  Pt is requesting refills of her Norco- 5/325 bid prn- Last order on 7/22 filled on 8/19 and Flexeril 5 mg bid prn  Per pt she will be out of both medications on 9/19 and is requesting refills to get her to her appt on 9/30  Medications are helping and denies SE's  Please advise- Regarding refill requests for Norco and Flexeril   Thank you

## 2022-09-16 NOTE — TELEPHONE ENCOUNTER
Partial fill for hydrocodone sent to patient's pharmacy  Thirty day supply of Flexeril sent to her pharmacy

## 2022-09-16 NOTE — TELEPHONE ENCOUNTER
Pt contacted Call Center requested refill of their medication  Medication Name:HYDROcodone-acetaminophen (Norco          Dosage of Med:5-325 mg       Frequency of Med:Take 1 tablet by mouth 2 (two) times a day as needed for pain for up to 30 dos      Remaining Medication: A couple       Pharmacy and Location:  [id number]- [facility]   [address] [place] [id number]   Phone:  [phone]        Pt  Preferred Callback Phone Number: [phone]      Thank you  Connie Olivo Pt contacted Call Center requested refill of their medication  Medication Name:cyclobenzaprine (FLEXERIL) 5 mg tablet          Dosage of Med: 5 mg     Frequency of Med:Take 1 tablet (5 mg total) by mouth 2 (two) times a day as needed for muscle spasms      Remaining Medication: A few       [id number], [address], [address] [address]   Phone:  [phone]        Pt  Preferred Callback Phone Number: [phone]      Thank you

## 2022-09-16 NOTE — TELEPHONE ENCOUNTER
Patient called back stating missed call from office  Please advise     Patient can be reached at [phone]   ty

## 2022-09-19 ENCOUNTER — HOSPITAL ENCOUNTER (OUTPATIENT)
Dept: INFUSION CENTER | Facility: HOSPITAL | Age: 67
Discharge: HOME | End: 2022-09-19
Payer: MEDICARE

## 2022-09-19 ENCOUNTER — EVALUATION (OUTPATIENT)
Dept: PHYSICAL THERAPY | Facility: CLINIC | Age: 67
End: 2022-09-19
Payer: MEDICARE

## 2022-09-19 VITALS
OXYGEN SATURATION: 98 % | DIASTOLIC BLOOD PRESSURE: 88 MMHG | HEART RATE: 77 BPM | RESPIRATION RATE: 18 BRPM | TEMPERATURE: 98.7 F | SYSTOLIC BLOOD PRESSURE: 143 MMHG

## 2022-09-19 DIAGNOSIS — S46.811D TRAUMATIC TEAR OF SUPRASPINATUS TENDON OF RIGHT SHOULDER, SUBSEQUENT ENCOUNTER: Primary | ICD-10-CM

## 2022-09-19 DIAGNOSIS — Z98.890 S/P RIGHT ROTATOR CUFF REPAIR: ICD-10-CM

## 2022-09-19 PROCEDURE — 96360 HYDRATION IV INFUSION INIT: CPT

## 2022-09-19 PROCEDURE — 97164 PT RE-EVAL EST PLAN CARE: CPT

## 2022-09-19 PROCEDURE — 97110 THERAPEUTIC EXERCISES: CPT

## 2022-09-19 PROCEDURE — 96361 HYDRATE IV INFUSION ADD-ON: CPT

## 2022-09-19 RX ADMIN — SODIUM CHLORIDE 1500 ML: 0.9 INJECTION, SOLUTION INTRAVENOUS at 08:13

## 2022-09-19 NOTE — PROGRESS NOTES
RE-EVAL and DC  Today's date: 22  Patient name: Ally Melendez  :   MRN: [id number]  Referring provider: Logan Burnett  Dx:   Encounter Diagnosis     ICD-10-CM    1  Traumatic tear of supraspinatus tendon of right shoulder, subsequent encounter  [id number]    2  S/P right rotator cuff repair  [id number]                     Assessment: Progress note completed this session and pt has had 40 OPPT visits to date    Pt demonstrates improved strength but still has weakness and has been focusing on strengthening and AROM activities  Pt still has limitations with use of her RUE with ADLs and lifting > 10# especially with reaching overhead  Pt feels she is ready for DC from PT as she will cont to gain strength thru her every day activities  Pt also notes she has other multiple medical apptmts and continuing a HEP would benefit her time schedule  Pt is ready for DC from PT at this time  Subjective: Pt reports she is a little sore today as her daughter was over this weekend and left a mess in her living room, and pt had to clean up, picking some things and felt soreness afterwards with muscle spasms  22 UPDATE: Pt notes she feels 75-80% improved from [facility]  Notes improved strength and mobility such that she can hang things over the shower melita easier and can carry groceries easily  Forgets she has an injury  Has 0/10 pain  Pt had cerv injections about 2 weeks ago which has helped her significantly  Pt notes she is fully functional at home and feels ready for DC to HEP  Plan    DC PT    Patient will  benefit from HEP continuation  Goals  STGs to be achieved in 4 weeks:  1  Pt to demonstrate reduced subjective pain rating "at worst" by at least 2-3 points from Initial Eval in order to allow for reduced pain with ADLs and improved functional activity tolerance  MET  2   Pt to demonstrate improved PROM of R shld by 20* in order to maximize joint mobility and function and allow for progression of exercise program and achievement of goals  MET  3  Pt to improve wrist and elbow strength to 5/5 in order to improve function RUE  Progressing, continue    LTGs to be achieved in 6-8 weeks:  1  Pt will be I with HEP in order to continue to improve quality of life and independence and reduce risk for re-injury  MET  2  Pt to demonstrate return to [facility] chores and self care without limitations or restrictions  MET     3  Pt to demonstrate improved function as noted by achieving or exceeding predicted score on FOTO outcomes assessment tool  4     Pt to demonstrate increased AROM of R shld by at least 5-10 degrees in order to allow for greater ease and independence with ADLs and functional mobility  MET  5  Pt to demonstrate increased MMT of RUE by at least 1/2-1 grade in order to improve safety and stability with ADLs and functional mobility  MET     Objective     Active Range of Motion     7/18/22:  Right shoulder  Flexion:  Extension:  Abduction:  External rotation at 45 degrees:  Internal rotation at 45 degrees:     6/8/22:   Right Shoulder   Flexion: 108 degrees   Extension: 42 degrees   Abduction: 82 degrees   External rotation 45°: 40 degrees   Internal rotation 45°: 55 degrees     9/19 22  Right shld  Flexion--140*  Abduction--165  Extension--65*  ER at 90* --62*  IR at 90*-- 55*    Pre-surgery measurements  Right Shoulder   Flexion: 50 degrees   Extension: 31 degrees   Abduction: 55 degrees   External rotation 45°: 57 degrees   Internal rotation 45°: 58 degrees     Additional Active Range of Motion Details  4/8/22: Measurements are pre-surgery  NO AROM allowed at this time as per protocol  5/6/22: Measurements are pre-surgery   NO AROM allowed at this time as per protocol     Passive Range of Motion  7/18/22:  Right shoulder  Flexion: 134 degrees   Abduction: 105 degrees   External rotation 0 degrees: 55 degrees   Internal rotation 0 degrees: 52 to body       6/8/22:  Right Shoulder   Flexion: 140 degrees   Abduction: 105 degrees   External rotation 0°: 55 degrees   Internal rotation 0°: 52 to body       5/6/22:  Right Shoulder   Flexion: 90 degrees   Abduction: 60 degrees   External rotation 0°: 16 degrees   Internal rotation 0°: to body        Strength/Myotome Testing     Right Shoulder     Additional Strength Details  4/8/22: Strength assessments are pre-surgical, pt not allowed AROM or MMT at this time as per protocol  Will re-assess when appropriate  5/6/22:  Strength assessments are pre-surgical, pt not allowed AROM or MMT at this time as per protocol  Will re-assess when appropriate  6/8/22:  Strength assessments are pre-surgical, pt not allowed AROM or MMT at this time as per protocol  Will re-assess when appropriate  7/18/22: Strength of R shoulder grossly 4/5 t/o     9/19/22   3+-4-/5 flex and abd     Precautions: POTS, severe scoliosis,fibromyalgia,GI issues,lumbar spinal cage, cerv DDD, arachnoiditis  Arachnoid cysts - no lifting more than 5#   DOS: 4/6/22; (Week 9): 5/18/22  Precautions: POTS, severe scoliosis,fibromyalgia,GI issues,lumbar spinal cage, cerv DDD, arachnoiditis  Arachnoid cysts  DOS: 4/6/22; (Week 9): 5/18/22        Date 8 30 22 9 1 22 9/19/22     Visit Count 38  39 40     FOTO        Pain In LB 7-8/10  cerv 6-7/10  R shld 2/10 "tight/sore"   B UT      Pain Out Better after MHP/CP applic Less sx after MHP applic                Manuals 8 30 22 9 1 22 9/19/22     PROM R SHLD PROM in all directions to end range 10'  To tolerance  PROM in all directions to end range 10'  To tolerance ROM/strength check  10'     Mass RUT        Supine gentle GH post/inf for pain                Neuro Re-Ed 8 30 22 9 1 22 9/19/22     scap retraction HEP                       Ther Ex 8 30 22 9 1 22      Supine serratus punches  HEP       Elbow ROM *Hold   Bicep curls as below       strengthening        cerv AROM Pendulum ex        Shoulder isometrics (5 way)  HEP       Supine wand exercises (flex, abd, ER) HEP               Table slides  Flex, arc HEP       Wand ex  Flex, ER,  EXT, IR Abd 25x 25x ea      Wall slides 10x10"  Dbl UE  Short range for flex 10 x 10"     Dbl UE  Short range for flex 10 x 10"     Dbl UE     Pulleys  3 way 4 min flex/scap    5 min ea   flex/scap 3 min ea  Flex/scap     UBE        MTPs/LTPs grn 2x15/3-5 Green 2 x 15 3-5" Green 30x ea     Bicep curls  3 way *held 3#  20 ea dir 3#  20 ea     Prone flex, HA, rows, ext Hold yet       Side lying ER & ABD ER 2# 4x10/3-5"  ABD 0# 3x10/3-5" resume BUE ER   Green TB  20x     FWD/Scap Raises 1#  20x ea   0#  30x ea                        Ther Activity                        Gait Training                        Modalities 8 30 22 9 1 22      CP to R shld Cerv 10' N/A      MHP To LB and R Bicep 10 min To LB, cerv,  and R Bicep 10 min

## 2022-09-20 ENCOUNTER — HOSPITAL ENCOUNTER (OUTPATIENT)
Dept: INFUSION CENTER | Facility: HOSPITAL | Age: 67
Discharge: HOME | End: 2022-09-20
Attending: INTERNAL MEDICINE
Payer: MEDICARE

## 2022-09-20 VITALS
OXYGEN SATURATION: 100 % | RESPIRATION RATE: 18 BRPM | SYSTOLIC BLOOD PRESSURE: 135 MMHG | HEART RATE: 70 BPM | TEMPERATURE: 97.6 F | DIASTOLIC BLOOD PRESSURE: 74 MMHG

## 2022-09-20 LAB
ALDOST SERPL-MCNC: 2.4 NG/DL (ref 0–30)
ALDOST/RENIN PLAS-RTO: 2.6 {RATIO} (ref 0–30)
RENIN PLAS-CCNC: 0.94 NG/ML/HR (ref 0.17–5.38)

## 2022-09-20 PROCEDURE — 96361 HYDRATE IV INFUSION ADD-ON: CPT

## 2022-09-20 PROCEDURE — 96360 HYDRATION IV INFUSION INIT: CPT

## 2022-09-20 RX ORDER — SODIUM CHLORIDE 9 MG/ML
375 INJECTION, SOLUTION INTRAVENOUS ONCE
Status: COMPLETED | OUTPATIENT
Start: 2022-09-22 | End: 2022-09-22

## 2022-09-20 RX ADMIN — SODIUM CHLORIDE 1500 ML: 0.9 INJECTION, SOLUTION INTRAVENOUS at 08:13

## 2022-09-21 RX ORDER — SODIUM CHLORIDE 9 MG/ML
375 INJECTION, SOLUTION INTRAVENOUS ONCE
Status: COMPLETED | OUTPATIENT
Start: 2022-09-23 | End: 2022-09-23

## 2022-09-22 ENCOUNTER — APPOINTMENT (OUTPATIENT)
Dept: PHYSICAL THERAPY | Facility: CLINIC | Age: 67
End: 2022-09-22
Payer: MEDICARE

## 2022-09-22 ENCOUNTER — HOSPITAL ENCOUNTER (OUTPATIENT)
Dept: INFUSION CENTER | Facility: HOSPITAL | Age: 67
End: 2022-09-22
Attending: INTERNAL MEDICINE
Payer: MEDICARE

## 2022-09-22 VITALS
RESPIRATION RATE: 18 BRPM | OXYGEN SATURATION: 99 % | SYSTOLIC BLOOD PRESSURE: 135 MMHG | DIASTOLIC BLOOD PRESSURE: 74 MMHG | HEART RATE: 73 BPM | TEMPERATURE: 98.5 F

## 2022-09-22 LAB — ALDOST SERPL-MCNC: 2.2 NG/DL (ref 0–30)

## 2022-09-22 PROCEDURE — 96361 HYDRATE IV INFUSION ADD-ON: CPT

## 2022-09-22 PROCEDURE — 96360 HYDRATION IV INFUSION INIT: CPT

## 2022-09-22 RX ADMIN — SODIUM CHLORIDE 375 ML/HR: 0.9 INJECTION, SOLUTION INTRAVENOUS at 08:08

## 2022-09-23 ENCOUNTER — HOSPITAL ENCOUNTER (OUTPATIENT)
Dept: INFUSION CENTER | Facility: HOSPITAL | Age: 67
End: 2022-09-23
Attending: INTERNAL MEDICINE
Payer: MEDICARE

## 2022-09-23 VITALS — RESPIRATION RATE: 20 BRPM | HEART RATE: 83 BPM | DIASTOLIC BLOOD PRESSURE: 73 MMHG | SYSTOLIC BLOOD PRESSURE: 169 MMHG

## 2022-09-23 PROCEDURE — 96361 HYDRATE IV INFUSION ADD-ON: CPT

## 2022-09-23 PROCEDURE — 96360 HYDRATION IV INFUSION INIT: CPT

## 2022-09-23 RX ORDER — SODIUM CHLORIDE 9 MG/ML
375 INJECTION, SOLUTION INTRAVENOUS ONCE
Status: COMPLETED | OUTPATIENT
Start: 2022-09-26 | End: 2022-09-26

## 2022-09-23 RX ORDER — SODIUM CHLORIDE 9 MG/ML
375 INJECTION, SOLUTION INTRAVENOUS ONCE
Status: COMPLETED | OUTPATIENT
Start: 2022-09-27 | End: 2022-09-27

## 2022-09-23 RX ADMIN — SODIUM CHLORIDE 375 ML/HR: 0.9 INJECTION, SOLUTION INTRAVENOUS at 08:51

## 2022-09-23 NOTE — PROGRESS NOTES
Pt tolerated todays hydration without incident  PICC line redressed  Three small pink areas noted under dressing but not at insertion site nor suture  Pt will monitor   Discharged ambulatory deferring avs

## 2022-09-24 LAB
METANEPH FREE SERPL-MCNC: NORMAL PG/ML (ref 0–88)
NORMETANEPHRINE SERPL-MCNC: 88.7 PG/ML (ref 0–285.2)

## 2022-09-26 ENCOUNTER — HOSPITAL ENCOUNTER (OUTPATIENT)
Dept: INFUSION CENTER | Facility: HOSPITAL | Age: 67
Discharge: HOME | End: 2022-09-26
Payer: MEDICARE

## 2022-09-26 ENCOUNTER — APPOINTMENT (OUTPATIENT)
Dept: PHYSICAL THERAPY | Facility: CLINIC | Age: 67
End: 2022-09-26
Payer: MEDICARE

## 2022-09-26 VITALS
RESPIRATION RATE: 18 BRPM | TEMPERATURE: 99.2 F | SYSTOLIC BLOOD PRESSURE: 162 MMHG | DIASTOLIC BLOOD PRESSURE: 101 MMHG | OXYGEN SATURATION: 98 % | HEART RATE: 96 BPM

## 2022-09-26 PROCEDURE — 96360 HYDRATION IV INFUSION INIT: CPT

## 2022-09-26 PROCEDURE — 96361 HYDRATE IV INFUSION ADD-ON: CPT

## 2022-09-26 RX ORDER — VIBEGRON 75 MG/1
1 TABLET, FILM COATED ORAL DAILY
COMMUNITY
Start: 2022-09-02

## 2022-09-26 RX ADMIN — SODIUM CHLORIDE 375 ML/HR: 0.9 INJECTION, SOLUTION INTRAVENOUS at 08:10

## 2022-09-27 ENCOUNTER — HOSPITAL ENCOUNTER (OUTPATIENT)
Dept: INFUSION CENTER | Facility: HOSPITAL | Age: 67
Discharge: HOME | End: 2022-09-27
Payer: MEDICARE

## 2022-09-27 VITALS
TEMPERATURE: 97.3 F | RESPIRATION RATE: 18 BRPM | HEART RATE: 87 BPM | SYSTOLIC BLOOD PRESSURE: 160 MMHG | DIASTOLIC BLOOD PRESSURE: 73 MMHG

## 2022-09-27 PROCEDURE — 96360 HYDRATION IV INFUSION INIT: CPT

## 2022-09-27 PROCEDURE — 96361 HYDRATE IV INFUSION ADD-ON: CPT

## 2022-09-27 RX ADMIN — SODIUM CHLORIDE 375 ML/HR: 0.9 INJECTION, SOLUTION INTRAVENOUS at 08:35

## 2022-09-27 NOTE — PLAN OF CARE
Problem: INFECTION - ADULT  Goal: Absence or prevention of progression during hospitalization  Description: INTERVENTIONS:  - Assess and monitor for signs and symptoms of infection  - Monitor lab/diagnostic results  - Monitor all insertion sites, i e  indwelling lines, tubes, and drains  - Monitor endotracheal if appropriate and nasal secretions for changes in amount and color  - [place] appropriate cooling/warming therapies per order  - Administer medications as ordered  - Instruct and encourage patient and family to use good hand hygiene technique  - Identify and instruct in appropriate isolation precautions for identified infection/condition  Outcome: Progressing     Problem: Knowledge Deficit  Goal: Patient/family/caregiver demonstrates understanding of disease process, treatment plan, medications, and discharge instructions  Description: Complete learning assessment and assess knowledge base    Interventions:  - Provide teaching at level of understanding  - Provide teaching via preferred learning methods  Outcome: Progressing

## 2022-09-28 RX ORDER — SODIUM CHLORIDE 9 MG/ML
375 INJECTION, SOLUTION INTRAVENOUS ONCE
Status: COMPLETED | OUTPATIENT
Start: 2022-09-30 | End: 2022-09-30

## 2022-09-28 RX ORDER — SODIUM CHLORIDE 9 MG/ML
375 INJECTION, SOLUTION INTRAVENOUS ONCE
Status: COMPLETED | OUTPATIENT
Start: 2022-09-29 | End: 2022-09-29

## 2022-09-29 ENCOUNTER — HOSPITAL ENCOUNTER (OUTPATIENT)
Dept: INFUSION CENTER | Facility: HOSPITAL | Age: 67
End: 2022-09-29
Payer: MEDICARE

## 2022-09-29 VITALS
RESPIRATION RATE: 16 BRPM | HEART RATE: 107 BPM | TEMPERATURE: 98.5 F | SYSTOLIC BLOOD PRESSURE: 119 MMHG | DIASTOLIC BLOOD PRESSURE: 79 MMHG

## 2022-09-29 PROCEDURE — 96360 HYDRATION IV INFUSION INIT: CPT

## 2022-09-29 PROCEDURE — 96361 HYDRATE IV INFUSION ADD-ON: CPT

## 2022-09-29 RX ORDER — SODIUM CHLORIDE 9 MG/ML
375 INJECTION, SOLUTION INTRAVENOUS ONCE
Status: COMPLETED | OUTPATIENT
Start: 2022-10-03 | End: 2022-10-03

## 2022-09-29 RX ADMIN — SODIUM CHLORIDE 375 ML/HR: 0.9 INJECTION, SOLUTION INTRAVENOUS at 08:15

## 2022-09-29 NOTE — PLAN OF CARE
Problem: Potential for Falls  Goal: Patient will remain free of falls  Description: INTERVENTIONS:  - Educate patient/family on patient safety including physical limitations  - Instruct patient to call for assistance with activity   - Consult OT/PT to assist with strengthening/mobility   - Keep Call bell within reach  - Keep bed low and locked with side rails adjusted as appropriate  - Keep care items and personal belongings within reach  - Initiate and maintain comfort rounds  - Make Fall Risk Sign visible to staff  - Apply yellow socks and bracelet for high fall risk patients  - Consider moving patient to room near nurses station  9/29/2022 1226 by Major Patience, RN  Outcome: Progressing  9/29/2022 1226 by Major Patience, RN  Outcome: Progressing

## 2022-09-30 ENCOUNTER — HOSPITAL ENCOUNTER (OUTPATIENT)
Dept: INFUSION CENTER | Facility: HOSPITAL | Age: 67
End: 2022-09-30
Payer: MEDICARE

## 2022-09-30 ENCOUNTER — OFFICE VISIT (OUTPATIENT)
Dept: PAIN MEDICINE | Facility: CLINIC | Age: 67
End: 2022-09-30
Payer: MEDICARE

## 2022-09-30 VITALS
DIASTOLIC BLOOD PRESSURE: 81 MMHG | TEMPERATURE: 95.5 F | HEART RATE: 77 BPM | SYSTOLIC BLOOD PRESSURE: 137 MMHG | RESPIRATION RATE: 16 BRPM | OXYGEN SATURATION: 100 %

## 2022-09-30 VITALS
SYSTOLIC BLOOD PRESSURE: 146 MMHG | DIASTOLIC BLOOD PRESSURE: 81 MMHG | HEIGHT: 61 IN | BODY MASS INDEX: 23.86 KG/M2 | WEIGHT: 126.4 LBS | HEART RATE: 87 BPM

## 2022-09-30 DIAGNOSIS — G89.4 CHRONIC PAIN DISORDER: Primary | ICD-10-CM

## 2022-09-30 DIAGNOSIS — M47.812 CERVICAL SPONDYLOSIS: ICD-10-CM

## 2022-09-30 DIAGNOSIS — R52 GENERALIZED PAIN: ICD-10-CM

## 2022-09-30 DIAGNOSIS — G89.29 CHRONIC BILATERAL LOW BACK PAIN WITH BILATERAL SCIATICA: ICD-10-CM

## 2022-09-30 DIAGNOSIS — M54.41 CHRONIC BILATERAL LOW BACK PAIN WITH BILATERAL SCIATICA: ICD-10-CM

## 2022-09-30 DIAGNOSIS — M54.16 LUMBAR RADICULOPATHY: ICD-10-CM

## 2022-09-30 DIAGNOSIS — M54.12 CERVICAL RADICULOPATHY: ICD-10-CM

## 2022-09-30 DIAGNOSIS — Z98.890 HISTORY OF LUMBAR SURGERY: ICD-10-CM

## 2022-09-30 DIAGNOSIS — G03.9 ARACHNOIDITIS: ICD-10-CM

## 2022-09-30 DIAGNOSIS — M54.42 CHRONIC BILATERAL LOW BACK PAIN WITH BILATERAL SCIATICA: ICD-10-CM

## 2022-09-30 DIAGNOSIS — M79.18 MYOFASCIAL PAIN SYNDROME: ICD-10-CM

## 2022-09-30 DIAGNOSIS — M54.2 NECK PAIN: ICD-10-CM

## 2022-09-30 PROCEDURE — 99214 OFFICE O/P EST MOD 30 MIN: CPT | Performed by: NURSE PRACTITIONER

## 2022-09-30 PROCEDURE — 96360 HYDRATION IV INFUSION INIT: CPT

## 2022-09-30 PROCEDURE — 96361 HYDRATE IV INFUSION ADD-ON: CPT

## 2022-09-30 RX ORDER — HYDROCODONE BITARTRATE AND ACETAMINOPHEN 5; 325 MG/1; MG/1
1 TABLET ORAL 2 TIMES DAILY PRN
Qty: 60 TABLET | Refills: 0 | Status: SHIPPED | OUTPATIENT
Start: 2022-09-30 | End: 2022-10-30

## 2022-09-30 RX ORDER — HYDROCODONE BITARTRATE AND ACETAMINOPHEN 5; 325 MG/1; MG/1
1 TABLET ORAL 2 TIMES DAILY PRN
Qty: 60 TABLET | Refills: 0 | Status: SHIPPED | OUTPATIENT
Start: 2022-09-30

## 2022-09-30 RX ADMIN — SODIUM CHLORIDE 375 ML/HR: 0.9 INJECTION, SOLUTION INTRAVENOUS at 08:22

## 2022-09-30 NOTE — PROGRESS NOTES
Assessment:  1  Chronic pain disorder    2  Chronic bilateral low back pain with bilateral sciatica    3  History of lumbar surgery    4  Lumbar radiculopathy    5  Myofascial pain syndrome        Plan:  While the patient was in the office today, I did have a thorough conversation regarding their chronic pain syndrome, medication management, and treatment plan options  Patient is being seen for follow-up visit  She underwent a C7-T1 interlaminar epidural steroid injection on 09/06/2022  Reports that her pain significantly improved for about DX after the injection, but then returned  Patient has previously undergone radiofrequency ablations for both her cervical and lumbar spine  Her lateral the cul ablation was at least 2 years ago  We will schedule the patient for left-sided C2-3 and C3-4 facet medial branch blocks with intention of moving forward towards radiofrequency ablation if there is an appropriate diagnostic response  The initial blocks will be performed with 2% lidocaine and if an appropriate response is obtained upon review of the patient's pain diary, a confirmatory block will be scheduled with 0 5% bupivacaine  Continue hydrocodone 5/325 twice daily if needed for pain  The patient's opioid scripts were sent to their pharmacy electronically and was given a 2 month supply of prescriptions with a Do Not Fill date(s) of 09/30/2022, 10/28/2022]  Continue Flexeril 5 mg twice daily if needed for spasms  She did not require a refill of this medication during today's visit  [place] Prescription Drug Monitoring Program report was reviewed and was appropriate     There are risks associated with opioid medications, including dependence, addiction and tolerance  The patient understands and agrees to use these medications only as prescribed   Potential side effects of the medications include, but are not limited to, constipation, drowsiness, addiction, impaired judgment and risk of fatal overdose if not taken as prescribed  The patient was warned against driving while taking sedation medications  Sharing medications is a felony  At this point in time, the patient is showing no signs of addiction, abuse, diversion or suicidal ideation  The patient will follow-up in 8 weeks for medication prescription refill and reevaluation  The patient was advised to contact the office should their symptoms worsen in the interim  The patient was agreeable and verbalized an understanding  History of Present Illness: The patient is a 75 y o  female last seen on 07/22/2022 who presents for a follow up office visit in regards to chronic pain secondary to chronic pain syndrome, neck pain, cervical radiculopathy, myofascial pain syndrome, chronic low back pain, lumbar radiculopathy, history of lumbar surgery  The patient currently reports complaints of neck pain that radiates to both upper extremities, midback pain, low back and lower extremity pain  Current pain level 6/10  Quality of pain is described as burning, dull, aching, throbbing, cramping, pressure-like, shooting, numb, pins and needles  Current pain medications includes:  Hydrocodone 5/325 twice daily if needed for pain, Flexeril 5 mg twice daily if needed for spasms  The patient reports that this regimen is providing 40 % pain relief  The patient is reporting no side effects from this pain medication regimen  Pain Contract Signed: 4/25/22  Last Urine Drug Screen: 7/22/22    I have personally reviewed and/or updated the patient's past medical history, past surgical history, family history, social history, current medications, allergies, and vital signs today  Review of Systems:    Review of Systems   Constitutional: Negative for unexpected weight change  HENT: Negative for hearing loss  Eyes: Negative for visual disturbance  Respiratory: Positive for shortness of breath  Cardiovascular: Negative for leg swelling  Gastrointestinal: Positive for constipation and nausea  Endocrine: Negative for polyuria  Genitourinary: Negative for difficulty urinating  Musculoskeletal: Positive for gait problem  Negative for joint swelling and myalgias  Decreased range of motion  Joint stiffness  Swelling   Pain in extremity   Skin: Negative for rash  Neurological: Positive for dizziness  Negative for weakness and headaches  Psychiatric/Behavioral: Negative for decreased concentration  All other systems reviewed and are negative          Past Medical History:   Diagnosis Date    Allergic rhinitis     Anxiety     Asthma     Back pain     Cardiac disease     Cardiopathy     EF 45%    Cough     Diverticulitis     Factor V Leiden (HCC)     Fibromyalgia     GERD (gastroesophageal reflux disease)     Hashimoto's thyroiditis     Hx of degenerative disc disease     Hypotension     pots - postural orthostatic hypotension    Interstitial cystitis     Irregular heart beat     LBBB    Irritable bowel syndrome     Migraines     Mitral valve disease     "thickening"    Myocardial infarction [facility])     possible but not sure when    Neuropathy     bilateral legs    Osteoporosis     Postural orthostatic tachycardia syndrome     must drink a lot of water and salt    Pott's disease     Rheumatic fever 1967    Scoliosis     Sepsis ([address] )     associated with PICC line    Sjogren's syndrome ([facility] [address] )     TMJ (dislocation of temporomandibular joint)        Past Surgical History:   Procedure Laterality Date    ABLATION MICROWAVE Left     lumbar area    BACK SURGERY  10/1998     SECTION  1992    CHOLECYSTECTOMY  2016    COLONOSCOPY      DILATION AND CURETTAGE OF UTERUS      EGD      FOOT SURGERY  1968    removal of bone and neuroma    HYSTERECTOMY  1997    age 54  PRICE ooph    IR OTHER  2022    IR PICC PLACEMENT SINGLE LUMEN  10/02/2020    IR PICC PLACEMENT SINGLE LUMEN  2021  IR PICC REPOSITION  12/07/2021    IR TUNNELED CENTRAL LINE PLACEMENT  06/20/2022    LAPAROSCOPY  1996    endometriosis    MOUTH BIOPSY      lip    MT EGD TRANSORAL BIOPSY SINGLE/MULTIPLE N/A 04/24/2019    Procedure: ESOPHAGOGASTRODUODENOSCOPY (EGD) with multiple bx and dilation;  Surgeon: Nadya Hinson MD;  Location: [address] [place] [address] GI LAB; Service: Gastroenterology    MT SHLDR ARTHROSCOP,SURG,W/ROTAT CUFF REPR Right 04/06/2022    Procedure: SHOULDER ARTHROSCOPIC ROTATOR CUFF REPAIR Biceps Tenodisis;   Surgeon: Derrick Maciel MD;  Location[facility] MAIN OR;  Service: Orthopedics    TUNNELED VENOUS CATHETER PLACEMENT  2016       Family History   Problem Relation Age of Onset    Cancer Mother         urinary bladder     Thyroid cancer Sister     Colon cancer Maternal Grandfather     Breast cancer Maternal Aunt         over 48 yrs old     Endometrial cancer Maternal Aunt     Multiple myeloma Maternal Aunt     No Known Problems Father     No Known Problems Daughter     Heart attack Sister     No Known Problems Sister     No Known Problems Sister     No Known Problems Sister     No Known Problems Sister     No Known Problems Daughter     Hypertension Paternal Aunt        Social History     Occupational History    Not on file   Tobacco Use    Smoking status: Never Smoker    Smokeless tobacco: Never Used   Vaping Use    Vaping Use: Never used   Substance and Sexual Activity    Alcohol use: Never    Drug use: No    Sexual activity: Not on file         Current Outpatient Medications:     Alum Hydroxide-Mag Trisilicate (Gaviscon) 43-26 6 MG CHEW, Chew 1 tablet 4 (four) times a day as needed With meals and as needed, Disp: , Rfl:     azelastine (ASTELIN) 0 1 % nasal spray, 2 sprays into each nostril 2 (two) times a day Use in each nostril as directed, Disp: , Rfl:     beclomethasone (QVAR) 40 MCG/ACT inhaler, Inhale 1 puff daily as needed (only when unable to nebulize pulmicort) Rinse mouth after use , Disp: , Rfl:     budesonide (PULMICORT) 0 5 mg/2 mL nebulizer solution, Take 0 5 mg by nebulization 2 (two) times a day Rinse mouth after use , Disp: , Rfl:     cholecalciferol (VITAMIN D3) 1,000 units tablet, Take 1,000 Units by mouth daily , Disp: , Rfl:     cyclobenzaprine (FLEXERIL) 5 mg tablet, Take 1 tablet (5 mg total) by mouth 2 (two) times a day as needed for muscle spasms, Disp: 60 tablet, Rfl: 0    docusate sodium (COLACE) 100 mg capsule, Take 100 mg by mouth daily as needed for constipation, Disp: , Rfl:     famotidine (PEPCID) 40 MG tablet, Twice a day, Disp: , Rfl:     fexofenadine (ALLEGRA) 180 MG tablet, Take 180 mg by mouth 2 (two) times a day , Disp: , Rfl:     fluticasone (FLONASE) 50 mcg/act nasal spray, 2 sprays into each nostril daily , Disp: , Rfl:     Galcanezumab-gnlm (Emgality) 120 MG/ML SOAJ, Inject under the skin every 30 (thirty) days , Disp: , Rfl:     Gemtesa 75 MG TABS, Take 1 tablet by mouth daily, Disp: , Rfl:     HYDROcodone-acetaminophen (Norco) 5-325 mg per tablet, Take 1 tablet by mouth 2 (two) times a day as needed for pain for up to 30 doses Do not fill until 8/19/2022 Max Daily Amount: 2 tablets, Disp: 60 tablet, Rfl: 0    HYDROcodone-acetaminophen (NORCO) 5-325 mg per tablet, Take 1 tablet by mouth 2 (two) times a day as needed for pain for up to 14 days Max Daily Amount: 2 tablets, Disp: 28 tablet, Rfl: 0    hydrOXYzine (ATARAX) 10 mg/5 mL syrup, Take 20 mg by mouth daily at bedtime , Disp: , Rfl:     ipratropium (ATROVENT) 0 03 % nasal spray, 2 sprays into each nostril 4 (four) times a day as needed for rhinitis , Disp: , Rfl:     ipratropium (ATROVENT) 0 06 % nasal spray, PLEASE SEE ATTACHED FOR DETAILED DIRECTIONS, Disp: , Rfl:     ivabradine HCl (Corlanor) 5 MG tablet, 2 (two) times a day  , Disp: , Rfl:     levalbuterol (XOPENEX HFA) 45 mcg/act inhaler, Inhale 2 puffs every 4 (four) hours as needed (when unable to nebulize), Disp: , Rfl:    levalbuterol (XOPENEX) 1 25 mg/3 mL nebulizer solution, Take 1 25 mg by nebulization every 6 (six) hours as needed , Disp: , Rfl: 2    Magnesium 400 MG CAPS, Take 1 capsule by mouth 2 (two) times a day , Disp: , Rfl:     midodrine (PROAMATINE) 2 5 mg tablet, , Disp: , Rfl:     MULTIPLE VITAMINS-CALCIUM PO, Take 1 capsule by mouth every morning , Disp: , Rfl:     Multiple Vitamins-Iron TABS, Take by mouth, Disp: , Rfl:     omega-3-acid ethyl esters (LOVAZA) 1 g capsule, Take 2 g by mouth 2 (two) times a day 1600mg daily, Disp: , Rfl:     omeprazole (PriLOSEC) 20 mg delayed release capsule, Take 20 mg by mouth 2 (two) times a day, Disp: , Rfl:     polyethylene glycol (MIRALAX) 17 g packet, Take 17 g by mouth daily as needed , Disp: , Rfl:     Probiotic Product (PROBIOTIC DAILY PO), Take 1 tablet by mouth daily At lunch, Disp: , Rfl:     psyllium (METAMUCIL) 0 52 g capsule, Take 0 52 g by mouth daily, Disp: , Rfl:     Qvar RediHaler 40 MCG/ACT inhaler, PLEASE SEE ATTACHED FOR DETAILED DIRECTIONS, Disp: , Rfl:     Thyroid, Porcine, POWD, Use 32 mg daily, Disp: , Rfl:     Ubrogepant (UBRELVY) 100 MG tablet, Take 100 mg by mouth Take 1 tablet (100 mg) one time as needed for migraine  May repeat one additional tablet (100 mg) at least two hours after the first dose  Do not use more than two doses per day, or for more than eight days per month , Disp: , Rfl:     famotidine (PEPCID) 10 mg tablet, Take 40 mg by mouth 2 (two) times a day   (Patient not taking: No sig reported), Disp: , Rfl:     midodrine (PROAMATINE) 5 mg tablet, TAKE 2 TABS PO IN AM, ONE TAB PO WITH LUNCH AND DINNER (Patient not taking: No sig reported), Disp: 120 tablet, Rfl: 11    oxybutynin (DITROPAN) 5 mg tablet, Take 2 5 mg by mouth 2 (two) times a day Once in the morning and one at HS, Disp: , Rfl:   No current facility-administered medications for this visit      Facility-Administered Medications Ordered in Other Visits:     sodium chloride 0 9 % infusion, 375 mL/hr, Intravenous, Once, Chikis Sidhu MD  Keegan Márquez  [START ON 10/3/2022] sodium chloride 0 9 % infusion, 375 mL/hr, Intravenous, Once, Chikis Sidhu MD    Allergies   Allergen Reactions    Imipramine Confusion, Fatigue, Irritability, Palpitations, Shortness Of Breath, Tachycardia and Visual Disturbance    Melatonin Shortness Of Breath    Mirtazapine Anxiety, Dizziness, GI Intolerance, Nausea Only, Palpitations and Shortness Of Breath    Nexium [Esomeprazole] Shortness Of Breath    Nsaids Shortness Of Breath    Singulair [Montelukast] Shortness Of Breath and Cough    Zomig [Zolmitriptan] Shortness Of Breath    Dexilant [Dexlansoprazole] Nausea Only and Vomiting    Albuterol     Ambien [Zolpidem]     Amitriptyline Drowsiness    Aspirin     Bactrim [Sulfamethoxazole-Trimethoprim] Hives    Banana - Food Allergy Dermatitis    Ceftin [Cefuroxime]     Celebrex [Celecoxib]     Ciprofloxacin     Cranberry-C [Ascorbate - Food Allergy] GI Intolerance    Demerol [Meperidine]     Epinephrine Dizziness    Ergotamine Nausea Only and Headache    Keflex [Cephalexin]     Klonopin [Clonazepam] Nausea Only and Dizziness    Latex Hives    Levaquin [Levofloxacin]     Lexapro [Escitalopram]     Lyrica [Pregabalin] Fatigue    Macrodantin [Nitrofurantoin]     Morphine Nausea Only    Movantik [Naloxegol] Nausea Only    Mushroom Extract Complex - Food Allergy      Eye itchy, asthma  attack    Naprosyn [Naproxen]     Neurontin [Gabapentin] Dizziness    Pineapple - Food Allergy GI Intolerance    Plecanatide Abdominal Pain, Diarrhea and GI Intolerance    Prolia [Denosumab]     Prozac [Fluoxetine]     Serevent [Salmeterol] Dizziness    Sudafed [Pseudoephedrine] Hives    Sulfa Antibiotics     Tomato - Food Allergy GI Intolerance    Trazodone     Ultram [Tramadol] Nausea Only, Dizziness and Headache    Vilazodone Hcl Abdominal Pain, Dizziness, GI Intolerance, Headache and Other (See Comments)    Vioxx [Rofecoxib]     Vortioxetine Drowsiness, Fatigue, GI Intolerance and Irritability    Zithromax [Azithromycin] Hives    Zoloft [Sertraline]     Zantac [Ranitidine] Rash and Dizziness       Physical Exam:    /81   Pulse 87   Ht 5' 1" (1 549 m)   Wt 57 3 kg (126 lb 6 4 oz)   BMI 23 88 kg/m²     Constitutional:normal, well developed, well nourished, alert, in no distress and non-toxic and no overt pain behavior  Eyes:anicteric  HEENT:grossly intact  Neck:supple, symmetric, trachea midline and no masses   Pulmonary:even and unlabored  Cardiovascular:No edema or pitting edema present  Skin:Normal without rashes or lesions and well hydrated  Psychiatric:Mood and affect appropriate  Neurologic:Cranial Nerves II-XII grossly intact  Musculoskeletal:gait is slow and guarded  range of motion of the cervical spine is limited in all planes  There are cervical paraspinal muscle spasms present  Imaging  No orders to display         No orders of the defined types were placed in this encounter

## 2022-09-30 NOTE — PATIENT INSTRUCTIONS

## 2022-09-30 NOTE — PLAN OF CARE
Problem: INFECTION - ADULT  Goal: Absence or prevention of progression during hospitalization  Description: INTERVENTIONS:  - Assess and monitor for signs and symptoms of infection  - Monitor lab/diagnostic results  - Monitor all insertion sites, i e  indwelling lines, tubes, and drains  - Monitor endotracheal if appropriate and nasal secretions for changes in amount and color  - [place] appropriate cooling/warming therapies per order  - Administer medications as ordered  - Instruct and encourage patient and family to use good hand hygiene technique  - Identify and instruct in appropriate isolation precautions for identified infection/condition  Outcome: Progressing     Problem: Knowledge Deficit  Goal: Patient/family/caregiver demonstrates understanding of disease process, treatment plan, medications, and discharge instructions  Description: Complete learning assessment and assess knowledge base    Interventions:  - Provide teaching at level of understanding  - Provide teaching via preferred learning methods  Outcome: Progressing

## 2022-09-30 NOTE — PROGRESS NOTES
Tolerated hydration well  Dressing changed  Small amount of redness noted at suture site of line   Declined AVS

## 2022-10-03 ENCOUNTER — TELEPHONE (OUTPATIENT)
Dept: PAIN MEDICINE | Facility: CLINIC | Age: 67
End: 2022-10-03

## 2022-10-03 ENCOUNTER — HOSPITAL ENCOUNTER (OUTPATIENT)
Dept: INFUSION CENTER | Facility: HOSPITAL | Age: 67
Discharge: HOME | End: 2022-10-03
Payer: MEDICARE

## 2022-10-03 VITALS
DIASTOLIC BLOOD PRESSURE: 81 MMHG | TEMPERATURE: 96.8 F | RESPIRATION RATE: 18 BRPM | SYSTOLIC BLOOD PRESSURE: 146 MMHG | HEART RATE: 90 BPM

## 2022-10-03 PROCEDURE — 96360 HYDRATION IV INFUSION INIT: CPT

## 2022-10-03 PROCEDURE — 96361 HYDRATE IV INFUSION ADD-ON: CPT

## 2022-10-03 RX ORDER — SODIUM CHLORIDE 9 MG/ML
375 INJECTION, SOLUTION INTRAVENOUS ONCE
Status: COMPLETED | OUTPATIENT
Start: 2022-10-05 | End: 2022-10-05

## 2022-10-03 RX ADMIN — SODIUM CHLORIDE 375 ML/HR: 0.9 INJECTION, SOLUTION INTRAVENOUS at 09:10

## 2022-10-03 NOTE — TELEPHONE ENCOUNTER
Peter Monsivais,    I spoke to Mrs Mao regarding her MBB's  She requested to schedule both because of how far Dr Esequiel Hooker is booking out  I did explain to her that she needs to make sure that she drops off her pain diary so I know whether she can keep the second appt  Patient is asking why this is not being done bilateral   Is it because of the insurance? I advised her that I would speak to you and get back to her

## 2022-10-05 ENCOUNTER — HOSPITAL ENCOUNTER (OUTPATIENT)
Dept: INFUSION CENTER | Facility: HOSPITAL | Age: 67
Discharge: HOME | End: 2022-10-05
Payer: MEDICARE

## 2022-10-05 VITALS
RESPIRATION RATE: 18 BRPM | TEMPERATURE: 97.3 F | HEART RATE: 82 BPM | SYSTOLIC BLOOD PRESSURE: 139 MMHG | OXYGEN SATURATION: 98 % | DIASTOLIC BLOOD PRESSURE: 77 MMHG

## 2022-10-05 PROCEDURE — 96360 HYDRATION IV INFUSION INIT: CPT

## 2022-10-05 PROCEDURE — 96361 HYDRATE IV INFUSION ADD-ON: CPT

## 2022-10-05 RX ORDER — SODIUM CHLORIDE 9 MG/ML
375 INJECTION, SOLUTION INTRAVENOUS ONCE
Status: COMPLETED | OUTPATIENT
Start: 2022-10-06 | End: 2022-10-06

## 2022-10-05 RX ORDER — SODIUM CHLORIDE 9 MG/ML
375 INJECTION, SOLUTION INTRAVENOUS ONCE
Status: COMPLETED | OUTPATIENT
Start: 2022-10-07 | End: 2022-10-07

## 2022-10-05 RX ADMIN — SODIUM CHLORIDE 375 ML/HR: 0.9 INJECTION, SOLUTION INTRAVENOUS at 09:11

## 2022-10-06 ENCOUNTER — HOSPITAL ENCOUNTER (OUTPATIENT)
Dept: INFUSION CENTER | Facility: HOSPITAL | Age: 67
End: 2022-10-06
Payer: MEDICARE

## 2022-10-06 VITALS
DIASTOLIC BLOOD PRESSURE: 87 MMHG | RESPIRATION RATE: 18 BRPM | HEART RATE: 86 BPM | SYSTOLIC BLOOD PRESSURE: 152 MMHG | OXYGEN SATURATION: 98 % | TEMPERATURE: 97.2 F

## 2022-10-06 PROCEDURE — 96361 HYDRATE IV INFUSION ADD-ON: CPT

## 2022-10-06 PROCEDURE — 96360 HYDRATION IV INFUSION INIT: CPT

## 2022-10-06 RX ADMIN — SODIUM CHLORIDE 375 ML/HR: 0.9 INJECTION, SOLUTION INTRAVENOUS at 08:36

## 2022-10-06 NOTE — PROGRESS NOTES
Tolerated scheduled hydration without incident  PICC line flushes well  Aware of appt tomorrow  Interventional Radiology Follow- Up Note      44y Female s/p Percutaneous cholecystostomy  on ___6/9______ in Interventional Radiology with Dr. Vines.     Patient seen and examined @ bedside. Site c/d/i with 225CC/ 24 hr output. Cx results: no growth.    Pt reports 9/10 RUQ  abdominal pain improves with Dilaudid Denies n/v.   CT 6/11 shows retrogastric collection measures 11.4 x 7.3 x 22.6 cm, previously 14.6 x 10 x 23.1 cm.     TVitals: T(F): 98.7, Max: 99.7 (06-12 @ 01:06)  HR: 89 (81 - 90)  BP: 99/65 (99/65 - 110/73)  RR: 18 (16 - 18)  SpO2: 95% (93% - 95%)  Wt(kg): --    LABS:                        11.2   16.2  )-----------( 709      ( 12 Jun 2017 06:33 )             34.9     06-12    132<L>  |  93<L>  |  5<L>  ----------------------------<  122<H>  4.5   |  29  |  0.52    Ca    8.3<L>      12 Jun 2017 06:33  Phos  3.9     06-11  Mg     2.0     06-11    TPro  6.1  /  Alb  2.7<L>  /  TBili  0.5  /  DBili  x   /  AST  9<L>  /  ALT  7<L>  /  AlkPhos  65  06-12    PT/INR - ( 12 Jun 2017 06:33 )   PT: 14.3 sec;   INR: 1.32 ratio         PTT - ( 12 Jun 2017 06:33 )  PTT:31.2 sec        PHYSICAL EXAM:  General: Nontoxic, in NAD  Neuro:  Alert & oriented x 3  Abdomen: soft, NTND. drain bilious output.           Impression: 44y Female admitted with Acute cholecystitis s/p perch ubaldo with Ct showing abdominal collection        Plan:  - Ct guided drainage of abdominal collection  -NPO  - hold lovenox 40mg  -Flush drain per doctor orders  -trend vitals, labs  - d/w Dr. Vines and [facility] surgical team    Please call IR at extension [phone] with any questions, concerns, or issues regarding above. Interventional Radiology Follow- Up Note      44y Female s/p Percutaneous cholecystostomy  on ___6/9______ in Interventional Radiology with Dr. Vines.     Patient seen and examined @ bedside. Site c/d/i with 225CC/ 24 hr output. Cx results: no growth.    Pt reports 9/10 RUQ  abdominal pain improves with Dilaudid Denies n/v.   CT 6/11 shows retrogastric collection measures 11.4 x 7.3 x 22.6 cm, previously 14.6 x 10 x 23.1 cm.     TVitals: T(F): 98.7, Max: 99.7 (06-12 @ 01:06)  HR: 89 (81 - 90)  BP: 99/65 (99/65 - 110/73)  RR: 18 (16 - 18)  SpO2: 95% (93% - 95%)  Wt(kg): --    LABS:                        11.2   16.2  )-----------( 709      ( 12 Jun 2017 06:33 )             34.9     06-12    132<L>  |  93<L>  |  5<L>  ----------------------------<  122<H>  4.5   |  29  |  0.52    Ca    8.3<L>      12 Jun 2017 06:33  Phos  3.9     06-11  Mg     2.0     06-11    TPro  6.1  /  Alb  2.7<L>  /  TBili  0.5  /  DBili  x   /  AST  9<L>  /  ALT  7<L>  /  AlkPhos  65  06-12    PT/INR - ( 12 Jun 2017 06:33 )   PT: 14.3 sec;   INR: 1.32 ratio         PTT - ( 12 Jun 2017 06:33 )  PTT:31.2 sec        PHYSICAL EXAM:  General: Nontoxic, in NAD  Neuro:  Alert & oriented x 3  Abdomen: soft, NTND. drain bilious output.           Impression: 44y Female admitted with Acute cholecystitis s/p perch ubaldo with Ct showing abdominal collection        Plan:  - Ct guided drainage of abdominal collection  -NPO  - hold lovenox 40mg  - d/w Dr. Vines and Dr. More (surgical team).      Please call IR at extension [phone] with any questions, concerns, or issues regarding above.

## 2022-10-07 ENCOUNTER — HOSPITAL ENCOUNTER (OUTPATIENT)
Dept: INFUSION CENTER | Facility: HOSPITAL | Age: 67
End: 2022-10-07
Payer: MEDICARE

## 2022-10-07 VITALS
HEART RATE: 87 BPM | RESPIRATION RATE: 18 BRPM | TEMPERATURE: 99 F | DIASTOLIC BLOOD PRESSURE: 84 MMHG | SYSTOLIC BLOOD PRESSURE: 147 MMHG | OXYGEN SATURATION: 99 %

## 2022-10-07 PROCEDURE — 96360 HYDRATION IV INFUSION INIT: CPT

## 2022-10-07 PROCEDURE — 96361 HYDRATE IV INFUSION ADD-ON: CPT

## 2022-10-07 RX ORDER — SODIUM CHLORIDE 9 MG/ML
375 INJECTION, SOLUTION INTRAVENOUS ONCE
Status: COMPLETED | OUTPATIENT
Start: 2022-10-10 | End: 2022-10-10

## 2022-10-07 RX ADMIN — SODIUM CHLORIDE 375 ML/HR: 0.9 INJECTION, SOLUTION INTRAVENOUS at 08:27

## 2022-10-10 ENCOUNTER — HOSPITAL ENCOUNTER (OUTPATIENT)
Dept: INFUSION CENTER | Facility: HOSPITAL | Age: 67
Discharge: HOME | End: 2022-10-10
Payer: MEDICARE

## 2022-10-10 VITALS
TEMPERATURE: 96.8 F | HEART RATE: 86 BPM | SYSTOLIC BLOOD PRESSURE: 147 MMHG | DIASTOLIC BLOOD PRESSURE: 79 MMHG | RESPIRATION RATE: 16 BRPM

## 2022-10-10 PROCEDURE — 96361 HYDRATE IV INFUSION ADD-ON: CPT

## 2022-10-10 PROCEDURE — 96360 HYDRATION IV INFUSION INIT: CPT

## 2022-10-10 RX ADMIN — SODIUM CHLORIDE 375 ML/HR: 0.9 INJECTION, SOLUTION INTRAVENOUS at 08:54

## 2022-10-11 ENCOUNTER — APPOINTMENT (OUTPATIENT)
Dept: LAB | Facility: HOSPITAL | Age: 67
End: 2022-10-11
Attending: INTERNAL MEDICINE
Payer: MEDICARE

## 2022-10-11 ENCOUNTER — HOSPITAL ENCOUNTER (OUTPATIENT)
Dept: INFUSION CENTER | Facility: HOSPITAL | Age: 67
Discharge: HOME | End: 2022-10-11
Payer: MEDICARE

## 2022-10-11 VITALS
OXYGEN SATURATION: 99 % | HEART RATE: 80 BPM | RESPIRATION RATE: 16 BRPM | SYSTOLIC BLOOD PRESSURE: 152 MMHG | DIASTOLIC BLOOD PRESSURE: 82 MMHG | TEMPERATURE: 98.6 F

## 2022-10-11 DIAGNOSIS — I51.9 MYXEDEMA HEART DISEASE: ICD-10-CM

## 2022-10-11 DIAGNOSIS — E03.9 MYXEDEMA HEART DISEASE: ICD-10-CM

## 2022-10-11 DIAGNOSIS — R53.83 OTHER FATIGUE: ICD-10-CM

## 2022-10-11 LAB
ALBUMIN SERPL BCP-MCNC: 4.2 G/DL (ref 3.5–5)
ALP SERPL-CCNC: 111 U/L (ref 34–104)
ALT SERPL W P-5'-P-CCNC: 40 U/L (ref 7–52)
ANION GAP SERPL CALCULATED.3IONS-SCNC: 8 MMOL/L (ref 4–13)
AST SERPL W P-5'-P-CCNC: 28 U/L (ref 13–39)
BILIRUB SERPL-MCNC: 0.63 MG/DL (ref 0.2–1)
BUN SERPL-MCNC: 13 MG/DL (ref 5–25)
CALCIUM SERPL-MCNC: 9.5 MG/DL (ref 8.4–10.2)
CHLORIDE SERPL-SCNC: 104 MMOL/L (ref 96–108)
CO2 SERPL-SCNC: 29 MMOL/L (ref 21–32)
CREAT SERPL-MCNC: 1 MG/DL (ref 0.6–1.3)
ERYTHROCYTE [DISTWIDTH] IN BLOOD BY AUTOMATED COUNT: 12.5 % (ref 11.6–15.1)
GFR SERPL CREATININE-BSD FRML MDRD: 58 ML/MIN/1.73SQ M
GLUCOSE P FAST SERPL-MCNC: 84 MG/DL (ref 65–99)
HCT VFR BLD AUTO: 44.5 % (ref 34.8–46.1)
HGB BLD-MCNC: 14.4 G/DL (ref 11.5–15.4)
MCH RBC QN AUTO: 30.1 PG (ref 26.8–34.3)
MCHC RBC AUTO-ENTMCNC: 32.4 G/DL (ref 31.4–37.4)
MCV RBC AUTO: 93 FL (ref 82–98)
PLATELET # BLD AUTO: 256 THOUSANDS/UL (ref 149–390)
PMV BLD AUTO: 9.4 FL (ref 8.9–12.7)
POTASSIUM SERPL-SCNC: 3.9 MMOL/L (ref 3.5–5.3)
PROT SERPL-MCNC: 6.6 G/DL (ref 6.4–8.4)
RBC # BLD AUTO: 4.78 MILLION/UL (ref 3.81–5.12)
SODIUM SERPL-SCNC: 141 MMOL/L (ref 135–147)
T4 FREE SERPL-MCNC: 0.85 NG/DL (ref 0.76–1.46)
TSH SERPL DL<=0.05 MIU/L-ACNC: 3.23 UIU/ML (ref 0.45–4.5)
WBC # BLD AUTO: 6.92 THOUSAND/UL (ref 4.31–10.16)

## 2022-10-11 PROCEDURE — 96361 HYDRATE IV INFUSION ADD-ON: CPT

## 2022-10-11 PROCEDURE — 36415 COLL VENOUS BLD VENIPUNCTURE: CPT

## 2022-10-11 PROCEDURE — 84443 ASSAY THYROID STIM HORMONE: CPT

## 2022-10-11 PROCEDURE — 80053 COMPREHEN METABOLIC PANEL: CPT

## 2022-10-11 PROCEDURE — 96360 HYDRATION IV INFUSION INIT: CPT

## 2022-10-11 PROCEDURE — 85027 COMPLETE CBC AUTOMATED: CPT

## 2022-10-11 PROCEDURE — 84439 ASSAY OF FREE THYROXINE: CPT

## 2022-10-11 RX ADMIN — SODIUM CHLORIDE 1500 ML: 0.9 INJECTION, SOLUTION INTRAVENOUS at 08:27

## 2022-10-11 NOTE — PLAN OF CARE
Problem: Potential for Falls  Goal: Patient will remain free of falls  Description: INTERVENTIONS:  - Educate patient/family on patient safety including physical limitations  - Instruct patient to call for assistance with activity   - Consult OT/PT to assist with strengthening/mobility   - Keep Call bell within reach  - Keep chair locked  - Keep care items and personal belongings within reach    - Consider moving patient to room near nurses station  10/11/2022 0846 by Ruthann Zheng RN  Outcome: Progressing  10/11/2022 0845 by Ruthann Zheng RN  Outcome: Progressing     Problem: PAIN - ADULT  Goal: Verbalizes/displays adequate comfort level or baseline comfort level  Description: Interventions:  - Encourage patient to monitor pain and request assistance  - Assess pain using appropriate pain scale  - Administer analgesics based on type and severity of pain and evaluate response  - Implement non-pharmacological measures as appropriate and evaluate response  - Consider cultural and social influences on pain and pain management  - Notify physician/advanced practitioner if interventions unsuccessful or patient reports new pain  Outcome: Progressing     Problem: INFECTION - ADULT  Goal: Absence or prevention of progression during hospitalization  Description: INTERVENTIONS:  - Assess and monitor for signs and symptoms of infection  - Monitor lab/diagnostic results  - Monitor all insertion sites, i e  indwelling lines, tubes, and drains  - Monitor endotracheal if appropriate and nasal secretions for changes in amount and color  - [place] appropriate cooling/warming therapies per order  - Administer medications as ordered  - Instruct and encourage patient and family to use good hand hygiene technique  - Identify and instruct in appropriate isolation precautions for identified infection/condition  Outcome: Progressing     Problem: Knowledge Deficit  Goal: Patient/family/caregiver demonstrates understanding of disease process, treatment plan, medications, and discharge instructions  Description: Complete learning assessment and assess knowledge base    Interventions:  - Provide teaching at level of understanding  - Provide teaching via preferred learning methods  Outcome: Progressing

## 2022-10-12 PROBLEM — R05.9 COUGH: Status: RESOLVED | Noted: 2021-09-20 | Resolved: 2022-10-12

## 2022-10-13 RX ORDER — SODIUM CHLORIDE 9 MG/ML
375 INJECTION, SOLUTION INTRAVENOUS ONCE
Status: COMPLETED | OUTPATIENT
Start: 2022-10-14 | End: 2022-10-14

## 2022-10-14 ENCOUNTER — HOSPITAL ENCOUNTER (OUTPATIENT)
Dept: INFUSION CENTER | Facility: HOSPITAL | Age: 67
End: 2022-10-14
Payer: MEDICARE

## 2022-10-14 VITALS
RESPIRATION RATE: 16 BRPM | DIASTOLIC BLOOD PRESSURE: 72 MMHG | SYSTOLIC BLOOD PRESSURE: 145 MMHG | OXYGEN SATURATION: 100 % | HEART RATE: 81 BPM | TEMPERATURE: 97.8 F

## 2022-10-14 PROCEDURE — 96361 HYDRATE IV INFUSION ADD-ON: CPT

## 2022-10-14 PROCEDURE — 96360 HYDRATION IV INFUSION INIT: CPT

## 2022-10-14 RX ORDER — SODIUM CHLORIDE 9 MG/ML
375 INJECTION, SOLUTION INTRAVENOUS ONCE
Status: COMPLETED | OUTPATIENT
Start: 2022-10-17 | End: 2022-10-17

## 2022-10-14 RX ADMIN — SODIUM CHLORIDE 375 ML/HR: 0.9 INJECTION, SOLUTION INTRAVENOUS at 08:33

## 2022-10-14 NOTE — PROGRESS NOTES
Patient tolerated IV hydration without issues  AVS declined  Patient is aware of next appointment  Patient ambulated off unit without incident  All personal belongings taken with patient

## 2022-10-17 ENCOUNTER — HOSPITAL ENCOUNTER (OUTPATIENT)
Dept: INFUSION CENTER | Facility: HOSPITAL | Age: 67
Discharge: HOME | End: 2022-10-17
Payer: MEDICARE

## 2022-10-17 VITALS
TEMPERATURE: 97.1 F | RESPIRATION RATE: 16 BRPM | SYSTOLIC BLOOD PRESSURE: 144 MMHG | HEART RATE: 86 BPM | DIASTOLIC BLOOD PRESSURE: 69 MMHG

## 2022-10-17 PROCEDURE — 96361 HYDRATE IV INFUSION ADD-ON: CPT

## 2022-10-17 PROCEDURE — 96360 HYDRATION IV INFUSION INIT: CPT

## 2022-10-17 RX ORDER — SODIUM CHLORIDE 9 MG/ML
375 INJECTION, SOLUTION INTRAVENOUS ONCE
Status: COMPLETED | OUTPATIENT
Start: 2022-10-18 | End: 2022-10-18

## 2022-10-17 RX ADMIN — SODIUM CHLORIDE 375 ML/HR: 0.9 INJECTION, SOLUTION INTRAVENOUS at 08:08

## 2022-10-17 NOTE — PROGRESS NOTES
Pt tolerated todays hydration without incident  PICC line to right chest with good blood return  Dressing d&i   Discharged ambulatory deferring avs

## 2022-10-18 ENCOUNTER — HOSPITAL ENCOUNTER (OUTPATIENT)
Dept: INFUSION CENTER | Facility: HOSPITAL | Age: 67
Discharge: HOME | End: 2022-10-18
Payer: MEDICARE

## 2022-10-18 VITALS
HEART RATE: 80 BPM | SYSTOLIC BLOOD PRESSURE: 138 MMHG | TEMPERATURE: 97 F | DIASTOLIC BLOOD PRESSURE: 86 MMHG | RESPIRATION RATE: 18 BRPM

## 2022-10-18 PROCEDURE — 96360 HYDRATION IV INFUSION INIT: CPT

## 2022-10-18 PROCEDURE — 96361 HYDRATE IV INFUSION ADD-ON: CPT

## 2022-10-18 RX ADMIN — SODIUM CHLORIDE 375 ML/HR: 0.9 INJECTION, SOLUTION INTRAVENOUS at 08:08

## 2022-10-19 RX ORDER — SODIUM CHLORIDE 9 MG/ML
375 INJECTION, SOLUTION INTRAVENOUS ONCE
Status: COMPLETED | OUTPATIENT
Start: 2022-10-20 | End: 2022-10-20

## 2022-10-20 ENCOUNTER — HOSPITAL ENCOUNTER (OUTPATIENT)
Dept: INFUSION CENTER | Facility: HOSPITAL | Age: 67
End: 2022-10-20
Payer: MEDICARE

## 2022-10-20 VITALS
DIASTOLIC BLOOD PRESSURE: 83 MMHG | HEART RATE: 80 BPM | SYSTOLIC BLOOD PRESSURE: 135 MMHG | RESPIRATION RATE: 18 BRPM | TEMPERATURE: 97.8 F

## 2022-10-20 PROCEDURE — 96360 HYDRATION IV INFUSION INIT: CPT

## 2022-10-20 PROCEDURE — 96361 HYDRATE IV INFUSION ADD-ON: CPT

## 2022-10-20 RX ORDER — SODIUM CHLORIDE 9 MG/ML
375 INJECTION, SOLUTION INTRAVENOUS ONCE
Status: COMPLETED | OUTPATIENT
Start: 2022-10-21 | End: 2022-10-21

## 2022-10-20 RX ADMIN — SODIUM CHLORIDE 375 ML/HR: 0.9 INJECTION, SOLUTION INTRAVENOUS at 08:08

## 2022-10-20 NOTE — PROGRESS NOTES
Pt tolerated IV hydartion well without incident  Discharged in stable condition and pt aware of next infusion appointment  AVS declined

## 2022-10-21 ENCOUNTER — HOSPITAL ENCOUNTER (OUTPATIENT)
Dept: INFUSION CENTER | Facility: HOSPITAL | Age: 67
End: 2022-10-21
Payer: MEDICARE

## 2022-10-21 PROCEDURE — 96360 HYDRATION IV INFUSION INIT: CPT

## 2022-10-21 PROCEDURE — 96361 HYDRATE IV INFUSION ADD-ON: CPT

## 2022-10-21 RX ORDER — SODIUM CHLORIDE 9 MG/ML
375 INJECTION, SOLUTION INTRAVENOUS ONCE
Status: COMPLETED | OUTPATIENT
Start: 2022-10-24 | End: 2022-10-24

## 2022-10-21 RX ADMIN — SODIUM CHLORIDE 375 ML/HR: 0.9 INJECTION, SOLUTION INTRAVENOUS at 08:50

## 2022-10-21 NOTE — PROGRESS NOTES
Pt tolerated todays hydration without incident  PICC line to right chest with good blood return  q 7 day central dressing changed however pt refuses chlorahexadine dressing secondary to possible allergy  Sterile tegaderm Dressing d&i   Discharged ambulatory deferring avs

## 2022-10-24 ENCOUNTER — HOSPITAL ENCOUNTER (OUTPATIENT)
Dept: INFUSION CENTER | Facility: HOSPITAL | Age: 67
Discharge: HOME | End: 2022-10-24
Payer: MEDICARE

## 2022-10-24 VITALS
TEMPERATURE: 98.7 F | OXYGEN SATURATION: 99 % | SYSTOLIC BLOOD PRESSURE: 140 MMHG | HEART RATE: 82 BPM | RESPIRATION RATE: 18 BRPM | DIASTOLIC BLOOD PRESSURE: 71 MMHG

## 2022-10-24 RX ADMIN — SODIUM CHLORIDE 375 ML/HR: 0.9 INJECTION, SOLUTION INTRAVENOUS at 09:20

## 2022-10-24 NOTE — PROGRESS NOTES
Patient presented to unit for hydration and stated that she had scratched PICC dressing partially off while she was sleeping  Dressing changed

## 2022-10-25 RX ORDER — SODIUM CHLORIDE 9 MG/ML
375 INJECTION, SOLUTION INTRAVENOUS ONCE
Status: COMPLETED | OUTPATIENT
Start: 2022-10-26 | End: 2022-10-26

## 2022-10-26 ENCOUNTER — HOSPITAL ENCOUNTER (OUTPATIENT)
Dept: INFUSION CENTER | Facility: HOSPITAL | Age: 67
Discharge: HOME | End: 2022-10-26

## 2022-10-26 VITALS
HEART RATE: 90 BPM | SYSTOLIC BLOOD PRESSURE: 149 MMHG | DIASTOLIC BLOOD PRESSURE: 105 MMHG | RESPIRATION RATE: 16 BRPM | TEMPERATURE: 97.7 F | OXYGEN SATURATION: 100 %

## 2022-10-26 RX ORDER — TOBRAMYCIN AND DEXAMETHASONE 3; 1 MG/ML; MG/ML
1 SUSPENSION/ DROPS OPHTHALMIC 2 TIMES DAILY
COMMUNITY
Start: 2022-10-25

## 2022-10-26 RX ORDER — ERYTHROMYCIN 5 MG/G
1 OINTMENT OPHTHALMIC
COMMUNITY
Start: 2022-10-25

## 2022-10-26 RX ORDER — VARENICLINE 0.03 MG/.05ML
1 SPRAY NASAL 2 TIMES DAILY
COMMUNITY
Start: 2022-10-25 | End: 2023-01-23

## 2022-10-26 RX ORDER — SODIUM CHLORIDE 9 MG/ML
375 INJECTION, SOLUTION INTRAVENOUS ONCE
Status: COMPLETED | OUTPATIENT
Start: 2022-10-27 | End: 2022-10-27

## 2022-10-26 RX ADMIN — SODIUM CHLORIDE 375 ML/HR: 0.9 INJECTION, SOLUTION INTRAVENOUS at 09:31

## 2022-10-27 ENCOUNTER — HOSPITAL ENCOUNTER (OUTPATIENT)
Dept: INFUSION CENTER | Facility: HOSPITAL | Age: 67
End: 2022-10-27
Payer: MEDICARE

## 2022-10-27 VITALS
DIASTOLIC BLOOD PRESSURE: 87 MMHG | RESPIRATION RATE: 16 BRPM | OXYGEN SATURATION: 98 % | SYSTOLIC BLOOD PRESSURE: 159 MMHG | TEMPERATURE: 97.6 F | HEART RATE: 85 BPM

## 2022-10-27 RX ORDER — SODIUM CHLORIDE 9 MG/ML
375 INJECTION, SOLUTION INTRAVENOUS ONCE
Status: COMPLETED | OUTPATIENT
Start: 2022-10-28 | End: 2022-10-28

## 2022-10-27 RX ADMIN — SODIUM CHLORIDE 375 ML/HR: 0.9 INJECTION, SOLUTION INTRAVENOUS at 08:39

## 2022-10-28 ENCOUNTER — HOSPITAL ENCOUNTER (OUTPATIENT)
Dept: INFUSION CENTER | Facility: HOSPITAL | Age: 67
End: 2022-10-28
Payer: MEDICARE

## 2022-10-28 VITALS
TEMPERATURE: 97.8 F | DIASTOLIC BLOOD PRESSURE: 95 MMHG | SYSTOLIC BLOOD PRESSURE: 149 MMHG | HEART RATE: 76 BPM | RESPIRATION RATE: 18 BRPM

## 2022-10-28 RX ADMIN — SODIUM CHLORIDE 375 ML/HR: 0.9 INJECTION, SOLUTION INTRAVENOUS at 08:16

## 2022-10-31 ENCOUNTER — HOSPITAL ENCOUNTER (OUTPATIENT)
Dept: INFUSION CENTER | Facility: HOSPITAL | Age: 67
Discharge: HOME | End: 2022-10-31

## 2022-10-31 VITALS
OXYGEN SATURATION: 98 % | DIASTOLIC BLOOD PRESSURE: 82 MMHG | SYSTOLIC BLOOD PRESSURE: 152 MMHG | RESPIRATION RATE: 18 BRPM | TEMPERATURE: 97.4 F | HEART RATE: 80 BPM

## 2022-10-31 RX ADMIN — SODIUM CHLORIDE 1500 ML: 0.9 INJECTION, SOLUTION INTRAVENOUS at 09:41

## 2022-11-01 ENCOUNTER — HOSPITAL ENCOUNTER (OUTPATIENT)
Dept: INFUSION CENTER | Facility: HOSPITAL | Age: 67
Discharge: HOME | End: 2022-11-01

## 2022-11-01 VITALS
HEART RATE: 84 BPM | TEMPERATURE: 97.6 F | DIASTOLIC BLOOD PRESSURE: 79 MMHG | OXYGEN SATURATION: 99 % | SYSTOLIC BLOOD PRESSURE: 138 MMHG | RESPIRATION RATE: 18 BRPM

## 2022-11-01 RX ADMIN — SODIUM CHLORIDE 1500 ML: 0.9 INJECTION, SOLUTION INTRAVENOUS at 09:07

## 2022-11-01 NOTE — PROGRESS NOTES
Pt hydrated as per orders  No complaints offered today  PICC line dressing dry and intact  Flushed well  Good blood return noted

## 2022-11-02 RX ORDER — SODIUM CHLORIDE 9 MG/ML
375 INJECTION, SOLUTION INTRAVENOUS ONCE
Status: COMPLETED | OUTPATIENT
Start: 2022-11-03 | End: 2022-11-03

## 2022-11-03 ENCOUNTER — HOSPITAL ENCOUNTER (OUTPATIENT)
Dept: INFUSION CENTER | Facility: HOSPITAL | Age: 67
End: 2022-11-03

## 2022-11-03 VITALS
TEMPERATURE: 96.5 F | HEART RATE: 75 BPM | RESPIRATION RATE: 18 BRPM | DIASTOLIC BLOOD PRESSURE: 73 MMHG | OXYGEN SATURATION: 99 % | SYSTOLIC BLOOD PRESSURE: 145 MMHG

## 2022-11-03 RX ORDER — SODIUM CHLORIDE 9 MG/ML
375 INJECTION, SOLUTION INTRAVENOUS ONCE
Status: COMPLETED | OUTPATIENT
Start: 2022-11-04 | End: 2022-11-04

## 2022-11-03 RX ADMIN — SODIUM CHLORIDE 375 ML/HR: 0.9 INJECTION, SOLUTION INTRAVENOUS at 08:33

## 2022-11-03 NOTE — PROGRESS NOTES
Pt tolerated IV hydration well without incident  Mentioned that she is still speaking with the physician about possibly increasing the volume of fluids she is to receive with each treatment  AVS declined and pt aware of next infusion appointment  Discharged in stable condition

## 2022-11-04 ENCOUNTER — HOSPITAL ENCOUNTER (OUTPATIENT)
Dept: INFUSION CENTER | Facility: HOSPITAL | Age: 67
End: 2022-11-04

## 2022-11-04 VITALS
HEART RATE: 79 BPM | RESPIRATION RATE: 16 BRPM | DIASTOLIC BLOOD PRESSURE: 78 MMHG | TEMPERATURE: 97.3 F | SYSTOLIC BLOOD PRESSURE: 180 MMHG

## 2022-11-04 RX ORDER — SODIUM CHLORIDE 9 MG/ML
375 INJECTION, SOLUTION INTRAVENOUS ONCE
Status: DISCONTINUED | OUTPATIENT
Start: 2022-11-07 | End: 2022-11-07

## 2022-11-04 RX ORDER — SODIUM CHLORIDE 9 MG/ML
375 INJECTION, SOLUTION INTRAVENOUS ONCE
Status: DISCONTINUED | OUTPATIENT
Start: 2022-11-08 | End: 2022-11-07

## 2022-11-04 RX ADMIN — SODIUM CHLORIDE 375 ML/HR: 0.9 INJECTION, SOLUTION INTRAVENOUS at 08:17

## 2022-11-07 ENCOUNTER — HOSPITAL ENCOUNTER (OUTPATIENT)
Dept: INFUSION CENTER | Facility: HOSPITAL | Age: 67
Discharge: HOME | End: 2022-11-07
Attending: INTERNAL MEDICINE

## 2022-11-07 VITALS
OXYGEN SATURATION: 98 % | RESPIRATION RATE: 18 BRPM | HEART RATE: 91 BPM | DIASTOLIC BLOOD PRESSURE: 87 MMHG | SYSTOLIC BLOOD PRESSURE: 171 MMHG | TEMPERATURE: 97.4 F

## 2022-11-07 RX ORDER — SODIUM CHLORIDE 9 MG/ML
333.33 INJECTION, SOLUTION INTRAVENOUS ONCE
Status: COMPLETED | OUTPATIENT
Start: 2022-11-08 | End: 2022-11-08

## 2022-11-07 RX ORDER — SODIUM CHLORIDE 9 MG/ML
333.33 INJECTION, SOLUTION INTRAVENOUS ONCE
Status: COMPLETED | OUTPATIENT
Start: 2022-11-07 | End: 2022-11-07

## 2022-11-07 RX ADMIN — SODIUM CHLORIDE 333.33 ML/HR: 0.9 INJECTION, SOLUTION INTRAVENOUS at 09:36

## 2022-11-07 NOTE — PROGRESS NOTES
Patient tolerated IV hydration without issues  AVS declined  Patient is aware  of appointment time tomorrow  Patient ambulated off unit without incident  All personal belongings taken with patient 
no rub/regular rate and rhythm

## 2022-11-08 ENCOUNTER — HOSPITAL ENCOUNTER (OUTPATIENT)
Dept: INFUSION CENTER | Facility: HOSPITAL | Age: 67
Discharge: HOME | End: 2022-11-08
Attending: INTERNAL MEDICINE

## 2022-11-08 VITALS
RESPIRATION RATE: 18 BRPM | TEMPERATURE: 97.2 F | DIASTOLIC BLOOD PRESSURE: 88 MMHG | SYSTOLIC BLOOD PRESSURE: 171 MMHG | HEART RATE: 76 BPM

## 2022-11-08 RX ADMIN — SODIUM CHLORIDE 333.33 ML/HR: 0.9 INJECTION, SOLUTION INTRAVENOUS at 09:32

## 2022-11-09 RX ORDER — SODIUM CHLORIDE 9 MG/ML
333.33 INJECTION, SOLUTION INTRAVENOUS ONCE
Status: COMPLETED | OUTPATIENT
Start: 2022-11-10 | End: 2022-11-10

## 2022-11-10 ENCOUNTER — HOSPITAL ENCOUNTER (OUTPATIENT)
Dept: INFUSION CENTER | Facility: HOSPITAL | Age: 67
End: 2022-11-10
Attending: INTERNAL MEDICINE

## 2022-11-10 VITALS
RESPIRATION RATE: 18 BRPM | OXYGEN SATURATION: 98 % | HEART RATE: 83 BPM | DIASTOLIC BLOOD PRESSURE: 91 MMHG | SYSTOLIC BLOOD PRESSURE: 151 MMHG | TEMPERATURE: 98.4 F

## 2022-11-10 RX ORDER — SODIUM CHLORIDE 9 MG/ML
333.33 INJECTION, SOLUTION INTRAVENOUS ONCE
Status: COMPLETED | OUTPATIENT
Start: 2022-11-11 | End: 2022-11-11

## 2022-11-10 RX ADMIN — SODIUM CHLORIDE 333.33 ML/HR: 0.9 INJECTION, SOLUTION INTRAVENOUS at 09:10

## 2022-11-11 ENCOUNTER — HOSPITAL ENCOUNTER (OUTPATIENT)
Dept: INFUSION CENTER | Facility: HOSPITAL | Age: 67
End: 2022-11-11
Attending: INTERNAL MEDICINE

## 2022-11-11 VITALS
HEART RATE: 87 BPM | DIASTOLIC BLOOD PRESSURE: 96 MMHG | RESPIRATION RATE: 18 BRPM | SYSTOLIC BLOOD PRESSURE: 163 MMHG | TEMPERATURE: 98.2 F | OXYGEN SATURATION: 98 %

## 2022-11-11 RX ORDER — SODIUM CHLORIDE 9 MG/ML
333.33 INJECTION, SOLUTION INTRAVENOUS ONCE
Status: COMPLETED | OUTPATIENT
Start: 2022-11-14 | End: 2022-11-14

## 2022-11-11 RX ADMIN — SODIUM CHLORIDE 333.33 ML/HR: 0.9 INJECTION, SOLUTION INTRAVENOUS at 09:10

## 2022-11-11 NOTE — PLAN OF CARE
Problem: INFECTION - ADULT  Goal: Absence or prevention of progression during hospitalization  Description: INTERVENTIONS:  - Assess and monitor for signs and symptoms of infection  - Monitor lab/diagnostic results  - Monitor all insertion sites, i e  indwelling lines, tubes, and drains  - Monitor endotracheal if appropriate and nasal secretions for changes in amount and color  - [place] appropriate cooling/warming therapies per order  - Administer medications as ordered  - Instruct and encourage patient and family to use good hand hygiene technique  - Identify and instruct in appropriate isolation precautions for identified infection/condition  Outcome: Progressing     Problem: Knowledge Deficit  Goal: Patient/family/caregiver demonstrates understanding of disease process, treatment plan, medications, and discharge instructions  Description: Complete learning assessment and assess knowledge base    Interventions:  - Provide teaching at level of understanding  - Provide teaching via preferred learning methods  Outcome: Progressing

## 2022-11-14 ENCOUNTER — HOSPITAL ENCOUNTER (OUTPATIENT)
Facility: HOSPITAL | Age: 67
Setting detail: OBSERVATION
Discharge: HOME | End: 2022-11-15
Attending: EMERGENCY MEDICINE | Admitting: HOSPITALIST

## 2022-11-14 ENCOUNTER — HOSPITAL ENCOUNTER (OUTPATIENT)
Dept: INFUSION CENTER | Facility: HOSPITAL | Age: 67
Discharge: HOME | End: 2022-11-14
Attending: INTERNAL MEDICINE

## 2022-11-14 VITALS
HEART RATE: 80 BPM | RESPIRATION RATE: 18 BRPM | DIASTOLIC BLOOD PRESSURE: 100 MMHG | SYSTOLIC BLOOD PRESSURE: 169 MMHG | TEMPERATURE: 97.9 F

## 2022-11-14 DIAGNOSIS — L03.90 CELLULITIS: Primary | ICD-10-CM

## 2022-11-14 PROBLEM — H01.003 BLEPHARITIS OF BOTH EYES: Status: ACTIVE | Noted: 2021-03-09

## 2022-11-14 PROBLEM — E06.3 HASHIMOTO'S DISEASE: Status: ACTIVE | Noted: 2019-02-01

## 2022-11-14 PROBLEM — G43.009 MIGRAINE WITHOUT AURA AND WITHOUT STATUS MIGRAINOSUS, NOT INTRACTABLE: Status: ACTIVE | Noted: 2019-05-06

## 2022-11-14 PROBLEM — N17.9 AKI (ACUTE KIDNEY INJURY) (HCC): Status: RESOLVED | Noted: 2022-09-02 | Resolved: 2022-11-14

## 2022-11-14 PROBLEM — Z47.89 AFTERCARE FOLLOWING SURGERY OF THE MUSCULOSKELETAL SYSTEM: Status: RESOLVED | Noted: 2022-04-11 | Resolved: 2022-11-14

## 2022-11-14 PROBLEM — M41.20 SCOLIOSIS (AND KYPHOSCOLIOSIS), IDIOPATHIC: Status: ACTIVE | Noted: 2019-11-25

## 2022-11-14 PROBLEM — H01.006 BLEPHARITIS OF BOTH EYES: Status: ACTIVE | Noted: 2021-03-09

## 2022-11-14 LAB
ALBUMIN SERPL BCP-MCNC: 4.6 G/DL (ref 3.5–5)
ALP SERPL-CCNC: 113 U/L (ref 34–104)
ALT SERPL W P-5'-P-CCNC: 36 U/L (ref 7–52)
ANION GAP SERPL CALCULATED.3IONS-SCNC: 6 MMOL/L (ref 4–13)
APTT PPP: 25 SECONDS (ref 23–37)
AST SERPL W P-5'-P-CCNC: 28 U/L (ref 13–39)
BASOPHILS # BLD AUTO: 0.04 THOUSANDS/ÂΜL (ref 0–0.1)
BASOPHILS NFR BLD AUTO: 1 % (ref 0–1)
BILIRUB SERPL-MCNC: 0.66 MG/DL (ref 0.2–1)
BUN SERPL-MCNC: 13 MG/DL (ref 5–25)
CALCIUM SERPL-MCNC: 9.4 MG/DL (ref 8.4–10.2)
CHLORIDE SERPL-SCNC: 104 MMOL/L (ref 96–108)
CO2 SERPL-SCNC: 28 MMOL/L (ref 21–32)
CREAT SERPL-MCNC: 0.9 MG/DL (ref 0.6–1.3)
EOSINOPHIL # BLD AUTO: 0.16 THOUSAND/ÂΜL (ref 0–0.61)
EOSINOPHIL NFR BLD AUTO: 2 % (ref 0–6)
ERYTHROCYTE [DISTWIDTH] IN BLOOD BY AUTOMATED COUNT: 13.1 % (ref 11.6–15.1)
GFR SERPL CREATININE-BSD FRML MDRD: 65 ML/MIN/1.73SQ M
GLUCOSE SERPL-MCNC: 89 MG/DL (ref 65–140)
HCT VFR BLD AUTO: 45.7 % (ref 34.8–46.1)
HGB BLD-MCNC: 14.8 G/DL (ref 11.5–15.4)
IMM GRANULOCYTES # BLD AUTO: 0.03 THOUSAND/UL (ref 0–0.2)
IMM GRANULOCYTES NFR BLD AUTO: 0 % (ref 0–2)
INR PPP: 0.93 (ref 0.84–1.19)
LACTATE SERPL-SCNC: 0.8 MMOL/L (ref 0.5–2)
LYMPHOCYTES # BLD AUTO: 2.51 THOUSANDS/ÂΜL (ref 0.6–4.47)
LYMPHOCYTES NFR BLD AUTO: 34 % (ref 14–44)
MCH RBC QN AUTO: 29.7 PG (ref 26.8–34.3)
MCHC RBC AUTO-ENTMCNC: 32.4 G/DL (ref 31.4–37.4)
MCV RBC AUTO: 92 FL (ref 82–98)
MONOCYTES # BLD AUTO: 0.6 THOUSAND/ÂΜL (ref 0.17–1.22)
MONOCYTES NFR BLD AUTO: 8 % (ref 4–12)
NEUTROPHILS # BLD AUTO: 4.1 THOUSANDS/ÂΜL (ref 1.85–7.62)
NEUTS SEG NFR BLD AUTO: 55 % (ref 43–75)
NRBC BLD AUTO-RTO: 0 /100 WBCS
PLATELET # BLD AUTO: 259 THOUSANDS/UL (ref 149–390)
PMV BLD AUTO: 9.3 FL (ref 8.9–12.7)
POTASSIUM SERPL-SCNC: 3.7 MMOL/L (ref 3.5–5.3)
PROCALCITONIN SERPL-MCNC: <0.05 NG/ML
PROT SERPL-MCNC: 7.1 G/DL (ref 6.4–8.4)
PROTHROMBIN TIME: 12.4 SECONDS (ref 11.6–14.5)
RBC # BLD AUTO: 4.98 MILLION/UL (ref 3.81–5.12)
SODIUM SERPL-SCNC: 138 MMOL/L (ref 135–147)
WBC # BLD AUTO: 7.44 THOUSAND/UL (ref 4.31–10.16)

## 2022-11-14 RX ORDER — DOCUSATE SODIUM 100 MG/1
100 CAPSULE, LIQUID FILLED ORAL DAILY PRN
Status: DISCONTINUED | OUTPATIENT
Start: 2022-11-14 | End: 2022-11-14

## 2022-11-14 RX ORDER — ACETAMINOPHEN 325 MG/1
650 TABLET ORAL EVERY 6 HOURS PRN
Status: DISCONTINUED | OUTPATIENT
Start: 2022-11-14 | End: 2022-11-15 | Stop reason: HOSPADM

## 2022-11-14 RX ORDER — PANTOPRAZOLE SODIUM 40 MG/1
40 TABLET, DELAYED RELEASE ORAL
Status: DISCONTINUED | OUTPATIENT
Start: 2022-11-15 | End: 2022-11-14

## 2022-11-14 RX ORDER — BUDESONIDE 0.5 MG/2ML
0.5 INHALANT ORAL 2 TIMES DAILY
Status: DISCONTINUED | OUTPATIENT
Start: 2022-11-14 | End: 2022-11-15 | Stop reason: HOSPADM

## 2022-11-14 RX ORDER — CEFEPIME HYDROCHLORIDE 2 G/50ML
2000 INJECTION, SOLUTION INTRAVENOUS ONCE
Status: COMPLETED | OUTPATIENT
Start: 2022-11-14 | End: 2022-11-14

## 2022-11-14 RX ORDER — CYCLOBENZAPRINE HCL 10 MG
5 TABLET ORAL 2 TIMES DAILY PRN
Status: DISCONTINUED | OUTPATIENT
Start: 2022-11-14 | End: 2022-11-14

## 2022-11-14 RX ORDER — HYDROCODONE BITARTRATE AND ACETAMINOPHEN 5; 325 MG/1; MG/1
1 TABLET ORAL 2 TIMES DAILY PRN
Status: DISCONTINUED | OUTPATIENT
Start: 2022-11-14 | End: 2022-11-15 | Stop reason: HOSPADM

## 2022-11-14 RX ORDER — CHLORAL HYDRATE 500 MG
1000 CAPSULE ORAL 2 TIMES DAILY
Status: DISCONTINUED | OUTPATIENT
Start: 2022-11-14 | End: 2022-11-15 | Stop reason: HOSPADM

## 2022-11-14 RX ORDER — ONDANSETRON 2 MG/ML
4 INJECTION INTRAMUSCULAR; INTRAVENOUS EVERY 6 HOURS PRN
Status: DISCONTINUED | OUTPATIENT
Start: 2022-11-14 | End: 2022-11-15 | Stop reason: HOSPADM

## 2022-11-14 RX ORDER — LEVALBUTEROL 1.25 MG/.5ML
1.25 SOLUTION, CONCENTRATE RESPIRATORY (INHALATION) EVERY 6 HOURS PRN
Status: DISCONTINUED | OUTPATIENT
Start: 2022-11-14 | End: 2022-11-15 | Stop reason: HOSPADM

## 2022-11-14 RX ORDER — AZELASTINE 1 MG/ML
2 SPRAY, METERED NASAL 2 TIMES DAILY
Status: DISCONTINUED | OUTPATIENT
Start: 2022-11-14 | End: 2022-11-15 | Stop reason: HOSPADM

## 2022-11-14 RX ORDER — OXYBUTYNIN CHLORIDE 5 MG/1
2.5 TABLET ORAL 2 TIMES DAILY
Status: DISCONTINUED | OUTPATIENT
Start: 2022-11-14 | End: 2022-11-15 | Stop reason: HOSPADM

## 2022-11-14 RX ORDER — VANCOMYCIN HYDROCHLORIDE 500 MG/100ML
500 INJECTION, SOLUTION INTRAVENOUS EVERY 12 HOURS
Status: DISCONTINUED | OUTPATIENT
Start: 2022-11-15 | End: 2022-11-15 | Stop reason: HOSPADM

## 2022-11-14 RX ORDER — MAGNESIUM HYDROXIDE/ALUMINUM HYDROXICE/SIMETHICONE 120; 1200; 1200 MG/30ML; MG/30ML; MG/30ML
5 SUSPENSION ORAL EVERY 4 HOURS PRN
Status: DISCONTINUED | OUTPATIENT
Start: 2022-11-14 | End: 2022-11-15 | Stop reason: HOSPADM

## 2022-11-14 RX ORDER — HYDROXYZINE HYDROCHLORIDE 10 MG/1
20 TABLET, FILM COATED ORAL
Status: DISCONTINUED | OUTPATIENT
Start: 2022-11-14 | End: 2022-11-15 | Stop reason: HOSPADM

## 2022-11-14 RX ORDER — CEFEPIME HYDROCHLORIDE 2 G/50ML
2000 INJECTION, SOLUTION INTRAVENOUS EVERY 12 HOURS
Status: DISCONTINUED | OUTPATIENT
Start: 2022-11-15 | End: 2022-11-15 | Stop reason: HOSPADM

## 2022-11-14 RX ORDER — FLUTICASONE PROPIONATE 50 MCG
2 SPRAY, SUSPENSION (ML) NASAL DAILY
Status: DISCONTINUED | OUTPATIENT
Start: 2022-11-15 | End: 2022-11-15 | Stop reason: HOSPADM

## 2022-11-14 RX ORDER — HYDROCODONE BITARTRATE AND ACETAMINOPHEN 5; 325 MG/1; MG/1
1 TABLET ORAL ONCE
Status: COMPLETED | OUTPATIENT
Start: 2022-11-14 | End: 2022-11-14

## 2022-11-14 RX ORDER — MELATONIN
1000 DAILY
Status: DISCONTINUED | OUTPATIENT
Start: 2022-11-15 | End: 2022-11-15 | Stop reason: HOSPADM

## 2022-11-14 RX ORDER — CYCLOBENZAPRINE HCL 10 MG
5 TABLET ORAL 2 TIMES DAILY
Status: DISCONTINUED | OUTPATIENT
Start: 2022-11-14 | End: 2022-11-15 | Stop reason: HOSPADM

## 2022-11-14 RX ORDER — POLYETHYLENE GLYCOL 3350 17 G/17G
17 POWDER, FOR SOLUTION ORAL DAILY PRN
Status: DISCONTINUED | OUTPATIENT
Start: 2022-11-14 | End: 2022-11-15 | Stop reason: HOSPADM

## 2022-11-14 RX ORDER — MIDODRINE HYDROCHLORIDE 5 MG/1
2.5 TABLET ORAL
Status: DISCONTINUED | OUTPATIENT
Start: 2022-11-15 | End: 2022-11-15 | Stop reason: HOSPADM

## 2022-11-14 RX ORDER — SODIUM CHLORIDE 9 MG/ML
333.33 INJECTION, SOLUTION INTRAVENOUS ONCE
Status: DISCONTINUED | OUTPATIENT
Start: 2022-11-15 | End: 2022-11-19 | Stop reason: HOSPADM

## 2022-11-14 RX ORDER — THYROID,PORK 100 % USP
32 POWDER (GRAM) MISCELLANEOUS DAILY
Status: DISCONTINUED | OUTPATIENT
Start: 2022-11-15 | End: 2022-11-15 | Stop reason: HOSPADM

## 2022-11-14 RX ORDER — IPRATROPIUM BROMIDE 21 UG/1
2 SPRAY, METERED NASAL 4 TIMES DAILY PRN
Status: DISCONTINUED | OUTPATIENT
Start: 2022-11-14 | End: 2022-11-14

## 2022-11-14 RX ORDER — FAMOTIDINE 20 MG/1
20 TABLET, FILM COATED ORAL DAILY
Status: DISCONTINUED | OUTPATIENT
Start: 2022-11-15 | End: 2022-11-15 | Stop reason: HOSPADM

## 2022-11-14 RX ORDER — HEPARIN SODIUM 5000 [USP'U]/ML
5000 INJECTION, SOLUTION INTRAVENOUS; SUBCUTANEOUS EVERY 8 HOURS SCHEDULED
Status: DISCONTINUED | OUTPATIENT
Start: 2022-11-14 | End: 2022-11-15 | Stop reason: HOSPADM

## 2022-11-14 RX ORDER — LORATADINE 10 MG/1
10 TABLET ORAL DAILY
Status: DISCONTINUED | OUTPATIENT
Start: 2022-11-15 | End: 2022-11-15 | Stop reason: HOSPADM

## 2022-11-14 RX ORDER — FLUTICASONE PROPIONATE 44 UG/1
1 AEROSOL, METERED RESPIRATORY (INHALATION) EVERY 12 HOURS SCHEDULED
Status: DISCONTINUED | OUTPATIENT
Start: 2022-11-14 | End: 2022-11-14

## 2022-11-14 RX ADMIN — CEFEPIME HYDROCHLORIDE 2000 MG: 2 INJECTION, SOLUTION INTRAVENOUS at 18:42

## 2022-11-14 RX ADMIN — VANCOMYCIN HYDROCHLORIDE 1250 MG: 1 INJECTION, POWDER, LYOPHILIZED, FOR SOLUTION INTRAVENOUS at 19:23

## 2022-11-14 RX ADMIN — SODIUM CHLORIDE 333.33 ML/HR: 0.9 INJECTION, SOLUTION INTRAVENOUS at 10:05

## 2022-11-14 RX ADMIN — HYDROCODONE BITARTRATE AND ACETAMINOPHEN 1 TABLET: 5; 325 TABLET ORAL at 19:54

## 2022-11-14 NOTE — PROGRESS NOTES
Patient presented to unit for hydration  PICC dressing removed for weekly dressing change  Small amount of dried, brown/beige colored drainage on old dressing from insertion site  Insertion site of PICC is pink  Patient states that she has not been running any fevers and is afebrile today  Call made to Dr Xiomara Fernández office and spoke to [facility] regarding above  Awaiting return call from Dr Xiomara Fernández office  Peripheral IV placed for today's hydration

## 2022-11-14 NOTE — PROGRESS NOTES
Patient stated that she just received a call from Dr Reis Sis  He did not want her to use her PICC line and told patient to go to the ER to have it evaluated  Patient is agreeable to this and will go to the ER today after hydration is complete

## 2022-11-15 ENCOUNTER — HOSPITAL ENCOUNTER (OUTPATIENT)
Dept: INFUSION CENTER | Facility: HOSPITAL | Age: 67
Discharge: HOME | End: 2022-11-15
Attending: INTERNAL MEDICINE

## 2022-11-15 VITALS
DIASTOLIC BLOOD PRESSURE: 107 MMHG | HEART RATE: 98 BPM | TEMPERATURE: 98.6 F | BODY MASS INDEX: 21.71 KG/M2 | SYSTOLIC BLOOD PRESSURE: 181 MMHG | OXYGEN SATURATION: 96 % | RESPIRATION RATE: 19 BRPM | WEIGHT: 115 LBS | HEIGHT: 61 IN

## 2022-11-15 PROBLEM — L03.90 CELLULITIS: Status: ACTIVE | Noted: 2022-11-15

## 2022-11-15 LAB
ANION GAP SERPL CALCULATED.3IONS-SCNC: 7 MMOL/L (ref 4–13)
BASOPHILS # BLD AUTO: 0.06 THOUSANDS/ÂΜL (ref 0–0.1)
BASOPHILS NFR BLD AUTO: 1 % (ref 0–1)
BUN SERPL-MCNC: 9 MG/DL (ref 5–25)
CALCIUM SERPL-MCNC: 9.4 MG/DL (ref 8.4–10.2)
CHLORIDE SERPL-SCNC: 105 MMOL/L (ref 96–108)
CO2 SERPL-SCNC: 28 MMOL/L (ref 21–32)
CREAT SERPL-MCNC: 0.87 MG/DL (ref 0.6–1.3)
EOSINOPHIL # BLD AUTO: 0.27 THOUSAND/ÂΜL (ref 0–0.61)
EOSINOPHIL NFR BLD AUTO: 3 % (ref 0–6)
ERYTHROCYTE [DISTWIDTH] IN BLOOD BY AUTOMATED COUNT: 13 % (ref 11.6–15.1)
GFR SERPL CREATININE-BSD FRML MDRD: 68 ML/MIN/1.73SQ M
GLUCOSE SERPL-MCNC: 91 MG/DL (ref 65–140)
HCT VFR BLD AUTO: 44.4 % (ref 34.8–46.1)
HGB BLD-MCNC: 14.3 G/DL (ref 11.5–15.4)
IMM GRANULOCYTES # BLD AUTO: 0.03 THOUSAND/UL (ref 0–0.2)
IMM GRANULOCYTES NFR BLD AUTO: 0 % (ref 0–2)
LYMPHOCYTES # BLD AUTO: 3.32 THOUSANDS/ÂΜL (ref 0.6–4.47)
LYMPHOCYTES NFR BLD AUTO: 39 % (ref 14–44)
MAGNESIUM SERPL-MCNC: 2 MG/DL (ref 1.9–2.7)
MCH RBC QN AUTO: 29.6 PG (ref 26.8–34.3)
MCHC RBC AUTO-ENTMCNC: 32.2 G/DL (ref 31.4–37.4)
MCV RBC AUTO: 92 FL (ref 82–98)
MONOCYTES # BLD AUTO: 0.73 THOUSAND/ÂΜL (ref 0.17–1.22)
MONOCYTES NFR BLD AUTO: 9 % (ref 4–12)
NEUTROPHILS # BLD AUTO: 4.03 THOUSANDS/ÂΜL (ref 1.85–7.62)
NEUTS SEG NFR BLD AUTO: 48 % (ref 43–75)
NRBC BLD AUTO-RTO: 0 /100 WBCS
PHOSPHATE SERPL-MCNC: 3.6 MG/DL (ref 2.3–4.1)
PLATELET # BLD AUTO: 246 THOUSANDS/UL (ref 149–390)
PMV BLD AUTO: 9.2 FL (ref 8.9–12.7)
POTASSIUM SERPL-SCNC: 3.9 MMOL/L (ref 3.5–5.3)
RBC # BLD AUTO: 4.83 MILLION/UL (ref 3.81–5.12)
SODIUM SERPL-SCNC: 140 MMOL/L (ref 135–147)
WBC # BLD AUTO: 8.44 THOUSAND/UL (ref 4.31–10.16)

## 2022-11-15 RX ORDER — CEFDINIR 300 MG/1
300 CAPSULE ORAL EVERY 12 HOURS SCHEDULED
Qty: 10 CAPSULE | Refills: 0 | Status: SHIPPED | OUTPATIENT
Start: 2022-11-15 | End: 2022-11-20

## 2022-11-15 RX ADMIN — CYCLOBENZAPRINE HYDROCHLORIDE 5 MG: 10 TABLET, FILM COATED ORAL at 01:42

## 2022-11-15 RX ADMIN — HYDROCODONE BITARTRATE AND ACETAMINOPHEN 1 TABLET: 5; 325 TABLET ORAL at 08:24

## 2022-11-15 RX ADMIN — VANCOMYCIN HYDROCHLORIDE 500 MG: 500 INJECTION, SOLUTION INTRAVENOUS at 08:14

## 2022-11-15 RX ADMIN — Medication 1000 UNITS: at 08:14

## 2022-11-15 RX ADMIN — FLUTICASONE PROPIONATE 2 SPRAY: 50 SPRAY, METERED NASAL at 08:20

## 2022-11-15 RX ADMIN — FAMOTIDINE 20 MG: 20 TABLET, FILM COATED ORAL at 08:14

## 2022-11-15 RX ADMIN — CYCLOBENZAPRINE HYDROCHLORIDE 5 MG: 10 TABLET, FILM COATED ORAL at 08:15

## 2022-11-15 RX ADMIN — MAGNESIUM OXIDE TAB 400 MG (241.3 MG ELEMENTAL MG) 400 MG: 400 (241.3 MG) TAB at 08:14

## 2022-11-15 RX ADMIN — AZELASTINE HYDROCHLORIDE 2 SPRAY: 137 SPRAY, METERED NASAL at 08:24

## 2022-11-15 RX ADMIN — MAGNESIUM OXIDE TAB 400 MG (241.3 MG ELEMENTAL MG) 400 MG: 400 (241.3 MG) TAB at 00:24

## 2022-11-15 RX ADMIN — OMEGA-3 FATTY ACIDS CAP 1000 MG 1000 MG: 1000 CAP at 08:16

## 2022-11-15 RX ADMIN — CEFEPIME HYDROCHLORIDE 2000 MG: 2 INJECTION, SOLUTION INTRAVENOUS at 07:29

## 2022-11-15 RX ADMIN — OMEGA-3 FATTY ACIDS CAP 1000 MG 1000 MG: 1000 CAP at 00:28

## 2022-11-15 RX ADMIN — AZELASTINE HYDROCHLORIDE 2 SPRAY: 137 SPRAY, METERED NASAL at 00:23

## 2022-11-15 RX ADMIN — HYDROXYZINE HYDROCHLORIDE 20 MG: 10 TABLET ORAL at 00:27

## 2022-11-15 NOTE — CONSULTS
Consultation - Interventional Radiology  Larissa Gonzalez 75 y o  female MRN: [id number]  Unit/Bed#: ED 22 Encounter: [id number]        Consults    ASSESSMENT/PLAN:    58-year-old with pots syndrome managed with infusions 3-4 days per week    She has had several arm accesses and tunneled central venous catheters    This most recent tunnel central venous catheter was placed over the summer  It has functioned well    Over the last week the infusion center team is become more concerned because there is a small amount of fluid discharge from the site  There is also mild tenderness along the tract  Interventional radiology was contacted yesterday and the patient was then sent to the emergency room  She does have a history of catheter infection with a different catheter site and also cellulitis treated with antibiotics  I discussed with the patient the overall requirement that she has long-term venous access and the difficulties this introduces with regards to venous preservation as well as recurrent infection  Options include  - simply removing the catheter  I do not recommend this given she requires continued access  - exchange over a wire  This is technically straightforward but may not be indicated  - antibiotics only    Blood cultures have been drawn and are pending  She reports low-grade fever of 99 at home but has not had fever here and she also reports weakness which unfortunately is nonspecific  This is unlike her prior presentation with sepsis  Antibiotics have been started and I believe we should continue the antibiotics and follow the tunnel site  If the tunnel is infected unfortunately exchange over wire is not very helpful and we will have to remove the catheter    If blood cultures come back with bacteremia then we will need to determine the organism to decide on management options  Hopefully this will not be the case      She also has multiple medical conditions and innumerable drug allergies but thankfully can tolerate placement or exchange with local    After discussion with the patient in the primary service I recommend:  - continue antibiotics (cover prior organism Pseudomonas)  - no catheter manipulation at this time  - await blood cultures  - the catheter is okay to use  - I will schedule outpatient site check with interventional radiology next week to follow local evolution    Medical Problems             Problem List     * (Principal) Cellulitis    LBBB (left bundle branch block)    Fibromyalgia    Postural orthostatic tachycardia syndrome (Chronic)    Sleep-related breathing disorder    Other insomnia    Restless leg syndrome    Bruxism    Anxiety and depression (Chronic)    Chronic rhinitis    Moderate persistent asthma (Chronic)    Gastroesophageal reflux disease without esophagitis (Chronic)    Chronic pain disorder    Factor V Leiden mutation (HCC)    Hypothyroidism (Chronic)    Weakness    Traumatic tear of supraspinatus tendon of right shoulder    Sicca syndrome (HCC)    History of lumbar surgery    Generalized pain    Myofascial pain syndrome    Arachnoiditis    Cervical radiculopathy    Chronic bilateral low back pain with bilateral sciatica    Lumbar radiculopathy    Encounter for long-term opiate analgesic use    Uncomplicated opioid dependence ([facility] )    Cardiomyopathy, unspecified type ([facility] )    Stage 3a chronic kidney disease ([facility] )    Lab Results   Component Value Date    EGFR 68 11/15/2022    EGFR 65 11/14/2022    EGFR 58 10/11/2022    CREATININE 0 87 11/15/2022    CREATININE 0 90 11/14/2022    CREATININE 1 00 10/11/2022         Urinary incontinence    Abnormal electrocardiography    Abnormal TSH    Asthma    Biliary dyskinesia    Blepharitis of both eyes    Degeneration of intervertebral disc    Diverticulosis of colon    Hashimoto's disease    Herniated nucleus pulposus, L3-4    Hyperlipidemia    Irritable bowel syndrome    Migraine without aura and without status migrainosus, not intractable    Mitral valve prolapse    Obstructive sleep apnea syndrome    Osteoporosis    Overview Signed 11/14/2022  8:55 PM by Sharri Holley PA-C     Transitioned From: Osteopenia         Rheumatic fever with cardiac involvement    Scoliosis (and kyphoscoliosis), idiopathic    Sjogren's syndrome ([facility] )    Osteoarthritis    TMJ (temporomandibular joint syndrome)    Vitamin D deficiency    Chronic interstitial cystitis    Radiculopathy, multiple sites in spine    Primary immunodeficiency syndrome ([facility] )                Reason for Consult / Principal Problem:    HPI: Natalie Harris is a 75y o  year old female who presents with Cellulitis      Review of Systems    Positive for fatigue  Self-reported low-grade fever  Tiredness    Past Medical History:  Past Medical History:   Diagnosis Date   • Allergic rhinitis    • Anxiety    • Asthma    • Back pain    • Cardiac disease    • Cardiopathy     EF 45%   • Cough    • Diverticulitis    • Factor V Leiden ([facility] )    • Fibromyalgia    • GERD (gastroesophageal reflux disease)    • Hashimoto's thyroiditis    • Hx of degenerative disc disease    • Hypotension     pots - postural orthostatic hypotension   • Interstitial cystitis    • Irregular heart beat     LBBB   • Irritable bowel syndrome    • Migraines    • Mitral valve disease     "thickening"   • Myocardial infarction [facility])     possible but not sure when   • Neuropathy     bilateral legs   • Osteoporosis    • Postural orthostatic tachycardia syndrome     must drink a lot of water and salt   • Pott's disease    • Rheumatic fever 1967   • Scoliosis    • Sepsis ([facility] ) 2021    associated with PICC line   • Sjogren's syndrome ([facility] )    • TMJ (dislocation of temporomandibular joint)        Past Surgical History:  Past Surgical History:   Procedure Laterality Date   • ABLATION MICROWAVE Left     lumbar area   • BACK SURGERY  10/1998   • [address]   • CHOLECYSTECTOMY  04/2016   • COLONOSCOPY  2016   • DILATION AND CURETTAGE OF UTERUS  1996   • EGD  2016   • FOOT SURGERY  1968    removal of bone and neuroma   • HYSTERECTOMY  1997    age 54  PRICE ooph   • IR OTHER  01/07/2022   • IR PICC PLACEMENT SINGLE LUMEN  10/02/2020   • IR PICC PLACEMENT SINGLE LUMEN  08/12/2021   • IR PICC REPOSITION  12/07/2021   • IR TUNNELED CENTRAL LINE PLACEMENT  06/20/2022   • LAPAROSCOPY  1996    endometriosis   • MOUTH BIOPSY      lip   • CO EGD TRANSORAL BIOPSY SINGLE/MULTIPLE N/A 04/24/2019    Procedure: ESOPHAGOGASTRODUODENOSCOPY (EGD) with multiple bx and dilation;  Surgeon: Radha Weiner MD;  Location: [address] [place] [address] GI LAB; Service: Gastroenterology   • CO SHLDR ARTHROSCOP,SURG,W/ROTAT CUFF REPR Right 04/06/2022    Procedure: SHOULDER ARTHROSCOPIC ROTATOR CUFF REPAIR Biceps Tenodisis; Surgeon: Jacqui Gifford MD;  Location: [facility] MAIN OR;  Service: Orthopedics   • TUNNELED VENOUS CATHETER PLACEMENT  2016       Social History:  Social History     Substance and Sexual Activity   Alcohol Use Never     Social History     Substance and Sexual Activity   Drug Use No     Social History     Tobacco Use   Smoking Status Never Smoker   Smokeless Tobacco Never Used       Family History:  Family History   Problem Relation Age of Onset   • Cancer Mother         urinary bladder    • Thyroid cancer Sister    • Colon cancer Maternal Grandfather    • Breast cancer Maternal Aunt         over 48 yrs old    • Endometrial cancer Maternal Aunt    • Multiple myeloma Maternal Aunt    • No Known Problems Father    • No Known Problems Daughter    • Heart attack Sister    • No Known Problems Sister    • No Known Problems Sister    • No Known Problems Sister    • No Known Problems Sister    • No Known Problems Daughter    • Hypertension Paternal Aunt        Allergies:   Allergies   Allergen Reactions   • Imipramine Confusion, Fatigue, Irritability, Palpitations, Shortness Of Breath, Tachycardia and Visual Disturbance   • Melatonin Shortness Of Breath   • Mirtazapine Anxiety, Dizziness, GI Intolerance, Nausea Only, Palpitations and Shortness Of Breath   • Nexium [Esomeprazole] Shortness Of Breath   • Nsaids Shortness Of Breath   • Singulair [Montelukast] Shortness Of Breath and Cough   • Zomig [Zolmitriptan] Shortness Of Breath   • Dexilant [Dexlansoprazole] Nausea Only and Vomiting   • Albuterol    • Ambien [Zolpidem]    • Amitriptyline Drowsiness   • Aspirin    • Bactrim [Sulfamethoxazole-Trimethoprim] Hives   • Banana - Food Allergy Dermatitis   • Ceftin [Cefuroxime]    • Celebrex [Celecoxib]    • Ciprofloxacin    • Cranberry-C [Ascorbate - Food Allergy] GI Intolerance   • Demerol [Meperidine]    • Epinephrine Dizziness   • Ergotamine Nausea Only and Headache   • Keflex [Cephalexin]    • Klonopin [Clonazepam] Nausea Only and Dizziness   • Latex Hives   • Levaquin [Levofloxacin]    • Lexapro [Escitalopram]    • Lyrica [Pregabalin] Fatigue   • Macrodantin [Nitrofurantoin]    • Morphine Nausea Only   • Movantik [Naloxegol] Nausea Only   • Mushroom Extract Complex - Food Allergy      Eye itchy, asthma  attack   • Naprosyn [Naproxen]    • Neurontin [Gabapentin] Dizziness   • Pineapple - Food Allergy GI Intolerance   • Plecanatide Abdominal Pain, Diarrhea and GI Intolerance   • Prolia [Denosumab]    • Prozac [Fluoxetine]    • Serevent [Salmeterol] Dizziness   • Sudafed [Pseudoephedrine] Hives   • Sulfa Antibiotics    • Tomato - Food Allergy GI Intolerance   • Trazodone    • Ultram [Tramadol] Nausea Only, Dizziness and Headache   • Vilazodone Dizziness, GI Intolerance and Headache   • Vilazodone Hcl Abdominal Pain, Dizziness, GI Intolerance, Headache and Other (See Comments)   • Vioxx [Rofecoxib]    • Vortioxetine Drowsiness, Fatigue, GI Intolerance and Irritability   • Zithromax [Azithromycin] Hives   • Zoloft [Sertraline]    • Zantac [Ranitidine] Rash and Dizziness       Medications:  (Not in a hospital admission)    Current Facility-Administered Medications Medication Dose Route Frequency   • acetaminophen (TYLENOL) tablet 650 mg  650 mg Oral Q6H PRN   • aluminum-magnesium hydroxide-simethicone (MYLANTA) oral suspension 5 mL  5 mL Oral Q4H PRN   • azelastine (ASTELIN) 0 1 % nasal spray 2 spray  2 spray Each Nare BID   • budesonide (PULMICORT) inhalation solution 0 5 mg  0 5 mg Nebulization BID   • cefepime (MAXIPIME) IVPB (premix in dextrose) 2,000 mg 50 mL  2,000 mg Intravenous Q12H   • cholecalciferol (VITAMIN D3) tablet 1,000 Units  1,000 Units Oral Daily   • cyclobenzaprine (FLEXERIL) tablet 5 mg  5 mg Oral BID   • famotidine (PEPCID) tablet 20 mg  20 mg Oral Daily   • fish oil capsule 1,000 mg  1,000 mg Oral BID   • fluticasone (FLONASE) 50 mcg/act nasal spray 2 spray  2 spray Nasal Daily   • heparin (porcine) subcutaneous injection 5,000 Units  5,000 Units Subcutaneous Q8H [facility]   • HYDROcodone-acetaminophen (NORCO) 5-325 mg per tablet 1 tablet  1 tablet Oral BID PRN   • hydrOXYzine HCL (ATARAX) tablet 20 mg  20 mg Oral HS   • ivabradine HCl (CORLANOR) tablet 5 mg  5 mg Oral BID   • levalbuterol (XOPENEX) inhalation solution 1 25 mg  1 25 mg Nebulization Q6H PRN   • loratadine (CLARITIN) tablet 10 mg  10 mg Oral Daily   • magnesium oxide (MAG-OX) tablet 400 mg  400 mg Oral BID   • midodrine (PROAMATINE) tablet 2 5 mg  2 5 mg Oral BID AC   • ondansetron (ZOFRAN) injection 4 mg  4 mg Intravenous Q6H PRN   • oxybutynin (DITROPAN) tablet 2 5 mg  2 5 mg Oral BID   • polyethylene glycol (MIRALAX) packet 17 g  17 g Oral Daily PRN   • psyllium (METAMUCIL) 1 packet  1 packet Oral Daily   • Thyroid (Porcine) POWD 32 mg  32 mg Does not apply Daily   • Ubrogepant (UBRELVY) tablet 100 mg  100 mg Oral Daily PRN   • vancomycin (VANCOCIN) IVPB (premix in dextrose) 500 mg 100 mL  500 mg Intravenous Q12H   • Varenicline Tartrate SOLN 1 spray  1 spray Nasal BID     Facility-Administered Medications Ordered in Other Encounters   Medication Dose Route Frequency   • sodium chloride 0 9 % infusion  333 33 mL/hr Intravenous Once       Vitals:  /83 (BP Location: Left arm)   Pulse 78   Temp 98 6 °F (37 °C) (Tympanic)   Resp 18   Ht 5' 1" (1 549 m)   Wt 52 2 kg (115 lb)   SpO2 96%   BMI 21 73 kg/m²   Body mass index is 21 73 kg/m²  Weight (last 2 days)     Date/Time Weight    11/15/22 0858 52 2 (115)    11/14/22 1835 53 3 (117 4)          I/Os:    Intake/Output Summary (Last 24 hours) at 11/15/2022 1143  Last data filed at 11/15/2022 0856  Gross per 24 hour   Intake 760 05 ml   Output --   Net 760 05 ml       PHYSICAL EXAM  General appearance: alert and oriented, in no acute distress  Skin:   Skin at the tunneled catheter site without erythema  Minimal crusting at tunnel exit  Cuff not as above  No induration  Patient reports mild tenderness    No associated collateral veins, purulence, blood    Head: Normocephalic, without obvious abnormality  Heart:  Regular  Lungs:  Room air  Abdomen:  Unremarkable  Neurological: normal without focal findings, mental status, speech normal, alert and oriented x3 and KATIE    Lab Results and Cultures:   CBC with diff:   Lab Results   Component Value Date    WBC 8 44 11/15/2022    HGB 14 3 11/15/2022    HCT 44 4 11/15/2022    MCV 92 11/15/2022     11/15/2022    MCH 29 6 11/15/2022    MCHC 32 2 11/15/2022    RDW 13 0 11/15/2022    MPV 9 2 11/15/2022    NRBC 0 11/15/2022      BMP/CMP:  Lab Results   Component Value Date     06/04/2018    K 3 9 11/15/2022    K 3 9 06/04/2018     11/15/2022     06/04/2018    CO2 28 11/15/2022    CO2 30 06/04/2018    CO2 28 03/05/2018    ANIONGAP 12 9 06/04/2018    BUN 9 11/15/2022    BUN 18 06/04/2018    CREATININE 0 87 11/15/2022    CREATININE 0 89 06/04/2018    GLUCOSE 94 03/05/2018    GLUCOSE 90 12/16/2015    CALCIUM 9 4 11/15/2022    CALCIUM 9 7 06/04/2018    AST 28 11/14/2022    AST 21 06/04/2018    ALT 36 11/14/2022    ALT 25 06/04/2018    ALKPHOS 113 (H) 11/14/2022    ALKPHOS 79 06/04/2018    PROT 7 0 06/04/2018    BILITOT 0 5 06/04/2018    EGFR 68 11/15/2022    EGFR 68 03/05/2018   ,     Coags:   Lab Results   Component Value Date    PTT 25 11/14/2022    INR 0 93 11/14/2022   ,   Results from last 7 days   Lab Units 11/14/22  1830   PTT seconds 25   INR  0 93        Lipid Panel:   Lab Results   Component Value Date    CHOL 220 10/06/2015     Lab Results   Component Value Date    HDL 84 09/29/2020     Lab Results   Component Value Date    HDL 84 09/29/2020     Lab Results   Component Value Date    LDLCALC 138 (H) 09/29/2020     Lab Results   Component Value Date    TRIG 79 09/29/2020       HgbA1c: No results found for: HGBA1C    Blood Culture:   Lab Results   Component Value Date    BLOODCX Received in Microbiology Lab  Culture in Progress  11/14/2022   ,   Urinalysis:   Lab Results   Component Value Date    COLORU Yellow 09/14/2022    CLARITYU Clear 09/14/2022    SPECGRAV 1 015 09/14/2022    PHUR 7 0 09/14/2022    PHUR 6 5 12/11/2018    LEUKOCYTESUR Negative 09/14/2022    NITRITE Negative 09/14/2022    GLUCOSEU Negative 09/14/2022    KETONESU Negative 09/14/2022    BILIRUBINUR Negative 09/14/2022    BLOODU Negative 09/14/2022   ,   Urine Culture:   Lab Results   Component Value Date    URINECX No Growth <1000 cfu/mL 03/24/2021   ,   Wound Culure:    Lab Results   Component Value Date    WOUNDCULT 2+ Growth of Staphylococcus aureus (A) 01/06/2022       Imaging Studies: I have personally reviewed pertinent films in PACS    Counseling / Coordination of Care  Total time spent today  27 minutes  Greater than 50% of total time was spent with the patient and / or family counseling and / or coordination of care  Thank you for allowing me to participate in the care of Larissa Gonzalez  Please don't hesitate to contact us with any questions

## 2022-11-15 NOTE — DISCHARGE SUMMARY
Zack 44  Discharge- Mechelle Jama 1955, 75 y o  female MRN: [id number]  Unit/Bed#: ED 22 Encounter: [id number]  Primary Care Provider: Christine Payne MD   Date and time admitted to hospital: 11/14/2022  5:16 PM    * Cellulitis  Assessment & Plan  · Patient with history of catheter related infection/bacteremia in the past  · Appears to have soft tissue/cellulitic infection at this time  · Will discharge on cefdinir  · Patient currently afebrile with no leukocytosis  · IR evaluated patient is catheter and state that catheter does not appear to be infected and can be used at this time  · IR scheduled follow-up for 1 week to re-evaluate as outpatient  · Will need to follow-up cultures with PCP, currently negative    Chronic interstitial cystitis  Assessment & Plan  Continue pre-hospital Ditropan 2 5 mg p o  b i d  Asthma  Assessment & Plan  Continue home inhaler regimen    Gastroesophageal reflux disease without esophagitis  Assessment & Plan  Continue pre-hospital Pepcid 40 mg p o  daily    Anxiety and depression  Assessment & Plan  Continue pre-hospital Atarax 20 mg p o  q h s  Discharging Physician / Practitioner: Ariadna Mc MD  PCP: Christine Payne MD  Admission Date:   Admission Orders (From admission, onward)     Ordered        11/14/22 2005  Place in Observation  Once                      Discharge Date: 11/15/22    Medical Problems             Resolved Problems  Date Reviewed: 11/14/2022   None                 Consultations During Hospital Stay:  · IR    Procedures Performed:   · Chest wall catheter evaluation by IR    Significant Findings / Test Results:   · Cellulitis    Incidental Findings:   · None     Test Results Pending at Discharge (will require follow up): · Blood cultures     Outpatient Tests Requested:  · Routine labs with PCP as outpatient    Complications:    • None    Reason for Admission:  Cellulitis    Hospital Course:      Mechelle Jama is a 75 y o  female patient who originally presented to the hospital on 11/14/2022 due to suspected catheter related infection  Patient was admitted and started on IV antibiotics  Blood Cultures were obtained  Patient has remained afebrile without leukocytosis  Patient was seen by IR  Catheter appears to be functioning appropriately  Per IR catheter can be used at this time  Patient will follow-up with IR in 1 week for re-evaluation as outpatient  Given that patient is doing well at this time will discharge on oral antibiotics based on previous cultures  Patient states that she has tolerated cefdinir in the past   Will discharge on cefdinir to complete 5 more days of therapy  Advised to follow-up with PCP within 1 week for routine follow-up  If cultures do return positive patient will need to return to the ER which patient understands  Please see above list of diagnoses and related plan for additional information  Condition at Discharge: stable     Discharge Day Visit / Exam:     Subjective:  No complaints at this time  Blood pressure is elevated however patient states that her blood pressure tends to fluctuate as she does have POTS  Patient denies any headache  No chest pain or shortness of breath  No change in vision  Advised to hold midodrine if her BP is elevated at home  Vitals: Blood Pressure: (!) 181/107 (11/15/22 1305)  Pulse: 98 (11/15/22 1305)  Temperature: 98 6 °F (37 °C) (11/15/22 0732)  Temp Source: Tympanic (11/15/22 0732)  Respirations: 19 (11/15/22 1305)  Height: 5' 1" (154 9 cm) (11/15/22 0858)  Weight - Scale: 52 2 kg (115 lb) (11/15/22 0858)  SpO2: 96 % (11/15/22 1305)     Exam:   Physical Exam  Constitutional:       General: She is not in acute distress  HENT:      Head: Normocephalic and atraumatic  Nose: Nose normal       Mouth/Throat:      Mouth: Mucous membranes are moist    Eyes:      Extraocular Movements: Extraocular movements intact        Conjunctiva/sclera: Conjunctivae normal    Cardiovascular:      Rate and Rhythm: Normal rate and regular rhythm  Pulmonary:      Effort: Pulmonary effort is normal  No respiratory distress  Abdominal:      Palpations: Abdomen is soft  Tenderness: There is no abdominal tenderness  Musculoskeletal:         General: Normal range of motion  Cervical back: Normal range of motion and neck supple  Skin:     General: Skin is warm and dry  Comments: Right upper chest wall catheter noted  No purulence noted   Neurological:      General: No focal deficit present  Mental Status: She is alert  Mental status is at baseline  Cranial Nerves: No cranial nerve deficit  Psychiatric:         Mood and Affect: Mood normal          Behavior: Behavior normal          Discussion with Family:  Updated patient regarding plan of care    Discharge instructions/Information to patient and family:   See after visit summary for information provided to patient and family  Provisions for Follow-Up Care:  See after visit summary for information related to follow-up care and any pertinent home health orders  Disposition:     Home      Planned Readmission:   • no     Discharge Statement:  I spent 35 minutes discharging the patient  This time was spent on the day of discharge  I had direct contact with the patient on the day of discharge  Greater than 50% of the total time was spent examining patient, answering all patient questions, arranging and discussing plan of care with patient as well as directly providing post-discharge instructions  Additional time then spent on discharge activities  Discharge Medications:  See after visit summary for reconciled discharge medications provided to patient and family        ** Please Note: This note has been constructed using a voice recognition system **

## 2022-11-15 NOTE — H&P
[address] [facility] 1955, 75 y o  female MRN: [id number]  Unit/Bed#: ED 22 Encounter: [id number]  Primary Care Provider: Harsha Keating MD   Date and time admitted to hospital: 11/14/2022  5:16 PM    * Cellulitis  Assessment & Plan  · Placed in observation Medicine  · Continue cefepime 2 g IV q 12 hours and vancomycin with pharmacy to dose  · Patient has a history of indwelling catheter for frequent normal saline infusions for her POTS syndrome with previously infected catheters  · Patient reports catheter was swapped out in June of this year  · Will consult IR in a m  · Blood cultures are currently pending    Chronic interstitial cystitis  Assessment & Plan  Continue pre-hospital Ditropan 2 5 mg p o  b i d  Asthma  Assessment & Plan  Continue pre-hospital Xopenex 1 25 mg p o  q 6 hours and Pulmicort 0 5 mg b i d     Gastroesophageal reflux disease without esophagitis  Assessment & Plan  Continue pre-hospital Pepcid 40 mg p o  daily    Anxiety and depression  Assessment & Plan  Continue pre-hospital Atarax 20 mg p o  q h s  VTE Prophylaxis: Heparin  Code Status:  Level 1  Discussion with family:  None present at bedside at time of exam    Anticipated Length of Stay:  Patient will be admitted on an Observation basis with an anticipated length of stay of  > 2 midnights  Justification for Hospital Stay:  Right chest cellulitis requiring IV antibiotics and IR evaluation    Total Time for Visit, including Counseling / Coordination of Care: 1 hour  Greater than 50% of this total time spent on direct patient counseling and coordination of care  Chief Complaint:   Fatigue x1 week    History of Present Illness: Tanesha Franco is a 75 y o  female who presents with fatigue x1 week    Patient with a history of chronic indwelling catheter for which she receives frequent normal saline fluid infusions for her POTS syndrome presents ER for further evaluation treatment for one-week history of generalized fatigue accompanied with nausea and muscle aches  Patient denies any fever but does state that her temperature was high as 99 which is unusual for her as she states that her normal temperature is around 96°  Patient reports that she has had issues with infected catheters in the past requiring removal at 1 point had even been septic with apparently bacteremia from an infected catheter  Patient apparently had the dressing changed and nurse was concerned that there was which she describes as “purulent drainage” but upon further questioning she stated that it was dark brown  Patient showed me pictures on her phone which showed some insertion site erythema which appears unchanged on physical exam     Review of Systems:    Review of Systems   Constitutional: Positive for fatigue  Negative for chills and fever  HENT: Negative for congestion and sore throat  Respiratory: Negative for cough, shortness of breath and wheezing  Cardiovascular: Negative for chest pain and palpitations  Gastrointestinal: Positive for nausea  Negative for abdominal pain, diarrhea and vomiting  Genitourinary: Negative for dysuria, frequency, hematuria and urgency  Musculoskeletal: Positive for myalgias  Negative for arthralgias  Skin: Negative for wound  Neurological: Negative for dizziness, syncope, light-headedness and headaches  All other systems reviewed and are negative        Past Medical and Surgical History:     Past Medical History:   Diagnosis Date   • Allergic rhinitis    • Anxiety    • Asthma    • Back pain    • Cardiac disease    • Cardiopathy     EF 45%   • Cough    • Diverticulitis    • Factor V Leiden (HCC)    • Fibromyalgia    • GERD (gastroesophageal reflux disease)    • Hashimoto's thyroiditis    • Hx of degenerative disc disease    • Hypotension     pots - postural orthostatic hypotension   • Interstitial cystitis    • Irregular heart beat     LBBB   • Irritable bowel syndrome    • Migraines    • Mitral valve disease     "thickening"   • Myocardial infarction [facility])     possible but not sure when   • Neuropathy     bilateral legs   • Osteoporosis    • Postural orthostatic tachycardia syndrome     must drink a lot of water and salt   • Pott's disease    • Rheumatic fever    • Scoliosis    • Sepsis ([facility] [address] )     associated with PICC line   • Sjogren's syndrome ([facility] [address] )    • TMJ (dislocation of temporomandibular joint)        Past Surgical History:   Procedure Laterality Date   • ABLATION MICROWAVE Left     lumbar area   • BACK SURGERY  10/1998   •  SECTION     • CHOLECYSTECTOMY  2016   • COLONOSCOPY     • DILATION AND CURETTAGE OF UTERUS     • EGD     • FOOT SURGERY      removal of bone and neuroma   • HYSTERECTOMY      age 54  PRICE ooph   • IR OTHER  2022   • IR PICC PLACEMENT SINGLE LUMEN  10/02/2020   • IR PICC PLACEMENT SINGLE LUMEN  2021   • IR PICC REPOSITION  2021   • IR TUNNELED CENTRAL LINE PLACEMENT  2022   • LAPAROSCOPY      endometriosis   • MOUTH BIOPSY      lip   • VA EGD TRANSORAL BIOPSY SINGLE/MULTIPLE N/A 2019    Procedure: ESOPHAGOGASTRODUODENOSCOPY (EGD) with multiple bx and dilation;  Surgeon: Lizzette Scott MD;  Location: [address] [place] [address] GI LAB; Service: Gastroenterology   • VA SHLDR ARTHROSCOP,SURG,W/ROTAT CUFF REPR Right 2022    Procedure: SHOULDER ARTHROSCOPIC ROTATOR CUFF REPAIR Biceps Tenodisis; Surgeon: Chelsey Neff MD;  Location: [facility] MAIN OR;  Service: Orthopedics   • TUNNELED VENOUS CATHETER PLACEMENT         Meds/Allergies:    Prior to Admission medications    Medication Sig Start Date End Date Taking?  Authorizing Provider   Alum Hydroxide-Mag Trisilicate (Gaviscon) 01-80 2 MG CHEW Chew 1 tablet 4 (four) times a day as needed With meals and as needed    Historical Provider, MD   azelastine (ASTELIN) 0 1 % nasal spray 2 sprays into each nostril 2 (two) times a day Use in each nostril as directed    Historical Provider, MD   beclomethasone (QVAR) 40 MCG/ACT inhaler Inhale 1 puff daily as needed (only when unable to nebulize pulmicort) Rinse mouth after use  Historical Provider, MD   budesonide (PULMICORT) 0 5 mg/2 mL nebulizer solution Take 0 5 mg by nebulization 2 (two) times a day Rinse mouth after use      Historical Provider, MD   cholecalciferol (VITAMIN D3) 1,000 units tablet Take 1,000 Units by mouth daily     Historical Provider, MD   cyclobenzaprine (FLEXERIL) 5 mg tablet Take 1 tablet (5 mg total) by mouth 2 (two) times a day as needed for muscle spasms 9/16/22   TEETEE Parham   docusate sodium (COLACE) 100 mg capsule Take 100 mg by mouth daily as needed for constipation    Historical Provider, MD   erythromycin (ILOTYCIN) ophthalmic ointment Apply 1 application to eye 82/18/06   Historical Provider, MD   famotidine (PEPCID) 10 mg tablet Take 40 mg by mouth 2 (two) times a day    Patient not taking: No sig reported    Historical Provider, MD   famotidine (PEPCID) 40 MG tablet Twice a day 8/22/22   Historical Provider, MD   fexofenadine (ALLEGRA) 180 MG tablet Take 180 mg by mouth 2 (two) times a day     Historical Provider, MD   fluticasone (FLONASE) 50 mcg/act nasal spray 2 sprays into each nostril daily     Historical Provider, MD   Galcanezumab-gnlm (Emgality) 120 MG/ML SOAJ Inject under the skin every 30 (thirty) days     Historical Provider, MD   Gemtesa 75 MG TABS Take 1 tablet by mouth daily 9/2/22   Historical Provider, MD   HYDROcodone-acetaminophen (Norco) 5-325 mg per tablet Take 1 tablet by mouth 2 (two) times a day as needed for pain for up to 30 doses Do not fill until 10/28/2022 Max Daily Amount: 2 tablets 9/30/22   TEETEE Parham   hydrOXYzine (ATARAX) 10 mg/5 mL syrup Take 20 mg by mouth daily at bedtime     Historical Provider, MD   ipratropium (ATROVENT) 0 03 % nasal spray 2 sprays into each nostril 4 (four) times a day as needed for rhinitis     Historical Provider, MD   ipratropium (ATROVENT) 0 06 % nasal spray PLEASE SEE ATTACHED FOR DETAILED DIRECTIONS 7/13/22   Historical Provider, MD   ivabradine HCl (Corlanor) 5 MG tablet 2 (two) times a day   10/28/21   Historical Provider, MD   levalbuterol Conchita Merino HFA) 45 mcg/act inhaler Inhale 2 puffs every 4 (four) hours as needed (when unable to nebulize)    Historical Provider, MD   levalbuterol (XOPENEX) 1 25 mg/3 mL nebulizer solution Take 1 25 mg by nebulization every 6 (six) hours as needed  1/7/19   Historical Provider, MD   Magnesium 400 MG CAPS Take 1 capsule by mouth 2 (two) times a day     Historical Provider, MD   midodrine (PROAMATINE) 2 5 mg tablet  5/18/22   Historical Provider, MD   midodrine (PROAMATINE) 5 mg tablet TAKE 2 TABS PO IN AM, ONE TAB PO WITH LUNCH AND DINNER  Patient not taking: No sig reported 12/21/18   Yamilex Templeton MD   MULTIPLE VITAMINS-CALCIUM PO Take 1 capsule by mouth every morning     Historical Provider, MD   Multiple Vitamins-Iron TABS Take by mouth    Historical Provider, MD   omega-3-acid ethyl esters (LOVAZA) 1 g capsule Take 2 g by mouth 2 (two) times a day 1600mg daily    Historical Provider, MD   omeprazole (PriLOSEC) 20 mg delayed release capsule Take 20 mg by mouth 2 (two) times a day    Historical Provider, MD   oxybutynin (DITROPAN) 5 mg tablet Take 2 5 mg by mouth 2 (two) times a day Once in the morning and one at HS    Historical Provider, MD   polyethylene glycol (MIRALAX) 17 g packet Take 17 g by mouth daily as needed     Historical Provider, MD   Probiotic Product (PROBIOTIC DAILY PO) Take 1 tablet by mouth daily At lunch    Historical Provider, MD   psyllium (METAMUCIL) 0 52 g capsule Take 0 52 g by mouth daily    Historical Provider, MD   Qvar RediHaler 40 MCG/ACT inhaler PLEASE SEE ATTACHED FOR DETAILED DIRECTIONS 6/5/22   Historical Provider, MD   Thyroid, Porcine, POWD Use 32 mg daily    Historical Provider, MD   tobramycin-dexamethasone [facility]) ophthalmic suspension Apply 1 drop to eye 2 (two) times a day 10/25/22   Historical Provider, MD   Ubrogepant (UBRELVY) 100 MG tablet Take 100 mg by mouth Take 1 tablet (100 mg) one time as needed for migraine  May repeat one additional tablet (100 mg) at least two hours after the first dose  Do not use more than two doses per day, or for more than eight days per month  Historical Provider, MD   Varenicline Tartrate (Tyrvaya) 0 03 MG/ACT SOLN 1 spray into each nostril 2 (two) times a day 10/25/22 1/23/23  Historical Provider, MD     all medications and allergies reviewed    Allergies:    Allergies   Allergen Reactions   • Imipramine Confusion, Fatigue, Irritability, Palpitations, Shortness Of Breath, Tachycardia and Visual Disturbance   • Melatonin Shortness Of Breath   • Mirtazapine Anxiety, Dizziness, GI Intolerance, Nausea Only, Palpitations and Shortness Of Breath   • Nexium [Esomeprazole] Shortness Of Breath   • Nsaids Shortness Of Breath   • Singulair [Montelukast] Shortness Of Breath and Cough   • Zomig [Zolmitriptan] Shortness Of Breath   • Dexilant [Dexlansoprazole] Nausea Only and Vomiting   • Albuterol    • Ambien [Zolpidem]    • Amitriptyline Drowsiness   • Aspirin    • Bactrim [Sulfamethoxazole-Trimethoprim] Hives   • Banana - Food Allergy Dermatitis   • Ceftin [Cefuroxime]    • Celebrex [Celecoxib]    • Ciprofloxacin    • Cranberry-C [Ascorbate - Food Allergy] GI Intolerance   • Demerol [Meperidine]    • Epinephrine Dizziness   • Ergotamine Nausea Only and Headache   • Keflex [Cephalexin]    • Klonopin [Clonazepam] Nausea Only and Dizziness   • Latex Hives   • Levaquin [Levofloxacin]    • Lexapro [Escitalopram]    • Lyrica [Pregabalin] Fatigue   • Macrodantin [Nitrofurantoin]    • Morphine Nausea Only   • Movantik [Naloxegol] Nausea Only   • Mushroom Extract Complex - Food Allergy      Eye itchy, asthma  attack   • Naprosyn [Naproxen]    • Neurontin [Gabapentin] Dizziness   • Pineapple - Food Allergy GI Intolerance   • Plecanatide Abdominal Pain, Diarrhea and GI Intolerance   • Prolia [Denosumab]    • Prozac [Fluoxetine]    • Serevent [Salmeterol] Dizziness   • Sudafed [Pseudoephedrine] Hives   • Sulfa Antibiotics    • Tomato - Food Allergy GI Intolerance   • Trazodone    • Ultram [Tramadol] Nausea Only, Dizziness and Headache   • Vilazodone Dizziness, GI Intolerance and Headache   • Vilazodone Hcl Abdominal Pain, Dizziness, GI Intolerance, Headache and Other (See Comments)   • Vioxx [Rofecoxib]    • Vortioxetine Drowsiness, Fatigue, GI Intolerance and Irritability   • Zithromax [Azithromycin] Hives   • Zoloft [Sertraline]    • Zantac [Ranitidine] Rash and Dizziness       Social History:     Marital Status:    Occupation:  Retired RN  Patient Pre-hospital Living Situation:  Resides at home alone  Patient Pre-hospital Level of Mobility:  Full with assist of a cane and sometimes walker  Patient Pre-hospital Diet Restrictions:  Gluten free lactose-free    Social History     Substance and Sexual Activity   Alcohol Use Never     Social History     Tobacco Use   Smoking Status Never Smoker   Smokeless Tobacco Never Used     Social History     Substance and Sexual Activity   Drug Use No       Family History:  I have reviewed the patient's family history    Physical Exam:     Vitals:   Blood Pressure: (!) 199/90 (11/14/22 2200)  Pulse: 84 (11/14/22 2200)  Temperature: 99 °F (37 2 °C) (11/14/22 1715)  Temp Source: Oral (11/14/22 1715)  Respirations: 18 (11/14/22 2200)  Weight - Scale: 53 3 kg (117 lb 6 4 oz) (11/14/22 1835)  SpO2: 99 % (11/14/22 2200)    Physical Exam  Vitals and nursing note reviewed  Constitutional:       General: She is not in acute distress  Appearance: Normal appearance  HENT:      Head: Normocephalic and atraumatic        Right Ear: Tympanic membrane normal       Left Ear: Tympanic membrane normal       Nose: Nose normal       Mouth/Throat:      Mouth: Mucous membranes are moist       Pharynx: Oropharynx is clear  No posterior oropharyngeal erythema  Eyes:      Extraocular Movements: Extraocular movements intact  Conjunctiva/sclera: Conjunctivae normal       Pupils: Pupils are equal, round, and reactive to light  Cardiovascular:      Rate and Rhythm: Normal rate and regular rhythm  Pulses: Normal pulses  Heart sounds: Normal heart sounds  No murmur heard  Pulmonary:      Effort: Pulmonary effort is normal  No respiratory distress  Breath sounds: Normal breath sounds  No rhonchi or rales  Abdominal:      General: Bowel sounds are normal       Palpations: Abdomen is soft  Tenderness: There is no abdominal tenderness  Musculoskeletal:      Cervical back: Normal range of motion and neck supple  No muscular tenderness  Right lower leg: No edema  Left lower leg: No edema  Skin:     General: Skin is warm and dry  Capillary Refill: Capillary refill takes less than 2 seconds  Findings: Erythema present  Comments: Mild erythema right anterior chest around catheter insertion site which was not warm to the touch   Neurological:      General: No focal deficit present  Mental Status: She is alert and oriented to person, place, and time  Additional Data:     Lab Results: I have personally reviewed pertinent reports        Results from last 7 days   Lab Units 11/14/22  1830   WBC Thousand/uL 7 44   HEMOGLOBIN g/dL 14 8   HEMATOCRIT % 45 7   PLATELETS Thousands/uL 259   NEUTROS PCT % 55   LYMPHS PCT % 34   MONOS PCT % 8   EOS PCT % 2     Results from last 7 days   Lab Units 11/14/22  1830   SODIUM mmol/L 138   POTASSIUM mmol/L 3 7   CHLORIDE mmol/L 104   CO2 mmol/L 28   BUN mg/dL 13   CREATININE mg/dL 0 90   ANION GAP mmol/L 6   CALCIUM mg/dL 9 4   ALBUMIN g/dL 4 6   TOTAL BILIRUBIN mg/dL 0 66   ALK PHOS U/L 113*   ALT U/L 36   AST U/L 28   GLUCOSE RANDOM mg/dL 89     Results from last 7 days   Lab Units 11/14/22  1830   INR 0 93             Results from last 7 days   Lab Units 11/14/22  1830   LACTIC ACID mmol/L 0 8   PROCALCITONIN ng/ml <0 05       Imaging: I have personally reviewed pertinent reports  No orders to display         EKG, Pathology, and Other Studies Reviewed on Admission:   · EKG: N/A    Epic / Care Everywhere Records Reviewed: Yes    ** Please Note: This note has been constructed using a voice recognition system   **

## 2022-11-15 NOTE — PLAN OF CARE
Problem: Potential for Falls  Goal: Patient will remain free of falls  Description: INTERVENTIONS:  - Educate patient/family on patient safety including physical limitations  - Instruct patient to call for assistance with activity   - Consult OT/PT to assist with strengthening/mobility   - Keep Call bell within reach  - Keep bed low and locked with side rails adjusted as appropriate  - Keep care items and personal belongings within reach  - Apply yellow socks and bracelet for high fall risk patients  - Consider moving patient to room near nurses station  11/15/2022 0903 by Ramón Rabago RN  Outcome: Progressing  11/15/2022 0903 by Ramón Rabago RN  Outcome: Progressing     Problem: Nutrition/Hydration-ADULT  Goal: Nutrient/Hydration intake appropriate for improving, restoring or maintaining nutritional needs  Description: Monitor and assess patient's nutrition/hydration status for malnutrition  Collaborate with interdisciplinary team and initiate plan and interventions as ordered  Monitor patient's weight and dietary intake as ordered or per policy  Utilize nutrition screening tool and intervene as necessary  Determine patient's food preferences and provide high-protein, high-caloric foods as appropriate       INTERVENTIONS:  - Monitor oral intake, urinary output, labs, and treatment plans  - Assess nutrition and hydration status and recommend course of action  - Evaluate amount of meals eaten  - Assist patient with eating if necessary   - Allow adequate time for meals  - Recommend/ encourage appropriate diets, oral nutritional supplements, and vitamin/mineral supplements  - Order, calculate, and assess calorie counts as needed  - Assess need for intravenous fluids  - Provide specific nutrition/hydration education as appropriate  - Include patient/family/caregiver in decisions related to nutrition  Outcome: Progressing

## 2022-11-15 NOTE — ASSESSMENT & PLAN NOTE
· Patient with history of catheter related infection/bacteremia in the past  · Appears to have soft tissue/cellulitic infection at this time  · Will discharge on cefdinir  · Patient currently afebrile with no leukocytosis  · IR evaluated patient is catheter and state that catheter does not appear to be infected and can be used at this time  · IR scheduled follow-up for 1 week to re-evaluate as outpatient  · Will need to follow-up cultures with PCP, currently negative

## 2022-11-15 NOTE — PROGRESS NOTES
Myron Galindo is a 75 y o  female who is currently ordered Vancomycin IV with management by the Pharmacy Consult service  Relevant clinical data and objective / subjective history reviewed  Vancomycin Assessment:  Indication and Goal AUC/Trough: Soft tissue (goal -600, trough >10), -600, trough >10  Clinical Status: stable  Micro:   pending  Renal Function:  SCr: 0 9 mg/dL  CrCl: 45 1 mL/min  Renal replacement: Not on dialysis  Days of Therapy: 1  Current Dose: 750 mg iv q 12 hrs  Vancomycin Plan:  New Dosin mg iv q 12 hrs  Estimated AUC: 438 mcg*hr/mL  Estimated Trough: 14 7 mcg/mL  Next Level: 0600 on 22  Renal Function Monitoring: Daily BMP and UOP  Pharmacy will continue to follow closely for s/sx of nephrotoxicity, infusion reactions and appropriateness of therapy  BMP and CBC will be ordered per protocol  We will continue to follow the patient’s culture results and clinical progress daily      Viviana Quinteros, Pharmacist

## 2022-11-15 NOTE — ED NOTES
Refusing heparin reports 'im walking around back and forth to the bathroom quite a bit I don't want it '     Katie Neville, RN  11/15/22 [id number]

## 2022-11-15 NOTE — ASSESSMENT & PLAN NOTE
· Placed in observation Medicine  · Continue cefepime 2 g IV q 12 hours and vancomycin with pharmacy to dose  · Patient has a history of indwelling catheter for frequent normal saline infusions for her POTS syndrome with previously infected catheters  · Patient reports catheter was swapped out in June of this year  · Will consult IR in a m    · Blood cultures are currently pending

## 2022-11-15 NOTE — ED PROVIDER NOTES
History  Chief Complaint   Patient presents with   • Vascular Access Problem     Pt reports the infusion nurse believes her Port may possibly be infected due to redness and drainage around the insertion site of her port  56-year-old female with extensive past medical history notable for factor 5 laden, CVID, CHF EF 45%, Sjogren's Syndrome, POTS for which she has a Adams catheter and receives IV fluid infusions  Patient presents today with purulent discharge noticed from her catheter insertion site  Patient also reporting fevers, chills, myalgias, generalized malaise over last several days as well  Patient states this is similar to presentation 14 months ago when she was admitted for a PICC line infection  She reports increasing pain near the catheter insertion site as well  Prior to Admission Medications   Prescriptions Last Dose Informant Patient Reported? Taking?    Alum Hydroxide-Mag Trisilicate (Gaviscon) 90-92 6 MG CHEW  Self Yes No   Sig: Chew 1 tablet 4 (four) times a day as needed With meals and as needed   Galcanezumab-gnlm (Emgality) 120 MG/ML SOAJ  Self Yes No   Sig: Inject under the skin every 30 (thirty) days    Gemtesa 75 MG TABS   Yes No   Sig: Take 1 tablet by mouth daily   HYDROcodone-acetaminophen (Norco) 5-325 mg per tablet   No No   Sig: Take 1 tablet by mouth 2 (two) times a day as needed for pain for up to 30 doses Do not fill until 10/28/2022 Max Daily Amount: 2 tablets   MULTIPLE VITAMINS-CALCIUM PO  Self Yes No   Sig: Take 1 capsule by mouth every morning    Magnesium 400 MG CAPS  Self Yes No   Sig: Take 1 capsule by mouth 2 (two) times a day    Multiple Vitamins-Iron TABS  Self Yes No   Sig: Take by mouth   Probiotic Product (PROBIOTIC DAILY PO)  Self Yes No   Sig: Take 1 tablet by mouth daily At lunch   Qvar RediHaler 40 MCG/ACT inhaler  Self Yes No   Sig: PLEASE SEE ATTACHED FOR DETAILED DIRECTIONS   Thyroid, Porcine, POWD  Self Yes No   Sig: Use 32 mg daily Ubrogepant (UBRELVY) 100 MG tablet  Self Yes No   Sig: Take 100 mg by mouth Take 1 tablet (100 mg) one time as needed for migraine  May repeat one additional tablet (100 mg) at least two hours after the first dose  Do not use more than two doses per day, or for more than eight days per month  Varenicline Tartrate (Tyrvaya) 0 03 MG/ACT SOLN   Yes No   Si spray into each nostril 2 (two) times a day   azelastine (ASTELIN) 0 1 % nasal spray  Self Yes No   Si sprays into each nostril 2 (two) times a day Use in each nostril as directed   beclomethasone (QVAR) 40 MCG/ACT inhaler  Self Yes No   Sig: Inhale 1 puff daily as needed (only when unable to nebulize pulmicort) Rinse mouth after use  budesonide (PULMICORT) 0 5 mg/2 mL nebulizer solution  Self Yes No   Sig: Take 0 5 mg by nebulization 2 (two) times a day Rinse mouth after use     cholecalciferol (VITAMIN D3) 1,000 units tablet  Self Yes No   Sig: Take 1,000 Units by mouth daily    cyclobenzaprine (FLEXERIL) 5 mg tablet   No No   Sig: Take 1 tablet (5 mg total) by mouth 2 (two) times a day as needed for muscle spasms   docusate sodium (COLACE) 100 mg capsule  Self Yes No   Sig: Take 100 mg by mouth daily as needed for constipation   erythromycin (ILOTYCIN) ophthalmic ointment   Yes No   Sig: Apply 1 application to eye   famotidine (PEPCID) 10 mg tablet  Self Yes No   Sig: Take 40 mg by mouth 2 (two) times a day     Patient not taking: No sig reported   famotidine (PEPCID) 40 MG tablet  Self Yes No   Sig: Twice a day   fexofenadine (ALLEGRA) 180 MG tablet  Self Yes No   Sig: Take 180 mg by mouth 2 (two) times a day    fluticasone (FLONASE) 50 mcg/act nasal spray  Self Yes No   Si sprays into each nostril daily    hydrOXYzine (ATARAX) 10 mg/5 mL syrup  Self Yes No   Sig: Take 20 mg by mouth daily at bedtime    ipratropium (ATROVENT) 0 03 % nasal spray  Self Yes No   Si sprays into each nostril 4 (four) times a day as needed for rhinitis ipratropium (ATROVENT) 0 06 % nasal spray  Self Yes No   Sig: PLEASE SEE ATTACHED FOR DETAILED DIRECTIONS   ivabradine HCl (Corlanor) 5 MG tablet  Self Yes No   Si (two) times a day     levalbuterol (XOPENEX HFA) 45 mcg/act inhaler  Self Yes No   Sig: Inhale 2 puffs every 4 (four) hours as needed (when unable to nebulize)   levalbuterol (XOPENEX) 1 25 mg/3 mL nebulizer solution  Self Yes No   Sig: Take 1 25 mg by nebulization every 6 (six) hours as needed    midodrine (PROAMATINE) 2 5 mg tablet  Self Yes No   midodrine (PROAMATINE) 5 mg tablet  Self No No   Sig: TAKE 2 TABS PO IN AM, ONE TAB PO WITH LUNCH AND DINNER   Patient not taking: No sig reported   omega-3-acid ethyl esters (LOVAZA) 1 g capsule  Self Yes No   Sig: Take 2 g by mouth 2 (two) times a day 1600mg daily   omeprazole (PriLOSEC) 20 mg delayed release capsule  Self Yes No   Sig: Take 20 mg by mouth 2 (two) times a day   oxybutynin (DITROPAN) 5 mg tablet  Self Yes No   Sig: Take 2 5 mg by mouth 2 (two) times a day Once in the morning and one at HS   polyethylene glycol (MIRALAX) 17 g packet  Self Yes No   Sig: Take 17 g by mouth daily as needed    psyllium (METAMUCIL) 0 52 g capsule  Self Yes No   Sig: Take 0 52 g by mouth daily   tobramycin-dexamethasone (TOBRADEX) ophthalmic suspension   Yes No   Sig: Apply 1 drop to eye 2 (two) times a day      Facility-Administered Medications: None       Past Medical History:   Diagnosis Date   • Allergic rhinitis    • Anxiety    • Asthma    • Back pain    • Cardiac disease    • Cardiopathy     EF 45%   • Cough    • Diverticulitis    • Factor V Leiden (HCC)    • Fibromyalgia    • GERD (gastroesophageal reflux disease)    • Hashimoto's thyroiditis    • Hx of degenerative disc disease    • Hypotension     pots - postural orthostatic hypotension   • Interstitial cystitis    • Irregular heart beat     LBBB   • Irritable bowel syndrome    • Migraines    • Mitral valve disease     "thickening"   • Myocardial infarction [facility])     possible but not sure when   • Neuropathy     bilateral legs   • Osteoporosis    • Postural orthostatic tachycardia syndrome     must drink a lot of water and salt   • Pott's disease    • Rheumatic fever    • Scoliosis    • Sepsis ([facility] [address] )     associated with PICC line   • Sjogren's syndrome ([facility] [address] )    • TMJ (dislocation of temporomandibular joint)        Past Surgical History:   Procedure Laterality Date   • ABLATION MICROWAVE Left     lumbar area   • BACK SURGERY  10/1998   •  SECTION     • CHOLECYSTECTOMY  2016   • COLONOSCOPY     • DILATION AND CURETTAGE OF UTERUS     • EGD     • FOOT SURGERY      removal of bone and neuroma   • HYSTERECTOMY      age 54  PRICE ooph   • IR OTHER  2022   • IR PICC PLACEMENT SINGLE LUMEN  10/02/2020   • IR PICC PLACEMENT SINGLE LUMEN  2021   • IR PICC REPOSITION  2021   • IR TUNNELED CENTRAL LINE PLACEMENT  2022   • LAPAROSCOPY      endometriosis   • MOUTH BIOPSY      lip   • NC EGD TRANSORAL BIOPSY SINGLE/MULTIPLE N/A 2019    Procedure: ESOPHAGOGASTRODUODENOSCOPY (EGD) with multiple bx and dilation;  Surgeon: Chante Malik MD;  Location: [address] [place] [address] GI LAB; Service: Gastroenterology   • NC SHLDR ARTHROSCOP,SURG,W/ROTAT CUFF REPR Right 2022    Procedure: SHOULDER ARTHROSCOPIC ROTATOR CUFF REPAIR Biceps Tenodisis;   Surgeon: Shiloh Parker MD;  Location: [facility] MAIN OR;  Service: Orthopedics   • TUNNELED VENOUS CATHETER PLACEMENT         Family History   Problem Relation Age of Onset   • Cancer Mother         urinary bladder    • Thyroid cancer Sister    • Colon cancer Maternal Grandfather    • Breast cancer Maternal Aunt         over 48 yrs old    • Endometrial cancer Maternal Aunt    • Multiple myeloma Maternal Aunt    • No Known Problems Father    • No Known Problems Daughter    • Heart attack Sister    • No Known Problems Sister    • No Known Problems Sister    • No Known Problems Sister    • No Known Problems Sister    • No Known Problems Daughter    • Hypertension Paternal Aunt      I have reviewed and agree with the history as documented      E-Cigarette/Vaping   • E-Cigarette Use Never User      E-Cigarette/Vaping Substances   • Nicotine No    • THC No    • CBD No    • Flavoring No    • Other No    • Unknown No      Social History     Tobacco Use   • Smoking status: Never Smoker   • Smokeless tobacco: Never Used   Vaping Use   • Vaping Use: Never used   Substance Use Topics   • Alcohol use: Never   • Drug use: No       Review of Systems    Physical Exam  Physical Exam    Vital Signs  ED Triage Vitals [11/14/22 1715]   Temperature Pulse Respirations Blood Pressure SpO2   99 °F (37 2 °C) 89 17 (!) 199/99 96 %      Temp Source Heart Rate Source Patient Position - Orthostatic VS BP Location FiO2 (%)   Oral -- -- Left arm --      Pain Score       No Pain           Vitals:    11/14/22 1715 11/14/22 1925   BP: (!) 199/99 (!) 195/98   Pulse: 89 86         Visual Acuity      ED Medications  Medications   vancomycin (VANCOCIN) 1,250 mg in sodium chloride 0 9 % 250 mL IVPB (1,250 mg Intravenous New Bag 11/14/22 1923)   cefepime (MAXIPIME) IVPB (premix in dextrose) 2,000 mg 50 mL (0 mg Intravenous Stopped 11/14/22 1923)   HYDROcodone-acetaminophen (NORCO) 5-325 mg per tablet 1 tablet (1 tablet Oral Given 11/1954)       Diagnostic Studies  Results Reviewed     Procedure Component Value Units Date/Time    Procalcitonin [[id number]]  (Normal) Collected: 11/14/22 1830    Lab Status: Final result Specimen: Blood from Arm, Right Updated: 11/14/22 1915     Procalcitonin <0 05 ng/ml     Comprehensive metabolic panel [[id number]]  (Abnormal) Collected: 11/14/22 1830    Lab Status: Final result Specimen: Blood from Arm, Right Updated: 11/14/22 1906     Sodium 138 mmol/L      Potassium 3 7 mmol/L      Chloride 104 mmol/L      CO2 28 mmol/L      ANION GAP 6 mmol/L      BUN 13 mg/dL      Creatinine 0 90 mg/dL      Glucose 89 mg/dL      Calcium 9 4 mg/dL      AST 28 U/L      ALT 36 U/L      Alkaline Phosphatase 113 U/L      Total Protein 7 1 g/dL      Albumin 4 6 g/dL      Total Bilirubin 0 66 mg/dL      eGFR 65 ml/min/1 73sq m     Narrative:      [place] guidelines for Chronic Kidney Disease (CKD):   •  Stage 1 with normal or high GFR (GFR > 90 mL/min/1 73 square meters)  •  Stage 2 Mild CKD (GFR = 60-89 mL/min/1 73 square meters)  •  Stage 3A Moderate CKD (GFR = 45-59 mL/min/1 73 square meters)  •  Stage 3B Moderate CKD (GFR = 30-44 mL/min/1 73 square meters)  •  Stage 4 Severe CKD (GFR = 15-29 mL/min/1 73 square meters)  •  Stage 5 End Stage CKD (GFR <15 mL/min/1 73 square meters)  Note: GFR calculation is accurate only with a steady state creatinine    Lactic acid [[id number]]  (Normal) Collected: 11/14/22 1830    Lab Status: Final result Specimen: Blood from Arm, Right Updated: 11/14/22 1905     LACTIC ACID 0 8 mmol/L     Narrative:      Result may be elevated if tourniquet was used during collection      Protime-INR [[id number]]  (Normal) Collected: 11/14/22 1830    Lab Status: Final result Specimen: Blood from Arm, Right Updated: 11/14/22 1901     Protime 12 4 seconds      INR 0 93    APTT [[id number]]  (Normal) Collected: 11/14/22 1830    Lab Status: Final result Specimen: Blood from Arm, Right Updated: 11/14/22 1901     PTT 25 seconds     CBC and differential [[id number]] Collected: 11/14/22 1830    Lab Status: Final result Specimen: Blood from Arm, Right Updated: 11/14/22 1842     WBC 7 44 Thousand/uL      RBC 4 98 Million/uL      Hemoglobin 14 8 g/dL      Hematocrit 45 7 %      MCV 92 fL      MCH 29 7 pg      MCHC 32 4 g/dL      RDW 13 1 %      MPV 9 3 fL      Platelets 429 Thousands/uL      nRBC 0 /100 WBCs      Neutrophils Relative 55 %      Immat GRANS % 0 %      Lymphocytes Relative 34 %      Monocytes Relative 8 %      Eosinophils Relative 2 %      Basophils Relative 1 % Neutrophils Absolute 4 10 Thousands/µL      Immature Grans Absolute 0 03 Thousand/uL      Lymphocytes Absolute 2 51 Thousands/µL      Monocytes Absolute 0 60 Thousand/µL      Eosinophils Absolute 0 16 Thousand/µL      Basophils Absolute 0 04 Thousands/µL     Blood culture #1 [[id number]] Collected: 11/14/22 1815    Lab Status: In process Specimen: Blood from Arm, Right Updated: 11/14/22 1839    Blood culture #2 [[id number]] Collected: 11/14/22 1830    Lab Status: In process Specimen: Blood from Arm, Right Updated: 11/14/22 1839                 No orders to display              Procedures  Procedures         ED Course                                             MDM  Number of Diagnoses or Management Options  Cellulitis: new and requires workup  Diagnosis management comments: Patient has mild erythema, and scant drainage from the catheter insertion site  While I suspect a localized site infection she is also reporting systemic symptoms as well concerning for sepsis, a catheter infection  After risk benefit discussion with patient, will start broad-spectrum antibiotics, send off cultures, likely admit, possible IR evaluation         Amount and/or Complexity of Data Reviewed  Clinical lab tests: reviewed  Review and summarize past medical records: yes  Independent visualization of images, tracings, or specimens: yes    Risk of Complications, Morbidity, and/or Mortality  Presenting problems: high  Diagnostic procedures: minimal  Management options: high        Disposition  Final diagnoses:   Cellulitis     Time reflects when diagnosis was documented in both MDM as applicable and the Disposition within this note     Time User Action Codes Description Comment    11/14/2022  7:46 PM Andrew Payne Add [L03 [id number]] Cellulitis       ED Disposition     ED Disposition   Admit    Condition   Stable    Date/Time   Mon Nov 14, 2022  8:04 PM    Comment   Case was discussed with Gurdeep Grayson and the patient's admission status was agreed to be Admission Status: observation status to the service of Dr Keanu Toussaint   Follow-up Information    None         Patient's Medications   Discharge Prescriptions    No medications on file       No discharge procedures on file      PDMP Review       Value Time User    PDMP Reviewed  Yes 11/14/2022  7:47 PM Juan Carlos Barrett DO          ED Provider  Electronically Signed by           Juan Carlos Barrett DO  11/14/22 2032 Stable    Date/Time   Mon Nov 14, 2022  8:04 PM    Comment   Case was discussed with Elizabeth Chan and the patient's admission status was agreed to be Admission Status: observation status to the service of Dr Chary Walls   Follow-up Information    None         Patient's Medications   Discharge Prescriptions    No medications on file       No discharge procedures on file      PDMP Review       Value Time User    PDMP Reviewed  Yes 11/14/2022  7:47 PM Racheal Rico DO          ED Provider  Electronically Signed by           Racheal Rico DO  11/14/22 2032       Racheal Rico DO  11/29/22 1151

## 2022-11-15 NOTE — PROGRESS NOTES
Ha Morin is a 75 y o  female who is currently ordered Vancomycin IV with management by the Pharmacy Consult service  Relevant clinical data and objective / subjective history reviewed  Vancomycin Assessment:  Indication and Goal AUC/Trough: Soft tissue (goal -600, trough >10), -600, trough >10  Clinical Status: stable  Micro:   pending  Renal Function:  SCr: 0 87 mg/dL  CrCl: 46 6 mL/min  Renal replacement: Not on dialysis  Days of Therapy: 2  Current Dose: 500mg IV Q12H  Vancomycin Plan:  New Dosinmg IV Q12H  Estimated AUC: 422 mcg*hr/mL  Estimated Trough: 14 1 mcg/mL  Next Level:  with AM labs  Renal Function Monitoring: Daily BMP and [place] will continue to follow closely for s/sx of nephrotoxicity, infusion reactions and appropriateness of therapy  BMP and CBC will be ordered per protocol  We will continue to follow the patient’s culture results and clinical progress daily      Celio Millan, Pharmacist

## 2022-11-16 RX ORDER — SODIUM CHLORIDE 9 MG/ML
333.33 INJECTION, SOLUTION INTRAVENOUS ONCE
Status: COMPLETED | OUTPATIENT
Start: 2022-11-17 | End: 2022-11-17

## 2022-11-17 ENCOUNTER — HOSPITAL ENCOUNTER (OUTPATIENT)
Dept: INFUSION CENTER | Facility: HOSPITAL | Age: 67
End: 2022-11-17
Attending: INTERNAL MEDICINE

## 2022-11-17 VITALS
TEMPERATURE: 98.8 F | HEART RATE: 81 BPM | SYSTOLIC BLOOD PRESSURE: 167 MMHG | RESPIRATION RATE: 18 BRPM | DIASTOLIC BLOOD PRESSURE: 87 MMHG | OXYGEN SATURATION: 98 %

## 2022-11-17 RX ORDER — SODIUM CHLORIDE 9 MG/ML
333.33 INJECTION, SOLUTION INTRAVENOUS ONCE
Status: COMPLETED | OUTPATIENT
Start: 2022-11-21 | End: 2022-11-21

## 2022-11-17 RX ADMIN — SODIUM CHLORIDE 333.33 ML/HR: 0.9 INJECTION, SOLUTION INTRAVENOUS at 09:09

## 2022-11-17 NOTE — PROGRESS NOTES
Patient presented to unit for hydration, reports less generalized aches and muscle pain and less tenderness at PICC site since starting antibiotics  PICC insertion site appears slightly less pink that previous assessment  Per Dr Eliseo Cantu discharge note on 11/15, PICC is okay to use

## 2022-11-18 ENCOUNTER — HOSPITAL ENCOUNTER (OUTPATIENT)
Dept: INFUSION CENTER | Facility: HOSPITAL | Age: 67
End: 2022-11-18
Attending: INTERNAL MEDICINE

## 2022-11-18 VITALS
SYSTOLIC BLOOD PRESSURE: 170 MMHG | TEMPERATURE: 97.7 F | RESPIRATION RATE: 18 BRPM | DIASTOLIC BLOOD PRESSURE: 82 MMHG | HEART RATE: 83 BPM | OXYGEN SATURATION: 99 %

## 2022-11-18 RX ADMIN — SODIUM CHLORIDE 2000 ML: 0.9 INJECTION, SOLUTION INTRAVENOUS at 08:59

## 2022-11-18 NOTE — PLAN OF CARE
Problem: INFECTION - ADULT  Goal: Absence or prevention of progression during hospitalization  Description: INTERVENTIONS:  - Assess and monitor for signs and symptoms of infection  - Monitor lab/diagnostic results  - Monitor all insertion sites, i e  indwelling lines, tubes, and drains  - Monitor endotracheal if appropriate and nasal secretions for changes in amount and color  - [place] appropriate cooling/warming therapies per order  - Administer medications as ordered  - Instruct and encourage patient and family to use good hand hygiene technique  - Identify and instruct in appropriate isolation precautions for identified infection/condition  Outcome: Progressing     Problem: Knowledge Deficit  Goal: Patient/family/caregiver demonstrates understanding of disease process, treatment plan, medications, and discharge instructions  Description: Complete learning assessment and assess knowledge base    Interventions:  - Provide teaching at level of understanding  - Provide teaching via preferred learning methods  Outcome: Progressing

## 2022-11-20 LAB
BACTERIA BLD CULT: NORMAL
BACTERIA BLD CULT: NORMAL

## 2022-11-21 ENCOUNTER — HOSPITAL ENCOUNTER (OUTPATIENT)
Dept: INFUSION CENTER | Facility: HOSPITAL | Age: 67
Discharge: HOME | End: 2022-11-21
Attending: INTERNAL MEDICINE

## 2022-11-21 VITALS
SYSTOLIC BLOOD PRESSURE: 142 MMHG | DIASTOLIC BLOOD PRESSURE: 95 MMHG | TEMPERATURE: 97.5 F | HEART RATE: 81 BPM | RESPIRATION RATE: 18 BRPM

## 2022-11-21 RX ORDER — SODIUM CHLORIDE 9 MG/ML
333.33 INJECTION, SOLUTION INTRAVENOUS ONCE
Status: COMPLETED | OUTPATIENT
Start: 2022-11-22 | End: 2022-11-22

## 2022-11-21 RX ADMIN — SODIUM CHLORIDE 333.33 ML/HR: 0.9 INJECTION, SOLUTION INTRAVENOUS at 10:12

## 2022-11-21 RX ADMIN — ALTEPLASE 2 MG: 2.2 INJECTION, POWDER, LYOPHILIZED, FOR SOLUTION INTRAVENOUS at 12:53

## 2022-11-21 NOTE — PROGRESS NOTES
Patient tolerated hydration through peripheral IV without issue  Discharged in stable condition, declined AVS but aware of next appointment

## 2022-11-21 NOTE — PROGRESS NOTES
Patient present to unit for hydration  PICC line flushed easily but with no blood return noted  Call placed to Dr Juan Miguel Rodriguez office for catheter maintenance order

## 2022-11-22 ENCOUNTER — TELEPHONE (OUTPATIENT)
Dept: PAIN MEDICINE | Facility: MEDICAL CENTER | Age: 67
End: 2022-11-22

## 2022-11-22 ENCOUNTER — HOSPITAL ENCOUNTER (OUTPATIENT)
Dept: INTERVENTIONAL RADIOLOGY/VASCULAR | Facility: HOSPITAL | Age: 67
Discharge: HOME | End: 2022-11-22
Attending: RADIOLOGY

## 2022-11-22 ENCOUNTER — HOSPITAL ENCOUNTER (OUTPATIENT)
Dept: INFUSION CENTER | Facility: HOSPITAL | Age: 67
Discharge: HOME | End: 2022-11-22
Attending: INTERNAL MEDICINE

## 2022-11-22 VITALS
RESPIRATION RATE: 16 BRPM | OXYGEN SATURATION: 100 % | SYSTOLIC BLOOD PRESSURE: 159 MMHG | DIASTOLIC BLOOD PRESSURE: 89 MMHG | HEART RATE: 61 BPM | TEMPERATURE: 97.8 F

## 2022-11-22 DIAGNOSIS — L03.90 CELLULITIS: ICD-10-CM

## 2022-11-22 RX ORDER — SODIUM CHLORIDE 9 MG/ML
333.33 INJECTION, SOLUTION INTRAVENOUS ONCE
Status: COMPLETED | OUTPATIENT
Start: 2022-11-25 | End: 2022-11-25

## 2022-11-22 RX ADMIN — SODIUM CHLORIDE 333.33 ML/HR: 0.9 INJECTION, SOLUTION INTRAVENOUS at 09:47

## 2022-11-22 NOTE — PLAN OF CARE
Problem: INFECTION - ADULT  Goal: Absence or prevention of progression during hospitalization  Description: INTERVENTIONS:  - Assess and monitor for signs and symptoms of infection  - Monitor lab/diagnostic results  - Monitor all insertion sites, i e  indwelling lines, tubes, and drains  - Monitor endotracheal if appropriate and nasal secretions for changes in amount and color  - [place] appropriate cooling/warming therapies per order  - Administer medications as ordered  - Instruct and encourage patient and family to use good hand hygiene technique  - Identify and instruct in appropriate isolation precautions for identified infection/condition  Outcome: Progressing     Problem: Knowledge Deficit  Goal: Patient/family/caregiver demonstrates understanding of disease process, treatment plan, medications, and discharge instructions  Description: Complete learning assessment and assess knowledge base    Interventions:  - Provide teaching at level of understanding  - Provide teaching via preferred learning methods  Outcome: Progressing

## 2022-11-22 NOTE — BRIEF OP NOTE (RAD/CATH)
INTERVENTIONAL RADIOLOGY PROCEDURE NOTE    Date: 11/22/2022    Procedure: IR SITE CHECK  Procedure Summary     Date: 11/22/22 Room / Location: [facility] Interventional Radiology    Anesthesia Start:  Anesthesia Stop:     Procedure: IR OTHER Diagnosis:       Cellulitis      (site check)    Scheduled Providers: Magda Bernard MD Responsible Provider:     Anesthesia Type: Not recorded ASA Status: Not recorded          Preoperative diagnosis:   1  Cellulitis         Postoperative diagnosis: Same  Surgeon: Magda Bernard MD     Assistant: None  No qualified resident was available  Blood loss: 0    Specimens: 0     Findings:     Site checked, no increasing erythema, decreasing tenderness    There is clearly local skin irritation from the plastic cup as well as the palmar suture material    After sterile prep with alcohol I trimmed two ears of the suture    A sterile dressing was then applied    Plan:     Patient has been re-initiated on antibiotics from her primary care provider    Recently catheter was not function well and required tPA which was successful    If there is a fibrin sheath normal recommendations would be to  exchange catheter and disrupt the fibrin sheath however I would not recommend performing this in the context of any infection    Patient has consistent concerns of infection and catheter is not working would suggest removal    If infection has cleared we can consider fibrin sheath exchange    I will not schedule further follow-up, the patient will contact us as her situation evolves    In the meantime catheter is okay to use    Complications: None immediate      Anesthesia: none     Images:

## 2022-11-22 NOTE — TELEPHONE ENCOUNTER
S/W pt  She has an infection in her Adams catheter and will be on antibiotics thru Nov 30th  Procedure is scheduled for 12/2  Pt is aware it may be in her best interest to reschedule/  Please advise regarding rescheduling

## 2022-11-22 NOTE — TELEPHONE ENCOUNTER
Caller: Patient    Doctor: Valdo    Reason for call: Dec 2 at 2 patient has a procedure scheduled  Currently has an infection in her central line and taking antibiotic   Ask if she needs to reschedule    Call back#:  [phone]

## 2022-11-23 ENCOUNTER — HOSPITAL ENCOUNTER (OUTPATIENT)
Dept: INFUSION CENTER | Facility: HOSPITAL | Age: 67
Discharge: HOME | End: 2022-11-23
Attending: INTERNAL MEDICINE

## 2022-11-23 VITALS
DIASTOLIC BLOOD PRESSURE: 99 MMHG | HEART RATE: 80 BPM | TEMPERATURE: 96.9 F | RESPIRATION RATE: 18 BRPM | SYSTOLIC BLOOD PRESSURE: 172 MMHG | OXYGEN SATURATION: 99 %

## 2022-11-23 RX ORDER — CEFDINIR 300 MG/1
CAPSULE ORAL
COMMUNITY
Start: 2022-11-21

## 2022-11-23 RX ADMIN — SODIUM CHLORIDE 2000 ML: 0.9 INJECTION, SOLUTION INTRAVENOUS at 09:21

## 2022-11-23 NOTE — PROGRESS NOTES
Patient stated that her Cardiologist was going to be decreasing her IV hydration to 1 5 Liters  Patient stated that this would not be able to be done till Monday due to her PCP being out of the office   - bilateral lower leg edema  Lungs clear through all fields  Patient refused to have last 500 ml of normal saline infused  IV hydration stopped per patient's request   Patient received a total of 1 5 Liters of NSS  Infusion rate was at 333 ML per current orders until we receive new orders from PCP  Patient tolerated IV hydration without issues  AVS declined  Patient is aware of next appointment  Patient ambulated off unit without incident  All personal belongings taken with patient

## 2022-11-25 ENCOUNTER — HOSPITAL ENCOUNTER (OUTPATIENT)
Dept: INFUSION CENTER | Facility: HOSPITAL | Age: 67
End: 2022-11-25
Attending: INTERNAL MEDICINE

## 2022-11-25 VITALS
SYSTOLIC BLOOD PRESSURE: 143 MMHG | TEMPERATURE: 96.8 F | HEART RATE: 90 BPM | RESPIRATION RATE: 18 BRPM | DIASTOLIC BLOOD PRESSURE: 92 MMHG

## 2022-11-25 RX ORDER — SODIUM CHLORIDE 9 MG/ML
333 INJECTION, SOLUTION INTRAVENOUS ONCE
Status: DISCONTINUED | OUTPATIENT
Start: 2022-11-29 | End: 2022-11-29

## 2022-11-25 RX ADMIN — SODIUM CHLORIDE 333.33 ML/HR: 0.9 INJECTION, SOLUTION INTRAVENOUS at 09:28

## 2022-11-28 ENCOUNTER — HOSPITAL ENCOUNTER (OUTPATIENT)
Dept: INFUSION CENTER | Facility: HOSPITAL | Age: 67
Discharge: HOME | End: 2022-11-28
Attending: INTERNAL MEDICINE

## 2022-11-28 VITALS
SYSTOLIC BLOOD PRESSURE: 134 MMHG | OXYGEN SATURATION: 100 % | HEART RATE: 99 BPM | RESPIRATION RATE: 18 BRPM | TEMPERATURE: 97.5 F | DIASTOLIC BLOOD PRESSURE: 86 MMHG

## 2022-11-28 RX ADMIN — SODIUM CHLORIDE 1500 ML: 0.9 INJECTION, SOLUTION INTRAVENOUS at 09:20

## 2022-11-28 RX ADMIN — SODIUM CHLORIDE 2000 ML: 0.9 INJECTION, SOLUTION INTRAVENOUS at 09:07

## 2022-11-28 NOTE — PROGRESS NOTES
Patient tolerated IV NSS hydration without issues  AVS declined  Patient is aware of next appointment  Patient ambulated off unit without incident  All personal belongings taken with patient

## 2022-11-28 NOTE — PROGRESS NOTES
New orders received from Dr Katerina Edwards  NSS 1 5 liters over 4 hours  Orders sent to pharmacy  NSS started at new rate  Patient aware orders were received

## 2022-11-29 ENCOUNTER — TELEPHONE (OUTPATIENT)
Dept: PAIN MEDICINE | Facility: CLINIC | Age: 67
End: 2022-11-29

## 2022-11-29 ENCOUNTER — HOSPITAL ENCOUNTER (OUTPATIENT)
Dept: INFUSION CENTER | Facility: HOSPITAL | Age: 67
Discharge: HOME | End: 2022-11-29
Attending: INTERNAL MEDICINE

## 2022-11-29 VITALS
HEART RATE: 81 BPM | TEMPERATURE: 97.4 F | OXYGEN SATURATION: 100 % | SYSTOLIC BLOOD PRESSURE: 123 MMHG | RESPIRATION RATE: 16 BRPM | DIASTOLIC BLOOD PRESSURE: 83 MMHG

## 2022-11-29 DIAGNOSIS — R52 GENERALIZED PAIN: ICD-10-CM

## 2022-11-29 DIAGNOSIS — G89.4 CHRONIC PAIN DISORDER: ICD-10-CM

## 2022-11-29 DIAGNOSIS — G03.9 ARACHNOIDITIS: ICD-10-CM

## 2022-11-29 RX ORDER — HYDROCODONE BITARTRATE AND ACETAMINOPHEN 5; 325 MG/1; MG/1
1 TABLET ORAL 2 TIMES DAILY PRN
Qty: 6 TABLET | Refills: 0 | Status: SHIPPED | OUTPATIENT
Start: 2022-11-29 | End: 2022-12-02 | Stop reason: SDUPTHER

## 2022-11-29 RX ADMIN — SODIUM CHLORIDE 1500 ML: 0.9 INJECTION, SOLUTION INTRAVENOUS at 08:25

## 2022-11-29 NOTE — TELEPHONE ENCOUNTER
Refill request  norco 5-325mg  Take 1 tablet by mouth 2 (two) times a day as needed for pain   Remaining: 3 pills, will run out before her appt this Friday  [facility]/pharmacy #[id number]- [place] [address] [place] [address]   Call back# [phone]

## 2022-11-29 NOTE — PROGRESS NOTES
Central line dressing done per orders  Patient tolerated IV hydration without issues  AVS declined  Patient is aware of next appointment  Patient ambulated off unit without incident  All personal belongings taken with patient

## 2022-11-29 NOTE — TELEPHONE ENCOUNTER
Please advise   Last fill 10/28 and she does have appt 12/2 to see you in the office  Only 3 pills remaining

## 2022-11-30 RX ORDER — SODIUM CHLORIDE 9 MG/ML
375 INJECTION, SOLUTION INTRAVENOUS ONCE
Status: COMPLETED | OUTPATIENT
Start: 2022-12-01 | End: 2022-12-01

## 2022-12-01 ENCOUNTER — HOSPITAL ENCOUNTER (OUTPATIENT)
Dept: INFUSION CENTER | Facility: HOSPITAL | Age: 67
End: 2022-12-01
Attending: INTERNAL MEDICINE

## 2022-12-01 VITALS
HEART RATE: 84 BPM | RESPIRATION RATE: 18 BRPM | OXYGEN SATURATION: 98 % | DIASTOLIC BLOOD PRESSURE: 73 MMHG | SYSTOLIC BLOOD PRESSURE: 168 MMHG | TEMPERATURE: 98.2 F

## 2022-12-01 RX ORDER — SODIUM CHLORIDE 9 MG/ML
375 INJECTION, SOLUTION INTRAVENOUS ONCE
Status: COMPLETED | OUTPATIENT
Start: 2022-12-02 | End: 2022-12-02

## 2022-12-01 RX ADMIN — SODIUM CHLORIDE 375 ML/HR: 0.9 INJECTION, SOLUTION INTRAVENOUS at 09:04

## 2022-12-01 NOTE — PLAN OF CARE
Problem: INFECTION - ADULT  Goal: Absence or prevention of progression during hospitalization  Description: INTERVENTIONS:  - Assess and monitor for signs and symptoms of infection  - Monitor lab/diagnostic results  - Monitor all insertion sites, i e  indwelling lines, tubes, and drains  - Monitor endotracheal if appropriate and nasal secretions for changes in amount and color  - [place] appropriate cooling/warming therapies per order  - Administer medications as ordered  - Instruct and encourage patient and family to use good hand hygiene technique  - Identify and instruct in appropriate isolation precautions for identified infection/condition  Outcome: Progressing     Problem: Knowledge Deficit  Goal: Patient/family/caregiver demonstrates understanding of disease process, treatment plan, medications, and discharge instructions  Description: Complete learning assessment and assess knowledge base    Interventions:  - Provide teaching at level of understanding  - Provide teaching via preferred learning methods  Outcome: Progressing

## 2022-12-02 ENCOUNTER — OFFICE VISIT (OUTPATIENT)
Dept: PAIN MEDICINE | Facility: CLINIC | Age: 67
End: 2022-12-02

## 2022-12-02 ENCOUNTER — HOSPITAL ENCOUNTER (OUTPATIENT)
Dept: INFUSION CENTER | Facility: HOSPITAL | Age: 67
End: 2022-12-02
Attending: INTERNAL MEDICINE

## 2022-12-02 VITALS
OXYGEN SATURATION: 98 % | HEART RATE: 72 BPM | DIASTOLIC BLOOD PRESSURE: 81 MMHG | RESPIRATION RATE: 18 BRPM | TEMPERATURE: 97.8 F | SYSTOLIC BLOOD PRESSURE: 170 MMHG

## 2022-12-02 VITALS
DIASTOLIC BLOOD PRESSURE: 82 MMHG | WEIGHT: 133.8 LBS | HEIGHT: 61 IN | SYSTOLIC BLOOD PRESSURE: 147 MMHG | BODY MASS INDEX: 25.26 KG/M2 | HEART RATE: 83 BPM

## 2022-12-02 DIAGNOSIS — R52 GENERALIZED PAIN: ICD-10-CM

## 2022-12-02 DIAGNOSIS — M54.41 CHRONIC BILATERAL LOW BACK PAIN WITH BILATERAL SCIATICA: ICD-10-CM

## 2022-12-02 DIAGNOSIS — G03.9 ARACHNOIDITIS: ICD-10-CM

## 2022-12-02 DIAGNOSIS — M54.42 CHRONIC BILATERAL LOW BACK PAIN WITH BILATERAL SCIATICA: ICD-10-CM

## 2022-12-02 DIAGNOSIS — F11.20 UNCOMPLICATED OPIOID DEPENDENCE (HCC): ICD-10-CM

## 2022-12-02 DIAGNOSIS — Z79.891 LONG-TERM CURRENT USE OF OPIATE ANALGESIC: ICD-10-CM

## 2022-12-02 DIAGNOSIS — M54.2 NECK PAIN: ICD-10-CM

## 2022-12-02 DIAGNOSIS — G89.4 CHRONIC PAIN SYNDROME: Primary | ICD-10-CM

## 2022-12-02 DIAGNOSIS — M79.18 MYOFASCIAL PAIN SYNDROME: ICD-10-CM

## 2022-12-02 DIAGNOSIS — M47.812 CERVICAL SPONDYLOSIS: ICD-10-CM

## 2022-12-02 DIAGNOSIS — M54.16 LUMBAR RADICULOPATHY: ICD-10-CM

## 2022-12-02 DIAGNOSIS — G89.29 CHRONIC BILATERAL LOW BACK PAIN WITH BILATERAL SCIATICA: ICD-10-CM

## 2022-12-02 RX ORDER — HYDROCODONE BITARTRATE AND ACETAMINOPHEN 5; 325 MG/1; MG/1
1 TABLET ORAL 2 TIMES DAILY PRN
Qty: 60 TABLET | Refills: 0 | Status: SHIPPED | OUTPATIENT
Start: 2022-12-02

## 2022-12-02 RX ORDER — FLUCONAZOLE 150 MG/1
TABLET ORAL
COMMUNITY
Start: 2022-11-29 | End: 2022-12-06

## 2022-12-02 RX ORDER — SODIUM CHLORIDE 9 MG/ML
375 INJECTION, SOLUTION INTRAVENOUS ONCE
Status: COMPLETED | OUTPATIENT
Start: 2022-12-05 | End: 2022-12-05

## 2022-12-02 RX ORDER — VERAPAMIL HYDROCHLORIDE 40 MG/1
TABLET ORAL
COMMUNITY
Start: 2022-12-01

## 2022-12-02 RX ORDER — FLUCONAZOLE 150 MG/1
TABLET ORAL
COMMUNITY
Start: 2022-12-01 | End: 2022-12-06

## 2022-12-02 RX ORDER — CEFDINIR 300 MG/1
CAPSULE ORAL
COMMUNITY
Start: 2022-11-21 | End: 2022-12-06

## 2022-12-02 RX ADMIN — SODIUM CHLORIDE 375 ML/HR: 0.9 INJECTION, SOLUTION INTRAVENOUS at 08:29

## 2022-12-02 NOTE — PATIENT INSTRUCTIONS

## 2022-12-02 NOTE — PROGRESS NOTES
Assessment:  1  Chronic pain syndrome    2  Neck pain    3  Cervical spondylosis    4  Chronic bilateral low back pain with bilateral sciatica    5  Lumbar radiculopathy    6  Myofascial pain syndrome    7  Generalized pain    8  Arachnoiditis    9  Long-term current use of opiate analgesic    10  Uncomplicated opioid dependence ([address] )        Plan:  While the patient was in the office today, I did have a thorough conversation regarding their chronic pain syndrome, medication management, and treatment plan options  Patient is being seen for follow-up visit  Patient is scheduled for upcoming cervical medial branch blocks and possible radiofrequency ablations  She recently finished antibiotics for cellulitis the site of her central line  Continue hydrocodone 5/325 twice daily if needed for pain  The patient's opioid scripts were sent to their pharmacy electronically and was given a 2 month supply of prescriptions with a Do Not Fill date(s) of 12/02/2022, 12/30/2022  Continue Flexeril 5 mg twice daily if needed for spasms  She did not require refill this medication during today's visit  [place] Prescription Drug Monitoring Program report was reviewed and was appropriate     A urine drug screen was collected at today's office visit as part of our medication management protocol  The point of care testing results were appropriate for what was being prescribed  The specimen will be sent for confirmatory testing  The drug screen is medically necessary because the patient is either dependent on opioid medication or is being considered for opioid medication therapy and the results could impact ongoing or future treatment  The drug screen is to evaluate for the presences or absence of prescribed, non-prescribed, and/or illicit drugs/substances  There are risks associated with opioid medications, including dependence, addiction and tolerance   The patient understands and agrees to use these medications only as prescribed  Potential side effects of the medications include, but are not limited to, constipation, drowsiness, addiction, impaired judgment and risk of fatal overdose if not taken as prescribed  The patient was warned against driving while taking sedation medications  Sharing medications is a felony  At this point in time, the patient is showing no signs of addiction, abuse, diversion or suicidal ideation  The patient will follow-up in 8 weeks for medication prescription refill and reevaluation  The patient was advised to contact the office should their symptoms worsen in the interim  The patient was agreeable and verbalized an understanding  History of Present Illness: The patient is a 75 y o  female last seen on 09/30/2022 who presents for a follow up office visit in regards to chronic pain secondary to chronic pain syndrome, neck pain, cervical spondylosis, chronic low back pain, lumbar radiculopathy, myofascial pain syndrome, arachnoiditis  The patient currently reports complaints of neck and upper extremity pain, midback pain, low back and lower extremity pain  Current pain level is an 8/10  Quality pain is described as burning, dull, aching, sharp, throbbing, cramping, pressure-like, shooting, numb, pins and needles  Current pain medications includes:  Hydrocodone 5/325 twice daily if needed for pain, Flexeril 5 mg twice daily if needed for spasms  The patient reports that this regimen is providing 20 % pain relief  The patient is reporting no side effects from this pain medication regimen  Pain Contract Signed: 4/25/22  Last Urine Drug Screen: 12/2/22    I have personally reviewed and/or updated the patient's past medical history, past surgical history, family history, social history, current medications, allergies, and vital signs today  Review of Systems:    Review of Systems   Constitutional: Negative for unexpected weight change  HENT: Negative for hearing loss  Eyes: Negative for visual disturbance  Respiratory: Positive for shortness of breath  Cardiovascular: Negative for leg swelling  Gastrointestinal: Positive for constipation and nausea  Endocrine: Negative for polyuria  Genitourinary: Negative for difficulty urinating  Musculoskeletal: Positive for gait problem  Negative for joint swelling and myalgias  Decreased range of motion  Joint stiffness  Swelling - facial /ankles, knees  Pain in extremity- shoulder, elbows, wrists, hips, knees, spine   Skin: Negative for rash  Neurological: Positive for dizziness  Negative for weakness and headaches  Psychiatric/Behavioral: Negative for decreased concentration  All other systems reviewed and are negative          Past Medical History:   Diagnosis Date   • Allergic rhinitis    • Anxiety    • Asthma    • Back pain    • Cardiac disease    • Cardiopathy     EF 45%   • Cough    • Diverticulitis    • Factor V Leiden ([facility] )    • Fibromyalgia    • GERD (gastroesophageal reflux disease)    • Hashimoto's thyroiditis    • Hx of degenerative disc disease    • Hypotension     pots - postural orthostatic hypotension   • Interstitial cystitis    • Irregular heart beat     LBBB   • Irritable bowel syndrome    • Migraines    • Mitral valve disease     "thickening"   • Myocardial infarction [facility])     possible but not sure when   • Neuropathy     bilateral legs   • Osteoporosis    • Postural orthostatic tachycardia syndrome     must drink a lot of water and salt   • Pott's disease    • Rheumatic fever    • Scoliosis    • Sepsis ([facility] )     associated with PICC line   • Sjogren's syndrome ([facility] )    • TMJ (dislocation of temporomandibular joint)        Past Surgical History:   Procedure Laterality Date   • ABLATION MICROWAVE Left     lumbar area   • BACK SURGERY  10/1998   •  SECTION     • CHOLECYSTECTOMY  2016   • COLONOSCOPY     • DILATION AND CURETTAGE OF UTERUS     • EGD  2016   • FOOT SURGERY  1968    removal of bone and neuroma   • HYSTERECTOMY  0    age 54  PRICE ooph   • IR OTHER  01/07/2022   • IR OTHER  11/22/2022   • IR PICC PLACEMENT SINGLE LUMEN  10/02/2020   • IR PICC PLACEMENT SINGLE LUMEN  08/12/2021   • IR PICC REPOSITION  12/07/2021   • IR TUNNELED CENTRAL LINE PLACEMENT  06/20/2022   • LAPAROSCOPY  1996    endometriosis   • MOUTH BIOPSY      lip   • MI EGD TRANSORAL BIOPSY SINGLE/MULTIPLE N/A 04/24/2019    Procedure: ESOPHAGOGASTRODUODENOSCOPY (EGD) with multiple bx and dilation;  Surgeon: Nikia Multani MD;  Location: [address] [place] [address] GI LAB; Service: Gastroenterology   • MI SHLDR ARTHROSCOP,SURG,W/ROTAT CUFF REPR Right 04/06/2022    Procedure: SHOULDER ARTHROSCOPIC ROTATOR CUFF REPAIR Biceps Tenodisis;   Surgeon: Spencer Batres MD;  Location: [facility] MAIN OR;  Service: Orthopedics   • TUNNELED VENOUS CATHETER PLACEMENT  2016       Family History   Problem Relation Age of Onset   • Cancer Mother         urinary bladder    • Thyroid cancer Sister    • Colon cancer Maternal Grandfather    • Breast cancer Maternal Aunt         over 48 yrs old    • Endometrial cancer Maternal Aunt    • Multiple myeloma Maternal Aunt    • No Known Problems Father    • No Known Problems Daughter    • Heart attack Sister    • No Known Problems Sister    • No Known Problems Sister    • No Known Problems Sister    • No Known Problems Sister    • No Known Problems Daughter    • Hypertension Paternal Aunt        Social History     Occupational History   • Not on file   Tobacco Use   • Smoking status: Never   • Smokeless tobacco: Never   Vaping Use   • Vaping Use: Never used   Substance and Sexual Activity   • Alcohol use: Never   • Drug use: No   • Sexual activity: Not on file         Current Outpatient Medications:   •  Alum Hydroxide-Mag Trisilicate (Gaviscon) 11-89 4 MG CHEW, Chew 1 tablet 4 (four) times a day as needed With meals and as needed, Disp: , Rfl:   •  azelastine (ASTELIN) 0 1 % nasal spray, 2 sprays into each nostril 2 (two) times a day Use in each nostril as directed, Disp: , Rfl:   •  beclomethasone (QVAR) 40 MCG/ACT inhaler, Inhale 1 puff daily as needed (only when unable to nebulize pulmicort) Rinse mouth after use , Disp: , Rfl:   •  budesonide (PULMICORT) 0 5 mg/2 mL nebulizer solution, Take 0 5 mg by nebulization 2 (two) times a day Rinse mouth after use , Disp: , Rfl:   •  cefdinir (OMNICEF) 300 mg capsule, , Disp: , Rfl:   •  cefdinir (OMNICEF) 300 mg capsule, , Disp: , Rfl:   •  cholecalciferol (VITAMIN D3) 1,000 units tablet, Take 4,000 Units by mouth daily, Disp: , Rfl:   •  cyclobenzaprine (FLEXERIL) 5 mg tablet, Take 1 tablet (5 mg total) by mouth 2 (two) times a day as needed for muscle spasms, Disp: 60 tablet, Rfl: 0  •  docusate sodium (COLACE) 100 mg capsule, Take 100 mg by mouth daily as needed for constipation, Disp: , Rfl:   •  erythromycin (ILOTYCIN) ophthalmic ointment, Apply 1 application to eye daily at bedtime, Disp: , Rfl:   •  famotidine (PEPCID) 40 MG tablet, Twice a day, Disp: , Rfl:   •  fexofenadine (ALLEGRA) 180 MG tablet, Take 180 mg by mouth 2 (two) times a day , Disp: , Rfl:   •  fluconazole (DIFLUCAN) 150 mg tablet, , Disp: , Rfl:   •  fluconazole (DIFLUCAN) 150 mg tablet, , Disp: , Rfl:   •  fluticasone (FLONASE) 50 mcg/act nasal spray, 2 sprays into each nostril daily , Disp: , Rfl:   •  Galcanezumab-gnlm (Emgality) 120 MG/ML SOAJ, Inject under the skin every 30 (thirty) days , Disp: , Rfl:   •  Gemtesa 75 MG TABS, Take 1 tablet by mouth daily, Disp: , Rfl:   •  HYDROcodone-acetaminophen (Norco) 5-325 mg per tablet, Take 1 tablet by mouth 2 (two) times a day as needed for pain for up to 30 doses Do not fill until 12/30/2022 Max Daily Amount: 2 tablets, Disp: 60 tablet, Rfl: 0  •  HYDROcodone-acetaminophen (NORCO) 5-325 mg per tablet, Take 1 tablet by mouth 2 (two) times a day as needed for pain Max Daily Amount: 2 tablets, Disp: 60 tablet, Rfl: 0  •  hydrOXYzine (ATARAX) 10 mg/5 mL syrup, Take 20 mg by mouth daily at bedtime , Disp: , Rfl:   •  ivabradine HCl (Corlanor) 5 MG tablet, 7 5 mg 2 (two) times a day, Disp: , Rfl:   •  levalbuterol (XOPENEX HFA) 45 mcg/act inhaler, Inhale 2 puffs every 4 (four) hours as needed (when unable to nebulize), Disp: , Rfl:   •  levalbuterol (XOPENEX) 1 25 mg/3 mL nebulizer solution, Take 1 25 mg by nebulization every 6 (six) hours as needed , Disp: , Rfl: 2  •  Magnesium 400 MG CAPS, Take 1 capsule by mouth 2 (two) times a day , Disp: , Rfl:   •  MULTIPLE VITAMINS-CALCIUM PO, Take 1 capsule by mouth every morning , Disp: , Rfl:   •  Multiple Vitamins-Iron TABS, Take by mouth, Disp: , Rfl:   •  omega-3-acid ethyl esters (LOVAZA) 1 g capsule, Take 2 g by mouth 2 (two) times a day 1600mg daily, Disp: , Rfl:   •  polyethylene glycol (MIRALAX) 17 g packet, Take 17 g by mouth daily as needed , Disp: , Rfl:   •  Probiotic Product (PROBIOTIC DAILY PO), Take 1 tablet by mouth daily At lunch, Disp: , Rfl:   •  psyllium (METAMUCIL) 0 52 g capsule, Take 0 52 g by mouth daily, Disp: , Rfl:   •  Qvar RediHaler 40 MCG/ACT inhaler, PLEASE SEE ATTACHED FOR DETAILED DIRECTIONS, Disp: , Rfl:   •  Thyroid, Porcine, POWD, Use 32 mg daily, Disp: , Rfl:   •  Ubrogepant (UBRELVY) 100 MG tablet, Take 100 mg by mouth Take 1 tablet (100 mg) one time as needed for migraine  May repeat one additional tablet (100 mg) at least two hours after the first dose   Do not use more than two doses per day, or for more than eight days per month , Disp: , Rfl:   •  Varenicline Tartrate (Tyrvaya) 0 03 MG/ACT SOLN, 1 spray into each nostril 2 (two) times a day, Disp: , Rfl:   •  verapamil (CALAN) 40 mg tablet, , Disp: , Rfl:   •  ipratropium (ATROVENT) 0 03 % nasal spray, 2 sprays into each nostril 4 (four) times a day as needed for rhinitis , Disp: , Rfl:   •  midodrine (PROAMATINE) 2 5 mg tablet, , Disp: , Rfl:   •  omeprazole (PriLOSEC) 20 mg delayed release capsule, Take 20 mg by mouth 2 (two) times a day, Disp: , Rfl:   •  oxybutynin (DITROPAN) 5 mg tablet, Take 2 5 mg by mouth 2 (two) times a day Once in the morning and one at HS, Disp: , Rfl:   •  tobramycin-dexamethasone (TOBRADEX) ophthalmic suspension, Apply 1 drop to eye 2 (two) times a day, Disp: , Rfl:   No current facility-administered medications for this visit      Allergies   Allergen Reactions   • Imipramine Confusion, Fatigue, Irritability, Palpitations, Shortness Of Breath, Tachycardia and Visual Disturbance   • Melatonin Shortness Of Breath   • Mirtazapine Anxiety, Dizziness, GI Intolerance, Nausea Only, Palpitations and Shortness Of Breath   • Nexium [Esomeprazole] Shortness Of Breath   • Nsaids Shortness Of Breath   • Singulair [Montelukast] Shortness Of Breath and Cough   • Zomig [Zolmitriptan] Shortness Of Breath   • Dexilant [Dexlansoprazole] Nausea Only and Vomiting   • Albuterol    • Ambien [Zolpidem]    • Amitriptyline Drowsiness   • Aspirin    • Bactrim [Sulfamethoxazole-Trimethoprim] Hives   • Banana - Food Allergy Dermatitis   • Ceftin [Cefuroxime]    • Celebrex [Celecoxib]    • Ciprofloxacin    • Cranberry-C [Ascorbate - Food Allergy] GI Intolerance   • Demerol [Meperidine]    • Epinephrine Dizziness   • Ergotamine Nausea Only and Headache   • Keflex [Cephalexin]    • Klonopin [Clonazepam] Nausea Only and Dizziness   • Latex Hives   • Levaquin [Levofloxacin]    • Lexapro [Escitalopram]    • Lyrica [Pregabalin] Fatigue   • Macrodantin [Nitrofurantoin]    • Morphine Nausea Only   • Movantik [Naloxegol] Nausea Only   • Mushroom Extract Complex - Food Allergy      Eye itchy, asthma  attack   • Naprosyn [Naproxen]    • Neurontin [Gabapentin] Dizziness   • Pineapple - Food Allergy GI Intolerance   • Plecanatide Abdominal Pain, Diarrhea and GI Intolerance   • Prolia [Denosumab]    • Prozac [Fluoxetine]    • Serevent [Salmeterol] Dizziness   • Sudafed [Pseudoephedrine] Hives   • Sulfa Antibiotics    • Tomato - Food Allergy GI Intolerance   • Trazodone    • Ultram [Tramadol] Nausea Only, Dizziness and Headache   • Vilazodone Dizziness, GI Intolerance and Headache   • Vilazodone Hcl Abdominal Pain, Dizziness, GI Intolerance, Headache and Other (See Comments)   • Vioxx [Rofecoxib]    • Vortioxetine Drowsiness, Fatigue, GI Intolerance and Irritability   • Zithromax [Azithromycin] Hives   • Zoloft [Sertraline]    • Zantac [Ranitidine] Rash and Dizziness       Physical Exam:    /82   Pulse 83   Ht 5' 1" (1 549 m)   Wt 60 7 kg (133 lb 12 8 oz)   BMI 25 28 kg/m²     Constitutional:normal, well developed, well nourished, alert, in no distress and non-toxic and no overt pain behavior    Eyes:anicteric  HEENT:grossly intact  Neck:supple, symmetric, trachea midline and no masses   Pulmonary:even and unlabored  Cardiovascular:No edema or pitting edema present  Skin:Normal without rashes or lesions and well hydrated  Psychiatric:Mood and affect appropriate  Neurologic:Cranial Nerves II-XII grossly intact  Musculoskeletal:normal      Imaging  No orders to display         Orders Placed This Encounter   Procedures   • MM DT_Alprazolam Definitive Test   • MM DT_Amphetamine Definitive Test   • MM DT_Aripiprazole Definitive Test   • MM ALL_Prescribed Meds and Special Instructions   • MM DT_Buprenorphine Definitive Test   • MM DT_Butalbital Definitive Test   • MM DT_Clonazepam Definitive Test   • MM DT_Clozapine Definitive Test   • MM DT_Cocaine Definitive Test   • MM DT_Codeine Definitive Test   • MM DT_Desipramine Definitive Test   • MM Diazepam Definitive Test   • MM DT_Ethyl Glucuronide/Ethyl Sulfate Definitive Test   • MM DT_Fentanyl Definitive Test   • MM DT_Haloperidol Definitive Test   • MM DT_Heroin Definitive Test   • MM DT_Hydrocodone Definitive Test   • MM DT_Hydromorphone Definitive Test   • MM DT_Kratom Definitive Test   • MM DT_Levorphanol Definitive Test   • MM Lorazepam Definitive Test   • MM DT_MDMA Definitive Test   • MM DT_Meperidine Definitive Test   • MM DT_Methadone Definitive Test   • MM DT_Methamphetamine Definitive Test   • MM DT_Methylphenidate Definitive Test   • MM DT_Morphine Definitive Test   • MM DT_Olanzapine Definitive Test   • MM DT_Oxazepam Definitive Test   • MM DT_Oxycodone Definitive Test   • MM DT_Oxymorphone Definitive Test   • MM DT_Phencyclidine Definitive Test   • MM DT_Phenobarbital Definitive Test   • MM DT_Phentermine Definitive Test   • MM DT_Pregablin Definitive   • MM DT_Quetiapine Definitive Test   • MM DT_Risperidone Definitive Test   • MM DT_Secobarbital Definitive Test   • MM DT_Tapentadol Definitive Test   • MM DT_Temazapam Definitive Test   • MM DT_THC Definitive Test   • MM DT_Tramadol Definitive Test   • MM DT_Validity Creatinine   • MM DT_Validity Oxidant   • MM DT_Validity pH   • MM DT_Validity Specific

## 2022-12-02 NOTE — H&P (VIEW-ONLY)
Assessment:  1  Chronic pain syndrome    2  Neck pain    3  Cervical spondylosis    4  Chronic bilateral low back pain with bilateral sciatica    5  Lumbar radiculopathy    6  Myofascial pain syndrome    7  Generalized pain    8  Arachnoiditis    9  Long-term current use of opiate analgesic    10  Uncomplicated opioid dependence ([address] )        Plan:  While the patient was in the office today, I did have a thorough conversation regarding their chronic pain syndrome, medication management, and treatment plan options  Patient is being seen for follow-up visit  Patient is scheduled for upcoming cervical medial branch blocks and possible radiofrequency ablations  She recently finished antibiotics for cellulitis the site of her central line  Continue hydrocodone 5/325 twice daily if needed for pain  The patient's opioid scripts were sent to their pharmacy electronically and was given a 2 month supply of prescriptions with a Do Not Fill date(s) of 12/02/2022, 12/30/2022  Continue Flexeril 5 mg twice daily if needed for spasms  She did not require refill this medication during today's visit  [place] Prescription Drug Monitoring Program report was reviewed and was appropriate     A urine drug screen was collected at today's office visit as part of our medication management protocol  The point of care testing results were appropriate for what was being prescribed  The specimen will be sent for confirmatory testing  The drug screen is medically necessary because the patient is either dependent on opioid medication or is being considered for opioid medication therapy and the results could impact ongoing or future treatment  The drug screen is to evaluate for the presences or absence of prescribed, non-prescribed, and/or illicit drugs/substances  There are risks associated with opioid medications, including dependence, addiction and tolerance   The patient understands and agrees to use these medications only as prescribed  Potential side effects of the medications include, but are not limited to, constipation, drowsiness, addiction, impaired judgment and risk of fatal overdose if not taken as prescribed  The patient was warned against driving while taking sedation medications  Sharing medications is a felony  At this point in time, the patient is showing no signs of addiction, abuse, diversion or suicidal ideation  The patient will follow-up in 8 weeks for medication prescription refill and reevaluation  The patient was advised to contact the office should their symptoms worsen in the interim  The patient was agreeable and verbalized an understanding  History of Present Illness: The patient is a 75 y o  female last seen on 09/30/2022 who presents for a follow up office visit in regards to chronic pain secondary to chronic pain syndrome, neck pain, cervical spondylosis, chronic low back pain, lumbar radiculopathy, myofascial pain syndrome, arachnoiditis  The patient currently reports complaints of neck and upper extremity pain, midback pain, low back and lower extremity pain  Current pain level is an 8/10  Quality pain is described as burning, dull, aching, sharp, throbbing, cramping, pressure-like, shooting, numb, pins and needles  Current pain medications includes:  Hydrocodone 5/325 twice daily if needed for pain, Flexeril 5 mg twice daily if needed for spasms  The patient reports that this regimen is providing 20 % pain relief  The patient is reporting no side effects from this pain medication regimen  Pain Contract Signed: 4/25/22  Last Urine Drug Screen: 12/2/22    I have personally reviewed and/or updated the patient's past medical history, past surgical history, family history, social history, current medications, allergies, and vital signs today  Review of Systems:    Review of Systems   Constitutional: Negative for unexpected weight change  HENT: Negative for hearing loss  Eyes: Negative for visual disturbance  Respiratory: Positive for shortness of breath  Cardiovascular: Negative for leg swelling  Gastrointestinal: Positive for constipation and nausea  Endocrine: Negative for polyuria  Genitourinary: Negative for difficulty urinating  Musculoskeletal: Positive for gait problem  Negative for joint swelling and myalgias  Decreased range of motion  Joint stiffness  Swelling - facial /ankles, knees  Pain in extremity- shoulder, elbows, wrists, hips, knees, spine   Skin: Negative for rash  Neurological: Positive for dizziness  Negative for weakness and headaches  Psychiatric/Behavioral: Negative for decreased concentration  All other systems reviewed and are negative          Past Medical History:   Diagnosis Date   • Allergic rhinitis    • Anxiety    • Asthma    • Back pain    • Cardiac disease    • Cardiopathy     EF 45%   • Cough    • Diverticulitis    • Factor V Leiden ([facility] )    • Fibromyalgia    • GERD (gastroesophageal reflux disease)    • Hashimoto's thyroiditis    • Hx of degenerative disc disease    • Hypotension     pots - postural orthostatic hypotension   • Interstitial cystitis    • Irregular heart beat     LBBB   • Irritable bowel syndrome    • Migraines    • Mitral valve disease     "thickening"   • Myocardial infarction [facility])     possible but not sure when   • Neuropathy     bilateral legs   • Osteoporosis    • Postural orthostatic tachycardia syndrome     must drink a lot of water and salt   • Pott's disease    • Rheumatic fever    • Scoliosis    • Sepsis ([facility] )     associated with PICC line   • Sjogren's syndrome ([facility] )    • TMJ (dislocation of temporomandibular joint)        Past Surgical History:   Procedure Laterality Date   • ABLATION MICROWAVE Left     lumbar area   • BACK SURGERY  10/1998   •  SECTION     • CHOLECYSTECTOMY  2016   • COLONOSCOPY     • DILATION AND CURETTAGE OF UTERUS     • EGD  2016   • FOOT SURGERY  1968    removal of bone and neuroma   • HYSTERECTOMY  0    age 54  PRICE ooph   • IR OTHER  01/07/2022   • IR OTHER  11/22/2022   • IR PICC PLACEMENT SINGLE LUMEN  10/02/2020   • IR PICC PLACEMENT SINGLE LUMEN  08/12/2021   • IR PICC REPOSITION  12/07/2021   • IR TUNNELED CENTRAL LINE PLACEMENT  06/20/2022   • LAPAROSCOPY  1996    endometriosis   • MOUTH BIOPSY      lip   • NC EGD TRANSORAL BIOPSY SINGLE/MULTIPLE N/A 04/24/2019    Procedure: ESOPHAGOGASTRODUODENOSCOPY (EGD) with multiple bx and dilation;  Surgeon: Tracey Obrien MD;  Location: [address] [place] [address] GI LAB; Service: Gastroenterology   • NC SHLDR ARTHROSCOP,SURG,W/ROTAT CUFF REPR Right 04/06/2022    Procedure: SHOULDER ARTHROSCOPIC ROTATOR CUFF REPAIR Biceps Tenodisis;   Surgeon: Tristen Lock MD;  Location: [facility] MAIN OR;  Service: Orthopedics   • TUNNELED VENOUS CATHETER PLACEMENT  2016       Family History   Problem Relation Age of Onset   • Cancer Mother         urinary bladder    • Thyroid cancer Sister    • Colon cancer Maternal Grandfather    • Breast cancer Maternal Aunt         over 48 yrs old    • Endometrial cancer Maternal Aunt    • Multiple myeloma Maternal Aunt    • No Known Problems Father    • No Known Problems Daughter    • Heart attack Sister    • No Known Problems Sister    • No Known Problems Sister    • No Known Problems Sister    • No Known Problems Sister    • No Known Problems Daughter    • Hypertension Paternal Aunt        Social History     Occupational History   • Not on file   Tobacco Use   • Smoking status: Never   • Smokeless tobacco: Never   Vaping Use   • Vaping Use: Never used   Substance and Sexual Activity   • Alcohol use: Never   • Drug use: No   • Sexual activity: Not on file         Current Outpatient Medications:   •  Alum Hydroxide-Mag Trisilicate (Gaviscon) 22-28 2 MG CHEW, Chew 1 tablet 4 (four) times a day as needed With meals and as needed, Disp: , Rfl:   •  azelastine (ASTELIN) 0 1 % nasal spray, 2 sprays into each nostril 2 (two) times a day Use in each nostril as directed, Disp: , Rfl:   •  beclomethasone (QVAR) 40 MCG/ACT inhaler, Inhale 1 puff daily as needed (only when unable to nebulize pulmicort) Rinse mouth after use , Disp: , Rfl:   •  budesonide (PULMICORT) 0 5 mg/2 mL nebulizer solution, Take 0 5 mg by nebulization 2 (two) times a day Rinse mouth after use , Disp: , Rfl:   •  cefdinir (OMNICEF) 300 mg capsule, , Disp: , Rfl:   •  cefdinir (OMNICEF) 300 mg capsule, , Disp: , Rfl:   •  cholecalciferol (VITAMIN D3) 1,000 units tablet, Take 4,000 Units by mouth daily, Disp: , Rfl:   •  cyclobenzaprine (FLEXERIL) 5 mg tablet, Take 1 tablet (5 mg total) by mouth 2 (two) times a day as needed for muscle spasms, Disp: 60 tablet, Rfl: 0  •  docusate sodium (COLACE) 100 mg capsule, Take 100 mg by mouth daily as needed for constipation, Disp: , Rfl:   •  erythromycin (ILOTYCIN) ophthalmic ointment, Apply 1 application to eye daily at bedtime, Disp: , Rfl:   •  famotidine (PEPCID) 40 MG tablet, Twice a day, Disp: , Rfl:   •  fexofenadine (ALLEGRA) 180 MG tablet, Take 180 mg by mouth 2 (two) times a day , Disp: , Rfl:   •  fluconazole (DIFLUCAN) 150 mg tablet, , Disp: , Rfl:   •  fluconazole (DIFLUCAN) 150 mg tablet, , Disp: , Rfl:   •  fluticasone (FLONASE) 50 mcg/act nasal spray, 2 sprays into each nostril daily , Disp: , Rfl:   •  Galcanezumab-gnlm (Emgality) 120 MG/ML SOAJ, Inject under the skin every 30 (thirty) days , Disp: , Rfl:   •  Gemtesa 75 MG TABS, Take 1 tablet by mouth daily, Disp: , Rfl:   •  HYDROcodone-acetaminophen (Norco) 5-325 mg per tablet, Take 1 tablet by mouth 2 (two) times a day as needed for pain for up to 30 doses Do not fill until 12/30/2022 Max Daily Amount: 2 tablets, Disp: 60 tablet, Rfl: 0  •  HYDROcodone-acetaminophen (NORCO) 5-325 mg per tablet, Take 1 tablet by mouth 2 (two) times a day as needed for pain Max Daily Amount: 2 tablets, Disp: 60 tablet, Rfl: 0  •  hydrOXYzine (ATARAX) 10 mg/5 mL syrup, Take 20 mg by mouth daily at bedtime , Disp: , Rfl:   •  ivabradine HCl (Corlanor) 5 MG tablet, 7 5 mg 2 (two) times a day, Disp: , Rfl:   •  levalbuterol (XOPENEX HFA) 45 mcg/act inhaler, Inhale 2 puffs every 4 (four) hours as needed (when unable to nebulize), Disp: , Rfl:   •  levalbuterol (XOPENEX) 1 25 mg/3 mL nebulizer solution, Take 1 25 mg by nebulization every 6 (six) hours as needed , Disp: , Rfl: 2  •  Magnesium 400 MG CAPS, Take 1 capsule by mouth 2 (two) times a day , Disp: , Rfl:   •  MULTIPLE VITAMINS-CALCIUM PO, Take 1 capsule by mouth every morning , Disp: , Rfl:   •  Multiple Vitamins-Iron TABS, Take by mouth, Disp: , Rfl:   •  omega-3-acid ethyl esters (LOVAZA) 1 g capsule, Take 2 g by mouth 2 (two) times a day 1600mg daily, Disp: , Rfl:   •  polyethylene glycol (MIRALAX) 17 g packet, Take 17 g by mouth daily as needed , Disp: , Rfl:   •  Probiotic Product (PROBIOTIC DAILY PO), Take 1 tablet by mouth daily At lunch, Disp: , Rfl:   •  psyllium (METAMUCIL) 0 52 g capsule, Take 0 52 g by mouth daily, Disp: , Rfl:   •  Qvar RediHaler 40 MCG/ACT inhaler, PLEASE SEE ATTACHED FOR DETAILED DIRECTIONS, Disp: , Rfl:   •  Thyroid, Porcine, POWD, Use 32 mg daily, Disp: , Rfl:   •  Ubrogepant (UBRELVY) 100 MG tablet, Take 100 mg by mouth Take 1 tablet (100 mg) one time as needed for migraine  May repeat one additional tablet (100 mg) at least two hours after the first dose   Do not use more than two doses per day, or for more than eight days per month , Disp: , Rfl:   •  Varenicline Tartrate (Tyrvaya) 0 03 MG/ACT SOLN, 1 spray into each nostril 2 (two) times a day, Disp: , Rfl:   •  verapamil (CALAN) 40 mg tablet, , Disp: , Rfl:   •  ipratropium (ATROVENT) 0 03 % nasal spray, 2 sprays into each nostril 4 (four) times a day as needed for rhinitis , Disp: , Rfl:   •  midodrine (PROAMATINE) 2 5 mg tablet, , Disp: , Rfl:   •  omeprazole (PriLOSEC) 20 mg delayed release capsule, Take 20 mg by mouth 2 (two) times a day, Disp: , Rfl:   •  oxybutynin (DITROPAN) 5 mg tablet, Take 2 5 mg by mouth 2 (two) times a day Once in the morning and one at HS, Disp: , Rfl:   •  tobramycin-dexamethasone (TOBRADEX) ophthalmic suspension, Apply 1 drop to eye 2 (two) times a day, Disp: , Rfl:   No current facility-administered medications for this visit      Allergies   Allergen Reactions   • Imipramine Confusion, Fatigue, Irritability, Palpitations, Shortness Of Breath, Tachycardia and Visual Disturbance   • Melatonin Shortness Of Breath   • Mirtazapine Anxiety, Dizziness, GI Intolerance, Nausea Only, Palpitations and Shortness Of Breath   • Nexium [Esomeprazole] Shortness Of Breath   • Nsaids Shortness Of Breath   • Singulair [Montelukast] Shortness Of Breath and Cough   • Zomig [Zolmitriptan] Shortness Of Breath   • Dexilant [Dexlansoprazole] Nausea Only and Vomiting   • Albuterol    • Ambien [Zolpidem]    • Amitriptyline Drowsiness   • Aspirin    • Bactrim [Sulfamethoxazole-Trimethoprim] Hives   • Banana - Food Allergy Dermatitis   • Ceftin [Cefuroxime]    • Celebrex [Celecoxib]    • Ciprofloxacin    • Cranberry-C [Ascorbate - Food Allergy] GI Intolerance   • Demerol [Meperidine]    • Epinephrine Dizziness   • Ergotamine Nausea Only and Headache   • Keflex [Cephalexin]    • Klonopin [Clonazepam] Nausea Only and Dizziness   • Latex Hives   • Levaquin [Levofloxacin]    • Lexapro [Escitalopram]    • Lyrica [Pregabalin] Fatigue   • Macrodantin [Nitrofurantoin]    • Morphine Nausea Only   • Movantik [Naloxegol] Nausea Only   • Mushroom Extract Complex - Food Allergy      Eye itchy, asthma  attack   • Naprosyn [Naproxen]    • Neurontin [Gabapentin] Dizziness   • Pineapple - Food Allergy GI Intolerance   • Plecanatide Abdominal Pain, Diarrhea and GI Intolerance   • Prolia [Denosumab]    • Prozac [Fluoxetine]    • Serevent [Salmeterol] Dizziness   • Sudafed [Pseudoephedrine] Hives   • Sulfa Antibiotics    • Tomato - Food Allergy GI Intolerance   • Trazodone    • Ultram [Tramadol] Nausea Only, Dizziness and Headache   • Vilazodone Dizziness, GI Intolerance and Headache   • Vilazodone Hcl Abdominal Pain, Dizziness, GI Intolerance, Headache and Other (See Comments)   • Vioxx [Rofecoxib]    • Vortioxetine Drowsiness, Fatigue, GI Intolerance and Irritability   • Zithromax [Azithromycin] Hives   • Zoloft [Sertraline]    • Zantac [Ranitidine] Rash and Dizziness       Physical Exam:    /82   Pulse 83   Ht 5' 1" (1 549 m)   Wt 60 7 kg (133 lb 12 8 oz)   BMI 25 28 kg/m²     Constitutional:normal, well developed, well nourished, alert, in no distress and non-toxic and no overt pain behavior    Eyes:anicteric  HEENT:grossly intact  Neck:supple, symmetric, trachea midline and no masses   Pulmonary:even and unlabored  Cardiovascular:No edema or pitting edema present  Skin:Normal without rashes or lesions and well hydrated  Psychiatric:Mood and affect appropriate  Neurologic:Cranial Nerves II-XII grossly intact  Musculoskeletal:normal      Imaging  No orders to display         Orders Placed This Encounter   Procedures   • MM DT_Alprazolam Definitive Test   • MM DT_Amphetamine Definitive Test   • MM DT_Aripiprazole Definitive Test   • MM ALL_Prescribed Meds and Special Instructions   • MM DT_Buprenorphine Definitive Test   • MM DT_Butalbital Definitive Test   • MM DT_Clonazepam Definitive Test   • MM DT_Clozapine Definitive Test   • MM DT_Cocaine Definitive Test   • MM DT_Codeine Definitive Test   • MM DT_Desipramine Definitive Test   • MM Diazepam Definitive Test   • MM DT_Ethyl Glucuronide/Ethyl Sulfate Definitive Test   • MM DT_Fentanyl Definitive Test   • MM DT_Haloperidol Definitive Test   • MM DT_Heroin Definitive Test   • MM DT_Hydrocodone Definitive Test   • MM DT_Hydromorphone Definitive Test   • MM DT_Kratom Definitive Test   • MM DT_Levorphanol Definitive Test   • MM Lorazepam Definitive Test   • MM DT_MDMA Definitive Test   • MM DT_Meperidine Definitive Test   • MM DT_Methadone Definitive Test   • MM DT_Methamphetamine Definitive Test   • MM DT_Methylphenidate Definitive Test   • MM DT_Morphine Definitive Test   • MM DT_Olanzapine Definitive Test   • MM DT_Oxazepam Definitive Test   • MM DT_Oxycodone Definitive Test   • MM DT_Oxymorphone Definitive Test   • MM DT_Phencyclidine Definitive Test   • MM DT_Phenobarbital Definitive Test   • MM DT_Phentermine Definitive Test   • MM DT_Pregablin Definitive   • MM DT_Quetiapine Definitive Test   • MM DT_Risperidone Definitive Test   • MM DT_Secobarbital Definitive Test   • MM DT_Tapentadol Definitive Test   • MM DT_Temazapam Definitive Test   • MM DT_THC Definitive Test   • MM DT_Tramadol Definitive Test   • MM DT_Validity Creatinine   • MM DT_Validity Oxidant   • MM DT_Validity pH   • MM DT_Validity Specific

## 2022-12-02 NOTE — PLAN OF CARE
Problem: INFECTION - ADULT  Goal: Absence or prevention of progression during hospitalization  Description: INTERVENTIONS:  - Assess and monitor for signs and symptoms of infection  - Monitor lab/diagnostic results  - Monitor all insertion sites, i e  indwelling lines, tubes, and drains  - Monitor endotracheal if appropriate and nasal secretions for changes in amount and color  - [place] appropriate cooling/warming therapies per order  - Administer medications as ordered  - Instruct and encourage patient and family to use good hand hygiene technique  - Identify and instruct in appropriate isolation precautions for identified infection/condition  Outcome: Progressing     Problem: Knowledge Deficit  Goal: Patient/family/caregiver demonstrates understanding of disease process, treatment plan, medications, and discharge instructions  Description: Complete learning assessment and assess knowledge base    Interventions:  - Provide teaching at level of understanding  - Provide teaching via preferred learning methods  Outcome: Progressing

## 2022-12-02 NOTE — H&P (VIEW-ONLY)
Assessment:  1  Chronic pain syndrome    2  Neck pain    3  Cervical spondylosis    4  Chronic bilateral low back pain with bilateral sciatica    5  Lumbar radiculopathy    6  Myofascial pain syndrome    7  Generalized pain    8  Arachnoiditis    9  Long-term current use of opiate analgesic    10  Uncomplicated opioid dependence ([address] )        Plan:  While the patient was in the office today, I did have a thorough conversation regarding their chronic pain syndrome, medication management, and treatment plan options  Patient is being seen for follow-up visit  Patient is scheduled for upcoming cervical medial branch blocks and possible radiofrequency ablations  She recently finished antibiotics for cellulitis the site of her central line  Continue hydrocodone 5/325 twice daily if needed for pain  The patient's opioid scripts were sent to their pharmacy electronically and was given a 2 month supply of prescriptions with a Do Not Fill date(s) of 12/02/2022, 12/30/2022  Continue Flexeril 5 mg twice daily if needed for spasms  She did not require refill this medication during today's visit  [place] Prescription Drug Monitoring Program report was reviewed and was appropriate     A urine drug screen was collected at today's office visit as part of our medication management protocol  The point of care testing results were appropriate for what was being prescribed  The specimen will be sent for confirmatory testing  The drug screen is medically necessary because the patient is either dependent on opioid medication or is being considered for opioid medication therapy and the results could impact ongoing or future treatment  The drug screen is to evaluate for the presences or absence of prescribed, non-prescribed, and/or illicit drugs/substances  There are risks associated with opioid medications, including dependence, addiction and tolerance   The patient understands and agrees to use these medications only as prescribed  Potential side effects of the medications include, but are not limited to, constipation, drowsiness, addiction, impaired judgment and risk of fatal overdose if not taken as prescribed  The patient was warned against driving while taking sedation medications  Sharing medications is a felony  At this point in time, the patient is showing no signs of addiction, abuse, diversion or suicidal ideation  The patient will follow-up in 8 weeks for medication prescription refill and reevaluation  The patient was advised to contact the office should their symptoms worsen in the interim  The patient was agreeable and verbalized an understanding  History of Present Illness: The patient is a 75 y o  female last seen on 09/30/2022 who presents for a follow up office visit in regards to chronic pain secondary to chronic pain syndrome, neck pain, cervical spondylosis, chronic low back pain, lumbar radiculopathy, myofascial pain syndrome, arachnoiditis  The patient currently reports complaints of neck and upper extremity pain, midback pain, low back and lower extremity pain  Current pain level is an 8/10  Quality pain is described as burning, dull, aching, sharp, throbbing, cramping, pressure-like, shooting, numb, pins and needles  Current pain medications includes:  Hydrocodone 5/325 twice daily if needed for pain, Flexeril 5 mg twice daily if needed for spasms  The patient reports that this regimen is providing 20 % pain relief  The patient is reporting no side effects from this pain medication regimen  Pain Contract Signed: 4/25/22  Last Urine Drug Screen: 12/2/22    I have personally reviewed and/or updated the patient's past medical history, past surgical history, family history, social history, current medications, allergies, and vital signs today  Review of Systems:    Review of Systems   Constitutional: Negative for unexpected weight change  HENT: Negative for hearing loss  Eyes: Negative for visual disturbance  Respiratory: Positive for shortness of breath  Cardiovascular: Negative for leg swelling  Gastrointestinal: Positive for constipation and nausea  Endocrine: Negative for polyuria  Genitourinary: Negative for difficulty urinating  Musculoskeletal: Positive for gait problem  Negative for joint swelling and myalgias  Decreased range of motion  Joint stiffness  Swelling - facial /ankles, knees  Pain in extremity- shoulder, elbows, wrists, hips, knees, spine   Skin: Negative for rash  Neurological: Positive for dizziness  Negative for weakness and headaches  Psychiatric/Behavioral: Negative for decreased concentration  All other systems reviewed and are negative          Past Medical History:   Diagnosis Date   • Allergic rhinitis    • Anxiety    • Asthma    • Back pain    • Cardiac disease    • Cardiopathy     EF 45%   • Cough    • Diverticulitis    • Factor V Leiden ([facility] )    • Fibromyalgia    • GERD (gastroesophageal reflux disease)    • Hashimoto's thyroiditis    • Hx of degenerative disc disease    • Hypotension     pots - postural orthostatic hypotension   • Interstitial cystitis    • Irregular heart beat     LBBB   • Irritable bowel syndrome    • Migraines    • Mitral valve disease     "thickening"   • Myocardial infarction [facility])     possible but not sure when   • Neuropathy     bilateral legs   • Osteoporosis    • Postural orthostatic tachycardia syndrome     must drink a lot of water and salt   • Pott's disease    • Rheumatic fever    • Scoliosis    • Sepsis ([facility] )     associated with PICC line   • Sjogren's syndrome ([facility] )    • TMJ (dislocation of temporomandibular joint)        Past Surgical History:   Procedure Laterality Date   • ABLATION MICROWAVE Left     lumbar area   • BACK SURGERY  10/1998   •  SECTION     • CHOLECYSTECTOMY  2016   • COLONOSCOPY     • DILATION AND CURETTAGE OF UTERUS     • EGD  2016   • FOOT SURGERY  1968    removal of bone and neuroma   • HYSTERECTOMY  0    age 54  PRICE ooph   • IR OTHER  01/07/2022   • IR OTHER  11/22/2022   • IR PICC PLACEMENT SINGLE LUMEN  10/02/2020   • IR PICC PLACEMENT SINGLE LUMEN  08/12/2021   • IR PICC REPOSITION  12/07/2021   • IR TUNNELED CENTRAL LINE PLACEMENT  06/20/2022   • LAPAROSCOPY  1996    endometriosis   • MOUTH BIOPSY      lip   • MD EGD TRANSORAL BIOPSY SINGLE/MULTIPLE N/A 04/24/2019    Procedure: ESOPHAGOGASTRODUODENOSCOPY (EGD) with multiple bx and dilation;  Surgeon: Frasnisco Landeros MD;  Location: [address] [place] [address] GI LAB; Service: Gastroenterology   • MD SHLDR ARTHROSCOP,SURG,W/ROTAT CUFF REPR Right 04/06/2022    Procedure: SHOULDER ARTHROSCOPIC ROTATOR CUFF REPAIR Biceps Tenodisis;   Surgeon: Samson Bravo MD;  Location: [facility] MAIN OR;  Service: Orthopedics   • TUNNELED VENOUS CATHETER PLACEMENT  2016       Family History   Problem Relation Age of Onset   • Cancer Mother         urinary bladder    • Thyroid cancer Sister    • Colon cancer Maternal Grandfather    • Breast cancer Maternal Aunt         over 48 yrs old    • Endometrial cancer Maternal Aunt    • Multiple myeloma Maternal Aunt    • No Known Problems Father    • No Known Problems Daughter    • Heart attack Sister    • No Known Problems Sister    • No Known Problems Sister    • No Known Problems Sister    • No Known Problems Sister    • No Known Problems Daughter    • Hypertension Paternal Aunt        Social History     Occupational History   • Not on file   Tobacco Use   • Smoking status: Never   • Smokeless tobacco: Never   Vaping Use   • Vaping Use: Never used   Substance and Sexual Activity   • Alcohol use: Never   • Drug use: No   • Sexual activity: Not on file         Current Outpatient Medications:   •  Alum Hydroxide-Mag Trisilicate (Gaviscon) 25-14 8 MG CHEW, Chew 1 tablet 4 (four) times a day as needed With meals and as needed, Disp: , Rfl:   •  azelastine (ASTELIN) 0 1 % nasal spray, 2 sprays into each nostril 2 (two) times a day Use in each nostril as directed, Disp: , Rfl:   •  beclomethasone (QVAR) 40 MCG/ACT inhaler, Inhale 1 puff daily as needed (only when unable to nebulize pulmicort) Rinse mouth after use , Disp: , Rfl:   •  budesonide (PULMICORT) 0 5 mg/2 mL nebulizer solution, Take 0 5 mg by nebulization 2 (two) times a day Rinse mouth after use , Disp: , Rfl:   •  cefdinir (OMNICEF) 300 mg capsule, , Disp: , Rfl:   •  cefdinir (OMNICEF) 300 mg capsule, , Disp: , Rfl:   •  cholecalciferol (VITAMIN D3) 1,000 units tablet, Take 4,000 Units by mouth daily, Disp: , Rfl:   •  cyclobenzaprine (FLEXERIL) 5 mg tablet, Take 1 tablet (5 mg total) by mouth 2 (two) times a day as needed for muscle spasms, Disp: 60 tablet, Rfl: 0  •  docusate sodium (COLACE) 100 mg capsule, Take 100 mg by mouth daily as needed for constipation, Disp: , Rfl:   •  erythromycin (ILOTYCIN) ophthalmic ointment, Apply 1 application to eye daily at bedtime, Disp: , Rfl:   •  famotidine (PEPCID) 40 MG tablet, Twice a day, Disp: , Rfl:   •  fexofenadine (ALLEGRA) 180 MG tablet, Take 180 mg by mouth 2 (two) times a day , Disp: , Rfl:   •  fluconazole (DIFLUCAN) 150 mg tablet, , Disp: , Rfl:   •  fluconazole (DIFLUCAN) 150 mg tablet, , Disp: , Rfl:   •  fluticasone (FLONASE) 50 mcg/act nasal spray, 2 sprays into each nostril daily , Disp: , Rfl:   •  Galcanezumab-gnlm (Emgality) 120 MG/ML SOAJ, Inject under the skin every 30 (thirty) days , Disp: , Rfl:   •  Gemtesa 75 MG TABS, Take 1 tablet by mouth daily, Disp: , Rfl:   •  HYDROcodone-acetaminophen (Norco) 5-325 mg per tablet, Take 1 tablet by mouth 2 (two) times a day as needed for pain for up to 30 doses Do not fill until 12/30/2022 Max Daily Amount: 2 tablets, Disp: 60 tablet, Rfl: 0  •  HYDROcodone-acetaminophen (NORCO) 5-325 mg per tablet, Take 1 tablet by mouth 2 (two) times a day as needed for pain Max Daily Amount: 2 tablets, Disp: 60 tablet, Rfl: 0  •  hydrOXYzine (ATARAX) 10 mg/5 mL syrup, Take 20 mg by mouth daily at bedtime , Disp: , Rfl:   •  ivabradine HCl (Corlanor) 5 MG tablet, 7 5 mg 2 (two) times a day, Disp: , Rfl:   •  levalbuterol (XOPENEX HFA) 45 mcg/act inhaler, Inhale 2 puffs every 4 (four) hours as needed (when unable to nebulize), Disp: , Rfl:   •  levalbuterol (XOPENEX) 1 25 mg/3 mL nebulizer solution, Take 1 25 mg by nebulization every 6 (six) hours as needed , Disp: , Rfl: 2  •  Magnesium 400 MG CAPS, Take 1 capsule by mouth 2 (two) times a day , Disp: , Rfl:   •  MULTIPLE VITAMINS-CALCIUM PO, Take 1 capsule by mouth every morning , Disp: , Rfl:   •  Multiple Vitamins-Iron TABS, Take by mouth, Disp: , Rfl:   •  omega-3-acid ethyl esters (LOVAZA) 1 g capsule, Take 2 g by mouth 2 (two) times a day 1600mg daily, Disp: , Rfl:   •  polyethylene glycol (MIRALAX) 17 g packet, Take 17 g by mouth daily as needed , Disp: , Rfl:   •  Probiotic Product (PROBIOTIC DAILY PO), Take 1 tablet by mouth daily At lunch, Disp: , Rfl:   •  psyllium (METAMUCIL) 0 52 g capsule, Take 0 52 g by mouth daily, Disp: , Rfl:   •  Qvar RediHaler 40 MCG/ACT inhaler, PLEASE SEE ATTACHED FOR DETAILED DIRECTIONS, Disp: , Rfl:   •  Thyroid, Porcine, POWD, Use 32 mg daily, Disp: , Rfl:   •  Ubrogepant (UBRELVY) 100 MG tablet, Take 100 mg by mouth Take 1 tablet (100 mg) one time as needed for migraine  May repeat one additional tablet (100 mg) at least two hours after the first dose   Do not use more than two doses per day, or for more than eight days per month , Disp: , Rfl:   •  Varenicline Tartrate (Tyrvaya) 0 03 MG/ACT SOLN, 1 spray into each nostril 2 (two) times a day, Disp: , Rfl:   •  verapamil (CALAN) 40 mg tablet, , Disp: , Rfl:   •  ipratropium (ATROVENT) 0 03 % nasal spray, 2 sprays into each nostril 4 (four) times a day as needed for rhinitis , Disp: , Rfl:   •  midodrine (PROAMATINE) 2 5 mg tablet, , Disp: , Rfl:   •  omeprazole (PriLOSEC) 20 mg delayed release capsule, Take 20 mg by mouth 2 (two) times a day, Disp: , Rfl:   •  oxybutynin (DITROPAN) 5 mg tablet, Take 2 5 mg by mouth 2 (two) times a day Once in the morning and one at HS, Disp: , Rfl:   •  tobramycin-dexamethasone (TOBRADEX) ophthalmic suspension, Apply 1 drop to eye 2 (two) times a day, Disp: , Rfl:   No current facility-administered medications for this visit      Allergies   Allergen Reactions   • Imipramine Confusion, Fatigue, Irritability, Palpitations, Shortness Of Breath, Tachycardia and Visual Disturbance   • Melatonin Shortness Of Breath   • Mirtazapine Anxiety, Dizziness, GI Intolerance, Nausea Only, Palpitations and Shortness Of Breath   • Nexium [Esomeprazole] Shortness Of Breath   • Nsaids Shortness Of Breath   • Singulair [Montelukast] Shortness Of Breath and Cough   • Zomig [Zolmitriptan] Shortness Of Breath   • Dexilant [Dexlansoprazole] Nausea Only and Vomiting   • Albuterol    • Ambien [Zolpidem]    • Amitriptyline Drowsiness   • Aspirin    • Bactrim [Sulfamethoxazole-Trimethoprim] Hives   • Banana - Food Allergy Dermatitis   • Ceftin [Cefuroxime]    • Celebrex [Celecoxib]    • Ciprofloxacin    • Cranberry-C [Ascorbate - Food Allergy] GI Intolerance   • Demerol [Meperidine]    • Epinephrine Dizziness   • Ergotamine Nausea Only and Headache   • Keflex [Cephalexin]    • Klonopin [Clonazepam] Nausea Only and Dizziness   • Latex Hives   • Levaquin [Levofloxacin]    • Lexapro [Escitalopram]    • Lyrica [Pregabalin] Fatigue   • Macrodantin [Nitrofurantoin]    • Morphine Nausea Only   • Movantik [Naloxegol] Nausea Only   • Mushroom Extract Complex - Food Allergy      Eye itchy, asthma  attack   • Naprosyn [Naproxen]    • Neurontin [Gabapentin] Dizziness   • Pineapple - Food Allergy GI Intolerance   • Plecanatide Abdominal Pain, Diarrhea and GI Intolerance   • Prolia [Denosumab]    • Prozac [Fluoxetine]    • Serevent [Salmeterol] Dizziness   • Sudafed [Pseudoephedrine] Hives   • Sulfa Antibiotics    • Tomato - Food Allergy GI Intolerance   • Trazodone    • Ultram [Tramadol] Nausea Only, Dizziness and Headache   • Vilazodone Dizziness, GI Intolerance and Headache   • Vilazodone Hcl Abdominal Pain, Dizziness, GI Intolerance, Headache and Other (See Comments)   • Vioxx [Rofecoxib]    • Vortioxetine Drowsiness, Fatigue, GI Intolerance and Irritability   • Zithromax [Azithromycin] Hives   • Zoloft [Sertraline]    • Zantac [Ranitidine] Rash and Dizziness       Physical Exam:    /82   Pulse 83   Ht 5' 1" (1 549 m)   Wt 60 7 kg (133 lb 12 8 oz)   BMI 25 28 kg/m²     Constitutional:normal, well developed, well nourished, alert, in no distress and non-toxic and no overt pain behavior    Eyes:anicteric  HEENT:grossly intact  Neck:supple, symmetric, trachea midline and no masses   Pulmonary:even and unlabored  Cardiovascular:No edema or pitting edema present  Skin:Normal without rashes or lesions and well hydrated  Psychiatric:Mood and affect appropriate  Neurologic:Cranial Nerves II-XII grossly intact  Musculoskeletal:normal      Imaging  No orders to display         Orders Placed This Encounter   Procedures   • MM DT_Alprazolam Definitive Test   • MM DT_Amphetamine Definitive Test   • MM DT_Aripiprazole Definitive Test   • MM ALL_Prescribed Meds and Special Instructions   • MM DT_Buprenorphine Definitive Test   • MM DT_Butalbital Definitive Test   • MM DT_Clonazepam Definitive Test   • MM DT_Clozapine Definitive Test   • MM DT_Cocaine Definitive Test   • MM DT_Codeine Definitive Test   • MM DT_Desipramine Definitive Test   • MM Diazepam Definitive Test   • MM DT_Ethyl Glucuronide/Ethyl Sulfate Definitive Test   • MM DT_Fentanyl Definitive Test   • MM DT_Haloperidol Definitive Test   • MM DT_Heroin Definitive Test   • MM DT_Hydrocodone Definitive Test   • MM DT_Hydromorphone Definitive Test   • MM DT_Kratom Definitive Test   • MM DT_Levorphanol Definitive Test   • MM Lorazepam Definitive Test   • MM DT_MDMA Definitive Test   • MM DT_Meperidine Definitive Test   • MM DT_Methadone Definitive Test   • MM DT_Methamphetamine Definitive Test   • MM DT_Methylphenidate Definitive Test   • MM DT_Morphine Definitive Test   • MM DT_Olanzapine Definitive Test   • MM DT_Oxazepam Definitive Test   • MM DT_Oxycodone Definitive Test   • MM DT_Oxymorphone Definitive Test   • MM DT_Phencyclidine Definitive Test   • MM DT_Phenobarbital Definitive Test   • MM DT_Phentermine Definitive Test   • MM DT_Pregablin Definitive   • MM DT_Quetiapine Definitive Test   • MM DT_Risperidone Definitive Test   • MM DT_Secobarbital Definitive Test   • MM DT_Tapentadol Definitive Test   • MM DT_Temazapam Definitive Test   • MM DT_THC Definitive Test   • MM DT_Tramadol Definitive Test   • MM DT_Validity Creatinine   • MM DT_Validity Oxidant   • MM DT_Validity pH   • MM DT_Validity Specific

## 2022-12-04 LAB
6MAM UR QL CFM: NEGATIVE NG/ML
7AMINOCLONAZEPAM UR QL CFM: NEGATIVE NG/ML
A-OH ALPRAZ UR QL CFM: NEGATIVE NG/ML
ACCEPTABLE CREAT UR QL: ABNORMAL MG/DL
ACCEPTIBLE SP GR UR QL: ABNORMAL
AMPHET UR QL CFM: NEGATIVE NG/ML
AMPHET UR QL CFM: NEGATIVE NG/ML
BUPRENORPHINE UR QL CFM: NEGATIVE NG/ML
BUTALBITAL UR QL CFM: NEGATIVE NG/ML
BZE UR QL CFM: NEGATIVE NG/ML
CODEINE UR QL CFM: NEGATIVE NG/ML
DESIPRAMINE UR QL CFM: NEGATIVE NG/ML
EDDP UR QL CFM: NEGATIVE NG/ML
ETHYL GLUCURONIDE UR QL CFM: NEGATIVE NG/ML
ETHYL SULFATE UR QL SCN: NEGATIVE NG/ML
FENTANYL UR QL CFM: NEGATIVE NG/ML
GLIADIN IGG SER IA-ACNC: NEGATIVE NG/ML
GLUCOSE 30M P 50 G LAC PO SERPL-MCNC: NEGATIVE NG/ML
HYDROCODONE UR QL CFM: NORMAL NG/ML
HYDROCODONE UR QL CFM: NORMAL NG/ML
HYDROMORPHONE UR QL CFM: NEGATIVE NG/ML
LORAZEPAM UR QL CFM: NEGATIVE NG/ML
MDMA UR QL CFM: NEGATIVE NG/ML
ME-PHENIDATE UR QL CFM: NEGATIVE NG/ML
MEPERIDINE UR QL CFM: NEGATIVE NG/ML
METHADONE UR QL CFM: NEGATIVE NG/ML
METHAMPHET UR QL CFM: NEGATIVE NG/ML
MORPHINE UR QL CFM: NEGATIVE NG/ML
MORPHINE UR QL CFM: NEGATIVE NG/ML
NITRITE UR QL: NORMAL UG/ML
NORBUPRENORPHINE UR QL CFM: NEGATIVE NG/ML
NORDIAZEPAM UR QL CFM: NEGATIVE NG/ML
NORFENTANYL UR QL CFM: NEGATIVE NG/ML
NORHYDROCODONE UR QL CFM: NEGATIVE NG/ML
NORHYDROCODONE UR QL CFM: NEGATIVE NG/ML
NORMEPERIDINE UR QL CFM: NEGATIVE NG/ML
NOROXYCODONE UR QL CFM: NEGATIVE NG/ML
OLANZAPINE QUANTIFICATION: NEGATIVE NG/ML
OPC-3373 QUANTIFICATION: NEGATIVE
OXAZEPAM UR QL CFM: NEGATIVE NG/ML
OXYCODONE UR QL CFM: NEGATIVE NG/ML
OXYMORPHONE UR QL CFM: NEGATIVE NG/ML
OXYMORPHONE UR QL CFM: NEGATIVE NG/ML
PARA-FLUOROFENTANYL QUANTIFICATION: NORMAL NG/ML
PCP UR QL CFM: NEGATIVE NG/ML
PHENOBARB UR QL CFM: NEGATIVE NG/ML
RESULT ALL_PRESCRIBED MEDS AND SPECIAL INSTRUCTIONS: NORMAL
SECOBARBITAL UR QL CFM: NEGATIVE NG/ML
SL AMB 4-ANPP QUANTIFICATION: NORMAL NG/ML
SL AMB 7-OH-MITRAGYNINE (KRATOM ALKALOID) QUANTIFICATION: NEGATIVE NG/ML
SL AMB ACETYL FENTANYL QUANTIFICATION: NORMAL NG/ML
SL AMB ACETYL NORFENTANYL QUANTIFICATION: NORMAL NG/ML
SL AMB ACRYL FENTANYL QUANTIFICATION: NORMAL NG/ML
SL AMB CARFENTANIL QUANTIFICATION: NORMAL NG/ML
SL AMB CLOZAPINE QUANTIFICATION: NEGATIVE NG/ML
SL AMB CTHC (MARIJUANA METABOLITE) QUANTIFICATION: NEGATIVE NG/ML
SL AMB DEXTRORPHAN (DEXTROMETHORPHAN METABOLITE) QUANT: NEGATIVE NG/ML
SL AMB HALOPERIDOL  QUANTIFICATION: NEGATIVE NG/ML
SL AMB HALOPERIDOL METABOLITE QUANTIFICATION: NEGATIVE NG/ML
SL AMB HYDROXYRISPERIDONE QUANTIFICATION: NEGATIVE NG/ML
SL AMB N-DESMETHYL-TRAMADOL QUANTIFICATION: NEGATIVE NG/ML
SL AMB N-DESMETHYLCLOZAPINE QUANTIFICATION: NEGATIVE NG/ML
SL AMB NORQUETIAPINE QUANTIFICATION: NEGATIVE NG/ML
SL AMB PHENTERMINE QUANTIFICATION: NEGATIVE NG/ML
SL AMB PREGABALIN QUANTIFICATION: NEGATIVE
SL AMB QUETIAPINE QUANTIFICATION: NEGATIVE NG/ML
SL AMB RISPERIDONE QUANTIFICATION: NEGATIVE NG/ML
SL AMB RITALINIC ACID QUANTIFICATION: NEGATIVE NG/ML
SPECIMEN PH ACCEPTABLE UR: NORMAL
TAPENTADOL UR QL CFM: NEGATIVE NG/ML
TEMAZEPAM UR QL CFM: NEGATIVE NG/ML
TEMAZEPAM UR QL CFM: NEGATIVE NG/ML
TRAMADOL UR QL CFM: NEGATIVE NG/ML
URATE/CREAT 24H UR: NEGATIVE NG/ML

## 2022-12-05 ENCOUNTER — HOSPITAL ENCOUNTER (OUTPATIENT)
Dept: INFUSION CENTER | Facility: HOSPITAL | Age: 67
Discharge: HOME | End: 2022-12-05
Attending: INTERNAL MEDICINE

## 2022-12-05 VITALS
RESPIRATION RATE: 16 BRPM | OXYGEN SATURATION: 100 % | DIASTOLIC BLOOD PRESSURE: 81 MMHG | HEART RATE: 70 BPM | TEMPERATURE: 97.3 F | SYSTOLIC BLOOD PRESSURE: 136 MMHG

## 2022-12-05 RX ORDER — SODIUM CHLORIDE 9 MG/ML
375 INJECTION, SOLUTION INTRAVENOUS ONCE
Status: COMPLETED | OUTPATIENT
Start: 2022-12-06 | End: 2022-12-06

## 2022-12-05 RX ADMIN — SODIUM CHLORIDE 375 ML/HR: 0.9 INJECTION, SOLUTION INTRAVENOUS at 09:29

## 2022-12-06 ENCOUNTER — HOSPITAL ENCOUNTER (OUTPATIENT)
Dept: INFUSION CENTER | Facility: HOSPITAL | Age: 67
Discharge: HOME | End: 2022-12-06
Attending: INTERNAL MEDICINE

## 2022-12-06 ENCOUNTER — HOSPITAL ENCOUNTER (OUTPATIENT)
Dept: RADIOLOGY | Facility: HOSPITAL | Age: 67
Discharge: HOME | End: 2022-12-06

## 2022-12-06 VITALS
RESPIRATION RATE: 18 BRPM | DIASTOLIC BLOOD PRESSURE: 76 MMHG | HEART RATE: 68 BPM | SYSTOLIC BLOOD PRESSURE: 137 MMHG | TEMPERATURE: 97.4 F | OXYGEN SATURATION: 98 %

## 2022-12-06 VITALS
TEMPERATURE: 97.6 F | OXYGEN SATURATION: 99 % | DIASTOLIC BLOOD PRESSURE: 70 MMHG | RESPIRATION RATE: 16 BRPM | SYSTOLIC BLOOD PRESSURE: 148 MMHG | HEART RATE: 71 BPM

## 2022-12-06 DIAGNOSIS — G89.4 CHRONIC PAIN DISORDER: ICD-10-CM

## 2022-12-06 DIAGNOSIS — M47.812 CERVICAL SPONDYLOSIS: ICD-10-CM

## 2022-12-06 DIAGNOSIS — M54.2 NECK PAIN: ICD-10-CM

## 2022-12-06 RX ORDER — LIDOCAINE HYDROCHLORIDE 10 MG/ML
5 INJECTION, SOLUTION EPIDURAL; INFILTRATION; INTRACAUDAL; PERINEURAL ONCE
Status: COMPLETED | OUTPATIENT
Start: 2022-12-06 | End: 2022-12-06

## 2022-12-06 RX ADMIN — Medication 3 ML: at 13:53

## 2022-12-06 RX ADMIN — LIDOCAINE HYDROCHLORIDE 5 ML: 10 INJECTION, SOLUTION EPIDURAL; INFILTRATION; INTRACAUDAL; PERINEURAL at 13:49

## 2022-12-06 RX ADMIN — SODIUM CHLORIDE 375 ML/HR: 0.9 INJECTION, SOLUTION INTRAVENOUS at 08:28

## 2022-12-06 NOTE — PLAN OF CARE
Problem: INFECTION - ADULT  Goal: Absence or prevention of progression during hospitalization  Description: INTERVENTIONS:  - Assess and monitor for signs and symptoms of infection  - Monitor lab/diagnostic results  - Monitor all insertion sites, i e  indwelling lines, tubes, and drains  - Monitor endotracheal if appropriate and nasal secretions for changes in amount and color  - [place] appropriate cooling/warming therapies per order  - Administer medications as ordered  - Instruct and encourage patient and family to use good hand hygiene technique  - Identify and instruct in appropriate isolation precautions for identified infection/condition  Outcome: Progressing     Problem: Knowledge Deficit  Goal: Patient/family/caregiver demonstrates understanding of disease process, treatment plan, medications, and discharge instructions  Description: Complete learning assessment and assess knowledge base    Interventions:  - Provide teaching at level of understanding  - Provide teaching via preferred learning methods  Outcome: Progressing

## 2022-12-06 NOTE — INTERVAL H&P NOTE
Update: (This section must be completed if the H&P was completed greater than 24 hrs to procedure or admission)    H&P reviewed  After examining the patient, I find no changed to the H&P since it had been written  Patient re-evaluated   Accept as history and physical     Hu Hernandes MD/December 6, 2022/1:35 PM

## 2022-12-06 NOTE — PROGRESS NOTES
Patient tolerated infusion and dressing change well, patient left in stable condition  aVS declined

## 2022-12-06 NOTE — DISCHARGE INSTR - LAB

## 2022-12-07 RX ORDER — SODIUM CHLORIDE 9 MG/ML
375 INJECTION, SOLUTION INTRAVENOUS ONCE
Status: COMPLETED | OUTPATIENT
Start: 2022-12-08 | End: 2022-12-08

## 2022-12-08 ENCOUNTER — HOSPITAL ENCOUNTER (OUTPATIENT)
Dept: INFUSION CENTER | Facility: HOSPITAL | Age: 67
End: 2022-12-08
Attending: INTERNAL MEDICINE

## 2022-12-08 VITALS
OXYGEN SATURATION: 98 % | SYSTOLIC BLOOD PRESSURE: 135 MMHG | HEART RATE: 84 BPM | TEMPERATURE: 98 F | RESPIRATION RATE: 18 BRPM | DIASTOLIC BLOOD PRESSURE: 70 MMHG

## 2022-12-08 RX ADMIN — SODIUM CHLORIDE 375 ML/HR: 0.9 INJECTION, SOLUTION INTRAVENOUS at 09:28

## 2022-12-12 ENCOUNTER — HOSPITAL ENCOUNTER (OUTPATIENT)
Dept: INFUSION CENTER | Facility: HOSPITAL | Age: 67
Discharge: HOME | End: 2022-12-12
Attending: INTERNAL MEDICINE

## 2022-12-12 VITALS
HEART RATE: 67 BPM | SYSTOLIC BLOOD PRESSURE: 135 MMHG | RESPIRATION RATE: 18 BRPM | OXYGEN SATURATION: 98 % | DIASTOLIC BLOOD PRESSURE: 61 MMHG | TEMPERATURE: 98.8 F

## 2022-12-12 RX ADMIN — SODIUM CHLORIDE 1500 ML: 0.9 INJECTION, SOLUTION INTRAVENOUS at 09:12

## 2022-12-12 NOTE — PLAN OF CARE
Problem: INFECTION - ADULT  Goal: Absence or prevention of progression during hospitalization  Description: INTERVENTIONS:  - Assess and monitor for signs and symptoms of infection  - Monitor lab/diagnostic results  - Monitor all insertion sites, i e  indwelling lines, tubes, and drains  - Monitor endotracheal if appropriate and nasal secretions for changes in amount and color  - [place] appropriate cooling/warming therapies per order  - Administer medications as ordered  - Instruct and encourage patient and family to use good hand hygiene technique  - Identify and instruct in appropriate isolation precautions for identified infection/condition  Outcome: Progressing     Problem: Knowledge Deficit  Goal: Patient/family/caregiver demonstrates understanding of disease process, treatment plan, medications, and discharge instructions  Description: Complete learning assessment and assess knowledge base    Interventions:  - Provide teaching at level of understanding  - Provide teaching via preferred learning methods  Outcome: Progressing

## 2022-12-13 RX ORDER — SODIUM CHLORIDE 9 MG/ML
375 INJECTION, SOLUTION INTRAVENOUS ONCE
Status: COMPLETED | OUTPATIENT
Start: 2022-12-14 | End: 2022-12-14

## 2022-12-14 ENCOUNTER — HOSPITAL ENCOUNTER (OUTPATIENT)
Dept: INFUSION CENTER | Facility: HOSPITAL | Age: 67
Discharge: HOME | End: 2022-12-14
Attending: INTERNAL MEDICINE

## 2022-12-14 VITALS
DIASTOLIC BLOOD PRESSURE: 99 MMHG | RESPIRATION RATE: 18 BRPM | HEART RATE: 76 BPM | SYSTOLIC BLOOD PRESSURE: 157 MMHG | TEMPERATURE: 97.6 F

## 2022-12-14 RX ADMIN — SODIUM CHLORIDE 375 ML/HR: 0.9 INJECTION, SOLUTION INTRAVENOUS at 08:20

## 2022-12-19 ENCOUNTER — HOSPITAL ENCOUNTER (OUTPATIENT)
Dept: INFUSION CENTER | Facility: HOSPITAL | Age: 67
Discharge: HOME | End: 2022-12-19
Attending: INTERNAL MEDICINE

## 2022-12-19 VITALS
HEART RATE: 88 BPM | RESPIRATION RATE: 18 BRPM | TEMPERATURE: 97.7 F | OXYGEN SATURATION: 97 % | DIASTOLIC BLOOD PRESSURE: 81 MMHG | SYSTOLIC BLOOD PRESSURE: 130 MMHG

## 2022-12-19 RX ADMIN — SODIUM CHLORIDE 1500 ML: 0.9 INJECTION, SOLUTION INTRAVENOUS at 09:39

## 2022-12-20 ENCOUNTER — HOSPITAL ENCOUNTER (OUTPATIENT)
Dept: INFUSION CENTER | Facility: HOSPITAL | Age: 67
Discharge: HOME | End: 2022-12-20

## 2022-12-20 VITALS
SYSTOLIC BLOOD PRESSURE: 173 MMHG | OXYGEN SATURATION: 97 % | HEART RATE: 84 BPM | DIASTOLIC BLOOD PRESSURE: 83 MMHG | TEMPERATURE: 97.4 F | RESPIRATION RATE: 18 BRPM

## 2022-12-20 RX ORDER — SODIUM CHLORIDE 9 MG/ML
375 INJECTION, SOLUTION INTRAVENOUS ONCE
Status: COMPLETED | OUTPATIENT
Start: 2022-12-22 | End: 2022-12-22

## 2022-12-20 RX ADMIN — SODIUM CHLORIDE 1500 ML: 0.9 INJECTION, SOLUTION INTRAVENOUS at 09:18

## 2022-12-21 ENCOUNTER — OFFICE VISIT (OUTPATIENT)
Dept: NEPHROLOGY | Facility: CLINIC | Age: 67
End: 2022-12-21

## 2022-12-21 VITALS
OXYGEN SATURATION: 99 % | HEART RATE: 85 BPM | WEIGHT: 135 LBS | HEIGHT: 61 IN | DIASTOLIC BLOOD PRESSURE: 90 MMHG | SYSTOLIC BLOOD PRESSURE: 154 MMHG | BODY MASS INDEX: 25.49 KG/M2

## 2022-12-21 DIAGNOSIS — N13.1 HYDRONEPHROSIS DUE TO OBSTRUCTION OF URETER: Primary | ICD-10-CM

## 2022-12-21 DIAGNOSIS — I42.9 CARDIOMYOPATHY, UNSPECIFIED TYPE (HCC): ICD-10-CM

## 2022-12-21 DIAGNOSIS — N30.10 CHRONIC INTERSTITIAL CYSTITIS: ICD-10-CM

## 2022-12-21 DIAGNOSIS — G90.A POSTURAL ORTHOSTATIC TACHYCARDIA SYNDROME: Chronic | ICD-10-CM

## 2022-12-21 DIAGNOSIS — N18.31 STAGE 3A CHRONIC KIDNEY DISEASE (HCC): ICD-10-CM

## 2022-12-21 DIAGNOSIS — I44.7 LBBB (LEFT BUNDLE BRANCH BLOCK): ICD-10-CM

## 2022-12-21 NOTE — ASSESSMENT & PLAN NOTE
Lab Results   Component Value Date    EGFR 68 11/15/2022    EGFR 65 11/14/2022    EGFR 58 10/11/2022    CREATININE 0 87 11/15/2022    CREATININE 0 90 11/14/2022    CREATININE 1 00 10/11/2022   kidney function has improved to stage 2  Will check a kidney ultrasound  Her renal scan demonstrated right sided decreased flow

## 2022-12-21 NOTE — PROGRESS NOTES
[address] [facility] [place], [place]    Name: Celeste Gallardo  YOB: 1955      Assessment/Plan:           Problem List Items Addressed This Visit        Cardiovascular and Mediastinum    Postural orthostatic tachycardia syndrome (Chronic)     Being treated with Corlanor and midodrine  Takes verapamil with good rate control         LBBB (left bundle branch block)    Cardiomyopathy, unspecified type ([facility] [address] )     Followed by cardiology            Genitourinary    Stage 3a chronic kidney disease ([facility] [address] )     Lab Results   Component Value Date    EGFR 68 11/15/2022    EGFR 65 11/14/2022    EGFR 58 10/11/2022    CREATININE 0 87 11/15/2022    CREATININE 0 90 11/14/2022    CREATININE 1 00 10/11/2022   kidney function has improved to stage 2  Will check a kidney ultrasound  Her renal scan demonstrated right sided decreased flow         Chronic interstitial cystitis     Controlled with Gemtasa for IC        Other Visit Diagnoses     Hydronephrosis due to obstruction of ureter    -  Primary    Relevant Orders    US kidney and bladder            Subjective:      Patient ID: Celeste Gallardo is a 75 y o  female  Referred by Dr Lizbeth Leach    HPI She had lost a lot of weight and had to use TPN and then had a reintroduction regimen  She is allergic to gluten, soy, dairy and other issues  She receives 1500 ml of normal saline 4 times a week  She has a Adams catheter in the right IJ  Di Hoist She has orthostatic hypotension and POTS  Followed by [facility] cardiologist Dr Scar Dove  She receives Corlanor (ivabradine) and midodrine  She had hypotension with tachycardia  She has urinary incontinence and history of interstitial cystitis since her 20's-30's  She had been placed on Ditropan and changed diet  Now she is on Gemtesa 75mg once a day as a urinary antispasmodic  as she has urinary incontinence and has bladder spasms  Blood pressures 2-3 times a day --> 130-140/80 with pulse in 70's   After IVF 120-130/70-80 and pulse in 70-80 range    Renal flow scan done August 29 demonstrated 65% left kidney and 34 5% right kidney function  The right kidney had stasis without mechanical obstruction and excretion prior to diuretic worsened  The following portions of the patient's history were reviewed and updated as appropriate: allergies, current medications, past family history, past medical history, past social history, past surgical history and problem list     Review of Systems   HENT:        Gets thrush easily probably due to inhaled steroid   Respiratory: Positive for shortness of breath  Has asthma and uses nebulizer tid   Cardiovascular: Positive for chest pain  Negative for leg swelling  Has intermittent CP and has cardiomyopathy and has a LBBB  Follows with cardiology   Gastrointestinal: Positive for abdominal pain, constipation and diarrhea  Due to verapamil and opioids   Genitourinary: Positive for difficulty urinating, dysuria and urgency  Has bladder spasms with cramping  Bananas, chocolate and nitrites cause inc cramping  Musculoskeletal: Positive for arthralgias and neck pain  Receives injections from Dr Ritchie Elders  Will have radiofrequency ablation   Neurological: Positive for headaches  Has migraines and has monthly injections and pills daily as needed at [facility]  Migraines reduced from  9-10 per month to 2 a month   Psychiatric/Behavioral: Negative for dysphoric mood           Social History     Socioeconomic History   • Marital status:      Spouse name: None   • Number of children: None   • Years of education: None   • Highest education level: None   Occupational History   • None   Tobacco Use   • Smoking status: Never   • Smokeless tobacco: Never   Vaping Use   • Vaping Use: Never used   Substance and Sexual Activity   • Alcohol use: Never   • Drug use: No   • Sexual activity: None   Other Topics Concern   • None   Social History Narrative • None     Social Determinants of Health     Financial Resource Strain: Not on file   Food Insecurity: Not on file   Transportation Needs: Not on file   Physical Activity: Not on file   Stress: Not on file   Social Connections: Not on file   Intimate Partner Violence: Not on file   Housing Stability: Not on file     Past Medical History:   Diagnosis Date   • Allergic rhinitis    • Anxiety    • Asthma    • Back pain    • Cardiac disease    • Cardiopathy     EF 45%   • Cough    • Diverticulitis    • Factor V Leiden ([address] )    • Fibromyalgia    • GERD (gastroesophageal reflux disease)    • Hashimoto's thyroiditis    • Hx of degenerative disc disease    • Hypotension     pots - postural orthostatic hypotension   • Interstitial cystitis    • Irregular heart beat     LBBB   • Irritable bowel syndrome    • Migraines    • Mitral valve disease     "thickening"   • Myocardial infarction ([address] )     possible but not sure when   • Neuropathy     bilateral legs   • Osteoporosis    • Postural orthostatic tachycardia syndrome     must drink a lot of water and salt   • Pott's disease    • Rheumatic fever 1967   • Scoliosis    • Sepsis ([address] ) 2021    associated with PICC line   • Sjogren's syndrome ([address] )    • TMJ (dislocation of temporomandibular joint)      Past Surgical History:   Procedure Laterality Date   • ABLATION MICROWAVE Left     lumbar area   • BACK SURGERY  10/1998   • [address]   • CHOLECYSTECTOMY  04/2016   • COLONOSCOPY  2016   • DILATION AND CURETTAGE OF UTERUS  1996   • EGD  2016   • FOOT SURGERY  1968    removal of bone and neuroma   • HYSTERECTOMY  1997    age 54  PRICE ooph   • IR OTHER  01/07/2022   • IR OTHER  11/22/2022   • IR PICC PLACEMENT SINGLE LUMEN  10/02/2020   • IR PICC PLACEMENT SINGLE LUMEN  08/12/2021   • IR PICC REPOSITION  12/07/2021   • IR TUNNELED CENTRAL LINE PLACEMENT  06/20/2022   • LAPAROSCOPY  1996    endometriosis   • MOUTH BIOPSY      lip   • ND EGD TRANSORAL BIOPSY SINGLE/MULTIPLE N/A 04/24/2019    Procedure: ESOPHAGOGASTRODUODENOSCOPY (EGD) with multiple bx and dilation;  Surgeon: Leydi Cardenas MD;  Location: [address] [place] [address] GI LAB; Service: Gastroenterology   • NM SHLDR ARTHROSCOP,SURG,W/ROTAT CUFF REPR Right 04/06/2022    Procedure: SHOULDER ARTHROSCOPIC ROTATOR CUFF REPAIR Biceps Tenodisis;   Surgeon: Beltran Arriaza MD;  Location: [facility] MAIN OR;  Service: Orthopedics   • TUNNELED VENOUS CATHETER PLACEMENT  2016       Current Outpatient Medications:   •  Alum Hydroxide-Mag Trisilicate (Gaviscon) 02-12 1 MG CHEW, Chew 1 tablet 4 (four) times a day as needed With meals and as needed, Disp: , Rfl:   •  azelastine (ASTELIN) 0 1 % nasal spray, 2 sprays into each nostril 2 (two) times a day Use in each nostril as directed, Disp: , Rfl:   •  beclomethasone (QVAR) 40 MCG/ACT inhaler, Inhale 1 puff daily as needed (only when unable to nebulize pulmicort) Rinse mouth after use , Disp: , Rfl:   •  budesonide (PULMICORT) 0 5 mg/2 mL nebulizer solution, Take 0 5 mg by nebulization 2 (two) times a day Rinse mouth after use , Disp: , Rfl:   •  cefdinir (OMNICEF) 300 mg capsule, , Disp: , Rfl:   •  cholecalciferol (VITAMIN D3) 1,000 units tablet, Take 4,000 Units by mouth daily, Disp: , Rfl:   •  cyclobenzaprine (FLEXERIL) 5 mg tablet, Take 1 tablet (5 mg total) by mouth 2 (two) times a day as needed for muscle spasms, Disp: 60 tablet, Rfl: 0  •  docusate sodium (COLACE) 100 mg capsule, Take 100 mg by mouth daily as needed for constipation, Disp: , Rfl:   •  erythromycin (ILOTYCIN) ophthalmic ointment, Apply 1 application to eye daily at bedtime, Disp: , Rfl:   •  famotidine (PEPCID) 40 MG tablet, Twice a day, Disp: , Rfl:   •  fexofenadine (ALLEGRA) 180 MG tablet, Take 180 mg by mouth 2 (two) times a day , Disp: , Rfl:   •  fluticasone (FLONASE) 50 mcg/act nasal spray, 2 sprays into each nostril daily , Disp: , Rfl:   •  Galcanezumab-gnlm (Emgality) 120 MG/ML SOAJ, Inject under the skin every 30 (thirty) days , Disp: , Rfl:   •  Gemtesa 75 MG TABS, Take 1 tablet by mouth daily, Disp: , Rfl:   •  HYDROcodone-acetaminophen (Norco) 5-325 mg per tablet, Take 1 tablet by mouth 2 (two) times a day as needed for pain for up to 30 doses Do not fill until 12/30/2022 Max Daily Amount: 2 tablets, Disp: 60 tablet, Rfl: 0  •  HYDROcodone-acetaminophen (NORCO) 5-325 mg per tablet, Take 1 tablet by mouth 2 (two) times a day as needed for pain Max Daily Amount: 2 tablets, Disp: 60 tablet, Rfl: 0  •  hydrOXYzine (ATARAX) 10 mg/5 mL syrup, Take 20 mg by mouth daily at bedtime , Disp: , Rfl:   •  ivabradine HCl (Corlanor) 5 MG tablet, 7 5 mg 2 (two) times a day, Disp: , Rfl:   •  levalbuterol (XOPENEX HFA) 45 mcg/act inhaler, Inhale 2 puffs every 4 (four) hours as needed (when unable to nebulize), Disp: , Rfl:   •  levalbuterol (XOPENEX) 1 25 mg/3 mL nebulizer solution, Take 1 25 mg by nebulization every 6 (six) hours as needed , Disp: , Rfl: 2  •  Magnesium 400 MG CAPS, Take 1 capsule by mouth 2 (two) times a day , Disp: , Rfl:   •  MULTIPLE VITAMINS-CALCIUM PO, Take 1 capsule by mouth every morning , Disp: , Rfl:   •  Multiple Vitamins-Iron TABS, Take by mouth, Disp: , Rfl:   •  omega-3-acid ethyl esters (LOVAZA) 1 g capsule, Take 2 g by mouth 2 (two) times a day 1600mg daily, Disp: , Rfl:   •  polyethylene glycol (MIRALAX) 17 g packet, Take 17 g by mouth daily as needed , Disp: , Rfl:   •  Probiotic Product (PROBIOTIC DAILY PO), Take 1 tablet by mouth daily At lunch, Disp: , Rfl:   •  psyllium (METAMUCIL) 0 52 g capsule, Take 0 52 g by mouth daily, Disp: , Rfl:   •  Qvar RediHaler 40 MCG/ACT inhaler, PLEASE SEE ATTACHED FOR DETAILED DIRECTIONS, Disp: , Rfl:   •  Thyroid, Porcine, POWD, Use 32 mg daily, Disp: , Rfl:   •  Ubrogepant (UBRELVY) 100 MG tablet, Take 100 mg by mouth Take 1 tablet (100 mg) one time as needed for migraine  May repeat one additional tablet (100 mg) at least two hours after the first dose  Do not use more than two doses per day, or for more than eight days per month , Disp: , Rfl:   •  Varenicline Tartrate (Tyrvaya) 0 03 MG/ACT SOLN, 1 spray into each nostril 2 (two) times a day, Disp: , Rfl:   •  verapamil (CALAN) 40 mg tablet, , Disp: , Rfl:   No current facility-administered medications for this visit      Facility-Administered Medications Ordered in Other Visits:   •  [START ON 12/22/2022] sodium chloride 0 9 % infusion, 375 mL/hr, Intravenous, Once, Lino Carrasco MD  •  [START ON 12/23/2022] sodium chloride 0 9 % infusion, 375 mL/hr, Intravenous, Once, Lino Carrasco MD    Lab Results   Component Value Date     06/04/2018    SODIUM 140 11/15/2022    K 3 9 11/15/2022     11/15/2022    CO2 28 11/15/2022    ANIONGAP 12 9 06/04/2018    AGAP 7 11/15/2022    BUN 9 11/15/2022    CREATININE 0 87 11/15/2022    GLUC 91 11/15/2022    GLUF 84 10/11/2022    CALCIUM 9 4 11/15/2022    AST 28 11/14/2022    ALT 36 11/14/2022    ALKPHOS 113 (H) 11/14/2022    PROT 7 0 06/04/2018    TP 7 1 11/14/2022    BILITOT 0 5 06/04/2018    TBILI 0 66 11/14/2022    EGFR 68 11/15/2022     Lab Results   Component Value Date    WBC 8 44 11/15/2022    HGB 14 3 11/15/2022    HCT 44 4 11/15/2022    MCV 92 11/15/2022     11/15/2022     Lab Results   Component Value Date    CHOLESTEROL 238 (H) 09/29/2020    CHOLESTEROL 221 (H) 10/31/2018     Lab Results   Component Value Date    HDL 84 09/29/2020    HDL 65 (H) 10/31/2018    HDL 59 10/06/2015     Lab Results   Component Value Date    LDLCALC 138 (H) 09/29/2020    LDLCALC 137 (H) 10/31/2018    LDLCALC 139 (H) 10/06/2015     Lab Results   Component Value Date    TRIG 79 09/29/2020    TRIG 94 10/31/2018    TRIG 109 09/26/2016     No results found for: [place], [place]  Lab Results   Component Value Date    RRS7SCWZZOBI 3 231 10/11/2022     Lab Results   Component Value Date    PTH 70 1 09/14/2022    CALCIUM 9 4 11/15/2022    PHOS 3 6 11/15/2022     Lab Results   Component Value Date SPEP See Comment 02/10/2021    UPEP The urine total 12/16/2014    UPEP The serum total 12/16/2014     No results found for: SERA HUDSON  MAC 9  PCR 0 11    Objective:      /90 (BP Location: Left arm, Patient Position: Sitting)   Pulse 85   Ht 5' 1" (1 549 m)   Wt 61 2 kg (135 lb)   SpO2 99%   BMI 25 51 kg/m²          Physical Exam  Vitals reviewed  Constitutional:       General: She is not in acute distress  Appearance: Normal appearance  She is not toxic-appearing  HENT:      Head: Normocephalic and atraumatic  Right Ear: External ear normal       Left Ear: External ear normal    Eyes:      Extraocular Movements: Extraocular movements intact  Conjunctiva/sclera: Conjunctivae normal       Pupils: Pupils are equal, round, and reactive to light  Neck:      Vascular: No carotid bruit  Cardiovascular:      Rate and Rhythm: Normal rate and regular rhythm  Pulmonary:      Effort: Pulmonary effort is normal  No respiratory distress  Breath sounds: Normal breath sounds  No wheezing or rales  Abdominal:      General: Bowel sounds are normal  There is no distension  Palpations: Abdomen is soft  Tenderness: There is no abdominal tenderness  Musculoskeletal:         General: Normal range of motion  Cervical back: Normal range of motion  Right lower leg: No edema  Left lower leg: No edema  Lymphadenopathy:      Cervical: No cervical adenopathy  Skin:     General: Skin is warm and dry  Neurological:      General: No focal deficit present  Mental Status: She is alert and oriented to person, place, and time  Mental status is at baseline  Psychiatric:         Mood and Affect: Mood normal          Behavior: Behavior normal          Thought Content:  Thought content normal          Judgment: Judgment normal

## 2022-12-21 NOTE — PATIENT INSTRUCTIONS
Have a kidney ultrasound to evaluate the right kidney  Kidney funciton has improved to normal range for an adult

## 2022-12-22 ENCOUNTER — HOSPITAL ENCOUNTER (OUTPATIENT)
Dept: INFUSION CENTER | Facility: HOSPITAL | Age: 67
End: 2022-12-22
Attending: INTERNAL MEDICINE

## 2022-12-22 VITALS
BODY MASS INDEX: 25.51 KG/M2 | SYSTOLIC BLOOD PRESSURE: 151 MMHG | TEMPERATURE: 96.9 F | DIASTOLIC BLOOD PRESSURE: 71 MMHG | OXYGEN SATURATION: 100 % | HEIGHT: 61 IN | HEART RATE: 78 BPM | RESPIRATION RATE: 18 BRPM

## 2022-12-22 RX ORDER — SODIUM CHLORIDE 9 MG/ML
375 INJECTION, SOLUTION INTRAVENOUS ONCE
Status: COMPLETED | OUTPATIENT
Start: 2022-12-27 | End: 2022-12-27

## 2022-12-22 RX ADMIN — SODIUM CHLORIDE 375 ML/HR: 0.9 INJECTION, SOLUTION INTRAVENOUS at 09:30

## 2022-12-22 NOTE — PROGRESS NOTES
Presented today for IV hydration, tolerated same well  Will return for next scheduled appointment, weather permitting

## 2022-12-27 ENCOUNTER — HOSPITAL ENCOUNTER (OUTPATIENT)
Dept: INFUSION CENTER | Facility: HOSPITAL | Age: 67
Discharge: HOME | End: 2022-12-27
Attending: INTERNAL MEDICINE

## 2022-12-27 VITALS
RESPIRATION RATE: 16 BRPM | DIASTOLIC BLOOD PRESSURE: 76 MMHG | OXYGEN SATURATION: 100 % | SYSTOLIC BLOOD PRESSURE: 140 MMHG | TEMPERATURE: 97.9 F | HEART RATE: 74 BPM

## 2022-12-27 RX ADMIN — SODIUM CHLORIDE 375 ML/HR: 0.9 INJECTION, SOLUTION INTRAVENOUS at 09:22

## 2022-12-28 ENCOUNTER — HOSPITAL ENCOUNTER (OUTPATIENT)
Dept: INFUSION CENTER | Facility: HOSPITAL | Age: 67
Discharge: HOME | End: 2022-12-28
Attending: INTERNAL MEDICINE

## 2022-12-28 VITALS
HEART RATE: 75 BPM | TEMPERATURE: 96.5 F | DIASTOLIC BLOOD PRESSURE: 77 MMHG | SYSTOLIC BLOOD PRESSURE: 157 MMHG | RESPIRATION RATE: 16 BRPM

## 2022-12-28 RX ORDER — SODIUM CHLORIDE 9 MG/ML
375 INJECTION, SOLUTION INTRAVENOUS ONCE
Status: COMPLETED | OUTPATIENT
Start: 2022-12-28 | End: 2022-12-28

## 2022-12-28 RX ORDER — SODIUM CHLORIDE 9 MG/ML
375 INJECTION, SOLUTION INTRAVENOUS ONCE
Status: DISCONTINUED | OUTPATIENT
Start: 2022-12-28 | End: 2022-12-28

## 2022-12-28 RX ORDER — SODIUM CHLORIDE 9 MG/ML
375 INJECTION, SOLUTION INTRAVENOUS ONCE
Status: COMPLETED | OUTPATIENT
Start: 2022-12-30 | End: 2022-12-30

## 2022-12-28 RX ORDER — SODIUM CHLORIDE 9 MG/ML
375 INJECTION, SOLUTION INTRAVENOUS ONCE
Status: COMPLETED | OUTPATIENT
Start: 2022-12-29 | End: 2022-12-29

## 2022-12-28 RX ADMIN — SODIUM CHLORIDE 375 ML/HR: 0.9 INJECTION, SOLUTION INTRAVENOUS at 09:16

## 2022-12-29 ENCOUNTER — HOSPITAL ENCOUNTER (OUTPATIENT)
Dept: INFUSION CENTER | Facility: HOSPITAL | Age: 67
End: 2022-12-29
Attending: INTERNAL MEDICINE

## 2022-12-29 ENCOUNTER — HOSPITAL ENCOUNTER (OUTPATIENT)
Dept: RADIOLOGY | Facility: HOSPITAL | Age: 67
End: 2022-12-29

## 2022-12-29 VITALS
HEART RATE: 87 BPM | DIASTOLIC BLOOD PRESSURE: 83 MMHG | TEMPERATURE: 97.1 F | RESPIRATION RATE: 18 BRPM | SYSTOLIC BLOOD PRESSURE: 153 MMHG

## 2022-12-29 VITALS
DIASTOLIC BLOOD PRESSURE: 78 MMHG | OXYGEN SATURATION: 99 % | SYSTOLIC BLOOD PRESSURE: 153 MMHG | HEART RATE: 67 BPM | TEMPERATURE: 97.7 F | RESPIRATION RATE: 18 BRPM

## 2022-12-29 DIAGNOSIS — M54.2 NECK PAIN: ICD-10-CM

## 2022-12-29 DIAGNOSIS — G89.4 CHRONIC PAIN DISORDER: ICD-10-CM

## 2022-12-29 DIAGNOSIS — M47.812 CERVICAL SPONDYLOSIS: ICD-10-CM

## 2022-12-29 RX ORDER — LIDOCAINE HYDROCHLORIDE 10 MG/ML
5 INJECTION, SOLUTION EPIDURAL; INFILTRATION; INTRACAUDAL; PERINEURAL ONCE
Status: COMPLETED | OUTPATIENT
Start: 2022-12-29 | End: 2022-12-29

## 2022-12-29 RX ORDER — BUPIVACAINE HYDROCHLORIDE 5 MG/ML
3 INJECTION, SOLUTION EPIDURAL; INTRACAUDAL ONCE
Status: COMPLETED | OUTPATIENT
Start: 2022-12-29 | End: 2022-12-29

## 2022-12-29 RX ADMIN — LIDOCAINE HYDROCHLORIDE 5 ML: 10 INJECTION, SOLUTION EPIDURAL; INFILTRATION; INTRACAUDAL; PERINEURAL at 13:16

## 2022-12-29 RX ADMIN — SODIUM CHLORIDE 375 ML/HR: 0.9 INJECTION, SOLUTION INTRAVENOUS at 08:10

## 2022-12-29 RX ADMIN — BUPIVACAINE HYDROCHLORIDE 3 ML: 5 INJECTION, SOLUTION EPIDURAL; INTRACAUDAL at 13:19

## 2022-12-30 ENCOUNTER — HOSPITAL ENCOUNTER (OUTPATIENT)
Dept: INFUSION CENTER | Facility: HOSPITAL | Age: 67
End: 2022-12-30
Attending: INTERNAL MEDICINE

## 2022-12-30 VITALS
TEMPERATURE: 97.7 F | DIASTOLIC BLOOD PRESSURE: 75 MMHG | RESPIRATION RATE: 18 BRPM | HEART RATE: 76 BPM | OXYGEN SATURATION: 98 % | SYSTOLIC BLOOD PRESSURE: 126 MMHG

## 2022-12-30 RX ADMIN — SODIUM CHLORIDE 375 ML/HR: 0.9 INJECTION, SOLUTION INTRAVENOUS at 09:20

## 2023-01-03 ENCOUNTER — HOSPITAL ENCOUNTER (OUTPATIENT)
Dept: INFUSION CENTER | Facility: HOSPITAL | Age: 68
Discharge: HOME | End: 2023-01-03
Attending: INTERNAL MEDICINE

## 2023-01-03 VITALS
RESPIRATION RATE: 18 BRPM | TEMPERATURE: 97.6 F | SYSTOLIC BLOOD PRESSURE: 145 MMHG | HEART RATE: 76 BPM | DIASTOLIC BLOOD PRESSURE: 86 MMHG

## 2023-01-03 RX ADMIN — SODIUM CHLORIDE 1500 ML: 0.9 INJECTION, SOLUTION INTRAVENOUS at 09:00

## 2023-01-05 ENCOUNTER — APPOINTMENT (OUTPATIENT)
Dept: LAB | Facility: HOSPITAL | Age: 68
End: 2023-01-05
Attending: INTERNAL MEDICINE

## 2023-01-05 ENCOUNTER — HOSPITAL ENCOUNTER (OUTPATIENT)
Dept: ULTRASOUND IMAGING | Facility: HOSPITAL | Age: 68
End: 2023-01-05
Attending: INTERNAL MEDICINE

## 2023-01-05 ENCOUNTER — HOSPITAL ENCOUNTER (OUTPATIENT)
Dept: INFUSION CENTER | Facility: HOSPITAL | Age: 68
End: 2023-01-05
Attending: INTERNAL MEDICINE

## 2023-01-05 VITALS
OXYGEN SATURATION: 98 % | SYSTOLIC BLOOD PRESSURE: 166 MMHG | DIASTOLIC BLOOD PRESSURE: 92 MMHG | HEART RATE: 83 BPM | RESPIRATION RATE: 16 BRPM | TEMPERATURE: 97.1 F

## 2023-01-05 DIAGNOSIS — I51.9 MYXEDEMA HEART DISEASE: ICD-10-CM

## 2023-01-05 DIAGNOSIS — E03.9 MYXEDEMA HEART DISEASE: ICD-10-CM

## 2023-01-05 DIAGNOSIS — I10 HYPERTENSION, UNSPECIFIED TYPE: ICD-10-CM

## 2023-01-05 DIAGNOSIS — N13.1 HYDRONEPHROSIS DUE TO OBSTRUCTION OF URETER: ICD-10-CM

## 2023-01-05 LAB
ALBUMIN SERPL BCP-MCNC: 4 G/DL (ref 3.5–5)
ALP SERPL-CCNC: 115 U/L (ref 46–116)
ALT SERPL W P-5'-P-CCNC: 40 U/L (ref 12–78)
ANION GAP SERPL CALCULATED.3IONS-SCNC: 7 MMOL/L (ref 4–13)
AST SERPL W P-5'-P-CCNC: 18 U/L (ref 5–45)
BILIRUB SERPL-MCNC: 0.82 MG/DL (ref 0.2–1)
BUN SERPL-MCNC: 17 MG/DL (ref 5–25)
CALCIUM SERPL-MCNC: 9.7 MG/DL (ref 8.3–10.1)
CHLORIDE SERPL-SCNC: 106 MMOL/L (ref 96–108)
CO2 SERPL-SCNC: 26 MMOL/L (ref 21–32)
CREAT SERPL-MCNC: 1.03 MG/DL (ref 0.6–1.3)
ERYTHROCYTE [DISTWIDTH] IN BLOOD BY AUTOMATED COUNT: 13 % (ref 11.6–15.1)
GFR SERPL CREATININE-BSD FRML MDRD: 55 ML/MIN/1.73SQ M
GLUCOSE SERPL-MCNC: 89 MG/DL (ref 65–140)
HCT VFR BLD AUTO: 45.1 % (ref 34.8–46.1)
HGB BLD-MCNC: 14.5 G/DL (ref 11.5–15.4)
MCH RBC QN AUTO: 28.9 PG (ref 26.8–34.3)
MCHC RBC AUTO-ENTMCNC: 32.2 G/DL (ref 31.4–37.4)
MCV RBC AUTO: 90 FL (ref 82–98)
PLATELET # BLD AUTO: 270 THOUSANDS/UL (ref 149–390)
PMV BLD AUTO: 10.1 FL (ref 8.9–12.7)
POTASSIUM SERPL-SCNC: 4.2 MMOL/L (ref 3.5–5.3)
PROT SERPL-MCNC: 7.3 G/DL (ref 6.4–8.4)
RBC # BLD AUTO: 5.01 MILLION/UL (ref 3.81–5.12)
SODIUM SERPL-SCNC: 139 MMOL/L (ref 135–147)
T4 FREE SERPL-MCNC: 0.93 NG/DL (ref 0.76–1.46)
TSH SERPL DL<=0.05 MIU/L-ACNC: 1.91 UIU/ML (ref 0.45–4.5)
WBC # BLD AUTO: 8.03 THOUSAND/UL (ref 4.31–10.16)

## 2023-01-05 RX ORDER — IPRATROPIUM BROMIDE 42 UG/1
SPRAY, METERED NASAL
COMMUNITY
Start: 2022-12-28

## 2023-01-05 RX ORDER — SODIUM CHLORIDE 9 MG/ML
375 INJECTION, SOLUTION INTRAVENOUS ONCE
Status: COMPLETED | OUTPATIENT
Start: 2023-01-09 | End: 2023-01-09

## 2023-01-05 RX ADMIN — SODIUM CHLORIDE 1500 ML: 0.9 INJECTION, SOLUTION INTRAVENOUS at 09:51

## 2023-01-06 ENCOUNTER — TELEPHONE (OUTPATIENT)
Dept: PAIN MEDICINE | Facility: MEDICAL CENTER | Age: 68
End: 2023-01-06

## 2023-01-06 ENCOUNTER — HOSPITAL ENCOUNTER (OUTPATIENT)
Dept: INFUSION CENTER | Facility: HOSPITAL | Age: 68
End: 2023-01-06
Attending: INTERNAL MEDICINE

## 2023-01-06 VITALS
RESPIRATION RATE: 16 BRPM | OXYGEN SATURATION: 100 % | HEART RATE: 74 BPM | DIASTOLIC BLOOD PRESSURE: 87 MMHG | TEMPERATURE: 97.7 F | SYSTOLIC BLOOD PRESSURE: 156 MMHG

## 2023-01-06 RX ORDER — SODIUM CHLORIDE 9 MG/ML
375 INJECTION, SOLUTION INTRAVENOUS ONCE
Status: COMPLETED | OUTPATIENT
Start: 2023-01-10 | End: 2023-01-10

## 2023-01-06 RX ADMIN — SODIUM CHLORIDE 1500 ML: 0.9 INJECTION, SOLUTION INTRAVENOUS at 09:12

## 2023-01-06 NOTE — TELEPHONE ENCOUNTER
Caller: patient    Doctor: Glenroy Lemus    Reason for call: patient would like to know when is her next procedure    Call back#: [phone]

## 2023-01-09 ENCOUNTER — HOSPITAL ENCOUNTER (OUTPATIENT)
Dept: INFUSION CENTER | Facility: HOSPITAL | Age: 68
Discharge: HOME | End: 2023-01-09
Attending: INTERNAL MEDICINE

## 2023-01-09 VITALS
SYSTOLIC BLOOD PRESSURE: 145 MMHG | HEART RATE: 71 BPM | TEMPERATURE: 97.3 F | DIASTOLIC BLOOD PRESSURE: 88 MMHG | OXYGEN SATURATION: 98 % | RESPIRATION RATE: 16 BRPM

## 2023-01-09 RX ADMIN — SODIUM CHLORIDE 375 ML/HR: 0.9 INJECTION, SOLUTION INTRAVENOUS at 09:19

## 2023-01-09 NOTE — PROGRESS NOTES
Small amount of tan "crust" noted at pt's chest line insertion site  Pt states that it was observed on Friday and is unchanged  No pain, swelling, redness, or additional drainage noted  Pt notes no itchiness today  Tolerated IV hydration well without incident and pt discharged in stable condition  AVS declined but pt aware of next infusion appointment

## 2023-01-10 ENCOUNTER — HOSPITAL ENCOUNTER (OUTPATIENT)
Dept: INFUSION CENTER | Facility: HOSPITAL | Age: 68
Discharge: HOME | End: 2023-01-10
Attending: INTERNAL MEDICINE

## 2023-01-10 VITALS
RESPIRATION RATE: 18 BRPM | OXYGEN SATURATION: 98 % | HEART RATE: 65 BPM | SYSTOLIC BLOOD PRESSURE: 135 MMHG | TEMPERATURE: 98.1 F | DIASTOLIC BLOOD PRESSURE: 62 MMHG

## 2023-01-10 RX ORDER — SODIUM CHLORIDE 9 MG/ML
375 INJECTION, SOLUTION INTRAVENOUS ONCE
Status: COMPLETED | OUTPATIENT
Start: 2023-01-12 | End: 2023-01-12

## 2023-01-10 RX ADMIN — SODIUM CHLORIDE 375 ML/HR: 0.9 INJECTION, SOLUTION INTRAVENOUS at 09:25

## 2023-01-10 NOTE — TELEPHONE ENCOUNTER
Caller:  Evaline Lines     Doctor: Dr Nestor Iyer    Reason for call: Patient calling back states call was disconnected was talking to     Call back#: [phone]

## 2023-01-10 NOTE — TELEPHONE ENCOUNTER
S/W pt  She's been sick for the past week with URI, asthma is bad, saw PCP, lungs clear, no temp however she is having a harsh cough   She would like to reschedule her 1/13/23 procedure at this time as she doesn't feel she will be recovered and not coughing by Friday  Mechelle De Los Santos, please call to reschedule

## 2023-01-10 NOTE — TELEPHONE ENCOUNTER
Caller: pt    Doctor: Brandie Gonzaelz     Reason for call: please reach out to pt  Pt says that she has a repository virus   Procedure is on 1/13/23    Call back#: [phone]

## 2023-01-11 RX ORDER — SODIUM CHLORIDE 9 MG/ML
375 INJECTION, SOLUTION INTRAVENOUS ONCE
Status: COMPLETED | OUTPATIENT
Start: 2023-01-13 | End: 2023-01-13

## 2023-01-12 ENCOUNTER — HOSPITAL ENCOUNTER (OUTPATIENT)
Dept: INFUSION CENTER | Facility: HOSPITAL | Age: 68
End: 2023-01-12
Attending: INTERNAL MEDICINE

## 2023-01-12 VITALS
TEMPERATURE: 98 F | RESPIRATION RATE: 16 BRPM | SYSTOLIC BLOOD PRESSURE: 129 MMHG | HEART RATE: 66 BPM | OXYGEN SATURATION: 99 % | DIASTOLIC BLOOD PRESSURE: 72 MMHG

## 2023-01-12 RX ADMIN — SODIUM CHLORIDE 375 ML/HR: 0.9 INJECTION, SOLUTION INTRAVENOUS at 09:17

## 2023-01-13 ENCOUNTER — HOSPITAL ENCOUNTER (OUTPATIENT)
Dept: INFUSION CENTER | Facility: HOSPITAL | Age: 68
End: 2023-01-13
Attending: INTERNAL MEDICINE

## 2023-01-13 VITALS
DIASTOLIC BLOOD PRESSURE: 84 MMHG | HEART RATE: 74 BPM | SYSTOLIC BLOOD PRESSURE: 142 MMHG | OXYGEN SATURATION: 99 % | TEMPERATURE: 97.6 F | RESPIRATION RATE: 18 BRPM

## 2023-01-13 RX ORDER — SODIUM CHLORIDE 9 MG/ML
375 INJECTION, SOLUTION INTRAVENOUS ONCE
Status: DISCONTINUED | OUTPATIENT
Start: 2023-01-17 | End: 2023-01-13

## 2023-01-13 RX ORDER — SODIUM CHLORIDE 9 MG/ML
250 INJECTION, SOLUTION INTRAVENOUS ONCE
Status: COMPLETED | OUTPATIENT
Start: 2023-01-17 | End: 2023-01-17

## 2023-01-13 RX ADMIN — SODIUM CHLORIDE 375 ML/HR: 0.9 INJECTION, SOLUTION INTRAVENOUS at 08:26

## 2023-01-13 NOTE — PROGRESS NOTES
Patient tolerated IV hydration without issues  AVS declined  Patient is aware of next appointment  Patient ambulated off unit without incident  All personal belongings taken with patient  Pt states she is awaiting new orders from dr Luciano Pete to slow ivf to 6 hours with same volume for next week

## 2023-01-17 ENCOUNTER — HOSPITAL ENCOUNTER (OUTPATIENT)
Dept: INFUSION CENTER | Facility: HOSPITAL | Age: 68
Discharge: HOME | End: 2023-01-17
Attending: INTERNAL MEDICINE

## 2023-01-17 VITALS
HEART RATE: 73 BPM | DIASTOLIC BLOOD PRESSURE: 74 MMHG | RESPIRATION RATE: 16 BRPM | TEMPERATURE: 97 F | SYSTOLIC BLOOD PRESSURE: 154 MMHG

## 2023-01-17 RX ADMIN — SODIUM CHLORIDE 250 ML/HR: 0.9 INJECTION, SOLUTION INTRAVENOUS at 09:18

## 2023-01-18 NOTE — TELEPHONE ENCOUNTER
Caller: patient    Doctor: Deena Cole    Reason for call: would like to know when is her next procedure    Call back#: [phone]

## 2023-01-19 ENCOUNTER — HOSPITAL ENCOUNTER (OUTPATIENT)
Dept: INFUSION CENTER | Facility: HOSPITAL | Age: 68
Discharge: HOME | End: 2023-01-19
Attending: INTERNAL MEDICINE

## 2023-01-19 VITALS
HEART RATE: 70 BPM | DIASTOLIC BLOOD PRESSURE: 74 MMHG | SYSTOLIC BLOOD PRESSURE: 136 MMHG | TEMPERATURE: 97.4 F | RESPIRATION RATE: 18 BRPM

## 2023-01-19 RX ADMIN — SODIUM CHLORIDE 1500 ML: 0.9 INJECTION, SOLUTION INTRAVENOUS at 09:25

## 2023-01-20 ENCOUNTER — HOSPITAL ENCOUNTER (OUTPATIENT)
Dept: INFUSION CENTER | Facility: HOSPITAL | Age: 68
End: 2023-01-20
Attending: INTERNAL MEDICINE

## 2023-01-20 ENCOUNTER — OFFICE VISIT (OUTPATIENT)
Dept: PAIN MEDICINE | Facility: CLINIC | Age: 68
End: 2023-01-20

## 2023-01-20 VITALS
WEIGHT: 137.8 LBS | HEIGHT: 61 IN | DIASTOLIC BLOOD PRESSURE: 75 MMHG | BODY MASS INDEX: 26.01 KG/M2 | SYSTOLIC BLOOD PRESSURE: 144 MMHG | HEART RATE: 80 BPM

## 2023-01-20 VITALS
HEART RATE: 76 BPM | RESPIRATION RATE: 18 BRPM | SYSTOLIC BLOOD PRESSURE: 168 MMHG | TEMPERATURE: 98.4 F | DIASTOLIC BLOOD PRESSURE: 90 MMHG

## 2023-01-20 DIAGNOSIS — G89.4 CHRONIC PAIN DISORDER: Primary | ICD-10-CM

## 2023-01-20 DIAGNOSIS — G03.9 ARACHNOIDITIS: ICD-10-CM

## 2023-01-20 DIAGNOSIS — G89.4 CHRONIC PAIN DISORDER: ICD-10-CM

## 2023-01-20 DIAGNOSIS — M54.16 LUMBAR RADICULOPATHY: ICD-10-CM

## 2023-01-20 DIAGNOSIS — M54.42 CHRONIC BILATERAL LOW BACK PAIN WITH BILATERAL SCIATICA: ICD-10-CM

## 2023-01-20 DIAGNOSIS — G89.4 CHRONIC PAIN SYNDROME: ICD-10-CM

## 2023-01-20 DIAGNOSIS — M54.41 CHRONIC BILATERAL LOW BACK PAIN WITH BILATERAL SCIATICA: ICD-10-CM

## 2023-01-20 DIAGNOSIS — M79.18 MYOFASCIAL PAIN SYNDROME: ICD-10-CM

## 2023-01-20 DIAGNOSIS — R52 GENERALIZED PAIN: ICD-10-CM

## 2023-01-20 DIAGNOSIS — M54.2 NECK PAIN: ICD-10-CM

## 2023-01-20 DIAGNOSIS — M54.12 CERVICAL RADICULOPATHY: ICD-10-CM

## 2023-01-20 DIAGNOSIS — M47.812 CERVICAL SPONDYLOSIS: ICD-10-CM

## 2023-01-20 DIAGNOSIS — G89.29 CHRONIC BILATERAL LOW BACK PAIN WITH BILATERAL SCIATICA: ICD-10-CM

## 2023-01-20 DIAGNOSIS — Z79.891 LONG-TERM CURRENT USE OF OPIATE ANALGESIC: ICD-10-CM

## 2023-01-20 DIAGNOSIS — Z98.890 HISTORY OF LUMBAR SURGERY: ICD-10-CM

## 2023-01-20 DIAGNOSIS — F11.20 UNCOMPLICATED OPIOID DEPENDENCE (HCC): ICD-10-CM

## 2023-01-20 RX ORDER — CYCLOBENZAPRINE HCL 5 MG
5 TABLET ORAL 2 TIMES DAILY PRN
Qty: 60 TABLET | Refills: 0 | Status: SHIPPED | OUTPATIENT
Start: 2023-01-20

## 2023-01-20 RX ORDER — HYDROCODONE BITARTRATE AND ACETAMINOPHEN 5; 325 MG/1; MG/1
1 TABLET ORAL 2 TIMES DAILY PRN
Qty: 60 TABLET | Refills: 0 | Status: SHIPPED | OUTPATIENT
Start: 2023-01-20

## 2023-01-20 RX ORDER — SODIUM CHLORIDE 9 MG/ML
250 INJECTION, SOLUTION INTRAVENOUS ONCE
Status: COMPLETED | OUTPATIENT
Start: 2023-01-24 | End: 2023-01-24

## 2023-01-20 RX ADMIN — SODIUM CHLORIDE 1500 ML: 0.9 INJECTION, SOLUTION INTRAVENOUS at 09:05

## 2023-01-20 NOTE — PATIENT INSTRUCTIONS
Opioid Safety   WHAT YOU NEED TO KNOW:   An opioid medicine is used to treat pain  Examples are oxycodone, morphine, fentanyl, or codeine  Pain control and management may help you rest, heal, and return to your daily activities  You and your family will receive information about how to manage your pain at home  The instructions will include what to do if you have side effects as your pain is managed  You will get information on how to handle opioid medicine safely  You will also get suggestions on how to control pain without opioids  It is important to follow all instructions so your pain is managed effectively  DISCHARGE INSTRUCTIONS:   Call your local emergency number (911 in the [place]), or have someone else call if:   You have a seizure  You cannot be woken  You have trouble staying awake and your breathing is slow or shallow  Your speech is slurred, or you are confused  You are dizzy or stumble when you walk  Call your doctor, or have someone close to you call if:   You are extremely drowsy, or you have trouble staying awake or speaking  You have pale or clammy skin  You have blue fingernails or lips  Your heartbeat is slower than normal     You cannot stop vomiting  You have questions or concerns about your condition or care  Use opioids safely:   Take prescribed opioids exactly as directed  Opioids come with directions based on the kind and how it is given  Talk to your healthcare provider or a pharmacist if you have any questions  Do not take more than the recommended amount  Too much can cause a life-threatening overdose  Do not continue to take it after your pain stops  You may develop tolerance  This means you keep needing higher doses to get the same effect  You may also develop opioid use disorder  This means you are not able to control your opioid use  Do not give opioids to others or take opioids that belong to someone else    The kind or amount one person takes may not be right for another  The person you share them with may also be taking medicines that do not mix with opioids  He or she may drink alcohol or use other drugs that can cause life-threatening problems when mixed with opioids  Do not mix opioids with other medicines or alcohol  The combination can cause an overdose, or cause you to stop breathing  Alcohol, sleeping pills, and medicines such as antihistamines can make you sleepy  A combination with opioids can lead to a coma  Do not drive or operate heavy machinery after you use an opioid  You may feel drowsy or have trouble concentrating  You can injure yourself or others if you drive or use heavy machinery when you are not alert  Your provider or pharmacist can tell you how long to wait after a dose before you do these activities  Talk to your healthcare provider if you have any side effects  Side effects include nausea, sleepiness, itching, and trouble thinking clearly  Your provider may need to make changes to the kind or amount of opioid you are taking  He or she can also help you find ways to prevent or relieve side effects  Manage constipation:  Constipation is the most common side effect of opioid medicine  Constipation is when you have hard, dry bowel movements, or you go longer than usual between bowel movements  Tell your healthcare provider about all changes in your bowel movements while you are taking opioids  He or she may recommend laxative medicine to help you have a bowel movement  He or she may also change the kind of opioid you are taking, or change when you take it  The following are more ways you can prevent or relieve constipation:  Drink liquids as directed  You may need to drink extra liquids to help soften and move your bowels  Ask how much liquid to drink each day and which liquids are best for you  Eat high-fiber foods  This may help decrease constipation by adding bulk to your bowel movements   High-fiber foods include fruits, vegetables, whole-grain breads and cereals, and beans  Your healthcare provider or dietitian can help you create a high-fiber meal plan  Your provider may also recommend a fiber supplement if you cannot get enough fiber from food  Exercise regularly  Regular physical activity can help stimulate your intestines  Walking is a good exercise to prevent or relieve constipation  Ask which exercises are best for you  Schedule a time each day to have a bowel movement  This may help train your body to have regular bowel movements  Bend forward while you are on the toilet to help move the bowel movement out  Sit on the toilet for at least 10 minutes, even if you do not have a bowel movement  Store opioids safely:   Store opioids where others cannot easily get them  Keep them in a locked cabinet or secure area  Do not  keep them in a purse or other bag you carry with you  A person may be looking for something else and find the opioids  Make sure opioids are stored out of the reach of children  A child can easily overdose on opioids  Opioids may look like candy to a small child  The best way to dispose of opioids: The laws vary by country and area  In the [facility], the best way is to return the opioids through a take-back program  This program is offered by the [employer] ("[employer],")  The following are options for using the program:  Take the opioids to a [employer] collection site  The site is often a law enforcement center  Call your local law enforcement center for scheduled take-back days in your area  You will be given information on where to go if the collection site is in a different location  Take the opioids to an approved pharmacy or hospital   A pharmacy or hospital may be set up as a collection site  You will need to ask if it is a [employer] collection site if you were not directed there   A pharmacy or doctor's office may not be able to take back opioids unless it is a [employer] site     Use a mail-back system  This means you are given containers to put the opioids into  You will then mail them in the containers  Use a take-back drop box  This is a place to leave the opioids at any time  People and animals will not be able to get into the box  Your local law enforcement agency can tell you where to find a drop box in your area  Other safe ways to dispose of opioids: The medicine may come with disposal instructions  The instructions may vary depending on the brand of medicine you are using  Instructions may come in a Medication Guide, but not every medicine has one  You may instead get instructions from your pharmacy or doctor  Follow instructions carefully  The following are general guidelines to follow:  Find out if you can flush the opioid  Some opioids can be flushed down the toilet or poured into the sink  You will need to contact authorities in your area to see if this is an option for you  The FDA also offers a list of medicines that are safe to flush down the toilet  You can check the list if you cannot get the information for your local area  Ask your waste management company about rules for putting opioids in the trash  The company will be able to give you specific directions  Scratch out personal information on the original medicine label so it cannot be read  Then put it in the trash  Do not label the trash or put any information on it about the opioids  It should look like regular household trash so no one is tempted to look for the opioids  Keep the trash out of the reach of children and animals  Always make sure trash is secure  Talk to officials if you live in a facility  If you live in a nursing home or assisted living center, talk to an official  The person will know the rules for your area  Other ways to manage pain:   Ask your healthcare provider about non-opioid medicines to control pain    Some medicines may even work better than opioids, depending on the cause of your pain  Nonprescription medicines include NSAIDs (such as ibuprofen) and acetaminophen  Prescription medicines include muscle relaxers, antidepressants, and steroids  Pain may be managed without any medicines  Some ways to relieve pain include massage, aromatherapy, or meditation  Physical or occupational therapy may also help  For more information:   Drug Enforcement Administration  [facility] [address]  Phone: [phone]  Web Address: [facility]/drug_disposal/    [address]  and Drug Administration  [place] , [address]  Phone: [phone]  Web Address: http://[employer]/    Follow up with your doctor or pain specialist as directed: You may need to have your dose adjusted  Your doctor or pain specialist can also help you find ways to manage pain without opioids  Write down your questions so you remember to ask them during your visits  © Copyright [employer] 2022 Information is for End User's use only and may not be sold, redistributed or otherwise used for commercial purposes  All illustrations and images included in CareNotes® are the copyrighted property of [employer]  or Mario Sadler   The above information is an  only  It is not intended as medical advice for individual conditions or treatments  Talk to your doctor, nurse or pharmacist before following any medical regimen to see if it is safe and effective for you

## 2023-01-20 NOTE — PROGRESS NOTES
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    11  Long-term current use of opiate analgesic    12  Uncomplicated opioid dependence ([facility] [address] )    13  Chronic pain syndrome        Plan:  While the patient was in the office today, I did have a thorough conversation regarding their chronic pain syndrome, medication management, and treatment plan options  Patient is being seen for a follow-up visit  He is scheduled for left-sided C2-3 and C3-4 radiofrequency ablation in February  Needed hydrocodone 5/325 twice daily if needed for pain  The patient's opioid scripts were sent to their pharmacy electronically and was given a 2 month supply of prescriptions with a Do Not Fill date(s) of 1/27/2023, 2/24/2023  Renewed Flexeril 5 mg twice daily if needed for spasms  Prescription was sent to her pharmacy  There are risks associated with opioid medications, including dependence, addiction and tolerance  The patient understands and agrees to use these medications only as prescribed  Potential side effects of the medications include, but are not limited to, constipation, drowsiness, addiction, impaired judgment and risk of fatal overdose if not taken as prescribed  The patient was warned against driving while taking sedation medications  Sharing medications is a felony  At this point in time, the patient is showing no signs of addiction, abuse, diversion or suicidal ideation  [place] Prescription Drug Monitoring Program report was reviewed and was appropriate     The patient will follow-up in 2 months for medication prescription refill and reevaluation  The patient was advised to contact the office should their symptoms worsen in the interim   The patient was agreeable and verbalized an understanding  History of Present Illness: The patient is a 75 y o  female who presents for a follow up office visit in regards to Back Pain, Neck Pain, Knee Pain, Shoulder Pain, Hand Pain, Arm Pain, Foot Pain, Headache, and Leg Pain  The patient’s current symptoms include plaints of neck pain that radiates to both shoulders and both upper extremities  Complaints of headaches, mid back pain, low back pain that radiates down both lower extremities  Current pain level is an 8/10  Quality pain is described as burning, sharp, cramping, pressure-like, shooting,    Current pain medications includes: Hydrocodone 5/325 twice daily if needed for pain, Flexeril 5 mg twice daily if needed for spasms  The patient reports that this regimen is providing 25-30% pain relief  The patient is reporting no side effects from this pain medication regimen  I have personally reviewed and/or updated the patient's past medical history, past surgical history, family history, social history, current medications, allergies, and vital signs today  Review of Systems  Review of Systems   Constitutional: Negative for unexpected weight change  HENT: Negative for hearing loss  Eyes: Negative for visual disturbance  Respiratory: Positive for shortness of breath  Cardiovascular: Negative for leg swelling  Gastrointestinal: Positive for constipation and nausea  Endocrine: Negative for polyuria  Genitourinary: Negative for difficulty urinating  Musculoskeletal: Positive for gait problem  Negative for joint swelling and myalgias  Decreased range of motion  Joint stiffness  Swelling-ankles  Pain in extremity-L leg to foot, R leg to knee   Skin: Negative for rash  Neurological: Positive for dizziness  Negative for weakness and headaches  Memory loss   Psychiatric/Behavioral: Negative for decreased concentration  All other systems reviewed and are negative          Past Medical History:   Diagnosis Date   • Allergic rhinitis    • Anxiety    • Asthma    • Back pain    • Cardiac disease    • Cardiopathy     EF 45%   • Cough    • Diverticulitis    • Factor V Leiden (HCC)    • Fibromyalgia    • GERD (gastroesophageal reflux disease)    • Hashimoto's thyroiditis    • Hx of degenerative disc disease    • Hypotension     pots - postural orthostatic hypotension   • Interstitial cystitis    • Irregular heart beat     LBBB   • Irritable bowel syndrome    • Migraines    • Mitral valve disease     "thickening"   • Myocardial infarction ([address] )     possible but not sure when   • Neuropathy     bilateral legs   • Osteoporosis    • Postural orthostatic tachycardia syndrome     must drink a lot of water and salt   • Pott's disease    • Rheumatic fever    • Scoliosis    • Sepsis ([address] )     associated with PICC line   • Sjogren's syndrome ([address] )    • TMJ (dislocation of temporomandibular joint)        Past Surgical History:   Procedure Laterality Date   • ABLATION MICROWAVE Left     lumbar area   • BACK SURGERY  10/1998   •  SECTION     • CHOLECYSTECTOMY  2016   • COLONOSCOPY     • DILATION AND CURETTAGE OF UTERUS     • EGD     • FOOT SURGERY  1968    removal of bone and neuroma   • HYSTERECTOMY      age 54  PRIEC ooph   • IR OTHER  2022   • IR OTHER  2022   • IR PICC PLACEMENT SINGLE LUMEN  10/02/2020   • IR PICC PLACEMENT SINGLE LUMEN  2021   • IR PICC REPOSITION  2021   • IR TUNNELED CENTRAL LINE PLACEMENT  2022   • LAPAROSCOPY  1996    endometriosis   • MOUTH BIOPSY      lip   • MO EGD TRANSORAL BIOPSY SINGLE/MULTIPLE N/A 2019    Procedure: ESOPHAGOGASTRODUODENOSCOPY (EGD) with multiple bx and dilation;  Surgeon: Leticia Barker MD;  Location: [address] [place] [address] GI LAB;   Service: Gastroenterology   • MO SURGICAL ARTHROSCOPY SHOULDER W/ROTATOR CUFF RPR Right 2022    Procedure: SHOULDER ARTHROSCOPIC ROTATOR CUFF REPAIR Biceps Tenodisis; Surgeon: Lotus Dunn MD;  Location: [facility];  Service: Orthopedics   • TUNNELED VENOUS CATHETER PLACEMENT  2016       Family History   Problem Relation Age of Onset   • Cancer Mother         urinary bladder    • Thyroid cancer Sister    • Colon cancer Maternal Grandfather    • Breast cancer Maternal Aunt         over 48 yrs old    • Endometrial cancer Maternal Aunt    • Multiple myeloma Maternal Aunt    • No Known Problems Father    • No Known Problems Daughter    • Heart attack Sister    • No Known Problems Sister    • No Known Problems Sister    • No Known Problems Sister    • No Known Problems Sister    • No Known Problems Daughter    • Hypertension Paternal Aunt        Social History     Occupational History   • Not on file   Tobacco Use   • Smoking status: Never   • Smokeless tobacco: Never   Vaping Use   • Vaping Use: Never used   Substance and Sexual Activity   • Alcohol use: Never   • Drug use: No   • Sexual activity: Not on file         Current Outpatient Medications:   •  Alum Hydroxide-Mag Trisilicate (Gaviscon) 69-07 4 MG CHEW, Chew 1 tablet 4 (four) times a day as needed With meals and as needed, Disp: , Rfl:   •  azelastine (ASTELIN) 0 1 % nasal spray, 2 sprays into each nostril 2 (two) times a day Use in each nostril as directed, Disp: , Rfl:   •  beclomethasone (QVAR) 40 MCG/ACT inhaler, Inhale 1 puff daily as needed (only when unable to nebulize pulmicort) Rinse mouth after use , Disp: , Rfl:   •  budesonide (PULMICORT) 0 5 mg/2 mL nebulizer solution, Take 0 5 mg by nebulization 2 (two) times a day Rinse mouth after use , Disp: , Rfl:   •  cholecalciferol (VITAMIN D3) 1,000 units tablet, Take 4,000 Units by mouth daily, Disp: , Rfl:   •  cyclobenzaprine (FLEXERIL) 5 mg tablet, Take 1 tablet (5 mg total) by mouth 2 (two) times a day as needed for muscle spasms, Disp: 60 tablet, Rfl: 0  •  docusate sodium (COLACE) 100 mg capsule, Take 100 mg by mouth daily as needed for constipation, Disp: , Rfl:   •  erythromycin (ILOTYCIN) ophthalmic ointment, Apply 1 application to eye daily at bedtime, Disp: , Rfl:   •  famotidine (PEPCID) 40 MG tablet, Twice a day, Disp: , Rfl:   •  fexofenadine (ALLEGRA) 180 MG tablet, Take 180 mg by mouth 2 (two) times a day , Disp: , Rfl:   •  fluticasone (FLONASE) 50 mcg/act nasal spray, 2 sprays into each nostril daily , Disp: , Rfl:   •  Galcanezumab-gnlm (Emgality) 120 MG/ML SOAJ, Inject under the skin every 30 (thirty) days , Disp: , Rfl:   •  Gemtesa 75 MG TABS, Take 1 tablet by mouth daily, Disp: , Rfl:   •  HYDROcodone-acetaminophen (Norco) 5-325 mg per tablet, Take 1 tablet by mouth 2 (two) times a day as needed for pain for up to 30 doses Do not fill until 2/24/2023 Max Daily Amount: 2 tablets, Disp: 60 tablet, Rfl: 0  •  HYDROcodone-acetaminophen (NORCO) 5-325 mg per tablet, Take 1 tablet by mouth 2 (two) times a day as needed for pain Do not fill until 1/27/2023 Max Daily Amount: 2 tablets, Disp: 60 tablet, Rfl: 0  •  hydrOXYzine (ATARAX) 10 mg/5 mL syrup, Take 20 mg by mouth daily at bedtime , Disp: , Rfl:   •  ipratropium (ATROVENT) 0 06 % nasal spray, PLEASE SEE ATTACHED FOR DETAILED DIRECTIONS, Disp: , Rfl:   •  ivabradine HCl (Corlanor) 5 MG tablet, 7 5 mg 2 (two) times a day, Disp: , Rfl:   •  levalbuterol (XOPENEX HFA) 45 mcg/act inhaler, Inhale 2 puffs every 4 (four) hours as needed (when unable to nebulize), Disp: , Rfl:   •  levalbuterol (XOPENEX) 1 25 mg/3 mL nebulizer solution, Take 1 25 mg by nebulization every 6 (six) hours as needed , Disp: , Rfl: 2  •  Magnesium 400 MG CAPS, Take 1 capsule by mouth 2 (two) times a day , Disp: , Rfl:   •  MULTIPLE VITAMINS-CALCIUM PO, Take 1 capsule by mouth every morning , Disp: , Rfl:   •  Multiple Vitamins-Iron TABS, Take by mouth, Disp: , Rfl:   •  omega-3-acid ethyl esters (LOVAZA) 1 g capsule, Take 2 g by mouth 2 (two) times a day 1600mg daily, Disp: , Rfl:   •  polyethylene glycol (MIRALAX) 17 g packet, Take 17 g by mouth daily as needed , Disp: , Rfl:   •  Probiotic Product (PROBIOTIC DAILY PO), Take 1 tablet by mouth daily At lunch, Disp: , Rfl:   •  psyllium (METAMUCIL) 0 52 g capsule, Take 0 52 g by mouth daily, Disp: , Rfl:   •  Qvar RediHaler 40 MCG/ACT inhaler, PLEASE SEE ATTACHED FOR DETAILED DIRECTIONS, Disp: , Rfl:   •  Thyroid, Porcine, POWD, Use 32 mg daily, Disp: , Rfl:   •  Ubrogepant (UBRELVY) 100 MG tablet, Take 100 mg by mouth Take 1 tablet (100 mg) one time as needed for migraine  May repeat one additional tablet (100 mg) at least two hours after the first dose  Do not use more than two doses per day, or for more than eight days per month , Disp: , Rfl:   •  Varenicline Tartrate (Tyrvaya) 0 03 MG/ACT SOLN, 1 spray into each nostril 2 (two) times a day, Disp: , Rfl:   •  verapamil (CALAN) 40 mg tablet, , Disp: , Rfl:   No current facility-administered medications for this visit      Facility-Administered Medications Ordered in Other Visits:   •  sodium chloride 0 9 % bolus 1,500 mL, 1,500 mL, Intravenous, Once, Maureen Dexter MD  •  [START ON 1/23/2023] sodium chloride 0 9 % bolus 1,500 mL, 1,500 mL, Intravenous, Once, Maureen Dexter MD    Allergies   Allergen Reactions   • Imipramine Confusion, Fatigue, Irritability, Palpitations, Shortness Of Breath, Tachycardia and Visual Disturbance   • Melatonin Shortness Of Breath   • Mirtazapine Anxiety, Dizziness, GI Intolerance, Nausea Only, Palpitations and Shortness Of Breath   • Nexium [Esomeprazole] Shortness Of Breath   • Nsaids Shortness Of Breath   • Singulair [Montelukast] Shortness Of Breath and Cough   • Zomig [Zolmitriptan] Shortness Of Breath   • Dexilant [Dexlansoprazole] Nausea Only and Vomiting   • Albuterol    • Ambien [Zolpidem]    • Amitriptyline Drowsiness   • Aspirin    • Bactrim [Sulfamethoxazole-Trimethoprim] Hives   • Banana - Food Allergy Dermatitis   • Ceftin [Cefuroxime]    • Celebrex [Celecoxib]    • Ciprofloxacin    • Cranberry-C [Ascorbate - Food Allergy] GI Intolerance   • Demerol [Meperidine]    • Epinephrine Dizziness   • Ergotamine Nausea Only and Headache   • Keflex [Cephalexin]    • Klonopin [Clonazepam] Nausea Only and Dizziness   • Latex Hives   • Levaquin [Levofloxacin]    • Lexapro [Escitalopram]    • Lyrica [Pregabalin] Fatigue   • Macrodantin [Nitrofurantoin]    • Morphine Nausea Only   • Movantik [Naloxegol] Nausea Only   • Mushroom Extract Complex - Food Allergy      Eye itchy, asthma  attack   • Naprosyn [Naproxen]    • Neurontin [Gabapentin] Dizziness   • Pineapple - Food Allergy GI Intolerance   • Plecanatide Abdominal Pain, Diarrhea and GI Intolerance   • Prolia [Denosumab]    • Prozac [Fluoxetine]    • Serevent [Salmeterol] Dizziness   • Sudafed [Pseudoephedrine] Hives   • Sulfa Antibiotics    • Tomato - Food Allergy GI Intolerance   • Trazodone    • Ultram [Tramadol] Nausea Only, Dizziness and Headache   • Vilazodone Dizziness, GI Intolerance and Headache   • Vilazodone Hcl Abdominal Pain, Dizziness, GI Intolerance, Headache and Other (See Comments)   • Vioxx [Rofecoxib]    • Vortioxetine Drowsiness, Fatigue, GI Intolerance and Irritability   • Zithromax [Azithromycin] Hives   • Zoloft [Sertraline]    • Zantac [Ranitidine] Rash and Dizziness       Physical Exam:    /75   Pulse 80   Ht 5' 1" (1 549 m)   Wt 62 5 kg (137 lb 12 8 oz)   BMI 26 04 kg/m²     Constitutional:normal, well developed, well nourished, alert, in no distress and non-toxic and no overt pain behavior  Eyes:anicteric  HEENT:grossly intact  Neck:supple, symmetric, trachea midline and no masses   Pulmonary:even and unlabored  Cardiovascular:No edema or pitting edema present  Skin:Normal without rashes or lesions and well hydrated  Psychiatric:Mood and affect appropriate  Neurologic:Cranial Nerves II-XII grossly intact  Musculoskeletal:Range of motion of the cervical spine is limited in all planes    There are cervical paraspinal muscle spasms present  There is tenderness bilaterally C2-C6  Imaging  No orders to display       No orders of the defined types were placed in this encounter

## 2023-01-23 RX ORDER — TIZANIDINE 2 MG/1
TABLET ORAL
Qty: 1 TABLET | Refills: 0 | OUTPATIENT
Start: 2023-01-23

## 2023-01-24 ENCOUNTER — HOSPITAL ENCOUNTER (OUTPATIENT)
Dept: INFUSION CENTER | Facility: HOSPITAL | Age: 68
Discharge: HOME | End: 2023-01-24
Attending: INTERNAL MEDICINE

## 2023-01-24 VITALS
HEART RATE: 77 BPM | RESPIRATION RATE: 18 BRPM | SYSTOLIC BLOOD PRESSURE: 130 MMHG | DIASTOLIC BLOOD PRESSURE: 81 MMHG | TEMPERATURE: 98.4 F

## 2023-01-24 RX ORDER — SODIUM CHLORIDE 9 MG/ML
250 INJECTION, SOLUTION INTRAVENOUS ONCE
Status: COMPLETED | OUTPATIENT
Start: 2023-01-26 | End: 2023-01-26

## 2023-01-24 RX ADMIN — SODIUM CHLORIDE 250 ML/HR: 0.9 INJECTION, SOLUTION INTRAVENOUS at 08:52

## 2023-01-26 ENCOUNTER — HOSPITAL ENCOUNTER (OUTPATIENT)
Dept: INFUSION CENTER | Facility: HOSPITAL | Age: 68
End: 2023-01-26
Attending: INTERNAL MEDICINE

## 2023-01-26 VITALS
SYSTOLIC BLOOD PRESSURE: 136 MMHG | HEART RATE: 82 BPM | RESPIRATION RATE: 18 BRPM | TEMPERATURE: 97.8 F | DIASTOLIC BLOOD PRESSURE: 72 MMHG

## 2023-01-26 RX ADMIN — SODIUM CHLORIDE 250 ML/HR: 0.9 INJECTION, SOLUTION INTRAVENOUS at 09:01

## 2023-01-27 ENCOUNTER — HOSPITAL ENCOUNTER (OUTPATIENT)
Dept: INFUSION CENTER | Facility: HOSPITAL | Age: 68
End: 2023-01-27
Attending: INTERNAL MEDICINE

## 2023-01-27 VITALS
DIASTOLIC BLOOD PRESSURE: 87 MMHG | RESPIRATION RATE: 18 BRPM | TEMPERATURE: 98.6 F | SYSTOLIC BLOOD PRESSURE: 155 MMHG | OXYGEN SATURATION: 99 % | HEART RATE: 74 BPM

## 2023-01-27 RX ADMIN — SODIUM CHLORIDE 1500 ML: 0.9 INJECTION, SOLUTION INTRAVENOUS at 09:10

## 2023-01-27 NOTE — PROGRESS NOTES
Pt here for hydration, offering no complaints  Right PICC flushed with positive blood return noted  Tolerated infusion without incident  PICC flushed and locked  AVS declined  Walked out in stable condition

## 2023-01-31 ENCOUNTER — TELEPHONE (OUTPATIENT)
Dept: PAIN MEDICINE | Facility: CLINIC | Age: 68
End: 2023-01-31

## 2023-01-31 DIAGNOSIS — G03.9 ARACHNOIDITIS: ICD-10-CM

## 2023-01-31 DIAGNOSIS — G89.4 CHRONIC PAIN SYNDROME: ICD-10-CM

## 2023-01-31 DIAGNOSIS — Z79.891 LONG-TERM CURRENT USE OF OPIATE ANALGESIC: ICD-10-CM

## 2023-01-31 DIAGNOSIS — F11.20 UNCOMPLICATED OPIOID DEPENDENCE (HCC): ICD-10-CM

## 2023-01-31 DIAGNOSIS — R52 GENERALIZED PAIN: ICD-10-CM

## 2023-01-31 NOTE — TELEPHONE ENCOUNTER
Caller: Sasha White  Doctor: Jesika Harrison    Reason for call: patient state she has 1 tablet left and her pharmacy has none in stock what can be done?      HYDROcodone-acetaminophen (Norco) 5-325 mg per tablet    [id number] [place] - [address] [address]   Phone:  [phone]  Fax:  [phone]   [employer] #:  [id number]      Call back#: [phone]

## 2023-02-01 RX ORDER — HYDROCODONE BITARTRATE AND ACETAMINOPHEN 5; 325 MG/1; MG/1
1 TABLET ORAL 2 TIMES DAILY PRN
Qty: 60 TABLET | Refills: 0 | Status: SHIPPED | OUTPATIENT
Start: 2023-02-01

## 2023-02-01 NOTE — TELEPHONE ENCOUNTER
RN s/w pt regarding previous  [facility] possible and Latrice Jameson the pharmacist let her know that they are trying to get "blister paks" of Emile Meigs in for her to be able to fill    Pt also aware that the Auth is in progress with [facility]

## 2023-02-01 NOTE — TELEPHONE ENCOUNTER
Caller: Anton Burnett    Doctor: Adam Price    Reason for call: patient called asking that [address] send her script for HYDROcodone-acetaminophen (Norco) 5-325 mg per tablet to [employer]    pls call  [facility] at [phone]    Call back#: [phone]

## 2023-02-01 NOTE — TELEPHONE ENCOUNTER
RN s/w the [facility] pharmacy  Per rep from the pharmacy they have 6 Norco in stock and have no idea when the shipment will be arriving  Per rep there is a national shortage and the other [employer] stores within 20 miles also do not have any of the [facility]  in stock      RN reached out to Rosette from the [employer] and she has already started the auth, per [employer] does take a while and she will also call them this afternoon (she already sent in all of the information they need )      RN left 2 messages on the pt's phone to get back to us, she needs to choose another pharamcy to go to who might have the medication in stock

## 2023-02-03 RX ORDER — SODIUM CHLORIDE 9 MG/ML
250 INJECTION, SOLUTION INTRAVENOUS ONCE
Status: COMPLETED | OUTPATIENT
Start: 2023-02-07 | End: 2023-02-07

## 2023-02-04 ENCOUNTER — HOSPITAL ENCOUNTER (OUTPATIENT)
Dept: RADIOLOGY | Facility: HOSPITAL | Age: 68
Discharge: HOME | End: 2023-02-04

## 2023-02-04 DIAGNOSIS — R05.9 COUGH, UNSPECIFIED TYPE: ICD-10-CM

## 2023-02-06 ENCOUNTER — HOSPITAL ENCOUNTER (OUTPATIENT)
Dept: INFUSION CENTER | Facility: HOSPITAL | Age: 68
Discharge: HOME | End: 2023-02-06
Attending: INTERNAL MEDICINE

## 2023-02-06 VITALS
HEART RATE: 72 BPM | SYSTOLIC BLOOD PRESSURE: 157 MMHG | TEMPERATURE: 98 F | RESPIRATION RATE: 18 BRPM | DIASTOLIC BLOOD PRESSURE: 85 MMHG | OXYGEN SATURATION: 97 %

## 2023-02-06 RX ORDER — DOXYCYCLINE HYCLATE 100 MG
TABLET ORAL
COMMUNITY
Start: 2023-02-03

## 2023-02-06 RX ORDER — METHYLPREDNISOLONE 8 MG/1
8 TABLET ORAL 4 TIMES DAILY
COMMUNITY
Start: 2023-02-03

## 2023-02-06 RX ADMIN — SODIUM CHLORIDE 1500 ML: 0.9 INJECTION, SOLUTION INTRAVENOUS at 09:46

## 2023-02-07 ENCOUNTER — HOSPITAL ENCOUNTER (OUTPATIENT)
Dept: INFUSION CENTER | Facility: HOSPITAL | Age: 68
Discharge: HOME | End: 2023-02-07

## 2023-02-07 VITALS
HEART RATE: 69 BPM | DIASTOLIC BLOOD PRESSURE: 91 MMHG | TEMPERATURE: 96.4 F | OXYGEN SATURATION: 99 % | RESPIRATION RATE: 18 BRPM | SYSTOLIC BLOOD PRESSURE: 148 MMHG

## 2023-02-07 RX ADMIN — SODIUM CHLORIDE 250 ML/HR: 0.9 INJECTION, SOLUTION INTRAVENOUS at 09:56

## 2023-02-08 RX ORDER — SODIUM CHLORIDE 9 MG/ML
250 INJECTION, SOLUTION INTRAVENOUS ONCE
Status: COMPLETED | OUTPATIENT
Start: 2023-02-09 | End: 2023-02-09

## 2023-02-09 ENCOUNTER — HOSPITAL ENCOUNTER (OUTPATIENT)
Dept: INFUSION CENTER | Facility: HOSPITAL | Age: 68
Discharge: HOME | End: 2023-02-09

## 2023-02-09 VITALS
HEART RATE: 83 BPM | OXYGEN SATURATION: 98 % | DIASTOLIC BLOOD PRESSURE: 104 MMHG | TEMPERATURE: 97.3 F | RESPIRATION RATE: 18 BRPM | SYSTOLIC BLOOD PRESSURE: 158 MMHG

## 2023-02-09 RX ADMIN — SODIUM CHLORIDE 250 ML/HR: 0.9 INJECTION, SOLUTION INTRAVENOUS at 09:12

## 2023-02-10 ENCOUNTER — HOSPITAL ENCOUNTER (OUTPATIENT)
Dept: INFUSION CENTER | Facility: HOSPITAL | Age: 68
Discharge: HOME | End: 2023-02-10
Attending: INTERNAL MEDICINE

## 2023-02-13 ENCOUNTER — HOSPITAL ENCOUNTER (OUTPATIENT)
Dept: INFUSION CENTER | Facility: HOSPITAL | Age: 68
Discharge: HOME | End: 2023-02-13
Attending: INTERNAL MEDICINE

## 2023-02-13 VITALS
SYSTOLIC BLOOD PRESSURE: 146 MMHG | RESPIRATION RATE: 18 BRPM | DIASTOLIC BLOOD PRESSURE: 82 MMHG | HEART RATE: 76 BPM | TEMPERATURE: 98.3 F

## 2023-02-13 RX ADMIN — SODIUM CHLORIDE 1500 ML: 0.9 INJECTION, SOLUTION INTRAVENOUS at 09:30

## 2023-02-13 NOTE — PROGRESS NOTES
Presented today for IV hydration, tolerated same well, will return 2/14 for hydration  Dressing change and flushed per protocol, CVC patent with good blood return

## 2023-02-14 ENCOUNTER — TELEPHONE (OUTPATIENT)
Dept: PAIN MEDICINE | Facility: CLINIC | Age: 68
End: 2023-02-14

## 2023-02-14 NOTE — TELEPHONE ENCOUNTER
Hydrocodone-acetaminophen requires prior auth from [facility] per [facility] pharmacy

## 2023-02-15 ENCOUNTER — HOSPITAL ENCOUNTER (OUTPATIENT)
Dept: INFUSION CENTER | Facility: HOSPITAL | Age: 68
Discharge: HOME | End: 2023-02-15
Attending: INTERNAL MEDICINE

## 2023-02-15 VITALS
HEART RATE: 84 BPM | SYSTOLIC BLOOD PRESSURE: 160 MMHG | RESPIRATION RATE: 16 BRPM | TEMPERATURE: 96.6 F | DIASTOLIC BLOOD PRESSURE: 94 MMHG

## 2023-02-15 RX ADMIN — SODIUM CHLORIDE 1500 ML: 0.9 INJECTION, SOLUTION INTRAVENOUS at 09:10

## 2023-02-15 NOTE — PROGRESS NOTES
Patient noted to have scant pink skin at farthest distal tubing site  Gauze 2x2 placed under site to keep tubing off skin  Denies pain or drainage at that site  CVC insertion site is WNL  Patient tolerated IV hydration without issues  AVS declined  Patient is aware of next appointment  Patient ambulated off unit without incident  All personal belongings taken with patient

## 2023-02-16 RX ORDER — SODIUM CHLORIDE 9 MG/ML
250 INJECTION, SOLUTION INTRAVENOUS ONCE
Status: COMPLETED | OUTPATIENT
Start: 2023-02-20 | End: 2023-02-20

## 2023-02-17 ENCOUNTER — HOSPITAL ENCOUNTER (OUTPATIENT)
Dept: INFUSION CENTER | Facility: HOSPITAL | Age: 68
End: 2023-02-17
Attending: INTERNAL MEDICINE

## 2023-02-17 VITALS
DIASTOLIC BLOOD PRESSURE: 87 MMHG | RESPIRATION RATE: 18 BRPM | OXYGEN SATURATION: 100 % | HEART RATE: 92 BPM | SYSTOLIC BLOOD PRESSURE: 165 MMHG | TEMPERATURE: 97.1 F

## 2023-02-17 RX ORDER — SODIUM CHLORIDE 9 MG/ML
250 INJECTION, SOLUTION INTRAVENOUS ONCE
Status: COMPLETED | OUTPATIENT
Start: 2023-02-21 | End: 2023-02-21

## 2023-02-17 RX ADMIN — SODIUM CHLORIDE 1500 ML: 0.9 INJECTION, SOLUTION INTRAVENOUS at 09:04

## 2023-02-20 ENCOUNTER — HOSPITAL ENCOUNTER (OUTPATIENT)
Dept: INFUSION CENTER | Facility: HOSPITAL | Age: 68
Discharge: HOME | End: 2023-02-20
Attending: INTERNAL MEDICINE

## 2023-02-20 VITALS
TEMPERATURE: 97.3 F | HEART RATE: 73 BPM | RESPIRATION RATE: 18 BRPM | DIASTOLIC BLOOD PRESSURE: 81 MMHG | SYSTOLIC BLOOD PRESSURE: 144 MMHG | OXYGEN SATURATION: 99 %

## 2023-02-20 RX ADMIN — SODIUM CHLORIDE 250 ML/HR: 0.9 INJECTION, SOLUTION INTRAVENOUS at 09:05

## 2023-02-20 NOTE — PROGRESS NOTES
Presented today for IV hydration, tolerated well  Will return for hydration as ordered  Dressing changed as ordered, & cap changed as well  Good blood return noted, flushed per protocol

## 2023-02-21 ENCOUNTER — HOSPITAL ENCOUNTER (OUTPATIENT)
Dept: INFUSION CENTER | Facility: HOSPITAL | Age: 68
Discharge: HOME | End: 2023-02-21

## 2023-02-21 VITALS
RESPIRATION RATE: 18 BRPM | HEART RATE: 90 BPM | DIASTOLIC BLOOD PRESSURE: 97 MMHG | TEMPERATURE: 98.9 F | SYSTOLIC BLOOD PRESSURE: 173 MMHG

## 2023-02-21 RX ORDER — SODIUM CHLORIDE 9 MG/ML
250 INJECTION, SOLUTION INTRAVENOUS ONCE
Status: DISCONTINUED | OUTPATIENT
Start: 2023-02-23 | End: 2023-02-27 | Stop reason: HOSPADM

## 2023-02-21 RX ADMIN — SODIUM CHLORIDE 250 ML/HR: 0.9 INJECTION, SOLUTION INTRAVENOUS at 09:25

## 2023-02-21 NOTE — PROGRESS NOTES
Patient tolerated hydration without issue   Discharged in stable condition, declined AVS but aware of next appointment

## 2023-02-22 RX ORDER — SODIUM CHLORIDE 9 MG/ML
250 INJECTION, SOLUTION INTRAVENOUS ONCE
Status: COMPLETED | OUTPATIENT
Start: 2023-02-24 | End: 2023-02-24

## 2023-02-23 ENCOUNTER — HOSPITAL ENCOUNTER (OUTPATIENT)
Dept: INFUSION CENTER | Facility: HOSPITAL | Age: 68
Discharge: HOME | End: 2023-02-23

## 2023-02-23 RX ORDER — SODIUM CHLORIDE 9 MG/ML
250 INJECTION, SOLUTION INTRAVENOUS ONCE
Status: DISCONTINUED | OUTPATIENT
Start: 2023-02-27 | End: 2023-03-03 | Stop reason: HOSPADM

## 2023-02-24 ENCOUNTER — HOSPITAL ENCOUNTER (OUTPATIENT)
Dept: INFUSION CENTER | Facility: HOSPITAL | Age: 68
End: 2023-02-24
Attending: INTERNAL MEDICINE

## 2023-02-24 VITALS
DIASTOLIC BLOOD PRESSURE: 98 MMHG | SYSTOLIC BLOOD PRESSURE: 171 MMHG | TEMPERATURE: 96.7 F | RESPIRATION RATE: 16 BRPM | OXYGEN SATURATION: 99 % | HEART RATE: 88 BPM

## 2023-02-24 RX ADMIN — SODIUM CHLORIDE 250 ML/HR: 0.9 INJECTION, SOLUTION INTRAVENOUS at 08:27

## 2023-02-27 ENCOUNTER — HOSPITAL ENCOUNTER (OUTPATIENT)
Dept: INFUSION CENTER | Facility: HOSPITAL | Age: 68
Discharge: HOME | End: 2023-02-27
Attending: INTERNAL MEDICINE

## 2023-02-28 RX ORDER — SODIUM CHLORIDE 9 MG/ML
250 INJECTION, SOLUTION INTRAVENOUS ONCE
Status: COMPLETED | OUTPATIENT
Start: 2023-03-02 | End: 2023-03-02

## 2023-02-28 RX ORDER — SODIUM CHLORIDE 9 MG/ML
250 INJECTION, SOLUTION INTRAVENOUS ONCE
Status: COMPLETED | OUTPATIENT
Start: 2023-03-01 | End: 2023-03-01

## 2023-03-01 ENCOUNTER — HOSPITAL ENCOUNTER (OUTPATIENT)
Dept: INFUSION CENTER | Facility: HOSPITAL | Age: 68
Discharge: HOME | End: 2023-03-01
Attending: INTERNAL MEDICINE

## 2023-03-01 VITALS
TEMPERATURE: 96.7 F | OXYGEN SATURATION: 98 % | SYSTOLIC BLOOD PRESSURE: 128 MMHG | HEART RATE: 80 BPM | RESPIRATION RATE: 16 BRPM | DIASTOLIC BLOOD PRESSURE: 83 MMHG

## 2023-03-01 RX ADMIN — SODIUM CHLORIDE 250 ML/HR: 0.9 INJECTION, SOLUTION INTRAVENOUS at 10:04

## 2023-03-02 ENCOUNTER — HOSPITAL ENCOUNTER (OUTPATIENT)
Dept: INFUSION CENTER | Facility: HOSPITAL | Age: 68
End: 2023-03-02

## 2023-03-02 ENCOUNTER — HOSPITAL ENCOUNTER (OUTPATIENT)
Dept: CT IMAGING | Facility: HOSPITAL | Age: 68
End: 2023-03-02

## 2023-03-02 ENCOUNTER — TELEPHONE (OUTPATIENT)
Dept: PAIN MEDICINE | Facility: CLINIC | Age: 68
End: 2023-03-02

## 2023-03-02 VITALS
SYSTOLIC BLOOD PRESSURE: 155 MMHG | HEART RATE: 69 BPM | RESPIRATION RATE: 16 BRPM | TEMPERATURE: 97.4 F | DIASTOLIC BLOOD PRESSURE: 93 MMHG | OXYGEN SATURATION: 100 %

## 2023-03-02 DIAGNOSIS — N23 UNSPECIFIED RENAL COLIC: ICD-10-CM

## 2023-03-02 RX ADMIN — IOHEXOL 100 ML: 350 INJECTION, SOLUTION INTRAVENOUS at 16:55

## 2023-03-02 RX ADMIN — SODIUM CHLORIDE 250 ML/HR: 0.9 INJECTION, SOLUTION INTRAVENOUS at 08:21

## 2023-03-02 NOTE — TELEPHONE ENCOUNTER
Caller: Dominga Ozuna  Doctor: Cecilio        Reason for call: Patient called needing a Prior Authorization for Flexeril through [address] / member [id number]  - pls use [facility] on file      Call back#: [phone]

## 2023-03-02 NOTE — TELEPHONE ENCOUNTER
Caller: César Nicholas from [place]  Doctor: Cecilio      Reason for call: needed our fax number for prior auth on [facility]    Call back#: [phone]

## 2023-03-03 ENCOUNTER — TELEPHONE (OUTPATIENT)
Dept: PAIN MEDICINE | Facility: CLINIC | Age: 68
End: 2023-03-03

## 2023-03-03 NOTE — TELEPHONE ENCOUNTER
Caller: Pepper Kawasaki PT    Doctor: [address] [place] [address]     Reason for call: The insurance appeal will be processed with 21 days only   [facility] will not approve it unless the doctor will appeal       Call back#: [phone]

## 2023-03-03 NOTE — TELEPHONE ENCOUNTER
Caller: Patient    Doctor: Ritchie Levin    Reason for call: tavia do is charging her for the script that was send to [facility] and stated needs a prior auth   Patient would like a call back    Call back#:

## 2023-03-03 NOTE — TELEPHONE ENCOUNTER
Received a fax from [facility] in [place] [address] that script for hydrocodone-acetaminophen 5/325mg is currently on backorder and they are requesting an alternative medication for patient    Please advise, thank you

## 2023-03-03 NOTE — TELEPHONE ENCOUNTER
I spoke with the pharmacist   He said there is a drug interaction which is why the medication cannot be filled  Between the Cyclobenzaprine and Hydroxyzine   Auth sent to [facility]

## 2023-03-03 NOTE — TELEPHONE ENCOUNTER
Patient will need to locate a local pharmacy who does carry this medication  There is no substitute

## 2023-03-06 NOTE — TELEPHONE ENCOUNTER
I spoke with [facility]  They said they never received the form from our office  I will resend it  They did a 1 time override so pt was able to  her prescription for this month

## 2023-03-06 NOTE — TELEPHONE ENCOUNTER
S/W pt and advised our auth team sent out the form again and everything should be okay moving forward

## 2023-03-09 ENCOUNTER — TELEPHONE (OUTPATIENT)
Dept: PAIN MEDICINE | Facility: CLINIC | Age: 68
End: 2023-03-09

## 2023-03-09 ENCOUNTER — HOSPITAL ENCOUNTER (OUTPATIENT)
Dept: INFUSION CENTER | Facility: HOSPITAL | Age: 68
End: 2023-03-09

## 2023-03-09 VITALS
TEMPERATURE: 97.4 F | SYSTOLIC BLOOD PRESSURE: 160 MMHG | DIASTOLIC BLOOD PRESSURE: 84 MMHG | RESPIRATION RATE: 16 BRPM | HEART RATE: 68 BPM

## 2023-03-09 RX ADMIN — SODIUM CHLORIDE 1500 ML: 0.9 INJECTION, SOLUTION INTRAVENOUS at 09:15

## 2023-03-09 NOTE — TELEPHONE ENCOUNTER
Caller: patient     Doctor: Christopher Ayoub, [address] [place] [address]    Reason for call: pt is requesting to reschedule today's procedure, due to still having an upper respiratory infection    Call back#: [phone]

## 2023-03-09 NOTE — PROGRESS NOTES
Patient's right CVC line dressing change due today  Patient reports that she changed her dressing at home the other day due to + pruritus at site  CVC dressing change done on unit per protocol  No redness, rash or discharge note at site  Will continue to monitor  Patient tolerated IV hydration without issues  AVS declined  Patient is aware of appointment tomorrow  Patient ambulated off unit without incident  All personal belongings taken with patient

## 2023-03-10 ENCOUNTER — HOSPITAL ENCOUNTER (OUTPATIENT)
Dept: INFUSION CENTER | Facility: HOSPITAL | Age: 68
End: 2023-03-10

## 2023-03-10 VITALS
HEART RATE: 77 BPM | SYSTOLIC BLOOD PRESSURE: 160 MMHG | RESPIRATION RATE: 16 BRPM | OXYGEN SATURATION: 99 % | DIASTOLIC BLOOD PRESSURE: 84 MMHG | TEMPERATURE: 96.8 F

## 2023-03-10 RX ORDER — SODIUM CHLORIDE 9 MG/ML
250 INJECTION, SOLUTION INTRAVENOUS ONCE
Status: COMPLETED | OUTPATIENT
Start: 2023-03-13 | End: 2023-03-13

## 2023-03-10 RX ADMIN — SODIUM CHLORIDE 1500 ML: 0.9 INJECTION, SOLUTION INTRAVENOUS at 08:09

## 2023-03-10 NOTE — TELEPHONE ENCOUNTER
Caller: Tawnya Lord     Doctor:Dr Dupont     Reason for call: Patient calling stating unable to make procedure today due to being sick  Patient would like a call back to reschedule       Call back#: [phone]

## 2023-03-13 ENCOUNTER — HOSPITAL ENCOUNTER (OUTPATIENT)
Dept: INFUSION CENTER | Facility: HOSPITAL | Age: 68
Discharge: HOME | End: 2023-03-13
Attending: INTERNAL MEDICINE

## 2023-03-13 VITALS
HEART RATE: 78 BPM | OXYGEN SATURATION: 98 % | TEMPERATURE: 97.8 F | RESPIRATION RATE: 18 BRPM | SYSTOLIC BLOOD PRESSURE: 133 MMHG | DIASTOLIC BLOOD PRESSURE: 83 MMHG

## 2023-03-13 RX ADMIN — SODIUM CHLORIDE 250 ML/HR: 0.9 INJECTION, SOLUTION INTRAVENOUS at 09:05

## 2023-03-13 NOTE — PROGRESS NOTES
Patient arrived to unit without complaint  Patient tolerated IV hydration without incident  AVS declined and patient aware of next appointment  Patient left in stable condition

## 2023-03-14 RX ORDER — SODIUM CHLORIDE 9 MG/ML
250 INJECTION, SOLUTION INTRAVENOUS ONCE
Status: COMPLETED | OUTPATIENT
Start: 2023-03-16 | End: 2023-03-16

## 2023-03-16 ENCOUNTER — HOSPITAL ENCOUNTER (OUTPATIENT)
Dept: INFUSION CENTER | Facility: HOSPITAL | Age: 68
End: 2023-03-16
Attending: INTERNAL MEDICINE

## 2023-03-16 VITALS
OXYGEN SATURATION: 97 % | TEMPERATURE: 97.4 F | SYSTOLIC BLOOD PRESSURE: 126 MMHG | HEART RATE: 76 BPM | DIASTOLIC BLOOD PRESSURE: 76 MMHG | RESPIRATION RATE: 18 BRPM

## 2023-03-16 RX ADMIN — SODIUM CHLORIDE 250 ML/HR: 0.9 INJECTION, SOLUTION INTRAVENOUS at 09:35

## 2023-03-17 RX ORDER — SODIUM CHLORIDE 9 MG/ML
250 INJECTION, SOLUTION INTRAVENOUS ONCE
Status: COMPLETED | OUTPATIENT
Start: 2023-03-21 | End: 2023-03-21

## 2023-03-20 ENCOUNTER — HOSPITAL ENCOUNTER (OUTPATIENT)
Dept: INFUSION CENTER | Facility: HOSPITAL | Age: 68
Discharge: HOME | End: 2023-03-20
Attending: INTERNAL MEDICINE

## 2023-03-20 VITALS
HEART RATE: 78 BPM | RESPIRATION RATE: 16 BRPM | SYSTOLIC BLOOD PRESSURE: 133 MMHG | OXYGEN SATURATION: 100 % | TEMPERATURE: 97.2 F | DIASTOLIC BLOOD PRESSURE: 77 MMHG

## 2023-03-20 RX ADMIN — SODIUM CHLORIDE 1500 ML: 0.9 INJECTION, SOLUTION INTRAVENOUS at 09:23

## 2023-03-20 NOTE — PROGRESS NOTES
Patient tolerated IV hydration without issues  AVS declined  Patient is aware of appointment time tomorrow  Patient ambulated off unit without incident  All personal belongings taken with patient

## 2023-03-20 NOTE — PLAN OF CARE
Problem: Knowledge Deficit  Goal: Patient/family/caregiver demonstrates understanding of disease process, treatment plan, medications, and discharge instructions  Description: Complete learning assessment and assess knowledge base    Interventions:  - Provide teaching at level of understanding  - Provide teaching via preferred learning methods  Outcome: Progressing     Problem: Potential for Falls  Goal: Patient will remain free of falls  Description: INTERVENTIONS:  - Educate patient/family on patient safety including physical limitations  - Instruct patient to call for assistance with activity   - Consult OT/PT to assist with strengthening/mobility   - Keep Call bell within reach  - Keep bed low and locked with side rails adjusted as appropriate  - Keep care items and personal belongings within reach  - Initiate and maintain comfort rounds  - Make Fall Risk Sign visible to staff  -- Apply yellow socks and bracelet for high fall risk patients  - Consider moving patient to room near nurses station  Outcome: Progressing

## 2023-03-21 ENCOUNTER — HOSPITAL ENCOUNTER (OUTPATIENT)
Dept: INFUSION CENTER | Facility: HOSPITAL | Age: 68
Discharge: HOME | End: 2023-03-21
Attending: INTERNAL MEDICINE

## 2023-03-21 VITALS
DIASTOLIC BLOOD PRESSURE: 82 MMHG | TEMPERATURE: 97.5 F | HEART RATE: 68 BPM | OXYGEN SATURATION: 99 % | RESPIRATION RATE: 18 BRPM | SYSTOLIC BLOOD PRESSURE: 138 MMHG

## 2023-03-21 RX ORDER — SODIUM CHLORIDE 9 MG/ML
250 INJECTION, SOLUTION INTRAVENOUS ONCE
Status: COMPLETED | OUTPATIENT
Start: 2023-03-23 | End: 2023-03-23

## 2023-03-21 RX ADMIN — SODIUM CHLORIDE 250 ML/HR: 0.9 INJECTION, SOLUTION INTRAVENOUS at 11:05

## 2023-03-22 RX ORDER — SODIUM CHLORIDE 9 MG/ML
250 INJECTION, SOLUTION INTRAVENOUS ONCE
Status: COMPLETED | OUTPATIENT
Start: 2023-03-24 | End: 2023-03-24

## 2023-03-23 ENCOUNTER — HOSPITAL ENCOUNTER (OUTPATIENT)
Dept: INFUSION CENTER | Facility: HOSPITAL | Age: 68
End: 2023-03-23
Attending: INTERNAL MEDICINE

## 2023-03-23 VITALS
OXYGEN SATURATION: 99 % | SYSTOLIC BLOOD PRESSURE: 127 MMHG | RESPIRATION RATE: 18 BRPM | HEART RATE: 61 BPM | DIASTOLIC BLOOD PRESSURE: 79 MMHG | TEMPERATURE: 96.9 F

## 2023-03-23 RX ADMIN — SODIUM CHLORIDE 250 ML/HR: 0.9 INJECTION, SOLUTION INTRAVENOUS at 09:30

## 2023-03-23 NOTE — PROGRESS NOTES
Patient arrived to unit without complaint  Patient's Adams catheter dressing changed  Patient tolerated IV Hydration without incident  AVS declined and patient aware of appointment tomorrow  Patient left in stable condition

## 2023-03-24 ENCOUNTER — OFFICE VISIT (OUTPATIENT)
Dept: PAIN MEDICINE | Facility: CLINIC | Age: 68
End: 2023-03-24

## 2023-03-24 ENCOUNTER — HOSPITAL ENCOUNTER (OUTPATIENT)
Dept: INFUSION CENTER | Facility: HOSPITAL | Age: 68
End: 2023-03-24
Attending: INTERNAL MEDICINE

## 2023-03-24 VITALS
DIASTOLIC BLOOD PRESSURE: 81 MMHG | HEIGHT: 61 IN | HEART RATE: 61 BPM | SYSTOLIC BLOOD PRESSURE: 148 MMHG | RESPIRATION RATE: 18 BRPM | BODY MASS INDEX: 26.04 KG/M2 | OXYGEN SATURATION: 99 % | TEMPERATURE: 96.9 F

## 2023-03-24 VITALS
WEIGHT: 137.8 LBS | HEART RATE: 76 BPM | DIASTOLIC BLOOD PRESSURE: 79 MMHG | SYSTOLIC BLOOD PRESSURE: 133 MMHG | BODY MASS INDEX: 26.01 KG/M2 | HEIGHT: 61 IN

## 2023-03-24 DIAGNOSIS — G03.9 ARACHNOIDITIS: ICD-10-CM

## 2023-03-24 DIAGNOSIS — M79.18 MYOFASCIAL PAIN SYNDROME: ICD-10-CM

## 2023-03-24 DIAGNOSIS — Z79.891 LONG-TERM CURRENT USE OF OPIATE ANALGESIC: ICD-10-CM

## 2023-03-24 DIAGNOSIS — Z98.890 HISTORY OF LUMBAR SURGERY: ICD-10-CM

## 2023-03-24 DIAGNOSIS — M54.2 NECK PAIN: ICD-10-CM

## 2023-03-24 DIAGNOSIS — G89.29 CHRONIC BILATERAL LOW BACK PAIN WITH BILATERAL SCIATICA: ICD-10-CM

## 2023-03-24 DIAGNOSIS — M54.12 CERVICAL RADICULOPATHY: ICD-10-CM

## 2023-03-24 DIAGNOSIS — M54.16 LUMBAR RADICULOPATHY: ICD-10-CM

## 2023-03-24 DIAGNOSIS — G89.4 CHRONIC PAIN SYNDROME: ICD-10-CM

## 2023-03-24 DIAGNOSIS — F11.20 UNCOMPLICATED OPIOID DEPENDENCE (HCC): ICD-10-CM

## 2023-03-24 DIAGNOSIS — M54.42 CHRONIC BILATERAL LOW BACK PAIN WITH BILATERAL SCIATICA: ICD-10-CM

## 2023-03-24 DIAGNOSIS — M47.812 CERVICAL SPONDYLOSIS: Primary | ICD-10-CM

## 2023-03-24 DIAGNOSIS — M54.41 CHRONIC BILATERAL LOW BACK PAIN WITH BILATERAL SCIATICA: ICD-10-CM

## 2023-03-24 RX ORDER — CYCLOBENZAPRINE HCL 5 MG
5 TABLET ORAL 2 TIMES DAILY PRN
Qty: 60 TABLET | Refills: 2 | Status: SHIPPED | OUTPATIENT
Start: 2023-03-24

## 2023-03-24 RX ORDER — HYDROCODONE BITARTRATE AND ACETAMINOPHEN 5; 325 MG/1; MG/1
1 TABLET ORAL 2 TIMES DAILY PRN
Qty: 60 TABLET | Refills: 0 | Status: SHIPPED | OUTPATIENT
Start: 2023-03-24

## 2023-03-24 RX ADMIN — SODIUM CHLORIDE 250 ML/HR: 0.9 INJECTION, SOLUTION INTRAVENOUS at 09:35

## 2023-03-24 NOTE — TELEPHONE ENCOUNTER
Caller: patient     Doctor: Tonya Howe, [facility]    Reason for call: pt is requesting to speak to medical assistant regarding her medication prior authorization listed in previous task messages    Call back#: [phone]

## 2023-03-24 NOTE — PROGRESS NOTES
Assessment:  1  Cervical spondylosis    2  Chronic pain syndrome    3  Neck pain    4  Cervical radiculopathy    5  Chronic bilateral low back pain with bilateral sciatica    6  Lumbar radiculopathy    7  History of lumbar surgery    8  Myofascial pain syndrome    9  Arachnoiditis    10  Long-term current use of opiate analgesic    11  Uncomplicated opioid dependence ([address] )        Plan:  While the patient was in the office today, I did have a thorough conversation regarding their chronic pain syndrome, medication management, and treatment plan options  Patient is being seen for follow-up visit  She is scheduled for left-sided C2-3 and C3-4 radiofrequency ablations on 4/4/2023  Continue hydrocodone 5/325 twice daily if needed for pain  The patient's opioid scripts were sent to their pharmacy electronically and was given a 2 month supply of prescriptions with a Do Not Fill date(s) of 3/29/2023, 4/26/2023  Continue Flexeril 5 mg twice daily if needed for spasms  A prescription was sent to her pharmacy  [facility] Prescription Drug Monitoring Program report was reviewed and was appropriate     A urine drug screen was collected at today's office visit as part of our medication management protocol  The point of care testing results were appropriate for what was being prescribed  The specimen will be sent for confirmatory testing  The drug screen is medically necessary because the patient is either dependent on opioid medication or is being considered for opioid medication therapy and the results could impact ongoing or future treatment  The drug screen is to evaluate for the presences or absence of prescribed, non-prescribed, and/or illicit drugs/substances  There are risks associated with opioid medications, including dependence, addiction and tolerance  The patient understands and agrees to use these medications only as prescribed   Potential side effects of the medications include, but are not limited to, constipation, drowsiness, addiction, impaired judgment and risk of fatal overdose if not taken as prescribed  The patient was warned against driving while taking sedation medications  Sharing medications is a felony  At this point in time, the patient is showing no signs of addiction, abuse, diversion or suicidal ideation  The patient will follow-up in 8 weeks for medication prescription refill and reevaluation  The patient was advised to contact the office should their symptoms worsen in the interim  The patient was agreeable and verbalized an understanding  History of Present Illness: The patient is a 75 y o  female last seen on 1/20/2023 who presents for a follow up office visit in regards to chronic pain secondary to chronic pain syndrome, neck pain, cervical spondylosis, chronic low back pain, lumbar radiculopathy, myofascial pain syndrome, history of lumbar surgery  The patient currently reports complaints of neck pain, upper extremity pain, mid back pain, low back pain, lower extremity pain  Current pain level is an 8/10  Quality pain is described as burning, dull, aching, sharp, throbbing, cramping, pressure-like, shooting, numb, pins-and-needles  Current pain medications includes: Hydrocodone 5/325 twice daily if needed for pain, Flexeril 5 mg twice daily if needed for spasms  The patient reports that this regimen is providing 20-30% pain relief  The patient is reporting no side effects from this pain medication regimen  Pain Contract Signed: 4/25/22  Last Urine Drug Screen: 3/24/23    I have personally reviewed and/or updated the patient's past medical history, past surgical history, family history, social history, current medications, allergies, and vital signs today  Review of Systems:    Review of Systems   Constitutional: Negative for unexpected weight change  HENT: Negative for hearing loss  Eyes: Negative for visual disturbance     Respiratory: Positive for shortness of breath  Cardiovascular: Negative for leg swelling  Gastrointestinal: Positive for diarrhea and nausea  Negative for constipation  Endocrine: Negative for polyuria  Genitourinary: Negative for difficulty urinating  Musculoskeletal: Positive for gait problem  Negative for joint swelling and myalgias  Decreased range of motion  Joint stiffness  Swelling - right ankle   Skin: Negative for rash  Neurological: Positive for dizziness  Negative for weakness and headaches  Psychiatric/Behavioral: Negative for decreased concentration  All other systems reviewed and are negative          Past Medical History:   Diagnosis Date   • Allergic rhinitis    • Anxiety    • Asthma    • Back pain    • Cardiac disease    • Cardiopathy     EF 45%   • Cough    • Diverticulitis    • Factor V Leiden (HCC)    • Fibromyalgia    • GERD (gastroesophageal reflux disease)    • Hashimoto's thyroiditis    • Hx of degenerative disc disease    • Hypotension     pots - postural orthostatic hypotension   • Interstitial cystitis    • Irregular heart beat     LBBB   • Irritable bowel syndrome    • Migraines    • Mitral valve disease     "thickening"   • Myocardial infarction [facility])     possible but not sure when   • Neuropathy     bilateral legs   • Osteoporosis    • Postural orthostatic tachycardia syndrome     must drink a lot of water and salt   • Pott's disease    • Rheumatic fever    • Scoliosis    • Sepsis ([address] )     associated with PICC line   • Sjogren's syndrome ([facility] [address] )    • TMJ (dislocation of temporomandibular joint)        Past Surgical History:   Procedure Laterality Date   • ABLATION MICROWAVE Left     lumbar area   • BACK SURGERY  10/1998   •  SECTION     • CHOLECYSTECTOMY  2016   • COLONOSCOPY     • DILATION AND CURETTAGE OF UTERUS     • EGD     • FOOT SURGERY  1968    removal of bone and neuroma   • HYSTERECTOMY      age 54  PRICE ooph   • IR OTHER  2022   • IR OTHER  11/22/2022   • IR PICC PLACEMENT SINGLE LUMEN  10/02/2020   • IR PICC PLACEMENT SINGLE LUMEN  08/12/2021   • IR PICC REPOSITION  12/07/2021   • IR TUNNELED CENTRAL LINE PLACEMENT  06/20/2022   • LAPAROSCOPY  1996    endometriosis   • MOUTH BIOPSY      lip   • NC EGD TRANSORAL BIOPSY SINGLE/MULTIPLE N/A 04/24/2019    Procedure: ESOPHAGOGASTRODUODENOSCOPY (EGD) with multiple bx and dilation;  Surgeon: Hilary Roman MD;  Location: [facility] GI LAB; Service: Gastroenterology   • NC SURGICAL ARTHROSCOPY SHOULDER W/ROTATOR CUFF RPR Right 04/06/2022    Procedure: SHOULDER ARTHROSCOPIC ROTATOR CUFF REPAIR Biceps Tenodisis;   Surgeon: Claudio Jacobo MD;  Location: [facility] MAIN OR;  Service: Orthopedics   • TUNNELED VENOUS CATHETER PLACEMENT  2016       Family History   Problem Relation Age of Onset   • Cancer Mother         urinary bladder    • Thyroid cancer Sister    • Colon cancer Maternal Grandfather    • Breast cancer Maternal Aunt         over 48 yrs old    • Endometrial cancer Maternal Aunt    • Multiple myeloma Maternal Aunt    • No Known Problems Father    • No Known Problems Daughter    • Heart attack Sister    • No Known Problems Sister    • No Known Problems Sister    • No Known Problems Sister    • No Known Problems Sister    • No Known Problems Daughter    • Hypertension Paternal Aunt        Social History     Occupational History   • Not on file   Tobacco Use   • Smoking status: Never   • Smokeless tobacco: Never   Vaping Use   • Vaping Use: Never used   Substance and Sexual Activity   • Alcohol use: Never   • Drug use: No   • Sexual activity: Not on file         Current Outpatient Medications:   •  Alum Hydroxide-Mag Trisilicate (Gaviscon) 02-09 5 MG CHEW, Chew 1 tablet 4 (four) times a day as needed With meals and as needed, Disp: , Rfl:   •  azelastine (ASTELIN) 0 1 % nasal spray, 2 sprays into each nostril 2 (two) times a day Use in each nostril as directed, Disp: , Rfl:   •  beclomethasone (QVAR) 40 MCG/ACT inhaler, Inhale 1 puff daily as needed (only when unable to nebulize pulmicort) Rinse mouth after use , Disp: , Rfl:   •  budesonide (PULMICORT) 0 5 mg/2 mL nebulizer solution, Take 0 5 mg by nebulization 2 (two) times a day Rinse mouth after use , Disp: , Rfl:   •  cholecalciferol (VITAMIN D3) 1,000 units tablet, Take 4,000 Units by mouth daily, Disp: , Rfl:   •  cyclobenzaprine (FLEXERIL) 5 mg tablet, Take 1 tablet (5 mg total) by mouth 2 (two) times a day as needed for muscle spasms, Disp: 60 tablet, Rfl: 2  •  docusate sodium (COLACE) 100 mg capsule, Take 100 mg by mouth daily as needed for constipation, Disp: , Rfl:   •  erythromycin (ILOTYCIN) ophthalmic ointment, Apply 1 application to eye daily at bedtime, Disp: , Rfl:   •  famotidine (PEPCID) 40 MG tablet, Twice a day, Disp: , Rfl:   •  fexofenadine (ALLEGRA) 180 MG tablet, Take 180 mg by mouth 2 (two) times a day , Disp: , Rfl:   •  fluticasone (FLONASE) 50 mcg/act nasal spray, 2 sprays into each nostril daily , Disp: , Rfl:   •  Galcanezumab-gnlm (Emgality) 120 MG/ML SOAJ, Inject under the skin every 30 (thirty) days , Disp: , Rfl:   •  Gemtesa 75 MG TABS, Take 1 tablet by mouth daily, Disp: , Rfl:   •  HYDROcodone-acetaminophen (NORCO) 5-325 mg per tablet, Take 1 tablet by mouth 2 (two) times a day as needed for pain Do not fill until 4/26/2023 Max Daily Amount: 2 tablets, Disp: 60 tablet, Rfl: 0  •  HYDROcodone-acetaminophen (Norco) 5-325 mg per tablet, Take 1 tablet by mouth 2 (two) times a day as needed for pain for up to 30 doses Do nor fill until 3/29/2023 Max Daily Amount: 2 tablets, Disp: 60 tablet, Rfl: 0  •  hydrOXYzine (ATARAX) 10 mg/5 mL syrup, Take 20 mg by mouth daily at bedtime , Disp: , Rfl:   •  ipratropium (ATROVENT) 0 06 % nasal spray, PLEASE SEE ATTACHED FOR DETAILED DIRECTIONS, Disp: , Rfl:   •  ivabradine HCl (Corlanor) 5 MG tablet, 7 5 mg 2 (two) times a day, Disp: , Rfl:   •  levalbuterol (XOPENEX HFA) 45 mcg/act inhaler, Inhale 2 puffs every 4 (four) hours as needed (when unable to nebulize), Disp: , Rfl:   •  levalbuterol (XOPENEX) 1 25 mg/3 mL nebulizer solution, Take 1 25 mg by nebulization every 6 (six) hours as needed , Disp: , Rfl: 2  •  Magnesium 400 MG CAPS, Take 1 capsule by mouth 2 (two) times a day , Disp: , Rfl:   •  MULTIPLE VITAMINS-CALCIUM PO, Take 1 capsule by mouth every morning , Disp: , Rfl:   •  omega-3-acid ethyl esters (LOVAZA) 1 g capsule, Take 2 g by mouth 2 (two) times a day 1600mg daily, Disp: , Rfl:   •  polyethylene glycol (MIRALAX) 17 g packet, Take 17 g by mouth daily as needed , Disp: , Rfl:   •  Probiotic Product (PROBIOTIC DAILY PO), Take 1 tablet by mouth daily At lunch, Disp: , Rfl:   •  psyllium (METAMUCIL) 0 52 g capsule, Take 0 52 g by mouth daily, Disp: , Rfl:   •  Qvar RediHaler 40 MCG/ACT inhaler, PLEASE SEE ATTACHED FOR DETAILED DIRECTIONS, Disp: , Rfl:   •  sodium chloride, Inject 1,500 mL into a catheter in a vein, Disp: , Rfl:   •  Thyroid, Porcine, POWD, Use 32 mg daily, Disp: , Rfl:   •  Ubrogepant (UBRELVY) 100 MG tablet, Take 100 mg by mouth Take 1 tablet (100 mg) one time as needed for migraine  May repeat one additional tablet (100 mg) at least two hours after the first dose  Do not use more than two doses per day, or for more than eight days per month , Disp: , Rfl:   •  verapamil (CALAN) 40 mg tablet, , Disp: , Rfl:   •  doxycycline hyclate (VIBRA-TABS) 100 mg tablet, , Disp: , Rfl:   •  methylPREDNISolone (MEDROL) 8 MG tablet, Take 8 mg by mouth 4 (four) times a day (Patient not taking: Reported on 2/13/2023), Disp: , Rfl:   •  Multiple Vitamins-Iron TABS, Take by mouth, Disp: , Rfl:   •  nystatin (MYCOSTATIN) 500,000 units/5 mL suspension, SWISH AND SWALLOW 5 ML 4 TIMES A DAY, Disp: , Rfl:   No current facility-administered medications for this visit      Facility-Administered Medications Ordered in Other Visits:   •  sodium chloride 0 9 % infusion, 250 mL/hr, Intravenous, Once, Izell Route, MD    Allergies   Allergen Reactions   • Imipramine Confusion, Fatigue, Irritability, Palpitations, Shortness Of Breath, Tachycardia and Visual Disturbance   • Melatonin Shortness Of Breath   • Mirtazapine Anxiety, Dizziness, GI Intolerance, Nausea Only, Palpitations and Shortness Of Breath   • Nexium [Esomeprazole] Shortness Of Breath   • Nsaids Shortness Of Breath   • Singulair [Montelukast] Shortness Of Breath and Cough   • Zomig [Zolmitriptan] Shortness Of Breath   • Dexilant [Dexlansoprazole] Nausea Only and Vomiting   • Albuterol    • Ambien [Zolpidem]    • Amitriptyline Drowsiness   • Aspirin    • Bactrim [Sulfamethoxazole-Trimethoprim] Hives   • Banana - Food Allergy Dermatitis   • Ceftin [Cefuroxime]    • Celebrex [Celecoxib]    • Ciprofloxacin    • Cranberry-C [Ascorbate - Food Allergy] GI Intolerance   • Demerol [Meperidine]    • Epinephrine Dizziness   • Ergotamine Nausea Only and Headache   • Keflex [Cephalexin]    • Klonopin [Clonazepam] Nausea Only and Dizziness   • Latex Hives   • Levaquin [Levofloxacin]    • Lexapro [Escitalopram]    • Lyrica [Pregabalin] Fatigue   • Macrodantin [Nitrofurantoin]    • Morphine Nausea Only   • Movantik [Naloxegol] Nausea Only   • Mushroom Extract Complex - Food Allergy      Eye itchy, asthma  attack   • Naprosyn [Naproxen]    • Neurontin [Gabapentin] Dizziness   • Pineapple - Food Allergy GI Intolerance   • Plecanatide Abdominal Pain, Diarrhea and GI Intolerance   • Prolia [Denosumab]    • Prozac [Fluoxetine]    • Serevent [Salmeterol] Dizziness   • Sudafed [Pseudoephedrine] Hives   • Sulfa Antibiotics    • Tomato - Food Allergy GI Intolerance   • Trazodone    • Ultram [Tramadol] Nausea Only, Dizziness and Headache   • Vilazodone Dizziness, GI Intolerance and Headache   • Vilazodone Hcl Abdominal Pain, Dizziness, GI Intolerance, Headache and Other (See Comments)   • Vioxx [Rofecoxib]    • Vortioxetine Drowsiness, Fatigue, GI Intolerance and Irritability   • Zithromax [Azithromycin] Hives   • Zoloft [Sertraline]    • Zantac [Ranitidine] Rash and Dizziness       Physical Exam:    /79   Pulse 76   Ht 5' 1" (1 549 m)   Wt 62 5 kg (137 lb 12 8 oz)   BMI 26 04 kg/m²     Constitutional:normal, well developed, well nourished, alert, in no distress and non-toxic and no overt pain behavior  Eyes:anicteric  HEENT:grossly intact  Neck:supple, symmetric, trachea midline and no masses   Pulmonary:even and unlabored  Cardiovascular:No edema or pitting edema present  Skin:Normal without rashes or lesions and well hydrated  Psychiatric:Mood and affect appropriate  Neurologic:Cranial Nerves II-XII grossly intact  Musculoskeletal:normal      Imaging  No orders to display         No orders of the defined types were placed in this encounter

## 2023-03-27 ENCOUNTER — HOSPITAL ENCOUNTER (OUTPATIENT)
Dept: INFUSION CENTER | Facility: HOSPITAL | Age: 68
Discharge: HOME | End: 2023-03-27
Attending: INTERNAL MEDICINE

## 2023-03-27 VITALS
RESPIRATION RATE: 18 BRPM | HEART RATE: 68 BPM | DIASTOLIC BLOOD PRESSURE: 75 MMHG | TEMPERATURE: 96.8 F | SYSTOLIC BLOOD PRESSURE: 130 MMHG

## 2023-03-27 RX ADMIN — SODIUM CHLORIDE 1500 ML: 0.9 INJECTION, SOLUTION INTRAVENOUS at 09:00

## 2023-03-28 ENCOUNTER — HOSPITAL ENCOUNTER (OUTPATIENT)
Dept: INFUSION CENTER | Facility: HOSPITAL | Age: 68
Discharge: HOME | End: 2023-03-28
Attending: INTERNAL MEDICINE

## 2023-03-28 VITALS
HEART RATE: 72 BPM | OXYGEN SATURATION: 100 % | TEMPERATURE: 97.4 F | SYSTOLIC BLOOD PRESSURE: 138 MMHG | RESPIRATION RATE: 18 BRPM | DIASTOLIC BLOOD PRESSURE: 74 MMHG

## 2023-03-28 RX ADMIN — SODIUM CHLORIDE 1500 ML: 0.9 INJECTION, SOLUTION INTRAVENOUS at 09:14

## 2023-03-28 NOTE — TELEPHONE ENCOUNTER
Called pt again, lm   Contacted [facility] they said additional information needs to be sent for Cyclobenzaprine  Sent multiple office visits  I will contact them tomorrow for an update

## 2023-03-29 RX ORDER — SODIUM CHLORIDE 9 MG/ML
250 INJECTION, SOLUTION INTRAVENOUS ONCE
Status: COMPLETED | OUTPATIENT
Start: 2023-03-31 | End: 2023-03-31

## 2023-03-30 ENCOUNTER — HOSPITAL ENCOUNTER (OUTPATIENT)
Dept: INFUSION CENTER | Facility: HOSPITAL | Age: 68
End: 2023-03-30
Attending: INTERNAL MEDICINE

## 2023-03-30 VITALS
SYSTOLIC BLOOD PRESSURE: 127 MMHG | OXYGEN SATURATION: 99 % | HEART RATE: 66 BPM | RESPIRATION RATE: 16 BRPM | TEMPERATURE: 97 F | DIASTOLIC BLOOD PRESSURE: 80 MMHG

## 2023-03-30 RX ADMIN — SODIUM CHLORIDE 1500 ML: 0.9 INJECTION, SOLUTION INTRAVENOUS at 10:00

## 2023-03-31 ENCOUNTER — HOSPITAL ENCOUNTER (OUTPATIENT)
Dept: INFUSION CENTER | Facility: HOSPITAL | Age: 68
End: 2023-03-31
Attending: INTERNAL MEDICINE

## 2023-03-31 VITALS
RESPIRATION RATE: 18 BRPM | TEMPERATURE: 97.6 F | DIASTOLIC BLOOD PRESSURE: 82 MMHG | HEART RATE: 75 BPM | SYSTOLIC BLOOD PRESSURE: 138 MMHG

## 2023-03-31 RX ORDER — SODIUM CHLORIDE 9 MG/ML
250 INJECTION, SOLUTION INTRAVENOUS ONCE
Status: COMPLETED | OUTPATIENT
Start: 2023-04-03 | End: 2023-04-03

## 2023-03-31 RX ADMIN — SODIUM CHLORIDE 250 ML/HR: 0.9 INJECTION, SOLUTION INTRAVENOUS at 08:56

## 2023-03-31 NOTE — PROGRESS NOTES
Late entry for 3/30/23 note showing up as incomplete and unable to sign  Patient tolerated IV hydration without issue  AVS declined  Patient stable at discharge

## 2023-03-31 NOTE — TELEPHONE ENCOUNTER
I spoke with [facility] was denied again   They said there is not enough documentation in the office visits stating why the patient needs the requested medication

## 2023-04-03 ENCOUNTER — HOSPITAL ENCOUNTER (OUTPATIENT)
Dept: INFUSION CENTER | Facility: HOSPITAL | Age: 68
Discharge: HOME | End: 2023-04-03
Attending: INTERNAL MEDICINE

## 2023-04-03 ENCOUNTER — HOSPITAL ENCOUNTER (OUTPATIENT)
Dept: RADIOLOGY | Facility: HOSPITAL | Age: 68
Discharge: HOME | End: 2023-04-03

## 2023-04-03 VITALS
HEART RATE: 68 BPM | RESPIRATION RATE: 18 BRPM | TEMPERATURE: 97.3 F | SYSTOLIC BLOOD PRESSURE: 148 MMHG | OXYGEN SATURATION: 99 % | DIASTOLIC BLOOD PRESSURE: 73 MMHG

## 2023-04-03 DIAGNOSIS — R05.9 COUGH, UNSPECIFIED TYPE: ICD-10-CM

## 2023-04-03 RX ADMIN — SODIUM CHLORIDE 250 ML/HR: 0.9 INJECTION, SOLUTION INTRAVENOUS at 09:37

## 2023-04-04 ENCOUNTER — HOSPITAL ENCOUNTER (OUTPATIENT)
Dept: INFUSION CENTER | Facility: HOSPITAL | Age: 68
Discharge: HOME | End: 2023-04-04
Attending: INTERNAL MEDICINE

## 2023-04-04 ENCOUNTER — HOSPITAL ENCOUNTER (OUTPATIENT)
Dept: RADIOLOGY | Facility: HOSPITAL | Age: 68
End: 2023-04-04

## 2023-04-04 VITALS
SYSTOLIC BLOOD PRESSURE: 123 MMHG | TEMPERATURE: 97.2 F | HEART RATE: 85 BPM | OXYGEN SATURATION: 98 % | DIASTOLIC BLOOD PRESSURE: 83 MMHG | RESPIRATION RATE: 16 BRPM

## 2023-04-04 LAB
ALBUMIN SERPL BCP-MCNC: 5.1 G/DL (ref 3.5–5)
ALP SERPL-CCNC: 96 U/L (ref 34–104)
ALT SERPL W P-5'-P-CCNC: 19 U/L (ref 7–52)
ANION GAP SERPL CALCULATED.3IONS-SCNC: 10 MMOL/L (ref 4–13)
AST SERPL W P-5'-P-CCNC: 19 U/L (ref 13–39)
BASOPHILS # BLD AUTO: 0.03 THOUSANDS/ÂΜL (ref 0–0.1)
BASOPHILS NFR BLD AUTO: 0 % (ref 0–1)
BILIRUB SERPL-MCNC: 0.63 MG/DL (ref 0.2–1)
BUN SERPL-MCNC: 19 MG/DL (ref 5–25)
CALCIUM SERPL-MCNC: 10.1 MG/DL (ref 8.4–10.2)
CHLORIDE SERPL-SCNC: 104 MMOL/L (ref 96–108)
CO2 SERPL-SCNC: 23 MMOL/L (ref 21–32)
CREAT SERPL-MCNC: 1.01 MG/DL (ref 0.6–1.3)
EOSINOPHIL # BLD AUTO: 0 THOUSAND/ÂΜL (ref 0–0.61)
EOSINOPHIL NFR BLD AUTO: 0 % (ref 0–6)
ERYTHROCYTE [DISTWIDTH] IN BLOOD BY AUTOMATED COUNT: 13.3 % (ref 11.6–15.1)
GFR SERPL CREATININE-BSD FRML MDRD: 57 ML/MIN/1.73SQ M
GLUCOSE P FAST SERPL-MCNC: 123 MG/DL (ref 65–99)
GLUCOSE SERPL-MCNC: 123 MG/DL (ref 65–140)
HCT VFR BLD AUTO: 47.7 % (ref 34.8–46.1)
HGB BLD-MCNC: 15.8 G/DL (ref 11.5–15.4)
IMM GRANULOCYTES # BLD AUTO: 0.04 THOUSAND/UL (ref 0–0.2)
IMM GRANULOCYTES NFR BLD AUTO: 0 % (ref 0–2)
LYMPHOCYTES # BLD AUTO: 0.9 THOUSANDS/ÂΜL (ref 0.6–4.47)
LYMPHOCYTES NFR BLD AUTO: 10 % (ref 14–44)
MCH RBC QN AUTO: 30.5 PG (ref 26.8–34.3)
MCHC RBC AUTO-ENTMCNC: 33.1 G/DL (ref 31.4–37.4)
MCV RBC AUTO: 92 FL (ref 82–98)
MONOCYTES # BLD AUTO: 0.31 THOUSAND/ÂΜL (ref 0.17–1.22)
MONOCYTES NFR BLD AUTO: 3 % (ref 4–12)
NEUTROPHILS # BLD AUTO: 7.72 THOUSANDS/ÂΜL (ref 1.85–7.62)
NEUTS SEG NFR BLD AUTO: 87 % (ref 43–75)
NRBC BLD AUTO-RTO: 0 /100 WBCS
PLATELET # BLD AUTO: 314 THOUSANDS/UL (ref 149–390)
PMV BLD AUTO: 10 FL (ref 8.9–12.7)
POTASSIUM SERPL-SCNC: 4.6 MMOL/L (ref 3.5–5.3)
PROT SERPL-MCNC: 7.9 G/DL (ref 6.4–8.4)
RBC # BLD AUTO: 5.18 MILLION/UL (ref 3.81–5.12)
SODIUM SERPL-SCNC: 137 MMOL/L (ref 135–147)
T4 FREE SERPL-MCNC: 0.92 NG/DL (ref 0.76–1.46)
TSH SERPL DL<=0.05 MIU/L-ACNC: 0.38 UIU/ML (ref 0.45–4.5)
WBC # BLD AUTO: 9 THOUSAND/UL (ref 4.31–10.16)

## 2023-04-04 RX ADMIN — SODIUM CHLORIDE 1500 ML: 0.9 INJECTION, SOLUTION INTRAVENOUS at 09:11

## 2023-04-05 RX ORDER — SODIUM CHLORIDE 9 MG/ML
250 INJECTION, SOLUTION INTRAVENOUS ONCE
Status: COMPLETED | OUTPATIENT
Start: 2023-04-06 | End: 2023-04-06

## 2023-04-05 NOTE — TELEPHONE ENCOUNTER
"This is ridiculous on their end  First, she has diagnosis of myofascial pain syndrome which is documented in her note /chart  Second, the prescription for the muscle relaxer states, \" as needed for muscle spasms  \"  I am not sure how much I could say    "

## 2023-04-05 NOTE — TELEPHONE ENCOUNTER
I spoke with [facility]   Its for all muscle relaxer's, the rep said it needs to be stated in the office note why the patient is to take the specific medication

## 2023-04-06 ENCOUNTER — HOSPITAL ENCOUNTER (OUTPATIENT)
Dept: INFUSION CENTER | Facility: HOSPITAL | Age: 68
End: 2023-04-06

## 2023-04-06 ENCOUNTER — HOSPITAL ENCOUNTER (OUTPATIENT)
Dept: MAMMOGRAPHY | Facility: HOSPITAL | Age: 68
End: 2023-04-06
Attending: INTERNAL MEDICINE

## 2023-04-06 VITALS
HEART RATE: 66 BPM | OXYGEN SATURATION: 95 % | TEMPERATURE: 96.8 F | DIASTOLIC BLOOD PRESSURE: 74 MMHG | SYSTOLIC BLOOD PRESSURE: 163 MMHG | RESPIRATION RATE: 16 BRPM

## 2023-04-06 VITALS — WEIGHT: 129 LBS | BODY MASS INDEX: 24.35 KG/M2 | HEIGHT: 61 IN

## 2023-04-06 DIAGNOSIS — Z12.31 SCREENING MAMMOGRAM FOR HIGH-RISK PATIENT: ICD-10-CM

## 2023-04-06 RX ORDER — SODIUM CHLORIDE 9 MG/ML
250 INJECTION, SOLUTION INTRAVENOUS ONCE
Status: COMPLETED | OUTPATIENT
Start: 2023-04-07 | End: 2023-04-07

## 2023-04-06 RX ADMIN — SODIUM CHLORIDE 250 ML/HR: 0.9 INJECTION, SOLUTION INTRAVENOUS at 09:09

## 2023-04-06 NOTE — PLAN OF CARE
Problem: Potential for Falls  Goal: Patient will remain free of falls  Description: INTERVENTIONS:  - Educate patient/family on patient safety including physical limitations  - Instruct patient to call for assistance with activity   - Consult OT/PT to assist with strengthening/mobility   - Keep Call bell within reach  - Keep bed low and locked with side rails adjusted as appropriate  - Keep care items and personal belongings within reach  - Initiate and maintain comfort rounds  - Make Fall Risk Sign visible to staff  - Apply yellow socks and bracelet for high fall risk patients  - Consider moving patient to room near nurses station  4/6/2023 0916 by Chantel Pierre RN  Outcome: Progressing  4/6/2023 0916 by Chantel Pierre RN  Outcome: Progressing     Problem: Knowledge Deficit  Goal: Patient/family/caregiver demonstrates understanding of disease process, treatment plan, medications, and discharge instructions  Description: Complete learning assessment and assess knowledge base    Interventions:  - Provide teaching at level of understanding  - Provide teaching via preferred learning methods  Outcome: Progressing

## 2023-04-06 NOTE — PROGRESS NOTES
Pt tolerated IV hydration well without incident  Central line dressing changed per protocol without incident  Discharged in stable condition and pt aware of next infusion appointment  AVS declined

## 2023-04-07 ENCOUNTER — HOSPITAL ENCOUNTER (OUTPATIENT)
Dept: INFUSION CENTER | Facility: HOSPITAL | Age: 68
End: 2023-04-07
Attending: INTERNAL MEDICINE

## 2023-04-07 VITALS
OXYGEN SATURATION: 99 % | DIASTOLIC BLOOD PRESSURE: 88 MMHG | SYSTOLIC BLOOD PRESSURE: 144 MMHG | RESPIRATION RATE: 18 BRPM | HEART RATE: 67 BPM | TEMPERATURE: 97 F

## 2023-04-07 RX ADMIN — SODIUM CHLORIDE 250 ML/HR: 0.9 INJECTION, SOLUTION INTRAVENOUS at 09:15

## 2023-04-19 ENCOUNTER — HOSPITAL ENCOUNTER (OUTPATIENT)
Dept: INFUSION CENTER | Facility: HOSPITAL | Age: 68
Discharge: HOME | End: 2023-04-19
Attending: INTERNAL MEDICINE

## 2023-04-19 VITALS
SYSTOLIC BLOOD PRESSURE: 139 MMHG | HEART RATE: 93 BPM | OXYGEN SATURATION: 98 % | TEMPERATURE: 96.6 F | DIASTOLIC BLOOD PRESSURE: 80 MMHG | RESPIRATION RATE: 18 BRPM

## 2023-04-19 RX ORDER — SODIUM CHLORIDE 9 MG/ML
250 INJECTION, SOLUTION INTRAVENOUS ONCE
Status: COMPLETED | OUTPATIENT
Start: 2023-04-21 | End: 2023-04-21

## 2023-04-19 RX ADMIN — SODIUM CHLORIDE 1500 ML: 0.9 INJECTION, SOLUTION INTRAVENOUS at 08:50

## 2023-04-20 ENCOUNTER — HOSPITAL ENCOUNTER (EMERGENCY)
Facility: HOSPITAL | Age: 68
Discharge: HOME | End: 2023-04-20
Attending: FAMILY MEDICINE | Admitting: FAMILY MEDICINE

## 2023-04-20 ENCOUNTER — APPOINTMENT (EMERGENCY)
Dept: RADIOLOGY | Facility: HOSPITAL | Age: 68
End: 2023-04-20

## 2023-04-20 VITALS
HEART RATE: 68 BPM | RESPIRATION RATE: 18 BRPM | SYSTOLIC BLOOD PRESSURE: 150 MMHG | DIASTOLIC BLOOD PRESSURE: 70 MMHG | TEMPERATURE: 97.6 F | OXYGEN SATURATION: 95 %

## 2023-04-20 DIAGNOSIS — R06.02 SOB (SHORTNESS OF BREATH): ICD-10-CM

## 2023-04-20 DIAGNOSIS — J45.901 ASTHMA EXACERBATION: Primary | ICD-10-CM

## 2023-04-20 LAB
ANION GAP SERPL CALCULATED.3IONS-SCNC: 9 MMOL/L (ref 4–13)
ATRIAL RATE: 70 BPM
BASOPHILS # BLD MANUAL: 0 THOUSAND/UL (ref 0–0.1)
BASOPHILS NFR MAR MANUAL: 0 % (ref 0–1)
BNP SERPL-MCNC: 25 PG/ML (ref 0–100)
BUN SERPL-MCNC: 18 MG/DL (ref 5–25)
CALCIUM SERPL-MCNC: 9.1 MG/DL (ref 8.4–10.2)
CARDIAC TROPONIN I PNL SERPL HS: 4 NG/L
CHLORIDE SERPL-SCNC: 101 MMOL/L (ref 96–108)
CO2 SERPL-SCNC: 27 MMOL/L (ref 21–32)
CREAT SERPL-MCNC: 1.17 MG/DL (ref 0.6–1.3)
EOSINOPHIL # BLD MANUAL: 0.3 THOUSAND/UL (ref 0–0.4)
EOSINOPHIL NFR BLD MANUAL: 2 % (ref 0–6)
ERYTHROCYTE [DISTWIDTH] IN BLOOD BY AUTOMATED COUNT: 14.2 % (ref 11.6–15.1)
FLUAV RNA RESP QL NAA+PROBE: NEGATIVE
FLUBV RNA RESP QL NAA+PROBE: NEGATIVE
GFR SERPL CREATININE-BSD FRML MDRD: 47 ML/MIN/1.73SQ M
GLUCOSE SERPL-MCNC: 92 MG/DL (ref 65–140)
HCT VFR BLD AUTO: 45.1 % (ref 34.8–46.1)
HGB BLD-MCNC: 14.7 G/DL (ref 11.5–15.4)
LYMPHOCYTES # BLD AUTO: 28 % (ref 14–44)
LYMPHOCYTES # BLD AUTO: 4.26 THOUSAND/UL (ref 0.6–4.47)
MCH RBC QN AUTO: 30.4 PG (ref 26.8–34.3)
MCHC RBC AUTO-ENTMCNC: 32.6 G/DL (ref 31.4–37.4)
MCV RBC AUTO: 93 FL (ref 82–98)
METAMYELOCYTES NFR BLD MANUAL: 1 % (ref 0–1)
MONOCYTES # BLD AUTO: 0.3 THOUSAND/UL (ref 0–1.22)
MONOCYTES NFR BLD: 2 % (ref 4–12)
NEUTROPHILS # BLD MANUAL: 10.05 THOUSAND/UL (ref 1.85–7.62)
NEUTS SEG NFR BLD AUTO: 66 % (ref 43–75)
P AXIS: 41 DEGREES
PLATELET # BLD AUTO: 237 THOUSANDS/UL (ref 149–390)
PLATELET BLD QL SMEAR: ADEQUATE
PMV BLD AUTO: 9.2 FL (ref 8.9–12.7)
POTASSIUM SERPL-SCNC: 4.1 MMOL/L (ref 3.5–5.3)
PR INTERVAL: 126 MS
QRS AXIS: 0 DEGREES
QRSD INTERVAL: 114 MS
QT INTERVAL: 408 MS
QTC INTERVAL: 440 MS
RBC # BLD AUTO: 4.84 MILLION/UL (ref 3.81–5.12)
RBC MORPH BLD: NORMAL
RSV RNA RESP QL NAA+PROBE: NEGATIVE
SARS-COV-2 RNA RESP QL NAA+PROBE: NEGATIVE
SODIUM SERPL-SCNC: 137 MMOL/L (ref 135–147)
T WAVE AXIS: 74 DEGREES
TOXIC GRANULES BLD QL SMEAR: PRESENT
VARIANT LYMPHS # BLD AUTO: 1 %
VENTRICULAR RATE: 70 BPM
WBC # BLD AUTO: 15.22 THOUSAND/UL (ref 4.31–10.16)

## 2023-04-20 RX ORDER — PREDNISONE 20 MG/1
40 TABLET ORAL DAILY
Qty: 10 TABLET | Refills: 0 | Status: SHIPPED | OUTPATIENT
Start: 2023-04-20 | End: 2023-04-25

## 2023-04-20 RX ORDER — ALBUTEROL SULFATE 2.5 MG/3ML
2.5 SOLUTION RESPIRATORY (INHALATION) ONCE
Status: COMPLETED | OUTPATIENT
Start: 2023-04-20 | End: 2023-04-20

## 2023-04-20 RX ADMIN — ALBUTEROL SULFATE 2.5 MG: 2.5 SOLUTION RESPIRATORY (INHALATION) at 16:33

## 2023-04-20 NOTE — ED PROVIDER NOTES
History  Chief Complaint   Patient presents with   • Shortness of Breath     Patient reports shortness of breath for the past week  Patient dx with pneumonia, started on abx  Patient was supposed to follow with pulmonologist at [facility] but could not make it down due to shortness of breath  HPI  Is a 29-year-old female presented to ED with a complaint of shortness of breath  Patient states she was recent diagnosed with pneumonia started on prednisone and Augmentin by her PCP  She states she finished her steroid and started to feel short of breath  She states she had an appointment with her pulmonology in [facility] but was not able to make it due to shortness of breath  States that she did finished her antibiotics  Denies any abdominal pain nausea vomiting diarrhea  She states she does have some chest tightness which has improved after breathing treatment  She is sitting comfortably in bed answer question appropriately no conversation dyspnea noted  Oxygen saturations 97%  Prior to Admission Medications   Prescriptions Last Dose Informant Patient Reported? Taking?    Alum Hydroxide-Mag Trisilicate (Gaviscon) 36-00 8 MG CHEW   Yes No   Sig: Chew 1 tablet 4 (four) times a day as needed With meals and as needed   Galcanezumab-gnlm (Emgality) 120 MG/ML SOAJ   Yes No   Sig: Inject under the skin every 30 (thirty) days    Gemtesa 75 MG TABS   Yes No   Sig: Take 1 tablet by mouth daily   HYDROcodone-acetaminophen (NORCO) 5-325 mg per tablet   No No   Sig: Take 1 tablet by mouth 2 (two) times a day as needed for pain Do not fill until 4/26/2023 Max Daily Amount: 2 tablets   HYDROcodone-acetaminophen (Norco) 5-325 mg per tablet   No No   Sig: Take 1 tablet by mouth 2 (two) times a day as needed for pain for up to 30 doses Do nor fill until 3/29/2023 Max Daily Amount: 2 tablets   MULTIPLE VITAMINS-CALCIUM PO   Yes No   Sig: Take 1 capsule by mouth every morning    Magnesium 400 MG CAPS   Yes No   Sig: Take 1 capsule by mouth 2 (two) times a day    Multiple Vitamins-Iron TABS   Yes No   Sig: Take by mouth   Probiotic Product (PROBIOTIC DAILY PO)   Yes No   Sig: Take 1 tablet by mouth daily At lunch   Qvar RediHaler 40 MCG/ACT inhaler   Yes No   Sig: PLEASE SEE ATTACHED FOR DETAILED DIRECTIONS   Thyroid, Porcine, POWD   Yes No   Sig: Use 32 mg daily   Ubrogepant (UBRELVY) 100 MG tablet   Yes No   Sig: Take 100 mg by mouth Take 1 tablet (100 mg) one time as needed for migraine  May repeat one additional tablet (100 mg) at least two hours after the first dose  Do not use more than two doses per day, or for more than eight days per month  azelastine (ASTELIN) 0 1 % nasal spray   Yes No   Si sprays into each nostril 2 (two) times a day Use in each nostril as directed   beclomethasone (QVAR) 40 MCG/ACT inhaler   Yes No   Sig: Inhale 1 puff daily as needed (only when unable to nebulize pulmicort) Rinse mouth after use  budesonide (PULMICORT) 0 5 mg/2 mL nebulizer solution   Yes No   Sig: Take 0 5 mg by nebulization 2 (two) times a day Rinse mouth after use     cefdinir (OMNICEF) 300 mg capsule   Yes No   Sig: Take 300 mg by mouth every 12 (twelve) hours   cholecalciferol (VITAMIN D3) 1,000 units tablet   Yes No   Sig: Take 4,000 Units by mouth daily   cyclobenzaprine (FLEXERIL) 5 mg tablet   No No   Sig: Take 1 tablet (5 mg total) by mouth 2 (two) times a day as needed for muscle spasms   docusate sodium (COLACE) 100 mg capsule   Yes No   Sig: Take 100 mg by mouth daily as needed for constipation   doxycycline hyclate (VIBRA-TABS) 100 mg tablet   Yes No   Patient not taking: Reported on 2023   erythromycin (ILOTYCIN) ophthalmic ointment   Yes No   Sig: Apply 1 application to eye daily at bedtime   famotidine (PEPCID) 40 MG tablet   Yes No   Sig: Twice a day   fexofenadine (ALLEGRA) 180 MG tablet   Yes No   Sig: Take 180 mg by mouth 2 (two) times a day    fluticasone (FLONASE) 50 mcg/act nasal spray   Yes No   Si sprays into each nostril daily    hydrOXYzine (ATARAX) 10 mg/5 mL syrup   Yes No   Sig: Take 20 mg by mouth daily at bedtime    ipratropium (ATROVENT) 0 06 % nasal spray   Yes No   Sig: PLEASE SEE ATTACHED FOR DETAILED DIRECTIONS   ivabradine HCl (Corlanor) 5 MG tablet   Yes No   Si 5 mg 2 (two) times a day   levalbuterol (XOPENEX HFA) 45 mcg/act inhaler   Yes No   Sig: Inhale 2 puffs every 4 (four) hours as needed (when unable to nebulize)   levalbuterol (XOPENEX) 1 25 mg/3 mL nebulizer solution   Yes No   Sig: Take 1 25 mg by nebulization every 6 (six) hours as needed    methylPREDNISolone (MEDROL) 8 MG tablet   Yes No   Sig: Take 8 mg by mouth 4 (four) times a day   Patient not taking: Reported on 2023   methylprednisolone (MEDROL) 4 mg tablet   Yes No   Sig: Take 4 mg by mouth see administration instructions Medrol dose pack     nystatin (MYCOSTATIN) 500,000 units/5 mL suspension   Yes No   Sig: SWISH AND SWALLOW 5 ML 4 TIMES A DAY   omega-3-acid ethyl esters (LOVAZA) 1 g capsule   Yes No   Sig: Take 2 g by mouth 2 (two) times a day 1600mg daily   polyethylene glycol (MIRALAX) 17 g packet   Yes No   Sig: Take 17 g by mouth daily as needed    psyllium (METAMUCIL) 0 52 g capsule   Yes No   Sig: Take 0 52 g by mouth daily   Patient not taking: Reported on 2023   sodium chloride   Yes No   Sig: Inject 1,500 mL into a catheter in a vein   verapamil (CALAN) 40 mg tablet   Yes No      Facility-Administered Medications: None       Past Medical History:   Diagnosis Date   • Allergic rhinitis    • Anxiety    • Asthma    • Back pain    • Cardiac disease    • Cardiopathy     EF 45%   • Cough    • Diverticulitis    • Factor V Leiden (HCC)    • Fibromyalgia    • GERD (gastroesophageal reflux disease)    • Hashimoto's thyroiditis    • Hx of degenerative disc disease    • Hypotension     pots - postural orthostatic hypotension   • Interstitial cystitis    • Irregular heart beat     LBBB   • Irritable bowel syndrome    • "Migraines    • Mitral valve disease     \"thickening\"   • Myocardial infarction [facility])     possible but not sure when   • Neuropathy     bilateral legs   • Osteoporosis    • Postural orthostatic tachycardia syndrome     must drink a lot of water and salt   • Pott's disease    • Rheumatic fever    • Scoliosis    • Sepsis ([facility] [address] )     associated with PICC line   • Sjogren's syndrome ([facility] [address] )    • TMJ (dislocation of temporomandibular joint)        Past Surgical History:   Procedure Laterality Date   • ABLATION MICROWAVE Left     lumbar area   • BACK SURGERY  10/1998   •  SECTION     • CHOLECYSTECTOMY  2016   • COLONOSCOPY     • DILATION AND CURETTAGE OF UTERUS     • EGD     • FOOT SURGERY      removal of bone and neuroma   • HYSTERECTOMY      age 54  PRICE ooph   • IR OTHER  2022   • IR OTHER  2022   • IR PICC PLACEMENT SINGLE LUMEN  10/02/2020   • IR PICC PLACEMENT SINGLE LUMEN  2021   • IR PICC REPOSITION  2021   • IR TUNNELED CENTRAL LINE PLACEMENT  2022   • LAPAROSCOPY      endometriosis   • MOUTH BIOPSY      lip   • ND EGD TRANSORAL BIOPSY SINGLE/MULTIPLE N/A 2019    Procedure: ESOPHAGOGASTRODUODENOSCOPY (EGD) with multiple bx and dilation;  Surgeon: Aravind Parekh MD;  Location: [facility]; Service: Gastroenterology   • ND SURGICAL ARTHROSCOPY SHOULDER W/ROTATOR CUFF RPR Right 2022    Procedure: SHOULDER ARTHROSCOPIC ROTATOR CUFF REPAIR Biceps Tenodisis;   Surgeon: Pat Camargo MD;  [facility];  Service: Orthopedics   • TUNNELED VENOUS CATHETER PLACEMENT         Family History   Problem Relation Age of Onset   • Cancer Mother         urinary bladder    • No Known Problems Father    • Thyroid cancer Sister    • Heart attack Sister    • No Known Problems Sister    • No Known Problems Sister    • No Known Problems Sister    • No Known Problems Sister    • No Known Problems Daughter    • No Known Problems " Daughter    • Colon cancer Maternal Grandfather    • Breast cancer Maternal Aunt         over 48 yrs old    • Endometrial cancer Maternal Aunt    • Multiple myeloma Maternal Aunt    • Hypertension Paternal Aunt      I have reviewed and agree with the history as documented  E-Cigarette/Vaping   • E-Cigarette Use Never User      E-Cigarette/Vaping Substances   • Nicotine No    • THC No    • CBD No    • Flavoring No    • Other No    • Unknown No      Social History     Tobacco Use   • Smoking status: Never   • Smokeless tobacco: Never   Vaping Use   • Vaping Use: Never used   Substance Use Topics   • Alcohol use: Never   • Drug use: No       Review of Systems   Constitutional: Negative for chills and fever  HENT: Negative for rhinorrhea and sore throat  Eyes: Negative for visual disturbance  Respiratory: Positive for chest tightness and shortness of breath  Negative for cough  Cardiovascular: Negative for chest pain and leg swelling  Gastrointestinal: Negative for abdominal pain, diarrhea, nausea and vomiting  Genitourinary: Negative for dysuria  Musculoskeletal: Negative for back pain and myalgias  Skin: Negative for rash  Neurological: Negative for dizziness and headaches  Psychiatric/Behavioral: Negative for confusion  All other systems reviewed and are negative  Physical Exam  Physical Exam  Vitals and nursing note reviewed  Constitutional:       Appearance: She is well-developed  HENT:      Head: Normocephalic and atraumatic  Right Ear: External ear normal       Left Ear: External ear normal       Nose: Nose normal       Mouth/Throat:      Mouth: Mucous membranes are moist       Pharynx: No oropharyngeal exudate  Eyes:      General: No scleral icterus  Right eye: No discharge  Left eye: No discharge  Conjunctiva/sclera: Conjunctivae normal       Pupils: Pupils are equal, round, and reactive to light     Cardiovascular:      Rate and Rhythm: Normal rate and regular rhythm  Pulses: Normal pulses  Heart sounds: Normal heart sounds  Pulmonary:      Effort: Pulmonary effort is normal  No respiratory distress  Breath sounds: Normal breath sounds  No wheezing  Abdominal:      General: Bowel sounds are normal       Palpations: Abdomen is soft  Musculoskeletal:         General: Normal range of motion  Cervical back: Normal range of motion and neck supple  Lymphadenopathy:      Cervical: No cervical adenopathy  Skin:     General: Skin is warm and dry  Capillary Refill: Capillary refill takes less than 2 seconds  Neurological:      General: No focal deficit present  Mental Status: She is alert and oriented to person, place, and time  Psychiatric:         Mood and Affect: Mood normal          Behavior: Behavior normal          Vital Signs  ED Triage Vitals [04/20/23 1351]   Temperature Pulse Respirations Blood Pressure SpO2   97 6 °F (36 4 °C) 73 18 153/72 98 %      Temp Source Heart Rate Source Patient Position - Orthostatic VS BP Location FiO2 (%)   Tympanic -- Sitting Left arm --      Pain Score       8           Vitals:    04/20/23 1351 04/20/23 1430 04/20/23 1500   BP: 153/72 119/64 122/58   Pulse: 73 73 70   Patient Position - Orthostatic VS: Sitting           Visual Acuity      ED Medications  Medications - No data to display    Diagnostic Studies  Results Reviewed     Procedure Component Value Units Date/Time    HS Troponin I 4hr [[id number]]     Lab Status: No result Specimen: Blood     CBC and differential [[id number]]  (Abnormal) Collected: 04/20/23 1423    Lab Status: Final result Specimen: Blood from Arm, Left Updated: 04/20/23 1519     WBC 15 22 Thousand/uL      RBC 4 84 Million/uL      Hemoglobin 14 7 g/dL      Hematocrit 45 1 %      MCV 93 fL      MCH 30 4 pg      MCHC 32 6 g/dL      RDW 14 2 %      MPV 9 2 fL      Platelets 130 Thousands/uL     Narrative: This is an appended report    These results have been appended to a previously verified report  Manual Differential(PHLEBS Do Not Order) [[id number]]  (Abnormal) Collected: 04/20/23 1423    Lab Status: Final result Specimen: Blood from Arm, Left Updated: 04/20/23 1519     Segmented % 66 %      Lymphocytes % 28 %      Monocytes % 2 %      Eosinophils, % 2 %      Basophils % 0 %      Metamyelocytes% 1 %      Atypical Lymphocytes % 1 %      Absolute Neutrophils 10 05 Thousand/uL      Lymphocytes Absolute 4 26 Thousand/uL      Monocytes Absolute 0 30 Thousand/uL      Eosinophils Absolute 0 30 Thousand/uL      Basophils Absolute 0 00 Thousand/uL      Total Counted --     Toxic Granulation Present     RBC Morphology Normal     Platelet Estimate Adequate    FLU/RSV/COVID - if FLU/RSV clinically relevant [[id number]]  (Normal) Collected: 04/20/23 1423    Lab Status: Final result Specimen: Nares from Nose Updated: 04/20/23 1517     SARS-CoV-2 Negative     INFLUENZA A PCR Negative     INFLUENZA B PCR Negative     RSV PCR Negative    Narrative:      FOR PEDIATRIC PATIENTS - copy/paste COVID Guidelines URL to browser: [email or web address]    SARS-CoV-2 assay is a Nucleic Acid Amplification assay intended for the  qualitative detection of nucleic acid from SARS-CoV-2 in nasopharyngeal  swabs  Results are for the presumptive identification of SARS-CoV-2 RNA  Positive results are indicative of infection with SARS-CoV-2, the virus  causing COVID-19, but do not rule out bacterial infection or co-infection  with other viruses  Laboratories within the [place] and its  territories are required to report all positive results to the appropriate  public health authorities  Negative results do not preclude SARS-CoV-2  infection and should not be used as the sole basis for treatment or other  patient management decisions   Negative results must be combined with  clinical observations, patient history, and epidemiological information  This test has not been FDA cleared or approved  This test has been authorized by FDA under an Emergency Use Authorization  (EUA)  This test is only authorized for the duration of time the  declaration that circumstances exist justifying the authorization of the  emergency use of an in vitro diagnostic tests for detection of SARS-CoV-2  virus and/or diagnosis of COVID-19 infection under section 564(b)(1) of  the Act, 21 U  S C  310MQL-4(J)(3), unless the authorization is terminated  or revoked sooner  The test has been validated but independent review by FDA  and CLIA is pending  Test performed using [employer] GeneXpert: This RT-PCR assay targets N2,  a region unique to SARS-CoV-2  A conserved region in the E-gene was chosen  for pan-Sarbecovirus detection which includes SARS-CoV-2  According to CMS-2020-01-R, this platform meets the definition of high-throughput technology      HS Troponin I 2hr [[id number]]     Lab Status: No result Specimen: Blood     HS Troponin 0hr (reflex protocol) [[id number]]  (Normal) Collected: 04/20/23 1423    Lab Status: Final result Specimen: Blood from Arm, Left Updated: 04/20/23 1504     hs TnI 0hr 4 ng/L     B-Type Natriuretic Peptide(BNP) [[id number]]  (Normal) Collected: 04/20/23 1423    Lab Status: Final result Specimen: Blood from Arm, Left Updated: 04/20/23 1503     BNP 25 pg/mL     Basic metabolic panel [[id number]] Collected: 04/20/23 1423    Lab Status: Final result Specimen: Blood from Arm, Left Updated: 04/20/23 1501     Sodium 137 mmol/L      Potassium 4 1 mmol/L      Chloride 101 mmol/L      CO2 27 mmol/L      ANION GAP 9 mmol/L      BUN 18 mg/dL      Creatinine 1 17 mg/dL      Glucose 92 mg/dL      Calcium 9 1 mg/dL      eGFR 47 ml/min/1 73sq m     Narrative:      [place] guidelines for Chronic Kidney Disease (CKD):   •  Stage 1 with normal or high GFR (GFR > 90 mL/min/1 73 square meters)  •  Stage 2 Mild CKD (GFR = 60-89 mL/min/1 73 square meters)  •  Stage 3A Moderate CKD (GFR = 45-59 mL/min/1 73 square meters)  •  Stage 3B Moderate CKD (GFR = 30-44 mL/min/1 73 square meters)  •  Stage 4 Severe CKD (GFR = 15-29 mL/min/1 73 square meters)  •  Stage 5 End Stage CKD (GFR <15 mL/min/1 73 square meters)  Note: GFR calculation is accurate only with a steady state creatinine                 XR chest 1 view portable   Final Result by Yajaira Ansari MD (04/20 1513)      No acute cardiopulmonary disease  Workstation performed: [id number]                    Procedures  Procedures         ED Course  ED Course as of 04/20/23 1605   Thu Apr 20, 2023   1518 No acute cardiopulmonary disease                                   SBIRT 20yo+    Flowsheet Row Most Recent Value   Initial Alcohol Screen: US AUDIT-C     1  How often do you have a drink containing alcohol? 0 Filed at: 04/20/2023 1353   2  How many drinks containing alcohol do you have on a typical day you are drinking? 0 Filed at: 04/20/2023 1353   3a  Male UNDER 65: How often do you have five or more drinks on one occasion? 0 Filed at: 04/20/2023 1353   3b  FEMALE Any Age, or MALE 65+: How often do you have 4 or more drinks on one occassion? 0 Filed at: 04/20/2023 1353   Audit-C Score 0 Filed at: 04/20/2023 1353   [place]: How many times in the past year have you    Used an illegal drug or used a prescription medication for non-medical reasons? Never Filed at: 04/20/2023 1353                    Medical Decision Making  Patient with history as above presented with shortness of breath and chest tightness  history obtained from patient  Patient was recently treated with antibiotics and steroid states felt better but then started feeling short of breath unable to go to her appointment today  Patient was nontoxic, stable  Ambulatory  Exam reassuring against focal bacterial infection or surgical pathology     Will obtain labs chest x-ray EKG  Labs within normal limits that shows a slightly elevated WBC which is most likely secondary to steroid use  Chest x-ray within normal limits  Differential diagnosis considered  Overall presentation is consistent with viral infection  Low suspicion for bacterial sinusitis, pneumonia, meningitis, endocarditis, or other serious infection  Discussed with patient that symptoms could be secondary to viral infection causing her asthma exacerbation  Recommend he continue with steroid at home nebulizer treatment as needed  Patient also has a appointment with the pulmonary which is rescheduled  I attempted to call patient pulmonologist who is not working today  Left a message for office to reschedule patient appointment  Patient is stable for discharge oxygen saturation 95 to 97% on room air  Blood pressure is stable patient is not tachycardic or tachypneic  Sitting comfortably in bed answer questions appropriately  No conversational dyspnea noted  Disposition: Discharged with instructions to obtain outpatient follow up of patient's symptoms and findings, with strict return precautions if patient develops new or worsening symptoms  Amount and/or Complexity of Data Reviewed  Labs: ordered  Radiology: ordered  Risk  Prescription drug management  Disposition  Final diagnoses:   Asthma exacerbation   SOB (shortness of breath)     Time reflects when diagnosis was documented in both MDM as applicable and the Disposition within this note     Time User Action Codes Description Comment    4/20/2023  3:30 PM Sehllie Chang Add [O51 010] Asthma exacerbation     4/20/2023  3:30 PM Shellie Chang Add [R06 02] SOB (shortness of breath)       ED Disposition     ED Disposition   Discharge    Condition   Stable    Date/Time   Thu Apr 20, 2023  3:30 PM    Comment   Ramakrishna Reynoso discharge to home/self care                 Follow-up Information     Follow up With Specialties Details Why Contact Info    Christine Chong MD Internal Medicine Schedule an appointment as soon as possible for a visit in 2 days If symptoms worsen [address] [place] [address]  [phone]            Patient's Medications   Discharge Prescriptions    PREDNISONE 20 MG TABLET    Take 2 tablets (40 mg total) by mouth daily for 5 days       Start Date: 4/20/2023 End Date: 4/25/2023       Order Dose: 40 mg       Quantity: 10 tablet    Refills: 0       No discharge procedures on file      PDMP Review       Value Time User    PDMP Reviewed  Yes 3/24/2023  8:34 AM Isabell Oakes [place]          ED Provider  Electronically Signed by           Donovan Fisher MD  04/20/23 0545

## 2023-04-21 ENCOUNTER — HOSPITAL ENCOUNTER (OUTPATIENT)
Dept: INFUSION CENTER | Facility: HOSPITAL | Age: 68
Discharge: HOME | End: 2023-04-21
Attending: INTERNAL MEDICINE

## 2023-04-21 VITALS
TEMPERATURE: 97.8 F | HEART RATE: 78 BPM | SYSTOLIC BLOOD PRESSURE: 169 MMHG | RESPIRATION RATE: 18 BRPM | DIASTOLIC BLOOD PRESSURE: 76 MMHG | OXYGEN SATURATION: 97 %

## 2023-04-21 RX ORDER — SODIUM CHLORIDE 9 MG/ML
250 INJECTION, SOLUTION INTRAVENOUS ONCE
Status: COMPLETED | OUTPATIENT
Start: 2023-04-25 | End: 2023-04-25

## 2023-04-21 RX ADMIN — SODIUM CHLORIDE 250 ML/HR: 0.9 INJECTION, SOLUTION INTRAVENOUS at 09:35

## 2023-04-21 NOTE — PROGRESS NOTES
Pt here for hydration  Adams flushed and blood return noted  Dressing changed at this time  Pt resting comfortably with no further questions or concerns  Call bell with in reach

## 2023-04-25 ENCOUNTER — HOSPITAL ENCOUNTER (OUTPATIENT)
Dept: INFUSION CENTER | Facility: HOSPITAL | Age: 68
Discharge: HOME | End: 2023-04-25

## 2023-04-25 VITALS
SYSTOLIC BLOOD PRESSURE: 159 MMHG | RESPIRATION RATE: 18 BRPM | DIASTOLIC BLOOD PRESSURE: 73 MMHG | HEART RATE: 72 BPM | TEMPERATURE: 98.5 F

## 2023-04-25 RX ADMIN — SODIUM CHLORIDE 250 ML/HR: 0.9 INJECTION, SOLUTION INTRAVENOUS at 09:10

## 2023-04-26 RX ORDER — SODIUM CHLORIDE 9 MG/ML
250 INJECTION, SOLUTION INTRAVENOUS ONCE
Status: COMPLETED | OUTPATIENT
Start: 2023-04-27 | End: 2023-04-27

## 2023-04-26 RX ORDER — SODIUM CHLORIDE 9 MG/ML
250 INJECTION, SOLUTION INTRAVENOUS ONCE
Status: COMPLETED | OUTPATIENT
Start: 2023-04-28 | End: 2023-04-28

## 2023-04-27 ENCOUNTER — OFFICE VISIT (OUTPATIENT)
Dept: NEPHROLOGY | Facility: CLINIC | Age: 68
End: 2023-04-27

## 2023-04-27 ENCOUNTER — HOSPITAL ENCOUNTER (OUTPATIENT)
Dept: INFUSION CENTER | Facility: HOSPITAL | Age: 68
Discharge: HOME | End: 2023-04-27
Attending: INTERNAL MEDICINE

## 2023-04-27 VITALS
DIASTOLIC BLOOD PRESSURE: 78 MMHG | TEMPERATURE: 97.3 F | HEART RATE: 80 BPM | OXYGEN SATURATION: 99 % | SYSTOLIC BLOOD PRESSURE: 135 MMHG | RESPIRATION RATE: 18 BRPM

## 2023-04-27 VITALS
OXYGEN SATURATION: 98 % | WEIGHT: 134 LBS | HEART RATE: 75 BPM | DIASTOLIC BLOOD PRESSURE: 80 MMHG | SYSTOLIC BLOOD PRESSURE: 140 MMHG | BODY MASS INDEX: 25.3 KG/M2 | HEIGHT: 61 IN

## 2023-04-27 DIAGNOSIS — N18.31 STAGE 3A CHRONIC KIDNEY DISEASE (HCC): Primary | ICD-10-CM

## 2023-04-27 RX ADMIN — SODIUM CHLORIDE 250 ML/HR: 0.9 INJECTION, SOLUTION INTRAVENOUS at 09:35

## 2023-04-27 NOTE — PROGRESS NOTES
Nephrology   Office Follow-Up  Tammie Waterman 75 y o  female MRN: [id number]    Encounter: [id number]        Dakotah Mao was seen in the [place] office today  All diagnoses and orders for visit:     1  Stage 3a chronic kidney disease (HCC)  · Baseline creatinine 1 0-1 1 mg/dL  Etiology likely vasomotor effect of BP, potential chronic tubulointerstitial disease, nephrosclerosis, potential obstructive uropathy  Role of nephrologist is to follow for CKD management and can also assist with infusion but ongoing management of interstitial cystitis and POTS to be left to urologist and cardiologist, respectively  · Repeat lab work in 2 weeks   -     Basic metabolic panel; Future; Expected date: 05/11/2023   -     Comprehensive metabolic panel; Future; Expected date: 08/08/2023   -     CBC and differential; Future; Expected date: 08/08/2023   -     Microalbumin / creatinine urine ratio; Future; Expected date: 08/08/2023   -     Urinalysis with microscopic; Future; Expected date: 08/08/2023   -     Protein / creatinine ratio, urine; Future; Expected date: 08/08/2023  2  POTS  · This is management by her cardiologist at [facility]  3  Interstitial cystitis  · Managed by her Urologist Dr Jeannette Marcum  4  Potential chronic tubulointerstitial disease  · Avoid medications implicated in this diagnosis such as NSAIDs    HPI: Tammie Waterman is a 75 y o  female who established renal care with Dr Nanette Lees in August of 2022 for CKD 3a, evaluation of hydronephrosis and blood pressure management  Complicated medical history with previous interstitial cystitis dx in 1993 by Dr Lluvia Ely s/p unsuccessful uretrha dilations  S/p BSO with hystercomty 1996  In 2021 c/o bladder spasms and incontinence being followed by Dr Jeannette Marcum at [place]  Previous renal flow scan left kidney 65 5% and right kidney 34 5% function  Right kidney + stasis without obstruction  Diagnosed with POTS/vasovagal syncope and f/w [facility]   She receives IV volume expansion four times weekly due to concern of poor absorption  She is on Corlanor and Midodrine  She has a RIJ tunneled catheter  CT renal protocol done 3/2/23  Significant for right proximal and mid renal collecting system prominence with narrowed versus collapsed right ureter  Reviewed spectrum of CKD, risk factors for same  She will have repeat lab work in 2 weeks to ensure stability  No changes were made today and she will continue care as outlined by her POTS specialist and urologist with [address]  ROS:   Review of Systems   Constitutional: Positive for fatigue  Negative for chills and fever  HENT: Negative for ear pain and sore throat  Eyes: Negative for pain and visual disturbance  Respiratory: Positive for cough and shortness of breath  Cardiovascular: Negative for chest pain and palpitations  Gastrointestinal: Negative for abdominal pain and vomiting  Genitourinary: Negative for dysuria and hematuria  Musculoskeletal: Negative for arthralgias and back pain  Skin: Negative for color change and rash  Neurological: Negative for seizures and syncope  All other systems reviewed and are negative        Allergies: Imipramine, Melatonin, Mirtazapine, Nexium [esomeprazole], Nsaids, Singulair [montelukast], Zomig [zolmitriptan], Dexilant [dexlansoprazole], Albuterol, Ambien [zolpidem], Amitriptyline, Aspirin, Bactrim [sulfamethoxazole-trimethoprim], Banana - food allergy, Ceftin [cefuroxime], Celebrex [celecoxib], Ciprofloxacin, Cranberry-c [ascorbate - food allergy], Demerol [meperidine], Epinephrine, Ergotamine, Keflex [cephalexin], Klonopin [clonazepam], Latex, Levaquin [levofloxacin], Lexapro [escitalopram], Lyrica [pregabalin], Macrodantin [nitrofurantoin], Morphine, Movantik [naloxegol], Mushroom extract complex - food allergy, Naprosyn [naproxen], Neurontin [gabapentin], Pineapple - food allergy, Plecanatide, Prolia [denosumab], Prozac [fluoxetine], Serevent [salmeterol], Sudafed [pseudoephedrine], Sulfa antibiotics, Tomato - food allergy, Trazodone, Ultram [tramadol], Vilazodone, Vilazodone hcl, Vioxx [rofecoxib], Vortioxetine, Zithromax [azithromycin], Zoloft [sertraline], and Zantac [ranitidine]    Medications:   Current Outpatient Medications:     Alum Hydroxide-Mag Trisilicate (Gaviscon) 41-28 4 MG CHEW, Chew 1 tablet 4 (four) times a day as needed With meals and as needed, Disp: , Rfl:     azelastine (ASTELIN) 0 1 % nasal spray, 2 sprays into each nostril 2 (two) times a day Use in each nostril as directed, Disp: , Rfl:     beclomethasone (QVAR) 40 MCG/ACT inhaler, Inhale 1 puff daily as needed (only when unable to nebulize pulmicort) Rinse mouth after use , Disp: , Rfl:     budesonide (PULMICORT) 0 5 mg/2 mL nebulizer solution, Take 0 5 mg by nebulization 2 (two) times a day Rinse mouth after use , Disp: , Rfl:     cholecalciferol (VITAMIN D3) 1,000 units tablet, Take 4,000 Units by mouth daily, Disp: , Rfl:     cyclobenzaprine (FLEXERIL) 5 mg tablet, Take 1 tablet (5 mg total) by mouth 2 (two) times a day as needed for muscle spasms, Disp: 60 tablet, Rfl: 2    docusate sodium (COLACE) 100 mg capsule, Take 100 mg by mouth daily as needed for constipation, Disp: , Rfl:     erythromycin (ILOTYCIN) ophthalmic ointment, Apply 1 application to eye daily at bedtime, Disp: , Rfl:     famotidine (PEPCID) 40 MG tablet, Twice a day, Disp: , Rfl:     fexofenadine (ALLEGRA) 180 MG tablet, Take 180 mg by mouth 2 (two) times a day , Disp: , Rfl:     fluticasone (FLONASE) 50 mcg/act nasal spray, 2 sprays into each nostril daily , Disp: , Rfl:     Galcanezumab-gnlm (Emgality) 120 MG/ML SOAJ, Inject under the skin every 30 (thirty) days , Disp: , Rfl:     Gemtesa 75 MG TABS, Take 1 tablet by mouth daily, Disp: , Rfl:     HYDROcodone-acetaminophen (NORCO) 5-325 mg per tablet, Take 1 tablet by mouth 2 (two) times a day as needed for pain Do not fill until 4/26/2023 Max Daily Amount: 2 tablets, Disp: 60 tablet, Rfl: 0    HYDROcodone-acetaminophen (Norco) 5-325 mg per tablet, Take 1 tablet by mouth 2 (two) times a day as needed for pain for up to 30 doses Do nor fill until 3/29/2023 Max Daily Amount: 2 tablets, Disp: 60 tablet, Rfl: 0    hydrOXYzine (ATARAX) 10 mg/5 mL syrup, Take 20 mg by mouth daily at bedtime , Disp: , Rfl:     ipratropium (ATROVENT) 0 06 % nasal spray, PLEASE SEE ATTACHED FOR DETAILED DIRECTIONS, Disp: , Rfl:     ivabradine HCl (Corlanor) 5 MG tablet, 7 5 mg 2 (two) times a day, Disp: , Rfl:     levalbuterol (XOPENEX HFA) 45 mcg/act inhaler, Inhale 2 puffs every 4 (four) hours as needed (when unable to nebulize), Disp: , Rfl:     levalbuterol (XOPENEX) 1 25 mg/3 mL nebulizer solution, Take 1 25 mg by nebulization every 6 (six) hours as needed , Disp: , Rfl: 2    Magnesium 400 MG CAPS, Take 1 capsule by mouth 2 (two) times a day , Disp: , Rfl:     MULTIPLE VITAMINS-CALCIUM PO, Take 1 capsule by mouth every morning , Disp: , Rfl:     omega-3-acid ethyl esters (LOVAZA) 1 g capsule, Take 2 g by mouth 2 (two) times a day 1600mg daily, Disp: , Rfl:     polyethylene glycol (MIRALAX) 17 g packet, Take 17 g by mouth daily as needed , Disp: , Rfl:     Probiotic Product (PROBIOTIC DAILY PO), Take 1 tablet by mouth daily At lunch, Disp: , Rfl:     psyllium (METAMUCIL) 0 52 g capsule, Take 0 52 g by mouth daily, Disp: , Rfl:     sodium chloride, Inject 1,500 mL into a catheter in a vein, Disp: , Rfl:     Thyroid, Porcine, POWD, Use 32 mg daily, Disp: , Rfl:     Ubrogepant (UBRELVY) 100 MG tablet, Take 100 mg by mouth Take 1 tablet (100 mg) one time as needed for migraine  May repeat one additional tablet (100 mg) at least two hours after the first dose   Do not use more than two doses per day, or for more than eight days per month , Disp: , Rfl:     verapamil (CALAN) 40 mg tablet, , Disp: , Rfl:     cefdinir (OMNICEF) 300 mg capsule, Take 300 mg by mouth "every 12 (twelve) hours, Disp: , Rfl:     doxycycline hyclate (VIBRA-TABS) 100 mg tablet, , Disp: , Rfl:     methylprednisolone (MEDROL) 4 mg tablet, Take 4 mg by mouth see administration instructions Medrol dose pack  , Disp: , Rfl:     methylPREDNISolone (MEDROL) 8 MG tablet, Take 8 mg by mouth 4 (four) times a day (Patient not taking: Reported on 2023), Disp: , Rfl:     Multiple Vitamins-Iron TABS, Take by mouth, Disp: , Rfl:     nystatin (MYCOSTATIN) 500,000 units/5 mL suspension, SWISH AND SWALLOW 5 ML 4 TIMES A DAY, Disp: , Rfl:     Qvar RediHaler 40 MCG/ACT inhaler, PLEASE SEE ATTACHED FOR DETAILED DIRECTIONS (Patient not taking: Reported on 2023), Disp: , Rfl:   No current facility-administered medications for this visit      Past Medical History:   Diagnosis Date    Allergic rhinitis     Anxiety     Asthma     Back pain     Cardiac disease     Cardiopathy     EF 45%    Cough     Diverticulitis     Factor V Leiden (HCC)     Fibromyalgia     GERD (gastroesophageal reflux disease)     Hashimoto's thyroiditis     Hx of degenerative disc disease     Hypotension     pots - postural orthostatic hypotension    Interstitial cystitis     Irregular heart beat     LBBB    Irritable bowel syndrome     Migraines     Mitral valve disease     \"thickening\"    Myocardial infarction [facility])     possible but not sure when    Neuropathy     bilateral legs    Osteoporosis     Postural orthostatic tachycardia syndrome     must drink a lot of water and salt    Pott's disease     Rheumatic fever 1967    Scoliosis     Sepsis ([address] )     associated with PICC line    Sjogren's syndrome ([facility] [address] )     TMJ (dislocation of temporomandibular joint)      Past Surgical History:   Procedure Laterality Date    ABLATION MICROWAVE Left     lumbar area    BACK SURGERY  10/1998     SECTION  1992    CHOLECYSTECTOMY  2016    COLONOSCOPY      DILATION AND CURETTAGE OF UTERUS      EGD  " "2016    FOOT SURGERY  1968    removal of bone and neuroma    HYSTERECTOMY  1997    age 54  PRICE ooph    IR OTHER  01/07/2022    IR OTHER  11/22/2022    IR PICC PLACEMENT SINGLE LUMEN  10/02/2020    IR PICC PLACEMENT SINGLE LUMEN  08/12/2021    IR PICC REPOSITION  12/07/2021    IR TUNNELED CENTRAL LINE PLACEMENT  06/20/2022    LAPAROSCOPY  1996    endometriosis    MOUTH BIOPSY      lip    [place] EGD TRANSORAL BIOPSY SINGLE/MULTIPLE N/A 04/24/2019    Procedure: ESOPHAGOGASTRODUODENOSCOPY (EGD) with multiple bx and dilation;  Surgeon: Jose Lo MD;  Location: [address] [place] [address] GI LAB; Service: Gastroenterology    [place] SURGICAL ARTHROSCOPY SHOULDER W/ROTATOR CUFF RPR Right 04/06/2022    Procedure: SHOULDER ARTHROSCOPIC ROTATOR CUFF REPAIR Biceps Tenodisis; Surgeon: Wily Marks MD;  Location: [facility] MAIN OR;  Service: Orthopedics    TUNNELED VENOUS CATHETER PLACEMENT  2016     Family History   Problem Relation Age of Onset    Cancer Mother         urinary bladder     No Known Problems Father     Thyroid cancer Sister     Heart attack Sister     No Known Problems Sister     No Known Problems Sister     No Known Problems Sister     No Known Problems Sister     No Known Problems Daughter     No Known Problems Daughter     Colon cancer Maternal Grandfather     Breast cancer Maternal Aunt         over 48 yrs old     Endometrial cancer Maternal Aunt     Multiple myeloma Maternal Aunt     Hypertension Paternal Aunt       reports that she has never smoked  She has never used smokeless tobacco  She reports that she does not drink alcohol and does not use drugs  Physical Exam:   Vitals:    04/27/23 1609   BP: 140/80   BP Location: Left arm   Patient Position: Sitting   Cuff Size: Standard   Pulse: 75   SpO2: 98%   Weight: 60 8 kg (134 lb)   Height: 5' 1\" (1 549 m)     Body mass index is 25 32 kg/m²      General: conscious, cooperative, in no acute distress, appears stated age  Eyes: conjunctivae " "pale, anicteric sclerae  ENT: lips and mucous membranes moist  Neck: supple, no JVD, no masses  Chest:  essentially clear breath sounds bilaterally, no crackles, ronchus or wheezings  CVS: S1 & S2, normal rate, regular rhythm  Abdomen: soft, non-tender, non-distended, normoactive bowel sounds, rounded  Extremities: no edema of both legs  Skin: no rash   Neuro: awake, alert, oriented       Diagnostic Data:  Lab: I have personally reviewed pertinent lab results  ,   CBC:       CMP: No results found for: SODIUM, K, CL, CO2, ANIONGAP, BUN, CREATININE, GLUCOSE, CALCIUM, AST, ALT, ALKPHOS, PROT, BILITOT, EGFR,   PT/INR: No results found for: PT, INR,   Magnesium: No components found for: MAG,  Phosphorous: No results found for: PHOS    Patient Instructions   Repeat lab work in about 2 weeks to check your kidney function          Portions of the record may have been created with voice recognition software  Occasional wrong word or \"sound a like\" substitutions may have occurred due to the inherent limitations of voice recognition software  Read the chart carefully and recognize, using context, where substitutions have occurred  If you have any questions, please contact the dictating provider    "

## 2023-04-28 ENCOUNTER — APPOINTMENT (OUTPATIENT)
Dept: RADIOLOGY | Facility: CLINIC | Age: 68
End: 2023-04-28

## 2023-04-28 ENCOUNTER — OFFICE VISIT (OUTPATIENT)
Dept: OBGYN CLINIC | Facility: CLINIC | Age: 68
End: 2023-04-28

## 2023-04-28 ENCOUNTER — HOSPITAL ENCOUNTER (OUTPATIENT)
Dept: INFUSION CENTER | Facility: HOSPITAL | Age: 68
Discharge: HOME | End: 2023-04-28
Attending: INTERNAL MEDICINE

## 2023-04-28 VITALS
TEMPERATURE: 97.4 F | DIASTOLIC BLOOD PRESSURE: 103 MMHG | OXYGEN SATURATION: 97 % | HEART RATE: 76 BPM | SYSTOLIC BLOOD PRESSURE: 173 MMHG | RESPIRATION RATE: 18 BRPM

## 2023-04-28 VITALS
HEIGHT: 61 IN | DIASTOLIC BLOOD PRESSURE: 80 MMHG | BODY MASS INDEX: 26.77 KG/M2 | TEMPERATURE: 97.8 F | WEIGHT: 141.8 LBS | SYSTOLIC BLOOD PRESSURE: 146 MMHG | HEART RATE: 85 BPM

## 2023-04-28 DIAGNOSIS — S93.491A SPRAIN OF ANTERIOR TALOFIBULAR LIGAMENT OF RIGHT ANKLE, INITIAL ENCOUNTER: Primary | ICD-10-CM

## 2023-04-28 DIAGNOSIS — S99.911A ANKLE INJURY, RIGHT, INITIAL ENCOUNTER: ICD-10-CM

## 2023-04-28 DIAGNOSIS — S93.421A SPRAIN OF DELTOID LIGAMENT OF RIGHT ANKLE, INITIAL ENCOUNTER: ICD-10-CM

## 2023-04-28 RX ORDER — SODIUM CHLORIDE 9 MG/ML
250 INJECTION, SOLUTION INTRAVENOUS ONCE
Status: COMPLETED | OUTPATIENT
Start: 2023-05-01 | End: 2023-05-01

## 2023-04-28 RX ADMIN — SODIUM CHLORIDE 250 ML/HR: 0.9 INJECTION, SOLUTION INTRAVENOUS at 09:51

## 2023-04-28 NOTE — PLAN OF CARE
Problem: METABOLIC, FLUID AND ELECTROLYTES - ADULT  Goal: Fluid balance maintained  Description: INTERVENTIONS:  - Monitor labs   - Instruct patient on fluid and nutrition as appropriate  - Assess for signs & symptoms of volume excess or deficit  - Administer IV hydration as ordered  Outcome: Progressing

## 2023-04-28 NOTE — PROGRESS NOTES
"[facility] [place]  [address]  [facility] [id number] [phone]      Chief Complaint:  Chief Complaint   Patient presents with   • Right Ankle - Pain       Vitals:  /80 (BP Location: Left arm, Patient Position: Sitting, Cuff Size: Standard)   Pulse 85   Temp 97 8 °F (36 6 °C) (Tympanic)   Ht 5' 1\" (1 549 m)   Wt 64 3 kg (141 lb 12 8 oz)   BMI 26 79 kg/m²     The following portions of the patient's history were reviewed and updated as appropriate: allergies, current medications, past family history, past medical history, past social history, past surgical history, and problem list       Subjective:   Patient ID: Eron Sandy is a 75 y o  female  Here c/o R ankle pain  She was pulling her mother up in bed and twisted her R ankle in 2/23  Swelled/bruised  No pain at rest  Pain with flexing ankle  Sharp pain at times  vicodin for back pain  Review of Systems   Constitutional: Negative for fatigue and fever  Respiratory: Negative for shortness of breath  Cardiovascular: Negative for chest pain  Gastrointestinal: Negative for abdominal pain and nausea  Genitourinary: Negative for dysuria  Musculoskeletal: Positive for arthralgias and gait problem  Skin: Negative for rash and wound  Neurological: Negative for weakness and headaches  Objective:  Right Ankle Exam     Tenderness   The patient is experiencing tenderness in the CF, ATF and deltoid  Range of Motion   The patient has normal right ankle ROM  Muscle Strength   The patient has normal right ankle strength  Comments:  Pain with plantarflexion/inversion/eversion/ext rotation  Neg jenkins/squeeze test           Strength/Myotome Testing     Right Ankle/Foot   Normal strength      Physical Exam  Constitutional:       Appearance: Normal appearance  She is normal weight  Eyes:      Extraocular Movements: Extraocular movements intact     Pulmonary: " Effort: Pulmonary effort is normal    Musculoskeletal:      Cervical back: Normal range of motion  Skin:     General: Skin is warm and dry  Neurological:      General: No focal deficit present  Mental Status: She is alert and oriented to person, place, and time  Mental status is at baseline  Psychiatric:         Mood and Affect: Mood normal          Behavior: Behavior normal          Thought Content: Thought content normal          Judgment: Judgment normal          I have personally reviewed pertinent films in PACS and my interpretation is XR-  R ankle-  no fx  Assessment/Plan:  Assessment/Plan   Diagnoses and all orders for this visit:    Sprain of anterior talofibular ligament of right ankle, initial encounter  -     Ambulatory Referral to Physical Therapy; Future  -     Brace    Ankle injury, right, initial encounter  -     XR ankle 3+ vw right; Future  -     Ambulatory Referral to Physical Therapy; Future  -     Brace    Sprain of deltoid ligament of right ankle, initial encounter  -     Ambulatory Referral to Physical Therapy; Future  -     Brace        Return in about 3 weeks (around 5/19/2023) for Recheck       Josh Black MD

## 2023-05-01 ENCOUNTER — HOSPITAL ENCOUNTER (OUTPATIENT)
Dept: INTERVENTIONAL RADIOLOGY/VASCULAR | Facility: HOSPITAL | Age: 68
Discharge: HOME | End: 2023-05-01

## 2023-05-01 ENCOUNTER — HOSPITAL ENCOUNTER (OUTPATIENT)
Dept: INFUSION CENTER | Facility: HOSPITAL | Age: 68
Discharge: HOME | End: 2023-05-01

## 2023-05-01 VITALS
OXYGEN SATURATION: 97 % | SYSTOLIC BLOOD PRESSURE: 147 MMHG | DIASTOLIC BLOOD PRESSURE: 82 MMHG | TEMPERATURE: 97.3 F | HEART RATE: 70 BPM | RESPIRATION RATE: 18 BRPM

## 2023-05-01 DIAGNOSIS — Z78.9 CENTRAL VENOUS CATHETER IN PLACE: ICD-10-CM

## 2023-05-01 RX ORDER — SODIUM CHLORIDE 9 MG/ML
250 INJECTION, SOLUTION INTRAVENOUS ONCE
Status: COMPLETED | OUTPATIENT
Start: 2023-05-02 | End: 2023-05-02

## 2023-05-01 RX ORDER — LIDOCAINE HYDROCHLORIDE 10 MG/ML
INJECTION, SOLUTION EPIDURAL; INFILTRATION; INTRACAUDAL; PERINEURAL AS NEEDED
Status: COMPLETED | OUTPATIENT
Start: 2023-05-01 | End: 2023-05-01

## 2023-05-01 RX ORDER — SODIUM CHLORIDE 9 MG/ML
250 INJECTION, SOLUTION INTRAVENOUS ONCE
Status: COMPLETED | OUTPATIENT
Start: 2023-05-03 | End: 2023-05-03

## 2023-05-01 RX ADMIN — LIDOCAINE HYDROCHLORIDE 10 ML: 10 INJECTION, SOLUTION EPIDURAL; INFILTRATION; INTRACAUDAL at 15:04

## 2023-05-01 RX ADMIN — SODIUM CHLORIDE 250 ML/HR: 0.9 INJECTION, SOLUTION INTRAVENOUS at 09:45

## 2023-05-01 NOTE — PROGRESS NOTES
Patient's IV NSS stopped due to her appointment in IR for central line check at 1430 PM   LFA IV DC without difficulty  AVS declined  Patient is aware of next appointment  Patient ambulated off unit without incident  All personal belongings taken with patient

## 2023-05-01 NOTE — PROGRESS NOTES
Patient's right internal jugular central line evaluated  Patient's right central line noted to be reinforced several times with tape  Patient stated she is feeling discomfort at site  Sterile dressing and 4-6 , 2x2 sterile gauze removed  Suture is noted to no longer be intact on skin  Central line noted to have migrated approximately an inch out of insertion site  Usually central line is right up to the hub  No drainage or redness noted at insertion site  Scant redness noted at medial low profile junction hub  Area covered with sterile 2 x 2 to avoid further irritation  Call placed to Dr Pooja Ugalde to have an order placed for IR line check  Advised that their office will need to contact IR for line check  Site redressed per protocol  Left forearm IV stated for today's treatment  Will await call for when patient is to go to IR for line check

## 2023-05-01 NOTE — BRIEF OP NOTE (RAD/CATH)
IR OTHER Procedure Note    PATIENT NAME: Gerardo Guadarrama  : 1955  MRN: [id number]    Pre-op Diagnosis:   1  Central venous catheter in place      Post-op Diagnosis:   1  Central venous catheter in place        Provider:   Marcella Batres PA-C    Estimated Blood Loss: none    Findings: 1 x 2-0 prolene suture placed to secure tunneled R chest central line to secure it in place       Specimens: none    Complications:  None immediate    Anesthesia: local    Marcella Batres PA-C     Date: 2023  Time: 3:41 PM

## 2023-05-02 ENCOUNTER — HOSPITAL ENCOUNTER (OUTPATIENT)
Dept: INFUSION CENTER | Facility: HOSPITAL | Age: 68
Discharge: HOME | End: 2023-05-02
Attending: INTERNAL MEDICINE

## 2023-05-02 ENCOUNTER — TELEPHONE (OUTPATIENT)
Dept: PAIN MEDICINE | Facility: CLINIC | Age: 68
End: 2023-05-02

## 2023-05-02 VITALS
RESPIRATION RATE: 18 BRPM | DIASTOLIC BLOOD PRESSURE: 85 MMHG | SYSTOLIC BLOOD PRESSURE: 152 MMHG | OXYGEN SATURATION: 99 % | TEMPERATURE: 96.6 F | HEART RATE: 88 BPM

## 2023-05-02 RX ADMIN — SODIUM CHLORIDE 250 ML/HR: 0.9 INJECTION, SOLUTION INTRAVENOUS at 09:24

## 2023-05-02 NOTE — TELEPHONE ENCOUNTER
Caller: marin    Doctor: Germain Reed    Reason for call: pt needs to r/s her procedure      Call back#: [phone]

## 2023-05-02 NOTE — PROGRESS NOTES
IV hydration tolerated well without complications  No complaints offered  AVS declined  Left unit in stable condition

## 2023-05-03 ENCOUNTER — HOSPITAL ENCOUNTER (OUTPATIENT)
Dept: INFUSION CENTER | Facility: HOSPITAL | Age: 68
Discharge: HOME | End: 2023-05-03

## 2023-05-03 ENCOUNTER — TELEPHONE (OUTPATIENT)
Dept: PAIN MEDICINE | Facility: CLINIC | Age: 68
End: 2023-05-03

## 2023-05-03 VITALS
RESPIRATION RATE: 18 BRPM | SYSTOLIC BLOOD PRESSURE: 170 MMHG | TEMPERATURE: 96.4 F | OXYGEN SATURATION: 98 % | HEART RATE: 88 BPM | DIASTOLIC BLOOD PRESSURE: 84 MMHG

## 2023-05-03 RX ADMIN — SODIUM CHLORIDE 250 ML/HR: 0.9 INJECTION, SOLUTION INTRAVENOUS at 08:54

## 2023-05-03 NOTE — TELEPHONE ENCOUNTER
LM that I received her msg and gave her alternate day and times to call me back to re-schedule the procedure

## 2023-05-04 ENCOUNTER — TELEPHONE (OUTPATIENT)
Age: 68
End: 2023-05-04

## 2023-05-04 NOTE — TELEPHONE ENCOUNTER
Pt called to cxld procedure scheduled on 5/12/2023 due to pneumonia   She is  re-scheduled for 7/14/2023 @ 8:00am  Per Pt's request

## 2023-05-05 ENCOUNTER — HOSPITAL ENCOUNTER (OUTPATIENT)
Dept: INFUSION CENTER | Facility: HOSPITAL | Age: 68
End: 2023-05-05

## 2023-05-05 VITALS
SYSTOLIC BLOOD PRESSURE: 141 MMHG | DIASTOLIC BLOOD PRESSURE: 82 MMHG | RESPIRATION RATE: 18 BRPM | HEART RATE: 73 BPM | TEMPERATURE: 97.3 F | OXYGEN SATURATION: 100 %

## 2023-05-05 RX ADMIN — SODIUM CHLORIDE 1500 ML: 0.9 INJECTION, SOLUTION INTRAVENOUS at 09:01

## 2023-05-05 NOTE — PROGRESS NOTES
Pt tolerated hydration treatment well with no complications  Port flushed and blood return noted  [facility] cap placed  Pt aware of future appts   Declined AVS

## 2023-05-07 NOTE — PROGRESS NOTES
"PT Evaluation     Today's date: 23  Patient name: Daryn Cespedes  : 1955  MRN: [id number]  Referring provider: Gerardo Cope MD  Dx:   Encounter Diagnosis     ICD-10-CM    1  Sprain of anterior talofibular ligament of right ankle, subsequent encounter  S93 491D       2  Sprain of deltoid ligament of right ankle, subsequent encounter  S93 421D                      Assessment  Assessment details: Daryn Cespedes is a 75 y o  female presenting to outpatient physical therapy with noted impairments including pain, impaired soft tissue mobility, reduced range of motion, reduced strength, reduced postural awareness, and reduced activity tolerance  Signs and symptoms at present are consistent with referring diagnosis of R ankle sprain  Due to noted impairments, the patient's present functional limitations include difficulty with ADLs with increased need for assistance, reliance on medication and/or modalities for pain relief, reduced tolerance for functional mobility and activity, and difficulty completing HH and community responsibilities  Patient to benefit from skilled outpatient physical therapy 2x/week for 8 weeks in order to reduce pain, maximize pain free range of motion, increase strength and stability, and improve functional mobility/functional activity in order to maximize return to prior level of function with reduced limitations  Home exercise program was provided and all questions answered to patient's level of satisfaction  Thank you for your referral         Impairments: abnormal or restricted ROM, abnormal movement, activity intolerance, impaired physical strength, lacks appropriate home exercise program and pain with function  Understanding of Dx/Px/POC: good   Prognosis: good    Goals  STGs to be achieved in 4 weeks:  1   Pt to demonstrate reduced subjective pain rating \"at worst\" by at least 2-3 points from Initial Eval in order to allow for reduced pain with ADLs and improved functional " activity tolerance  2  Pt to demonstrate increased AROM of R ankle by at least 5-10 degrees in order to allow for greater ease and independence with ADLs and functional mobility  3  Pt to demonstrate increased MMT of R ankle by at least 1/2-1 grade in order to improve safety and stability with ADLs and functional mobility  LTGs to be achieved in 6-8 weeks:  1  Pt will be I with HEP in order to continue to improve quality of life and independence and reduce risk for re-injury  2  Pt to demonstrate return to community errands and driving to [facility]   without limitations or restrictions  3  Pt to demonstrate improved function as noted by achieving or exceeding predicted score on FOTO outcomes assessment tool  Plan  Patient would benefit from: skilled physical therapy  Other planned modality interventions: Modalities prn for symptom management  Planned therapy interventions: manual therapy, neuromuscular re-education, therapeutic activities, therapeutic exercise, strengthening, stretching and home exercise program  Frequency: 2x week  Duration in weeks: 8  Plan of Care beginning date: 5/9/2023  Plan of Care expiration date: 7/4/2023  Treatment plan discussed with: PTA and patient        Subjective Evaluation    History of Present Illness  Mechanism of injury: Pt was taking care of mother and while pulling her up in bed she twisted her ankle  States it swelled and bruised and was painful  States she has pain while driving and prolonged standing or walking > 15 min  Pt having pain in lat malleolus wrapping around the heel and into the medial malleolus area and at times into the arch of the foot  Pt is wearing an ankle brace provided by the doctor's office  Pt taking hydrocodone for her back which helps with the ankle  Having diff with stair climbing  Currently fighting pneumonia and using nebulizer every 3 hours            Not a recurrent problem   Quality of life: fair    Pain  Current pain rating: 3  At best pain ratin  At worst pain ratin  Quality: sharp, throbbing, dull ache and discomfort  Relieving factors: medications, rest, relaxation, support and heat  Aggravating factors: standing and walking (driving, stair climbing)  Progression: improved    Social Support  Steps to enter house: no  Stairs in house: no   Lives in: apartment (elevators)    Employment status: not working    Diagnostic Tests  X-ray: normal (no fx)  Treatments  Previous treatment: medication  Current treatment: physical therapy  Patient Goals  Patient goals for therapy: decreased pain, increased motion, increased strength and independence with ADLs/IADLs          Objective     Observations     Right Ankle/Foot   Negative for deformity, edema, effusion and trophic changes  Additional Observation Details  No swelling or bruising    Tenderness     Right Ankle/Foot   Tenderness in the anterior talofibular ligament, deltoid ligament, lateral malleolus and medial malleolus  No tenderness in the Achilles insertion  Neurological Testing     Additional Neurological Details  Prior neuropathy due to back issues, pt reports this is unchanged    Active Range of Motion     Right Ankle/Foot   Dorsiflexion (ke): 10 degrees   Plantar flexion: 55 degrees   Inversion: 35 degrees   Eversion: 23 degrees     Strength/Myotome Testing     Right Ankle/Foot   Dorsiflexion: 4  Plantar flexion: 4+  Inversion: 4  Eversion: 4-    General Comments:       Ankle/Foot Comments   No swelling R ankle             Precautions: extensive PMH-- please see chart      Re-eval Date: 23    Date 23       Visit Count 1       FOTO  comp        Pain In See eval       Pain Out              Manuals 23                                       Neuro Re-Ed        Side step on aeromat        Tandem stand on aerGladitood        Tandem amb on [employer]                                Ther Ex        Nustep        TR's/HR's Seated 20x       Ankle AROM 20x ea "dir       Ankle TB        Mini squats        Seated towel slides 2 min ea       Incline calf stretch        Step ups  F/L/R        Towel stretch foot and calf 3 x 20\"       Ther Activity                        Gait Training                        Modalities                           "

## 2023-05-08 ENCOUNTER — HOSPITAL ENCOUNTER (OUTPATIENT)
Dept: INFUSION CENTER | Facility: HOSPITAL | Age: 68
Discharge: HOME | End: 2023-05-08

## 2023-05-08 VITALS
RESPIRATION RATE: 18 BRPM | SYSTOLIC BLOOD PRESSURE: 143 MMHG | HEART RATE: 87 BPM | DIASTOLIC BLOOD PRESSURE: 85 MMHG | TEMPERATURE: 97 F | OXYGEN SATURATION: 99 %

## 2023-05-08 RX ADMIN — SODIUM CHLORIDE 1500 ML: 0.9 INJECTION, SOLUTION INTRAVENOUS at 09:21

## 2023-05-09 ENCOUNTER — HOSPITAL ENCOUNTER (OUTPATIENT)
Dept: INFUSION CENTER | Facility: HOSPITAL | Age: 68
Discharge: HOME | End: 2023-05-09
Attending: INTERNAL MEDICINE

## 2023-05-09 ENCOUNTER — EVALUATION (OUTPATIENT)
Dept: PHYSICAL THERAPY | Facility: CLINIC | Age: 68
End: 2023-05-09

## 2023-05-09 VITALS
SYSTOLIC BLOOD PRESSURE: 132 MMHG | TEMPERATURE: 97.4 F | OXYGEN SATURATION: 99 % | DIASTOLIC BLOOD PRESSURE: 84 MMHG | HEART RATE: 63 BPM | RESPIRATION RATE: 16 BRPM

## 2023-05-09 DIAGNOSIS — S93.421D SPRAIN OF DELTOID LIGAMENT OF RIGHT ANKLE, SUBSEQUENT ENCOUNTER: ICD-10-CM

## 2023-05-09 DIAGNOSIS — S93.491D SPRAIN OF ANTERIOR TALOFIBULAR LIGAMENT OF RIGHT ANKLE, SUBSEQUENT ENCOUNTER: Primary | ICD-10-CM

## 2023-05-09 RX ADMIN — SODIUM CHLORIDE 1500 ML: 0.9 INJECTION, SOLUTION INTRAVENOUS at 09:23

## 2023-05-09 NOTE — PROGRESS NOTES
Pt arrived to unit for IV hydration  7 day central line dressing change completed  Line flushed well with no resistance but no blood return obtained  Peripheral IV started to begin treatment today  Called Dr Dipak Arana office to get order for Cathflo as there's isn't one in her paper chart  I was notified that he is out of the office all week and there is no one available for a verbal order until tomorrow  Will continue treatment today with peripheral IV and address central line at next appointment

## 2023-05-09 NOTE — PLAN OF CARE
Problem: METABOLIC, FLUID AND ELECTROLYTES - ADULT  Goal: Fluid balance maintained  Description: INTERVENTIONS:  - Monitor labs   - Instruct patient on fluid and nutrition as appropriate  - Assess for signs & symptoms of volume excess or deficit  - Administer IV fluids as ordered  Outcome: Progressing

## 2023-05-11 ENCOUNTER — HOSPITAL ENCOUNTER (OUTPATIENT)
Dept: INFUSION CENTER | Facility: HOSPITAL | Age: 68
Discharge: HOME | End: 2023-05-11

## 2023-05-11 VITALS
HEART RATE: 76 BPM | SYSTOLIC BLOOD PRESSURE: 119 MMHG | OXYGEN SATURATION: 99 % | TEMPERATURE: 97.8 F | RESPIRATION RATE: 18 BRPM | DIASTOLIC BLOOD PRESSURE: 78 MMHG

## 2023-05-11 RX ORDER — SODIUM CHLORIDE 9 MG/ML
250 INJECTION, SOLUTION INTRAVENOUS ONCE
Status: COMPLETED | OUTPATIENT
Start: 2023-05-12 | End: 2023-05-12

## 2023-05-11 RX ADMIN — SODIUM CHLORIDE 1500 ML: 0.9 INJECTION, SOLUTION INTRAVENOUS at 08:54

## 2023-05-12 ENCOUNTER — HOSPITAL ENCOUNTER (OUTPATIENT)
Dept: INFUSION CENTER | Facility: HOSPITAL | Age: 68
Discharge: HOME | End: 2023-05-12
Attending: INTERNAL MEDICINE

## 2023-05-12 VITALS
TEMPERATURE: 97 F | DIASTOLIC BLOOD PRESSURE: 83 MMHG | HEART RATE: 83 BPM | RESPIRATION RATE: 20 BRPM | SYSTOLIC BLOOD PRESSURE: 132 MMHG | OXYGEN SATURATION: 98 %

## 2023-05-12 RX ORDER — SODIUM CHLORIDE 9 MG/ML
250 INJECTION, SOLUTION INTRAVENOUS ONCE
Status: COMPLETED | OUTPATIENT
Start: 2023-05-15 | End: 2023-05-15

## 2023-05-12 RX ADMIN — SODIUM CHLORIDE 250 ML/HR: 0.9 INJECTION, SOLUTION INTRAVENOUS at 09:37

## 2023-05-14 NOTE — PROGRESS NOTES
Assessment:  1  Chronic pain syndrome    2  Aftercare following surgery of the musculoskeletal system    3  Uncomplicated opioid dependence ([address] )    4  Long-term current use of opiate analgesic        Plan:  Patient is 48-year-old female complaints of neck pain, bilateral arm pain, low back pain, bilateral leg pain, midback pain status post right rotator cuff repair this office for initial consultation  Patient was managed by Dr Sheila Palacio performed a series of epidural steroid injections, radiofrequency ablations addition medication management using Vicodin 5/325 mg p o  b i d   Patient's symptoms size after labia  Patient denies any adverse events since last office visit with Michele Riley  Currently active and physical therapy  Since patient is status post rotator cuff repair at this time will avoid any interventional management until patient can be able to have problems positioning for procedures  1  We will obtain a oral swap and have patient sign opioid contract  2  We will provide patient a refill of Vicodin 5/325 mg p o  b i d  #60  3  Follow-up in 1 month to review oral swab results and provide pills with 2 months refill medication    An oral drug screen swab was collected at today's office visit  The swab will be sent for confirmatory testing  The drug screen is medically necessary because the patient is either dependent on opioid medication or is being considered for opioid medication therapy and the results could impact ongoing or future treatment  The drug screen is to evaluate for the presences or absence of prescribed, non-prescribed, and/or illicit drugs/substances  There are risks associated with opioid medications, including dependence, addiction and tolerance  The patient understands and agrees to use these medications only as prescribed   Potential side effects of the medications include, but are not limited to, constipation, drowsiness, addiction, impaired judgment and risk of fatal overdose if not taken as prescribed  The patient was warned against driving while taking sedation medications  Sharing medications is a felony  At this point in time, the patient is showing no signs of addiction, abuse, diversion or suicidal ideation  [facility] Prescription Drug Monitoring Program report was reviewed and was appropriate     History of Present Illness: The patient is a 78 y o  female who presents for consultation in regards to Neck Pain, Shoulder Pain, Arm Pain, Wrist Pain, Hip Pain, Back Pain, Leg Pain, Knee Pain, Ankle Pain, Foot Pain, Groin Pain, and Elbow Pain  Symptoms have been present for 46 years  Symptoms began following an injury at work  Pain is reported to be 8 on the numeric rating scale  Symptoms are felt constantly and worst in the morning, nighttime  Symptoms are characterized as burning, cramping, shooting, sharp, tingling and pressure-like  Symptoms are associated with bilateral arm weakness and bilateral leg weakness  Aggravating factors include standing, bending, leaning forward, leaning bckward, sitting, walking, exercise, coughing/sneezing and bowel movements  Relieving factors include kneeling and lying down  No change in symptoms with relaxation  Treatments that have been helpful include surgery , prior injections including Gerri, LESI and heat/ice  physical therapy, chiropractic manipulation, acupuncture, home exercise, hypnosis, biofeedback and psychotherapy have provided no relief  Medications to relieve symptoms include vicodin, tylenol, flexeril  Review of Systems:    Review of Systems   Constitutional: Negative for fever and unexpected weight change  HENT: Negative for trouble swallowing  Eyes: Positive for pain, redness and visual disturbance  Respiratory: Positive for cough and shortness of breath  Negative for wheezing  Cardiovascular: Positive for chest pain, palpitations and leg swelling     Gastrointestinal: Positive for abdominal pain  Negative for constipation, diarrhea, nausea and vomiting  Endocrine: Positive for polydipsia and polyuria  Negative for cold intolerance and heat intolerance  Pain with urination   Genitourinary: Negative for difficulty urinating and frequency  Musculoskeletal: Positive for arthralgias and myalgias  Negative for gait problem and joint swelling  Skin: Positive for rash  Neurological: Positive for dizziness, numbness and headaches  Negative for seizures, syncope and weakness  Hematological: Does not bruise/bleed easily  Psychiatric/Behavioral: Negative for dysphoric mood  The patient is nervous/anxious  All other systems reviewed and are negative          Past Medical History:   Diagnosis Date    Allergic rhinitis     Anxiety     Asthma     Back pain     Cardiac disease     Cardiopathy     EF 45%    Cough     Diverticulitis     Factor V Leiden (HCC)     Fibromyalgia     GERD (gastroesophageal reflux disease)     Hashimoto's thyroiditis     Hx of degenerative disc disease     Hypotension     pots - postural orthostatic hypotension    Interstitial cystitis     Irregular heart beat     LBBB    Irritable bowel syndrome     Migraines     Mitral valve disease     "thickening"    Myocardial infarction [facility])     possible but not sure when    Neuropathy     bilateral legs    Osteoporosis     Postural orthostatic tachycardia syndrome     must drink a lot of water and salt    Pott's disease     Rheumatic fever 1967    Scoliosis     Sepsis ([facility] [address] )     associated with PICC line    Sjogren's syndrome ([facility] [facility] )     TMJ (dislocation of temporomandibular joint)        Past Surgical History:   Procedure Laterality Date    ABLATION MICROWAVE Left     lumbar area    BACK SURGERY  10/1998     SECTION      CHOLECYSTECTOMY  2016    COLONOSCOPY      DILATION AND CURETTAGE OF UTERUS      EGD      FOOT SURGERY  1968    removal of bone and neuroma    HYSTERECTOMY  1997    age 54  PRICE ooph    IR OTHER  1/7/2022    IR PICC PLACEMENT SINGLE LUMEN  10/2/2020    IR PICC PLACEMENT SINGLE LUMEN  8/12/2021    IR PICC REPOSITION  12/7/2021    LAPAROSCOPY  1996    endometriosis    NE EGD TRANSORAL BIOPSY SINGLE/MULTIPLE N/A 4/24/2019    Procedure: ESOPHAGOGASTRODUODENOSCOPY (EGD) with multiple bx and dilation;  Surgeon: Aaron Ritchie MD;  Location: [address] [place] [address] GI LAB; Service: Gastroenterology    NE SHLDR ARTHROSCOP,SURG,W/ROTAT CUFF REPR Right 4/6/2022    Procedure: SHOULDER ARTHROSCOPIC ROTATOR CUFF REPAIR Biceps Tenodisis;   Surgeon: Michelle Mays MD;  [facility];  Service: Orthopedics    TUNNELED VENOUS CATHETER PLACEMENT  2016       Family History   Problem Relation Age of Onset    Cancer Mother         urinary bladder     Thyroid cancer Sister     Colon cancer Maternal Grandfather     Breast cancer Maternal Aunt         over 48 yrs old     Endometrial cancer Maternal Aunt     Multiple myeloma Maternal Aunt     No Known Problems Father     No Known Problems Daughter     Heart attack Sister     No Known Problems Sister     No Known Problems Sister     No Known Problems Sister     No Known Problems Sister     No Known Problems Daughter     Hypertension Paternal Aunt        Social History     Occupational History    Not on file   Tobacco Use    Smoking status: Never Smoker    Smokeless tobacco: Never Used   Vaping Use    Vaping Use: Never used   Substance and Sexual Activity    Alcohol use: Never    Drug use: No    Sexual activity: Not on file         Current Outpatient Medications:     Alum Hydroxide-Mag Trisilicate (Gaviscon) 12-30 2 MG CHEW, Chew 1 tablet 4 (four) times a day as needed With meals and as needed, Disp: , Rfl:     azelastine (ASTELIN) 0 1 % nasal spray, 2 sprays into each nostril 2 (two) times a day Use in each nostril as directed , Disp: , Rfl:     beclomethasone (QVAR) 40 MCG/ACT inhaler, Inhale 1 puff daily as needed (only when unable to nebulize pulmicort) Rinse mouth after use  , Disp: , Rfl:     budesonide (PULMICORT) 0 5 mg/2 mL nebulizer solution, Take 0 5 mg by nebulization 2 (two) times a day Rinse mouth after use , Disp: , Rfl:     cholecalciferol (VITAMIN D3) 1,000 units tablet, Take 1,000 Units by mouth daily , Disp: , Rfl:     cyclobenzaprine (FLEXERIL) 5 mg tablet, Take 5 mg by mouth 2 (two) times a day as needed for muscle spasms, Disp: , Rfl:     docusate sodium (COLACE) 100 mg capsule, Take 100 mg by mouth daily as needed for constipation, Disp: , Rfl:     famotidine (PEPCID) 10 mg tablet, Take 40 mg by mouth 2 (two) times a day  , Disp: , Rfl:     fexofenadine (ALLEGRA) 180 MG tablet, Take 180 mg by mouth 2 (two) times a day , Disp: , Rfl:     fluticasone (FLONASE) 50 mcg/act nasal spray, 2 sprays into each nostril daily , Disp: , Rfl:     Galcanezumab-gnlm (Emgality) 120 MG/ML SOAJ, Inject under the skin every 30 (thirty) days , Disp: , Rfl:     HYDROcodone-acetaminophen (Norco) 5-325 mg per tablet, Take 1 tablet by mouth every 6 (six) hours as needed for pain for up to 15 doses Max Daily Amount: 4 tablets, Disp: 15 tablet, Rfl: 0    hydrOXYzine (ATARAX) 10 mg/5 mL syrup, Take 20 mg by mouth daily at bedtime , Disp: , Rfl:     ipratropium (ATROVENT) 0 03 % nasal spray, 2 sprays into each nostril 4 (four) times a day as needed for rhinitis , Disp: , Rfl:     ivabradine HCl (Corlanor) 5 MG tablet, 2 (two) times a day  , Disp: , Rfl:     levalbuterol (XOPENEX HFA) 45 mcg/act inhaler, Inhale 2 puffs every 4 (four) hours as needed (when unable to nebulize), Disp: , Rfl:     levalbuterol (XOPENEX) 1 25 mg/3 mL nebulizer solution, Take 1 25 mg by nebulization every 6 (six) hours as needed , Disp: , Rfl: 2    Magnesium 400 MG CAPS, Take 1 capsule by mouth 2 (two) times a day , Disp: , Rfl:     midodrine (PROAMATINE) 5 mg tablet, TAKE 2 TABS PO IN AM, ONE TAB PO WITH LUNCH AND DINNER (Patient taking differently: 10 mg 3 (three) times a day One hour after thyroid medication, around 1200 and 1600, TAKE 2 TABS PO IN AM, ONE TAB PO WITH LUNCH AND DINNER ), Disp: 120 tablet, Rfl: 11    MULTIPLE VITAMINS-CALCIUM PO, Take 1 capsule by mouth every morning , Disp: , Rfl:     omega-3-acid ethyl esters (LOVAZA) 1 g capsule, Take 2 g by mouth 2 (two) times a day 1600mg daily, Disp: , Rfl:     oxybutynin (DITROPAN) 5 mg tablet, Take 2 5 mg by mouth 2 (two) times a day Once in the morning and one at HS , Disp: , Rfl:     oxyCODONE (ROXICODONE) 5 immediate release tablet, 1 tablets every 8 hours as needed for severe shoulder pain ONLY , Disp: 13 tablet, Rfl: 0    polyethylene glycol (MIRALAX) 17 g packet, Take 17 g by mouth daily as needed , Disp: , Rfl:     Probiotic Product (PROBIOTIC DAILY PO), Take 1 tablet by mouth daily At lunch, Disp: , Rfl:     psyllium (METAMUCIL) 0 52 g capsule, Take 0 52 g by mouth daily, Disp: , Rfl:     Thyroid, Porcine, POWD, Use 32 mg daily, Disp: , Rfl:     Ubrogepant (Ubrelvy) 100 MG tablet, Take 100 mg by mouth Take 1 tablet (100 mg) one time as needed for migraine  May repeat one additional tablet (100 mg) at least two hours after the first dose  Do not use more than two doses per day, or for more than eight days per month , Disp: , Rfl:   No current facility-administered medications for this visit      Allergies   Allergen Reactions    Imipramine Confusion, Fatigue, Irritability, Palpitations, Shortness Of Breath, Tachycardia and Visual Disturbance    Melatonin Shortness Of Breath    Nexium [Esomeprazole] Shortness Of Breath    Nsaids Shortness Of Breath    Singulair [Montelukast] Shortness Of Breath and Cough    Zomig [Zolmitriptan] Shortness Of Breath    Dexilant [Dexlansoprazole] Nausea Only and Vomiting    Albuterol     Ambien [Zolpidem]     Amitriptyline Drowsiness    Aspirin     Bactrim [Sulfamethoxazole-Trimethoprim] Hives    Banana - Food Allergy Dermatitis    Ceftin [Cefuroxime]     Celebrex [Celecoxib]     Ciprofloxacin     Cranberry-C [Ascorbate - Food Allergy] GI Intolerance    Demerol [Meperidine]     Epinephrine Dizziness    Ergotamine Nausea Only and Headache    Keflex [Cephalexin]     Klonopin [Clonazepam] Nausea Only and Dizziness    Levaquin [Levofloxacin]     Lexapro [Escitalopram]     Lyrica [Pregabalin] Fatigue    Macrodantin [Nitrofurantoin]     Morphine Nausea Only    Movantik [Naloxegol] Nausea Only    Mushroom Extract Complex - Food Allergy      Eye itchy, asthma  attack    Naprosyn [Naproxen]     Neurontin [Gabapentin] Dizziness    Pineapple - Food Allergy GI Intolerance    Prolia [Denosumab]     Prozac [Fluoxetine]     Serevent [Salmeterol] Dizziness    Sudafed [Pseudoephedrine] Hives    Sulfa Antibiotics     Tomato - Food Allergy GI Intolerance    Trazodone     Ultram [Tramadol] Nausea Only, Dizziness and Headache    Vioxx [Rofecoxib]     Zithromax [Azithromycin] Hives    Zoloft [Sertraline]     Zantac [Ranitidine] Rash and Dizziness       Physical Exam:    BP (!) 182/97 (BP Location: Left arm, Patient Position: Sitting, Cuff Size: Standard)   Pulse 90   Ht 5' 1" (1 549 m)   Wt 57 9 kg (127 lb 9 6 oz)   BMI 24 11 kg/m²     Constitutional: normal, well developed, well nourished, alert, in no distress and non-toxic and no overt pain behavior  Eyes: anicteric  HEENT: grossly intact  Neck: supple, symmetric, trachea midline and no masses   Pulmonary:even and unlabored  Cardiovascular:No edema or pitting edema present  Skin:Normal without rashes or lesions and well hydrated  Psychiatric:Mood and affect appropriate  Neurologic:Cranial Nerves II-XII grossly intact  Musculoskeletal:antalgic     Cervical Spine examination demonstrates  Decreased ROM secondary to pain with lateral rotation to the left/right and bending to the left/right, in addition to neck flexion   4/5 upper extremity strength in all muscle groups bilaterally  Negative Spurling's maneuver to the b/l Ue, sensitivity to light touch intact b/l Ue  Lumbar/Sacral Spine examination demonstrates  Decrease range of motion lumbar spine with pain upon: flexion, lateral rotation to the left/right, and bending to the left/right  Bilateral lumbar paraspinals tender to palpation  Muscle spasms noted in the lumbar area bilaterally  4/5 lower extremity strength in all muscle groups bilaterally  Positive seated straight leg raise for bilateral lower extremities  Sensitivity to light touch intact bilateral lower extremities  2+ reflexes in the patella and Achilles  No ankle clonus     Imaging  MRI RIGHT SHOULDER     INDICATION:   M25 511: Pain in right shoulder  G89 29: Other chronic pain  R29 898: Other symptoms and signs involving the musculoskeletal system  Dr Michael Favor note from 2/15/2022 was reviewed    Patient has right shoulder pain for the last 6 months      COMPARISON:  Right shoulder plain films from 2/9/2022      TECHNIQUE:   The following MR sequences were obtained of the right shoulder: Localizer, axial GRE/PD fat sat, oblique coronal T2 fat sat, oblique sagittal T1/T2 fat sat       Gadolinium was not used      FINDINGS:     SUBCUTANEOUS TISSUES: Normal     JOINT EFFUSION: None      ACROMION PROCESS: Normal      ROTATOR CUFF: Complete tear of the supraspinatus with torn tendon edge retracted to the level of the humeral head apex and associated with mild muscle atrophy (series 5 images 9-13) The infraspinatus, subscapularis, and teres minor are intact      SUBACROMIAL/SUBDELTOID BURSA: Normal       LONG HEAD OF BICEPS TENDON: High grade partial tear of the long head of biceps tendon (series 3 images 10-18 )     GLENOID LABRUM: Intact      GLENOHUMERAL JOINT: Intact      ACROMIOCLAVICULAR JOINT:  There is mild osteoarthritis      BONES: Normal      IMPRESSION:     Complete tear of the supraspinatus with torn tendon edge retracted to the level of the humeral head apex and associated with mild muscle atrophy (series 5 images 9-13)      High grade partial tear of the long head of biceps tendon (series 3 images 10-18 )      RIGHT SHOULDER     INDICATION:   right shoulder pain      COMPARISON:  None     VIEWS:  XR SHOULDER 2+ VW RIGHT  Images: 3     FINDINGS:     There is no acute fracture or dislocation      Minimal glenohumeral joint osteoarthrosis and mild acromioclavicular joint osteoarthrosis  There is diffuse osteopenia      Tiny intraosseous ganglia suspected in the humeral head      Right central venous catheter terminating at the cavoatrial junction      IMPRESSION:     No acute osseous abnormality  Additional findings as above  none

## 2023-05-15 ENCOUNTER — APPOINTMENT (OUTPATIENT)
Dept: LAB | Facility: CLINIC | Age: 68
End: 2023-05-15

## 2023-05-15 ENCOUNTER — HOSPITAL ENCOUNTER (OUTPATIENT)
Dept: INFUSION CENTER | Facility: HOSPITAL | Age: 68
Discharge: HOME | End: 2023-05-15

## 2023-05-15 VITALS
DIASTOLIC BLOOD PRESSURE: 76 MMHG | HEART RATE: 67 BPM | TEMPERATURE: 97.9 F | RESPIRATION RATE: 16 BRPM | SYSTOLIC BLOOD PRESSURE: 126 MMHG | OXYGEN SATURATION: 99 %

## 2023-05-15 DIAGNOSIS — R05.9 COUGH, UNSPECIFIED TYPE: ICD-10-CM

## 2023-05-15 RX ADMIN — SODIUM CHLORIDE 250 ML/HR: 0.9 INJECTION, SOLUTION INTRAVENOUS at 09:13

## 2023-05-15 NOTE — PLAN OF CARE
Problem: Potential for Falls  Goal: Patient will remain free of falls  Description: INTERVENTIONS:  - Educate patient/family on patient safety including physical limitations  - Instruct patient to call for assistance with activity   - Consult OT/PT to assist with strengthening/mobility   - Keep Call bell within reach  - Keep bed low and locked with side rails adjusted as appropriate  - Keep care items and personal belongings within reach  - Initiate and maintain comfort rounds  - Make Fall Risk Sign visible to staff  - Apply yellow socks and bracelet for high fall risk patients  - Consider moving patient to room near nurses station  Outcome: Progressing     Problem: Knowledge Deficit  Goal: Patient/family/caregiver demonstrates understanding of disease process, treatment plan, medications, and discharge instructions  Description: Complete learning assessment and assess knowledge base    Interventions:  - Provide teaching at level of understanding  - Provide teaching via preferred learning methods  Outcome: Progressing
no

## 2023-05-15 NOTE — PROGRESS NOTES
Pt tolerated IV hydration well without incident  PICC line flushed with good blood return and passive disinfectant cap applied - dressing change due tomorrow  Pt discharged in stable condition and is aware of next infusion appointment  AVS declined

## 2023-05-16 ENCOUNTER — HOSPITAL ENCOUNTER (OUTPATIENT)
Dept: INFUSION CENTER | Facility: HOSPITAL | Age: 68
Discharge: HOME | End: 2023-05-16
Attending: INTERNAL MEDICINE

## 2023-05-16 VITALS
HEART RATE: 86 BPM | DIASTOLIC BLOOD PRESSURE: 77 MMHG | TEMPERATURE: 97.9 F | RESPIRATION RATE: 20 BRPM | OXYGEN SATURATION: 98 % | SYSTOLIC BLOOD PRESSURE: 127 MMHG

## 2023-05-16 DIAGNOSIS — N18.31 STAGE 3A CHRONIC KIDNEY DISEASE (HCC): ICD-10-CM

## 2023-05-16 LAB
ANION GAP SERPL CALCULATED.3IONS-SCNC: 8 MMOL/L (ref 4–13)
BUN SERPL-MCNC: 12 MG/DL (ref 5–25)
CALCIUM SERPL-MCNC: 9.4 MG/DL (ref 8.4–10.2)
CHLORIDE SERPL-SCNC: 105 MMOL/L (ref 96–108)
CO2 SERPL-SCNC: 25 MMOL/L (ref 21–32)
CREAT SERPL-MCNC: 1.03 MG/DL (ref 0.6–1.3)
GFR SERPL CREATININE-BSD FRML MDRD: 55 ML/MIN/1.73SQ M
GLUCOSE P FAST SERPL-MCNC: 83 MG/DL (ref 65–99)
GLUCOSE SERPL-MCNC: 83 MG/DL (ref 65–140)
POTASSIUM SERPL-SCNC: 4.1 MMOL/L (ref 3.5–5.3)
SODIUM SERPL-SCNC: 138 MMOL/L (ref 135–147)
T4 FREE SERPL-MCNC: 1.13 NG/DL (ref 0.76–1.46)
TSH SERPL DL<=0.05 MIU/L-ACNC: 0.92 UIU/ML (ref 0.45–4.5)

## 2023-05-16 RX ADMIN — SODIUM CHLORIDE 1500 ML: 0.9 INJECTION, SOLUTION INTRAVENOUS at 09:42

## 2023-05-16 NOTE — PROGRESS NOTES
S/w nurse from Dr Eleazar Asher office about faxing over PRN order for cathflo for when pt's line is not giving blood return

## 2023-05-16 NOTE — PROGRESS NOTES
IV hydration given without incident  Patient offers no complaints on D/C  PICC flushes freely but no blood return noted on D/C still awaiting proper TPA order from Dr Raphael office, AVS declined, she is aware of her next appointment

## 2023-05-16 NOTE — PROGRESS NOTES
Pt to infusion center for IV hydration  CVC flushed freely, but no blood return noted  PIV inserted for infusion  Reached out to Dr Wendy Thrasher office to possibly obtain order for cathflo  Awaiting return phone call

## 2023-05-17 ENCOUNTER — OFFICE VISIT (OUTPATIENT)
Dept: PHYSICAL THERAPY | Facility: CLINIC | Age: 68
End: 2023-05-17

## 2023-05-17 DIAGNOSIS — S93.491D SPRAIN OF ANTERIOR TALOFIBULAR LIGAMENT OF RIGHT ANKLE, SUBSEQUENT ENCOUNTER: Primary | ICD-10-CM

## 2023-05-17 DIAGNOSIS — S93.421D SPRAIN OF DELTOID LIGAMENT OF RIGHT ANKLE, SUBSEQUENT ENCOUNTER: ICD-10-CM

## 2023-05-17 NOTE — PROGRESS NOTES
"Daily Note     Today's date: 2023  Patient name: Samantha Cabot  : 1955  MRN: [id number]  Referring provider: Katie Clemons MD  Dx:   Encounter Diagnosis     ICD-10-CM    1  Sprain of anterior talofibular ligament of right ankle, subsequent encounter  S93 491D       2  Sprain of deltoid ligament of right ankle, subsequent encounter  S93 421D                      Subjective:  Pt  states her Pulmonary Issue is still on-going  Also notes her R ankle is bothersome as well, which she mainly attributes to driving x 3 hours back and forth to [place]          Objective: See treatment diary below  *Ther exer incl Skilled Conversation x 20 min      Assessment: Tolerated treatment Fairly Well with ther exer and self stretch performed  *Held on select exercises today as per pt request, so as to not aggravate breathing issues  Plan: Con't services 2x/week  Precautions: extensive PMH-- please see chart      Re-eval Date: 23    Date 23      Visit Count 1 2      FOTO  comp        Pain In See eval Stiff/sore R ankle      Pain Out  A little less            Manuals 23                                      Neuro Re-Ed  23      Side step on aeromat        Tandem stand on [employer]        Tandem amb on [employer]                  **Skilled Conversation 20 min              Ther Ex  23      Nustep  *Pt   Deferred today      TR's/HR's Seated 20x Seated 30x        Ankle AROM 20x ea dir 30x ea dir        Ankle TB  *NV      Mini squats        Seated towel slides 2 min ea 2 min ea      Incline calf stretch  **R 4x/30\"  R LE Over sm red bolster      Step ups  F/L/R  *NV      Towel stretch foot and calf 3 x 20\" 4 x 20\" R      Ther Activity                        Gait Training                        Modalities                             "

## 2023-05-18 LAB
BACTERIA SPT RESP CULT: ABNORMAL
GRAM STN SPEC: ABNORMAL

## 2023-05-19 ENCOUNTER — APPOINTMENT (OUTPATIENT)
Dept: PHYSICAL THERAPY | Facility: CLINIC | Age: 68
End: 2023-05-19
Payer: MEDICARE

## 2023-05-22 ENCOUNTER — HOSPITAL ENCOUNTER (OUTPATIENT)
Dept: INFUSION CENTER | Facility: HOSPITAL | Age: 68
Discharge: HOME | End: 2023-05-22

## 2023-05-22 VITALS
OXYGEN SATURATION: 97 % | DIASTOLIC BLOOD PRESSURE: 82 MMHG | RESPIRATION RATE: 16 BRPM | HEART RATE: 85 BPM | SYSTOLIC BLOOD PRESSURE: 135 MMHG | TEMPERATURE: 97.5 F

## 2023-05-22 RX ORDER — SODIUM CHLORIDE 9 MG/ML
250 INJECTION, SOLUTION INTRAVENOUS ONCE
Status: DISCONTINUED | OUTPATIENT
Start: 2023-05-24 | End: 2023-05-28 | Stop reason: HOSPADM

## 2023-05-22 RX ORDER — SODIUM CHLORIDE 9 MG/ML
250 INJECTION, SOLUTION INTRAVENOUS ONCE
Status: COMPLETED | OUTPATIENT
Start: 2023-05-22 | End: 2023-05-22

## 2023-05-22 RX ORDER — SODIUM CHLORIDE 9 MG/ML
250 INJECTION, SOLUTION INTRAVENOUS ONCE
Status: COMPLETED | OUTPATIENT
Start: 2023-05-23 | End: 2023-05-23

## 2023-05-22 RX ADMIN — SODIUM CHLORIDE 250 ML/HR: 0.9 INJECTION, SOLUTION INTRAVENOUS at 09:34

## 2023-05-22 RX ADMIN — ALTEPLASE 2 MG: 2.2 INJECTION, POWDER, LYOPHILIZED, FOR SOLUTION INTRAVENOUS at 09:49

## 2023-05-22 NOTE — PROGRESS NOTES
Right chest central line noted to not have blood return  LFA IV site started to begin hydration  Cathflo administered in central line   + blood return after 1 hour  Patient tolerated IV hydration without issues  AVS declined  Patient is aware of appointment time  Patient ambulated off unit without incident  All personal belongings taken with patient

## 2023-05-23 ENCOUNTER — HOSPITAL ENCOUNTER (OUTPATIENT)
Dept: INFUSION CENTER | Facility: HOSPITAL | Age: 68
Discharge: HOME | End: 2023-05-23

## 2023-05-23 VITALS
OXYGEN SATURATION: 98 % | SYSTOLIC BLOOD PRESSURE: 152 MMHG | TEMPERATURE: 96.4 F | RESPIRATION RATE: 18 BRPM | HEART RATE: 69 BPM | DIASTOLIC BLOOD PRESSURE: 80 MMHG

## 2023-05-23 RX ADMIN — SODIUM CHLORIDE 250 ML/HR: 0.9 INJECTION, SOLUTION INTRAVENOUS at 09:21

## 2023-05-24 ENCOUNTER — HOSPITAL ENCOUNTER (OUTPATIENT)
Dept: INFUSION CENTER | Facility: HOSPITAL | Age: 68
Discharge: HOME | End: 2023-05-24
Attending: INTERNAL MEDICINE

## 2023-05-24 ENCOUNTER — APPOINTMENT (OUTPATIENT)
Dept: PHYSICAL THERAPY | Facility: CLINIC | Age: 68
End: 2023-05-24
Payer: MEDICARE

## 2023-05-24 RX ORDER — SODIUM CHLORIDE 9 MG/ML
250 INJECTION, SOLUTION INTRAVENOUS ONCE
Status: COMPLETED | OUTPATIENT
Start: 2023-05-26 | End: 2023-05-26

## 2023-05-25 ENCOUNTER — HOSPITAL ENCOUNTER (OUTPATIENT)
Dept: INFUSION CENTER | Facility: HOSPITAL | Age: 68
End: 2023-05-25

## 2023-05-25 ENCOUNTER — HOSPITAL ENCOUNTER (OUTPATIENT)
Dept: CT IMAGING | Facility: HOSPITAL | Age: 68
End: 2023-05-25
Attending: OTOLARYNGOLOGY

## 2023-05-25 DIAGNOSIS — J34.89 SINUS PRESSURE: ICD-10-CM

## 2023-05-26 ENCOUNTER — HOSPITAL ENCOUNTER (OUTPATIENT)
Dept: INFUSION CENTER | Facility: HOSPITAL | Age: 68
End: 2023-05-26
Attending: INTERNAL MEDICINE

## 2023-05-26 ENCOUNTER — OFFICE VISIT (OUTPATIENT)
Dept: PHYSICAL THERAPY | Facility: CLINIC | Age: 68
End: 2023-05-26

## 2023-05-26 DIAGNOSIS — S93.491D SPRAIN OF ANTERIOR TALOFIBULAR LIGAMENT OF RIGHT ANKLE, SUBSEQUENT ENCOUNTER: Primary | ICD-10-CM

## 2023-05-26 DIAGNOSIS — S93.421D SPRAIN OF DELTOID LIGAMENT OF RIGHT ANKLE, SUBSEQUENT ENCOUNTER: ICD-10-CM

## 2023-05-26 RX ADMIN — SODIUM CHLORIDE 250 ML/HR: 0.9 INJECTION, SOLUTION INTRAVENOUS at 08:53

## 2023-05-26 NOTE — PROGRESS NOTES
"Daily Note     Today's date: 2023  Patient name: Jass Casanova  : 1955  MRN: [id number]  Referring provider: Ariela Hinds MD  Dx:   Encounter Diagnosis     ICD-10-CM    1  Sprain of anterior talofibular ligament of right ankle, subsequent encounter  S93 491D       2  Sprain of deltoid ligament of right ankle, subsequent encounter  S93 421D                      Subjective: My ankle has been killing me since my trip to [facility] last week after having to go to [facility] round trip  Objective: See treatment diary below      Assessment: Tolerated treatment well  Reduction in pain with ROM and isometrics performed  She continues to demonstrate antalgic gait pattern with ankle brace and cane use  Patient demonstrated fatigue post treatment and would benefit from continued PT      Plan: Continue per plan of care  Progress treatment as tolerated  Precautions: extensive PMH-- please see chart      Re-eval Date: 23    Date 23     Visit Count 1 2 3     FOTO  comp        Pain In See eval Stiff/sore R ankle 8/10     Pain Out  A little less \"A little better \"  5/10           Manuals 23     PROM, calf stretch   15'                             Neuro Re-Ed  23     Side step on [employer]        Tandem stand on [employer]        Tandem amb on [employer]                  **Skilled Conversation 20 min              Ther Ex  23     Nustep  *Pt   Deferred today Patient deferred     TR's/HR's Seated 20x Seated 30x   Seated 30x     Ankle AROM 20x ea dir 30x ea dir   30x ea dir     Ankle TB  *NV *NV     Mini squats        Seated towel slides 2 min ea 2 min ea 2 min ea     Incline calf stretch  **R 4x/30\"  R LE Over sm red bolster **R 4x/30\"  R LE Over sm red bolster     Step ups  F/L/R  *NV Deferred by patient     Towel stretch foot and calf 3 x 20\" 4 x 20\" R Cont NV     isometrics   10x/5\" into ball      Ther Activity              " Gait Training                        Modalities

## 2023-05-30 ENCOUNTER — HOSPITAL ENCOUNTER (OUTPATIENT)
Dept: INFUSION CENTER | Facility: HOSPITAL | Age: 68
Discharge: HOME | End: 2023-05-30
Attending: INTERNAL MEDICINE
Payer: MEDICARE

## 2023-05-30 VITALS
HEART RATE: 80 BPM | RESPIRATION RATE: 18 BRPM | OXYGEN SATURATION: 99 % | TEMPERATURE: 97 F | SYSTOLIC BLOOD PRESSURE: 138 MMHG | DIASTOLIC BLOOD PRESSURE: 73 MMHG

## 2023-05-30 PROCEDURE — 96360 HYDRATION IV INFUSION INIT: CPT

## 2023-05-30 PROCEDURE — 96361 HYDRATE IV INFUSION ADD-ON: CPT

## 2023-05-30 RX ORDER — SODIUM CHLORIDE 9 MG/ML
250 INJECTION, SOLUTION INTRAVENOUS ONCE
Status: COMPLETED | OUTPATIENT
Start: 2023-05-31 | End: 2023-05-31

## 2023-05-30 RX ADMIN — SODIUM CHLORIDE 1500 ML: 0.9 INJECTION, SOLUTION INTRAVENOUS at 09:45

## 2023-05-30 NOTE — PROGRESS NOTES
"Daily Note     Today's date: 23  Patient name: Pat Mercer  : 1955  MRN: [id number]  Referring provider: Emile Ospina MD  Dx:   Encounter Diagnosis     ICD-10-CM    1  Sprain of anterior talofibular ligament of right ankle, subsequent encounter  S93 491D       2  Sprain of deltoid ligament of right ankle, subsequent encounter  S93 421D                      Subjective:  Pt  Notes swelling @ R ankle, and pain level = \"5-6\"/10  Says she's been on her feet a lot, and also involved in a lot of driving, which is aggravating to her R ankle/foot  Objective: See treatment diary below      Assessment: Tolerated treatment Fair+ with performance of ther exer  Received MT and Massage Very Well  Good benefits with CP applic, as well  Plan: Con't services 2x/week as per POC/Goals  Precautions: extensive PMH-- please see chart      Re-eval Date: 23    Date 23    Visit Count 1 2 3 4    FOTO  comp        Pain In See eval Stiff/sore R ankle 8/10 5-6/10    Pain Out  A little less \"A little better \"  5/10 3-4/10  5/14 after CP applic          Manuals 23 5  23    PROM, calf stretch   15' 10'        **massage 15 min                    Neuro Re-Ed  23 5 31 23    Side step on aeromat        Tandem stand on aeromat        Tandem amb on [employer]                  **Skilled Conversation 20 min  Skilled Conversation 10 min            Ther Ex  23 5 31 23    Nustep  *Pt   Deferred today Patient deferred Patient deferred    TR's/HR's Seated 20x Seated 30x   Seated 30x Seated 40x    Ankle AROM 20x ea dir 30x ea dir   30x ea dir 30x ea dir    Ankle TB  *NV *NV     Mini squats        Seated towel slides 2 min ea 2 min ea 2 min ea     Incline calf stretch  **R 4x/30\"  R LE Over sm red bolster **R 4x/30\"  R LE Over sm red bolster R 4x/30\"  *Manual    Step ups  F/L/R  *NV Deferred by patient     Towel stretch " "foot and calf 3 x 20\" 4 x 20\" R Cont NV 4 x 20\" R  *[employer]    isometrics   10x/5\" into ball      Ther Activity                        Gait Training                        Modalities    5 31 23        **CP x 10 min R ankle/foot                 "

## 2023-05-30 NOTE — PROGRESS NOTES
Pt tolerated VI hydration well without incident  Discharged in stable condition and pt aware of next infusion appointment  AVS declined

## 2023-05-31 ENCOUNTER — OFFICE VISIT (OUTPATIENT)
Dept: PHYSICAL THERAPY | Facility: CLINIC | Age: 68
End: 2023-05-31

## 2023-05-31 ENCOUNTER — HOSPITAL ENCOUNTER (OUTPATIENT)
Dept: INFUSION CENTER | Facility: HOSPITAL | Age: 68
Discharge: HOME | End: 2023-05-31
Attending: INTERNAL MEDICINE
Payer: MEDICARE

## 2023-05-31 ENCOUNTER — OFFICE VISIT (OUTPATIENT)
Dept: PAIN MEDICINE | Facility: CLINIC | Age: 68
End: 2023-05-31

## 2023-05-31 VITALS
HEART RATE: 69 BPM | SYSTOLIC BLOOD PRESSURE: 170 MMHG | DIASTOLIC BLOOD PRESSURE: 89 MMHG | OXYGEN SATURATION: 100 % | TEMPERATURE: 96.4 F | RESPIRATION RATE: 18 BRPM

## 2023-05-31 VITALS
HEART RATE: 66 BPM | DIASTOLIC BLOOD PRESSURE: 84 MMHG | BODY MASS INDEX: 26.43 KG/M2 | SYSTOLIC BLOOD PRESSURE: 147 MMHG | WEIGHT: 140 LBS | HEIGHT: 61 IN

## 2023-05-31 DIAGNOSIS — M54.41 CHRONIC BILATERAL LOW BACK PAIN WITH BILATERAL SCIATICA: ICD-10-CM

## 2023-05-31 DIAGNOSIS — S93.491D SPRAIN OF ANTERIOR TALOFIBULAR LIGAMENT OF RIGHT ANKLE, SUBSEQUENT ENCOUNTER: Primary | ICD-10-CM

## 2023-05-31 DIAGNOSIS — M54.12 CERVICAL RADICULOPATHY: ICD-10-CM

## 2023-05-31 DIAGNOSIS — G89.4 CHRONIC PAIN SYNDROME: Primary | ICD-10-CM

## 2023-05-31 DIAGNOSIS — F11.20 UNCOMPLICATED OPIOID DEPENDENCE (HCC): ICD-10-CM

## 2023-05-31 DIAGNOSIS — M79.18 MYOFASCIAL PAIN SYNDROME: ICD-10-CM

## 2023-05-31 DIAGNOSIS — M47.812 CERVICAL SPONDYLOSIS: ICD-10-CM

## 2023-05-31 DIAGNOSIS — Z79.891 LONG-TERM CURRENT USE OF OPIATE ANALGESIC: ICD-10-CM

## 2023-05-31 DIAGNOSIS — Z98.890 HISTORY OF LUMBAR SURGERY: ICD-10-CM

## 2023-05-31 DIAGNOSIS — M54.2 NECK PAIN: ICD-10-CM

## 2023-05-31 DIAGNOSIS — S93.421D SPRAIN OF DELTOID LIGAMENT OF RIGHT ANKLE, SUBSEQUENT ENCOUNTER: ICD-10-CM

## 2023-05-31 DIAGNOSIS — M54.42 CHRONIC BILATERAL LOW BACK PAIN WITH BILATERAL SCIATICA: ICD-10-CM

## 2023-05-31 DIAGNOSIS — G89.29 CHRONIC BILATERAL LOW BACK PAIN WITH BILATERAL SCIATICA: ICD-10-CM

## 2023-05-31 DIAGNOSIS — G03.9 ARACHNOIDITIS: ICD-10-CM

## 2023-05-31 PROCEDURE — 96361 HYDRATE IV INFUSION ADD-ON: CPT

## 2023-05-31 PROCEDURE — 96360 HYDRATION IV INFUSION INIT: CPT

## 2023-05-31 RX ORDER — HYDROCODONE BITARTRATE AND ACETAMINOPHEN 5; 325 MG/1; MG/1
1 TABLET ORAL 2 TIMES DAILY PRN
Qty: 60 TABLET | Refills: 0 | Status: SHIPPED | OUTPATIENT
Start: 2023-05-31

## 2023-05-31 RX ORDER — CYCLOBENZAPRINE HCL 5 MG
5 TABLET ORAL 2 TIMES DAILY PRN
Qty: 60 TABLET | Refills: 2 | Status: SHIPPED | OUTPATIENT
Start: 2023-05-31

## 2023-05-31 RX ADMIN — SODIUM CHLORIDE 250 ML/HR: 0.9 INJECTION, SOLUTION INTRAVENOUS at 09:20

## 2023-05-31 NOTE — PROGRESS NOTES
Assessment:  1  Chronic pain syndrome    2  Chronic bilateral low back pain with bilateral sciatica    3  History of lumbar surgery    4  Arachnoiditis    5  Neck pain    6  Cervical spondylosis    7  Cervical radiculopathy    8  Myofascial pain syndrome    9  Long-term current use of opiate analgesic    10  Uncomplicated opioid dependence ([address] )        Plan:  While the patient was in the office today, I did have a thorough conversation regarding their chronic pain syndrome, medication management, and treatment plan options  Patient is being seen for a follow-up visit  She is scheduled for left-sided C2-3 and C3-4 radiofrequency ablations on 7/14/2023  Continue hydrocodone 5/325 twice daily if needed for pain  The patient's opioid scripts were sent to their pharmacy electronically and was given a 2 month supply of prescriptions with a Do Not Fill date(s) of 5/31/2023, 6/28/2023  Continue Flexeril 5 mg twice daily if needed for spasms  A prescription was sent to her pharmacy  [place] Prescription Drug Monitoring Program report was reviewed and was appropriate     A urine drug screen was collected at today's office visit as part of our medication management protocol  The point of care testing results were appropriate for what was being prescribed  The specimen will be sent for confirmatory testing  The drug screen is medically necessary because the patient is either dependent on opioid medication or is being considered for opioid medication therapy and the results could impact ongoing or future treatment  The drug screen is to evaluate for the presences or absence of prescribed, non-prescribed, and/or illicit drugs/substances  There are risks associated with opioid medications, including dependence, addiction and tolerance  The patient understands and agrees to use these medications only as prescribed   Potential side effects of the medications include, but are not limited to, constipation, drowsiness, addiction, impaired judgment and risk of fatal overdose if not taken as prescribed  The patient was warned against driving while taking sedation medications  Sharing medications is a felony  At this point in time, the patient is showing no signs of addiction, abuse, diversion or suicidal ideation  While the patient was in the office today an opioid contract was thoroughly reviewed and signed by the patient  The patient was given adequate time to ask questions in regards to the contract and a signed copy was sent home for his/her records  The patient will follow-up in 8 weeks for medication prescription refill and reevaluation  The patient was advised to contact the office should their symptoms worsen in the interim  The patient was agreeable and verbalized an understanding  History of Present Illness: The patient is a 75 y o  female last seen on 3/24/2023 who presents for a follow up office visit in regards to chronic pain secondary to chronic pain syndrome, neck pain, cervical spondylosis, cervical radiculopathy, chronic low back pain, lumbar radiculopathy, history of lumbar surgery, arachnoiditis, myofascial pain syndrome  The patient currently reports complaints of neck and upper extremity pain, mid back pain, low back and lower extremity pain  Current pain level is an 8/10  Quality pain is described as burning, sharp, cramping, shooting, numb, pins-and-needles  Current pain medications includes: Hydrocodone 5/325 twice daily if needed for pain, Flexeril 5 mg twice daily if needed for spasms  The patient reports that this regimen is providing 25% pain relief  The patient is reporting no side effects from this pain medication regimen      Pain Contract Signed: 5/31/23  Last Urine Drug Screen: 5/31/23    I have personally reviewed and/or updated the patient's past medical history, past surgical history, family history, social history, current medications, allergies, and vital "signs today  Review of Systems:    Review of Systems   Constitutional: Negative for unexpected weight change  HENT: Negative for hearing loss  Eyes: Negative for visual disturbance  Respiratory: Negative for shortness of breath  Cardiovascular: Negative for leg swelling  Gastrointestinal: Negative for constipation  Endocrine: Negative for polyuria  Genitourinary: Negative for difficulty urinating  Musculoskeletal: Negative for gait problem, joint swelling and myalgias  Skin: Negative for rash  Neurological: Negative for weakness and headaches  Psychiatric/Behavioral: Negative for decreased concentration  All other systems reviewed and are negative          Past Medical History:   Diagnosis Date   • Allergic rhinitis    • Anxiety    • Asthma    • Back pain    • Cardiac disease    • Cardiopathy     EF 45%   • Cough    • Diverticulitis    • Factor V Leiden (HCC)    • Fibromyalgia    • GERD (gastroesophageal reflux disease)    • Hashimoto's thyroiditis    • Hx of degenerative disc disease    • Hypotension     pots - postural orthostatic hypotension   • Interstitial cystitis    • Irregular heart beat     LBBB   • Irritable bowel syndrome    • Migraines    • Mitral valve disease     \"thickening\"   • Myocardial infarction [facility])     possible but not sure when   • Neuropathy     bilateral legs   • Osteoporosis    • Postural orthostatic tachycardia syndrome     must drink a lot of water and salt   • Pott's disease    • Rheumatic fever    • Scoliosis    • Sepsis ([address] )     associated with PICC line   • Sjogren's syndrome ([facility] [address] )    • TMJ (dislocation of temporomandibular joint)        Past Surgical History:   Procedure Laterality Date   • ABLATION MICROWAVE Left     lumbar area   • BACK SURGERY  10/1998   •  SECTION     • CHOLECYSTECTOMY  2016   • COLONOSCOPY     • DILATION AND CURETTAGE OF UTERUS     • EGD     • FOOT SURGERY      removal of bone and neuroma " • HYSTERECTOMY  1997    age 54  PRICE ooph   • IR OTHER  01/07/2022   • IR OTHER  11/22/2022   • IR OTHER  5/1/2023   • IR PICC PLACEMENT SINGLE LUMEN  10/02/2020   • IR PICC PLACEMENT SINGLE LUMEN  08/12/2021   • IR PICC REPOSITION  12/07/2021   • IR TUNNELED CENTRAL LINE PLACEMENT  06/20/2022   • LAPAROSCOPY  1996    endometriosis   • MOUTH BIOPSY      lip   • MA EGD TRANSORAL BIOPSY SINGLE/MULTIPLE N/A 04/24/2019    Procedure: ESOPHAGOGASTRODUODENOSCOPY (EGD) with multiple bx and dilation;  Surgeon: Jacinda Pablo MD;  Location: [facility] GI LAB; Service: Gastroenterology   • MA SURGICAL ARTHROSCOPY SHOULDER W/ROTATOR CUFF RPR Right 04/06/2022    Procedure: SHOULDER ARTHROSCOPIC ROTATOR CUFF REPAIR Biceps Tenodisis;   Surgeon: Geralyn Baumgarten, MD;  Location: [facility] MAIN OR;  Service: Orthopedics   • TUNNELED VENOUS CATHETER PLACEMENT  2016       Family History   Problem Relation Age of Onset   • Cancer Mother         urinary bladder    • No Known Problems Father    • Thyroid cancer Sister    • Heart attack Sister    • No Known Problems Sister    • No Known Problems Sister    • No Known Problems Sister    • No Known Problems Sister    • No Known Problems Daughter    • No Known Problems Daughter    • Colon cancer Maternal Grandfather    • Breast cancer Maternal Aunt         over 48 yrs old    • Endometrial cancer Maternal Aunt    • Multiple myeloma Maternal Aunt    • Hypertension Paternal Aunt        Social History     Occupational History   • Not on file   Tobacco Use   • Smoking status: Never   • Smokeless tobacco: Never   Vaping Use   • Vaping Use: Never used   Substance and Sexual Activity   • Alcohol use: Never   • Drug use: No   • Sexual activity: Not on file         Current Outpatient Medications:   •  Alum Hydroxide-Mag Trisilicate (Gaviscon) 74-20 8 MG CHEW, Chew 1 tablet 4 (four) times a day as needed With meals and as needed, Disp: , Rfl:   •  azelastine (ASTELIN) 0 1 % nasal spray, 2 sprays into each nostril 2 (two) times a day Use in each nostril as directed, Disp: , Rfl:   •  beclomethasone (QVAR) 40 MCG/ACT inhaler, Inhale 1 puff daily as needed (only when unable to nebulize pulmicort) Rinse mouth after use , Disp: , Rfl:   •  budesonide (PULMICORT) 0 5 mg/2 mL nebulizer solution, Take 0 5 mg by nebulization 2 (two) times a day Rinse mouth after use , Disp: , Rfl:   •  cholecalciferol (VITAMIN D3) 1,000 units tablet, Take 4,000 Units by mouth daily, Disp: , Rfl:   •  cyclobenzaprine (FLEXERIL) 5 mg tablet, Take 1 tablet (5 mg total) by mouth 2 (two) times a day as needed for muscle spasms, Disp: 60 tablet, Rfl: 2  •  docusate sodium (COLACE) 100 mg capsule, Take 100 mg by mouth daily as needed for constipation, Disp: , Rfl:   •  erythromycin (ILOTYCIN) ophthalmic ointment, Apply 1 application to eye daily at bedtime, Disp: , Rfl:   •  famotidine (PEPCID) 40 MG tablet, Twice a day, Disp: , Rfl:   •  fexofenadine (ALLEGRA) 180 MG tablet, Take 180 mg by mouth 2 (two) times a day , Disp: , Rfl:   •  fluticasone (FLONASE) 50 mcg/act nasal spray, 2 sprays into each nostril daily , Disp: , Rfl:   •  Galcanezumab-gnlm (Emgality) 120 MG/ML SOAJ, Inject under the skin every 30 (thirty) days , Disp: , Rfl:   •  HYDROcodone-acetaminophen (NORCO) 5-325 mg per tablet, Take 1 tablet by mouth 2 (two) times a day as needed for pain Do not fill until 6/28/2023 Max Daily Amount: 2 tablets, Disp: 60 tablet, Rfl: 0  •  HYDROcodone-acetaminophen (Norco) 5-325 mg per tablet, Take 1 tablet by mouth 2 (two) times a day as needed for pain for up to 60 doses Max Daily Amount: 2 tablets, Disp: 60 tablet, Rfl: 0  •  hydrOXYzine (ATARAX) 10 mg/5 mL syrup, Take 20 mg by mouth daily at bedtime , Disp: , Rfl:   •  ipratropium (ATROVENT) 0 02 % nebulizer solution, Take 0 5 mg by nebulization every 6 (six) hours as needed for wheezing or shortness of breath, Disp: , Rfl:   •  ipratropium (ATROVENT) 0 06 % nasal spray, PLEASE SEE ATTACHED FOR DETAILED DIRECTIONS, Disp: , Rfl:   •  ivabradine HCl (Corlanor) 5 MG tablet, 7 5 mg 2 (two) times a day, Disp: , Rfl:   •  levalbuterol (XOPENEX HFA) 45 mcg/act inhaler, Inhale 2 puffs every 4 (four) hours as needed (when unable to nebulize), Disp: , Rfl:   •  levalbuterol (XOPENEX) 1 25 mg/3 mL nebulizer solution, Take 1 25 mg by nebulization every 6 (six) hours as needed , Disp: , Rfl: 2  •  Magnesium 400 MG CAPS, Take 1 capsule by mouth 2 (two) times a day , Disp: , Rfl:   •  omega-3-acid ethyl esters (LOVAZA) 1 g capsule, Take 2 g by mouth 2 (two) times a day 1600mg daily, Disp: , Rfl:   •  polyethylene glycol (MIRALAX) 17 g packet, Take 17 g by mouth daily as needed , Disp: , Rfl:   •  Probiotic Product (PROBIOTIC DAILY PO), Take 1 tablet by mouth daily At lunch, Disp: , Rfl:   •  psyllium (METAMUCIL) 0 52 g capsule, Take 0 52 g by mouth daily, Disp: , Rfl:   •  Qvar RediHaler 40 MCG/ACT inhaler, , Disp: , Rfl:   •  sodium chloride, Inject 1,500 mL into a catheter in a vein, Disp: , Rfl:   •  Thyroid, Porcine, POWD, Use 32 mg daily, Disp: , Rfl:   •  Ubrogepant (UBRELVY) 100 MG tablet, Take 100 mg by mouth Take 1 tablet (100 mg) one time as needed for migraine  May repeat one additional tablet (100 mg) at least two hours after the first dose  Do not use more than two doses per day, or for more than eight days per month , Disp: , Rfl:   •  verapamil (CALAN) 40 mg tablet, , Disp: , Rfl:   •  MULTIPLE VITAMINS-CALCIUM PO, Take 1 capsule by mouth every morning , Disp: , Rfl:   •  Multiple Vitamins-Iron TABS, Take by mouth, Disp: , Rfl:   No current facility-administered medications for this visit      Facility-Administered Medications Ordered in Other Visits:   •  sodium chloride 0 9 % infusion, 250 mL/hr, Intravenous, Once, Heidi Sena MD    Allergies   Allergen Reactions   • Imipramine Confusion, Fatigue, Irritability, Palpitations, Shortness Of Breath, Tachycardia and Visual Disturbance   • Melatonin Shortness Of "Breath   • Mirtazapine Anxiety, Dizziness, GI Intolerance, Nausea Only, Palpitations and Shortness Of Breath   • Nexium [Esomeprazole] Shortness Of Breath   • Nsaids Shortness Of Breath   • Singulair [Montelukast] Shortness Of Breath and Cough   • Zomig [Zolmitriptan] Shortness Of Breath   • Dexilant [Dexlansoprazole] Nausea Only and Vomiting   • Albuterol    • Ambien [Zolpidem]    • Amitriptyline Drowsiness   • Aspirin    • Bactrim [Sulfamethoxazole-Trimethoprim] Hives   • Banana - Food Allergy Dermatitis   • Ceftin [Cefuroxime]    • Celebrex [Celecoxib]    • Ciprofloxacin    • Cranberry-C [Ascorbate - Food Allergy] GI Intolerance   • Demerol [Meperidine]    • Epinephrine Dizziness   • Ergotamine Nausea Only and Headache   • Keflex [Cephalexin]    • Klonopin [Clonazepam] Nausea Only and Dizziness   • Latex Hives   • Levaquin [Levofloxacin]    • Lexapro [Escitalopram]    • Lyrica [Pregabalin] Fatigue   • Macrodantin [Nitrofurantoin]    • Morphine Nausea Only   • Movantik [Naloxegol] Nausea Only   • Mushroom Extract Complex - Food Allergy      Eye itchy, asthma  attack   • Naprosyn [Naproxen]    • Neurontin [Gabapentin] Dizziness   • Pineapple - Food Allergy GI Intolerance   • Plecanatide Abdominal Pain, Diarrhea and GI Intolerance   • Prolia [Denosumab]    • Prozac [Fluoxetine]    • Serevent [Salmeterol] Dizziness   • Sudafed [Pseudoephedrine] Hives   • Sulfa Antibiotics    • Tomato - Food Allergy GI Intolerance   • Trazodone    • Ultram [Tramadol] Nausea Only, Dizziness and Headache   • Vilazodone Dizziness, GI Intolerance and Headache   • Vilazodone Hcl Abdominal Pain, Dizziness, GI Intolerance, Headache and Other (See Comments)   • Vioxx [Rofecoxib]    • Vortioxetine Drowsiness, Fatigue, GI Intolerance and Irritability   • Zithromax [Azithromycin] Hives   • Zoloft [Sertraline]    • Zantac [Ranitidine] Rash and Dizziness       Physical Exam:    /84   Pulse 66   Ht 5' 1\" (1 549 m)   Wt 63 5 kg (140 lb)   " BMI 26 45 kg/m²     Constitutional:normal, well developed, well nourished, alert, in no distress and non-toxic and no overt pain behavior  Eyes:anicteric  HEENT:grossly intact  Neck:supple, symmetric, trachea midline and no masses   Pulmonary:even and unlabored  Cardiovascular:No edema or pitting edema present  Skin:Normal without rashes or lesions and well hydrated  Psychiatric:Mood and affect appropriate  Neurologic:Cranial Nerves II-XII grossly intact  Musculoskeletal:normal      Imaging  No orders to display         No orders of the defined types were placed in this encounter

## 2023-06-01 ENCOUNTER — OFFICE VISIT (OUTPATIENT)
Dept: OBGYN CLINIC | Facility: CLINIC | Age: 68
End: 2023-06-01

## 2023-06-01 ENCOUNTER — HOSPITAL ENCOUNTER (OUTPATIENT)
Dept: INFUSION CENTER | Facility: HOSPITAL | Age: 68
End: 2023-06-01
Attending: INTERNAL MEDICINE
Payer: MEDICARE

## 2023-06-01 VITALS
SYSTOLIC BLOOD PRESSURE: 124 MMHG | HEIGHT: 61 IN | HEART RATE: 79 BPM | TEMPERATURE: 99.1 F | WEIGHT: 140 LBS | DIASTOLIC BLOOD PRESSURE: 81 MMHG | BODY MASS INDEX: 26.43 KG/M2

## 2023-06-01 VITALS
HEART RATE: 85 BPM | RESPIRATION RATE: 18 BRPM | DIASTOLIC BLOOD PRESSURE: 83 MMHG | TEMPERATURE: 96.1 F | OXYGEN SATURATION: 99 % | SYSTOLIC BLOOD PRESSURE: 143 MMHG

## 2023-06-01 DIAGNOSIS — S93.491D SPRAIN OF ANTERIOR TALOFIBULAR LIGAMENT OF RIGHT ANKLE, SUBSEQUENT ENCOUNTER: Primary | ICD-10-CM

## 2023-06-01 DIAGNOSIS — S99.911D RIGHT ANKLE INJURY, SUBSEQUENT ENCOUNTER: ICD-10-CM

## 2023-06-01 DIAGNOSIS — S93.421D SPRAIN OF DELTOID LIGAMENT OF RIGHT ANKLE, SUBSEQUENT ENCOUNTER: ICD-10-CM

## 2023-06-01 PROCEDURE — 96361 HYDRATE IV INFUSION ADD-ON: CPT

## 2023-06-01 PROCEDURE — 96360 HYDRATION IV INFUSION INIT: CPT

## 2023-06-01 RX ORDER — SODIUM CHLORIDE 9 MG/ML
250 INJECTION, SOLUTION INTRAVENOUS ONCE
Status: COMPLETED | OUTPATIENT
Start: 2023-06-01 | End: 2023-06-01

## 2023-06-01 RX ORDER — SODIUM CHLORIDE 9 MG/ML
250 INJECTION, SOLUTION INTRAVENOUS ONCE
Status: COMPLETED | OUTPATIENT
Start: 2023-06-02 | End: 2023-06-02

## 2023-06-01 RX ADMIN — SODIUM CHLORIDE 250 ML/HR: 0.9 INJECTION, SOLUTION INTRAVENOUS at 09:30

## 2023-06-01 NOTE — PATIENT INSTRUCTIONS
F/u 6 wks  Continue therapy  Try wearing boot  Brace as needed  Icing/heat/OTC pain meds as needed  Home exercises

## 2023-06-01 NOTE — PROGRESS NOTES
"[facility] [place] [id number]  [facility] [id number] [phone]      Chief Complaint:  Chief Complaint   Patient presents with   • Right Ankle - Follow-up       Vitals:  /81 (BP Location: Left arm, Patient Position: Sitting, Cuff Size: Standard)   Pulse 79   Temp 99 1 °F (37 3 °C) (Tympanic)   Ht 5' 1\" (1 549 m)   Wt 63 5 kg (140 lb)   BMI 26 45 kg/m²     The following portions of the patient's history were reviewed and updated as appropriate: allergies, current medications, past family history, past medical history, past social history, past surgical history, and problem list       Subjective:   Patient ID: Zacarias Stokes is a 75 y o  female  Here for f/u  R ankle sprain  Started therapy  swollen  No pain at rest  Sharp pain at times  Prolonged walking worsens pain/driving  Ankle brace bothers her but wearing it  Review of Systems   Constitutional: Negative for fatigue and fever  Respiratory: Negative for shortness of breath  Cardiovascular: Negative for chest pain  Gastrointestinal: Negative for abdominal pain and nausea  Genitourinary: Negative for dysuria  Musculoskeletal: Positive for arthralgias  Skin: Negative for rash and wound  Neurological: Negative for weakness and headaches  Objective:  Right Ankle Exam     Tenderness   The patient is experiencing tenderness in the ATF, CF and deltoid  Range of Motion   The patient has normal right ankle ROM  Muscle Strength   The patient has normal right ankle strength  Strength/Myotome Testing     Right Ankle/Foot   Normal strength      Physical Exam  Constitutional:       Appearance: Normal appearance  She is normal weight  Eyes:      Extraocular Movements: Extraocular movements intact  Pulmonary:      Effort: Pulmonary effort is normal    Musculoskeletal:      Cervical back: Normal range of motion     Skin:     General: Skin is warm and " dry    Neurological:      General: No focal deficit present  Mental Status: She is alert and oriented to person, place, and time  Mental status is at baseline  Psychiatric:         Mood and Affect: Mood normal          Behavior: Behavior normal          Thought Content: Thought content normal          Judgment: Judgment normal                Assessment/Plan:  Assessment/Plan   Diagnoses and all orders for this visit:    Sprain of anterior talofibular ligament of right ankle, subsequent encounter  -     Cam Boot  -     Ambulatory Referral to Physical Therapy; Future    Sprain of deltoid ligament of right ankle, subsequent encounter  -     Cam Boot  -     Ambulatory Referral to Physical Therapy; Future    Right ankle injury, subsequent encounter  -     Cam Boot  -     Ambulatory Referral to Physical Therapy; Future        Return in about 6 weeks (around 7/13/2023) for Recheck       Denisse Hale MD

## 2023-06-02 ENCOUNTER — OFFICE VISIT (OUTPATIENT)
Dept: PHYSICAL THERAPY | Facility: CLINIC | Age: 68
End: 2023-06-02

## 2023-06-02 ENCOUNTER — APPOINTMENT (OUTPATIENT)
Dept: LAB | Facility: CLINIC | Age: 68
End: 2023-06-02
Payer: MEDICARE

## 2023-06-02 ENCOUNTER — HOSPITAL ENCOUNTER (OUTPATIENT)
Dept: INFUSION CENTER | Facility: HOSPITAL | Age: 68
End: 2023-06-02
Attending: INTERNAL MEDICINE
Payer: MEDICARE

## 2023-06-02 VITALS
SYSTOLIC BLOOD PRESSURE: 154 MMHG | RESPIRATION RATE: 18 BRPM | OXYGEN SATURATION: 96 % | DIASTOLIC BLOOD PRESSURE: 86 MMHG | HEART RATE: 81 BPM | TEMPERATURE: 97.1 F

## 2023-06-02 DIAGNOSIS — J45.902 EXTRINSIC ASTHMA WITH STATUS ASTHMATICUS, UNSPECIFIED ASTHMA SEVERITY, UNSPECIFIED WHETHER PERSISTENT: ICD-10-CM

## 2023-06-02 DIAGNOSIS — J31.0 NONALLERGIC RHINITIS: ICD-10-CM

## 2023-06-02 DIAGNOSIS — S93.491D SPRAIN OF ANTERIOR TALOFIBULAR LIGAMENT OF RIGHT ANKLE, SUBSEQUENT ENCOUNTER: Primary | ICD-10-CM

## 2023-06-02 DIAGNOSIS — T78.1XXA ADVERSE FOOD REACTION, INITIAL ENCOUNTER: ICD-10-CM

## 2023-06-02 DIAGNOSIS — S93.421D SPRAIN OF DELTOID LIGAMENT OF RIGHT ANKLE, SUBSEQUENT ENCOUNTER: ICD-10-CM

## 2023-06-02 DIAGNOSIS — R07.89 CHEST TIGHTNESS: ICD-10-CM

## 2023-06-02 DIAGNOSIS — R05.9 COUGH, UNSPECIFIED TYPE: ICD-10-CM

## 2023-06-02 LAB
ALBUMIN SERPL BCP-MCNC: 4.1 G/DL (ref 3.5–5)
ALP SERPL-CCNC: 107 U/L (ref 46–116)
ALT SERPL W P-5'-P-CCNC: 30 U/L (ref 12–78)
ANION GAP SERPL CALCULATED.3IONS-SCNC: 2 MMOL/L (ref 4–13)
AST SERPL W P-5'-P-CCNC: 17 U/L (ref 5–45)
BILIRUB SERPL-MCNC: 0.48 MG/DL (ref 0.2–1)
BUN SERPL-MCNC: 15 MG/DL (ref 5–25)
CALCIUM SERPL-MCNC: 9.8 MG/DL (ref 8.3–10.1)
CHLORIDE SERPL-SCNC: 107 MMOL/L (ref 96–108)
CO2 SERPL-SCNC: 28 MMOL/L (ref 21–32)
CREAT SERPL-MCNC: 1.06 MG/DL (ref 0.6–1.3)
ERYTHROCYTE [DISTWIDTH] IN BLOOD BY AUTOMATED COUNT: 13.4 % (ref 11.6–15.1)
GFR SERPL CREATININE-BSD FRML MDRD: 54 ML/MIN/1.73SQ M
GLUCOSE P FAST SERPL-MCNC: 77 MG/DL (ref 65–99)
HCT VFR BLD AUTO: 43.9 % (ref 34.8–46.1)
HGB BLD-MCNC: 14.1 G/DL (ref 11.5–15.4)
MCH RBC QN AUTO: 30.3 PG (ref 26.8–34.3)
MCHC RBC AUTO-ENTMCNC: 32.1 G/DL (ref 31.4–37.4)
MCV RBC AUTO: 94 FL (ref 82–98)
PLATELET # BLD AUTO: 338 THOUSANDS/UL (ref 149–390)
PMV BLD AUTO: 9.9 FL (ref 8.9–12.7)
POTASSIUM SERPL-SCNC: 3.7 MMOL/L (ref 3.5–5.3)
PROT SERPL-MCNC: 7.5 G/DL (ref 6.4–8.4)
RBC # BLD AUTO: 4.65 MILLION/UL (ref 3.81–5.12)
SODIUM SERPL-SCNC: 137 MMOL/L (ref 135–147)
WBC # BLD AUTO: 6.69 THOUSAND/UL (ref 4.31–10.16)

## 2023-06-02 PROCEDURE — 36415 COLL VENOUS BLD VENIPUNCTURE: CPT

## 2023-06-02 PROCEDURE — 86003 ALLG SPEC IGE CRUDE XTRC EA: CPT

## 2023-06-02 PROCEDURE — 85027 COMPLETE CBC AUTOMATED: CPT

## 2023-06-02 PROCEDURE — 82785 ASSAY OF IGE: CPT

## 2023-06-02 PROCEDURE — 80053 COMPREHEN METABOLIC PANEL: CPT

## 2023-06-02 RX ORDER — SODIUM CHLORIDE 9 MG/ML
250 INJECTION, SOLUTION INTRAVENOUS ONCE
Status: COMPLETED | OUTPATIENT
Start: 2023-06-06 | End: 2023-06-06

## 2023-06-02 RX ORDER — SODIUM CHLORIDE 9 MG/ML
250 INJECTION, SOLUTION INTRAVENOUS ONCE
Status: COMPLETED | OUTPATIENT
Start: 2023-06-05 | End: 2023-06-05

## 2023-06-02 RX ADMIN — SODIUM CHLORIDE 250 ML/HR: 0.9 INJECTION, SOLUTION INTRAVENOUS at 09:23

## 2023-06-02 NOTE — PROGRESS NOTES
"Daily Note     Today's date: 2023  Patient name: Omar Rabago  : 1955  MRN: [id number]  Referring provider: Kal Navarro MD  Dx:   Encounter Diagnosis     ICD-10-CM    1  Sprain of anterior talofibular ligament of right ankle, subsequent encounter  S93 491D       2  Sprain of deltoid ligament of right ankle, subsequent encounter  S93 421D                      Subjective:  Pt  Reports pain level = \"5-6\"10 again today  Pt  ambul with Sprain walker boot on R, as per MD (Dr Rachele Sherwood) direction  Objective: See treatment diary below      Assessment: Tolerated treatment Fairly Well with ther exer  , and Well with all modality Applics incl US, CP, and MT  Plan: Con't services 2x/week  Precautions: extensive PMH-- please see chart      Re-eval Date: 23    Date 23 5 17 23 5 31 23 6 2 23   Visit Count 1 2 3 4 5   FOTO  comp        Pain In See eval Stiff/sore R ankle 8/10 5-6/10 5-6/10    4/10 during treatment   Pain Out  A little less \"A little better \"  5/10 3-4/10  7/25 after CP applic - after CP applic         Manuals 79 5 17 23 23 5 31 23 6 2 23   PROM, calf stretch   15' 10' 10'       **massage 15 min massage 15 min                   Neuro Re-Ed  23 5 31 23 6 2 23   Side step on aeromat        Tandem stand on aeromat        Tandem amb on [employer]                  **Skilled Conversation 20 min  Skilled Conversation 10 min Skilled Conversation 10 min           Ther Ex  5 23 5 31 23 6 2 23   Nustep  *Pt   Deferred today Patient deferred Patient deferred    TR's/HR's Seated 20x Seated 30x   Seated 30x Seated 40x Seated 40x   Ankle AROM 20x ea dir 30x ea dir   30x ea dir 30x ea dir 30x ea dir   Ankle TB  *NV *NV     Mini squats        Seated towel slides 2 min ea 2 min ea 2 min ea     Incline calf stretch  **R 4x/30\"  R LE Over sm red bolster **R 4x/30\"  R LE Over sm red bolster R 4\"  *Manual R 4\"  *Manual " "  Step ups  F/L/R  *NV Deferred by patient     Towel stretch foot and calf 3 x 20\" 4 x 20\" R Cont NV 4 x 20\" R  *Manual 4 x 20\" R  *Manual   isometrics   10x/5\" into ball      Ther Activity                        Gait Training                        Modalities    5 31 23 6 2 23       **CP x 10 min R ankle/foot CP x 10 min R ankle/foot        **US to R ankle 10 min        "

## 2023-06-04 LAB
6MAM UR QL CFM: NEGATIVE NG/ML
7AMINOCLONAZEPAM UR QL CFM: NEGATIVE NG/ML
A-OH ALPRAZ UR QL CFM: NEGATIVE NG/ML
ACCEPTABLE CREAT UR QL: NORMAL MG/DL
ACCEPTIBLE SP GR UR QL: NORMAL
AMPHET UR QL CFM: NEGATIVE NG/ML
AMPHET UR QL CFM: NEGATIVE NG/ML
BUPRENORPHINE UR QL CFM: NEGATIVE NG/ML
BUTALBITAL UR QL CFM: NEGATIVE NG/ML
BZE UR QL CFM: NEGATIVE NG/ML
CODEINE UR QL CFM: NEGATIVE NG/ML
DESIPRAMINE UR QL CFM: NEGATIVE NG/ML
EDDP UR QL CFM: NEGATIVE NG/ML
ETHYL GLUCURONIDE UR QL CFM: NEGATIVE NG/ML
ETHYL SULFATE UR QL SCN: NEGATIVE NG/ML
FENTANYL UR QL CFM: NEGATIVE NG/ML
GLIADIN IGG SER IA-ACNC: NEGATIVE NG/ML
GLUCOSE 30M P 50 G LAC PO SERPL-MCNC: NEGATIVE NG/ML
HYDROCODONE UR QL CFM: NORMAL NG/ML
HYDROCODONE UR QL CFM: NORMAL NG/ML
HYDROMORPHONE UR QL CFM: NORMAL NG/ML
LORAZEPAM UR QL CFM: NEGATIVE NG/ML
MDMA UR QL CFM: NEGATIVE NG/ML
ME-PHENIDATE UR QL CFM: NEGATIVE NG/ML
MEPERIDINE UR QL CFM: NEGATIVE NG/ML
METHADONE UR QL CFM: NEGATIVE NG/ML
METHAMPHET UR QL CFM: NEGATIVE NG/ML
MORPHINE UR QL CFM: NEGATIVE NG/ML
MORPHINE UR QL CFM: NEGATIVE NG/ML
NITRITE UR QL: NORMAL UG/ML
NORBUPRENORPHINE UR QL CFM: NEGATIVE NG/ML
NORDIAZEPAM UR QL CFM: NEGATIVE NG/ML
NORFENTANYL UR QL CFM: NEGATIVE NG/ML
NORHYDROCODONE UR QL CFM: NORMAL NG/ML
NORHYDROCODONE UR QL CFM: NORMAL NG/ML
NORMEPERIDINE UR QL CFM: NEGATIVE NG/ML
NOROXYCODONE UR QL CFM: NEGATIVE NG/ML
OLANZAPINE QUANTIFICATION: NEGATIVE NG/ML
OPC-3373 QUANTIFICATION: NEGATIVE
OXAZEPAM UR QL CFM: NEGATIVE NG/ML
OXYCODONE UR QL CFM: NEGATIVE NG/ML
OXYMORPHONE UR QL CFM: NEGATIVE NG/ML
OXYMORPHONE UR QL CFM: NEGATIVE NG/ML
PARA-FLUOROFENTANYL QUANTIFICATION: NORMAL NG/ML
PCP UR QL CFM: NEGATIVE NG/ML
PHENOBARB UR QL CFM: NEGATIVE NG/ML
RESULT ALL_PRESCRIBED MEDS AND SPECIAL INSTRUCTIONS: NORMAL
SECOBARBITAL UR QL CFM: NEGATIVE NG/ML
SL AMB 4-ANPP QUANTIFICATION: NORMAL NG/ML
SL AMB 7-OH-MITRAGYNINE (KRATOM ALKALOID) QUANTIFICATION: NEGATIVE NG/ML
SL AMB ACETYL FENTANYL QUANTIFICATION: NORMAL NG/ML
SL AMB ACETYL NORFENTANYL QUANTIFICATION: NORMAL NG/ML
SL AMB ACRYL FENTANYL QUANTIFICATION: NORMAL NG/ML
SL AMB CARFENTANIL QUANTIFICATION: NORMAL NG/ML
SL AMB CLOZAPINE QUANTIFICATION: NEGATIVE NG/ML
SL AMB CTHC (MARIJUANA METABOLITE) QUANTIFICATION: NEGATIVE NG/ML
SL AMB DEXTRORPHAN (DEXTROMETHORPHAN METABOLITE) QUANT: ABNORMAL NG/ML
SL AMB HALOPERIDOL  QUANTIFICATION: NEGATIVE NG/ML
SL AMB HALOPERIDOL METABOLITE QUANTIFICATION: NEGATIVE NG/ML
SL AMB HYDROXYRISPERIDONE QUANTIFICATION: NEGATIVE NG/ML
SL AMB N-DESMETHYL-TRAMADOL QUANTIFICATION: NEGATIVE NG/ML
SL AMB N-DESMETHYLCLOZAPINE QUANTIFICATION: NEGATIVE NG/ML
SL AMB NORQUETIAPINE QUANTIFICATION: NEGATIVE NG/ML
SL AMB PHENTERMINE QUANTIFICATION: NEGATIVE NG/ML
SL AMB PREGABALIN QUANTIFICATION: NEGATIVE
SL AMB QUETIAPINE QUANTIFICATION: NEGATIVE NG/ML
SL AMB RISPERIDONE QUANTIFICATION: NEGATIVE NG/ML
SL AMB RITALINIC ACID QUANTIFICATION: NEGATIVE NG/ML
SPECIMEN PH ACCEPTABLE UR: NORMAL
TAPENTADOL UR QL CFM: NEGATIVE NG/ML
TEMAZEPAM UR QL CFM: NEGATIVE NG/ML
TEMAZEPAM UR QL CFM: NEGATIVE NG/ML
TRAMADOL UR QL CFM: NEGATIVE NG/ML
URATE/CREAT 24H UR: NEGATIVE NG/ML

## 2023-06-05 ENCOUNTER — OFFICE VISIT (OUTPATIENT)
Dept: PHYSICAL THERAPY | Facility: CLINIC | Age: 68
End: 2023-06-05
Payer: MEDICARE

## 2023-06-05 ENCOUNTER — HOSPITAL ENCOUNTER (OUTPATIENT)
Dept: INFUSION CENTER | Facility: HOSPITAL | Age: 68
Discharge: HOME | End: 2023-06-05
Attending: INTERNAL MEDICINE
Payer: MEDICARE

## 2023-06-05 VITALS
DIASTOLIC BLOOD PRESSURE: 85 MMHG | RESPIRATION RATE: 16 BRPM | TEMPERATURE: 96.5 F | OXYGEN SATURATION: 99 % | HEART RATE: 88 BPM | SYSTOLIC BLOOD PRESSURE: 159 MMHG

## 2023-06-05 DIAGNOSIS — S93.491D SPRAIN OF ANTERIOR TALOFIBULAR LIGAMENT OF RIGHT ANKLE, SUBSEQUENT ENCOUNTER: Primary | ICD-10-CM

## 2023-06-05 DIAGNOSIS — S93.421D SPRAIN OF DELTOID LIGAMENT OF RIGHT ANKLE, SUBSEQUENT ENCOUNTER: ICD-10-CM

## 2023-06-05 LAB
A ALTERNATA IGE QN: <0.1 KUA/I
A FUMIGATUS IGE QN: <0.1 KUA/I
BERMUDA GRASS IGE QN: <0.1 KUA/I
BOXELDER IGE QN: <0.1 KUA/I
C HERBARUM IGE QN: <0.1 KUA/I
CAT DANDER IGE QN: <0.1 KUA/I
CMN PIGWEED IGE QN: <0.1 KUA/I
COCKSFOOT IGE QN: <0.1 KUA/I
COMMON RAGWEED IGE QN: <0.1 KUA/I
COTTONWOOD IGE QN: <0.1 KUA/I
D FARINAE IGE QN: <0.1 KUA/I
D PTERONYSS IGE QN: <0.1 KUA/I
DOG DANDER IGE QN: <0.1 KUA/I
LONDON PLANE IGE QN: <0.1 KUA/I
MILK IGE QN: <0.1 KUA/I
MOUSE URINE PROT IGE QN: <0.1 KUA/I
MT JUNIPER IGE QN: <0.1 KUA/I
MUGWORT IGE QN: <0.1 KUA/I
P NOTATUM IGE QN: <0.1 KUA/I
PEANUT IGE QN: <0.1 KUA/I
PER RYE GRASS IGE QN: <0.1 KUA/I
ROACH IGE QN: <0.1 KUA/I
SALMON IGE QN: <0.1 KUA/I
SHEEP SORREL IGE QN: <0.1 KUA/I
SILVER BIRCH IGE QN: <0.1 KUA/I
SOYBEAN IGE QN: <0.1 KUA/I
TIMOTHY IGE QN: <0.1 KUA/I
TOTAL IGE SMQN RAST: 6.18 KU/L (ref 0–113)
WALNUT IGE QN: <0.1 KUA/I
WALNUT IGE QN: <0.1 KUA/I
WHEAT IGE QN: <0.1 KUA/I
WHITE ASH IGE QN: <0.1 KUA/I
WHITE ELM IGE QN: <0.1 KUA/I
WHITE MULBERRY IGE QN: <0.1 KUA/I
WHITE OAK IGE QN: <0.1 KUA/I

## 2023-06-05 PROCEDURE — 97110 THERAPEUTIC EXERCISES: CPT

## 2023-06-05 RX ADMIN — SODIUM CHLORIDE 250 ML/HR: 0.9 INJECTION, SOLUTION INTRAVENOUS at 09:06

## 2023-06-05 RX ADMIN — ALTEPLASE 2 MG: 2.2 INJECTION, POWDER, LYOPHILIZED, FOR SOLUTION INTRAVENOUS at 09:12

## 2023-06-05 NOTE — PROGRESS NOTES
Right chest central line dressing changed due to the side of the dressing lifting off  Dressing change done per protocol  Central line flushed freely  No blood return noted  Cathflo administered  Blood return noted after 30 minutes  Patient tolerated NSS IV hydration without issues  AVS declined  Patient is aware of next appointment  Patient ambulated off unit without incident  All personal belongings taken with patient
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
## 2023-06-06 ENCOUNTER — HOSPITAL ENCOUNTER (OUTPATIENT)
Dept: INFUSION CENTER | Facility: HOSPITAL | Age: 68
Discharge: HOME | End: 2023-06-06
Attending: INTERNAL MEDICINE
Payer: MEDICARE

## 2023-06-06 VITALS
OXYGEN SATURATION: 99 % | HEART RATE: 82 BPM | DIASTOLIC BLOOD PRESSURE: 84 MMHG | SYSTOLIC BLOOD PRESSURE: 134 MMHG | RESPIRATION RATE: 18 BRPM | TEMPERATURE: 96.9 F

## 2023-06-06 LAB
AMER BEECH IGE QN: <0.1 KU/L
E PURPURASCENS IGE QN: <0.1 KU/L
GIANT RAGWEED IGE QN: <0.1 KU/L
PECAN/HICK TREE IGE QN: <0.1 KU/L
SW VERNAL GRASS IGE QN: <0.1 KU/L
WHITE HICKORY IGE QN: <0.1 KU/L

## 2023-06-06 RX ADMIN — SODIUM CHLORIDE 250 ML/HR: 9 INJECTION, SOLUTION INTRAVENOUS at 08:42

## 2023-06-07 LAB — COMMON REED IGE AB [UNITS/VOLUME] IN SERUM: <0.1 KU/L

## 2023-06-08 ENCOUNTER — APPOINTMENT (OUTPATIENT)
Dept: PHYSICAL THERAPY | Facility: CLINIC | Age: 68
End: 2023-06-08
Payer: MEDICARE

## 2023-06-08 RX ORDER — SODIUM CHLORIDE 9 MG/ML
250 INJECTION, SOLUTION INTRAVENOUS ONCE
Status: COMPLETED | OUTPATIENT
Start: 2023-06-09 | End: 2023-06-09

## 2023-06-09 ENCOUNTER — HOSPITAL ENCOUNTER (OUTPATIENT)
Dept: INFUSION CENTER | Facility: HOSPITAL | Age: 68
End: 2023-06-09
Attending: INTERNAL MEDICINE
Payer: MEDICARE

## 2023-06-09 VITALS
TEMPERATURE: 97.3 F | SYSTOLIC BLOOD PRESSURE: 144 MMHG | OXYGEN SATURATION: 100 % | RESPIRATION RATE: 18 BRPM | HEART RATE: 77 BPM | DIASTOLIC BLOOD PRESSURE: 82 MMHG

## 2023-06-09 PROCEDURE — 96361 HYDRATE IV INFUSION ADD-ON: CPT

## 2023-06-09 PROCEDURE — 96360 HYDRATION IV INFUSION INIT: CPT

## 2023-06-09 RX ADMIN — SODIUM CHLORIDE 250 ML/HR: 0.9 INJECTION, SOLUTION INTRAVENOUS at 09:04

## 2023-06-12 ENCOUNTER — APPOINTMENT (OUTPATIENT)
Dept: PHYSICAL THERAPY | Facility: CLINIC | Age: 68
End: 2023-06-12
Payer: MEDICARE

## 2023-06-13 ENCOUNTER — HOSPITAL ENCOUNTER (OUTPATIENT)
Dept: INFUSION CENTER | Facility: HOSPITAL | Age: 68
Discharge: HOME | End: 2023-06-13
Payer: MEDICARE

## 2023-06-13 ENCOUNTER — OFFICE VISIT (OUTPATIENT)
Dept: PHYSICAL THERAPY | Facility: CLINIC | Age: 68
End: 2023-06-13
Payer: MEDICARE

## 2023-06-13 VITALS
OXYGEN SATURATION: 95 % | TEMPERATURE: 98 F | HEART RATE: 81 BPM | RESPIRATION RATE: 16 BRPM | DIASTOLIC BLOOD PRESSURE: 82 MMHG | SYSTOLIC BLOOD PRESSURE: 123 MMHG

## 2023-06-13 DIAGNOSIS — S93.491D SPRAIN OF ANTERIOR TALOFIBULAR LIGAMENT OF RIGHT ANKLE, SUBSEQUENT ENCOUNTER: Primary | ICD-10-CM

## 2023-06-13 DIAGNOSIS — S93.421D SPRAIN OF DELTOID LIGAMENT OF RIGHT ANKLE, SUBSEQUENT ENCOUNTER: ICD-10-CM

## 2023-06-13 PROCEDURE — 97110 THERAPEUTIC EXERCISES: CPT

## 2023-06-13 PROCEDURE — 96361 HYDRATE IV INFUSION ADD-ON: CPT

## 2023-06-13 PROCEDURE — 96360 HYDRATION IV INFUSION INIT: CPT

## 2023-06-13 RX ADMIN — SODIUM CHLORIDE 1500 ML: 0.9 INJECTION, SOLUTION INTRAVENOUS at 09:07
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
## 2023-06-14 RX ORDER — SODIUM CHLORIDE 9 MG/ML
250 INJECTION, SOLUTION INTRAVENOUS ONCE
Status: COMPLETED | OUTPATIENT
Start: 2023-06-15 | End: 2023-06-15

## 2023-06-15 ENCOUNTER — HOSPITAL ENCOUNTER (OUTPATIENT)
Dept: INFUSION CENTER | Facility: HOSPITAL | Age: 68
End: 2023-06-15
Payer: MEDICARE

## 2023-06-15 ENCOUNTER — OFFICE VISIT (OUTPATIENT)
Dept: PHYSICAL THERAPY | Facility: CLINIC | Age: 68
End: 2023-06-15
Payer: MEDICARE

## 2023-06-15 VITALS — TEMPERATURE: 97.6 F | RESPIRATION RATE: 18 BRPM | DIASTOLIC BLOOD PRESSURE: 76 MMHG | SYSTOLIC BLOOD PRESSURE: 137 MMHG

## 2023-06-15 DIAGNOSIS — S93.491D SPRAIN OF ANTERIOR TALOFIBULAR LIGAMENT OF RIGHT ANKLE, SUBSEQUENT ENCOUNTER: Primary | ICD-10-CM

## 2023-06-15 DIAGNOSIS — S93.421D SPRAIN OF DELTOID LIGAMENT OF RIGHT ANKLE, SUBSEQUENT ENCOUNTER: ICD-10-CM

## 2023-06-15 PROCEDURE — 96361 HYDRATE IV INFUSION ADD-ON: CPT

## 2023-06-15 PROCEDURE — 96360 HYDRATION IV INFUSION INIT: CPT

## 2023-06-15 PROCEDURE — 97110 THERAPEUTIC EXERCISES: CPT

## 2023-06-15 PROCEDURE — 97035 APP MDLTY 1+ULTRASOUND EA 15: CPT

## 2023-06-15 RX ADMIN — SODIUM CHLORIDE 250 ML/HR: 0.9 INJECTION, SOLUTION INTRAVENOUS at 09:05

## 2023-06-15 NOTE — PROGRESS NOTES
"Daily Note     Today's date: 6/15/2023  Patient name: Elton Pal  : 1955  MRN: [id number]  Referring provider: Chu Rivera MD  Dx:   Encounter Diagnosis     ICD-10-CM    1  Sprain of anterior talofibular ligament of right ankle, subsequent encounter  S93 491D       2  Sprain of deltoid ligament of right ankle, subsequent encounter  S93 421D                      Subjective:  Pt  states pain level @ R ankle is \"6-7\"/10 at this present time  Also notes \"joint pain\" the last few days, which she attributes to side effects from new medicine she is taking  Objective: See treatment diary below      Assessment: Tolerated treatment Fair+ with ther exer performed  Received US and CP applics Well  Plan: Will con't services 2x/week as per POC/Goals               Precautions: extensive PMH-- please see chart      Re-eval Date: 23    Date 23 6 15 23     Visit Count 6 7 8     FOTO        Pain In 5-6/10   6-7/10 6-7/10     Pain Out 3-4/10  3/10           Manuals 23 6 15 23     PROM, calf stretch                                Neuro Re-Ed        Side step on aeromat        Tandem stand on aeromat        Tandem amb on [employer]                                Ther Ex 23  6 15 23     Nustep  declined      TR's/HR's Standing 30x BLE Seated 30x Seated 30x     Ankle AROM 30x ea dir 30x ea dir 30x ea dir       Ankle TB Yellow 8-10 reps ea dir       Mini squats        Seated towel slides DF/PF  Inv/ev  2' ea       Incline calf stretch        Step ups  F/L/R        Towel stretch foot and calf 4 x 20\" R 4 x 20\" R 4 x 20\" R     isometrics 10x/5\" into ball           **SKILLED CONVERSATION  15 min             Ther Activity                        Gait Training                        Modalities   6 15 23        **US 10 min        **CP 10 min          "

## 2023-06-16 ENCOUNTER — HOSPITAL ENCOUNTER (OUTPATIENT)
Dept: INFUSION CENTER | Facility: HOSPITAL | Age: 68
End: 2023-06-16
Payer: MEDICARE

## 2023-06-16 VITALS
RESPIRATION RATE: 18 BRPM | OXYGEN SATURATION: 99 % | SYSTOLIC BLOOD PRESSURE: 144 MMHG | HEART RATE: 72 BPM | DIASTOLIC BLOOD PRESSURE: 82 MMHG | TEMPERATURE: 96.2 F

## 2023-06-16 LAB
MISCELLANEOUS LAB TEST RESULT: NORMAL

## 2023-06-16 PROCEDURE — 96360 HYDRATION IV INFUSION INIT: CPT

## 2023-06-16 PROCEDURE — 96361 HYDRATE IV INFUSION ADD-ON: CPT

## 2023-06-16 RX ORDER — SODIUM CHLORIDE 9 MG/ML
250 INJECTION, SOLUTION INTRAVENOUS ONCE
Status: COMPLETED | OUTPATIENT
Start: 2023-06-19 | End: 2023-06-19

## 2023-06-16 RX ADMIN — SODIUM CHLORIDE 1500 ML: 0.9 INJECTION, SOLUTION INTRAVENOUS at 08:20

## 2023-06-16 NOTE — PROGRESS NOTES
IV hydration tolerated without incident  CVC flushed per protocol, clamped & PDC applied  Discharged in stable condition

## 2023-06-19 ENCOUNTER — HOSPITAL ENCOUNTER (OUTPATIENT)
Dept: INFUSION CENTER | Facility: HOSPITAL | Age: 68
Discharge: HOME | End: 2023-06-19
Payer: MEDICARE

## 2023-06-19 ENCOUNTER — OFFICE VISIT (OUTPATIENT)
Dept: PHYSICAL THERAPY | Facility: CLINIC | Age: 68
End: 2023-06-19
Payer: MEDICARE

## 2023-06-19 VITALS
TEMPERATURE: 96.8 F | SYSTOLIC BLOOD PRESSURE: 126 MMHG | HEART RATE: 75 BPM | DIASTOLIC BLOOD PRESSURE: 77 MMHG | OXYGEN SATURATION: 99 % | RESPIRATION RATE: 18 BRPM

## 2023-06-19 DIAGNOSIS — S93.491D SPRAIN OF ANTERIOR TALOFIBULAR LIGAMENT OF RIGHT ANKLE, SUBSEQUENT ENCOUNTER: Primary | ICD-10-CM

## 2023-06-19 DIAGNOSIS — S93.421D SPRAIN OF DELTOID LIGAMENT OF RIGHT ANKLE, SUBSEQUENT ENCOUNTER: ICD-10-CM

## 2023-06-19 PROCEDURE — 96361 HYDRATE IV INFUSION ADD-ON: CPT

## 2023-06-19 PROCEDURE — 97110 THERAPEUTIC EXERCISES: CPT

## 2023-06-19 PROCEDURE — 96360 HYDRATION IV INFUSION INIT: CPT

## 2023-06-19 PROCEDURE — 97035 APP MDLTY 1+ULTRASOUND EA 15: CPT

## 2023-06-19 RX ADMIN — SODIUM CHLORIDE 250 ML/HR: 0.9 INJECTION, SOLUTION INTRAVENOUS at 09:01

## 2023-06-19 NOTE — PROGRESS NOTES
"Daily Note     Today's date: 2023  Patient name: Yfn Patel  : 1955  MRN: [id number]  Referring provider: Esa Arambula MD  Dx:   Encounter Diagnosis     ICD-10-CM    1  Sprain of anterior talofibular ligament of right ankle, subsequent encounter  S93 491D       2  Sprain of deltoid ligament of right ankle, subsequent encounter  S93 421D                      Subjective: Pt states she is having increased back pain and was unable to bend over to don boot  Notes her POTS is acting up and she reports she as been near passing out  States she was in bed most of the weekend due to POTS and asthma  Pt states the exercises are flaring her up and she does not want to do them  Objective: See treatment diary below      Assessment: Tolerated treatment poor  Patient would benefit from continued PT  Declined exercises but was receptive to US to her medial and posterior R ankle    States she will apply ice to her ankle at the infusion center  Plan: Continue per plan of care        Precautions: extensive PMH-- please see chart      Re-eval Date: 23    Date 6/5/23 6/13/23 6 15 23 6/19    Visit Count 6 7 8 9    FOTO        Pain In 5-6/10   6-7/10 6-7/10   8/10    Pain Out 3-4/10  3/10           Manuals 6/5/23 6/13 6 15 23 6/19    PROM, calf stretch                                Neuro Re-Ed        Side step on aeromat        Tandem stand on aeromat        Tandem amb on [employer]                                Ther Ex 6/5/23  6 15 23  6/19    Nustep  declined      TR's/HR's Standing 30x BLE Seated 30x Seated 30x Seated 30x    Ankle AROM 30x ea dir 30x ea dir 30x ea dir   declined    Ankle TB Yellow 8-10 reps ea dir       Mini squats        Seated towel slides DF/PF  Inv/ev  2' ea       Incline calf stretch        Step ups  F/L/R        Towel stretch foot and calf 4 x 20\" R 4 x 20\" R 4 x 20\" R 4 x 30\" R    isometrics 10x/5\" into ball           **SKILLED CONVERSATION  15 min           " Ther Activity                        Gait Training                        Modalities   6 15 23 6/19       **US 10 min  US 10'  1 0 w/cm2  3MHz       **CP 10 min CP 10 min

## 2023-06-20 ENCOUNTER — HOSPITAL ENCOUNTER (OUTPATIENT)
Dept: INFUSION CENTER | Facility: HOSPITAL | Age: 68
Discharge: HOME | End: 2023-06-20
Payer: MEDICARE

## 2023-06-20 VITALS
OXYGEN SATURATION: 98 % | RESPIRATION RATE: 20 BRPM | DIASTOLIC BLOOD PRESSURE: 96 MMHG | HEART RATE: 81 BPM | SYSTOLIC BLOOD PRESSURE: 149 MMHG | TEMPERATURE: 96.7 F

## 2023-06-20 PROCEDURE — 96361 HYDRATE IV INFUSION ADD-ON: CPT

## 2023-06-20 PROCEDURE — 96360 HYDRATION IV INFUSION INIT: CPT

## 2023-06-20 RX ADMIN — SODIUM CHLORIDE 1500 ML: 0.9 INJECTION, SOLUTION INTRAVENOUS at 08:48

## 2023-06-20 NOTE — PROGRESS NOTES
CVC dressing changed per protocol  IV hydration tolerated without incident  CVC flushed, clamped & PDC applied  Discharged in stable condition

## 2023-06-22 ENCOUNTER — APPOINTMENT (OUTPATIENT)
Dept: PHYSICAL THERAPY | Facility: CLINIC | Age: 68
End: 2023-06-22
Payer: MEDICARE

## 2023-06-22 ENCOUNTER — TELEPHONE (OUTPATIENT)
Dept: OBGYN CLINIC | Facility: HOSPITAL | Age: 68
End: 2023-06-22

## 2023-06-22 NOTE — TELEPHONE ENCOUNTER
Caller: Self    Doctor: Donell    Reason for call: Returning call to confirm when procedure was scheduled   She is scheduled for August 1st at 8am    Call back#: [phone]

## 2023-06-23 RX ORDER — SODIUM CHLORIDE 9 MG/ML
250 INJECTION, SOLUTION INTRAVENOUS ONCE
Status: COMPLETED | OUTPATIENT
Start: 2023-06-26 | End: 2023-06-26

## 2023-06-26 ENCOUNTER — APPOINTMENT (OUTPATIENT)
Dept: PHYSICAL THERAPY | Facility: CLINIC | Age: 68
End: 2023-06-26
Payer: MEDICARE

## 2023-06-26 ENCOUNTER — HOSPITAL ENCOUNTER (OUTPATIENT)
Dept: INFUSION CENTER | Facility: HOSPITAL | Age: 68
Discharge: HOME | End: 2023-06-26
Attending: INTERNAL MEDICINE
Payer: MEDICARE

## 2023-06-26 ENCOUNTER — TELEPHONE (OUTPATIENT)
Dept: INTERVENTIONAL RADIOLOGY/VASCULAR | Facility: CLINIC | Age: 68
End: 2023-06-26

## 2023-06-26 VITALS
TEMPERATURE: 96.9 F | RESPIRATION RATE: 18 BRPM | DIASTOLIC BLOOD PRESSURE: 77 MMHG | OXYGEN SATURATION: 99 % | HEART RATE: 70 BPM | SYSTOLIC BLOOD PRESSURE: 131 MMHG

## 2023-06-26 DIAGNOSIS — G90.A POSTURAL ORTHOSTATIC TACHYCARDIA SYNDROME: Primary | Chronic | ICD-10-CM

## 2023-06-26 RX ORDER — CEFDINIR 300 MG/1
300 CAPSULE ORAL EVERY 12 HOURS SCHEDULED
Qty: 14 CAPSULE | Refills: 0 | Status: SHIPPED | OUTPATIENT
Start: 2023-06-26 | End: 2023-07-03

## 2023-06-26 RX ADMIN — SODIUM CHLORIDE 250 ML/HR: 0.9 INJECTION, SOLUTION INTRAVENOUS at 08:49

## 2023-06-26 NOTE — PROGRESS NOTES
Per Dr Juaquin Bearden - he would be comfortable ordering a course of antibiotics  Pt aware and agreeable  Pt tolerated IV hydration well without incident and central line dressing changed with transparent dressing and sterile gauze (to prevent suture from breaking through dressing) per pt request  Discharged in stable condition and pt aware of next infusion appointment  AVS declined

## 2023-06-26 NOTE — TELEPHONE ENCOUNTER
Patient known to the interventional radiology practice for tunneled central venous access for regular infusions for POTS    Concern regarding possible recurrent cellulitis at exit site    Called as patient has redness at skin sites (insertion and sutures)    Tender, no drainage    Site seen 4 times a week by infusion  Pt well educated    She has taken very good care of this site      Placed June 20 2022 previously we managed this with antibiotics we will attempt again    She has no systemic signs or symptoms    I will prescribe OmnicefFor a week      She will call us with any questions or concerns or if there is lack of improvement      Also started  Tezspire (tezepelumab-ekko) with resultant nausea

## 2023-06-26 NOTE — PROGRESS NOTES
Pt reports to infusion center stating that her central line site got wet yesterday during a shower  Pt changed dressing by herself at home - transparent dressing noted over site today  Site appears reddened around sutures and insertion site and pt reports localized pain at same  Picture taken and sent to Dr Yonny Garcia to make aware  Pt also has appt scheduled with Dr Xu Saavedra tomorrow and wishes for him to look at it at that time  Line flushes freely with good blood return

## 2023-06-27 RX ORDER — SODIUM CHLORIDE 9 MG/ML
250 INJECTION, SOLUTION INTRAVENOUS ONCE
Status: COMPLETED | OUTPATIENT
Start: 2023-06-28 | End: 2023-06-28

## 2023-06-28 ENCOUNTER — HOSPITAL ENCOUNTER (OUTPATIENT)
Dept: INFUSION CENTER | Facility: HOSPITAL | Age: 68
Discharge: HOME | End: 2023-06-28
Payer: MEDICARE

## 2023-06-28 VITALS
SYSTOLIC BLOOD PRESSURE: 156 MMHG | OXYGEN SATURATION: 99 % | DIASTOLIC BLOOD PRESSURE: 90 MMHG | RESPIRATION RATE: 18 BRPM | HEART RATE: 89 BPM | TEMPERATURE: 97 F

## 2023-06-28 PROCEDURE — 96360 HYDRATION IV INFUSION INIT: CPT

## 2023-06-28 PROCEDURE — 96361 HYDRATE IV INFUSION ADD-ON: CPT

## 2023-06-28 RX ADMIN — SODIUM CHLORIDE 250 ML/HR: 0.9 INJECTION, SOLUTION INTRAVENOUS at 09:24

## 2023-06-28 NOTE — PROGRESS NOTES
Pt spoke with Dr Tin Mcpherson Monday evening regarding central line concerns. Currently taking Omnicef 300mg BID x7 days. No additional reports of pain or changes to insertion site.

## 2023-06-28 NOTE — PLAN OF CARE
Problem: Potential for Falls  Goal: Patient will remain free of falls  Description: INTERVENTIONS:  - Educate patient/family on patient safety including physical limitations  - Instruct patient to call for assistance with activity   - Consult OT/PT to assist with strengthening/mobility   - Keep Call bell within reach  - Keep bed low and locked with side rails adjusted as appropriate  - Keep care items and personal belongings within reach  - Initiate and maintain comfort rounds  - Make Fall Risk Sign visible to staff  - Apply yellow socks and bracelet for high fall risk patients  - Consider moving patient to room near nurses station  Outcome: Progressing     Problem: Knowledge Deficit  Goal: Patient/family/caregiver demonstrates understanding of disease process, treatment plan, medications, and discharge instructions  Description: Complete learning assessment and assess knowledge base.   Interventions:  - Provide teaching at level of understanding  - Provide teaching via preferred learning methods  Outcome: Progressing

## 2023-06-28 NOTE — PROGRESS NOTES
Pt tolerated IV hydration well without incident. Discharged in stable condition and pt aware of next infusion appointment. AVS declined.

## 2023-06-29 ENCOUNTER — APPOINTMENT (OUTPATIENT)
Dept: PHYSICAL THERAPY | Facility: CLINIC | Age: 68
End: 2023-06-29
Payer: MEDICARE

## 2023-06-29 RX ORDER — SODIUM CHLORIDE 9 MG/ML
250 INJECTION, SOLUTION INTRAVENOUS ONCE
Status: COMPLETED | OUTPATIENT
Start: 2023-07-03 | End: 2023-07-03

## 2023-06-30 RX ORDER — SODIUM CHLORIDE 9 MG/ML
250 INJECTION, SOLUTION INTRAVENOUS ONCE
Status: COMPLETED | OUTPATIENT
Start: 2023-07-05 | End: 2023-07-05

## 2023-07-03 ENCOUNTER — HOSPITAL ENCOUNTER (OUTPATIENT)
Dept: INFUSION CENTER | Facility: HOSPITAL | Age: 68
Discharge: HOME | End: 2023-07-03
Attending: INTERNAL MEDICINE
Payer: MEDICARE

## 2023-07-03 ENCOUNTER — HOSPITAL ENCOUNTER (OUTPATIENT)
Dept: MRI IMAGING | Facility: HOSPITAL | Age: 68
Discharge: HOME | End: 2023-07-03
Attending: PHYSICIAN ASSISTANT
Payer: MEDICARE

## 2023-07-03 ENCOUNTER — APPOINTMENT (OUTPATIENT)
Dept: LAB | Facility: HOSPITAL | Age: 68
End: 2023-07-03
Payer: MEDICARE

## 2023-07-03 VITALS
RESPIRATION RATE: 18 BRPM | HEART RATE: 85 BPM | OXYGEN SATURATION: 99 % | DIASTOLIC BLOOD PRESSURE: 94 MMHG | TEMPERATURE: 97.3 F | SYSTOLIC BLOOD PRESSURE: 141 MMHG

## 2023-07-03 DIAGNOSIS — K21.9 GASTROESOPHAGEAL REFLUX DISEASE WITHOUT ESOPHAGITIS: ICD-10-CM

## 2023-07-03 DIAGNOSIS — K58.2 MIXED IRRITABLE BOWEL SYNDROME: ICD-10-CM

## 2023-07-03 DIAGNOSIS — K86.2 CYST OF PANCREAS: ICD-10-CM

## 2023-07-03 DIAGNOSIS — K44.9 DIAPHRAGMATIC HERNIA WITHOUT OBSTRUCTION AND WITHOUT GANGRENE: Primary | ICD-10-CM

## 2023-07-03 DIAGNOSIS — Z80.0 FAMILY HISTORY OF MALIGNANT NEOPLASM OF DIGESTIVE ORGANS: ICD-10-CM

## 2023-07-03 DIAGNOSIS — R74.8 ACID PHOSPHATASE ELEVATED: ICD-10-CM

## 2023-07-03 DIAGNOSIS — Z80.0 FAMILY HISTORY OF MALIGNANT NEOPLASM OF GASTROINTESTINAL TRACT: ICD-10-CM

## 2023-07-03 DIAGNOSIS — K44.9 DIAPHRAGMATIC HERNIA WITHOUT OBSTRUCTION AND WITHOUT GANGRENE: ICD-10-CM

## 2023-07-03 PROCEDURE — 86301 IMMUNOASSAY TUMOR CA 19-9: CPT

## 2023-07-03 PROCEDURE — 36415 COLL VENOUS BLD VENIPUNCTURE: CPT

## 2023-07-03 PROCEDURE — A9585 GADOBUTROL INJECTION: HCPCS | Performed by: PHYSICIAN ASSISTANT

## 2023-07-03 PROCEDURE — G1004 CDSM NDSC: HCPCS

## 2023-07-03 PROCEDURE — 74183 MRI ABD W/O CNTR FLWD CNTR: CPT

## 2023-07-03 RX ADMIN — SODIUM CHLORIDE 250 ML/HR: 0.9 INJECTION, SOLUTION INTRAVENOUS at 09:16

## 2023-07-03 RX ADMIN — GADOBUTROL 6 ML: 604.72 INJECTION INTRAVENOUS at 08:22

## 2023-07-03 NOTE — PROGRESS NOTES
Central line dressing changed without incident. Skin under dressing no longer pink/red. Gauze placed under transparent dressing per pt request d/t sutures poking through transparent dressing. Pt tolerated IV hydration well without incident and pt discharged in stable condition. AVS declined.

## 2023-07-03 NOTE — PROGRESS NOTES
I have reviewed the [facility] policy with the patient.  She has also signed and was given a copy, per her request. I explained that she must call greater than 24 hours in advance of her appointment or risk being identified as a no-show, which my necessiate being taken off the schedule, in the future

## 2023-07-04 LAB — CANCER AG19-9 SERPL-ACNC: 25 U/ML (ref 0–35)

## 2023-07-05 ENCOUNTER — HOSPITAL ENCOUNTER (OUTPATIENT)
Dept: INFUSION CENTER | Facility: HOSPITAL | Age: 68
Discharge: HOME | End: 2023-07-05
Payer: MEDICARE

## 2023-07-05 VITALS
DIASTOLIC BLOOD PRESSURE: 75 MMHG | TEMPERATURE: 96.1 F | OXYGEN SATURATION: 98 % | SYSTOLIC BLOOD PRESSURE: 125 MMHG | RESPIRATION RATE: 18 BRPM | HEART RATE: 91 BPM

## 2023-07-05 PROCEDURE — 96360 HYDRATION IV INFUSION INIT: CPT

## 2023-07-05 PROCEDURE — 96361 HYDRATE IV INFUSION ADD-ON: CPT

## 2023-07-05 RX ADMIN — SODIUM CHLORIDE 250 ML/HR: 0.9 INJECTION, SOLUTION INTRAVENOUS at 08:55

## 2023-07-05 NOTE — PROGRESS NOTES
IV hydration tolerated without incident. CVC flushed per protocol, clamped & PDC applied. Pt discharged in stable condition.

## 2023-07-06 ENCOUNTER — HOSPITAL ENCOUNTER (OUTPATIENT)
Dept: INFUSION CENTER | Facility: HOSPITAL | Age: 68
End: 2023-07-06
Payer: MEDICARE

## 2023-07-06 VITALS
DIASTOLIC BLOOD PRESSURE: 79 MMHG | OXYGEN SATURATION: 98 % | RESPIRATION RATE: 18 BRPM | SYSTOLIC BLOOD PRESSURE: 152 MMHG | HEART RATE: 83 BPM | TEMPERATURE: 96.3 F

## 2023-07-06 PROCEDURE — 96360 HYDRATION IV INFUSION INIT: CPT

## 2023-07-06 PROCEDURE — 96361 HYDRATE IV INFUSION ADD-ON: CPT

## 2023-07-06 RX ADMIN — SODIUM CHLORIDE 1500 ML: 0.9 INJECTION, SOLUTION INTRAVENOUS at 08:48

## 2023-07-07 ENCOUNTER — HOSPITAL ENCOUNTER (OUTPATIENT)
Dept: CT IMAGING | Facility: HOSPITAL | Age: 68
End: 2023-07-07
Payer: MEDICARE

## 2023-07-07 ENCOUNTER — HOSPITAL ENCOUNTER (OUTPATIENT)
Dept: INFUSION CENTER | Facility: HOSPITAL | Age: 68
End: 2023-07-07
Attending: INTERNAL MEDICINE
Payer: MEDICARE

## 2023-07-07 VITALS
DIASTOLIC BLOOD PRESSURE: 79 MMHG | SYSTOLIC BLOOD PRESSURE: 152 MMHG | HEART RATE: 74 BPM | RESPIRATION RATE: 18 BRPM | TEMPERATURE: 96.6 F | OXYGEN SATURATION: 98 %

## 2023-07-07 DIAGNOSIS — J98.4 DISORDER OF LUNG PARENCHYMA: ICD-10-CM

## 2023-07-07 PROCEDURE — 96361 HYDRATE IV INFUSION ADD-ON: CPT

## 2023-07-07 PROCEDURE — 96360 HYDRATION IV INFUSION INIT: CPT

## 2023-07-07 PROCEDURE — 71250 CT THORAX DX C-: CPT

## 2023-07-07 RX ADMIN — SODIUM CHLORIDE 1500 ML: 0.9 INJECTION, SOLUTION INTRAVENOUS at 08:50

## 2023-07-10 ENCOUNTER — HOSPITAL ENCOUNTER (OUTPATIENT)
Dept: INFUSION CENTER | Facility: HOSPITAL | Age: 68
Discharge: HOME | End: 2023-07-10
Payer: MEDICARE

## 2023-07-10 VITALS
TEMPERATURE: 96.9 F | HEART RATE: 76 BPM | SYSTOLIC BLOOD PRESSURE: 138 MMHG | DIASTOLIC BLOOD PRESSURE: 80 MMHG | OXYGEN SATURATION: 99 %

## 2023-07-10 PROCEDURE — 96360 HYDRATION IV INFUSION INIT: CPT

## 2023-07-10 PROCEDURE — 96361 HYDRATE IV INFUSION ADD-ON: CPT

## 2023-07-10 RX ADMIN — SODIUM CHLORIDE 1500 ML: 0.9 INJECTION, SOLUTION INTRAVENOUS at 09:37

## 2023-07-10 NOTE — PROGRESS NOTES
Patient tolerated IV NSS hydration without issues. AVS declined. Patient is aware of next appointment. Patient ambulated off unit without incident. All personal belongings taken with patient.

## 2023-07-11 ENCOUNTER — HOSPITAL ENCOUNTER (OUTPATIENT)
Dept: INFUSION CENTER | Facility: HOSPITAL | Age: 68
Discharge: HOME | End: 2023-07-11
Payer: MEDICARE

## 2023-07-11 VITALS
RESPIRATION RATE: 18 BRPM | TEMPERATURE: 97.7 F | OXYGEN SATURATION: 97 % | SYSTOLIC BLOOD PRESSURE: 135 MMHG | DIASTOLIC BLOOD PRESSURE: 74 MMHG | HEART RATE: 86 BPM

## 2023-07-11 PROCEDURE — 96361 HYDRATE IV INFUSION ADD-ON: CPT

## 2023-07-11 PROCEDURE — 96360 HYDRATION IV INFUSION INIT: CPT

## 2023-07-11 RX ORDER — SODIUM CHLORIDE 9 MG/ML
250 INJECTION, SOLUTION INTRAVENOUS ONCE
Status: COMPLETED | OUTPATIENT
Start: 2023-07-12 | End: 2023-07-12

## 2023-07-11 RX ADMIN — SODIUM CHLORIDE 1500 ML: 0.9 INJECTION, SOLUTION INTRAVENOUS at 08:44

## 2023-07-11 NOTE — PROGRESS NOTES
Patient tolerated IV hydration well without incident. Discharged in stable condition and pt aware of next infusion appointment. AVS declined.

## 2023-07-11 NOTE — PROGRESS NOTES
Pt tolerated IV hydration without incident. CVC flushed per protocol & PDC applied. Discharged in stable condition.

## 2023-07-12 ENCOUNTER — HOSPITAL ENCOUNTER (OUTPATIENT)
Dept: INFUSION CENTER | Facility: HOSPITAL | Age: 68
Discharge: HOME | End: 2023-07-12
Attending: INTERNAL MEDICINE
Payer: MEDICARE

## 2023-07-12 VITALS
DIASTOLIC BLOOD PRESSURE: 64 MMHG | SYSTOLIC BLOOD PRESSURE: 140 MMHG | HEART RATE: 72 BPM | TEMPERATURE: 97.8 F | OXYGEN SATURATION: 98 % | RESPIRATION RATE: 18 BRPM

## 2023-07-12 PROCEDURE — 96360 HYDRATION IV INFUSION INIT: CPT

## 2023-07-12 PROCEDURE — 96361 HYDRATE IV INFUSION ADD-ON: CPT

## 2023-07-12 RX ORDER — SODIUM CHLORIDE 9 MG/ML
250 INJECTION, SOLUTION INTRAVENOUS ONCE
Status: COMPLETED | OUTPATIENT
Start: 2023-07-13 | End: 2023-07-13

## 2023-07-12 RX ADMIN — SODIUM CHLORIDE 250 ML/HR: 0.9 INJECTION, SOLUTION INTRAVENOUS at 08:40

## 2023-07-12 NOTE — PROGRESS NOTES
Patient tolerated IV hydration without issues. AVS declined. Patient is aware of appointment time tomorrow. Patient ambulated off unit without incident. All personal belongings taken with patient.

## 2023-07-13 ENCOUNTER — HOSPITAL ENCOUNTER (OUTPATIENT)
Dept: INFUSION CENTER | Facility: HOSPITAL | Age: 68
End: 2023-07-13
Payer: MEDICARE

## 2023-07-13 VITALS
OXYGEN SATURATION: 98 % | TEMPERATURE: 97.4 F | HEART RATE: 76 BPM | SYSTOLIC BLOOD PRESSURE: 155 MMHG | RESPIRATION RATE: 18 BRPM | DIASTOLIC BLOOD PRESSURE: 75 MMHG

## 2023-07-13 PROCEDURE — 96360 HYDRATION IV INFUSION INIT: CPT

## 2023-07-13 PROCEDURE — 96361 HYDRATE IV INFUSION ADD-ON: CPT

## 2023-07-13 RX ADMIN — SODIUM CHLORIDE 250 ML/HR: 0.9 INJECTION, SOLUTION INTRAVENOUS at 08:49

## 2023-07-13 NOTE — PROGRESS NOTES
IV hydration tolerated without incident. CVC flushed per protocol, clamped & PDC applied. Discharged in stable condition.

## 2023-07-14 RX ORDER — SODIUM CHLORIDE 9 MG/ML
250 INJECTION, SOLUTION INTRAVENOUS ONCE
Status: COMPLETED | OUTPATIENT
Start: 2023-07-17 | End: 2023-07-17

## 2023-07-17 ENCOUNTER — OFFICE VISIT (OUTPATIENT)
Dept: OBGYN CLINIC | Facility: CLINIC | Age: 68
End: 2023-07-17
Payer: MEDICARE

## 2023-07-17 ENCOUNTER — HOSPITAL ENCOUNTER (OUTPATIENT)
Dept: INFUSION CENTER | Facility: HOSPITAL | Age: 68
Discharge: HOME | End: 2023-07-17
Payer: MEDICARE

## 2023-07-17 ENCOUNTER — EVALUATION (OUTPATIENT)
Dept: PHYSICAL THERAPY | Facility: CLINIC | Age: 68
End: 2023-07-17
Payer: MEDICARE

## 2023-07-17 VITALS
RESPIRATION RATE: 18 BRPM | HEART RATE: 76 BPM | SYSTOLIC BLOOD PRESSURE: 145 MMHG | TEMPERATURE: 97 F | DIASTOLIC BLOOD PRESSURE: 89 MMHG | OXYGEN SATURATION: 99 %

## 2023-07-17 VITALS
DIASTOLIC BLOOD PRESSURE: 95 MMHG | BODY MASS INDEX: 26.21 KG/M2 | HEART RATE: 62 BPM | WEIGHT: 138.8 LBS | TEMPERATURE: 97.7 F | SYSTOLIC BLOOD PRESSURE: 152 MMHG | HEIGHT: 61 IN

## 2023-07-17 DIAGNOSIS — S93.491D SPRAIN OF ANTERIOR TALOFIBULAR LIGAMENT OF RIGHT ANKLE, SUBSEQUENT ENCOUNTER: Primary | ICD-10-CM

## 2023-07-17 DIAGNOSIS — S93.421D SPRAIN OF DELTOID LIGAMENT OF RIGHT ANKLE, SUBSEQUENT ENCOUNTER: ICD-10-CM

## 2023-07-17 DIAGNOSIS — M35.00 SJOGREN SYNDROME, UNSPECIFIED (HCC): ICD-10-CM

## 2023-07-17 DIAGNOSIS — S99.911D RIGHT ANKLE INJURY, SUBSEQUENT ENCOUNTER: ICD-10-CM

## 2023-07-17 PROCEDURE — 96361 HYDRATE IV INFUSION ADD-ON: CPT

## 2023-07-17 PROCEDURE — 99214 OFFICE O/P EST MOD 30 MIN: CPT | Performed by: FAMILY MEDICINE

## 2023-07-17 PROCEDURE — 97530 THERAPEUTIC ACTIVITIES: CPT

## 2023-07-17 PROCEDURE — 97164 PT RE-EVAL EST PLAN CARE: CPT

## 2023-07-17 PROCEDURE — 96360 HYDRATION IV INFUSION INIT: CPT

## 2023-07-17 RX ORDER — SODIUM CHLORIDE 9 MG/ML
250 INJECTION, SOLUTION INTRAVENOUS ONCE
Status: COMPLETED | OUTPATIENT
Start: 2023-07-18 | End: 2023-07-18

## 2023-07-17 RX ADMIN — SODIUM CHLORIDE 250 ML/HR: 0.9 INJECTION, SOLUTION INTRAVENOUS at 09:15

## 2023-07-17 NOTE — PROGRESS NOTES
PT Re-Evaluation     Today's date: 23  Patient name: Bartolome Mann  : 1955  MRN: [id number]  Referring provider: Charlene Moreno MD  Dx:   Encounter Diagnosis     ICD-10-CM    1. Sprain of anterior talofibular ligament of right ankle, subsequent encounter  S93.491D       2. Sprain of deltoid ligament of right ankle, subsequent encounter  S93.421D           Start Time: 9859  Stop Time: 1645  Total time in clinic (min): 60 minutes    Assessment  Assessment details: Bartolome Mann is a 75 y.o. female presenting to outpatient physical therapy with noted impairments including pain, impaired soft tissue mobility, reduced range of motion, reduced strength, reduced postural awareness, and reduced activity tolerance. Signs and symptoms at present are consistent with referring diagnosis of R ankle sprain; however, questionable possible diagnosis of CPRS due to symptoms. Due to noted impairments, the patient's present functional limitations include difficulty with ADLs with increased need for assistance, reliance on medication and/or modalities for pain relief, reduced tolerance for functional mobility and activity, and difficulty completing HH and community responsibilities. Patient to benefit from continued skilled outpatient physical therapy 2x/week for 8 weeks in order to reduce pain, maximize pain free range of motion, increase strength and stability, and improve functional mobility/functional activity in order to maximize return to prior level of function with reduced limitations. Discussed continuation of trial US f/b CP as pt noted most relief from that treatment; also discussed focusing on DF ROM/strengthening, as well as desensitization. Will also consider trial TENS if appropriate. Home exercise program was provided and all questions answered to patient's level of satisfaction.  Thank you for your referral.        Impairments: abnormal coordination, abnormal gait, abnormal muscle tone, abnormal or restricted ROM, abnormal movement, activity intolerance, impaired balance, impaired physical strength, lacks appropriate home exercise program, pain with function, safety issue and weight-bearing intolerance  Understanding of Dx/Px/POC: good   Prognosis: good    Goals  STGs to be achieved in 4 weeks:  1. Pt to demonstrate reduced subjective pain rating "at worst" by at least 2-3 points from Initial Eval in order to allow for reduced pain with ADLs and improved functional activity tolerance. Not Met.   2. Pt to demonstrate increased AROM of R ankle by at least 5-10 degrees in order to allow for greater ease and independence with ADLs and functional mobility. Not Met. 3. Pt to demonstrate increased MMT of R ankle by at least 1/2-1 grade in order to improve safety and stability with ADLs and functional mobility. Not Met. LTGs to be achieved in 6-8 weeks:  1. Pt will be I with HEP in order to continue to improve quality of life and independence and reduce risk for re-injury. 2. Pt to demonstrate return to community errands and driving to [employer]  without limitations or restrictions. 3. Pt to demonstrate improved function as noted by achieving or exceeding predicted score on FOTO outcomes assessment tool.        Plan  Patient would benefit from: skilled physical therapy  Planned modality interventions: ultrasound and TENS  Other planned modality interventions: Modalities prn for symptom management  Planned therapy interventions: manual therapy, neuromuscular re-education, therapeutic activities, therapeutic exercise, strengthening, stretching, home exercise program, activity modification, balance, balance/weight bearing training, nerve gliding, massage, orthotic fitting/training, orthotic management and training, patient education, postural training, self care, sensory integrative techniques, therapeutic training, transfer training and joint mobilization  Frequency: 2x week  Duration in weeks: 8  Plan of Care beginning date: 7/17/2023  Plan of Care expiration date: 9/11/2023  Treatment plan discussed with: PTA and patient        Subjective Evaluation    History of Present Illness  Mechanism of injury: RE: 7/17/2023 - scheduled for Aug 1st for radiofrequency ablation. MRI on abdomen (has pancreatitis); Taking low dose 5 mg hydrocodone - due to joint pain. Muscle cramps. Undergoing several tests. Saw referring MD this morning - discussed pt having afternoon PT appointments because it takes her time to get up and moving (has POTS and has lots of food allergies, so she has to do a lot of food prep). MD wants her to continue with US treatment and possibly estim. Currently - chief complaint is that pt is sensitive with WBing and movement, as she will get extremely sore at end of the day. States that she has a lot of discoloration/swelling that is intermittent - pain is mainly around malleoli and heel region. Will have temperature and touch sensitivities. Will get burning/throbbing pain. Has difficulty with standing/walking, especially with doing household ADLs. Is currently on wait list for agency on aging for assistance (e.g. aide). Gets most relief at night mainly after taking hydrocodone and flexerol after using heat and ice. IE: Pt was taking care of mother and while pulling her up in bed she twisted her ankle. States it swelled and bruised and was painful. States she has pain while driving and prolonged standing or walking > 15 min. Pt having pain in lat malleolus wrapping around the heel and into the medial malleolus area and at times into the arch of the foot. Pt is wearing an ankle brace provided by the doctor's office. Pt taking hydrocodone for her back which helps with the ankle. Having diff with stair climbing. Currently fighting pneumonia and using nebulizer every 3 hours.           Not a recurrent problem   Quality of life: fair    Patient Goals  Patient goals for therapy: decreased pain, increased motion, increased strength and independence with ADLs/IADLs    Pain  Current pain ratin  At best pain ratin  At worst pain ratin  Quality: sharp, throbbing, dull ache and discomfort  Relieving factors: medications, rest, relaxation, support and heat  Aggravating factors: standing and walking (driving, stair climbing)  Progression: improved    Social Support  Steps to enter house: no  Stairs in house: no   Lives in: apartment (elevators)    Employment status: not working    Diagnostic Tests  X-ray: normal (no fx)  Treatments  Previous treatment: medication  Current treatment: physical therapy        Objective     Observations     Right Ankle/Foot   Negative for deformity, edema, effusion and trophic changes. Additional Observation Details  No swelling or bruising    Tenderness     Right Ankle/Foot   Tenderness in the anterior ankle, anterior talofibular ligament, calcaneofibular ligament, deltoid ligament, lateral malleolus, medial malleolus and navicular. 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      Re-eval Date: 23    Date 23      Visit Count 9 10      FOTO        Pain In   8/10 710      Pain Out              Manuals 6.15.23 6/19 7/17      PROM, calf stretch                  Gentle massage/Desensitization                     Gentle subtalar jt mobs/oscillation                              Neuro Re-Ed Side step on aeromat         Tandem stand on aeromat         Tandem amb on [employer]                                    Ther Ex 6.15.23  6/19 7/17      Nustep         TR's/HR's Seated 30x Seated 30x       Ankle AROM 30x ea dir   declined Focus on DF - NV       Ankle TB         Mini squats         Seated towel slides         Incline calf stretch         Step ups  F/L/R         Towel stretch foot and calf 4 x 20" R 4 x 30" R Focus on Gentle DF stretch - do 5 reps of 5-10" holds nv       isometrics   Focus on DF isometrics - 10 reps only nv        **SKILLED CONVERSATION  15 min  Skilled conversation 30 minutes               Ther Activity                           Gait Training                           Modalities 6.15.23 6/19 7/17       **US 10 min  US 10'  1.0 w/cm2  3MHz Continue trial US NV - as she reports feeling better after this and CP      Trial TENS? ?? Review contraindications first!           **CP 10 min CP 10 min Continue NV      Access Code: [facility]  URL: [email or web address]  Date: 07/17/2023  Prepared by: Hayder Allen    Exercises  - Long Sitting Calf Stretch with Strap  - 1 x daily - 7 x weekly - 5-10 sets - 5-10 reps - 5 hold  - Towel Desensitization  - 1 x daily - 7 x weekly - 3 sets - 10 reps

## 2023-07-17 NOTE — PROGRESS NOTES
[facility] [address] [address] [phone]      Assessment:  1. Sprain of anterior talofibular ligament of right ankle, subsequent encounter  -     Ambulatory Referral to Physical Therapy; Future    2. Sprain of deltoid ligament of right ankle, subsequent encounter  -     Ambulatory Referral to Physical Therapy; Future    3. Right ankle injury, subsequent encounter  -     Ambulatory Referral to Physical Therapy; Future    4. Sjogren syndrome, unspecified ([facility])        Plan:  Patient Instructions   F/u 6 wks  Continue therapy  Icing/heat/OTC pain meds as needed. Brace/boot as needed. Home exercises. Return in about 6 weeks (around 8/28/2023) for Recheck. Chief Complaint:  Chief Complaint   Patient presents with   • Right Ankle - Follow-up       Subjective:   Here for f/u  R ankle sprain  Still having a lot of pain  Unable to go to PT due to medical issues. Swelling is dec  Stopped wearing boot due to lower back issues. Wearing ankle brace but bothers her at times  Doing home exercises. No pain at rest  Sharp pain at times. Prolonged walking worsens pain/driving  Better walking with boot. Patient ID: Riki Harrell is a 75 y.o. female     Review of Systems   Constitutional: Negative for fatigue and fever. Respiratory: Positive for shortness of breath. Cardiovascular: Negative for chest pain. Gastrointestinal: Negative for abdominal pain and nausea. Genitourinary: Negative for dysuria. Musculoskeletal: Positive for arthralgias. Skin: Negative for rash and wound. Neurological: Negative for weakness and headaches.        Objective:  Vitals:  /95 (BP Location: Left arm, Patient Position: Sitting, Cuff Size: Standard)   Pulse 62   Temp 97.7 °F (36.5 °C) (Tympanic)   Ht 5' 1" (1.549 m)   Wt 63 kg (138 lb 12.8 oz)   BMI 26.23 kg/m²     The following portions of the patient's history were reviewed and updated as appropriate: allergies, current medications, past family history, past medical history, past social history, past surgical history, and problem list.    Physical exam:  Physical Exam  Constitutional:       Appearance: Normal appearance. She is normal weight. HENT:      Head: Normocephalic. Eyes:      Extraocular Movements: Extraocular movements intact. Pulmonary:      Effort: Pulmonary effort is normal.   Musculoskeletal:      Cervical back: Normal range of motion. Skin:     General: Skin is warm and dry. Neurological:      General: No focal deficit present. Mental Status: She is alert and oriented to person, place, and time. Mental status is at baseline. Psychiatric:         Mood and Affect: Mood normal.         Behavior: Behavior normal.         Thought Content: Thought content normal.         Judgment: Judgment normal.       Right Ankle Exam     Tenderness   The patient is experiencing tenderness in the ATF, CF and deltoid. Range of Motion   The patient has normal right ankle ROM. Muscle Strength   The patient has normal right ankle strength.     Comments:  Neg jenkins Torin Moritz test.          Strength/Myotome Testing     Right Ankle/Foot   Normal strength            Dayron Mclain MD

## 2023-07-17 NOTE — PATIENT INSTRUCTIONS
F/u 6 wks  Continue therapy  Icing/heat/OTC pain meds as needed. Brace/boot as needed. Home exercises.

## 2023-07-17 NOTE — PROGRESS NOTES
PICC line redressed by Edwin Hendricks rn. IV hydration tolerated without incident. CVC flushed per protocol, clamped & PDC applied. Discharged in stable condition.

## 2023-07-18 ENCOUNTER — HOSPITAL ENCOUNTER (OUTPATIENT)
Dept: INFUSION CENTER | Facility: HOSPITAL | Age: 68
Discharge: HOME | End: 2023-07-18
Payer: MEDICARE

## 2023-07-18 VITALS
DIASTOLIC BLOOD PRESSURE: 85 MMHG | TEMPERATURE: 96.4 F | OXYGEN SATURATION: 99 % | HEART RATE: 78 BPM | RESPIRATION RATE: 18 BRPM | SYSTOLIC BLOOD PRESSURE: 133 MMHG

## 2023-07-18 RX ADMIN — SODIUM CHLORIDE 250 ML/HR: 0.9 INJECTION, SOLUTION INTRAVENOUS at 08:55

## 2023-07-19 ENCOUNTER — HOSPITAL ENCOUNTER (OUTPATIENT)
Dept: ULTRASOUND IMAGING | Facility: HOSPITAL | Age: 68
Discharge: HOME | End: 2023-07-19
Attending: INTERNAL MEDICINE
Payer: MEDICARE

## 2023-07-19 DIAGNOSIS — E04.1 NONTOXIC SINGLE THYROID NODULE: ICD-10-CM

## 2023-07-19 PROCEDURE — 76536 US EXAM OF HEAD AND NECK: CPT

## 2023-07-20 RX ORDER — SODIUM CHLORIDE 9 MG/ML
250 INJECTION, SOLUTION INTRAVENOUS ONCE
Status: COMPLETED | OUTPATIENT
Start: 2023-07-21 | End: 2023-07-21

## 2023-07-21 ENCOUNTER — HOSPITAL ENCOUNTER (OUTPATIENT)
Dept: INFUSION CENTER | Facility: HOSPITAL | Age: 68
End: 2023-07-21
Payer: MEDICARE

## 2023-07-21 VITALS — TEMPERATURE: 97.8 F | OXYGEN SATURATION: 99 % | HEART RATE: 81 BPM

## 2023-07-21 PROCEDURE — 96361 HYDRATE IV INFUSION ADD-ON: CPT

## 2023-07-21 PROCEDURE — 96360 HYDRATION IV INFUSION INIT: CPT

## 2023-07-21 RX ORDER — SODIUM CHLORIDE 9 MG/ML
250 INJECTION, SOLUTION INTRAVENOUS ONCE
Status: COMPLETED | OUTPATIENT
Start: 2023-07-24 | End: 2023-07-24

## 2023-07-21 RX ADMIN — SODIUM CHLORIDE 250 ML/HR: 0.9 INJECTION, SOLUTION INTRAVENOUS at 08:56

## 2023-07-24 ENCOUNTER — HOSPITAL ENCOUNTER (OUTPATIENT)
Dept: INFUSION CENTER | Facility: HOSPITAL | Age: 68
Discharge: HOME | End: 2023-07-24
Payer: MEDICARE

## 2023-07-24 VITALS
RESPIRATION RATE: 18 BRPM | OXYGEN SATURATION: 97 % | TEMPERATURE: 96.4 F | DIASTOLIC BLOOD PRESSURE: 79 MMHG | HEART RATE: 71 BPM | SYSTOLIC BLOOD PRESSURE: 114 MMHG

## 2023-07-24 PROCEDURE — 96360 HYDRATION IV INFUSION INIT: CPT

## 2023-07-24 PROCEDURE — 96361 HYDRATE IV INFUSION ADD-ON: CPT

## 2023-07-24 RX ORDER — SODIUM CHLORIDE 9 MG/ML
250 INJECTION, SOLUTION INTRAVENOUS ONCE
Status: COMPLETED | OUTPATIENT
Start: 2023-07-25 | End: 2023-07-25

## 2023-07-24 RX ADMIN — SODIUM CHLORIDE 250 ML/HR: 0.9 INJECTION, SOLUTION INTRAVENOUS at 09:25

## 2023-07-25 ENCOUNTER — HOSPITAL ENCOUNTER (OUTPATIENT)
Dept: INFUSION CENTER | Facility: HOSPITAL | Age: 68
Discharge: HOME | End: 2023-07-25
Payer: MEDICARE

## 2023-07-25 ENCOUNTER — OFFICE VISIT (OUTPATIENT)
Dept: PHYSICAL THERAPY | Facility: CLINIC | Age: 68
End: 2023-07-25
Payer: MEDICARE

## 2023-07-25 VITALS
RESPIRATION RATE: 18 BRPM | SYSTOLIC BLOOD PRESSURE: 150 MMHG | DIASTOLIC BLOOD PRESSURE: 74 MMHG | TEMPERATURE: 97.1 F | OXYGEN SATURATION: 99 % | HEART RATE: 84 BPM

## 2023-07-25 DIAGNOSIS — S93.421D SPRAIN OF DELTOID LIGAMENT OF RIGHT ANKLE, SUBSEQUENT ENCOUNTER: ICD-10-CM

## 2023-07-25 DIAGNOSIS — S93.491D SPRAIN OF ANTERIOR TALOFIBULAR LIGAMENT OF RIGHT ANKLE, SUBSEQUENT ENCOUNTER: Primary | ICD-10-CM

## 2023-07-25 PROCEDURE — 96360 HYDRATION IV INFUSION INIT: CPT

## 2023-07-25 PROCEDURE — 97110 THERAPEUTIC EXERCISES: CPT

## 2023-07-25 PROCEDURE — 96361 HYDRATE IV INFUSION ADD-ON: CPT

## 2023-07-25 PROCEDURE — 97035 APP MDLTY 1+ULTRASOUND EA 15: CPT

## 2023-07-25 RX ADMIN — SODIUM CHLORIDE 250 ML/HR: 0.9 INJECTION, SOLUTION INTRAVENOUS at 09:00

## 2023-07-25 NOTE — PROGRESS NOTES
Daily Note     Today's date: 2023  Patient name: Mei Coppola  :   MRN: [id number]  Referring provider: Connor Lacey MD  Dx:   Encounter Diagnosis     ICD-10-CM    1. Sprain of anterior talofibular ligament of right ankle, subsequent encounter  S93.491D       2. Sprain of deltoid ligament of right ankle, subsequent encounter  S93.399C                      Subjective:  Pt. reports her R ankle region "feels flared up" today. Requests to only receive US, very  light MT,  and CP applic for treatment today. Says she is performing ther exer to tolerance at home. Objective: See treatment diary below  *Skilled Conversation x 25 min    Assessment: Tolerated treatment Well with US and CP applic. MT applied very light, gingerly. Plan: Con't services 2x/week as per POC/Goals. Precautions: extensive PMH-- please see chart - POTS - monitor pulse - pulse should not drop below 60 - or over 100.      Re-eval Date: 23    Date 23    Visit Count 9 10  11    FOTO        Pain In   8/10 7/10  R ankle "Flared up"    Pain Out    "Much Better"          Manuals 6.15.23 6/19  7.25.23    PROM, calf stretch    *hold this visit            Gentle massage/Desensitization                   Gentle subtalar jt mobs/oscillation       *5 min  Light, gingerly                    Neuro Re-Ed    23    Side step on aeromat        Tandem stand on aeromat        Tandem amb on [employer]                                Ther Ex 6.15.23  6/19 7/17 7.25.23    Nustep        TR's/HR's Seated 30x Seated 30x  *performing @ home    Ankle AROM 30x ea dir   declined Focus on DF - NV  *performing @ home    Ankle TB        Mini squats        Seated towel slides    *performing @ home    Incline calf stretch        Step ups  F/L/R        Towel stretch foot and calf 4 x 20" R 4 x 30" R Focus on Gentle DF stretch - do 5 reps of 5-10" holds nv  *performing @ home    isometrics Focus on DF isometrics - 10 reps only nv  *performing @ home     **SKILLED CONVERSATION  15 min  Skilled conversation 30 minutes Skilled conversation 25 minutes            Ther Activity                        Gait Training                        Modalities 6.15.23 6/19 7/17 7.25.23     **US 10 min  US 10'  1.0 w/cm2  3MHz Continue trial US NV - as she reports feeling better after this and CP US 12'  1.0 w/cm2  3MHz    Trial TENS? ?? Review contraindications first!          **CP 10 min CP 10 min Continue NV CP x 12 min    Access Code: [facility]  URL: [email or web address]  Date: 07/17/2023  Prepared by: Laura Pinedo    Exercises  - Long Sitting Calf Stretch with Strap  - 1 x daily - 7 x weekly - 5-10 sets - 5-10 reps - 5 hold  - Towel Desensitization  - 1 x daily - 7 x weekly - 3 sets - 10 reps

## 2023-07-26 RX ORDER — SODIUM CHLORIDE 9 MG/ML
250 INJECTION, SOLUTION INTRAVENOUS ONCE
Status: COMPLETED | OUTPATIENT
Start: 2023-07-28 | End: 2023-07-28

## 2023-07-26 RX ORDER — SODIUM CHLORIDE 9 MG/ML
250 INJECTION, SOLUTION INTRAVENOUS ONCE
Status: COMPLETED | OUTPATIENT
Start: 2023-07-27 | End: 2023-07-27

## 2023-07-27 ENCOUNTER — HOSPITAL ENCOUNTER (OUTPATIENT)
Dept: INFUSION CENTER | Facility: HOSPITAL | Age: 68
End: 2023-07-27
Payer: MEDICARE

## 2023-07-27 ENCOUNTER — OFFICE VISIT (OUTPATIENT)
Dept: PHYSICAL THERAPY | Facility: CLINIC | Age: 68
End: 2023-07-27
Payer: MEDICARE

## 2023-07-27 VITALS
TEMPERATURE: 96.9 F | RESPIRATION RATE: 16 BRPM | HEART RATE: 76 BPM | SYSTOLIC BLOOD PRESSURE: 125 MMHG | OXYGEN SATURATION: 99 % | DIASTOLIC BLOOD PRESSURE: 79 MMHG

## 2023-07-27 DIAGNOSIS — S93.421D SPRAIN OF DELTOID LIGAMENT OF RIGHT ANKLE, SUBSEQUENT ENCOUNTER: ICD-10-CM

## 2023-07-27 DIAGNOSIS — S93.491D SPRAIN OF ANTERIOR TALOFIBULAR LIGAMENT OF RIGHT ANKLE, SUBSEQUENT ENCOUNTER: Primary | ICD-10-CM

## 2023-07-27 PROCEDURE — 97110 THERAPEUTIC EXERCISES: CPT

## 2023-07-27 PROCEDURE — 97140 MANUAL THERAPY 1/> REGIONS: CPT

## 2023-07-27 PROCEDURE — 97035 APP MDLTY 1+ULTRASOUND EA 15: CPT

## 2023-07-27 PROCEDURE — 96360 HYDRATION IV INFUSION INIT: CPT

## 2023-07-27 PROCEDURE — 96361 HYDRATE IV INFUSION ADD-ON: CPT

## 2023-07-27 RX ORDER — SODIUM CHLORIDE 9 MG/ML
250 INJECTION, SOLUTION INTRAVENOUS ONCE
Status: COMPLETED | OUTPATIENT
Start: 2023-07-31 | End: 2023-07-31

## 2023-07-27 RX ADMIN — SODIUM CHLORIDE 250 ML/HR: 0.9 INJECTION, SOLUTION INTRAVENOUS at 09:02

## 2023-07-27 NOTE — PROGRESS NOTES
Daily Note     Today's date: 2023  Patient name: Abi Coyne  :   MRN: [id number]  Referring provider: Peterson Patel MD  Dx:   Encounter Diagnosis     ICD-10-CM    1. Sprain of anterior talofibular ligament of right ankle, subsequent encounter  S93.491D       2. Sprain of deltoid ligament of right ankle, subsequent encounter  S93.421D                      Subjective:  Pt. states last treatment was with good results for relief of sx @ her R ankle. Requests for same treatment again today. Objective: See treatment diary below      Assessment: Tolerated treatment Well with reception to [address], MT, and CP applications. Sowmya Rivas results again with today's treatment application, as per pt feedback. Plan: Con't services 2x/week. Precautions: extensive PMH-- please see chart - POTS - monitor pulse - pulse should not drop below 60 - or over 100.      Re-eval Date: 23    Date 23 7   Visit Count 9 10  11 12   FOTO        Pain In   8/10 7/10  R ankle "Flared up" Some swelling   Pain Out    "Much Better" Less swelling and overall sx         Manuals 6.15.23 6/19  7 7.   PROM, calf stretch    *hold this visit **6 min           Gentle massage/Desensitization                   Gentle subtalar jt mobs/oscillation       *5 min  Light, gingerly *6 min  Light, gingerly                   Neuro Re-Ed    23 7   Side step on aeromat        Tandem stand on aeromat        Tandem amb on [employer]                                Ther Ex 6.15.23  6/19 7/17 7.25.23 7.23   Nustep        TR's/HR's Seated 30x Seated 30x  *performing @ home *HEP   Ankle AROM 30x ea dir   declined Focus on DF - NV  *performing @ home *HEP   Ankle TB        Mini squats        Seated towel slides    *performing @ home *HEP   Incline calf stretch        Step ups  F/L/R        Towel stretch foot and calf 4 x 20" R 4 x 30" R Focus on Gentle DF stretch - do 5 reps of 5-10" holds nv  *performing @ home *HEP   isometrics   Focus on DF isometrics - 10 reps only nv  *performing @ home *HEP    **SKILLED CONVERSATION  15 min  Skilled conversation 30 minutes Skilled conversation 25 minutes Skilled conversation 15 minutes           Ther Activity                        Gait Training                        Modalities 6.15.23 6/19 7/17 7.25.23 7.27.23    **US 10 min  US 10'  1.0 w/cm2  3MHz Continue trial US NV - as she reports feeling better after this and CP US 12'  1.0 w/cm2  3MHz US 12'  1.0 w/cm2  3MHz   Trial TENS? ?? Review contraindications first!          **CP 10 min CP 10 min Continue NV CP x 12 min CP x 12 min   Access Code: [facility]  URL: [email or web address]  Date: 07/17/2023  Prepared by: Kia Head    Exercises  - Long Sitting Calf Stretch with Strap  - 1 x daily - 7 x weekly - 5-10 sets - 5-10 reps - 5 hold  - Towel Desensitization  - 1 x daily - 7 x weekly - 3 sets - 10 reps

## 2023-07-27 NOTE — PROGRESS NOTES
Pt arrived late to infusion appt this morning. After 1L of fluid was infused, pt asked this nurse to increase the rate on her saline infusion. I told her that I unfortunately cannot, due to the order from Dr. Vikash Almaguer being over 6 hours & I cannot change his order. Pt seemed irritated due to the fact that she would either not get her whole 1.5L infusion or be late for physical therapy.

## 2023-07-28 ENCOUNTER — HOSPITAL ENCOUNTER (OUTPATIENT)
Dept: INFUSION CENTER | Facility: HOSPITAL | Age: 68
End: 2023-07-28
Payer: MEDICARE

## 2023-07-28 PROCEDURE — 96360 HYDRATION IV INFUSION INIT: CPT

## 2023-07-28 PROCEDURE — 96361 HYDRATE IV INFUSION ADD-ON: CPT

## 2023-07-28 RX ADMIN — SODIUM CHLORIDE 250 ML/HR: 0.9 INJECTION, SOLUTION INTRAVENOUS at 09:10

## 2023-07-30 DIAGNOSIS — F11.20 UNCOMPLICATED OPIOID DEPENDENCE (HCC): ICD-10-CM

## 2023-07-30 DIAGNOSIS — G89.4 CHRONIC PAIN SYNDROME: ICD-10-CM

## 2023-07-30 DIAGNOSIS — Z79.891 LONG-TERM CURRENT USE OF OPIATE ANALGESIC: ICD-10-CM

## 2023-07-30 DIAGNOSIS — G03.9 ARACHNOIDITIS: ICD-10-CM

## 2023-07-30 NOTE — TELEPHONE ENCOUNTER
Medication Refill Request     Name  HYDROcodone-acetaminophen (Norco) 5-325 mg per tablet    Dose/Frequency Take 1 tablet by mouth 2 (two) times a day as needed for pain for up to 60 doses Max Daily Amount: 2 tablets  Quantity 60 tablet  Verified pharmacy   [x]  Verified ordering Provider   [x]  Does patient have enough for the next 3 days?  Yes [] No [x]

## 2023-07-31 ENCOUNTER — HOSPITAL ENCOUNTER (OUTPATIENT)
Dept: INFUSION CENTER | Facility: HOSPITAL | Age: 68
Discharge: HOME | End: 2023-07-31
Payer: MEDICARE

## 2023-07-31 ENCOUNTER — OFFICE VISIT (OUTPATIENT)
Dept: PHYSICAL THERAPY | Facility: CLINIC | Age: 68
End: 2023-07-31
Payer: MEDICARE

## 2023-07-31 ENCOUNTER — TELEPHONE (OUTPATIENT)
Dept: PAIN MEDICINE | Facility: MEDICAL CENTER | Age: 68
End: 2023-07-31

## 2023-07-31 VITALS
HEART RATE: 70 BPM | RESPIRATION RATE: 18 BRPM | TEMPERATURE: 99.2 F | DIASTOLIC BLOOD PRESSURE: 81 MMHG | OXYGEN SATURATION: 100 % | SYSTOLIC BLOOD PRESSURE: 135 MMHG

## 2023-07-31 DIAGNOSIS — S93.491D SPRAIN OF ANTERIOR TALOFIBULAR LIGAMENT OF RIGHT ANKLE, SUBSEQUENT ENCOUNTER: Primary | ICD-10-CM

## 2023-07-31 DIAGNOSIS — S93.421D SPRAIN OF DELTOID LIGAMENT OF RIGHT ANKLE, SUBSEQUENT ENCOUNTER: ICD-10-CM

## 2023-07-31 PROCEDURE — 97140 MANUAL THERAPY 1/> REGIONS: CPT

## 2023-07-31 PROCEDURE — 96361 HYDRATE IV INFUSION ADD-ON: CPT

## 2023-07-31 PROCEDURE — 97035 APP MDLTY 1+ULTRASOUND EA 15: CPT

## 2023-07-31 PROCEDURE — 97110 THERAPEUTIC EXERCISES: CPT

## 2023-07-31 PROCEDURE — 96360 HYDRATION IV INFUSION INIT: CPT

## 2023-07-31 RX ORDER — HYDROCODONE BITARTRATE AND ACETAMINOPHEN 5; 325 MG/1; MG/1
1 TABLET ORAL 2 TIMES DAILY PRN
Qty: 60 TABLET | Refills: 0 | OUTPATIENT
Start: 2023-07-31

## 2023-07-31 RX ORDER — HYDROCODONE BITARTRATE AND ACETAMINOPHEN 5; 325 MG/1; MG/1
1 TABLET ORAL 2 TIMES DAILY PRN
Qty: 36 TABLET | Refills: 0 | Status: SHIPPED | OUTPATIENT
Start: 2023-07-31

## 2023-07-31 RX ADMIN — SODIUM CHLORIDE 250 ML/HR: 0.9 INJECTION, SOLUTION INTRAVENOUS at 09:26

## 2023-07-31 NOTE — TELEPHONE ENCOUNTER
RN spoke with Pt. Notified Pt that prescription request was granted and sent to preferred pharmacy. Pts NOV 8/18 with RAZ.

## 2023-07-31 NOTE — TELEPHONE ENCOUNTER
Caller:  Shabnam Brito     Doctor/Office: Leslie Clement     Call regarding :       Call was transferred to: Scott

## 2023-07-31 NOTE — PROGRESS NOTES
Daily Note     Today's date: 2023  Patient name: Mike Davis  : 1955  MRN: [id number]  Referring provider: Jose F Carranza MD  Dx:   Encounter Diagnosis     ICD-10-CM    1. Sprain of anterior talofibular ligament of right ankle, subsequent encounter  S93.491D       2. Sprain of deltoid ligament of right ankle, subsequent encounter  S93.421D                      Subjective:   Pt. describes sx @ L ankle today as "achey Pain". Says (it's) been aggravated from being on her feet alot this past weekend. Objective: See treatment diary below      Assessment: Tolerated treatment Well with US, MT, and CP applications received. Less sx by end of treatment session. Plan: Con't services 2x/week. Precautions: extensive PMH-- please see chart - POTS - monitor pulse - pulse should not drop below 60 - or over 100.      Re-eval Date: 23    Date 23 7   Visit Count 13 10  11 12   FOTO        Pain In  "achey" pain 7/10  R ankle "Flared up" Some swelling   Pain Out Less sx after treatment applic.    "Much Better" Less swelling and overall sx         Manuals 23 7   PROM, calf stretch 6 min   *hold this visit **6 min           Gentle massage/Desensitization                   Gentle subtalar jt mobs/oscillation    6 min  Light, gingerly   *5 min  Light, gingerly *6 min  Light, gingerly                   Neuro Re-Ed 23 7   Side step on aeromat        Tandem stand on aeromat        Tandem amb on [employer]                                Ther Ex 23 7   Nustep        TR's/HR's HEP Seated 30x  *performing @ home *HEP   Ankle AROM HEP   declined Focus on DF - NV  *performing @ home *HEP   Ankle TB        Mini squats        Seated towel slides HEP   *performing @ home *HEP   Incline calf stretch        Step ups  F/L/R        Towel stretch foot and calf HEP 4 x 30" R Focus on Gentle DF stretch - do 5 reps of 5-10" holds nv  *performing @ home *HEP   isometrics HEP  Focus on DF isometrics - 10 reps only nv  *performing @ home *HEP    SKILLED CONVERSATION  15 min  Skilled conversation 30 minutes Skilled conversation 25 minutes Skilled conversation 15 minutes           Ther Activity                        Gait Training                        Modalities 7.31.23 6/19 7/17 7.25.23 7.27.23    US 12'  1.0 w/cm2  3MHz  US 10'  1.0 w/cm2  3MHz Continue trial US NV - as she reports feeling better after this and CP US 12'  1.0 w/cm2  3MHz US 12'  1.0 w/cm2  3MHz   Trial TENS? ?? Review contraindications first!          CP 10 min CP 10 min Continue NV CP x 12 min CP x 12 min   Access Code: [facility]  URL: [email or web address]  Date: 07/17/2023  Prepared by: Fina Mascorro    Exercises  - Long Sitting Calf Stretch with Strap  - 1 x daily - 7 x weekly - 5-10 sets - 5-10 reps - 5 hold  - Towel Desensitization  - 1 x daily - 7 x weekly - 3 sets - 10 reps

## 2023-07-31 NOTE — TELEPHONE ENCOUNTER
Caller:  Nick Barrera     Doctor: Yamilet Lemus     Reason for call: Patient returning call from RN    Call back#: [phone]

## 2023-07-31 NOTE — TELEPHONE ENCOUNTER
Pt Prescription request for HYDROcodone-acetaminophen (Norco) 5-325 mg x1 BID PRN. Last Rx 5/31 with a do NOT fill until 6/28 by BK. LOV 5/31 with BK. NOV 8/18 with BK. Scheduled PRO: Left C2-C3, C3-C4 RFA tomorrow 8/1 with RM at [facility]. Spoke with Pt & Pt is unable to schedule sooner OV appt d/t other scheduled appts.   [facility]    Please advise-Short Script

## 2023-08-01 ENCOUNTER — HOSPITAL ENCOUNTER (OUTPATIENT)
Dept: INFUSION CENTER | Facility: HOSPITAL | Age: 68
Discharge: HOME | End: 2023-08-01
Payer: MEDICARE

## 2023-08-01 VITALS
HEART RATE: 74 BPM | DIASTOLIC BLOOD PRESSURE: 83 MMHG | TEMPERATURE: 97.3 F | SYSTOLIC BLOOD PRESSURE: 160 MMHG | RESPIRATION RATE: 18 BRPM | OXYGEN SATURATION: 100 %

## 2023-08-01 PROCEDURE — 96360 HYDRATION IV INFUSION INIT: CPT

## 2023-08-01 PROCEDURE — 96361 HYDRATE IV INFUSION ADD-ON: CPT

## 2023-08-01 RX ADMIN — SODIUM CHLORIDE 1500 ML: 0.9 INJECTION, SOLUTION INTRAVENOUS at 10:18

## 2023-08-01 NOTE — PROGRESS NOTES
IV hydration tolerated without incident. CVC flushed per protocol, clamped & PDC applied. AVS declined. Pt aware of next appointment date and time. Discharged in stable condition.

## 2023-08-03 ENCOUNTER — HOSPITAL ENCOUNTER (OUTPATIENT)
Dept: INFUSION CENTER | Facility: HOSPITAL | Age: 68
End: 2023-08-03
Payer: MEDICARE

## 2023-08-03 ENCOUNTER — APPOINTMENT (OUTPATIENT)
Dept: PHYSICAL THERAPY | Facility: CLINIC | Age: 68
End: 2023-08-03
Payer: MEDICARE

## 2023-08-03 VITALS
HEART RATE: 72 BPM | SYSTOLIC BLOOD PRESSURE: 140 MMHG | TEMPERATURE: 97.2 F | DIASTOLIC BLOOD PRESSURE: 70 MMHG | RESPIRATION RATE: 18 BRPM

## 2023-08-03 PROCEDURE — 96360 HYDRATION IV INFUSION INIT: CPT

## 2023-08-03 PROCEDURE — 96361 HYDRATE IV INFUSION ADD-ON: CPT

## 2023-08-03 RX ADMIN — SODIUM CHLORIDE 1500 ML: 0.9 INJECTION, SOLUTION INTRAVENOUS at 09:35

## 2023-08-03 NOTE — PLAN OF CARE
Problem: Knowledge Deficit  Goal: Patient/family/caregiver demonstrates understanding of disease process, treatment plan, medications, and discharge instructions  Description: Complete learning assessment and assess knowledge base.   Interventions:  - Provide teaching at level of understanding  - Provide teaching via preferred learning methods  8/3/2023 0939 by Malou Rangel RN  Outcome: Progressing  8/3/2023 0939 by Malou Rangel RN  Outcome: Progressing

## 2023-08-04 ENCOUNTER — HOSPITAL ENCOUNTER (OUTPATIENT)
Dept: INFUSION CENTER | Facility: HOSPITAL | Age: 68
End: 2023-08-04
Payer: MEDICARE

## 2023-08-04 VITALS
RESPIRATION RATE: 18 BRPM | OXYGEN SATURATION: 97 % | TEMPERATURE: 96.9 F | HEART RATE: 82 BPM | DIASTOLIC BLOOD PRESSURE: 81 MMHG | SYSTOLIC BLOOD PRESSURE: 131 MMHG

## 2023-08-04 RX ADMIN — SODIUM CHLORIDE 1500 ML: 0.9 INJECTION, SOLUTION INTRAVENOUS at 08:31

## 2023-08-04 NOTE — PROGRESS NOTES
Hydration tolerated without incident. CVC flushed, clamped & PDC applied. Discharged in stable condition.

## 2023-08-07 ENCOUNTER — OFFICE VISIT (OUTPATIENT)
Dept: PHYSICAL THERAPY | Facility: CLINIC | Age: 68
End: 2023-08-07
Payer: MEDICARE

## 2023-08-07 ENCOUNTER — HOSPITAL ENCOUNTER (OUTPATIENT)
Dept: INFUSION CENTER | Facility: HOSPITAL | Age: 68
Discharge: HOME | End: 2023-08-07
Attending: INTERNAL MEDICINE
Payer: MEDICARE

## 2023-08-07 VITALS
SYSTOLIC BLOOD PRESSURE: 126 MMHG | TEMPERATURE: 97.6 F | OXYGEN SATURATION: 100 % | RESPIRATION RATE: 16 BRPM | HEART RATE: 76 BPM | DIASTOLIC BLOOD PRESSURE: 81 MMHG

## 2023-08-07 DIAGNOSIS — S93.491D SPRAIN OF ANTERIOR TALOFIBULAR LIGAMENT OF RIGHT ANKLE, SUBSEQUENT ENCOUNTER: Primary | ICD-10-CM

## 2023-08-07 DIAGNOSIS — S93.421D SPRAIN OF DELTOID LIGAMENT OF RIGHT ANKLE, SUBSEQUENT ENCOUNTER: ICD-10-CM

## 2023-08-07 PROCEDURE — 97140 MANUAL THERAPY 1/> REGIONS: CPT

## 2023-08-07 PROCEDURE — 97035 APP MDLTY 1+ULTRASOUND EA 15: CPT

## 2023-08-07 PROCEDURE — 97110 THERAPEUTIC EXERCISES: CPT

## 2023-08-07 RX ORDER — SODIUM CHLORIDE 9 MG/ML
250 INJECTION, SOLUTION INTRAVENOUS ONCE
Status: COMPLETED | OUTPATIENT
Start: 2023-08-08 | End: 2023-08-08

## 2023-08-07 RX ADMIN — SODIUM CHLORIDE 1500 ML: 0.9 INJECTION, SOLUTION INTRAVENOUS at 09:00

## 2023-08-07 NOTE — PROGRESS NOTES
Daily Note     Today's date: 2023  Patient name: Sade Meza  : 1955  MRN: [id number]  Referring provider: Patsy Chaves MD  Dx:   Encounter Diagnosis     ICD-10-CM    1. Sprain of anterior talofibular ligament of right ankle, subsequent encounter  S93.491D       2. Sprain of deltoid ligament of right ankle, subsequent encounter  S93.421D                       Subjective:  Pt. states she feels  R ankle is coming along thus far, however her cervical region and thor/LB regions have been painful. Says MD is recommending MHP applics. Pt. Spoke with 1* therapist today here in [address] re: above stated. Objective: See treatment diary below      Assessment: Tolerated treatment Well with US, MT, and MHP applications. Plan:  Con't services 2x/week as per POC/Goals. Precautions: extensive PMH-- please see chart - POTS - monitor pulse - pulse should not drop below 60 - or over 100. Re-eval Date: 23    Date 23 823   Visit Count 13 14  11 12   FOTO        Pain In  "achey" pain less pain/sx @ R ankle    Pain tightness @ Cerv/thor/LB regions  R ankle "Flared up" Some swelling   Pain Out Less sx after treatment applic.   Good results with US for R ankle  And MHP for cerv/thor/LB  "Much Better" Less swelling and overall sx         Manuals 23 8.23   PROM, calf stretch 6 min 6 min  *hold this visit **6 min           Gentle massage/Desensitization                   Gentle subtalar jt mobs/oscillation    6 min  Light, gingerly 6 min  Light, gingerly  *5 min  Light, gingerly *6 min  Light, gingerly                   Neuro Re-Ed 23 8  7 7   Side step on aeromat        Tandem stand on aeromat        Tandem amb on [employer]                                Ther Ex 23  8.23 7   Nustep        TR's/HR's HEP HEP  *performing @ home *HEP   Ankle AROM HEP   HEP Focus on DF - NV *performing @ home *HEP   Ankle TB        Mini squats        Seated towel slides HEP   *performing @ home *HEP   Incline calf stretch        Step ups  F/L/R        Towel stretch foot and calf HEP HEP Focus on Gentle DF stretch - do 5 reps of 5-10" holds nv  *performing @ home *HEP   isometrics HEP HEP Focus on DF isometrics - 10 reps only nv  *performing @ home *HEP    SKILLED CONVERSATION  15 min SKILLED CONVERSATION  15 min Skilled conversation 30 minutes Skilled conversation 25 minutes Skilled conversation 15 minutes           Ther Activity                        Gait Training                        Modalities 7.31.23 8.7.23 7/17 7.25.23 7.27.23    US 12'  1.0 w/cm2  3MHz  US 12'  1.0 w/cm2  3MHz Continue trial US NV - as she reports feeling better after this and CP US 12'  1.0 w/cm2  3MHz US 12'  1.0 w/cm2  3MHz   Trial TENS? ?? Review contraindications first!          CP 10 min CP 10 min Continue NV CP x 12 min CP x 12 min   Access Code: [facility]  URL: [email or web address]  Date: 07/17/2023  Prepared by: Rai Hurtado    Exercises  - Long Sitting Calf Stretch with Strap  - 1 x daily - 7 x weekly - 5-10 sets - 5-10 reps - 5 hold  - Towel Desensitization  - 1 x daily - 7 x weekly - 3 sets - 10 reps

## 2023-08-08 ENCOUNTER — HOSPITAL ENCOUNTER (OUTPATIENT)
Dept: INFUSION CENTER | Facility: HOSPITAL | Age: 68
Discharge: HOME | End: 2023-08-08
Attending: INTERNAL MEDICINE
Payer: MEDICARE

## 2023-08-08 VITALS
TEMPERATURE: 98 F | RESPIRATION RATE: 16 BRPM | SYSTOLIC BLOOD PRESSURE: 126 MMHG | DIASTOLIC BLOOD PRESSURE: 83 MMHG | OXYGEN SATURATION: 99 % | HEART RATE: 76 BPM

## 2023-08-08 DIAGNOSIS — N18.31 STAGE 3A CHRONIC KIDNEY DISEASE (HCC): ICD-10-CM

## 2023-08-08 LAB
ALBUMIN SERPL BCP-MCNC: 4.5 G/DL (ref 3.5–5)
ALP SERPL-CCNC: 91 U/L (ref 34–104)
ALT SERPL W P-5'-P-CCNC: 33 U/L (ref 7–52)
ANION GAP SERPL CALCULATED.3IONS-SCNC: 9 MMOL/L
AST SERPL W P-5'-P-CCNC: 23 U/L (ref 13–39)
BACTERIA UR QL AUTO: NORMAL /HPF
BASOPHILS # BLD AUTO: 0.03 THOUSANDS/ÂΜL (ref 0–0.1)
BASOPHILS NFR BLD AUTO: 0 % (ref 0–1)
BILIRUB SERPL-MCNC: 0.48 MG/DL (ref 0.2–1)
BILIRUB UR QL STRIP: NEGATIVE
BUN SERPL-MCNC: 15 MG/DL (ref 5–25)
CALCIUM SERPL-MCNC: 9.3 MG/DL (ref 8.4–10.2)
CHLORIDE SERPL-SCNC: 105 MMOL/L (ref 96–108)
CLARITY UR: CLEAR
CO2 SERPL-SCNC: 25 MMOL/L (ref 21–32)
COLOR UR: YELLOW
CREAT SERPL-MCNC: 0.94 MG/DL (ref 0.6–1.3)
EOSINOPHIL # BLD AUTO: 0.07 THOUSAND/ÂΜL (ref 0–0.61)
EOSINOPHIL NFR BLD AUTO: 1 % (ref 0–6)
ERYTHROCYTE [DISTWIDTH] IN BLOOD BY AUTOMATED COUNT: 12 % (ref 11.6–15.1)
GFR SERPL CREATININE-BSD FRML MDRD: 62 ML/MIN/1.73SQ M
GLUCOSE P FAST SERPL-MCNC: 96 MG/DL (ref 65–99)
GLUCOSE SERPL-MCNC: 96 MG/DL (ref 65–140)
GLUCOSE UR STRIP-MCNC: NEGATIVE MG/DL
HCT VFR BLD AUTO: 45 % (ref 34.8–46.1)
HGB BLD-MCNC: 14.7 G/DL (ref 11.5–15.4)
HGB UR QL STRIP.AUTO: NEGATIVE
IMM GRANULOCYTES # BLD AUTO: 0.02 THOUSAND/UL (ref 0–0.2)
IMM GRANULOCYTES NFR BLD AUTO: 0 % (ref 0–2)
KETONES UR STRIP-MCNC: NEGATIVE MG/DL
LEUKOCYTE ESTERASE UR QL STRIP: NEGATIVE
LYMPHOCYTES # BLD AUTO: 2.26 THOUSANDS/ÂΜL (ref 0.6–4.47)
LYMPHOCYTES NFR BLD AUTO: 32 % (ref 14–44)
MCH RBC QN AUTO: 30.5 PG (ref 26.8–34.3)
MCHC RBC AUTO-ENTMCNC: 32.7 G/DL (ref 31.4–37.4)
MCV RBC AUTO: 93 FL (ref 82–98)
MONOCYTES # BLD AUTO: 0.55 THOUSAND/ÂΜL (ref 0.17–1.22)
MONOCYTES NFR BLD AUTO: 8 % (ref 4–12)
NEUTROPHILS # BLD AUTO: 4.05 THOUSANDS/ÂΜL (ref 1.85–7.62)
NEUTS SEG NFR BLD AUTO: 59 % (ref 43–75)
NITRITE UR QL STRIP: NEGATIVE
NON-SQ EPI CELLS URNS QL MICRO: NORMAL /HPF
NRBC BLD AUTO-RTO: 0 /100 WBCS
PH UR STRIP.AUTO: 6 [PH]
PLATELET # BLD AUTO: 263 THOUSANDS/UL (ref 149–390)
PMV BLD AUTO: 9.9 FL (ref 8.9–12.7)
POTASSIUM SERPL-SCNC: 4 MMOL/L (ref 3.5–5.3)
PROT SERPL-MCNC: 6.7 G/DL (ref 6.4–8.4)
PROT UR STRIP-MCNC: NEGATIVE MG/DL
RBC # BLD AUTO: 4.82 MILLION/UL (ref 3.81–5.12)
RBC #/AREA URNS AUTO: NORMAL /HPF
SODIUM SERPL-SCNC: 139 MMOL/L (ref 135–147)
SP GR UR STRIP.AUTO: 1.01
UROBILINOGEN UR QL STRIP.AUTO: 0.2 E.U./DL
WBC # BLD AUTO: 6.98 THOUSAND/UL (ref 4.31–10.16)
WBC #/AREA URNS AUTO: NORMAL /HPF

## 2023-08-08 PROCEDURE — 84156 ASSAY OF PROTEIN URINE: CPT

## 2023-08-08 PROCEDURE — 82570 ASSAY OF URINE CREATININE: CPT

## 2023-08-08 PROCEDURE — 81001 URINALYSIS AUTO W/SCOPE: CPT

## 2023-08-08 PROCEDURE — 82043 UR ALBUMIN QUANTITATIVE: CPT | Performed by: NURSE PRACTITIONER

## 2023-08-08 PROCEDURE — 85025 COMPLETE CBC W/AUTO DIFF WBC: CPT

## 2023-08-08 PROCEDURE — 80053 COMPREHEN METABOLIC PANEL: CPT

## 2023-08-08 PROCEDURE — 82570 ASSAY OF URINE CREATININE: CPT | Performed by: NURSE PRACTITIONER

## 2023-08-08 RX ADMIN — SODIUM CHLORIDE 250 ML/HR: 0.9 INJECTION, SOLUTION INTRAVENOUS at 08:57

## 2023-08-08 NOTE — PROGRESS NOTES
Pt tolerated hydration without difficulty. PICC line flushed and saline locked per protocol. [address] applied. Next appt confirmed. AVS declined. Left ambulatory in stable condition.

## 2023-08-10 ENCOUNTER — HOSPITAL ENCOUNTER (OUTPATIENT)
Dept: INFUSION CENTER | Facility: HOSPITAL | Age: 68
Discharge: HOME | End: 2023-08-10
Payer: MEDICARE

## 2023-08-10 ENCOUNTER — APPOINTMENT (OUTPATIENT)
Dept: PHYSICAL THERAPY | Facility: CLINIC | Age: 68
End: 2023-08-10
Payer: MEDICARE

## 2023-08-10 VITALS
SYSTOLIC BLOOD PRESSURE: 140 MMHG | DIASTOLIC BLOOD PRESSURE: 80 MMHG | RESPIRATION RATE: 16 BRPM | TEMPERATURE: 96.3 F | HEART RATE: 77 BPM | OXYGEN SATURATION: 100 %

## 2023-08-10 LAB
MISCELLANEOUS LAB TEST RESULT: NORMAL
MISCELLANEOUS LAB TEST RESULT: NORMAL

## 2023-08-10 PROCEDURE — 96361 HYDRATE IV INFUSION ADD-ON: CPT

## 2023-08-10 PROCEDURE — 96360 HYDRATION IV INFUSION INIT: CPT

## 2023-08-10 RX ORDER — SODIUM CHLORIDE 9 MG/ML
250 INJECTION, SOLUTION INTRAVENOUS ONCE
Status: COMPLETED | OUTPATIENT
Start: 2023-08-10 | End: 2023-08-10

## 2023-08-10 RX ADMIN — SODIUM CHLORIDE 250 ML/HR: 0.9 INJECTION, SOLUTION INTRAVENOUS at 09:35

## 2023-08-11 RX ORDER — SODIUM CHLORIDE 9 MG/ML
250 INJECTION, SOLUTION INTRAVENOUS ONCE
Status: COMPLETED | OUTPATIENT
Start: 2023-08-14 | End: 2023-08-14

## 2023-08-14 ENCOUNTER — HOSPITAL ENCOUNTER (OUTPATIENT)
Dept: INFUSION CENTER | Facility: HOSPITAL | Age: 68
Discharge: HOME | End: 2023-08-14
Attending: INTERNAL MEDICINE
Payer: MEDICARE

## 2023-08-14 VITALS
HEART RATE: 79 BPM | DIASTOLIC BLOOD PRESSURE: 90 MMHG | SYSTOLIC BLOOD PRESSURE: 158 MMHG | OXYGEN SATURATION: 99 % | RESPIRATION RATE: 18 BRPM | TEMPERATURE: 96.3 F

## 2023-08-14 PROCEDURE — 96361 HYDRATE IV INFUSION ADD-ON: CPT

## 2023-08-14 PROCEDURE — 96360 HYDRATION IV INFUSION INIT: CPT

## 2023-08-14 RX ORDER — SODIUM CHLORIDE 9 MG/ML
250 INJECTION, SOLUTION INTRAVENOUS ONCE
Status: COMPLETED | OUTPATIENT
Start: 2023-08-15 | End: 2023-08-15

## 2023-08-14 RX ADMIN — SODIUM CHLORIDE 250 ML/HR: 0.9 INJECTION, SOLUTION INTRAVENOUS at 08:49

## 2023-08-14 NOTE — PROGRESS NOTES
Pt tolerated hydration without difficulty. PICC line flushed and saline locked per protocol. [address] applied. Next appt confirmed. AVS declined. Left ambulatory in stable condition.

## 2023-08-15 ENCOUNTER — HOSPITAL ENCOUNTER (OUTPATIENT)
Dept: INFUSION CENTER | Facility: HOSPITAL | Age: 68
Discharge: HOME | End: 2023-08-15
Attending: INTERNAL MEDICINE
Payer: MEDICARE

## 2023-08-15 ENCOUNTER — OFFICE VISIT (OUTPATIENT)
Dept: PHYSICAL THERAPY | Facility: CLINIC | Age: 68
End: 2023-08-15
Payer: MEDICARE

## 2023-08-15 VITALS
SYSTOLIC BLOOD PRESSURE: 143 MMHG | OXYGEN SATURATION: 99 % | RESPIRATION RATE: 18 BRPM | DIASTOLIC BLOOD PRESSURE: 88 MMHG | HEART RATE: 69 BPM | TEMPERATURE: 96.8 F

## 2023-08-15 DIAGNOSIS — S93.421D SPRAIN OF DELTOID LIGAMENT OF RIGHT ANKLE, SUBSEQUENT ENCOUNTER: ICD-10-CM

## 2023-08-15 DIAGNOSIS — S93.491D SPRAIN OF ANTERIOR TALOFIBULAR LIGAMENT OF RIGHT ANKLE, SUBSEQUENT ENCOUNTER: Primary | ICD-10-CM

## 2023-08-15 PROCEDURE — 97035 APP MDLTY 1+ULTRASOUND EA 15: CPT

## 2023-08-15 PROCEDURE — 97110 THERAPEUTIC EXERCISES: CPT

## 2023-08-15 PROCEDURE — 96360 HYDRATION IV INFUSION INIT: CPT

## 2023-08-15 PROCEDURE — 96361 HYDRATE IV INFUSION ADD-ON: CPT

## 2023-08-15 PROCEDURE — 97140 MANUAL THERAPY 1/> REGIONS: CPT

## 2023-08-15 RX ADMIN — SODIUM CHLORIDE 250 ML/HR: 0.9 INJECTION, SOLUTION INTRAVENOUS at 09:06

## 2023-08-15 NOTE — PROGRESS NOTES
Daily Note     Today's date: 8/15/2023  Patient name: Mike Davis  : 1955  MRN: [id number]  Referring provider: Jose F Carranza MD  Dx:   Encounter Diagnosis     ICD-10-CM    1. Sprain of anterior talofibular ligament of right ankle, subsequent encounter  S93.491D       2. Sprain of deltoid ligament of right ankle, subsequent encounter  S93.421D           Start Time: 1520  Stop Time: 1600  Total time in clinic (min): 40 minutes    Subjective:   Pt. states she has a terrible "migraine and POTS related flare up" right now. Does not wish to participate in, or perform any ther exer other than very light, simple ankle ROM while supine on table. Objective: See treatment diary below      Assessment: Tolerated treatment Fairly Well with simple, light ther exer performed. Received MT, US, and CP applic Well. Plan:  Con't services as per POC/Goals. Precautions: extensive PMH-- please see chart - POTS - monitor pulse - pulse should not drop below 60 - or over 100. Re-eval Date: 23    Date 23 8 8.15.23 723   Visit Count 13 14 15 11 12   FOTO        Pain In  "achey" pain less pain/sx @ R ankle    Pain tightness @ Cerv/thor/LB regions "Flared up" R ankle "Flared up" Some swelling   Pain Out Less sx after treatment applic.   Good results with US for R ankle  And MHP for cerv/thor/LB Less flare/sx after treatment applic. "Much Better" Less swelling and overall sx         Manuals 23 8.7. 8.15. 7 7   PROM, calf stretch 6 min 6 min 6 min *hold this visit **6 min           Gentle massage/Desensitization                   Gentle subtalar jt mobs/oscillation    6 min  Light, gingerly 6 min  Light, gingerly 6 min  Light, gingerly *5 min  Light, gingerly *6 min  Light, gingerly                   Neuro Re-Ed 23 8.7.23 8.15.23 7.. 7   Side step on [employer]        Tandem stand on aeromat        Tandem amb on [employer] Ther Ex 7.31.23  8.7.23 8.15.23 7.25.23 7.27.23   Nustep        TR's/HR's HEP HEP  *performing @ home *HEP   Ankle AROM HEP   HEP *Light, gentle  *performing @ home *HEP   Ankle TB        Mini squats        Seated towel slides HEP   *performing @ home *HEP   Incline calf stretch        Step ups  F/L/R        Towel stretch foot and calf HEP HEP  *performing @ home *HEP   isometrics HEP HEP  *performing @ home *HEP    SKILLED CONVERSATION  15 min SKILLED CONVERSATION  15 min Skilled conversation 10 minutes Skilled conversation 25 minutes Skilled conversation 15 minutes           Ther Activity                        Gait Training                        Modalities 7.31.23 8.7.23 8.15.23 7.25.23 7.27.23    US 12'  1.0 w/cm2  3MHz  US 12'  1.0 w/cm2  3MHz US 12'  1.0 w/cm2  3MHz US 12'  1.0 w/cm2  3MHz US 12'  1.0 w/cm2  3MHz   Trial TENS? ?? Review contraindications first!          CP 10 min CP 10 min CP 10 min CP x 12 min CP x 12 min   Access Code: [facility]  URL: [email or web address]  Date: 07/17/2023  Prepared by: Jordyn Quiet    Exercises  - Long Sitting Calf Stretch with Strap  - 1 x daily - 7 x weekly - 5-10 sets - 5-10 reps - 5 hold  - Towel Desensitization  - 1 x daily - 7 x weekly - 3 sets - 10 reps

## 2023-08-16 RX ORDER — SODIUM CHLORIDE 9 MG/ML
250 INJECTION, SOLUTION INTRAVENOUS ONCE
Status: COMPLETED | OUTPATIENT
Start: 2023-08-17 | End: 2023-08-17

## 2023-08-17 ENCOUNTER — TELEPHONE (OUTPATIENT)
Age: 68
End: 2023-08-17

## 2023-08-17 ENCOUNTER — APPOINTMENT (OUTPATIENT)
Dept: PHYSICAL THERAPY | Facility: CLINIC | Age: 68
End: 2023-08-17
Payer: MEDICARE

## 2023-08-17 ENCOUNTER — HOSPITAL ENCOUNTER (OUTPATIENT)
Dept: INFUSION CENTER | Facility: HOSPITAL | Age: 68
End: 2023-08-17
Attending: INTERNAL MEDICINE
Payer: MEDICARE

## 2023-08-17 VITALS
SYSTOLIC BLOOD PRESSURE: 150 MMHG | RESPIRATION RATE: 18 BRPM | OXYGEN SATURATION: 99 % | HEART RATE: 67 BPM | TEMPERATURE: 96.3 F | DIASTOLIC BLOOD PRESSURE: 84 MMHG

## 2023-08-17 DIAGNOSIS — S93.491D SPRAIN OF ANTERIOR TALOFIBULAR LIGAMENT OF RIGHT ANKLE, SUBSEQUENT ENCOUNTER: Primary | ICD-10-CM

## 2023-08-17 DIAGNOSIS — G03.9 ARACHNOIDITIS: ICD-10-CM

## 2023-08-17 DIAGNOSIS — S93.421D SPRAIN OF DELTOID LIGAMENT OF RIGHT ANKLE, SUBSEQUENT ENCOUNTER: ICD-10-CM

## 2023-08-17 DIAGNOSIS — G89.4 CHRONIC PAIN SYNDROME: ICD-10-CM

## 2023-08-17 DIAGNOSIS — F11.20 UNCOMPLICATED OPIOID DEPENDENCE (HCC): ICD-10-CM

## 2023-08-17 DIAGNOSIS — Z79.891 LONG-TERM CURRENT USE OF OPIATE ANALGESIC: ICD-10-CM

## 2023-08-17 PROCEDURE — 96361 HYDRATE IV INFUSION ADD-ON: CPT

## 2023-08-17 PROCEDURE — 96360 HYDRATION IV INFUSION INIT: CPT

## 2023-08-17 RX ORDER — SODIUM CHLORIDE 9 MG/ML
250 INJECTION, SOLUTION INTRAVENOUS ONCE
Status: COMPLETED | OUTPATIENT
Start: 2023-08-18 | End: 2023-08-18

## 2023-08-17 RX ADMIN — SODIUM CHLORIDE 250 ML/HR: 0.9 INJECTION, SOLUTION INTRAVENOUS at 08:17

## 2023-08-17 NOTE — PROGRESS NOTES
Daily Note     Today's date: 2023  Patient name: Naun Quezada  : 1955  MRN: [id number]  Referring provider: Dami Healy MD  Dx:   Encounter Diagnosis     ICD-10-CM    1. Sprain of anterior talofibular ligament of right ankle, subsequent encounter  S93.491D       2. Sprain of deltoid ligament of right ankle, subsequent encounter  S93.421D                      Subjective:       Objective: See treatment diary below      Assessment: Tolerated treatment       Plan: Con't services 2x/week. Precautions: extensive PMH-- please see chart - POTS - monitor pulse - pulse should not drop below 60 - or over 100. Re-eval Date: 23    Date 23 8. 8.15.23 8. 7   Visit Count 13 14 15 16 12   FOTO        Pain In  "achey" pain less pain/sx @ R ankle    Pain tightness @ Cerv/thor/LB regions "Flared up"  Some swelling   Pain Out Less sx after treatment applic. Good results with US for R ankle  And P for cerv/thor/LB Less flare/sx after treatment applic.   Less swelling and overall sx         Manuals 23 8.7. 8.15. 8.. 7..23   PROM, calf stretch 6 min 6 min 6 min 6 min **6 min           Gentle massage/Desensitization                   Gentle subtalar jt mobs/oscillation    6 min  Light, gingerly 6 min  Light, gingerly 6 min  Light, gingerly 6 min  Light, gingerly *6 min  Light, gingerly                   Neuro Re-Ed 23 8.7. 8.15.23 8.17.23 7..23   Side step on aeromat        Tandem stand on aeromat        Tandem amb on [employer]                                Ther Ex 23  8.7. 8.15. 8.17.23 7..23   Nustep        TR's/HR's HEP HEP  *performing @ home *HEP   Ankle AROM HEP   HEP *Light, gentle   *HEP   Ankle TB        Mini squats        Seated towel slides HEP   *performing @ home *HEP   Incline calf stretch        Step ups  F/L/R        Towel stretch foot and calf HEP HEP  *performing @ home *HEP   isometrics HEP HEP *performing @ home *HEP    SKILLED CONVERSATION  15 min SKILLED CONVERSATION  15 min Skilled conversation 10 minutes Skilled conversation 25 minutes Skilled conversation 15 minutes           Ther Activity                        Gait Training                        Modalities 7.31.23 8.7.23 8.15.23 8.17.23 7.27.23    US 12'  1.0 w/cm2  3MHz  US 12'  1.0 w/cm2  3MHz US 12'  1.0 w/cm2  3MHz US 12'  1.0 w/cm2  3MHz US 12'  1.0 w/cm2  3MHz   Trial TENS? ?? Review contraindications first!          CP 10 min CP 10 min CP 10 min CP x 12 min CP x 12 min   Access Code: [facility]  URL: [email or web address]  Date: 07/17/2023  Prepared by: Austyn Chapa    Exercises  - Long Sitting Calf Stretch with Strap  - 1 x daily - 7 x weekly - 5-10 sets - 5-10 reps - 5 hold  - Towel Desensitization  - 1 x daily - 7 x weekly - 3 sets - 10 reps

## 2023-08-17 NOTE — TELEPHONE ENCOUNTER
Caller: patient    Doctor: RACHID    Reason for call: would like to know if procedure is covered    Call back#:

## 2023-08-17 NOTE — PROGRESS NOTES
Central line dressing changed per protocol without incident. Pt tolerated IV hydration and was discharged in stable condition. AVS declined.

## 2023-08-17 NOTE — TELEPHONE ENCOUNTER
Pt contacted Call Center requested refill of their medication. Patient car broke down and reschedule her OV    Medication Name: hydrocodone      Dosage of Med: 5-325mg      Frequency of Med: 2 daily        Remaining Medication:   Until tomorrow     Pharmacy and Location:     [facility] [address] [place] [address]        Pt. Preferred Callback Phone Number:       Thank you.        PLEASE ADVISE PATIENTS:    REFILL REQUESTS WILL BE PROCESSED WITHIN 24-48 HOURS

## 2023-08-18 ENCOUNTER — HOSPITAL ENCOUNTER (OUTPATIENT)
Dept: INFUSION CENTER | Facility: HOSPITAL | Age: 68
End: 2023-08-18
Attending: INTERNAL MEDICINE
Payer: MEDICARE

## 2023-08-18 ENCOUNTER — APPOINTMENT (OUTPATIENT)
Dept: RADIOLOGY | Facility: CLINIC | Age: 68
End: 2023-08-18
Payer: MEDICARE

## 2023-08-18 ENCOUNTER — OFFICE VISIT (OUTPATIENT)
Dept: OBGYN CLINIC | Facility: CLINIC | Age: 68
End: 2023-08-18
Payer: MEDICARE

## 2023-08-18 ENCOUNTER — TELEPHONE (OUTPATIENT)
Dept: PAIN MEDICINE | Facility: CLINIC | Age: 68
End: 2023-08-18

## 2023-08-18 VITALS
HEIGHT: 61 IN | WEIGHT: 140 LBS | HEART RATE: 86 BPM | SYSTOLIC BLOOD PRESSURE: 175 MMHG | DIASTOLIC BLOOD PRESSURE: 93 MMHG | TEMPERATURE: 99.2 F | BODY MASS INDEX: 26.43 KG/M2

## 2023-08-18 VITALS
DIASTOLIC BLOOD PRESSURE: 97 MMHG | HEART RATE: 69 BPM | TEMPERATURE: 97.4 F | RESPIRATION RATE: 18 BRPM | OXYGEN SATURATION: 99 % | SYSTOLIC BLOOD PRESSURE: 166 MMHG

## 2023-08-18 DIAGNOSIS — G89.29 CHRONIC LEFT SHOULDER PAIN: ICD-10-CM

## 2023-08-18 DIAGNOSIS — M75.42 IMPINGEMENT SYNDROME OF LEFT SHOULDER: Primary | ICD-10-CM

## 2023-08-18 DIAGNOSIS — M25.512 CHRONIC LEFT SHOULDER PAIN: ICD-10-CM

## 2023-08-18 PROCEDURE — 99214 OFFICE O/P EST MOD 30 MIN: CPT | Performed by: FAMILY MEDICINE

## 2023-08-18 PROCEDURE — 96361 HYDRATE IV INFUSION ADD-ON: CPT

## 2023-08-18 PROCEDURE — 20610 DRAIN/INJ JOINT/BURSA W/O US: CPT | Performed by: FAMILY MEDICINE

## 2023-08-18 PROCEDURE — 96360 HYDRATION IV INFUSION INIT: CPT

## 2023-08-18 PROCEDURE — 73030 X-RAY EXAM OF SHOULDER: CPT

## 2023-08-18 RX ORDER — HYDROCODONE BITARTRATE AND ACETAMINOPHEN 5; 325 MG/1; MG/1
1 TABLET ORAL 2 TIMES DAILY PRN
Qty: 28 TABLET | Refills: 0 | Status: SHIPPED | OUTPATIENT
Start: 2023-08-18

## 2023-08-18 RX ORDER — SODIUM CHLORIDE 9 MG/ML
250 INJECTION, SOLUTION INTRAVENOUS ONCE
Status: COMPLETED | OUTPATIENT
Start: 2023-08-21 | End: 2023-08-21

## 2023-08-18 RX ORDER — ROPIVACAINE HYDROCHLORIDE 5 MG/ML
10 INJECTION, SOLUTION EPIDURAL; INFILTRATION; PERINEURAL
Status: COMPLETED | OUTPATIENT
Start: 2023-08-18 | End: 2023-08-18

## 2023-08-18 RX ORDER — TRIAMCINOLONE ACETONIDE 40 MG/ML
40 INJECTION, SUSPENSION INTRA-ARTICULAR; INTRAMUSCULAR
Status: COMPLETED | OUTPATIENT
Start: 2023-08-18 | End: 2023-08-18

## 2023-08-18 RX ADMIN — SODIUM CHLORIDE 250 ML/HR: 0.9 INJECTION, SOLUTION INTRAVENOUS at 08:17

## 2023-08-18 RX ADMIN — TRIAMCINOLONE ACETONIDE 40 MG: 40 INJECTION, SUSPENSION INTRA-ARTICULAR; INTRAMUSCULAR at 15:30

## 2023-08-18 RX ADMIN — ROPIVACAINE HYDROCHLORIDE 10 ML: 5 INJECTION, SOLUTION EPIDURAL; INFILTRATION; PERINEURAL at 15:30

## 2023-08-18 NOTE — TELEPHONE ENCOUNTER
Norco 5-325mg x1 BID PRN. Last Rx 7/31 by BK. LOV 5/31 with BK. NOV 9/1 with BK.  (Per Pt: re-scheduled OV d/t car issues)  Preferred Pharmacy: [facility] [address] [facility] [address] [facility]    Please advise-Short script

## 2023-08-18 NOTE — TELEPHONE ENCOUNTER
RN spoke with Pt. Notified Pt that short prescription request was granted and sent to preferred pharmacy.

## 2023-08-18 NOTE — PROGRESS NOTES
[facility] [address] [address]  [phone]      Assessment:  1. Impingement syndrome of left shoulder    2. Chronic left shoulder pain  -     XR shoulder 2+ vw left; Future; Expected date: 08/18/2023        Plan:  Patient Instructions   F/u 6 wks  Home exercises. Icing/heat/OTC pain meds as needed. Return in about 6 weeks (around 9/29/2023) for Recheck. Chief Complaint:  Chief Complaint   Patient presents with   • Left Shoulder - Pain       Subjective:   HPI    Patient ID: Abi Coyne is a 75 y.o. female     Here c/o L shoulder pain  Worse with reaching up/out/back  Denies injury  Started hurting about 2.5 months  She has been doing home exercises. Taking vicodin which helps. Radiates to biceps  Sharp/achey pain  Wakes her up at night      Review of Systems   Constitutional: Negative for fatigue and fever. Respiratory: Negative for shortness of breath. Cardiovascular: Negative for chest pain. Gastrointestinal: Negative for abdominal pain and nausea. Genitourinary: Negative for dysuria. Musculoskeletal: Positive for arthralgias. Skin: Negative for rash and wound. Neurological: Negative for weakness and headaches. Objective:  Vitals:  BP (!) 175/93 (BP Location: Left arm, Patient Position: Sitting, Cuff Size: Standard)   Pulse 86   Temp 99.2 °F (37.3 °C) (Tympanic)   Ht 5' 1" (1.549 m)   Wt 63.5 kg (140 lb)   BMI 26.45 kg/m²     The following portions of the patient's history were reviewed and updated as appropriate: allergies, current medications, past family history, past medical history, past social history, past surgical history, and problem list.    Physical exam:  Physical Exam  Constitutional:       Appearance: Normal appearance. She is normal weight. HENT:      Head: Normocephalic. Eyes:      Extraocular Movements: Extraocular movements intact.    Pulmonary:      Effort: Pulmonary effort is normal.   Musculoskeletal:      Cervical back: Normal range of motion. Skin:     General: Skin is warm and dry. Neurological:      General: No focal deficit present. Mental Status: She is alert and oriented to person, place, and time. Mental status is at baseline. Psychiatric:         Mood and Affect: Mood normal.         Behavior: Behavior normal.         Thought Content: Thought content normal.         Judgment: Judgment normal.       Left Shoulder Exam     Tenderness   Left shoulder tenderness location: posterior shoulder joint TTP. Range of Motion   Extension: normal   External rotation: normal   Forward flexion: abnormal   Internal rotation 0 degrees: normal   Internal rotation 90 degrees: normal     Muscle Strength   The patient has normal left shoulder strength. Tests   Hahn test: positive  Impingement: positive    Comments:  Pos empty can/push off test        Large joint arthrocentesis: L subacromial bursa  Universal Protocol:  Consent: Verbal consent obtained. Risks and benefits: risks, benefits and alternatives were discussed  Consent given by: patient  Time out: Immediately prior to procedure a "time out" was called to verify the correct patient, procedure, equipment, support staff and site/side marked as required.   Timeout called at: 8/18/2023 4:00 PM.  Site marked: the operative site was marked  Supporting Documentation  Indications: pain   Procedure Details  Location: shoulder - L subacromial bursa  Preparation: Patient was prepped and draped in the usual sterile fashion  Needle size: 25 G  Ultrasound guidance: no  Approach: posterolateral  Medications administered: 10 mL ropivacaine 0.5 %; 40 mg triamcinolone acetonide 40 mg/mL    Patient tolerance: patient tolerated the procedure well with no immediate complications  Dressing:  Sterile dressing applied          I have personally reviewed pertinent films in PACS and my interpretation is XR-  L shoulder mild/mod OA. no fx.       Brant Romero MD

## 2023-08-21 ENCOUNTER — TELEPHONE (OUTPATIENT)
Age: 68
End: 2023-08-21

## 2023-08-21 ENCOUNTER — HOSPITAL ENCOUNTER (OUTPATIENT)
Dept: INFUSION CENTER | Facility: HOSPITAL | Age: 68
Discharge: HOME | End: 2023-08-21
Attending: INTERNAL MEDICINE
Payer: MEDICARE

## 2023-08-21 VITALS
SYSTOLIC BLOOD PRESSURE: 136 MMHG | TEMPERATURE: 98.1 F | OXYGEN SATURATION: 99 % | HEART RATE: 79 BPM | RESPIRATION RATE: 18 BRPM | DIASTOLIC BLOOD PRESSURE: 72 MMHG

## 2023-08-21 PROCEDURE — 96361 HYDRATE IV INFUSION ADD-ON: CPT

## 2023-08-21 PROCEDURE — 96360 HYDRATION IV INFUSION INIT: CPT

## 2023-08-21 RX ADMIN — SODIUM CHLORIDE 250 ML/HR: 0.9 INJECTION, SOLUTION INTRAVENOUS at 08:39

## 2023-08-21 NOTE — TELEPHONE ENCOUNTER
Pt had left shoulder Steroid injection on Friday8/18  She is scheduled for Left Cervical RFA 8/23 ( Wednesday)    Is it okay to proceed with RFA

## 2023-08-21 NOTE — PROGRESS NOTES
IV hydration tolerate without incident. CVC flushed, clamped & PDC applied. Discharged in stable condition.

## 2023-08-21 NOTE — TELEPHONE ENCOUNTER
Caller: patient    Doctor: Santi Nolan    Reason for call: patient received a steroid injection in her left shoulder on Friday. She wants to know if she can still have procedure with spa on Thursday.     Call back#: [phone]

## 2023-08-22 ENCOUNTER — OFFICE VISIT (OUTPATIENT)
Dept: PHYSICAL THERAPY | Facility: CLINIC | Age: 68
End: 2023-08-22
Payer: MEDICARE

## 2023-08-22 ENCOUNTER — HOSPITAL ENCOUNTER (OUTPATIENT)
Dept: INFUSION CENTER | Facility: HOSPITAL | Age: 68
Discharge: HOME | End: 2023-08-22
Attending: INTERNAL MEDICINE
Payer: MEDICARE

## 2023-08-22 VITALS
SYSTOLIC BLOOD PRESSURE: 148 MMHG | OXYGEN SATURATION: 99 % | RESPIRATION RATE: 18 BRPM | HEART RATE: 69 BPM | TEMPERATURE: 97.8 F | DIASTOLIC BLOOD PRESSURE: 88 MMHG

## 2023-08-22 DIAGNOSIS — S93.491D SPRAIN OF ANTERIOR TALOFIBULAR LIGAMENT OF RIGHT ANKLE, SUBSEQUENT ENCOUNTER: Primary | ICD-10-CM

## 2023-08-22 DIAGNOSIS — S93.421D SPRAIN OF DELTOID LIGAMENT OF RIGHT ANKLE, SUBSEQUENT ENCOUNTER: ICD-10-CM

## 2023-08-22 PROCEDURE — 96360 HYDRATION IV INFUSION INIT: CPT

## 2023-08-22 PROCEDURE — 96361 HYDRATE IV INFUSION ADD-ON: CPT

## 2023-08-22 PROCEDURE — 97140 MANUAL THERAPY 1/> REGIONS: CPT

## 2023-08-22 PROCEDURE — 97110 THERAPEUTIC EXERCISES: CPT

## 2023-08-22 PROCEDURE — 97035 APP MDLTY 1+ULTRASOUND EA 15: CPT

## 2023-08-22 RX ADMIN — SODIUM CHLORIDE 1500 ML: 0.9 INJECTION, SOLUTION INTRAVENOUS at 09:13

## 2023-08-22 NOTE — PROGRESS NOTES
Daily Note     Today's date: 2023  Patient name: Prema Daigle  : [birth year]  MRN: [id number]  Referring provider: Erna Bang MD  Dx:   Encounter Diagnosis     ICD-10-CM    1. Sprain of anterior talofibular ligament of right ankle, subsequent encounter  S93.491D       2. Sprain of deltoid ligament of right ankle, subsequent encounter  S93.421D                      Subjective:  No new c/o voiced by pt re: R ankle. Objective: See treatment diary below      Assessment: Tolerated treatment Well with ther exer performed, and US and CP applications received. Pt. progressing appropriately and consistently thus far, as per pt feedback. *FOTO score met. Plan:  Pt. to be re-evaluated upon next treatment session for R ankle and assessment (Re:) for L shoulder. Con't services as deemed appropriate as per 1* therapist's discretion. Precautions: extensive PMH-- please see chart - POTS - monitor pulse - pulse should not drop below 60 - or over 100. Re-eval Date: 23    Date 23 8 8.15.23 8 8   Visit Count 13 14 15 16 17   FOTO     *completed   Pain In  "achey" pain less pain/sx @ R ankle    Pain tightness @ Cerv/thor/LB regions "Flared up"  Less sx overall   Pain Out Less sx after treatment applic. Good results with US for R ankle  And MHP for cerv/thor/LB Less flare/sx after treatment applic.    after CP applic         Manuals 2.. 8. 8.15.23 8 8   PROM, calf stretch 6 min 6 min 6 min 6 min 6 min           Gentle massage/Desensitization                   Gentle subtalar jt mobs/oscillation    6 min  Light, gingerly 6 min  Light, gingerly 6 min  Light, gingerly 6 min  Light, gingerly 6 min  Light, gingerly                   Neuro Re-Ed 23 8. 8.15.23 8. 8   Side step on [employer]        Tandem stand on aeromat        Tandem amb on [employer]                                Ther Ex 23  8 8.15.23 8.17.23 8.22.23   Nustep        TR's/HR's HEP HEP  *performing @ home HEP   Ankle AROM HEP   HEP *Light, gentle   *Light, gentle    Ankle TB        Mini squats        Seated towel slides HEP   *performing @ home HEP   Incline calf stretch        Step ups  F/L/R        Towel stretch foot and calf HEP HEP  *performing @ home HEP   isometrics HEP HEP  *performing @ home HEP    SKILLED CONVERSATION  15 min SKILLED CONVERSATION  15 min Skilled conversation 10 minutes Skilled conversation 25 minutes Skilled conversation 15 minutes           Ther Activity                        Gait Training                        Modalities 7.31.23 8.7.23 8.15.23 8.17.23 8.22.23    US 12'  1.0 w/cm2  3MHz  US 12'  1.0 w/cm2  3MHz US 12'  1.0 w/cm2  3MHz US 12'  1.0 w/cm2  3MHz US 12'  1.0 w/cm2  3MHz   Trial TENS? ?? Review contraindications first!          CP 10 min CP 10 min CP 10 min CP x 12 min CP x 12 min   Access Code: [facility]  URL: [email or web address]  Date: 07/17/2023  Prepared by: Laisha Barber    Exercises  - Long Sitting Calf Stretch with Strap  - 1 x daily - 7 x weekly - 5-10 sets - 5-10 reps - 5 hold  - Towel Desensitization  - 1 x daily - 7 x weekly - 3 sets - 10 reps

## 2023-08-22 NOTE — PROGRESS NOTES
IV hydration tolerated without incident. CVC flushed per protocol, clamped & PDC applied.   PT discharged in stable condition

## 2023-08-23 ENCOUNTER — TELEPHONE (OUTPATIENT)
Dept: PAIN MEDICINE | Facility: CLINIC | Age: 68
End: 2023-08-23

## 2023-08-23 ENCOUNTER — HOSPITAL ENCOUNTER (OUTPATIENT)
Dept: RADIOLOGY | Facility: HOSPITAL | Age: 68
Discharge: HOME | End: 2023-08-23
Admitting: ANESTHESIOLOGY
Payer: MEDICARE

## 2023-08-23 VITALS
OXYGEN SATURATION: 98 % | RESPIRATION RATE: 20 BRPM | DIASTOLIC BLOOD PRESSURE: 77 MMHG | HEART RATE: 62 BPM | TEMPERATURE: 96.8 F | SYSTOLIC BLOOD PRESSURE: 147 MMHG

## 2023-08-23 DIAGNOSIS — M47.812 CERVICAL SPONDYLOSIS: ICD-10-CM

## 2023-08-23 RX ORDER — LIDOCAINE HYDROCHLORIDE 10 MG/ML
5 INJECTION, SOLUTION EPIDURAL; INFILTRATION; INTRACAUDAL; PERINEURAL ONCE
Status: COMPLETED | OUTPATIENT
Start: 2023-08-23 | End: 2023-08-23

## 2023-08-23 RX ADMIN — Medication 3 ML: at 08:51

## 2023-08-23 RX ADMIN — LIDOCAINE HYDROCHLORIDE 5 ML: 10 INJECTION, SOLUTION EPIDURAL; INFILTRATION; INTRACAUDAL; PERINEURAL at 08:45

## 2023-08-23 NOTE — TELEPHONE ENCOUNTER
Pt had left cervical RFA today with RM. Notes she continues to be dizzy since she is home, has nausea, headache, feels pain in her neck is more intense than prior to procedure. Notes this has never happened before. Has needed to remain laying all day with ice and pain is still 9/10.   Just took [place] for the migraine to see if that helps    Called RM to discuss  Suggested pt take muscle relaxer and 1000mg of tylenol now

## 2023-08-23 NOTE — TELEPHONE ENCOUNTER
Left C2-3 and  C3-4 [facility] @ [address] with  8/23/23      F/u appt with RAZ at [facility] 9/1/23 at 8am with a 7:45 arrival time

## 2023-08-23 NOTE — TELEPHONE ENCOUNTER
Called to advise pt as per RM. She notes she already took the Flexeril at 1030 with ES tylenol, dozed off a little but heard her neighbors making noise so she didn't feel much better. Also notes she took 1/2 of a hydrocodone at approx 2pm and still no relief. Admits to hydrating and eating small amts whe taking her medications  States her BP has been rising all day even though she has taking her HTN meds. Last BP was 155/94, however she feels it is pain related. Again states this has never happened before. Dr Rachel Rodriguez called while I was speaking to pt. Advised she take Flexeril again at this time with food and 2 ES tylenol.   PT is agreeable and verbalized understanding

## 2023-08-23 NOTE — DISCHARGE INSTRUCTIONS

## 2023-08-23 NOTE — TELEPHONE ENCOUNTER
Caller:  Vicente Perez PT    Doctor/Office: Dr Layla Malik     Call regarding :  Pain from procedure     Call was transferred to: Nurse

## 2023-08-23 NOTE — H&P
Assessment:  1. Cervical spondylosis  FL spine and pain procedure    FL spine and pain procedure          Plan:  Roseline Huber is a 75 y.o. female with complaints of neck pain presents to surgical center for procedure. 1. We will perform a left C2-C3 and C3-C4 radiofrequency ablation  2. 2. Follow-up 1 month after injection    Complete risks and benefits including bleeding, infection, tissue reaction, nerve injury and allergic reaction were discussed. The approach was demonstrated using models and literature was provided. Verbal and written consent was obtained. My impressions and treatment recommendations were discussed in detail with the patient who verbalized understanding and had no further questions. Discharge instructions were provided. I personally saw and examined the patient and I agree with the above discussed plan of care. Orders Placed This Encounter   Procedures   • FL spine and pain procedure     Standing Status:   Standing     Number of Occurrences:   1     Order Specific Question:   Reason for Exam:     Answer:   Left C2-C3 and C3-C4 RFA     Order Specific Question:   Anticoagulant hold needed? Answer:   No     No orders of the defined types were placed in this encounter. History of Present Illness: Roseline Huber is a 75 y.o. female who presents for a follow up office visit in regards to neck pain. The patient’s current symptoms include 8 out of 10 sharp. No particular time pattern. I have personally reviewed and/or updated the patient's past medical history, past surgical history, family history, social history, current medications, allergies, and vital signs today. Review of Systems   Musculoskeletal: Positive for neck pain and neck stiffness. All other systems reviewed and are negative.       Patient Active Problem List   Diagnosis   • LBBB (left bundle branch block)   • Fibromyalgia   • Postural orthostatic tachycardia syndrome   • Sleep-related breathing disorder   • Other insomnia   • Restless leg syndrome   • Bruxism   • Anxiety and depression   • Chronic rhinitis   • Moderate persistent asthma   • Gastroesophageal reflux disease without esophagitis   • Chronic pain disorder   • Factor V Leiden mutation ([facility])   • Hypothyroidism   • Weakness   • Traumatic tear of supraspinatus tendon of right shoulder   • Sicca syndrome ([facility])   • History of lumbar surgery   • Generalized pain   • Myofascial pain syndrome   • Arachnoiditis   • Cervical radiculopathy   • Chronic bilateral low back pain with bilateral sciatica   • Lumbar radiculopathy   • Encounter for long-term opiate analgesic use   • Uncomplicated opioid dependence ([address])   • Cardiomyopathy, unspecified type ([address])   • Stage 3a chronic kidney disease ([address])   • Urinary incontinence   • Abnormal electrocardiography   • Abnormal TSH   • Asthma   • Biliary dyskinesia   • Blepharitis of both eyes   • Degeneration of intervertebral disc   • Diverticulosis of colon   • Hashimoto's disease   • Herniated nucleus pulposus, L3-4   • Hyperlipidemia   • Irritable bowel syndrome   • Migraine without aura and without status migrainosus, not intractable   • Mitral valve prolapse   • Obstructive sleep apnea syndrome   • Osteoporosis   • Rheumatic fever with cardiac involvement   • Scoliosis (and kyphoscoliosis), idiopathic   • Sjogren's syndrome ([facility])   • Osteoarthritis   • TMJ (temporomandibular joint syndrome)   • Vitamin D deficiency   • Chronic interstitial cystitis   • Radiculopathy, multiple sites in spine   • Primary immunodeficiency syndrome ([facility])   • Cellulitis   • Cervical spondylosis   • Neck pain   • Sprain of deltoid ligament of right ankle   • Sprain of anterior talofibular ligament of right ankle   • Ankle injury, right, initial encounter       Past Medical History:   Diagnosis Date   • Allergic rhinitis    • Anxiety    • Asthma    • Back pain    • Cardiac disease    • Cardiopathy     EF 45%   • Cough    • Diverticulitis    • Factor V Leiden ([address])    • Fibromyalgia    • GERD (gastroesophageal reflux disease)    • Hashimoto's thyroiditis    • Hx of degenerative disc disease    • Hypotension     pots - postural orthostatic hypotension   • Interstitial cystitis    • Irregular heart beat     LBBB   • Irritable bowel syndrome    • Migraines    • Mitral valve disease     "thickening"   • Myocardial infarction ([address])     possible but not sure when   • Neuropathy     bilateral legs   • Osteoporosis    • Postural orthostatic tachycardia syndrome     must drink a lot of water and salt   • Pott's disease    • Rheumatic fever    • Scoliosis    • Sepsis ([address])     associated with PICC line   • Sjogren's syndrome ([address])    • TMJ (dislocation of temporomandibular joint)        Past Surgical History:   Procedure Laterality Date   • ABLATION MICROWAVE Left     lumbar area   • BACK SURGERY  10/1998   •  SECTION     • CHOLECYSTECTOMY  2016   • COLONOSCOPY     • DILATION AND CURETTAGE OF UTERUS     • EGD     • FOOT SURGERY      removal of bone and neuroma   • HYSTERECTOMY      age 54  PRICE ooph   • IR OTHER  2022   • IR OTHER  2022   • IR OTHER  2023   • IR PICC PLACEMENT SINGLE LUMEN  10/02/2020   • IR PICC PLACEMENT SINGLE LUMEN  2021   • IR PICC REPOSITION  2021   • IR TUNNELED CENTRAL LINE PLACEMENT  2022   • LAPAROSCOPY  1996    endometriosis   • MOUTH BIOPSY      lip   • ID EGD TRANSORAL BIOPSY SINGLE/MULTIPLE N/A 2019    Procedure: ESOPHAGOGASTRODUODENOSCOPY (EGD) with multiple bx and dilation;  Surgeon: Lynn Smith MD;  Location: [address] [place] [address] GI LAB; Service: Gastroenterology   • ID SURGICAL ARTHROSCOPY SHOULDER W/ROTATOR CUFF RPR Right 2022    Procedure: SHOULDER ARTHROSCOPIC ROTATOR CUFF REPAIR Biceps Tenodisis;   Surgeon: Radha Haynes MD;  Location: [facility] MAIN OR;  Service: Orthopedics   • TUNNELED VENOUS CATHETER PLACEMENT   Family History   Problem Relation Age of Onset   • Cancer Mother         urinary bladder    • No Known Problems Father    • Thyroid cancer Sister    • Heart attack Sister    • No Known Problems Sister    • No Known Problems Sister    • No Known Problems Sister    • No Known Problems Sister    • No Known Problems Daughter    • No Known Problems Daughter    • Colon cancer Maternal Grandfather    • Breast cancer Maternal Aunt         over 48 yrs old    • Endometrial cancer Maternal Aunt    • Multiple myeloma Maternal Aunt    • Hypertension Paternal Aunt        Social History     Occupational History   • Not on file   Tobacco Use   • Smoking status: Never   • Smokeless tobacco: Never   Vaping Use   • Vaping Use: Never used   Substance and Sexual Activity   • Alcohol use: Never   • Drug use: No   • Sexual activity: Not on file       Current Outpatient Medications on File Prior to Encounter   Medication Sig   • Alum Hydroxide-Mag Trisilicate (Gaviscon) 63-23.3 MG CHEW Chew 1 tablet 4 (four) times a day as needed With meals and as needed   • azelastine (ASTELIN) 0.1 % nasal spray 2 sprays into each nostril 2 (two) times a day Use in each nostril as directed   • beclomethasone (QVAR) 40 MCG/ACT inhaler Inhale 1 puff daily as needed (only when unable to nebulize pulmicort) Rinse mouth after use. • budesonide (PULMICORT) 0.5 mg/2 mL nebulizer solution Take 0.5 mg by nebulization 2 (two) times a day Rinse mouth after use.    • cholecalciferol (VITAMIN D3) 1,000 units tablet Take 4,000 Units by mouth daily   • cyclobenzaprine (FLEXERIL) 5 mg tablet Take 1 tablet (5 mg total) by mouth 2 (two) times a day as needed for muscle spasms   • docusate sodium (COLACE) 100 mg capsule Take 100 mg by mouth daily as needed for constipation   • erythromycin (ILOTYCIN) ophthalmic ointment Apply 1 application to eye daily at bedtime   • famotidine (PEPCID) 40 MG tablet Twice a day   • fexofenadine (ALLEGRA) 180 MG tablet Take 180 mg by mouth 2 (two) times a day    • fluticasone (FLONASE) 50 mcg/act nasal spray 2 sprays into each nostril daily    • Galcanezumab-gnlm (Emgality) 120 MG/ML SOAJ Inject under the skin every 30 (thirty) days    • HYDROcodone-acetaminophen (Norco) 5-325 mg per tablet Take 1 tablet by mouth 2 (two) times a day as needed for pain for up to 60 doses Max Daily Amount: 2 tablets   • HYDROcodone-acetaminophen (NORCO) 5-325 mg per tablet Take 1 tablet by mouth 2 (two) times a day as needed for pain Max Daily Amount: 2 tablets   • hydrOXYzine (ATARAX) 10 mg/5 mL syrup Take 20 mg by mouth daily at bedtime    • ipratropium (ATROVENT) 0.02 % nebulizer solution Take 0.5 mg by nebulization every 6 (six) hours as needed for wheezing or shortness of breath   • ipratropium (ATROVENT) 0.06 % nasal spray PLEASE SEE ATTACHED FOR DETAILED DIRECTIONS   • ivabradine HCl (Corlanor) 5 MG tablet 7.5 mg 2 (two) times a day   • levalbuterol (XOPENEX HFA) 45 mcg/act inhaler Inhale 2 puffs every 4 (four) hours as needed (when unable to nebulize)   • levalbuterol (XOPENEX) 1.25 mg/3 mL nebulizer solution Take 1.25 mg by nebulization every 6 (six) hours as needed    • Magnesium 400 MG CAPS Take 1 capsule by mouth 2 (two) times a day    • MULTIPLE VITAMINS-CALCIUM PO Take 1 capsule by mouth every morning    • Multiple Vitamins-Iron TABS Take by mouth   • omega-3-acid ethyl esters (LOVAZA) 1 g capsule Take 2 g by mouth 2 (two) times a day 1600mg daily   • polyethylene glycol (MIRALAX) 17 g packet Take 17 g by mouth daily as needed    • Probiotic Product (PROBIOTIC DAILY PO) Take 1 tablet by mouth daily At lunch   • psyllium (METAMUCIL) 0.52 g capsule Take 0.52 g by mouth daily   • Qvar RediHaler 40 MCG/ACT inhaler    • sodium chloride Inject 1,500 mL into a catheter in a vein   • Tezepelumab-ekko 210 MG/1. 91ML SOAJ Inject 210 mg under the skin every 30 (thirty) days   • Thyroid, Porcine, POWD Use 32 mg daily   • Ubrogepant (UBRELVY) 100 MG tablet Take 100 mg by mouth Take 1 tablet (100 mg) one time as needed for migraine. May repeat one additional tablet (100 mg) at least two hours after the first dose. Do not use more than two doses per day, or for more than eight days per month.    • verapamil (CALAN) 40 mg tablet      Current Facility-Administered Medications on File Prior to Encounter   Medication   • [COMPLETED] sodium chloride 0.9 % bolus 1,500 mL       Allergies   Allergen Reactions   • Imipramine Confusion, Fatigue, Irritability, Palpitations, Shortness Of Breath, Tachycardia and Visual Disturbance   • Melatonin Shortness Of Breath   • Mirtazapine Anxiety, Dizziness, GI Intolerance, Nausea Only, Palpitations and Shortness Of Breath   • Nexium [Esomeprazole] Shortness Of Breath   • Nsaids Shortness Of Breath   • Singulair [Montelukast] Shortness Of Breath and Cough   • Zomig [Zolmitriptan] Shortness Of Breath   • Dexilant [Dexlansoprazole] Nausea Only and Vomiting   • Albuterol    • Ambien [Zolpidem]    • Amitriptyline Drowsiness   • Aspirin    • Bactrim [Sulfamethoxazole-Trimethoprim] Hives   • Banana - Food Allergy Dermatitis   • Ceftin [Cefuroxime]    • Celebrex [Celecoxib]    • Ciprofloxacin    • Cranberry-C [Ascorbate - Food Allergy] GI Intolerance   • Demerol [Meperidine]    • Epinephrine Dizziness   • Ergotamine Nausea Only and Headache   • Keflex [Cephalexin]    • Klonopin [Clonazepam] Nausea Only and Dizziness   • Latex Hives   • Levaquin [Levofloxacin]    • Lexapro [Escitalopram]    • Lyrica [Pregabalin] Fatigue   • Macrodantin [Nitrofurantoin]    • Morphine Nausea Only   • Movantik [Naloxegol] Nausea Only   • Mushroom Extract Complex - Food Allergy      Eye itchy, asthma  attack   • Naprosyn [Naproxen]    • Neurontin [Gabapentin] Dizziness   • Pineapple - Food Allergy GI Intolerance   • Plecanatide Abdominal Pain, Diarrhea and GI Intolerance   • Prolia [Denosumab]    • Prozac [Fluoxetine]    • Serevent [Salmeterol] Dizziness   • Sudafed [Pseudoephedrine] Hives   • Sulfa Antibiotics    • Tomato - Food Allergy GI Intolerance   • Trazodone    • Ultram [Tramadol] Nausea Only, Dizziness and Headache   • Vilazodone Dizziness, GI Intolerance and Headache   • Vilazodone Hcl Abdominal Pain, Dizziness, GI Intolerance, Headache and Other (See Comments)   • Vioxx [Rofecoxib]    • Vortioxetine Drowsiness, Fatigue, GI Intolerance and Irritability   • Zithromax [Azithromycin] Hives   • Zoloft [Sertraline]    • Zantac [Ranitidine] Rash and Dizziness       Physical Exam:    There were no vitals taken for this visit. Constitutional:normal, well developed, well nourished, alert, in no distress and non-toxic and no overt pain behavior.   Eyes:anicteric  HEENT:grossly intact  Neck:supple, symmetric, trachea midline and no masses   Pulmonary:even and unlabored  Cardiovascular:No edema or pitting edema present  Skin:Normal without rashes or lesions and well hydrated  Psychiatric:Mood and affect appropriate  Neurologic:Cranial Nerves II-XII grossly intact  Musculoskeletal:normal

## 2023-08-24 ENCOUNTER — HOSPITAL ENCOUNTER (OUTPATIENT)
Dept: INFUSION CENTER | Facility: HOSPITAL | Age: 68
Discharge: HOME | End: 2023-08-24
Attending: INTERNAL MEDICINE

## 2023-08-24 RX ORDER — SODIUM CHLORIDE 9 MG/ML
250 INJECTION, SOLUTION INTRAVENOUS ONCE
Status: COMPLETED | OUTPATIENT
Start: 2023-08-28 | End: 2023-08-28

## 2023-08-24 NOTE — TELEPHONE ENCOUNTER
Left C2-3 and  C3-4 RFA @ [facility] with RM 8/23/23  See prev notes below       S/W pt today. Notes the additional dose of Flexeril with tylenol yesterday really helped and she is feeling so much better today. Notes at one point last night pain was only 3/10    Pt stated needle sites look good, denies S/S of infection, denies fever, denies soreness and denies sunburn like sensation. Pain rating today:  6/10  Advised pt if he/she does get pain to take prescribed or OTC pain medications and/or use ice/heat and it will take 4-6 weeks to take full effect. Pt verbalized understanding. Confirmed next appt with pt.

## 2023-08-25 ENCOUNTER — HOSPITAL ENCOUNTER (OUTPATIENT)
Dept: INFUSION CENTER | Facility: HOSPITAL | Age: 68
End: 2023-08-25
Attending: INTERNAL MEDICINE
Payer: MEDICARE

## 2023-08-25 VITALS
SYSTOLIC BLOOD PRESSURE: 149 MMHG | HEART RATE: 74 BPM | OXYGEN SATURATION: 99 % | DIASTOLIC BLOOD PRESSURE: 84 MMHG | RESPIRATION RATE: 18 BRPM | TEMPERATURE: 96.8 F

## 2023-08-25 PROCEDURE — 96360 HYDRATION IV INFUSION INIT: CPT

## 2023-08-25 PROCEDURE — 96361 HYDRATE IV INFUSION ADD-ON: CPT

## 2023-08-25 RX ADMIN — SODIUM CHLORIDE 1500 ML: 0.9 INJECTION, SOLUTION INTRAVENOUS at 09:00

## 2023-08-28 ENCOUNTER — HOSPITAL ENCOUNTER (OUTPATIENT)
Dept: INFUSION CENTER | Facility: HOSPITAL | Age: 68
Discharge: HOME | End: 2023-08-28
Attending: INTERNAL MEDICINE
Payer: MEDICARE

## 2023-08-28 VITALS
DIASTOLIC BLOOD PRESSURE: 84 MMHG | TEMPERATURE: 97.4 F | OXYGEN SATURATION: 100 % | HEART RATE: 88 BPM | RESPIRATION RATE: 18 BRPM | SYSTOLIC BLOOD PRESSURE: 158 MMHG

## 2023-08-28 PROCEDURE — 96361 HYDRATE IV INFUSION ADD-ON: CPT

## 2023-08-28 PROCEDURE — 96360 HYDRATION IV INFUSION INIT: CPT

## 2023-08-28 RX ORDER — SODIUM CHLORIDE 9 MG/ML
250 INJECTION, SOLUTION INTRAVENOUS ONCE
Status: COMPLETED | OUTPATIENT
Start: 2023-08-29 | End: 2023-08-29

## 2023-08-28 RX ADMIN — SODIUM CHLORIDE 250 ML/HR: 0.9 INJECTION, SOLUTION INTRAVENOUS at 08:30

## 2023-08-29 ENCOUNTER — EVALUATION (OUTPATIENT)
Dept: PHYSICAL THERAPY | Facility: CLINIC | Age: 68
End: 2023-08-29
Payer: MEDICARE

## 2023-08-29 ENCOUNTER — HOSPITAL ENCOUNTER (OUTPATIENT)
Dept: INFUSION CENTER | Facility: HOSPITAL | Age: 68
Discharge: HOME | End: 2023-08-29
Attending: INTERNAL MEDICINE
Payer: MEDICARE

## 2023-08-29 VITALS
HEART RATE: 79 BPM | DIASTOLIC BLOOD PRESSURE: 86 MMHG | SYSTOLIC BLOOD PRESSURE: 130 MMHG | OXYGEN SATURATION: 99 % | RESPIRATION RATE: 18 BRPM | TEMPERATURE: 97.1 F

## 2023-08-29 DIAGNOSIS — G89.29 CHRONIC LEFT SHOULDER PAIN: Primary | ICD-10-CM

## 2023-08-29 DIAGNOSIS — M25.512 CHRONIC LEFT SHOULDER PAIN: Primary | ICD-10-CM

## 2023-08-29 PROCEDURE — 96361 HYDRATE IV INFUSION ADD-ON: CPT

## 2023-08-29 PROCEDURE — 97163 PT EVAL HIGH COMPLEX 45 MIN: CPT

## 2023-08-29 PROCEDURE — 96360 HYDRATION IV INFUSION INIT: CPT

## 2023-08-29 RX ADMIN — SODIUM CHLORIDE 250 ML/HR: 0.9 INJECTION, SOLUTION INTRAVENOUS at 09:06

## 2023-08-29 NOTE — PROGRESS NOTES
PT Evaluation     Today's date: 2023  Patient name: Zaida Gutiérrez  :   MRN: [id number]  Referring provider: Kimi Meyers MD  Dx:   Encounter Diagnosis     ICD-10-CM    1. Chronic left shoulder pain  M25.512     G89.29                      Assessment  Assessment details: Zaida Gutiérrez is a 75 y.o. female presenting to outpatient physical therapy with noted impairments including pain, impaired soft tissue mobility, reduced range of motion, reduced strength, reduced postural awareness, and reduced activity tolerance. Signs and symptoms at present are consistent with referring diagnosis of L shld pain. Due to noted impairments, the patient's present functional limitations include difficulty with ADLs with increased need for assistance, reliance on medication and/or modalities for pain relief, reduced tolerance for functional mobility and activity, and difficulty completing self care and [facility] responsibilities. Patient to benefit from skilled outpatient physical therapy 2x/week for 6 weeks in order to reduce pain, maximize pain free range of motion, increase strength and stability, and improve functional mobility/functional activity in order to maximize return to prior level of function with reduced limitations. Home exercise program was provided and all questions answered to patient's level of satisfaction. Thank you for your referral.        Impairments: abnormal or restricted ROM, abnormal movement, activity intolerance, impaired physical strength, lacks appropriate home exercise program and pain with function  Understanding of Dx/Px/POC: good   Prognosis: good    Goals  STGs to be achieved in 4 weeks:  1. Pt to demonstrate reduced subjective pain rating "at worst" by at least 2-3 points from Initial Eval in order to allow for reduced pain with ADLs and improved functional activity tolerance.    2. Pt to demonstrate increased AROM of L shld by at least 5-10 degrees in order to allow for greater ease and independence with ADLs and functional mobility. 3. Pt to demonstrate full PROM of L shld in order to maximize joint mobility and function and allow for progression of exercise program and achievement of goals. 4. Pt to demonstrate increased MMT of L shldby at least 1/2-1 grade in order to improve safety and stability with ADLs and functional mobility. LTGs to be achieved in 6-8 weeks:  1. Pt will be I with HEP in order to continue to improve quality of life and independence and reduce risk for re-injury. 2. 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 3. Pt to demonstrate improved function as noted by achieving or exceeding predicted score on FOTO outcomes assessment tool. Plan  Patient would benefit from: skilled physical therapy  Other planned modality interventions: Modalities prn for symptom management  Planned therapy interventions: manual therapy, neuromuscular re-education, therapeutic activities, therapeutic exercise, strengthening, stretching and home exercise program  Frequency: 2x week  Duration in weeks: 6  Plan of Care beginning date: 8/29/2023  Plan of Care expiration date: 10/10/2023  Treatment plan discussed with: PTA and patient        Subjective Evaluation    History of Present Illness  Mechanism of injury: Pt notes 1 month hx of L shld pain for which she saw ortho and had a cortisone injection which helped decrease pain. Pt notes pain flares up with rainy days. Pain with overhead reaching such as hanging laundry and retrieving dishes from cupboards. Has used heat and ice with relief while in use. Pt had radio frequency ablation L cerv area and is still having pain from that. Pt notes diff washing and styling hair.           Not a recurrent problem   Quality of life: fair    Patient Goals  Patient goals for therapy: decreased pain, increased motion, increased strength and independence with ADLs/IADLs    Pain  Current pain ratin (6/10 with palpation)  At best pain ratin  At worst pain ratin  Location: L biceps and posterior shld  Quality: dull ache and sharp (shooting)  Relieving factors: ice, heat and medications  Aggravating factors: overhead activity and lifting  Progression: improved (since injection)    Social Support  Lives in: apartment    Employment status: not working  Hand dominance: left      Diagnostic Tests  X-ray: abnormal (no fx's, OA)  Treatments  Previous treatment: injection treatment and medication  Current treatment: medication and physical therapy        Objective     Palpation   Left   Tenderness of the biceps, posterior deltoid, supraspinatus and upper trapezius. Tenderness     Left Shoulder   Tenderness in the biceps tendon (proximal) and bicipital groove. Active Range of Motion   Left Shoulder   Flexion: 133 degrees   Extension: 35 degrees   Abduction: 132 degrees   External rotation 90°: 83 degrees   Internal rotation 90°: 51 degrees     Strength/Myotome Testing     Left Shoulder     Planes of Motion   Flexion: 4-   Abduction: 4-   External rotation at 0°: 4   Internal rotation at 0°: 4- (painful)     Isolated Muscles   Biceps: 4+     Tests     Left Shoulder   Positive belly press, empty can and full can. Negative drop arm, painful arc and Mac's sign.               Precautions: POTS, see PMH for extensive list of comorbidities    Re-eval Date: 23    Date 23       Visit Count 1       FOTO        Pain In See eval       Pain Out                Manuals 23                                       Neuro Re-Ed        MTPs/LTPs        Wall push ups        TB punch                                        Ther Ex        pulleys        Wall slides        Finger ladder        UBE        Wand ex  Flex, abd, ext, IR        Bicep curls        Active flex/abd                Ther Activity                        Gait Training                        Modalities

## 2023-08-30 RX ORDER — SODIUM CHLORIDE 9 MG/ML
250 INJECTION, SOLUTION INTRAVENOUS ONCE
Status: COMPLETED | OUTPATIENT
Start: 2023-08-31 | End: 2023-08-31

## 2023-08-31 ENCOUNTER — OFFICE VISIT (OUTPATIENT)
Dept: NEPHROLOGY | Facility: CLINIC | Age: 68
End: 2023-08-31
Payer: MEDICARE

## 2023-08-31 ENCOUNTER — HOSPITAL ENCOUNTER (OUTPATIENT)
Dept: INFUSION CENTER | Facility: HOSPITAL | Age: 68
End: 2023-08-31
Attending: INTERNAL MEDICINE
Payer: MEDICARE

## 2023-08-31 ENCOUNTER — APPOINTMENT (OUTPATIENT)
Dept: PHYSICAL THERAPY | Facility: CLINIC | Age: 68
End: 2023-08-31
Payer: MEDICARE

## 2023-08-31 VITALS
DIASTOLIC BLOOD PRESSURE: 72 MMHG | HEART RATE: 84 BPM | OXYGEN SATURATION: 99 % | HEIGHT: 61 IN | WEIGHT: 139 LBS | SYSTOLIC BLOOD PRESSURE: 126 MMHG | BODY MASS INDEX: 26.24 KG/M2

## 2023-08-31 VITALS
OXYGEN SATURATION: 99 % | RESPIRATION RATE: 16 BRPM | TEMPERATURE: 97 F | DIASTOLIC BLOOD PRESSURE: 81 MMHG | SYSTOLIC BLOOD PRESSURE: 120 MMHG | HEART RATE: 79 BPM

## 2023-08-31 DIAGNOSIS — N18.31 STAGE 3A CHRONIC KIDNEY DISEASE (HCC): Primary | ICD-10-CM

## 2023-08-31 PROCEDURE — 96360 HYDRATION IV INFUSION INIT: CPT

## 2023-08-31 PROCEDURE — 99213 OFFICE O/P EST LOW 20 MIN: CPT | Performed by: INTERNAL MEDICINE

## 2023-08-31 PROCEDURE — 96361 HYDRATE IV INFUSION ADD-ON: CPT

## 2023-08-31 RX ORDER — SODIUM CHLORIDE 9 MG/ML
250 INJECTION, SOLUTION INTRAVENOUS ONCE
Status: COMPLETED | OUTPATIENT
Start: 2023-09-01 | End: 2023-09-01

## 2023-08-31 RX ADMIN — SODIUM CHLORIDE 250 ML/HR: 0.9 INJECTION, SOLUTION INTRAVENOUS at 08:39

## 2023-08-31 NOTE — PROGRESS NOTES
IV hydration tolerated without incident. CVC flushed, clamped & PDC applied. Discharged in stable condition.

## 2023-08-31 NOTE — PROGRESS NOTES
[facility] [address] [place] [address]    Name: Patsy Esparza  YOB: 1955      Assessment/Plan:    Stage 3a chronic kidney disease [facility])  Lab Results   Component Value Date    EGFR 62 08/08/2023    EGFR 54 06/02/2023    EGFR 55 05/16/2023    CREATININE 0.94 08/08/2023    CREATININE 1.06 06/02/2023    CREATININE 1.03 05/16/2023     Patient kidney function slightly better than usual, baseline creatinine appears to be about 1 mg/dL. Continue to optimize care avoid potential nephrotoxins. With respect to pain control management, recommend initiation of Santos 2 inhibitor if possible. Patient apparently had transaminitis with Vioxx, she may be able to tolerate either Celebrex or Mobic given molecular differences, but will defer this to her pain management physician. From the renal standpoint, this is a permissible medication given current clinical circumstances. Problem List Items Addressed This Visit        Genitourinary    Stage 3a chronic kidney disease [facility]) - Primary     Lab Results   Component Value Date    EGFR 62 08/08/2023    EGFR 54 06/02/2023    EGFR 55 05/16/2023    CREATININE 0.94 08/08/2023    CREATININE 1.06 06/02/2023    CREATININE 1.03 05/16/2023     Patient kidney function slightly better than usual, baseline creatinine appears to be about 1 mg/dL. Continue to optimize care avoid potential nephrotoxins. With respect to pain control management, recommend initiation of Santos 2 inhibitor if possible. Patient apparently had transaminitis with Vioxx, she may be able to tolerate either Celebrex or Mobic given molecular differences, but will defer this to her pain management physician. From the renal standpoint, this is a permissible medication given current clinical circumstances.          Relevant Orders    Comprehensive metabolic panel    Albumin / creatinine urine ratio    Urinalysis with microscopic    Magnesium    Phosphorus    PTH, intact       Patient stable from the renal standpoint. We will see her back for regular appointment in 1 year, we will continue to follow blood work every 6 months for now. Subjective:      Patient ID: Sanchez Valentin is a 75 y.o. female. Patient presents for follow up. We reviewed that patient's labs in detail. Regarding POTS, she has tried Florinef with side-effect of mood disorder. Currently having isotonic saline infusions twice a week which appears to be helping. Patient is on low-dose verapamil. From the kidney function standpoint, patient's creatinine is stable at 0.94 mg/dL, there were no significant electrolyte abnormalities noted. Patient is currently taking medications as prescribed with no specific side effects. Patient is seeing pain management, receiving injections from time to time, also getting hydrocodone for pain control management. The following portions of the patient's history were reviewed and updated as appropriate: allergies, current medications, past family history, past medical history, past social history, past surgical history and problem list.    Review of Systems   All other systems reviewed and are negative.         Social History     Socioeconomic History   • Marital status:      Spouse name: None   • Number of children: None   • Years of education: None   • Highest education level: None   Occupational History   • None   Tobacco Use   • Smoking status: Never   • Smokeless tobacco: Never   Vaping Use   • Vaping Use: Never used   Substance and Sexual Activity   • Alcohol use: Never   • Drug use: No   • Sexual activity: None   Other Topics Concern   • None   Social History Narrative   • None     Social Determinants of Health     Financial Resource Strain: Not on file   Food Insecurity: Not on file   Transportation Needs: Not on file   Physical Activity: Not on file   Stress: Not on file   Social Connections: Not on file   Intimate Partner Violence: Not on file   Housing Stability: Not on file     Past Medical History:   Diagnosis Date   • Allergic rhinitis    • Anxiety    • Asthma    • Back pain    • Cardiac disease    • Cardiopathy     EF 45%   • Cough    • Diverticulitis    • Factor V Leiden ([address])    • Fibromyalgia    • GERD (gastroesophageal reflux disease)    • Hashimoto's thyroiditis    • Hx of degenerative disc disease    • Hypotension     pots - postural orthostatic hypotension   • Interstitial cystitis    • Irregular heart beat     LBBB   • Irritable bowel syndrome    • Migraines    • Mitral valve disease     "thickening"   • Myocardial infarction ([address])     possible but not sure when   • Neuropathy     bilateral legs   • Osteoporosis    • Postural orthostatic tachycardia syndrome     must drink a lot of water and salt   • Pott's disease    • Rheumatic fever    • Scoliosis    • Sepsis ([address])     associated with PICC line   • Sjogren's syndrome ([address])    • TMJ (dislocation of temporomandibular joint)      Past Surgical History:   Procedure Laterality Date   • ABLATION MICROWAVE Left     lumbar area   • BACK SURGERY  10/1998   •  SECTION     • CHOLECYSTECTOMY  2016   • COLONOSCOPY     • DILATION AND CURETTAGE OF UTERUS     • EGD     • FOOT SURGERY  1968    removal of bone and neuroma   • HYSTERECTOMY      age 54  PRICE ooph   • IR OTHER  2022   • IR OTHER  2022   • IR OTHER  2023   • IR PICC PLACEMENT SINGLE LUMEN  10/02/2020   • IR PICC PLACEMENT SINGLE LUMEN  2021   • IR PICC REPOSITION  2021   • IR TUNNELED CENTRAL LINE PLACEMENT  2022   • LAPAROSCOPY  1996    endometriosis   • MOUTH BIOPSY      lip   • MT EGD TRANSORAL BIOPSY SINGLE/MULTIPLE N/A 2019    Procedure: ESOPHAGOGASTRODUODENOSCOPY (EGD) with multiple bx and dilation;  Surgeon: Najma Hale MD;  Location: [address] [place] [address] GI LAB;   Service: Gastroenterology   • MT SURGICAL ARTHROSCOPY SHOULDER W/ROTATOR CUFF RPR Right 2022 Procedure: SHOULDER ARTHROSCOPIC ROTATOR CUFF REPAIR Biceps Tenodisis;   Surgeon: Immanuel Meehan MD;  [facility]: [facility];  Service: Orthopedics   • TUNNELED VENOUS CATHETER PLACEMENT  2016       Current Outpatient Medications:   •  Alum Hydroxide-Mag Trisilicate (Gaviscon) 17-87.8 MG CHEW, Chew 1 tablet 4 (four) times a day as needed With meals and as needed, Disp: , Rfl:   •  azelastine (ASTELIN) 0.1 % nasal spray, 2 sprays into each nostril 2 (two) times a day Use in each nostril as directed, Disp: , Rfl:   •  beclomethasone (QVAR) 40 MCG/ACT inhaler, Inhale 1 puff daily as needed (only when unable to nebulize pulmicort) Rinse mouth after use., Disp: , Rfl:   •  budesonide (PULMICORT) 0.5 mg/2 mL nebulizer solution, Take 0.5 mg by nebulization 2 (two) times a day Rinse mouth after use., Disp: , Rfl:   •  cholecalciferol (VITAMIN D3) 1,000 units tablet, Take 4,000 Units by mouth daily, Disp: , Rfl:   •  cyclobenzaprine (FLEXERIL) 5 mg tablet, Take 1 tablet (5 mg total) by mouth 2 (two) times a day as needed for muscle spasms, Disp: 60 tablet, Rfl: 2  •  docusate sodium (COLACE) 100 mg capsule, Take 100 mg by mouth daily as needed for constipation, Disp: , Rfl:   •  erythromycin (ILOTYCIN) ophthalmic ointment, Apply 1 application to eye daily at bedtime, Disp: , Rfl:   •  famotidine (PEPCID) 40 MG tablet, Twice a day, Disp: , Rfl:   •  fexofenadine (ALLEGRA) 180 MG tablet, Take 180 mg by mouth 2 (two) times a day , Disp: , Rfl:   •  fluticasone (FLONASE) 50 mcg/act nasal spray, 2 sprays into each nostril daily , Disp: , Rfl:   •  Galcanezumab-gnlm (Emgality) 120 MG/ML SOAJ, Inject under the skin every 30 (thirty) days , Disp: , Rfl:   •  HYDROcodone-acetaminophen (Norco) 5-325 mg per tablet, Take 1 tablet by mouth 2 (two) times a day as needed for pain for up to 60 doses Max Daily Amount: 2 tablets, Disp: 60 tablet, Rfl: 0  •  HYDROcodone-acetaminophen (NORCO) 5-325 mg per tablet, Take 1 tablet by mouth 2 (two) times a day as needed for pain Max Daily Amount: 2 tablets, Disp: 28 tablet, Rfl: 0  •  ipratropium (ATROVENT) 0.02 % nebulizer solution, Take 0.5 mg by nebulization every 6 (six) hours as needed for wheezing or shortness of breath, Disp: , Rfl:   •  ipratropium (ATROVENT) 0.06 % nasal spray, PLEASE SEE ATTACHED FOR DETAILED DIRECTIONS, Disp: , Rfl:   •  ivabradine HCl (Corlanor) 5 MG tablet, 7.5 mg 2 (two) times a day, Disp: , Rfl:   •  levalbuterol (XOPENEX HFA) 45 mcg/act inhaler, Inhale 2 puffs every 4 (four) hours as needed (when unable to nebulize), Disp: , Rfl:   •  levalbuterol (XOPENEX) 1.25 mg/3 mL nebulizer solution, Take 1.25 mg by nebulization every 6 (six) hours as needed , Disp: , Rfl: 2  •  Magnesium 400 MG CAPS, Take 1 capsule by mouth 2 (two) times a day , Disp: , Rfl:   •  MULTIPLE VITAMINS-CALCIUM PO, Take 1 capsule by mouth every morning , Disp: , Rfl:   •  omega-3-acid ethyl esters (LOVAZA) 1 g capsule, Take 2 g by mouth 2 (two) times a day 1600mg daily, Disp: , Rfl:   •  polyethylene glycol (MIRALAX) 17 g packet, Take 17 g by mouth daily as needed , Disp: , Rfl:   •  Probiotic Product (PROBIOTIC DAILY PO), Take 1 tablet by mouth daily At lunch, Disp: , Rfl:   •  Qvar RediHaler 40 MCG/ACT inhaler, , Disp: , Rfl:   •  sodium chloride, Inject 1,500 mL into a catheter in a vein, Disp: , Rfl:   •  Thyroid, Porcine, POWD, Use 32 mg daily, Disp: , Rfl:   •  Ubrogepant (UBRELVY) 100 MG tablet, Take 100 mg by mouth Take 1 tablet (100 mg) one time as needed for migraine. May repeat one additional tablet (100 mg) at least two hours after the first dose. Do not use more than two doses per day, or for more than eight days per month., Disp: , Rfl:   •  verapamil (CALAN) 40 mg tablet, , Disp: , Rfl:   No current facility-administered medications for this visit.     Facility-Administered Medications Ordered in Other Visits:   •  [START ON 9/1/2023] sodium chloride 0.9 % infusion, 250 mL/hr, Intravenous, Once, Marietta Mina MD    Lab Results   Component Value Date     06/04/2018    SODIUM 139 08/08/2023    K 4.0 08/08/2023     08/08/2023    CO2 25 08/08/2023    ANIONGAP 12.9 06/04/2018    AGAP 9 08/08/2023    BUN 15 08/08/2023    CREATININE 0.94 08/08/2023    GLUC 96 08/08/2023    GLUF 96 08/08/2023    CALCIUM 9.3 08/08/2023    AST 23 08/08/2023    ALT 33 08/08/2023    ALKPHOS 91 08/08/2023    PROT 7.0 06/04/2018    TP 6.7 08/08/2023    BILITOT 0.5 06/04/2018    TBILI 0.48 08/08/2023    EGFR 62 08/08/2023     Lab Results   Component Value Date    WBC 6.98 08/08/2023    HGB 14.7 08/08/2023    HCT 45.0 08/08/2023    MCV 93 08/08/2023     08/08/2023     Lab Results   Component Value Date    CHOLESTEROL 238 (H) 09/29/2020    CHOLESTEROL 221 (H) 10/31/2018     Lab Results   Component Value Date    HDL 84 09/29/2020    HDL 65 (H) 10/31/2018    HDL 59 10/06/2015     Lab Results   Component Value Date    LDLCALC 138 (H) 09/29/2020    LDLCALC 137 (H) 10/31/2018    LDLCALC 139 (H) 10/06/2015     Lab Results   Component Value Date    TRIG 79 09/29/2020    TRIG 94 10/31/2018    TRIG 109 09/26/2016     No results found for: "CHOLHDL"  Lab Results   Component Value Date    ORL1UMHACFVN 0.921 05/16/2023     Lab Results   Component Value Date    PTH 70.1 09/14/2022    CALCIUM 9.3 08/08/2023    PHOS 3.6 11/15/2022     Lab Results   Component Value Date    SPEP See Comment 02/10/2021    UPEP The urine total 12/16/2014    UPEP The serum total 12/16/2014     No results found for: "Shea Gómez", "[id number]"        Objective:      /72 Comment: left arm  Pulse 84   Ht 5' 1" (1.549 m)   Wt 63 kg (139 lb)   SpO2 99%   BMI 26.26 kg/m²          Physical Exam  Vitals reviewed. Constitutional:       General: She is not in acute distress. Appearance: She is well-developed. HENT:      Head: Normocephalic and atraumatic.    Eyes:      Conjunctiva/sclera: Conjunctivae normal.   Cardiovascular:      Rate and Rhythm: Normal rate and regular rhythm. Pulmonary:      Effort: Pulmonary effort is normal.      Breath sounds: Normal breath sounds. Abdominal:      Palpations: Abdomen is soft. Musculoskeletal:      Cervical back: Neck supple. Skin:     General: Skin is warm. Findings: No rash. Neurological:      Mental Status: She is alert and oriented to person, place, and time. Cranial Nerves: No cranial nerve deficit.    Psychiatric:         Behavior: Behavior normal.

## 2023-08-31 NOTE — ASSESSMENT & PLAN NOTE
Lab Results   Component Value Date    EGFR 62 08/08/2023    EGFR 54 06/02/2023    EGFR 55 05/16/2023    CREATININE 0.94 08/08/2023    CREATININE 1.06 06/02/2023    CREATININE 1.03 05/16/2023     Patient kidney function slightly better than usual, baseline creatinine appears to be about 1 mg/dL. Continue to optimize care avoid potential nephrotoxins. With respect to pain control management, recommend initiation of Santos 2 inhibitor if possible. Patient apparently had transaminitis with Vioxx, she may be able to tolerate either Celebrex or Mobic given molecular differences, but will defer this to her pain management physician. From the renal standpoint, this is a permissible medication given current clinical circumstances.

## 2023-09-01 ENCOUNTER — OFFICE VISIT (OUTPATIENT)
Dept: PAIN MEDICINE | Facility: CLINIC | Age: 68
End: 2023-09-01
Payer: MEDICARE

## 2023-09-01 ENCOUNTER — HOSPITAL ENCOUNTER (OUTPATIENT)
Dept: INFUSION CENTER | Facility: HOSPITAL | Age: 68
End: 2023-09-01
Attending: INTERNAL MEDICINE
Payer: MEDICARE

## 2023-09-01 ENCOUNTER — TELEPHONE (OUTPATIENT)
Age: 68
End: 2023-09-01

## 2023-09-01 VITALS
DIASTOLIC BLOOD PRESSURE: 86 MMHG | WEIGHT: 139 LBS | BODY MASS INDEX: 26.24 KG/M2 | HEART RATE: 87 BPM | HEIGHT: 61 IN | SYSTOLIC BLOOD PRESSURE: 143 MMHG

## 2023-09-01 VITALS
HEART RATE: 80 BPM | TEMPERATURE: 98.8 F | OXYGEN SATURATION: 99 % | RESPIRATION RATE: 18 BRPM | SYSTOLIC BLOOD PRESSURE: 147 MMHG | DIASTOLIC BLOOD PRESSURE: 92 MMHG

## 2023-09-01 DIAGNOSIS — G89.4 CHRONIC PAIN SYNDROME: Primary | ICD-10-CM

## 2023-09-01 DIAGNOSIS — M54.41 CHRONIC BILATERAL LOW BACK PAIN WITH BILATERAL SCIATICA: ICD-10-CM

## 2023-09-01 DIAGNOSIS — M54.2 NECK PAIN: ICD-10-CM

## 2023-09-01 DIAGNOSIS — G89.29 CHRONIC BILATERAL LOW BACK PAIN WITH BILATERAL SCIATICA: ICD-10-CM

## 2023-09-01 DIAGNOSIS — M54.16 LUMBAR RADICULOPATHY: ICD-10-CM

## 2023-09-01 DIAGNOSIS — G03.9 ARACHNOIDITIS: ICD-10-CM

## 2023-09-01 DIAGNOSIS — M79.18 MYOFASCIAL PAIN SYNDROME: ICD-10-CM

## 2023-09-01 DIAGNOSIS — M41.20 IDIOPATHIC SCOLIOSIS AND KYPHOSCOLIOSIS: ICD-10-CM

## 2023-09-01 DIAGNOSIS — M54.42 CHRONIC BILATERAL LOW BACK PAIN WITH BILATERAL SCIATICA: ICD-10-CM

## 2023-09-01 DIAGNOSIS — Z98.890 HISTORY OF LUMBAR SURGERY: ICD-10-CM

## 2023-09-01 PROCEDURE — 96361 HYDRATE IV INFUSION ADD-ON: CPT

## 2023-09-01 PROCEDURE — 96360 HYDRATION IV INFUSION INIT: CPT

## 2023-09-01 PROCEDURE — 99214 OFFICE O/P EST MOD 30 MIN: CPT | Performed by: NURSE PRACTITIONER

## 2023-09-01 RX ORDER — CYCLOBENZAPRINE HCL 5 MG
5 TABLET ORAL 3 TIMES DAILY PRN
Qty: 90 TABLET | Refills: 2 | Status: SHIPPED | OUTPATIENT
Start: 2023-09-01

## 2023-09-01 RX ORDER — HYDROCODONE BITARTRATE AND ACETAMINOPHEN 5; 325 MG/1; MG/1
1 TABLET ORAL 2 TIMES DAILY PRN
Qty: 60 TABLET | Refills: 0 | Status: SHIPPED | OUTPATIENT
Start: 2023-09-01

## 2023-09-01 RX ADMIN — SODIUM CHLORIDE 250 ML/HR: 0.9 INJECTION, SOLUTION INTRAVENOUS at 08:59

## 2023-09-01 NOTE — TELEPHONE ENCOUNTER
Spoke with Pt regarding medical equipment/new mattress for hosp bed. Advised Pt to reach out ot her PCP for script. Pt verbalized understanding & was appreciative for the clarification.

## 2023-09-01 NOTE — TELEPHONE ENCOUNTER
Caller: pt    Doctor: Sylvia Teresa    Reason for call: pt is asking if she can have a new mattress for her hospital bed?     Call back#: [phone]

## 2023-09-01 NOTE — PROGRESS NOTES
Patient tolerated IV hydration without issues. AVS declined. Patient has Mychart. Patient ambulated off unit without incident. All personal belongings taken with patient.

## 2023-09-01 NOTE — PATIENT INSTRUCTIONS
Opioid Safety   WHAT YOU NEED TO KNOW:   An opioid medicine is used to treat pain. Examples are oxycodone, morphine, fentanyl, or codeine. Pain control and management may help you rest, heal, and return to your daily activities. You and your family will receive information about how to manage your pain at home. The instructions will include what to do if you have side effects as your pain is managed. You will get information on how to handle opioid medicine safely. You will also get suggestions on how to control pain without opioids. It is important to follow all instructions so your pain is managed effectively. DISCHARGE INSTRUCTIONS:   Call your local emergency number (911 in the ), or have someone else call if:   You have a seizure. You cannot be woken. You have trouble staying awake and your breathing is slow or shallow. Your speech is slurred, or you are confused. You are dizzy or stumble when you walk. Call your doctor, or have someone close to you call if:   You are extremely drowsy, or you have trouble staying awake or speaking. You have pale or clammy skin. You have blue fingernails or lips. Your heartbeat is slower than normal.    You cannot stop vomiting. You have questions or concerns about your condition or care. Use opioids safely:   Take prescribed opioids exactly as directed. Opioids come with directions based on the kind and how it is given. Talk to your healthcare provider or a pharmacist if you have any questions. Do not take more than the recommended amount. Too much can cause a life-threatening overdose. Do not continue to take it after your pain stops. You may develop tolerance. This means you keep needing higher doses to get the same effect. You may also develop opioid use disorder. This means you are not able to control your opioid use. Do not give opioids to others or take opioids that belong to someone else.   The kind or amount one person takes may not be right for another. The person you share them with may also be taking medicines that do not mix with opioids. He or she may drink alcohol or use other drugs that can cause life-threatening problems when mixed with opioids. Do not mix opioids with other medicines or alcohol. The combination can cause an overdose, or cause you to stop breathing. Alcohol, sleeping pills, and medicines such as antihistamines can make you sleepy. A combination with opioids can lead to a coma. Do not drive or operate heavy machinery after you use an opioid. You may feel drowsy or have trouble concentrating. You can injure yourself or others if you drive or use heavy machinery when you are not alert. Your provider or pharmacist can tell you how long to wait after a dose before you do these activities. Talk to your healthcare provider if you have any side effects. Side effects include nausea, sleepiness, itching, and trouble thinking clearly. Your provider may need to make changes to the kind or amount of opioid you are taking. He or she can also help you find ways to prevent or relieve side effects. Manage constipation:  Constipation is the most common side effect of opioid medicine. Constipation is when you have hard, dry bowel movements, or you go longer than usual between bowel movements. Tell your healthcare provider about all changes in your bowel movements while you are taking opioids. He or she may recommend laxative medicine to help you have a bowel movement. He or she may also change the kind of opioid you are taking, or change when you take it. The following are more ways you can prevent or relieve constipation:  Drink liquids as directed. You may need to drink extra liquids to help soften and move your bowels. Ask how much liquid to drink each day and which liquids are best for you. Eat high-fiber foods. This may help decrease constipation by adding bulk to your bowel movements.  High-fiber foods include fruits, vegetables, whole-grain breads and cereals, and beans. Your healthcare provider or dietitian can help you create a high-fiber meal plan. Your provider may also recommend a fiber supplement if you cannot get enough fiber from food. Exercise regularly. Regular physical activity can help stimulate your intestines. Walking is a good exercise to prevent or relieve constipation. Ask which exercises are best for you. Schedule a time each day to have a bowel movement. This may help train your body to have regular bowel movements. Bend forward while you are on the toilet to help move the bowel movement out. Sit on the toilet for at least 10 minutes, even if you do not have a bowel movement. Store opioids safely:   Store opioids where others cannot easily get them. Keep them in a locked cabinet or secure area. Do not  keep them in a purse or other bag you carry with you. A person may be looking for something else and find the opioids. Make sure opioids are stored out of the reach of children. A child can easily overdose on opioids. Opioids may look like candy to a small child. The best way to dispose of opioids: The laws vary by country and area. In the [facility], the best way is to return the opioids through a take-back program. This program is offered by the [employer]). The following are options for using the program:  Take the opioids to a [employer] collection site. The site is often a law enforcement center. Call your local law enforcement center for scheduled take-back days in your area. You will be given information on where to go if the collection site is in a different location. Take the opioids to an approved pharmacy or hospital.  A pharmacy or hospital may be set up as a collection site. You will need to ask if it is a [employer] collection site if you were not directed there.  A pharmacy or doctor's office may not be able to take back opioids unless it is a [employer] site.    Use a mail-back system. This means you are given containers to put the opioids into. You will then mail them in the containers. Use a take-back drop box. This is a place to leave the opioids at any time. People and animals will not be able to get into the box. Your local law enforcement agency can tell you where to find a drop box in your area. Other safe ways to dispose of opioids: The medicine may come with disposal instructions. The instructions may vary depending on the brand of medicine you are using. Instructions may come in a Medication Guide, but not every medicine has one. You may instead get instructions from your pharmacy or doctor. Follow instructions carefully. The following are general guidelines to follow:  Find out if you can flush the opioid. Some opioids can be flushed down the toilet or poured into the sink. You will need to contact authorities in your area to see if this is an option for you. The FDA also offers a list of medicines that are safe to flush down the toilet. You can check the list if you cannot get the information for your local area. Ask your waste management company about rules for putting opioids in the trash. The company will be able to give you specific directions. Scratch out personal information on the original medicine label so it cannot be read. Then put it in the trash. Do not label the trash or put any information on it about the opioids. It should look like regular household trash so no one is tempted to look for the opioids. Keep the trash out of the reach of children and animals. Always make sure trash is secure. Talk to officials if you live in a facility. If you live in a nursing home or assisted living center, talk to an official. The person will know the rules for your area. Other ways to manage pain:   Ask your healthcare provider about non-opioid medicines to control pain.   Some medicines may even work better than opioids, depending on the cause of your pain. Nonprescription medicines include NSAIDs (such as ibuprofen) and acetaminophen. Prescription medicines include muscle relaxers, antidepressants, and steroids. Pain may be managed without any medicines. Some ways to relieve pain include massage, aromatherapy, or meditation. Physical or occupational therapy may also help. For more information:   Drug Enforcement Administration  [address] , [address]  Phone: 2- 311 - 573-1638  Web Address: [employer]. [employer]/drug_disposal/    [address] and Drug Administration  [address] , [address]  Phone: 4- 385 - 128-2425  Web Address: http://Tribe/  Follow up with your doctor or pain specialist as directed: You may need to have your dose adjusted. Your doctor or pain specialist can also help you find ways to manage pain without opioids. Write down your questions so you remember to ask them during your visits. © Copyright [employer] 2022 Information is for End User's use only and may not be sold, redistributed or otherwise used for commercial purposes. The above information is an  only. It is not intended as medical advice for individual conditions or treatments. Talk to your doctor, nurse or pharmacist before following any medical regimen to see if it is safe and effective for you.

## 2023-09-01 NOTE — PROGRESS NOTES
Assessment:  1. Chronic pain syndrome    2. Chronic bilateral low back pain with bilateral sciatica    3. History of lumbar surgery    4. Lumbar radiculopathy    5. Arachnoiditis    6. Neck pain    7. Scoliosis (and kyphoscoliosis), idiopathic    8. Myofascial pain syndrome        Plan:  While the patient was in the office today, I did have a thorough conversation regarding their chronic pain syndrome, medication management, and treatment plan options. Patient is being seen for a follow-up visit. She was recently seen by nephrology who suggested that she may try a Santos 2 inhibitor. Patient previously took Vioxx which caused elevated liver functions. She is allergic to sulfa and is reluctant to try Celebrex due to sulfa allergy. She has had issues with NSAIDs in the past and is reluctant to try NSAIDs in general.    We will continue with hydrocodone 5/325 twice daily if needed for pain. The patient's opioid scripts were sent to their pharmacy electronically and was given a 2 month supply of prescriptions with a Do Not Fill date(s) of 9/1/2023, 9/29/2023. Continue Flexeril 5 mg 3 times daily if needed for spasms. Patient underwent a left-sided C2-3 and C3-4 radiofrequency ablation on 8/23/2023. [place] Prescription Drug Monitoring Program report was reviewed and was appropriate     There are risks associated with opioid medications, including dependence, addiction and tolerance. The patient understands and agrees to use these medications only as prescribed. Potential side effects of the medications include, but are not limited to, constipation, drowsiness, addiction, impaired judgment and risk of fatal overdose if not taken as prescribed. The patient was warned against driving while taking sedation medications. Sharing medications is a felony. At this point in time, the patient is showing no signs of addiction, abuse, diversion or suicidal ideation.     The patient will follow-up in 8 weeks for medication prescription refill and reevaluation. The patient was advised to contact the office should their symptoms worsen in the interim. The patient was agreeable and verbalized an understanding. History of Present Illness: The patient is a 75 y.o. female last seen on 5/31/2023 who presents for a follow up office visit in regards to chronic pain secondary to chronic pain syndrome, chronic low back pain, history of lumbar surgery, lumbar radiculopathy, arachnoiditis, neck pain, fascial pain syndrome. The patient currently reports complaints of neck pain and upper extremity pain, low back and lower extremity pain. Current pain level is an 8/10. Quality pain is described as burning, cramping, shooting, numb, pins-and-needles. Current pain medications includes: Hydrocodone 5/325 twice daily if needed for pain, Flexeril 5 mg 3 times daily if needed for spasms. The patient reports that this regimen is providing 50% pain relief. The patient is reporting no side effects from this pain medication regimen. Pain Contract Signed: 5/31/23  Last Urine Drug Screen: 6/2/23    I have personally reviewed and/or updated the patient's past medical history, past surgical history, family history, social history, current medications, allergies, and vital signs today. Review of Systems:    Review of Systems   Constitutional: Negative for unexpected weight change. HENT: Negative for hearing loss. Eyes: Negative for visual disturbance. Respiratory: Positive for shortness of breath. Cardiovascular: Negative for leg swelling. Gastrointestinal: Positive for constipation and nausea. Endocrine: Negative for polyuria. Genitourinary: Negative for difficulty urinating. Musculoskeletal: Positive for arthralgias, gait problem and myalgias. Negative for joint swelling. Decreased range of motion  Joint stiffness  Pain in extremity- L shoulder, R ankle   Skin: Negative for rash.    Neurological: Positive for dizziness. Negative for weakness and headaches. Psychiatric/Behavioral: Negative for decreased concentration. All other systems reviewed and are negative. Past Medical History:   Diagnosis Date   • Allergic rhinitis    • Anxiety    • Asthma    • Back pain    • Cardiac disease    • Cardiopathy     EF 45%   • Cough    • Diverticulitis    • Factor V Leiden (HCC)    • Fibromyalgia    • GERD (gastroesophageal reflux disease)    • Hashimoto's thyroiditis    • Hx of degenerative disc disease    • Hypotension     pots - postural orthostatic hypotension   • Interstitial cystitis    • Irregular heart beat     LBBB   • Irritable bowel syndrome    • Migraines    • Mitral valve disease     "thickening"   • Myocardial infarction ([address])     possible but not sure when   • Neuropathy     bilateral legs   • Osteoporosis    • Postural orthostatic tachycardia syndrome     must drink a lot of water and salt   • Pott's disease    • Rheumatic fever    • Scoliosis    • Sepsis ([address])     associated with PICC line   • Sjogren's syndrome ([address])    • TMJ (dislocation of temporomandibular joint)        Past Surgical History:   Procedure Laterality Date   • ABLATION MICROWAVE Left     lumbar area   • BACK SURGERY  10/1998   •  SECTION     • CHOLECYSTECTOMY  2016   • COLONOSCOPY     • DILATION AND CURETTAGE OF UTERUS     • EGD     • FOOT SURGERY      removal of bone and neuroma   • HYSTERECTOMY      age 54  PRICE ooph   • IR OTHER  2022   • IR OTHER  2022   • IR OTHER  2023   • IR PICC PLACEMENT SINGLE LUMEN  10/02/2020   • IR PICC PLACEMENT SINGLE LUMEN  2021   • IR PICC REPOSITION  2021   • IR TUNNELED CENTRAL LINE PLACEMENT  2022   • LAPAROSCOPY  1996    endometriosis   • MOUTH BIOPSY      lip   • AR EGD TRANSORAL BIOPSY SINGLE/MULTIPLE N/A 2019    Procedure: ESOPHAGOGASTRODUODENOSCOPY (EGD) with multiple bx and dilation;  Surgeon:  Conrad Fish Nettie Arboleda MD;  Location: [facility]; Service: Gastroenterology   • WA SURGICAL ARTHROSCOPY SHOULDER W/ROTATOR CUFF RPR Right 04/06/2022    Procedure: SHOULDER ARTHROSCOPIC ROTATOR CUFF REPAIR Biceps Tenodisis;   Surgeon: Valdemar Espinoza MD;  [facility];  Service: Orthopedics   • TUNNELED VENOUS CATHETER PLACEMENT  2016       Family History   Problem Relation Age of Onset   • Cancer Mother         urinary bladder    • No Known Problems Father    • Thyroid cancer Sister    • Heart attack Sister    • No Known Problems Sister    • No Known Problems Sister    • No Known Problems Sister    • No Known Problems Sister    • No Known Problems Daughter    • No Known Problems Daughter    • Colon cancer Maternal Grandfather    • Breast cancer Maternal Aunt         over 48 yrs old    • Endometrial cancer Maternal Aunt    • Multiple myeloma Maternal Aunt    • Hypertension Paternal Aunt        Social History     Occupational History   • Not on file   Tobacco Use   • Smoking status: Never   • Smokeless tobacco: Never   Vaping Use   • Vaping Use: Never used   Substance and Sexual Activity   • Alcohol use: Never   • Drug use: No   • Sexual activity: Not on file         Current Outpatient Medications:   •  Alum Hydroxide-Mag Trisilicate (Gaviscon) 85-59.9 MG CHEW, Chew 1 tablet 4 (four) times a day as needed With meals and as needed, Disp: , Rfl:   •  azelastine (ASTELIN) 0.1 % nasal spray, 2 sprays into each nostril 2 (two) times a day Use in each nostril as directed, Disp: , Rfl:   •  beclomethasone (QVAR) 40 MCG/ACT inhaler, Inhale 1 puff daily as needed (only when unable to nebulize pulmicort) Rinse mouth after use., Disp: , Rfl:   •  budesonide (PULMICORT) 0.5 mg/2 mL nebulizer solution, Take 0.5 mg by nebulization 2 (two) times a day Rinse mouth after use., Disp: , Rfl:   •  cholecalciferol (VITAMIN D3) 1,000 units tablet, Take 4,000 Units by mouth daily, Disp: , Rfl:   •  cyclobenzaprine (FLEXERIL) 5 mg tablet, Take 1 tablet (5 mg total) by mouth 3 (three) times a day as needed for muscle spasms, Disp: 90 tablet, Rfl: 2  •  docusate sodium (COLACE) 100 mg capsule, Take 100 mg by mouth daily as needed for constipation, Disp: , Rfl:   •  erythromycin (ILOTYCIN) ophthalmic ointment, Apply 1 application to eye daily at bedtime, Disp: , Rfl:   •  famotidine (PEPCID) 40 MG tablet, Twice a day, Disp: , Rfl:   •  fexofenadine (ALLEGRA) 180 MG tablet, Take 180 mg by mouth 2 (two) times a day , Disp: , Rfl:   •  fluticasone (FLONASE) 50 mcg/act nasal spray, 2 sprays into each nostril daily , Disp: , Rfl:   •  Galcanezumab-gnlm (Emgality) 120 MG/ML SOAJ, Inject under the skin every 30 (thirty) days , Disp: , Rfl:   •  HYDROcodone-acetaminophen (Norco) 5-325 mg per tablet, Take 1 tablet by mouth 2 (two) times a day as needed for pain for up to 60 doses Do not fill until 9/29/2023 Max Daily Amount: 2 tablets, Disp: 60 tablet, Rfl: 0  •  HYDROcodone-acetaminophen (NORCO) 5-325 mg per tablet, Take 1 tablet by mouth 2 (two) times a day as needed for pain Max Daily Amount: 2 tablets, Disp: 60 tablet, Rfl: 0  •  ipratropium (ATROVENT) 0.02 % nebulizer solution, Take 0.5 mg by nebulization every 6 (six) hours as needed for wheezing or shortness of breath, Disp: , Rfl:   •  ipratropium (ATROVENT) 0.06 % nasal spray, PLEASE SEE ATTACHED FOR DETAILED DIRECTIONS, Disp: , Rfl:   •  ivabradine HCl (Corlanor) 5 MG tablet, 7.5 mg 2 (two) times a day, Disp: , Rfl:   •  levalbuterol (XOPENEX HFA) 45 mcg/act inhaler, Inhale 2 puffs every 4 (four) hours as needed (when unable to nebulize), Disp: , Rfl:   •  levalbuterol (XOPENEX) 1.25 mg/3 mL nebulizer solution, Take 1.25 mg by nebulization every 6 (six) hours as needed , Disp: , Rfl: 2  •  Magnesium 400 MG CAPS, Take 1 capsule by mouth 2 (two) times a day , Disp: , Rfl:   •  MULTIPLE VITAMINS-CALCIUM PO, Take 1 capsule by mouth every morning , Disp: , Rfl:   •  omega-3-acid ethyl esters (LOVAZA) 1 g capsule, Take 2 g by mouth 2 (two) times a day 1600mg daily, Disp: , Rfl:   •  polyethylene glycol (MIRALAX) 17 g packet, Take 17 g by mouth daily as needed , Disp: , Rfl:   •  Probiotic Product (PROBIOTIC DAILY PO), Take 1 tablet by mouth daily At lunch, Disp: , Rfl:   •  Qvar RediHaler 40 MCG/ACT inhaler, , Disp: , Rfl:   •  sodium chloride, Inject 1,500 mL into a catheter in a vein, Disp: , Rfl:   •  Thyroid, Porcine, POWD, Use 32 mg daily, Disp: , Rfl:   •  Ubrogepant (UBRELVY) 100 MG tablet, Take 100 mg by mouth Take 1 tablet (100 mg) one time as needed for migraine. May repeat one additional tablet (100 mg) at least two hours after the first dose. Do not use more than two doses per day, or for more than eight days per month., Disp: , Rfl:   •  verapamil (CALAN) 40 mg tablet, , Disp: , Rfl:   No current facility-administered medications for this visit.     Facility-Administered Medications Ordered in Other Visits:   •  sodium chloride 0.9 % infusion, 250 mL/hr, Intravenous, Once, Radha Clements MD    Allergies   Allergen Reactions   • Imipramine Confusion, Fatigue, Irritability, Palpitations, Shortness Of Breath, Tachycardia and Visual Disturbance   • Melatonin Shortness Of Breath   • Mirtazapine Anxiety, Dizziness, GI Intolerance, Nausea Only, Palpitations and Shortness Of Breath   • Nexium [Esomeprazole] Shortness Of Breath   • Nsaids Shortness Of Breath   • Singulair [Montelukast] Shortness Of Breath and Cough   • Zomig [Zolmitriptan] Shortness Of Breath   • Dexilant [Dexlansoprazole] Nausea Only and Vomiting   • Albuterol    • Ambien [Zolpidem]    • Amitriptyline Drowsiness   • Aspirin    • Bactrim [Sulfamethoxazole-Trimethoprim] Hives   • Banana - Food Allergy Dermatitis   • Ceftin [Cefuroxime]    • Celebrex [Celecoxib]    • Ciprofloxacin    • Cranberry-C [Ascorbate - Food Allergy] GI Intolerance   • Demerol [Meperidine]    • Epinephrine Dizziness   • Ergotamine Nausea Only and Headache   • Keflex [Cephalexin]    • Klonopin [Clonazepam] Nausea Only and Dizziness   • Latex Hives   • Levaquin [Levofloxacin]    • Lexapro [Escitalopram]    • Lyrica [Pregabalin] Fatigue   • Macrodantin [Nitrofurantoin]    • Morphine Nausea Only   • Movantik [Naloxegol] Nausea Only   • Mushroom Extract Complex - Food Allergy      Eye itchy, asthma  attack   • Naprosyn [Naproxen]    • Neurontin [Gabapentin] Dizziness   • Pineapple - Food Allergy GI Intolerance   • Plecanatide Abdominal Pain, Diarrhea and GI Intolerance   • Prolia [Denosumab]    • Prozac [Fluoxetine]    • Serevent [Salmeterol] Dizziness   • Sudafed [Pseudoephedrine] Hives   • Sulfa Antibiotics    • Tomato - Food Allergy GI Intolerance   • Trazodone    • Ultram [Tramadol] Nausea Only, Dizziness and Headache   • Vilazodone Dizziness, GI Intolerance and Headache   • Vilazodone Hcl Abdominal Pain, Dizziness, GI Intolerance, Headache and Other (See Comments)   • Vioxx [Rofecoxib]    • Vortioxetine Drowsiness, Fatigue, GI Intolerance and Irritability   • Zithromax [Azithromycin] Hives   • Zoloft [Sertraline]    • Zantac [Ranitidine] Rash and Dizziness       Physical Exam:    /86   Pulse 87   Ht 5' 1" (1.549 m)   Wt 63 kg (139 lb)   BMI 26.26 kg/m²     Constitutional:normal, well developed, well nourished, alert, in no distress and non-toxic and no overt pain behavior. Eyes:anicteric  HEENT:grossly intact  Neck:supple, symmetric, trachea midline and no masses   Pulmonary:even and unlabored  Cardiovascular:No edema or pitting edema present  Skin:Normal without rashes or lesions and well hydrated  Psychiatric:Mood and affect appropriate  Neurologic:Cranial Nerves II-XII grossly intact  Musculoskeletal:normal      Imaging  No orders to display         No orders of the defined types were placed in this encounter.

## 2023-09-05 RX ORDER — SODIUM CHLORIDE 9 MG/ML
250 INJECTION, SOLUTION INTRAVENOUS ONCE
Status: COMPLETED | OUTPATIENT
Start: 2023-09-06 | End: 2023-09-06

## 2023-09-05 NOTE — TELEPHONE ENCOUNTER
Please see below  Pt last OV 4/25  Next OV 5/27 with BK Olumiant Counseling: I discussed with the patient the risks of Olumiant therapy including but not limited to upper respiratory tract infections, shingles, cold sores, and nausea. Live vaccines should be avoided.  This medication has been linked to serious infections; higher rate of mortality; malignancy and lymphoproliferative disorders; major adverse cardiovascular events; thrombosis; gastrointestinal perforations; neutropenia; lymphopenia; anemia; liver enzyme elevations; and lipid elevations.

## 2023-09-06 ENCOUNTER — HOSPITAL ENCOUNTER (OUTPATIENT)
Dept: INFUSION CENTER | Facility: HOSPITAL | Age: 68
Discharge: HOME | End: 2023-09-06
Attending: INTERNAL MEDICINE
Payer: MEDICARE

## 2023-09-06 ENCOUNTER — OFFICE VISIT (OUTPATIENT)
Dept: PHYSICAL THERAPY | Facility: CLINIC | Age: 68
End: 2023-09-06
Payer: MEDICARE

## 2023-09-06 VITALS
OXYGEN SATURATION: 100 % | DIASTOLIC BLOOD PRESSURE: 89 MMHG | HEART RATE: 81 BPM | TEMPERATURE: 96.9 F | SYSTOLIC BLOOD PRESSURE: 142 MMHG | RESPIRATION RATE: 18 BRPM

## 2023-09-06 DIAGNOSIS — G89.29 CHRONIC LEFT SHOULDER PAIN: Primary | ICD-10-CM

## 2023-09-06 DIAGNOSIS — M25.512 CHRONIC LEFT SHOULDER PAIN: Primary | ICD-10-CM

## 2023-09-06 PROCEDURE — 96360 HYDRATION IV INFUSION INIT: CPT

## 2023-09-06 PROCEDURE — 97112 NEUROMUSCULAR REEDUCATION: CPT

## 2023-09-06 PROCEDURE — 96361 HYDRATE IV INFUSION ADD-ON: CPT

## 2023-09-06 PROCEDURE — 97110 THERAPEUTIC EXERCISES: CPT

## 2023-09-06 RX ORDER — TOBRAMYCIN AND DEXAMETHASONE 3; 1 MG/ML; MG/ML
1 SUSPENSION/ DROPS OPHTHALMIC 2 TIMES DAILY
COMMUNITY
Start: 2023-09-05 | End: 2023-09-22

## 2023-09-06 RX ORDER — SODIUM CHLORIDE 9 MG/ML
250 INJECTION, SOLUTION INTRAVENOUS ONCE
Status: COMPLETED | OUTPATIENT
Start: 2023-09-07 | End: 2023-09-07

## 2023-09-06 RX ADMIN — SODIUM CHLORIDE 250 ML/HR: 0.9 INJECTION, SOLUTION INTRAVENOUS at 08:29

## 2023-09-06 NOTE — PROGRESS NOTES
Daily Note     Today's date: 2023  Patient name: Jolinda Phoenix  : 1955  MRN: [id number]  Referring provider: Debra Peralta MD  Dx:   Encounter Diagnosis     ICD-10-CM    1. Chronic left shoulder pain  M25.512     G89.29                      Subjective:   Pt. states she will do only what she can do today re: completion of exercises for her L shoulder. .       Objective: See treatment diary below      Assessment:  Tolerated treatment Fair+ overall with performance of ther exer/NM Re-Ed. .      Plan:  Con't services 2x/week as per POC/Goals set forth.                Precautions: POTS, see PMH for extensive list of comorbidities    Re-eval Date: 23    Date 23      Visit Count 1 2      FOTO        Pain In See eval 0/10      Pain Out  L shoulder "tired"              Manuals 23.                                      Neuro Re-Ed  23      MTPs/LTPs  **Yellow   2x10 ea        Wall push ups  **1x10      TB punch  *NV                                        Ther Ex  23      pulleys  **2 min Fwd Flex        Wall slides  *NV        Finger ladder  ** #20 FF  3x/5"    ** #10 ABD 3x/5"        UBE  **120 RPM 8 min Alt every 2 min      Wand ex  Flex, abd, ext, IR        Bicep curls  **L 1# 3x10        Active flex/abd  **L 2x10 ea              Ther Activity                        Gait Training                        Modalities  23        **CP to L shoulder   10 min

## 2023-09-06 NOTE — PROGRESS NOTES
Patient tolerated IV hydration without issues. AVS declined. Patient is aware of next appointment. Patient ambulated off unit without incident. All personal belongings taken with patient.

## 2023-09-07 ENCOUNTER — HOSPITAL ENCOUNTER (OUTPATIENT)
Dept: INFUSION CENTER | Facility: HOSPITAL | Age: 68
End: 2023-09-07
Attending: INTERNAL MEDICINE
Payer: MEDICARE

## 2023-09-07 VITALS
HEART RATE: 67 BPM | RESPIRATION RATE: 18 BRPM | DIASTOLIC BLOOD PRESSURE: 70 MMHG | TEMPERATURE: 96.9 F | OXYGEN SATURATION: 99 % | SYSTOLIC BLOOD PRESSURE: 148 MMHG

## 2023-09-07 PROCEDURE — 96361 HYDRATE IV INFUSION ADD-ON: CPT

## 2023-09-07 PROCEDURE — 96360 HYDRATION IV INFUSION INIT: CPT

## 2023-09-07 RX ADMIN — SODIUM CHLORIDE 250 ML/HR: 0.9 INJECTION, SOLUTION INTRAVENOUS at 08:46

## 2023-09-08 ENCOUNTER — HOSPITAL ENCOUNTER (OUTPATIENT)
Dept: INFUSION CENTER | Facility: HOSPITAL | Age: 68
End: 2023-09-08
Attending: INTERNAL MEDICINE
Payer: MEDICARE

## 2023-09-08 VITALS
RESPIRATION RATE: 18 BRPM | SYSTOLIC BLOOD PRESSURE: 146 MMHG | DIASTOLIC BLOOD PRESSURE: 83 MMHG | OXYGEN SATURATION: 100 % | TEMPERATURE: 97.4 F | HEART RATE: 66 BPM

## 2023-09-08 PROCEDURE — 96360 HYDRATION IV INFUSION INIT: CPT

## 2023-09-08 PROCEDURE — 96361 HYDRATE IV INFUSION ADD-ON: CPT

## 2023-09-08 RX ADMIN — SODIUM CHLORIDE 1500 ML: 0.9 INJECTION, SOLUTION INTRAVENOUS at 08:56

## 2023-09-11 ENCOUNTER — OFFICE VISIT (OUTPATIENT)
Dept: PHYSICAL THERAPY | Facility: CLINIC | Age: 68
End: 2023-09-11
Payer: MEDICARE

## 2023-09-11 ENCOUNTER — OFFICE VISIT (OUTPATIENT)
Dept: OBGYN CLINIC | Facility: CLINIC | Age: 68
End: 2023-09-11
Payer: MEDICARE

## 2023-09-11 ENCOUNTER — HOSPITAL ENCOUNTER (OUTPATIENT)
Dept: INFUSION CENTER | Facility: HOSPITAL | Age: 68
Discharge: HOME | End: 2023-09-11
Attending: INTERNAL MEDICINE
Payer: MEDICARE

## 2023-09-11 VITALS
WEIGHT: 140.2 LBS | SYSTOLIC BLOOD PRESSURE: 156 MMHG | TEMPERATURE: 97.8 F | DIASTOLIC BLOOD PRESSURE: 80 MMHG | BODY MASS INDEX: 26.47 KG/M2 | HEART RATE: 89 BPM | HEIGHT: 61 IN

## 2023-09-11 VITALS
OXYGEN SATURATION: 99 % | RESPIRATION RATE: 18 BRPM | SYSTOLIC BLOOD PRESSURE: 145 MMHG | HEART RATE: 68 BPM | DIASTOLIC BLOOD PRESSURE: 80 MMHG | TEMPERATURE: 96.8 F

## 2023-09-11 DIAGNOSIS — S99.911D RIGHT ANKLE INJURY, SUBSEQUENT ENCOUNTER: ICD-10-CM

## 2023-09-11 DIAGNOSIS — S93.491D SPRAIN OF ANTERIOR TALOFIBULAR LIGAMENT OF RIGHT ANKLE, SUBSEQUENT ENCOUNTER: Primary | ICD-10-CM

## 2023-09-11 DIAGNOSIS — M25.512 CHRONIC LEFT SHOULDER PAIN: Primary | ICD-10-CM

## 2023-09-11 DIAGNOSIS — S93.421D SPRAIN OF DELTOID LIGAMENT OF RIGHT ANKLE, SUBSEQUENT ENCOUNTER: ICD-10-CM

## 2023-09-11 DIAGNOSIS — G89.29 CHRONIC LEFT SHOULDER PAIN: Primary | ICD-10-CM

## 2023-09-11 PROCEDURE — 97110 THERAPEUTIC EXERCISES: CPT

## 2023-09-11 PROCEDURE — 96361 HYDRATE IV INFUSION ADD-ON: CPT

## 2023-09-11 PROCEDURE — 97112 NEUROMUSCULAR REEDUCATION: CPT

## 2023-09-11 PROCEDURE — 96360 HYDRATION IV INFUSION INIT: CPT

## 2023-09-11 PROCEDURE — 99213 OFFICE O/P EST LOW 20 MIN: CPT | Performed by: FAMILY MEDICINE

## 2023-09-11 RX ORDER — SODIUM CHLORIDE 9 MG/ML
250 INJECTION, SOLUTION INTRAVENOUS ONCE
Status: COMPLETED | OUTPATIENT
Start: 2023-09-12 | End: 2023-09-12

## 2023-09-11 RX ADMIN — SODIUM CHLORIDE 1500 ML: 0.9 INJECTION, SOLUTION INTRAVENOUS at 09:03

## 2023-09-11 NOTE — PROGRESS NOTES
[facility] [address] [id number] [phone]      Assessment:  1. Sprain of anterior talofibular ligament of right ankle, subsequent encounter    2. Right ankle injury, subsequent encounter    3. Sprain of deltoid ligament of right ankle, subsequent encounter        Plan:  Patient Instructions   F/u as needed  Brace as needed  Icing/heat/OTC pain meds as needed. Home exercises. Return if symptoms worsen or fail to improve. Chief Complaint:  Chief Complaint   Patient presents with   • Right Ankle - Follow-up       Subjective:   HPI    Patient ID: Roseline Huber is a 75 y.o. female     Subjective:     Here for f/u  R ankle sprain  Getting better- less pain  Some pulling into heel. Started PT  Doing home exercises  Swelling   Wearing ankle brace   Doing home exercises. Sharp pain at times. Driving causes pain. Review of Systems   Constitutional: Negative for fatigue and fever. Respiratory: Positive for cough. Negative for shortness of breath. Cardiovascular: Negative for chest pain. Gastrointestinal: Negative for abdominal pain and nausea. Genitourinary: Negative for dysuria. Musculoskeletal: Positive for arthralgias. Skin: Negative for rash and wound. Neurological: Negative for weakness and headaches. Objective:  Vitals:  /80 (BP Location: Left arm, Patient Position: Sitting, Cuff Size: Standard)   Pulse 89   Temp 97.8 °F (36.6 °C) (Tympanic)   Ht 5' 1" (1.549 m)   Wt 63.6 kg (140 lb 3.2 oz)   BMI 26.49 kg/m²     The following portions of the patient's history were reviewed and updated as appropriate: allergies, current medications, past family history, past medical history, past social history, past surgical history, and problem list.    Physical exam:  Physical Exam  Constitutional:       Appearance: Normal appearance. She is normal weight. HENT:      Head: Normocephalic.    Eyes: Extraocular Movements: Extraocular movements intact. Pulmonary:      Effort: Pulmonary effort is normal.   Musculoskeletal:         General: Tenderness present. Cervical back: Normal range of motion. Skin:     General: Skin is warm and dry. Neurological:      General: No focal deficit present. Mental Status: She is alert and oriented to person, place, and time. Mental status is at baseline. Psychiatric:         Mood and Affect: Mood normal.         Behavior: Behavior normal.         Thought Content: Thought content normal.         Judgment: Judgment normal.       Right Ankle Exam     Tenderness   The patient is experiencing tenderness in the ATF. Range of Motion   The patient has normal right ankle ROM. Muscle Strength   The patient has normal right ankle strength.           Strength/Myotome Testing     Right Ankle/Foot   Normal strength            Vikas Jimenes MD

## 2023-09-11 NOTE — PROGRESS NOTES
Daily Note     Today's date: 2023  Patient name: Sonu Miller  : 1955  MRN: [id number]  Referring provider: Vikas Jimenes MD  Dx:   Encounter Diagnosis     ICD-10-CM    1. Chronic left shoulder pain  M25.512     G89.29                      Subjective:   Pt. states "pretty good," re: status of L shoulder at this present time. Objective: See treatment diary below      Assessment: Tolerated treatment Fair+/Fairly Well overall with performance of ther exer and NM Re-Ed. Pt. did note a cracking/catching sensation @ L shoulder while on UBE, and also when performing finger ladder. Also noted radiating pain/sx down into L bicep region. All better after CP applic. Plan:  Con't services 2x/week.               Precautions: POTS, see PMH for extensive list of comorbidities    Re-eval Date: 23    Date 23     Visit Count 1 2 3     FOTO        Pain In See eval 0/10 0/10     Pain Out  L shoulder "tired" 0/10             Manuals 23                                     Neuro Re-Ed  23     MTPs/LTPs  **Yellow   2x10 ea   Yellow   1x10 ea       Wall push ups  **1x10 1x10     TB punch  *NV   **Yellow   1x10                                      Ther Ex  23     pulleys  **2 min Fwd Flex   2 min Fwd Flex     Wall slides  *NV   *NV     Finger ladder  ** #20 FF  3x/5"    ** #10 ABD 3x/5"   #21-22 FF  4x/5"     #10-13 ABD 3x/5"     UBE  **120 RPM 8 min Alt every 2 min 120 RPM 8 min   Alt every 2 min     Wand ex  Flex, abd, ext, IR        Bicep curls  **L 1# 3x10   L 1# 3x10     Active flex/abd  **L 2x10 ea L 2x10 ea             Ther Activity                        Gait Training                        Modalities  23       **CP to L shoulder   10 min CP to L shoulder/  L bicep   10 min

## 2023-09-12 ENCOUNTER — HOSPITAL ENCOUNTER (OUTPATIENT)
Dept: INFUSION CENTER | Facility: HOSPITAL | Age: 68
Discharge: HOME | End: 2023-09-12
Attending: INTERNAL MEDICINE
Payer: MEDICARE

## 2023-09-12 VITALS
HEART RATE: 73 BPM | SYSTOLIC BLOOD PRESSURE: 152 MMHG | RESPIRATION RATE: 20 BRPM | TEMPERATURE: 98.3 F | OXYGEN SATURATION: 99 % | DIASTOLIC BLOOD PRESSURE: 87 MMHG

## 2023-09-12 PROCEDURE — 96361 HYDRATE IV INFUSION ADD-ON: CPT

## 2023-09-12 PROCEDURE — 96360 HYDRATION IV INFUSION INIT: CPT

## 2023-09-12 RX ADMIN — SODIUM CHLORIDE 250 ML/HR: 0.9 INJECTION, SOLUTION INTRAVENOUS at 08:53

## 2023-09-12 NOTE — PROGRESS NOTES
Patient tolerated IV hydration without issues.  AVS declined.  Patient is aware of next appointment. Addy Morgan ambulated off unit without incident.  All personal belongings taken with patient.

## 2023-09-13 RX ORDER — SODIUM CHLORIDE 9 MG/ML
250 INJECTION, SOLUTION INTRAVENOUS ONCE
Status: COMPLETED | OUTPATIENT
Start: 2023-09-14 | End: 2023-09-14

## 2023-09-14 ENCOUNTER — HOSPITAL ENCOUNTER (OUTPATIENT)
Dept: INFUSION CENTER | Facility: HOSPITAL | Age: 68
End: 2023-09-14
Attending: INTERNAL MEDICINE
Payer: MEDICARE

## 2023-09-14 ENCOUNTER — OFFICE VISIT (OUTPATIENT)
Dept: PHYSICAL THERAPY | Facility: CLINIC | Age: 68
End: 2023-09-14
Payer: MEDICARE

## 2023-09-14 VITALS
SYSTOLIC BLOOD PRESSURE: 129 MMHG | RESPIRATION RATE: 16 BRPM | DIASTOLIC BLOOD PRESSURE: 69 MMHG | TEMPERATURE: 97.3 F | HEART RATE: 78 BPM

## 2023-09-14 DIAGNOSIS — M25.512 CHRONIC LEFT SHOULDER PAIN: Primary | ICD-10-CM

## 2023-09-14 DIAGNOSIS — G89.29 CHRONIC LEFT SHOULDER PAIN: Primary | ICD-10-CM

## 2023-09-14 PROCEDURE — 96361 HYDRATE IV INFUSION ADD-ON: CPT

## 2023-09-14 PROCEDURE — 97110 THERAPEUTIC EXERCISES: CPT

## 2023-09-14 PROCEDURE — 96360 HYDRATION IV INFUSION INIT: CPT

## 2023-09-14 PROCEDURE — 97112 NEUROMUSCULAR REEDUCATION: CPT

## 2023-09-14 RX ORDER — SODIUM CHLORIDE 9 MG/ML
250 INJECTION, SOLUTION INTRAVENOUS ONCE
Status: COMPLETED | OUTPATIENT
Start: 2023-09-15 | End: 2023-09-15

## 2023-09-14 RX ADMIN — SODIUM CHLORIDE 250 ML/HR: 0.9 INJECTION, SOLUTION INTRAVENOUS at 09:01

## 2023-09-14 NOTE — PROGRESS NOTES
Daily Note     Today's date: 2023  Patient name: Abel Arboleda  : 1955  MRN: [id number]  Referring provider: Ty Rivas MD  Dx:   Encounter Diagnosis     ICD-10-CM    1. Chronic left shoulder pain  M25.512     G89.29                      Subjective:   Pt. states she may not be able to complete her entire exercise program today due to sx she's experiencing from the flu shot she received  2 days ago. Objective: See treatment diary below      Assessment: Tolerated treatment Fair+/Fairly Well overall with ther exer/NM Re-Ed performed. Received CP applic for L shld Well. Plan:  Con't services 2x/week.               Precautions: POTS, see PMH for extensive list of comorbidities    Re-eval Date: 23    Date 23    Visit Count 1 2 3 4    FOTO        Pain In See eval 0/10 0/10 6/10    710 w/exer    Pain Out  L shoulder "tired" 0/10 4/10 after CP            Manuals 23. 9. 9                                    Neuro Re-Ed  . 9 9    MTPs/LTPs  **Yellow   2x10 ea   Yellow   1x10 ea       Wall push ups  **1x10 1x10 1x15    TB punch  *NV   **Yellow   1x10                                      Ther Ex  23 9    pulleys  **2 min Fwd Flex   2 min Fwd Flex 3 min Fwd Flex  **2 min arched    Wall slides  *NV   *NV     Finger ladder  ** #20 FF  3x/5"    ** #10 ABD 3x/5"   #21-22 FF  4x/5"     #10-13 ABD 3x/5" #20 FF  4x/5"     #15 ABD 4x/5"    UBE  **120 RPM 8 min Alt every 2 min 120 RPM 8 min   Alt every 2 min *deferred  by pt    Wand ex  Flex, abd, ext, IR        Bicep curls  **L 1# 3x10   L 1# 3x10 L 1# 3x15    Active flex/abd  **L 2x10 ea L 2x10 ea L 2x1 ea            Ther Activity                        Gait Training                        Modalities  23 9.23      **CP to L shoulder   10 min CP to L shoulder/  L bicep   10 min CP to L shoulder/  L bicep   10 min

## 2023-09-15 ENCOUNTER — HOSPITAL ENCOUNTER (OUTPATIENT)
Dept: INFUSION CENTER | Facility: HOSPITAL | Age: 68
End: 2023-09-15
Attending: INTERNAL MEDICINE
Payer: MEDICARE

## 2023-09-15 PROCEDURE — 96361 HYDRATE IV INFUSION ADD-ON: CPT

## 2023-09-15 PROCEDURE — 96360 HYDRATION IV INFUSION INIT: CPT

## 2023-09-15 RX ORDER — SODIUM CHLORIDE 9 MG/ML
250 INJECTION, SOLUTION INTRAVENOUS ONCE
Status: COMPLETED | OUTPATIENT
Start: 2023-09-18 | End: 2023-09-18

## 2023-09-15 RX ADMIN — SODIUM CHLORIDE 250 ML/HR: 0.9 INJECTION, SOLUTION INTRAVENOUS at 09:06

## 2023-09-18 ENCOUNTER — HOSPITAL ENCOUNTER (OUTPATIENT)
Dept: INFUSION CENTER | Facility: HOSPITAL | Age: 68
Discharge: HOME | End: 2023-09-18
Attending: INTERNAL MEDICINE
Payer: MEDICARE

## 2023-09-18 ENCOUNTER — OFFICE VISIT (OUTPATIENT)
Dept: PHYSICAL THERAPY | Facility: CLINIC | Age: 68
End: 2023-09-18
Payer: MEDICARE

## 2023-09-18 VITALS
OXYGEN SATURATION: 99 % | RESPIRATION RATE: 18 BRPM | TEMPERATURE: 97 F | HEART RATE: 80 BPM | SYSTOLIC BLOOD PRESSURE: 144 MMHG | DIASTOLIC BLOOD PRESSURE: 78 MMHG

## 2023-09-18 DIAGNOSIS — M25.512 CHRONIC LEFT SHOULDER PAIN: ICD-10-CM

## 2023-09-18 DIAGNOSIS — G89.29 CHRONIC LEFT SHOULDER PAIN: ICD-10-CM

## 2023-09-18 DIAGNOSIS — S93.421D SPRAIN OF DELTOID LIGAMENT OF RIGHT ANKLE, SUBSEQUENT ENCOUNTER: ICD-10-CM

## 2023-09-18 DIAGNOSIS — S93.491D SPRAIN OF ANTERIOR TALOFIBULAR LIGAMENT OF RIGHT ANKLE, SUBSEQUENT ENCOUNTER: Primary | ICD-10-CM

## 2023-09-18 PROCEDURE — 96361 HYDRATE IV INFUSION ADD-ON: CPT

## 2023-09-18 PROCEDURE — 96360 HYDRATION IV INFUSION INIT: CPT

## 2023-09-18 PROCEDURE — 97112 NEUROMUSCULAR REEDUCATION: CPT

## 2023-09-18 PROCEDURE — 97110 THERAPEUTIC EXERCISES: CPT

## 2023-09-18 RX ADMIN — SODIUM CHLORIDE 250 ML/HR: 0.9 INJECTION, SOLUTION INTRAVENOUS at 08:50

## 2023-09-18 NOTE — PROGRESS NOTES
IV hydration tolerated well without complications. No complaints offered. AVS declined. Left unit in stable condition.

## 2023-09-18 NOTE — PROGRESS NOTES
Daily Note     Today's date: 2023  Patient name: Bird Gasca  : 1955  MRN: [id number]  Referring provider: Nikki Holm MD  Dx:   Encounter Diagnosis     ICD-10-CM    1. Sprain of anterior talofibular ligament of right ankle, subsequent encounter  S93.491D       2. Chronic left shoulder pain  M25.512     G89.29       3. Sprain of deltoid ligament of right ankle, subsequent encounter  S93.421D                      Subjective: Pt notes her shld is feeling better than at [facility]. I took a hydrocodone and flexoril this morning but it has worn off now. Pain level is 4-5/10. Objective: See treatment diary below      Assessment: Tolerated treatment fair. Patient demonstrated fatigue post treatment, exhibited good technique with therapeutic exercises and would benefit from continued PT  Performance on UBE is slow . Pt noted pain at end of active flex and abd. Plan: Continue per plan of care.       Precautions: POTS, see PMH for extensive list of comorbidities    Re-eval Date: 23    Date 23.. 9. 9.   Visit Count 1 2 3 4 5   FOTO        Pain In See eval 0/10 0/10 6/10    7/10 w/exer  4-5/10   Pain Out  L shoulder "tired" 0/10 4/10 after CP            Manuals 23 9..23 9.11.23 9.14.23                                    Neuro Re-Ed  . 9..23 9..23    MTPs/LTPs  **Yellow   2x10 ea   Yellow   1x10 ea    Yellow seted  20x ea   Wall push ups  **1x10 1x10 1x15 15x   TB punch  *NV   **Yellow   1x10                                      Ther Ex  9.. 9..23 9..23    pulleys  **2 min Fwd Flex   2 min Fwd Flex 3 min Fwd Flex  **2 min arched 3 min fwd  2 min abd   Wall slides  *NV   *NV  15x 5-10"  FF   Finger ladder  ** #20 FF  3x/5"    ** #10 ABD 3x/5"   #21-22 FF  4x/5"     #10-13 ABD 3x/5" #20 FF  4x/5"     #15 ABD 4x/5" FF  5x  #24    ABD  5x #24   UBE  **120 RPM 8 min Alt every 2 min 120 RPM 8 min   Alt every 2 min *deferred  by pt 110 rpm  10 min  Alt every 2 min   Wand ex  Flex, abd, ext, IR        Bicep curls  **L 1# 3x10   L 1# 3x10 L 1# 3x15 L 2#  30x   Active flex/abd  **L 2x10 ea L 2x10 ea L 2x1 ea   L 2 x 10 ea           Ther Activity                        Gait Training                        Modalities  9.6.23 9.11.23 9.14.23 9/18     **CP to L shoulder   10 min CP to L shoulder/  L bicep   10 min CP to L shoulder/  L bicep   10 min CP to L shoulder/  L bicep   10 min

## 2023-09-19 ENCOUNTER — APPOINTMENT (OUTPATIENT)
Dept: PHYSICAL THERAPY | Facility: CLINIC | Age: 68
End: 2023-09-19
Payer: MEDICARE

## 2023-09-20 ENCOUNTER — OFFICE VISIT (OUTPATIENT)
Dept: PHYSICAL THERAPY | Facility: CLINIC | Age: 68
End: 2023-09-20
Payer: MEDICARE

## 2023-09-20 ENCOUNTER — HOSPITAL ENCOUNTER (OUTPATIENT)
Dept: INFUSION CENTER | Facility: HOSPITAL | Age: 68
Discharge: HOME | End: 2023-09-20
Attending: INTERNAL MEDICINE
Payer: MEDICARE

## 2023-09-20 VITALS
OXYGEN SATURATION: 100 % | TEMPERATURE: 97.3 F | RESPIRATION RATE: 18 BRPM | DIASTOLIC BLOOD PRESSURE: 84 MMHG | HEART RATE: 85 BPM | SYSTOLIC BLOOD PRESSURE: 131 MMHG

## 2023-09-20 DIAGNOSIS — M25.512 CHRONIC LEFT SHOULDER PAIN: Primary | ICD-10-CM

## 2023-09-20 DIAGNOSIS — G89.29 CHRONIC LEFT SHOULDER PAIN: Primary | ICD-10-CM

## 2023-09-20 PROCEDURE — 96361 HYDRATE IV INFUSION ADD-ON: CPT

## 2023-09-20 PROCEDURE — 96360 HYDRATION IV INFUSION INIT: CPT

## 2023-09-20 PROCEDURE — 97112 NEUROMUSCULAR REEDUCATION: CPT

## 2023-09-20 PROCEDURE — 97110 THERAPEUTIC EXERCISES: CPT

## 2023-09-20 RX ADMIN — SODIUM CHLORIDE 1500 ML: 0.9 INJECTION, SOLUTION INTRAVENOUS at 09:10

## 2023-09-20 NOTE — PROGRESS NOTES
Dressing change to left chest by jeanine wong RN as pts dressing was loosened upon admit. Tolerated hydration without incident. Flushed and chg cap applied at dc. Good blood return noted. Pt is aware of her appt tomorrow.  Discharged amulatory deferring avs.

## 2023-09-20 NOTE — PROGRESS NOTES
Daily Note     Today's date: 2023  Patient name: Mariposa Hart  :   MRN: [id number]  Referring provider: Shelly Hanna MD  Dx:   Encounter Diagnosis     ICD-10-CM    1. Chronic left shoulder pain  M25.512     G89.29                      Subjective:  Pt. reports 4-10 @ L shoulder at this present time. Objective: See treatment diary below      Assessment: Tolerated treatment Fair+ with ther exer and NM Re-Ed performed. Pt. Noted pain level increased to "8"/10 with exer performed, and decreased to "7'/21 after CP applic. Plan:  Con't services 2x/week.                 Precautions: POTS, see PMH for extensive list of comorbidities    Re-eval Date: 23    Date 23 9. 9. 9   Visit Count 6 2 3 4 5   FOTO        Pain In 4-510    8/10 w/exer 0/10 0/10 6/10    7/10 w/exer  4-/10   Pain Out 3/10 after CP L shoulder "tired" 0/10 4/10 after CP            Manuals .. 9..23 9..23 9..23                                    Neuro Re-Ed 23 9.. 9. 9.   MTPs/LTPs *RED 2x10/pause *seated **Yellow   2x10 ea   Yellow   1x10 ea    Yellow seted  20x ea   Wall push ups 2x10 **1x10 1x10 1x15 15x   TB punch  *NV   **Yellow   1x10                                      Ther Ex 23 9..23 9.. 9.   pulleys 3 min fwd  2 min abd **2 min Fwd Flex   2 min Fwd Flex 3 min Fwd Flex  **2 min arched 3 min fwd  2 min abd   Wall slides 15x 5-10"  FF *NV   *NV  15x 5-10"  FF   Finger ladder FF     5x  #25  ABD  5x  #24 ** #20 FF  3x/5"    ** #10 ABD 3x/5"   #21-22 FF  4x/5"     #10-13 ABD 3x/5" #20 FF  4x/5"     #15 ABD 4x/5" FF  5x  #24    ABD  5x #24    rpm  10 min  Alt every 2 min **120 RPM 8 min Alt every 2 min 120 RPM 8 min   Alt every 2 min *deferred  by pt 110 rpm  10 min  Alt every 2 min   Wand ex  Flex, abd, ext, IR        Bicep curls L 2#  30x **L 1# 3x10   L 1# 3x10 L 1# 3x15 L 2#  30x   Active flex/abd  L 2 x 15 ea **L 2x10 ea L 2x10 ea L 2x1 ea   L 2 x 10 ea           Ther Activity                        Gait Training                        Modalities 9.20.23 9.6.23 9.11.23 9.14.23 9/18    CP to L shoulder/  L bicep   15 min **CP to L shoulder   10 min CP to L shoulder/  L bicep   10 min CP to L shoulder/  L bicep   10 min CP to L shoulder/  L bicep   10 min

## 2023-09-20 NOTE — PROGRESS NOTES
Last Visit Date: 03/24/2023  Next Visit Date: Visit date not found Pt tolerated IV hydration well without incident  Discharged in stable condition and pt aware of next infusion appointment  AVS declined

## 2023-09-21 ENCOUNTER — HOSPITAL ENCOUNTER (OUTPATIENT)
Dept: INFUSION CENTER | Facility: HOSPITAL | Age: 68
End: 2023-09-21
Attending: INTERNAL MEDICINE
Payer: MEDICARE

## 2023-09-21 VITALS
RESPIRATION RATE: 18 BRPM | HEART RATE: 78 BPM | SYSTOLIC BLOOD PRESSURE: 145 MMHG | TEMPERATURE: 97.9 F | DIASTOLIC BLOOD PRESSURE: 89 MMHG

## 2023-09-21 PROCEDURE — 96361 HYDRATE IV INFUSION ADD-ON: CPT

## 2023-09-21 PROCEDURE — 96360 HYDRATION IV INFUSION INIT: CPT

## 2023-09-21 RX ORDER — SODIUM CHLORIDE 9 MG/ML
250 INJECTION, SOLUTION INTRAVENOUS ONCE
Status: COMPLETED | OUTPATIENT
Start: 2023-09-25 | End: 2023-09-25

## 2023-09-21 RX ADMIN — SODIUM CHLORIDE 1500 ML: 0.9 INJECTION, SOLUTION INTRAVENOUS at 08:53

## 2023-09-22 ENCOUNTER — HOSPITAL ENCOUNTER (OUTPATIENT)
Dept: INFUSION CENTER | Facility: HOSPITAL | Age: 68
End: 2023-09-22
Attending: INTERNAL MEDICINE
Payer: MEDICARE

## 2023-09-22 ENCOUNTER — OFFICE VISIT (OUTPATIENT)
Dept: PAIN MEDICINE | Facility: CLINIC | Age: 68
End: 2023-09-22
Payer: MEDICARE

## 2023-09-22 VITALS
WEIGHT: 137 LBS | BODY MASS INDEX: 25.86 KG/M2 | SYSTOLIC BLOOD PRESSURE: 146 MMHG | HEIGHT: 61 IN | HEART RATE: 79 BPM | DIASTOLIC BLOOD PRESSURE: 81 MMHG

## 2023-09-22 VITALS
SYSTOLIC BLOOD PRESSURE: 149 MMHG | OXYGEN SATURATION: 99 % | RESPIRATION RATE: 18 BRPM | DIASTOLIC BLOOD PRESSURE: 87 MMHG | TEMPERATURE: 97.9 F | HEART RATE: 71 BPM

## 2023-09-22 DIAGNOSIS — M47.812 CERVICAL SPONDYLOSIS: ICD-10-CM

## 2023-09-22 DIAGNOSIS — Z98.1 HISTORY OF LUMBAR FUSION: ICD-10-CM

## 2023-09-22 DIAGNOSIS — M54.42 CHRONIC BILATERAL LOW BACK PAIN WITH BILATERAL SCIATICA: ICD-10-CM

## 2023-09-22 DIAGNOSIS — G89.4 CHRONIC PAIN SYNDROME: Primary | ICD-10-CM

## 2023-09-22 DIAGNOSIS — Z98.890 HISTORY OF LUMBAR SURGERY: ICD-10-CM

## 2023-09-22 DIAGNOSIS — M54.2 NECK PAIN: ICD-10-CM

## 2023-09-22 DIAGNOSIS — M54.12 CERVICAL RADICULOPATHY: ICD-10-CM

## 2023-09-22 DIAGNOSIS — M54.41 CHRONIC BILATERAL LOW BACK PAIN WITH BILATERAL SCIATICA: ICD-10-CM

## 2023-09-22 DIAGNOSIS — G89.29 CHRONIC BILATERAL LOW BACK PAIN WITH BILATERAL SCIATICA: ICD-10-CM

## 2023-09-22 DIAGNOSIS — M47.816 LUMBAR SPONDYLOSIS: ICD-10-CM

## 2023-09-22 PROCEDURE — 96361 HYDRATE IV INFUSION ADD-ON: CPT

## 2023-09-22 PROCEDURE — 96360 HYDRATION IV INFUSION INIT: CPT

## 2023-09-22 PROCEDURE — 99214 OFFICE O/P EST MOD 30 MIN: CPT | Performed by: NURSE PRACTITIONER

## 2023-09-22 RX ORDER — HYDROCODONE BITARTRATE AND ACETAMINOPHEN 5; 325 MG/1; MG/1
1 TABLET ORAL 2 TIMES DAILY PRN
Qty: 60 TABLET | Refills: 0 | Status: SHIPPED | OUTPATIENT
Start: 2023-09-22

## 2023-09-22 RX ADMIN — SODIUM CHLORIDE 1500 ML: 0.9 INJECTION, SOLUTION INTRAVENOUS at 08:56

## 2023-09-22 NOTE — PATIENT INSTRUCTIONS
Opioid Safety   WHAT YOU NEED TO KNOW:   An opioid medicine is used to treat pain. Examples are oxycodone, morphine, fentanyl, or codeine. Pain control and management may help you rest, heal, and return to your daily activities. You and your family will receive information about how to manage your pain at home. The instructions will include what to do if you have side effects as your pain is managed. You will get information on how to handle opioid medicine safely. You will also get suggestions on how to control pain without opioids. It is important to follow all instructions so your pain is managed effectively. DISCHARGE INSTRUCTIONS:   Call your local emergency number (911 in the ), or have someone else call if:   You have a seizure. You cannot be woken. You have trouble staying awake and your breathing is slow or shallow. Your speech is slurred, or you are confused. You are dizzy or stumble when you walk. Call your doctor, or have someone close to you call if:   You are extremely drowsy, or you have trouble staying awake or speaking. You have pale or clammy skin. You have blue fingernails or lips. Your heartbeat is slower than normal.    You cannot stop vomiting. You have questions or concerns about your condition or care. Use opioids safely:   Take prescribed opioids exactly as directed. Opioids come with directions based on the kind and how it is given. Talk to your healthcare provider or a pharmacist if you have any questions. Do not take more than the recommended amount. Too much can cause a life-threatening overdose. Do not continue to take it after your pain stops. You may develop tolerance. This means you keep needing higher doses to get the same effect. You may also develop opioid use disorder. This means you are not able to control your opioid use. Do not give opioids to others or take opioids that belong to someone else.   The kind or amount one person takes may not be right for another. The person you share them with may also be taking medicines that do not mix with opioids. The person may drink alcohol or use other drugs that can cause life-threatening problems when mixed with opioids. Do not mix opioids with other medicines or alcohol. The combination can cause an overdose, or cause you to stop breathing. Alcohol, sleeping pills, and medicines such as antihistamines can make you sleepy. A combination with opioids can lead to a coma. Do not drive or operate heavy machinery after you use an opioid. You may feel drowsy or have trouble concentrating. You can injure yourself or others if you drive or use heavy machinery when you are not alert. Your provider or pharmacist can tell you how long to wait after a dose before you do these activities. Talk to your healthcare provider if you have any side effects. Side effects include nausea, sleepiness, itching, and trouble thinking clearly. Your provider may need to make changes to the kind or amount of opioid you are taking. Your provider can also help you find ways to prevent or relieve side effects. Manage constipation:  Constipation is the most common side effect of opioid medicine. Constipation is when you have hard, dry bowel movements, or you go longer than usual between bowel movements. Tell your healthcare provider about all changes in your bowel movements while you are taking opioids. Your provider may recommend laxative medicine to help you have a bowel movement. Your provider may also change the kind of opioid you are taking, or change when you take it. The following are more ways you can prevent or relieve constipation:  Drink liquids as directed. You may need to drink extra liquids to help soften and move your bowels. Ask how much liquid to drink each day and which liquids are best for you. Eat high-fiber foods. This may help decrease constipation by adding bulk to your bowel movements.  High-fiber foods include fruits, vegetables, whole-grain breads and cereals, and beans. Your healthcare provider or dietitian can help you create a high-fiber meal plan. Your provider may also recommend a fiber supplement if you cannot get enough fiber from food. Exercise regularly. Regular physical activity can help stimulate your intestines. Walking is a good exercise to prevent or relieve constipation. Ask which exercises are best for you. Schedule a time each day to have a bowel movement. This may help train your body to have regular bowel movements. Bend forward while you are on the toilet to help move the bowel movement out. Sit on the toilet for at least 10 minutes, even if you do not have a bowel movement. Store opioids safely:   Store opioids where others cannot easily get them. Keep them in a locked cabinet or secure area. Do not  keep them in a purse or other bag you carry with you. A person may be looking for something else and find the opioids. Make sure opioids are stored out of the reach of children. A child can easily overdose on opioids. Opioids may look like candy to a small child. The best way to dispose of opioids: The laws vary by country and area. In the [place], the best way is to return the opioids through a take-back program. This program is offered by the [employer] ([employer]). The following are options for using the program:  Take the opioids to a [employer] collection site. The site is often a law enforcement center. Call your local law enforcement center for scheduled take-back days in your area. You will be given information on where to go if the collection site is in a different location. Take the opioids to an approved pharmacy or hospital.  A pharmacy or hospital may be set up as a collection site. You will need to ask if it is a [employer] collection site if you were not directed there.  A pharmacy or doctor's office may not be able to take back opioids unless it is a [employer] site. Use a mail-back system. This means you are given containers to put the opioids into. You will then mail them in the containers. Use a take-back drop box. This is a place to leave the opioids at any time. People and animals will not be able to get into the box. Your local law enforcement agency can tell you where to find a drop box in your area. Other safe ways to dispose of opioids: The medicine may come with disposal instructions. The instructions may vary depending on the brand of medicine you are using. Instructions may come in a Medication Guide, but not every medicine has one. You may instead get instructions from your pharmacy or doctor. Follow instructions carefully. The following are general guidelines to follow:  Find out if you can flush the opioid. Some opioids can be flushed down the toilet or poured into the sink. You will need to contact authorities in your area to see if this is an option for you. The FDA also offers a list of medicines that are safe to flush down the toilet. You can check the list if you cannot get the information for your local area. Ask your waste management company about rules for putting opioids in the trash. The company will be able to give you specific directions. Scratch out personal information on the original medicine label so it cannot be read. Then put it in the trash. Do not label the trash or put any information on it about the opioids. It should look like regular household trash so no one is tempted to look for the opioids. Keep the trash out of the reach of children and animals. Always make sure trash is secure. Talk to officials if you live in a facility. If you live in a nursing home or assisted living center, talk to an official. The person will know the rules for your area. Other ways to manage pain:   Ask your healthcare provider about non-opioid medicines to control pain.   Some medicines may even work better than opioids, depending on the cause of your pain. Nonprescription medicines include NSAIDs (such as ibuprofen) and acetaminophen. Prescription medicines include muscle relaxers, antidepressants, and steroids. Pain may be managed without any medicines. Some ways to relieve pain include massage, aromatherapy, or meditation. Physical or occupational therapy may also help. Follow up with your doctor or pain specialist as directed: You may need to have your dose adjusted. Your doctor or pain specialist can also help you find ways to manage pain without opioids. Write down your questions so you remember to ask them during your visits. For more information:   Drug Enforcement Administration  [address] [facility] , [address]  Phone: [phone]  Web Address: [employer]. [employer].gov/drug_disposal/    [address] [facility] [address] and Drug Administration  [address] , [address]  Phone: 9- [phone]  Web Address: http://sofatutor/  © Copyright Lavonne Aguilar 2023 Information is for End User's use only and may not be sold, redistributed or otherwise used for commercial purposes. The above information is an  only. It is not intended as medical advice for individual conditions or treatments. Talk to your doctor, nurse or pharmacist before following any medical regimen to see if it is safe and effective for you.

## 2023-09-22 NOTE — PROGRESS NOTES
Assessment:  1. Chronic pain syndrome    2. Chronic bilateral low back pain with bilateral sciatica    3. History of lumbar fusion    4. History of lumbar surgery    5. Lumbar spondylosis    6. Neck pain    7. Cervical radiculopathy    8. Cervical spondylosis        Plan:  While the patient was in the office today, I did have a thorough conversation regarding their chronic pain syndrome, medication management, and treatment plan options. Patient is being seen for a follow-up visit. She underwent a left C2-3 and C3-4 radiofrequency ablation on 8/23/2023. So far, she is reporting about 50% improvement in the pain following her left neck. Her biggest complaint today is low back pain. Patient was previously treated at [facility] pain management with lumbar epidural steroid injections and lumbar facet radiofrequency ablations. She states that they were both helpful, but the ablations seem to help a lot more. We will schedule the patient for bilateral L2-3 and L3-4 facet medial branch blocks with intention of moving forward towards radiofrequency ablation if there is an appropriate diagnostic response. The initial blocks will be performed with 2% lidocaine and if an appropriate response is obtained upon review of the patient's pain diary, a confirmatory block will be scheduled with 0.5% bupivacaine. MRI of the lumbar spine performed on 11/25/2019 reveals facet arthritis from L1- L4. Patient participated in physical therapy for the lumbar area in 2020. She is not sure where she went, but it was number in [place]. Patient states that physical therapy was not helpful. She continues with home exercises for lumbar core strengthening/stretching out 30 minutes 3-4 times a week. Patient cannot tolerate NSAIDs. They aggravate her asthma and cause GI upset. She takes Tylenol if needed. Continue Flexeril 5 mg 3 times daily if needed for spasms.   She did not require refill of this medication during today's visit. Continue hydrocodone 5/325 2 times daily if needed for pain. There is a prescription at the pharmacy with a do not fill date of 9/29/2023. I sent over a second prescription with a do not fill date of 10/27/2023. [place] Prescription Drug Monitoring Program report was reviewed and was appropriate     There are risks associated with opioid medications, including dependence, addiction and tolerance. The patient understands and agrees to use these medications only as prescribed. Potential side effects of the medications include, but are not limited to, constipation, drowsiness, addiction, impaired judgment and risk of fatal overdose if not taken as prescribed. The patient was warned against driving while taking sedation medications. Sharing medications is a felony. At this point in time, the patient is showing no signs of addiction, abuse, diversion or suicidal ideation. The patient will follow-up in 8 weeks for medication prescription refill and reevaluation. The patient was advised to contact the office should their symptoms worsen in the interim. The patient was agreeable and verbalized an understanding. History of Present Illness: The patient is a 70 y.o. female last seen on 9/1/2023 who presents for a follow up office visit in regards to chronic pain secondary to chronic pain syndrome, chronic neck pain, cervical spondylosis, chronic low back pain, lumbar spondylosis, history of lumbar fusion, myofascial pain syndrome. The patient currently reports complaints of neck pain that can radiate to both upper extremities, mid back pain, low back pain. Current pain in the left side of her neck is a 4/10. Pain in the low back is an 8/10. Quality pain is described as burning, sharp, cramping, shooting, numb, pins-and-needles. Current pain medications includes: Hydrocodone 5/325 twice daily if needed for pain, Flexeril 5 mg 3 times daily if needed for spasms.   The patient reports that this regimen is providing 30% pain relief. The patient is reporting no side effects from this pain medication regimen. Pain Contract Signed: 5/31/23  Last Urine Drug Screen: 6/2/23    I have personally reviewed and/or updated the patient's past medical history, past surgical history, family history, social history, current medications, allergies, and vital signs today. Review of Systems:    Review of Systems   Constitutional: Negative for unexpected weight change. HENT: Negative for hearing loss. Eyes: Negative for visual disturbance. Respiratory: Positive for shortness of breath. Cardiovascular: Positive for chest pain. Negative for leg swelling. Gastrointestinal: Positive for constipation and nausea. Endocrine: Negative for polyuria. Genitourinary: Negative for difficulty urinating. Musculoskeletal: Positive for gait problem. Negative for joint swelling and myalgias. Decreased range of motion  Swelling - r ankle  Pain in extremity- all extremities   Skin: Positive for rash. Neurological: Positive for dizziness. Negative for weakness and headaches. Memory loss   Psychiatric/Behavioral: Negative for decreased concentration. All other systems reviewed and are negative.         Past Medical History:   Diagnosis Date   • Allergic rhinitis    • Anxiety    • Asthma    • Back pain    • Cardiac disease    • Cardiopathy     EF 45%   • Cough    • Diverticulitis    • Factor V Leiden (HCC)    • Fibromyalgia    • GERD (gastroesophageal reflux disease)    • Hashimoto's thyroiditis    • Hx of degenerative disc disease    • Hypotension     pots - postural orthostatic hypotension   • Interstitial cystitis    • Irregular heart beat     LBBB   • Irritable bowel syndrome    • Migraines    • Mitral valve disease     "thickening"   • Myocardial infarction [facility])     possible but not sure when   • Neuropathy     bilateral legs   • Osteoporosis    • Postural orthostatic tachycardia syndrome     must drink a lot of water and salt   • Pott's disease    • Rheumatic fever    • Scoliosis    • Sepsis ([address])     associated with PICC line   • Sjogren's syndrome ([address])    • TMJ (dislocation of temporomandibular joint)        Past Surgical History:   Procedure Laterality Date   • ABLATION MICROWAVE Left     lumbar area   • BACK SURGERY  10/1998   •  SECTION     • CHOLECYSTECTOMY  2016   • COLONOSCOPY     • DILATION AND CURETTAGE OF UTERUS     • EGD     • FOOT SURGERY  1968    removal of bone and neuroma   • HYSTERECTOMY      age 54  PRICE ooph   • IR OTHER  2022   • IR OTHER  2022   • IR OTHER  2023   • IR PICC PLACEMENT SINGLE LUMEN  10/02/2020   • IR PICC PLACEMENT SINGLE LUMEN  2021   • IR PICC REPOSITION  2021   • IR TUNNELED CENTRAL LINE PLACEMENT  2022   • LAPAROSCOPY      endometriosis   • MOUTH BIOPSY      lip   • OK EGD TRANSORAL BIOPSY SINGLE/MULTIPLE N/A 2019    Procedure: ESOPHAGOGASTRODUODENOSCOPY (EGD) with multiple bx and dilation;  Surgeon: Kristen Cui MD;  Location: [address] [place] [address] GI LAB; Service: Gastroenterology   • OK SURGICAL ARTHROSCOPY SHOULDER W/ROTATOR CUFF RPR Right 2022    Procedure: SHOULDER ARTHROSCOPIC ROTATOR CUFF REPAIR Biceps Tenodisis;   Surgeon: Alexandria Calles MD;  Location: [facility] MAIN OR;  Service: Orthopedics   • TUNNELED VENOUS CATHETER PLACEMENT         Family History   Problem Relation Age of Onset   • Cancer Mother         urinary bladder    • No Known Problems Father    • Thyroid cancer Sister    • Heart attack Sister    • No Known Problems Sister    • No Known Problems Sister    • No Known Problems Sister    • No Known Problems Sister    • No Known Problems Daughter    • No Known Problems Daughter    • Colon cancer Maternal Grandfather    • Breast cancer Maternal Aunt         over 48 yrs old    • Endometrial cancer Maternal Aunt    • Multiple myeloma Maternal Aunt    • Hypertension Paternal Aunt        Social History     Occupational History   • Not on file   Tobacco Use   • Smoking status: Never   • Smokeless tobacco: Never   Vaping Use   • Vaping Use: Never used   Substance and Sexual Activity   • Alcohol use: Never   • Drug use: No   • Sexual activity: Not on file         Current Outpatient Medications:   •  Alum Hydroxide-Mag Trisilicate (Gaviscon) 36-59.4 MG CHEW, Chew 1 tablet 4 (four) times a day as needed With meals and as needed, Disp: , Rfl:   •  azelastine (ASTELIN) 0.1 % nasal spray, 2 sprays into each nostril 2 (two) times a day Use in each nostril as directed, Disp: , Rfl:   •  beclomethasone (QVAR) 40 MCG/ACT inhaler, Inhale 1 puff daily as needed (only when unable to nebulize pulmicort) Rinse mouth after use., Disp: , Rfl:   •  budesonide (PULMICORT) 0.5 mg/2 mL nebulizer solution, Take 0.5 mg by nebulization 2 (two) times a day Rinse mouth after use., Disp: , Rfl:   •  cholecalciferol (VITAMIN D3) 1,000 units tablet, Take 4,000 Units by mouth daily, Disp: , Rfl:   •  cyclobenzaprine (FLEXERIL) 5 mg tablet, Take 1 tablet (5 mg total) by mouth 3 (three) times a day as needed for muscle spasms, Disp: 90 tablet, Rfl: 2  •  docusate sodium (COLACE) 100 mg capsule, Take 100 mg by mouth daily as needed for constipation, Disp: , Rfl:   •  erythromycin (ILOTYCIN) ophthalmic ointment, Apply 1 application to eye daily at bedtime, Disp: , Rfl:   •  famotidine (PEPCID) 40 MG tablet, Twice a day, Disp: , Rfl:   •  fexofenadine (ALLEGRA) 180 MG tablet, Take 180 mg by mouth 2 (two) times a day , Disp: , Rfl:   •  fluticasone (FLONASE) 50 mcg/act nasal spray, 2 sprays into each nostril daily , Disp: , Rfl:   •  Galcanezumab-gnlm (Emgality) 120 MG/ML SOAJ, Inject under the skin every 30 (thirty) days , Disp: , Rfl:   •  HYDROcodone-acetaminophen (Norco) 5-325 mg per tablet, Take 1 tablet by mouth 2 (two) times a day as needed for pain for up to 60 doses Do not fill until 9/29/2023 Max Daily Amount: 2 tablets, Disp: 60 tablet, Rfl: 0  •  HYDROcodone-acetaminophen (NORCO) 5-325 mg per tablet, Take 1 tablet by mouth 2 (two) times a day as needed for pain Do not fill until 10/27/2023 Max Daily Amount: 2 tablets, Disp: 60 tablet, Rfl: 0  •  ipratropium (ATROVENT) 0.02 % nebulizer solution, Take 0.5 mg by nebulization every 6 (six) hours as needed for wheezing or shortness of breath, Disp: , Rfl:   •  ipratropium (ATROVENT) 0.06 % nasal spray, PLEASE SEE ATTACHED FOR DETAILED DIRECTIONS, Disp: , Rfl:   •  ivabradine HCl (Corlanor) 5 MG tablet, 7.5 mg 2 (two) times a day, Disp: , Rfl:   •  levalbuterol (XOPENEX HFA) 45 mcg/act inhaler, Inhale 2 puffs every 4 (four) hours as needed (when unable to nebulize), Disp: , Rfl:   •  levalbuterol (XOPENEX) 1.25 mg/3 mL nebulizer solution, Take 1.25 mg by nebulization every 6 (six) hours as needed , Disp: , Rfl: 2  •  Magnesium 400 MG CAPS, Take 1 capsule by mouth 2 (two) times a day , Disp: , Rfl:   •  MULTIPLE VITAMINS-CALCIUM PO, Take 1 capsule by mouth every morning , Disp: , Rfl:   •  omega-3-acid ethyl esters (LOVAZA) 1 g capsule, Take 2 g by mouth 2 (two) times a day 1600mg daily, Disp: , Rfl:   •  polyethylene glycol (MIRALAX) 17 g packet, Take 17 g by mouth daily as needed , Disp: , Rfl:   •  Probiotic Product (PROBIOTIC DAILY PO), Take 1 tablet by mouth daily At lunch, Disp: , Rfl:   •  Qvar RediHaler 40 MCG/ACT inhaler, , Disp: , Rfl:   •  sodium chloride, Inject 1,500 mL into a catheter in a vein, Disp: , Rfl:   •  Thyroid, Porcine, POWD, Use 32 mg daily, Disp: , Rfl:   •  Ubrogepant (UBRELVY) 100 MG tablet, Take 100 mg by mouth Take 1 tablet (100 mg) one time as needed for migraine. May repeat one additional tablet (100 mg) at least two hours after the first dose.  Do not use more than two doses per day, or for more than eight days per month., Disp: , Rfl:   •  verapamil (CALAN) 40 mg tablet, , Disp: , Rfl:   No current facility-administered medications for this visit.     Facility-Administered Medications Ordered in Other Visits:   •  sodium chloride 0.9 % bolus 1,500 mL, 1,500 mL, Intravenous, Once, Pino Nesbitt MD  •  [START ON 9/25/2023] sodium chloride 0.9 % infusion, 250 mL/hr, Intravenous, Once, Pino Nesbitt MD    Allergies   Allergen Reactions   • Imipramine Confusion, Fatigue, Irritability, Palpitations, Shortness Of Breath, Tachycardia and Visual Disturbance   • Melatonin Shortness Of Breath   • Mirtazapine Anxiety, Dizziness, GI Intolerance, Nausea Only, Palpitations and Shortness Of Breath   • Nexium [Esomeprazole] Shortness Of Breath   • Nsaids Shortness Of Breath   • Singulair [Montelukast] Shortness Of Breath and Cough   • Zomig [Zolmitriptan] Shortness Of Breath   • Dexilant [Dexlansoprazole] Nausea Only and Vomiting   • Albuterol    • Ambien [Zolpidem]    • Amitriptyline Drowsiness   • Aspirin    • Bactrim [Sulfamethoxazole-Trimethoprim] Hives   • Banana - Food Allergy Dermatitis   • Ceftin [Cefuroxime]    • Celebrex [Celecoxib]    • Ciprofloxacin    • Cranberry-C [Ascorbate - Food Allergy] GI Intolerance   • Demerol [Meperidine]    • Epinephrine Dizziness   • Ergotamine Nausea Only and Headache   • Keflex [Cephalexin]    • Klonopin [Clonazepam] Nausea Only and Dizziness   • Latex Hives   • Levaquin [Levofloxacin]    • Lexapro [Escitalopram]    • Lyrica [Pregabalin] Fatigue   • Macrodantin [Nitrofurantoin]    • Morphine Nausea Only   • Movantik [Naloxegol] Nausea Only   • Mushroom Extract Complex - Food Allergy      Eye itchy, asthma  attack   • Naprosyn [Naproxen]    • Neurontin [Gabapentin] Dizziness   • Pineapple - Food Allergy GI Intolerance   • Plecanatide Abdominal Pain, Diarrhea and GI Intolerance   • Prolia [Denosumab]    • Prozac [Fluoxetine]    • Serevent [Salmeterol] Dizziness   • Sudafed [Pseudoephedrine] Hives   • Sulfa Antibiotics    • Tomato - Food Allergy GI Intolerance   • Trazodone    • Ultram [Tramadol] Nausea Only, Dizziness and Headache   • Vilazodone Dizziness, GI Intolerance and Headache   • Vilazodone Hcl Abdominal Pain, Dizziness, GI Intolerance, Headache and Other (See Comments)   • Vioxx [Rofecoxib]    • Vortioxetine Drowsiness, Fatigue, GI Intolerance and Irritability   • Zithromax [Azithromycin] Hives   • Zoloft [Sertraline]    • Zantac [Ranitidine] Rash and Dizziness       Physical Exam:    /81   Pulse 79   Ht 5' 1" (1.549 m)   Wt 62.1 kg (137 lb)   BMI 25.89 kg/m²     Constitutional:normal, well developed, well nourished, alert, in no distress and non-toxic and no overt pain behavior. Eyes:anicteric  HEENT:grossly intact  Neck:supple, symmetric, trachea midline and no masses   Pulmonary:even and unlabored  Cardiovascular:No edema or pitting edema present  Skin:Normal without rashes or lesions and well hydrated  Psychiatric:Mood and affect appropriate  Neurologic:Cranial Nerves II-XII grossly intact  Musculoskeletal:Range of motion of the lumbar spine is limited in all planes. There is tenderness bilaterally L2-S1. There are lumbar paraspinal muscle spasms present.       Imaging  FL spine and pain procedure    (Results Pending)         Orders Placed This Encounter   Procedures   • FL spine and pain procedure

## 2023-09-25 ENCOUNTER — HOSPITAL ENCOUNTER (OUTPATIENT)
Dept: INFUSION CENTER | Facility: HOSPITAL | Age: 68
Discharge: HOME | End: 2023-09-25
Attending: INTERNAL MEDICINE
Payer: MEDICARE

## 2023-09-25 VITALS
TEMPERATURE: 97.7 F | HEART RATE: 84 BPM | RESPIRATION RATE: 18 BRPM | DIASTOLIC BLOOD PRESSURE: 77 MMHG | SYSTOLIC BLOOD PRESSURE: 150 MMHG

## 2023-09-25 PROCEDURE — 96361 HYDRATE IV INFUSION ADD-ON: CPT

## 2023-09-25 PROCEDURE — 96360 HYDRATION IV INFUSION INIT: CPT

## 2023-09-25 RX ADMIN — SODIUM CHLORIDE 250 ML/HR: 0.9 INJECTION, SOLUTION INTRAVENOUS at 08:50

## 2023-09-26 ENCOUNTER — HOSPITAL ENCOUNTER (OUTPATIENT)
Dept: INFUSION CENTER | Facility: HOSPITAL | Age: 68
Discharge: HOME | End: 2023-09-26
Attending: INTERNAL MEDICINE
Payer: MEDICARE

## 2023-09-26 ENCOUNTER — OFFICE VISIT (OUTPATIENT)
Dept: PHYSICAL THERAPY | Facility: CLINIC | Age: 68
End: 2023-09-26
Payer: MEDICARE

## 2023-09-26 VITALS
RESPIRATION RATE: 18 BRPM | SYSTOLIC BLOOD PRESSURE: 142 MMHG | DIASTOLIC BLOOD PRESSURE: 81 MMHG | TEMPERATURE: 97.8 F | HEART RATE: 78 BPM | OXYGEN SATURATION: 97 %

## 2023-09-26 DIAGNOSIS — G89.29 CHRONIC LEFT SHOULDER PAIN: Primary | ICD-10-CM

## 2023-09-26 DIAGNOSIS — M25.512 CHRONIC LEFT SHOULDER PAIN: Primary | ICD-10-CM

## 2023-09-26 DIAGNOSIS — S93.421D SPRAIN OF DELTOID LIGAMENT OF RIGHT ANKLE, SUBSEQUENT ENCOUNTER: ICD-10-CM

## 2023-09-26 DIAGNOSIS — S93.491D SPRAIN OF ANTERIOR TALOFIBULAR LIGAMENT OF RIGHT ANKLE, SUBSEQUENT ENCOUNTER: ICD-10-CM

## 2023-09-26 PROCEDURE — 97110 THERAPEUTIC EXERCISES: CPT

## 2023-09-26 PROCEDURE — 96361 HYDRATE IV INFUSION ADD-ON: CPT

## 2023-09-26 PROCEDURE — 96360 HYDRATION IV INFUSION INIT: CPT

## 2023-09-26 RX ADMIN — SODIUM CHLORIDE 1500 ML: 0.9 INJECTION, SOLUTION INTRAVENOUS at 09:14

## 2023-09-26 NOTE — PROGRESS NOTES
Patient requested to stop IVF early so she can make it on time to her therapy appointment. Patient tolerated IV hydration without issues. AVS declined. Patient has Mychart. Patient ambulated off unit without incident. All personal belongings taken with patient.

## 2023-09-26 NOTE — PROGRESS NOTES
Daily Note     Today's date: 2023  Patient name: Lv Hernandez  : 1955  MRN: [id number]  Referring provider: Della Simmonds, MD  Dx:   Encounter Diagnosis     ICD-10-CM    1. Chronic left shoulder pain  M25.512     G89.29       2. Sprain of anterior talofibular ligament of right ankle, subsequent encounter  S93.747X       3. Sprain of deltoid ligament of right ankle, subsequent encounter  S93.843D                      Subjective: Pt states she has not been sleeping much and having POTS flares and back spasms. Objective: See treatment diary below      Assessment: Tolerated treatment fair. Patient demonstrated fatigue post treatment, exhibited good technique with therapeutic exercises and would benefit from continued PT      Plan: Continue per plan of care.       Precautions: POTS, see PMH for extensive list of comorbidities    Re-eval Date: 23    Date 23      Visit Count 6 7      FOTO  Due NV      Pain In 4-5/10    8/10 w/exer   5/10      Pain Out 3/10 after CP 3-4              Manuals 23 9 9                                   Neuro Re-Ed 23   MTPs/LTPs *RED 2x10/pause *seated Red   2x10 ea   Yellow   1x10 ea    Yellow seted  20x ea   Wall push ups 2x10  1x10 1x15 15x   TB punch     **Yellow   1x10                                      Ther Ex 23   pulleys 3 min fwd  2 min abd  3 min Fwd Flex and abd   2 min Fwd Flex 3 min Fwd Flex  **2 min arched 3 min fwd  2 min abd   Wall slides 15x 5-10"  FF 15 x 5-10" FF     15x 5-10"  FF   Finger ladder FF     5x  #25  ABD  5x  #24  #25  FF  5x/5"      #24  ABD 5x/5"   #21-22 FF  4x/5"     #10-13 ABD 3x/5" #20 FF  4x/5"     #15 ABD 4x/5" FF  5x  #24    ABD  5x #24    rpm  10 min  Alt every 2 min  100 RPM  10 min Alt every 2 min 120 RPM 8 min   Alt every 2 min *deferred  by pt 110 rpm  10 min  Alt every 2 min   Wand ex  Flex, abd, ext, IR Bicep curls L 2#  30x L 2# 3x10   L 1# 3x10 L 1# 3x15 L 2#  30x   Active flex/abd  L 2 x 15 ea L 2x15 ea L 2x10 ea L 2x1 ea   L 2 x 10 ea           Ther Activity                        Gait Training                        Modalities 9.20.23 9.26.23 9.11.23 9.14.23 9/18    CP to L shoulder/  L bicep   15 min CP to L shoulder   10 min CP to L shoulder/  L bicep   10 min CP to L shoulder/  L bicep   10 min CP to L shoulder/  L bicep   10 min

## 2023-09-27 ENCOUNTER — HOSPITAL ENCOUNTER (OUTPATIENT)
Dept: INFUSION CENTER | Facility: HOSPITAL | Age: 68
Discharge: HOME | End: 2023-09-27
Attending: INTERNAL MEDICINE
Payer: MEDICARE

## 2023-09-27 ENCOUNTER — OFFICE VISIT (OUTPATIENT)
Dept: PHYSICAL THERAPY | Facility: CLINIC | Age: 68
End: 2023-09-27
Payer: MEDICARE

## 2023-09-27 VITALS
RESPIRATION RATE: 18 BRPM | DIASTOLIC BLOOD PRESSURE: 74 MMHG | SYSTOLIC BLOOD PRESSURE: 131 MMHG | HEART RATE: 74 BPM | TEMPERATURE: 97.8 F | OXYGEN SATURATION: 98 %

## 2023-09-27 DIAGNOSIS — G89.29 CHRONIC LEFT SHOULDER PAIN: Primary | ICD-10-CM

## 2023-09-27 DIAGNOSIS — M25.512 CHRONIC LEFT SHOULDER PAIN: Primary | ICD-10-CM

## 2023-09-27 PROCEDURE — 96361 HYDRATE IV INFUSION ADD-ON: CPT

## 2023-09-27 PROCEDURE — 97110 THERAPEUTIC EXERCISES: CPT

## 2023-09-27 PROCEDURE — 96360 HYDRATION IV INFUSION INIT: CPT

## 2023-09-27 RX ADMIN — SODIUM CHLORIDE 1500 ML: 0.9 INJECTION, SOLUTION INTRAVENOUS at 09:31

## 2023-09-27 NOTE — PROGRESS NOTES
Daily Note     Today's date: 2023  Patient name: Genevieve Boyer  : 1955  MRN: [id number]  Referring provider: Katie Serrano MD  Dx:   Encounter Diagnosis     ICD-10-CM    1. Chronic left shoulder pain  M25.512     G89.29                      Subjective:   Pt. reports her neck is bothersome right now. ..says she was helping her sister with some chores and feels she strained her neck. Objective: See treatment diary below      Assessment: Tolerated treatment Fair+ with most exercises /NM Re-Ed performed. Received CP applic Very Well. Plan: Con't services 2x/week.             Precautions: POTS, see PMH for extensive list of comorbidities    Re-eval Date: 23    Date 23     Visit Count 6 7 8     FOTO        Pain In 4-5/10    810 w/exer   10 /10 L shoulder    /10 neck     Pain Out 3/10 after CP 3-4 "much better"             Manuals .. 9. 9.                                   Neuro Re-Ed 23. 9. 9.   MTPs/LTPs *RED 2x10/pause *seated Red   2x10 ea   RED   2x15 ea    Yellow seted  20x ea   Wall push ups 2x10  2x10 1x15 15x   TB punch     Yellow   1x10                                      Ther Ex 23 9 9.   pulleys 3 min fwd  2 min abd  3 min Fwd Flex and abd   4 min Fwd Flex 3 min Fwd Flex  **2 min arched 3 min fwd  2 min abd   Wall slides 15x 5-10"  FF 15 x 5-10" FF   15 x 5-10" FF  15x 5-10"  FF   Finger ladder FF     5x  #25  ABD  5x  #24  #25  FF  5x/5"      #24  ABD 5x/5"   #25  FF  5x/5"      #24  ABD 5x/5" #20 FF  4x/5"     #15 ABD 4x/5" FF  5x  #24    ABD  5x #24    rpm  10 min  Alt every 2 min  100 RPM  10 min Alt every 2 min 90 RPM   8 min   Alt every 2 min *deferred  by pt 110 rpm  10 min  Alt every 2 min   Wand ex  Flex, abd, ext, IR        Bicep curls L 2#  30x L 2# 3x10   L 2# 4x10 L 1# 3x15 L 2#  30x   Active flex/abd  L 2 x 15 ea L 2x15 ea L 2x15 ea L 2x1 ea   L 2 x 10 ea           Ther Activity                        Gait Training                        Modalities 9.20.23 9.26.23 9.27.23 9.14.23 9/18    CP to L shoulder/  L bicep   15 min CP to L shoulder   10 min CP to L shoulder/cerv   10 min CP to L shoulder/  L bicep   10 min CP to L shoulder/  L bicep   10 min

## 2023-09-28 ENCOUNTER — ANESTHESIA (OUTPATIENT)
Dept: GASTROENTEROLOGY | Facility: HOSPITAL | Age: 68
End: 2023-09-28

## 2023-09-28 ENCOUNTER — ANESTHESIA EVENT (OUTPATIENT)
Dept: GASTROENTEROLOGY | Facility: HOSPITAL | Age: 68
End: 2023-09-28

## 2023-09-28 ENCOUNTER — HOSPITAL ENCOUNTER (OUTPATIENT)
Dept: GASTROENTEROLOGY | Facility: HOSPITAL | Age: 68
Setting detail: OUTPATIENT SURGERY
End: 2023-09-28
Attending: INTERNAL MEDICINE
Payer: MEDICARE

## 2023-09-28 VITALS
TEMPERATURE: 97 F | DIASTOLIC BLOOD PRESSURE: 86 MMHG | RESPIRATION RATE: 18 BRPM | OXYGEN SATURATION: 100 % | SYSTOLIC BLOOD PRESSURE: 154 MMHG | HEART RATE: 78 BPM

## 2023-09-28 DIAGNOSIS — R13.10 DYSPHAGIA, UNSPECIFIED: ICD-10-CM

## 2023-09-28 DIAGNOSIS — K22.2 ESOPHAGEAL OBSTRUCTION: ICD-10-CM

## 2023-09-28 DIAGNOSIS — K21.9 GASTRO-ESOPHAGEAL REFLUX DISEASE WITHOUT ESOPHAGITIS: ICD-10-CM

## 2023-09-28 PROCEDURE — 88341 IMHCHEM/IMCYTCHM EA ADD ANTB: CPT | Performed by: PATHOLOGY

## 2023-09-28 PROCEDURE — 88342 IMHCHEM/IMCYTCHM 1ST ANTB: CPT | Performed by: PATHOLOGY

## 2023-09-28 PROCEDURE — 88305 TISSUE EXAM BY PATHOLOGIST: CPT | Performed by: PATHOLOGY

## 2023-09-28 PROCEDURE — C1726 CATH, BAL DIL, NON-VASCULAR: HCPCS

## 2023-09-28 RX ORDER — SODIUM CHLORIDE, SODIUM LACTATE, POTASSIUM CHLORIDE, CALCIUM CHLORIDE 600; 310; 30; 20 MG/100ML; MG/100ML; MG/100ML; MG/100ML
125 INJECTION, SOLUTION INTRAVENOUS CONTINUOUS
Status: DISCONTINUED | OUTPATIENT
Start: 2023-09-28 | End: 2023-10-02 | Stop reason: HOSPADM

## 2023-09-28 RX ORDER — PROPOFOL 10 MG/ML
INJECTION, EMULSION INTRAVENOUS CONTINUOUS PRN
Status: DISCONTINUED | OUTPATIENT
Start: 2023-09-28 | End: 2023-09-28

## 2023-09-28 RX ORDER — PROMETHAZINE HYDROCHLORIDE 25 MG/ML
12.5 INJECTION, SOLUTION INTRAMUSCULAR; INTRAVENOUS ONCE
Status: COMPLETED | OUTPATIENT
Start: 2023-09-28 | End: 2023-09-28

## 2023-09-28 RX ORDER — LIDOCAINE HYDROCHLORIDE 20 MG/ML
INJECTION, SOLUTION EPIDURAL; INFILTRATION; INTRACAUDAL; PERINEURAL AS NEEDED
Status: DISCONTINUED | OUTPATIENT
Start: 2023-09-28 | End: 2023-09-28

## 2023-09-28 RX ORDER — SODIUM CHLORIDE 9 MG/ML
250 INJECTION, SOLUTION INTRAVENOUS ONCE
Status: COMPLETED | OUTPATIENT
Start: 2023-10-02 | End: 2023-10-02

## 2023-09-28 RX ORDER — PROPOFOL 10 MG/ML
INJECTION, EMULSION INTRAVENOUS AS NEEDED
Status: DISCONTINUED | OUTPATIENT
Start: 2023-09-28 | End: 2023-09-28

## 2023-09-28 RX ORDER — PROMETHAZINE HYDROCHLORIDE 25 MG/ML
INJECTION, SOLUTION INTRAMUSCULAR; INTRAVENOUS
Status: DISPENSED
Start: 2023-09-28 | End: 2023-09-28

## 2023-09-28 RX ADMIN — PROPOFOL 80 MG: 10 INJECTION, EMULSION INTRAVENOUS at 10:19

## 2023-09-28 RX ADMIN — PROPOFOL 150 MCG/KG/MIN: 10 INJECTION, EMULSION INTRAVENOUS at 10:19

## 2023-09-28 RX ADMIN — SODIUM CHLORIDE, SODIUM LACTATE, POTASSIUM CHLORIDE, AND CALCIUM CHLORIDE 125 ML/HR: .6; .31; .03; .02 INJECTION, SOLUTION INTRAVENOUS at 09:59

## 2023-09-28 RX ADMIN — LIDOCAINE HYDROCHLORIDE 100 MG: 20 INJECTION, SOLUTION EPIDURAL; INFILTRATION; INTRACAUDAL; PERINEURAL at 10:19

## 2023-09-28 RX ADMIN — PROMETHAZINE HYDROCHLORIDE 12.5 MG: 25 INJECTION INTRAMUSCULAR; INTRAVENOUS at 10:58

## 2023-09-28 NOTE — ANESTHESIA PREPROCEDURE EVALUATION
Procedure:  EGD    Relevant Problems   CARDIO   (+) Hyperlipidemia   (+) LBBB (left bundle branch block)   (+) Migraine without aura and without status migrainosus, not intractable   (+) Mitral valve prolapse      ENDO   (+) Hypothyroidism      GI/HEPATIC   (+) Gastroesophageal reflux disease without esophagitis      /RENAL   (+) Stage 3a chronic kidney disease (HCC)      MUSCULOSKELETAL   (+) Cervical spondylosis   (+) Chronic bilateral low back pain with bilateral sciatica   (+) Degeneration of intervertebral disc   (+) Fibromyalgia   (+) Myofascial pain syndrome   (+) Osteoarthritis   (+) Scoliosis (and kyphoscoliosis), idiopathic      NEURO/PSYCH   (+) Anxiety and depression   (+) Chronic bilateral low back pain with bilateral sciatica   (+) Chronic pain disorder   (+) Fibromyalgia   (+) Migraine without aura and without status migrainosus, not intractable   (+) Myofascial pain syndrome      PULMONARY   (+) Asthma   (+) Moderate persistent asthma   (+) Obstructive sleep apnea syndrome        Physical Exam    Airway    Mallampati score: II  TM Distance: >3 FB  Neck ROM: full     Dental   No notable dental hx     Cardiovascular  Cardiovascular exam normal    Pulmonary  Pulmonary exam normal     Other Findings    POTS--gets saline infusions several times weekly, last yesterday.   Asthma seems stable. Used nebulizer this AM. Poor exercise tolerance  EF 44%    Anesthesia Plan  ASA Score- 3     Anesthesia Type- IV sedation with anesthesia with ASA Monitors. Additional Monitors:   Airway Plan:           Plan Factors-Exercise tolerance (METS): <4 METS. Chart reviewed. Patient summary reviewed. Patient is not a current smoker. Induction- intravenous. Postoperative Plan-     Informed Consent- Anesthetic plan and risks discussed with patient. I personally reviewed this patient with the CRNA. Discussed and agreed on the Anesthesia Plan with the CRNA. Vazquez Chaves

## 2023-09-28 NOTE — ANESTHESIA POSTPROCEDURE EVALUATION
Post-Op Assessment Note    CV Status:  Stable  Pain Score: 0    Pain management: adequate     Mental Status:  Arousable   Hydration Status:  Stable   PONV Controlled:  None   Airway Patency:  Patent      Post Op Vitals Reviewed: Yes      Staff: CRNA         No notable events documented.     BP   126/93   Temp   36   Pulse  80   Resp  14    SpO2   100 yes

## 2023-09-28 NOTE — INTERVAL H&P NOTE
H&P reviewed. After examining the patient I find no changes in the patients condition since the H&P had been written.     Vitals:    09/28/23 0949   BP: 169/82   Pulse: 77   Resp: 18   Temp: 97.5 °F (36.4 °C)   SpO2: 99%

## 2023-10-02 ENCOUNTER — HOSPITAL ENCOUNTER (OUTPATIENT)
Dept: INFUSION CENTER | Facility: HOSPITAL | Age: 68
Discharge: HOME | End: 2023-10-02
Attending: INTERNAL MEDICINE
Payer: MEDICARE

## 2023-10-02 ENCOUNTER — OFFICE VISIT (OUTPATIENT)
Dept: PHYSICAL THERAPY | Facility: CLINIC | Age: 68
End: 2023-10-02
Payer: MEDICARE

## 2023-10-02 VITALS
SYSTOLIC BLOOD PRESSURE: 126 MMHG | DIASTOLIC BLOOD PRESSURE: 71 MMHG | TEMPERATURE: 96.9 F | RESPIRATION RATE: 16 BRPM | HEART RATE: 67 BPM

## 2023-10-02 DIAGNOSIS — M25.512 CHRONIC LEFT SHOULDER PAIN: Primary | ICD-10-CM

## 2023-10-02 DIAGNOSIS — G89.29 CHRONIC LEFT SHOULDER PAIN: Primary | ICD-10-CM

## 2023-10-02 PROCEDURE — 96361 HYDRATE IV INFUSION ADD-ON: CPT

## 2023-10-02 PROCEDURE — 97110 THERAPEUTIC EXERCISES: CPT

## 2023-10-02 PROCEDURE — 96360 HYDRATION IV INFUSION INIT: CPT

## 2023-10-02 RX ADMIN — SODIUM CHLORIDE 250 ML/HR: 0.9 INJECTION, SOLUTION INTRAVENOUS at 08:57

## 2023-10-02 NOTE — PROGRESS NOTES
CVC dressing noted to be coming loose. CVC dressing change done per protocol. Patient tolerated IV hydration without issues. AVS declined. Patient has Mychart. Patient ambulated off unit without incident. All personal belongings taken with patient.

## 2023-10-02 NOTE — PROGRESS NOTES
Daily Note     Today's date: 10/2/2023  Patient name: Tae Foy  : 1955  MRN: [id number]  Referring provider: Jeremiah Akins MD  Dx:   Encounter Diagnosis     ICD-10-CM    1. Chronic left shoulder pain  M25.512     G89.29                      Subjective:  Pt. requests to perform less reps and adjustments for select exercises today, as she feels she needs to get back into exercising at a slow pace after having had a medical procedures done last week, which exhausted her physically, and also wants to "avoid a POTS flare". Objective: See treatment diary below       Assessment: Tolerated treatment Fairly Well with ther exer performed. Adjusted/modified exer to pt's requests and tolerance. Plan: Con't services 2x/week.               Precautions: POTS, see PMH for extensive list of comorbidities    Re-eval Date: 23    Date 23 10    Visit Count 6 7 8 9    FOTO        Pain In 4-5/10    8/10 w/exer   10 4/10 L shoulder    7/10 neck /10 L shoulder    Pain Out 3/10 after CP 3-4 "much better" Better after CP applic            Manuals 23 9 9. 10.                                   Neuro Re-Ed 23 9 10.   MTPs/LTPs *RED 2x10/pause *seated Red   2x10 ea   RED   2x15 ea   RED   2x10 ea Yellow seted  20x ea   Wall push ups 2x10  2x10 15x 15x   TB punch     Yellow   1x10  Yellow   1x10                                     Ther Ex 23 10.   pulleys 3 min fwd  2 min abd  3 min Fwd Flex and abd   4 min Fwd Flex 3 min Fwd Flex   3 min fwd  2 min abd   Wall slides 15x 5-10"  FF 15 x 5-10" FF   15 x 5-10" FF  15x 5-10"  FF   Finger ladder FF     5x  #25  ABD  5x  #24  #25  FF  5x/5"      #24  ABD 5x/5"   #25  FF  5x/5"      #24  ABD 5x/5" #25 FF  5x/5"     #25 ABD 5x/5" FF  5x  #24    ABD  5x #24    rpm  10 min  Alt every 2 min  100 RPM  10 min Alt every 2 min 90 RPM   8 min   Alt every 2 min 100 RPM   8 min   Alt every 2 min 110 rpm  10 min  Alt every 2 min   Wand ex  Flex, abd, ext, IR        Bicep curls L 2#  30x L 2# 3x10   L 2# 4x10 L  2# 3x10 L 2#  30x   Active flex/abd  L 2 x 15 ea L 2x15 ea L 2x15 ea L 1x15 ea   L 2 x 10 ea           Ther Activity                        Gait Training                        Modalities 9.20.23 9.26.23 9.27.23 10.2.23 9/18    CP to L shoulder/  L bicep   15 min CP to L shoulder   10 min CP to L shoulder/cerv   10 min CP to L shoulder CP to L shoulder/  L bicep   10 min

## 2023-10-03 ENCOUNTER — HOSPITAL ENCOUNTER (OUTPATIENT)
Dept: INFUSION CENTER | Facility: HOSPITAL | Age: 68
Discharge: HOME | End: 2023-10-03
Attending: INTERNAL MEDICINE
Payer: MEDICARE

## 2023-10-03 PROCEDURE — 96361 HYDRATE IV INFUSION ADD-ON: CPT

## 2023-10-03 PROCEDURE — 96360 HYDRATION IV INFUSION INIT: CPT

## 2023-10-03 RX ADMIN — SODIUM CHLORIDE 1500 ML: 0.9 INJECTION, SOLUTION INTRAVENOUS at 09:33

## 2023-10-04 PROCEDURE — 88305 TISSUE EXAM BY PATHOLOGIST: CPT | Performed by: PATHOLOGY

## 2023-10-04 PROCEDURE — 88342 IMHCHEM/IMCYTCHM 1ST ANTB: CPT | Performed by: PATHOLOGY

## 2023-10-04 PROCEDURE — 88341 IMHCHEM/IMCYTCHM EA ADD ANTB: CPT | Performed by: PATHOLOGY

## 2023-10-04 RX ORDER — SODIUM CHLORIDE 9 MG/ML
250 INJECTION, SOLUTION INTRAVENOUS ONCE
Status: COMPLETED | OUTPATIENT
Start: 2023-10-05 | End: 2023-10-05

## 2023-10-05 ENCOUNTER — HOSPITAL ENCOUNTER (OUTPATIENT)
Dept: INFUSION CENTER | Facility: HOSPITAL | Age: 68
End: 2023-10-05
Attending: INTERNAL MEDICINE
Payer: MEDICARE

## 2023-10-05 ENCOUNTER — OFFICE VISIT (OUTPATIENT)
Dept: PHYSICAL THERAPY | Facility: CLINIC | Age: 68
End: 2023-10-05
Payer: MEDICARE

## 2023-10-05 DIAGNOSIS — M25.512 CHRONIC LEFT SHOULDER PAIN: Primary | ICD-10-CM

## 2023-10-05 DIAGNOSIS — G89.29 CHRONIC LEFT SHOULDER PAIN: Primary | ICD-10-CM

## 2023-10-05 PROCEDURE — 96360 HYDRATION IV INFUSION INIT: CPT

## 2023-10-05 PROCEDURE — 96361 HYDRATE IV INFUSION ADD-ON: CPT

## 2023-10-05 PROCEDURE — 97110 THERAPEUTIC EXERCISES: CPT

## 2023-10-05 PROCEDURE — 97112 NEUROMUSCULAR REEDUCATION: CPT

## 2023-10-05 RX ORDER — SODIUM CHLORIDE 9 MG/ML
250 INJECTION, SOLUTION INTRAVENOUS ONCE
Status: COMPLETED | OUTPATIENT
Start: 2023-10-09 | End: 2023-10-09

## 2023-10-05 RX ADMIN — SODIUM CHLORIDE 250 ML/HR: 0.9 INJECTION, SOLUTION INTRAVENOUS at 08:56

## 2023-10-05 NOTE — PROGRESS NOTES
Daily Note     Today's date: 10/5/2023  Patient name: Naun Quezada  : 1955  MRN: [id number]  Referring provider: Dami Healy MD  Dx:   Encounter Diagnosis     ICD-10-CM    1. Chronic left shoulder pain  M25.512     G89.29                      Subjective:    Pt. states she over-did-it yesterday and feels wiped out. States she will may only perform select exercises, and try her best to complete as much as she can re: exercise program.       Objective: See treatment diary below      Assessment: Tolerated treatment Fairly Well overall with ther exer and NM R-Ed performed. Sx more calm after CP applic, as per pt feedback. Plan:   Con't services as per POC/Goals.               Precautions: POTS, see PMH for extensive list of comorbidities    Re-eval Date: 23    Date 23 9 10. 10.23   Visit Count 6 7 8 9 10   FOTO        Pain In 4-5/10    8/10 w/exer   5/10 4/10 L shoulder    7/10 neck 5/10 L shoulder 6-7/10 L shoulder   Pain Out 3/10 after CP 3-4 "much better" Better after CP applic More calm after CP applic           Manuals .. 9 10.2.23 10.5.23                                   Neuro Re-Ed 23 9..23 10.2.23 10.5.23   MTPs/LTPs *RED 2x10/pause *seated Red   2x10 ea   RED   2x15 ea   RED   2x10 ea Green   LTP 1x6  MTP 10x   Wall push ups 2x10  2x10 15x 10x   TB punch     Yellow   1x10  Yellow   1x10  Yellow   1x10                                   Ther Ex 23. 9. 10.2.23 10.5.23   pulleys 3 min fwd  2 min abd  3 min Fwd Flex and abd   4 min Fwd Flex 3 min Fwd Flex   Resume NV   Wall slides 15x 5-10"  FF 15 x 5-10" FF   15 x 5-10" FF  15x 5-10"  FF   Finger ladder FF     5x  #25  ABD  5x  #24  #25  FF  5x/5"      #24  ABD 5x/5"   #25  FF  5x/5"      #24  ABD 5x/5" #25 FF  5x/5"     #25 ABD 5x/5" FF  5x  #24    ABD  5x #24    rpm  10 min  Alt every 2 min  100 RPM  10 min Alt every 2 min 90 RPM   8 min   Alt every 2 min 100 RPM   8 min   Alt every 2 min 110 rpm  8 min  Alt every 2 min   Wand ex  Flex, abd, ext, IR        Bicep curls L 2#  30x L 2# 3x10   L 2# 4x10 L  2# 3x10 L 2#  2x10   Active flex/abd  L 2 x 15 ea L 2x15 ea L 2x15 ea L 1x15 ea   L 2 x 10 ea           Ther Activity                        Gait Training                        Modalities 9.20.23 9.26.23 9.27.23 10.2.23 10.5.23    CP to L shoulder/  L bicep   15 min CP to L shoulder   10 min CP to L shoulder/cerv   10 min CP to L shoulder CP to L shoulder   10 min

## 2023-10-06 ENCOUNTER — OFFICE VISIT (OUTPATIENT)
Dept: OBGYN CLINIC | Facility: CLINIC | Age: 68
End: 2023-10-06
Payer: MEDICARE

## 2023-10-06 ENCOUNTER — HOSPITAL ENCOUNTER (OUTPATIENT)
Dept: INFUSION CENTER | Facility: HOSPITAL | Age: 68
End: 2023-10-06
Attending: INTERNAL MEDICINE
Payer: MEDICARE

## 2023-10-06 VITALS
DIASTOLIC BLOOD PRESSURE: 73 MMHG | SYSTOLIC BLOOD PRESSURE: 136 MMHG | HEART RATE: 66 BPM | WEIGHT: 137 LBS | TEMPERATURE: 98.8 F | HEIGHT: 61 IN | BODY MASS INDEX: 25.86 KG/M2

## 2023-10-06 DIAGNOSIS — S46.212A BICEPS STRAIN, LEFT, INITIAL ENCOUNTER: ICD-10-CM

## 2023-10-06 DIAGNOSIS — M25.512 CHRONIC LEFT SHOULDER PAIN: ICD-10-CM

## 2023-10-06 DIAGNOSIS — M75.42 IMPINGEMENT SYNDROME OF LEFT SHOULDER: Primary | ICD-10-CM

## 2023-10-06 DIAGNOSIS — G89.29 CHRONIC LEFT SHOULDER PAIN: ICD-10-CM

## 2023-10-06 PROCEDURE — 96360 HYDRATION IV INFUSION INIT: CPT

## 2023-10-06 PROCEDURE — 99213 OFFICE O/P EST LOW 20 MIN: CPT | Performed by: FAMILY MEDICINE

## 2023-10-06 PROCEDURE — 96361 HYDRATE IV INFUSION ADD-ON: CPT

## 2023-10-06 RX ADMIN — SODIUM CHLORIDE 1500 ML: 0.9 INJECTION, SOLUTION INTRAVENOUS at 09:00

## 2023-10-06 NOTE — PLAN OF CARE
Problem: Knowledge Deficit  Goal: Patient/family/caregiver demonstrates understanding of disease process, treatment plan, medications, and discharge instructions  Description: Complete learning assessment and assess knowledge base.   Interventions:  - Provide teaching at level of understanding  - Provide teaching via preferred learning methods  10/6/2023 0932 by Ruma Law RN  Outcome: Progressing  10/6/2023 0850 by Ruma Law RN  Outcome: Progressing

## 2023-10-06 NOTE — PROGRESS NOTES
[facility] [address] [id number] [phone]      Assessment:  1. Impingement syndrome of left shoulder    2. Chronic left shoulder pain    3. Biceps strain, left, initial encounter        Plan:  Patient Instructions   F/u as needed  Hold therapy for now  Continue home exercises. Icing/heat/OTC pain meds as needed. Return if symptoms worsen or fail to improve. Chief Complaint:  Chief Complaint   Patient presents with   • Left Shoulder - Follow-up       Subjective:   HPI    Patient ID: Ariela Caicedo is a 70 y.o. female     Here for f/u  L shoulder pain  Injection helped, but Wed lifted a gallon jug of vinegar and felt pain in the L shoulder again. Going to PT  Worse with lifting items  She has been doing home exercises. Taking vicodin which helps. Radiates to biceps  Sharp/achey pain         Review of Systems   Constitutional: Negative for fatigue and fever. Respiratory: Negative for shortness of breath. Cardiovascular: Negative for chest pain. Gastrointestinal: Negative for abdominal pain and nausea. Genitourinary: Negative for dysuria. Musculoskeletal: Positive for arthralgias. Skin: Negative for rash and wound. Neurological: Negative for weakness and headaches. Objective:  Vitals:  /73 (BP Location: Left arm, Patient Position: Sitting, Cuff Size: Large)   Pulse 66   Temp 98.8 °F (37.1 °C) (Tympanic)   Ht 5' 1" (1.549 m)   Wt 62.1 kg (137 lb)   BMI 25.89 kg/m²     The following portions of the patient's history were reviewed and updated as appropriate: allergies, current medications, past family history, past medical history, past social history, past surgical history, and problem list.    Physical exam:  Physical Exam  Constitutional:       Appearance: Normal appearance. She is normal weight. Eyes:      Extraocular Movements: Extraocular movements intact.    Pulmonary: Effort: Pulmonary effort is normal.   Musculoskeletal:      Cervical back: Normal range of motion. Skin:     General: Skin is warm and dry. Neurological:      General: No focal deficit present. Mental Status: She is alert and oriented to person, place, and time. Mental status is at baseline. Psychiatric:         Mood and Affect: Mood normal.         Behavior: Behavior normal.         Thought Content: Thought content normal.         Judgment: Judgment normal.       Left Shoulder Exam     Tenderness   The patient is experiencing tenderness in the biceps tendon. Range of Motion   The patient has normal left shoulder ROM. Muscle Strength   The patient has normal left shoulder strength.     Tests   Hahn test: positive    Comments:  Neg yergason/speeds test  Mild pain with resistance to elbow flexion                  Paul Martinez MD

## 2023-10-09 ENCOUNTER — HOSPITAL ENCOUNTER (OUTPATIENT)
Dept: INFUSION CENTER | Facility: HOSPITAL | Age: 68
Discharge: HOME | End: 2023-10-09
Attending: INTERNAL MEDICINE
Payer: MEDICARE

## 2023-10-09 VITALS
TEMPERATURE: 97.5 F | DIASTOLIC BLOOD PRESSURE: 72 MMHG | HEART RATE: 80 BPM | SYSTOLIC BLOOD PRESSURE: 134 MMHG | RESPIRATION RATE: 17 BRPM

## 2023-10-09 PROCEDURE — 96361 HYDRATE IV INFUSION ADD-ON: CPT

## 2023-10-09 PROCEDURE — 96360 HYDRATION IV INFUSION INIT: CPT

## 2023-10-09 RX ORDER — SODIUM CHLORIDE 9 MG/ML
250 INJECTION, SOLUTION INTRAVENOUS ONCE
Status: COMPLETED | OUTPATIENT
Start: 2023-10-10 | End: 2023-10-10

## 2023-10-09 RX ADMIN — SODIUM CHLORIDE 250 ML/HR: 0.9 INJECTION, SOLUTION INTRAVENOUS at 09:02

## 2023-10-09 NOTE — PROGRESS NOTES
CVC dressing change done per protocol. Hydration tolerated without incident. CVC flushed per protocol, clamped, and PDC applied. Discharged in stable condition.

## 2023-10-10 ENCOUNTER — HOSPITAL ENCOUNTER (OUTPATIENT)
Dept: INFUSION CENTER | Facility: HOSPITAL | Age: 68
Discharge: HOME | End: 2023-10-10
Attending: INTERNAL MEDICINE
Payer: MEDICARE

## 2023-10-10 VITALS
RESPIRATION RATE: 18 BRPM | HEART RATE: 66 BPM | TEMPERATURE: 97.6 F | SYSTOLIC BLOOD PRESSURE: 135 MMHG | DIASTOLIC BLOOD PRESSURE: 80 MMHG

## 2023-10-10 PROCEDURE — 96360 HYDRATION IV INFUSION INIT: CPT

## 2023-10-10 PROCEDURE — 96361 HYDRATE IV INFUSION ADD-ON: CPT

## 2023-10-10 RX ORDER — SODIUM CHLORIDE 9 MG/ML
250 INJECTION, SOLUTION INTRAVENOUS ONCE
Status: COMPLETED | OUTPATIENT
Start: 2023-10-11 | End: 2023-10-11

## 2023-10-10 RX ADMIN — SODIUM CHLORIDE 250 ML/HR: 0.9 INJECTION, SOLUTION INTRAVENOUS at 09:54

## 2023-10-11 ENCOUNTER — HOSPITAL ENCOUNTER (OUTPATIENT)
Dept: INFUSION CENTER | Facility: HOSPITAL | Age: 68
Discharge: HOME | End: 2023-10-11
Payer: MEDICARE

## 2023-10-11 VITALS
HEART RATE: 69 BPM | SYSTOLIC BLOOD PRESSURE: 141 MMHG | TEMPERATURE: 96.9 F | DIASTOLIC BLOOD PRESSURE: 77 MMHG | RESPIRATION RATE: 18 BRPM | OXYGEN SATURATION: 99 %

## 2023-10-11 PROCEDURE — 96361 HYDRATE IV INFUSION ADD-ON: CPT

## 2023-10-11 PROCEDURE — 96360 HYDRATION IV INFUSION INIT: CPT

## 2023-10-11 RX ADMIN — SODIUM CHLORIDE 250 ML/HR: 0.9 INJECTION, SOLUTION INTRAVENOUS at 08:30

## 2023-10-12 RX ORDER — SODIUM CHLORIDE 9 MG/ML
250 INJECTION, SOLUTION INTRAVENOUS ONCE
Status: COMPLETED | OUTPATIENT
Start: 2023-10-13 | End: 2023-10-13

## 2023-10-13 ENCOUNTER — HOSPITAL ENCOUNTER (OUTPATIENT)
Dept: INFUSION CENTER | Facility: HOSPITAL | Age: 68
End: 2023-10-13
Attending: INTERNAL MEDICINE
Payer: MEDICARE

## 2023-10-13 VITALS
HEART RATE: 63 BPM | DIASTOLIC BLOOD PRESSURE: 76 MMHG | RESPIRATION RATE: 18 BRPM | SYSTOLIC BLOOD PRESSURE: 136 MMHG | TEMPERATURE: 96.8 F | OXYGEN SATURATION: 98 %

## 2023-10-13 RX ORDER — SODIUM CHLORIDE 9 MG/ML
250 INJECTION, SOLUTION INTRAVENOUS ONCE
Status: COMPLETED | OUTPATIENT
Start: 2023-10-16 | End: 2023-10-16

## 2023-10-13 RX ADMIN — SODIUM CHLORIDE 250 ML/HR: 0.9 INJECTION, SOLUTION INTRAVENOUS at 08:55

## 2023-10-16 ENCOUNTER — HOSPITAL ENCOUNTER (OUTPATIENT)
Dept: INFUSION CENTER | Facility: HOSPITAL | Age: 68
Discharge: HOME | End: 2023-10-16
Payer: MEDICARE

## 2023-10-16 VITALS
SYSTOLIC BLOOD PRESSURE: 146 MMHG | RESPIRATION RATE: 18 BRPM | DIASTOLIC BLOOD PRESSURE: 73 MMHG | OXYGEN SATURATION: 99 % | HEART RATE: 65 BPM | TEMPERATURE: 96.7 F

## 2023-10-16 RX ORDER — SODIUM CHLORIDE 9 MG/ML
250 INJECTION, SOLUTION INTRAVENOUS ONCE
Status: COMPLETED | OUTPATIENT
Start: 2023-10-17 | End: 2023-10-17

## 2023-10-16 RX ADMIN — SODIUM CHLORIDE 250 ML/HR: 0.9 INJECTION, SOLUTION INTRAVENOUS at 09:29

## 2023-10-16 NOTE — PROGRESS NOTES
Patient tolerated hydration treatment without incident. CVC flushed, Blood return checked, PDC applied. Patient ambulated off unit in stable condition.

## 2023-10-17 ENCOUNTER — HOSPITAL ENCOUNTER (OUTPATIENT)
Dept: INFUSION CENTER | Facility: HOSPITAL | Age: 68
Discharge: HOME | End: 2023-10-17
Payer: MEDICARE

## 2023-10-17 VITALS
SYSTOLIC BLOOD PRESSURE: 140 MMHG | HEART RATE: 61 BPM | OXYGEN SATURATION: 99 % | TEMPERATURE: 98.6 F | RESPIRATION RATE: 18 BRPM | DIASTOLIC BLOOD PRESSURE: 89 MMHG

## 2023-10-17 PROCEDURE — 96360 HYDRATION IV INFUSION INIT: CPT

## 2023-10-17 PROCEDURE — 96361 HYDRATE IV INFUSION ADD-ON: CPT

## 2023-10-17 RX ORDER — SUCRALFATE ORAL 1 G/10ML
1 SUSPENSION ORAL 2 TIMES DAILY
COMMUNITY

## 2023-10-17 RX ORDER — SODIUM CHLORIDE 9 MG/ML
250 INJECTION, SOLUTION INTRAVENOUS ONCE
Status: COMPLETED | OUTPATIENT
Start: 2023-10-19 | End: 2023-10-19

## 2023-10-17 RX ADMIN — SODIUM CHLORIDE 250 ML/HR: 0.9 INJECTION, SOLUTION INTRAVENOUS at 09:28

## 2023-10-17 NOTE — PROGRESS NOTES
CVC dressing changed per patient request. Patient tolerated hydration treatment without incident. CVC flushed, blood return noted. [address] applied. AVS declined, patient aware of next appointment. Patient ambulated off unit in stable condition.

## 2023-10-19 ENCOUNTER — HOSPITAL ENCOUNTER (OUTPATIENT)
Dept: INFUSION CENTER | Facility: HOSPITAL | Age: 68
End: 2023-10-19
Payer: MEDICARE

## 2023-10-19 VITALS
SYSTOLIC BLOOD PRESSURE: 137 MMHG | HEART RATE: 67 BPM | DIASTOLIC BLOOD PRESSURE: 81 MMHG | TEMPERATURE: 96.7 F | RESPIRATION RATE: 18 BRPM

## 2023-10-19 PROCEDURE — 96361 HYDRATE IV INFUSION ADD-ON: CPT

## 2023-10-19 PROCEDURE — 96360 HYDRATION IV INFUSION INIT: CPT

## 2023-10-19 RX ADMIN — SODIUM CHLORIDE 250 ML/HR: 0.9 INJECTION, SOLUTION INTRAVENOUS at 09:28

## 2023-10-19 NOTE — PROGRESS NOTES
Hydration tolerated without incident. CVC flushed per protocol, clamped & PDC applied. Discharged in stable condition.

## 2023-10-20 ENCOUNTER — HOSPITAL ENCOUNTER (OUTPATIENT)
Dept: INFUSION CENTER | Facility: HOSPITAL | Age: 68
End: 2023-10-20
Payer: MEDICARE

## 2023-10-20 VITALS
HEART RATE: 70 BPM | RESPIRATION RATE: 17 BRPM | OXYGEN SATURATION: 100 % | SYSTOLIC BLOOD PRESSURE: 133 MMHG | TEMPERATURE: 96.9 F | DIASTOLIC BLOOD PRESSURE: 82 MMHG

## 2023-10-20 PROCEDURE — 96360 HYDRATION IV INFUSION INIT: CPT

## 2023-10-20 PROCEDURE — 96361 HYDRATE IV INFUSION ADD-ON: CPT

## 2023-10-20 RX ADMIN — SODIUM CHLORIDE 1500 ML: 0.9 INJECTION, SOLUTION INTRAVENOUS at 09:21

## 2023-10-20 NOTE — PROGRESS NOTES
Hydration tolerated without incident. CVC flushed per protocol, clamped & PDC applied. Pt discharged in stable condition.

## 2023-10-23 ENCOUNTER — HOSPITAL ENCOUNTER (OUTPATIENT)
Dept: INFUSION CENTER | Facility: HOSPITAL | Age: 68
Discharge: HOME | End: 2023-10-23
Payer: MEDICARE

## 2023-10-23 VITALS
OXYGEN SATURATION: 100 % | TEMPERATURE: 96.6 F | HEART RATE: 66 BPM | SYSTOLIC BLOOD PRESSURE: 144 MMHG | DIASTOLIC BLOOD PRESSURE: 87 MMHG | RESPIRATION RATE: 18 BRPM

## 2023-10-23 PROCEDURE — 96361 HYDRATE IV INFUSION ADD-ON: CPT

## 2023-10-23 PROCEDURE — 96360 HYDRATION IV INFUSION INIT: CPT

## 2023-10-23 RX ADMIN — SODIUM CHLORIDE 1500 ML: 9 INJECTION, SOLUTION INTRAVENOUS at 09:02

## 2023-10-23 NOTE — PROGRESS NOTES
With dressing change, CVC stitch found to be no longer attached. CVC secured in place with stat lock, patient stated she would contact IR. Blood return noted. Patient tolerated hydration treatment without incident. Patient declined AVS, aware of next appointment. Patient ambulated off unit in stable condition.

## 2023-10-23 NOTE — PLAN OF CARE
Problem: Potential for Falls  Goal: Patient will remain free of falls  Description: INTERVENTIONS:  - Educate patient/family on patient safety including physical limitations  - Instruct patient to call for assistance with activity   - Consult OT/PT to assist with strengthening/mobility   - Keep Call bell within reach  - Keep bed low and locked with side rails adjusted as appropriate  - Keep care items and personal belongings within reach  - Initiate and maintain comfort rounds  - Make Fall Risk Sign visible to staff  - Apply yellow socks and bracelet for high fall risk patients  - Consider moving patient to room near nurses station  Outcome: Progressing
Yes

## 2023-10-24 ENCOUNTER — HOSPITAL ENCOUNTER (OUTPATIENT)
Dept: INFUSION CENTER | Facility: HOSPITAL | Age: 68
Discharge: HOME | End: 2023-10-24
Payer: MEDICARE

## 2023-10-24 VITALS
HEART RATE: 70 BPM | DIASTOLIC BLOOD PRESSURE: 78 MMHG | TEMPERATURE: 97.2 F | SYSTOLIC BLOOD PRESSURE: 136 MMHG | OXYGEN SATURATION: 99 % | RESPIRATION RATE: 18 BRPM

## 2023-10-24 PROCEDURE — 96360 HYDRATION IV INFUSION INIT: CPT

## 2023-10-24 PROCEDURE — 96361 HYDRATE IV INFUSION ADD-ON: CPT

## 2023-10-24 RX ADMIN — SODIUM CHLORIDE 1500 ML: 0.9 INJECTION, SOLUTION INTRAVENOUS at 09:04

## 2023-10-25 ENCOUNTER — TELEPHONE (OUTPATIENT)
Dept: PAIN MEDICINE | Facility: CLINIC | Age: 68
End: 2023-10-25

## 2023-10-25 RX ORDER — SODIUM CHLORIDE 9 MG/ML
250 INJECTION, SOLUTION INTRAVENOUS ONCE
Status: COMPLETED | OUTPATIENT
Start: 2023-10-27 | End: 2023-10-27

## 2023-10-25 NOTE — TELEPHONE ENCOUNTER
Called pt per BK note and lmom that patient doesn't need this appt since she didn't have the injection done, also she is good on medications until mid November. I let pt know to please call back either way if she wants to keep it to discuss any issues, or reschedule appt.

## 2023-10-25 NOTE — TELEPHONE ENCOUNTER
Caller:  Vinicius Alvarez PT    Doctor: Arjun Gayle    Reason for call: Pt will keep her ovs with the CRNP     Call back#: N/A

## 2023-10-26 ENCOUNTER — HOSPITAL ENCOUNTER (OUTPATIENT)
Dept: INFUSION CENTER | Facility: HOSPITAL | Age: 68
Discharge: HOME | End: 2023-10-26
Payer: MEDICARE

## 2023-10-26 VITALS
SYSTOLIC BLOOD PRESSURE: 155 MMHG | RESPIRATION RATE: 20 BRPM | TEMPERATURE: 97.1 F | HEART RATE: 73 BPM | OXYGEN SATURATION: 100 % | DIASTOLIC BLOOD PRESSURE: 84 MMHG

## 2023-10-26 PROCEDURE — 96360 HYDRATION IV INFUSION INIT: CPT

## 2023-10-26 PROCEDURE — 96361 HYDRATE IV INFUSION ADD-ON: CPT

## 2023-10-26 RX ORDER — SODIUM CHLORIDE 9 MG/ML
250 INJECTION, SOLUTION INTRAVENOUS ONCE
Status: COMPLETED | OUTPATIENT
Start: 2023-10-30 | End: 2023-10-30

## 2023-10-26 RX ADMIN — SODIUM CHLORIDE 1500 ML: 9 INJECTION, SOLUTION INTRAVENOUS at 08:45

## 2023-10-26 NOTE — PROGRESS NOTES
Patient stat lock still in place. Patient asked if she had contacted IR about her stitch no longer being attached, and patient stated she did not since the stat lock was still working for her. Staff suggested she should still contact IR to see what they would like her to do. Patient tolerated hydration therapy without incident. CVC flushed, blood return noted, PDC applied. Patient ambulated off unit in stable condition.

## 2023-10-27 ENCOUNTER — OFFICE VISIT (OUTPATIENT)
Dept: PAIN MEDICINE | Facility: CLINIC | Age: 68
End: 2023-10-27
Payer: MEDICARE

## 2023-10-27 ENCOUNTER — HOSPITAL ENCOUNTER (OUTPATIENT)
Dept: INFUSION CENTER | Facility: HOSPITAL | Age: 68
Discharge: HOME | End: 2023-10-27
Payer: MEDICARE

## 2023-10-27 VITALS
DIASTOLIC BLOOD PRESSURE: 81 MMHG | BODY MASS INDEX: 25.45 KG/M2 | SYSTOLIC BLOOD PRESSURE: 149 MMHG | HEIGHT: 61 IN | HEART RATE: 79 BPM | WEIGHT: 134.8 LBS

## 2023-10-27 VITALS
TEMPERATURE: 96.9 F | OXYGEN SATURATION: 99 % | HEART RATE: 84 BPM | DIASTOLIC BLOOD PRESSURE: 83 MMHG | SYSTOLIC BLOOD PRESSURE: 152 MMHG | RESPIRATION RATE: 18 BRPM

## 2023-10-27 DIAGNOSIS — G89.4 CHRONIC PAIN DISORDER: Primary | ICD-10-CM

## 2023-10-27 DIAGNOSIS — M47.812 CERVICAL SPONDYLOSIS: ICD-10-CM

## 2023-10-27 DIAGNOSIS — M54.2 NECK PAIN: ICD-10-CM

## 2023-10-27 DIAGNOSIS — M54.12 CERVICAL RADICULOPATHY: ICD-10-CM

## 2023-10-27 DIAGNOSIS — M79.18 MYOFASCIAL PAIN SYNDROME: ICD-10-CM

## 2023-10-27 PROCEDURE — 96361 HYDRATE IV INFUSION ADD-ON: CPT

## 2023-10-27 PROCEDURE — 99214 OFFICE O/P EST MOD 30 MIN: CPT | Performed by: NURSE PRACTITIONER

## 2023-10-27 PROCEDURE — 96360 HYDRATION IV INFUSION INIT: CPT

## 2023-10-27 RX ORDER — METHYLPREDNISOLONE 4 MG/1
TABLET ORAL
Qty: 1 EACH | Refills: 0 | Status: SHIPPED | OUTPATIENT
Start: 2023-10-27

## 2023-10-27 RX ORDER — SODIUM CHLORIDE 9 MG/ML
250 INJECTION, SOLUTION INTRAVENOUS ONCE
Status: COMPLETED | OUTPATIENT
Start: 2023-10-31 | End: 2023-10-31

## 2023-10-27 RX ADMIN — SODIUM CHLORIDE 250 ML/HR: 0.9 INJECTION, SOLUTION INTRAVENOUS at 08:26

## 2023-10-27 NOTE — PROGRESS NOTES
Assessment:  1. Chronic pain disorder    2. Neck pain    3. Cervical radiculopathy    4. Cervical spondylosis    5. Myofascial pain syndrome        Plan:  While the patient was in the office today, I did have a thorough conversation regarding their chronic pain syndrome, medication management, and treatment plan options. Patient is being seen for a follow-up visit. She is complaining of increasing left neck pain for couple weeks. She states that she picked up something heavy and has been experiencing increased neck pain and headaches ever since. I discussed with the patient that at this point time since I feel that there is a significant inflammatory component to their pain symptoms, that they would benefit from a titrating dose of oral steroids over the next 7 days. I advised the patient that while on the steroids, they should not take any other oral NSAIDs except for acetaminophen or Tylenol until they have completed the steroid taper. I also advised them that once they have completed the steroid taper, they are to give our office a follow up phone call to let us know how they are doing and if there is any improvement. The patient was agreeable and verbalized an understanding. Continue Flexeril 5 mg 3 times daily if needed for spasms. She did not require refill of this medication during today's visit. Continue hydrocodone 5/325 twice daily if needed for pain. She did not require refill of this medication during today's visit. I provided her with cervical stretching/strengthening exercises to do at home. She states that she has been doing some stretching at home on her own. Does not have time to go to physical therapy due to multiple doctors appointments. The patient will follow-up in 4 weeks for medication prescription refill and reevaluation. The patient was advised to contact the office should their symptoms worsen in the interim. The patient was agreeable and verbalized an understanding. History of Present Illness: The patient is a 70 y.o. female last seen on 9/22/2023 who presents for a follow up office visit in regards to chronic pain secondary to chronic pain syndrome, neck pain, cervical spondylosis, cervical radiculopathy, myofascial pain syndrome. The patient currently reports increased left-sided neck pain and headaches. Current pain level is an 8/10. Quality of pain is described as burning, pressure-like, shooting, numb, pins-and-needles. Current pain medications includes: Hydrocodone 5/325 twice daily if needed for pain, Flexeril 5 mg 3 times daily if needed for spasms. .  The patient reports that this regimen is providing 25% pain relief. The patient is reporting no side effects from this pain medication regimen. Pain Contract Signed: 5/31/23  Last Urine Drug Screen: 6/2/23    I have personally reviewed and/or updated the patient's past medical history, past surgical history, family history, social history, current medications, allergies, and vital signs today. Review of Systems:    Review of Systems   Constitutional:  Negative for unexpected weight change. HENT:  Negative for hearing loss. Eyes:  Negative for visual disturbance. Respiratory:  Positive for shortness of breath. Cardiovascular:  Negative for leg swelling. Gastrointestinal:  Positive for constipation and nausea. Endocrine: Negative for polyuria. Genitourinary:  Negative for difficulty urinating. Musculoskeletal:  Negative for gait problem, joint swelling and myalgias. Decreased range of motion  Joint stiffness  Pain in extremity- L shoulder to hand   Skin:  Negative for rash. Neurological:  Positive for dizziness. Negative for weakness and headaches. Psychiatric/Behavioral:  Negative for decreased concentration. All other systems reviewed and are negative.         Past Medical History:   Diagnosis Date    Allergic rhinitis     Anxiety     Asthma     Back pain     Cardiac disease     Cardiopathy     EF 45%    Cough     Diverticulitis     Factor V Leiden ([address])     Fibromyalgia     GERD (gastroesophageal reflux disease)     Hashimoto's thyroiditis     Hx of degenerative disc disease     Hypotension     pots - postural orthostatic hypotension    Interstitial cystitis     Irregular heart beat     LBBB    Irritable bowel syndrome     Migraines     Mitral valve disease     "thickening"    Myocardial infarction [facility])     possible but not sure when    Neuropathy     bilateral legs    Osteoporosis     Postural orthostatic tachycardia syndrome     must drink a lot of water and salt    Pott's disease     Rheumatic fever 1967    Scoliosis     Sepsis ([address])     associated with PICC line    Sjogren's syndrome ([address])     TMJ (dislocation of temporomandibular joint)        Past Surgical History:   Procedure Laterality Date    ABLATION MICROWAVE Left     lumbar area    BACK SURGERY  10/1998     SECTION  1992    CHOLECYSTECTOMY  2016    COLONOSCOPY  2016    [address] OF UTERUS  1996    EGD  2016    FOOT SURGERY  1968    removal of bone and neuroma    HYSTERECTOMY  1997    age 54  PRICE ooph    IR OTHER  2022    IR OTHER  2022    IR OTHER  2023    IR PICC PLACEMENT SINGLE LUMEN  10/02/2020    IR PICC PLACEMENT SINGLE LUMEN  2021    IR PICC REPOSITION  2021    IR TUNNELED CENTRAL LINE PLACEMENT  2022    LAPAROSCOPY  1996    endometriosis    MOUTH BIOPSY      lip    SD EGD TRANSORAL BIOPSY SINGLE/MULTIPLE N/A 2019    Procedure: ESOPHAGOGASTRODUODENOSCOPY (EGD) with multiple bx and dilation;  Surgeon: Vinay Garcia MD;  Location: [address] [place] [address] GI LAB; Service: Gastroenterology    SD SURGICAL ARTHROSCOPY SHOULDER W/ROTATOR CUFF RPR Right 2022    Procedure: SHOULDER ARTHROSCOPIC ROTATOR CUFF REPAIR Biceps Tenodisis;   Surgeon: Radha Anderson MD;  Location: [facility] MAIN OR;  Service: Orthopedics    TUNNELED VENOUS CATHETER PLACEMENT   Family History   Problem Relation Age of Onset    Cancer Mother         urinary bladder     No Known Problems Father     Thyroid cancer Sister     Heart attack Sister     No Known Problems Sister     No Known Problems Sister     No Known Problems Sister     No Known Problems Sister     No Known Problems Daughter     No Known Problems Daughter     Colon cancer Maternal Grandfather     Breast cancer Maternal Aunt         over 48 yrs old     Endometrial cancer Maternal Aunt     Multiple myeloma Maternal Aunt     Hypertension Paternal Aunt        Social History     Occupational History    Not on file   Tobacco Use    Smoking status: Never    Smokeless tobacco: Never   Vaping Use    Vaping Use: Never used   Substance and Sexual Activity    Alcohol use: Never    Drug use: No    Sexual activity: Not on file         Current Outpatient Medications:     Alum Hydroxide-Mag Trisilicate (Gaviscon) 71-38.8 MG CHEW, Chew 1 tablet 4 (four) times a day as needed With meals and as needed, Disp: , Rfl:     azelastine (ASTELIN) 0.1 % nasal spray, 2 sprays into each nostril 2 (two) times a day Use in each nostril as directed, Disp: , Rfl:     beclomethasone (QVAR) 40 MCG/ACT inhaler, Inhale 1 puff daily as needed (only when unable to nebulize pulmicort) Rinse mouth after use., Disp: , Rfl:     budesonide (PULMICORT) 0.5 mg/2 mL nebulizer solution, Take 0.5 mg by nebulization 2 (two) times a day Rinse mouth after use., Disp: , Rfl:     cholecalciferol (VITAMIN D3) 1,000 units tablet, Take 4,000 Units by mouth daily, Disp: , Rfl:     cyclobenzaprine (FLEXERIL) 5 mg tablet, Take 1 tablet (5 mg total) by mouth 3 (three) times a day as needed for muscle spasms, Disp: 90 tablet, Rfl: 2    docusate sodium (COLACE) 100 mg capsule, Take 100 mg by mouth daily as needed for constipation, Disp: , Rfl:     erythromycin (ILOTYCIN) ophthalmic ointment, Apply 1 application to eye daily at bedtime, Disp: , Rfl:     famotidine (PEPCID) 40 MG tablet, Twice a day, Disp: , Rfl:     fexofenadine (ALLEGRA) 180 MG tablet, Take 180 mg by mouth 2 (two) times a day , Disp: , Rfl:     fluticasone (FLONASE) 50 mcg/act nasal spray, 2 sprays into each nostril daily , Disp: , Rfl:     Galcanezumab-gnlm (Emgality) 120 MG/ML SOAJ, Inject under the skin every 30 (thirty) days , Disp: , Rfl:     HYDROcodone-acetaminophen (Norco) 5-325 mg per tablet, Take 1 tablet by mouth 2 (two) times a day as needed for pain for up to 60 doses Do not fill until 9/29/2023 Max Daily Amount: 2 tablets, Disp: 60 tablet, Rfl: 0    HYDROcodone-acetaminophen (NORCO) 5-325 mg per tablet, Take 1 tablet by mouth 2 (two) times a day as needed for pain Do not fill until 10/27/2023 Max Daily Amount: 2 tablets, Disp: 60 tablet, Rfl: 0    ipratropium (ATROVENT) 0.02 % nebulizer solution, Take 0.5 mg by nebulization every 6 (six) hours as needed for wheezing or shortness of breath, Disp: , Rfl:     ipratropium (ATROVENT) 0.06 % nasal spray, PLEASE SEE ATTACHED FOR DETAILED DIRECTIONS, Disp: , Rfl:     ivabradine HCl (Corlanor) 5 MG tablet, 7.5 mg 2 (two) times a day, Disp: , Rfl:     levalbuterol (XOPENEX HFA) 45 mcg/act inhaler, Inhale 2 puffs every 4 (four) hours as needed (when unable to nebulize), Disp: , Rfl:     levalbuterol (XOPENEX) 1.25 mg/3 mL nebulizer solution, Take 1.25 mg by nebulization every 6 (six) hours as needed , Disp: , Rfl: 2    Magnesium 400 MG CAPS, Take 1 capsule by mouth 2 (two) times a day , Disp: , Rfl:     methylPREDNISolone 4 MG tablet therapy pack, Use as directed on package, Disp: 1 each, Rfl: 0    MULTIPLE VITAMINS-CALCIUM PO, Take 1 capsule by mouth every morning , Disp: , Rfl:     NON FORMULARY, Take 1 mg by mouth 3 (three) times a day Ketotifen 1 mg compounded capsule, Disp: , Rfl:     omega-3-acid ethyl esters (LOVAZA) 1 g capsule, Take 2 g by mouth 2 (two) times a day 1600mg daily, Disp: , Rfl:     polyethylene glycol (MIRALAX) 17 g packet, Take 17 g by mouth daily as needed , Disp: , Rfl:     Probiotic Product (PROBIOTIC DAILY PO), Take 1 tablet by mouth daily At lunch, Disp: , Rfl:     Qvar RediHaler 40 MCG/ACT inhaler, , Disp: , Rfl:     sodium chloride, Inject 1,500 mL into a catheter in a vein, Disp: , Rfl:     sucralfate (CARAFATE) 1 g/10 mL suspension, Take 1 g by mouth 2 (two) times a day, Disp: , Rfl:     Thyroid, Porcine, POWD, Use 32 mg daily, Disp: , Rfl:     Ubrogepant (UBRELVY) 100 MG tablet, Take 100 mg by mouth Take 1 tablet (100 mg) one time as needed for migraine. May repeat one additional tablet (100 mg) at least two hours after the first dose. Do not use more than two doses per day, or for more than eight days per month., Disp: , Rfl:     verapamil (CALAN) 40 mg tablet, , Disp: , Rfl:   No current facility-administered medications for this visit.     Facility-Administered Medications Ordered in Other Visits:     sodium chloride 0.9 % infusion, 250 mL/hr, Intravenous, Once, Camilla Bustillos MD    [facility] [place] 10/30/2023] sodium chloride 0.9 % infusion, 250 mL/hr, Intravenous, Once, Camilla Bustillos MD    Allergies   Allergen Reactions    Imipramine Confusion, Fatigue, Irritability, Palpitations, Shortness Of Breath, Tachycardia and Visual Disturbance    Melatonin Shortness Of Breath    Mirtazapine Anxiety, Dizziness, GI Intolerance, Nausea Only, Palpitations and Shortness Of Breath    Nexium [Esomeprazole] Shortness Of Breath    Nsaids Shortness Of Breath    Singulair [Montelukast] Shortness Of Breath and Cough    Zomig [Zolmitriptan] Shortness Of Breath    Dexilant [Dexlansoprazole] Nausea Only and Vomiting    Albuterol     Ambien [Zolpidem]     Amitriptyline Drowsiness    Aspirin     Bactrim [Sulfamethoxazole-Trimethoprim] Hives    Banana - Food Allergy Dermatitis    Ceftin [Cefuroxime]     Celebrex [Celecoxib]     Ciprofloxacin     Cranberry-C [Ascorbate - Food Allergy] GI Intolerance    Demerol [Meperidine]     Epinephrine Dizziness    Ergotamine Nausea Only and Headache    Keflex [Cephalexin]     Klonopin [Clonazepam] Nausea Only and Dizziness    Latex Hives    Levaquin [Levofloxacin]     Lexapro [Escitalopram]     Lyrica [Pregabalin] Fatigue    Macrodantin [Nitrofurantoin]     Morphine Nausea Only    Movantik [Naloxegol] Nausea Only    Mushroom Extract Complex - Food Allergy      Eye itchy, asthma  attack    Naprosyn [Naproxen]     Neurontin [Gabapentin] Dizziness    Pineapple - Food Allergy GI Intolerance    Plecanatide Abdominal Pain, Diarrhea and GI Intolerance    Prolia [Denosumab]     Prozac [Fluoxetine]     Serevent [Salmeterol] Dizziness    Sudafed [Pseudoephedrine] Hives    Sulfa Antibiotics     Tomato - Food Allergy GI Intolerance    Trazodone     Ultram [Tramadol] Nausea Only, Dizziness and Headache    Vilazodone Dizziness, GI Intolerance and Headache    Vilazodone Hcl Abdominal Pain, Dizziness, GI Intolerance, Headache and Other (See Comments)    Vioxx [Rofecoxib]     Vortioxetine Drowsiness, Fatigue, GI Intolerance and Irritability    Zithromax [Azithromycin] Hives    Zoloft [Sertraline]     Zantac [Ranitidine] Rash and Dizziness       Physical Exam:    /81   Pulse 79   Ht 5' 1" (1.549 m)   Wt 61.1 kg (134 lb 12.8 oz)   BMI 25.47 kg/m²     Constitutional:normal, well developed, well nourished, alert, in no distress and non-toxic and no overt pain behavior. Eyes:anicteric  HEENT:grossly intact  Neck:supple, symmetric, trachea midline and no masses   Pulmonary:even and unlabored  Cardiovascular:No edema or pitting edema present  Skin:Normal without rashes or lesions and well hydrated  Psychiatric:Mood and affect appropriate  Neurologic:Cranial Nerves II-XII grossly intact  Musculoskeletal: Range of motion of the cervical spine is limited in all planes. There are left-sided cervical paraspinal muscle spasms present. There is tenderness over the left trapezius muscle.       Imaging  No orders to display         No orders of the defined types were placed in this encounter.

## 2023-10-27 NOTE — H&P (VIEW-ONLY)
Assessment:  1. Chronic pain disorder    2. Neck pain    3. Cervical radiculopathy    4. Cervical spondylosis    5. Myofascial pain syndrome        Plan:  While the patient was in the office today, I did have a thorough conversation regarding their chronic pain syndrome, medication management, and treatment plan options. Patient is being seen for a follow-up visit. She is complaining of increasing left neck pain for couple weeks. She states that she picked up something heavy and has been experiencing increased neck pain and headaches ever since. I discussed with the patient that at this point time since I feel that there is a significant inflammatory component to their pain symptoms, that they would benefit from a titrating dose of oral steroids over the next 7 days. I advised the patient that while on the steroids, they should not take any other oral NSAIDs except for acetaminophen or Tylenol until they have completed the steroid taper. I also advised them that once they have completed the steroid taper, they are to give our office a follow up phone call to let us know how they are doing and if there is any improvement. The patient was agreeable and verbalized an understanding. Continue Flexeril 5 mg 3 times daily if needed for spasms. She did not require refill of this medication during today's visit. Continue hydrocodone 5/325 twice daily if needed for pain. She did not require refill of this medication during today's visit. I provided her with cervical stretching/strengthening exercises to do at home. She states that she has been doing some stretching at home on her own. Does not have time to go to physical therapy due to multiple doctors appointments. The patient will follow-up in 4 weeks for medication prescription refill and reevaluation. The patient was advised to contact the office should their symptoms worsen in the interim. The patient was agreeable and verbalized an understanding. History of Present Illness: The patient is a 70 y.o. female last seen on 9/22/2023 who presents for a follow up office visit in regards to chronic pain secondary to chronic pain syndrome, neck pain, cervical spondylosis, cervical radiculopathy, myofascial pain syndrome. The patient currently reports increased left-sided neck pain and headaches. Current pain level is an 8/10. Quality of pain is described as burning, pressure-like, shooting, numb, pins-and-needles. Current pain medications includes: Hydrocodone 5/325 twice daily if needed for pain, Flexeril 5 mg 3 times daily if needed for spasms. .  The patient reports that this regimen is providing 25% pain relief. The patient is reporting no side effects from this pain medication regimen. Pain Contract Signed: 5/31/23  Last Urine Drug Screen: 6/2/23    I have personally reviewed and/or updated the patient's past medical history, past surgical history, family history, social history, current medications, allergies, and vital signs today. Review of Systems:    Review of Systems   Constitutional:  Negative for unexpected weight change. HENT:  Negative for hearing loss. Eyes:  Negative for visual disturbance. Respiratory:  Positive for shortness of breath. Cardiovascular:  Negative for leg swelling. Gastrointestinal:  Positive for constipation and nausea. Endocrine: Negative for polyuria. Genitourinary:  Negative for difficulty urinating. Musculoskeletal:  Negative for gait problem, joint swelling and myalgias. Decreased range of motion  Joint stiffness  Pain in extremity- L shoulder to hand   Skin:  Negative for rash. Neurological:  Positive for dizziness. Negative for weakness and headaches. Psychiatric/Behavioral:  Negative for decreased concentration. All other systems reviewed and are negative.         Past Medical History:   Diagnosis Date    Allergic rhinitis     Anxiety     Asthma     Back pain     Cardiac disease     Cardiopathy     EF 45%    Cough     Diverticulitis     Factor V Leiden ([address])     Fibromyalgia     GERD (gastroesophageal reflux disease)     Hashimoto's thyroiditis     Hx of degenerative disc disease     Hypotension     pots - postural orthostatic hypotension    Interstitial cystitis     Irregular heart beat     LBBB    Irritable bowel syndrome     Migraines     Mitral valve disease     "thickening"    Myocardial infarction [facility])     possible but not sure when    Neuropathy     bilateral legs    Osteoporosis     Postural orthostatic tachycardia syndrome     must drink a lot of water and salt    Pott's disease     Rheumatic fever 1967    Scoliosis     Sepsis ([address])     associated with PICC line    Sjogren's syndrome ([address])     TMJ (dislocation of temporomandibular joint)        Past Surgical History:   Procedure Laterality Date    ABLATION MICROWAVE Left     lumbar area    BACK SURGERY  10/1998     SECTION  1992    CHOLECYSTECTOMY  2016    COLONOSCOPY  2016    [address] OF UTERUS  1996    EGD  2016    FOOT SURGERY  1968    removal of bone and neuroma    HYSTERECTOMY  1997    age 54  PRICE ooph    IR OTHER  2022    IR OTHER  2022    IR OTHER  2023    IR PICC PLACEMENT SINGLE LUMEN  10/02/2020    IR PICC PLACEMENT SINGLE LUMEN  2021    IR PICC REPOSITION  2021    IR TUNNELED CENTRAL LINE PLACEMENT  2022    LAPAROSCOPY  1996    endometriosis    MOUTH BIOPSY      lip    MO EGD TRANSORAL BIOPSY SINGLE/MULTIPLE N/A 2019    Procedure: ESOPHAGOGASTRODUODENOSCOPY (EGD) with multiple bx and dilation;  Surgeon: Anny Amador MD;  Location: [address] [place] [address] GI LAB; Service: Gastroenterology    MO SURGICAL ARTHROSCOPY SHOULDER W/ROTATOR CUFF RPR Right 2022    Procedure: SHOULDER ARTHROSCOPIC ROTATOR CUFF REPAIR Biceps Tenodisis;   Surgeon: Ulysses Taylor MD;  Location: [facility] MAIN OR;  Service: Orthopedics    TUNNELED VENOUS CATHETER PLACEMENT   Family History   Problem Relation Age of Onset    Cancer Mother         urinary bladder     No Known Problems Father     Thyroid cancer Sister     Heart attack Sister     No Known Problems Sister     No Known Problems Sister     No Known Problems Sister     No Known Problems Sister     No Known Problems Daughter     No Known Problems Daughter     Colon cancer Maternal Grandfather     Breast cancer Maternal Aunt         over 48 yrs old     Endometrial cancer Maternal Aunt     Multiple myeloma Maternal Aunt     Hypertension Paternal Aunt        Social History     Occupational History    Not on file   Tobacco Use    Smoking status: Never    Smokeless tobacco: Never   Vaping Use    Vaping Use: Never used   Substance and Sexual Activity    Alcohol use: Never    Drug use: No    Sexual activity: Not on file         Current Outpatient Medications:     Alum Hydroxide-Mag Trisilicate (Gaviscon) 22-18.4 MG CHEW, Chew 1 tablet 4 (four) times a day as needed With meals and as needed, Disp: , Rfl:     azelastine (ASTELIN) 0.1 % nasal spray, 2 sprays into each nostril 2 (two) times a day Use in each nostril as directed, Disp: , Rfl:     beclomethasone (QVAR) 40 MCG/ACT inhaler, Inhale 1 puff daily as needed (only when unable to nebulize pulmicort) Rinse mouth after use., Disp: , Rfl:     budesonide (PULMICORT) 0.5 mg/2 mL nebulizer solution, Take 0.5 mg by nebulization 2 (two) times a day Rinse mouth after use., Disp: , Rfl:     cholecalciferol (VITAMIN D3) 1,000 units tablet, Take 4,000 Units by mouth daily, Disp: , Rfl:     cyclobenzaprine (FLEXERIL) 5 mg tablet, Take 1 tablet (5 mg total) by mouth 3 (three) times a day as needed for muscle spasms, Disp: 90 tablet, Rfl: 2    docusate sodium (COLACE) 100 mg capsule, Take 100 mg by mouth daily as needed for constipation, Disp: , Rfl:     erythromycin (ILOTYCIN) ophthalmic ointment, Apply 1 application to eye daily at bedtime, Disp: , Rfl:     famotidine (PEPCID) 40 MG tablet, Twice a day, Disp: , Rfl:     fexofenadine (ALLEGRA) 180 MG tablet, Take 180 mg by mouth 2 (two) times a day , Disp: , Rfl:     fluticasone (FLONASE) 50 mcg/act nasal spray, 2 sprays into each nostril daily , Disp: , Rfl:     Galcanezumab-gnlm (Emgality) 120 MG/ML SOAJ, Inject under the skin every 30 (thirty) days , Disp: , Rfl:     HYDROcodone-acetaminophen (Norco) 5-325 mg per tablet, Take 1 tablet by mouth 2 (two) times a day as needed for pain for up to 60 doses Do not fill until 9/29/2023 Max Daily Amount: 2 tablets, Disp: 60 tablet, Rfl: 0    HYDROcodone-acetaminophen (NORCO) 5-325 mg per tablet, Take 1 tablet by mouth 2 (two) times a day as needed for pain Do not fill until 10/27/2023 Max Daily Amount: 2 tablets, Disp: 60 tablet, Rfl: 0    ipratropium (ATROVENT) 0.02 % nebulizer solution, Take 0.5 mg by nebulization every 6 (six) hours as needed for wheezing or shortness of breath, Disp: , Rfl:     ipratropium (ATROVENT) 0.06 % nasal spray, PLEASE SEE ATTACHED FOR DETAILED DIRECTIONS, Disp: , Rfl:     ivabradine HCl (Corlanor) 5 MG tablet, 7.5 mg 2 (two) times a day, Disp: , Rfl:     levalbuterol (XOPENEX HFA) 45 mcg/act inhaler, Inhale 2 puffs every 4 (four) hours as needed (when unable to nebulize), Disp: , Rfl:     levalbuterol (XOPENEX) 1.25 mg/3 mL nebulizer solution, Take 1.25 mg by nebulization every 6 (six) hours as needed , Disp: , Rfl: 2    Magnesium 400 MG CAPS, Take 1 capsule by mouth 2 (two) times a day , Disp: , Rfl:     methylPREDNISolone 4 MG tablet therapy pack, Use as directed on package, Disp: 1 each, Rfl: 0    MULTIPLE VITAMINS-CALCIUM PO, Take 1 capsule by mouth every morning , Disp: , Rfl:     NON FORMULARY, Take 1 mg by mouth 3 (three) times a day Ketotifen 1 mg compounded capsule, Disp: , Rfl:     omega-3-acid ethyl esters (LOVAZA) 1 g capsule, Take 2 g by mouth 2 (two) times a day 1600mg daily, Disp: , Rfl:     polyethylene glycol (MIRALAX) 17 g packet, Take 17 g by mouth daily as needed , Disp: , Rfl:     Probiotic Product (PROBIOTIC DAILY PO), Take 1 tablet by mouth daily At lunch, Disp: , Rfl:     Qvar RediHaler 40 MCG/ACT inhaler, , Disp: , Rfl:     sodium chloride, Inject 1,500 mL into a catheter in a vein, Disp: , Rfl:     sucralfate (CARAFATE) 1 g/10 mL suspension, Take 1 g by mouth 2 (two) times a day, Disp: , Rfl:     Thyroid, Porcine, POWD, Use 32 mg daily, Disp: , Rfl:     Ubrogepant (UBRELVY) 100 MG tablet, Take 100 mg by mouth Take 1 tablet (100 mg) one time as needed for migraine. May repeat one additional tablet (100 mg) at least two hours after the first dose. Do not use more than two doses per day, or for more than eight days per month., Disp: , Rfl:     verapamil (CALAN) 40 mg tablet, , Disp: , Rfl:   No current facility-administered medications for this visit.     Facility-Administered Medications Ordered in Other Visits:     sodium chloride 0.9 % infusion, 250 mL/hr, Intravenous, Once, Ana M Quiros MD    [facility] 10/30/2023] sodium chloride 0.9 % infusion, 250 mL/hr, Intravenous, Once, Ana M Quiros MD    Allergies   Allergen Reactions    Imipramine Confusion, Fatigue, Irritability, Palpitations, Shortness Of Breath, Tachycardia and Visual Disturbance    Melatonin Shortness Of Breath    Mirtazapine Anxiety, Dizziness, GI Intolerance, Nausea Only, Palpitations and Shortness Of Breath    Nexium [Esomeprazole] Shortness Of Breath    Nsaids Shortness Of Breath    Singulair [Montelukast] Shortness Of Breath and Cough    Zomig [Zolmitriptan] Shortness Of Breath    Dexilant [Dexlansoprazole] Nausea Only and Vomiting    Albuterol     Ambien [Zolpidem]     Amitriptyline Drowsiness    Aspirin     Bactrim [Sulfamethoxazole-Trimethoprim] Hives    Banana - Food Allergy Dermatitis    Ceftin [Cefuroxime]     Celebrex [Celecoxib]     Ciprofloxacin     Cranberry-C [Ascorbate - Food Allergy] GI Intolerance    Demerol [Meperidine]     Epinephrine Dizziness    Ergotamine Nausea Only and Headache    Keflex [Cephalexin]     Klonopin [Clonazepam] Nausea Only and Dizziness    Latex Hives    Levaquin [Levofloxacin]     Lexapro [Escitalopram]     Lyrica [Pregabalin] Fatigue    Macrodantin [Nitrofurantoin]     Morphine Nausea Only    Movantik [Naloxegol] Nausea Only    Mushroom Extract Complex - Food Allergy      Eye itchy, asthma  attack    Naprosyn [Naproxen]     Neurontin [Gabapentin] Dizziness    Pineapple - Food Allergy GI Intolerance    Plecanatide Abdominal Pain, Diarrhea and GI Intolerance    Prolia [Denosumab]     Prozac [Fluoxetine]     Serevent [Salmeterol] Dizziness    Sudafed [Pseudoephedrine] Hives    Sulfa Antibiotics     Tomato - Food Allergy GI Intolerance    Trazodone     Ultram [Tramadol] Nausea Only, Dizziness and Headache    Vilazodone Dizziness, GI Intolerance and Headache    Vilazodone Hcl Abdominal Pain, Dizziness, GI Intolerance, Headache and Other (See Comments)    Vioxx [Rofecoxib]     Vortioxetine Drowsiness, Fatigue, GI Intolerance and Irritability    Zithromax [Azithromycin] Hives    Zoloft [Sertraline]     Zantac [Ranitidine] Rash and Dizziness       Physical Exam:    /81   Pulse 79   Ht 5' 1" (1.549 m)   Wt 61.1 kg (134 lb 12.8 oz)   BMI 25.47 kg/m²     Constitutional:normal, well developed, well nourished, alert, in no distress and non-toxic and no overt pain behavior. Eyes:anicteric  HEENT:grossly intact  Neck:supple, symmetric, trachea midline and no masses   Pulmonary:even and unlabored  Cardiovascular:No edema or pitting edema present  Skin:Normal without rashes or lesions and well hydrated  Psychiatric:Mood and affect appropriate  Neurologic:Cranial Nerves II-XII grossly intact  Musculoskeletal: Range of motion of the cervical spine is limited in all planes. There are left-sided cervical paraspinal muscle spasms present. There is tenderness over the left trapezius muscle.       Imaging  No orders to display         No orders of the defined types were placed in this encounter.

## 2023-10-27 NOTE — PLAN OF CARE
Problem: Potential for Falls  Goal: Patient will remain free of falls  Description: INTERVENTIONS:  - Educate patient/family on patient safety including physical limitations  - Instruct patient to call for assistance with activity   - Consult OT/PT to assist with strengthening/mobility   - Keep Call bell within reach  - Keep bed low and locked with side rails adjusted as appropriate  - Keep care items and personal belongings within reach  - Initiate and maintain comfort rounds  - Make Fall Risk Sign visible to staff  - Apply yellow socks and bracelet for high fall risk patients  - Consider moving patient to room near nurses station  Outcome: Progressing inno distress, dcd with scripts

## 2023-10-27 NOTE — PATIENT INSTRUCTIONS
Neck Exercises   AMBULATORY CARE:   Neck exercises  help reduce neck pain, and improve neck movement and strength. Neck exercises also help prevent long-term neck problems. Call your doctor if:   Your pain does not get better, or gets worse. You have questions or concerns about your condition, care, or exercise program.    What you need to know about exercise safety:   Move slowly, gently, and smoothly. Avoid fast or jerky motions. Stand and sit the way your healthcare provider shows you. Good posture may reduce your neck pain. Check your posture often, even when you are not doing your neck exercises. Follow the exercise program recommended by your healthcare provider. He or she will tell you which exercises are best for your condition. He or she will also tell you how many repetitions to do and how often you should do the exercises. How to perform neck exercises safely:   Exercise position:  You may sit or stand while you do neck exercises. Face forward. Your shoulders should be straight and relaxed, with a good posture. Head tilts, forward and back:  Gently bow your head and try to touch your chin to your chest. Your healthcare provider may tell you to push on the back of your neck to help bow your head. Raise your chin back to the starting position. Tilt your head back as far as possible so you are looking up at the ceiling. Your healthcare provider may tell you to lift your chin to help tilt your head back. Return your head to the starting position. Head tilts, side to side:  Tilt your head, bringing your ear toward your shoulder. Then tilt your head toward the other shoulder. Head turns:  Turn your head to look over your shoulder. Tilt your chin down and try to touch it to your shoulder. Do not raise your shoulder to your chin. Face forward again. Do the same on the other side.          Head rolls:  Slowly bring your chin toward your chest. Next, roll your head to the right. Your ear should be positioned over your shoulder. Hold this position for 5 seconds. Roll your head back toward your chest and to the left into the same position. Hold for 5 seconds. Gently roll your head back and around in a clockwise Cheesh-Na 3 times. Next, move your head in the reverse direction (counterclockwise) in a Cheesh-Na 3 times. Do not shrug your shoulders upwards while you do this exercise. Follow up with your doctor as directed:  Write down your questions so you remember to ask them during your visits. © Copyright Thana [employer]iv[employer] 2023 Information is for End User's use only and may not be sold, redistributed or otherwise used for commercial purposes. The above information is an  only. It is not intended as medical advice for individual conditions or treatments. Talk to your doctor, nurse or pharmacist before following any medical regimen to see if it is safe and effective for you.

## 2023-10-30 ENCOUNTER — HOSPITAL ENCOUNTER (OUTPATIENT)
Dept: INFUSION CENTER | Facility: HOSPITAL | Age: 68
Discharge: HOME | End: 2023-10-30
Payer: MEDICARE

## 2023-10-30 VITALS
TEMPERATURE: 96.9 F | HEART RATE: 83 BPM | SYSTOLIC BLOOD PRESSURE: 131 MMHG | DIASTOLIC BLOOD PRESSURE: 83 MMHG | RESPIRATION RATE: 18 BRPM | OXYGEN SATURATION: 98 %

## 2023-10-30 PROCEDURE — 96361 HYDRATE IV INFUSION ADD-ON: CPT

## 2023-10-30 PROCEDURE — 96360 HYDRATION IV INFUSION INIT: CPT

## 2023-10-30 RX ADMIN — SODIUM CHLORIDE 250 ML/HR: 0.9 INJECTION, SOLUTION INTRAVENOUS at 09:35

## 2023-10-31 ENCOUNTER — HOSPITAL ENCOUNTER (OUTPATIENT)
Dept: INFUSION CENTER | Facility: HOSPITAL | Age: 68
Discharge: HOME | End: 2023-10-31
Payer: MEDICARE

## 2023-10-31 VITALS
SYSTOLIC BLOOD PRESSURE: 130 MMHG | DIASTOLIC BLOOD PRESSURE: 79 MMHG | OXYGEN SATURATION: 99 % | TEMPERATURE: 97.8 F | HEART RATE: 76 BPM | RESPIRATION RATE: 18 BRPM

## 2023-10-31 PROCEDURE — 96361 HYDRATE IV INFUSION ADD-ON: CPT

## 2023-10-31 PROCEDURE — 96360 HYDRATION IV INFUSION INIT: CPT

## 2023-10-31 RX ORDER — SODIUM CHLORIDE 9 MG/ML
250 INJECTION, SOLUTION INTRAVENOUS ONCE
Status: COMPLETED | OUTPATIENT
Start: 2023-11-02 | End: 2023-11-02

## 2023-10-31 RX ADMIN — SODIUM CHLORIDE 250 ML/HR: 0.9 INJECTION, SOLUTION INTRAVENOUS at 08:34

## 2023-11-01 RX ORDER — SODIUM CHLORIDE 9 MG/ML
250 INJECTION, SOLUTION INTRAVENOUS ONCE
Status: COMPLETED | OUTPATIENT
Start: 2023-11-03 | End: 2023-11-03

## 2023-11-02 ENCOUNTER — HOSPITAL ENCOUNTER (OUTPATIENT)
Dept: INFUSION CENTER | Facility: HOSPITAL | Age: 68
End: 2023-11-02
Payer: MEDICARE

## 2023-11-02 VITALS
DIASTOLIC BLOOD PRESSURE: 74 MMHG | TEMPERATURE: 97.2 F | HEART RATE: 80 BPM | RESPIRATION RATE: 17 BRPM | SYSTOLIC BLOOD PRESSURE: 144 MMHG

## 2023-11-02 PROCEDURE — 96360 HYDRATION IV INFUSION INIT: CPT

## 2023-11-02 PROCEDURE — 96361 HYDRATE IV INFUSION ADD-ON: CPT

## 2023-11-02 RX ORDER — SODIUM CHLORIDE 9 MG/ML
250 INJECTION, SOLUTION INTRAVENOUS ONCE
Status: COMPLETED | OUTPATIENT
Start: 2023-11-06 | End: 2023-11-06

## 2023-11-02 RX ADMIN — SODIUM CHLORIDE 250 ML/HR: 0.9 INJECTION, SOLUTION INTRAVENOUS at 08:35

## 2023-11-03 ENCOUNTER — HOSPITAL ENCOUNTER (OUTPATIENT)
Dept: INFUSION CENTER | Facility: HOSPITAL | Age: 68
End: 2023-11-03
Payer: MEDICARE

## 2023-11-03 VITALS
SYSTOLIC BLOOD PRESSURE: 126 MMHG | TEMPERATURE: 96.9 F | OXYGEN SATURATION: 99 % | DIASTOLIC BLOOD PRESSURE: 78 MMHG | RESPIRATION RATE: 16 BRPM | HEART RATE: 68 BPM

## 2023-11-03 PROCEDURE — 96360 HYDRATION IV INFUSION INIT: CPT

## 2023-11-03 PROCEDURE — 96361 HYDRATE IV INFUSION ADD-ON: CPT

## 2023-11-03 RX ORDER — SODIUM CHLORIDE 9 MG/ML
250 INJECTION, SOLUTION INTRAVENOUS ONCE
Status: COMPLETED | OUTPATIENT
Start: 2023-11-07 | End: 2023-11-07

## 2023-11-03 RX ADMIN — SODIUM CHLORIDE 250 ML/HR: 0.9 INJECTION, SOLUTION INTRAVENOUS at 08:06

## 2023-11-03 NOTE — PROGRESS NOTES
IV hydration tolerated without incident. CVC flushed per protocol, clamped & PDC applied. AVS declined, patient aware of next appointment. Patient ambulated off unit in stable condition.

## 2023-11-06 ENCOUNTER — HOSPITAL ENCOUNTER (OUTPATIENT)
Dept: INFUSION CENTER | Facility: HOSPITAL | Age: 68
Discharge: HOME | End: 2023-11-06
Payer: MEDICARE

## 2023-11-06 VITALS
HEART RATE: 104 BPM | DIASTOLIC BLOOD PRESSURE: 83 MMHG | OXYGEN SATURATION: 100 % | SYSTOLIC BLOOD PRESSURE: 144 MMHG | TEMPERATURE: 98.9 F | RESPIRATION RATE: 18 BRPM

## 2023-11-06 PROCEDURE — 96361 HYDRATE IV INFUSION ADD-ON: CPT

## 2023-11-06 PROCEDURE — 96360 HYDRATION IV INFUSION INIT: CPT

## 2023-11-06 RX ADMIN — SODIUM CHLORIDE 250 ML/HR: 0.9 INJECTION, SOLUTION INTRAVENOUS at 08:55

## 2023-11-06 NOTE — PROGRESS NOTES
IV hydration tolerated without incident. CVC flushed per protocol & PDC applied. Discharged in stable condition.

## 2023-11-07 ENCOUNTER — HOSPITAL ENCOUNTER (OUTPATIENT)
Dept: INFUSION CENTER | Facility: HOSPITAL | Age: 68
Discharge: HOME | End: 2023-11-07
Payer: MEDICARE

## 2023-11-07 VITALS
SYSTOLIC BLOOD PRESSURE: 135 MMHG | RESPIRATION RATE: 18 BRPM | TEMPERATURE: 96.6 F | DIASTOLIC BLOOD PRESSURE: 83 MMHG | HEART RATE: 85 BPM | OXYGEN SATURATION: 99 %

## 2023-11-07 PROCEDURE — 96361 HYDRATE IV INFUSION ADD-ON: CPT

## 2023-11-07 PROCEDURE — 96360 HYDRATION IV INFUSION INIT: CPT

## 2023-11-07 RX ORDER — SODIUM CHLORIDE 9 MG/ML
250 INJECTION, SOLUTION INTRAVENOUS ONCE
Status: COMPLETED | OUTPATIENT
Start: 2023-11-09 | End: 2023-11-09

## 2023-11-07 RX ADMIN — SODIUM CHLORIDE 250 ML/HR: 0.9 INJECTION, SOLUTION INTRAVENOUS at 08:30

## 2023-11-07 NOTE — PROGRESS NOTES
Hydration tolerated without incident. CVC flushed per protocol & PDC applied. Discharged in stable condition.

## 2023-11-08 ENCOUNTER — TELEPHONE (OUTPATIENT)
Age: 68
End: 2023-11-08

## 2023-11-08 RX ORDER — SODIUM CHLORIDE 9 MG/ML
250 INJECTION, SOLUTION INTRAVENOUS ONCE
Status: COMPLETED | OUTPATIENT
Start: 2023-11-10 | End: 2023-11-10

## 2023-11-08 NOTE — TELEPHONE ENCOUNTER
Caller: patient    Doctor: n/a    Reason for call: patient called back stating that they did receive the vm.  Patient stated that if a cancellation were to open for this Friday, they would take it    Call back#: n/a

## 2023-11-09 ENCOUNTER — APPOINTMENT (OUTPATIENT)
Dept: LAB | Facility: CLINIC | Age: 68
End: 2023-11-09
Payer: MEDICARE

## 2023-11-09 ENCOUNTER — HOSPITAL ENCOUNTER (OUTPATIENT)
Dept: INFUSION CENTER | Facility: HOSPITAL | Age: 68
End: 2023-11-09
Payer: MEDICARE

## 2023-11-09 VITALS
DIASTOLIC BLOOD PRESSURE: 79 MMHG | SYSTOLIC BLOOD PRESSURE: 149 MMHG | TEMPERATURE: 97.2 F | HEART RATE: 83 BPM | RESPIRATION RATE: 18 BRPM | OXYGEN SATURATION: 98 %

## 2023-11-09 DIAGNOSIS — E03.9 HYPOTHYROIDISM, UNSPECIFIED TYPE: ICD-10-CM

## 2023-11-09 DIAGNOSIS — J45.902 ASTHMA WITH STATUS ASTHMATICUS, UNSPECIFIED ASTHMA SEVERITY, UNSPECIFIED WHETHER PERSISTENT: ICD-10-CM

## 2023-11-09 LAB
ALBUMIN SERPL BCP-MCNC: 4.5 G/DL (ref 3.5–5)
ALP SERPL-CCNC: 83 U/L (ref 34–104)
ALT SERPL W P-5'-P-CCNC: 31 U/L (ref 7–52)
ANION GAP SERPL CALCULATED.3IONS-SCNC: 10 MMOL/L
AST SERPL W P-5'-P-CCNC: 23 U/L (ref 13–39)
BASOPHILS # BLD AUTO: 0.05 THOUSANDS/ÂΜL (ref 0–0.1)
BASOPHILS NFR BLD AUTO: 1 % (ref 0–1)
BILIRUB SERPL-MCNC: 0.49 MG/DL (ref 0.2–1)
BUN SERPL-MCNC: 19 MG/DL (ref 5–25)
CALCIUM SERPL-MCNC: 9.6 MG/DL (ref 8.4–10.2)
CHLORIDE SERPL-SCNC: 103 MMOL/L (ref 96–108)
CO2 SERPL-SCNC: 28 MMOL/L (ref 21–32)
CREAT SERPL-MCNC: 1.21 MG/DL (ref 0.6–1.3)
EOSINOPHIL # BLD AUTO: 0.09 THOUSAND/ÂΜL (ref 0–0.61)
EOSINOPHIL NFR BLD AUTO: 1 % (ref 0–6)
ERYTHROCYTE [DISTWIDTH] IN BLOOD BY AUTOMATED COUNT: 13.4 % (ref 11.6–15.1)
GFR SERPL CREATININE-BSD FRML MDRD: 45 ML/MIN/1.73SQ M
GLUCOSE P FAST SERPL-MCNC: 95 MG/DL (ref 65–99)
HCT VFR BLD AUTO: 45.2 % (ref 34.8–46.1)
HGB BLD-MCNC: 14.8 G/DL (ref 11.5–15.4)
IMM GRANULOCYTES # BLD AUTO: 0.07 THOUSAND/UL (ref 0–0.2)
IMM GRANULOCYTES NFR BLD AUTO: 1 % (ref 0–2)
LYMPHOCYTES # BLD AUTO: 1.87 THOUSANDS/ÂΜL (ref 0.6–4.47)
LYMPHOCYTES NFR BLD AUTO: 25 % (ref 14–44)
MCH RBC QN AUTO: 30.7 PG (ref 26.8–34.3)
MCHC RBC AUTO-ENTMCNC: 32.7 G/DL (ref 31.4–37.4)
MCV RBC AUTO: 94 FL (ref 82–98)
MONOCYTES # BLD AUTO: 0.64 THOUSAND/ÂΜL (ref 0.17–1.22)
MONOCYTES NFR BLD AUTO: 8 % (ref 4–12)
NEUTROPHILS # BLD AUTO: 4.9 THOUSANDS/ÂΜL (ref 1.85–7.62)
NEUTS SEG NFR BLD AUTO: 64 % (ref 43–75)
NRBC BLD AUTO-RTO: 0 /100 WBCS
PLATELET # BLD AUTO: 281 THOUSANDS/UL (ref 149–390)
PMV BLD AUTO: 10.1 FL (ref 8.9–12.7)
POTASSIUM SERPL-SCNC: 3.8 MMOL/L (ref 3.5–5.3)
PROT SERPL-MCNC: 7.1 G/DL (ref 6.4–8.4)
RBC # BLD AUTO: 4.82 MILLION/UL (ref 3.81–5.12)
SODIUM SERPL-SCNC: 141 MMOL/L (ref 135–147)
TSH SERPL DL<=0.05 MIU/L-ACNC: 1.65 UIU/ML (ref 0.45–4.5)
WBC # BLD AUTO: 7.62 THOUSAND/UL (ref 4.31–10.16)

## 2023-11-09 PROCEDURE — 84443 ASSAY THYROID STIM HORMONE: CPT

## 2023-11-09 PROCEDURE — 36415 COLL VENOUS BLD VENIPUNCTURE: CPT

## 2023-11-09 PROCEDURE — 80053 COMPREHEN METABOLIC PANEL: CPT

## 2023-11-09 PROCEDURE — 85025 COMPLETE CBC W/AUTO DIFF WBC: CPT

## 2023-11-09 PROCEDURE — 96361 HYDRATE IV INFUSION ADD-ON: CPT

## 2023-11-09 PROCEDURE — 96360 HYDRATION IV INFUSION INIT: CPT

## 2023-11-09 RX ORDER — SODIUM CHLORIDE 9 MG/ML
250 INJECTION, SOLUTION INTRAVENOUS ONCE
Status: COMPLETED | OUTPATIENT
Start: 2023-11-13 | End: 2023-11-13

## 2023-11-09 RX ADMIN — SODIUM CHLORIDE 250 ML/HR: 0.9 INJECTION, SOLUTION INTRAVENOUS at 08:23

## 2023-11-09 NOTE — PROGRESS NOTES
V hydration tolerated without incident. CVC flushed per protocol & PDC applied. Discharged in stable condition.

## 2023-11-10 ENCOUNTER — HOSPITAL ENCOUNTER (OUTPATIENT)
Dept: RADIOLOGY | Facility: HOSPITAL | Age: 68
End: 2023-11-10
Payer: MEDICARE

## 2023-11-10 ENCOUNTER — HOSPITAL ENCOUNTER (OUTPATIENT)
Dept: INFUSION CENTER | Facility: HOSPITAL | Age: 68
End: 2023-11-10
Payer: MEDICARE

## 2023-11-10 VITALS
TEMPERATURE: 97.8 F | HEART RATE: 76 BPM | SYSTOLIC BLOOD PRESSURE: 143 MMHG | RESPIRATION RATE: 18 BRPM | OXYGEN SATURATION: 98 % | DIASTOLIC BLOOD PRESSURE: 70 MMHG

## 2023-11-10 VITALS
TEMPERATURE: 97 F | OXYGEN SATURATION: 98 % | RESPIRATION RATE: 20 BRPM | SYSTOLIC BLOOD PRESSURE: 155 MMHG | DIASTOLIC BLOOD PRESSURE: 80 MMHG | HEART RATE: 67 BPM

## 2023-11-10 DIAGNOSIS — M47.816 LUMBAR SPONDYLOSIS: ICD-10-CM

## 2023-11-10 DIAGNOSIS — G89.4 CHRONIC PAIN SYNDROME: ICD-10-CM

## 2023-11-10 PROCEDURE — 96360 HYDRATION IV INFUSION INIT: CPT

## 2023-11-10 PROCEDURE — 64493 INJ PARAVERT F JNT L/S 1 LEV: CPT | Performed by: ANESTHESIOLOGY

## 2023-11-10 PROCEDURE — 64494 INJ PARAVERT F JNT L/S 2 LEV: CPT | Performed by: ANESTHESIOLOGY

## 2023-11-10 PROCEDURE — 96361 HYDRATE IV INFUSION ADD-ON: CPT

## 2023-11-10 RX ORDER — LIDOCAINE HYDROCHLORIDE 10 MG/ML
5 INJECTION, SOLUTION EPIDURAL; INFILTRATION; INTRACAUDAL; PERINEURAL ONCE
Status: COMPLETED | OUTPATIENT
Start: 2023-11-10 | End: 2023-11-10

## 2023-11-10 RX ORDER — BUPIVACAINE HCL/PF 2.5 MG/ML
3 VIAL (ML) INJECTION ONCE
Status: COMPLETED | OUTPATIENT
Start: 2023-11-10 | End: 2023-11-10

## 2023-11-10 RX ADMIN — LIDOCAINE HYDROCHLORIDE 5 ML: 10 INJECTION, SOLUTION EPIDURAL; INFILTRATION; INTRACAUDAL; PERINEURAL at 13:20

## 2023-11-10 RX ADMIN — Medication 3 ML: at 13:20

## 2023-11-10 RX ADMIN — SODIUM CHLORIDE 250 ML/HR: 0.9 INJECTION, SOLUTION INTRAVENOUS at 08:12

## 2023-11-10 NOTE — PROGRESS NOTES
Pt tolerated IV hydration well without incident. Had to stop during infusion for a back injection appointment, but returned to infusion center to receive remainder of IV hydration back. Pt discharged in stable condition and is aware of next infusion appointment. AVS declined.

## 2023-11-10 NOTE — DISCHARGE INSTRUCTIONS

## 2023-11-13 ENCOUNTER — TELEPHONE (OUTPATIENT)
Dept: PAIN MEDICINE | Facility: CLINIC | Age: 68
End: 2023-11-13

## 2023-11-13 ENCOUNTER — HOSPITAL ENCOUNTER (OUTPATIENT)
Dept: INFUSION CENTER | Facility: HOSPITAL | Age: 68
Discharge: HOME | End: 2023-11-13
Payer: MEDICARE

## 2023-11-13 VITALS
TEMPERATURE: 97.6 F | DIASTOLIC BLOOD PRESSURE: 80 MMHG | HEART RATE: 84 BPM | OXYGEN SATURATION: 98 % | SYSTOLIC BLOOD PRESSURE: 128 MMHG

## 2023-11-13 PROCEDURE — 96361 HYDRATE IV INFUSION ADD-ON: CPT

## 2023-11-13 PROCEDURE — 96360 HYDRATION IV INFUSION INIT: CPT

## 2023-11-13 RX ADMIN — SODIUM CHLORIDE 250 ML/HR: 0.9 INJECTION, SOLUTION INTRAVENOUS at 08:53

## 2023-11-13 NOTE — PLAN OF CARE
Problem: Potential for Falls  Goal: Patient will remain free of falls  Description: INTERVENTIONS:  - Educate patient/family on patient safety including physical limitations  - Instruct patient to call for assistance with activity   - Keep Call bell within reach  - Keep bed low and locked with side rails adjusted as appropriate  - Keep care items and personal belongings within reach  - Initiate and maintain comfort rounds  - Consider moving patient to room near nurses station  Outcome: Progressing

## 2023-11-13 NOTE — PROGRESS NOTES
CVC dressing changed per protocol. IV hydration tolerated without incident. CVC flushed per protocol, clamped & PDC applied. AVS declined, patient aware of next appointment. Patient ambulated off unit in stable condition.

## 2023-11-14 ENCOUNTER — HOSPITAL ENCOUNTER (OUTPATIENT)
Dept: INFUSION CENTER | Facility: HOSPITAL | Age: 68
Discharge: HOME | End: 2023-11-14
Payer: MEDICARE

## 2023-11-14 VITALS
DIASTOLIC BLOOD PRESSURE: 75 MMHG | SYSTOLIC BLOOD PRESSURE: 126 MMHG | OXYGEN SATURATION: 100 % | TEMPERATURE: 97.7 F | RESPIRATION RATE: 16 BRPM

## 2023-11-14 PROCEDURE — 96360 HYDRATION IV INFUSION INIT: CPT

## 2023-11-14 PROCEDURE — 96361 HYDRATE IV INFUSION ADD-ON: CPT

## 2023-11-14 RX ADMIN — SODIUM CHLORIDE 1500 ML: 0.9 INJECTION, SOLUTION INTRAVENOUS at 08:23

## 2023-11-15 RX ORDER — SODIUM CHLORIDE 9 MG/ML
250 INJECTION, SOLUTION INTRAVENOUS ONCE
Status: COMPLETED | OUTPATIENT
Start: 2023-11-17 | End: 2023-11-17

## 2023-11-16 ENCOUNTER — HOSPITAL ENCOUNTER (OUTPATIENT)
Dept: INFUSION CENTER | Facility: HOSPITAL | Age: 68
End: 2023-11-16
Payer: MEDICARE

## 2023-11-16 VITALS
RESPIRATION RATE: 18 BRPM | HEART RATE: 73 BPM | SYSTOLIC BLOOD PRESSURE: 131 MMHG | DIASTOLIC BLOOD PRESSURE: 83 MMHG | TEMPERATURE: 96.7 F

## 2023-11-16 PROCEDURE — 96361 HYDRATE IV INFUSION ADD-ON: CPT

## 2023-11-16 PROCEDURE — 96360 HYDRATION IV INFUSION INIT: CPT

## 2023-11-16 RX ADMIN — SODIUM CHLORIDE 1500 ML: 0.9 INJECTION, SOLUTION INTRAVENOUS at 08:38

## 2023-11-17 ENCOUNTER — HOSPITAL ENCOUNTER (OUTPATIENT)
Dept: INFUSION CENTER | Facility: HOSPITAL | Age: 68
End: 2023-11-17
Payer: MEDICARE

## 2023-11-17 VITALS
HEART RATE: 83 BPM | DIASTOLIC BLOOD PRESSURE: 87 MMHG | RESPIRATION RATE: 18 BRPM | TEMPERATURE: 99 F | SYSTOLIC BLOOD PRESSURE: 151 MMHG | OXYGEN SATURATION: 98 %

## 2023-11-17 PROCEDURE — 96361 HYDRATE IV INFUSION ADD-ON: CPT

## 2023-11-17 PROCEDURE — 96360 HYDRATION IV INFUSION INIT: CPT

## 2023-11-17 RX ADMIN — SODIUM CHLORIDE 250 ML/HR: 0.9 INJECTION, SOLUTION INTRAVENOUS at 08:28

## 2023-11-17 NOTE — PROGRESS NOTES
IV hydration tolerated without incident. CVC flushed per protocol, clamped & PDC applied. AVS declined, patient aware of next appointment.  Patient ambulated off unit in stable condition

## 2023-11-21 ENCOUNTER — HOSPITAL ENCOUNTER (OUTPATIENT)
Dept: INFUSION CENTER | Facility: HOSPITAL | Age: 68
Discharge: HOME | End: 2023-11-21
Attending: INTERNAL MEDICINE
Payer: MEDICARE

## 2023-11-21 VITALS
SYSTOLIC BLOOD PRESSURE: 151 MMHG | RESPIRATION RATE: 17 BRPM | DIASTOLIC BLOOD PRESSURE: 91 MMHG | HEART RATE: 77 BPM | TEMPERATURE: 97.1 F | OXYGEN SATURATION: 99 %

## 2023-11-21 PROCEDURE — 96360 HYDRATION IV INFUSION INIT: CPT

## 2023-11-21 PROCEDURE — 96361 HYDRATE IV INFUSION ADD-ON: CPT

## 2023-11-21 RX ADMIN — SODIUM CHLORIDE 1500 ML: 0.9 INJECTION, SOLUTION INTRAVENOUS at 09:19

## 2023-11-22 ENCOUNTER — HOSPITAL ENCOUNTER (OUTPATIENT)
Dept: INFUSION CENTER | Facility: HOSPITAL | Age: 68
Discharge: HOME | End: 2023-11-22
Attending: INTERNAL MEDICINE
Payer: MEDICARE

## 2023-11-22 VITALS
HEART RATE: 88 BPM | RESPIRATION RATE: 18 BRPM | TEMPERATURE: 96.9 F | SYSTOLIC BLOOD PRESSURE: 149 MMHG | OXYGEN SATURATION: 99 % | DIASTOLIC BLOOD PRESSURE: 82 MMHG

## 2023-11-22 PROCEDURE — 96361 HYDRATE IV INFUSION ADD-ON: CPT

## 2023-11-22 PROCEDURE — 96360 HYDRATION IV INFUSION INIT: CPT

## 2023-11-22 RX ADMIN — SODIUM CHLORIDE 1500 ML: 0.9 INJECTION, SOLUTION INTRAVENOUS at 08:30

## 2023-11-24 ENCOUNTER — HOSPITAL ENCOUNTER (OUTPATIENT)
Dept: INFUSION CENTER | Facility: HOSPITAL | Age: 68
End: 2023-11-24
Attending: INTERNAL MEDICINE
Payer: MEDICARE

## 2023-11-24 ENCOUNTER — OFFICE VISIT (OUTPATIENT)
Dept: PAIN MEDICINE | Facility: CLINIC | Age: 68
End: 2023-11-24
Payer: MEDICARE

## 2023-11-24 VITALS
BODY MASS INDEX: 24.17 KG/M2 | WEIGHT: 128 LBS | HEART RATE: 83 BPM | HEIGHT: 61 IN | SYSTOLIC BLOOD PRESSURE: 130 MMHG | DIASTOLIC BLOOD PRESSURE: 77 MMHG

## 2023-11-24 VITALS — HEART RATE: 86 BPM | RESPIRATION RATE: 17 BRPM | OXYGEN SATURATION: 98 % | TEMPERATURE: 97.1 F

## 2023-11-24 DIAGNOSIS — M54.2 NECK PAIN: ICD-10-CM

## 2023-11-24 DIAGNOSIS — F11.20 UNCOMPLICATED OPIOID DEPENDENCE (HCC): ICD-10-CM

## 2023-11-24 DIAGNOSIS — G89.4 CHRONIC PAIN SYNDROME: Primary | ICD-10-CM

## 2023-11-24 DIAGNOSIS — G03.9 ARACHNOIDITIS: ICD-10-CM

## 2023-11-24 DIAGNOSIS — M54.41 CHRONIC BILATERAL LOW BACK PAIN WITH BILATERAL SCIATICA: ICD-10-CM

## 2023-11-24 DIAGNOSIS — M47.816 LUMBAR SPONDYLOSIS: ICD-10-CM

## 2023-11-24 DIAGNOSIS — Z98.1 HISTORY OF LUMBAR FUSION: ICD-10-CM

## 2023-11-24 DIAGNOSIS — M79.18 MYOFASCIAL PAIN SYNDROME: ICD-10-CM

## 2023-11-24 DIAGNOSIS — M54.42 CHRONIC BILATERAL LOW BACK PAIN WITH BILATERAL SCIATICA: ICD-10-CM

## 2023-11-24 DIAGNOSIS — M54.16 LUMBAR RADICULOPATHY: ICD-10-CM

## 2023-11-24 DIAGNOSIS — Z79.891 LONG-TERM CURRENT USE OF OPIATE ANALGESIC: ICD-10-CM

## 2023-11-24 DIAGNOSIS — G89.29 CHRONIC BILATERAL LOW BACK PAIN WITH BILATERAL SCIATICA: ICD-10-CM

## 2023-11-24 PROCEDURE — 99214 OFFICE O/P EST MOD 30 MIN: CPT | Performed by: NURSE PRACTITIONER

## 2023-11-24 PROCEDURE — 96361 HYDRATE IV INFUSION ADD-ON: CPT

## 2023-11-24 PROCEDURE — 96360 HYDRATION IV INFUSION INIT: CPT

## 2023-11-24 RX ORDER — HYDROCODONE BITARTRATE AND ACETAMINOPHEN 5; 325 MG/1; MG/1
1 TABLET ORAL 2 TIMES DAILY PRN
Qty: 60 TABLET | Refills: 0 | Status: SHIPPED | OUTPATIENT
Start: 2023-11-24

## 2023-11-24 RX ORDER — CYCLOBENZAPRINE HCL 5 MG
5 TABLET ORAL 3 TIMES DAILY PRN
Qty: 90 TABLET | Refills: 2 | Status: SHIPPED | OUTPATIENT
Start: 2023-11-24

## 2023-11-24 RX ADMIN — SODIUM CHLORIDE 1500 ML: 0.9 INJECTION, SOLUTION INTRAVENOUS at 08:38

## 2023-11-24 NOTE — PATIENT INSTRUCTIONS
Opioid Safety   WHAT YOU NEED TO KNOW:   An opioid medicine is used to treat pain. Examples are oxycodone, morphine, fentanyl, or codeine. Pain control and management may help you rest, heal, and return to your daily activities. You and your family will receive information about how to manage your pain at home. The instructions will include what to do if you have side effects as your pain is managed. You will get information on how to handle opioid medicine safely. You will also get suggestions on how to control pain without opioids. It is important to follow all instructions so your pain is managed effectively. DISCHARGE INSTRUCTIONS:   Call your local emergency number (911 in the ), or have someone else call if:   You have a seizure. You cannot be woken. You have trouble staying awake and your breathing is slow or shallow. Your speech is slurred, or you are confused. You are dizzy or stumble when you walk. Call your doctor, or have someone close to you call if:   You are extremely drowsy, or you have trouble staying awake or speaking. You have pale or clammy skin. You have blue fingernails or lips. Your heartbeat is slower than normal.    You cannot stop vomiting. You have questions or concerns about your condition or care. Use opioids safely:   Take prescribed opioids exactly as directed. Opioids come with directions based on the kind and how it is given. Talk to your healthcare provider or a pharmacist if you have any questions. Do not take more than the recommended amount. Too much can cause a life-threatening overdose. Do not continue to take it after your pain stops. You may develop tolerance. This means you keep needing higher doses to get the same effect. You may also develop opioid use disorder. This means you are not able to control your opioid use. Do not give opioids to others or take opioids that belong to someone else.   The kind or amount one person takes may not be right for another. The person you share them with may also be taking medicines that do not mix with opioids. The person may drink alcohol or use other drugs that can cause life-threatening problems when mixed with opioids. Do not mix opioids with other medicines or alcohol. The combination can cause an overdose, or cause you to stop breathing. Alcohol, sleeping pills, and medicines such as antihistamines can make you sleepy. A combination with opioids can lead to a coma. Do not drive or operate heavy machinery after you use an opioid. You may feel drowsy or have trouble concentrating. You can injure yourself or others if you drive or use heavy machinery when you are not alert. Your provider or pharmacist can tell you how long to wait after a dose before you do these activities. Talk to your healthcare provider if you have any side effects. Side effects include nausea, sleepiness, itching, and trouble thinking clearly. Your provider may need to make changes to the kind or amount of opioid you are taking. Your provider can also help you find ways to prevent or relieve side effects. Manage constipation:  Constipation is the most common side effect of opioid medicine. Constipation is when you have hard, dry bowel movements, or you go longer than usual between bowel movements. Tell your healthcare provider about all changes in your bowel movements while you are taking opioids. Your provider may recommend laxative medicine to help you have a bowel movement. Your provider may also change the kind of opioid you are taking, or change when you take it. The following are more ways you can prevent or relieve constipation:  Drink liquids as directed. You may need to drink extra liquids to help soften and move your bowels. Ask how much liquid to drink each day and which liquids are best for you. Eat high-fiber foods. This may help decrease constipation by adding bulk to your bowel movements.  High-fiber foods include fruits, vegetables, whole-grain breads and cereals, and beans. Your healthcare provider or dietitian can help you create a high-fiber meal plan. Your provider may also recommend a fiber supplement if you cannot get enough fiber from food. Exercise regularly. Regular physical activity can help stimulate your intestines. Walking is a good exercise to prevent or relieve constipation. Ask which exercises are best for you. Schedule a time each day to have a bowel movement. This may help train your body to have regular bowel movements. Bend forward while you are on the toilet to help move the bowel movement out. Sit on the toilet for at least 10 minutes, even if you do not have a bowel movement. Store opioids safely:   Store opioids where others cannot easily get them. Keep them in a locked cabinet or secure area. Do not  keep them in a purse or other bag you carry with you. A person may be looking for something else and find the opioids. Make sure opioids are stored out of the reach of children. A child can easily overdose on opioids. Opioids may look like candy to a small child. The best way to dispose of opioids: The laws vary by country and area. In the [facility] [address] [facility], the best way is to return the opioids through a take-back program. This program is offered by the [employer] ([employer]). The following are options for using the program:  Take the opioids to a [employer] collection site. The site is often a law enforcement center. Call your local law enforcement center for scheduled take-back days in your area. You will be given information on where to go if the collection site is in a different location. Take the opioids to an approved pharmacy or hospital.  A pharmacy or hospital may be set up as a collection site. You will need to ask if it is a [employer] collection site if you were not directed there.  A pharmacy or doctor's office may not be able to take back opioids unless it is a [employer] site. Use a mail-back system. This means you are given containers to put the opioids into. You will then mail them in the containers. Use a take-back drop box. This is a place to leave the opioids at any time. People and animals will not be able to get into the box. Your local law enforcement agency can tell you where to find a drop box in your area. Other safe ways to dispose of opioids: The medicine may come with disposal instructions. The instructions may vary depending on the brand of medicine you are using. Instructions may come in a Medication Guide, but not every medicine has one. You may instead get instructions from your pharmacy or doctor. Follow instructions carefully. The following are general guidelines to follow:  Find out if you can flush the opioid. Some opioids can be flushed down the toilet or poured into the sink. You will need to contact authorities in your area to see if this is an option for you. The FDA also offers a list of medicines that are safe to flush down the toilet. You can check the list if you cannot get the information for your local area. Ask your waste management company about rules for putting opioids in the trash. The company will be able to give you specific directions. Scratch out personal information on the original medicine label so it cannot be read. Then put it in the trash. Do not label the trash or put any information on it about the opioids. It should look like regular household trash so no one is tempted to look for the opioids. Keep the trash out of the reach of children and animals. Always make sure trash is secure. Talk to officials if you live in a facility. If you live in a nursing home or assisted living center, talk to an official. The person will know the rules for your area. Other ways to manage pain:   Ask your healthcare provider about non-opioid medicines to control pain.   Some medicines may even work better than opioids, depending on the cause of your pain. Nonprescription medicines include NSAIDs (such as ibuprofen) and acetaminophen. Prescription medicines include muscle relaxers, antidepressants, and steroids. Pain may be managed without any medicines. Some ways to relieve pain include massage, aromatherapy, or meditation. Physical or occupational therapy may also help. Follow up with your doctor or pain specialist as directed: You may need to have your dose adjusted. Your doctor or pain specialist can also help you find ways to manage pain without opioids. Write down your questions so you remember to ask them during your visits. For more information:   Drug Enforcement Administration  [facility] , [address] [facility]  Phone: 8- [phone] - [phone]  Web Address: [employer]. [place].gov/drug_disposal/    [address] [place] [address] and Drug Administration  [address] , [address]  Phone: 2- 777 - [phone]  Web Address: http://[employer]/  © Copyright Cathy Tilley 2023 Information is for End User's use only and may not be sold, redistributed or otherwise used for commercial purposes. The above information is an  only. It is not intended as medical advice for individual conditions or treatments. Talk to your doctor, nurse or pharmacist before following any medical regimen to see if it is safe and effective for you.

## 2023-11-24 NOTE — PROGRESS NOTES
Assessment:  1. Chronic pain syndrome    2. Chronic bilateral low back pain with bilateral sciatica    3. History of lumbar fusion    4. Lumbar radiculopathy    5. Lumbar spondylosis    6. Neck pain    7. Arachnoiditis    8. Myofascial pain syndrome        Plan:  While the patient was in the office today, I did have a thorough conversation regarding their chronic pain syndrome, medication management, and treatment plan options. Patient is being seen for a follow-up visit. She underwent bilateral L2-3 and L3-4 medial branch blocks on 11/10/2023 with good results. She is scheduled for medial branch block #2 in the near future. She was last seen here on 10/27/2023 for increased neck pain. I provided her with cervical exercises to do at home. Also, provided her with a steroid taper. She states that her neck pain was doing well. Yesterday was Thanksgiving and she cooked a large meal and lifted a heavy turkey which seems to have slightly aggravated her neck pain. Will not plan anything interventional at this time since her pain was stable until yesterday. If pain continues to be exacerbated, she will let us know. Renewed hydrocodone 5/325 twice daily if needed for pain. The patient's opioid scripts were sent to their pharmacy electronically and was given a 2 month supply of prescriptions with a Do Not Fill date(s) of 11/24/2023, 12/22/2023. Continue Flexeril 5 mg 3 times daily if needed for spasms. [place] Prescription Drug Monitoring Program report was reviewed and was appropriate     A urine drug screen was collected at today's office visit as part of our medication management protocol. The point of care testing results were appropriate for what was being prescribed. The specimen will be sent for confirmatory testing.  The drug screen is medically necessary because the patient is either dependent on opioid medication or is being considered for opioid medication therapy and the results could impact ongoing or future treatment. The drug screen is to evaluate for the presences or absence of prescribed, non-prescribed, and/or illicit drugs/substances. There are risks associated with opioid medications, including dependence, addiction and tolerance. The patient understands and agrees to use these medications only as prescribed. Potential side effects of the medications include, but are not limited to, constipation, drowsiness, addiction, impaired judgment and risk of fatal overdose if not taken as prescribed. The patient was warned against driving while taking sedation medications. Sharing medications is a felony. At this point in time, the patient is showing no signs of addiction, abuse, diversion or suicidal ideation. The patient will follow-up in 8 weeks for medication prescription refill and reevaluation. The patient was advised to contact the office should their symptoms worsen in the interim. The patient was agreeable and verbalized an understanding. History of Present Illness: The patient is a 70 y.o. female last seen on 10/27/2023 who presents for a follow up office visit in regards to chronic pain secondary to chronic pain syndrome, chronic low back pain, history of lumbar fusion, lumbar radiculopathy, neck pain. The patient currently reports complaints of neck pain, mid back pain, low back pain. Current pain level is an 8/10. Quality pain is described as sharp, cramping, shooting, numb, pins-and-needles. Current pain medications includes: Codon 5/325 twice daily if needed for pain, Flexeril 5 mg 3 times daily if needed for spasms. The patient reports that this regimen is providing 25-30% pain relief. The patient is reporting no side effects from this pain medication regimen.     Pain Contract Signed: 5/31/23  Last Urine Drug Screen: 6/2/23    I have personally reviewed and/or updated the patient's past medical history, past surgical history, family history, social history, current medications, allergies, and vital signs today. Review of Systems:    Review of Systems   Constitutional:  Negative for unexpected weight change. HENT:  Negative for hearing loss. Eyes:  Negative for visual disturbance. Respiratory:  Positive for shortness of breath. Cardiovascular:  Positive for chest pain. Negative for leg swelling. Gastrointestinal:  Positive for constipation and nausea. Endocrine: Negative for polyuria. Genitourinary:  Negative for difficulty urinating. Musculoskeletal:  Positive for gait problem. Negative for joint swelling and myalgias. Decreased range of motion  Joint stiffness  Swelling- face/ankles, feet  Pain in extremity- arms & legs   Skin:  Negative for rash. Neurological:  Positive for dizziness. Negative for weakness and headaches. Memory loss   Psychiatric/Behavioral:  Negative for decreased concentration. All other systems reviewed and are negative.         Past Medical History:   Diagnosis Date    Allergic rhinitis     Anxiety     Asthma     Back pain     Cardiac disease     Cardiopathy     EF 45%    Cough     Diverticulitis     Factor V Leiden ([address])     Fibromyalgia     GERD (gastroesophageal reflux disease)     Hashimoto's thyroiditis     Hx of degenerative disc disease     Hypotension     pots - postural orthostatic hypotension    Interstitial cystitis     Irregular heart beat     LBBB    Irritable bowel syndrome     Migraines     Mitral valve disease     "thickening"    Myocardial infarction [facility])     possible but not sure when    Neuropathy     bilateral legs    Osteoporosis     Postural orthostatic tachycardia syndrome     must drink a lot of water and salt    Pott's disease     Rheumatic fever 1967    Scoliosis     Sepsis ([address]) 2021    associated with PICC line    Sjogren's syndrome ([address])     TMJ (dislocation of temporomandibular joint)        Past Surgical History:   Procedure Laterality Date    ABLATION MICROWAVE Left lumbar area    BACK SURGERY  10/1998     SECTION  1992    CHOLECYSTECTOMY  2016    COLONOSCOPY  2016    DILATION AND CURETTAGE OF UTERUS  1996    EGD      FOOT SURGERY  1968    removal of bone and neuroma    HYSTERECTOMY  1997    age 54  PRICE ooph    IR OTHER  2022    IR OTHER  2022    IR OTHER  2023    IR PICC PLACEMENT SINGLE LUMEN  10/02/2020    IR PICC PLACEMENT SINGLE LUMEN  2021    IR PICC REPOSITION  2021    IR TUNNELED CENTRAL LINE PLACEMENT  2022    LAPAROSCOPY  1996    endometriosis    MOUTH BIOPSY      lip    DC EGD TRANSORAL BIOPSY SINGLE/MULTIPLE N/A 2019    Procedure: ESOPHAGOGASTRODUODENOSCOPY (EGD) with multiple bx and dilation;  Surgeon: Gabi Ortiz MD;  Location: [address] [place] [address] GI LAB; Service: Gastroenterology    DC SURGICAL ARTHROSCOPY SHOULDER W/ROTATOR CUFF RPR Right 2022    Procedure: SHOULDER ARTHROSCOPIC ROTATOR CUFF REPAIR Biceps Tenodisis;   Surgeon: Christiana Moralez MD;  Location: [facility] MAIN OR;  Service: Orthopedics    TUNNELED VENOUS CATHETER PLACEMENT         Family History   Problem Relation Age of Onset    Cancer Mother         urinary bladder     No Known Problems Father     Thyroid cancer Sister     Heart attack Sister     No Known Problems Sister     No Known Problems Sister     No Known Problems Sister     No Known Problems Sister     No Known Problems Daughter     No Known Problems Daughter     Colon cancer Maternal Grandfather     Breast cancer Maternal Aunt         over 48 yrs old     Endometrial cancer Maternal Aunt     Multiple myeloma Maternal Aunt     Hypertension Paternal Aunt        Social History     Occupational History    Not on file   Tobacco Use    Smoking status: Never    Smokeless tobacco: Never   Vaping Use    Vaping Use: Never used   Substance and Sexual Activity    Alcohol use: Never    Drug use: No    Sexual activity: Not on file         Current Outpatient Medications:     Alum Hydroxide-Mag Trisilicate (Gaviscon) 00-23.4 MG CHEW, Chew 1 tablet 4 (four) times a day as needed With meals and as needed, Disp: , Rfl:     azelastine (ASTELIN) 0.1 % nasal spray, 2 sprays into each nostril 2 (two) times a day Use in each nostril as directed, Disp: , Rfl:     beclomethasone (QVAR) 40 MCG/ACT inhaler, Inhale 1 puff daily as needed (only when unable to nebulize pulmicort) Rinse mouth after use., Disp: , Rfl:     budesonide (PULMICORT) 0.5 mg/2 mL nebulizer solution, Take 0.5 mg by nebulization 2 (two) times a day Rinse mouth after use., Disp: , Rfl:     cholecalciferol (VITAMIN D3) 1,000 units tablet, Take 4,000 Units by mouth daily, Disp: , Rfl:     cyclobenzaprine (FLEXERIL) 5 mg tablet, Take 1 tablet (5 mg total) by mouth 3 (three) times a day as needed for muscle spasms, Disp: 90 tablet, Rfl: 2    docusate sodium (COLACE) 100 mg capsule, Take 100 mg by mouth daily as needed for constipation, Disp: , Rfl:     erythromycin (ILOTYCIN) ophthalmic ointment, Apply 1 application to eye daily at bedtime, Disp: , Rfl:     famotidine (PEPCID) 40 MG tablet, Twice a day, Disp: , Rfl:     fexofenadine (ALLEGRA) 180 MG tablet, Take 180 mg by mouth 2 (two) times a day , Disp: , Rfl:     fluticasone (FLONASE) 50 mcg/act nasal spray, 2 sprays into each nostril daily , Disp: , Rfl:     Galcanezumab-gnlm (Emgality) 120 MG/ML SOAJ, Inject under the skin every 30 (thirty) days , Disp: , Rfl:     HYDROcodone-acetaminophen (Norco) 5-325 mg per tablet, Take 1 tablet by mouth 2 (two) times a day as needed for pain for up to 60 doses Do not fill until 11/24/2023 Max Daily Amount: 2 tablets, Disp: 60 tablet, Rfl: 0    HYDROcodone-acetaminophen (NORCO) 5-325 mg per tablet, Take 1 tablet by mouth 2 (two) times a day as needed for pain Do not fill until 12/22/2023 Max Daily Amount: 2 tablets, Disp: 60 tablet, Rfl: 0    ipratropium (ATROVENT) 0.02 % nebulizer solution, Take 0.5 mg by nebulization every 6 (six) hours as needed for wheezing or shortness of breath, Disp: , Rfl:     ipratropium (ATROVENT) 0.06 % nasal spray, PLEASE SEE ATTACHED FOR DETAILED DIRECTIONS, Disp: , Rfl:     ivabradine HCl (Corlanor) 5 MG tablet, 7.5 mg 2 (two) times a day, Disp: , Rfl:     levalbuterol (XOPENEX HFA) 45 mcg/act inhaler, Inhale 2 puffs every 4 (four) hours as needed (when unable to nebulize), Disp: , Rfl:     levalbuterol (XOPENEX) 1.25 mg/3 mL nebulizer solution, Take 1.25 mg by nebulization every 6 (six) hours as needed , Disp: , Rfl: 2    Magnesium 400 MG CAPS, Take 1 capsule by mouth 2 (two) times a day , Disp: , Rfl:     MULTIPLE VITAMINS-CALCIUM PO, Take 1 capsule by mouth every morning , Disp: , Rfl:     NON FORMULARY, Take 1 mg by mouth 3 (three) times a day Ketotifen 1 mg compounded capsule, Disp: , Rfl:     omega-3-acid ethyl esters (LOVAZA) 1 g capsule, Take 2 g by mouth 2 (two) times a day 1600mg daily, Disp: , Rfl:     polyethylene glycol (MIRALAX) 17 g packet, Take 17 g by mouth daily as needed , Disp: , Rfl:     Probiotic Product (PROBIOTIC DAILY PO), Take 1 tablet by mouth daily At lunch, Disp: , Rfl:     Qvar RediHaler 40 MCG/ACT inhaler, , Disp: , Rfl:     sodium chloride, Inject 1,500 mL into a catheter in a vein, Disp: , Rfl:     sucralfate (CARAFATE) 1 g/10 mL suspension, Take 1 g by mouth 2 (two) times a day, Disp: , Rfl:     Thyroid, Porcine, POWD, Use 32 mg daily, Disp: , Rfl:     Ubrogepant (UBRELVY) 100 MG tablet, Take 100 mg by mouth Take 1 tablet (100 mg) one time as needed for migraine. May repeat one additional tablet (100 mg) at least two hours after the first dose. Do not use more than two doses per day, or for more than eight days per month., Disp: , Rfl:     verapamil (CALAN) 40 mg tablet, , Disp: , Rfl:     methylPREDNISolone 4 MG tablet therapy pack, Use as directed on package, Disp: 1 each, Rfl: 0  No current facility-administered medications for this visit.     Facility-Administered Medications Ordered in Other Visits: sodium chloride 0.9 % bolus 1,500 mL, 1,500 mL, Intravenous, Once, Evita Delatorre MD    Fredy Leaks ON 11/27/2023] sodium chloride 0.9 % bolus 1,500 mL, 1,500 mL, Intravenous, Once, Evita Delatorre MD    Allergies   Allergen Reactions    Imipramine Confusion, Fatigue, Irritability, Palpitations, Shortness Of Breath, Tachycardia and Visual Disturbance    Melatonin Shortness Of Breath    Mirtazapine Anxiety, Dizziness, GI Intolerance, Nausea Only, Palpitations and Shortness Of Breath    Nexium [Esomeprazole] Shortness Of Breath    Nsaids Shortness Of Breath    Singulair [Montelukast] Shortness Of Breath and Cough    Zomig [Zolmitriptan] Shortness Of Breath    Dexilant [Dexlansoprazole] Nausea Only and Vomiting    Albuterol     Ambien [Zolpidem]     Amitriptyline Drowsiness    Aspirin     Bactrim [Sulfamethoxazole-Trimethoprim] Hives    Banana - Food Allergy Dermatitis    Ceftin [Cefuroxime]     Celebrex [Celecoxib]     Ciprofloxacin     Cranberry-C [Ascorbate - Food Allergy] GI Intolerance    Demerol [Meperidine]     Epinephrine Dizziness    Ergotamine Nausea Only and Headache    Keflex [Cephalexin]     Klonopin [Clonazepam] Nausea Only and Dizziness    Latex Hives    Levaquin [Levofloxacin]     Lexapro [Escitalopram]     Lyrica [Pregabalin] Fatigue    Macrodantin [Nitrofurantoin]     Morphine Nausea Only    Movantik [Naloxegol] Nausea Only    Mushroom Extract Complex - Food Allergy      Eye itchy, asthma  attack    Naprosyn [Naproxen]     Neurontin [Gabapentin] Dizziness    Pineapple - Food Allergy GI Intolerance    Plecanatide Abdominal Pain, Diarrhea and GI Intolerance    Prolia [Denosumab]     Prozac [Fluoxetine]     Serevent [Salmeterol] Dizziness    Sudafed [Pseudoephedrine] Hives    Sulfa Antibiotics     Tomato - Food Allergy GI Intolerance    Trazodone     Ultram [Tramadol] Nausea Only, Dizziness and Headache    Vilazodone Dizziness, GI Intolerance and Headache    Vilazodone Hcl Abdominal Pain, Dizziness, GI Intolerance, Headache and Other (See Comments)    Vioxx [Rofecoxib]     Vortioxetine Drowsiness, Fatigue, GI Intolerance and Irritability    Zithromax [Azithromycin] Hives    Zoloft [Sertraline]     Zantac [Ranitidine] Rash and Dizziness       Physical Exam:    /77   Pulse 83   Ht 5' 1" (1.549 m)   Wt 58.1 kg (128 lb)   BMI 24.19 kg/m²     Constitutional:normal, well developed, well nourished, alert, in no distress and non-toxic and no overt pain behavior. Eyes:anicteric  HEENT:grossly intact  Neck:supple, symmetric, trachea midline and no masses   Pulmonary:even and unlabored  Cardiovascular:No edema or pitting edema present  Skin:Normal without rashes or lesions and well hydrated  Psychiatric:Mood and affect appropriate  Neurologic:Cranial Nerves II-XII grossly intact  Musculoskeletal:normal      Imaging  No orders to display         No orders of the defined types were placed in this encounter.

## 2023-11-27 ENCOUNTER — HOSPITAL ENCOUNTER (OUTPATIENT)
Dept: INFUSION CENTER | Facility: HOSPITAL | Age: 68
Discharge: HOME | End: 2023-11-27
Payer: MEDICARE

## 2023-11-27 VITALS
DIASTOLIC BLOOD PRESSURE: 82 MMHG | SYSTOLIC BLOOD PRESSURE: 159 MMHG | HEART RATE: 80 BPM | TEMPERATURE: 96.5 F | RESPIRATION RATE: 16 BRPM

## 2023-11-27 LAB
6MAM UR QL CFM: NEGATIVE NG/ML
7AMINOCLONAZEPAM UR QL CFM: NEGATIVE NG/ML
A-OH ALPRAZ UR QL CFM: NEGATIVE NG/ML
ACCEPTABLE CREAT UR QL: ABNORMAL MG/DL
ACCEPTIBLE SP GR UR QL: ABNORMAL
AMPHET UR QL CFM: NEGATIVE NG/ML
AMPHET UR QL CFM: NEGATIVE NG/ML
BUPRENORPHINE UR QL CFM: NEGATIVE NG/ML
BUTALBITAL UR QL CFM: NEGATIVE NG/ML
BZE UR QL CFM: NEGATIVE NG/ML
CODEINE UR QL CFM: NEGATIVE NG/ML
DESIPRAMINE UR QL CFM: NEGATIVE NG/ML
EDDP UR QL CFM: NEGATIVE NG/ML
ETHYL GLUCURONIDE UR QL CFM: NEGATIVE NG/ML
ETHYL SULFATE UR QL SCN: NEGATIVE NG/ML
FENTANYL UR QL CFM: NEGATIVE NG/ML
GLIADIN IGG SER IA-ACNC: NEGATIVE NG/ML
GLUCOSE 30M P 50 G LAC PO SERPL-MCNC: NEGATIVE NG/ML
HYDROCODONE UR QL CFM: NORMAL NG/ML
HYDROCODONE UR QL CFM: NORMAL NG/ML
HYDROMORPHONE UR QL CFM: NEGATIVE NG/ML
LORAZEPAM UR QL CFM: NEGATIVE NG/ML
MDMA UR QL CFM: NEGATIVE NG/ML
ME-PHENIDATE UR QL CFM: NEGATIVE NG/ML
MEPERIDINE UR QL CFM: NEGATIVE NG/ML
METHADONE UR QL CFM: NEGATIVE NG/ML
METHAMPHET UR QL CFM: NEGATIVE NG/ML
MORPHINE UR QL CFM: NEGATIVE NG/ML
MORPHINE UR QL CFM: NEGATIVE NG/ML
NITRITE UR QL: NORMAL UG/ML
NORBUPRENORPHINE UR QL CFM: NEGATIVE NG/ML
NORDIAZEPAM UR QL CFM: NEGATIVE NG/ML
NORFENTANYL UR QL CFM: NEGATIVE NG/ML
NORHYDROCODONE UR QL CFM: NEGATIVE NG/ML
NORHYDROCODONE UR QL CFM: NEGATIVE NG/ML
NORMEPERIDINE UR QL CFM: NEGATIVE NG/ML
NOROXYCODONE UR QL CFM: NEGATIVE NG/ML
OLANZAPINE QUANTIFICATION: NEGATIVE NG/ML
OPC-3373 QUANTIFICATION: NEGATIVE
OXAZEPAM UR QL CFM: NEGATIVE NG/ML
OXYCODONE UR QL CFM: NEGATIVE NG/ML
OXYMORPHONE UR QL CFM: NEGATIVE NG/ML
OXYMORPHONE UR QL CFM: NEGATIVE NG/ML
PARA-FLUOROFENTANYL QUANTIFICATION: NORMAL NG/ML
PCP UR QL CFM: NEGATIVE NG/ML
PHENOBARB UR QL CFM: NEGATIVE NG/ML
RESULT ALL_PRESCRIBED MEDS AND SPECIAL INSTRUCTIONS: NORMAL
SECOBARBITAL UR QL CFM: NEGATIVE NG/ML
SL AMB 4-ANPP QUANTIFICATION: NORMAL NG/ML
SL AMB 7-OH-MITRAGYNINE (KRATOM ALKALOID) QUANTIFICATION: NEGATIVE NG/ML
SL AMB ACETYL FENTANYL QUANTIFICATION: NORMAL NG/ML
SL AMB ACETYL NORFENTANYL QUANTIFICATION: NORMAL NG/ML
SL AMB ACRYL FENTANYL QUANTIFICATION: NORMAL NG/ML
SL AMB CARFENTANIL QUANTIFICATION: NORMAL NG/ML
SL AMB CLOZAPINE QUANTIFICATION: NEGATIVE NG/ML
SL AMB CTHC (MARIJUANA METABOLITE) QUANTIFICATION: NEGATIVE NG/ML
SL AMB DEXTRORPHAN (DEXTROMETHORPHAN METABOLITE) QUANT: ABNORMAL NG/ML
SL AMB HALOPERIDOL  QUANTIFICATION: NEGATIVE NG/ML
SL AMB HALOPERIDOL METABOLITE QUANTIFICATION: NEGATIVE NG/ML
SL AMB HYDROXYRISPERIDONE QUANTIFICATION: NEGATIVE NG/ML
SL AMB N-DESMETHYL-TRAMADOL QUANTIFICATION: NEGATIVE NG/ML
SL AMB N-DESMETHYLCLOZAPINE QUANTIFICATION: NEGATIVE NG/ML
SL AMB NORQUETIAPINE QUANTIFICATION: NEGATIVE NG/ML
SL AMB PHENTERMINE QUANTIFICATION: NEGATIVE NG/ML
SL AMB PREGABALIN QUANTIFICATION: NEGATIVE
SL AMB QUETIAPINE QUANTIFICATION: NEGATIVE NG/ML
SL AMB RISPERIDONE QUANTIFICATION: NEGATIVE NG/ML
SL AMB RITALINIC ACID QUANTIFICATION: NEGATIVE NG/ML
SPECIMEN PH ACCEPTABLE UR: NORMAL
TAPENTADOL UR QL CFM: NEGATIVE NG/ML
TEMAZEPAM UR QL CFM: NEGATIVE NG/ML
TEMAZEPAM UR QL CFM: NEGATIVE NG/ML
TRAMADOL UR QL CFM: NEGATIVE NG/ML
URATE/CREAT 24H UR: NEGATIVE NG/ML

## 2023-11-27 PROCEDURE — 96361 HYDRATE IV INFUSION ADD-ON: CPT

## 2023-11-27 PROCEDURE — 96360 HYDRATION IV INFUSION INIT: CPT

## 2023-11-27 RX ORDER — SODIUM CHLORIDE 9 MG/ML
250 INJECTION, SOLUTION INTRAVENOUS ONCE
Status: COMPLETED | OUTPATIENT
Start: 2023-11-28 | End: 2023-11-28

## 2023-11-27 RX ADMIN — SODIUM CHLORIDE 1500 ML: 0.9 INJECTION, SOLUTION INTRAVENOUS at 08:52

## 2023-11-27 NOTE — PROGRESS NOTES
Right chest CVC dressing done today per patient's request.  Sterile protocol followed. Patient tolerated IV hydration without issues. AVS declined. Patient has Mychart. Patient ambulated off unit without incident. All personal belongings taken with patient.

## 2023-11-28 ENCOUNTER — HOSPITAL ENCOUNTER (OUTPATIENT)
Dept: INFUSION CENTER | Facility: HOSPITAL | Age: 68
Discharge: HOME | End: 2023-11-28
Payer: MEDICARE

## 2023-11-28 VITALS
SYSTOLIC BLOOD PRESSURE: 136 MMHG | RESPIRATION RATE: 18 BRPM | HEART RATE: 72 BPM | DIASTOLIC BLOOD PRESSURE: 80 MMHG | TEMPERATURE: 96.9 F | OXYGEN SATURATION: 100 %

## 2023-11-28 PROCEDURE — 96360 HYDRATION IV INFUSION INIT: CPT

## 2023-11-28 PROCEDURE — 96361 HYDRATE IV INFUSION ADD-ON: CPT

## 2023-11-28 RX ORDER — SODIUM CHLORIDE 9 MG/ML
250 INJECTION, SOLUTION INTRAVENOUS ONCE
Status: COMPLETED | OUTPATIENT
Start: 2023-11-30 | End: 2023-11-30

## 2023-11-28 RX ADMIN — SODIUM CHLORIDE 250 ML/HR: 0.9 INJECTION, SOLUTION INTRAVENOUS at 08:40

## 2023-11-29 RX ORDER — SODIUM CHLORIDE 9 MG/ML
250 INJECTION, SOLUTION INTRAVENOUS ONCE
Status: COMPLETED | OUTPATIENT
Start: 2023-12-01 | End: 2023-12-01

## 2023-11-29 RX ORDER — SODIUM CHLORIDE 9 MG/ML
250 INJECTION, SOLUTION INTRAVENOUS ONCE
Status: COMPLETED | OUTPATIENT
Start: 2023-12-04 | End: 2023-12-04

## 2023-11-30 ENCOUNTER — HOSPITAL ENCOUNTER (OUTPATIENT)
Dept: INFUSION CENTER | Facility: HOSPITAL | Age: 68
End: 2023-11-30
Payer: MEDICARE

## 2023-11-30 VITALS
DIASTOLIC BLOOD PRESSURE: 78 MMHG | TEMPERATURE: 97.1 F | SYSTOLIC BLOOD PRESSURE: 142 MMHG | OXYGEN SATURATION: 99 % | HEART RATE: 70 BPM | RESPIRATION RATE: 17 BRPM

## 2023-11-30 PROCEDURE — 96360 HYDRATION IV INFUSION INIT: CPT

## 2023-11-30 PROCEDURE — 96361 HYDRATE IV INFUSION ADD-ON: CPT

## 2023-11-30 RX ORDER — SODIUM CHLORIDE 9 MG/ML
250 INJECTION, SOLUTION INTRAVENOUS ONCE
Status: COMPLETED | OUTPATIENT
Start: 2023-12-05 | End: 2023-12-05

## 2023-11-30 RX ADMIN — SODIUM CHLORIDE 250 ML/HR: 0.9 INJECTION, SOLUTION INTRAVENOUS at 08:45

## 2023-12-01 ENCOUNTER — HOSPITAL ENCOUNTER (OUTPATIENT)
Dept: INFUSION CENTER | Facility: HOSPITAL | Age: 68
End: 2023-12-01
Payer: MEDICARE

## 2023-12-01 VITALS
SYSTOLIC BLOOD PRESSURE: 154 MMHG | DIASTOLIC BLOOD PRESSURE: 82 MMHG | TEMPERATURE: 96.3 F | HEART RATE: 77 BPM | RESPIRATION RATE: 18 BRPM

## 2023-12-01 PROCEDURE — 96361 HYDRATE IV INFUSION ADD-ON: CPT

## 2023-12-01 PROCEDURE — 96360 HYDRATION IV INFUSION INIT: CPT

## 2023-12-01 RX ORDER — CROMOLYN SODIUM 100 MG/5ML
100 SOLUTION, CONCENTRATE ORAL
COMMUNITY

## 2023-12-01 RX ADMIN — SODIUM CHLORIDE 250 ML/HR: 0.9 INJECTION, SOLUTION INTRAVENOUS at 08:34

## 2023-12-04 ENCOUNTER — HOSPITAL ENCOUNTER (OUTPATIENT)
Dept: INFUSION CENTER | Facility: HOSPITAL | Age: 68
Discharge: HOME | End: 2023-12-04
Attending: INTERNAL MEDICINE
Payer: MEDICARE

## 2023-12-04 VITALS — HEART RATE: 80 BPM | SYSTOLIC BLOOD PRESSURE: 132 MMHG | DIASTOLIC BLOOD PRESSURE: 79 MMHG | TEMPERATURE: 97 F

## 2023-12-04 PROCEDURE — 96360 HYDRATION IV INFUSION INIT: CPT

## 2023-12-04 PROCEDURE — 96361 HYDRATE IV INFUSION ADD-ON: CPT

## 2023-12-04 RX ADMIN — SODIUM CHLORIDE 250 ML/HR: 0.9 INJECTION, SOLUTION INTRAVENOUS at 09:22

## 2023-12-04 NOTE — PROGRESS NOTES
IV hydration tolerated without incident. AVS declined. Pt aware of appt scheduled for tomorrow. Discharged in stable condition.

## 2023-12-05 ENCOUNTER — HOSPITAL ENCOUNTER (OUTPATIENT)
Dept: INTERVENTIONAL RADIOLOGY/VASCULAR | Facility: HOSPITAL | Age: 68
Discharge: HOME | End: 2023-12-05
Attending: RADIOLOGY

## 2023-12-05 ENCOUNTER — HOSPITAL ENCOUNTER (OUTPATIENT)
Dept: INFUSION CENTER | Facility: HOSPITAL | Age: 68
Discharge: HOME | End: 2023-12-05
Attending: INTERNAL MEDICINE
Payer: MEDICARE

## 2023-12-05 ENCOUNTER — TELEPHONE (OUTPATIENT)
Age: 68
End: 2023-12-05

## 2023-12-05 VITALS
SYSTOLIC BLOOD PRESSURE: 133 MMHG | HEART RATE: 71 BPM | DIASTOLIC BLOOD PRESSURE: 76 MMHG | RESPIRATION RATE: 19 BRPM | TEMPERATURE: 97.3 F

## 2023-12-05 DIAGNOSIS — M35.00 SICCA SYNDROME (HCC): ICD-10-CM

## 2023-12-05 DIAGNOSIS — M35.00 SICCA SYNDROME (HCC): Primary | ICD-10-CM

## 2023-12-05 PROCEDURE — 96360 HYDRATION IV INFUSION INIT: CPT

## 2023-12-05 PROCEDURE — 96361 HYDRATE IV INFUSION ADD-ON: CPT

## 2023-12-05 RX ORDER — CEFDINIR 300 MG/1
300 CAPSULE ORAL EVERY 12 HOURS SCHEDULED
Qty: 14 CAPSULE | Refills: 0 | Status: SHIPPED | OUTPATIENT
Start: 2023-12-05 | End: 2023-12-12

## 2023-12-05 RX ADMIN — SODIUM CHLORIDE 250 ML/HR: 0.9 INJECTION, SOLUTION INTRAVENOUS at 09:27

## 2023-12-05 NOTE — PROGRESS NOTES
Remainder of hydration infusion tolerated without incident. PIV removed. Discharged in stable condition.

## 2023-12-05 NOTE — PROGRESS NOTES
IV infusion paused & pt disconnected. [address] applied to PIV. Pt to go to [facility] for CVC check d/t concern for infection.

## 2023-12-05 NOTE — PROGRESS NOTES
Pt to infusion center for IV hydration. Pt c/o of generalized body aches & brain fog. Pt is afebrile & has no other signs of systemic infection. CVC insertion site looking mildly erythemic. Pt insistent that she is developing infection at insertion site. Dr. Ann Mon made aware via TT & image of insertion site sent. Pt would like prescription for cefdinir sent to preferred pharmacy. Dr Ann Mon agreed to send script. Pt will also be contacted by IR team for appt to check CVC.

## 2023-12-05 NOTE — TELEPHONE ENCOUNTER
Caller: pt    Doctor: Ina Mcguire    Reason for call: pt has an infection she wants to know if she should cx her procedure?     Call back#: [phone]

## 2023-12-05 NOTE — TELEPHONE ENCOUNTER
S/W pt, infection around solorzano cath and placed on antibiotics today. Advised she would need to be rescheduled.  She verbalized understanding and will wait for  to call her back

## 2023-12-07 ENCOUNTER — HOSPITAL ENCOUNTER (OUTPATIENT)
Dept: INFUSION CENTER | Facility: HOSPITAL | Age: 68
End: 2023-12-07
Attending: INTERNAL MEDICINE
Payer: MEDICARE

## 2023-12-07 VITALS
OXYGEN SATURATION: 98 % | HEART RATE: 96 BPM | SYSTOLIC BLOOD PRESSURE: 135 MMHG | RESPIRATION RATE: 18 BRPM | TEMPERATURE: 97.6 F | DIASTOLIC BLOOD PRESSURE: 75 MMHG

## 2023-12-07 PROCEDURE — 96361 HYDRATE IV INFUSION ADD-ON: CPT

## 2023-12-07 PROCEDURE — 96360 HYDRATION IV INFUSION INIT: CPT

## 2023-12-07 RX ORDER — SODIUM CHLORIDE 9 MG/ML
250 INJECTION, SOLUTION INTRAVENOUS ONCE
Status: COMPLETED | OUTPATIENT
Start: 2023-12-11 | End: 2023-12-11

## 2023-12-07 RX ADMIN — SODIUM CHLORIDE 1500 ML: 0.9 INJECTION, SOLUTION INTRAVENOUS at 09:13

## 2023-12-07 NOTE — PROGRESS NOTES
Interventional Radiology: The long term right chest tunneled central venous line was evaluated. There is no evidence of infection at the skin entry site at this time. The line is functioning properly per the patient. The line may continue to be used.

## 2023-12-07 NOTE — PROGRESS NOTES
Pt offers no complaints. States that her right shoulder area near her central line feels better since being on her abx. Message sent to Dr Tia Davis regarding central line check on 12/5 - ok to use central line at this time (progress note placed in chart). Pt tolerated IV hydration well without incident and was discharged in stable condition. Aware of next infusion appointment and AVS declined.

## 2023-12-08 ENCOUNTER — HOSPITAL ENCOUNTER (OUTPATIENT)
Dept: INFUSION CENTER | Facility: HOSPITAL | Age: 68
End: 2023-12-08
Attending: INTERNAL MEDICINE
Payer: MEDICARE

## 2023-12-08 VITALS
RESPIRATION RATE: 16 BRPM | DIASTOLIC BLOOD PRESSURE: 75 MMHG | HEART RATE: 67 BPM | TEMPERATURE: 97.3 F | SYSTOLIC BLOOD PRESSURE: 133 MMHG

## 2023-12-08 PROCEDURE — 96360 HYDRATION IV INFUSION INIT: CPT

## 2023-12-08 PROCEDURE — 96361 HYDRATE IV INFUSION ADD-ON: CPT

## 2023-12-08 RX ORDER — SODIUM CHLORIDE 9 MG/ML
250 INJECTION, SOLUTION INTRAVENOUS ONCE
Status: COMPLETED | OUTPATIENT
Start: 2023-12-12 | End: 2023-12-12

## 2023-12-08 RX ADMIN — SODIUM CHLORIDE 1500 ML: 0.9 INJECTION, SOLUTION INTRAVENOUS at 08:42

## 2023-12-08 NOTE — PROGRESS NOTES
Patient tolerated IV hydration without issues. AVS declined. Patient has Mychart. Patient is aware of their appointment on 12/11/23 at 0900. Patient ambulated off unit without incident. All personal belongings taken with patient.

## 2023-12-11 ENCOUNTER — HOSPITAL ENCOUNTER (OUTPATIENT)
Dept: INFUSION CENTER | Facility: HOSPITAL | Age: 68
Discharge: HOME | End: 2023-12-11
Attending: INTERNAL MEDICINE
Payer: MEDICARE

## 2023-12-11 VITALS
HEART RATE: 69 BPM | DIASTOLIC BLOOD PRESSURE: 79 MMHG | OXYGEN SATURATION: 100 % | RESPIRATION RATE: 18 BRPM | SYSTOLIC BLOOD PRESSURE: 130 MMHG | TEMPERATURE: 96.9 F

## 2023-12-11 PROCEDURE — 96360 HYDRATION IV INFUSION INIT: CPT

## 2023-12-11 PROCEDURE — 96361 HYDRATE IV INFUSION ADD-ON: CPT

## 2023-12-11 RX ADMIN — SODIUM CHLORIDE 250 ML/HR: 0.9 INJECTION, SOLUTION INTRAVENOUS at 09:10

## 2023-12-11 NOTE — PROGRESS NOTES
IV hydration tolerated without incident. CVC flushed per protocol, clamped & PDC applied. AVS declined. Pt is aware of next appt on 12/12 at 9 am.  Discharged in stable condition.

## 2023-12-12 ENCOUNTER — HOSPITAL ENCOUNTER (OUTPATIENT)
Dept: INFUSION CENTER | Facility: HOSPITAL | Age: 68
Discharge: HOME | End: 2023-12-12
Attending: INTERNAL MEDICINE
Payer: MEDICARE

## 2023-12-12 VITALS
DIASTOLIC BLOOD PRESSURE: 78 MMHG | RESPIRATION RATE: 16 BRPM | SYSTOLIC BLOOD PRESSURE: 133 MMHG | TEMPERATURE: 97.2 F | OXYGEN SATURATION: 100 % | HEART RATE: 66 BPM

## 2023-12-12 PROCEDURE — 96360 HYDRATION IV INFUSION INIT: CPT

## 2023-12-12 PROCEDURE — 96361 HYDRATE IV INFUSION ADD-ON: CPT

## 2023-12-12 RX ORDER — SODIUM CHLORIDE 9 MG/ML
250 INJECTION, SOLUTION INTRAVENOUS ONCE
Status: COMPLETED | OUTPATIENT
Start: 2023-12-14 | End: 2023-12-14

## 2023-12-12 RX ADMIN — SODIUM CHLORIDE 250 ML/HR: 0.9 INJECTION, SOLUTION INTRAVENOUS at 09:22

## 2023-12-12 NOTE — PROGRESS NOTES
IV hydration well without incident. Discharged in stable condition and pt aware of next infusion appointment. AVS declined.
CHEST PAIN

## 2023-12-14 ENCOUNTER — HOSPITAL ENCOUNTER (OUTPATIENT)
Dept: INFUSION CENTER | Facility: HOSPITAL | Age: 68
End: 2023-12-14
Attending: INTERNAL MEDICINE
Payer: MEDICARE

## 2023-12-14 VITALS
RESPIRATION RATE: 17 BRPM | SYSTOLIC BLOOD PRESSURE: 137 MMHG | DIASTOLIC BLOOD PRESSURE: 84 MMHG | HEART RATE: 73 BPM | TEMPERATURE: 98.3 F

## 2023-12-14 PROCEDURE — 96361 HYDRATE IV INFUSION ADD-ON: CPT

## 2023-12-14 PROCEDURE — 96360 HYDRATION IV INFUSION INIT: CPT

## 2023-12-14 RX ORDER — SODIUM CHLORIDE 9 MG/ML
250 INJECTION, SOLUTION INTRAVENOUS ONCE
Status: COMPLETED | OUTPATIENT
Start: 2023-12-15 | End: 2023-12-15

## 2023-12-14 RX ADMIN — SODIUM CHLORIDE 250 ML/HR: 0.9 INJECTION, SOLUTION INTRAVENOUS at 09:00

## 2023-12-14 NOTE — PROGRESS NOTES
Patient hydration infusion completed without complication. Patient declined AVS at this time and confirmed next appointment. Patient discharged in stable condition to home via ambulation.

## 2023-12-14 NOTE — PROGRESS NOTES
Pt tolerated IV hydration well without incident  Discharged in stable condition and next infusion appointment confirmed with pt  AVS declined  Left arm;

## 2023-12-15 ENCOUNTER — HOSPITAL ENCOUNTER (OUTPATIENT)
Dept: INFUSION CENTER | Facility: HOSPITAL | Age: 68
End: 2023-12-15
Attending: INTERNAL MEDICINE
Payer: MEDICARE

## 2023-12-15 VITALS
DIASTOLIC BLOOD PRESSURE: 80 MMHG | HEART RATE: 68 BPM | TEMPERATURE: 98.3 F | SYSTOLIC BLOOD PRESSURE: 150 MMHG | RESPIRATION RATE: 18 BRPM

## 2023-12-15 PROCEDURE — 96361 HYDRATE IV INFUSION ADD-ON: CPT

## 2023-12-15 PROCEDURE — 96360 HYDRATION IV INFUSION INIT: CPT

## 2023-12-15 RX ADMIN — SODIUM CHLORIDE 250 ML/HR: 9 INJECTION, SOLUTION INTRAVENOUS at 08:50

## 2023-12-15 NOTE — PROGRESS NOTES
IV hydration tolerated without incident. CVC flushed per protocol, clamped & PDC applied. AVS declined, patient aware of next appointment on 12/18/23 at 0830. Patient ambulated off unit in stable condition.

## 2023-12-18 ENCOUNTER — HOSPITAL ENCOUNTER (OUTPATIENT)
Dept: INFUSION CENTER | Facility: HOSPITAL | Age: 68
Discharge: HOME | End: 2023-12-18
Attending: INTERNAL MEDICINE
Payer: MEDICARE

## 2023-12-18 PROCEDURE — 96361 HYDRATE IV INFUSION ADD-ON: CPT

## 2023-12-18 PROCEDURE — 96360 HYDRATION IV INFUSION INIT: CPT

## 2023-12-18 RX ADMIN — SODIUM CHLORIDE 1500 ML: 0.9 INJECTION, SOLUTION INTRAVENOUS at 09:00

## 2023-12-18 NOTE — PROGRESS NOTES
Central line dressing changed per protocol without incident. Pt tolerated IV hydration and was discharged in stable condition. Pt aware of next infusion appointment tomorrow at 8:30AM and AVS declined.

## 2023-12-19 ENCOUNTER — HOSPITAL ENCOUNTER (OUTPATIENT)
Dept: INFUSION CENTER | Facility: HOSPITAL | Age: 68
Discharge: HOME | End: 2023-12-19
Attending: INTERNAL MEDICINE
Payer: MEDICARE

## 2023-12-19 VITALS
TEMPERATURE: 96.9 F | RESPIRATION RATE: 18 BRPM | OXYGEN SATURATION: 96 % | HEART RATE: 65 BPM | SYSTOLIC BLOOD PRESSURE: 147 MMHG | DIASTOLIC BLOOD PRESSURE: 81 MMHG

## 2023-12-19 PROCEDURE — 96360 HYDRATION IV INFUSION INIT: CPT

## 2023-12-19 PROCEDURE — 96361 HYDRATE IV INFUSION ADD-ON: CPT

## 2023-12-19 RX ADMIN — SODIUM CHLORIDE 1500 ML: 0.9 INJECTION, SOLUTION INTRAVENOUS at 08:47

## 2023-12-19 NOTE — PLAN OF CARE
Problem: Potential for Falls  Goal: Patient will remain free of falls  Description: INTERVENTIONS:  - Educate patient/family on patient safety including physical limitations  - Instruct patient to call for assistance with activity   - Consult OT/PT to assist with strengthening/mobility   - Keep Call bell within reach  - Keep bed low and locked with side rails adjusted as appropriate  - Keep care items and personal belongings within reach  - Initiate and maintain comfort rounds  - Make Fall Risk Sign visible to staff    - Apply yellow socks and bracelet for high fall risk patients  - Consider moving patient to room near nurses station  Outcome: Progressing

## 2023-12-19 NOTE — PROGRESS NOTES
Patient tolerated IV hydration without issues.  AVS declined.  Patient has Mychart.  Patient is aware of their appointment on 12/21/23 at 0830.   Patient ambulated off unit without incident.  All personal belongings taken with patient.

## 2023-12-20 RX ORDER — SODIUM CHLORIDE 9 MG/ML
250 INJECTION, SOLUTION INTRAVENOUS ONCE
Status: COMPLETED | OUTPATIENT
Start: 2023-12-22 | End: 2023-12-22

## 2023-12-20 RX ORDER — SODIUM CHLORIDE 9 MG/ML
250 INJECTION, SOLUTION INTRAVENOUS ONCE
Status: COMPLETED | OUTPATIENT
Start: 2023-12-21 | End: 2023-12-21

## 2023-12-21 ENCOUNTER — HOSPITAL ENCOUNTER (OUTPATIENT)
Dept: INFUSION CENTER | Facility: HOSPITAL | Age: 68
End: 2023-12-21
Attending: INTERNAL MEDICINE
Payer: MEDICARE

## 2023-12-21 VITALS
HEART RATE: 75 BPM | DIASTOLIC BLOOD PRESSURE: 78 MMHG | TEMPERATURE: 97.7 F | RESPIRATION RATE: 17 BRPM | SYSTOLIC BLOOD PRESSURE: 129 MMHG

## 2023-12-21 PROCEDURE — 96361 HYDRATE IV INFUSION ADD-ON: CPT

## 2023-12-21 PROCEDURE — 96360 HYDRATION IV INFUSION INIT: CPT

## 2023-12-21 RX ADMIN — SODIUM CHLORIDE 250 ML/HR: 0.9 INJECTION, SOLUTION INTRAVENOUS at 08:34

## 2023-12-21 NOTE — PROGRESS NOTES
IV hydration tolerated without incident. CVC flushed per protocol, clamped & PDC applied. AVS declined, patient aware of next appointment on 12/22/23 at 0830. Patient ambulated off unit in stable condition.

## 2023-12-22 ENCOUNTER — HOSPITAL ENCOUNTER (OUTPATIENT)
Dept: INFUSION CENTER | Facility: HOSPITAL | Age: 68
End: 2023-12-22
Attending: INTERNAL MEDICINE
Payer: MEDICARE

## 2023-12-22 VITALS
OXYGEN SATURATION: 100 % | SYSTOLIC BLOOD PRESSURE: 155 MMHG | HEART RATE: 79 BPM | DIASTOLIC BLOOD PRESSURE: 73 MMHG | RESPIRATION RATE: 18 BRPM | TEMPERATURE: 97.9 F

## 2023-12-22 RX ADMIN — SODIUM CHLORIDE 250 ML/HR: 0.9 INJECTION, SOLUTION INTRAVENOUS at 08:40

## 2023-12-22 NOTE — PROGRESS NOTES
IV hydration tolerated without incident. CVC flushed per protocol, clamped & PDC applied. AVS declined, patient aware of next appointment on 12/26/23 at 0900. Patient ambulated off unit in stable condition.

## 2023-12-26 ENCOUNTER — HOSPITAL ENCOUNTER (OUTPATIENT)
Dept: INFUSION CENTER | Facility: HOSPITAL | Age: 68
Discharge: HOME | End: 2023-12-26
Attending: INTERNAL MEDICINE
Payer: MEDICARE

## 2023-12-26 VITALS
RESPIRATION RATE: 19 BRPM | HEART RATE: 79 BPM | TEMPERATURE: 98.9 F | SYSTOLIC BLOOD PRESSURE: 135 MMHG | DIASTOLIC BLOOD PRESSURE: 70 MMHG

## 2023-12-26 PROCEDURE — 96361 HYDRATE IV INFUSION ADD-ON: CPT

## 2023-12-26 PROCEDURE — 96360 HYDRATION IV INFUSION INIT: CPT

## 2023-12-26 RX ADMIN — SODIUM CHLORIDE 1500 ML: 0.9 INJECTION, SOLUTION INTRAVENOUS at 09:24

## 2023-12-26 NOTE — PROGRESS NOTES
Central line dressing changed per protocol without incident. Pt tolerated IV hydration and was discharged in stable condition. Pt aware of next infusion appointment on 12/27/23 at 8:30AM,

## 2023-12-27 ENCOUNTER — HOSPITAL ENCOUNTER (OUTPATIENT)
Dept: INFUSION CENTER | Facility: HOSPITAL | Age: 68
Discharge: HOME | End: 2023-12-27
Attending: INTERNAL MEDICINE
Payer: MEDICARE

## 2023-12-27 VITALS
SYSTOLIC BLOOD PRESSURE: 123 MMHG | HEART RATE: 71 BPM | DIASTOLIC BLOOD PRESSURE: 81 MMHG | TEMPERATURE: 99.3 F | OXYGEN SATURATION: 100 % | RESPIRATION RATE: 16 BRPM

## 2023-12-27 PROCEDURE — 96360 HYDRATION IV INFUSION INIT: CPT

## 2023-12-27 PROCEDURE — 96361 HYDRATE IV INFUSION ADD-ON: CPT

## 2023-12-27 RX ADMIN — SODIUM CHLORIDE 1500 ML: 0.9 INJECTION, SOLUTION INTRAVENOUS at 08:35

## 2023-12-27 NOTE — PROGRESS NOTES
IV hydration tolerated without incident. CVC flushed per protocol, clamped & PDC applied. AVS declined, patient aware of next appointment on 12/28/23 at 0830. Patient ambulated off unit in stable condition.

## 2023-12-28 ENCOUNTER — HOSPITAL ENCOUNTER (OUTPATIENT)
Dept: INFUSION CENTER | Facility: HOSPITAL | Age: 68
End: 2023-12-28
Attending: INTERNAL MEDICINE
Payer: MEDICARE

## 2023-12-28 VITALS
RESPIRATION RATE: 19 BRPM | TEMPERATURE: 97.3 F | HEART RATE: 74 BPM | SYSTOLIC BLOOD PRESSURE: 149 MMHG | DIASTOLIC BLOOD PRESSURE: 92 MMHG

## 2023-12-28 PROCEDURE — 96360 HYDRATION IV INFUSION INIT: CPT

## 2023-12-28 PROCEDURE — 96361 HYDRATE IV INFUSION ADD-ON: CPT

## 2023-12-28 RX ORDER — SODIUM CHLORIDE 9 MG/ML
250 INJECTION, SOLUTION INTRAVENOUS ONCE
Status: COMPLETED | OUTPATIENT
Start: 2024-01-02 | End: 2024-01-02

## 2023-12-28 RX ADMIN — SODIUM CHLORIDE 1500 ML: 0.9 INJECTION, SOLUTION INTRAVENOUS at 08:27

## 2023-12-28 NOTE — PROGRESS NOTES
IV hydration tolerated without incident. CVC flushed per protocol, clamped & PDC applied. AVS declined, patient aware of next appointment on 12/29/23 at 0900. Patient ambulated off unit in stable condition.

## 2023-12-29 ENCOUNTER — HOSPITAL ENCOUNTER (OUTPATIENT)
Dept: INFUSION CENTER | Facility: HOSPITAL | Age: 68
End: 2023-12-29
Attending: INTERNAL MEDICINE
Payer: MEDICARE

## 2023-12-29 VITALS
SYSTOLIC BLOOD PRESSURE: 147 MMHG | RESPIRATION RATE: 18 BRPM | DIASTOLIC BLOOD PRESSURE: 85 MMHG | TEMPERATURE: 96.8 F | HEART RATE: 70 BPM

## 2023-12-29 PROCEDURE — 96360 HYDRATION IV INFUSION INIT: CPT

## 2023-12-29 PROCEDURE — 96361 HYDRATE IV INFUSION ADD-ON: CPT

## 2023-12-29 RX ADMIN — SODIUM CHLORIDE 1500 ML: 0.9 INJECTION, SOLUTION INTRAVENOUS at 09:00

## 2023-12-29 NOTE — PROGRESS NOTES
Teresa Mao  tolerated treatment well with no complications.      Teresa Mao is aware of future appt on 1/2 at 9 am.     AVS declined by Teresa Mao.

## 2024-01-01 NOTE — PLAN OF CARE
Problem: Potential for Falls  Goal: Patient will remain free of falls  Description: INTERVENTIONS:  - Educate patient/family on patient safety including physical limitations  - Instruct patient to call for assistance with activity   - Consult OT/PT to assist with strengthening/mobility   - Keep Call bell within reach  - Keep bed low and locked with side rails adjusted as appropriate  - Keep care items and personal belongings within reach  - Initiate and maintain comfort rounds  - Make Fall Risk Sign visible to staff  - Offer Toileting every 1 Hours, in advance of need  - Apply yellow socks and bracelet for high fall risk patients  - Consider moving patient to room near nurses station  Outcome: Progressing GENERAL: NAD  HEAD:  Atraumatic, Normocephalic  CHEST/LUNG: Clear to auscultation bilaterally  HEART: Normal S1/S2  PSYCH: AAOx3  NEUROLOGY: non-focal  SKIN: No obvious rashes or lesions

## 2024-01-02 ENCOUNTER — HOSPITAL ENCOUNTER (OUTPATIENT)
Dept: RADIOLOGY | Facility: HOSPITAL | Age: 69
Discharge: HOME | End: 2024-01-02
Admitting: ANESTHESIOLOGY
Payer: MEDICARE

## 2024-01-02 ENCOUNTER — HOSPITAL ENCOUNTER (OUTPATIENT)
Dept: INFUSION CENTER | Facility: HOSPITAL | Age: 69
Discharge: HOME | End: 2024-01-02
Attending: INTERNAL MEDICINE
Payer: MEDICARE

## 2024-01-02 VITALS
TEMPERATURE: 97.1 F | RESPIRATION RATE: 18 BRPM | SYSTOLIC BLOOD PRESSURE: 168 MMHG | DIASTOLIC BLOOD PRESSURE: 84 MMHG | HEART RATE: 83 BPM

## 2024-01-02 VITALS
HEART RATE: 85 BPM | TEMPERATURE: 97.8 F | OXYGEN SATURATION: 97 % | RESPIRATION RATE: 18 BRPM | SYSTOLIC BLOOD PRESSURE: 138 MMHG | DIASTOLIC BLOOD PRESSURE: 80 MMHG

## 2024-01-02 DIAGNOSIS — E87.8 IMPAIRED HYDRATION: Primary | ICD-10-CM

## 2024-01-02 DIAGNOSIS — M47.816 LUMBAR SPONDYLOSIS: ICD-10-CM

## 2024-01-02 PROCEDURE — 64493 INJ PARAVERT F JNT L/S 1 LEV: CPT | Performed by: ANESTHESIOLOGY

## 2024-01-02 PROCEDURE — 64494 INJ PARAVERT F JNT L/S 2 LEV: CPT | Performed by: ANESTHESIOLOGY

## 2024-01-02 RX ORDER — BUPIVACAINE HYDROCHLORIDE 5 MG/ML
3 INJECTION, SOLUTION EPIDURAL; INTRACAUDAL ONCE
Status: COMPLETED | OUTPATIENT
Start: 2024-01-02 | End: 2024-01-02

## 2024-01-02 RX ORDER — LIDOCAINE HYDROCHLORIDE 10 MG/ML
5 INJECTION, SOLUTION EPIDURAL; INFILTRATION; INTRACAUDAL; PERINEURAL ONCE
Status: COMPLETED | OUTPATIENT
Start: 2024-01-02 | End: 2024-01-02

## 2024-01-02 RX ADMIN — SODIUM CHLORIDE 250 ML/HR: 0.9 INJECTION, SOLUTION INTRAVENOUS at 09:12

## 2024-01-02 RX ADMIN — BUPIVACAINE HYDROCHLORIDE 3 ML: 5 INJECTION, SOLUTION EPIDURAL; INTRACAUDAL at 08:25

## 2024-01-02 RX ADMIN — LIDOCAINE HYDROCHLORIDE 5 ML: 10 INJECTION, SOLUTION EPIDURAL; INFILTRATION; INTRACAUDAL; PERINEURAL at 08:22

## 2024-01-02 NOTE — DISCHARGE INSTR - LAB

## 2024-01-02 NOTE — PROGRESS NOTES
"7 day central line dressing change performed. Pt states she has been itching rather frequently and wants to get a \"stitch put in\" rather than using the stat locks. She did call IR and had an appt scheduled for Thursday. IV hydration tolerated without incident. CVC flushed per protocol, clamped & PDC applied. AVS declined, patient aware of next appointment tomorrow at 830. Patient ambulated off unit in stable condition.    "

## 2024-01-02 NOTE — H&P
Assessment:  1. Lumbar spondylosis  FL spine and pain procedure    FL spine and pain procedure          Plan:  Teresa Mao is a 68 y.o. female with complaints of low back pain presents to surgical center for procedure.  We will perform a bilateral L2-L3 and L3-L4 medial branch block #2  2. Follow-up 1 month after injection    Complete risks and benefits including bleeding, infection, tissue reaction, nerve injury and allergic reaction were discussed. The approach was demonstrated using models and literature was provided. Verbal and written consent was obtained.    My impressions and treatment recommendations were discussed in detail with the patient who verbalized understanding and had no further questions.  Discharge instructions were provided. I personally saw and examined the patient and I agree with the above discussed plan of care.    Orders Placed This Encounter   Procedures    FL spine and pain procedure     Standing Status:   Standing     Number of Occurrences:   1     Order Specific Question:   Reason for Exam:     Answer:   B/L L2-3 and L3-4 MBB#2     Order Specific Question:   Anticoagulant hold needed?     Answer:   no     New Medications Ordered This Visit   Medications    lidocaine (PF) (XYLOCAINE-MPF) 1 % injection 5 mL    lidocaine (PF) (XYLOCAINE-MPF) 2 % injection 3 mL       History of Present Illness:  Teresa Mao is a 68 y.o. female who presents for a follow up office visit in regards to low back pain.   The patient’s current symptoms include 8 out of 10 sharp and stabbing throbbing pain with any particular time pattern.      I have personally reviewed and/or updated the patient's past medical history, past surgical history, family history, social history, current medications, allergies, and vital signs today.     Review of Systems   Musculoskeletal:  Positive for arthralgias and back pain.   All other systems reviewed and are negative.      Patient Active Problem List   Diagnosis     LBBB (left bundle branch block)    Fibromyalgia    Postural orthostatic tachycardia syndrome    Sleep-related breathing disorder    Other insomnia    Restless leg syndrome    Bruxism    Anxiety and depression    Chronic rhinitis    Moderate persistent asthma    Gastroesophageal reflux disease without esophagitis    Chronic pain disorder    Factor V Leiden mutation ([facility])    Hypothyroidism    Weakness    Traumatic tear of supraspinatus tendon of right shoulder    Sicca syndrome ([facility])    History of lumbar surgery    Generalized pain    Myofascial pain syndrome    Arachnoiditis    Cervical radiculopathy    Chronic bilateral low back pain with bilateral sciatica    Lumbar radiculopathy    Encounter for long-term opiate analgesic use    Uncomplicated opioid dependence ([facility])    Cardiomyopathy, unspecified type ([facility])    Stage 3a chronic kidney disease ([facility])    Urinary incontinence    Abnormal electrocardiography    Abnormal TSH    Asthma    Biliary dyskinesia    Blepharitis of both eyes    Degeneration of intervertebral disc    Diverticulosis of colon    Hashimoto's disease    Herniated nucleus pulposus, L3-4    Hyperlipidemia    Irritable bowel syndrome    Migraine without aura and without status migrainosus, not intractable    Mitral valve prolapse    Obstructive sleep apnea syndrome    Osteoporosis    Rheumatic fever with cardiac involvement    Scoliosis (and kyphoscoliosis), idiopathic    Sjogren's syndrome ([facility])    Osteoarthritis    TMJ (temporomandibular joint syndrome)    Vitamin D deficiency    Chronic interstitial cystitis    Radiculopathy, multiple sites in spine    Primary immunodeficiency syndrome ([facility])    Cellulitis    Cervical spondylosis    Neck pain    Sprain of deltoid ligament of right ankle    Sprain of anterior talofibular ligament of right ankle    Ankle injury, right, initial encounter    History of lumbar fusion    Lumbar spondylosis       Past Medical History:   Diagnosis Date    Allergic rhinitis   "   Anxiety     Asthma     Back pain     Cardiac disease     Cardiopathy     EF 45%    Cough     Diverticulitis     Factor V Leiden (HCC)     Fibromyalgia     GERD (gastroesophageal reflux disease)     Hashimoto's thyroiditis     Hx of degenerative disc disease     Hypotension     pots - postural orthostatic hypotension    Interstitial cystitis     Irregular heart beat     LBBB    Irritable bowel syndrome     Migraines     Mitral valve disease     \"thickening\"    Myocardial infarction (HCC)     possible but not sure when    Neuropathy     bilateral legs    Osteoporosis     Postural orthostatic tachycardia syndrome     must drink a lot of water and salt    Pott's disease     Rheumatic fever 1967    Scoliosis     Sepsis (HCC)     associated with PICC line    Sjogren's syndrome (HCC)     TMJ (dislocation of temporomandibular joint)        Past Surgical History:   Procedure Laterality Date    ABLATION MICROWAVE Left     lumbar area    BACK SURGERY  10/1998     SECTION  1992    CHOLECYSTECTOMY  2016    COLONOSCOPY      DILATION AND CURETTAGE OF UTERUS      EGD      FOOT SURGERY  1968    removal of bone and neuroma    HYSTERECTOMY  1997    age 45  PRICE ooph    IR OTHER  2022    IR OTHER  2022    IR OTHER  2023    IR PICC PLACEMENT SINGLE LUMEN  10/02/2020    IR PICC PLACEMENT SINGLE LUMEN  2021    IR PICC REPOSITION  2021    IR TUNNELED CENTRAL LINE PLACEMENT  2022    LAPAROSCOPY  1996    endometriosis    MOUTH BIOPSY      lip    MA EGD TRANSORAL BIOPSY SINGLE/MULTIPLE N/A 2019    Procedure: ESOPHAGOGASTRODUODENOSCOPY (EGD) with multiple bx and dilation;  Surgeon: Peter Baldwin MD;  Location:  GI LAB;  Service: Gastroenterology    MA SURGICAL ARTHROSCOPY SHOULDER W/ROTATOR CUFF RPR Right 2022    Procedure: SHOULDER ARTHROSCOPIC ROTATOR CUFF REPAIR Biceps Tenodisis;  Surgeon: Ilya Munoz MD;  Location: [facility] MAIN OR;  Service: " Orthopedics    TUNNELED VENOUS CATHETER PLACEMENT  2016       Family History   Problem Relation Age of Onset    Cancer Mother         urinary bladder     No Known Problems Father     Thyroid cancer Sister     Heart attack Sister     No Known Problems Sister     No Known Problems Sister     No Known Problems Sister     No Known Problems Sister     No Known Problems Daughter     No Known Problems Daughter     Colon cancer Maternal Grandfather     Breast cancer Maternal Aunt         over 50 yrs old     Endometrial cancer Maternal Aunt     Multiple myeloma Maternal Aunt     Hypertension Paternal Aunt        Social History     Occupational History    Not on file   Tobacco Use    Smoking status: Never    Smokeless tobacco: Never   Vaping Use    Vaping status: Never Used   Substance and Sexual Activity    Alcohol use: Never    Drug use: No    Sexual activity: Not on file       Current Outpatient Medications on File Prior to Encounter   Medication Sig    Alum Hydroxide-Mag Trisilicate (Gaviscon) 80-14.2 MG CHEW Chew 1 tablet 4 (four) times a day as needed With meals and as needed    azelastine (ASTELIN) 0.1 % nasal spray 2 sprays into each nostril 2 (two) times a day Use in each nostril as directed    beclomethasone (QVAR) 40 MCG/ACT inhaler Inhale 1 puff daily as needed (only when unable to nebulize pulmicort) Rinse mouth after use.    budesonide (PULMICORT) 0.5 mg/2 mL nebulizer solution Take 0.5 mg by nebulization 2 (two) times a day Rinse mouth after use.    cholecalciferol (VITAMIN D3) 1,000 units tablet Take 4,000 Units by mouth daily    cromolyn (GASTROCROM) 100 MG/5ML solution Take 100 mg by mouth 4 (four) times a day (before meals and at bedtime)    cyclobenzaprine (FLEXERIL) 5 mg tablet Take 1 tablet (5 mg total) by mouth 3 (three) times a day as needed for muscle spasms    docusate sodium (COLACE) 100 mg capsule Take 100 mg by mouth daily as needed for constipation    erythromycin (ILOTYCIN) ophthalmic ointment  Apply 1 application to eye daily at bedtime    famotidine (PEPCID) 40 MG tablet Twice a day    fexofenadine (ALLEGRA) 180 MG tablet Take 180 mg by mouth 2 (two) times a day     fluticasone (FLONASE) 50 mcg/act nasal spray 2 sprays into each nostril daily     Galcanezumab-gnlm (Emgality) 120 MG/ML SOAJ Inject under the skin every 30 (thirty) days     HYDROcodone-acetaminophen (Norco) 5-325 mg per tablet Take 1 tablet by mouth 2 (two) times a day as needed for pain for up to 60 doses Do not fill until 11/24/2023 Max Daily Amount: 2 tablets    HYDROcodone-acetaminophen (NORCO) 5-325 mg per tablet Take 1 tablet by mouth 2 (two) times a day as needed for pain Do not fill until 12/22/2023 Max Daily Amount: 2 tablets    ipratropium (ATROVENT) 0.02 % nebulizer solution Take 0.5 mg by nebulization every 6 (six) hours as needed for wheezing or shortness of breath    ipratropium (ATROVENT) 0.06 % nasal spray PLEASE SEE ATTACHED FOR DETAILED DIRECTIONS    ivabradine HCl (Corlanor) 5 MG tablet 7.5 mg 2 (two) times a day    levalbuterol (XOPENEX HFA) 45 mcg/act inhaler Inhale 2 puffs every 4 (four) hours as needed (when unable to nebulize)    levalbuterol (XOPENEX) 1.25 mg/3 mL nebulizer solution Take 1.25 mg by nebulization every 6 (six) hours as needed     Magnesium 400 MG CAPS Take 1 capsule by mouth 2 (two) times a day     methylPREDNISolone 4 MG tablet therapy pack Use as directed on package    MULTIPLE VITAMINS-CALCIUM PO Take 1 capsule by mouth every morning     NON FORMULARY Take 1 mg by mouth 3 (three) times a day Ketotifen 1 mg compounded capsule    omega-3-acid ethyl esters (LOVAZA) 1 g capsule Take 2 g by mouth 2 (two) times a day 1600mg daily    polyethylene glycol (MIRALAX) 17 g packet Take 17 g by mouth daily as needed     Probiotic Product (PROBIOTIC DAILY PO) Take 1 tablet by mouth daily At lunch    Qvar RediHaler 40 MCG/ACT inhaler     sodium chloride Inject 1,500 mL into a catheter in a vein    sucralfate  (CARAFATE) 1 g/10 mL suspension Take 1 g by mouth 2 (two) times a day    Thyroid, Porcine, POWD Use 32 mg daily    Ubrogepant (UBRELVY) 100 MG tablet Take 100 mg by mouth Take 1 tablet (100 mg) one time as needed for migraine. May repeat one additional tablet (100 mg) at least two hours after the first dose. Do not use more than two doses per day, or for more than eight days per month.    verapamil (CALAN) 40 mg tablet      No current facility-administered medications on file prior to encounter.       Allergies   Allergen Reactions    Imipramine Confusion, Fatigue, Irritability, Palpitations, Shortness Of Breath, Tachycardia and Visual Disturbance    Melatonin Shortness Of Breath    Mirtazapine Anxiety, Dizziness, GI Intolerance, Nausea Only, Palpitations and Shortness Of Breath    Nexium [Esomeprazole] Shortness Of Breath    Nsaids Shortness Of Breath    Singulair [Montelukast] Shortness Of Breath and Cough    Zomig [Zolmitriptan] Shortness Of Breath    Dexilant [Dexlansoprazole] Nausea Only and Vomiting    Albuterol     Ambien [Zolpidem]     Amitriptyline Drowsiness    Aspirin     Bactrim [Sulfamethoxazole-Trimethoprim] Hives    Banana - Food Allergy Dermatitis    Ceftin [Cefuroxime]     Celebrex [Celecoxib]     Ciprofloxacin     Cranberry-C [Ascorbate - Food Allergy] GI Intolerance    Demerol [Meperidine]     Epinephrine Dizziness    Ergotamine Nausea Only and Headache    Keflex [Cephalexin]     Klonopin [Clonazepam] Nausea Only and Dizziness    Latex Hives    Levaquin [Levofloxacin]     Lexapro [Escitalopram]     Lyrica [Pregabalin] Fatigue    Macrodantin [Nitrofurantoin]     Morphine Nausea Only    Movantik [Naloxegol] Nausea Only    Mushroom Extract Complex - Food Allergy      Eye itchy, asthma  attack    Naprosyn [Naproxen]     Neurontin [Gabapentin] Dizziness    Pineapple - Food Allergy GI Intolerance    Plecanatide Abdominal Pain, Diarrhea and GI Intolerance    Prolia [Denosumab]     Prozac [Fluoxetine]      Serevent [Salmeterol] Dizziness    Sudafed [Pseudoephedrine] Hives    Sulfa Antibiotics     Tomato - Food Allergy GI Intolerance    Trazodone     Ultram [Tramadol] Nausea Only, Dizziness and Headache    Vilazodone Dizziness, GI Intolerance and Headache    Vilazodone Hcl Abdominal Pain, Dizziness, GI Intolerance, Headache and Other (See Comments)    Vioxx [Rofecoxib]     Vortioxetine Drowsiness, Fatigue, GI Intolerance and Irritability    Zithromax [Azithromycin] Hives    Zoloft [Sertraline]     Zantac [Ranitidine] Rash and Dizziness       Physical Exam:    /80 (BP Location: Right arm)   Pulse 85   Temp 97.8 °F (36.6 °C) (Temporal)   Resp 18   SpO2 97%     Constitutional:normal, well developed, well nourished, alert, in no distress and non-toxic and no overt pain behavior.  Eyes:anicteric  HEENT:grossly intact  Neck:supple, symmetric, trachea midline and no masses   Pulmonary:even and unlabored  Cardiovascular:No edema or pitting edema present  Skin:Normal without rashes or lesions and well hydrated  Psychiatric:Mood and affect appropriate  Neurologic:Cranial Nerves II-XII grossly intact  Musculoskeletal:normal

## 2024-01-02 NOTE — DISCHARGE INSTRUCTIONS

## 2024-01-03 ENCOUNTER — TELEPHONE (OUTPATIENT)
Age: 69
End: 2024-01-03

## 2024-01-03 ENCOUNTER — HOSPITAL ENCOUNTER (OUTPATIENT)
Dept: INFUSION CENTER | Facility: HOSPITAL | Age: 69
Discharge: HOME | End: 2024-01-03
Attending: INTERNAL MEDICINE
Payer: MEDICARE

## 2024-01-03 VITALS
RESPIRATION RATE: 18 BRPM | HEART RATE: 90 BPM | SYSTOLIC BLOOD PRESSURE: 145 MMHG | DIASTOLIC BLOOD PRESSURE: 80 MMHG | TEMPERATURE: 97.2 F

## 2024-01-03 PROCEDURE — 96361 HYDRATE IV INFUSION ADD-ON: CPT

## 2024-01-03 PROCEDURE — 96360 HYDRATION IV INFUSION INIT: CPT

## 2024-01-03 RX ADMIN — SODIUM CHLORIDE 1500 ML: 0.9 INJECTION, SOLUTION INTRAVENOUS at 08:42

## 2024-01-03 NOTE — TELEPHONE ENCOUNTER
----- Message from Laura Ceron MA sent at 1/3/2024 10:42 AM EST -----  Please review patient pain diary for MBB2 and advise the next step. It is scanned into media

## 2024-01-03 NOTE — PROGRESS NOTES
"Pt tolerated her routine 6 hour long hydration without incident. She will be going to IR tomorrow am for eval of her PICC site. Pt reports increased itching and \"pinkness \" a the upper portion of her dressing and is requesting a stitch to secure picc rather than the stat lock we have been using. Good blood return noted from picc and insertion site wnl.   "

## 2024-01-04 ENCOUNTER — HOSPITAL ENCOUNTER (OUTPATIENT)
Dept: INTERVENTIONAL RADIOLOGY/VASCULAR | Facility: HOSPITAL | Age: 69
Discharge: HOME | End: 2024-01-04
Payer: MEDICARE

## 2024-01-04 ENCOUNTER — HOSPITAL ENCOUNTER (OUTPATIENT)
Dept: INFUSION CENTER | Facility: HOSPITAL | Age: 69
End: 2024-01-04
Attending: INTERNAL MEDICINE
Payer: MEDICARE

## 2024-01-04 VITALS
RESPIRATION RATE: 18 BRPM | TEMPERATURE: 97.3 F | DIASTOLIC BLOOD PRESSURE: 84 MMHG | HEART RATE: 75 BPM | SYSTOLIC BLOOD PRESSURE: 145 MMHG

## 2024-01-04 DIAGNOSIS — E87.8 IMPAIRED HYDRATION: ICD-10-CM

## 2024-01-04 PROCEDURE — 99212 OFFICE O/P EST SF 10 MIN: CPT | Performed by: RADIOLOGY

## 2024-01-04 PROCEDURE — 96361 HYDRATE IV INFUSION ADD-ON: CPT

## 2024-01-04 PROCEDURE — 96360 HYDRATION IV INFUSION INIT: CPT

## 2024-01-04 RX ADMIN — SODIUM CHLORIDE 1500 ML: 0.9 INJECTION, SOLUTION INTRAVENOUS at 10:12

## 2024-01-04 NOTE — BRIEF OP NOTE (RAD/CATH)
IR OTHER Procedure Note    PATIENT NAME: Teresa Mao  : 1955  MRN: [id number]    Pre-op Diagnosis:   1. Impaired hydration      Post-op Diagnosis:   1. Impaired hydration        Provider:   Jairo Nix PA-C    Estimated Blood Loss: none    Findings: retention suture for tunneled central line placed    Specimens: none    Complications:  none immediate    Anesthesia: local    Jairo Nix PA-C     Date: 2024  Time: 10:26 AM

## 2024-01-05 ENCOUNTER — OFFICE VISIT (OUTPATIENT)
Dept: PAIN MEDICINE | Facility: CLINIC | Age: 69
End: 2024-01-05
Payer: MEDICARE

## 2024-01-05 ENCOUNTER — HOSPITAL ENCOUNTER (OUTPATIENT)
Dept: INFUSION CENTER | Facility: HOSPITAL | Age: 69
End: 2024-01-05
Attending: INTERNAL MEDICINE
Payer: MEDICARE

## 2024-01-05 VITALS
RESPIRATION RATE: 18 BRPM | DIASTOLIC BLOOD PRESSURE: 90 MMHG | TEMPERATURE: 96.9 F | SYSTOLIC BLOOD PRESSURE: 159 MMHG | HEART RATE: 73 BPM

## 2024-01-05 VITALS
HEART RATE: 79 BPM | DIASTOLIC BLOOD PRESSURE: 81 MMHG | WEIGHT: 137.6 LBS | HEIGHT: 61 IN | BODY MASS INDEX: 25.98 KG/M2 | SYSTOLIC BLOOD PRESSURE: 148 MMHG

## 2024-01-05 DIAGNOSIS — M54.2 NECK PAIN: ICD-10-CM

## 2024-01-05 DIAGNOSIS — G03.9 ARACHNOIDITIS: ICD-10-CM

## 2024-01-05 DIAGNOSIS — M47.816 LUMBAR SPONDYLOSIS: ICD-10-CM

## 2024-01-05 DIAGNOSIS — M54.42 CHRONIC BILATERAL LOW BACK PAIN WITH BILATERAL SCIATICA: ICD-10-CM

## 2024-01-05 DIAGNOSIS — Z98.1 HISTORY OF LUMBAR FUSION: ICD-10-CM

## 2024-01-05 DIAGNOSIS — G89.29 CHRONIC BILATERAL LOW BACK PAIN WITH BILATERAL SCIATICA: ICD-10-CM

## 2024-01-05 DIAGNOSIS — G89.4 CHRONIC PAIN SYNDROME: Primary | ICD-10-CM

## 2024-01-05 DIAGNOSIS — M47.812 CERVICAL SPONDYLOSIS: ICD-10-CM

## 2024-01-05 DIAGNOSIS — M79.18 MYOFASCIAL PAIN SYNDROME: ICD-10-CM

## 2024-01-05 DIAGNOSIS — M54.12 CERVICAL RADICULOPATHY: ICD-10-CM

## 2024-01-05 DIAGNOSIS — M54.41 CHRONIC BILATERAL LOW BACK PAIN WITH BILATERAL SCIATICA: ICD-10-CM

## 2024-01-05 PROCEDURE — 96360 HYDRATION IV INFUSION INIT: CPT

## 2024-01-05 PROCEDURE — 96361 HYDRATE IV INFUSION ADD-ON: CPT

## 2024-01-05 PROCEDURE — 99214 OFFICE O/P EST MOD 30 MIN: CPT | Performed by: NURSE PRACTITIONER

## 2024-01-05 RX ORDER — HYDROCODONE BITARTRATE AND ACETAMINOPHEN 5; 325 MG/1; MG/1
1 TABLET ORAL 2 TIMES DAILY PRN
Qty: 60 TABLET | Refills: 0 | Status: SHIPPED | OUTPATIENT
Start: 2024-01-24

## 2024-01-05 RX ORDER — HYDROCODONE BITARTRATE AND ACETAMINOPHEN 5; 325 MG/1; MG/1
1 TABLET ORAL 2 TIMES DAILY PRN
Qty: 60 TABLET | Refills: 0 | Status: SHIPPED | OUTPATIENT
Start: 2024-02-21

## 2024-01-05 RX ADMIN — SODIUM CHLORIDE 1500 ML: 9 INJECTION, SOLUTION INTRAVENOUS at 08:25

## 2024-01-05 NOTE — PROGRESS NOTES
Assessment:  1. Chronic pain syndrome    2. Chronic bilateral low back pain with bilateral sciatica    3. History of lumbar fusion    4. Lumbar spondylosis    5. Arachnoiditis    6. Neck pain    7. Cervical spondylosis    8. Cervical radiculopathy    9. Myofascial pain syndrome        Plan:  While the patient was in the office today, I did have a thorough conversation regarding their chronic pain syndrome, medication management, and treatment plan options.  Patient is being seen for a follow-up visit.  She recently underwent a second bilateral L2-3 and L3-4 medial branch block and reports more than 80% improvement for at least 12 hours.  Patient will be scheduled for left than right L2-3 and L3-4 radiofrequency ablations in the near future.    Renewed hydrocodone 5/325 twice daily if needed for pain.  The patient's opioid scripts were sent to their pharmacy electronically and was given a 2 month supply of prescriptions with a Do Not Fill date(s) of 1/24/2024, 2/21/2024.    Continue Flexeril 5 mg 3 times daily if needed for spasms.  She did not require refill of this medication during today's visit.    [place] Prescription Drug Monitoring Program report was reviewed and was appropriate     There are risks associated with opioid medications, including dependence, addiction and tolerance. The patient understands and agrees to use these medications only as prescribed. Potential side effects of the medications include, but are not limited to, constipation, drowsiness, addiction, impaired judgment and risk of fatal overdose if not taken as prescribed. The patient was warned against driving while taking sedation medications.  Sharing medications is a felony. At this point in time, the patient is showing no signs of addiction, abuse, diversion or suicidal ideation.    The patient will follow-up in 8 weeks for medication prescription refill and reevaluation. The patient was advised to contact the office should their  symptoms worsen in the interim. The patient was agreeable and verbalized an understanding.        History of Present Illness:    The patient is a 68 y.o. female last seen on 11/24/2023 who presents for a follow up office visit in regards to chronic pain secondary to chronic pain syndrome, chronic low back pain, history of lumbar fusion lumbar spondylosis.  The patient currently reports complaints of low back pain.  Current pain level is an 8/10.  Quality pain is described as burning, sharp, throbbing, cramping, shooting, numb, pins-and-needles.    Current pain medications includes: Hydrocodone 5/325 twice daily if needed for pain, Flexeril 5 mg 3 times daily if needed for spasms.  The patient reports that this regimen is providing 25% pain relief.  The patient is reporting no side effects from this pain medication regimen.    Pain Contract Signed: 5/31/2023  Last Urine Drug Screen: 12/24/2023    I have personally reviewed and/or updated the patient's past medical history, past surgical history, family history, social history, current medications, allergies, and vital signs today.       Review of Systems:    Review of Systems   Eyes:  Negative for visual disturbance.   Respiratory:  Positive for shortness of breath. Negative for wheezing.    Cardiovascular:  Positive for chest pain. Negative for palpitations.   Gastrointestinal:  Positive for constipation and nausea.   Endocrine: Negative for polydipsia.   Musculoskeletal:  Positive for gait problem. Negative for joint swelling and myalgias.        Joint stiffness  Pain in extremity-arms & legs   Skin:  Negative for rash.   Neurological:  Positive for dizziness. Negative for headaches.   Psychiatric/Behavioral:  Negative for decreased concentration.          Past Medical History:   Diagnosis Date    Allergic rhinitis     Anxiety     Asthma     Back pain     Cardiac disease     Cardiopathy     EF 45%    Cough     Diverticulitis     Factor V Leiden (HCC)      "Fibromyalgia     GERD (gastroesophageal reflux disease)     Hashimoto's thyroiditis     Hx of degenerative disc disease     Hypotension     pots - postural orthostatic hypotension    Interstitial cystitis     Irregular heart beat     LBBB    Irritable bowel syndrome     Migraines     Mitral valve disease     \"thickening\"    Myocardial infarction (HCC)     possible but not sure when    Neuropathy     bilateral legs    Osteoporosis     Postural orthostatic tachycardia syndrome     must drink a lot of water and salt    Pott's disease     Rheumatic fever 1967    Scoliosis     Sepsis (HCC)     associated with PICC line    Sjogren's syndrome (HCC)     TMJ (dislocation of temporomandibular joint)        Past Surgical History:   Procedure Laterality Date    ABLATION MICROWAVE Left     lumbar area    BACK SURGERY  10/1998     SECTION  1992    CHOLECYSTECTOMY  2016    COLONOSCOPY  2016    DILATION AND CURETTAGE OF UTERUS      EGD      FOOT SURGERY  1968    removal of bone and neuroma    HYSTERECTOMY  1997    age 45  PRICE ooph    IR OTHER  2022    IR OTHER  2022    IR OTHER  2023    IR PICC PLACEMENT SINGLE LUMEN  10/02/2020    IR PICC PLACEMENT SINGLE LUMEN  2021    IR PICC REPOSITION  2021    IR TUNNELED CENTRAL LINE PLACEMENT  2022    LAPAROSCOPY  1996    endometriosis    MOUTH BIOPSY      lip    NM EGD TRANSORAL BIOPSY SINGLE/MULTIPLE N/A 2019    Procedure: ESOPHAGOGASTRODUODENOSCOPY (EGD) with multiple bx and dilation;  Surgeon: Peter Baldwin MD;  Location:  GI LAB;  Service: Gastroenterology    NM SURGICAL ARTHROSCOPY SHOULDER W/ROTATOR CUFF RPR Right 2022    Procedure: SHOULDER ARTHROSCOPIC ROTATOR CUFF REPAIR Biceps Tenodisis;  Surgeon: Ilya Munoz MD;  [facility]: [facility];  Service: Orthopedics    TUNNELED VENOUS CATHETER PLACEMENT         Family History   Problem Relation Age of Onset    Cancer Mother         urinary " bladder     No Known Problems Father     Thyroid cancer Sister     Heart attack Sister     No Known Problems Sister     No Known Problems Sister     No Known Problems Sister     No Known Problems Sister     No Known Problems Daughter     No Known Problems Daughter     Colon cancer Maternal Grandfather     Breast cancer Maternal Aunt         over 50 yrs old     Endometrial cancer Maternal Aunt     Multiple myeloma Maternal Aunt     Hypertension Paternal Aunt        Social History     Occupational History    Not on file   Tobacco Use    Smoking status: Never    Smokeless tobacco: Never   Vaping Use    Vaping status: Never Used   Substance and Sexual Activity    Alcohol use: Never    Drug use: No    Sexual activity: Not on file         Current Outpatient Medications:     Alum Hydroxide-Mag Trisilicate (Gaviscon) 80-14.2 MG CHEW, Chew 1 tablet 4 (four) times a day as needed With meals and as needed, Disp: , Rfl:     azelastine (ASTELIN) 0.1 % nasal spray, 2 sprays into each nostril 2 (two) times a day Use in each nostril as directed, Disp: , Rfl:     beclomethasone (QVAR) 40 MCG/ACT inhaler, Inhale 1 puff daily as needed (only when unable to nebulize pulmicort) Rinse mouth after use., Disp: , Rfl:     budesonide (PULMICORT) 0.5 mg/2 mL nebulizer solution, Take 0.5 mg by nebulization 2 (two) times a day Rinse mouth after use., Disp: , Rfl:     cromolyn (GASTROCROM) 100 MG/5ML solution, Take 100 mg by mouth 4 (four) times a day (before meals and at bedtime), Disp: , Rfl:     cyclobenzaprine (FLEXERIL) 5 mg tablet, Take 1 tablet (5 mg total) by mouth 3 (three) times a day as needed for muscle spasms, Disp: 90 tablet, Rfl: 2    docusate sodium (COLACE) 100 mg capsule, Take 100 mg by mouth daily as needed for constipation, Disp: , Rfl:     erythromycin (ILOTYCIN) ophthalmic ointment, Apply 1 application to eye daily at bedtime, Disp: , Rfl:     famotidine (PEPCID) 40 MG tablet, Twice a day, Disp: , Rfl:     fexofenadine  (ALLEGRA) 180 MG tablet, Take 180 mg by mouth 2 (two) times a day , Disp: , Rfl:     fluticasone (FLONASE) 50 mcg/act nasal spray, 2 sprays into each nostril daily , Disp: , Rfl:     Galcanezumab-gnlm (Emgality) 120 MG/ML SOAJ, Inject under the skin every 30 (thirty) days , Disp: , Rfl:     [START ON 1/24/2024] HYDROcodone-acetaminophen (Norco) 5-325 mg per tablet, Take 1 tablet by mouth 2 (two) times a day as needed for pain for up to 60 doses Max Daily Amount: 2 tablets Do not start before January 24, 2024., Disp: 60 tablet, Rfl: 0    [START ON 2/21/2024] HYDROcodone-acetaminophen (NORCO) 5-325 mg per tablet, Take 1 tablet by mouth 2 (two) times a day as needed for pain Max Daily Amount: 2 tablets Do not start before February 21, 2024., Disp: 60 tablet, Rfl: 0    ipratropium (ATROVENT) 0.02 % nebulizer solution, Take 0.5 mg by nebulization every 6 (six) hours as needed for wheezing or shortness of breath, Disp: , Rfl:     ipratropium (ATROVENT) 0.06 % nasal spray, PLEASE SEE ATTACHED FOR DETAILED DIRECTIONS, Disp: , Rfl:     ivabradine HCl (Corlanor) 5 MG tablet, 7.5 mg 2 (two) times a day, Disp: , Rfl:     levalbuterol (XOPENEX HFA) 45 mcg/act inhaler, Inhale 2 puffs every 4 (four) hours as needed (when unable to nebulize), Disp: , Rfl:     levalbuterol (XOPENEX) 1.25 mg/3 mL nebulizer solution, Take 1.25 mg by nebulization every 6 (six) hours as needed , Disp: , Rfl: 2    Magnesium 400 MG CAPS, Take 1 capsule by mouth 2 (two) times a day , Disp: , Rfl:     MULTIPLE VITAMINS-CALCIUM PO, Take 1 capsule by mouth every morning , Disp: , Rfl:     NON FORMULARY, Take 1 mg by mouth 3 (three) times a day Ketotifen 1 mg compounded capsule, Disp: , Rfl:     omega-3-acid ethyl esters (LOVAZA) 1 g capsule, Take 2 g by mouth 2 (two) times a day 1600mg daily, Disp: , Rfl:     polyethylene glycol (MIRALAX) 17 g packet, Take 17 g by mouth daily as needed , Disp: , Rfl:     Probiotic Product (PROBIOTIC DAILY PO), Take 1 tablet  by mouth daily At lunch, Disp: , Rfl:     Qvar RediHaler 40 MCG/ACT inhaler, , Disp: , Rfl:     sodium chloride, Inject 1,500 mL into a catheter in a vein, Disp: , Rfl:     sucralfate (CARAFATE) 1 g/10 mL suspension, Take 1 g by mouth 2 (two) times a day, Disp: , Rfl:     Thyroid, Porcine, POWD, Use 32 mg daily, Disp: , Rfl:     Ubrogepant (UBRELVY) 100 MG tablet, Take 100 mg by mouth Take 1 tablet (100 mg) one time as needed for migraine. May repeat one additional tablet (100 mg) at least two hours after the first dose. Do not use more than two doses per day, or for more than eight days per month., Disp: , Rfl:     verapamil (CALAN) 40 mg tablet, , Disp: , Rfl:     cholecalciferol (VITAMIN D3) 1,000 units tablet, Take 4,000 Units by mouth daily (Patient not taking: Reported on 1/5/2024), Disp: , Rfl:     methylPREDNISolone 4 MG tablet therapy pack, Use as directed on package, Disp: 1 each, Rfl: 0  No current facility-administered medications for this visit.    Facility-Administered Medications Ordered in Other Visits:     sodium chloride 0.9 % bolus 1,500 mL, 1,500 mL, Intravenous, Once, Jan Doyle MD    [START ON 1/8/2024] sodium chloride 0.9 % bolus 1,500 mL, 1,500 mL, Intravenous, Once, Jan Doyle MD    Allergies   Allergen Reactions    Imipramine Confusion, Fatigue, Irritability, Palpitations, Shortness Of Breath, Tachycardia and Visual Disturbance    Melatonin Shortness Of Breath    Mirtazapine Anxiety, Dizziness, GI Intolerance, Nausea Only, Palpitations and Shortness Of Breath    Nexium [Esomeprazole] Shortness Of Breath    Nsaids Shortness Of Breath    Singulair [Montelukast] Shortness Of Breath and Cough    Zomig [Zolmitriptan] Shortness Of Breath    Dexilant [Dexlansoprazole] Nausea Only and Vomiting    Albuterol     Ambien [Zolpidem]     Amitriptyline Drowsiness    Aspirin     Bactrim [Sulfamethoxazole-Trimethoprim] Hives    Banana - Food Allergy Dermatitis    Ceftin [Cefuroxime]     Celebrex [Celecoxib]   "   Ciprofloxacin     Cranberry-C [Ascorbate - Food Allergy] GI Intolerance    Demerol [Meperidine]     Epinephrine Dizziness    Ergotamine Nausea Only and Headache    Keflex [Cephalexin]     Klonopin [Clonazepam] Nausea Only and Dizziness    Latex Hives    Levaquin [Levofloxacin]     Lexapro [Escitalopram]     Lyrica [Pregabalin] Fatigue    Macrodantin [Nitrofurantoin]     Morphine Nausea Only    Movantik [Naloxegol] Nausea Only    Mushroom Extract Complex - Food Allergy      Eye itchy, asthma  attack    Naprosyn [Naproxen]     Neurontin [Gabapentin] Dizziness    Pineapple - Food Allergy GI Intolerance    Plecanatide Abdominal Pain, Diarrhea and GI Intolerance    Prolia [Denosumab]     Prozac [Fluoxetine]     Serevent [Salmeterol] Dizziness    Sudafed [Pseudoephedrine] Hives    Sulfa Antibiotics     Tomato - Food Allergy GI Intolerance    Trazodone     Ultram [Tramadol] Nausea Only, Dizziness and Headache    Vilazodone Dizziness, GI Intolerance and Headache    Vilazodone Hcl Abdominal Pain, Dizziness, GI Intolerance, Headache and Other (See Comments)    Vioxx [Rofecoxib]     Vortioxetine Drowsiness, Fatigue, GI Intolerance and Irritability    Zithromax [Azithromycin] Hives    Zoloft [Sertraline]     Zantac [Ranitidine] Rash and Dizziness       Physical Exam:    /81   Pulse 79   Ht 5' 1\" (1.549 m)   Wt 62.4 kg (137 lb 9.6 oz)   BMI 26.00 kg/m²     Constitutional:normal, well developed, well nourished, alert, in no distress and non-toxic and no overt pain behavior.  Eyes:anicteric  HEENT:grossly intact  Neck:supple, symmetric, trachea midline and no masses   Pulmonary:even and unlabored  Cardiovascular:No edema or pitting edema present  Skin:Normal without rashes or lesions and well hydrated  Psychiatric:Mood and affect appropriate  Neurologic:Cranial Nerves II-XII grossly intact  Musculoskeletal: Gait is slow and guarded.  Range of motion of the lumbar spine is limited in all planes.  There is tenderness " bilaterally L4-S1.  There are lumbar paraspinal muscle spasms present.      Imaging  No orders to display         No orders of the defined types were placed in this encounter.

## 2024-01-05 NOTE — PROGRESS NOTES
Pt tolerated IV hydration well without incident. Discharged in stable condition and pt aware of next infusion appointment on Monday 1/8/24 at 8:30AM. AVS declined.

## 2024-01-05 NOTE — PATIENT INSTRUCTIONS
Opioid Safety   WHAT YOU NEED TO KNOW:   An opioid medicine is used to treat pain. Examples are oxycodone, morphine, fentanyl, or codeine. Pain control and management may help you rest, heal, and return to your daily activities. You and your family will receive information about how to manage your pain at home. The instructions will include what to do if you have side effects as your pain is managed. You will get information on how to handle opioid medicine safely. You will also get suggestions on how to control pain without opioids. It is important to follow all instructions so your pain is managed effectively.  DISCHARGE INSTRUCTIONS:   Call your local emergency number (911 in the ), or have someone else call if:   You have a seizure.    You cannot be woken.    You have trouble staying awake and your breathing is slow or shallow.    Your speech is slurred, or you are confused.    You are dizzy or stumble when you walk.    Call your doctor, or have someone close to you call if:   You are extremely drowsy, or you have trouble staying awake or speaking.    You have pale or clammy skin.    You have blue fingernails or lips.    Your heartbeat is slower than normal.    You cannot stop vomiting.    You have questions or concerns about your condition or care.    Use opioids safely:   Take prescribed opioids exactly as directed.  Opioids come with directions based on the kind and how it is given. Talk to your healthcare provider or a pharmacist if you have any questions. Do not take more than the recommended amount. Too much can cause a life-threatening overdose. Do not continue to take it after your pain stops. You may develop tolerance. This means you keep needing higher doses to get the same effect. You may also develop opioid use disorder. This means you are not able to control your opioid use.    Do not give opioids to others or take opioids that belong to someone else.  The kind or amount one person takes may not  be right for another. The person you share them with may also be taking medicines that do not mix with opioids. The person may drink alcohol or use other drugs that can cause life-threatening problems when mixed with opioids.    Do not mix opioids with other medicines or alcohol.  The combination can cause an overdose, or cause you to stop breathing. Alcohol, sleeping pills, and medicines such as antihistamines can make you sleepy. A combination with opioids can lead to a coma.    Do not drive or operate heavy machinery after you use an opioid.  You may feel drowsy or have trouble concentrating. You can injure yourself or others if you drive or use heavy machinery when you are not alert. Your provider or pharmacist can tell you how long to wait after a dose before you do these activities.    Talk to your healthcare provider if you have any side effects.  Side effects include nausea, sleepiness, itching, and trouble thinking clearly. Your provider may need to make changes to the kind or amount of opioid you are taking. Your provider can also help you find ways to prevent or relieve side effects.    Manage constipation:  Constipation is the most common side effect of opioid medicine. Constipation is when you have hard, dry bowel movements, or you go longer than usual between bowel movements. Tell your healthcare provider about all changes in your bowel movements while you are taking opioids. Your provider may recommend laxative medicine to help you have a bowel movement. Your provider may also change the kind of opioid you are taking, or change when you take it. The following are more ways you can prevent or relieve constipation:  Drink liquids as directed.  You may need to drink extra liquids to help soften and move your bowels. Ask how much liquid to drink each day and which liquids are best for you.    Eat high-fiber foods.  This may help decrease constipation by adding bulk to your bowel movements. High-fiber  foods include fruits, vegetables, whole-grain breads and cereals, and beans. Your healthcare provider or dietitian can help you create a high-fiber meal plan. Your provider may also recommend a fiber supplement if you cannot get enough fiber from food.         Exercise regularly.  Regular physical activity can help stimulate your intestines. Walking is a good exercise to prevent or relieve constipation. Ask which exercises are best for you.         Schedule a time each day to have a bowel movement.  This may help train your body to have regular bowel movements. Bend forward while you are on the toilet to help move the bowel movement out. Sit on the toilet for at least 10 minutes, even if you do not have a bowel movement.    Store opioids safely:   Store opioids where others cannot easily get them.  Keep them in a locked cabinet or secure area. Do not  keep them in a purse or other bag you carry with you. A person may be looking for something else and find the opioids.         Make sure opioids are stored out of the reach of children.  A child can easily overdose on opioids. Opioids may look like candy to a small child.    The best way to dispose of opioids:  The laws vary by country and area. In the [place], the best way is to return the opioids through a take-back program. This program is offered by the [place] Drug Enforcement Agency ([employer]). The following are options for using the program:  Take the opioids to a [employer] collection site.  The site is often a law enforcement center. Call your local law enforcement center for scheduled take-back days in your area. You will be given information on where to go if the collection site is in a different location.    Take the opioids to an approved pharmacy or hospital.  A pharmacy or hospital may be set up as a collection site. You will need to ask if it is a [employer] collection site if you were not directed there. A pharmacy or doctor's office may not be able to take back opioids  unless it is a [employer] site.    Use a mail-back system.  This means you are given containers to put the opioids into. You will then mail them in the containers.    Use a take-back drop box.  This is a place to leave the opioids at any time. People and animals will not be able to get into the box. Your local law enforcement agency can tell you where to find a drop box in your area.    Other safe ways to dispose of opioids:  The medicine may come with disposal instructions. The instructions may vary depending on the brand of medicine you are using. Instructions may come in a Medication Guide, but not every medicine has one. You may instead get instructions from your pharmacy or doctor. Follow instructions carefully. The following are general guidelines to follow:  Find out if you can flush the opioid.  Some opioids can be flushed down the toilet or poured into the sink. You will need to contact authorities in your area to see if this is an option for you. The FDA also offers a list of medicines that are safe to flush down the toilet. You can check the list if you cannot get the information for your local area.    Ask your waste management company about rules for putting opioids in the trash.  The company will be able to give you specific directions. Scratch out personal information on the original medicine label so it cannot be read. Then put it in the trash. Do not label the trash or put any information on it about the opioids. It should look like regular household trash so no one is tempted to look for the opioids. Keep the trash out of the reach of children and animals. Always make sure trash is secure.    Talk to officials if you live in a facility.  If you live in a nursing home or assisted living center, talk to an official. The person will know the rules for your area.    Other ways to manage pain:   Ask your healthcare provider about non-opioid medicines to control pain.  Some medicines may even work better than  opioids, depending on the cause of your pain. Nonprescription medicines include NSAIDs (such as ibuprofen) and acetaminophen. Prescription medicines include muscle relaxers, antidepressants, and steroids.    Pain may be managed without any medicines.  Some ways to relieve pain include massage, aromatherapy, or meditation. Physical or occupational therapy may also help.    Follow up with your doctor or pain specialist as directed:  You may need to have your dose adjusted. Your doctor or pain specialist can also help you find ways to manage pain without opioids. Write down your questions so you remember to ask them during your visits.  For more information:   Drug Enforcement Administration  [address] [place] [address]  Phone: 9- 765 - [phone]  Web Address: https://www.deadiversion.[facility] Centeroj.gov/drug_disposal/    US Food and Drug Administration  [address] [place] [address]  Phone: 6- 945 - 157-0842  Web Address: http://www.fda.gov  © Copyright [employer] 2023 Information is for End User's use only and may not be sold, redistributed or otherwise used for commercial purposes.  The above information is an  only. It is not intended as medical advice for individual conditions or treatments. Talk to your doctor, nurse or pharmacist before following any medical regimen to see if it is safe and effective for you.

## 2024-01-08 ENCOUNTER — HOSPITAL ENCOUNTER (OUTPATIENT)
Dept: INFUSION CENTER | Facility: HOSPITAL | Age: 69
Discharge: HOME | End: 2024-01-08
Attending: INTERNAL MEDICINE
Payer: MEDICARE

## 2024-01-08 VITALS
SYSTOLIC BLOOD PRESSURE: 107 MMHG | HEART RATE: 79 BPM | OXYGEN SATURATION: 98 % | TEMPERATURE: 96.9 F | DIASTOLIC BLOOD PRESSURE: 76 MMHG | RESPIRATION RATE: 16 BRPM

## 2024-01-08 PROCEDURE — 96360 HYDRATION IV INFUSION INIT: CPT

## 2024-01-08 PROCEDURE — 96361 HYDRATE IV INFUSION ADD-ON: CPT

## 2024-01-08 RX ADMIN — SODIUM CHLORIDE 1500 ML: 0.9 INJECTION, SOLUTION INTRAVENOUS at 08:46

## 2024-01-08 NOTE — PROGRESS NOTES
Patient tolerated IV hydration without issues.  AVS declined.  Patient is aware of their appointment on 1/9/24 at 0900. Patient ambulated off unit without incident.  All personal belongings taken with patient.

## 2024-01-10 RX ORDER — SODIUM CHLORIDE 9 MG/ML
250 INJECTION, SOLUTION INTRAVENOUS ONCE
Status: COMPLETED | OUTPATIENT
Start: 2024-01-11 | End: 2024-01-11

## 2024-01-11 ENCOUNTER — HOSPITAL ENCOUNTER (OUTPATIENT)
Dept: INFUSION CENTER | Facility: HOSPITAL | Age: 69
End: 2024-01-11
Attending: INTERNAL MEDICINE
Payer: MEDICARE

## 2024-01-11 VITALS
DIASTOLIC BLOOD PRESSURE: 77 MMHG | OXYGEN SATURATION: 99 % | TEMPERATURE: 98.7 F | RESPIRATION RATE: 16 BRPM | SYSTOLIC BLOOD PRESSURE: 129 MMHG | HEART RATE: 94 BPM

## 2024-01-11 PROCEDURE — 96360 HYDRATION IV INFUSION INIT: CPT

## 2024-01-11 PROCEDURE — 96361 HYDRATE IV INFUSION ADD-ON: CPT

## 2024-01-11 RX ADMIN — SODIUM CHLORIDE 250 ML/HR: 0.9 INJECTION, SOLUTION INTRAVENOUS at 08:50

## 2024-01-12 ENCOUNTER — HOSPITAL ENCOUNTER (OUTPATIENT)
Dept: INFUSION CENTER | Facility: HOSPITAL | Age: 69
End: 2024-01-12
Attending: INTERNAL MEDICINE
Payer: MEDICARE

## 2024-01-12 VITALS
SYSTOLIC BLOOD PRESSURE: 147 MMHG | RESPIRATION RATE: 18 BRPM | HEART RATE: 80 BPM | TEMPERATURE: 96.7 F | DIASTOLIC BLOOD PRESSURE: 89 MMHG

## 2024-01-12 PROCEDURE — 96360 HYDRATION IV INFUSION INIT: CPT

## 2024-01-12 PROCEDURE — 96361 HYDRATE IV INFUSION ADD-ON: CPT

## 2024-01-12 RX ORDER — SODIUM CHLORIDE 9 MG/ML
250 INJECTION, SOLUTION INTRAVENOUS ONCE
Status: COMPLETED | OUTPATIENT
Start: 2024-01-15 | End: 2024-01-15

## 2024-01-12 RX ADMIN — SODIUM CHLORIDE 1500 ML: 0.9 INJECTION, SOLUTION INTRAVENOUS at 09:00

## 2024-01-12 NOTE — PROGRESS NOTES
Teresa Mao  tolerated treatment well with no complications.      Teresa Mao is aware of future appt on 1/15 at 8:30 am.     AVS declined by Teresa Mao.

## 2024-01-15 ENCOUNTER — HOSPITAL ENCOUNTER (OUTPATIENT)
Dept: INFUSION CENTER | Facility: HOSPITAL | Age: 69
Discharge: HOME | End: 2024-01-15
Attending: INTERNAL MEDICINE
Payer: MEDICARE

## 2024-01-15 VITALS
DIASTOLIC BLOOD PRESSURE: 87 MMHG | TEMPERATURE: 97.3 F | HEART RATE: 94 BPM | RESPIRATION RATE: 18 BRPM | SYSTOLIC BLOOD PRESSURE: 144 MMHG

## 2024-01-15 PROCEDURE — 96360 HYDRATION IV INFUSION INIT: CPT

## 2024-01-15 PROCEDURE — 96361 HYDRATE IV INFUSION ADD-ON: CPT

## 2024-01-15 RX ADMIN — SODIUM CHLORIDE 250 ML/HR: 0.9 INJECTION, SOLUTION INTRAVENOUS at 08:39

## 2024-01-15 NOTE — PROGRESS NOTES
Teresa Mao  tolerated hydration well with no complications. Discharged in stable condition.    Teresa Mao is aware of future appt on 1/17/24 at 9AM    AVS declined by Teresa Mao:

## 2024-01-17 RX ORDER — SODIUM CHLORIDE 9 MG/ML
250 INJECTION, SOLUTION INTRAVENOUS ONCE
Status: COMPLETED | OUTPATIENT
Start: 2024-01-18 | End: 2024-01-18

## 2024-01-18 ENCOUNTER — HOSPITAL ENCOUNTER (OUTPATIENT)
Dept: INFUSION CENTER | Facility: HOSPITAL | Age: 69
End: 2024-01-18
Attending: INTERNAL MEDICINE
Payer: MEDICARE

## 2024-01-18 VITALS
OXYGEN SATURATION: 100 % | SYSTOLIC BLOOD PRESSURE: 138 MMHG | HEART RATE: 87 BPM | TEMPERATURE: 97.7 F | DIASTOLIC BLOOD PRESSURE: 83 MMHG | RESPIRATION RATE: 18 BRPM

## 2024-01-18 PROCEDURE — 96361 HYDRATE IV INFUSION ADD-ON: CPT

## 2024-01-18 PROCEDURE — 96360 HYDRATION IV INFUSION INIT: CPT

## 2024-01-18 RX ORDER — SODIUM CHLORIDE 9 MG/ML
250 INJECTION, SOLUTION INTRAVENOUS ONCE
Status: COMPLETED | OUTPATIENT
Start: 2024-01-22 | End: 2024-01-22

## 2024-01-18 RX ADMIN — SODIUM CHLORIDE 250 ML/HR: 0.9 INJECTION, SOLUTION INTRAVENOUS at 08:47

## 2024-01-18 NOTE — PROGRESS NOTES
Pt here for Fluids, pt offered no complaints today. Vitals stable. Pt resting comfortably with call bell in place.

## 2024-01-18 NOTE — PLAN OF CARE
Problem: Potential for Falls  Goal: Patient will remain free of falls  Description: INTERVENTIONS:  - Educate patient/family on patient safety including physical limitations    - Keep Call bell within reach    Outcome: Progressing

## 2024-01-22 ENCOUNTER — HOSPITAL ENCOUNTER (OUTPATIENT)
Dept: INFUSION CENTER | Facility: HOSPITAL | Age: 69
Discharge: HOME | End: 2024-01-22
Attending: INTERNAL MEDICINE
Payer: MEDICARE

## 2024-01-22 VITALS
DIASTOLIC BLOOD PRESSURE: 80 MMHG | TEMPERATURE: 96.9 F | OXYGEN SATURATION: 100 % | SYSTOLIC BLOOD PRESSURE: 111 MMHG | RESPIRATION RATE: 16 BRPM | HEART RATE: 81 BPM

## 2024-01-22 PROCEDURE — 96361 HYDRATE IV INFUSION ADD-ON: CPT

## 2024-01-22 PROCEDURE — 96360 HYDRATION IV INFUSION INIT: CPT

## 2024-01-22 RX ADMIN — SODIUM CHLORIDE 250 ML/HR: 0.9 INJECTION, SOLUTION INTRAVENOUS at 08:40

## 2024-01-22 NOTE — PROGRESS NOTES
Teresa Mao  received hydration without incident.     Teresa Mao is aware of future appt on 1-23-24 at 830 am     AVS declined.

## 2024-01-23 RX ORDER — SODIUM CHLORIDE 9 MG/ML
250 INJECTION, SOLUTION INTRAVENOUS ONCE
Status: COMPLETED | OUTPATIENT
Start: 2024-01-25 | End: 2024-01-25

## 2024-01-24 RX ORDER — SODIUM CHLORIDE 9 MG/ML
250 INJECTION, SOLUTION INTRAVENOUS ONCE
Status: COMPLETED | OUTPATIENT
Start: 2024-01-26 | End: 2024-01-26

## 2024-01-25 ENCOUNTER — HOSPITAL ENCOUNTER (OUTPATIENT)
Dept: INFUSION CENTER | Facility: HOSPITAL | Age: 69
End: 2024-01-25
Attending: INTERNAL MEDICINE
Payer: MEDICARE

## 2024-01-25 VITALS
HEART RATE: 86 BPM | RESPIRATION RATE: 18 BRPM | DIASTOLIC BLOOD PRESSURE: 81 MMHG | OXYGEN SATURATION: 99 % | SYSTOLIC BLOOD PRESSURE: 135 MMHG | TEMPERATURE: 99 F

## 2024-01-25 PROCEDURE — 96361 HYDRATE IV INFUSION ADD-ON: CPT

## 2024-01-25 PROCEDURE — 96360 HYDRATION IV INFUSION INIT: CPT

## 2024-01-25 RX ORDER — TOBRAMYCIN AND DEXAMETHASONE 3; 1 MG/ML; MG/ML
1 SUSPENSION/ DROPS OPHTHALMIC
COMMUNITY

## 2024-01-25 RX ORDER — SODIUM CHLORIDE 9 MG/ML
250 INJECTION, SOLUTION INTRAVENOUS ONCE
Status: COMPLETED | OUTPATIENT
Start: 2024-01-29 | End: 2024-01-29

## 2024-01-25 RX ADMIN — SODIUM CHLORIDE 250 ML/HR: 0.9 INJECTION, SOLUTION INTRAVENOUS at 08:40

## 2024-01-26 ENCOUNTER — HOSPITAL ENCOUNTER (OUTPATIENT)
Dept: INFUSION CENTER | Facility: HOSPITAL | Age: 69
End: 2024-01-26
Attending: INTERNAL MEDICINE
Payer: MEDICARE

## 2024-01-26 VITALS
SYSTOLIC BLOOD PRESSURE: 122 MMHG | HEART RATE: 16 BPM | RESPIRATION RATE: 77 BRPM | DIASTOLIC BLOOD PRESSURE: 77 MMHG | TEMPERATURE: 97.2 F

## 2024-01-26 PROCEDURE — 96360 HYDRATION IV INFUSION INIT: CPT

## 2024-01-26 PROCEDURE — 96361 HYDRATE IV INFUSION ADD-ON: CPT

## 2024-01-26 RX ADMIN — SODIUM CHLORIDE 250 ML/HR: 0.9 INJECTION, SOLUTION INTRAVENOUS at 08:36

## 2024-01-26 NOTE — PROGRESS NOTES
Patient tolerated IV hydration without issues.  Teresa Mao  tolerated treatment well with no complications.      Teresa Mao is aware of future appt on 1/29/24 at 0830.     AVS-  No (Declined by Teresa Mao)     Patient ambulated off unit without incident.  All personal belongings taken with patient.

## 2024-01-29 ENCOUNTER — HOSPITAL ENCOUNTER (OUTPATIENT)
Dept: INFUSION CENTER | Facility: HOSPITAL | Age: 69
Discharge: HOME | End: 2024-01-29
Payer: MEDICARE

## 2024-01-29 VITALS
HEART RATE: 78 BPM | DIASTOLIC BLOOD PRESSURE: 77 MMHG | SYSTOLIC BLOOD PRESSURE: 123 MMHG | RESPIRATION RATE: 18 BRPM | TEMPERATURE: 98.2 F

## 2024-01-29 PROCEDURE — 96361 HYDRATE IV INFUSION ADD-ON: CPT

## 2024-01-29 PROCEDURE — 96360 HYDRATION IV INFUSION INIT: CPT

## 2024-01-29 RX ADMIN — SODIUM CHLORIDE 250 ML/HR: 0.9 INJECTION, SOLUTION INTRAVENOUS at 09:05

## 2024-01-30 ENCOUNTER — TELEPHONE (OUTPATIENT)
Age: 69
End: 2024-01-30

## 2024-01-30 ENCOUNTER — APPOINTMENT (OUTPATIENT)
Dept: RADIOLOGY | Facility: CLINIC | Age: 69
End: 2024-01-30
Payer: MEDICARE

## 2024-01-30 ENCOUNTER — OFFICE VISIT (OUTPATIENT)
Dept: URGENT CARE | Facility: CLINIC | Age: 69
End: 2024-01-30
Payer: MEDICARE

## 2024-01-30 VITALS
RESPIRATION RATE: 18 BRPM | SYSTOLIC BLOOD PRESSURE: 122 MMHG | DIASTOLIC BLOOD PRESSURE: 80 MMHG | OXYGEN SATURATION: 96 % | HEART RATE: 85 BPM | TEMPERATURE: 98.2 F

## 2024-01-30 DIAGNOSIS — J06.9 UPPER RESPIRATORY TRACT INFECTION, UNSPECIFIED TYPE: Primary | ICD-10-CM

## 2024-01-30 DIAGNOSIS — R05.1 ACUTE COUGH: ICD-10-CM

## 2024-01-30 PROCEDURE — 99213 OFFICE O/P EST LOW 20 MIN: CPT | Performed by: PHYSICIAN ASSISTANT

## 2024-01-30 PROCEDURE — G0463 HOSPITAL OUTPT CLINIC VISIT: HCPCS | Performed by: PHYSICIAN ASSISTANT

## 2024-01-30 PROCEDURE — 71046 X-RAY EXAM CHEST 2 VIEWS: CPT

## 2024-01-30 PROCEDURE — 87636 SARSCOV2 & INF A&B AMP PRB: CPT | Performed by: PHYSICIAN ASSISTANT

## 2024-01-30 RX ORDER — SODIUM CHLORIDE 9 MG/ML
250 INJECTION, SOLUTION INTRAVENOUS ONCE
Status: DISCONTINUED | OUTPATIENT
Start: 2024-02-01 | End: 2024-02-05 | Stop reason: HOSPADM

## 2024-01-30 NOTE — TELEPHONE ENCOUNTER
Caller: liliana Moore    Doctor: Cecilio    Reason for call: pt needs to reschedule procedure scheduled for 2/6/24. Pt has the flu and bronchitis. Please Advise    Call back#: [phone]

## 2024-01-30 NOTE — PROGRESS NOTES
[facility]        NAME: Teresa Mao is a 68 y.o. female  : 1955    MRN: [id number]  DATE: 2024  TIME: 7:34 PM    Assessment and Plan   Upper respiratory tract infection, unspecified type [J06.9]  1. Upper respiratory tract infection, unspecified type        2. Acute cough  Covid/Flu- Office Collect Normal    XR chest pa & lateral    Covid/Flu- Office Collect Normal            Patient Instructions     Patient Instructions   Upper Respiratory Infection   WHAT YOU NEED TO KNOW:   An upper respiratory infection is also called a cold. It can affect your nose, throat, ears, and sinuses. Cold symptoms are usually worst for the first 3 to 5 days. Most people get better in 7 to 14 days. You may continue to cough for 2 to 3 weeks. Colds are caused by viruses and do not get better with antibiotics.  DISCHARGE INSTRUCTIONS:   Call your local emergency number (911 in the ) if:   You have chest pain or trouble breathing.      Return to the emergency department if:   You have a fever over 102ºF (39ºC).      Call your doctor if:   You have a low fever.    Your sore throat gets worse or you see white or yellow spots in your throat.    Your symptoms get worse after 3 to 5 days or are not better in 14 days.    You have a rash anywhere on your skin.    You have large, tender lumps in your neck.    You have thick, green, or yellow drainage from your nose.    You cough up thick yellow, green, or bloody mucus.    You have a bad earache.    You have questions or concerns about your condition or care.    Medicines:  You may need any of the following:  Decongestants  help reduce nasal congestion and help you breathe more easily. If you take decongestant pills, they may make you feel restless or cause problems with your sleep. Do not use decongestant sprays for more than a few days.    Cough suppressants  help reduce coughing. Ask your healthcare provider which type of cough medicine is best for you.      NSAIDs , such as ibuprofen, help decrease swelling, pain, and fever. NSAIDs can cause stomach bleeding or kidney problems in certain people. If you take blood thinner medicine, always ask your healthcare provider if NSAIDs are safe for you. Always read the medicine label and follow directions.    Acetaminophen  decreases pain and fever. It is available without a doctor's order. Ask how much to take and how often to take it. Follow directions. Read the labels of all other medicines you are using to see if they also contain acetaminophen, or ask your doctor or pharmacist. Acetaminophen can cause liver damage if not taken correctly.    Take your medicine as directed.  Contact your healthcare provider if you think your medicine is not helping or if you have side effects. Tell your provider if you are allergic to any medicine. Keep a list of the medicines, vitamins, and herbs you take. Include the amounts, and when and why you take them. Bring the list or the pill bottles to follow-up visits. Carry your medicine list with you in case of an emergency.    Self-care:   Rest as much as possible.  Slowly start to do more each day.    Drink more liquids as directed.  Liquids will help thin and loosen mucus so you can cough it up. Liquids will also help prevent dehydration. Liquids that help prevent dehydration include water, fruit juice, and broth. Do not drink liquids that contain caffeine. Caffeine can increase your risk for dehydration. Ask your healthcare provider how much liquid to drink each day.    Soothe a sore throat.  Gargle with warm salt water. Make salt water by dissolving ¼ teaspoon salt in 1 cup warm water. You may also suck on hard candy or throat lozenges. You may use a sore throat spray.    Use a humidifier or vaporizer.  Use a cool mist humidifier or a vaporizer to increase air moisture in your home. This may make it easier for you to breathe and help decrease your cough.    Use saline nasal drops as  directed.  These help relieve congestion.    Apply petroleum-based jelly around the outside of your nostrils.  This can decrease irritation from blowing your nose.    Do not smoke.  Nicotine and other chemicals in cigarettes and cigars can make your symptoms worse. They can also cause infections such as bronchitis or pneumonia. Ask your healthcare provider for information if you currently smoke and need help to quit. E-cigarettes or smokeless tobacco still contain nicotine. Talk to your healthcare provider before you use these products.    Prevent a cold:   Wash your hands often.  Use soap and water every time you wash your hands. Rub your soapy hands together, lacing your fingers. Use the fingers of one hand to scrub under the nails of the other hand. Wash for at least 20 seconds. Rinse with warm, running water for several seconds. Then dry your hands. Use hand  gel if soap and water are not available. Do not touch your eyes or mouth without washing your hands first.         Cover a sneeze or cough.  Use a tissue that covers your mouth and nose. Put the used tissue in the trash right away. Use the bend of your arm if a tissue is not available. Wash your hands well with soap and water or use a hand . Do not stand close to anyone who is sneezing or coughing.    Try to stay away from others while you are sick.  This is especially important during the first 2 to 3 days when the virus is more easily spread. Wait until a fever, cough, or other symptoms are gone before you return to work or other regular activities.    Do not share items while you are sick.  This includes food, drinks, eating utensils, and dishes.    Follow up with your doctor as directed:  Write down your questions so you remember to ask them during your visits.  © Copyright [employer] 2023 Information is for End User's use only and may not be sold, redistributed or otherwise used for commercial purposes.  The above information is an   only. It is not intended as medical advice for individual conditions or treatments. Talk to your doctor, nurse or pharmacist before following any medical regimen to see if it is safe and effective for you.        Follow up with PCP in 3-5 days.  Proceed to  ER if symptoms worsen.    Chief Complaint     Chief Complaint   Patient presents with    Cough    Fatigue     Onset 2  week ago Negative covid today gets iv therapy for POTS just left pharmacy prior to coming here where her pcp prescribed antibiotic and steroids          History of Present Illness       The patient is a 8-year-old female with past medical history significant for asthma, hypertension, factor V Leyden, and POTS syndrome who presents the clinic for cough for approximately 2 weeks.  She states that she does have a tendency of getting bronchitis and pneumonia.  She is undergoing IV fluids for her POTS syndrome and states that hospital will not give her an IV since she is currently coughing unless she is tested for the flu or COVID.  She states that she tried to get in with her primary care doctor today but she became dizzy and could not drive therefore she had a phone visit with him.  She states that the primary care doctor called in Augmentin and steroids but she needed to come into the clinic and have a COVID and flu test today.        Review of Systems   Review of Systems   Constitutional:  Negative for chills and fever.   HENT:  Positive for congestion, postnasal drip, rhinorrhea and sinus pain. Negative for ear pain and sore throat.    Eyes:  Negative for pain and visual disturbance.   Respiratory:  Positive for cough. Negative for shortness of breath.    Cardiovascular:  Negative for chest pain and palpitations.   Gastrointestinal:  Negative for abdominal pain and vomiting.   Genitourinary:  Negative for dysuria and hematuria.   Musculoskeletal:  Negative for arthralgias and back pain.   Skin:  Negative for color change and rash.    Neurological:  Positive for dizziness. Negative for seizures and syncope.   All other systems reviewed and are negative.        Current Medications       Current Outpatient Medications:     Alum Hydroxide-Mag Trisilicate (Gaviscon) 80-14.2 MG CHEW, Chew 1 tablet 4 (four) times a day as needed With meals and as needed, Disp: , Rfl:     azelastine (ASTELIN) 0.1 % nasal spray, 2 sprays into each nostril 2 (two) times a day Use in each nostril as directed, Disp: , Rfl:     beclomethasone (QVAR) 40 MCG/ACT inhaler, Inhale 1 puff daily as needed (only when unable to nebulize pulmicort) Rinse mouth after use., Disp: , Rfl:     budesonide (PULMICORT) 0.5 mg/2 mL nebulizer solution, Take 0.5 mg by nebulization 2 (two) times a day Rinse mouth after use., Disp: , Rfl:     cholecalciferol (VITAMIN D3) 1,000 units tablet, Take 4,000 Units by mouth daily (Patient not taking: Reported on 1/5/2024), Disp: , Rfl:     cromolyn (GASTROCROM) 100 MG/5ML solution, Take 100 mg by mouth 4 (four) times a day (before meals and at bedtime), Disp: , Rfl:     cyclobenzaprine (FLEXERIL) 5 mg tablet, Take 1 tablet (5 mg total) by mouth 3 (three) times a day as needed for muscle spasms, Disp: 90 tablet, Rfl: 2    docusate sodium (COLACE) 100 mg capsule, Take 100 mg by mouth daily as needed for constipation, Disp: , Rfl:     erythromycin (ILOTYCIN) ophthalmic ointment, Apply 1 application to eye daily at bedtime, Disp: , Rfl:     famotidine (PEPCID) 40 MG tablet, Twice a day, Disp: , Rfl:     fexofenadine (ALLEGRA) 180 MG tablet, Take 180 mg by mouth 2 (two) times a day , Disp: , Rfl:     fluticasone (FLONASE) 50 mcg/act nasal spray, 2 sprays into each nostril daily , Disp: , Rfl:     Galcanezumab-gnlm (Emgality) 120 MG/ML SOAJ, Inject under the skin every 30 (thirty) days , Disp: , Rfl:     HYDROcodone-acetaminophen (Norco) 5-325 mg per tablet, Take 1 tablet by mouth 2 (two) times a day as needed for pain for up to 60 doses Max Daily Amount: 2  tablets Do not start before January 24, 2024., Disp: 60 tablet, Rfl: 0    [START ON 2/21/2024] HYDROcodone-acetaminophen (NORCO) 5-325 mg per tablet, Take 1 tablet by mouth 2 (two) times a day as needed for pain Max Daily Amount: 2 tablets Do not start before February 21, 2024., Disp: 60 tablet, Rfl: 0    ipratropium (ATROVENT) 0.02 % nebulizer solution, Take 0.5 mg by nebulization every 6 (six) hours as needed for wheezing or shortness of breath, Disp: , Rfl:     ipratropium (ATROVENT) 0.06 % nasal spray, PLEASE SEE ATTACHED FOR DETAILED DIRECTIONS, Disp: , Rfl:     ivabradine HCl (Corlanor) 5 MG tablet, 7.5 mg 2 (two) times a day, Disp: , Rfl:     levalbuterol (XOPENEX HFA) 45 mcg/act inhaler, Inhale 2 puffs every 4 (four) hours as needed (when unable to nebulize), Disp: , Rfl:     levalbuterol (XOPENEX) 1.25 mg/3 mL nebulizer solution, Take 1.25 mg by nebulization every 6 (six) hours as needed , Disp: , Rfl: 2    Magnesium 400 MG CAPS, Take 1 capsule by mouth 2 (two) times a day , Disp: , Rfl:     methylPREDNISolone 4 MG tablet therapy pack, Use as directed on package, Disp: 1 each, Rfl: 0    MULTIPLE VITAMINS-CALCIUM PO, Take 1 capsule by mouth every morning , Disp: , Rfl:     NON FORMULARY, Take 1 mg by mouth 3 (three) times a day Ketotifen 1 mg compounded capsule, Disp: , Rfl:     omega-3-acid ethyl esters (LOVAZA) 1 g capsule, Take 2 g by mouth 2 (two) times a day 1600mg daily, Disp: , Rfl:     polyethylene glycol (MIRALAX) 17 g packet, Take 17 g by mouth daily as needed , Disp: , Rfl:     Probiotic Product (PROBIOTIC DAILY PO), Take 1 tablet by mouth daily At lunch, Disp: , Rfl:     Qvar RediHaler 40 MCG/ACT inhaler, , Disp: , Rfl:     sodium chloride, Inject 1,500 mL into a catheter in a vein, Disp: , Rfl:     sucralfate (CARAFATE) 1 g/10 mL suspension, Take 1 g by mouth 2 (two) times a day, Disp: , Rfl:     Thyroid, Porcine, POWD, Use 32 mg daily, Disp: , Rfl:     tobramycin-dexamethasone (TOBRADEX)  ophthalmic suspension, 1 drop every 4 (four) hours while awake, Disp: , Rfl:     Ubrogepant (UBRELVY) 100 MG tablet, Take 100 mg by mouth Take 1 tablet (100 mg) one time as needed for migraine. May repeat one additional tablet (100 mg) at least two hours after the first dose. Do not use more than two doses per day, or for more than eight days per month., Disp: , Rfl:     verapamil (CALAN) 40 mg tablet, , Disp: , Rfl:   No current facility-administered medications for this visit.    Facility-Administered Medications Ordered in Other Visits:     sodium chloride 0.9 % infusion, 250 mL/hr, Intravenous, Once, Jan Doyle MD    [START ON 2/1/2024] sodium chloride 0.9 % infusion, 250 mL/hr, Intravenous, Once, Jan Doyle MD    Current Allergies     Allergies as of 01/30/2024 - Reviewed 01/30/2024   Allergen Reaction Noted    Imipramine Confusion, Fatigue, Irritability, Palpitations, Shortness Of Breath, Tachycardia, and Visual Disturbance 09/21/2021    Melatonin Shortness Of Breath 03/25/2019    Mirtazapine Anxiety, Dizziness, GI Intolerance, Nausea Only, Palpitations, and Shortness Of Breath 12/14/2020    Nexium [esomeprazole] Shortness Of Breath 03/25/2019    Nsaids Shortness Of Breath 03/25/2019    Singulair [montelukast] Shortness Of Breath and Cough 03/25/2019    Zomig [zolmitriptan] Shortness Of Breath 03/25/2019    Dexilant [dexlansoprazole] Nausea Only and Vomiting 03/25/2019    Albuterol  03/25/2019    Ambien [zolpidem]  01/24/2019    Amitriptyline Drowsiness 03/25/2019    Aspirin  01/19/2016    Bactrim [sulfamethoxazole-trimethoprim] Hives 03/25/2019    Banana - food allergy Dermatitis 08/11/2021    Ceftin [cefuroxime]  01/19/2016    Celebrex [celecoxib]  03/25/2019    Ciprofloxacin  01/19/2016    Cranberry-c [ascorbate - food allergy] GI Intolerance 08/11/2021    Demerol [meperidine]  01/19/2016    Epinephrine Dizziness 03/25/2019    Ergotamine Nausea Only and Headache 03/25/2019    Keflex [cephalexin]  01/19/2016     Klonopin [clonazepam] Nausea Only and Dizziness 03/25/2019    Latex Hives 05/25/2021    Levaquin [levofloxacin]  01/19/2016    Lexapro [escitalopram]  03/25/2019    Lyrica [pregabalin] Fatigue 03/25/2019    Macrodantin [nitrofurantoin]  01/19/2016    Morphine Nausea Only 03/25/2019    Movantik [naloxegol] Nausea Only 03/25/2019    Mushroom extract complex - food allergy  03/16/2016    Naprosyn [naproxen]  01/19/2016    Neurontin [gabapentin] Dizziness 03/25/2019    Pineapple - food allergy GI Intolerance 08/11/2021    Plecanatide Abdominal Pain, Diarrhea, and GI Intolerance 01/14/2021    Prolia [denosumab]  01/24/2019    Prozac [fluoxetine]  03/25/2019    Serevent [salmeterol] Dizziness 03/25/2019    Sudafed [pseudoephedrine] Hives 03/25/2019    Sulfa antibiotics  01/19/2016    Tomato - food allergy GI Intolerance 08/11/2021    Trazodone  03/25/2019    Ultram [tramadol] Nausea Only, Dizziness, and Headache 03/25/2019    Vilazodone Dizziness, GI Intolerance, and Headache 10/25/2022    Vilazodone hcl Abdominal Pain, Dizziness, GI Intolerance, Headache, and Other (See Comments) 12/14/2020    Vioxx [rofecoxib]  03/25/2019    Vortioxetine Drowsiness, Fatigue, GI Intolerance, and Irritability 10/19/2020    Zithromax [azithromycin] Hives 06/29/2018    Zoloft [sertraline]  01/24/2019    Zantac [ranitidine] Rash and Dizziness 03/25/2019            The following portions of the patient's history were reviewed and updated as appropriate: allergies, current medications, past family history, past medical history, past social history, past surgical history and problem list.     Past Medical History:   Diagnosis Date    Allergic rhinitis     Anxiety     Asthma     Back pain     Cardiac disease     Cardiopathy     EF 45%    Cough     Diverticulitis     Factor V Leiden (HCC)     Fibromyalgia     GERD (gastroesophageal reflux disease)     Hashimoto's thyroiditis     Hx of degenerative disc disease     Hypotension     pots -  "postural orthostatic hypotension    Interstitial cystitis     Irregular heart beat     LBBB    Irritable bowel syndrome     Migraines     Mitral valve disease     \"thickening\"    Myocardial infarction (HCC)     possible but not sure when    Neuropathy     bilateral legs    Osteoporosis     Postural orthostatic tachycardia syndrome     must drink a lot of water and salt    Pott's disease     Rheumatic fever 1967    Scoliosis     Sepsis (HCC)     associated with PICC line    Sjogren's syndrome (HCC)     TMJ (dislocation of temporomandibular joint)        Past Surgical History:   Procedure Laterality Date    ABLATION MICROWAVE Left     lumbar area    BACK SURGERY  10/1998     SECTION  1992    CHOLECYSTECTOMY  2016    COLONOSCOPY  2016    DILATION AND CURETTAGE OF UTERUS  1996    EGD  2016    FOOT SURGERY  1968    removal of bone and neuroma    HYSTERECTOMY  1997    age 45  PRICE ooph    IR OTHER  2022    IR OTHER  2022    IR OTHER  2023    IR OTHER  2024    IR PICC PLACEMENT SINGLE LUMEN  10/02/2020    IR PICC PLACEMENT SINGLE LUMEN  2021    IR PICC REPOSITION  2021    IR TUNNELED CENTRAL LINE PLACEMENT  2022    LAPAROSCOPY  1996    endometriosis    MOUTH BIOPSY      lip    NE EGD TRANSORAL BIOPSY SINGLE/MULTIPLE N/A 2019    Procedure: ESOPHAGOGASTRODUODENOSCOPY (EGD) with multiple bx and dilation;  Surgeon: Peter Baldwin MD;  Location:  GI LAB;  Service: Gastroenterology    NE SURGICAL ARTHROSCOPY SHOULDER W/ROTATOR CUFF RPR Right 2022    Procedure: SHOULDER ARTHROSCOPIC ROTATOR CUFF REPAIR Biceps Tenodisis;  Surgeon: Ilya Munoz MD;  Location[facility] MAIN OR;  Service: Orthopedics    TUNNELED VENOUS CATHETER PLACEMENT         Family History   Problem Relation Age of Onset    Cancer Mother         urinary bladder     No Known Problems Father     Thyroid cancer Sister     Heart attack Sister     No Known Problems Sister     No Known " Problems Sister     No Known Problems Sister     No Known Problems Sister     No Known Problems Daughter     No Known Problems Daughter     Colon cancer Maternal Grandfather     Breast cancer Maternal Aunt         over 50 yrs old     Endometrial cancer Maternal Aunt     Multiple myeloma Maternal Aunt     Hypertension Paternal Aunt          Medications have been verified.        Objective   /80   Pulse 85   Temp 98.2 °F (36.8 °C)   Resp 18   SpO2 96%        Physical Exam     Physical Exam  Constitutional:       Appearance: She is well-developed. She is not diaphoretic.   HENT:      Head: Normocephalic.      Nose: Congestion and rhinorrhea present.      Comments: There is green discharge noted in the nares  Eyes:      General:         Right eye: No discharge.         Left eye: No discharge.      Pupils: Pupils are equal, round, and reactive to light.   Neck:      Thyroid: No thyromegaly.   Cardiovascular:      Rate and Rhythm: Normal rate.      Heart sounds: No murmur heard.  Pulmonary:      Effort: Pulmonary effort is normal. No respiratory distress.      Breath sounds: Wheezing and rhonchi present. No rales.   Chest:      Chest wall: No tenderness.   Abdominal:      General: There is no distension.      Palpations: Abdomen is soft.      Tenderness: There is no abdominal tenderness. There is no guarding or rebound.   Musculoskeletal:         General: Normal range of motion.      Cervical back: Normal range of motion.   Lymphadenopathy:      Cervical: No cervical adenopathy.   Skin:     General: Skin is warm.   Neurological:      Mental Status: She is alert and oriented to person, place, and time.         -I personally interpreted the x-ray and reviewed with the patient.  There is no acute infiltrate.    -Since the patient is already being treated by her primary care doctor with Augmentin and steroids I will suggest supportive treatment for now.  COVID and flu swab were sent to the lab.  I suggest follow-up  with PCP or go to the ER if symptoms worsen

## 2024-01-31 LAB
FLUAV RNA RESP QL NAA+PROBE: NEGATIVE
FLUBV RNA RESP QL NAA+PROBE: NEGATIVE
SARS-COV-2 RNA RESP QL NAA+PROBE: NEGATIVE

## 2024-02-01 ENCOUNTER — HOSPITAL ENCOUNTER (OUTPATIENT)
Dept: INFUSION CENTER | Facility: HOSPITAL | Age: 69
Discharge: HOME | End: 2024-02-01

## 2024-02-02 RX ORDER — SODIUM CHLORIDE 9 MG/ML
250 INJECTION, SOLUTION INTRAVENOUS ONCE
Status: COMPLETED | OUTPATIENT
Start: 2024-02-06 | End: 2024-02-06

## 2024-02-05 ENCOUNTER — HOSPITAL ENCOUNTER (OUTPATIENT)
Dept: INFUSION CENTER | Facility: HOSPITAL | Age: 69
Discharge: HOME | End: 2024-02-05
Payer: MEDICARE

## 2024-02-05 VITALS
SYSTOLIC BLOOD PRESSURE: 121 MMHG | TEMPERATURE: 98.4 F | RESPIRATION RATE: 16 BRPM | HEART RATE: 77 BPM | OXYGEN SATURATION: 99 % | DIASTOLIC BLOOD PRESSURE: 77 MMHG

## 2024-02-05 PROCEDURE — 96360 HYDRATION IV INFUSION INIT: CPT

## 2024-02-05 PROCEDURE — 96361 HYDRATE IV INFUSION ADD-ON: CPT

## 2024-02-05 RX ADMIN — SODIUM CHLORIDE 1500 ML: 9 INJECTION, SOLUTION INTRAVENOUS at 09:32

## 2024-02-05 NOTE — PROGRESS NOTES
Teresa Mao  tolerated hydration treatment well with no complications.      Teresa Mao is aware of future appt on 2/6 at 9:30AM.     AVS printed and given to Teresa Mao:No (Declined by Teresa Mao).

## 2024-02-06 ENCOUNTER — HOSPITAL ENCOUNTER (OUTPATIENT)
Dept: INFUSION CENTER | Facility: HOSPITAL | Age: 69
Discharge: HOME | End: 2024-02-06
Payer: MEDICARE

## 2024-02-06 VITALS
HEART RATE: 80 BPM | TEMPERATURE: 98.5 F | SYSTOLIC BLOOD PRESSURE: 118 MMHG | DIASTOLIC BLOOD PRESSURE: 76 MMHG | OXYGEN SATURATION: 99 % | RESPIRATION RATE: 16 BRPM

## 2024-02-06 PROCEDURE — 96360 HYDRATION IV INFUSION INIT: CPT

## 2024-02-06 PROCEDURE — 96361 HYDRATE IV INFUSION ADD-ON: CPT

## 2024-02-06 RX ORDER — SODIUM CHLORIDE 9 MG/ML
250 INJECTION, SOLUTION INTRAVENOUS ONCE
Status: COMPLETED | OUTPATIENT
Start: 2024-02-08 | End: 2024-02-08

## 2024-02-06 RX ADMIN — SODIUM CHLORIDE 250 ML/HR: 0.9 INJECTION, SOLUTION INTRAVENOUS at 09:52

## 2024-02-06 NOTE — PROGRESS NOTES
Hydration given as ordered     Teresa Mao is aware of future appt on 2-8-24 at 830     AVS declined.

## 2024-02-08 ENCOUNTER — APPOINTMENT (OUTPATIENT)
Dept: LAB | Facility: CLINIC | Age: 69
End: 2024-02-08
Payer: MEDICARE

## 2024-02-08 ENCOUNTER — HOSPITAL ENCOUNTER (OUTPATIENT)
Dept: INFUSION CENTER | Facility: HOSPITAL | Age: 69
End: 2024-02-08
Payer: MEDICARE

## 2024-02-08 VITALS
TEMPERATURE: 98.8 F | RESPIRATION RATE: 18 BRPM | HEART RATE: 101 BPM | DIASTOLIC BLOOD PRESSURE: 84 MMHG | OXYGEN SATURATION: 100 % | SYSTOLIC BLOOD PRESSURE: 132 MMHG

## 2024-02-08 DIAGNOSIS — I10 HYPERTENSION, UNSPECIFIED TYPE: ICD-10-CM

## 2024-02-08 DIAGNOSIS — E03.9 HYPOTHYROIDISM, UNSPECIFIED TYPE: ICD-10-CM

## 2024-02-08 LAB
ALBUMIN SERPL BCP-MCNC: 4.4 G/DL (ref 3.5–5)
ALP SERPL-CCNC: 84 U/L (ref 34–104)
ALT SERPL W P-5'-P-CCNC: 24 U/L (ref 7–52)
ANION GAP SERPL CALCULATED.3IONS-SCNC: 9 MMOL/L
AST SERPL W P-5'-P-CCNC: 21 U/L (ref 13–39)
BILIRUB SERPL-MCNC: 0.52 MG/DL (ref 0.2–1)
BUN SERPL-MCNC: 17 MG/DL (ref 5–25)
CALCIUM SERPL-MCNC: 9.7 MG/DL (ref 8.4–10.2)
CHLORIDE SERPL-SCNC: 101 MMOL/L (ref 96–108)
CHOLEST SERPL-MCNC: 188 MG/DL
CO2 SERPL-SCNC: 32 MMOL/L (ref 21–32)
CREAT SERPL-MCNC: 1 MG/DL (ref 0.6–1.3)
ERYTHROCYTE [DISTWIDTH] IN BLOOD BY AUTOMATED COUNT: 12.5 % (ref 11.6–15.1)
GFR SERPL CREATININE-BSD FRML MDRD: 57 ML/MIN/1.73SQ M
GLUCOSE P FAST SERPL-MCNC: 86 MG/DL (ref 65–99)
HCT VFR BLD AUTO: 46.1 % (ref 34.8–46.1)
HDLC SERPL-MCNC: 72 MG/DL
HGB BLD-MCNC: 15 G/DL (ref 11.5–15.4)
LDLC SERPL CALC-MCNC: 96 MG/DL (ref 0–100)
MCH RBC QN AUTO: 29.7 PG (ref 26.8–34.3)
MCHC RBC AUTO-ENTMCNC: 32.5 G/DL (ref 31.4–37.4)
MCV RBC AUTO: 91 FL (ref 82–98)
NONHDLC SERPL-MCNC: 116 MG/DL
PLATELET # BLD AUTO: 275 THOUSANDS/UL (ref 149–390)
PMV BLD AUTO: 9.8 FL (ref 8.9–12.7)
POTASSIUM SERPL-SCNC: 3.8 MMOL/L (ref 3.5–5.3)
PROT SERPL-MCNC: 6.6 G/DL (ref 6.4–8.4)
RBC # BLD AUTO: 5.05 MILLION/UL (ref 3.81–5.12)
SODIUM SERPL-SCNC: 142 MMOL/L (ref 135–147)
TRIGL SERPL-MCNC: 100 MG/DL
TSH SERPL DL<=0.05 MIU/L-ACNC: 3.48 UIU/ML (ref 0.45–4.5)
WBC # BLD AUTO: 9.69 THOUSAND/UL (ref 4.31–10.16)

## 2024-02-08 PROCEDURE — 84443 ASSAY THYROID STIM HORMONE: CPT

## 2024-02-08 PROCEDURE — 85027 COMPLETE CBC AUTOMATED: CPT

## 2024-02-08 PROCEDURE — 96361 HYDRATE IV INFUSION ADD-ON: CPT

## 2024-02-08 PROCEDURE — 80061 LIPID PANEL: CPT

## 2024-02-08 PROCEDURE — 80053 COMPREHEN METABOLIC PANEL: CPT

## 2024-02-08 PROCEDURE — 96360 HYDRATION IV INFUSION INIT: CPT

## 2024-02-08 RX ORDER — SODIUM CHLORIDE 9 MG/ML
250 INJECTION, SOLUTION INTRAVENOUS ONCE
Status: COMPLETED | OUTPATIENT
Start: 2024-02-12 | End: 2024-02-12

## 2024-02-08 RX ADMIN — SODIUM CHLORIDE 250 ML/HR: 0.9 INJECTION, SOLUTION INTRAVENOUS at 08:35

## 2024-02-08 NOTE — PROGRESS NOTES
Teresa Mao  tolerated treatment well with no complications.      Teresa Mao is aware of future appt on 2/9 at 8:30 am.     AVS declined by Teresa Mao.

## 2024-02-09 ENCOUNTER — HOSPITAL ENCOUNTER (OUTPATIENT)
Dept: INFUSION CENTER | Facility: HOSPITAL | Age: 69
End: 2024-02-09
Payer: MEDICARE

## 2024-02-09 VITALS
TEMPERATURE: 96.2 F | RESPIRATION RATE: 16 BRPM | DIASTOLIC BLOOD PRESSURE: 83 MMHG | HEART RATE: 72 BPM | SYSTOLIC BLOOD PRESSURE: 150 MMHG

## 2024-02-09 PROCEDURE — 96361 HYDRATE IV INFUSION ADD-ON: CPT

## 2024-02-09 PROCEDURE — 96360 HYDRATION IV INFUSION INIT: CPT

## 2024-02-09 RX ADMIN — SODIUM CHLORIDE 1500 ML: 0.9 INJECTION, SOLUTION INTRAVENOUS at 08:42

## 2024-02-09 NOTE — PROGRESS NOTES
Teresa Mao  tolerated treatment well with no complications.      Teresa Mao is aware of future appt on 2/12 at 9 am.     AVS declined by Teresa Mao.

## 2024-02-12 ENCOUNTER — HOSPITAL ENCOUNTER (OUTPATIENT)
Dept: INFUSION CENTER | Facility: HOSPITAL | Age: 69
Discharge: HOME | End: 2024-02-12
Payer: MEDICARE

## 2024-02-12 VITALS
HEART RATE: 71 BPM | RESPIRATION RATE: 18 BRPM | TEMPERATURE: 98.4 F | DIASTOLIC BLOOD PRESSURE: 77 MMHG | SYSTOLIC BLOOD PRESSURE: 128 MMHG

## 2024-02-12 PROCEDURE — 96360 HYDRATION IV INFUSION INIT: CPT

## 2024-02-12 PROCEDURE — 96361 HYDRATE IV INFUSION ADD-ON: CPT

## 2024-02-12 RX ADMIN — SODIUM CHLORIDE 250 ML/HR: 0.9 INJECTION, SOLUTION INTRAVENOUS at 09:04

## 2024-02-12 NOTE — PROGRESS NOTES
Teresa Mao  tolerated treatment well with no complications.      Teresa Mao is aware of future appt on 2/15 at 9 am.     AVS declined by Teresa Mao.

## 2024-02-14 RX ORDER — SODIUM CHLORIDE 9 MG/ML
250 INJECTION, SOLUTION INTRAVENOUS ONCE
Status: COMPLETED | OUTPATIENT
Start: 2024-02-15 | End: 2024-02-15

## 2024-02-15 ENCOUNTER — HOSPITAL ENCOUNTER (OUTPATIENT)
Dept: INFUSION CENTER | Facility: HOSPITAL | Age: 69
End: 2024-02-15
Payer: MEDICARE

## 2024-02-15 VITALS
SYSTOLIC BLOOD PRESSURE: 138 MMHG | RESPIRATION RATE: 16 BRPM | HEART RATE: 70 BPM | DIASTOLIC BLOOD PRESSURE: 85 MMHG | TEMPERATURE: 98.5 F

## 2024-02-15 PROCEDURE — 96361 HYDRATE IV INFUSION ADD-ON: CPT

## 2024-02-15 PROCEDURE — 96360 HYDRATION IV INFUSION INIT: CPT

## 2024-02-15 RX ORDER — SODIUM CHLORIDE 9 MG/ML
250 INJECTION, SOLUTION INTRAVENOUS ONCE
Status: COMPLETED | OUTPATIENT
Start: 2024-02-19 | End: 2024-02-19

## 2024-02-15 RX ADMIN — SODIUM CHLORIDE 250 ML/HR: 0.9 INJECTION, SOLUTION INTRAVENOUS at 09:34

## 2024-02-15 NOTE — PROGRESS NOTES
Teresa Mao  tolerated hydration treatment well with no complications.      Teresa Mao is aware of future appt on 2/16 at 8:30AM.     AVS printed and given to Teresa Mao: No (Declined by Teresa Mao).

## 2024-02-17 DIAGNOSIS — M79.18 MYOFASCIAL PAIN SYNDROME: ICD-10-CM

## 2024-02-17 DIAGNOSIS — G03.9 ARACHNOIDITIS: ICD-10-CM

## 2024-02-19 ENCOUNTER — HOSPITAL ENCOUNTER (OUTPATIENT)
Dept: INFUSION CENTER | Facility: HOSPITAL | Age: 69
Discharge: HOME | End: 2024-02-19
Payer: MEDICARE

## 2024-02-19 VITALS
OXYGEN SATURATION: 99 % | TEMPERATURE: 97.1 F | HEART RATE: 77 BPM | SYSTOLIC BLOOD PRESSURE: 127 MMHG | RESPIRATION RATE: 16 BRPM | DIASTOLIC BLOOD PRESSURE: 81 MMHG

## 2024-02-19 PROCEDURE — 96360 HYDRATION IV INFUSION INIT: CPT

## 2024-02-19 PROCEDURE — 96361 HYDRATE IV INFUSION ADD-ON: CPT

## 2024-02-19 RX ORDER — CYCLOBENZAPRINE HCL 5 MG
5 TABLET ORAL 3 TIMES DAILY PRN
Qty: 90 TABLET | Refills: 2 | Status: SHIPPED | OUTPATIENT
Start: 2024-02-19 | End: 2024-02-23 | Stop reason: SDUPTHER

## 2024-02-19 RX ADMIN — SODIUM CHLORIDE 250 ML/HR: 0.9 INJECTION, SOLUTION INTRAVENOUS at 08:50

## 2024-02-19 NOTE — PROGRESS NOTES
Teresa Mao  tolerated hydration treatment well with no complications.      Teresa Mao is aware of future appt on 2/20 at 8:30AM.     AVS printed and given to Teresa Mao: No (Declined by Teresa Mao).

## 2024-02-20 ENCOUNTER — HOSPITAL ENCOUNTER (OUTPATIENT)
Dept: INFUSION CENTER | Facility: HOSPITAL | Age: 69
Discharge: HOME | End: 2024-02-20
Payer: MEDICARE

## 2024-02-20 VITALS
RESPIRATION RATE: 18 BRPM | HEART RATE: 71 BPM | DIASTOLIC BLOOD PRESSURE: 71 MMHG | SYSTOLIC BLOOD PRESSURE: 126 MMHG | TEMPERATURE: 97.7 F

## 2024-02-20 PROCEDURE — 96361 HYDRATE IV INFUSION ADD-ON: CPT

## 2024-02-20 PROCEDURE — 96360 HYDRATION IV INFUSION INIT: CPT

## 2024-02-20 RX ORDER — SODIUM CHLORIDE 9 MG/ML
250 INJECTION, SOLUTION INTRAVENOUS ONCE
Status: COMPLETED | OUTPATIENT
Start: 2024-02-22 | End: 2024-02-22

## 2024-02-20 RX ADMIN — SODIUM CHLORIDE 1500 ML: 0.9 INJECTION, SOLUTION INTRAVENOUS at 08:44

## 2024-02-20 NOTE — PROGRESS NOTES
Hydration given without incident.    Teresa Mao is aware of future appt on 2-22-24 at 830     AVS Declined by Teresa Mao

## 2024-02-21 NOTE — TELEPHONE ENCOUNTER
Caller: Richard (Pharmacist)    Doctor: RAZ    Reason for call: - cyclobenzaprine (FLEXERIL) 5 mg tablet  need prior auth     Call back#: [phone]

## 2024-02-22 ENCOUNTER — HOSPITAL ENCOUNTER (OUTPATIENT)
Dept: INFUSION CENTER | Facility: HOSPITAL | Age: 69
End: 2024-02-22
Payer: MEDICARE

## 2024-02-22 ENCOUNTER — TELEPHONE (OUTPATIENT)
Age: 69
End: 2024-02-22

## 2024-02-22 VITALS
RESPIRATION RATE: 16 BRPM | DIASTOLIC BLOOD PRESSURE: 86 MMHG | SYSTOLIC BLOOD PRESSURE: 135 MMHG | HEART RATE: 76 BPM | TEMPERATURE: 96.6 F

## 2024-02-22 DIAGNOSIS — M79.18 MYOFASCIAL PAIN SYNDROME: ICD-10-CM

## 2024-02-22 DIAGNOSIS — G03.9 ARACHNOIDITIS: ICD-10-CM

## 2024-02-22 PROCEDURE — 96360 HYDRATION IV INFUSION INIT: CPT

## 2024-02-22 PROCEDURE — 96361 HYDRATE IV INFUSION ADD-ON: CPT

## 2024-02-22 RX ORDER — SODIUM CHLORIDE 9 MG/ML
250 INJECTION, SOLUTION INTRAVENOUS ONCE
Status: COMPLETED | OUTPATIENT
Start: 2024-02-26 | End: 2024-02-26

## 2024-02-22 RX ADMIN — SODIUM CHLORIDE 250 ML/HR: 0.9 INJECTION, SOLUTION INTRAVENOUS at 08:57

## 2024-02-22 NOTE — TELEPHONE ENCOUNTER
Reason for call:   [x] Prior Auth  [] Other:     Caller:  [] Patient  [x] Pharmacy  Name: [facility]   Address: [address] [place] [address]   Callback Number: [phone]    Medication: Both medications need prior auth for PACE                        Cyclobenzaprine 5 mg, take 1 tablet by mouth three times a day as needed                         Hydrocodone-acetaminophen 5-325 mg, take 1 tablet by mouth 2 times a day as needed     Ordering Provider:   [] PCP/Provider -   [x] Speciality/Provider - Spine and Pain

## 2024-02-22 NOTE — PROGRESS NOTES
Teresa Mao  tolerated treatment well with no complications.      Teresa Mao is aware of future appt on 2/23 at 8:30AM.     AVS printed and given to Teresa Mao: No (Declined by Teresa Mao).

## 2024-02-23 ENCOUNTER — HOSPITAL ENCOUNTER (OUTPATIENT)
Dept: INFUSION CENTER | Facility: HOSPITAL | Age: 69
End: 2024-02-23
Payer: MEDICARE

## 2024-02-23 VITALS
TEMPERATURE: 96 F | SYSTOLIC BLOOD PRESSURE: 126 MMHG | HEART RATE: 66 BPM | DIASTOLIC BLOOD PRESSURE: 76 MMHG | RESPIRATION RATE: 16 BRPM

## 2024-02-23 RX ORDER — SODIUM CHLORIDE 9 MG/ML
250 INJECTION, SOLUTION INTRAVENOUS ONCE
Status: COMPLETED | OUTPATIENT
Start: 2024-02-27 | End: 2024-02-27

## 2024-02-23 RX ORDER — CYCLOBENZAPRINE HCL 5 MG
5 TABLET ORAL 3 TIMES DAILY PRN
Qty: 63 TABLET | Refills: 0 | Status: SHIPPED | OUTPATIENT
Start: 2024-02-23

## 2024-02-23 RX ORDER — GUAIFENESIN 600 MG/1
600 TABLET, EXTENDED RELEASE ORAL EVERY 12 HOURS SCHEDULED
COMMUNITY

## 2024-02-23 RX ADMIN — SODIUM CHLORIDE 1500 ML: 0.9 INJECTION, SOLUTION INTRAVENOUS at 08:51

## 2024-02-23 NOTE — TELEPHONE ENCOUNTER
Patient called the RX Refill Line. Message is being forwarded to the office.     Patient is calling to see the status of the Flexeril.  Has been stretching out her medication and only has two pills left.     Please contact patient at [phone]

## 2024-02-23 NOTE — PROGRESS NOTES
Teresa Mao  tolerated hydration treatment well with no complications.      Teresa Mao is aware of future appt on 2/26 at 10AM.     AVS printed and given to Teresa Mao: No (Declined by Teresa Mao).

## 2024-02-23 NOTE — TELEPHONE ENCOUNTER
Patient called RX refill line, wanted to give Allycia more information and would like a call back.  She stated she spoke to the medical review associate at [facility] and was told she could get 21 days worth of Flexeril since she is waiting on a PA for the prescription. She wasn't sure if a new prescription for this is needed. Please contact patient at [phone].

## 2024-02-23 NOTE — TELEPHONE ENCOUNTER
Are you able to send 21 day supply of Flexeril to pts pharm? [facility] only allowing 21 days supply. Thank you!

## 2024-02-26 ENCOUNTER — HOSPITAL ENCOUNTER (OUTPATIENT)
Dept: INFUSION CENTER | Facility: HOSPITAL | Age: 69
Discharge: HOME | End: 2024-02-26
Payer: MEDICARE

## 2024-02-26 ENCOUNTER — APPOINTMENT (OUTPATIENT)
Dept: LAB | Facility: CLINIC | Age: 69
End: 2024-02-26
Payer: MEDICARE

## 2024-02-26 VITALS
DIASTOLIC BLOOD PRESSURE: 84 MMHG | SYSTOLIC BLOOD PRESSURE: 143 MMHG | TEMPERATURE: 97.7 F | RESPIRATION RATE: 16 BRPM | HEART RATE: 80 BPM

## 2024-02-26 DIAGNOSIS — N18.31 STAGE 3A CHRONIC KIDNEY DISEASE (HCC): ICD-10-CM

## 2024-02-26 LAB
ALBUMIN SERPL BCP-MCNC: 4.6 G/DL (ref 3.5–5)
ALP SERPL-CCNC: 110 U/L (ref 34–104)
ALT SERPL W P-5'-P-CCNC: 29 U/L (ref 7–52)
ANION GAP SERPL CALCULATED.3IONS-SCNC: 11 MMOL/L
AST SERPL W P-5'-P-CCNC: 27 U/L (ref 13–39)
BACTERIA UR QL AUTO: NORMAL /HPF
BILIRUB SERPL-MCNC: 0.62 MG/DL (ref 0.2–1)
BILIRUB UR QL STRIP: NEGATIVE
BUN SERPL-MCNC: 21 MG/DL (ref 5–25)
CALCIUM SERPL-MCNC: 9.8 MG/DL (ref 8.4–10.2)
CHLORIDE SERPL-SCNC: 101 MMOL/L (ref 96–108)
CLARITY UR: CLEAR
CO2 SERPL-SCNC: 27 MMOL/L (ref 21–32)
COLOR UR: NORMAL
CREAT SERPL-MCNC: 1.05 MG/DL (ref 0.6–1.3)
CREAT UR-MCNC: 26.5 MG/DL
GFR SERPL CREATININE-BSD FRML MDRD: 54 ML/MIN/1.73SQ M
GLUCOSE P FAST SERPL-MCNC: 87 MG/DL (ref 65–99)
GLUCOSE UR STRIP-MCNC: NEGATIVE MG/DL
HGB UR QL STRIP.AUTO: NEGATIVE
KETONES UR STRIP-MCNC: NEGATIVE MG/DL
LEUKOCYTE ESTERASE UR QL STRIP: NEGATIVE
MAGNESIUM SERPL-MCNC: 2.1 MG/DL (ref 1.9–2.7)
MICROALBUMIN UR-MCNC: <7 MG/L
MICROALBUMIN/CREAT 24H UR: <26 MG/G CREATININE (ref 0–30)
NITRITE UR QL STRIP: NEGATIVE
NON-SQ EPI CELLS URNS QL MICRO: NORMAL /HPF
PH UR STRIP.AUTO: 6 [PH]
PHOSPHATE SERPL-MCNC: 3.6 MG/DL (ref 2.3–4.1)
POTASSIUM SERPL-SCNC: 4.2 MMOL/L (ref 3.5–5.3)
PROT SERPL-MCNC: 6.9 G/DL (ref 6.4–8.4)
PROT UR STRIP-MCNC: NEGATIVE MG/DL
PTH-INTACT SERPL-MCNC: 62.8 PG/ML (ref 12–88)
RBC #/AREA URNS AUTO: NORMAL /HPF
SODIUM SERPL-SCNC: 139 MMOL/L (ref 135–147)
SP GR UR STRIP.AUTO: 1.01 (ref 1–1.03)
UROBILINOGEN UR STRIP-ACNC: <2 MG/DL
WBC #/AREA URNS AUTO: NORMAL /HPF

## 2024-02-26 PROCEDURE — 96360 HYDRATION IV INFUSION INIT: CPT

## 2024-02-26 PROCEDURE — 83970 ASSAY OF PARATHORMONE: CPT

## 2024-02-26 PROCEDURE — 84100 ASSAY OF PHOSPHORUS: CPT

## 2024-02-26 PROCEDURE — 82570 ASSAY OF URINE CREATININE: CPT

## 2024-02-26 PROCEDURE — 96361 HYDRATE IV INFUSION ADD-ON: CPT

## 2024-02-26 PROCEDURE — 81001 URINALYSIS AUTO W/SCOPE: CPT

## 2024-02-26 PROCEDURE — 83735 ASSAY OF MAGNESIUM: CPT

## 2024-02-26 PROCEDURE — 80053 COMPREHEN METABOLIC PANEL: CPT

## 2024-02-26 PROCEDURE — 36415 COLL VENOUS BLD VENIPUNCTURE: CPT

## 2024-02-26 PROCEDURE — 82043 UR ALBUMIN QUANTITATIVE: CPT

## 2024-02-26 RX ADMIN — SODIUM CHLORIDE 250 ML/HR: 0.9 INJECTION, SOLUTION INTRAVENOUS at 09:51

## 2024-02-26 NOTE — PROGRESS NOTES
Teresa Mao  tolerated hydration treatment well with no complications.      Teresa Mao is aware of future appt on 2/27 at 8:30AM.     AVS printed and given to Teresa Mao: No (Declined by Teresa Mao).

## 2024-02-27 ENCOUNTER — HOSPITAL ENCOUNTER (OUTPATIENT)
Dept: INFUSION CENTER | Facility: HOSPITAL | Age: 69
Discharge: HOME | End: 2024-02-27
Payer: MEDICARE

## 2024-02-27 VITALS
HEART RATE: 82 BPM | RESPIRATION RATE: 18 BRPM | DIASTOLIC BLOOD PRESSURE: 80 MMHG | SYSTOLIC BLOOD PRESSURE: 140 MMHG | TEMPERATURE: 97.1 F

## 2024-02-27 PROCEDURE — 96361 HYDRATE IV INFUSION ADD-ON: CPT

## 2024-02-27 PROCEDURE — 96360 HYDRATION IV INFUSION INIT: CPT

## 2024-02-27 RX ADMIN — SODIUM CHLORIDE 250 ML/HR: 0.9 INJECTION, SOLUTION INTRAVENOUS at 08:35

## 2024-02-27 NOTE — PROGRESS NOTES
Teresa Mao  received hydration without incident.     Teresa Mao is aware of future appt on 2-29-24 at 830    AVS declined.

## 2024-02-28 RX ORDER — SODIUM CHLORIDE 9 MG/ML
250 INJECTION, SOLUTION INTRAVENOUS ONCE
Status: COMPLETED | OUTPATIENT
Start: 2024-03-01 | End: 2024-03-01

## 2024-02-29 ENCOUNTER — HOSPITAL ENCOUNTER (OUTPATIENT)
Dept: INFUSION CENTER | Facility: HOSPITAL | Age: 69
End: 2024-02-29
Payer: MEDICARE

## 2024-02-29 ENCOUNTER — OFFICE VISIT (OUTPATIENT)
Age: 69
End: 2024-02-29
Payer: MEDICARE

## 2024-02-29 DIAGNOSIS — Z98.1 HISTORY OF LUMBAR FUSION: ICD-10-CM

## 2024-02-29 DIAGNOSIS — G89.4 CHRONIC PAIN SYNDROME: Primary | ICD-10-CM

## 2024-02-29 DIAGNOSIS — F11.20 UNCOMPLICATED OPIOID DEPENDENCE (HCC): ICD-10-CM

## 2024-02-29 DIAGNOSIS — M54.16 LUMBAR RADICULOPATHY: ICD-10-CM

## 2024-02-29 DIAGNOSIS — M54.41 CHRONIC BILATERAL LOW BACK PAIN WITH BILATERAL SCIATICA: ICD-10-CM

## 2024-02-29 DIAGNOSIS — M47.812 CERVICAL SPONDYLOSIS: ICD-10-CM

## 2024-02-29 DIAGNOSIS — M54.12 CERVICAL RADICULOPATHY: ICD-10-CM

## 2024-02-29 DIAGNOSIS — M54.42 CHRONIC BILATERAL LOW BACK PAIN WITH BILATERAL SCIATICA: ICD-10-CM

## 2024-02-29 DIAGNOSIS — M54.2 NECK PAIN: ICD-10-CM

## 2024-02-29 DIAGNOSIS — G89.29 CHRONIC BILATERAL LOW BACK PAIN WITH BILATERAL SCIATICA: ICD-10-CM

## 2024-02-29 DIAGNOSIS — G03.9 ARACHNOIDITIS: ICD-10-CM

## 2024-02-29 DIAGNOSIS — Z79.891 LONG-TERM CURRENT USE OF OPIATE ANALGESIC: ICD-10-CM

## 2024-02-29 DIAGNOSIS — M47.816 LUMBAR SPONDYLOSIS: ICD-10-CM

## 2024-02-29 PROCEDURE — 99214 OFFICE O/P EST MOD 30 MIN: CPT | Performed by: NURSE PRACTITIONER

## 2024-02-29 PROCEDURE — 96361 HYDRATE IV INFUSION ADD-ON: CPT

## 2024-02-29 PROCEDURE — 96360 HYDRATION IV INFUSION INIT: CPT

## 2024-02-29 RX ORDER — HYDROCODONE BITARTRATE AND ACETAMINOPHEN 5; 325 MG/1; MG/1
1 TABLET ORAL 2 TIMES DAILY PRN
Qty: 60 TABLET | Refills: 0 | Status: SHIPPED | OUTPATIENT
Start: 2024-03-21

## 2024-02-29 RX ORDER — HYDROCODONE BITARTRATE AND ACETAMINOPHEN 5; 325 MG/1; MG/1
1 TABLET ORAL 2 TIMES DAILY PRN
Qty: 60 TABLET | Refills: 0 | Status: SHIPPED | OUTPATIENT
Start: 2024-04-18

## 2024-02-29 RX ORDER — SODIUM CHLORIDE 9 MG/ML
250 INJECTION, SOLUTION INTRAVENOUS ONCE
Status: DISCONTINUED | OUTPATIENT
Start: 2024-03-04 | End: 2024-03-08 | Stop reason: HOSPADM

## 2024-02-29 RX ADMIN — SODIUM CHLORIDE 1500 ML: 0.9 INJECTION, SOLUTION INTRAVENOUS at 08:58

## 2024-02-29 NOTE — PATIENT INSTRUCTIONS
Opioid Safety   WHAT YOU NEED TO KNOW:   An opioid medicine is used to treat pain. Examples are oxycodone, morphine, fentanyl, or codeine. Pain control and management may help you rest, heal, and return to your daily activities. You and your family will receive information about how to manage your pain at home. The instructions will include what to do if you have side effects as your pain is managed. You will get information on how to handle opioid medicine safely. You will also get suggestions on how to control pain without opioids. It is important to follow all instructions so your pain is managed effectively.  DISCHARGE INSTRUCTIONS:   Call your local emergency number (911 in the ), or have someone else call if:   You have a seizure.    You cannot be woken.    You have trouble staying awake and your breathing is slow or shallow.    Your speech is slurred, or you are confused.    You are dizzy or stumble when you walk.    Call your doctor, or have someone close to you call if:   You are extremely drowsy, or you have trouble staying awake or speaking.    You have pale or clammy skin.    You have blue fingernails or lips.    Your heartbeat is slower than normal.    You cannot stop vomiting.    You have questions or concerns about your condition or care.    Use opioids safely:   Take prescribed opioids exactly as directed.  Opioids come with directions based on the kind and how it is given. Talk to your healthcare provider or a pharmacist if you have any questions. Do not take more than the recommended amount. Too much can cause a life-threatening overdose. Do not continue to take it after your pain stops. You may develop tolerance. This means you keep needing higher doses to get the same effect. You may also develop opioid use disorder. This means you are not able to control your opioid use.    Do not give opioids to others or take opioids that belong to someone else.  The kind or amount one person takes may not  be right for another. The person you share them with may also be taking medicines that do not mix with opioids. The person may drink alcohol or use other drugs that can cause life-threatening problems when mixed with opioids.    Do not mix opioids with other medicines or alcohol.  The combination can cause an overdose, or cause you to stop breathing. Alcohol, sleeping pills, and medicines such as antihistamines can make you sleepy. A combination with opioids can lead to a coma.    Do not drive or operate heavy machinery after you use an opioid.  You may feel drowsy or have trouble concentrating. You can injure yourself or others if you drive or use heavy machinery when you are not alert. Your provider or pharmacist can tell you how long to wait after a dose before you do these activities.    Talk to your healthcare provider if you have any side effects.  Side effects include nausea, sleepiness, itching, and trouble thinking clearly. Your provider may need to make changes to the kind or amount of opioid you are taking. Your provider can also help you find ways to prevent or relieve side effects.    Manage constipation:  Constipation is the most common side effect of opioid medicine. Constipation is when you have hard, dry bowel movements, or you go longer than usual between bowel movements. Tell your healthcare provider about all changes in your bowel movements while you are taking opioids. Your provider may recommend laxative medicine to help you have a bowel movement. Your provider may also change the kind of opioid you are taking, or change when you take it. The following are more ways you can prevent or relieve constipation:  Drink liquids as directed.  You may need to drink extra liquids to help soften and move your bowels. Ask how much liquid to drink each day and which liquids are best for you.    Eat high-fiber foods.  This may help decrease constipation by adding bulk to your bowel movements. High-fiber  foods include fruits, vegetables, whole-grain breads and cereals, and beans. Your healthcare provider or dietitian can help you create a high-fiber meal plan. Your provider may also recommend a fiber supplement if you cannot get enough fiber from food.         Exercise regularly.  Regular physical activity can help stimulate your intestines. Walking is a good exercise to prevent or relieve constipation. Ask which exercises are best for you.         Schedule a time each day to have a bowel movement.  This may help train your body to have regular bowel movements. Bend forward while you are on the toilet to help move the bowel movement out. Sit on the toilet for at least 10 minutes, even if you do not have a bowel movement.    Store opioids safely:   Store opioids where others cannot easily get them.  Keep them in a locked cabinet or secure area. Do not  keep them in a purse or other bag you carry with you. A person may be looking for something else and find the opioids.         Make sure opioids are stored out of the reach of children.  A child can easily overdose on opioids. Opioids may look like candy to a small child.    The best way to dispose of opioids:  The laws vary by country and area. In the [place], the best way is to return the opioids through a take-back program. This program is offered by the [place] Drug Enforcement Agency ([employer]). The following are options for using the program:  Take the opioids to a [employer] collection site.  The site is often a law enforcement center. Call your local law enforcement center for scheduled take-back days in your area. You will be given information on where to go if the collection site is in a different location.    Take the opioids to an approved pharmacy or hospital.  A pharmacy or hospital may be set up as a collection site. You will need to ask if it is a [employer] collection site if you were not directed there. A pharmacy or doctor's office may not be able to take back opioids  unless it is a [employer] site.    Use a mail-back system.  This means you are given containers to put the opioids into. You will then mail them in the containers.    Use a take-back drop box.  This is a place to leave the opioids at any time. People and animals will not be able to get into the box. Your local law enforcement agency can tell you where to find a drop box in your area.    Other safe ways to dispose of opioids:  The medicine may come with disposal instructions. The instructions may vary depending on the brand of medicine you are using. Instructions may come in a Medication Guide, but not every medicine has one. You may instead get instructions from your pharmacy or doctor. Follow instructions carefully. The following are general guidelines to follow:  Find out if you can flush the opioid.  Some opioids can be flushed down the toilet or poured into the sink. You will need to contact authorities in your area to see if this is an option for you. The FDA also offers a list of medicines that are safe to flush down the toilet. You can check the list if you cannot get the information for your local area.    Ask your waste management company about rules for putting opioids in the trash.  The company will be able to give you specific directions. Scratch out personal information on the original medicine label so it cannot be read. Then put it in the trash. Do not label the trash or put any information on it about the opioids. It should look like regular household trash so no one is tempted to look for the opioids. Keep the trash out of the reach of children and animals. Always make sure trash is secure.    Talk to officials if you live in a facility.  If you live in a nursing home or assisted living center, talk to an official. The person will know the rules for your area.    Other ways to manage pain:   Ask your healthcare provider about non-opioid medicines to control pain.  Some medicines may even work better than  opioids, depending on the cause of your pain. Nonprescription medicines include NSAIDs (such as ibuprofen) and acetaminophen. Prescription medicines include muscle relaxers, antidepressants, and steroids.    Pain may be managed without any medicines.  Some ways to relieve pain include massage, aromatherapy, or meditation. Physical or occupational therapy may also help.    Follow up with your doctor or pain specialist as directed:  You may need to have your dose adjusted. Your doctor or pain specialist can also help you find ways to manage pain without opioids. Write down your questions so you remember to ask them during your visits.  For more information:   Drug Enforcement Administration  [address] [place] [address]  Phone: 7- 385 - [phone]  Web Address: https://www.deadiversion.[facility].gov/drug_disposal/    [place] Food and Drug Administration  [address] [place] [address]  Phone: 7- 665 - 741-5139  Web Address: http://www.fda.gov  © Copyright [employer] 2023 Information is for End User's use only and may not be sold, redistributed or otherwise used for commercial purposes.  The above information is an  only. It is not intended as medical advice for individual conditions or treatments. Talk to your doctor, nurse or pharmacist before following any medical regimen to see if it is safe and effective for you.

## 2024-02-29 NOTE — H&P (VIEW-ONLY)
Assessment:  1. Chronic pain syndrome    2. Chronic bilateral low back pain with bilateral sciatica    3. Arachnoiditis    4. Lumbar spondylosis    5. Lumbar radiculopathy    6. History of lumbar fusion    7. Neck pain    8. Cervical radiculopathy    9. Cervical spondylosis        Plan:  While the patient was in the office today, I did have a thorough conversation regarding their chronic pain syndrome, medication management, and treatment plan options.  Patient is being seen for a follow-up visit.  Overall, she is reporting about 50% reduction in her pain with current medication regimen.  Patient has upcoming appointments for lumbar facet radiofrequency ablations.    Renewed hydrocodone 5/325 twice daily if needed for pain.  The patient's opioid scripts were sent to their pharmacy electronically and was given a 2 month supply of prescriptions with a Do Not Fill date(s) of 3/21/2024, 4/18/2024.    Continue Flexeril 5 mg 3 times daily if needed for spasms.  She did not require refill of this medication during today's visit.    [place] Prescription Drug Monitoring Program report was reviewed and was appropriate     A urine drug screen was collected at today's office visit as part of our medication management protocol. The point of care testing results were appropriate for what was being prescribed. The specimen will be sent for confirmatory testing. The drug screen is medically necessary because the patient is either dependent on opioid medication or is being considered for opioid medication therapy and the results could impact ongoing or future treatment. The drug screen is to evaluate for the presences or absence of prescribed, non-prescribed, and/or illicit drugs/substances.    There are risks associated with opioid medications, including dependence, addiction and tolerance. The patient understands and agrees to use these medications only as prescribed. Potential side effects of the medications include, but are  not limited to, constipation, drowsiness, addiction, impaired judgment and risk of fatal overdose if not taken as prescribed. The patient was warned against driving while taking sedation medications.  Sharing medications is a felony. At this point in time, the patient is showing no signs of addiction, abuse, diversion or suicidal ideation.    The patient will follow-up in 8 weeks for medication prescription refill and reevaluation. The patient was advised to contact the office should their symptoms worsen in the interim. The patient was agreeable and verbalized an understanding.        History of Present Illness:    The patient is a 68 y.o. female last seen on 1/5/2024 who presents for a follow up office visit in regards to chronic pain secondary to chronic pain syndrome, arachnoiditis cannot lumbar spondylosis, lumbar radiculopathy, history of lumbar fusion, neck pain, cervical radiculopathy, cervical spondylosis.  The patient currently reports complaints of neck pain that radiates to both upper extremities, mid back pain, low back pain that radiates to both lower extremities.  Current pain level is an 8/10.  Quality pain is described as burning, sharp, cramping, shooting, numb, pins-and-needles.    Current pain medications includes: Hydrocodone 5/325 twice daily if needed for pain, Flexeril 5 mg 3 times daily if needed for spasms.  The patient reports that this regimen is providing 50% pain relief.  The patient is reporting no side effects from this pain medication regimen.    Pain Contract Signed: 5/32/23  Last Urine Drug Screen: 2/29/24    I have personally reviewed and/or updated the patient's past medical history, past surgical history, family history, social history, current medications, allergies, and vital signs today.       Review of Systems:    Review of Systems   Constitutional:  Negative for unexpected weight change.   HENT:  Negative for hearing loss.    Eyes:  Negative for visual disturbance.  "  Respiratory:  Positive for shortness of breath.    Cardiovascular:  Positive for chest pain. Negative for leg swelling.   Gastrointestinal:  Positive for constipation and nausea.   Endocrine: Negative for polyuria.   Genitourinary:  Negative for difficulty urinating.   Musculoskeletal:  Negative for gait problem, joint swelling and myalgias.   Skin:  Negative for rash.   Neurological:  Positive for dizziness. Negative for weakness and headaches.        Memory loss   Psychiatric/Behavioral:  Negative for decreased concentration.    All other systems reviewed and are negative.        Past Medical History:   Diagnosis Date    Allergic rhinitis     Anxiety     Asthma     Back pain     Cardiac disease     Cardiopathy     EF 45%    Cough     Diverticulitis     Factor V Leiden (HCC)     Fibromyalgia     GERD (gastroesophageal reflux disease)     Hashimoto's thyroiditis     Hx of degenerative disc disease     Hypotension     pots - postural orthostatic hypotension    Interstitial cystitis     Irregular heart beat     LBBB    Irritable bowel syndrome     Migraines     Mitral valve disease     \"thickening\"    Myocardial infarction (HCC)     possible but not sure when    Neuropathy     bilateral legs    Osteoporosis     Postural orthostatic tachycardia syndrome     must drink a lot of water and salt    Pott's disease     Rheumatic fever 1967    Scoliosis     Sepsis ([facility])     associated with PICC line    Sjogren's syndrome ([facility])     TMJ (dislocation of temporomandibular joint)        Past Surgical History:   Procedure Laterality Date    ABLATION MICROWAVE Left     lumbar area    BACK SURGERY  10/1998     SECTION  1992    CHOLECYSTECTOMY  2016    COLONOSCOPY      DILATION AND CURETTAGE OF UTERUS      EGD      FOOT SURGERY  1968    removal of bone and neuroma    HYSTERECTOMY  1997    age 45  PRICE ooph    IR OTHER  2022    IR OTHER  2022    IR OTHER  2023    IR OTHER  2024    IR PICC " PLACEMENT SINGLE LUMEN  10/02/2020    IR PICC PLACEMENT SINGLE LUMEN  08/12/2021    IR PICC REPOSITION  12/07/2021    IR TUNNELED CENTRAL LINE PLACEMENT  06/20/2022    LAPAROSCOPY  1996    endometriosis    MOUTH BIOPSY      lip    OH EGD TRANSORAL BIOPSY SINGLE/MULTIPLE N/A 04/24/2019    Procedure: ESOPHAGOGASTRODUODENOSCOPY (EGD) with multiple bx and dilation;  Surgeon: Peter Baldwin MD;  Location:  GI LAB;  Service: Gastroenterology    OH SURGICAL ARTHROSCOPY SHOULDER W/ROTATOR CUFF RPR Right 04/06/2022    Procedure: SHOULDER ARTHROSCOPIC ROTATOR CUFF REPAIR Biceps Tenodisis;  Surgeon: Ilya Munoz MD;  Location: [facility] MAIN OR;  Service: Orthopedics    TUNNELED VENOUS CATHETER PLACEMENT  2016       Family History   Problem Relation Age of Onset    Cancer Mother         urinary bladder     No Known Problems Father     Thyroid cancer Sister     Heart attack Sister     No Known Problems Sister     No Known Problems Sister     No Known Problems Sister     No Known Problems Sister     No Known Problems Daughter     No Known Problems Daughter     Colon cancer Maternal Grandfather     Breast cancer Maternal Aunt         over 50 yrs old     Endometrial cancer Maternal Aunt     Multiple myeloma Maternal Aunt     Hypertension Paternal Aunt        Social History     Occupational History    Not on file   Tobacco Use    Smoking status: Never    Smokeless tobacco: Never   Vaping Use    Vaping status: Never Used   Substance and Sexual Activity    Alcohol use: Never    Drug use: No    Sexual activity: Not on file         Current Outpatient Medications:     Alum Hydroxide-Mag Trisilicate (Gaviscon) 80-14.2 MG CHEW, Chew 1 tablet 4 (four) times a day as needed With meals and as needed, Disp: , Rfl:     azelastine (ASTELIN) 0.1 % nasal spray, 2 sprays into each nostril 2 (two) times a day Use in each nostril as directed, Disp: , Rfl:     beclomethasone (QVAR) 40 MCG/ACT inhaler, Inhale 1 puff daily as needed (only  when unable to nebulize pulmicort) Rinse mouth after use., Disp: , Rfl:     budesonide (PULMICORT) 0.5 mg/2 mL nebulizer solution, Take 0.5 mg by nebulization 2 (two) times a day Rinse mouth after use., Disp: , Rfl:     cromolyn (GASTROCROM) 100 MG/5ML solution, Take 100 mg by mouth 4 (four) times a day (before meals and at bedtime), Disp: , Rfl:     cyclobenzaprine (FLEXERIL) 5 mg tablet, Take 1 tablet (5 mg total) by mouth 3 (three) times a day as needed for muscle spasms, Disp: 63 tablet, Rfl: 0    docusate sodium (COLACE) 100 mg capsule, Take 100 mg by mouth daily as needed for constipation, Disp: , Rfl:     erythromycin (ILOTYCIN) ophthalmic ointment, Apply 1 application to eye daily at bedtime, Disp: , Rfl:     famotidine (PEPCID) 40 MG tablet, Twice a day, Disp: , Rfl:     fexofenadine (ALLEGRA) 180 MG tablet, Take 180 mg by mouth 2 (two) times a day , Disp: , Rfl:     fluticasone (FLONASE) 50 mcg/act nasal spray, 2 sprays into each nostril daily , Disp: , Rfl:     Galcanezumab-gnlm (Emgality) 120 MG/ML SOAJ, Inject under the skin every 30 (thirty) days , Disp: , Rfl:     guaiFENesin (Mucinex) 600 mg 12 hr tablet, Take 600 mg by mouth every 12 (twelve) hours, Disp: , Rfl:     [START ON 4/18/2024] HYDROcodone-acetaminophen (NORCO) 5-325 mg per tablet, Take 1 tablet by mouth 2 (two) times a day as needed for pain Max Daily Amount: 2 tablets Do not start before April 18, 2024., Disp: 60 tablet, Rfl: 0    [START ON 3/21/2024] HYDROcodone-acetaminophen (Norco) 5-325 mg per tablet, Take 1 tablet by mouth 2 (two) times a day as needed for pain for up to 60 doses Max Daily Amount: 2 tablets Do not start before March 21, 2024., Disp: 60 tablet, Rfl: 0    ipratropium (ATROVENT) 0.02 % nebulizer solution, Take 0.5 mg by nebulization every 6 (six) hours as needed for wheezing or shortness of breath, Disp: , Rfl:     ipratropium (ATROVENT) 0.06 % nasal spray, PLEASE SEE ATTACHED FOR DETAILED DIRECTIONS, Disp: , Rfl:      ivabradine HCl (Corlanor) 5 MG tablet, 7.5 mg 2 (two) times a day, Disp: , Rfl:     levalbuterol (XOPENEX HFA) 45 mcg/act inhaler, Inhale 2 puffs every 4 (four) hours as needed (when unable to nebulize), Disp: , Rfl:     levalbuterol (XOPENEX) 1.25 mg/3 mL nebulizer solution, Take 1.25 mg by nebulization every 6 (six) hours as needed , Disp: , Rfl: 2    Magnesium 400 MG CAPS, Take 1 capsule by mouth 2 (two) times a day , Disp: , Rfl:     MULTIPLE VITAMINS-CALCIUM PO, Take 1 capsule by mouth every morning , Disp: , Rfl:     NON FORMULARY, Take 1 mg by mouth 3 (three) times a day Ketotifen 1 mg compounded capsule, Disp: , Rfl:     omega-3-acid ethyl esters (LOVAZA) 1 g capsule, Take 2 g by mouth 2 (two) times a day 1600mg daily, Disp: , Rfl:     polyethylene glycol (MIRALAX) 17 g packet, Take 17 g by mouth daily as needed , Disp: , Rfl:     Probiotic Product (PROBIOTIC DAILY PO), Take 1 tablet by mouth daily At lunch, Disp: , Rfl:     Qvar RediHaler 40 MCG/ACT inhaler, , Disp: , Rfl:     sodium chloride, Inject 1,500 mL into a catheter in a vein, Disp: , Rfl:     sucralfate (CARAFATE) 1 g/10 mL suspension, Take 1 g by mouth 2 (two) times a day, Disp: , Rfl:     Thyroid, Porcine, POWD, Use 32 mg daily, Disp: , Rfl:     Ubrogepant (UBRELVY) 100 MG tablet, Take 100 mg by mouth Take 1 tablet (100 mg) one time as needed for migraine. May repeat one additional tablet (100 mg) at least two hours after the first dose. Do not use more than two doses per day, or for more than eight days per month., Disp: , Rfl:     verapamil (CALAN) 40 mg tablet, , Disp: , Rfl:     cholecalciferol (VITAMIN D3) 1,000 units tablet, Take 4,000 Units by mouth daily (Patient not taking: Reported on 1/5/2024), Disp: , Rfl:   No current facility-administered medications for this visit.    Facility-Administered Medications Ordered in Other Visits:     [START ON 3/4/2024] sodium chloride 0.9 % bolus 1,500 mL, 1,500 mL, Intravenous, Once, Jan Doyle MD     [START ON 3/1/2024] sodium chloride 0.9 % infusion, 250 mL/hr, Intravenous, Once, Jan Doyle MD    [START ON 3/4/2024] sodium chloride 0.9 % infusion, 250 mL/hr, Intravenous, Once, Jan Doyle MD    Allergies   Allergen Reactions    Imipramine Confusion, Fatigue, Irritability, Palpitations, Shortness Of Breath, Tachycardia and Visual Disturbance    Melatonin Shortness Of Breath    Mirtazapine Anxiety, Dizziness, GI Intolerance, Nausea Only, Palpitations and Shortness Of Breath    Nexium [Esomeprazole] Shortness Of Breath    Nsaids Shortness Of Breath    Singulair [Montelukast] Shortness Of Breath and Cough    Zomig [Zolmitriptan] Shortness Of Breath    Dexilant [Dexlansoprazole] Nausea Only and Vomiting    Albuterol     Ambien [Zolpidem]     Amitriptyline Drowsiness    Aspirin     Bactrim [Sulfamethoxazole-Trimethoprim] Hives    Banana - Food Allergy Dermatitis    Ceftin [Cefuroxime]     Celebrex [Celecoxib]     Ciprofloxacin     Cranberry-C [Ascorbate - Food Allergy] GI Intolerance    Demerol [Meperidine]     Epinephrine Dizziness    Ergotamine Nausea Only and Headache    Keflex [Cephalexin]     Klonopin [Clonazepam] Nausea Only and Dizziness    Latex Hives    Levaquin [Levofloxacin]     Lexapro [Escitalopram]     Lyrica [Pregabalin] Fatigue    Macrodantin [Nitrofurantoin]     Morphine Nausea Only    Movantik [Naloxegol] Nausea Only    Mushroom Extract Complex - Food Allergy      Eye itchy, asthma  attack    Naprosyn [Naproxen]     Neurontin [Gabapentin] Dizziness    Pineapple - Food Allergy GI Intolerance    Plecanatide Abdominal Pain, Diarrhea and GI Intolerance    Prolia [Denosumab]     Prozac [Fluoxetine]     Serevent [Salmeterol] Dizziness    Sudafed [Pseudoephedrine] Hives    Sulfa Antibiotics     Tomato - Food Allergy GI Intolerance    Trazodone     Ultram [Tramadol] Nausea Only, Dizziness and Headache    Vilazodone Dizziness, GI Intolerance and Headache    Vilazodone Hcl Abdominal Pain, Dizziness, GI  Intolerance, Headache and Other (See Comments)    Vioxx [Rofecoxib]     Vortioxetine Drowsiness, Fatigue, GI Intolerance and Irritability    Zithromax [Azithromycin] Hives    Zoloft [Sertraline]     Zantac [Ranitidine] Rash and Dizziness       Physical Exam:    There were no vitals taken for this visit.    Constitutional:normal, well developed, well nourished, alert, in no distress and non-toxic and no overt pain behavior.  Eyes:anicteric  HEENT:grossly intact  Neck:supple, symmetric, trachea midline and no masses   Pulmonary:even and unlabored  Cardiovascular:No edema or pitting edema present  Skin:Normal without rashes or lesions and well hydrated  Psychiatric:Mood and affect appropriate  Neurologic:Cranial Nerves II-XII grossly intact  Musculoskeletal:ambulates with cane and gait is slow and guarded.      Imaging  No orders to display         No orders of the defined types were placed in this encounter.

## 2024-02-29 NOTE — PROGRESS NOTES
Assessment:  1. Chronic pain syndrome    2. Chronic bilateral low back pain with bilateral sciatica    3. Arachnoiditis    4. Lumbar spondylosis    5. Lumbar radiculopathy    6. History of lumbar fusion    7. Neck pain    8. Cervical radiculopathy    9. Cervical spondylosis        Plan:  While the patient was in the office today, I did have a thorough conversation regarding their chronic pain syndrome, medication management, and treatment plan options.  Patient is being seen for a follow-up visit.  Overall, she is reporting about 50% reduction in her pain with current medication regimen.  Patient has upcoming appointments for lumbar facet radiofrequency ablations.    Renewed hydrocodone 5/325 twice daily if needed for pain.  The patient's opioid scripts were sent to their pharmacy electronically and was given a 2 month supply of prescriptions with a Do Not Fill date(s) of 3/21/2024, 4/18/2024.    Continue Flexeril 5 mg 3 times daily if needed for spasms.  She did not require refill of this medication during today's visit.    [place] Prescription Drug Monitoring Program report was reviewed and was appropriate     A urine drug screen was collected at today's office visit as part of our medication management protocol. The point of care testing results were appropriate for what was being prescribed. The specimen will be sent for confirmatory testing. The drug screen is medically necessary because the patient is either dependent on opioid medication or is being considered for opioid medication therapy and the results could impact ongoing or future treatment. The drug screen is to evaluate for the presences or absence of prescribed, non-prescribed, and/or illicit drugs/substances.    There are risks associated with opioid medications, including dependence, addiction and tolerance. The patient understands and agrees to use these medications only as prescribed. Potential side effects of the medications include, but are  not limited to, constipation, drowsiness, addiction, impaired judgment and risk of fatal overdose if not taken as prescribed. The patient was warned against driving while taking sedation medications.  Sharing medications is a felony. At this point in time, the patient is showing no signs of addiction, abuse, diversion or suicidal ideation.    The patient will follow-up in 8 weeks for medication prescription refill and reevaluation. The patient was advised to contact the office should their symptoms worsen in the interim. The patient was agreeable and verbalized an understanding.        History of Present Illness:    The patient is a 68 y.o. female last seen on 1/5/2024 who presents for a follow up office visit in regards to chronic pain secondary to chronic pain syndrome, arachnoiditis cannot lumbar spondylosis, lumbar radiculopathy, history of lumbar fusion, neck pain, cervical radiculopathy, cervical spondylosis.  The patient currently reports complaints of neck pain that radiates to both upper extremities, mid back pain, low back pain that radiates to both lower extremities.  Current pain level is an 8/10.  Quality pain is described as burning, sharp, cramping, shooting, numb, pins-and-needles.    Current pain medications includes: Hydrocodone 5/325 twice daily if needed for pain, Flexeril 5 mg 3 times daily if needed for spasms.  The patient reports that this regimen is providing 50% pain relief.  The patient is reporting no side effects from this pain medication regimen.    Pain Contract Signed: 5/32/23  Last Urine Drug Screen: 2/29/24    I have personally reviewed and/or updated the patient's past medical history, past surgical history, family history, social history, current medications, allergies, and vital signs today.       Review of Systems:    Review of Systems   Constitutional:  Negative for unexpected weight change.   HENT:  Negative for hearing loss.    Eyes:  Negative for visual disturbance.  "  Respiratory:  Positive for shortness of breath.    Cardiovascular:  Positive for chest pain. Negative for leg swelling.   Gastrointestinal:  Positive for constipation and nausea.   Endocrine: Negative for polyuria.   Genitourinary:  Negative for difficulty urinating.   Musculoskeletal:  Negative for gait problem, joint swelling and myalgias.   Skin:  Negative for rash.   Neurological:  Positive for dizziness. Negative for weakness and headaches.        Memory loss   Psychiatric/Behavioral:  Negative for decreased concentration.    All other systems reviewed and are negative.        Past Medical History:   Diagnosis Date    Allergic rhinitis     Anxiety     Asthma     Back pain     Cardiac disease     Cardiopathy     EF 45%    Cough     Diverticulitis     Factor V Leiden (HCC)     Fibromyalgia     GERD (gastroesophageal reflux disease)     Hashimoto's thyroiditis     Hx of degenerative disc disease     Hypotension     pots - postural orthostatic hypotension    Interstitial cystitis     Irregular heart beat     LBBB    Irritable bowel syndrome     Migraines     Mitral valve disease     \"thickening\"    Myocardial infarction (HCC)     possible but not sure when    Neuropathy     bilateral legs    Osteoporosis     Postural orthostatic tachycardia syndrome     must drink a lot of water and salt    Pott's disease     Rheumatic fever 1967    Scoliosis     Sepsis ([facility])     associated with PICC line    Sjogren's syndrome ([facility])     TMJ (dislocation of temporomandibular joint)        Past Surgical History:   Procedure Laterality Date    ABLATION MICROWAVE Left     lumbar area    BACK SURGERY  10/1998     SECTION  1992    CHOLECYSTECTOMY  2016    COLONOSCOPY      DILATION AND CURETTAGE OF UTERUS      EGD      FOOT SURGERY  1968    removal of bone and neuroma    HYSTERECTOMY  1997    age 45  PRICE ooph    IR OTHER  2022    IR OTHER  2022    IR OTHER  2023    IR OTHER  2024    IR PICC " PLACEMENT SINGLE LUMEN  10/02/2020    IR PICC PLACEMENT SINGLE LUMEN  08/12/2021    IR PICC REPOSITION  12/07/2021    IR TUNNELED CENTRAL LINE PLACEMENT  06/20/2022    LAPAROSCOPY  1996    endometriosis    MOUTH BIOPSY      lip    SC EGD TRANSORAL BIOPSY SINGLE/MULTIPLE N/A 04/24/2019    Procedure: ESOPHAGOGASTRODUODENOSCOPY (EGD) with multiple bx and dilation;  Surgeon: Peter Baldwin MD;  Location:  GI LAB;  Service: Gastroenterology    SC SURGICAL ARTHROSCOPY SHOULDER W/ROTATOR CUFF RPR Right 04/06/2022    Procedure: SHOULDER ARTHROSCOPIC ROTATOR CUFF REPAIR Biceps Tenodisis;  Surgeon: Ilya Munoz MD;  Location: [facility] MAIN OR;  Service: Orthopedics    TUNNELED VENOUS CATHETER PLACEMENT  2016       Family History   Problem Relation Age of Onset    Cancer Mother         urinary bladder     No Known Problems Father     Thyroid cancer Sister     Heart attack Sister     No Known Problems Sister     No Known Problems Sister     No Known Problems Sister     No Known Problems Sister     No Known Problems Daughter     No Known Problems Daughter     Colon cancer Maternal Grandfather     Breast cancer Maternal Aunt         over 50 yrs old     Endometrial cancer Maternal Aunt     Multiple myeloma Maternal Aunt     Hypertension Paternal Aunt        Social History     Occupational History    Not on file   Tobacco Use    Smoking status: Never    Smokeless tobacco: Never   Vaping Use    Vaping status: Never Used   Substance and Sexual Activity    Alcohol use: Never    Drug use: No    Sexual activity: Not on file         Current Outpatient Medications:     Alum Hydroxide-Mag Trisilicate (Gaviscon) 80-14.2 MG CHEW, Chew 1 tablet 4 (four) times a day as needed With meals and as needed, Disp: , Rfl:     azelastine (ASTELIN) 0.1 % nasal spray, 2 sprays into each nostril 2 (two) times a day Use in each nostril as directed, Disp: , Rfl:     beclomethasone (QVAR) 40 MCG/ACT inhaler, Inhale 1 puff daily as needed (only  when unable to nebulize pulmicort) Rinse mouth after use., Disp: , Rfl:     budesonide (PULMICORT) 0.5 mg/2 mL nebulizer solution, Take 0.5 mg by nebulization 2 (two) times a day Rinse mouth after use., Disp: , Rfl:     cromolyn (GASTROCROM) 100 MG/5ML solution, Take 100 mg by mouth 4 (four) times a day (before meals and at bedtime), Disp: , Rfl:     cyclobenzaprine (FLEXERIL) 5 mg tablet, Take 1 tablet (5 mg total) by mouth 3 (three) times a day as needed for muscle spasms, Disp: 63 tablet, Rfl: 0    docusate sodium (COLACE) 100 mg capsule, Take 100 mg by mouth daily as needed for constipation, Disp: , Rfl:     erythromycin (ILOTYCIN) ophthalmic ointment, Apply 1 application to eye daily at bedtime, Disp: , Rfl:     famotidine (PEPCID) 40 MG tablet, Twice a day, Disp: , Rfl:     fexofenadine (ALLEGRA) 180 MG tablet, Take 180 mg by mouth 2 (two) times a day , Disp: , Rfl:     fluticasone (FLONASE) 50 mcg/act nasal spray, 2 sprays into each nostril daily , Disp: , Rfl:     Galcanezumab-gnlm (Emgality) 120 MG/ML SOAJ, Inject under the skin every 30 (thirty) days , Disp: , Rfl:     guaiFENesin (Mucinex) 600 mg 12 hr tablet, Take 600 mg by mouth every 12 (twelve) hours, Disp: , Rfl:     [START ON 4/18/2024] HYDROcodone-acetaminophen (NORCO) 5-325 mg per tablet, Take 1 tablet by mouth 2 (two) times a day as needed for pain Max Daily Amount: 2 tablets Do not start before April 18, 2024., Disp: 60 tablet, Rfl: 0    [START ON 3/21/2024] HYDROcodone-acetaminophen (Norco) 5-325 mg per tablet, Take 1 tablet by mouth 2 (two) times a day as needed for pain for up to 60 doses Max Daily Amount: 2 tablets Do not start before March 21, 2024., Disp: 60 tablet, Rfl: 0    ipratropium (ATROVENT) 0.02 % nebulizer solution, Take 0.5 mg by nebulization every 6 (six) hours as needed for wheezing or shortness of breath, Disp: , Rfl:     ipratropium (ATROVENT) 0.06 % nasal spray, PLEASE SEE ATTACHED FOR DETAILED DIRECTIONS, Disp: , Rfl:      ivabradine HCl (Corlanor) 5 MG tablet, 7.5 mg 2 (two) times a day, Disp: , Rfl:     levalbuterol (XOPENEX HFA) 45 mcg/act inhaler, Inhale 2 puffs every 4 (four) hours as needed (when unable to nebulize), Disp: , Rfl:     levalbuterol (XOPENEX) 1.25 mg/3 mL nebulizer solution, Take 1.25 mg by nebulization every 6 (six) hours as needed , Disp: , Rfl: 2    Magnesium 400 MG CAPS, Take 1 capsule by mouth 2 (two) times a day , Disp: , Rfl:     MULTIPLE VITAMINS-CALCIUM PO, Take 1 capsule by mouth every morning , Disp: , Rfl:     NON FORMULARY, Take 1 mg by mouth 3 (three) times a day Ketotifen 1 mg compounded capsule, Disp: , Rfl:     omega-3-acid ethyl esters (LOVAZA) 1 g capsule, Take 2 g by mouth 2 (two) times a day 1600mg daily, Disp: , Rfl:     polyethylene glycol (MIRALAX) 17 g packet, Take 17 g by mouth daily as needed , Disp: , Rfl:     Probiotic Product (PROBIOTIC DAILY PO), Take 1 tablet by mouth daily At lunch, Disp: , Rfl:     Qvar RediHaler 40 MCG/ACT inhaler, , Disp: , Rfl:     sodium chloride, Inject 1,500 mL into a catheter in a vein, Disp: , Rfl:     sucralfate (CARAFATE) 1 g/10 mL suspension, Take 1 g by mouth 2 (two) times a day, Disp: , Rfl:     Thyroid, Porcine, POWD, Use 32 mg daily, Disp: , Rfl:     Ubrogepant (UBRELVY) 100 MG tablet, Take 100 mg by mouth Take 1 tablet (100 mg) one time as needed for migraine. May repeat one additional tablet (100 mg) at least two hours after the first dose. Do not use more than two doses per day, or for more than eight days per month., Disp: , Rfl:     verapamil (CALAN) 40 mg tablet, , Disp: , Rfl:     cholecalciferol (VITAMIN D3) 1,000 units tablet, Take 4,000 Units by mouth daily (Patient not taking: Reported on 1/5/2024), Disp: , Rfl:   No current facility-administered medications for this visit.    Facility-Administered Medications Ordered in Other Visits:     [START ON 3/4/2024] sodium chloride 0.9 % bolus 1,500 mL, 1,500 mL, Intravenous, Once, Jan Doyle MD     [START ON 3/1/2024] sodium chloride 0.9 % infusion, 250 mL/hr, Intravenous, Once, Jan Doyle MD    [START ON 3/4/2024] sodium chloride 0.9 % infusion, 250 mL/hr, Intravenous, Once, Jan Doyle MD    Allergies   Allergen Reactions    Imipramine Confusion, Fatigue, Irritability, Palpitations, Shortness Of Breath, Tachycardia and Visual Disturbance    Melatonin Shortness Of Breath    Mirtazapine Anxiety, Dizziness, GI Intolerance, Nausea Only, Palpitations and Shortness Of Breath    Nexium [Esomeprazole] Shortness Of Breath    Nsaids Shortness Of Breath    Singulair [Montelukast] Shortness Of Breath and Cough    Zomig [Zolmitriptan] Shortness Of Breath    Dexilant [Dexlansoprazole] Nausea Only and Vomiting    Albuterol     Ambien [Zolpidem]     Amitriptyline Drowsiness    Aspirin     Bactrim [Sulfamethoxazole-Trimethoprim] Hives    Banana - Food Allergy Dermatitis    Ceftin [Cefuroxime]     Celebrex [Celecoxib]     Ciprofloxacin     Cranberry-C [Ascorbate - Food Allergy] GI Intolerance    Demerol [Meperidine]     Epinephrine Dizziness    Ergotamine Nausea Only and Headache    Keflex [Cephalexin]     Klonopin [Clonazepam] Nausea Only and Dizziness    Latex Hives    Levaquin [Levofloxacin]     Lexapro [Escitalopram]     Lyrica [Pregabalin] Fatigue    Macrodantin [Nitrofurantoin]     Morphine Nausea Only    Movantik [Naloxegol] Nausea Only    Mushroom Extract Complex - Food Allergy      Eye itchy, asthma  attack    Naprosyn [Naproxen]     Neurontin [Gabapentin] Dizziness    Pineapple - Food Allergy GI Intolerance    Plecanatide Abdominal Pain, Diarrhea and GI Intolerance    Prolia [Denosumab]     Prozac [Fluoxetine]     Serevent [Salmeterol] Dizziness    Sudafed [Pseudoephedrine] Hives    Sulfa Antibiotics     Tomato - Food Allergy GI Intolerance    Trazodone     Ultram [Tramadol] Nausea Only, Dizziness and Headache    Vilazodone Dizziness, GI Intolerance and Headache    Vilazodone Hcl Abdominal Pain, Dizziness, GI  Intolerance, Headache and Other (See Comments)    Vioxx [Rofecoxib]     Vortioxetine Drowsiness, Fatigue, GI Intolerance and Irritability    Zithromax [Azithromycin] Hives    Zoloft [Sertraline]     Zantac [Ranitidine] Rash and Dizziness       Physical Exam:    There were no vitals taken for this visit.    Constitutional:normal, well developed, well nourished, alert, in no distress and non-toxic and no overt pain behavior.  Eyes:anicteric  HEENT:grossly intact  Neck:supple, symmetric, trachea midline and no masses   Pulmonary:even and unlabored  Cardiovascular:No edema or pitting edema present  Skin:Normal without rashes or lesions and well hydrated  Psychiatric:Mood and affect appropriate  Neurologic:Cranial Nerves II-XII grossly intact  Musculoskeletal:ambulates with cane and gait is slow and guarded.      Imaging  No orders to display         No orders of the defined types were placed in this encounter.

## 2024-02-29 NOTE — H&P (VIEW-ONLY)
Assessment:  1. Chronic pain syndrome    2. Chronic bilateral low back pain with bilateral sciatica    3. Arachnoiditis    4. Lumbar spondylosis    5. Lumbar radiculopathy    6. History of lumbar fusion    7. Neck pain    8. Cervical radiculopathy    9. Cervical spondylosis        Plan:  While the patient was in the office today, I did have a thorough conversation regarding their chronic pain syndrome, medication management, and treatment plan options.  Patient is being seen for a follow-up visit.  Overall, she is reporting about 50% reduction in her pain with current medication regimen.  Patient has upcoming appointments for lumbar facet radiofrequency ablations.    Renewed hydrocodone 5/325 twice daily if needed for pain.  The patient's opioid scripts were sent to their pharmacy electronically and was given a 2 month supply of prescriptions with a Do Not Fill date(s) of 3/21/2024, 4/18/2024.    Continue Flexeril 5 mg 3 times daily if needed for spasms.  She did not require refill of this medication during today's visit.    [place] Prescription Drug Monitoring Program report was reviewed and was appropriate     A urine drug screen was collected at today's office visit as part of our medication management protocol. The point of care testing results were appropriate for what was being prescribed. The specimen will be sent for confirmatory testing. The drug screen is medically necessary because the patient is either dependent on opioid medication or is being considered for opioid medication therapy and the results could impact ongoing or future treatment. The drug screen is to evaluate for the presences or absence of prescribed, non-prescribed, and/or illicit drugs/substances.    There are risks associated with opioid medications, including dependence, addiction and tolerance. The patient understands and agrees to use these medications only as prescribed. Potential side effects of the medications include, but are  not limited to, constipation, drowsiness, addiction, impaired judgment and risk of fatal overdose if not taken as prescribed. The patient was warned against driving while taking sedation medications.  Sharing medications is a felony. At this point in time, the patient is showing no signs of addiction, abuse, diversion or suicidal ideation.    The patient will follow-up in 8 weeks for medication prescription refill and reevaluation. The patient was advised to contact the office should their symptoms worsen in the interim. The patient was agreeable and verbalized an understanding.        History of Present Illness:    The patient is a 68 y.o. female last seen on 1/5/2024 who presents for a follow up office visit in regards to chronic pain secondary to chronic pain syndrome, arachnoiditis cannot lumbar spondylosis, lumbar radiculopathy, history of lumbar fusion, neck pain, cervical radiculopathy, cervical spondylosis.  The patient currently reports complaints of neck pain that radiates to both upper extremities, mid back pain, low back pain that radiates to both lower extremities.  Current pain level is an 8/10.  Quality pain is described as burning, sharp, cramping, shooting, numb, pins-and-needles.    Current pain medications includes: Hydrocodone 5/325 twice daily if needed for pain, Flexeril 5 mg 3 times daily if needed for spasms.  The patient reports that this regimen is providing 50% pain relief.  The patient is reporting no side effects from this pain medication regimen.    Pain Contract Signed: 5/32/23  Last Urine Drug Screen: 2/29/24    I have personally reviewed and/or updated the patient's past medical history, past surgical history, family history, social history, current medications, allergies, and vital signs today.       Review of Systems:    Review of Systems   Constitutional:  Negative for unexpected weight change.   HENT:  Negative for hearing loss.    Eyes:  Negative for visual disturbance.  "  Respiratory:  Positive for shortness of breath.    Cardiovascular:  Positive for chest pain. Negative for leg swelling.   Gastrointestinal:  Positive for constipation and nausea.   Endocrine: Negative for polyuria.   Genitourinary:  Negative for difficulty urinating.   Musculoskeletal:  Negative for gait problem, joint swelling and myalgias.   Skin:  Negative for rash.   Neurological:  Positive for dizziness. Negative for weakness and headaches.        Memory loss   Psychiatric/Behavioral:  Negative for decreased concentration.    All other systems reviewed and are negative.        Past Medical History:   Diagnosis Date    Allergic rhinitis     Anxiety     Asthma     Back pain     Cardiac disease     Cardiopathy     EF 45%    Cough     Diverticulitis     Factor V Leiden (HCC)     Fibromyalgia     GERD (gastroesophageal reflux disease)     Hashimoto's thyroiditis     Hx of degenerative disc disease     Hypotension     pots - postural orthostatic hypotension    Interstitial cystitis     Irregular heart beat     LBBB    Irritable bowel syndrome     Migraines     Mitral valve disease     \"thickening\"    Myocardial infarction (HCC)     possible but not sure when    Neuropathy     bilateral legs    Osteoporosis     Postural orthostatic tachycardia syndrome     must drink a lot of water and salt    Pott's disease     Rheumatic fever 1967    Scoliosis     Sepsis ([facility])     associated with PICC line    Sjogren's syndrome ([facility])     TMJ (dislocation of temporomandibular joint)        Past Surgical History:   Procedure Laterality Date    ABLATION MICROWAVE Left     lumbar area    BACK SURGERY  10/1998     SECTION  1992    CHOLECYSTECTOMY  2016    COLONOSCOPY      DILATION AND CURETTAGE OF UTERUS      EGD      FOOT SURGERY  1968    removal of bone and neuroma    HYSTERECTOMY  1997    age 45  PRICE ooph    IR OTHER  2022    IR OTHER  2022    IR OTHER  2023    IR OTHER  2024    IR PICC " PLACEMENT SINGLE LUMEN  10/02/2020    IR PICC PLACEMENT SINGLE LUMEN  08/12/2021    IR PICC REPOSITION  12/07/2021    IR TUNNELED CENTRAL LINE PLACEMENT  06/20/2022    LAPAROSCOPY  1996    endometriosis    MOUTH BIOPSY      lip    OH EGD TRANSORAL BIOPSY SINGLE/MULTIPLE N/A 04/24/2019    Procedure: ESOPHAGOGASTRODUODENOSCOPY (EGD) with multiple bx and dilation;  Surgeon: Peter Baldwin MD;  Location:  GI LAB;  Service: Gastroenterology    OH SURGICAL ARTHROSCOPY SHOULDER W/ROTATOR CUFF RPR Right 04/06/2022    Procedure: SHOULDER ARTHROSCOPIC ROTATOR CUFF REPAIR Biceps Tenodisis;  Surgeon: Ilya Munoz MD;  Location: [facility] MAIN OR;  Service: Orthopedics    TUNNELED VENOUS CATHETER PLACEMENT  2016       Family History   Problem Relation Age of Onset    Cancer Mother         urinary bladder     No Known Problems Father     Thyroid cancer Sister     Heart attack Sister     No Known Problems Sister     No Known Problems Sister     No Known Problems Sister     No Known Problems Sister     No Known Problems Daughter     No Known Problems Daughter     Colon cancer Maternal Grandfather     Breast cancer Maternal Aunt         over 50 yrs old     Endometrial cancer Maternal Aunt     Multiple myeloma Maternal Aunt     Hypertension Paternal Aunt        Social History     Occupational History    Not on file   Tobacco Use    Smoking status: Never    Smokeless tobacco: Never   Vaping Use    Vaping status: Never Used   Substance and Sexual Activity    Alcohol use: Never    Drug use: No    Sexual activity: Not on file         Current Outpatient Medications:     Alum Hydroxide-Mag Trisilicate (Gaviscon) 80-14.2 MG CHEW, Chew 1 tablet 4 (four) times a day as needed With meals and as needed, Disp: , Rfl:     azelastine (ASTELIN) 0.1 % nasal spray, 2 sprays into each nostril 2 (two) times a day Use in each nostril as directed, Disp: , Rfl:     beclomethasone (QVAR) 40 MCG/ACT inhaler, Inhale 1 puff daily as needed (only  when unable to nebulize pulmicort) Rinse mouth after use., Disp: , Rfl:     budesonide (PULMICORT) 0.5 mg/2 mL nebulizer solution, Take 0.5 mg by nebulization 2 (two) times a day Rinse mouth after use., Disp: , Rfl:     cromolyn (GASTROCROM) 100 MG/5ML solution, Take 100 mg by mouth 4 (four) times a day (before meals and at bedtime), Disp: , Rfl:     cyclobenzaprine (FLEXERIL) 5 mg tablet, Take 1 tablet (5 mg total) by mouth 3 (three) times a day as needed for muscle spasms, Disp: 63 tablet, Rfl: 0    docusate sodium (COLACE) 100 mg capsule, Take 100 mg by mouth daily as needed for constipation, Disp: , Rfl:     erythromycin (ILOTYCIN) ophthalmic ointment, Apply 1 application to eye daily at bedtime, Disp: , Rfl:     famotidine (PEPCID) 40 MG tablet, Twice a day, Disp: , Rfl:     fexofenadine (ALLEGRA) 180 MG tablet, Take 180 mg by mouth 2 (two) times a day , Disp: , Rfl:     fluticasone (FLONASE) 50 mcg/act nasal spray, 2 sprays into each nostril daily , Disp: , Rfl:     Galcanezumab-gnlm (Emgality) 120 MG/ML SOAJ, Inject under the skin every 30 (thirty) days , Disp: , Rfl:     guaiFENesin (Mucinex) 600 mg 12 hr tablet, Take 600 mg by mouth every 12 (twelve) hours, Disp: , Rfl:     [START ON 4/18/2024] HYDROcodone-acetaminophen (NORCO) 5-325 mg per tablet, Take 1 tablet by mouth 2 (two) times a day as needed for pain Max Daily Amount: 2 tablets Do not start before April 18, 2024., Disp: 60 tablet, Rfl: 0    [START ON 3/21/2024] HYDROcodone-acetaminophen (Norco) 5-325 mg per tablet, Take 1 tablet by mouth 2 (two) times a day as needed for pain for up to 60 doses Max Daily Amount: 2 tablets Do not start before March 21, 2024., Disp: 60 tablet, Rfl: 0    ipratropium (ATROVENT) 0.02 % nebulizer solution, Take 0.5 mg by nebulization every 6 (six) hours as needed for wheezing or shortness of breath, Disp: , Rfl:     ipratropium (ATROVENT) 0.06 % nasal spray, PLEASE SEE ATTACHED FOR DETAILED DIRECTIONS, Disp: , Rfl:      ivabradine HCl (Corlanor) 5 MG tablet, 7.5 mg 2 (two) times a day, Disp: , Rfl:     levalbuterol (XOPENEX HFA) 45 mcg/act inhaler, Inhale 2 puffs every 4 (four) hours as needed (when unable to nebulize), Disp: , Rfl:     levalbuterol (XOPENEX) 1.25 mg/3 mL nebulizer solution, Take 1.25 mg by nebulization every 6 (six) hours as needed , Disp: , Rfl: 2    Magnesium 400 MG CAPS, Take 1 capsule by mouth 2 (two) times a day , Disp: , Rfl:     MULTIPLE VITAMINS-CALCIUM PO, Take 1 capsule by mouth every morning , Disp: , Rfl:     NON FORMULARY, Take 1 mg by mouth 3 (three) times a day Ketotifen 1 mg compounded capsule, Disp: , Rfl:     omega-3-acid ethyl esters (LOVAZA) 1 g capsule, Take 2 g by mouth 2 (two) times a day 1600mg daily, Disp: , Rfl:     polyethylene glycol (MIRALAX) 17 g packet, Take 17 g by mouth daily as needed , Disp: , Rfl:     Probiotic Product (PROBIOTIC DAILY PO), Take 1 tablet by mouth daily At lunch, Disp: , Rfl:     Qvar RediHaler 40 MCG/ACT inhaler, , Disp: , Rfl:     sodium chloride, Inject 1,500 mL into a catheter in a vein, Disp: , Rfl:     sucralfate (CARAFATE) 1 g/10 mL suspension, Take 1 g by mouth 2 (two) times a day, Disp: , Rfl:     Thyroid, Porcine, POWD, Use 32 mg daily, Disp: , Rfl:     Ubrogepant (UBRELVY) 100 MG tablet, Take 100 mg by mouth Take 1 tablet (100 mg) one time as needed for migraine. May repeat one additional tablet (100 mg) at least two hours after the first dose. Do not use more than two doses per day, or for more than eight days per month., Disp: , Rfl:     verapamil (CALAN) 40 mg tablet, , Disp: , Rfl:     cholecalciferol (VITAMIN D3) 1,000 units tablet, Take 4,000 Units by mouth daily (Patient not taking: Reported on 1/5/2024), Disp: , Rfl:   No current facility-administered medications for this visit.    Facility-Administered Medications Ordered in Other Visits:     [START ON 3/4/2024] sodium chloride 0.9 % bolus 1,500 mL, 1,500 mL, Intravenous, Once, Jan Doyle MD     [START ON 3/1/2024] sodium chloride 0.9 % infusion, 250 mL/hr, Intravenous, Once, Jan Doyle MD    [START ON 3/4/2024] sodium chloride 0.9 % infusion, 250 mL/hr, Intravenous, Once, Jan Doyle MD    Allergies   Allergen Reactions    Imipramine Confusion, Fatigue, Irritability, Palpitations, Shortness Of Breath, Tachycardia and Visual Disturbance    Melatonin Shortness Of Breath    Mirtazapine Anxiety, Dizziness, GI Intolerance, Nausea Only, Palpitations and Shortness Of Breath    Nexium [Esomeprazole] Shortness Of Breath    Nsaids Shortness Of Breath    Singulair [Montelukast] Shortness Of Breath and Cough    Zomig [Zolmitriptan] Shortness Of Breath    Dexilant [Dexlansoprazole] Nausea Only and Vomiting    Albuterol     Ambien [Zolpidem]     Amitriptyline Drowsiness    Aspirin     Bactrim [Sulfamethoxazole-Trimethoprim] Hives    Banana - Food Allergy Dermatitis    Ceftin [Cefuroxime]     Celebrex [Celecoxib]     Ciprofloxacin     Cranberry-C [Ascorbate - Food Allergy] GI Intolerance    Demerol [Meperidine]     Epinephrine Dizziness    Ergotamine Nausea Only and Headache    Keflex [Cephalexin]     Klonopin [Clonazepam] Nausea Only and Dizziness    Latex Hives    Levaquin [Levofloxacin]     Lexapro [Escitalopram]     Lyrica [Pregabalin] Fatigue    Macrodantin [Nitrofurantoin]     Morphine Nausea Only    Movantik [Naloxegol] Nausea Only    Mushroom Extract Complex - Food Allergy      Eye itchy, asthma  attack    Naprosyn [Naproxen]     Neurontin [Gabapentin] Dizziness    Pineapple - Food Allergy GI Intolerance    Plecanatide Abdominal Pain, Diarrhea and GI Intolerance    Prolia [Denosumab]     Prozac [Fluoxetine]     Serevent [Salmeterol] Dizziness    Sudafed [Pseudoephedrine] Hives    Sulfa Antibiotics     Tomato - Food Allergy GI Intolerance    Trazodone     Ultram [Tramadol] Nausea Only, Dizziness and Headache    Vilazodone Dizziness, GI Intolerance and Headache    Vilazodone Hcl Abdominal Pain, Dizziness, GI  Intolerance, Headache and Other (See Comments)    Vioxx [Rofecoxib]     Vortioxetine Drowsiness, Fatigue, GI Intolerance and Irritability    Zithromax [Azithromycin] Hives    Zoloft [Sertraline]     Zantac [Ranitidine] Rash and Dizziness       Physical Exam:    There were no vitals taken for this visit.    Constitutional:normal, well developed, well nourished, alert, in no distress and non-toxic and no overt pain behavior.  Eyes:anicteric  HEENT:grossly intact  Neck:supple, symmetric, trachea midline and no masses   Pulmonary:even and unlabored  Cardiovascular:No edema or pitting edema present  Skin:Normal without rashes or lesions and well hydrated  Psychiatric:Mood and affect appropriate  Neurologic:Cranial Nerves II-XII grossly intact  Musculoskeletal:ambulates with cane and gait is slow and guarded.      Imaging  No orders to display         No orders of the defined types were placed in this encounter.

## 2024-03-01 ENCOUNTER — HOSPITAL ENCOUNTER (OUTPATIENT)
Dept: INFUSION CENTER | Facility: HOSPITAL | Age: 69
End: 2024-03-01
Payer: MEDICARE

## 2024-03-01 VITALS
TEMPERATURE: 97.2 F | RESPIRATION RATE: 16 BRPM | DIASTOLIC BLOOD PRESSURE: 79 MMHG | SYSTOLIC BLOOD PRESSURE: 123 MMHG | HEART RATE: 88 BPM

## 2024-03-01 PROCEDURE — 96360 HYDRATION IV INFUSION INIT: CPT

## 2024-03-01 PROCEDURE — 96361 HYDRATE IV INFUSION ADD-ON: CPT

## 2024-03-01 RX ORDER — SODIUM CHLORIDE 9 MG/ML
250 INJECTION, SOLUTION INTRAVENOUS ONCE
Status: COMPLETED | OUTPATIENT
Start: 2024-03-05 | End: 2024-03-05

## 2024-03-01 RX ADMIN — SODIUM CHLORIDE 250 ML/HR: 0.9 INJECTION, SOLUTION INTRAVENOUS at 09:10

## 2024-03-01 NOTE — PROGRESS NOTES
Teresa Mao  tolerated treatment well with no complications.      Teresa Mao is aware of future appt on 3/4 at 9:30 am.     AVS declined by Teresa Mao.

## 2024-03-03 LAB
6MAM UR QL CFM: NEGATIVE NG/ML
7AMINOCLONAZEPAM UR QL CFM: NEGATIVE NG/ML
A-OH ALPRAZ UR QL CFM: NEGATIVE NG/ML
ACCEPTABLE CREAT UR QL: ABNORMAL MG/DL
ACCEPTIBLE SP GR UR QL: NORMAL
AMPHET UR QL CFM: NEGATIVE NG/ML
AMPHET UR QL CFM: NEGATIVE NG/ML
BUPRENORPHINE UR QL CFM: NEGATIVE NG/ML
BUTALBITAL UR QL CFM: NEGATIVE NG/ML
BZE UR QL CFM: NEGATIVE NG/ML
CODEINE UR QL CFM: NEGATIVE NG/ML
DESIPRAMINE UR QL CFM: NEGATIVE NG/ML
EDDP UR QL CFM: NEGATIVE NG/ML
ETHYL GLUCURONIDE UR QL CFM: NEGATIVE NG/ML
ETHYL SULFATE UR QL SCN: NEGATIVE NG/ML
FENTANYL UR QL CFM: NEGATIVE NG/ML
GLIADIN IGG SER IA-ACNC: NEGATIVE NG/ML
GLUCOSE 30M P 50 G LAC PO SERPL-MCNC: NEGATIVE NG/ML
HYDROCODONE UR QL CFM: NORMAL NG/ML
HYDROCODONE UR QL CFM: NORMAL NG/ML
HYDROMORPHONE UR QL CFM: NEGATIVE NG/ML
LORAZEPAM UR QL CFM: NEGATIVE NG/ML
MDMA UR QL CFM: NEGATIVE NG/ML
ME-PHENIDATE UR QL CFM: NEGATIVE NG/ML
MEPERIDINE UR QL CFM: NEGATIVE NG/ML
METHADONE UR QL CFM: NEGATIVE NG/ML
METHAMPHET UR QL CFM: NEGATIVE NG/ML
MORPHINE UR QL CFM: NEGATIVE NG/ML
MORPHINE UR QL CFM: NEGATIVE NG/ML
NITRITE UR QL: NORMAL UG/ML
NORBUPRENORPHINE UR QL CFM: NEGATIVE NG/ML
NORDIAZEPAM UR QL CFM: NEGATIVE NG/ML
NORFENTANYL UR QL CFM: NEGATIVE NG/ML
NORHYDROCODONE UR QL CFM: NORMAL NG/ML
NORHYDROCODONE UR QL CFM: NORMAL NG/ML
NORMEPERIDINE UR QL CFM: NEGATIVE NG/ML
NOROXYCODONE UR QL CFM: NEGATIVE NG/ML
OLANZAPINE QUANTIFICATION: NEGATIVE NG/ML
OPC-3373 QUANTIFICATION: NEGATIVE
OXAZEPAM UR QL CFM: NEGATIVE NG/ML
OXYCODONE UR QL CFM: NEGATIVE NG/ML
OXYMORPHONE UR QL CFM: NEGATIVE NG/ML
OXYMORPHONE UR QL CFM: NEGATIVE NG/ML
PARA-FLUOROFENTANYL QUANTIFICATION: NORMAL NG/ML
PCP UR QL CFM: NEGATIVE NG/ML
PHENOBARB UR QL CFM: NEGATIVE NG/ML
RESULT ALL_PRESCRIBED MEDS AND SPECIAL INSTRUCTIONS: NORMAL
SECOBARBITAL UR QL CFM: NEGATIVE NG/ML
SL AMB 4-ANPP QUANTIFICATION: NORMAL NG/ML
SL AMB 7-OH-MITRAGYNINE (KRATOM ALKALOID) QUANTIFICATION: NEGATIVE NG/ML
SL AMB ACETYL FENTANYL QUANTIFICATION: NORMAL NG/ML
SL AMB ACETYL NORFENTANYL QUANTIFICATION: NORMAL NG/ML
SL AMB ACRYL FENTANYL QUANTIFICATION: NORMAL NG/ML
SL AMB CARFENTANIL QUANTIFICATION: NORMAL NG/ML
SL AMB CLOZAPINE QUANTIFICATION: NEGATIVE NG/ML
SL AMB CTHC (MARIJUANA METABOLITE) QUANTIFICATION: NEGATIVE NG/ML
SL AMB DEXTRORPHAN (DEXTROMETHORPHAN METABOLITE) QUANT: ABNORMAL NG/ML
SL AMB HALOPERIDOL  QUANTIFICATION: NEGATIVE NG/ML
SL AMB HALOPERIDOL METABOLITE QUANTIFICATION: NEGATIVE NG/ML
SL AMB HYDROXYRISPERIDONE QUANTIFICATION: NEGATIVE NG/ML
SL AMB N-DESMETHYL-TRAMADOL QUANTIFICATION: NEGATIVE NG/ML
SL AMB N-DESMETHYLCLOZAPINE QUANTIFICATION: NEGATIVE NG/ML
SL AMB NORQUETIAPINE QUANTIFICATION: NEGATIVE NG/ML
SL AMB PHENTERMINE QUANTIFICATION: NEGATIVE NG/ML
SL AMB PREGABALIN QUANTIFICATION: NEGATIVE
SL AMB QUETIAPINE QUANTIFICATION: NEGATIVE NG/ML
SL AMB RISPERIDONE QUANTIFICATION: NEGATIVE NG/ML
SL AMB RITALINIC ACID QUANTIFICATION: NEGATIVE NG/ML
SPECIMEN PH ACCEPTABLE UR: NORMAL
TAPENTADOL UR QL CFM: NEGATIVE NG/ML
TEMAZEPAM UR QL CFM: NEGATIVE NG/ML
TEMAZEPAM UR QL CFM: NEGATIVE NG/ML
TRAMADOL UR QL CFM: NEGATIVE NG/ML
URATE/CREAT 24H UR: NEGATIVE NG/ML

## 2024-03-04 ENCOUNTER — HOSPITAL ENCOUNTER (OUTPATIENT)
Dept: INFUSION CENTER | Facility: HOSPITAL | Age: 69
Discharge: HOME | End: 2024-03-04
Payer: MEDICARE

## 2024-03-04 VITALS
RESPIRATION RATE: 18 BRPM | DIASTOLIC BLOOD PRESSURE: 82 MMHG | HEART RATE: 76 BPM | SYSTOLIC BLOOD PRESSURE: 132 MMHG | TEMPERATURE: 96.5 F

## 2024-03-04 PROCEDURE — 96361 HYDRATE IV INFUSION ADD-ON: CPT

## 2024-03-04 PROCEDURE — 96360 HYDRATION IV INFUSION INIT: CPT

## 2024-03-04 RX ADMIN — SODIUM CHLORIDE 1500 ML: 0.9 INJECTION, SOLUTION INTRAVENOUS at 09:45

## 2024-03-04 NOTE — PROGRESS NOTES
Teresa Mao  tolerated hydration  well with no complications.      Teresa Mao is aware of future appt tomorrow.     AVS printed and given to Teresa Mao:  No (Declined by Teresa Mao)

## 2024-03-05 ENCOUNTER — HOSPITAL ENCOUNTER (OUTPATIENT)
Dept: RADIOLOGY | Facility: HOSPITAL | Age: 69
Discharge: HOME | End: 2024-03-05
Payer: MEDICARE

## 2024-03-05 ENCOUNTER — TELEPHONE (OUTPATIENT)
Dept: PAIN MEDICINE | Facility: CLINIC | Age: 69
End: 2024-03-05

## 2024-03-05 ENCOUNTER — HOSPITAL ENCOUNTER (OUTPATIENT)
Dept: INFUSION CENTER | Facility: HOSPITAL | Age: 69
Discharge: HOME | End: 2024-03-05
Payer: MEDICARE

## 2024-03-05 VITALS
SYSTOLIC BLOOD PRESSURE: 144 MMHG | HEART RATE: 72 BPM | DIASTOLIC BLOOD PRESSURE: 85 MMHG | RESPIRATION RATE: 20 BRPM | TEMPERATURE: 97.4 F | OXYGEN SATURATION: 98 %

## 2024-03-05 DIAGNOSIS — M47.816 LUMBAR SPONDYLOSIS: ICD-10-CM

## 2024-03-05 PROCEDURE — 64635 DESTROY LUMB/SAC FACET JNT: CPT | Performed by: ANESTHESIOLOGY

## 2024-03-05 PROCEDURE — 64636 DESTROY L/S FACET JNT ADDL: CPT | Performed by: ANESTHESIOLOGY

## 2024-03-05 RX ORDER — LIDOCAINE HYDROCHLORIDE 10 MG/ML
5 INJECTION, SOLUTION EPIDURAL; INFILTRATION; INTRACAUDAL; PERINEURAL ONCE
Status: COMPLETED | OUTPATIENT
Start: 2024-03-05 | End: 2024-03-05

## 2024-03-05 RX ORDER — SODIUM CHLORIDE 9 MG/ML
250 INJECTION, SOLUTION INTRAVENOUS ONCE
Status: COMPLETED | OUTPATIENT
Start: 2024-03-07 | End: 2024-03-07

## 2024-03-05 RX ADMIN — SODIUM CHLORIDE 250 ML/HR: 0.9 INJECTION, SOLUTION INTRAVENOUS at 09:53

## 2024-03-05 RX ADMIN — LIDOCAINE HYDROCHLORIDE 5 ML: 10 INJECTION, SOLUTION EPIDURAL; INFILTRATION; INTRACAUDAL; PERINEURAL at 09:04

## 2024-03-05 RX ADMIN — Medication 4 ML: at 09:04

## 2024-03-05 NOTE — DISCHARGE INSTRUCTIONS

## 2024-03-05 NOTE — PROGRESS NOTES
Teresa Mao  tolerated treatment well with no complications.      Teresa Mao is aware of future appt on 3-7-24 900    AVS declined

## 2024-03-05 NOTE — DISCHARGE INSTR - LAB

## 2024-03-05 NOTE — INTERVAL H&P NOTE
Update: (This section must be completed if the H&P was completed greater than 24 hrs to procedure or admission)    H&P reviewed. After examining the patient, I find no changed to the H&P since it had been written.    Patient re-evaluated. Accept as history and physical.    Hu Hernandes MD/March 5, 2024/8:45 AM

## 2024-03-06 ENCOUNTER — TELEPHONE (OUTPATIENT)
Dept: PAIN MEDICINE | Facility: CLINIC | Age: 69
End: 2024-03-06

## 2024-03-06 DIAGNOSIS — M79.18 MYOFASCIAL PAIN SYNDROME: ICD-10-CM

## 2024-03-06 DIAGNOSIS — G03.9 ARACHNOIDITIS: ICD-10-CM

## 2024-03-06 RX ORDER — SODIUM CHLORIDE 9 MG/ML
250 INJECTION, SOLUTION INTRAVENOUS ONCE
Status: COMPLETED | OUTPATIENT
Start: 2024-03-08 | End: 2024-03-08

## 2024-03-06 NOTE — TELEPHONE ENCOUNTER
Citlalli...    S/w pt s/p Rt L2-4 RFA on 3/5/24 RM. Pt stated needle sites look good, denies S/S of infect, denies fever, (+) soreness/muscle spasms and denies sun burn like sensation. Pts current pain 6-7/10, pt woke @ 0200 w/ sharp stabbing pain in LB and took oxy/MR was able to fall back asleep.Advised pt if they have any further pain to take OTC/prescribed meds and/or use ice/heat and that it can take 4-6wks to see full effect. Confirmed nxt appt w/ pt Lt L2-4 RFA on 3/29/24. Pt verbalized understanding and apprec of call.

## 2024-03-07 ENCOUNTER — HOSPITAL ENCOUNTER (OUTPATIENT)
Dept: INFUSION CENTER | Facility: HOSPITAL | Age: 69
End: 2024-03-07
Payer: MEDICARE

## 2024-03-07 PROCEDURE — 96361 HYDRATE IV INFUSION ADD-ON: CPT

## 2024-03-07 PROCEDURE — 96360 HYDRATION IV INFUSION INIT: CPT

## 2024-03-07 RX ORDER — CYCLOBENZAPRINE HCL 5 MG
5 TABLET ORAL 3 TIMES DAILY PRN
Qty: 63 TABLET | Refills: 0 | OUTPATIENT
Start: 2024-03-07

## 2024-03-07 RX ORDER — SODIUM CHLORIDE 9 MG/ML
250 INJECTION, SOLUTION INTRAVENOUS ONCE
Status: COMPLETED | OUTPATIENT
Start: 2024-03-11 | End: 2024-03-11

## 2024-03-07 RX ADMIN — SODIUM CHLORIDE 250 ML/HR: 0.9 INJECTION, SOLUTION INTRAVENOUS at 09:29

## 2024-03-07 NOTE — PROGRESS NOTES
Teresa Mao  tolerated hydration treatment well with no complications.      Teresa Mao is aware of future appt on 3/8 at 8:30AM.     AVS printed and given to Teresa Mao: No (Declined by Teresa Mao).

## 2024-03-08 ENCOUNTER — HOSPITAL ENCOUNTER (OUTPATIENT)
Dept: INFUSION CENTER | Facility: HOSPITAL | Age: 69
End: 2024-03-08
Payer: MEDICARE

## 2024-03-08 VITALS
RESPIRATION RATE: 18 BRPM | HEART RATE: 76 BPM | SYSTOLIC BLOOD PRESSURE: 158 MMHG | TEMPERATURE: 97.2 F | DIASTOLIC BLOOD PRESSURE: 74 MMHG

## 2024-03-08 PROCEDURE — 96360 HYDRATION IV INFUSION INIT: CPT

## 2024-03-08 PROCEDURE — 96361 HYDRATE IV INFUSION ADD-ON: CPT

## 2024-03-08 RX ORDER — SODIUM CHLORIDE 9 MG/ML
250 INJECTION, SOLUTION INTRAVENOUS ONCE
Status: COMPLETED | OUTPATIENT
Start: 2024-03-12 | End: 2024-03-12

## 2024-03-08 RX ADMIN — SODIUM CHLORIDE 250 ML/HR: 0.9 INJECTION, SOLUTION INTRAVENOUS at 08:37

## 2024-03-08 NOTE — PROGRESS NOTES
Teresa Mao  tolerated hydration treatment well with no complications.      Teresa Mao is aware of future appt on 3/11 at 8:30AM.     AVS printed and given to Teresa Mao: No (Declined by Teresa Mao).

## 2024-03-11 ENCOUNTER — HOSPITAL ENCOUNTER (OUTPATIENT)
Dept: INFUSION CENTER | Facility: HOSPITAL | Age: 69
Discharge: HOME | End: 2024-03-11
Payer: MEDICARE

## 2024-03-11 VITALS
DIASTOLIC BLOOD PRESSURE: 81 MMHG | HEART RATE: 84 BPM | TEMPERATURE: 97.6 F | RESPIRATION RATE: 18 BRPM | SYSTOLIC BLOOD PRESSURE: 127 MMHG

## 2024-03-11 PROCEDURE — 96360 HYDRATION IV INFUSION INIT: CPT

## 2024-03-11 PROCEDURE — 96361 HYDRATE IV INFUSION ADD-ON: CPT

## 2024-03-11 RX ADMIN — SODIUM CHLORIDE 250 ML/HR: 0.9 INJECTION, SOLUTION INTRAVENOUS at 08:41

## 2024-03-11 NOTE — PROGRESS NOTES
Teresa Mao tolerated IV hydration well with no complications.      Teresa Mao is aware of future appt on 3/12 at 9AM.    AVS declined by Teresa Mao.

## 2024-03-11 NOTE — PLAN OF CARE
Problem: Potential for Falls  Goal: Patient will remain free of falls  Description: INTERVENTIONS:  - Educate patient/family on patient safety including physical limitations  - Instruct patient to call for assistance with activity   - Consult OT/PT to assist with strengthening/mobility   - Keep Call bell within reach  - Keep bed low and locked with side rails adjusted as appropriate  - Keep care items and personal belongings within reach  - Initiate and maintain comfort rounds  - Make Fall Risk Sign visible to staff  - Apply yellow socks and bracelet for high fall risk patients  - Consider moving patient to room near nurses station  Outcome: Progressing     Problem: Knowledge Deficit  Goal: Patient/family/caregiver demonstrates understanding of disease process, treatment plan, medications, and discharge instructions  Description: Complete learning assessment and assess knowledge base.  Interventions:  - Provide teaching at level of understanding  - Provide teaching via preferred learning methods  Outcome: Progressing     
0 = swallows foods/liquids without difficulty

## 2024-03-12 ENCOUNTER — HOSPITAL ENCOUNTER (OUTPATIENT)
Dept: INFUSION CENTER | Facility: HOSPITAL | Age: 69
Discharge: HOME | End: 2024-03-12
Payer: MEDICARE

## 2024-03-12 VITALS
SYSTOLIC BLOOD PRESSURE: 145 MMHG | HEART RATE: 73 BPM | DIASTOLIC BLOOD PRESSURE: 87 MMHG | RESPIRATION RATE: 18 BRPM | TEMPERATURE: 96.4 F

## 2024-03-12 PROCEDURE — 96361 HYDRATE IV INFUSION ADD-ON: CPT

## 2024-03-12 PROCEDURE — 96360 HYDRATION IV INFUSION INIT: CPT

## 2024-03-12 RX ORDER — SODIUM CHLORIDE 9 MG/ML
250 INJECTION, SOLUTION INTRAVENOUS ONCE
Status: COMPLETED | OUTPATIENT
Start: 2024-03-14 | End: 2024-03-14

## 2024-03-12 RX ADMIN — SODIUM CHLORIDE 250 ML/HR: 0.9 INJECTION, SOLUTION INTRAVENOUS at 09:10

## 2024-03-12 NOTE — PROGRESS NOTES
Patient tolerated IV hydration without issues.      Teresa Mao is aware of future appt on 3/14/24 at 0830.     AVS -  No (Declined by Teresa Mao).    Patient ambulated off unit without incident.  All personal belongings taken with patient.

## 2024-03-14 ENCOUNTER — HOSPITAL ENCOUNTER (OUTPATIENT)
Dept: INFUSION CENTER | Facility: HOSPITAL | Age: 69
End: 2024-03-14
Payer: MEDICARE

## 2024-03-14 VITALS
DIASTOLIC BLOOD PRESSURE: 69 MMHG | RESPIRATION RATE: 18 BRPM | HEART RATE: 73 BPM | TEMPERATURE: 96.6 F | SYSTOLIC BLOOD PRESSURE: 142 MMHG

## 2024-03-14 PROCEDURE — 96361 HYDRATE IV INFUSION ADD-ON: CPT

## 2024-03-14 PROCEDURE — 96360 HYDRATION IV INFUSION INIT: CPT

## 2024-03-14 RX ORDER — SODIUM CHLORIDE 9 MG/ML
250 INJECTION, SOLUTION INTRAVENOUS ONCE
Status: COMPLETED | OUTPATIENT
Start: 2024-03-18 | End: 2024-03-18

## 2024-03-14 RX ADMIN — SODIUM CHLORIDE 250 ML/HR: 0.9 INJECTION, SOLUTION INTRAVENOUS at 08:47

## 2024-03-14 NOTE — PROGRESS NOTES
Patient tolerated IV hydration without issues.       Teresa Mao is aware of future appt on 3/1524 at 0900        AVS -  No (Declined by Teresa Mao).     Patient ambulated off unit without incident.  All personal belongings taken with patient.

## 2024-03-15 ENCOUNTER — HOSPITAL ENCOUNTER (OUTPATIENT)
Dept: INFUSION CENTER | Facility: HOSPITAL | Age: 69
End: 2024-03-15
Payer: MEDICARE

## 2024-03-15 VITALS
SYSTOLIC BLOOD PRESSURE: 124 MMHG | DIASTOLIC BLOOD PRESSURE: 82 MMHG | TEMPERATURE: 97.9 F | HEART RATE: 72 BPM | OXYGEN SATURATION: 100 % | RESPIRATION RATE: 16 BRPM

## 2024-03-15 DIAGNOSIS — M79.18 MYOFASCIAL PAIN SYNDROME: ICD-10-CM

## 2024-03-15 DIAGNOSIS — G03.9 ARACHNOIDITIS: ICD-10-CM

## 2024-03-15 PROCEDURE — 96361 HYDRATE IV INFUSION ADD-ON: CPT

## 2024-03-15 PROCEDURE — 96360 HYDRATION IV INFUSION INIT: CPT

## 2024-03-15 RX ORDER — SODIUM CHLORIDE 9 MG/ML
250 INJECTION, SOLUTION INTRAVENOUS ONCE
Status: COMPLETED | OUTPATIENT
Start: 2024-03-19 | End: 2024-03-19

## 2024-03-15 RX ORDER — CYCLOBENZAPRINE HCL 5 MG
5 TABLET ORAL 3 TIMES DAILY PRN
Qty: 90 TABLET | Refills: 1 | Status: SHIPPED | OUTPATIENT
Start: 2024-03-15 | End: 2024-04-14

## 2024-03-15 RX ADMIN — SODIUM CHLORIDE 1500 ML: 0.9 INJECTION, SOLUTION INTRAVENOUS at 09:26

## 2024-03-15 NOTE — TELEPHONE ENCOUNTER
That should dissipate however usually I provide a steroid pack, I think pt does not tolerate medrol dose pack well thoug

## 2024-03-15 NOTE — TELEPHONE ENCOUNTER
Caller: Teresa    Doctor: Kiah    Reason for call: [facility] did not get prior auth please refax to: [phone]    cyclobenzaprine (FLEXERIL) 5 mg tablet     She will also need med refill sent to     [facility]/pharmacy #[id number] - [place], [place] - [address]  [address], [place] [address]  Phone: [phone]  Fax: [phone]  GUANACO #: [id number]     Will be out of meds by today     She would like to speak with nurse regarding burning feeling please advise.     Call back#: [phone]

## 2024-03-15 NOTE — TELEPHONE ENCOUNTER
"SEE BELOW - also included in prev message  since her procedure 3/5  she is having \"burning deep into the muscle\"    radiating into her right hip.  Denies sunburn like feeling  Is asking for recommendation on what to settle this burning feeling     "

## 2024-03-15 NOTE — PROGRESS NOTES
Teresa Mao  tolerated hydration treatment well with no complications.      Teresa Mao is aware of future appt on 3/18 at 3:30PM.     AVS printed and given to Teresa Mao.

## 2024-03-18 ENCOUNTER — HOSPITAL ENCOUNTER (OUTPATIENT)
Dept: INFUSION CENTER | Facility: HOSPITAL | Age: 69
Discharge: HOME | End: 2024-03-18
Payer: MEDICARE

## 2024-03-18 VITALS
HEART RATE: 89 BPM | SYSTOLIC BLOOD PRESSURE: 141 MMHG | TEMPERATURE: 96.9 F | DIASTOLIC BLOOD PRESSURE: 89 MMHG | RESPIRATION RATE: 18 BRPM

## 2024-03-18 PROCEDURE — 96361 HYDRATE IV INFUSION ADD-ON: CPT

## 2024-03-18 PROCEDURE — 96360 HYDRATION IV INFUSION INIT: CPT

## 2024-03-18 RX ADMIN — SODIUM CHLORIDE 250 ML/HR: 0.9 INJECTION, SOLUTION INTRAVENOUS at 09:09

## 2024-03-18 NOTE — PROGRESS NOTES
Patient tolerated IV hydration without issues.      Teresa Mao is aware of future appt on 3/19/24 at 0830.     AVS -  No (Declined by Teresa Mao)     Patient ambulated off unit without incident.  All personal belongings taken with patient.

## 2024-03-19 ENCOUNTER — HOSPITAL ENCOUNTER (OUTPATIENT)
Dept: INFUSION CENTER | Facility: HOSPITAL | Age: 69
Discharge: HOME | End: 2024-03-19
Payer: MEDICARE

## 2024-03-19 VITALS
SYSTOLIC BLOOD PRESSURE: 132 MMHG | RESPIRATION RATE: 18 BRPM | DIASTOLIC BLOOD PRESSURE: 83 MMHG | TEMPERATURE: 97.6 F | HEART RATE: 83 BPM

## 2024-03-19 PROCEDURE — 96361 HYDRATE IV INFUSION ADD-ON: CPT

## 2024-03-19 PROCEDURE — 96360 HYDRATION IV INFUSION INIT: CPT

## 2024-03-19 RX ORDER — SODIUM CHLORIDE 9 MG/ML
250 INJECTION, SOLUTION INTRAVENOUS ONCE
Status: COMPLETED | OUTPATIENT
Start: 2024-03-21 | End: 2024-03-21

## 2024-03-19 RX ADMIN — SODIUM CHLORIDE 250 ML/HR: 0.9 INJECTION, SOLUTION INTRAVENOUS at 08:43

## 2024-03-19 NOTE — PROGRESS NOTES
Teresa Mao  tolerated hydration treatment well with no complications.      Teresa Mao is aware of future appt on 3/21 at 8:30AM.     AVS printed and given to Teresa Mao: No (Declined by Teresa Mao).

## 2024-03-20 RX ORDER — SODIUM CHLORIDE 9 MG/ML
250 INJECTION, SOLUTION INTRAVENOUS ONCE
Status: COMPLETED | OUTPATIENT
Start: 2024-03-22 | End: 2024-03-22

## 2024-03-21 ENCOUNTER — HOSPITAL ENCOUNTER (OUTPATIENT)
Dept: INFUSION CENTER | Facility: HOSPITAL | Age: 69
End: 2024-03-21
Payer: MEDICARE

## 2024-03-21 VITALS
HEART RATE: 90 BPM | SYSTOLIC BLOOD PRESSURE: 132 MMHG | TEMPERATURE: 96.9 F | RESPIRATION RATE: 18 BRPM | DIASTOLIC BLOOD PRESSURE: 92 MMHG

## 2024-03-21 PROCEDURE — 96361 HYDRATE IV INFUSION ADD-ON: CPT

## 2024-03-21 PROCEDURE — 96360 HYDRATION IV INFUSION INIT: CPT

## 2024-03-21 RX ORDER — SODIUM CHLORIDE 9 MG/ML
250 INJECTION, SOLUTION INTRAVENOUS ONCE
Status: COMPLETED | OUTPATIENT
Start: 2024-03-25 | End: 2024-03-25

## 2024-03-21 RX ADMIN — SODIUM CHLORIDE 250 ML/HR: 0.9 INJECTION, SOLUTION INTRAVENOUS at 09:06

## 2024-03-21 NOTE — PROGRESS NOTES
Patient tolerated IV hydration without issues.       Teresa Mao is aware of future appt on 3/22/24 at 0830.      AVS -  No (Declined by Teresa Mao)      Patient ambulated off unit without incident.  All personal belongings taken with patient.

## 2024-03-22 ENCOUNTER — OFFICE VISIT (OUTPATIENT)
Dept: OBGYN CLINIC | Facility: CLINIC | Age: 69
End: 2024-03-22
Payer: MEDICARE

## 2024-03-22 ENCOUNTER — APPOINTMENT (OUTPATIENT)
Dept: RADIOLOGY | Facility: CLINIC | Age: 69
End: 2024-03-22
Payer: MEDICARE

## 2024-03-22 ENCOUNTER — HOSPITAL ENCOUNTER (OUTPATIENT)
Dept: INFUSION CENTER | Facility: HOSPITAL | Age: 69
End: 2024-03-22
Payer: MEDICARE

## 2024-03-22 VITALS
SYSTOLIC BLOOD PRESSURE: 138 MMHG | TEMPERATURE: 96.7 F | DIASTOLIC BLOOD PRESSURE: 80 MMHG | HEART RATE: 85 BPM | RESPIRATION RATE: 18 BRPM

## 2024-03-22 VITALS
HEIGHT: 61 IN | TEMPERATURE: 99.5 F | HEART RATE: 82 BPM | WEIGHT: 138.2 LBS | DIASTOLIC BLOOD PRESSURE: 88 MMHG | BODY MASS INDEX: 26.09 KG/M2 | SYSTOLIC BLOOD PRESSURE: 158 MMHG

## 2024-03-22 DIAGNOSIS — R29.898 WEAKNESS OF LEFT UPPER EXTREMITY: ICD-10-CM

## 2024-03-22 DIAGNOSIS — M25.512 ACUTE PAIN OF LEFT SHOULDER: ICD-10-CM

## 2024-03-22 DIAGNOSIS — S49.92XA SHOULDER INJURY, LEFT, INITIAL ENCOUNTER: ICD-10-CM

## 2024-03-22 DIAGNOSIS — M25.512 ACUTE PAIN OF LEFT SHOULDER: Primary | ICD-10-CM

## 2024-03-22 PROCEDURE — 73030 X-RAY EXAM OF SHOULDER: CPT

## 2024-03-22 PROCEDURE — 96361 HYDRATE IV INFUSION ADD-ON: CPT

## 2024-03-22 PROCEDURE — 96360 HYDRATION IV INFUSION INIT: CPT

## 2024-03-22 PROCEDURE — 99214 OFFICE O/P EST MOD 30 MIN: CPT | Performed by: FAMILY MEDICINE

## 2024-03-22 RX ORDER — SODIUM CHLORIDE 9 MG/ML
250 INJECTION, SOLUTION INTRAVENOUS ONCE
Status: COMPLETED | OUTPATIENT
Start: 2024-03-26 | End: 2024-03-26

## 2024-03-22 RX ADMIN — SODIUM CHLORIDE 250 ML/HR: 0.9 INJECTION, SOLUTION INTRAVENOUS at 08:30

## 2024-03-22 NOTE — PROGRESS NOTES
Pt here for hydration. Adams flushed and blood return noted. Fluids infusing. Pt resting comfortably with no further questions or concerns. Call bell with in reach.

## 2024-03-22 NOTE — PATIENT INSTRUCTIONS
F/u after MRI  MRI L shoulder-  L shoulder pain/injury/weakness/XR neg   Icing/heat/OTC pain meds as needed.

## 2024-03-22 NOTE — PROGRESS NOTES
"[facility] [address] [address] [phone]      Assessment:  1. Acute pain of left shoulder  -     MRI shoulder left wo contrast; Future; Expected date: 03/22/2024  -     XR shoulder 2+ vw left; Future; Expected date: 03/22/2024    2. Weakness of left upper extremity  -     MRI shoulder left wo contrast; Future; Expected date: 03/22/2024  -     XR shoulder 2+ vw left; Future; Expected date: 03/22/2024    3. Shoulder injury, left, initial encounter  -     XR shoulder 2+ vw left; Future; Expected date: 03/22/2024        Plan:  Patient Instructions   F/u after MRI  MRI L shoulder-  L shoulder pain/injury/weakness/XR neg   Icing/heat/OTC pain meds as needed.     Return for f/u after MRI L shoulder, Recheck.    Chief Complaint:  Chief Complaint   Patient presents with    Left Shoulder - Follow-up       Subjective:   HPI    Patient ID: Teresa Mao is a 68 y.o. female     Here for f/u  L shoulder pain  Pain started 1 month ago while lifting water bottles  Finished PT  Worse with lifting items/wakes her up at [place]  She has been doing home exercises.  Taking vicodin which helps.  Radiates to biceps  Sharp/achey pain  Burning pain/cramping pain at times    Review of Systems   Constitutional:  Negative for fatigue and fever.   Respiratory:  Negative for shortness of breath.    Cardiovascular:  Negative for chest pain.   Gastrointestinal:  Negative for abdominal pain and nausea.   Genitourinary:  Negative for dysuria.   Musculoskeletal:  Positive for arthralgias.   Skin:  Negative for rash and wound.   Neurological:  Negative for weakness and headaches.       Objective:  Vitals:  /88 (BP Location: Left arm, Patient Position: Sitting, Cuff Size: Standard)   Pulse 82   Temp 99.5 °F (37.5 °C) (Tympanic)   Ht 5' 1\" (1.549 m)   Wt 62.7 kg (138 lb 3.2 oz)   BMI 26.11 kg/m²     The following portions of the patient's history were " reviewed and updated as appropriate: allergies, current medications, past family history, past medical history, past social history, past surgical history, and problem list.    Physical exam:  Physical Exam  Constitutional:       Appearance: Normal appearance. She is normal weight.   HENT:      Head: Normocephalic.   Eyes:      Extraocular Movements: Extraocular movements intact.   Pulmonary:      Effort: Pulmonary effort is normal.   Musculoskeletal:      Cervical back: Normal range of motion.   Skin:     General: Skin is warm and dry.   Neurological:      General: No focal deficit present.      Mental Status: She is alert and oriented to person, place, and time. Mental status is at baseline.   Psychiatric:         Mood and Affect: Mood normal.         Behavior: Behavior normal.         Thought Content: Thought content normal.         Judgment: Judgment normal.       Left Shoulder Exam     Tenderness   The patient is experiencing tenderness in the biceps tendon.    Range of Motion   Active abduction:  90 abnormal   Passive abduction:  abnormal   Extension:  normal   External rotation:  normal   Forward flexion:  abnormal   Internal rotation 0 degrees:  normal   Internal rotation 90 degrees:  normal     Muscle Strength   Abduction: 2/5   Internal rotation: 4/5   External rotation: 4/5   Supraspinatus: 2/5   Subscapularis: 3/5   Biceps: 2/5     Tests   Hahn test: positive     Comments:  Pos empty can/push off test  Pos yergasons speed test            I have personally reviewed pertinent films in PACS and my interpretation is XR  L shoulder- mild OA. No fx.      Jose Antonio Marley MD

## 2024-03-25 ENCOUNTER — HOSPITAL ENCOUNTER (OUTPATIENT)
Dept: INFUSION CENTER | Facility: HOSPITAL | Age: 69
Discharge: HOME | End: 2024-03-25
Payer: MEDICARE

## 2024-03-25 VITALS
HEART RATE: 77 BPM | SYSTOLIC BLOOD PRESSURE: 146 MMHG | DIASTOLIC BLOOD PRESSURE: 68 MMHG | RESPIRATION RATE: 18 BRPM | TEMPERATURE: 97 F

## 2024-03-25 PROCEDURE — 96360 HYDRATION IV INFUSION INIT: CPT

## 2024-03-25 PROCEDURE — 96361 HYDRATE IV INFUSION ADD-ON: CPT

## 2024-03-25 RX ADMIN — SODIUM CHLORIDE 250 ML/HR: 0.9 INJECTION, SOLUTION INTRAVENOUS at 08:52

## 2024-03-26 ENCOUNTER — HOSPITAL ENCOUNTER (OUTPATIENT)
Dept: INFUSION CENTER | Facility: HOSPITAL | Age: 69
Discharge: HOME | End: 2024-03-26
Payer: MEDICARE

## 2024-03-26 VITALS
HEART RATE: 79 BPM | OXYGEN SATURATION: 99 % | RESPIRATION RATE: 16 BRPM | DIASTOLIC BLOOD PRESSURE: 78 MMHG | TEMPERATURE: 97.9 F | SYSTOLIC BLOOD PRESSURE: 123 MMHG

## 2024-03-26 PROCEDURE — 96361 HYDRATE IV INFUSION ADD-ON: CPT

## 2024-03-26 PROCEDURE — 96360 HYDRATION IV INFUSION INIT: CPT

## 2024-03-26 RX ORDER — SODIUM CHLORIDE 9 MG/ML
250 INJECTION, SOLUTION INTRAVENOUS ONCE
Status: COMPLETED | OUTPATIENT
Start: 2024-03-28 | End: 2024-03-28

## 2024-03-26 RX ADMIN — SODIUM CHLORIDE 250 ML/HR: 0.9 INJECTION, SOLUTION INTRAVENOUS at 09:25

## 2024-03-26 NOTE — PROGRESS NOTES
Teresa Mao  tolerated hydration well with no complications.      Teresa Mao is aware of future appt on 3-28-24 at 830    AVS declined by Teresa Mao

## 2024-03-27 RX ORDER — SODIUM CHLORIDE 9 MG/ML
250 INJECTION, SOLUTION INTRAVENOUS ONCE
Status: COMPLETED | OUTPATIENT
Start: 2024-03-29 | End: 2024-03-29

## 2024-03-28 ENCOUNTER — HOSPITAL ENCOUNTER (OUTPATIENT)
Dept: INFUSION CENTER | Facility: HOSPITAL | Age: 69
End: 2024-03-28
Payer: MEDICARE

## 2024-03-28 VITALS
SYSTOLIC BLOOD PRESSURE: 108 MMHG | DIASTOLIC BLOOD PRESSURE: 71 MMHG | HEART RATE: 67 BPM | OXYGEN SATURATION: 100 % | RESPIRATION RATE: 16 BRPM | TEMPERATURE: 97.5 F

## 2024-03-28 PROCEDURE — 96361 HYDRATE IV INFUSION ADD-ON: CPT

## 2024-03-28 PROCEDURE — 96360 HYDRATION IV INFUSION INIT: CPT

## 2024-03-28 RX ORDER — SODIUM CHLORIDE 9 MG/ML
250 INJECTION, SOLUTION INTRAVENOUS ONCE
Status: COMPLETED | OUTPATIENT
Start: 2024-04-01 | End: 2024-04-01

## 2024-03-28 RX ADMIN — SODIUM CHLORIDE 250 ML/HR: 0.9 INJECTION, SOLUTION INTRAVENOUS at 08:30

## 2024-03-28 NOTE — PROGRESS NOTES
Teresa Mao  tolerated treatment well with no complications.      Teresa Mao is aware of future appt on 3/29 at 930a.     AVS printed and given to Teresa Mao:  No (Declined by Teresa Mao)

## 2024-03-28 NOTE — PROGRESS NOTES
Pt here for IVF. Pt offered no acute complaints today. Pt resting comfortably in chair. Central line dressing change done. Pt stated it felt a little tender and sore. Site looks clean and dry, No redness noted other then from adhesive dressing at top.

## 2024-03-29 ENCOUNTER — HOSPITAL ENCOUNTER (OUTPATIENT)
Dept: INFUSION CENTER | Facility: HOSPITAL | Age: 69
End: 2024-03-29
Payer: MEDICARE

## 2024-03-29 ENCOUNTER — TELEPHONE (OUTPATIENT)
Dept: PAIN MEDICINE | Facility: CLINIC | Age: 69
End: 2024-03-29

## 2024-03-29 ENCOUNTER — HOSPITAL ENCOUNTER (OUTPATIENT)
Dept: RADIOLOGY | Facility: HOSPITAL | Age: 69
End: 2024-03-29
Payer: MEDICARE

## 2024-03-29 VITALS
OXYGEN SATURATION: 99 % | HEART RATE: 69 BPM | TEMPERATURE: 97.4 F | SYSTOLIC BLOOD PRESSURE: 154 MMHG | DIASTOLIC BLOOD PRESSURE: 84 MMHG | RESPIRATION RATE: 18 BRPM

## 2024-03-29 VITALS
DIASTOLIC BLOOD PRESSURE: 61 MMHG | RESPIRATION RATE: 16 BRPM | HEART RATE: 76 BPM | SYSTOLIC BLOOD PRESSURE: 112 MMHG | TEMPERATURE: 97.3 F

## 2024-03-29 DIAGNOSIS — M47.816 LUMBAR SPONDYLOSIS: ICD-10-CM

## 2024-03-29 PROCEDURE — 64635 DESTROY LUMB/SAC FACET JNT: CPT | Performed by: ANESTHESIOLOGY

## 2024-03-29 PROCEDURE — 96361 HYDRATE IV INFUSION ADD-ON: CPT

## 2024-03-29 PROCEDURE — 64636 DESTROY L/S FACET JNT ADDL: CPT | Performed by: ANESTHESIOLOGY

## 2024-03-29 PROCEDURE — 96360 HYDRATION IV INFUSION INIT: CPT

## 2024-03-29 RX ORDER — LIDOCAINE HYDROCHLORIDE 10 MG/ML
5 INJECTION, SOLUTION EPIDURAL; INFILTRATION; INTRACAUDAL; PERINEURAL ONCE
Status: COMPLETED | OUTPATIENT
Start: 2024-03-29 | End: 2024-03-29

## 2024-03-29 RX ORDER — SODIUM CHLORIDE 9 MG/ML
250 INJECTION, SOLUTION INTRAVENOUS ONCE
Status: COMPLETED | OUTPATIENT
Start: 2024-04-02 | End: 2024-04-02

## 2024-03-29 RX ADMIN — Medication 4 ML: at 08:23

## 2024-03-29 RX ADMIN — LIDOCAINE HYDROCHLORIDE 5 ML: 10 INJECTION, SOLUTION EPIDURAL; INFILTRATION; INTRACAUDAL; PERINEURAL at 08:15

## 2024-03-29 RX ADMIN — SODIUM CHLORIDE 250 ML/HR: 0.9 INJECTION, SOLUTION INTRAVENOUS at 09:03

## 2024-03-29 NOTE — INTERVAL H&P NOTE
Update: (This section must be completed if the H&P was completed greater than 24 hrs to procedure or admission)    H&P reviewed. After examining the patient, I find no changed to the H&P since it had been written.    Patient re-evaluated. Accept as history and physical.    Hu Hernandes MD/March 29, 2024/8:46 AM

## 2024-03-29 NOTE — DISCHARGE INSTR - LAB

## 2024-03-29 NOTE — PROGRESS NOTES
Teresa Mao  tolerated hydration treatment well with no complications.      Teresa Mao is aware of future appt on 4/1 at 8AM.     AVS printed and given to Teresa Mao: No (Declined by Teresa Mao).

## 2024-04-01 ENCOUNTER — HOSPITAL ENCOUNTER (OUTPATIENT)
Dept: MRI IMAGING | Facility: HOSPITAL | Age: 69
Discharge: HOME | End: 2024-04-01
Attending: FAMILY MEDICINE
Payer: MEDICARE

## 2024-04-01 ENCOUNTER — HOSPITAL ENCOUNTER (OUTPATIENT)
Dept: INFUSION CENTER | Facility: HOSPITAL | Age: 69
Discharge: HOME | End: 2024-04-01
Payer: MEDICARE

## 2024-04-01 VITALS
RESPIRATION RATE: 18 BRPM | HEART RATE: 84 BPM | DIASTOLIC BLOOD PRESSURE: 91 MMHG | TEMPERATURE: 97.9 F | OXYGEN SATURATION: 100 % | SYSTOLIC BLOOD PRESSURE: 149 MMHG

## 2024-04-01 DIAGNOSIS — R29.898 WEAKNESS OF LEFT UPPER EXTREMITY: ICD-10-CM

## 2024-04-01 DIAGNOSIS — M25.512 ACUTE PAIN OF LEFT SHOULDER: ICD-10-CM

## 2024-04-01 PROCEDURE — 96360 HYDRATION IV INFUSION INIT: CPT

## 2024-04-01 PROCEDURE — 73221 MRI JOINT UPR EXTREM W/O DYE: CPT

## 2024-04-01 PROCEDURE — 96361 HYDRATE IV INFUSION ADD-ON: CPT

## 2024-04-01 RX ADMIN — SODIUM CHLORIDE 250 ML/HR: 0.9 INJECTION, SOLUTION INTRAVENOUS at 08:22

## 2024-04-01 NOTE — TELEPHONE ENCOUNTER
S/W pt. Pt stated needle sites look good, denies S/S of infection, denies fever, denies soreness and denies sunburn like sensation.   Pain rating today:  8/10  Feels much better after this procedure  Advised pt if he/she does get pain to take prescribed or OTC pain medications and/or use ice/heat and it will take 4-6 weeks to take full effect. Pt verbalized understanding.  Confirmed next appt with pt.

## 2024-04-01 NOTE — PLAN OF CARE
Problem: Potential for Falls  Goal: Patient will remain free of falls  Description: INTERVENTIONS:    - Keep Call bell within reach    Outcome: Progressing

## 2024-04-01 NOTE — PROGRESS NOTES
Teresa Mao  tolerated treatment well with no complications.      Teresa Mao is aware of future appt on 4/2 at 0830.     AVS printed and given to Teresa Mao:   No (Declined by Teresa Mao)

## 2024-04-02 ENCOUNTER — HOSPITAL ENCOUNTER (OUTPATIENT)
Dept: INFUSION CENTER | Facility: HOSPITAL | Age: 69
Discharge: HOME | End: 2024-04-02
Payer: MEDICARE

## 2024-04-02 VITALS
TEMPERATURE: 96.9 F | HEART RATE: 85 BPM | SYSTOLIC BLOOD PRESSURE: 144 MMHG | DIASTOLIC BLOOD PRESSURE: 76 MMHG | RESPIRATION RATE: 18 BRPM

## 2024-04-02 PROCEDURE — 96361 HYDRATE IV INFUSION ADD-ON: CPT

## 2024-04-02 PROCEDURE — 96360 HYDRATION IV INFUSION INIT: CPT

## 2024-04-02 RX ORDER — SODIUM CHLORIDE 9 MG/ML
250 INJECTION, SOLUTION INTRAVENOUS ONCE
Status: COMPLETED | OUTPATIENT
Start: 2024-04-04 | End: 2024-04-04

## 2024-04-02 RX ADMIN — SODIUM CHLORIDE 250 ML/HR: 0.9 INJECTION, SOLUTION INTRAVENOUS at 08:44

## 2024-04-02 NOTE — PROGRESS NOTES
Teresa Mao  tolerated hydration treatment well with no complications.      Teresa Mao is aware of future appt on 4/4 at 8:30AM.     AVS printed and given to Teresa Mao: No (Declined by Teresa Mao).

## 2024-04-03 RX ORDER — SODIUM CHLORIDE 9 MG/ML
250 INJECTION, SOLUTION INTRAVENOUS ONCE
Status: COMPLETED | OUTPATIENT
Start: 2024-04-05 | End: 2024-04-05

## 2024-04-04 ENCOUNTER — HOSPITAL ENCOUNTER (OUTPATIENT)
Dept: INFUSION CENTER | Facility: HOSPITAL | Age: 69
End: 2024-04-04
Payer: MEDICARE

## 2024-04-04 VITALS
SYSTOLIC BLOOD PRESSURE: 128 MMHG | TEMPERATURE: 97.6 F | RESPIRATION RATE: 16 BRPM | HEART RATE: 84 BPM | DIASTOLIC BLOOD PRESSURE: 80 MMHG

## 2024-04-04 RX ORDER — SODIUM CHLORIDE 9 MG/ML
250 INJECTION, SOLUTION INTRAVENOUS ONCE
Status: COMPLETED | OUTPATIENT
Start: 2024-04-08 | End: 2024-04-08

## 2024-04-04 RX ADMIN — SODIUM CHLORIDE 250 ML/HR: 0.9 INJECTION, SOLUTION INTRAVENOUS at 08:35

## 2024-04-04 NOTE — PROGRESS NOTES
Teresa Mao  tolerated hydration treatment well with no complications.      Teresa Mao is aware of future appt on 4/5 at 9AM.     AVS printed and given to Teresa Mao: No (Declined by Teresa Mao).

## 2024-04-05 ENCOUNTER — HOSPITAL ENCOUNTER (OUTPATIENT)
Dept: INFUSION CENTER | Facility: HOSPITAL | Age: 69
End: 2024-04-05
Payer: MEDICARE

## 2024-04-05 VITALS
OXYGEN SATURATION: 99 % | DIASTOLIC BLOOD PRESSURE: 84 MMHG | RESPIRATION RATE: 18 BRPM | HEART RATE: 66 BPM | SYSTOLIC BLOOD PRESSURE: 141 MMHG | TEMPERATURE: 97.1 F

## 2024-04-05 RX ORDER — SODIUM CHLORIDE 9 MG/ML
250 INJECTION, SOLUTION INTRAVENOUS ONCE
Status: COMPLETED | OUTPATIENT
Start: 2024-04-09 | End: 2024-04-09

## 2024-04-05 RX ADMIN — SODIUM CHLORIDE 250 ML/HR: 0.9 INJECTION, SOLUTION INTRAVENOUS at 09:15

## 2024-04-05 NOTE — PROGRESS NOTES
Pt here for hydration. Adams accessed and blood returned noted. Pt resting comfortably with no further questions or concerns. Call bell with in reach.

## 2024-04-05 NOTE — PROGRESS NOTES
Teresa Mao  tolerated treatment well with no complications.      Teresa Mao is aware of future appt on 4/8 at 8:30 am.     AVS declined by Teresa Mao.

## 2024-04-08 ENCOUNTER — HOSPITAL ENCOUNTER (OUTPATIENT)
Dept: INFUSION CENTER | Facility: HOSPITAL | Age: 69
Discharge: HOME | End: 2024-04-08
Payer: MEDICARE

## 2024-04-08 VITALS
TEMPERATURE: 96.9 F | RESPIRATION RATE: 18 BRPM | HEART RATE: 91 BPM | DIASTOLIC BLOOD PRESSURE: 88 MMHG | SYSTOLIC BLOOD PRESSURE: 156 MMHG

## 2024-04-08 PROCEDURE — 96361 HYDRATE IV INFUSION ADD-ON: CPT

## 2024-04-08 PROCEDURE — 96360 HYDRATION IV INFUSION INIT: CPT

## 2024-04-08 RX ADMIN — SODIUM CHLORIDE 250 ML/HR: 0.9 INJECTION, SOLUTION INTRAVENOUS at 08:49

## 2024-04-08 NOTE — PROGRESS NOTES
Patient tolerated IV hydration without issues.      Teresa Mao is aware of future appt on 4/9/24 at 0900.     AVS -  No (Declined by Teresa Mao) Patient has Mychart.    Patient ambulated off unit without incident.  All personal belongings taken with patient.

## 2024-04-09 ENCOUNTER — HOSPITAL ENCOUNTER (OUTPATIENT)
Dept: INFUSION CENTER | Facility: HOSPITAL | Age: 69
Discharge: HOME | End: 2024-04-09
Payer: MEDICARE

## 2024-04-09 VITALS
HEART RATE: 76 BPM | RESPIRATION RATE: 16 BRPM | TEMPERATURE: 97.8 F | DIASTOLIC BLOOD PRESSURE: 87 MMHG | SYSTOLIC BLOOD PRESSURE: 149 MMHG

## 2024-04-09 PROCEDURE — 96360 HYDRATION IV INFUSION INIT: CPT

## 2024-04-09 PROCEDURE — 96361 HYDRATE IV INFUSION ADD-ON: CPT

## 2024-04-09 RX ORDER — SODIUM CHLORIDE 9 MG/ML
250 INJECTION, SOLUTION INTRAVENOUS ONCE
Status: COMPLETED | OUTPATIENT
Start: 2024-04-11 | End: 2024-04-11

## 2024-04-09 RX ADMIN — SODIUM CHLORIDE 250 ML/HR: 0.9 INJECTION, SOLUTION INTRAVENOUS at 09:23

## 2024-04-09 NOTE — PROGRESS NOTES
Patient tolerated IV hydration without issues.      Teresa Mao is aware of future appt on 4/11/24 at 0900.     AVS -  No (Declined by Teresa Mao) Patient has Mychart.    Patient ambulated off unit without incident.  All personal belongings taken with patient.

## 2024-04-10 RX ORDER — SODIUM CHLORIDE 9 MG/ML
250 INJECTION, SOLUTION INTRAVENOUS ONCE
Status: COMPLETED | OUTPATIENT
Start: 2024-04-12 | End: 2024-04-12

## 2024-04-11 ENCOUNTER — APPOINTMENT (OUTPATIENT)
Dept: LAB | Facility: CLINIC | Age: 69
End: 2024-04-11
Payer: MEDICARE

## 2024-04-11 ENCOUNTER — HOSPITAL ENCOUNTER (OUTPATIENT)
Dept: INFUSION CENTER | Facility: HOSPITAL | Age: 69
End: 2024-04-11
Payer: MEDICARE

## 2024-04-11 VITALS
TEMPERATURE: 97.5 F | SYSTOLIC BLOOD PRESSURE: 131 MMHG | DIASTOLIC BLOOD PRESSURE: 87 MMHG | RESPIRATION RATE: 18 BRPM | HEART RATE: 85 BPM

## 2024-04-11 DIAGNOSIS — E03.9 HYPOTHYROIDISM, UNSPECIFIED TYPE: ICD-10-CM

## 2024-04-11 LAB
ALBUMIN SERPL BCP-MCNC: 4.5 G/DL (ref 3.5–5)
ALP SERPL-CCNC: 110 U/L (ref 34–104)
ALT SERPL W P-5'-P-CCNC: 79 U/L (ref 7–52)
ANION GAP SERPL CALCULATED.3IONS-SCNC: 6 MMOL/L (ref 4–13)
AST SERPL W P-5'-P-CCNC: 47 U/L (ref 13–39)
BILIRUB SERPL-MCNC: 0.66 MG/DL (ref 0.2–1)
BUN SERPL-MCNC: 20 MG/DL (ref 5–25)
CALCIUM SERPL-MCNC: 9.7 MG/DL (ref 8.4–10.2)
CHLORIDE SERPL-SCNC: 104 MMOL/L (ref 96–108)
CO2 SERPL-SCNC: 31 MMOL/L (ref 21–32)
CREAT SERPL-MCNC: 0.99 MG/DL (ref 0.6–1.3)
GFR SERPL CREATININE-BSD FRML MDRD: 58 ML/MIN/1.73SQ M
GLUCOSE P FAST SERPL-MCNC: 82 MG/DL (ref 65–99)
POTASSIUM SERPL-SCNC: 3.9 MMOL/L (ref 3.5–5.3)
PROT SERPL-MCNC: 6.9 G/DL (ref 6.4–8.4)
SODIUM SERPL-SCNC: 141 MMOL/L (ref 135–147)
TSH SERPL DL<=0.05 MIU/L-ACNC: 1.88 UIU/ML (ref 0.45–4.5)

## 2024-04-11 PROCEDURE — 36415 COLL VENOUS BLD VENIPUNCTURE: CPT

## 2024-04-11 PROCEDURE — 80053 COMPREHEN METABOLIC PANEL: CPT

## 2024-04-11 PROCEDURE — 84443 ASSAY THYROID STIM HORMONE: CPT

## 2024-04-11 PROCEDURE — 96361 HYDRATE IV INFUSION ADD-ON: CPT

## 2024-04-11 PROCEDURE — 96360 HYDRATION IV INFUSION INIT: CPT

## 2024-04-11 RX ADMIN — SODIUM CHLORIDE 250 ML/HR: 0.9 INJECTION, SOLUTION INTRAVENOUS at 08:57

## 2024-04-11 NOTE — PROGRESS NOTES
Teresa Mao  tolerated treatment well with no complications.      Teresa Mao is aware of future appt on 4/12 at 8:30 am.     AVS declined by Teresa Mao.

## 2024-04-12 ENCOUNTER — HOSPITAL ENCOUNTER (OUTPATIENT)
Dept: INFUSION CENTER | Facility: HOSPITAL | Age: 69
End: 2024-04-12
Payer: MEDICARE

## 2024-04-12 VITALS
RESPIRATION RATE: 18 BRPM | SYSTOLIC BLOOD PRESSURE: 143 MMHG | HEART RATE: 66 BPM | DIASTOLIC BLOOD PRESSURE: 77 MMHG | TEMPERATURE: 96.9 F

## 2024-04-12 PROCEDURE — 96361 HYDRATE IV INFUSION ADD-ON: CPT

## 2024-04-12 PROCEDURE — 96360 HYDRATION IV INFUSION INIT: CPT

## 2024-04-12 RX ORDER — SODIUM CHLORIDE 9 MG/ML
250 INJECTION, SOLUTION INTRAVENOUS ONCE
Status: COMPLETED | OUTPATIENT
Start: 2024-04-16 | End: 2024-04-16

## 2024-04-12 RX ADMIN — SODIUM CHLORIDE 250 ML/HR: 0.9 INJECTION, SOLUTION INTRAVENOUS at 08:31

## 2024-04-12 NOTE — PROGRESS NOTES
Teresa Mao tolerated IV hydration well with no complications.    Teresa Mao is aware of future appt on 4/15 at 8:30AM.    AVS printed and given to Teresa Mao.

## 2024-04-15 ENCOUNTER — OFFICE VISIT (OUTPATIENT)
Dept: OBGYN CLINIC | Facility: OTHER | Age: 69
End: 2024-04-15
Payer: MEDICARE

## 2024-04-15 ENCOUNTER — HOSPITAL ENCOUNTER (OUTPATIENT)
Dept: INFUSION CENTER | Facility: HOSPITAL | Age: 69
Discharge: HOME | End: 2024-04-15
Payer: MEDICARE

## 2024-04-15 VITALS
SYSTOLIC BLOOD PRESSURE: 149 MMHG | HEART RATE: 73 BPM | HEIGHT: 61 IN | DIASTOLIC BLOOD PRESSURE: 80 MMHG | WEIGHT: 135 LBS | BODY MASS INDEX: 25.49 KG/M2

## 2024-04-15 VITALS
SYSTOLIC BLOOD PRESSURE: 131 MMHG | HEART RATE: 86 BPM | TEMPERATURE: 96.7 F | DIASTOLIC BLOOD PRESSURE: 75 MMHG | RESPIRATION RATE: 18 BRPM

## 2024-04-15 DIAGNOSIS — M35.00 SICCA SYNDROME (HCC): ICD-10-CM

## 2024-04-15 DIAGNOSIS — M75.102 TEAR OF LEFT SUPRASPINATUS TENDON: Primary | ICD-10-CM

## 2024-04-15 PROCEDURE — 99214 OFFICE O/P EST MOD 30 MIN: CPT | Performed by: ORTHOPAEDIC SURGERY

## 2024-04-15 PROCEDURE — 96361 HYDRATE IV INFUSION ADD-ON: CPT

## 2024-04-15 PROCEDURE — 96360 HYDRATION IV INFUSION INIT: CPT

## 2024-04-15 RX ADMIN — SODIUM CHLORIDE 1500 ML: 0.9 INJECTION, SOLUTION INTRAVENOUS at 08:39

## 2024-04-15 NOTE — PROGRESS NOTES
Teresa Mao  tolerated hydration treatment well with no complications.      Teresa Mao is aware of future appt on 4/16 at 8:30AM.     AVS printed and given to Teresa Mao: No (Declined by Teresa Mao).

## 2024-04-15 NOTE — PATIENT INSTRUCTIONS
You are being scheduled for a shoulder arthroscopy to treat your symptoms.  Below are some instructions and information on what to expect before and after your surgery.        Pre-Surgical Preparation for Arthroscopic Shoulder Surgery:     You will be contacted the evening prior to your surgery to confirm the scheduled time of the procedure and when to arrive at the hospital.   Do not eat or drink anything after midnight the night before your surgery.  Since you are having out-patient surgery, make sure that you have someone who can drive you home later in the day.  Also, prepare that person for a long day, as the process of safely preparing for and recovering from the procedure is more time consuming than the actual procedure!      As you will be in a sling after surgery, please wear or bring a loose fitting button-down shirt so that you can easily place this over the sling when you leave the surgical suite.  This avoids having to place the operative arm in a sleeve.  Most patients find that this is the easiest outfit to wear for the first week or so after surgery so you may want to plan accordingly.    Most patients find that lying down in bed after shoulder surgery accentuates their discomfort.  This is likely related to the effect of gravity on the swelling in the shoulder.  As a result, most patients sleep better in a recliner or in bed with pillows propped up behind their back for the first few days or weeks after surgery.  It is a good idea to plan for this ahead of time so there will be less hassle getting things set up the night after surgery.       What to Expect After Arthroscopic Shoulder Surgery:    It is normal to have swelling and discomfort in the shoulder for several days or a week after surgery.  It is also normal to have a small amount of drainage from the surgical wounds (especially the first few days after surgery), as we put fluid into the shoulder to visualize the structures during surgery.   "It is NOT normal to have foul smelling, purulent drainage and if this is noted, please contact the office immediately or proceed to the emergency room for evaluation as this may indicate an infection.     Applying ice bags to the shoulder may help with pain that is not controlled by the regional block.  Ice should be applied 20-30 minutes at a time, every hour or two.  Make sure to put a thin towel or T-shirt next to your skin to avoid direct contact of the ice with the skin. Icing is most helpful in the first 48 hours, although many people find that continuing past this time frame lessens their postoperative pain.  Please note that your post-operative dressing is not conductive to ice, so if you need to, it is okay to remove that dressing even the night after surgery and place band-aids over the wounds in order for the ice to take effect.     Pain Control    Most patients will receive a nerve block, the local anesthetic may keep your whole arm numb for up to 4 days.  You will be given a prescription for narcotic pain medication when you are discharged from the hospital.  With the newer nerve block that is being utilized, patients are rarely requiring the use of this narcotic pain medication.  If you find you do not tolerate that type of pain medicine well, call our office and we will try another one.     In addition to the narcotic pain medication, it is safe to use an anti-inflammatory (unless the patient has a medical condition that would not allow safe use of this mediation).  This includes the Advil, Motrin, Ibuprofen and Alleve category of medications.  Simply follow the over the counter dosing on the package and use as indicated as another adjunct.  Importantly since these medications are all very similar, use only one of them.  Tylenol is a separate medication that can be utilized as well and can be taken at the same time as the other medication or given in a \"staggered\" manner.  Just make sure that you " follow the dosing on the over the counter bottle instructions.  Also make sure that the pain medication prescribed by Dr Munoz's team does not contain acetaminophen (this is found in Percocet and Vicodin).   Typically we do not prescribe those types of pain medications but if for some reason that has been prescribed DO NOT add more Tylenol (acetaminophen) as you could end up taking too much of that medication.    As mentioned above, most patients find that lying down accentuates their discomfort. You might sleep better in a recliner, or propped up in bed.     Dr. Munoz encourages patients to safely ambulate around the house as much as possible in the first few days after the procedure as this can help with blood circulation in the legs. While the incidence of blood clots is very rare following shoulder surgery, early ambulation is a great way to help decrease the already low rate.    24 hours after the surgery you may remove the bandage and cover incisions with Band-Aids if needed. At that time you may shower, the wounds will have a surgical glue that will protect them from shower water but do not submerge your incisions directly (bathing or swimming) until at least 2 weeks post-operatively.  It is safe to let the arm hang at the side and take a shower and put on a shirt without the sling on.  Just make sure that you do not use the operative are to reach out and grab anything as that may damage the repair.  When you are done showering and getting dressed please return the operative arm to the sling.    Unless noted otherwise in your discharge paperwork, Dr. Munoz uses absorbable sutures so they do not need to be removed.    Dr. Munoz’s physician assistant (PA) will see you in the office a few days after the procedure to review the intra-operative findings and to initiate physical therapy if appropriate.  A post-operative appointment should have been scheduled for you already, but if for some reason this did  not happen, please call the office to make one.     Physical therapy is important after nearly all shoulder surgeries and a detailed rehabilitation plan based on the specific intra-operative findings and procedures will be provided to your therapist at the first post-operative office visit.  Most patients have post-operative therapy appointments scheduled pre-operatively, but if you do not, that will be handled at the first post-operative office visit.  Unless expressly directed otherwise it is safe to remove the sling even the first day after the surgery and let the arm hang by the side.  This allows patients to shower and even put a shirt on (bad arm in the sleeve first).  It is also safe to flex and extend their wrist, hand and fingers as much as possible when the block wears off.  These simple motions can serve to pump fluid out of the forearm and decrease swelling in the arm.

## 2024-04-15 NOTE — PROGRESS NOTES
"  Assessment  Diagnoses and all orders for this visit:    Tear of left supraspinatus tendon    Discussion and Plan:    Explained to the patient that her MRI reveals an acute appearing tear of her supraspinatus tendon from her acute injury from 2 months ago. I recommend that she have an arthroscopic procedure performed to address her rotator cuff tear. She understood and all questions were answered.   A thorough discussion was performed with the patient regarding the risks and benefit of operative and nonoperative treatment of their rotator cuff tear.  Risks discussed include but were not limited to infection, neurovascular injury, recurrent tear, nonhealing of the repair, need for further surgery, need for biceps tenodesis or tenotomy, stiffness, need for prolonged rehabilitation, as well as the risk of anesthesia.  After this discussion all questions were answered and informed consent was obtained in the office for arthroscopic rotator cuff repair of the left shoulder.  The patient will be scheduled for this procedure accordingly.  Follow up post operatively    Subjective:   Patient ID: Teresa Mao is a 68 y.o. female presents today with a chief complaint of left shoulder pain.   The pain began 2 month(s) ago and is associated with an acute injury.  Patient reports that she was lifting heavy bags of groceries in February and felt a \"sharp\" pain about the shoulder. The patient describes the pain as aching and sharp in intensity,  intermittent, occurring with increasing frequency, awakening patient from sleep in timing, and localizes the pain to the  left subacromial joint, deltoid.  The pain is worse with movement, overuse, and raising arm over head and relieved by rest, ice, avoiding the painful activities.  The pain is not associated with numbness and tingling.  The pain is not associated with constitutional symptoms. The patient is awoken at night by the pain.    The patient has had prior treatment for her " "left shoulder in the form of PT/HEP without benefit.     She has a history of a right arthroscopic rotator cuff tear in 2022 with success.          The following portions of the patient's history were reviewed and updated as appropriate: allergies, current medications, past family history, past medical history, past social history, past surgical history and problem list.    Objective:  /80 (BP Location: Right arm, Patient Position: Sitting, Cuff Size: Standard)   Pulse 73   Ht 5' 1\" (1.549 m)   Wt 61.2 kg (135 lb)   BMI 25.51 kg/m²       Left Shoulder Exam     Range of Motion   Left shoulder forward flexion: AROM: 90 limited by pain. PROM: 130.     Muscle Strength   Abduction: 4/5     Tests   Drop arm: positive    Other   Erythema: absent  Sensation: normal  Pulse: present             Physical Exam  Vitals and nursing note reviewed.   Constitutional:       Appearance: She is well-developed.   HENT:      Head: Normocephalic and atraumatic.   Eyes:      Pupils: Pupils are equal, round, and reactive to light.   Cardiovascular:      Rate and Rhythm: Normal rate and regular rhythm.      Pulses: Normal pulses.      Heart sounds: Normal heart sounds.   Pulmonary:      Effort: Pulmonary effort is normal. No respiratory distress.      Breath sounds: Normal breath sounds.   Abdominal:      General: There is no distension.      Palpations: Abdomen is soft.   Musculoskeletal:      Cervical back: Normal range of motion and neck supple.   Skin:     General: Skin is warm and dry.   Neurological:      Mental Status: She is alert and oriented to person, place, and time.   Psychiatric:         Mood and Affect: Mood normal.         Behavior: Behavior normal.         Thought Content: Thought content normal.         Judgment: Judgment normal.           I have personally reviewed pertinent films in PACS and my interpretation is as follows.    MRI Left Shoulder 4/1/24: Full thickness tear of the supraspinatus tendon without " significant retraction or muscle atrophy.     Scribe Attestation      I,:  Nick Hunt am acting as a scribe while in the presence of the attending physician.:       I,:  Ilya Munoz MD personally performed the services described in this documentation    as scribed in my presence.:

## 2024-04-15 NOTE — H&P (VIEW-ONLY)
"  Assessment  Diagnoses and all orders for this visit:    Tear of left supraspinatus tendon    Discussion and Plan:    Explained to the patient that her MRI reveals an acute appearing tear of her supraspinatus tendon from her acute injury from 2 months ago. I recommend that she have an arthroscopic procedure performed to address her rotator cuff tear. She understood and all questions were answered.   A thorough discussion was performed with the patient regarding the risks and benefit of operative and nonoperative treatment of their rotator cuff tear.  Risks discussed include but were not limited to infection, neurovascular injury, recurrent tear, nonhealing of the repair, need for further surgery, need for biceps tenodesis or tenotomy, stiffness, need for prolonged rehabilitation, as well as the risk of anesthesia.  After this discussion all questions were answered and informed consent was obtained in the office for arthroscopic rotator cuff repair of the left shoulder.  The patient will be scheduled for this procedure accordingly.  Follow up post operatively    Subjective:   Patient ID: Teresa Mao is a 68 y.o. female presents today with a chief complaint of left shoulder pain.   The pain began 2 month(s) ago and is associated with an acute injury.  Patient reports that she was lifting heavy bags of groceries in February and felt a \"sharp\" pain about the shoulder. The patient describes the pain as aching and sharp in intensity,  intermittent, occurring with increasing frequency, awakening patient from sleep in timing, and localizes the pain to the  left subacromial joint, deltoid.  The pain is worse with movement, overuse, and raising arm over head and relieved by rest, ice, avoiding the painful activities.  The pain is not associated with numbness and tingling.  The pain is not associated with constitutional symptoms. The patient is awoken at night by the pain.    The patient has had prior treatment for her " "left shoulder in the form of PT/HEP without benefit.     She has a history of a right arthroscopic rotator cuff tear in 2022 with success.          The following portions of the patient's history were reviewed and updated as appropriate: allergies, current medications, past family history, past medical history, past social history, past surgical history and problem list.    Objective:  /80 (BP Location: Right arm, Patient Position: Sitting, Cuff Size: Standard)   Pulse 73   Ht 5' 1\" (1.549 m)   Wt 61.2 kg (135 lb)   BMI 25.51 kg/m²       Left Shoulder Exam     Range of Motion   Left shoulder forward flexion: AROM: 90 limited by pain. PROM: 130.     Muscle Strength   Abduction: 4/5     Tests   Drop arm: positive    Other   Erythema: absent  Sensation: normal  Pulse: present             Physical Exam  Vitals and nursing note reviewed.   Constitutional:       Appearance: She is well-developed.   HENT:      Head: Normocephalic and atraumatic.   Eyes:      Pupils: Pupils are equal, round, and reactive to light.   Cardiovascular:      Rate and Rhythm: Normal rate and regular rhythm.      Pulses: Normal pulses.      Heart sounds: Normal heart sounds.   Pulmonary:      Effort: Pulmonary effort is normal. No respiratory distress.      Breath sounds: Normal breath sounds.   Abdominal:      General: There is no distension.      Palpations: Abdomen is soft.   Musculoskeletal:      Cervical back: Normal range of motion and neck supple.   Skin:     General: Skin is warm and dry.   Neurological:      Mental Status: She is alert and oriented to person, place, and time.   Psychiatric:         Mood and Affect: Mood normal.         Behavior: Behavior normal.         Thought Content: Thought content normal.         Judgment: Judgment normal.           I have personally reviewed pertinent films in PACS and my interpretation is as follows.    MRI Left Shoulder 4/1/24: Full thickness tear of the supraspinatus tendon without " Terbinafine Pregnancy And Lactation Text: This medication is Pregnancy Category B and is considered safe during pregnancy. It is also excreted in breast milk and breast feeding isn't recommended. Glycopyrrolate Pregnancy And Lactation Text: This medication is Pregnancy Category B and is considered safe during pregnancy. It is unknown if it is excreted breast milk. significant retraction or muscle atrophy.     Scribe Attestation      I,:  Nick Hunt am acting as a scribe while in the presence of the attending physician.:       I,:  Ilya Munoz MD personally performed the services described in this documentation    as scribed in my presence.:              Cyclosporine Counseling:  I discussed with the patient the risks of cyclosporine including but not limited to hypertension, gingival hyperplasia,myelosuppression, immunosuppression, liver damage, kidney damage, neurotoxicity, lymphoma, and serious infections. The patient understands that monitoring is required including baseline blood pressure, CBC, CMP, lipid panel and uric acid, and then 1-2 times monthly CMP and blood pressure. 5-Fu Counseling: 5-Fluorouracil Counseling:  I discussed with the patient the risks of 5-fluorouracil including but not limited to erythema, scaling, itching, weeping, crusting, and pain. Quinolones Counseling:  I discussed with the patient the risks of fluoroquinolones including but not limited to GI upset, allergic reaction, drug rash, diarrhea, dizziness, photosensitivity, yeast infections, liver function test abnormalities, tendonitis/tendon rupture. Skyrizi Pregnancy And Lactation Text: The risk during pregnancy and breastfeeding is uncertain with this medication. Opioid Counseling: I discussed with the patient the potential side effects of opioids including but not limited to addiction, altered mental status, and depression. I stressed avoiding alcohol, benzodiazepines, muscle relaxants and sleep aids unless specifically okayed by a physician. The patient verbalized understanding of the proper use and possible adverse effects of opioids. All of the patient's questions and concerns were addressed. They were instructed to flush the remaining pills down the toilet if they did not need them for pain. Benzoyl Peroxide Pregnancy And Lactation Text: This medication is Pregnancy Category C. It is unknown if benzoyl peroxide is excreted in breast milk. Minocycline Pregnancy And Lactation Text: This medication is Pregnancy Category D and not consider safe during pregnancy. It is also excreted in breast milk. Skyrizi Counseling: I discussed with the patient the risks of risankizumab-rzaa including but not limited to immunosuppression, and serious infections.  The patient understands that monitoring is required including a PPD at baseline and must alert us or the primary physician if symptoms of infection or other concerning signs are noted. Spironolactone Pregnancy And Lactation Text: This medication can cause feminization of the male fetus and should be avoided during pregnancy. The active metabolite is also found in breast milk. Protopic Counseling: Patient may experience a mild burning sensation during topical application. Protopic is not approved in children less than 2 years of age. There have been case reports of hematologic and skin malignancies in patients using topical calcineurin inhibitors although causality is questionable. Cosentyx Counseling:  I discussed with the patient the risks of Cosentyx including but not limited to worsening of Crohn's disease, immunosuppression, allergic reactions and infections.  The patient understands that monitoring is required including a PPD at baseline and must alert us or the primary physician if symptoms of infection or other concerning signs are noted. Stelara Counseling:  I discussed with the patient the risks of ustekinumab including but not limited to immunosuppression, malignancy, posterior leukoencephalopathy syndrome, and serious infections.  The patient understands that monitoring is required including a PPD at baseline and must alert us or the primary physician if symptoms of infection or other concerning signs are noted. 5-Fu Pregnancy And Lactation Text: This medication is Pregnancy Category X and contraindicated in pregnancy and in women who may become pregnant. It is unknown if this medication is excreted in breast milk. Opioid Pregnancy And Lactation Text: These medications can lead to premature delivery and should be avoided during pregnancy. These medications are also present in breast milk in small amounts. Quinolones Pregnancy And Lactation Text: This medication is Pregnancy Category C and it isn't know if it is safe during pregnancy. It is also excreted in breast milk. Cosentyx Pregnancy And Lactation Text: This medication is Pregnancy Category B and is considered safe during pregnancy. It is unknown if this medication is excreted in breast milk. SSKI Counseling:  I discussed with the patient the risks of SSKI including but not limited to thyroid abnormalities, metallic taste, GI upset, fever, headache, acne, arthralgias, paraesthesias, lymphadenopathy, easy bleeding, arrhythmias, and allergic reaction. Cyclosporine Pregnancy And Lactation Text: This medication is Pregnancy Category C and it isn't know if it is safe during pregnancy. This medication is excreted in breast milk. Protopic Pregnancy And Lactation Text: This medication is Pregnancy Category C. It is unknown if this medication is excreted in breast milk when applied topically. Hydroxychloroquine Counseling:  I discussed with the patient that a baseline ophthalmologic exam is needed at the start of therapy and every year thereafter while on therapy. A CBC may also be warranted for monitoring.  The side effects of this medication were discussed with the patient, including but not limited to agranulocytosis, aplastic anemia, seizures, rashes, retinopathy, and liver toxicity. Patient instructed to call the office should any adverse effect occur.  The patient verbalized understanding of the proper use and possible adverse effects of Plaquenil.  All the patient's questions and concerns were addressed. Rifampin Counseling: I discussed with the patient the risks of rifampin including but not limited to liver damage, kidney damage, red-orange body fluids, nausea/vomiting and severe allergy. Thalidomide Counseling: I discussed with the patient the risks of thalidomide including but not limited to birth defects, anxiety, weakness, chest pain, dizziness, cough and severe allergy. Azithromycin Counseling:  I discussed with the patient the risks of azithromycin including but not limited to GI upset, allergic reaction, drug rash, diarrhea, and yeast infections. Rhofade Pregnancy And Lactation Text: This medication has not been assigned a Pregnancy Risk Category. It is unknown if the medication is excreted in breast milk. Dupixent Pregnancy And Lactation Text: This medication likely crosses the placenta but the risk for the fetus is uncertain. This medication is excreted in breast milk. Methotrexate Counseling:  Patient counseled regarding adverse effects of methotrexate including but not limited to nausea, vomiting, abnormalities in liver function tests. Patients may develop mouth sores, rash, diarrhea, and abnormalities in blood counts. The patient understands that monitoring is required including LFT's and blood counts.  There is a rare possibility of scarring of the liver and lung problems that can occur when taking methotrexate. Persistent nausea, loss of appetite, pale stools, dark urine, cough, and shortness of breath should be reported immediately. Patient advised to discontinue methotrexate treatment at least three months before attempting to become pregnant.  I discussed the need for folate supplements while taking methotrexate.  These supplements can decrease side effects during methotrexate treatment. The patient verbalized understanding of the proper use and possible adverse effects of methotrexate.  All of the patient's questions and concerns were addressed. Rhofade Counseling: Rhofade is a topical medication which can decrease superficial blood flow where applied. Side effects are uncommon and include stinging, redness and allergic reactions. Sski Pregnancy And Lactation Text: This medication is Pregnancy Category D and isn't considered safe during pregnancy. It is excreted in breast milk. Dupixent Counseling: I discussed with the patient the risks of dupilumab including but not limited to eye infection and irritation, cold sores, injection site reactions, worsening of asthma, allergic reactions and increased risk of parasitic infection.  Live vaccines should be avoided while taking dupilumab. Dupilumab will also interact with certain medications such as warfarin and cyclosporine. The patient understands that monitoring is required and they must alert us or the primary physician if symptoms of infection or other concerning signs are noted. Hydroxychloroquine Pregnancy And Lactation Text: This medication has been shown to cause fetal harm but it isn't assigned a Pregnancy Risk Category. There are small amounts excreted in breast milk. Cimetidine Counseling:  I discussed with the patient the risks of Cimetidine including but not limited to gynecomastia, headache, diarrhea, nausea, drowsiness, arrhythmias, pancreatitis, skin rashes, psychosis, bone marrow suppression and kidney toxicity. Drysol Counseling:  I discussed with the patient the risks of drysol/aluminum chloride including but not limited to skin rash, itching, irritation, burning. Taltz Counseling: I discussed with the patient the risks of ixekizumab including but not limited to immunosuppression, serious infections, worsening of inflammatory bowel disease and drug reactions.  The patient understands that monitoring is required including a PPD at baseline and must alert us or the primary physician if symptoms of infection or other concerning signs are noted. Enbrel Counseling:  I discussed with the patient the risks of etanercept including but not limited to myelosuppression, immunosuppression, autoimmune hepatitis, demyelinating diseases, lymphoma, and infections.  The patient understands that monitoring is required including a PPD at baseline and must alert us or the primary physician if symptoms of infection or other concerning signs are noted. Doxepin Counseling:  Patient advised that the medication is sedating and not to drive a car after taking this medication. Patient informed of potential adverse effects including but not limited to dry mouth, urinary retention, and blurry vision.  The patient verbalized understanding of the proper use and possible adverse effects of doxepin.  All of the patient's questions and concerns were addressed. Solaraze Counseling:  I discussed with the patient the risks of Solaraze including but not limited to erythema, scaling, itching, weeping, crusting, and pain. Azithromycin Pregnancy And Lactation Text: This medication is considered safe during pregnancy and is also secreted in breast milk. Niacinamide Counseling: I recommended taking niacin or niacinamide, also know as vitamin B3, twice daily. Recent evidence suggests that taking vitamin B3 (500 mg twice daily) can reduce the risk of actinic keratoses and non-melanoma skin cancers. Side effects of vitamin B3 include flushing and headache. Drysol Pregnancy And Lactation Text: This medication is considered safe during pregnancy and breast feeding. Methotrexate Pregnancy And Lactation Text: This medication is Pregnancy Category X and is known to cause fetal harm. This medication is excreted in breast milk. Rifampin Pregnancy And Lactation Text: This medication is Pregnancy Category C and it isn't know if it is safe during pregnancy. It is also excreted in breast milk and should not be used if you are breast feeding. Arava Counseling:  Patient counseled regarding adverse effects of Arava including but not limited to nausea, vomiting, abnormalities in liver function tests. Patients may develop mouth sores, rash, diarrhea, and abnormalities in blood counts. The patient understands that monitoring is required including LFTs and blood counts.  There is a rare possibility of scarring of the liver and lung problems that can occur when taking methotrexate. Persistent nausea, loss of appetite, pale stools, dark urine, cough, and shortness of breath should be reported immediately. Patient advised to discontinue Arava treatment and consult with a physician prior to attempting conception. The patient will have to undergo a treatment to eliminate Arava from the body prior to conception. Solaraze Pregnancy And Lactation Text: This medication is Pregnancy Category B and is considered safe. There is some data to suggest avoiding during the third trimester. It is unknown if this medication is excreted in breast milk. Bactrim Counseling:  I discussed with the patient the risks of sulfa antibiotics including but not limited to GI upset, allergic reaction, drug rash, diarrhea, dizziness, photosensitivity, and yeast infections.  Rarely, more serious reactions can occur including but not limited to aplastic anemia, agranulocytosis, methemoglobinemia, blood dyscrasias, liver or kidney failure, lung infiltrates or desquamative/blistering drug rashes. Elidel Pregnancy And Lactation Text: This medication is Pregnancy Category C. It is unknown if this medication is excreted in breast milk. Doxepin Pregnancy And Lactation Text: This medication is Pregnancy Category C and it isn't known if it is safe during pregnancy. It is also excreted in breast milk and breast feeding isn't recommended. Tranexamic Acid Counseling:  Patient advised of the small risk of bleeding problems with tranexamic acid. They were also instructed to call if they developed any nausea, vomiting or diarrhea. All of the patient's questions and concerns were addressed. Thalidomide Pregnancy And Lactation Text: This medication is Pregnancy Category X and is absolutely contraindicated during pregnancy. It is unknown if it is excreted in breast milk. Elidel Counseling: Patient may experience a mild burning sensation during topical application. Elidel is not approved in children less than 2 years of age. There have been case reports of hematologic and skin malignancies in patients using topical calcineurin inhibitors although causality is questionable. Niacinamide Pregnancy And Lactation Text: These medications are considered safe during pregnancy. Sarecycline Counseling: Patient advised regarding possible photosensitivity and discoloration of the teeth, skin, lips, tongue and gums.  Patient instructed to avoid sunlight, if possible.  When exposed to sunlight, patients should wear protective clothing, sunglasses, and sunscreen.  The patient was instructed to call the office immediately if the following severe adverse effects occur:  hearing changes, easy bruising/bleeding, severe headache, or vision changes.  The patient verbalized understanding of the proper use and possible adverse effects of sarecycline.  All of the patient's questions and concerns were addressed. Prednisone Counseling:  I discussed with the patient the risks of prolonged use of prednisone including but not limited to weight gain, insomnia, osteoporosis, mood changes, diabetes, susceptibility to infection, glaucoma and high blood pressure.  In cases where prednisone use is prolonged, patients should be monitored with blood pressure checks, serum glucose levels and an eye exam.  Additionally, the patient may need to be placed on GI prophylaxis, PCP prophylaxis, and calcium and vitamin D supplementation and/or a bisphosphonate.  The patient verbalized understanding of the proper use and the possible adverse effects of prednisone.  All of the patient's questions and concerns were addressed. Humira Counseling:  I discussed with the patient the risks of adalimumab including but not limited to myelosuppression, immunosuppression, autoimmune hepatitis, demyelinating diseases, lymphoma, and serious infections.  The patient understands that monitoring is required including a PPD at baseline and must alert us or the primary physician if symptoms of infection or other concerning signs are noted. Clofazimine Counseling:  I discussed with the patient the risks of clofazimine including but not limited to skin and eye pigmentation, liver damage, nausea/vomiting, gastrointestinal bleeding and allergy. Tranexamic Acid Pregnancy And Lactation Text: It is unknown if this medication is safe during pregnancy or breast feeding. Eucrisa Counseling: Patient may experience a mild burning sensation during topical application. Eucrisa is not approved in children less than 2 years of age. Topical Retinoid counseling:  Patient advised to apply a pea-sized amount only at bedtime and wait 30 minutes after washing their face before applying.  If too drying, patient may add a non-comedogenic moisturizer. The patient verbalized understanding of the proper use and possible adverse effects of retinoids.  All of the patient's questions and concerns were addressed. Nsaids Counseling: NSAID Counseling: I discussed with the patient that NSAIDs should be taken with food. Prolonged use of NSAIDs can result in the development of stomach ulcers.  Patient advised to stop taking NSAIDs if abdominal pain occurs.  The patient verbalized understanding of the proper use and possible adverse effects of NSAIDs.  All of the patient's questions and concerns were addressed. Bactrim Pregnancy And Lactation Text: This medication is Pregnancy Category D and is known to cause fetal risk.  It is also excreted in breast milk. Tremfya Counseling: I discussed with the patient the risks of guselkumab including but not limited to immunosuppression, serious infections, and drug reactions.  The patient understands that monitoring is required including a PPD at baseline and must alert us or the primary physician if symptoms of infection or other concerning signs are noted. Acitretin Counseling:  I discussed with the patient the risks of acitretin including but not limited to hair loss, dry lips/skin/eyes, liver damage, hyperlipidemia, depression/suicidal ideation, photosensitivity.  Serious rare side effects can include but are not limited to pancreatitis, pseudotumor cerebri, bony changes, clot formation/stroke/heart attack.  Patient understands that alcohol is contraindicated since it can result in liver toxicity and significantly prolong the elimination of the drug by many years. Cephalexin Counseling: I counseled the patient regarding use of cephalexin as an antibiotic for prophylactic and/or therapeutic purposes. Cephalexin (commonly prescribed under brand name Keflex) is a cephalosporin antibiotic which is active against numerous classes of bacteria, including most skin bacteria. Side effects may include nausea, diarrhea, gastrointestinal upset, rash, hives, yeast infections, and in rare cases, hepatitis, kidney disease, seizures, fever, confusion, neurologic symptoms, and others. Patients with severe allergies to penicillin medications are cautioned that there is about a 10% incidence of cross-reactivity with cephalosporins. When possible, patients with penicillin allergies should use alternatives to cephalosporins for antibiotic therapy. Valtrex Counseling: I discussed with the patient the risks of valacyclovir including but not limited to kidney damage, nausea, vomiting and severe allergy.  The patient understands that if the infection seems to be worsening or is not improving, they are to call. Odomzo Counseling- I discussed with the patient the risks of Odomzo including but not limited to nausea, vomiting, diarrhea, constipation, weight loss, changes in the sense of taste, decreased appetite, muscle spasms, and hair loss.  The patient verbalized understanding of the proper use and possible adverse effects of Odomzo.  All of the patient's questions and concerns were addressed. Hydroxyzine Pregnancy And Lactation Text: This medication is not safe during pregnancy and should not be taken. It is also excreted in breast milk and breast feeding isn't recommended. Eucrisa Pregnancy And Lactation Text: This medication has not been assigned a Pregnancy Risk Category but animal studies failed to show danger with the topical medication. It is unknown if the medication is excreted in breast milk. Nsaids Pregnancy And Lactation Text: These medications are considered safe up to 30 weeks gestation. It is excreted in breast milk. Hydroxyzine Counseling: Patient advised that the medication is sedating and not to drive a car after taking this medication.  Patient informed of potential adverse effects including but not limited to dry mouth, urinary retention, and blurry vision.  The patient verbalized understanding of the proper use and possible adverse effects of hydroxyzine.  All of the patient's questions and concerns were addressed. Clofazimine Pregnancy And Lactation Text: This medication is Pregnancy Category C and isn't considered safe during pregnancy. It is excreted in breast milk. Tetracycline Counseling: Patient counseled regarding possible photosensitivity and increased risk for sunburn.  Patient instructed to avoid sunlight, if possible.  When exposed to sunlight, patients should wear protective clothing, sunglasses, and sunscreen.  The patient was instructed to call the office immediately if the following severe adverse effects occur:  hearing changes, easy bruising/bleeding, severe headache, or vision changes.  The patient verbalized understanding of the proper use and possible adverse effects of tetracycline.  All of the patient's questions and concerns were addressed. Patient understands to avoid pregnancy while on therapy due to potential birth defects. Acitretin Pregnancy And Lactation Text: This medication is Pregnancy Category X and should not be given to women who are pregnant or may become pregnant in the future. This medication is excreted in breast milk. Hydroquinone Counseling:  Patient advised that medication may result in skin irritation, lightening (hypopigmentation), dryness, and burning.  In the event of skin irritation, the patient was advised to reduce the amount of the drug applied or use it less frequently.  Rarely, spots that are treated with hydroquinone can become darker (pseudoochronosis).  Should this occur, patient instructed to stop medication and call the office. The patient verbalized understanding of the proper use and possible adverse effects of hydroquinone.  All of the patient's questions and concerns were addressed. Xelalexandriaz Pregnancy And Lactation Text: This medication is Pregnancy Category D and is not considered safe during pregnancy.  The risk during breast feeding is also uncertain. Xeljanz Counseling: I discussed with the patient the risks of Xeljanz therapy including increased risk of infection, liver issues, headache, diarrhea, or cold symptoms. Live vaccines should be avoided. They were instructed to call if they have any problems. Colchicine Counseling:  Patient counseled regarding adverse effects including but not limited to stomach upset (nausea, vomiting, stomach pain, or diarrhea).  Patient instructed to limit alcohol consumption while taking this medication.  Colchicine may reduce blood counts especially with prolonged use.  The patient understands that monitoring of kidney function and blood counts may be required, especially at baseline. The patient verbalized understanding of the proper use and possible adverse effects of colchicine.  All of the patient's questions and concerns were addressed. Valtrex Pregnancy And Lactation Text: this medication is Pregnancy Category B and is considered safe during pregnancy. This medication is not directly found in breast milk but it's metabolite acyclovir is present. Cephalexin Pregnancy And Lactation Text: This medication is Pregnancy Category B and considered safe during pregnancy.  It is also excreted in breast milk but can be used safely for shorter doses. Ilumya Counseling: I discussed with the patient the risks of tildrakizumab including but not limited to immunosuppression, malignancy, posterior leukoencephalopathy syndrome, and serious infections.  The patient understands that monitoring is required including a PPD at baseline and must alert us or the primary physician if symptoms of infection or other concerning signs are noted. Tazorac Counseling:  Patient advised that medication is irritating and drying.  Patient may need to apply sparingly and wash off after an hour before eventually leaving it on overnight.  The patient verbalized understanding of the proper use and possible adverse effects of tazorac.  All of the patient's questions and concerns were addressed. Xolair Counseling:  Patient informed of potential adverse effects including but not limited to fever, muscle aches, rash and allergic reactions.  The patient verbalized understanding of the proper use and possible adverse effects of Xolair.  All of the patient's questions and concerns were addressed. Otezla Counseling: The side effects of Otezla were discussed with the patient, including but not limited to worsening or new depression, weight loss, diarrhea, nausea, upper respiratory tract infection, and headache. Patient instructed to call the office should any adverse effect occur.  The patient verbalized understanding of the proper use and possible adverse effects of Otezla.  All the patient's questions and concerns were addressed. Albendazole Counseling:  I discussed with the patient the risks of albendazole including but not limited to cytopenia, kidney damage, nausea/vomiting and severe allergy.  The patient understands that this medication is being used in an off-label manner. Dapsone Counseling: I discussed with the patient the risks of dapsone including but not limited to hemolytic anemia, agranulocytosis, rashes, methemoglobinemia, kidney failure, peripheral neuropathy, headaches, GI upset, and liver toxicity.  Patients who start dapsone require monitoring including baseline LFTs and weekly CBCs for the first month, then every month thereafter.  The patient verbalized understanding of the proper use and possible adverse effects of dapsone.  All of the patient's questions and concerns were addressed. Fluconazole Counseling:  Patient counseled regarding adverse effects of fluconazole including but not limited to headache, diarrhea, nausea, upset stomach, liver function test abnormalities, taste disturbance, and stomach pain.  There is a rare possibility of liver failure that can occur when taking fluconazole.  The patient understands that monitoring of LFTs and kidney function test may be required, especially at baseline. The patient verbalized understanding of the proper use and possible adverse effects of fluconazole.  All of the patient's questions and concerns were addressed. Use Enhanced Medication Counseling?: No Bexarotene Counseling:  I discussed with the patient the risks of bexarotene including but not limited to hair loss, dry lips/skin/eyes, liver abnormalities, hyperlipidemia, pancreatitis, depression/suicidal ideation, photosensitivity, drug rash/allergic reactions, hypothyroidism, anemia, leukopenia, infection, cataracts, and teratogenicity.  Patient understands that they will need regular blood tests to check lipid profile, liver function tests, white blood cell count, thyroid function tests and pregnancy test if applicable. Tazorac Pregnancy And Lactation Text: This medication is not safe during pregnancy. It is unknown if this medication is excreted in breast milk. Clindamycin Counseling: I counseled the patient regarding use of clindamycin as an antibiotic for prophylactic and/or therapeutic purposes. Clindamycin is active against numerous classes of bacteria, including skin bacteria. Side effects may include nausea, diarrhea, gastrointestinal upset, rash, hives, yeast infections, and in rare cases, colitis. Xolair Pregnancy And Lactation Text: This medication is Pregnancy Category B and is considered safe during pregnancy. This medication is excreted in breast milk. Dapsone Pregnancy And Lactation Text: This medication is Pregnancy Category C and is not considered safe during pregnancy or breast feeding. Doxycycline Counseling:  Patient counseled regarding possible photosensitivity and increased risk for sunburn.  Patient instructed to avoid sunlight, if possible.  When exposed to sunlight, patients should wear protective clothing, sunglasses, and sunscreen.  The patient was instructed to call the office immediately if the following severe adverse effects occur:  hearing changes, easy bruising/bleeding, severe headache, or vision changes.  The patient verbalized understanding of the proper use and possible adverse effects of doxycycline.  All of the patient's questions and concerns were addressed. Topical Clindamycin Pregnancy And Lactation Text: This medication is Pregnancy Category B and is considered safe during pregnancy. It is unknown if it is excreted in breast milk. Bexarotene Pregnancy And Lactation Text: This medication is Pregnancy Category X and should not be given to women who are pregnant or may become pregnant. This medication should not be used if you are breast feeding. Clindamycin Pregnancy And Lactation Text: This medication can be used in pregnancy if certain situations. Clindamycin is also present in breast milk. Infliximab Counseling:  I discussed with the patient the risks of infliximab including but not limited to myelosuppression, immunosuppression, autoimmune hepatitis, demyelinating diseases, lymphoma, and serious infections.  The patient understands that monitoring is required including a PPD at baseline and must alert us or the primary physician if symptoms of infection or other concerning signs are noted. Topical Clindamycin Counseling: Patient counseled that this medication may cause skin irritation or allergic reactions.  In the event of skin irritation, the patient was advised to reduce the amount of the drug applied or use it less frequently.   The patient verbalized understanding of the proper use and possible adverse effects of clindamycin.  All of the patient's questions and concerns were addressed. Otezla Pregnancy And Lactation Text: This medication is Pregnancy Category C and it isn't known if it is safe during pregnancy. It is unknown if it is excreted in breast milk. Albendazole Pregnancy And Lactation Text: This medication is Pregnancy Category C and it isn't known if it is safe during pregnancy. It is also excreted in breast milk. Imiquimod Counseling:  I discussed with the patient the risks of imiquimod including but not limited to erythema, scaling, itching, weeping, crusting, and pain.  Patient understands that the inflammatory response to imiquimod is variable from person to person and was educated regarded proper titration schedule.  If flu-like symptoms develop, patient knows to discontinue the medication and contact us. Rituxan Counseling:  I discussed with the patient the risks of Rituxan infusions. Side effects can include infusion reactions, severe drug rashes including mucocutaneous reactions, reactivation of latent hepatitis and other infections and rarely progressive multifocal leukoencephalopathy.  All of the patient's questions and concerns were addressed. Topical Sulfur Applications Counseling: Topical Sulfur Counseling: Patient counseled that this medication may cause skin irritation or allergic reactions.  In the event of skin irritation, the patient was advised to reduce the amount of the drug applied or use it less frequently.   The patient verbalized understanding of the proper use and possible adverse effects of topical sulfur application.  All of the patient's questions and concerns were addressed. Doxycycline Pregnancy And Lactation Text: This medication is Pregnancy Category D and not consider safe during pregnancy. It is also excreted in breast milk but is considered safe for shorter treatment courses. Ivermectin Counseling:  Patient instructed to take medication on an empty stomach with a full glass of water.  Patient informed of potential adverse effects including but not limited to nausea, diarrhea, dizziness, itching, and swelling of the extremities or lymph nodes.  The patient verbalized understanding of the proper use and possible adverse effects of ivermectin.  All of the patient's questions and concerns were addressed. Isotretinoin Counseling: Patient should get monthly blood tests, not donate blood, not drive at night if vision affected, not share medication, and not undergo elective surgery for 6 months after tx completed. Side effects reviewed, pt to contact office should one occur. Oxybutynin Counseling:  I discussed with the patient the risks of oxybutynin including but not limited to skin rash, drowsiness, dry mouth, difficulty urinating, and blurred vision. Griseofulvin Counseling:  I discussed with the patient the risks of griseofulvin including but not limited to photosensitivity, cytopenia, liver damage, nausea/vomiting and severe allergy.  The patient understands that this medication is best absorbed when taken with a fatty meal (e.g., ice cream or french fries). Erivedge Counseling- I discussed with the patient the risks of Erivedge including but not limited to nausea, vomiting, diarrhea, constipation, weight loss, changes in the sense of taste, decreased appetite, muscle spasms, and hair loss.  The patient verbalized understanding of the proper use and possible adverse effects of Erivedge.  All of the patient's questions and concerns were addressed. Topical Sulfur Applications Pregnancy And Lactation Text: This medication is Pregnancy Category C and has an unknown safety profile during pregnancy. It is unknown if this topical medication is excreted in breast milk. Azathioprine Counseling:  I discussed with the patient the risks of azathioprine including but not limited to myelosuppression, immunosuppression, hepatotoxicity, lymphoma, and infections.  The patient understands that monitoring is required including baseline LFTs, Creatinine, possible TPMP genotyping and weekly CBCs for the first month and then every 2 weeks thereafter.  The patient verbalized understanding of the proper use and possible adverse effects of azathioprine.  All of the patient's questions and concerns were addressed. Erythromycin Counseling:  I discussed with the patient the risks of erythromycin including but not limited to GI upset, allergic reaction, drug rash, diarrhea, increase in liver enzymes, and yeast infections. Rituxan Pregnancy And Lactation Text: This medication is Pregnancy Category C and it isn't know if it is safe during pregnancy. It is unknown if this medication is excreted in breast milk but similar antibodies are known to be excreted. Minoxidil Counseling: Minoxidil is a topical medication which can increase blood flow where it is applied. It is uncertain how this medication increases hair growth. Side effects are uncommon and include stinging and allergic reactions. Griseofulvin Pregnancy And Lactation Text: This medication is Pregnancy Category X and is known to cause serious birth defects. It is unknown if this medication is excreted in breast milk but breast feeding should be avoided. Isotretinoin Pregnancy And Lactation Text: This medication is Pregnancy Category X and is considered extremely dangerous during pregnancy. It is unknown if it is excreted in breast milk. Siliq Counseling:  I discussed with the patient the risks of Siliq including but not limited to new or worsening depression, suicidal thoughts and behavior, immunosuppression, malignancy, posterior leukoencephalopathy syndrome, and serious infections.  The patient understands that monitoring is required including a PPD at baseline and must alert us or the primary physician if symptoms of infection or other concerning signs are noted. There is also a special program designed to monitor depression which is required with Siliq. Finasteride Counseling:  I discussed with the patient the risks of use of finasteride including but not limited to decreased libido, decreased ejaculate volume, gynecomastia, and depression. Women should not handle medication.  All of the patient's questions and concerns were addressed. High Dose Vitamin A Pregnancy And Lactation Text: High dose vitamin A therapy is contraindicated during pregnancy and breast feeding. Mirvaso Counseling: Mirvaso is a topical medication which can decrease superficial blood flow where applied. Side effects are uncommon and include stinging, redness and allergic reactions. Itraconazole Counseling:  I discussed with the patient the risks of itraconazole including but not limited to liver damage, nausea/vomiting, neuropathy, and severe allergy.  The patient understands that this medication is best absorbed when taken with acidic beverages such as non-diet cola or ginger ale.  The patient understands that monitoring is required including baseline LFTs and repeat LFTs at intervals.  The patient understands that they are to contact us or the primary physician if concerning signs are noted. High Dose Vitamin A Counseling: Side effects reviewed, pt to contact office should one occur. Propranolol Counseling:  I discussed with the patient the risks of propranolol including but not limited to low heart rate, low blood pressure, low blood sugar, restlessness and increased cold sensitivity. They should call the office if they experience any of these side effects. Azathioprine Pregnancy And Lactation Text: This medication is Pregnancy Category D and isn't considered safe during pregnancy. It is unknown if this medication is excreted in breast milk. Erythromycin Pregnancy And Lactation Text: This medication is Pregnancy Category B and is considered safe during pregnancy. It is also excreted in breast milk. Wartpeel Counseling:  I discussed with the patient the risks of Wartpeel including but not limited to erythema, scaling, itching, weeping, crusting, and pain. Detail Level: Simple Birth Control Pills Counseling: Birth Control Pill Counseling: I discussed with the patient the potential side effects of OCPs including but not limited to increased risk of stroke, heart attack, thrombophlebitis, deep venous thrombosis, hepatic adenomas, breast changes, GI upset, headaches, and depression.  The patient verbalized understanding of the proper use and possible adverse effects of OCPs. All of the patient's questions and concerns were addressed. Cellcept Counseling:  I discussed with the patient the risks of mycophenolate mofetil including but not limited to infection/immunosuppression, GI upset, hypokalemia, hypercholesterolemia, bone marrow suppression, lymphoproliferative disorders, malignancy, GI ulceration/bleed/perforation, colitis, interstitial lung disease, kidney failure, progressive multifocal leukoencephalopathy, and birth defects.  The patient understands that monitoring is required including a baseline creatinine and regular CBC testing. In addition, patient must alert us immediately if symptoms of infection or other concerning signs are noted. Propranolol Pregnancy And Lactation Text: This medication is Pregnancy Category C and it isn't known if it is safe during pregnancy. It is excreted in breast milk. Carac Counseling:  I discussed with the patient the risks of Carac including but not limited to erythema, scaling, itching, weeping, crusting, and pain. Finasteride Pregnancy And Lactation Text: This medication is absolutely contraindicated during pregnancy. It is unknown if it is excreted in breast milk. Metronidazole Counseling:  I discussed with the patient the risks of metronidazole including but not limited to seizures, nausea/vomiting, a metallic taste in the mouth, nausea/vomiting and severe allergy. Cimzia Counseling:  I discussed with the patient the risks of Cimzia including but not limited to immunosuppression, allergic reactions and infections.  The patient understands that monitoring is required including a PPD at baseline and must alert us or the primary physician if symptoms of infection or other concerning signs are noted. Birth Control Pills Pregnancy And Lactation Text: This medication should be avoided if pregnant and for the first 30 days post-partum. Cyclophosphamide Counseling:  I discussed with the patient the risks of cyclophosphamide including but not limited to hair loss, hormonal abnormalities, decreased fertility, abdominal pain, diarrhea, nausea and vomiting, bone marrow suppression and infection. The patient understands that monitoring is required while taking this medication. Picato Counseling:  I discussed with the patient the risks of Picato including but not limited to erythema, scaling, itching, weeping, crusting, and pain. Ketoconazole Counseling:   Patient counseled regarding improving absorption with orange juice.  Adverse effects include but are not limited to breast enlargement, headache, diarrhea, nausea, upset stomach, liver function test abnormalities, taste disturbance, and stomach pain.  There is a rare possibility of liver failure that can occur when taking ketoconazole. The patient understands that monitoring of LFTs may be required, especially at baseline. The patient verbalized understanding of the proper use and possible adverse effects of ketoconazole.  All of the patient's questions and concerns were addressed. Gabapentin Counseling: I discussed with the patient the risks of gabapentin including but not limited to dizziness, somnolence, fatigue and ataxia. Metronidazole Pregnancy And Lactation Text: This medication is Pregnancy Category B and considered safe during pregnancy.  It is also excreted in breast milk. Simponi Counseling:  I discussed with the patient the risks of golimumab including but not limited to myelosuppression, immunosuppression, autoimmune hepatitis, demyelinating diseases, lymphoma, and serious infections.  The patient understands that monitoring is required including a PPD at baseline and must alert us or the primary physician if symptoms of infection or other concerning signs are noted. Zyclara Counseling:  I discussed with the patient the risks of imiquimod including but not limited to erythema, scaling, itching, weeping, crusting, and pain.  Patient understands that the inflammatory response to imiquimod is variable from person to person and was educated regarded proper titration schedule.  If flu-like symptoms develop, patient knows to discontinue the medication and contact us. Cyclophosphamide Pregnancy And Lactation Text: This medication is Pregnancy Category D and it isn't considered safe during pregnancy. This medication is excreted in breast milk. Spironolactone Counseling: Patient advised regarding risks of diarrhea, abdominal pain, hyperkalemia, birth defects (for female patients), liver toxicity and renal toxicity. The patient may need blood work to monitor liver and kidney function and potassium levels while on therapy. The patient verbalized understanding of the proper use and possible adverse effects of spironolactone.  All of the patient's questions and concerns were addressed. Cimzia Pregnancy And Lactation Text: This medication crosses the placenta but can be considered safe in certain situations. Cimzia may be excreted in breast milk. Glycopyrrolate Counseling:  I discussed with the patient the risks of glycopyrrolate including but not limited to skin rash, drowsiness, dry mouth, difficulty urinating, and blurred vision. Terbinafine Counseling: Patient counseling regarding adverse effects of terbinafine including but not limited to headache, diarrhea, rash, upset stomach, liver function test abnormalities, itching, taste/smell disturbance, nausea, abdominal pain, and flatulence.  There is a rare possibility of liver failure that can occur when taking terbinafine.  The patient understands that a baseline LFT and kidney function test may be required. The patient verbalized understanding of the proper use and possible adverse effects of terbinafine.  All of the patient's questions and concerns were addressed. Calcipotriene Pregnancy And Lactation Text: This medication has not been proven safe during pregnancy. It is unknown if this medication is excreted in breast milk. Calcipotriene Counseling:  I discussed with the patient the risks of calcipotriene including but not limited to erythema, scaling, itching, and irritation. Ketoconazole Pregnancy And Lactation Text: This medication is Pregnancy Category C and it isn't know if it is safe during pregnancy. It is also excreted in breast milk and breast feeding isn't recommended. Benzoyl Peroxide Counseling: Patient counseled that medicine may cause skin irritation and bleach clothing.  In the event of skin irritation, the patient was advised to reduce the amount of the drug applied or use it less frequently.   The patient verbalized understanding of the proper use and possible adverse effects of benzoyl peroxide.  All of the patient's questions and concerns were addressed. Minocycline Counseling: Patient advised regarding possible photosensitivity and discoloration of the teeth, skin, lips, tongue and gums.  Patient instructed to avoid sunlight, if possible.  When exposed to sunlight, patients should wear protective clothing, sunglasses, and sunscreen.  The patient was instructed to call the office immediately if the following severe adverse effects occur:  hearing changes, easy bruising/bleeding, severe headache, or vision changes.  The patient verbalized understanding of the proper use and possible adverse effects of minocycline.  All of the patient's questions and concerns were addressed.

## 2024-04-16 ENCOUNTER — HOSPITAL ENCOUNTER (OUTPATIENT)
Dept: INFUSION CENTER | Facility: HOSPITAL | Age: 69
Discharge: HOME | End: 2024-04-16
Payer: MEDICARE

## 2024-04-16 VITALS
TEMPERATURE: 97.2 F | SYSTOLIC BLOOD PRESSURE: 149 MMHG | DIASTOLIC BLOOD PRESSURE: 70 MMHG | RESPIRATION RATE: 18 BRPM | HEART RATE: 53 BPM

## 2024-04-16 PROCEDURE — 96361 HYDRATE IV INFUSION ADD-ON: CPT

## 2024-04-16 PROCEDURE — 96360 HYDRATION IV INFUSION INIT: CPT

## 2024-04-16 RX ADMIN — SODIUM CHLORIDE 250 ML/HR: 9 INJECTION, SOLUTION INTRAVENOUS at 08:45

## 2024-04-16 NOTE — PROGRESS NOTES
Teresa Mao  tolerated treatment well with no complications.      Teresa Mao is aware of future appt on 4/18 at 9 am.     AVS declined by Teresa Mao.

## 2024-04-17 RX ORDER — SODIUM CHLORIDE 9 MG/ML
250 INJECTION, SOLUTION INTRAVENOUS ONCE
Status: COMPLETED | OUTPATIENT
Start: 2024-04-19 | End: 2024-04-19

## 2024-04-17 RX ORDER — SODIUM CHLORIDE 9 MG/ML
250 INJECTION, SOLUTION INTRAVENOUS ONCE
Status: COMPLETED | OUTPATIENT
Start: 2024-04-18 | End: 2024-04-18

## 2024-04-18 ENCOUNTER — TELEPHONE (OUTPATIENT)
Age: 69
End: 2024-04-18

## 2024-04-18 ENCOUNTER — HOSPITAL ENCOUNTER (OUTPATIENT)
Dept: INFUSION CENTER | Facility: HOSPITAL | Age: 69
Discharge: HOME | End: 2024-04-18
Payer: MEDICARE

## 2024-04-18 DIAGNOSIS — M54.41 CHRONIC BILATERAL LOW BACK PAIN WITH BILATERAL SCIATICA: ICD-10-CM

## 2024-04-18 DIAGNOSIS — G89.4 CHRONIC PAIN DISORDER: Primary | ICD-10-CM

## 2024-04-18 DIAGNOSIS — M75.102 TEAR OF LEFT SUPRASPINATUS TENDON: Primary | ICD-10-CM

## 2024-04-18 DIAGNOSIS — M54.2 NECK PAIN: ICD-10-CM

## 2024-04-18 DIAGNOSIS — G89.29 CHRONIC BILATERAL LOW BACK PAIN WITH BILATERAL SCIATICA: ICD-10-CM

## 2024-04-18 DIAGNOSIS — M54.16 LUMBAR RADICULOPATHY: ICD-10-CM

## 2024-04-18 DIAGNOSIS — M54.42 CHRONIC BILATERAL LOW BACK PAIN WITH BILATERAL SCIATICA: ICD-10-CM

## 2024-04-18 PROCEDURE — 96361 HYDRATE IV INFUSION ADD-ON: CPT

## 2024-04-18 PROCEDURE — 96360 HYDRATION IV INFUSION INIT: CPT

## 2024-04-18 RX ORDER — SODIUM CHLORIDE 9 MG/ML
250 INJECTION, SOLUTION INTRAVENOUS ONCE
Status: COMPLETED | OUTPATIENT
Start: 2024-04-22 | End: 2024-04-22

## 2024-04-18 RX ADMIN — SODIUM CHLORIDE 250 ML/HR: 0.9 INJECTION, SOLUTION INTRAVENOUS at 09:08

## 2024-04-18 NOTE — TELEPHONE ENCOUNTER
Caller: liliana    Doctor: kocher    Reason for call: pt needs an auth done for her hydrocodone on the [employer] card.    Call back#: [phone]

## 2024-04-18 NOTE — PROGRESS NOTES
Patient tolerated IV hydration without issues.      Teresa Mao is aware of future appt on 4/19/24 at 0830.     AVS-  No (Declined by Teresa Mao) Patient has Mychart.    Patient ambulated off unit without incident.  All personal belongings taken with patient.

## 2024-04-19 ENCOUNTER — HOSPITAL ENCOUNTER (OUTPATIENT)
Dept: INFUSION CENTER | Facility: HOSPITAL | Age: 69
Discharge: HOME | End: 2024-04-19
Payer: MEDICARE

## 2024-04-19 ENCOUNTER — ANESTHESIA EVENT (OUTPATIENT)
Dept: PERIOP | Facility: AMBULARY SURGERY CENTER | Age: 69
End: 2024-04-19
Payer: MEDICARE

## 2024-04-19 VITALS
SYSTOLIC BLOOD PRESSURE: 126 MMHG | HEART RATE: 71 BPM | RESPIRATION RATE: 16 BRPM | TEMPERATURE: 96.9 F | DIASTOLIC BLOOD PRESSURE: 80 MMHG

## 2024-04-19 PROCEDURE — 96361 HYDRATE IV INFUSION ADD-ON: CPT

## 2024-04-19 PROCEDURE — 96360 HYDRATION IV INFUSION INIT: CPT

## 2024-04-19 RX ORDER — SODIUM CHLORIDE 9 MG/ML
250 INJECTION, SOLUTION INTRAVENOUS ONCE
Status: COMPLETED | OUTPATIENT
Start: 2024-04-23 | End: 2024-04-23

## 2024-04-19 RX ADMIN — SODIUM CHLORIDE 250 ML/HR: 0.9 INJECTION, SOLUTION INTRAVENOUS at 08:45

## 2024-04-19 NOTE — PROGRESS NOTES
Teresa Mao  tolerated hydration treatment well with no complications.      Teresa Mao is aware of future appt on 4/22 at 8:30AM.     AVS printed and given to Teresa Mao: No (Declined by Teresa Mao).

## 2024-04-22 ENCOUNTER — HOSPITAL ENCOUNTER (OUTPATIENT)
Dept: INFUSION CENTER | Facility: HOSPITAL | Age: 69
Discharge: HOME | End: 2024-04-22
Payer: MEDICARE

## 2024-04-22 ENCOUNTER — TELEPHONE (OUTPATIENT)
Dept: OBGYN CLINIC | Facility: HOSPITAL | Age: 69
End: 2024-04-22

## 2024-04-22 VITALS
HEART RATE: 87 BPM | DIASTOLIC BLOOD PRESSURE: 87 MMHG | SYSTOLIC BLOOD PRESSURE: 139 MMHG | RESPIRATION RATE: 18 BRPM | TEMPERATURE: 98 F

## 2024-04-22 DIAGNOSIS — Z01.818 PRE-OP EVALUATION: Primary | ICD-10-CM

## 2024-04-22 PROCEDURE — 96360 HYDRATION IV INFUSION INIT: CPT

## 2024-04-22 PROCEDURE — 96361 HYDRATE IV INFUSION ADD-ON: CPT

## 2024-04-22 RX ADMIN — SODIUM CHLORIDE 250 ML/HR: 0.9 INJECTION, SOLUTION INTRAVENOUS at 08:43

## 2024-04-22 NOTE — PRE-PROCEDURE INSTRUCTIONS
Pre-Surgery Instructions:   Medication Instructions    Alum Hydroxide-Mag Trisilicate (Gaviscon) 80-14.2 MG CHEW Uses PRN- DO NOT take day of surgery    azelastine (ASTELIN) 0.1 % nasal spray Take day of surgery.    beclomethasone (QVAR) 40 MCG/ACT inhaler Take day of surgery.    budesonide (PULMICORT) 0.5 mg/2 mL nebulizer solution Take day of surgery.    cromolyn (GASTROCROM) 100 MG/5ML solution Hold day of surgery.    docusate sodium (COLACE) 100 mg capsule Hold day of surgery.    famotidine (PEPCID) 40 MG tablet Take day of surgery.    fexofenadine (ALLEGRA) 180 MG tablet Take day of surgery.    fluticasone (FLONASE) 50 mcg/act nasal spray Take day of surgery.    guaiFENesin (Mucinex) 600 mg 12 hr tablet Hold day of surgery.    HYDROcodone-acetaminophen (NORCO) 5-325 mg per tablet Uses PRN- OK to take day of surgery    ipratropium (ATROVENT) 0.02 % nebulizer solution Take day of surgery.    ipratropium (ATROVENT) 0.06 % nasal spray Take day of surgery.    ivabradine HCl (Corlanor) 5 MG tablet Take day of surgery.    levalbuterol (XOPENEX HFA) 45 mcg/act inhaler Uses PRN- OK to take day of surgery    Magnesium 400 MG CAPS Hold day of surgery.    MULTIPLE VITAMINS-CALCIUM PO Stop taking 7 days prior to surgery.    omega-3-acid ethyl esters (LOVAZA) 1 g capsule Stop taking 7 days prior to surgery.    polyethylene glycol (MIRALAX) 17 g packet Hold day of surgery.    sucralfate (CARAFATE) 1 g/10 mL suspension Uses PRN- OK to take day of surgery    Thyroid, Porcine, POWD Take day of surgery.    Ubrogepant (UBRELVY) 100 MG tablet Uses PRN- OK to take day of surgery    verapamil (CALAN) 40 mg tablet Patient cannot take this on empty stomach. Will take after surgery      Medication instructions for day surgery reviewed. Please use only a sip of water to take your instructed medications. Avoid all over the counter vitamins, supplements and NSAIDS for one week prior to surgery per anesthesia guidelines. Tylenol is ok to  take as needed.     You will receive a call one business day prior to surgery with an arrival time and hospital directions. If your surgery is scheduled on a Monday, the hospital will be calling you on the Friday prior to your surgery. If you have not heard from anyone by 8pm, please call the hospital supervisor through the hospital  at [phone]. ([place] [phone] or [place] [phone]).    Do not eat or drink anything after midnight the night before your surgery, including candy, mints, lifesavers, or chewing gum. Do not drink alcohol 24hrs before your surgery. Try not to smoke at least 24hrs before your surgery.       Follow the pre surgery showering instructions as listed in the “My Surgical Experience Booklet” or otherwise provided by your surgeon's office. Do not use a blade to shave the surgical area 1 week before surgery. It is okay to use a clean electric clippers up to 24 hours before surgery. Do not apply any lotions, creams, including makeup, cologne, deodorant, or perfumes after showering on the day of your surgery. Do not use dry shampoo, hair spray, hair gel, or any type of hair products.     No contact lenses, eye make-up, or artificial eyelashes. Remove nail polish, including gel polish, and any artificial, gel, or acrylic nails if possible. Remove all jewelry including rings and body piercing jewelry.     Wear causal clothing that is easy to take on and off. Consider your type of surgery.    Keep any valuables, jewelry, piercings at home. Please bring any specially ordered equipment (sling, braces) if indicated.    Arrange for a responsible person to drive you to and from the hospital on the day of your surgery. Please confirm the visitor policy for the day of your procedure when you receive your phone call with an arrival time.     Call the surgeon's office with any new illnesses, exposures, or additional questions prior to surgery.    Please reference your “My Surgical  Experience Booklet” for additional information to prepare for your upcoming surgery.

## 2024-04-22 NOTE — TELEPHONE ENCOUNTER
Tammy     Requesting a call from [facility] to schedule a  for cold therapy unit       Callback: [phone]

## 2024-04-22 NOTE — PROGRESS NOTES
Teresa Mao tolerated IV hydration well with no complications.      Teresa Mao is aware of future appt on 4/23 at 8:30AM.     AVS declined by Teresa Mao.

## 2024-04-23 ENCOUNTER — HOSPITAL ENCOUNTER (OUTPATIENT)
Dept: INFUSION CENTER | Facility: HOSPITAL | Age: 69
Discharge: HOME | End: 2024-04-23
Payer: MEDICARE

## 2024-04-23 VITALS
HEART RATE: 79 BPM | SYSTOLIC BLOOD PRESSURE: 141 MMHG | TEMPERATURE: 97.7 F | DIASTOLIC BLOOD PRESSURE: 93 MMHG | RESPIRATION RATE: 18 BRPM

## 2024-04-23 PROCEDURE — 96361 HYDRATE IV INFUSION ADD-ON: CPT

## 2024-04-23 PROCEDURE — 96360 HYDRATION IV INFUSION INIT: CPT

## 2024-04-23 RX ADMIN — SODIUM CHLORIDE 250 ML/HR: 0.9 INJECTION, SOLUTION INTRAVENOUS at 08:58

## 2024-04-23 NOTE — PROGRESS NOTES
Teresa Mao  tolerated hydration treatment well with no complications.      Teresa Mao is aware of future appt on 4/25 at 8:30AM.     AVS printed and given to Teresa Mao: No (Declined by Teresa Mao).

## 2024-04-24 RX ORDER — SODIUM CHLORIDE 9 MG/ML
250 INJECTION, SOLUTION INTRAVENOUS ONCE
Status: COMPLETED | OUTPATIENT
Start: 2024-04-26 | End: 2024-04-26

## 2024-04-24 RX ORDER — SODIUM CHLORIDE 9 MG/ML
250 INJECTION, SOLUTION INTRAVENOUS ONCE
Status: COMPLETED | OUTPATIENT
Start: 2024-04-29 | End: 2024-04-29

## 2024-04-25 ENCOUNTER — HOSPITAL ENCOUNTER (OUTPATIENT)
Dept: INFUSION CENTER | Facility: HOSPITAL | Age: 69
End: 2024-04-25
Payer: MEDICARE

## 2024-04-25 VITALS
DIASTOLIC BLOOD PRESSURE: 82 MMHG | TEMPERATURE: 97.1 F | RESPIRATION RATE: 18 BRPM | SYSTOLIC BLOOD PRESSURE: 128 MMHG | HEART RATE: 71 BPM

## 2024-04-25 PROCEDURE — 96361 HYDRATE IV INFUSION ADD-ON: CPT

## 2024-04-25 PROCEDURE — 96360 HYDRATION IV INFUSION INIT: CPT

## 2024-04-25 RX ADMIN — SODIUM CHLORIDE 1500 ML: 0.9 INJECTION, SOLUTION INTRAVENOUS at 08:51

## 2024-04-25 NOTE — PLAN OF CARE
Problem: Knowledge Deficit  Goal: Patient/family/caregiver demonstrates understanding of disease process, treatment plan, medications, and discharge instructions  Description: Complete learning assessment and assess knowledge base.  Interventions:  - Provide teaching at level of understanding  - Provide teaching via preferred learning methods  4/25/2024 0858 by Nury Clark RN  Outcome: Progressing  4/25/2024 0858 by Nury Clark, RN  Outcome: Progressing     
Into subcutaneous tissue

## 2024-04-25 NOTE — PROGRESS NOTES
Teresa Mao  tolerated hydration well with no complications.      Teresa Mao is aware of future appt on 4-26-24 830    AVSv declined

## 2024-04-25 NOTE — TELEPHONE ENCOUNTER
Caller: liliana Moore     Doctor: kocher    Reason for call: pt would like to know if a script can be written for the insurance to cover a heating pad. Please Advise     Call back# [phone]

## 2024-04-25 NOTE — TELEPHONE ENCOUNTER
S/w pt who states she s/w Medicare and they cover a replacement heating pad but need a script for one.     Are you able to put in a script/order for a heating pad?   If you are, pt states she will come by the office after her infusions and p/u since she is having surgery on her shoulder nxt week.    Pls advise. Thank you!

## 2024-04-26 ENCOUNTER — TELEPHONE (OUTPATIENT)
Age: 69
End: 2024-04-26

## 2024-04-26 ENCOUNTER — HOSPITAL ENCOUNTER (OUTPATIENT)
Dept: MAMMOGRAPHY | Facility: HOSPITAL | Age: 69
End: 2024-04-26
Attending: INTERNAL MEDICINE
Payer: MEDICARE

## 2024-04-26 ENCOUNTER — HOSPITAL ENCOUNTER (OUTPATIENT)
Dept: INFUSION CENTER | Facility: HOSPITAL | Age: 69
End: 2024-04-26
Payer: MEDICARE

## 2024-04-26 VITALS — WEIGHT: 135 LBS | BODY MASS INDEX: 25.49 KG/M2 | HEIGHT: 61 IN

## 2024-04-26 VITALS
DIASTOLIC BLOOD PRESSURE: 78 MMHG | TEMPERATURE: 98.2 F | RESPIRATION RATE: 18 BRPM | SYSTOLIC BLOOD PRESSURE: 152 MMHG | HEART RATE: 71 BPM

## 2024-04-26 DIAGNOSIS — Z12.31 SCREENING MAMMOGRAM, ENCOUNTER FOR: ICD-10-CM

## 2024-04-26 PROCEDURE — 96361 HYDRATE IV INFUSION ADD-ON: CPT

## 2024-04-26 PROCEDURE — 96360 HYDRATION IV INFUSION INIT: CPT

## 2024-04-26 PROCEDURE — 77067 SCR MAMMO BI INCL CAD: CPT

## 2024-04-26 PROCEDURE — 77063 BREAST TOMOSYNTHESIS BI: CPT

## 2024-04-26 RX ADMIN — SODIUM CHLORIDE 250 ML/HR: 0.9 INJECTION, SOLUTION INTRAVENOUS at 08:35

## 2024-04-26 NOTE — TELEPHONE ENCOUNTER
Caller: Teresa    Doctor: Tammy    Reason for call: Called to see if you can cancel the order for the cooling unit. Her brother is coming to visit from [place] of [place] and he will be bringing one down for her.     Call back#: [phone]

## 2024-04-26 NOTE — PROGRESS NOTES
Teresa Mao  tolerated treatment well with no complications.      Teresa Mao is aware of future appt on 4/29/2024 at 0830 .    No (Declined by Teresa Mao)

## 2024-04-26 NOTE — TELEPHONE ENCOUNTER
S/w pt and advised of heating pad order placed in [facility]. Pt verbalized understanding and asked if she could p/u paper script from [place] office. RN advised yes she can p/u script at that office. Pt verbalized understanding and apprec of call.     Clerical--> can you pls print heating pad script located under media for pt to p/u today after infusion? Thank you!

## 2024-04-26 NOTE — TELEPHONE ENCOUNTER
Caller: Teresa    Doctor: Kocher    Reason for call: patient is on a time crunch needs answer ASAP regarding script for heating pad please advise    Call back#: [phone]

## 2024-04-29 ENCOUNTER — HOSPITAL ENCOUNTER (OUTPATIENT)
Dept: INFUSION CENTER | Facility: HOSPITAL | Age: 69
Discharge: HOME | End: 2024-04-29
Payer: MEDICARE

## 2024-04-29 VITALS
HEART RATE: 70 BPM | SYSTOLIC BLOOD PRESSURE: 153 MMHG | DIASTOLIC BLOOD PRESSURE: 79 MMHG | RESPIRATION RATE: 18 BRPM | TEMPERATURE: 97.4 F

## 2024-04-29 PROCEDURE — 96361 HYDRATE IV INFUSION ADD-ON: CPT

## 2024-04-29 PROCEDURE — 96360 HYDRATION IV INFUSION INIT: CPT

## 2024-04-29 RX ADMIN — SODIUM CHLORIDE 250 ML/HR: 0.9 INJECTION, SOLUTION INTRAVENOUS at 08:45

## 2024-04-29 NOTE — PROGRESS NOTES
Teresa Mao  tolerated hydration treatment well with no complications.      Teresa Mao is aware of future appt on 4/30 at 8:30AM.     AVS printed and given to Teresa Mao: No (Declined by Teresa Mao).

## 2024-04-30 ENCOUNTER — HOSPITAL ENCOUNTER (OUTPATIENT)
Dept: PULMONOLOGY | Facility: HOSPITAL | Age: 69
Discharge: HOME | End: 2024-04-30
Payer: MEDICARE

## 2024-04-30 ENCOUNTER — HOSPITAL ENCOUNTER (OUTPATIENT)
Dept: INFUSION CENTER | Facility: HOSPITAL | Age: 69
Discharge: HOME | End: 2024-04-30
Payer: MEDICARE

## 2024-04-30 VITALS
RESPIRATION RATE: 18 BRPM | DIASTOLIC BLOOD PRESSURE: 84 MMHG | SYSTOLIC BLOOD PRESSURE: 150 MMHG | TEMPERATURE: 97.8 F | OXYGEN SATURATION: 100 % | HEART RATE: 75 BPM

## 2024-04-30 DIAGNOSIS — J45.40 MODERATE PERSISTENT ASTHMA WITHOUT COMPLICATION: ICD-10-CM

## 2024-04-30 DIAGNOSIS — G90.9 DISORDER OF AUTONOMIC NERVOUS SYSTEM: ICD-10-CM

## 2024-04-30 DIAGNOSIS — G90.A POSTURAL ORTHOSTATIC TACHYCARDIA SYNDROME: ICD-10-CM

## 2024-04-30 PROCEDURE — 96361 HYDRATE IV INFUSION ADD-ON: CPT

## 2024-04-30 PROCEDURE — 94060 EVALUATION OF WHEEZING: CPT | Performed by: INTERNAL MEDICINE

## 2024-04-30 PROCEDURE — 94729 DIFFUSING CAPACITY: CPT | Performed by: INTERNAL MEDICINE

## 2024-04-30 PROCEDURE — 94729 DIFFUSING CAPACITY: CPT

## 2024-04-30 PROCEDURE — 96360 HYDRATION IV INFUSION INIT: CPT

## 2024-04-30 PROCEDURE — 94726 PLETHYSMOGRAPHY LUNG VOLUMES: CPT

## 2024-04-30 PROCEDURE — 94760 N-INVAS EAR/PLS OXIMETRY 1: CPT

## 2024-04-30 PROCEDURE — 94726 PLETHYSMOGRAPHY LUNG VOLUMES: CPT | Performed by: INTERNAL MEDICINE

## 2024-04-30 PROCEDURE — 94060 EVALUATION OF WHEEZING: CPT

## 2024-04-30 RX ORDER — ALBUTEROL SULFATE 2.5 MG/3ML
2.5 SOLUTION RESPIRATORY (INHALATION) ONCE AS NEEDED
Status: DISCONTINUED | OUTPATIENT
Start: 2024-04-30 | End: 2024-05-04 | Stop reason: HOSPADM

## 2024-04-30 RX ORDER — LEVALBUTEROL INHALATION SOLUTION 0.63 MG/3ML
0.63 SOLUTION RESPIRATORY (INHALATION) ONCE
Status: COMPLETED | OUTPATIENT
Start: 2024-04-30 | End: 2024-04-30

## 2024-04-30 RX ADMIN — SODIUM CHLORIDE 1500 ML: 0.9 INJECTION, SOLUTION INTRAVENOUS at 08:59

## 2024-04-30 RX ADMIN — LEVALBUTEROL HYDROCHLORIDE 0.63 MG: 0.63 SOLUTION RESPIRATORY (INHALATION) at 15:34

## 2024-04-30 NOTE — PROGRESS NOTES
Teresa Mao  tolerated treatment well with no complications.      Teresa Mao is aware of future appt on 5/2 at 8:30 am.     AVS declined by Teresa Mao.

## 2024-05-01 ENCOUNTER — TELEPHONE (OUTPATIENT)
Dept: NEPHROLOGY | Facility: CLINIC | Age: 69
End: 2024-05-01

## 2024-05-01 ENCOUNTER — OFFICE VISIT (OUTPATIENT)
Age: 69
End: 2024-05-01
Payer: MEDICARE

## 2024-05-01 VITALS
BODY MASS INDEX: 24.24 KG/M2 | SYSTOLIC BLOOD PRESSURE: 157 MMHG | DIASTOLIC BLOOD PRESSURE: 78 MMHG | HEART RATE: 75 BPM | WEIGHT: 128.4 LBS | HEIGHT: 61 IN

## 2024-05-01 DIAGNOSIS — M79.18 MYOFASCIAL PAIN SYNDROME: ICD-10-CM

## 2024-05-01 DIAGNOSIS — M47.816 LUMBAR SPONDYLOSIS: ICD-10-CM

## 2024-05-01 DIAGNOSIS — F11.20 UNCOMPLICATED OPIOID DEPENDENCE (HCC): ICD-10-CM

## 2024-05-01 DIAGNOSIS — M54.41 CHRONIC BILATERAL LOW BACK PAIN WITH BILATERAL SCIATICA: ICD-10-CM

## 2024-05-01 DIAGNOSIS — M47.812 CERVICAL SPONDYLOSIS: ICD-10-CM

## 2024-05-01 DIAGNOSIS — G89.29 CHRONIC BILATERAL LOW BACK PAIN WITH BILATERAL SCIATICA: ICD-10-CM

## 2024-05-01 DIAGNOSIS — M54.16 LUMBAR RADICULOPATHY: ICD-10-CM

## 2024-05-01 DIAGNOSIS — M54.12 CERVICAL RADICULOPATHY: ICD-10-CM

## 2024-05-01 DIAGNOSIS — Z98.1 HISTORY OF LUMBAR FUSION: ICD-10-CM

## 2024-05-01 DIAGNOSIS — M54.42 CHRONIC BILATERAL LOW BACK PAIN WITH BILATERAL SCIATICA: ICD-10-CM

## 2024-05-01 DIAGNOSIS — G89.4 CHRONIC PAIN SYNDROME: Primary | ICD-10-CM

## 2024-05-01 DIAGNOSIS — G03.9 ARACHNOIDITIS: ICD-10-CM

## 2024-05-01 DIAGNOSIS — M54.2 NECK PAIN: ICD-10-CM

## 2024-05-01 DIAGNOSIS — Z79.891 LONG-TERM CURRENT USE OF OPIATE ANALGESIC: ICD-10-CM

## 2024-05-01 PROCEDURE — 99214 OFFICE O/P EST MOD 30 MIN: CPT | Performed by: NURSE PRACTITIONER

## 2024-05-01 RX ORDER — HYDROCODONE BITARTRATE AND ACETAMINOPHEN 5; 325 MG/1; MG/1
1 TABLET ORAL 2 TIMES DAILY PRN
Qty: 60 TABLET | Refills: 0 | Status: SHIPPED | OUTPATIENT
Start: 2024-05-16

## 2024-05-01 RX ORDER — HYDROCODONE BITARTRATE AND ACETAMINOPHEN 5; 325 MG/1; MG/1
1 TABLET ORAL 2 TIMES DAILY PRN
Qty: 60 TABLET | Refills: 0 | Status: SHIPPED | OUTPATIENT
Start: 2024-06-13

## 2024-05-01 NOTE — PROGRESS NOTES
Assessment:  1. Chronic pain syndrome    2. Chronic bilateral low back pain with bilateral sciatica    3. History of lumbar fusion    4. Lumbar radiculopathy    5. Lumbar spondylosis    6. Arachnoiditis    7. Neck pain    8. Cervical radiculopathy    9. Cervical spondylosis    10. Myofascial pain syndrome    11. Uncomplicated opioid dependence (HCC)    12. Long-term current use of opiate analgesic        Plan:  While the patient was in the office today, I did have a thorough conversation regarding their chronic pain syndrome, medication management, and treatment plan options.  Patient is being seen for a follow-up visit.  She underwent a left-sided L2-3 and L3-4 radiofrequency ablation on 3/29/2024.  The right side was performed on 3/5/2024.  So far, there has been up to 60% improvement in her low back pain.    Continue hydrocodone 5/325 twice daily if needed for pain.  The patient's opioid scripts were sent to their pharmacy electronically and was given a 2 month supply of prescriptions with a Do Not Fill date(s) of 5/16/2024, 6/13/2024.    Continue Flexeril 5 mg 3 times daily if needed for spasms.  She did not require refill of this medication during today's visit.    Patient is scheduled to undergo right arthroscopic shoulder surgery on 5/3/2024.    [place] Prescription Drug Monitoring Program report was reviewed and was appropriate     A urine drug screen was collected at today's office visit as part of our medication management protocol. The point of care testing results were appropriate for what was being prescribed. The specimen will be sent for confirmatory testing. The drug screen is medically necessary because the patient is either dependent on opioid medication or is being considered for opioid medication therapy and the results could impact ongoing or future treatment. The drug screen is to evaluate for the presences or absence of prescribed, non-prescribed, and/or illicit drugs/substances.    There  are risks associated with opioid medications, including dependence, addiction and tolerance. The patient understands and agrees to use these medications only as prescribed. Potential side effects of the medications include, but are not limited to, constipation, drowsiness, addiction, impaired judgment and risk of fatal overdose if not taken as prescribed. The patient was warned against driving while taking sedation medications.  Sharing medications is a felony. At this point in time, the patient is showing no signs of addiction, abuse, diversion or suicidal ideation.    While the patient was in the office today an opioid contract was thoroughly reviewed and signed by the patient. The patient was given adequate time to ask questions in regards to the contract and a signed copy was sent home for his/her records.    The patient will follow-up in 8 weeks for medication prescription refill and reevaluation. The patient was advised to contact the office should their symptoms worsen in the interim. The patient was agreeable and verbalized an understanding.        History of Present Illness:    The patient is a 68 y.o. female last seen on 2/29/2024 who presents for a follow up office visit in regards to chronic pain secondary to chronic pain syndrome, chronic low back pain, arachnoiditis, history of lumbar fusion, lumbar spondylosis, lumbar radiculopathy, neck pain, cervical radiculopathy, cervical spondylosis.  The patient currently reports complaints of neck pain that radiates to both upper extremities, mid back pain, low back pain that radiates to both lower extremities.  Current pain level is a 4/10.  Quality pain is described as burning, sharp, throbbing, cramping, shooting, numb, pins-and-needles.    Current pain medications includes: Hydrocodone 5/325 twice daily if needed for pain, Flexeril 5 mg 3 times daily if needed for spasms.  The patient reports that this regimen is providing up to 60% pain relief.  The  "patient is reporting no side effects from this pain medication regimen.    Pain Contract Signed: 5/1/2024  Last Urine Drug Screen: 5/1/2024    I have personally reviewed and/or updated the patient's past medical history, past surgical history, family history, social history, current medications, allergies, and vital signs today.       Review of Systems:    Review of Systems   Eyes:  Negative for visual disturbance.   Respiratory:  Positive for shortness of breath. Negative for wheezing.    Cardiovascular:  Negative for chest pain and palpitations.   Gastrointestinal:  Positive for constipation and nausea.   Endocrine: Negative for polydipsia.   Musculoskeletal:  Negative for gait problem, joint swelling and myalgias.        Joint stiffness  Pain in extremity   Skin:  Negative for rash.   Neurological:  Positive for dizziness. Negative for headaches.   Psychiatric/Behavioral:  Negative for decreased concentration.          Past Medical History:   Diagnosis Date    Allergic rhinitis     Anxiety     Asthma     Back pain     Cardiac disease     Cardiopathy     EF 45%    Cough     Diverticulitis     Factor V Leiden (HCC)     Fibromyalgia     GERD (gastroesophageal reflux disease)     Hashimoto's thyroiditis     Hx of degenerative disc disease     Hypertension     Hypotension     pots - postural orthostatic hypotension    Interstitial cystitis     Irregular heart beat     LBBB    Irritable bowel syndrome     Migraines     Mitral valve disease     \"thickening\"    Myocardial infarction (HCC)     possible but not sure when    Neuropathy     bilateral legs    Osteoporosis     Postural orthostatic tachycardia syndrome     must drink a lot of water and salt    Pott's disease     Rheumatic fever 1967    Scoliosis     Sepsis (HCC) 2021    associated with PICC line    Sjogren's syndrome (HCC)     TMJ (dislocation of temporomandibular joint)        Past Surgical History:   Procedure Laterality Date    ABLATION MICROWAVE Left     " lumbar area    BACK SURGERY  10/1998     SECTION  1992    CHOLECYSTECTOMY  2016    COLONOSCOPY  2016    DILATION AND CURETTAGE OF UTERUS      EGD  2016    FOOT SURGERY  1968    removal of bone and neuroma    HYSTERECTOMY N/A     age 39    IR OTHER  2022    IR OTHER  2022    IR OTHER  2023    IR OTHER  2024    IR PICC PLACEMENT SINGLE LUMEN  10/02/2020    IR PICC PLACEMENT SINGLE LUMEN  2021    IR PICC REPOSITION  2021    IR TUNNELED CENTRAL LINE PLACEMENT  2022    LAPAROSCOPY      endometriosis    MOUTH BIOPSY      lip    OOPHORECTOMY Bilateral     age 39    [place] EGD TRANSORAL BIOPSY SINGLE/MULTIPLE N/A 2019    Procedure: ESOPHAGOGASTRODUODENOSCOPY (EGD) with multiple bx and dilation;  Surgeon: Peter Baldwin MD;  Location:  GI LAB;  Service: Gastroenterology    [place] SURGICAL ARTHROSCOPY SHOULDER W/ROTATOR CUFF RPR Right 2022    Procedure: SHOULDER ARTHROSCOPIC ROTATOR CUFF REPAIR Biceps Tenodisis;  Surgeon: Ilya Munoz MD;  Location: [facility] MAIN OR;  Service: Orthopedics    TUNNELED VENOUS CATHETER PLACEMENT         Family History   Problem Relation Age of Onset    Cancer Mother         bladder    No Known Problems Father     Thyroid cancer Sister     Heart attack Sister     No Known Problems Sister     No Known Problems Sister     No Known Problems Sister     No Known Problems Sister     No Known Problems Daughter     No Known Problems Daughter     No Known Problems Maternal Grandmother     Colon cancer Maternal Grandfather     No Known Problems Paternal Grandmother     No Known Problems Paternal Grandfather     Breast cancer Maternal Aunt         over age 50    Endometrial cancer Maternal Aunt     Multiple myeloma Maternal Aunt     Hypertension Paternal Aunt     Brain cancer Niece     Lymphoma Niece     Breast cancer additional onset Neg Hx     BRCA2 Positive Neg Hx     Ovarian cancer Neg Hx     BRCA2 Negative Neg Hx      BRCA1 Positive Neg Hx     BRCA1 Negative Neg Hx     BRCA 1/2 Neg Hx        Social History     Occupational History    Not on file   Tobacco Use    Smoking status: Never    Smokeless tobacco: Never   Vaping Use    Vaping status: Never Used   Substance and Sexual Activity    Alcohol use: Never    Drug use: No    Sexual activity: Not on file         Current Outpatient Medications:     Alum Hydroxide-Mag Trisilicate (Gaviscon) 80-14.2 MG CHEW, Chew 1 tablet 4 (four) times a day as needed With meals and as needed, Disp: , Rfl:     azelastine (ASTELIN) 0.1 % nasal spray, 2 sprays into each nostril 2 (two) times a day Use in each nostril as directed, Disp: , Rfl:     beclomethasone (QVAR) 40 MCG/ACT inhaler, Inhale 1 puff daily as needed (only when unable to nebulize pulmicort) Rinse mouth after use., Disp: , Rfl:     budesonide (PULMICORT) 0.5 mg/2 mL nebulizer solution, Take 0.5 mg by nebulization 2 (two) times a day Rinse mouth after use., Disp: , Rfl:     cromolyn (GASTROCROM) 100 MG/5ML solution, Take 100 mg by mouth 4 (four) times a day (before meals and at bedtime), Disp: , Rfl:     cyclobenzaprine (FLEXERIL) 5 mg tablet, Take 1 tablet (5 mg total) by mouth 3 (three) times a day as needed for muscle spasms, Disp: 90 tablet, Rfl: 1    docusate sodium (COLACE) 100 mg capsule, Take 100 mg by mouth daily as needed for constipation, Disp: , Rfl:     erythromycin (ILOTYCIN) ophthalmic ointment, Apply 1 application to eye daily at bedtime, Disp: , Rfl:     famotidine (PEPCID) 40 MG tablet, Twice a day, Disp: , Rfl:     fexofenadine (ALLEGRA) 180 MG tablet, Take 180 mg by mouth 2 (two) times a day , Disp: , Rfl:     fluticasone (FLONASE) 50 mcg/act nasal spray, 2 sprays into each nostril daily , Disp: , Rfl:     Galcanezumab-gnlm (Emgality) 120 MG/ML SOAJ, Inject under the skin every 30 (thirty) days , Disp: , Rfl:     guaiFENesin (Mucinex) 600 mg 12 hr tablet, Take 600 mg by mouth every 12 (twelve) hours, Disp: , Rfl:      Heating Pad PADS, Use if needed (pain), Disp: , Rfl:     [START ON 5/16/2024] HYDROcodone-acetaminophen (NORCO) 5-325 mg per tablet, Take 1 tablet by mouth 2 (two) times a day as needed for pain Max Daily Amount: 2 tablets Do not start before May 16, 2024., Disp: 60 tablet, Rfl: 0    [START ON 6/13/2024] HYDROcodone-acetaminophen (Norco) 5-325 mg per tablet, Take 1 tablet by mouth 2 (two) times a day as needed for pain for up to 60 doses Max Daily Amount: 2 tablets Do not start before June 13, 2024., Disp: 60 tablet, Rfl: 0    ipratropium (ATROVENT) 0.02 % nebulizer solution, Take 0.5 mg by nebulization every 6 (six) hours as needed for wheezing or shortness of breath, Disp: , Rfl:     ipratropium (ATROVENT) 0.06 % nasal spray, PLEASE SEE ATTACHED FOR DETAILED DIRECTIONS, Disp: , Rfl:     ivabradine HCl (Corlanor) 5 MG tablet, 7.5 mg 2 (two) times a day, Disp: , Rfl:     levalbuterol (XOPENEX HFA) 45 mcg/act inhaler, Inhale 2 puffs every 4 (four) hours as needed (when unable to nebulize), Disp: , Rfl:     levalbuterol (XOPENEX) 1.25 mg/3 mL nebulizer solution, Take 1.25 mg by nebulization every 6 (six) hours as needed , Disp: , Rfl: 2    Magnesium 400 MG CAPS, Take 1 capsule by mouth 2 (two) times a day , Disp: , Rfl:     MULTIPLE VITAMINS-CALCIUM PO, Take 1 capsule by mouth every morning , Disp: , Rfl:     NON FORMULARY, Take 1 mg by mouth 3 (three) times a day Ketotifen 1 mg compounded capsule, Disp: , Rfl:     omega-3-acid ethyl esters (LOVAZA) 1 g capsule, Take 2 g by mouth 2 (two) times a day 1600mg daily, Disp: , Rfl:     polyethylene glycol (MIRALAX) 17 g packet, Take 17 g by mouth daily as needed , Disp: , Rfl:     Probiotic Product (PROBIOTIC DAILY PO), Take 1 tablet by mouth daily At lunch, Disp: , Rfl:     Qvar RediHaler 40 MCG/ACT inhaler, , Disp: , Rfl:     sodium chloride, Inject 1,500 mL into a catheter in a vein, Disp: , Rfl:     sucralfate (CARAFATE) 1 g/10 mL suspension, Take 1 g by mouth 2  (two) times a day, Disp: , Rfl:     Thyroid, Porcine, POWD, Use 32 mg daily, Disp: , Rfl:     Ubrogepant (UBRELVY) 100 MG tablet, Take 100 mg by mouth Take 1 tablet (100 mg) one time as needed for migraine. May repeat one additional tablet (100 mg) at least two hours after the first dose. Do not use more than two doses per day, or for more than eight days per month., Disp: , Rfl:     verapamil (CALAN) 40 mg tablet, , Disp: , Rfl:   No current facility-administered medications for this visit.    Facility-Administered Medications Ordered in Other Visits:     albuterol inhalation solution 2.5 mg, 2.5 mg, Nebulization, Once PRN, Jan Doyle MD    [START ON 5/2/2024] sodium chloride 0.9 % bolus 1,500 mL, 1,500 mL, Intravenous, Once, Jan Doyle MD    Allergies   Allergen Reactions    Imipramine Confusion, Fatigue, Irritability, Palpitations, Shortness Of Breath, Tachycardia and Visual Disturbance    Melatonin Shortness Of Breath    Mirtazapine Anxiety, Dizziness, GI Intolerance, Nausea Only, Palpitations and Shortness Of Breath    Nexium [Esomeprazole] Shortness Of Breath    Nsaids Shortness Of Breath    Singulair [Montelukast] Shortness Of Breath and Cough    Zomig [Zolmitriptan] Shortness Of Breath    Dexilant [Dexlansoprazole] Nausea Only and Vomiting    Albuterol     Ambien [Zolpidem]     Amitriptyline Drowsiness    Aspirin     Bactrim [Sulfamethoxazole-Trimethoprim] Hives    Banana - Food Allergy Dermatitis    Ceftin [Cefuroxime]     Celebrex [Celecoxib]     Ciprofloxacin     Cranberry-C [Ascorbate - Food Allergy] GI Intolerance    Demerol [Meperidine]     Epinephrine Dizziness    Ergotamine Nausea Only and Headache    Keflex [Cephalexin]     Klonopin [Clonazepam] Nausea Only and Dizziness    Latex Hives    Levaquin [Levofloxacin]     Lexapro [Escitalopram]     Lyrica [Pregabalin] Fatigue    Macrodantin [Nitrofurantoin]     Morphine Nausea Only    Movantik [Naloxegol] Nausea Only    Mushroom Extract Complex - Food  "Allergy      Eye itchy, asthma  attack    Naprosyn [Naproxen]     Neurontin [Gabapentin] Dizziness    Pineapple - Food Allergy GI Intolerance    Plecanatide Abdominal Pain, Diarrhea and GI Intolerance    Prolia [Denosumab]     Prozac [Fluoxetine]     Serevent [Salmeterol] Dizziness    Sudafed [Pseudoephedrine] Hives    Sulfa Antibiotics     Tomato - Food Allergy GI Intolerance    Trazodone     Ultram [Tramadol] Nausea Only, Dizziness and Headache    Vilazodone Dizziness, GI Intolerance and Headache    Vilazodone Hcl Abdominal Pain, Dizziness, GI Intolerance, Headache and Other (See Comments)    Vioxx [Rofecoxib]     Vortioxetine Drowsiness, Fatigue, GI Intolerance and Irritability    Zithromax [Azithromycin] Hives    Zoloft [Sertraline]     Zantac [Ranitidine] Rash and Dizziness       Physical Exam:    /78   Pulse 75   Ht 5' 1\" (1.549 m)   Wt 58.2 kg (128 lb 6.4 oz)   BMI 24.26 kg/m²     Constitutional:normal, well developed, well nourished, alert, in no distress and non-toxic and no overt pain behavior.  Eyes:anicteric  HEENT:grossly intact  Neck:supple, symmetric, trachea midline and no masses   Pulmonary:even and unlabored  Cardiovascular:No edema or pitting edema present  Skin:Normal without rashes or lesions and well hydrated  Psychiatric:Mood and affect appropriate  Neurologic:Cranial Nerves II-XII grossly intact  Musculoskeletal:antalgic and ambulates with cane      Imaging  No orders to display         Orders Placed This Encounter   Procedures    MM ALL_Prescribed Meds and Special Instructions    MM DT_Alprazolam Definitive Test    MM DT_Amphetamine Definitive Test    MM DT_Aripiprazole Definitive Test    MM DT_Bath Salts Definitive Test    MM DT_Buprenorphine Definitive Test    MM DT_Butalbital Definitive Test    MM DT_Clonazepam Definitive Test    MM DT_Clozapine Definitive Test    MM DT_Cocaine Definitive Test    MM DT_Codeine Definitive Test    MM DT_Desipramine Definitive Test    MM " DT_Dextromethorphan Definitive Test    MM Diazepam Definitive Test    MM DT_Ethyl Glucuronide/Ethyl Sulfate Definitive Test    MM DT_Fentanyl Definitive Test    MM DT_Haloperidol Definitive Test    MM DT_Heroin Definitive Test    MM DT_Hydrocodone Definitive Test    MM DT_Imipramine Definitive Test    MM DT_Kratom Definitive Test    MM DT_Levorphanol Definitive Test    MM DT_MDMA Definitive Test    MM DT_Meperidine Definitive Test    MM DT_Methadone Definitive Test    MM DT_Methamphetamine Definitive Test    MM DT_Methylphenidate Definitive Test    MM DT_Morphine Definitive Test    MM Lorazepam Definitive Test    MM DT_Olanzapine Definitive Test    MM DT_Oxazepam Definitive Test    MM DT_Oxycodone Definitive Test    MM DT_Oxymorphone Definitive Test    MM DT_Phencyclidine Definitive Test    MM DT_Phenobarbital Definitive Test    MM DT_Phentermine Definitive Test    MM DT_Quetiapine Definitive Test    MM DT_Risperidone Definitive Test    MM DT_Secobarbital Definitive Test    MM DT_Spice Definitive Test    MM DT_Tapentadol Definitive Test    MM DT_Temazapam Definitive Test    MM DT_THC Definitive Test    MM DT_Tramadol Definitive Test

## 2024-05-01 NOTE — PATIENT INSTRUCTIONS
Opioid Safety   WHAT YOU NEED TO KNOW:   An opioid medicine is used to treat pain. Examples are oxycodone, morphine, fentanyl, or codeine. Pain control and management may help you rest, heal, and return to your daily activities. You and your family will receive information about how to manage your pain at home. The instructions will include what to do if you have side effects as your pain is managed. You will get information on how to handle opioid medicine safely. You will also get suggestions on how to control pain without opioids. It is important to follow all instructions so your pain is managed effectively.  DISCHARGE INSTRUCTIONS:   Call your local emergency number (911 in the ), or have someone else call if:   You have a seizure.    You cannot be woken.    You have trouble staying awake and your breathing is slow or shallow.    Your speech is slurred, or you are confused.    You are dizzy or stumble when you walk.    Call your doctor, or have someone close to you call if:   You are extremely drowsy, or you have trouble staying awake or speaking.    You have pale or clammy skin.    You have blue fingernails or lips.    Your heartbeat is slower than normal.    You cannot stop vomiting.    You have questions or concerns about your condition or care.    Use opioids safely:   Take prescribed opioids exactly as directed.  Opioids come with directions based on the kind and how it is given. Talk to your healthcare provider or a pharmacist if you have any questions. Do not take more than the recommended amount. Too much can cause a life-threatening overdose. Do not continue to take it after your pain stops. You may develop tolerance. This means you keep needing higher doses to get the same effect. You may also develop opioid use disorder. This means you are not able to control your opioid use.    Do not give opioids to others or take opioids that belong to someone else.  The kind or amount one person takes may not  be right for another. The person you share them with may also be taking medicines that do not mix with opioids. The person may drink alcohol or use other drugs that can cause life-threatening problems when mixed with opioids.    Do not mix opioids with other medicines or alcohol.  The combination can cause an overdose, or cause you to stop breathing. Alcohol, sleeping pills, and medicines such as antihistamines can make you sleepy. A combination with opioids can lead to a coma.    Do not drive or operate heavy machinery after you use an opioid.  You may feel drowsy or have trouble concentrating. You can injure yourself or others if you drive or use heavy machinery when you are not alert. Your provider or pharmacist can tell you how long to wait after a dose before you do these activities.    Talk to your healthcare provider if you have any side effects.  Side effects include nausea, sleepiness, itching, and trouble thinking clearly. Your provider may need to make changes to the kind or amount of opioid you are taking. Your provider can also help you find ways to prevent or relieve side effects.    Manage constipation:  Constipation is the most common side effect of opioid medicine. Constipation is when you have hard, dry bowel movements, or you go longer than usual between bowel movements. Tell your healthcare provider about all changes in your bowel movements while you are taking opioids. Your provider may recommend laxative medicine to help you have a bowel movement. Your provider may also change the kind of opioid you are taking, or change when you take it. The following are more ways you can prevent or relieve constipation:  Drink liquids as directed.  You may need to drink extra liquids to help soften and move your bowels. Ask how much liquid to drink each day and which liquids are best for you.    Eat high-fiber foods.  This may help decrease constipation by adding bulk to your bowel movements. High-fiber  foods include fruits, vegetables, whole-grain breads and cereals, and beans. Your healthcare provider or dietitian can help you create a high-fiber meal plan. Your provider may also recommend a fiber supplement if you cannot get enough fiber from food.         Exercise regularly.  Regular physical activity can help stimulate your intestines. Walking is a good exercise to prevent or relieve constipation. Ask which exercises are best for you.         Schedule a time each day to have a bowel movement.  This may help train your body to have regular bowel movements. Bend forward while you are on the toilet to help move the bowel movement out. Sit on the toilet for at least 10 minutes, even if you do not have a bowel movement.    Store opioids safely:   Store opioids where others cannot easily get them.  Keep them in a locked cabinet or secure area. Do not  keep them in a purse or other bag you carry with you. A person may be looking for something else and find the opioids.         Make sure opioids are stored out of the reach of children.  A child can easily overdose on opioids. Opioids may look like candy to a small child.    The best way to dispose of opioids:  The laws vary by country and area. In the [place], the best way is to return the opioids through a take-back program. This program is offered by the [place] Drug Enforcement Agency ([employer]). The following are options for using the program:  Take the opioids to a [employer] collection site.  The site is often a law enforcement center. Call your local law enforcement center for scheduled take-back days in your area. You will be given information on where to go if the collection site is in a different location.    Take the opioids to an approved pharmacy or hospital.  A pharmacy or hospital may be set up as a collection site. You will need to ask if it is a [employer] collection site if you were not directed there. A pharmacy or doctor's office may not be able to take back opioids  unless it is a [employer] site.    Use a mail-back system.  This means you are given containers to put the opioids into. You will then mail them in the containers.    Use a take-back drop box.  This is a place to leave the opioids at any time. People and animals will not be able to get into the box. Your local law enforcement agency can tell you where to find a drop box in your area.    Other safe ways to dispose of opioids:  The medicine may come with disposal instructions. The instructions may vary depending on the brand of medicine you are using. Instructions may come in a Medication Guide, but not every medicine has one. You may instead get instructions from your pharmacy or doctor. Follow instructions carefully. The following are general guidelines to follow:  Find out if you can flush the opioid.  Some opioids can be flushed down the toilet or poured into the sink. You will need to contact authorities in your area to see if this is an option for you. The FDA also offers a list of medicines that are safe to flush down the toilet. You can check the list if you cannot get the information for your local area.    Ask your waste management company about rules for putting opioids in the trash.  The company will be able to give you specific directions. Scratch out personal information on the original medicine label so it cannot be read. Then put it in the trash. Do not label the trash or put any information on it about the opioids. It should look like regular household trash so no one is tempted to look for the opioids. Keep the trash out of the reach of children and animals. Always make sure trash is secure.    Talk to officials if you live in a facility.  If you live in a nursing home or assisted living center, talk to an official. The person will know the rules for your area.    Other ways to manage pain:   Ask your healthcare provider about non-opioid medicines to control pain.  Some medicines may even work better than  opioids, depending on the cause of your pain. Nonprescription medicines include NSAIDs (such as ibuprofen) and acetaminophen. Prescription medicines include muscle relaxers, antidepressants, and steroids.    Pain may be managed without any medicines.  Some ways to relieve pain include massage, aromatherapy, or meditation. Physical or occupational therapy may also help.    Follow up with your doctor or pain specialist as directed:  You may need to have your dose adjusted. Your doctor or pain specialist can also help you find ways to manage pain without opioids. Write down your questions so you remember to ask them during your visits.  For more information:   Drug Enforcement Administration  [address] [place] [address]  Phone: 7- 672 - [phone]  Web Address: https://www.deadiversion.[facility].gov/drug_disposal/    [place] Food and Drug Administration  [address] [place] [address]  Phone: 4- 945 - 841-3934  Web Address: http://www.fda.gov  © Copyright [employer] 2023 Information is for End User's use only and may not be sold, redistributed or otherwise used for commercial purposes.  The above information is an  only. It is not intended as medical advice for individual conditions or treatments. Talk to your doctor, nurse or pharmacist before following any medical regimen to see if it is safe and effective for you.

## 2024-05-01 NOTE — TELEPHONE ENCOUNTER
left voicemail for patient to call the office to schedule follow up appointment for August 2024 with  Or TEETEE/PA

## 2024-05-02 ENCOUNTER — HOSPITAL ENCOUNTER (OUTPATIENT)
Dept: INFUSION CENTER | Facility: HOSPITAL | Age: 69
Discharge: HOME | End: 2024-05-02
Payer: MEDICARE

## 2024-05-02 VITALS
DIASTOLIC BLOOD PRESSURE: 92 MMHG | SYSTOLIC BLOOD PRESSURE: 164 MMHG | HEART RATE: 76 BPM | RESPIRATION RATE: 18 BRPM | TEMPERATURE: 97 F

## 2024-05-02 DIAGNOSIS — G03.9 ARACHNOIDITIS: ICD-10-CM

## 2024-05-02 DIAGNOSIS — M79.18 MYOFASCIAL PAIN SYNDROME: ICD-10-CM

## 2024-05-02 PROCEDURE — 96360 HYDRATION IV INFUSION INIT: CPT

## 2024-05-02 PROCEDURE — 96361 HYDRATE IV INFUSION ADD-ON: CPT

## 2024-05-02 RX ORDER — CYCLOBENZAPRINE HCL 5 MG
5 TABLET ORAL 3 TIMES DAILY PRN
Qty: 90 TABLET | Refills: 1 | Status: SHIPPED | OUTPATIENT
Start: 2024-05-02

## 2024-05-02 RX ADMIN — SODIUM CHLORIDE 1500 ML: 0.9 INJECTION, SOLUTION INTRAVENOUS at 08:44

## 2024-05-02 NOTE — PROGRESS NOTES
Teresa Mao  tolerated IV hydration well with no complications.      Teresa Mao is aware of future appt on 5/7 at 8AM.     AVS printed and given to Teresa Mao.

## 2024-05-03 ENCOUNTER — HOSPITAL ENCOUNTER (OUTPATIENT)
Facility: AMBULARY SURGERY CENTER | Age: 69
Setting detail: OUTPATIENT SURGERY
Discharge: HOME | End: 2024-05-03
Attending: ORTHOPAEDIC SURGERY | Admitting: ORTHOPAEDIC SURGERY
Payer: MEDICARE

## 2024-05-03 ENCOUNTER — ANESTHESIA (OUTPATIENT)
Dept: PERIOP | Facility: AMBULARY SURGERY CENTER | Age: 69
End: 2024-05-03
Payer: MEDICARE

## 2024-05-03 VITALS
RESPIRATION RATE: 18 BRPM | HEART RATE: 78 BPM | SYSTOLIC BLOOD PRESSURE: 145 MMHG | BODY MASS INDEX: 24.92 KG/M2 | TEMPERATURE: 97.5 F | WEIGHT: 132 LBS | OXYGEN SATURATION: 99 % | DIASTOLIC BLOOD PRESSURE: 71 MMHG | HEIGHT: 61 IN

## 2024-05-03 DIAGNOSIS — M75.102 TEAR OF LEFT SUPRASPINATUS TENDON: Primary | ICD-10-CM

## 2024-05-03 PROCEDURE — C1713 ANCHOR/SCREW BN/BN,TIS/BN: HCPCS | Performed by: ORTHOPAEDIC SURGERY

## 2024-05-03 PROCEDURE — C9290 INJ, BUPIVACAINE LIPOSOME: HCPCS | Performed by: ANESTHESIOLOGY

## 2024-05-03 PROCEDURE — 29826 SHO ARTHRS SRG DECOMPRESSION: CPT | Performed by: ORTHOPAEDIC SURGERY

## 2024-05-03 PROCEDURE — 29827 SHO ARTHRS SRG RT8TR CUF RPR: CPT | Performed by: ORTHOPAEDIC SURGERY

## 2024-05-03 DEVICE — BIO-COMP SWVLK C, CLD 4.75X19.1MM
Type: IMPLANTABLE DEVICE | Site: SHOULDER | Status: FUNCTIONAL
Brand: ARTHREX®

## 2024-05-03 DEVICE — IMPLANTABLE DEVICE: Type: IMPLANTABLE DEVICE | Site: SHOULDER | Status: FUNCTIONAL

## 2024-05-03 RX ORDER — FAMOTIDINE 20 MG/1
40 TABLET, FILM COATED ORAL ONCE
Status: COMPLETED | OUTPATIENT
Start: 2024-05-03 | End: 2024-05-03

## 2024-05-03 RX ORDER — PROPOFOL 10 MG/ML
INJECTION, EMULSION INTRAVENOUS AS NEEDED
Status: DISCONTINUED | OUTPATIENT
Start: 2024-05-03 | End: 2024-05-03

## 2024-05-03 RX ORDER — PHENYLEPHRINE HCL IN 0.9% NACL 1 MG/10 ML
SYRINGE (ML) INTRAVENOUS AS NEEDED
Status: DISCONTINUED | OUTPATIENT
Start: 2024-05-03 | End: 2024-05-03

## 2024-05-03 RX ORDER — CEFAZOLIN SODIUM 2 G/50ML
2000 SOLUTION INTRAVENOUS ONCE
Status: COMPLETED | OUTPATIENT
Start: 2024-05-03 | End: 2024-05-03

## 2024-05-03 RX ORDER — FAMOTIDINE 20 MG/50ML
20 INJECTION, SOLUTION INTRAVENOUS ONCE
Status: DISCONTINUED | OUTPATIENT
Start: 2024-05-03 | End: 2024-05-03

## 2024-05-03 RX ORDER — ONDANSETRON 2 MG/ML
INJECTION INTRAMUSCULAR; INTRAVENOUS AS NEEDED
Status: DISCONTINUED | OUTPATIENT
Start: 2024-05-03 | End: 2024-05-03

## 2024-05-03 RX ORDER — DIPHENHYDRAMINE HCL 25 MG
25 TABLET ORAL ONCE
Status: COMPLETED | OUTPATIENT
Start: 2024-05-03 | End: 2024-05-03

## 2024-05-03 RX ORDER — ONDANSETRON 2 MG/ML
4 INJECTION INTRAMUSCULAR; INTRAVENOUS EVERY 6 HOURS PRN
Status: DISCONTINUED | OUTPATIENT
Start: 2024-05-03 | End: 2024-05-03 | Stop reason: HOSPADM

## 2024-05-03 RX ORDER — NALOXONE HYDROCHLORIDE 4 MG/.1ML
SPRAY NASAL
Qty: 1 EACH | Refills: 0 | Status: SHIPPED | OUTPATIENT
Start: 2024-05-03 | End: 2025-05-03

## 2024-05-03 RX ORDER — ONDANSETRON 2 MG/ML
4 INJECTION INTRAMUSCULAR; INTRAVENOUS ONCE AS NEEDED
Status: DISCONTINUED | OUTPATIENT
Start: 2024-05-03 | End: 2024-05-03 | Stop reason: HOSPADM

## 2024-05-03 RX ORDER — FENTANYL CITRATE/PF 50 MCG/ML
25 SYRINGE (ML) INJECTION
Status: DISCONTINUED | OUTPATIENT
Start: 2024-05-03 | End: 2024-05-03 | Stop reason: HOSPADM

## 2024-05-03 RX ORDER — OXYCODONE HYDROCHLORIDE 5 MG/1
TABLET ORAL
Qty: 13 TABLET | Refills: 0 | Status: SHIPPED | OUTPATIENT
Start: 2024-05-03

## 2024-05-03 RX ORDER — ACETAMINOPHEN 325 MG/1
650 TABLET ORAL EVERY 6 HOURS PRN
Status: DISCONTINUED | OUTPATIENT
Start: 2024-05-03 | End: 2024-05-03 | Stop reason: HOSPADM

## 2024-05-03 RX ORDER — LIDOCAINE HYDROCHLORIDE 10 MG/ML
INJECTION, SOLUTION EPIDURAL; INFILTRATION; INTRACAUDAL; PERINEURAL AS NEEDED
Status: DISCONTINUED | OUTPATIENT
Start: 2024-05-03 | End: 2024-05-03

## 2024-05-03 RX ORDER — MIDAZOLAM HYDROCHLORIDE 2 MG/2ML
INJECTION, SOLUTION INTRAMUSCULAR; INTRAVENOUS AS NEEDED
Status: DISCONTINUED | OUTPATIENT
Start: 2024-05-03 | End: 2024-05-03

## 2024-05-03 RX ORDER — OXYCODONE HYDROCHLORIDE 5 MG/1
5 TABLET ORAL EVERY 4 HOURS PRN
Status: DISCONTINUED | OUTPATIENT
Start: 2024-05-03 | End: 2024-05-03 | Stop reason: HOSPADM

## 2024-05-03 RX ORDER — SODIUM CHLORIDE, SODIUM LACTATE, POTASSIUM CHLORIDE, CALCIUM CHLORIDE 600; 310; 30; 20 MG/100ML; MG/100ML; MG/100ML; MG/100ML
INJECTION, SOLUTION INTRAVENOUS CONTINUOUS PRN
Status: DISCONTINUED | OUTPATIENT
Start: 2024-05-03 | End: 2024-05-03

## 2024-05-03 RX ORDER — ALBUTEROL SULFATE 2.5 MG/3ML
2.5 SOLUTION RESPIRATORY (INHALATION) ONCE
Status: COMPLETED | OUTPATIENT
Start: 2024-05-03 | End: 2024-05-03

## 2024-05-03 RX ORDER — PROPOFOL 10 MG/ML
INJECTION, EMULSION INTRAVENOUS CONTINUOUS PRN
Status: DISCONTINUED | OUTPATIENT
Start: 2024-05-03 | End: 2024-05-03

## 2024-05-03 RX ADMIN — MIDAZOLAM 2 MG: 1 INJECTION INTRAMUSCULAR; INTRAVENOUS at 08:48

## 2024-05-03 RX ADMIN — DIPHENHYDRAMINE HYDROCHLORIDE 25 MG: 25 TABLET ORAL at 08:05

## 2024-05-03 RX ADMIN — FAMOTIDINE 40 MG: 20 TABLET, FILM COATED ORAL at 08:11

## 2024-05-03 RX ADMIN — Medication 100 MCG: at 09:40

## 2024-05-03 RX ADMIN — Medication 100 MCG: at 09:31

## 2024-05-03 RX ADMIN — PROPOFOL 140 MCG/KG/MIN: 10 INJECTION, EMULSION INTRAVENOUS at 08:54

## 2024-05-03 RX ADMIN — Medication 200 MCG: at 09:21

## 2024-05-03 RX ADMIN — SODIUM CHLORIDE, SODIUM LACTATE, POTASSIUM CHLORIDE, AND CALCIUM CHLORIDE: .6; .31; .03; .02 INJECTION, SOLUTION INTRAVENOUS at 08:48

## 2024-05-03 RX ADMIN — BUPIVACAINE 20 ML: 13.3 INJECTION, SUSPENSION, LIPOSOMAL INFILTRATION at 08:22

## 2024-05-03 RX ADMIN — PROPOFOL 150 MG: 10 INJECTION, EMULSION INTRAVENOUS at 08:51

## 2024-05-03 RX ADMIN — SODIUM CHLORIDE, SODIUM LACTATE, POTASSIUM CHLORIDE, AND CALCIUM CHLORIDE: .6; .31; .03; .02 INJECTION, SOLUTION INTRAVENOUS at 09:23

## 2024-05-03 RX ADMIN — ONDANSETRON 4 MG: 2 INJECTION INTRAMUSCULAR; INTRAVENOUS at 08:51

## 2024-05-03 RX ADMIN — LIDOCAINE HYDROCHLORIDE 50 MG: 10 INJECTION, SOLUTION EPIDURAL; INFILTRATION; INTRACAUDAL; PERINEURAL at 08:51

## 2024-05-03 RX ADMIN — ALBUTEROL SULFATE 2.5 MG: 2.5 SOLUTION RESPIRATORY (INHALATION) at 09:52

## 2024-05-03 RX ADMIN — CEFAZOLIN SODIUM 2000 MG: 2 SOLUTION INTRAVENOUS at 08:59

## 2024-05-03 NOTE — ANESTHESIA PROCEDURE NOTES
Peripheral Block    Patient location during procedure: holding area  Reason for block: at surgeon's request and post-op pain management  Staffing  Performed by: David Sanford MD  Authorized by: David Sanford MD    Preanesthetic Checklist  Completed: patient identified, IV checked, site marked, risks and benefits discussed, surgical consent, monitors and equipment checked, pre-op evaluation and timeout performed  Peripheral Block  Prep: ChloraPrep  Patient monitoring: frequent blood pressure checks, continuous pulse oximetry and heart rate  Block type: Interscalene  Laterality: left  Injection technique: single-shot  Procedures: ultrasound guided, Ultrasound guidance required for the procedure to increase accuracy and safety of medication placement and decrease risk of complications.  Ultrasound permanent image saved  bupivacaine liposomal (EXPAREL) 1.3 % injection 20 mL - Perineural   20 mL - 5/3/2024 8:22:00 AM  Needle  Needle type: Stimuplex   Needle localization: anatomical landmarks and ultrasound guidance  Assessment  Injection assessment: incremental injection, frequent aspiration, injected with ease, negative aspiration, negative for heart rate change, no paresthesia on injection, no symptoms of intraneural/intravenous injection and needle tip visualized at all times  Paresthesia pain: none  Post-procedure:  site cleaned  patient tolerated the procedure well with no immediate complications

## 2024-05-03 NOTE — INTERVAL H&P NOTE
H&P reviewed. After examining the patient I find no changes in the patients condition since the H&P had been written.    Vitals:    05/03/24 0808   BP: 153/71   Pulse: 80   Resp: 16   Temp: (!) 97.3 °F (36.3 °C)   SpO2: 99%

## 2024-05-03 NOTE — OP NOTE
OPERATIVE REPORT  PATIENT NAME: Teresa Mao    :  1955  MRN: [id number]  Pt Location: [facility] OR ROOM 06    SURGERY DATE: 5/3/2024     SURGEON: Ilya Munoz MD     ASSISTANT: Yesenia Pacheco PA-C     NOTE: Yesenia Pacheco PA-C was present throughout the entire procedure and performed essential assistance with patient prepping, draping, positioning, suture management, wound closure, sterile dressing application and sling application, all under my direct supervision.     RESIDENT ASSITANT: Hugo Hernandez MD PGY-2 Assisted in the procedure.  I, Ilya Munoz MD, was present for the entire procedure and was scrubbed for and performed all the key and essential components of the procedure.     PREOPERATIVE DIAGNOSIS:  Left Shoulder Supraspinatus Tear    POSTOPERATIVE DIAGNOSIS: Same    PROCEDURES: Surgical Arthroscopy Left Shoulder with Rotator Cuff Repair and Subacromial Decompression    ANESTHESIA STAFF:  David Sanford MD      ANESTHESIA TYPE: General LMA with ultrasound guided interscalene block (Exparel). The interscalene block was provided by the anesthesia staff per my request for postoperative pain control and to decrease the use of postoperative narcotic medication for pain control.    COMPLICATIONS: None    FINDINGS: Supraspinatus Tear    SPECIMEN(S):  None    ESTIMATED BLOOD LOSS: Minimal    INDICATION:  Briefly, the patient is a 68 y.o.  female with left shoulder pain following an acute injury. MRI scan confirmed a full thickness supraspinatus tear. The patient elected for arthroscopic treatment. Informed consent was obtained after a thorough discussion of the risks and benefits of the procedure, as well as alternatives to the procedure.     OPERATIVE TECHNIQUE:  On the day of surgery, I identified the patient’s left shoulder and marked it with my initials. The patient was taken to the operating room where anesthesia was induced and 2 grams of IV Cefazolin were given. The  patient was examined in the supine position and was found to have full range of motion of the left shoulder with no instability . The patient was then positioned in the semilateral [facility] chair position. All bony prominences were padded. The shoulder was prepped and draped in normal sterile fashion. After a time-out for safety, a standard posterolateral arthroscopic portal was made. Glenohumeral evaluation revealed intact glenohumeral articular cartilage with some early, age-related degenerative change and no loose bodies. There was a full thickness supraspinatus tear.  This tear did involve the posterior half of the supraspinatus insertion, it did not extend into the infraspinatus and did not disrupt the bicipital pulley hence the long head biceps was stable.  There was a mild degenerative change the glenoid labrum and the infraspinatus and subscapularis were intact.  After the intra-articular evaluation was completed, the scope was then placed in the subacromial space through the same portal where a thorough bursectomy was performed. The full thickness supraspinatus tear was found to measure 0.9 cm from anterior to posterior and was able to be easily reduced to the tuberosity.  The tuberosity was prepared in routine fashion and a double row suture bridge configuration repair with one medial 2.6 mm double loaded Arthrex All-Suture anchor and two lateral 4.75 mm Arthrex BioComposite SwiveLock anchors using four stiches through the tendon in horizontal mattress fashion was performed achieving anatomic reduction of the rotator cuff tendon to the tuberosity. The long head of biceps was not indicated for tenodesis given intra-operative appearance. The CA ligament was frayed so it was released off the anterolateral edge of acromion and a gentle acromioplasty was performed. The area was then irrigated. Scope was withdrawn. Wounds were closed with 4-0 Monocryl and Histoacryl. Sterile dressings and a sling with an  abduction pillow was placed. The patient was awoken without complication and returned to the recovery room in good condition. We will see the patient back in the office next week to initiate therapy following standard rotator cuff repair rehabilitation protocol. At the end of procedure, the counts were correct.     PATIENT DISPOSITION:  Stable to PACU      SIGNATURE: Ilya Munoz MD  DATE: May 3, 2024  TIME: 9:41 AM

## 2024-05-03 NOTE — ANESTHESIA PREPROCEDURE EVALUATION
"\"68 y.o. female presents today with a chief complaint of left shoulder pain.   The pain began 2 month(s) ago and is associated with an acute injury.  Patient reports that she was lifting heavy bags of groceries in February and felt a \"sharp\" pain about the shoulder. The patient describes the pain as aching and sharp in intensity,  intermittent, occurring with increasing frequency, awakening patient from sleep in timing, and localizes the pain to the  left subacromial joint, deltoid.  The pain is worse with movement, overuse, and raising arm over head and relieved by rest, ice, avoiding the painful activities.  The pain is not associated with numbness and tingling.  The pain is not associated with constitutional symptoms. The patient is awoken at night by the pain.    The patient has had prior treatment for her left shoulder in the form of PT/HEP without benefit.      She has a history of a right arthroscopic rotator cuff tear in 2022 with success.\"    Procedure:  SHOULDER ARTHROSCOPIC ROTATOR CUFF REPAIR (Left: Shoulder)    Relevant Problems   CARDIO   (+) Hyperlipidemia   (+) LBBB (left bundle branch block)   (+) Migraine without aura and without status migrainosus, not intractable   (+) Mitral valve prolapse      ENDO   (+) Hypothyroidism      GI/HEPATIC   (+) Gastroesophageal reflux disease without esophagitis      /RENAL   (+) Stage 3a chronic kidney disease (HCC)      MUSCULOSKELETAL   (+) Cervical spondylosis   (+) Chronic bilateral low back pain with bilateral sciatica   (+) Degeneration of intervertebral disc   (+) Fibromyalgia   (+) Lumbar spondylosis   (+) Myofascial pain syndrome   (+) Osteoarthritis   (+) Scoliosis (and kyphoscoliosis), idiopathic      NEURO/PSYCH   (+) Anxiety and depression   (+) Chronic bilateral low back pain with bilateral sciatica   (+) Chronic pain disorder   (+) Fibromyalgia   (+) Migraine without aura and without status migrainosus, not intractable   (+) Myofascial pain " syndrome      PULMONARY   (+) Asthma   (+) Moderate persistent asthma   (+) Obstructive sleep apnea syndrome   Left Ventricle   The left ventricle is normal in size. There is mildly increased left ventricular wall thickness consistent with mild concentric hypertrophy. There is global hypokinesis with minimal segmental variation. The left ventricular ejection fraction by biplane method of discs is 44%. Mildly decreased left ventricular ejection fraction. The average global longitudinal peak systolic strain is reduced. The average global longitudinal peak systolic strain is 11.6 % (absolute value). There is an impaired relaxation mitral inflow pattern.     Right Ventricle   The right ventricle is normal in size. There is normal function of the right ventricle.     Left Atrium   The left atrium is normal in size.     Right Atrium   The right atrium is normal in size.     IVC/SVC   The inferior vena cava is normal in size (diameter <21 mm) and decreases >50% in size with inspiration, suggesting a normal right atrial pressure of 3 mmHg (range 0-5 mm Hg).     Mitral Valve   The mitral valve is normal in structure. There is no mitral stenosis. There is trace mitral regurgitation.     Tricuspid Valve   The tricuspid valve is normal in structure. There is trace tricuspid regurgitation. The pulmonary artery systolic pressure is unable to be estimated due to an incomplete tricuspid regurgitation envelope.     Aortic Valve   Aortic valve not well visualized. There is no aortic stenosis. There is no aortic regurgitation.     Pulmonic Valve   The pulmonic valve was not well visualized. There is no pulmonic valve regurgitation.     Pericardium   No pericardial effusion.     Septum   No obvious interatrial communication noted with color Doppler imaging.     Aortic Arch/Descending/Abdominal Aorta   The aortic root is normal in size.      Physical Exam    Airway    Mallampati score: II  TM Distance: >3 FB  Neck ROM: full     Dental    No notable dental hx     Cardiovascular  Rhythm: regular, Rate: normal, No weak pulses    Pulmonary   No stridor    Other Findings  post-pubertal.      Anesthesia Plan  ASA Score- 3     Anesthesia Type- general with ASA Monitors.         Additional Monitors:     Airway Plan: LMA.           Plan Factors-    Chart reviewed. EKG reviewed.  Existing labs reviewed. Patient summary reviewed.                  Induction- intravenous.    Postoperative Plan- Plan for postoperative opioid use. Planned trial extubation    Informed Consent- Anesthetic plan and risks discussed with patient.  I personally reviewed this patient with the CRNA. Discussed and agreed on the Anesthesia Plan with the CRNA..

## 2024-05-06 ENCOUNTER — OFFICE VISIT (OUTPATIENT)
Dept: OBGYN CLINIC | Facility: OTHER | Age: 69
End: 2024-05-06

## 2024-05-06 VITALS
HEART RATE: 76 BPM | HEIGHT: 61 IN | SYSTOLIC BLOOD PRESSURE: 160 MMHG | WEIGHT: 132 LBS | DIASTOLIC BLOOD PRESSURE: 83 MMHG | BODY MASS INDEX: 24.92 KG/M2

## 2024-05-06 DIAGNOSIS — Z98.890 S/P SHOULDER SURGERY: Primary | ICD-10-CM

## 2024-05-06 PROCEDURE — 99024 POSTOP FOLLOW-UP VISIT: CPT | Performed by: PHYSICIAN ASSISTANT

## 2024-05-06 RX ORDER — SODIUM CHLORIDE 9 MG/ML
250 INJECTION, SOLUTION INTRAVENOUS ONCE
Status: COMPLETED | OUTPATIENT
Start: 2024-05-08 | End: 2024-05-08

## 2024-05-06 NOTE — PATIENT INSTRUCTIONS
Rotator Cuff Repair Rehabilitation Protocol  (adapted from Milana OLMOS et al. “Rehabilitation of the Rotator Cuff: An Evaluation Based Approach”. JAAOS 2006; 14:599-609)  Updated 10.14  Ilya Munoz MD  [facility]  [address]  [place] [id number]  [phone]  Phase 1 (Weeks 0-6)   Immediate Postoperative Period   Goals:    Diminish Pain and Inflammation  Maintain and Protect Integrity of the Repair    Gradually Increase Passive ROM (NO Active or Active Assist until Week 6)    Become Independent with Modified ADLs   Precautions:  Maintain Arm in Abduction Sling, Remove Only for Directed Exercises (may remove Abduction Pillow after Day 21 for comfort)    Keep Incisions Clean & Dry (okay to shower in 48 hours, band-aids over incisions)  No Immersion (pool) until Wounds Totally Sealed (usually not prior to day #10)  Passive Shoulder Motion ONLY, No Lifting/Holding Objects, Reaching Behind Back  Okay to Type/Write at Desktop with Arm in Sling   Day 0-6    Elbow, Wrist, Hand AROM Exercises     Start Cervical AROM and Scapula Isometrics    Cryotherapy/Ice for Pain and Inflammation    Instruct in Hygiene, Posture, and Positioning    Day 7-28    Continue Above  May Start Pendulum Exercises    May Start Supine, Pain Free, PT assisted PROM  Forward Flexion to 90°, External Rotation to 35° (Elbow at side), Internal Rotation to Body, Abduction to 60°    Can Introduce light Cardio (Walking, Stationary Bike)    Aqua Therapy may begin at week 3 (day 21) as long as no wound problems   Day 29-42    Continue Above  Progress PROM to Goal of full PROM by Week 6.  May add Gentle Mobilizations (GH and Scapulothoracic) to Regain full PROM if Needed.  May add Heat prior to PROM Exercises, Ice after Exercises  May Begin AAROM at Day 29 if ROM is Appropriate in Anticipation of AROM Starting at Week 6   Criteria to Progress to Phase 2    Reasonable Passive Forward Flexion, Abduction ,  IR/ER    Time  Phase 2 (Weeks 6-12)   Protection and Active Motion   Goals of Phase:    Allow Healing of Soft Tissues    Decrease Pain and Inflammation  Add ADLs and Regain AROM by End of Phase   Precautions:    NO STRENGTHENING until Phase 3    Repair is Most Prone to Failure during this Phase!    No Lifting Objects > 2 lbs (Coffee Cup OK), no Sudden Motions    Avoid Upper Extremity Bike and Ergometer   Day 43-56    Discontinue Sling  Initiate AROM Exercises (forward flexion, ER, IR and abduction), Rotator Cuff Isometrics    Continue Periscapular Exercises, add Stretching if PROM Lacking   No Strengthening until Week 12 (Minimum Time Needed for Cuff Healing Sufficient to withstand Strengthening)     Phase 3 (Weeks 12-16)   Early Strengthening   Goal of Phase:    Gain full AROM, Maintain PROM    Gradual return of Shoulder Strength, Power and Endurance    Gradual return to Functional Activities   Precautions:    No Lifting > 10lbs, Sudden Lifting or Pushing activities, Overhead Lifting    No Upper Extremity Bike or Ergometer   Week 12    Initiate Strengthening Program (10 lb Maximum until Phase 4)      ER/IR with Bands (Standing)      ER in Lateral Decubitius Position      Lateral Raises      Full Can in Scapular Plane      Prone Rowing, Horizontal Abduction, Extension      Elbow Flexion/Extension   Week 14-16    Initiate light Functional Activities as Permitted  Progress to Fundamental Shoulder Exercises  Phase 4 (Variable but Weeks 16-24)   Aggressive Rehab  Sport Specific or Activity Specific    Goals of Phase:    Maintain Full Pain-free AROM    Advanced Conditioning Exercises for enhanced Functional use    Continue regaining Shoulder Strength, Power and Endurance    Eventual return to full Functional Activities   Precautions:    None   Week 16    Continue ROM and stretching if appropriate    Progress Strengthening, Proprioceptive and Neuromuscular Training    Light Sports if Progressing Well (Chipping/Putting,  easy ground strokes etc.)   Week 20    Continue Strengthening and Stretching    Initiate Interval Sports Program as Appropriate    Note:   This is a general program which may be modified based on intra-operative findings, additional procedures preformed, repair stability, and patient biological factors.  If in doubt, please check with my office for individual patient specifics.  The ultimate goal of the surgery and rehabilitation is to get the rotator cuff to heal with the least residual functional deficit due to stiffness.  That being said, I would rather have a stiff shoulder with a healed rotator cuff repair, than a loose shoulder with a failed repair!  In 8 weeks with Dr. Fam or team

## 2024-05-06 NOTE — PROGRESS NOTES
Orthopaedic Surgery - Office Note  Teresa Mao (68 y.o. female)   : 1955   MRN: [id number]  Encounter Date: 2024    Chief Complaint   Patient presents with    Left Shoulder - Post-op         Assessment/Plan  Diagnoses and all orders for this visit:    S/P shoulder surgery-left shoulder arthroscopic subacromial decompression and rotator cuff repair on 5/3/2024    Patient will continue to follow all postoperative instructions.  Intraoperative pictures were reviewed and provided to the patient in the office today.  Patient will begin physical therapy following the standard rotator cuff protocol.  Patient will maintain the sling until 6 weeks postoperative.  Patient will call with any questions or concerns.  Skin care precautions were reviewed anesthesia notes were reviewed with the patient     Her postop anesthesia notes were reviewed indicating coughing which was treated with albuterol.         Return for recheck In 8 weeks with Dr. Munoz or team.        History of Present Illness  Patient is here for postop visit after left shoulder arthroscopic subacromial decompression and rotator cuff repair on 5/3/2024.  Pain is controlled and patient denies any postoperative complications.  She has physical therapy scheduled.  Patient has 50 listed allergies and does make note she was coughing after anesthesia and received treatment.  She was questioning what was done..    Review of Systems  Pertinent items are noted in HPI.  All other systems were reviewed and are negative.    Physical Exam  There were no vitals taken for this visit.  Cons: Appears well.  No apparent distress.  Psych: Alert. Oriented x3.  Mood and affect normal.    Left shoulder incisions are healed well there are no signs of infection.  Patient is neurovascular grossly intact in the left upper extremity.  She has active wrist extension and full range of motion of all digits.            Studies Reviewed  Operative report was reviewed by  "myself today in the office    Procedures  No procedures today.    Medical, Surgical, Family, and Social History  The patient's medical history, family history, and social history, were reviewed and updated as appropriate.    Past Medical History:   Diagnosis Date    Allergic rhinitis     Anxiety     Asthma     Back pain     Cardiac disease     Cardiopathy     EF 45%    Cough     Diverticulitis     Factor V Leiden (HCC)     Fibromyalgia     GERD (gastroesophageal reflux disease)     Hashimoto's thyroiditis     Hx of degenerative disc disease     Hypertension     Hypotension     pots - postural orthostatic hypotension    Interstitial cystitis     Irregular heart beat     LBBB    Irritable bowel syndrome     Migraines     Mitral valve disease     \"thickening\"    Myocardial infarction (HCC)     possible but not sure when    Neuropathy     bilateral legs    Osteoporosis     Postural orthostatic tachycardia syndrome     must drink a lot of water and salt    Pott's disease     Rheumatic fever 1967    Scoliosis     Sepsis (HCC)     associated with PICC line    Sjogren's syndrome (HCC)     TMJ (dislocation of temporomandibular joint)        Past Surgical History:   Procedure Laterality Date    ABLATION MICROWAVE Left     lumbar area    BACK SURGERY  10/1998     SECTION  1992    CHOLECYSTECTOMY  2016    COLONOSCOPY      DILATION AND CURETTAGE OF UTERUS      EGD  2016    FOOT SURGERY  1968    removal of bone and neuroma    HYSTERECTOMY N/A     age 39    IR OTHER  2022    IR OTHER  2022    IR OTHER  2023    IR OTHER  2024    IR PICC PLACEMENT SINGLE LUMEN  10/02/2020    IR PICC PLACEMENT SINGLE LUMEN  2021    IR PICC REPOSITION  2021    IR TUNNELED CENTRAL LINE PLACEMENT  2022    LAPAROSCOPY      endometriosis    MOUTH BIOPSY      lip    OOPHORECTOMY Bilateral     age 39    MO EGD TRANSORAL BIOPSY SINGLE/MULTIPLE N/A 2019    Procedure: " ESOPHAGOGASTRODUODENOSCOPY (EGD) with multiple bx and dilation;  Surgeon: Peter Baldwin MD;  Location:  GI LAB;  Service: Gastroenterology    WI SURGICAL ARTHROSCOPY SHOULDER W/ROTATOR CUFF RPR Right 04/06/2022    Procedure: SHOULDER ARTHROSCOPIC ROTATOR CUFF REPAIR Biceps Tenodisis;  Surgeon: Ilya Munoz MD;  Location: [facility];  Service: Orthopedics    TUNNELED VENOUS CATHETER PLACEMENT  2016       Family History   Problem Relation Age of Onset    Cancer Mother         bladder    No Known Problems Father     Thyroid cancer Sister     Heart attack Sister     No Known Problems Sister     No Known Problems Sister     No Known Problems Sister     No Known Problems Sister     No Known Problems Daughter     No Known Problems Daughter     No Known Problems Maternal Grandmother     Colon cancer Maternal Grandfather     No Known Problems Paternal Grandmother     No Known Problems Paternal Grandfather     Breast cancer Maternal Aunt         over age 50    Endometrial cancer Maternal Aunt     Multiple myeloma Maternal Aunt     Hypertension Paternal Aunt     Brain cancer Niece     Lymphoma Niece     Breast cancer additional onset Neg Hx     BRCA2 Positive Neg Hx     Ovarian cancer Neg Hx     BRCA2 Negative Neg Hx     BRCA1 Positive Neg Hx     BRCA1 Negative Neg Hx     BRCA 1/2 Neg Hx        Social History     Occupational History    Not on file   Tobacco Use    Smoking status: Never    Smokeless tobacco: Never   Vaping Use    Vaping status: Never Used   Substance and Sexual Activity    Alcohol use: Never    Drug use: No    Sexual activity: Not on file       Allergies   Allergen Reactions    Imipramine Confusion, Fatigue, Irritability, Palpitations, Shortness Of Breath, Tachycardia and Visual Disturbance    Melatonin Shortness Of Breath    Mirtazapine Anxiety, Dizziness, GI Intolerance, Nausea Only, Palpitations and Shortness Of Breath    Nexium [Esomeprazole] Shortness Of Breath    Nsaids Shortness Of  Breath    Singulair [Montelukast] Shortness Of Breath and Cough    Zomig [Zolmitriptan] Shortness Of Breath    Dexilant [Dexlansoprazole] Nausea Only and Vomiting    Albuterol     Ambien [Zolpidem]     Amitriptyline Drowsiness    Aspirin     Bactrim [Sulfamethoxazole-Trimethoprim] Hives    Banana - Food Allergy Dermatitis    Ceftin [Cefuroxime]     Celebrex [Celecoxib]     Ciprofloxacin     Cranberry-C [Ascorbate - Food Allergy] GI Intolerance    Demerol [Meperidine]     Epinephrine Dizziness    Ergotamine Nausea Only and Headache    Keflex [Cephalexin]     Klonopin [Clonazepam] Nausea Only and Dizziness    Latex Hives    Levaquin [Levofloxacin]     Lexapro [Escitalopram]     Lyrica [Pregabalin] Fatigue    Macrodantin [Nitrofurantoin]     Morphine Nausea Only    Movantik [Naloxegol] Nausea Only    Mushroom Extract Complex - Food Allergy      Eye itchy, asthma  attack    Naprosyn [Naproxen]     Neurontin [Gabapentin] Dizziness    Pineapple - Food Allergy GI Intolerance    Plecanatide Abdominal Pain, Diarrhea and GI Intolerance    Prolia [Denosumab]     Prozac [Fluoxetine]     Serevent [Salmeterol] Dizziness    Sudafed [Pseudoephedrine] Hives    Sulfa Antibiotics     Tomato - Food Allergy GI Intolerance    Trazodone     Ultram [Tramadol] Nausea Only, Dizziness and Headache    Vilazodone Dizziness, GI Intolerance and Headache    Vilazodone Hcl Abdominal Pain, Dizziness, GI Intolerance, Headache and Other (See Comments)    Vioxx [Rofecoxib]     Vortioxetine Drowsiness, Fatigue, GI Intolerance and Irritability    Zithromax [Azithromycin] Hives    Zoloft [Sertraline]     Zantac [Ranitidine] Rash and Dizziness         Current Outpatient Medications:     Alum Hydroxide-Mag Trisilicate (Gaviscon) 80-14.2 MG CHEW, Chew 1 tablet 4 (four) times a day as needed With meals and as needed, Disp: , Rfl:     azelastine (ASTELIN) 0.1 % nasal spray, 2 sprays into each nostril 2 (two) times a day Use in each nostril as directed, Disp: ,  Rfl:     beclomethasone (QVAR) 40 MCG/ACT inhaler, Inhale 1 puff daily as needed (only when unable to nebulize pulmicort) Rinse mouth after use., Disp: , Rfl:     budesonide (PULMICORT) 0.5 mg/2 mL nebulizer solution, Take 0.5 mg by nebulization 2 (two) times a day Rinse mouth after use., Disp: , Rfl:     cromolyn (GASTROCROM) 100 MG/5ML solution, Take 100 mg by mouth 4 (four) times a day (before meals and at bedtime), Disp: , Rfl:     cyclobenzaprine (FLEXERIL) 5 mg tablet, TAKE 1 TABLET BY MOUTH THREE TIMES A DAY AS NEEDED FOR MUSCLE SPASMS, Disp: 90 tablet, Rfl: 1    docusate sodium (COLACE) 100 mg capsule, Take 100 mg by mouth daily as needed for constipation, Disp: , Rfl:     erythromycin (ILOTYCIN) ophthalmic ointment, Apply 1 application to eye daily at bedtime, Disp: , Rfl:     famotidine (PEPCID) 40 MG tablet, Twice a day, Disp: , Rfl:     fexofenadine (ALLEGRA) 180 MG tablet, Take 180 mg by mouth 2 (two) times a day , Disp: , Rfl:     fluticasone (FLONASE) 50 mcg/act nasal spray, 2 sprays into each nostril daily , Disp: , Rfl:     Galcanezumab-gnlm (Emgality) 120 MG/ML SOAJ, Inject under the skin every 30 (thirty) days , Disp: , Rfl:     guaiFENesin (Mucinex) 600 mg 12 hr tablet, Take 600 mg by mouth every 12 (twelve) hours, Disp: , Rfl:     Heating Pad PADS, Use if needed (pain), Disp: , Rfl:     [START ON 5/16/2024] HYDROcodone-acetaminophen (NORCO) 5-325 mg per tablet, Take 1 tablet by mouth 2 (two) times a day as needed for pain Max Daily Amount: 2 tablets Do not start before May 16, 2024., Disp: 60 tablet, Rfl: 0    [START ON 6/13/2024] HYDROcodone-acetaminophen (Norco) 5-325 mg per tablet, Take 1 tablet by mouth 2 (two) times a day as needed for pain for up to 60 doses Max Daily Amount: 2 tablets Do not start before June 13, 2024., Disp: 60 tablet, Rfl: 0    ipratropium (ATROVENT) 0.02 % nebulizer solution, Take 0.5 mg by nebulization every 6 (six) hours as needed for wheezing or shortness of breath,  Disp: , Rfl:     ipratropium (ATROVENT) 0.06 % nasal spray, PLEASE SEE ATTACHED FOR DETAILED DIRECTIONS, Disp: , Rfl:     ivabradine HCl (Corlanor) 5 MG tablet, 7.5 mg 2 (two) times a day, Disp: , Rfl:     levalbuterol (XOPENEX HFA) 45 mcg/act inhaler, Inhale 2 puffs every 4 (four) hours as needed (when unable to nebulize), Disp: , Rfl:     levalbuterol (XOPENEX) 1.25 mg/3 mL nebulizer solution, Take 1.25 mg by nebulization every 6 (six) hours as needed , Disp: , Rfl: 2    Magnesium 400 MG CAPS, Take 1 capsule by mouth 2 (two) times a day , Disp: , Rfl:     MULTIPLE VITAMINS-CALCIUM PO, Take 1 capsule by mouth every morning , Disp: , Rfl:     naloxone (NARCAN) 4 mg/0.1 mL nasal spray, Administer 1 spray into a nostril. If no response after 2-3 minutes, give another dose in the other nostril using a new spray., Disp: 1 each, Rfl: 0    NON FORMULARY, Take 1 mg by mouth 3 (three) times a day Ketotifen 1 mg compounded capsule, Disp: , Rfl:     omega-3-acid ethyl esters (LOVAZA) 1 g capsule, Take 2 g by mouth 2 (two) times a day 1600mg daily, Disp: , Rfl:     oxyCODONE (ROXICODONE) 5 immediate release tablet, 1 tablets every 6 hours as needed for severe shoulder pain only., Disp: 13 tablet, Rfl: 0    polyethylene glycol (MIRALAX) 17 g packet, Take 17 g by mouth daily as needed , Disp: , Rfl:     Probiotic Product (PROBIOTIC DAILY PO), Take 1 tablet by mouth daily At lunch, Disp: , Rfl:     Qvar RediHaler 40 MCG/ACT inhaler, , Disp: , Rfl:     sodium chloride, Inject 1,500 mL into a catheter in a vein, Disp: , Rfl:     sucralfate (CARAFATE) 1 g/10 mL suspension, Take 1 g by mouth 2 (two) times a day, Disp: , Rfl:     Thyroid, Porcine, POWD, Use 32 mg daily, Disp: , Rfl:     Ubrogepant (UBRELVY) 100 MG tablet, Take 100 mg by mouth Take 1 tablet (100 mg) one time as needed for migraine. May repeat one additional tablet (100 mg) at least two hours after the first dose. Do not use more than two doses per day, or for more than  eight days per month., Disp: , Rfl:     verapamil (CALAN) 40 mg tablet, , Disp: , Rfl:   No current facility-administered medications for this visit.    Facility-Administered Medications Ordered in Other Visits:     [START ON 5/7/2024] sodium chloride 0.9 % bolus 1,500 mL, 1,500 mL, Intravenous, Once, MD Iraj Small PA-C

## 2024-05-06 NOTE — PROGRESS NOTES
"PT Evaluation     Today's date: 2024  Patient name: Teresa Mao  : 1955  MRN: [id number]  Referring provider: Ilya Munoz*  Dx:   Encounter Diagnosis     ICD-10-CM    1. Tear of left supraspinatus tendon  M75.102       2. Status post left rotator cuff repair  Z98.890                      Assessment  Assessment details: Teresa Mao is a 68 y.o. female presenting to outpatient physical therapy with noted impairments including pain, impaired soft tissue mobility, reduced range of motion, reduced strength, reduced postural awareness, and reduced activity tolerance. Signs and symptoms at present are consistent with referring diagnosis of rotator cuff repair L shld. Due to noted impairments, the patient's present functional limitations include difficulty with ADLs with increased need for assistance, reliance on medication and/or modalities for pain relief, reduced tolerance for functional mobility and activity, and difficulty completing HH and self care responsibilities. Patient to benefit from skilled outpatient physical therapy 2x/week for 12 weeks in order to reduce pain, maximize pain free range of motion, increase strength and stability, and improve functional mobility/functional activity in order to maximize return to prior level of function with reduced limitations. Home exercise program was provided and all questions answered to patient's level of satisfaction. Thank you for your referral.        Impairments: abnormal or restricted ROM, abnormal movement, activity intolerance, impaired physical strength, lacks appropriate home exercise program and pain with function  Barriers to therapy: transportation  Understanding of Dx/Px/POC: good   Prognosis: good    Goals  STGs to be achieved in 4 weeks:  1. Pt to demonstrate reduced subjective pain rating \"at worst\" by at least 2-3 points from Initial Eval in order to allow for reduced pain with ADLs and improved functional activity " tolerance.   2. Pt to demonstrate increased PROM of L shld  by at least 5-10 degrees in order to allow for greater ease and independence with ADLs and functional mobility.   3. Pt to demonstrate increased strength of L wrist and   in order to improve safety and stability with ADLs and functional mobility.     LTGs to be achieved in 6-8 weeks:  1. Pt will be I with HEP in order to continue to improve quality of life and independence and reduce risk for re-injury.   2. Pt to demonstrate return to indep self care and ADL's without limitations or restrictions.   3. Pt to demonstrate improved function as noted by achieving or exceeding predicted score on FOTO outcomes assessment tool.   4. Pt to demonstrate increased MMT of L shld by at least 1/2-1 grade in order to improve safety and stability with ADLs and functional mobility.         Plan  Patient would benefit from: skilled physical therapy  Other planned modality interventions: Modalities prn for symptom management  Planned therapy interventions: manual therapy, neuromuscular re-education, therapeutic activities, therapeutic exercise, strengthening, stretching and home exercise program  Frequency: 2x week  Duration in weeks: 12  Plan of Care beginning date: 5/7/2024  Plan of Care expiration date: 7/30/2024  Treatment plan discussed with: PTA and patient      Subjective Evaluation    History of Present Illness  Mechanism of injury: Pt underwent L rot cuff repair on Fri. May 3rd with Dr Munoz. Notes her nerve block wore off Sunday and she is in quite a bit of pain. Pt wearing sling and swathe and removed surgical dressing Sat. Was seen in the office yesterday and was told everything looks good. Has an aide coming in 2x/week to assist with bathing and HH chores.  Pt unable to lay in bed, cannot bathe indep, diff with HH chores, diff dressing, unable to drive, diff with most activities.          Not a recurrent problem   Quality of life: poor    Patient  Goals  Patient goals for therapy: decreased pain, increased motion, increased strength and independence with ADLs/IADLs  Patient goal: return to driving, cooking  Pain  Current pain ratin  At best pain ratin  At worst pain ratin  Quality: sharp, pressure and tight  Relieving factors: ice and medications  Exacerbated by: holding upper trap tight, reaching, movement.  Progression: improved    Social Support  Lives with: alone    Employment status: not working  Hand dominance: left    Treatments  Previous treatment: physical therapy, medication, injection treatment and home therapy  Current treatment: medication and physical therapy        Objective     Postural Observations  Seated posture: good  Standing posture: good      Observations   Left Shoulder   Positive for edema and incision. Negative for drainage.     Cervical/Thoracic Screen   Cervical range of motion within normal limits with the following exceptions: Sling is putting pressure on jugular catheter  AROM WFLs    Active Range of Motion     Additional Active Range of Motion Details  No AROM as per protocol    Passive Range of Motion   Left Shoulder   Flexion: 90 degrees   Abduction: 80 degrees   External rotation 0°: 5 degrees   Internal rotation 0°: Left shoulder passive internal rotation at 0 degrees: to body.     Strength/Myotome Testing     Additional Strength Details  Unable to assess at this time.             Precautions: see extensive PMH, ABRAMS,     FOLLOW PROTOCOL**, sling to be worn 6 weeks.    Re-eval Date: 24    Date        Visit Count 1       FOTO Comp        Pain In See eval       Pain Out            Manuals        PROM LUE seated                               Neuro Re-Ed                                                                Ther Ex        ultigrip Yellow 30x       Ball squeeze Green  30x       Wrist AROM 20x ea dir       L elbow ROM AAROM L elbow 20x       Pendulum ex 20x ea dir                                                                        Ther Activity                        Gait Training                        Modalities

## 2024-05-07 ENCOUNTER — EVALUATION (OUTPATIENT)
Dept: PHYSICAL THERAPY | Facility: CLINIC | Age: 69
End: 2024-05-07
Payer: MEDICARE

## 2024-05-07 ENCOUNTER — HOSPITAL ENCOUNTER (OUTPATIENT)
Dept: INFUSION CENTER | Facility: HOSPITAL | Age: 69
Discharge: HOME | End: 2024-05-07
Attending: INTERNAL MEDICINE
Payer: MEDICARE

## 2024-05-07 VITALS
SYSTOLIC BLOOD PRESSURE: 143 MMHG | TEMPERATURE: 97.6 F | RESPIRATION RATE: 16 BRPM | HEART RATE: 76 BPM | DIASTOLIC BLOOD PRESSURE: 82 MMHG

## 2024-05-07 DIAGNOSIS — Z98.890 STATUS POST LEFT ROTATOR CUFF REPAIR: ICD-10-CM

## 2024-05-07 DIAGNOSIS — M75.102 TEAR OF LEFT SUPRASPINATUS TENDON: Primary | ICD-10-CM

## 2024-05-07 PROCEDURE — 97110 THERAPEUTIC EXERCISES: CPT

## 2024-05-07 PROCEDURE — 97162 PT EVAL MOD COMPLEX 30 MIN: CPT

## 2024-05-07 RX ORDER — AMOXICILLIN AND CLAVULANATE POTASSIUM 250; 125 MG/1; MG/1
1 TABLET, FILM COATED ORAL EVERY 12 HOURS SCHEDULED
COMMUNITY
Start: 2024-05-07 | End: 2024-05-12

## 2024-05-07 RX ORDER — SODIUM CHLORIDE 9 MG/ML
250 INJECTION, SOLUTION INTRAVENOUS ONCE
Status: COMPLETED | OUTPATIENT
Start: 2024-05-09 | End: 2024-05-09

## 2024-05-07 RX ADMIN — SODIUM CHLORIDE 1500 ML: 0.9 INJECTION, SOLUTION INTRAVENOUS at 08:20

## 2024-05-07 NOTE — PROGRESS NOTES
Teresa Mao  tolerated hydration treatment well with no complications.      Teresa Mao is aware of future appt on 5/8 at 8AM.     AVS printed and given to Teresa Mao: No (Declined by Teresa Mao).

## 2024-05-08 ENCOUNTER — HOSPITAL ENCOUNTER (OUTPATIENT)
Dept: INFUSION CENTER | Facility: HOSPITAL | Age: 69
Discharge: HOME | End: 2024-05-08
Attending: INTERNAL MEDICINE
Payer: MEDICARE

## 2024-05-08 VITALS
TEMPERATURE: 97.3 F | RESPIRATION RATE: 18 BRPM | DIASTOLIC BLOOD PRESSURE: 79 MMHG | HEART RATE: 80 BPM | SYSTOLIC BLOOD PRESSURE: 156 MMHG

## 2024-05-08 PROCEDURE — 96360 HYDRATION IV INFUSION INIT: CPT

## 2024-05-08 PROCEDURE — 96361 HYDRATE IV INFUSION ADD-ON: CPT

## 2024-05-08 RX ORDER — SODIUM CHLORIDE 9 MG/ML
250 INJECTION, SOLUTION INTRAVENOUS ONCE
Status: COMPLETED | OUTPATIENT
Start: 2024-05-10 | End: 2024-05-10

## 2024-05-08 RX ADMIN — SODIUM CHLORIDE 250 ML/HR: 0.9 INJECTION, SOLUTION INTRAVENOUS at 08:04

## 2024-05-08 NOTE — PROGRESS NOTES
Teresa Mao  tolerated IV hydration well with no complications.      Teresa Mao is aware of future appt tomorrow at 8AM.     AVS declined by Teresa Mao.

## 2024-05-09 ENCOUNTER — HOSPITAL ENCOUNTER (OUTPATIENT)
Dept: INFUSION CENTER | Facility: HOSPITAL | Age: 69
End: 2024-05-09
Attending: INTERNAL MEDICINE
Payer: MEDICARE

## 2024-05-09 ENCOUNTER — OFFICE VISIT (OUTPATIENT)
Dept: PHYSICAL THERAPY | Facility: CLINIC | Age: 69
End: 2024-05-09
Payer: MEDICARE

## 2024-05-09 VITALS
HEART RATE: 87 BPM | RESPIRATION RATE: 18 BRPM | TEMPERATURE: 97.2 F | DIASTOLIC BLOOD PRESSURE: 86 MMHG | SYSTOLIC BLOOD PRESSURE: 148 MMHG

## 2024-05-09 DIAGNOSIS — Z98.890 STATUS POST LEFT ROTATOR CUFF REPAIR: ICD-10-CM

## 2024-05-09 DIAGNOSIS — M75.102 TEAR OF LEFT SUPRASPINATUS TENDON: Primary | ICD-10-CM

## 2024-05-09 LAB
6MAM UR QL CFM: NEGATIVE NG/ML
7AMINOCLONAZEPAM UR QL CFM: NEGATIVE NG/ML
A-OH ALPRAZ UR QL CFM: NEGATIVE NG/ML
AMPHET UR QL CFM: NEGATIVE NG/ML
AMPHET UR QL CFM: NEGATIVE NG/ML
BUPRENORPHINE UR QL CFM: NEGATIVE NG/ML
BUTALBITAL UR QL CFM: NEGATIVE NG/ML
BZE UR QL CFM: NEGATIVE NG/ML
CODEINE UR QL CFM: NEGATIVE NG/ML
DESIPRAMINE UR QL CFM: NEGATIVE NG/ML
DESIPRAMINE UR QL CFM: NEGATIVE NG/ML
EDDP UR QL CFM: NEGATIVE NG/ML
ETHYL GLUCURONIDE UR QL CFM: NEGATIVE NG/ML
ETHYL SULFATE UR QL SCN: NEGATIVE NG/ML
EUTYLONE UR QL: NEGATIVE NG/ML
FENTANYL UR QL CFM: NEGATIVE NG/ML
GLIADIN IGG SER IA-ACNC: NEGATIVE NG/ML
GLUCOSE 30M P 50 G LAC PO SERPL-MCNC: NEGATIVE NG/ML
HYDROCODONE UR QL CFM: ABNORMAL NG/ML
HYDROCODONE UR QL CFM: ABNORMAL NG/ML
HYDROMORPHONE UR QL CFM: ABNORMAL NG/ML
IMIPRAMINE UR QL CFM: NEGATIVE NG/ML
LORAZEPAM UR QL CFM: NEGATIVE NG/ML
MDMA UR QL CFM: NEGATIVE NG/ML
ME-PHENIDATE UR QL CFM: NEGATIVE NG/ML
MEPERIDINE UR QL CFM: NEGATIVE NG/ML
METHADONE UR QL CFM: NEGATIVE NG/ML
METHAMPHET UR QL CFM: NEGATIVE NG/ML
MORPHINE UR QL CFM: NEGATIVE NG/ML
MORPHINE UR QL CFM: NEGATIVE NG/ML
NORBUPRENORPHINE UR QL CFM: NEGATIVE NG/ML
NORDIAZEPAM UR QL CFM: NEGATIVE NG/ML
NORFENTANYL UR QL CFM: NEGATIVE NG/ML
NORHYDROCODONE UR QL CFM: ABNORMAL NG/ML
NORHYDROCODONE UR QL CFM: ABNORMAL NG/ML
NORMEPERIDINE UR QL CFM: NEGATIVE NG/ML
NOROXYCODONE UR QL CFM: NEGATIVE NG/ML
OLANZAPINE QUANTIFICATION: NEGATIVE NG/ML
OPC-3373 QUANTIFICATION: NEGATIVE NG/ML
OXAZEPAM UR QL CFM: NEGATIVE NG/ML
OXYCODONE UR QL CFM: NEGATIVE NG/ML
OXYMORPHONE UR QL CFM: NEGATIVE NG/ML
OXYMORPHONE UR QL CFM: NEGATIVE NG/ML
PARA-FLUOROFENTANYL QUANTIFICATION: NORMAL NG/ML
PCP UR QL CFM: NEGATIVE NG/ML
PHENOBARB UR QL CFM: NEGATIVE NG/ML
RESULT ALL_PRESCRIBED MEDS AND SPECIAL INSTRUCTIONS: NORMAL
SECOBARBITAL UR QL CFM: NEGATIVE NG/ML
SL AMB 4-ANPP QUANTIFICATION: NORMAL NG/ML
SL AMB 5F-ADB-M7 METABOLITE QUANTIFICATION: NEGATIVE NG/ML
SL AMB 7-OH-MITRAGYNINE (KRATOM ALKALOID) QUANTIFICATION: NEGATIVE NG/ML
SL AMB AB-FUBINACA-M3 METABOLITE QUANTIFICATION: NEGATIVE NG/ML
SL AMB ACETYL FENTANYL QUANTIFICATION: NORMAL NG/ML
SL AMB ACETYL NORFENTANYL QUANTIFICATION: NORMAL NG/ML
SL AMB ACRYL FENTANYL QUANTIFICATION: NORMAL NG/ML
SL AMB CARFENTANIL QUANTIFICATION: NORMAL NG/ML
SL AMB CLOZAPINE QUANTIFICATION: NEGATIVE NG/ML
SL AMB CTHC (MARIJUANA METABOLITE) QUANTIFICATION: NEGATIVE NG/ML
SL AMB DEXTROMETHORPHAN QUANTIFICATION: NEGATIVE NG/ML
SL AMB DEXTRORPHAN (DEXTROMETHORPHAN METABOLITE) QUANT: ABNORMAL NG/ML
SL AMB DEXTRORPHAN (DEXTROMETHORPHAN METABOLITE) QUANT: ABNORMAL NG/ML
SL AMB HALOPERIDOL  QUANTIFICATION: NEGATIVE NG/ML
SL AMB HALOPERIDOL METABOLITE QUANTIFICATION: NEGATIVE NG/ML
SL AMB HYDROXYRISPERIDONE QUANTIFICATION: NEGATIVE NG/ML
SL AMB JWH018 METABOLITE QUANTIFICATION: NEGATIVE NG/ML
SL AMB JWH073 METABOLITE QUANTIFICATION: NEGATIVE NG/ML
SL AMB MDMB-FUBINACA-M1 METABOLITE QUANTIFICATION: NEGATIVE NG/ML
SL AMB METHYLONE QUANTIFICATION: NEGATIVE NG/ML
SL AMB N-DESMETHYL-TRAMADOL QUANTIFICATION: NEGATIVE NG/ML
SL AMB N-DESMETHYLCLOZAPINE QUANTIFICATION: NEGATIVE NG/ML
SL AMB NORQUETIAPINE QUANTIFICATION: NEGATIVE NG/ML
SL AMB PHENTERMINE QUANTIFICATION: NEGATIVE NG/ML
SL AMB QUETIAPINE QUANTIFICATION: NEGATIVE NG/ML
SL AMB RCS4 METABOLITE QUANTIFICATION: NEGATIVE NG/ML
SL AMB RISPERIDONE QUANTIFICATION: NEGATIVE NG/ML
SL AMB RITALINIC ACID QUANTIFICATION: NEGATIVE NG/ML
SPECIMEN DRAWN SERPL: NEGATIVE NG/ML
TAPENTADOL UR QL CFM: NEGATIVE NG/ML
TEMAZEPAM UR QL CFM: NEGATIVE NG/ML
TEMAZEPAM UR QL CFM: NEGATIVE NG/ML
TRAMADOL UR QL CFM: NEGATIVE NG/ML
URATE/CREAT 24H UR: NEGATIVE NG/ML

## 2024-05-09 PROCEDURE — 96360 HYDRATION IV INFUSION INIT: CPT

## 2024-05-09 PROCEDURE — 97110 THERAPEUTIC EXERCISES: CPT

## 2024-05-09 PROCEDURE — 96361 HYDRATE IV INFUSION ADD-ON: CPT

## 2024-05-09 RX ORDER — SODIUM CHLORIDE 9 MG/ML
250 INJECTION, SOLUTION INTRAVENOUS ONCE
Status: COMPLETED | OUTPATIENT
Start: 2024-05-13 | End: 2024-05-13

## 2024-05-09 RX ADMIN — SODIUM CHLORIDE 250 ML/HR: 0.9 INJECTION, SOLUTION INTRAVENOUS at 08:06

## 2024-05-09 NOTE — PROGRESS NOTES
"Daily Note     Today's date: 2024  Patient name: Teresa Mao  : 1955  MRN: [id number]  Referring provider: Ilya Munoz*  Dx:   Encounter Diagnosis     ICD-10-CM    1. Tear of left supraspinatus tendon  M75.102       2. Status post left rotator cuff repair  Z98.890           Start Time: 1500  Stop Time: 1548  Total time in clinic (min): 48 minutes    Subjective: \"My shoulder feels sore today but I took a pain pill before I came here at about 1 pm\"       Objective: See treatment diary below      Assessment: Tolerated treatment well. Able to complete POC without incident. Requiring frequent cues for proper technique of there ex; frequent verbal/visual cues to avoid active ROM of L shoulder with pendulums. No significant increase in L shoulder discomfort with interventions. Tolerated addition of cervical AROM stretching well with noted tightness of UT/LS. Pt able to don/doff sling with good performance without any active motion of L shoulder. Will cont to progress as tolerated per protocol. Patient would benefit from continued PT      Plan: Continue per plan of care.  Progress treatment as tolerated.   Progress treament per protocol.      Precautions: see extensive PMH, ABRAMS,     FOLLOW PROTOCOL**, sling to be worn 6 weeks. Surgery date: 5/3/24    Rotator Cuff Repair Rehabilitation Protocol  (adapted from Milana PJ et al. “Rehabilitation of the Rotator Cuff: An Evaluation Based Approach”. JAAOS 2006; 14:599-609)  Updated 10  Ilya Munoz MD  [facility]  [address] [place] [address]  [phone]  Phase 1 (Weeks 0-6)              Immediate Postoperative Period              Goals:                          Diminish Pain and Inflammation  Maintain and Protect Integrity of the Repair                          Gradually Increase Passive ROM (NO Active or Active Assist until Week 6)                          Become Independent with Modified ADLs       "        Precautions:  Maintain Arm in Abduction Sling, Remove Only for Directed Exercises (may remove Abduction Pillow after Day 21 for comfort)                          Keep Incisions Clean & Dry (okay to shower in 48 hours, band-aids over incisions)  No Immersion (pool) until Wounds Totally Sealed (usually not prior to day #10)  Passive Shoulder Motion ONLY, No Lifting/Holding Objects, Reaching Behind Back  Okay to Type/Write at Desktop with Arm in Sling              Day 0-6                          Elbow, Wrist, Hand AROM Exercises                           Start Cervical AROM and Scapula Isometrics                          Cryotherapy/Ice for Pain and Inflammation                          Instruct in Hygiene, Posture, and Positioning               Day 7-28                          Continue Above  May Start Pendulum Exercises                          May Start Supine, Pain Free, PT assisted PROM  Forward Flexion to 90°, External Rotation to 35° (Elbow at side), Internal Rotation to Body, Abduction to 60°                          Can Introduce light Cardio (Walking, Stationary Bike)                          Aqua Therapy may begin at week 3 (day 21) as long as no wound problems              Day 29-42                          Continue Above  Progress PROM to Goal of full PROM by Week 6.  May add Gentle Mobilizations (GH and Scapulothoracic) to Regain full PROM if Needed.  May add Heat prior to PROM Exercises, Ice after Exercises  May Begin AAROM at Day 29 if ROM is Appropriate in Anticipation of AROM Starting at Week 6              Criteria to Progress to Phase 2                          Reasonable Passive Forward Flexion, Abduction , IR/ER                          Time  Phase 2 (Weeks 6-12)              Protection and Active Motion              Goals of Phase:                          Allow Healing of Soft Tissues                          Decrease Pain and Inflammation  Add ADLs and Regain AROM by End of Phase               Precautions:                          NO STRENGTHENING until Phase 3                          Repair is Most Prone to Failure during this Phase!                          No Lifting Objects > 2 lbs (Coffee Cup OK), no Sudden Motions                          Avoid Upper Extremity Bike and Ergometer              Day 43-56                          Discontinue Sling  Initiate AROM Exercises (forward flexion, ER, IR and abduction), Rotator Cuff Isometrics                          Continue Periscapular Exercises, add Stretching if PROM Lacking   No Strengthening until Week 12 (Minimum Time Needed for Cuff Healing Sufficient to withstand Strengthening)                Phase 3 (Weeks 12-16)              Early Strengthening              Goal of Phase:                          Gain full AROM, Maintain PROM                          Gradual return of Shoulder Strength, Power and Endurance                          Gradual return to Functional Activities              Precautions:                          No Lifting > 10lbs, Sudden Lifting or Pushing activities, Overhead Lifting                          No Upper Extremity Bike or Ergometer              Week 12                          Initiate Strengthening Program (10 lb Maximum until Phase 4)                            ER/IR with Bands (Standing)                            ER in Lateral Decubitius Position                            Lateral Raises                            Full Can in Scapular Plane                            Prone Rowing, Horizontal Abduction, Extension                            Elbow Flexion/Extension              Week 14-16                          Initiate light Functional Activities as Permitted  Progress to Fundamental Shoulder Exercises  Phase 4 (Variable but Weeks 16-24)              Aggressive Rehab  Sport Specific or Activity Specific               Goals of Phase:                          Maintain Full Pain-free AROM                           "Advanced Conditioning Exercises for enhanced Functional use                          Continue regaining Shoulder Strength, Power and Endurance                          Eventual return to full Functional Activities              Precautions:                          None              Week 16                          Continue ROM and stretching if appropriate                          Progress Strengthening, Proprioceptive and Neuromuscular Training                          Light Sports if Progressing Well (Chipping/Putting, easy ground strokes etc.)              Week 20                          Continue Strengthening and Stretching                          Initiate Interval Sports Program as Appropriate          Re-eval Date: 6/7/24    Date 5/7 5/9      Visit Count 1 2      FOTO Comp 5/7       Pain In See eval 6/10       Pain Out  6/10           Manuals 5/7 5/9      PROM LUE seated To start day 7 s/p surgery per protocol, pt day 6 today, to start NV                              Neuro Re-Ed                                                                Ther Ex        ultigrip Yellow 30x       Ball squeeze Green  30x Green 30x      Wrist AROM 20x ea dir 20x ea dir      L elbow ROM AAROM L elbow 20x X20 AROM flexion and ext      Pendulum ex 20x ea dir X20 ea direction       UT/LS stretch  Gentle self stretch UT/LS 3x30\" ea                                                              Ther Activity                        Gait Training                        Modalities          Cryo 10 min with no adverse reaction                    "

## 2024-05-09 NOTE — PROGRESS NOTES
Teresa Mao  tolerated IV hydration well with no complications.      Teresa Mao is aware of future appt on 5/10 at 8:30AM.     AVS declined by Teresa Mao.

## 2024-05-10 ENCOUNTER — HOSPITAL ENCOUNTER (OUTPATIENT)
Dept: INFUSION CENTER | Facility: HOSPITAL | Age: 69
End: 2024-05-10
Attending: INTERNAL MEDICINE
Payer: MEDICARE

## 2024-05-10 VITALS
RESPIRATION RATE: 18 BRPM | HEART RATE: 77 BPM | DIASTOLIC BLOOD PRESSURE: 85 MMHG | SYSTOLIC BLOOD PRESSURE: 154 MMHG | TEMPERATURE: 97.7 F

## 2024-05-10 PROCEDURE — 96361 HYDRATE IV INFUSION ADD-ON: CPT

## 2024-05-10 PROCEDURE — 96360 HYDRATION IV INFUSION INIT: CPT

## 2024-05-10 RX ORDER — SODIUM CHLORIDE 9 MG/ML
250 INJECTION, SOLUTION INTRAVENOUS ONCE
Status: COMPLETED | OUTPATIENT
Start: 2024-05-14 | End: 2024-05-14

## 2024-05-10 RX ADMIN — SODIUM CHLORIDE 250 ML/HR: 0.9 INJECTION, SOLUTION INTRAVENOUS at 08:25

## 2024-05-13 ENCOUNTER — HOSPITAL ENCOUNTER (OUTPATIENT)
Dept: INFUSION CENTER | Facility: HOSPITAL | Age: 69
Discharge: HOME | End: 2024-05-13
Attending: INTERNAL MEDICINE
Payer: MEDICARE

## 2024-05-13 VITALS
HEART RATE: 82 BPM | TEMPERATURE: 96.9 F | SYSTOLIC BLOOD PRESSURE: 142 MMHG | OXYGEN SATURATION: 97 % | RESPIRATION RATE: 18 BRPM | DIASTOLIC BLOOD PRESSURE: 86 MMHG

## 2024-05-13 RX ADMIN — SODIUM CHLORIDE 250 ML/HR: 0.9 INJECTION, SOLUTION INTRAVENOUS at 08:05

## 2024-05-13 NOTE — PROGRESS NOTES
Teresa Mao  tolerated hydration well with no complications.      Teresa Mao is aware of future appt on 5-14-24 at 800    AVS declined by Teresa Mao

## 2024-05-13 NOTE — PLAN OF CARE
Problem: Knowledge Deficit  Goal: Patient/family/caregiver demonstrates understanding of disease process, treatment plan, medications, and discharge instructions  Description: Complete learning assessment and assess knowledge base.  Interventions:  - Provide teaching at level of understanding  - Provide teaching via preferred learning methods  5/13/2024 0905 by Nury Clark, RN  Outcome: Progressing  5/13/2024 0904 by Nury Clark, RN  Outcome: Progressing

## 2024-05-14 ENCOUNTER — HOSPITAL ENCOUNTER (OUTPATIENT)
Dept: INFUSION CENTER | Facility: HOSPITAL | Age: 69
Discharge: HOME | End: 2024-05-14
Attending: INTERNAL MEDICINE
Payer: MEDICARE

## 2024-05-14 ENCOUNTER — OFFICE VISIT (OUTPATIENT)
Dept: PHYSICAL THERAPY | Facility: CLINIC | Age: 69
End: 2024-05-14
Payer: MEDICARE

## 2024-05-14 DIAGNOSIS — Z98.890 STATUS POST LEFT ROTATOR CUFF REPAIR: Primary | ICD-10-CM

## 2024-05-14 DIAGNOSIS — M75.102 TEAR OF LEFT SUPRASPINATUS TENDON: ICD-10-CM

## 2024-05-14 PROCEDURE — 96361 HYDRATE IV INFUSION ADD-ON: CPT

## 2024-05-14 PROCEDURE — 96360 HYDRATION IV INFUSION INIT: CPT

## 2024-05-14 PROCEDURE — 97535 SELF CARE MNGMENT TRAINING: CPT

## 2024-05-14 PROCEDURE — 97110 THERAPEUTIC EXERCISES: CPT

## 2024-05-14 RX ORDER — SODIUM CHLORIDE 9 MG/ML
250 INJECTION, SOLUTION INTRAVENOUS ONCE
Status: COMPLETED | OUTPATIENT
Start: 2024-05-16 | End: 2024-05-16

## 2024-05-14 RX ADMIN — SODIUM CHLORIDE 250 ML/HR: 0.9 INJECTION, SOLUTION INTRAVENOUS at 08:29

## 2024-05-14 NOTE — PROGRESS NOTES
Teresa Mao  tolerated hydration treatment well with no complications.      Teresa Mao is aware of future appt on thursday    AVS printed and given to Teresa Mao:  No (Declined by Teresa Mao)

## 2024-05-14 NOTE — PROGRESS NOTES
"Daily Note     Today's date: 2024  Patient name: Teresa Mao  : 1955  MRN: [id number]  Referring provider: Ilya Munoz*  Dx:   Encounter Diagnosis     ICD-10-CM    1. Status post left rotator cuff repair  Z98.890       2. Tear of left supraspinatus tendon  M75.102                      Subjective:   Pt. reports pain level @ L shld is = \"7\"/10 today, which pt. attributes to \"possibly the weather and barometric pressure change\".       Objective: See treatment diary below      Assessment: Tolerated treatment well. Patient exhibited good technique with therapeutic exercises and would benefit from continued PT. No increase in pain level/sx with ther exer or MT.       Plan: Continue per plan of care.  Progress treament per protocol.              Precautions: see extensive PMH, ABRAMS,     FOLLOW PROTOCOL**, sling to be worn 6 weeks. Surgery date: 5/3/24    Rotator Cuff Repair Rehabilitation Protocol  (adapted from Milana PJ et al. “Rehabilitation of the Rotator Cuff: An Evaluation Based Approach”. JAAOS 2006; 14:599-609)  Updated 10  Ilya Munoz MD  [facility]  [address] [place] [id number]  [phone]  Phase 1 (Weeks 0-6)              Immediate Postoperative Period              Goals:                          Diminish Pain and Inflammation  Maintain and Protect Integrity of the Repair                          Gradually Increase Passive ROM (NO Active or Active Assist until Week 6)                          Become Independent with Modified ADLs              Precautions:  Maintain Arm in Abduction Sling, Remove Only for Directed Exercises (may remove Abduction Pillow after Day 21 for comfort)                          Keep Incisions Clean & Dry (okay to shower in 48 hours, band-aids over incisions)  No Immersion (pool) until Wounds Totally Sealed (usually not prior to day #10)  Passive Shoulder Motion ONLY, No Lifting/Holding Objects, Reaching " Behind Back  Okay to Type/Write at Desktop with Arm in Sling              Day 0-6                          Elbow, Wrist, Hand AROM Exercises                           Start Cervical AROM and Scapula Isometrics                          Cryotherapy/Ice for Pain and Inflammation                          Instruct in Hygiene, Posture, and Positioning               Day 7-28                          Continue Above  May Start Pendulum Exercises                          May Start Supine, Pain Free, PT assisted PROM  Forward Flexion to 90°, External Rotation to 35° (Elbow at side), Internal Rotation to Body, Abduction to 60°                          Can Introduce light Cardio (Walking, Stationary Bike)                          Aqua Therapy may begin at week 3 (day 21) as long as no wound problems              Day 29-42                          Continue Above  Progress PROM to Goal of full PROM by Week 6.  May add Gentle Mobilizations (GH and Scapulothoracic) to Regain full PROM if Needed.  May add Heat prior to PROM Exercises, Ice after Exercises  May Begin AAROM at Day 29 if ROM is Appropriate in Anticipation of AROM Starting at Week 6              Criteria to Progress to Phase 2                          Reasonable Passive Forward Flexion, Abduction , IR/ER                          Time  Phase 2 (Weeks 6-12)              Protection and Active Motion              Goals of Phase:                          Allow Healing of Soft Tissues                          Decrease Pain and Inflammation  Add ADLs and Regain AROM by End of Phase              Precautions:                          NO STRENGTHENING until Phase 3                          Repair is Most Prone to Failure during this Phase!                          No Lifting Objects > 2 lbs (Coffee Cup OK), no Sudden Motions                          Avoid Upper Extremity Bike and Ergometer              Day 43-56                          Discontinue Sling  Initiate AROM Exercises  (forward flexion, ER, IR and abduction), Rotator Cuff Isometrics                          Continue Periscapular Exercises, add Stretching if PROM Lacking   No Strengthening until Week 12 (Minimum Time Needed for Cuff Healing Sufficient to withstand Strengthening)                Phase 3 (Weeks 12-16)              Early Strengthening              Goal of Phase:                          Gain full AROM, Maintain PROM                          Gradual return of Shoulder Strength, Power and Endurance                          Gradual return to Functional Activities              Precautions:                          No Lifting > 10lbs, Sudden Lifting or Pushing activities, Overhead Lifting                          No Upper Extremity Bike or Ergometer              Week 12                          Initiate Strengthening Program (10 lb Maximum until Phase 4)                            ER/IR with Bands (Standing)                            ER in Lateral Decubitius Position                            Lateral Raises                            Full Can in Scapular Plane                            Prone Rowing, Horizontal Abduction, Extension                            Elbow Flexion/Extension              Week 14-16                          Initiate light Functional Activities as Permitted  Progress to Fundamental Shoulder Exercises  Phase 4 (Variable but Weeks 16-24)              Aggressive Rehab  Sport Specific or Activity Specific               Goals of Phase:                          Maintain Full Pain-free AROM                          Advanced Conditioning Exercises for enhanced Functional use                          Continue regaining Shoulder Strength, Power and Endurance                          Eventual return to full Functional Activities              Precautions:                          None              Week 16                          Continue ROM and stretching if appropriate                          Progress  "Strengthening, Proprioceptive and Neuromuscular Training                          Light Sports if Progressing Well (Chipping/Putting, easy ground strokes etc.)              Week 20                          Continue Strengthening and Stretching                          Initiate Interval Sports Program as Appropriate          Re-eval Date: 6/7/24    Date 5/7 5/9 5.14     Visit Count 1 2 3     FOTO Comp 5/7       Pain In See eval 6/10  7/10 w/med     Pain Out  6/10           Manuals 5/7 5/9 5.14     PROM LUE seated To start day 7 s/p surgery per protocol, pt day 6 today, to start NV **MJD  Pain-Free PTA assisted PROM                             Neuro Re-Ed                                                                Ther Ex   5.14     ultigrip Yellow 30x  Yellow 30x       Ball squeeze Green  30x Green 30x Green 40x     Wrist AROM 20x ea dir 20x ea dir 25x ea dir       L elbow ROM AAROM L elbow 20x X20 AROM flexion and ext x20 AROM flexion and ext     Pendulum ex 20x ea dir X20 ea direction  x25 ea direction     UT/LS stretch  Gentle self stretch UT/LS 3x30\" ea Gentle self stretch UT/LS 3x30\" ea                                     Self care/Pt. Educ   **10 min                     Ther Activity                        Gait Training                        Modalities   5.14       Cryo 10 min with no adverse reaction  Cryo 10 min with no adverse reaction                    "

## 2024-05-15 RX ORDER — SODIUM CHLORIDE 9 MG/ML
250 INJECTION, SOLUTION INTRAVENOUS ONCE
Status: COMPLETED | OUTPATIENT
Start: 2024-05-17 | End: 2024-05-17

## 2024-05-16 ENCOUNTER — OFFICE VISIT (OUTPATIENT)
Dept: PHYSICAL THERAPY | Facility: CLINIC | Age: 69
End: 2024-05-16
Payer: MEDICARE

## 2024-05-16 ENCOUNTER — HOSPITAL ENCOUNTER (OUTPATIENT)
Dept: INFUSION CENTER | Facility: HOSPITAL | Age: 69
End: 2024-05-16
Attending: INTERNAL MEDICINE
Payer: MEDICARE

## 2024-05-16 VITALS
SYSTOLIC BLOOD PRESSURE: 143 MMHG | RESPIRATION RATE: 16 BRPM | DIASTOLIC BLOOD PRESSURE: 79 MMHG | HEART RATE: 62 BPM | TEMPERATURE: 97.2 F

## 2024-05-16 DIAGNOSIS — M75.102 TEAR OF LEFT SUPRASPINATUS TENDON: Primary | ICD-10-CM

## 2024-05-16 DIAGNOSIS — Z98.890 STATUS POST LEFT ROTATOR CUFF REPAIR: ICD-10-CM

## 2024-05-16 PROCEDURE — 97110 THERAPEUTIC EXERCISES: CPT

## 2024-05-16 PROCEDURE — 97535 SELF CARE MNGMENT TRAINING: CPT

## 2024-05-16 PROCEDURE — 96360 HYDRATION IV INFUSION INIT: CPT

## 2024-05-16 PROCEDURE — 96361 HYDRATE IV INFUSION ADD-ON: CPT

## 2024-05-16 RX ORDER — SODIUM CHLORIDE 9 MG/ML
250 INJECTION, SOLUTION INTRAVENOUS ONCE
Status: COMPLETED | OUTPATIENT
Start: 2024-05-20 | End: 2024-05-20

## 2024-05-16 RX ADMIN — SODIUM CHLORIDE 250 ML/HR: 0.9 INJECTION, SOLUTION INTRAVENOUS at 08:11

## 2024-05-16 NOTE — PROGRESS NOTES
Teresa Mao  tolerated hydration treatment well with no complications.      Teresa Mao is aware of future appt on 5/17 at 8AM.     AVS printed and given to Teresa Mao: No (Declined by Teresa Mao).

## 2024-05-16 NOTE — PROGRESS NOTES
"Daily Note     Today's date: 2024  Patient name: Teresa Mao  : 1955  MRN: [id number]  Referring provider: Ilya Munoz*  Dx:   Encounter Diagnosis     ICD-10-CM    1. Tear of left supraspinatus tendon  M75.102       2. Status post left rotator cuff repair  Z98.890                      Subjective:  Pt. reports pain level = \"5\"/10 @ L shld at this present time.       Objective:  See treatment diary below      Assessment: Tolerated treatment well.  Patient exhibited good technique with therapeutic exercises and would benefit from continued PT.       Plan:  Con't services 2x/week as per POC/Goals, and w/in protocol/guidelines           Precautions: see extensive PMH, ABRAMS,     FOLLOW PROTOCOL**, sling to be worn 6 weeks. Surgery date: 5/3/24    Rotator Cuff Repair Rehabilitation Protocol  (adapted from Milana PJ et al. “Rehabilitation of the Rotator Cuff: An Evaluation Based Approach”. JAAOS 2006; 14:599-609)  Updated 10.14  Ilya Munoz MD  [facility]  [address] [place] [id number]  [phone]  Phase 1 (Weeks 0-6)              Immediate Postoperative Period              Goals:                          Diminish Pain and Inflammation  Maintain and Protect Integrity of the Repair                          Gradually Increase Passive ROM (NO Active or Active Assist until Week 6)                          Become Independent with Modified ADLs              Precautions:  Maintain Arm in Abduction Sling, Remove Only for Directed Exercises (may remove Abduction Pillow after Day 21 for comfort)                          Keep Incisions Clean & Dry (okay to shower in 48 hours, band-aids over incisions)  No Immersion (pool) until Wounds Totally Sealed (usually not prior to day #10)  Passive Shoulder Motion ONLY, No Lifting/Holding Objects, Reaching Behind Back  Okay to Type/Write at Desktop with Arm in Sling              Day 0-6                          " Elbow, Wrist, Hand AROM Exercises                           Start Cervical AROM and Scapula Isometrics                          Cryotherapy/Ice for Pain and Inflammation                          Instruct in Hygiene, Posture, and Positioning               Day 7-28                          Continue Above  May Start Pendulum Exercises                          May Start Supine, Pain Free, PT assisted PROM  Forward Flexion to 90°, External Rotation to 35° (Elbow at side), Internal Rotation to Body, Abduction to 60°                          Can Introduce light Cardio (Walking, Stationary Bike)                          Aqua Therapy may begin at week 3 (day 21) as long as no wound problems              Day 29-42                          Continue Above  Progress PROM to Goal of full PROM by Week 6.  May add Gentle Mobilizations (GH and Scapulothoracic) to Regain full PROM if Needed.  May add Heat prior to PROM Exercises, Ice after Exercises  May Begin AAROM at Day 29 if ROM is Appropriate in Anticipation of AROM Starting at Week 6              Criteria to Progress to Phase 2                          Reasonable Passive Forward Flexion, Abduction , IR/ER                          Time  Phase 2 (Weeks 6-12)              Protection and Active Motion              Goals of Phase:                          Allow Healing of Soft Tissues                          Decrease Pain and Inflammation  Add ADLs and Regain AROM by End of Phase              Precautions:                          NO STRENGTHENING until Phase 3                          Repair is Most Prone to Failure during this Phase!                          No Lifting Objects > 2 lbs (Coffee Cup OK), no Sudden Motions                          Avoid Upper Extremity Bike and Ergometer              Day 43-56                          Discontinue Sling  Initiate AROM Exercises (forward flexion, ER, IR and abduction), Rotator Cuff Isometrics                          Continue  Periscapular Exercises, add Stretching if PROM Lacking   No Strengthening until Week 12 (Minimum Time Needed for Cuff Healing Sufficient to withstand Strengthening)                Phase 3 (Weeks 12-16)              Early Strengthening              Goal of Phase:                          Gain full AROM, Maintain PROM                          Gradual return of Shoulder Strength, Power and Endurance                          Gradual return to Functional Activities              Precautions:                          No Lifting > 10lbs, Sudden Lifting or Pushing activities, Overhead Lifting                          No Upper Extremity Bike or Ergometer              Week 12                          Initiate Strengthening Program (10 lb Maximum until Phase 4)                            ER/IR with Bands (Standing)                            ER in Lateral Decubitius Position                            Lateral Raises                            Full Can in Scapular Plane                            Prone Rowing, Horizontal Abduction, Extension                            Elbow Flexion/Extension              Week 14-16                          Initiate light Functional Activities as Permitted  Progress to Fundamental Shoulder Exercises  Phase 4 (Variable but Weeks 16-24)              Aggressive Rehab  Sport Specific or Activity Specific               Goals of Phase:                          Maintain Full Pain-free AROM                          Advanced Conditioning Exercises for enhanced Functional use                          Continue regaining Shoulder Strength, Power and Endurance                          Eventual return to full Functional Activities              Precautions:                          None              Week 16                          Continue ROM and stretching if appropriate                          Progress Strengthening, Proprioceptive and Neuromuscular Training                          Light Sports if  "Progressing Well (Chipping/Putting, easy ground strokes etc.)              Week 20                          Continue Strengthening and Stretching                          Initiate Interval Sports Program as Appropriate            Re-eval Date: 6/7/24    Date 5/7 5/9 5.14 5.16    Visit Count 1 2 3 4    FOTO Comp 5/7       Pain In See eval 6/10  7/10 w/med 5/10    Pain Out  6/10   less        Manuals 5/7 5/9 5.14 5.16    PROM LUE seated To start day 7 s/p surgery per protocol, pt day 6 today, to start NV **MJD  Pain-Free PTA assisted PROM MJD  Pain-Free PTA assisted PROM                            Neuro Re-Ed                                                                Ther Ex   5.14 5.16    ultigrip Yellow 30x  Yellow 30x   RED 30x    Ball squeeze Green  30x Green 30x Green 40x Green 40x    Wrist AROM 20x ea dir 20x ea dir 25x ea dir   25x ea dir    L elbow ROM AAROM L elbow 20x X20 AROM flexion and ext x20 AROM flexion and ext x30 AROM flexion and ext    Pendulum ex 20x ea dir X20 ea direction  x25 ea direction x25 ea direction    UT/LS stretch  Gentle self stretch UT/LS 3x30\" ea Gentle self stretch UT/LS 3x30\" ea Gentle self stretch UT/LS 3x30\" ea        Cerv ROM  NV **       Scap Melany  NV **                   Self care/Pt. Educ   **10 min *Reviewed        **Postural/  positioning Educ            Ther Activity                        Gait Training                        Modalities   5.14 5.16      Cryo 10 min with no adverse reaction  Cryo 10 min with no adverse reaction Cryo 10 min with no adverse reaction                     "

## 2024-05-17 ENCOUNTER — HOSPITAL ENCOUNTER (OUTPATIENT)
Dept: INFUSION CENTER | Facility: HOSPITAL | Age: 69
End: 2024-05-17
Attending: INTERNAL MEDICINE
Payer: MEDICARE

## 2024-05-17 VITALS
TEMPERATURE: 96.6 F | RESPIRATION RATE: 16 BRPM | SYSTOLIC BLOOD PRESSURE: 149 MMHG | DIASTOLIC BLOOD PRESSURE: 74 MMHG | HEART RATE: 64 BPM

## 2024-05-17 RX ADMIN — SODIUM CHLORIDE 250 ML/HR: 0.9 INJECTION, SOLUTION INTRAVENOUS at 08:09

## 2024-05-17 NOTE — PROGRESS NOTES
Patient tolerated IV hydration without issues.  Patient requested IV hydration be stopped due to having another appointment she needed to get to.    Teresa Mao is aware of future appt on 5/20/24 at 0800.     AVS -  No (Declined by Teresa Mao) Patient has Mychart.    Patient ambulated off unit without incident.  All personal belongings taken with patient.

## 2024-05-20 ENCOUNTER — HOSPITAL ENCOUNTER (OUTPATIENT)
Dept: INFUSION CENTER | Facility: HOSPITAL | Age: 69
Discharge: HOME | End: 2024-05-20
Attending: INTERNAL MEDICINE
Payer: MEDICARE

## 2024-05-20 PROCEDURE — 96361 HYDRATE IV INFUSION ADD-ON: CPT

## 2024-05-20 PROCEDURE — 96360 HYDRATION IV INFUSION INIT: CPT

## 2024-05-20 RX ADMIN — SODIUM CHLORIDE 250 ML/HR: 0.9 INJECTION, SOLUTION INTRAVENOUS at 08:12

## 2024-05-20 NOTE — PROGRESS NOTES
Patient tolerated IV hydration without issues.  Teresa Mao  tolerated treatment well with no complications.      eTresa Mao is aware of future appt on 5/21/24 at 0800.     AVS -  No (Declined by Teresa Mao) Patient has Mychart.    Patient ambulated off unit without incident.  All personal belongings taken with patient.

## 2024-05-20 NOTE — PLAN OF CARE
Problem: Potential for Falls  Goal: Patient will remain free of falls  Description: INTERVENTIONS:  - Educate patient/family on patient safety including physical limitations  - Instruct patient to call for assistance with activity   - Consult OT/PT to assist with strengthening/mobility   - Keep Call bell within reach  - Keep bed low and locked with side rails adjusted as appropriate  - Keep care items and personal belongings within reach  - Initiate and maintain comfort rounds  - Make Fall Risk Sign visible to staff  -- Apply yellow socks and bracelet for high fall risk patients  - Consider moving patient to room near nurses station  Outcome: Progressing     Problem: Potential for Falls  Goal: Patient will remain free of falls  Description: INTERVENTIONS:  - Educate patient/family on patient safety including physical limitations  - Instruct patient to call for assistance with activity   - Consult OT/PT to assist with strengthening/mobility   - Keep Call bell within reach  - Keep bed low and locked with side rails adjusted as appropriate  - Keep care items and personal belongings within reach  - Initiate and maintain comfort rounds  - Make Fall Risk Sign visible to staff  - Apply yellow socks and bracelet for high fall risk patients  - Consider moving patient to room near nurses station  Outcome: Progressing

## 2024-05-21 ENCOUNTER — OFFICE VISIT (OUTPATIENT)
Dept: PHYSICAL THERAPY | Facility: CLINIC | Age: 69
End: 2024-05-21
Payer: MEDICARE

## 2024-05-21 ENCOUNTER — HOSPITAL ENCOUNTER (OUTPATIENT)
Dept: INFUSION CENTER | Facility: HOSPITAL | Age: 69
Discharge: HOME | End: 2024-05-21
Payer: MEDICARE

## 2024-05-21 VITALS
DIASTOLIC BLOOD PRESSURE: 70 MMHG | TEMPERATURE: 98.5 F | HEART RATE: 65 BPM | RESPIRATION RATE: 16 BRPM | SYSTOLIC BLOOD PRESSURE: 129 MMHG

## 2024-05-21 DIAGNOSIS — M75.102 TEAR OF LEFT SUPRASPINATUS TENDON: ICD-10-CM

## 2024-05-21 DIAGNOSIS — Z98.890 STATUS POST LEFT ROTATOR CUFF REPAIR: Primary | ICD-10-CM

## 2024-05-21 PROCEDURE — 96360 HYDRATION IV INFUSION INIT: CPT

## 2024-05-21 PROCEDURE — 97110 THERAPEUTIC EXERCISES: CPT

## 2024-05-21 PROCEDURE — 97140 MANUAL THERAPY 1/> REGIONS: CPT

## 2024-05-21 PROCEDURE — 96361 HYDRATE IV INFUSION ADD-ON: CPT

## 2024-05-21 RX ORDER — SODIUM CHLORIDE 9 MG/ML
250 INJECTION, SOLUTION INTRAVENOUS ONCE
Status: COMPLETED | OUTPATIENT
Start: 2024-05-23 | End: 2024-05-23

## 2024-05-21 RX ADMIN — SODIUM CHLORIDE 1500 ML: 0.9 INJECTION, SOLUTION INTRAVENOUS at 08:56

## 2024-05-21 NOTE — PROGRESS NOTES
Teresa Mao  tolerated hydration treatment well with no complications.      Teresa Mao is aware of future appt on 5/23 at 8AM.     AVS printed and given to Teresa Mao: No (Declined by Teresa Mao).

## 2024-05-21 NOTE — PROGRESS NOTES
Daily Note     Today's date: 2024  Patient name: Teresa Mao  : 1955  MRN: [id number]  Referring provider: Ilya Munoz*  Dx:   Encounter Diagnosis     ICD-10-CM    1. Status post left rotator cuff repair  Z98.890       2. Tear of left supraspinatus tendon  M75.102                      Subjective: No complaints or concerns expressed by pt today re: L shld.      Objective: See treatment diary below      Assessment: Tolerated treatment well.  Pt. progressing appropriately within protocol guidelines. Patient exhibited good technique with therapeutic exercises and would benefit from continued PT.      Plan: Continue per plan of care.  Progress treament per protocol.              Precautions: see extensive PMH, ABRAMS,     FOLLOW PROTOCOL**, sling to be worn 6 weeks. Surgery date: 5/3/24    Rotator Cuff Repair Rehabilitation Protocol  (adapted from Milana PJ et al. “Rehabilitation of the Rotator Cuff: An Evaluation Based Approach”. JAAOS 2006; 14:599-609)  Updated 10  Ilya Munoz MD  [facility]  [address] [place] [id number]  [phone]  Phase 1 (Weeks 0-6)              Immediate Postoperative Period              Goals:                          Diminish Pain and Inflammation  Maintain and Protect Integrity of the Repair                          Gradually Increase Passive ROM (NO Active or Active Assist until Week 6)                          Become Independent with Modified ADLs              Precautions:  Maintain Arm in Abduction Sling, Remove Only for Directed Exercises (may remove Abduction Pillow after Day 21 for comfort)                          Keep Incisions Clean & Dry (okay to shower in 48 hours, band-aids over incisions)  No Immersion (pool) until Wounds Totally Sealed (usually not prior to day #10)  Passive Shoulder Motion ONLY, No Lifting/Holding Objects, Reaching Behind Back  Okay to Type/Write at Desktop with Arm in Sling               Day 0-6                          Elbow, Wrist, Hand AROM Exercises                           Start Cervical AROM and Scapula Isometrics                          Cryotherapy/Ice for Pain and Inflammation                          Instruct in Hygiene, Posture, and Positioning               Day 7-28                          Continue Above  May Start Pendulum Exercises                          May Start Supine, Pain Free, PT assisted PROM  Forward Flexion to 90°, External Rotation to 35° (Elbow at side), Internal Rotation to Body, Abduction to 60°                          Can Introduce light Cardio (Walking, Stationary Bike)                          Aqua Therapy may begin at week 3 (day 21) as long as no wound problems              Day 29-42                          Continue Above  Progress PROM to Goal of full PROM by Week 6.  May add Gentle Mobilizations (GH and Scapulothoracic) to Regain full PROM if Needed.  May add Heat prior to PROM Exercises, Ice after Exercises  May Begin AAROM at Day 29 if ROM is Appropriate in Anticipation of AROM Starting at Week 6              Criteria to Progress to Phase 2                          Reasonable Passive Forward Flexion, Abduction , IR/ER                          Time  Phase 2 (Weeks 6-12)              Protection and Active Motion              Goals of Phase:                          Allow Healing of Soft Tissues                          Decrease Pain and Inflammation  Add ADLs and Regain AROM by End of Phase              Precautions:                          NO STRENGTHENING until Phase 3                          Repair is Most Prone to Failure during this Phase!                          No Lifting Objects > 2 lbs (Coffee Cup OK), no Sudden Motions                          Avoid Upper Extremity Bike and Ergometer              Day 43-56                          Discontinue Sling  Initiate AROM Exercises (forward flexion, ER, IR and abduction), Rotator Cuff Isometrics                           Continue Periscapular Exercises, add Stretching if PROM Lacking   No Strengthening until Week 12 (Minimum Time Needed for Cuff Healing Sufficient to withstand Strengthening)                Phase 3 (Weeks 12-16)              Early Strengthening              Goal of Phase:                          Gain full AROM, Maintain PROM                          Gradual return of Shoulder Strength, Power and Endurance                          Gradual return to Functional Activities              Precautions:                          No Lifting > 10lbs, Sudden Lifting or Pushing activities, Overhead Lifting                          No Upper Extremity Bike or Ergometer              Week 12                          Initiate Strengthening Program (10 lb Maximum until Phase 4)                            ER/IR with Bands (Standing)                            ER in Lateral Decubitius Position                            Lateral Raises                            Full Can in Scapular Plane                            Prone Rowing, Horizontal Abduction, Extension                            Elbow Flexion/Extension              Week 14-16                          Initiate light Functional Activities as Permitted  Progress to Fundamental Shoulder Exercises  Phase 4 (Variable but Weeks 16-24)              Aggressive Rehab  Sport Specific or Activity Specific               Goals of Phase:                          Maintain Full Pain-free AROM                          Advanced Conditioning Exercises for enhanced Functional use                          Continue regaining Shoulder Strength, Power and Endurance                          Eventual return to full Functional Activities              Precautions:                          None              Week 16                          Continue ROM and stretching if appropriate                          Progress Strengthening, Proprioceptive and Neuromuscular Training                         "  Light Sports if Progressing Well (Chipping/Putting, easy ground strokes etc.)              Week 20                          Continue Strengthening and Stretching                          Initiate Interval Sports Program as Appropriate            Re-eval Date: 6/7/24    Date 5/7 5/9 5.14 5.16 5.21   Visit Count 1 2 3 4 5   FOTO Comp 5/7       Pain In See eval 6/10  7/10 w/med 5/10  moderate   Pain Out  6/10   less less       Manuals 5/7 5/9 5.14 5.16 5.21   PROM LUE seated To start day 7 s/p surgery per protocol, pt day 6 today, to start NV **MJD  Pain-Free PTA assisted PROM MJD  Pain-Free PTA assisted PROM MJD  Pain-Free PTA assisted PROM                           Neuro Re-Ed                                                                Ther Ex   5.14 5.16 5.21   ultigrip Yellow 30x  Yellow 30x   RED 30x RED 30x   Ball squeeze Green  30x Green 30x Green 40x Green 40x Green 45x   Wrist AROM 20x ea dir 20x ea dir 25x ea dir   25x ea dir x30 ea direction   L elbow ROM AAROM L elbow 20x X20 AROM flexion and ext x20 AROM flexion and ext x30 AROM flexion and ext x30 AROM flexion and ext   Pendulum ex 20x ea dir X20 ea direction  x25 ea direction x25 ea direction x30 ea direction   UT/LS stretch  Gentle self stretch UT/LS 3x30\" ea Gentle self stretch UT/LS 3x30\" ea Gentle self stretch UT/LS 3x30\" ea Gentle self stretch UT/LS 4x30\" ea       Cerv ROM  NV **5 reps ea motion       Scap Melany  NV **2x5/5\"                   Self care/Pt. Educ   **10 min *Reviewed Reviewed       **Postural/  positioning Educ Reviewed           Ther Activity                        Gait Training                        Modalities   5.14 5.16 5.21     Cryo 10 min with no adverse reaction  Cryo 10 min with no adverse reaction Cryo 10 min with no adverse reaction Cryo 10 min with no adverse reaction                      "

## 2024-05-23 ENCOUNTER — HOSPITAL ENCOUNTER (OUTPATIENT)
Dept: INFUSION CENTER | Facility: HOSPITAL | Age: 69
End: 2024-05-23
Attending: INTERNAL MEDICINE
Payer: MEDICARE

## 2024-05-23 ENCOUNTER — OFFICE VISIT (OUTPATIENT)
Dept: PHYSICAL THERAPY | Facility: CLINIC | Age: 69
End: 2024-05-23
Payer: MEDICARE

## 2024-05-23 VITALS
SYSTOLIC BLOOD PRESSURE: 147 MMHG | HEART RATE: 64 BPM | TEMPERATURE: 97 F | DIASTOLIC BLOOD PRESSURE: 66 MMHG | RESPIRATION RATE: 18 BRPM

## 2024-05-23 DIAGNOSIS — M75.102 TEAR OF LEFT SUPRASPINATUS TENDON: ICD-10-CM

## 2024-05-23 DIAGNOSIS — Z98.890 STATUS POST LEFT ROTATOR CUFF REPAIR: Primary | ICD-10-CM

## 2024-05-23 PROCEDURE — 97110 THERAPEUTIC EXERCISES: CPT

## 2024-05-23 PROCEDURE — 96361 HYDRATE IV INFUSION ADD-ON: CPT

## 2024-05-23 PROCEDURE — 97140 MANUAL THERAPY 1/> REGIONS: CPT

## 2024-05-23 PROCEDURE — 96360 HYDRATION IV INFUSION INIT: CPT

## 2024-05-23 RX ADMIN — SODIUM CHLORIDE 250 ML/HR: 0.9 INJECTION, SOLUTION INTRAVENOUS at 08:18

## 2024-05-23 NOTE — PROGRESS NOTES
Daily Note     Today's date: 2024  Patient name: Teresa Mao  : 1955  MRN: [id number]  Referring provider: Ilya Munoz*  Dx:   Encounter Diagnosis     ICD-10-CM    1. Status post left rotator cuff repair  Z98.890       2. Tear of left supraspinatus tendon  M75.102                      Subjective: Pt notes her shldr is just achy today. Notes she uses her L hand for light activities.      Objective: See treatment diary below      Assessment: Tolerated treatment well. Patient exhibited good technique with therapeutic exercises and would benefit from continued PT.  Pt making excellent gains in PROM to date and has surpassed expected ROM.      Plan: Continue per plan of care.      Precautions: see extensive PMH, ABRAMS,     FOLLOW PROTOCOL**, sling to be worn 6 weeks. Surgery date: 5/3/24    Rotator Cuff Repair Rehabilitation Protocol  (adapted from Milana PJ et al. “Rehabilitation of the Rotator Cuff: An Evaluation Based Approach”. JAAOS 2006; 14:599-609)  Updated 10  Ilya Munoz MD  [facility]  [address] [place] [id number]  [phone]  Phase 1 (Weeks 0-6)              Immediate Postoperative Period              Goals:                          Diminish Pain and Inflammation  Maintain and Protect Integrity of the Repair                          Gradually Increase Passive ROM (NO Active or Active Assist until Week 6)                          Become Independent with Modified ADLs              Precautions:  Maintain Arm in Abduction Sling, Remove Only for Directed Exercises (may remove Abduction Pillow after Day 21 for comfort)                          Keep Incisions Clean & Dry (okay to shower in 48 hours, band-aids over incisions)  No Immersion (pool) until Wounds Totally Sealed (usually not prior to day #10)  Passive Shoulder Motion ONLY, No Lifting/Holding Objects, Reaching Behind Back  Okay to Type/Write at Desktop with Arm in Sling               Day 0-6                          Elbow, Wrist, Hand AROM Exercises                           Start Cervical AROM and Scapula Isometrics                          Cryotherapy/Ice for Pain and Inflammation                          Instruct in Hygiene, Posture, and Positioning               Day 7-28                          Continue Above  May Start Pendulum Exercises                          May Start Supine, Pain Free, PT assisted PROM  Forward Flexion to 90°, External Rotation to 35° (Elbow at side), Internal Rotation to Body, Abduction to 60°                          Can Introduce light Cardio (Walking, Stationary Bike)                          Aqua Therapy may begin at week 3 (day 21) as long as no wound problems              Day 29-42                          Continue Above  Progress PROM to Goal of full PROM by Week 6.  May add Gentle Mobilizations (GH and Scapulothoracic) to Regain full PROM if Needed.  May add Heat prior to PROM Exercises, Ice after Exercises  May Begin AAROM at Day 29 if ROM is Appropriate in Anticipation of AROM Starting at Week 6              Criteria to Progress to Phase 2                          Reasonable Passive Forward Flexion, Abduction , IR/ER                          Time  Phase 2 (Weeks 6-12)              Protection and Active Motion              Goals of Phase:                          Allow Healing of Soft Tissues                          Decrease Pain and Inflammation  Add ADLs and Regain AROM by End of Phase              Precautions:                          NO STRENGTHENING until Phase 3                          Repair is Most Prone to Failure during this Phase!                          No Lifting Objects > 2 lbs (Coffee Cup OK), no Sudden Motions                          Avoid Upper Extremity Bike and Ergometer              Day 43-56                          Discontinue Sling  Initiate AROM Exercises (forward flexion, ER, IR and abduction), Rotator Cuff Isometrics                           Continue Periscapular Exercises, add Stretching if PROM Lacking   No Strengthening until Week 12 (Minimum Time Needed for Cuff Healing Sufficient to withstand Strengthening)                Phase 3 (Weeks 12-16)              Early Strengthening              Goal of Phase:                          Gain full AROM, Maintain PROM                          Gradual return of Shoulder Strength, Power and Endurance                          Gradual return to Functional Activities              Precautions:                          No Lifting > 10lbs, Sudden Lifting or Pushing activities, Overhead Lifting                          No Upper Extremity Bike or Ergometer              Week 12                          Initiate Strengthening Program (10 lb Maximum until Phase 4)                            ER/IR with Bands (Standing)                            ER in Lateral Decubitius Position                            Lateral Raises                            Full Can in Scapular Plane                            Prone Rowing, Horizontal Abduction, Extension                            Elbow Flexion/Extension              Week 14-16                          Initiate light Functional Activities as Permitted  Progress to Fundamental Shoulder Exercises  Phase 4 (Variable but Weeks 16-24)              Aggressive Rehab  Sport Specific or Activity Specific               Goals of Phase:                          Maintain Full Pain-free AROM                          Advanced Conditioning Exercises for enhanced Functional use                          Continue regaining Shoulder Strength, Power and Endurance                          Eventual return to full Functional Activities              Precautions:                          None              Week 16                          Continue ROM and stretching if appropriate                          Progress Strengthening, Proprioceptive and Neuromuscular Training                       "    Light Sports if Progressing Well (Chipping/Putting, easy ground strokes etc.)              Week 20                          Continue Strengthening and Stretching                          Initiate Interval Sports Program as Appropriate            Re-eval Date: 6/7/24  DOS:  5/3/24  Date 5/23 5/9 5.14 5.16 5.21   Visit Count 6 2 3 4 5   FOTO        Pain In  4/10 with med 6/10  7/10 w/med 5/10  moderate   Pain Out  6/10   less less       Manuals 5/23 5/9 5.14 5.16 5.21   PROM LUE Supine  TT 10' To start day 7 s/p surgery per protocol, pt day 6 today, to start NV **MJD  Pain-Free PTA assisted PROM MJD  Pain-Free PTA assisted PROM MJD  Pain-Free PTA assisted PROM                           Neuro Re-Ed                                                                Ther Ex   5.14 5.16 5.21   ultigrip Red 30x  Yellow 30x   RED 30x RED 30x   Ball squeeze Green  45xx Green 30x Green 40x Green 40x Green 45x   Wrist AROM 30x ea dir 20x ea dir 25x ea dir   25x ea dir x30 ea direction   L elbow ROM AROM L elbow 30x X20 AROM flexion and ext x20 AROM flexion and ext x30 AROM flexion and ext x30 AROM flexion and ext   Pendulum ex 30x ea dir X20 ea direction  x25 ea direction x25 ea direction x30 ea direction   UT/LS stretch 4 x 30\" self stretch  UT/LS  3 x 30\" ea Gentle self stretch UT/LS 3x30\" ea Gentle self stretch UT/LS 3x30\" ea Gentle self stretch UT/LS 3x30\" ea Gentle self stretch UT/LS 4x30\" ea   Cerv ROM 5 reps ea dir   Cerv ROM  NV **5 reps ea motion   Scap isometrics  10 x 5\"   Scap Melany  NV **2x5/5\"                   Self care/Pt. Educ   **10 min *Reviewed Reviewed       **Postural/  positioning Educ Reviewed           Ther Activity                        Gait Training                        Modalities   5.14 5.16 5.21     Cryo 10 min with no adverse reaction  Cryo 10 min with no adverse reaction Cryo 10 min with no adverse reaction Cryo 10 min with no adverse reaction                        "

## 2024-05-23 NOTE — PLAN OF CARE
Problem: Knowledge Deficit  Goal: Patient/family/caregiver demonstrates understanding of disease process, treatment plan, medications, and discharge instructions  Description: Complete learning assessment and assess knowledge base.  Interventions:  - Provide teaching at level of understanding  - Provide teaching via preferred learning methods  5/23/2024 0824 by Nury Clark RN  Outcome: Progressing  5/23/2024 0823 by Nury Clark, RN  Outcome: Progressing

## 2024-05-23 NOTE — PROGRESS NOTES
Teresa Mao  tolerated hydration well with no complications.      Teresa Mao is aware of future appt on 5-24-24 at 800    AVS declined

## 2024-05-24 ENCOUNTER — HOSPITAL ENCOUNTER (OUTPATIENT)
Dept: INFUSION CENTER | Facility: HOSPITAL | Age: 69
End: 2024-05-24
Attending: INTERNAL MEDICINE
Payer: MEDICARE

## 2024-05-24 VITALS
SYSTOLIC BLOOD PRESSURE: 133 MMHG | HEART RATE: 67 BPM | TEMPERATURE: 97.2 F | DIASTOLIC BLOOD PRESSURE: 79 MMHG | RESPIRATION RATE: 18 BRPM

## 2024-05-24 PROCEDURE — 96360 HYDRATION IV INFUSION INIT: CPT

## 2024-05-24 PROCEDURE — 96361 HYDRATE IV INFUSION ADD-ON: CPT

## 2024-05-24 RX ORDER — SODIUM CHLORIDE 9 MG/ML
250 INJECTION, SOLUTION INTRAVENOUS ONCE
Status: COMPLETED | OUTPATIENT
Start: 2024-05-29 | End: 2024-05-29

## 2024-05-24 RX ADMIN — SODIUM CHLORIDE 1500 ML: 0.9 INJECTION, SOLUTION INTRAVENOUS at 08:20

## 2024-05-24 NOTE — PROGRESS NOTES
Teresa Mao  tolerated treatment well with no complications.      Teresa Mao is aware of future appt on 5/28 at 8 am.     AVS declined by Teresa Mao.

## 2024-05-28 ENCOUNTER — HOSPITAL ENCOUNTER (OUTPATIENT)
Dept: INFUSION CENTER | Facility: HOSPITAL | Age: 69
Discharge: HOME | End: 2024-05-28
Attending: INTERNAL MEDICINE
Payer: MEDICARE

## 2024-05-28 ENCOUNTER — OFFICE VISIT (OUTPATIENT)
Dept: PHYSICAL THERAPY | Facility: CLINIC | Age: 69
End: 2024-05-28
Payer: MEDICARE

## 2024-05-28 VITALS
SYSTOLIC BLOOD PRESSURE: 139 MMHG | TEMPERATURE: 97.7 F | DIASTOLIC BLOOD PRESSURE: 84 MMHG | HEART RATE: 66 BPM | RESPIRATION RATE: 16 BRPM

## 2024-05-28 DIAGNOSIS — M75.102 TEAR OF LEFT SUPRASPINATUS TENDON: ICD-10-CM

## 2024-05-28 DIAGNOSIS — Z98.890 STATUS POST LEFT ROTATOR CUFF REPAIR: Primary | ICD-10-CM

## 2024-05-28 PROCEDURE — 97110 THERAPEUTIC EXERCISES: CPT

## 2024-05-28 PROCEDURE — 97140 MANUAL THERAPY 1/> REGIONS: CPT

## 2024-05-28 RX ORDER — SODIUM CHLORIDE 9 MG/ML
250 INJECTION, SOLUTION INTRAVENOUS ONCE
Status: COMPLETED | OUTPATIENT
Start: 2024-05-30 | End: 2024-05-30

## 2024-05-28 RX ADMIN — SODIUM CHLORIDE 1500 ML: 0.9 INJECTION, SOLUTION INTRAVENOUS at 08:31

## 2024-05-28 NOTE — PROGRESS NOTES
Daily Note     Today's date: 2024  Patient name: Teresa Mao  : 1955  MRN: [id number]  Referring provider: Ilya Munoz*  Dx:   Encounter Diagnosis     ICD-10-CM    1. Status post left rotator cuff repair  Z98.890       2. Tear of left supraspinatus tendon  M75.102                      Subjective: Pt notes she is doing well and took pain meds this afternoon which helped improve comfortability. Pt removed abductor pillow as allowed by protocol.      Objective: See treatment diary below      Assessment: Tolerated treatment well. Patient exhibited good technique with therapeutic exercises and would benefit from continued PT. Pt demonstrates good PROM and is ahead of predicted status.   Good relief of discomfort with use of ice post ex.      Plan: Continue per plan of care.  Advance ex program as protocol allows.     Precautions: see extensive PMH, ABRAMS,     FOLLOW PROTOCOL**, sling to be worn 6 weeks. Surgery date: 5/3/24    Rotator Cuff Repair Rehabilitation Protocol  (adapted from Milana PJ et al. “Rehabilitation of the Rotator Cuff: An Evaluation Based Approach”. JAAOS 2006; 14:599-609)  Updated 10  Ilya Munoz MD  [facility]  [address] [place] [id number]  [phone]  Phase 1 (Weeks 0-6)              Immediate Postoperative Period              Goals:                          Diminish Pain and Inflammation  Maintain and Protect Integrity of the Repair                          Gradually Increase Passive ROM (NO Active or Active Assist until Week 6)                          Become Independent with Modified ADLs              Precautions:  Maintain Arm in Abduction Sling, Remove Only for Directed Exercises (may remove Abduction Pillow after Day 21 for comfort)                          Keep Incisions Clean & Dry (okay to shower in 48 hours, band-aids over incisions)  No Immersion (pool) until Wounds Totally Sealed (usually not prior to day  #10)  Passive Shoulder Motion ONLY, No Lifting/Holding Objects, Reaching Behind Back  Okay to Type/Write at Desktop with Arm in Sling              Day 0-6                          Elbow, Wrist, Hand AROM Exercises                           Start Cervical AROM and Scapula Isometrics                          Cryotherapy/Ice for Pain and Inflammation                          Instruct in Hygiene, Posture, and Positioning               Day 7-28                          Continue Above  May Start Pendulum Exercises                          May Start Supine, Pain Free, PT assisted PROM  Forward Flexion to 90°, External Rotation to 35° (Elbow at side), Internal Rotation to Body, Abduction to 60°                          Can Introduce light Cardio (Walking, Stationary Bike)                          Aqua Therapy may begin at week 3 (day 21) as long as no wound problems              Day 29-42                          Continue Above  Progress PROM to Goal of full PROM by Week 6.  May add Gentle Mobilizations (GH and Scapulothoracic) to Regain full PROM if Needed.  May add Heat prior to PROM Exercises, Ice after Exercises  May Begin AAROM at Day 29 if ROM is Appropriate in Anticipation of AROM Starting at Week 6              Criteria to Progress to Phase 2                          Reasonable Passive Forward Flexion, Abduction , IR/ER                          Time  Phase 2 (Weeks 6-12)              Protection and Active Motion              Goals of Phase:                          Allow Healing of Soft Tissues                          Decrease Pain and Inflammation  Add ADLs and Regain AROM by End of Phase              Precautions:                          NO STRENGTHENING until Phase 3                          Repair is Most Prone to Failure during this Phase!                          No Lifting Objects > 2 lbs (Coffee Cup OK), no Sudden Motions                          Avoid Upper Extremity Bike and Ergometer              Day  43-56                          Discontinue Sling  Initiate AROM Exercises (forward flexion, ER, IR and abduction), Rotator Cuff Isometrics                          Continue Periscapular Exercises, add Stretching if PROM Lacking   No Strengthening until Week 12 (Minimum Time Needed for Cuff Healing Sufficient to withstand Strengthening)                Phase 3 (Weeks 12-16)              Early Strengthening              Goal of Phase:                          Gain full AROM, Maintain PROM                          Gradual return of Shoulder Strength, Power and Endurance                          Gradual return to Functional Activities              Precautions:                          No Lifting > 10lbs, Sudden Lifting or Pushing activities, Overhead Lifting                          No Upper Extremity Bike or Ergometer              Week 12                          Initiate Strengthening Program (10 lb Maximum until Phase 4)                            ER/IR with Bands (Standing)                            ER in Lateral Decubitius Position                            Lateral Raises                            Full Can in Scapular Plane                            Prone Rowing, Horizontal Abduction, Extension                            Elbow Flexion/Extension              Week 14-16                          Initiate light Functional Activities as Permitted  Progress to Fundamental Shoulder Exercises  Phase 4 (Variable but Weeks 16-24)              Aggressive Rehab  Sport Specific or Activity Specific               Goals of Phase:                          Maintain Full Pain-free AROM                          Advanced Conditioning Exercises for enhanced Functional use                          Continue regaining Shoulder Strength, Power and Endurance                          Eventual return to full Functional Activities              Precautions:                          None              Week 16                          Continue  "ROM and stretching if appropriate                          Progress Strengthening, Proprioceptive and Neuromuscular Training                          Light Sports if Progressing Well (Chipping/Putting, easy ground strokes etc.)              Week 20                          Continue Strengthening and Stretching                          Initiate Interval Sports Program as Appropriate            Re-eval Date: 6/7/24  DOS:  5/3/24  Date 5/23 5/28      Visit Count 6 7      FOTO        Pain In  4/10 with med   4/10      Pain Out            Manuals 5/23 5/28 5.14 5.16 5.21   PROM LUE Supine  TT 10' Supine TT 10' **MJD  Pain-Free PTA assisted PROM MJD  Pain-Free PTA assisted PROM MJD  Pain-Free PTA assisted PROM                           Neuro Re-Ed                                                                Ther Ex        ultigrip Red 30x Green 30x      Ball squeeze Green  45xx Green 45x      Wrist AROM 30x ea dir 30x ea dir      L elbow ROM AROM L elbow 30x X30 AROM flexion and ext      Pendulum ex 30x ea dir X30 ea direction       UT/LS stretch 4 x 30\" self stretch  UT/LS  3 x 30\" ea Gentle self stretch UT/LS 3x30\" ea      Cerv ROM 5 reps ea dir 5 reps ea dir        Scap isometrics  10 x 5\" 10 x 5\"                      Self care/Pt. Educ                        Ther Activity                        Gait Training                        Modalities   5.14 5.16 5.21     Cryo 10 min with no adverse reaction  Cryo 10 min with no adverse reaction Cryo 10 min with no adverse reaction Cryo 10 min with no adverse reaction                          "

## 2024-05-28 NOTE — PROGRESS NOTES
Teresa Moa  tolerated hydration treatment well with no complications.      Teresa Mao is aware of future appt on 5/29 at 8AM.     AVS printed and given to Teresa Mao: No (Declined by Teresa Mao).

## 2024-05-28 NOTE — PLAN OF CARE
Problem: Potential for Falls  Goal: Patient will remain free of falls  Description: INTERVENTIONS:  - Educate patient/family on patient safety including physical limitations  - Instruct patient to call for assistance with activity   - Consult OT/PT to assist with strengthening/mobility   - Keep Call bell within reach  - Keep bed low and locked with side rails adjusted as appropriate  - Keep care items and personal belongings within reach  - Initiate and maintain comfort rounds  - Make Fall Risk Sign visible to staff  - Apply yellow socks and bracelet for high fall risk patients  - Consider moving patient to room near nurses station  Outcome: Progressing      Patient arrives ambulatory to triage with mask in place. Reports hx cyclical vomiting. Reports onset of vomiting on Tuesday.

## 2024-05-29 ENCOUNTER — HOSPITAL ENCOUNTER (OUTPATIENT)
Dept: INFUSION CENTER | Facility: HOSPITAL | Age: 69
Discharge: HOME | End: 2024-05-29
Attending: INTERNAL MEDICINE
Payer: MEDICARE

## 2024-05-29 VITALS
TEMPERATURE: 96.9 F | DIASTOLIC BLOOD PRESSURE: 82 MMHG | HEART RATE: 64 BPM | SYSTOLIC BLOOD PRESSURE: 123 MMHG | RESPIRATION RATE: 16 BRPM | OXYGEN SATURATION: 98 %

## 2024-05-29 PROCEDURE — 96360 HYDRATION IV INFUSION INIT: CPT

## 2024-05-29 PROCEDURE — 96361 HYDRATE IV INFUSION ADD-ON: CPT

## 2024-05-29 RX ORDER — SODIUM CHLORIDE 9 MG/ML
250 INJECTION, SOLUTION INTRAVENOUS ONCE
Status: COMPLETED | OUTPATIENT
Start: 2024-05-31 | End: 2024-05-31

## 2024-05-29 RX ADMIN — SODIUM CHLORIDE 250 ML/HR: 0.9 INJECTION, SOLUTION INTRAVENOUS at 08:11

## 2024-05-29 NOTE — PROGRESS NOTES
Patient tolerated IV hydration without issues.      Teresa Mao is aware of future appt on 5/30/24 at 0800.     AVS -  No (Declined by Teresa Mao) Patient has Mychart.    Patient ambulated off unit without incident.  All personal belongings taken with patient.

## 2024-05-30 ENCOUNTER — OFFICE VISIT (OUTPATIENT)
Dept: PHYSICAL THERAPY | Facility: CLINIC | Age: 69
End: 2024-05-30
Payer: MEDICARE

## 2024-05-30 ENCOUNTER — HOSPITAL ENCOUNTER (OUTPATIENT)
Dept: INFUSION CENTER | Facility: HOSPITAL | Age: 69
End: 2024-05-30
Attending: INTERNAL MEDICINE
Payer: MEDICARE

## 2024-05-30 VITALS
SYSTOLIC BLOOD PRESSURE: 155 MMHG | TEMPERATURE: 97 F | HEART RATE: 71 BPM | DIASTOLIC BLOOD PRESSURE: 77 MMHG | RESPIRATION RATE: 18 BRPM

## 2024-05-30 DIAGNOSIS — Z98.890 STATUS POST LEFT ROTATOR CUFF REPAIR: Primary | ICD-10-CM

## 2024-05-30 PROCEDURE — 97140 MANUAL THERAPY 1/> REGIONS: CPT

## 2024-05-30 PROCEDURE — 96360 HYDRATION IV INFUSION INIT: CPT

## 2024-05-30 PROCEDURE — 97110 THERAPEUTIC EXERCISES: CPT

## 2024-05-30 PROCEDURE — 96361 HYDRATE IV INFUSION ADD-ON: CPT

## 2024-05-30 RX ORDER — SODIUM CHLORIDE 9 MG/ML
250 INJECTION, SOLUTION INTRAVENOUS ONCE
Status: COMPLETED | OUTPATIENT
Start: 2024-06-03 | End: 2024-06-03

## 2024-05-30 RX ADMIN — SODIUM CHLORIDE 250 ML/HR: 0.9 INJECTION, SOLUTION INTRAVENOUS at 08:12

## 2024-05-30 NOTE — PROGRESS NOTES
Teresa Mao  tolerated hydration well with no complications.      Teresa Mao is aware of future appt on      AVS declined

## 2024-05-30 NOTE — PROGRESS NOTES
"Daily Note     Today's date: 2024  Patient name: Teresa Mao  : 1955  MRN: [id number]  Referring provider: Ilya Munoz*  Dx:   Encounter Diagnosis     ICD-10-CM    1. Status post left rotator cuff repair  Z98.890                      Subjective: Pt. Reports pain level @  shld currently = \"7\"10 w/o meds.  Says she only just took pain meds about 30 min ago.   Also notes a POTS flare most likely today, which she says has an effect on how her shld is feeling. .       Objective: See treatment diary below      Assessment: Tolerated treatment well. Patient exhibited good technique with therapeutic exercises and would benefit from continued PT.   Pain level decreased to \"3\"/10 by end of treatment session (with meds active).      Plan: Continue per plan of care.  Progress treament per protocol.            Precautions: see extensive PMH, ABRAMS,     FOLLOW PROTOCOL**, sling to be worn 6 weeks. Surgery date: 5/3/24    Rotator Cuff Repair Rehabilitation Protocol  (adapted from Milana OLMOS et al. “Rehabilitation of the Rotator Cuff: An Evaluation Based Approach”. JAAOS 2006; 14:599-609)  Updated 10.14   Ilya Munoz MD  [facility]  [address] [place] [id number]  [phone]  Phase 1 (Weeks 0-6)              Immediate Postoperative Period              Goals:                          Diminish Pain and Inflammation  Maintain and Protect Integrity of the Repair                          Gradually Increase Passive ROM (NO Active or Active Assist until Week 6)                          Become Independent with Modified ADLs              Precautions:  Maintain Arm in Abduction Sling, Remove Only for Directed Exercises (may remove Abduction Pillow after Day 21 for comfort)                          Keep Incisions Clean & Dry (okay to shower in 48 hours, band-aids over incisions)  No Immersion (pool) until Wounds Totally Sealed (usually not prior to day #10)  Passive " Shoulder Motion ONLY, No Lifting/Holding Objects, Reaching Behind Back  Okay to Type/Write at Desktop with Arm in Sling              Day 0-6                          Elbow, Wrist, Hand AROM Exercises                           Start Cervical AROM and Scapula Isometrics                          Cryotherapy/Ice for Pain and Inflammation                          Instruct in Hygiene, Posture, and Positioning               Day 7-28                          Continue Above  May Start Pendulum Exercises                          May Start Supine, Pain Free, PT assisted PROM  Forward Flexion to 90°, External Rotation to 35° (Elbow at side), Internal Rotation to Body, Abduction to 60°                          Can Introduce light Cardio (Walking, Stationary Bike)                          Aqua Therapy may begin at week 3 (day 21) as long as no wound problems              Day 29-42                          Continue Above  Progress PROM to Goal of full PROM by Week 6.  May add Gentle Mobilizations (GH and Scapulothoracic) to Regain full PROM if Needed.  May add Heat prior to PROM Exercises, Ice after Exercises  May Begin AAROM at Day 29 if ROM is Appropriate in Anticipation of AROM Starting at Week 6              Criteria to Progress to Phase 2                          Reasonable Passive Forward Flexion, Abduction , IR/ER                          Time  Phase 2 (Weeks 6-12)              Protection and Active Motion              Goals of Phase:                          Allow Healing of Soft Tissues                          Decrease Pain and Inflammation  Add ADLs and Regain AROM by End of Phase              Precautions:                          NO STRENGTHENING until Phase 3                          Repair is Most Prone to Failure during this Phase!                          No Lifting Objects > 2 lbs (Coffee Cup OK), no Sudden Motions                          Avoid Upper Extremity Bike and Ergometer              Day 43-56                           Discontinue Sling  Initiate AROM Exercises (forward flexion, ER, IR and abduction), Rotator Cuff Isometrics                          Continue Periscapular Exercises, add Stretching if PROM Lacking   No Strengthening until Week 12 (Minimum Time Needed for Cuff Healing Sufficient to withstand Strengthening)                Phase 3 (Weeks 12-16)              Early Strengthening              Goal of Phase:                          Gain full AROM, Maintain PROM                          Gradual return of Shoulder Strength, Power and Endurance                          Gradual return to Functional Activities              Precautions:                          No Lifting > 10lbs, Sudden Lifting or Pushing activities, Overhead Lifting                          No Upper Extremity Bike or Ergometer              Week 12                          Initiate Strengthening Program (10 lb Maximum until Phase 4)                            ER/IR with Bands (Standing)                            ER in Lateral Decubitius Position                            Lateral Raises                            Full Can in Scapular Plane                            Prone Rowing, Horizontal Abduction, Extension                            Elbow Flexion/Extension              Week 14-16                          Initiate light Functional Activities as Permitted  Progress to Fundamental Shoulder Exercises  Phase 4 (Variable but Weeks 16-24)              Aggressive Rehab  Sport Specific or Activity Specific               Goals of Phase:                          Maintain Full Pain-free AROM                          Advanced Conditioning Exercises for enhanced Functional use                          Continue regaining Shoulder Strength, Power and Endurance                          Eventual return to full Functional Activities              Precautions:                          None              Week 16                          Continue ROM and stretching  "if appropriate                          Progress Strengthening, Proprioceptive and Neuromuscular Training                          Light Sports if Progressing Well (Chipping/Putting, easy ground strokes etc.)              Week 20                          Continue Strengthening and Stretching                          Initiate Interval Sports Program as Appropriate            Re-eval Date: 6/7/24  DOS:  5/3/24  Date 5/23 5/28 5.30     Visit Count 6 7 8     FOTO   *NV     Pain In  4/10 with med   4/10 7/10 w/o meds     Pain Out   3/10 w/meds         Manuals 5/23 5/28 5.30 5.16 5.21   PROM LUE Supine  TT 10' Supine TT 10' Supine MJD  10' MJD  Pain-Free PTA assisted PROM MJD  Pain-Free PTA assisted PROM                           Neuro Re-Ed                                                                Ther Ex   5.30     ultigrip Red 30x Green 30x   Green 30x     Ball squeeze Green  45xx Green 45x Green 45x     Wrist AROM 30x ea dir 30x ea dir   40x ea dir     L elbow ROM AROM L elbow 30x X30 AROM flexion and ext X40 AROM flexion and ext     Pendulum ex 30x ea dir X30 ea direction  X40 ea direction      UT/LS stretch 4 x 30\" self stretch  UT/LS  3 x 30\" ea Gentle self stretch UT/LS 3x30\" ea Gentle self stretch UT/LS 3x30\" ea     Cerv ROM 5 reps ea dir 5 reps ea dir 5 reps ea dir       Scap isometrics  10 x 5\" 10 x 5\" 2x10 x 5\"                     Self care/Pt. Educ                        Ther Activity                        Gait Training                        Modalities   5.30 5.16 5.21     Cryo 10 min with no adverse reaction  Cryo 10 min with no adverse reaction Cryo 10 min with no adverse reaction Cryo 10 min with no adverse reaction                            "

## 2024-05-31 ENCOUNTER — HOSPITAL ENCOUNTER (OUTPATIENT)
Dept: INFUSION CENTER | Facility: HOSPITAL | Age: 69
End: 2024-05-31
Attending: INTERNAL MEDICINE
Payer: MEDICARE

## 2024-05-31 VITALS
DIASTOLIC BLOOD PRESSURE: 84 MMHG | HEART RATE: 88 BPM | RESPIRATION RATE: 16 BRPM | SYSTOLIC BLOOD PRESSURE: 139 MMHG | TEMPERATURE: 97.3 F

## 2024-05-31 PROCEDURE — 96361 HYDRATE IV INFUSION ADD-ON: CPT

## 2024-05-31 PROCEDURE — 96360 HYDRATION IV INFUSION INIT: CPT

## 2024-05-31 RX ORDER — SODIUM CHLORIDE 9 MG/ML
250 INJECTION, SOLUTION INTRAVENOUS ONCE
Status: COMPLETED | OUTPATIENT
Start: 2024-06-04 | End: 2024-06-04

## 2024-05-31 RX ADMIN — SODIUM CHLORIDE 250 ML/HR: 0.9 INJECTION, SOLUTION INTRAVENOUS at 08:15

## 2024-05-31 NOTE — PROGRESS NOTES
Teresa Mao  tolerated hydration well with no complications.      Teresa Mao is aware of future appt  Monday at 830    AVS printed and given to Teresa Mao:   No (Declined by Teresa Mao)

## 2024-06-03 ENCOUNTER — TELEPHONE (OUTPATIENT)
Dept: OBGYN CLINIC | Facility: HOSPITAL | Age: 69
End: 2024-06-03

## 2024-06-03 ENCOUNTER — HOSPITAL ENCOUNTER (OUTPATIENT)
Dept: INFUSION CENTER | Facility: HOSPITAL | Age: 69
Discharge: HOME | End: 2024-06-03
Attending: INTERNAL MEDICINE
Payer: MEDICARE

## 2024-06-03 VITALS
DIASTOLIC BLOOD PRESSURE: 72 MMHG | OXYGEN SATURATION: 99 % | SYSTOLIC BLOOD PRESSURE: 137 MMHG | RESPIRATION RATE: 18 BRPM | HEART RATE: 68 BPM | TEMPERATURE: 97 F

## 2024-06-03 RX ADMIN — SODIUM CHLORIDE 250 ML/HR: 0.9 INJECTION, SOLUTION INTRAVENOUS at 08:23

## 2024-06-03 NOTE — TELEPHONE ENCOUNTER
CLM on VM requesting that pt send picture via [employer] or if unable to do that CB and can give my work email at [email or web address].  Await CB.

## 2024-06-03 NOTE — PROGRESS NOTES
Teresa Mao  tolerated hydration twell with no complications.      Teresa Mao is aware of future appt tomorrow    AVS printed and given to Teresa Mao:  No (Declined by Teresa Mao)

## 2024-06-03 NOTE — PROGRESS NOTES
"Daily Note     Today's date: 2024  Patient name: Teresa Mao  : 1955  MRN: [id number]  Referring provider: Ilya Munoz*  Dx:   Encounter Diagnosis     ICD-10-CM    1. Status post left rotator cuff repair  Z98.890                      Subjective:  Pt. Reports pain level @ L shld = \"5\"/10 w/meds.      Objective: See treatment diary below      Assessment: Tolerated treatment well. Treatment progressed as per protocol timeline/guidelines, and as per 1*therapists direction..  Patient exhibited good technique with therapeutic exercises and would benefit from continued PT.      Plan: Continue per plan of care.  Progress treament per protocol.                Precautions: see extensive PMH, ABRAMS,     FOLLOW PROTOCOL**, sling to be worn 6 weeks. Surgery date: 5/3/24    Rotator Cuff Repair Rehabilitation Protocol  (adapted from Milana PJ et al. “Rehabilitation of the Rotator Cuff: An Evaluation Based Approach”. JAAOS 2006; 14:599-609)  Updated 10   Ilya Munoz MD  [facility]  [address] [place] [id number]  [phone]  Phase 1 (Weeks 0-6)              Immediate Postoperative Period              Goals:                          Diminish Pain and Inflammation  Maintain and Protect Integrity of the Repair                          Gradually Increase Passive ROM (NO Active or Active Assist until Week 6)                          Become Independent with Modified ADLs              Precautions:  Maintain Arm in Abduction Sling, Remove Only for Directed Exercises (may remove Abduction Pillow after Day 21 for comfort)                          Keep Incisions Clean & Dry (okay to shower in 48 hours, band-aids over incisions)  No Immersion (pool) until Wounds Totally Sealed (usually not prior to day #10)  Passive Shoulder Motion ONLY, No Lifting/Holding Objects, Reaching Behind Back  Okay to Type/Write at Desktop with Arm in Sling              Day 0-6             "              Elbow, Wrist, Hand AROM Exercises                           Start Cervical AROM and Scapula Isometrics                          Cryotherapy/Ice for Pain and Inflammation                          Instruct in Hygiene, Posture, and Positioning               Day 7-28                          Continue Above  May Start Pendulum Exercises                          May Start Supine, Pain Free, PT assisted PROM  Forward Flexion to 90°, External Rotation to 35° (Elbow at side), Internal Rotation to Body, Abduction to 60°                          Can Introduce light Cardio (Walking, Stationary Bike)                          Aqua Therapy may begin at week 3 (day 21) as long as no wound problems              Day 29-42                          Continue Above  Progress PROM to Goal of full PROM by Week 6.  May add Gentle Mobilizations (GH and Scapulothoracic) to Regain full PROM if Needed.  May add Heat prior to PROM Exercises, Ice after Exercises  May Begin AAROM at Day 29 if ROM is Appropriate in Anticipation of AROM Starting at Week 6              Criteria to Progress to Phase 2                          Reasonable Passive Forward Flexion, Abduction , IR/ER                          Time  Phase 2 (Weeks 6-12)              Protection and Active Motion              Goals of Phase:                          Allow Healing of Soft Tissues                          Decrease Pain and Inflammation  Add ADLs and Regain AROM by End of Phase              Precautions:                          NO STRENGTHENING until Phase 3                          Repair is Most Prone to Failure during this Phase!                          No Lifting Objects > 2 lbs (Coffee Cup OK), no Sudden Motions                          Avoid Upper Extremity Bike and Ergometer              Day 43-56                          Discontinue Sling  Initiate AROM Exercises (forward flexion, ER, IR and abduction), Rotator Cuff Isometrics                           Continue Periscapular Exercises, add Stretching if PROM Lacking   No Strengthening until Week 12 (Minimum Time Needed for Cuff Healing Sufficient to withstand Strengthening)                Phase 3 (Weeks 12-16)              Early Strengthening              Goal of Phase:                          Gain full AROM, Maintain PROM                          Gradual return of Shoulder Strength, Power and Endurance                          Gradual return to Functional Activities              Precautions:                          No Lifting > 10lbs, Sudden Lifting or Pushing activities, Overhead Lifting                          No Upper Extremity Bike or Ergometer              Week 12                          Initiate Strengthening Program (10 lb Maximum until Phase 4)                            ER/IR with Bands (Standing)                            ER in Lateral Decubitius Position                            Lateral Raises                            Full Can in Scapular Plane                            Prone Rowing, Horizontal Abduction, Extension                            Elbow Flexion/Extension              Week 14-16                          Initiate light Functional Activities as Permitted  Progress to Fundamental Shoulder Exercises  Phase 4 (Variable but Weeks 16-24)              Aggressive Rehab  Sport Specific or Activity Specific               Goals of Phase:                          Maintain Full Pain-free AROM                          Advanced Conditioning Exercises for enhanced Functional use                          Continue regaining Shoulder Strength, Power and Endurance                          Eventual return to full Functional Activities              Precautions:                          None              Week 16                          Continue ROM and stretching if appropriate                          Progress Strengthening, Proprioceptive and Neuromuscular Training                          Light Sports  "if Progressing Well (Chipping/Putting, easy ground strokes etc.)              Week 20                          Continue Strengthening and Stretching                          Initiate Interval Sports Program as Appropriate            Re-eval Date: 6/7/24  DOS:  5/3/24  Date 5/23 5/28 5.30 6.4    Visit Count 6 7 8 9    FOTO   *NV     Pain In  4/10 with med   4/10 7/10 w/o meds 5/10 w meds    Pain Out   3/10 w/meds Less after CP applic        Manuals 5/23 5/28 5.30 6.4 5.21   PROM LUE Supine  TT 10' Supine TT 10' Supine MJD  10' Supine MJD  10' MJD  Pain-Free PTA assisted PROM                           Neuro Re-Ed                                                                Ther Ex   5.30 6.4    ultigrip Red 30x Green 30x   Green 30x Green 40x    Ball squeeze Green  45xx Green 45x Green 45x BLUE 30x    Wrist AROM 30x ea dir 30x ea dir   40x ea dir 40x ea dir    L elbow ROM AROM L elbow 30x X30 AROM flexion and ext X40 AROM flexion and ext X40 AROM flexion and ext    Pendulum ex 30x ea dir X30 ea direction  X40 ea direction  X40 ea direction     UT/LS stretch 4 x 30\" self stretch  UT/LS  3 x 30\" ea Gentle self stretch UT/LS 3x30\" ea Gentle self stretch UT/LS 3x30\" ea Gentle self stretch UT/LS 3x30\" ea    Cerv ROM 5 reps ea dir 5 reps ea dir 5 reps ea dir 10 reps ea dir      Scap isometrics  10 x 5\" 10 x 5\" 2x10 x 5\" 2x10 x 5\"        **pulleys 30 reps AArom        **finger ladder  #21  5 reps FF    Self care/Pt. Educ    *Reviewed                    Ther Activity                        Gait Training                        Modalities   5.30 6.4 5.21     Cryo 10 min with no adverse reaction  Cryo 10 min with no adverse reaction Cryo 10 min with no adverse reaction Cryo 10 min with no adverse reaction                              "

## 2024-06-03 NOTE — TELEPHONE ENCOUNTER
LUANN on JERICA Pacheco's msg. Also let my work email if she would like to send picture via my email if cannot send via [employer] or call and make an appt.

## 2024-06-03 NOTE — TELEPHONE ENCOUNTER
Caller: Patient    Doctor: Tammy    Reason for call: Patient is 4 weeks a/p RCR and noticed a spot on her incision that had a pus pocket with redness.  She wanted to make the doctor aware and asking for recommendations.    Call back#: [phone]

## 2024-06-03 NOTE — TELEPHONE ENCOUNTER
Likely suture abscess at this point.  Happy to see picture or she can be seen if she is concerned.

## 2024-06-04 ENCOUNTER — OFFICE VISIT (OUTPATIENT)
Dept: PHYSICAL THERAPY | Facility: CLINIC | Age: 69
End: 2024-06-04
Payer: MEDICARE

## 2024-06-04 ENCOUNTER — HOSPITAL ENCOUNTER (OUTPATIENT)
Dept: INFUSION CENTER | Facility: HOSPITAL | Age: 69
Discharge: HOME | End: 2024-06-04
Attending: INTERNAL MEDICINE
Payer: MEDICARE

## 2024-06-04 VITALS
RESPIRATION RATE: 18 BRPM | TEMPERATURE: 97.6 F | OXYGEN SATURATION: 98 % | SYSTOLIC BLOOD PRESSURE: 139 MMHG | DIASTOLIC BLOOD PRESSURE: 85 MMHG | HEART RATE: 67 BPM

## 2024-06-04 DIAGNOSIS — Z98.890 STATUS POST LEFT ROTATOR CUFF REPAIR: Primary | ICD-10-CM

## 2024-06-04 PROCEDURE — 97535 SELF CARE MNGMENT TRAINING: CPT

## 2024-06-04 PROCEDURE — 97140 MANUAL THERAPY 1/> REGIONS: CPT

## 2024-06-04 PROCEDURE — 97110 THERAPEUTIC EXERCISES: CPT

## 2024-06-04 PROCEDURE — 96360 HYDRATION IV INFUSION INIT: CPT

## 2024-06-04 PROCEDURE — 96361 HYDRATE IV INFUSION ADD-ON: CPT

## 2024-06-04 RX ORDER — SODIUM CHLORIDE 9 MG/ML
250 INJECTION, SOLUTION INTRAVENOUS ONCE
Status: COMPLETED | OUTPATIENT
Start: 2024-06-06 | End: 2024-06-06

## 2024-06-04 RX ADMIN — SODIUM CHLORIDE 250 ML/HR: 0.9 INJECTION, SOLUTION INTRAVENOUS at 08:13

## 2024-06-04 NOTE — PROGRESS NOTES
Teresa Mao  tolerated hydration treatment well with no complications.      Teresa Mao is aware of future appt on 6/6 at 9AM.     AVS printed and given to Teresa Mao: No (Declined by Teresa Mao).

## 2024-06-05 RX ORDER — SODIUM CHLORIDE 9 MG/ML
250 INJECTION, SOLUTION INTRAVENOUS ONCE
Status: COMPLETED | OUTPATIENT
Start: 2024-06-07 | End: 2024-06-07

## 2024-06-06 ENCOUNTER — OFFICE VISIT (OUTPATIENT)
Dept: PHYSICAL THERAPY | Facility: CLINIC | Age: 69
End: 2024-06-06
Payer: MEDICARE

## 2024-06-06 ENCOUNTER — HOSPITAL ENCOUNTER (OUTPATIENT)
Dept: INFUSION CENTER | Facility: HOSPITAL | Age: 69
End: 2024-06-06
Attending: INTERNAL MEDICINE
Payer: MEDICARE

## 2024-06-06 VITALS
HEART RATE: 61 BPM | DIASTOLIC BLOOD PRESSURE: 83 MMHG | SYSTOLIC BLOOD PRESSURE: 132 MMHG | TEMPERATURE: 97.3 F | RESPIRATION RATE: 16 BRPM

## 2024-06-06 DIAGNOSIS — Z98.890 STATUS POST LEFT ROTATOR CUFF REPAIR: Primary | ICD-10-CM

## 2024-06-06 PROCEDURE — 97140 MANUAL THERAPY 1/> REGIONS: CPT

## 2024-06-06 PROCEDURE — 96361 HYDRATE IV INFUSION ADD-ON: CPT

## 2024-06-06 PROCEDURE — 96360 HYDRATION IV INFUSION INIT: CPT

## 2024-06-06 PROCEDURE — 97110 THERAPEUTIC EXERCISES: CPT

## 2024-06-06 RX ORDER — SODIUM CHLORIDE 9 MG/ML
250 INJECTION, SOLUTION INTRAVENOUS ONCE
Status: COMPLETED | OUTPATIENT
Start: 2024-06-10 | End: 2024-06-10

## 2024-06-06 RX ADMIN — SODIUM CHLORIDE 250 ML/HR: 0.9 INJECTION, SOLUTION INTRAVENOUS at 09:03

## 2024-06-06 NOTE — PROGRESS NOTES
Teresa Mao  tolerated hydration treatment well with no complications.      Teresa Mao is aware of future appt  6/7/24 [id number]    AVS printed and given to Teresa Mao:  No (Declined by Teresa Mao)

## 2024-06-06 NOTE — PROGRESS NOTES
"Daily Note     Today's date: 2024  Patient name: Teresa Mao  : 1955  MRN: [id number]  Referring provider: Ilya Munoz*  Dx:   Encounter Diagnosis     ICD-10-CM    1. Status post left rotator cuff repair  Z98.890                      Subjective:  Pt. Reports current pain level = \"4\"/10 with meds.      Objective: See treatment diary below      Assessment: Tolerated treatment well. Less pain by end of treatment session.  Patient exhibited good technique with therapeutic exercises and would benefit from continued PT. Good benefits from CP applic.       Plan: Continue per plan of care.  Progress treament per protocol.            Precautions: see extensive PMH, ABRAMS,     FOLLOW PROTOCOL**, sling to be worn 6 weeks. Surgery date: 5/3/24    Rotator Cuff Repair Rehabilitation Protocol  (adapted from Milana OLMOS et al. “Rehabilitation of the Rotator Cuff: An Evaluation Based Approach”. JAAOS 2006; 14:599-609)  Updated 10   Ilya Munoz MD  [facility]  [address] [place] [address]  [phone]  Phase 1 (Weeks 0-6)              Immediate Postoperative Period              Goals:                          Diminish Pain and Inflammation  Maintain and Protect Integrity of the Repair                          Gradually Increase Passive ROM (NO Active or Active Assist until Week 6)                          Become Independent with Modified ADLs              Precautions:  Maintain Arm in Abduction Sling, Remove Only for Directed Exercises (may remove Abduction Pillow after Day 21 for comfort)                          Keep Incisions Clean & Dry (okay to shower in 48 hours, band-aids over incisions)  No Immersion (pool) until Wounds Totally Sealed (usually not prior to day #10)  Passive Shoulder Motion ONLY, No Lifting/Holding Objects, Reaching Behind Back  Okay to Type/Write at Desktop with Arm in Sling              Day 0-6                          Elbow, " Wrist, Hand AROM Exercises                           Start Cervical AROM and Scapula Isometrics                          Cryotherapy/Ice for Pain and Inflammation                          Instruct in Hygiene, Posture, and Positioning               Day 7-28                          Continue Above  May Start Pendulum Exercises                          May Start Supine, Pain Free, PT assisted PROM  Forward Flexion to 90°, External Rotation to 35° (Elbow at side), Internal Rotation to Body, Abduction to 60°                          Can Introduce light Cardio (Walking, Stationary Bike)                          Aqua Therapy may begin at week 3 (day 21) as long as no wound problems              Day 29-42                          Continue Above  Progress PROM to Goal of full PROM by Week 6.  May add Gentle Mobilizations (GH and Scapulothoracic) to Regain full PROM if Needed.  May add Heat prior to PROM Exercises, Ice after Exercises  May Begin AAROM at Day 29 if ROM is Appropriate in Anticipation of AROM Starting at Week 6              Criteria to Progress to Phase 2                          Reasonable Passive Forward Flexion, Abduction , IR/ER                          Time  Phase 2 (Weeks 6-12)              Protection and Active Motion              Goals of Phase:                          Allow Healing of Soft Tissues                          Decrease Pain and Inflammation  Add ADLs and Regain AROM by End of Phase              Precautions:                          NO STRENGTHENING until Phase 3                          Repair is Most Prone to Failure during this Phase!                          No Lifting Objects > 2 lbs (Coffee Cup OK), no Sudden Motions                          Avoid Upper Extremity Bike and Ergometer              Day 43-56                          Discontinue Sling  Initiate AROM Exercises (forward flexion, ER, IR and abduction), Rotator Cuff Isometrics                          Continue Periscapular  Exercises, add Stretching if PROM Lacking   No Strengthening until Week 12 (Minimum Time Needed for Cuff Healing Sufficient to withstand Strengthening)                Phase 3 (Weeks 12-16)              Early Strengthening              Goal of Phase:                          Gain full AROM, Maintain PROM                          Gradual return of Shoulder Strength, Power and Endurance                          Gradual return to Functional Activities              Precautions:                          No Lifting > 10lbs, Sudden Lifting or Pushing activities, Overhead Lifting                          No Upper Extremity Bike or Ergometer              Week 12                          Initiate Strengthening Program (10 lb Maximum until Phase 4)                            ER/IR with Bands (Standing)                            ER in Lateral Decubitius Position                            Lateral Raises                            Full Can in Scapular Plane                            Prone Rowing, Horizontal Abduction, Extension                            Elbow Flexion/Extension              Week 14-16                          Initiate light Functional Activities as Permitted  Progress to Fundamental Shoulder Exercises  Phase 4 (Variable but Weeks 16-24)              Aggressive Rehab  Sport Specific or Activity Specific               Goals of Phase:                          Maintain Full Pain-free AROM                          Advanced Conditioning Exercises for enhanced Functional use                          Continue regaining Shoulder Strength, Power and Endurance                          Eventual return to full Functional Activities              Precautions:                          None              Week 16                          Continue ROM and stretching if appropriate                          Progress Strengthening, Proprioceptive and Neuromuscular Training                          Light Sports if Progressing Well  "(Chipping/Putting, easy ground strokes etc.)              Week 20                          Continue Strengthening and Stretching                          Initiate Interval Sports Program as Appropriate            Re-eval Date: 6/7/24  DOS:  5/3/24  Date 5/23 5/28 5.30 6.4 6.6   Visit Count 6 7 8 9 10   FOTO   *NV     Pain In  4/10 with med   4/10 7/10 w/o meds 5/10 w meds 4/10 w/meds   Pain Out   3/10 w/meds Less after CP applic Less after CP applic       Manuals 5/23 5/28 5.30 6.4 6.6   PROM LUE Supine  TT 10' Supine TT 10' Supine MJD  10' Supine MJD  10' Supine MJD  10'                           Neuro Re-Ed                                                                Ther Ex   5.30 6.4 6.6   ultigrip Red 30x Green 30x   Green 30x Green 40x Green 40x   Ball squeeze Green  45xx Green 45x Green 45x BLUE 30x BLUE 30x   Wrist AROM 30x ea dir 30x ea dir   40x ea dir 40x ea dir 45x ea dir   L elbow ROM AROM L elbow 30x X30 AROM flexion and ext X40 AROM flexion and ext X40 AROM flexion and ext X45 AROM flexion and ex   Pendulum ex 30x ea dir X30 ea direction  X40 ea direction  X40 ea direction  X45 ea direction   UT/LS stretch 4 x 30\" self stretch  UT/LS  3 x 30\" ea Gentle self stretch UT/LS 3x30\" ea Gentle self stretch UT/LS 3x30\" ea Gentle self stretch UT/LS 3x30\" ea Gentle self stretch UT/LS 4x30\" ea   Cerv ROM 5 reps ea dir 5 reps ea dir 5 reps ea dir 10 reps ea dir   10 reps ea dir   Scap isometrics  10 x 5\" 10 x 5\" 2x10 x 5\" 2x10 x 5\" 3x10 x 5\"       **pulleys 30 reps AArom pulleys 30 reps AArom       **finger ladder  #21  5 reps FF finger ladder  #21  5 reps FF   Self care/Pt. Educ    *Reviewed Reviewed                   Ther Activity                        Gait Training                        Modalities   5.30 6.4 6.6     Cryo 10 min with no adverse reaction  Cryo 10 min with no adverse reaction Cryo 10 min with no adverse reaction Cryo 10 min with no adverse reaction                                "

## 2024-06-07 ENCOUNTER — HOSPITAL ENCOUNTER (OUTPATIENT)
Dept: INFUSION CENTER | Facility: HOSPITAL | Age: 69
End: 2024-06-07
Attending: INTERNAL MEDICINE
Payer: MEDICARE

## 2024-06-07 VITALS
HEART RATE: 67 BPM | SYSTOLIC BLOOD PRESSURE: 124 MMHG | DIASTOLIC BLOOD PRESSURE: 86 MMHG | RESPIRATION RATE: 18 BRPM | TEMPERATURE: 97 F

## 2024-06-07 PROCEDURE — 96361 HYDRATE IV INFUSION ADD-ON: CPT

## 2024-06-07 PROCEDURE — 96360 HYDRATION IV INFUSION INIT: CPT

## 2024-06-07 RX ADMIN — SODIUM CHLORIDE 250 ML/HR: 0.9 INJECTION, SOLUTION INTRAVENOUS at 08:10

## 2024-06-07 NOTE — PROGRESS NOTES
Teresa Mao  tolerated hydration well with no complications.      Teresa Mao is aware of future appt on 6-10-24 at 800    AVS declined by Teresa Mao

## 2024-06-10 ENCOUNTER — HOSPITAL ENCOUNTER (OUTPATIENT)
Dept: INFUSION CENTER | Facility: HOSPITAL | Age: 69
Discharge: HOME | End: 2024-06-10
Attending: INTERNAL MEDICINE
Payer: MEDICARE

## 2024-06-10 VITALS
DIASTOLIC BLOOD PRESSURE: 78 MMHG | OXYGEN SATURATION: 100 % | SYSTOLIC BLOOD PRESSURE: 127 MMHG | HEART RATE: 68 BPM | TEMPERATURE: 97.8 F

## 2024-06-10 PROCEDURE — 96360 HYDRATION IV INFUSION INIT: CPT

## 2024-06-10 PROCEDURE — 96361 HYDRATE IV INFUSION ADD-ON: CPT

## 2024-06-10 RX ADMIN — SODIUM CHLORIDE 250 ML/HR: 0.9 INJECTION, SOLUTION INTRAVENOUS at 08:15

## 2024-06-10 NOTE — PROGRESS NOTES
Teresa Mao  tolerated hydration treatment well with no complications.      Teresa Mao is aware of future appt on 6/11 at AM.     AVS printed and given to Teresa Mao.

## 2024-06-11 ENCOUNTER — HOSPITAL ENCOUNTER (OUTPATIENT)
Dept: INFUSION CENTER | Facility: HOSPITAL | Age: 69
Discharge: HOME | End: 2024-06-11
Attending: INTERNAL MEDICINE
Payer: MEDICARE

## 2024-06-11 ENCOUNTER — APPOINTMENT (OUTPATIENT)
Dept: PHYSICAL THERAPY | Facility: CLINIC | Age: 69
End: 2024-06-11
Payer: MEDICARE

## 2024-06-11 VITALS
OXYGEN SATURATION: 97 % | RESPIRATION RATE: 16 BRPM | SYSTOLIC BLOOD PRESSURE: 149 MMHG | TEMPERATURE: 97.4 F | DIASTOLIC BLOOD PRESSURE: 93 MMHG | HEART RATE: 74 BPM

## 2024-06-11 DIAGNOSIS — Z98.890 STATUS POST LEFT ROTATOR CUFF REPAIR: Primary | ICD-10-CM

## 2024-06-11 PROCEDURE — 96360 HYDRATION IV INFUSION INIT: CPT

## 2024-06-11 PROCEDURE — 96361 HYDRATE IV INFUSION ADD-ON: CPT

## 2024-06-11 RX ORDER — METHYLPREDNISOLONE 8 MG/1
8 TABLET ORAL DAILY
COMMUNITY

## 2024-06-11 RX ADMIN — SODIUM CHLORIDE 1500 ML: 0.9 INJECTION, SOLUTION INTRAVENOUS at 08:07

## 2024-06-11 NOTE — PROGRESS NOTES
Teresa Mao  tolerated hydration well with no complications.      Teresa Mao is aware of future appt on thursday    AVS printed and given to Teresa Mao:   No (Declined by Teresa Mao)

## 2024-06-11 NOTE — PROGRESS NOTES
"Daily Note     Today's date: 2024  Patient name: Teresa Mao  : 1955  MRN: [id number]  Referring provider: Ilya Munoz*  Dx:   Encounter Diagnosis     ICD-10-CM    1. Status post left rotator cuff repair  Z98.890                      Subjective:       Objective: See treatment diary below      Assessment: Tolerated treatment {Tolerated treatment :[id number]}. Patient {assessment:[id number]}      Plan: Continue per plan of care.  Progress treament per protocol.              Precautions: see extensive PMH, ABRAMS,     FOLLOW PROTOCOL**, sling to be worn 6 weeks. Surgery date: 5/3/24                Re-eval Date: 24  DOS:  5/3/24  Date 6.11  5.30 6.4 6.6   Visit Count 11 7 8 9 10   FOTO   *NV     Pain In  /10 with med   4/10 7/10 w/o meds 5/10 w meds 4/10 w/meds   Pain Out   3/10 w/meds Less after CP applic Less after CP applic       Manuals 6.11  5.30 6.4 6.6   PROM LUE Supine MJD  10' Supine TT 10' Supine MJD  10' Supine MJD  10' Supine MJD  10'                           Neuro Re-Ed                                                                Ther Ex 6.11  5.30 6.4 6.6   ultigrip BLUE 30x Green 30x   Green 30x Green 40x Green 40x   Ball squeeze BLUE 40x Green 45x Green 45x BLUE 30x BLUE 30x   Wrist AROM 45x ea dir 30x ea dir   40x ea dir 40x ea dir 45x ea dir   L elbow ROM X45 AROM flexion and ex X30 AROM flexion and ext X40 AROM flexion and ext X40 AROM flexion and ext X45 AROM flexion and ex   Pendulum ex *HEP X30 ea direction  X40 ea direction  X40 ea direction  X45 ea direction   UT/LS stretch Gentle self stretch UT/LS 4x30\" ea Gentle self stretch UT/LS 3x30\" ea Gentle self stretch UT/LS 3x30\" ea Gentle self stretch UT/LS 3x30\" ea Gentle self stretch UT/LS 4x30\" ea   Cerv ROM 10 reps ea dir 5 reps ea dir 5 reps ea dir 10 reps ea dir   10 reps ea dir   Scap isometrics  3x10 x 5\" 10 x 5\" 2x10 x 5\" 2x10 x 5\" 3x10 x 5\"    pulleys 40 reps AArom   **pulleys 30 reps AArom pulleys " 30 reps AArom    finger ladder  #21  5 reps FF   **finger ladder  #21  5 reps FF finger ladder  #21  5 reps FF   Self care/Pt. Educ    *Reviewed Reviewed                   Ther Activity                        Gait Training                        Modalities 6.11  5.30 6.4 6.6    Cryo 10 min with no adverse reaction Cryo 10 min with no adverse reaction  Cryo 10 min with no adverse reaction Cryo 10 min with no adverse reaction Cryo 10 min with no adverse reaction               Rotator Cuff Repair Rehabilitation Protocol  (adapted from Milana PJ et al. “Rehabilitation of the Rotator Cuff: An Evaluation Based Approach”. JAAOS 2006; 14:599-609)  Updated 10.14   Ilya Munoz MD  [facility]  [address] [place] [address]  [place] [address]  [phone]  Phase 1 (Weeks 0-6)              Immediate Postoperative Period              Goals:                          Diminish Pain and Inflammation  Maintain and Protect Integrity of the Repair                          Gradually Increase Passive ROM (NO Active or Active Assist until Week 6)                          Become Independent with Modified ADLs              Precautions:  Maintain Arm in Abduction Sling, Remove Only for Directed Exercises (may remove Abduction Pillow after Day 21 for comfort)                          Keep Incisions Clean & Dry (okay to shower in 48 hours, band-aids over incisions)  No Immersion (pool) until Wounds Totally Sealed (usually not prior to day #10)  Passive Shoulder Motion ONLY, No Lifting/Holding Objects, Reaching Behind Back  Okay to Type/Write at Desktop with Arm in Sling              Day 0-6                          Elbow, Wrist, Hand AROM Exercises                           Start Cervical AROM and Scapula Isometrics                          Cryotherapy/Ice for Pain and Inflammation                          Instruct in Hygiene, Posture, and Positioning               Day 7-28                          Continue  Above  May Start Pendulum Exercises                          May Start Supine, Pain Free, PT assisted PROM  Forward Flexion to 90°, External Rotation to 35° (Elbow at side), Internal Rotation to Body, Abduction to 60°                          Can Introduce light Cardio (Walking, Stationary Bike)                          Aqua Therapy may begin at week 3 (day 21) as long as no wound problems              Day 29-42                          Continue Above  Progress PROM to Goal of full PROM by Week 6.  May add Gentle Mobilizations (GH and Scapulothoracic) to Regain full PROM if Needed.  May add Heat prior to PROM Exercises, Ice after Exercises  May Begin AAROM at Day 29 if ROM is Appropriate in Anticipation of AROM Starting at Week 6              Criteria to Progress to Phase 2                          Reasonable Passive Forward Flexion, Abduction , IR/ER                          Time  Phase 2 (Weeks 6-12)              Protection and Active Motion              Goals of Phase:                          Allow Healing of Soft Tissues                          Decrease Pain and Inflammation  Add ADLs and Regain AROM by End of Phase              Precautions:                          NO STRENGTHENING until Phase 3                          Repair is Most Prone to Failure during this Phase!                          No Lifting Objects > 2 lbs (Coffee Cup OK), no Sudden Motions                          Avoid Upper Extremity Bike and Ergometer              Day 43-56                          Discontinue Sling  Initiate AROM Exercises (forward flexion, ER, IR and abduction), Rotator Cuff Isometrics                          Continue Periscapular Exercises, add Stretching if PROM Lacking   No Strengthening until Week 12 (Minimum Time Needed for Cuff Healing Sufficient to withstand Strengthening)                Phase 3 (Weeks 12-16)              Early Strengthening              Goal of Phase:                          Gain full AROM,  Maintain PROM                          Gradual return of Shoulder Strength, Power and Endurance                          Gradual return to Functional Activities              Precautions:                          No Lifting > 10lbs, Sudden Lifting or Pushing activities, Overhead Lifting                          No Upper Extremity Bike or Ergometer              Week 12                          Initiate Strengthening Program (10 lb Maximum until Phase 4)                            ER/IR with Bands (Standing)                            ER in Lateral Decubitius Position                            Lateral Raises                            Full Can in Scapular Plane                            Prone Rowing, Horizontal Abduction, Extension                            Elbow Flexion/Extension              Week 14-16                          Initiate light Functional Activities as Permitted  Progress to Fundamental Shoulder Exercises  Phase 4 (Variable but Weeks 16-24)              Aggressive Rehab  Sport Specific or Activity Specific               Goals of Phase:                          Maintain Full Pain-free AROM                          Advanced Conditioning Exercises for enhanced Functional use                          Continue regaining Shoulder Strength, Power and Endurance                          Eventual return to full Functional Activities              Precautions:                          None              Week 16                          Continue ROM and stretching if appropriate                          Progress Strengthening, Proprioceptive and Neuromuscular Training                          Light Sports if Progressing Well (Chipping/Putting, easy ground strokes etc.)              Week 20                          Continue Strengthening and Stretching                          Initiate Interval Sports Program as Appropriate

## 2024-06-12 NOTE — PROGRESS NOTES
"Daily Note     Today's date: 2024  Patient name: Teresa Mao  : 1955  MRN: [id number]  Referring provider: Ilya Munoz*  Dx:   Encounter Diagnosis     ICD-10-CM    1. Status post left rotator cuff repair  Z98.890                      Subjective:   Pt. reports pain level currently = 4-5/10/with meds.      Objective: See treatment diary below      Assessment: Tolerated treatment well.  Pt. progressing appropriately and consistently within protocol guidelines.  Patient exhibited good technique with therapeutic exercises and would benefit from continued PT.      Plan: Continue per plan of care.  Progress treament per protocol.            Precautions: see extensive PMH, ABRAMS,     FOLLOW PROTOCOL**, sling to be worn 6 weeks. Surgery date: 5/3/24                Re-eval Date: 24  DOS:  5/3/24  Date 6.13  5.30 6.4 6.6   Visit Count 11 7 8 9 10   FOTO   *NV     Pain In  4-5/10 with meds   4/10 7/10 w/o meds 5/10 w meds 4/10 w/meds   Pain Out /10  3/10 w/meds Less after CP applic Less after CP applic       Manuals 6.13  5.30 6.4 6.6   PROM LUE Supine MJD  10' Supine TT 10' Supine MJD  10' Supine MJD  10' Supine MJD  10'                           Neuro Re-Ed                                                                Ther Ex 6.13  5.30 6.4 6.6   ultigrip D/C Green 30x   Green 30x Green 40x Green 40x   Ball squeeze D/C Green 45x Green 45x BLUE 30x BLUE 30x   Wrist AROM 1# 30x ea dir 30x ea dir   40x ea dir 40x ea dir 45x ea dir   L elbow ROM 1#X40 AROM flexion and ex X30 AROM flexion and ext X40 AROM flexion and ext X40 AROM flexion and ext X45 AROM flexion and ex   Pendulum ex *HEP X30 ea direction  X40 ea direction  X40 ea direction  X45 ea direction   UT/LS stretch Gentle self stretch UT/LS 4x30\" ea Gentle self stretch UT/LS 3x30\" ea Gentle self stretch UT/LS 3x30\" ea Gentle self stretch UT/LS 3x30\" ea Gentle self stretch UT/LS 4x30\" ea    **Isometrics  3 way 5x/5\"       Cerv ROM " "15 reps ea dir 5 reps ea dir 5 reps ea dir 10 reps ea dir   10 reps ea dir   Scap isometrics  3x10 x 5\" 10 x 5\" 2x10 x 5\" 2x10 x 5\" 3x10 x 5\"    pulleys 40 reps AArom   **pulleys 30 reps AArom pulleys 30 reps AArom    finger ladder  #24  6 reps FF   **finger ladder  #21  5 reps FF finger ladder  #21  5 reps FF   Self care/Pt. Educ    *Reviewed Reviewed                   Ther Activity                        Gait Training                        Modalities 6.13  5.30 6.4 6.6    Cryo 10 min to L shld with no adverse reaction Cryo 10 min with no adverse reaction  Cryo 10 min with no adverse reaction Cryo 10 min with no adverse reaction Cryo 10 min with no adverse reaction    *MHP 10 min to L UT           Rotator Cuff Repair Rehabilitation Protocol  (adapted from Milana OLMOS et al. “Rehabilitation of the Rotator Cuff: An Evaluation Based Approach”. JAAOS 2006; 14:599-609)  Updated 10.14   Ilya Munoz MD  [facility]  [address] [place] [address]  [phone]  Phase 1 (Weeks 0-6)              Immediate Postoperative Period              Goals:                          Diminish Pain and Inflammation  Maintain and Protect Integrity of the Repair                          Gradually Increase Passive ROM (NO Active or Active Assist until Week 6)                          Become Independent with Modified ADLs              Precautions:  Maintain Arm in Abduction Sling, Remove Only for Directed Exercises (may remove Abduction Pillow after Day 21 for comfort)                          Keep Incisions Clean & Dry (okay to shower in 48 hours, band-aids over incisions)  No Immersion (pool) until Wounds Totally Sealed (usually not prior to day #10)  Passive Shoulder Motion ONLY, No Lifting/Holding Objects, Reaching Behind Back  Okay to Type/Write at Desktop with Arm in Sling              Day 0-6                          Elbow, Wrist, Hand AROM Exercises                           Start Cervical " AROM and Scapula Isometrics                          Cryotherapy/Ice for Pain and Inflammation                          Instruct in Hygiene, Posture, and Positioning               Day 7-28                          Continue Above  May Start Pendulum Exercises                          May Start Supine, Pain Free, PT assisted PROM  Forward Flexion to 90°, External Rotation to 35° (Elbow at side), Internal Rotation to Body, Abduction to 60°                          Can Introduce light Cardio (Walking, Stationary Bike)                          Aqua Therapy may begin at week 3 (day 21) as long as no wound problems              Day 29-42                          Continue Above  Progress PROM to Goal of full PROM by Week 6.  May add Gentle Mobilizations (GH and Scapulothoracic) to Regain full PROM if Needed.  May add Heat prior to PROM Exercises, Ice after Exercises  May Begin AAROM at Day 29 if ROM is Appropriate in Anticipation of AROM Starting at Week 6              Criteria to Progress to Phase 2                          Reasonable Passive Forward Flexion, Abduction , IR/ER                          Time  Phase 2 (Weeks 6-12)              Protection and Active Motion              Goals of Phase:                          Allow Healing of Soft Tissues                          Decrease Pain and Inflammation  Add ADLs and Regain AROM by End of Phase              Precautions:                          NO STRENGTHENING until Phase 3                          Repair is Most Prone to Failure during this Phase!                          No Lifting Objects > 2 lbs (Coffee Cup OK), no Sudden Motions                          Avoid Upper Extremity Bike and Ergometer              Day 43-56                          Discontinue Sling  Initiate AROM Exercises (forward flexion, ER, IR and abduction), Rotator Cuff Isometrics                          Continue Periscapular Exercises, add Stretching if PROM Lacking   No Strengthening until Week  12 (Minimum Time Needed for Cuff Healing Sufficient to withstand Strengthening)                Phase 3 (Weeks 12-16)              Early Strengthening              Goal of Phase:                          Gain full AROM, Maintain PROM                          Gradual return of Shoulder Strength, Power and Endurance                          Gradual return to Functional Activities              Precautions:                          No Lifting > 10lbs, Sudden Lifting or Pushing activities, Overhead Lifting                          No Upper Extremity Bike or Ergometer              Week 12                          Initiate Strengthening Program (10 lb Maximum until Phase 4)                            ER/IR with Bands (Standing)                            ER in Lateral Decubitius Position                            Lateral Raises                            Full Can in Scapular Plane                            Prone Rowing, Horizontal Abduction, Extension                            Elbow Flexion/Extension              Week 14-16                          Initiate light Functional Activities as Permitted  Progress to Fundamental Shoulder Exercises  Phase 4 (Variable but Weeks 16-24)              Aggressive Rehab  Sport Specific or Activity Specific               Goals of Phase:                          Maintain Full Pain-free AROM                          Advanced Conditioning Exercises for enhanced Functional use                          Continue regaining Shoulder Strength, Power and Endurance                          Eventual return to full Functional Activities              Precautions:                          None              Week 16                          Continue ROM and stretching if appropriate                          Progress Strengthening, Proprioceptive and Neuromuscular Training                          Light Sports if Progressing Well (Chipping/Putting, easy ground strokes etc.)              Week 20                           Continue Strengthening and Stretching                          Initiate Interval Sports Program as Appropriate

## 2024-06-13 ENCOUNTER — OFFICE VISIT (OUTPATIENT)
Dept: PHYSICAL THERAPY | Facility: CLINIC | Age: 69
End: 2024-06-13
Payer: MEDICARE

## 2024-06-13 ENCOUNTER — HOSPITAL ENCOUNTER (OUTPATIENT)
Dept: INFUSION CENTER | Facility: HOSPITAL | Age: 69
End: 2024-06-13
Attending: INTERNAL MEDICINE
Payer: MEDICARE

## 2024-06-13 VITALS
RESPIRATION RATE: 18 BRPM | HEART RATE: 73 BPM | SYSTOLIC BLOOD PRESSURE: 144 MMHG | TEMPERATURE: 97.4 F | DIASTOLIC BLOOD PRESSURE: 83 MMHG

## 2024-06-13 DIAGNOSIS — Z98.890 STATUS POST LEFT ROTATOR CUFF REPAIR: Primary | ICD-10-CM

## 2024-06-13 PROCEDURE — 97140 MANUAL THERAPY 1/> REGIONS: CPT

## 2024-06-13 PROCEDURE — 96361 HYDRATE IV INFUSION ADD-ON: CPT

## 2024-06-13 PROCEDURE — 97110 THERAPEUTIC EXERCISES: CPT

## 2024-06-13 PROCEDURE — 96360 HYDRATION IV INFUSION INIT: CPT

## 2024-06-13 RX ORDER — SODIUM CHLORIDE 9 MG/ML
250 INJECTION, SOLUTION INTRAVENOUS ONCE
Status: COMPLETED | OUTPATIENT
Start: 2024-06-17 | End: 2024-06-17

## 2024-06-13 RX ADMIN — SODIUM CHLORIDE 1500 ML: 0.9 INJECTION, SOLUTION INTRAVENOUS at 08:37

## 2024-06-13 NOTE — PROGRESS NOTES
Teresa Mao  tolerated treatment well with no complications.      Teresa Mao is aware of future appt on 6/14 at 8:30 am.     AVS declined by Teresa Mao.

## 2024-06-14 ENCOUNTER — TELEPHONE (OUTPATIENT)
Age: 69
End: 2024-06-14

## 2024-06-14 ENCOUNTER — HOSPITAL ENCOUNTER (OUTPATIENT)
Dept: INFUSION CENTER | Facility: HOSPITAL | Age: 69
End: 2024-06-14
Attending: INTERNAL MEDICINE
Payer: MEDICARE

## 2024-06-14 VITALS
RESPIRATION RATE: 18 BRPM | HEART RATE: 80 BPM | TEMPERATURE: 96.7 F | SYSTOLIC BLOOD PRESSURE: 144 MMHG | DIASTOLIC BLOOD PRESSURE: 88 MMHG

## 2024-06-14 DIAGNOSIS — G03.9 ARACHNOIDITIS: ICD-10-CM

## 2024-06-14 PROCEDURE — 96360 HYDRATION IV INFUSION INIT: CPT

## 2024-06-14 PROCEDURE — 96361 HYDRATE IV INFUSION ADD-ON: CPT

## 2024-06-14 RX ORDER — HYDROCODONE BITARTRATE AND ACETAMINOPHEN 5; 325 MG/1; MG/1
1 TABLET ORAL 2 TIMES DAILY PRN
Qty: 60 TABLET | Refills: 0 | Status: SHIPPED | OUTPATIENT
Start: 2024-06-14

## 2024-06-14 RX ORDER — SODIUM CHLORIDE 9 MG/ML
250 INJECTION, SOLUTION INTRAVENOUS ONCE
Status: COMPLETED | OUTPATIENT
Start: 2024-06-18 | End: 2024-06-18

## 2024-06-14 RX ADMIN — SODIUM CHLORIDE 1500 ML: 0.9 INJECTION, SOLUTION INTRAVENOUS at 08:31

## 2024-06-14 NOTE — TELEPHONE ENCOUNTER
S/w pharmacist at [facility], [place] rx is available but they can not fill because Yodit needs to put her supervising physicians name and medical license number on the rx. RN asked pharmacist if I can provide she said no she needs it on the actual rx, requesting new rx with information. RM's name and license # [id number]

## 2024-06-14 NOTE — TELEPHONE ENCOUNTER
Caller: patient    Doctor: RAZ    Reason for call: calling for her hydrocodone refill. Patient unable to , stated need to be sign by  provider    Call back#:

## 2024-06-14 NOTE — PROGRESS NOTES
Teresa Mao  tolerated treatment well with no complications.      Teresa Mao is aware of future appt on 6/17 at 8:30 am.     AVS declined by Teresa Mao.

## 2024-06-17 ENCOUNTER — HOSPITAL ENCOUNTER (OUTPATIENT)
Dept: INFUSION CENTER | Facility: HOSPITAL | Age: 69
Discharge: HOME | End: 2024-06-17
Attending: INTERNAL MEDICINE
Payer: MEDICARE

## 2024-06-17 VITALS
HEART RATE: 77 BPM | TEMPERATURE: 96.9 F | OXYGEN SATURATION: 96 % | SYSTOLIC BLOOD PRESSURE: 139 MMHG | RESPIRATION RATE: 16 BRPM | DIASTOLIC BLOOD PRESSURE: 93 MMHG

## 2024-06-17 PROCEDURE — 96361 HYDRATE IV INFUSION ADD-ON: CPT

## 2024-06-17 PROCEDURE — 96360 HYDRATION IV INFUSION INIT: CPT

## 2024-06-17 RX ADMIN — SODIUM CHLORIDE 250 ML/HR: 0.9 INJECTION, SOLUTION INTRAVENOUS at 08:45

## 2024-06-17 NOTE — PROGRESS NOTES
Teresa Mao received hydration without incident.     Teresa Mao is aware of future appt on 6-18-24 at 830     AVS declined by Teresa Mao

## 2024-06-18 ENCOUNTER — HOSPITAL ENCOUNTER (OUTPATIENT)
Dept: INFUSION CENTER | Facility: HOSPITAL | Age: 69
Discharge: HOME | End: 2024-06-18
Attending: INTERNAL MEDICINE
Payer: MEDICARE

## 2024-06-18 ENCOUNTER — OFFICE VISIT (OUTPATIENT)
Dept: PHYSICAL THERAPY | Facility: CLINIC | Age: 69
End: 2024-06-18
Payer: MEDICARE

## 2024-06-18 VITALS
TEMPERATURE: 97.7 F | SYSTOLIC BLOOD PRESSURE: 158 MMHG | HEART RATE: 66 BPM | OXYGEN SATURATION: 100 % | RESPIRATION RATE: 17 BRPM | DIASTOLIC BLOOD PRESSURE: 83 MMHG

## 2024-06-18 DIAGNOSIS — Z98.890 STATUS POST LEFT ROTATOR CUFF REPAIR: Primary | ICD-10-CM

## 2024-06-18 PROCEDURE — 97110 THERAPEUTIC EXERCISES: CPT

## 2024-06-18 PROCEDURE — 97140 MANUAL THERAPY 1/> REGIONS: CPT

## 2024-06-18 RX ADMIN — SODIUM CHLORIDE 250 ML/HR: 0.9 INJECTION, SOLUTION INTRAVENOUS at 08:43

## 2024-06-18 NOTE — PROGRESS NOTES
"Daily Note     Today's date: 2024  Patient name: Teresa Mao  : 1955  MRN: [id number]  Referring provider: Ilya Munoz*  Dx:   Encounter Diagnosis     ICD-10-CM    1. Status post left rotator cuff repair  Z98.890                      Subjective:   Pt. reports current pain level @ L shld = 3-4/10 (ache) ~ w/o meds.      Objective: See treatment diary below      Assessment: Tolerated treatment well. Patient exhibited good technique with therapeutic exercises and would benefit from continued PT.      Plan: Continue per plan of care.  Progress treament per protocol.            Precautions: see extensive PMH, ABRAMS,     FOLLOW PROTOCOL**, sling to be worn 6 weeks. Surgery date: 5/3/24                Re-eval Date: 24  DOS:  5/3/24  Date 6.13 6.18 5.30 6.4 6.6   Visit Count 11 12 8 9 10   FOTO   *NV     Pain In  4-5/10 with meds  3-4/10 ache w/o meds 7/10 w/o meds 5/10 w meds 4/10 w/meds   Pain Out 2/10 No negative change 3/10 w/meds Less after CP applic Less after CP applic       Manuals 6.13 6.18 5.30 6.4 6.6   PROM LUE Supine MJD  10' Supine MJD 10' Supine MJD  10' Supine MJD  10' Supine MJD  10'                           Neuro Re-Ed                                                                Ther Ex 6.13 6.18 5.30 6.4 6.6   ultigrip D/C    Green 30x Green 40x Green 40x   Ball squeeze D/C  Green 45x BLUE 30x BLUE 30x   Wrist AROM 1# 30x ea dir 1# 30x ea dir   40x ea dir 40x ea dir 45x ea dir   L elbow ROM 1#X40 AROM flexion and ex 1# X45 AROM flexion and ext X40 AROM flexion and ext X40 AROM flexion and ext X45 AROM flexion and ex   Pendulum ex *HEP  X40 ea direction  X40 ea direction  X45 ea direction   UT/LS stretch Gentle self stretch UT/LS 4x30\" ea Gentle self stretch UT/LS 4x30\" ea Gentle self stretch UT/LS 3x30\" ea Gentle self stretch UT/LS 3x30\" ea Gentle self stretch UT/LS 4x30\" ea    **Isometrics  3 way 5x/5\" Isometrics  3 way 7x/5\"      Cerv ROM 15 reps ea dir 15 reps ea " "dir 5 reps ea dir 10 reps ea dir   10 reps ea dir   Scap isometrics  3x10 x 5\" 3x15/5\" 2x10 x 5\" 2x10 x 5\" 3x10 x 5\"    pulleys 40 reps AArom pulleys 60 reps AArom  **pulleys 30 reps AArom pulleys 30 reps AArom    finger ladder  #24  6 reps FF finger ladder  #26  6 reps FF  **finger ladder  #21  5 reps FF finger ladder  #21  5 reps FF   Self care/Pt. Educ    *Reviewed Reviewed                   Ther Activity                        Gait Training                        Modalities 6.13 6.18 5.30 6.4 6.6    Cryo 10 min to L shld with no adverse reaction Cryo 10 min to L shld with no adverse reaction Cryo 10 min with no adverse reaction Cryo 10 min with no adverse reaction Cryo 10 min with no adverse reaction    *MHP 10 min to L UT MHP 10 min to L UT          Rotator Cuff Repair Rehabilitation Protocol  (adapted from Milana PJ et al. “Rehabilitation of the Rotator Cuff: An Evaluation Based Approach”. JAJOSE MANUELS 2006; 14:599-609)  Updated 10.14   Ilya Munoz MD  [facility]  [address] [place] [address]  [phone]  Phase 1 (Weeks 0-6)              Immediate Postoperative Period              Goals:                          Diminish Pain and Inflammation  Maintain and Protect Integrity of the Repair                          Gradually Increase Passive ROM (NO Active or Active Assist until Week 6)                          Become Independent with Modified ADLs              Precautions:  Maintain Arm in Abduction Sling, Remove Only for Directed Exercises (may remove Abduction Pillow after Day 21 for comfort)                          Keep Incisions Clean & Dry (okay to shower in 48 hours, band-aids over incisions)  No Immersion (pool) until Wounds Totally Sealed (usually not prior to day #10)  Passive Shoulder Motion ONLY, No Lifting/Holding Objects, Reaching Behind Back  Okay to Type/Write at Desktop with Arm in Sling              Day 0-6                          Elbow, Wrist, Hand " AROM Exercises                           Start Cervical AROM and Scapula Isometrics                          Cryotherapy/Ice for Pain and Inflammation                          Instruct in Hygiene, Posture, and Positioning               Day 7-28                          Continue Above  May Start Pendulum Exercises                          May Start Supine, Pain Free, PT assisted PROM  Forward Flexion to 90°, External Rotation to 35° (Elbow at side), Internal Rotation to Body, Abduction to 60°                          Can Introduce light Cardio (Walking, Stationary Bike)                          Aqua Therapy may begin at week 3 (day 21) as long as no wound problems              Day 29-42                          Continue Above  Progress PROM to Goal of full PROM by Week 6.  May add Gentle Mobilizations (GH and Scapulothoracic) to Regain full PROM if Needed.  May add Heat prior to PROM Exercises, Ice after Exercises  May Begin AAROM at Day 29 if ROM is Appropriate in Anticipation of AROM Starting at Week 6              Criteria to Progress to Phase 2                          Reasonable Passive Forward Flexion, Abduction , IR/ER                          Time  Phase 2 (Weeks 6-12)              Protection and Active Motion              Goals of Phase:                          Allow Healing of Soft Tissues                          Decrease Pain and Inflammation  Add ADLs and Regain AROM by End of Phase              Precautions:                          NO STRENGTHENING until Phase 3                          Repair is Most Prone to Failure during this Phase!                          No Lifting Objects > 2 lbs (Coffee Cup OK), no Sudden Motions                          Avoid Upper Extremity Bike and Ergometer              Day 43-56                          Discontinue Sling  Initiate AROM Exercises (forward flexion, ER, IR and abduction), Rotator Cuff Isometrics                          Continue Periscapular Exercises, add  Stretching if PROM Lacking   No Strengthening until Week 12 (Minimum Time Needed for Cuff Healing Sufficient to withstand Strengthening)                Phase 3 (Weeks 12-16)              Early Strengthening              Goal of Phase:                          Gain full AROM, Maintain PROM                          Gradual return of Shoulder Strength, Power and Endurance                          Gradual return to Functional Activities              Precautions:                          No Lifting > 10lbs, Sudden Lifting or Pushing activities, Overhead Lifting                          No Upper Extremity Bike or Ergometer              Week 12                          Initiate Strengthening Program (10 lb Maximum until Phase 4)                            ER/IR with Bands (Standing)                            ER in Lateral Decubitius Position                            Lateral Raises                            Full Can in Scapular Plane                            Prone Rowing, Horizontal Abduction, Extension                            Elbow Flexion/Extension              Week 14-16                          Initiate light Functional Activities as Permitted  Progress to Fundamental Shoulder Exercises  Phase 4 (Variable but Weeks 16-24)              Aggressive Rehab  Sport Specific or Activity Specific               Goals of Phase:                          Maintain Full Pain-free AROM                          Advanced Conditioning Exercises for enhanced Functional use                          Continue regaining Shoulder Strength, Power and Endurance                          Eventual return to full Functional Activities              Precautions:                          None              Week 16                          Continue ROM and stretching if appropriate                          Progress Strengthening, Proprioceptive and Neuromuscular Training                          Light Sports if Progressing Well (Chipping/Putting,  easy ground strokes etc.)              Week 20                          Continue Strengthening and Stretching                          Initiate Interval Sports Program as Appropriate

## 2024-06-18 NOTE — PROGRESS NOTES
Patient tolerated IV hydration without issues.      Teresa Mao is aware of future appt on 6/19/24 at 0900.     AVS -  No (Declined by Teresa Mao) Patient has Mychart.    Patient ambulated off unit without incident.  All personal belongings taken with patient.

## 2024-06-20 ENCOUNTER — APPOINTMENT (OUTPATIENT)
Dept: PHYSICAL THERAPY | Facility: CLINIC | Age: 69
End: 2024-06-20
Payer: MEDICARE

## 2024-06-21 ENCOUNTER — HOSPITAL ENCOUNTER (OUTPATIENT)
Dept: INFUSION CENTER | Facility: HOSPITAL | Age: 69
End: 2024-06-21
Attending: INTERNAL MEDICINE
Payer: MEDICARE

## 2024-06-21 VITALS
RESPIRATION RATE: 16 BRPM | OXYGEN SATURATION: 99 % | SYSTOLIC BLOOD PRESSURE: 128 MMHG | DIASTOLIC BLOOD PRESSURE: 80 MMHG | HEART RATE: 67 BPM | TEMPERATURE: 96.9 F

## 2024-06-21 PROCEDURE — 96360 HYDRATION IV INFUSION INIT: CPT

## 2024-06-21 PROCEDURE — 96361 HYDRATE IV INFUSION ADD-ON: CPT

## 2024-06-21 RX ADMIN — SODIUM CHLORIDE 1500 ML: 0.9 INJECTION, SOLUTION INTRAVENOUS at 08:39

## 2024-06-21 NOTE — PROGRESS NOTES
Teresa Mao received hydration without incident.     Teresa Mao is aware of future appt on  6-24-24 at 830     AVS declined by Teresa Mao

## 2024-06-24 ENCOUNTER — HOSPITAL ENCOUNTER (OUTPATIENT)
Dept: INFUSION CENTER | Facility: HOSPITAL | Age: 69
Discharge: HOME | End: 2024-06-24
Attending: INTERNAL MEDICINE
Payer: MEDICARE

## 2024-06-24 VITALS
SYSTOLIC BLOOD PRESSURE: 151 MMHG | DIASTOLIC BLOOD PRESSURE: 81 MMHG | TEMPERATURE: 97.7 F | OXYGEN SATURATION: 97 % | RESPIRATION RATE: 17 BRPM | HEART RATE: 67 BPM

## 2024-06-24 RX ADMIN — SODIUM CHLORIDE 1500 ML: 0.9 INJECTION, SOLUTION INTRAVENOUS at 08:59

## 2024-06-24 NOTE — PROGRESS NOTES
Patient tolerated IV hydration-NSS without issues.     Teresa Mao is aware of future appt on 6/27/24 at 0900.     AVS - No (Declined by Teresa Mao) Patient has Mychart.    Patient ambulated off unit without incident.  All personal belongings taken with patient.

## 2024-06-25 ENCOUNTER — OFFICE VISIT (OUTPATIENT)
Dept: PHYSICAL THERAPY | Facility: CLINIC | Age: 69
End: 2024-06-25
Payer: MEDICARE

## 2024-06-25 DIAGNOSIS — Z98.890 STATUS POST LEFT ROTATOR CUFF REPAIR: Primary | ICD-10-CM

## 2024-06-25 PROCEDURE — 97140 MANUAL THERAPY 1/> REGIONS: CPT

## 2024-06-25 PROCEDURE — 97110 THERAPEUTIC EXERCISES: CPT

## 2024-06-25 NOTE — PROGRESS NOTES
"Daily Note     Today's date: 2024  Patient name: Teresa Mao  : 1955  MRN: [id number]  Referring provider: Ilya Munoz*  Dx:   Encounter Diagnosis     ICD-10-CM    1. Status post left rotator cuff repair  Z98.890                      Subjective:  Pt. reports current pain level @ R shld is = \"3\"/10 w/meds.      Objective: See treatment diary below      Assessment: Tolerated treatment well.  Pt. progressing appropriately thus far. Patient exhibited good technique with therapeutic exercises and would benefit from continued PT.  Good benefits from MHP and CP apps.    *initial projected FOTO score met/exceeded.      Plan: Continue per plan of care.  Progress treament per protocol.          Precautions: see extensive PMH, ABRAMS,     FOLLOW PROTOCOL**, sling to be worn 6 weeks. Surgery date: 5/3/24                Re-eval Date: 24  DOS:  5/3/24  Date 6.13 6.18 6.25 6.4 6.6   Visit Count 11 12 13 9 10   FOTO   *completed 2024    *initial projected score met/exceeded     Pain In  4-5/10 with meds  3-4/10 ache w/o meds 3/10 w/meds 5/10 w meds 4/10 w/meds   Pain Out 2/10 No negative change Good benefits from MHP and CP apps Less after CP applic Less after CP applic       Manuals 6.13 6.18 6.25 6.4 6.6   PROM LUE Supine MJD  10' Supine MJD 10' Supine MJD  10' Supine MJD  10' Supine MJD  10'                           Neuro Re-Ed                                                                Ther Ex 6.13 6.18 6.25 6.4 6.6   ultigrip D/C     Green 40x Green 40x   Ball squeeze D/C   BLUE 30x BLUE 30x   Wrist AROM 1# 30x ea dir 1# 30x ea dir   1# 40x ea dir 40x ea dir 45x ea dir   L elbow ROM 1#X40 AROM flexion and ex 1# X45 AROM flexion and ext 1# X45 AROM flexion and ext X40 AROM flexion and ext X45 AROM flexion and ex   Pendulum ex *HEP   X40 ea direction  X45 ea direction   UT/LS stretch Gentle self stretch UT/LS 4x30\" ea Gentle self stretch UT/LS 4x30\" ea Gentle self stretch UT/LS 5x30\" ea " "Gentle self stretch UT/LS 3x30\" ea Gentle self stretch UT/LS 4x30\" ea    **Isometrics  3 way 5x/5\" Isometrics  3 way 7x/5\" Isometrics  3 way 7x/5\"     Cerv ROM 15 reps ea dir 15 reps ea dir 20 reps ea dir 10 reps ea dir   10 reps ea dir   Scap isometrics  3x10 x 5\" 3x15/5\" 3x15/5\" 2x10 x 5\" 3x10 x 5\"    pulleys 40 reps AArom pulleys 60 reps AArom pulleys 60 reps AArom **pulleys 30 reps AArom pulleys 30 reps AArom    finger ladder  #24  6 reps FF finger ladder  #26  6 reps FF finger ladder  #26  8 reps FF **finger ladder  #21  5 reps FF finger ladder  #21  5 reps FF   Self care/Pt. Educ    *Reviewed Reviewed                   Ther Activity                        Gait Training                        Modalities 6.13 6.18 6.25 6.4 6.6    Cryo 10 min to L shld with no adverse reaction Cryo 10 min to L shld with no adverse reaction Cryo 10 min with no adverse reaction Cryo 10 min with no adverse reaction Cryo 10 min with no adverse reaction    *MHP 10 min to L UT MHP 10 min to L UT MHP 10 min to L UT         Rotator Cuff Repair Rehabilitation Protocol  (adapted from Milana PJ et al. “Rehabilitation of the Rotator Cuff: An Evaluation Based Approach”. RISA 2006; 14:599-609)  Updated 10.14   Ilya Munoz MD  [facility]  [address] [place] [address]  [phone]  Phase 1 (Weeks 0-6)              Immediate Postoperative Period              Goals:                          Diminish Pain and Inflammation  Maintain and Protect Integrity of the Repair                          Gradually Increase Passive ROM (NO Active or Active Assist until Week 6)                          Become Independent with Modified ADLs              Precautions:  Maintain Arm in Abduction Sling, Remove Only for Directed Exercises (may remove Abduction Pillow after Day 21 for comfort)                          Keep Incisions Clean & Dry (okay to shower in 48 hours, band-aids over incisions)  No Immersion (pool) " until Wounds Totally Sealed (usually not prior to day #10)  Passive Shoulder Motion ONLY, No Lifting/Holding Objects, Reaching Behind Back  Okay to Type/Write at Desktop with Arm in Sling              Day 0-6                          Elbow, Wrist, Hand AROM Exercises                           Start Cervical AROM and Scapula Isometrics                          Cryotherapy/Ice for Pain and Inflammation                          Instruct in Hygiene, Posture, and Positioning               Day 7-28                          Continue Above  May Start Pendulum Exercises                          May Start Supine, Pain Free, PT assisted PROM  Forward Flexion to 90°, External Rotation to 35° (Elbow at side), Internal Rotation to Body, Abduction to 60°                          Can Introduce light Cardio (Walking, Stationary Bike)                          Aqua Therapy may begin at week 3 (day 21) as long as no wound problems              Day 29-42                          Continue Above  Progress PROM to Goal of full PROM by Week 6.  May add Gentle Mobilizations (GH and Scapulothoracic) to Regain full PROM if Needed.  May add Heat prior to PROM Exercises, Ice after Exercises  May Begin AAROM at Day 29 if ROM is Appropriate in Anticipation of AROM Starting at Week 6              Criteria to Progress to Phase 2                          Reasonable Passive Forward Flexion, Abduction , IR/ER                          Time  Phase 2 (Weeks 6-12)              Protection and Active Motion              Goals of Phase:                          Allow Healing of Soft Tissues                          Decrease Pain and Inflammation  Add ADLs and Regain AROM by End of Phase              Precautions:                          NO STRENGTHENING until Phase 3                          Repair is Most Prone to Failure during this Phase!                          No Lifting Objects > 2 lbs (Coffee Cup OK), no Sudden Motions                          Avoid  Upper Extremity Bike and Ergometer              Day 43-56                          Discontinue Sling  Initiate AROM Exercises (forward flexion, ER, IR and abduction), Rotator Cuff Isometrics                          Continue Periscapular Exercises, add Stretching if PROM Lacking   No Strengthening until Week 12 (Minimum Time Needed for Cuff Healing Sufficient to withstand Strengthening)                Phase 3 (Weeks 12-16)              Early Strengthening              Goal of Phase:                          Gain full AROM, Maintain PROM                          Gradual return of Shoulder Strength, Power and Endurance                          Gradual return to Functional Activities              Precautions:                          No Lifting > 10lbs, Sudden Lifting or Pushing activities, Overhead Lifting                          No Upper Extremity Bike or Ergometer              Week 12                          Initiate Strengthening Program (10 lb Maximum until Phase 4)                            ER/IR with Bands (Standing)                            ER in Lateral Decubitius Position                            Lateral Raises                            Full Can in Scapular Plane                            Prone Rowing, Horizontal Abduction, Extension                            Elbow Flexion/Extension              Week 14-16                          Initiate light Functional Activities as Permitted  Progress to Fundamental Shoulder Exercises  Phase 4 (Variable but Weeks 16-24)              Aggressive Rehab  Sport Specific or Activity Specific               Goals of Phase:                          Maintain Full Pain-free AROM                          Advanced Conditioning Exercises for enhanced Functional use                          Continue regaining Shoulder Strength, Power and Endurance                          Eventual return to full Functional Activities              Precautions:                          None               Week 16                          Continue ROM and stretching if appropriate                          Progress Strengthening, Proprioceptive and Neuromuscular Training                          Light Sports if Progressing Well (Chipping/Putting, easy ground strokes etc.)              Week 20                          Continue Strengthening and Stretching                          Initiate Interval Sports Program as Appropriate

## 2024-06-26 RX ORDER — SODIUM CHLORIDE 9 MG/ML
250 INJECTION, SOLUTION INTRAVENOUS ONCE
Status: COMPLETED | OUTPATIENT
Start: 2024-06-28 | End: 2024-06-28

## 2024-06-27 ENCOUNTER — APPOINTMENT (OUTPATIENT)
Dept: PHYSICAL THERAPY | Facility: CLINIC | Age: 69
End: 2024-06-27
Payer: MEDICARE

## 2024-06-27 RX ORDER — SODIUM CHLORIDE 9 MG/ML
250 INJECTION, SOLUTION INTRAVENOUS ONCE
Status: DISCONTINUED | OUTPATIENT
Start: 2024-07-01 | End: 2024-07-05 | Stop reason: HOSPADM

## 2024-06-28 ENCOUNTER — HOSPITAL ENCOUNTER (OUTPATIENT)
Dept: INFUSION CENTER | Facility: HOSPITAL | Age: 69
End: 2024-06-28
Attending: INTERNAL MEDICINE
Payer: MEDICARE

## 2024-06-28 VITALS
HEART RATE: 61 BPM | DIASTOLIC BLOOD PRESSURE: 73 MMHG | OXYGEN SATURATION: 98 % | SYSTOLIC BLOOD PRESSURE: 126 MMHG | RESPIRATION RATE: 16 BRPM | TEMPERATURE: 98.1 F

## 2024-06-28 PROCEDURE — 96360 HYDRATION IV INFUSION INIT: CPT

## 2024-06-28 PROCEDURE — 96361 HYDRATE IV INFUSION ADD-ON: CPT

## 2024-06-28 RX ORDER — SODIUM CHLORIDE 9 MG/ML
250 INJECTION, SOLUTION INTRAVENOUS ONCE
Status: COMPLETED | OUTPATIENT
Start: 2024-07-02 | End: 2024-07-02

## 2024-06-28 RX ADMIN — SODIUM CHLORIDE 250 ML/HR: 0.9 INJECTION, SOLUTION INTRAVENOUS at 09:06

## 2024-06-28 NOTE — PROGRESS NOTES
Teresa Mao  tolerated treatment well with no complications.      Teresa Mao is aware of future appt on 7/1 at 8 am.     AVS declined by Teresa Mao.

## 2024-07-01 ENCOUNTER — HOSPITAL ENCOUNTER (OUTPATIENT)
Dept: INFUSION CENTER | Facility: HOSPITAL | Age: 69
Discharge: HOME | End: 2024-07-01
Payer: MEDICARE

## 2024-07-01 ENCOUNTER — OFFICE VISIT (OUTPATIENT)
Dept: OBGYN CLINIC | Facility: OTHER | Age: 69
End: 2024-07-01

## 2024-07-01 VITALS
OXYGEN SATURATION: 98 % | HEART RATE: 69 BPM | RESPIRATION RATE: 16 BRPM | DIASTOLIC BLOOD PRESSURE: 78 MMHG | SYSTOLIC BLOOD PRESSURE: 153 MMHG | TEMPERATURE: 98.7 F

## 2024-07-01 VITALS
HEIGHT: 61 IN | WEIGHT: 138 LBS | SYSTOLIC BLOOD PRESSURE: 134 MMHG | DIASTOLIC BLOOD PRESSURE: 77 MMHG | BODY MASS INDEX: 26.06 KG/M2

## 2024-07-01 DIAGNOSIS — Z98.890 S/P LEFT ROTATOR CUFF REPAIR: Primary | ICD-10-CM

## 2024-07-01 PROCEDURE — 96361 HYDRATE IV INFUSION ADD-ON: CPT

## 2024-07-01 PROCEDURE — 99024 POSTOP FOLLOW-UP VISIT: CPT | Performed by: PHYSICIAN ASSISTANT

## 2024-07-01 PROCEDURE — 96360 HYDRATION IV INFUSION INIT: CPT

## 2024-07-01 RX ORDER — HYDROXYZINE HCL 10 MG/5 ML
SOLUTION, ORAL ORAL
COMMUNITY
Start: 2022-12-13

## 2024-07-01 RX ORDER — ALUMINUM HYDROXIDE AND MAGNESIUM CARBONATE 160; 105 MG/1; MG/1
TABLET, CHEWABLE ORAL
COMMUNITY

## 2024-07-01 RX ADMIN — SODIUM CHLORIDE 1500 ML: 0.9 INJECTION, SOLUTION INTRAVENOUS at 08:22

## 2024-07-01 NOTE — PROGRESS NOTES
Teresa Mao  tolerated hydration well with no complications.      Teresa Mao is aware of future appt on 7-2-24 at 830    AVS declined

## 2024-07-01 NOTE — PROGRESS NOTES
"Surgery: SALS w/RCR, SAD on 5/3/24    S: Patient is doing well.  They have been compliant with formal PT and the HEP.  Denies new injury or trauma    /77 (BP Location: Right arm, Patient Position: Sitting, Cuff Size: Adult)   Ht 5' 1\" (1.549 m) Comment: verbal  Wt 62.6 kg (138 lb)   BMI 26.07 kg/m²     O: LEFT shoulder  FF: 170  Abd: full  ER: equivalent  ER strength: good resistance  Good elbow, wrist and hand range of motion  Skin - warm and dry  SI  NVI    A/P: 8 weeks following arthroscopic left shoulder rotator cuff repair with subacromial decompression - making excellent progress  Continue with formal physical therapy - following standard RCR protocol  HEP - per therapy.    Pain control - Tylenol 1000 mg every 8 hours  Ice as needed - 20 minutes on and 20 minutes off  Follow up in 6-8 weeks   "

## 2024-07-02 ENCOUNTER — APPOINTMENT (OUTPATIENT)
Dept: PHYSICAL THERAPY | Facility: CLINIC | Age: 69
End: 2024-07-02
Payer: MEDICARE

## 2024-07-02 ENCOUNTER — HOSPITAL ENCOUNTER (OUTPATIENT)
Dept: INFUSION CENTER | Facility: HOSPITAL | Age: 69
Discharge: HOME | End: 2024-07-02
Attending: INTERNAL MEDICINE
Payer: MEDICARE

## 2024-07-02 ENCOUNTER — HOSPITAL ENCOUNTER (OUTPATIENT)
Dept: RADIOLOGY | Facility: HOSPITAL | Age: 69
Discharge: HOME | End: 2024-07-02
Payer: MEDICARE

## 2024-07-02 VITALS
SYSTOLIC BLOOD PRESSURE: 172 MMHG | HEART RATE: 68 BPM | DIASTOLIC BLOOD PRESSURE: 95 MMHG | TEMPERATURE: 96.7 F | RESPIRATION RATE: 18 BRPM | OXYGEN SATURATION: 100 %

## 2024-07-02 DIAGNOSIS — J45.40 MODERATE PERSISTENT ALLERGIC ASTHMA: ICD-10-CM

## 2024-07-02 DIAGNOSIS — G90.A POSTURAL ORTHOSTATIC TACHYCARDIA SYNDROME: ICD-10-CM

## 2024-07-02 PROCEDURE — 96360 HYDRATION IV INFUSION INIT: CPT

## 2024-07-02 PROCEDURE — 96361 HYDRATE IV INFUSION ADD-ON: CPT

## 2024-07-02 PROCEDURE — 71046 X-RAY EXAM CHEST 2 VIEWS: CPT

## 2024-07-02 RX ORDER — SODIUM CHLORIDE 9 MG/ML
250 INJECTION, SOLUTION INTRAVENOUS ONCE
Status: COMPLETED | OUTPATIENT
Start: 2024-07-05 | End: 2024-07-05

## 2024-07-02 RX ADMIN — SODIUM CHLORIDE 250 ML/HR: 0.9 INJECTION, SOLUTION INTRAVENOUS at 08:32

## 2024-07-02 NOTE — PROGRESS NOTES
Patient tolerated IV hydration without issues.       Teresa Mao is aware of future appt on 7/5/24 at 0930.     AVS -  No (Declined by Teresa Mao) Patient has Mychart.    Patient ambulated off unit without incident.  All personal belongings taken with patient.

## 2024-07-03 RX ORDER — SODIUM CHLORIDE 9 MG/ML
250 INJECTION, SOLUTION INTRAVENOUS ONCE
Status: COMPLETED | OUTPATIENT
Start: 2024-07-08 | End: 2024-07-08

## 2024-07-05 ENCOUNTER — HOSPITAL ENCOUNTER (OUTPATIENT)
Dept: INFUSION CENTER | Facility: HOSPITAL | Age: 69
End: 2024-07-05
Payer: MEDICARE

## 2024-07-05 VITALS
RESPIRATION RATE: 18 BRPM | TEMPERATURE: 99.4 F | OXYGEN SATURATION: 98 % | HEART RATE: 73 BPM | DIASTOLIC BLOOD PRESSURE: 76 MMHG | SYSTOLIC BLOOD PRESSURE: 159 MMHG

## 2024-07-05 PROCEDURE — 96360 HYDRATION IV INFUSION INIT: CPT

## 2024-07-05 PROCEDURE — 96361 HYDRATE IV INFUSION ADD-ON: CPT

## 2024-07-05 RX ORDER — SODIUM CHLORIDE 9 MG/ML
250 INJECTION, SOLUTION INTRAVENOUS ONCE
Status: COMPLETED | OUTPATIENT
Start: 2024-07-09 | End: 2024-07-09

## 2024-07-05 RX ADMIN — SODIUM CHLORIDE 250 ML/HR: 0.9 INJECTION, SOLUTION INTRAVENOUS at 09:36

## 2024-07-05 NOTE — PROGRESS NOTES
Teresa Mao  tolerated treatment well with no complications.      Teresa Mao is aware of future appt on 7/8 at 8:30 am.     AVS declined by Teresa Mao.

## 2024-07-08 ENCOUNTER — HOSPITAL ENCOUNTER (OUTPATIENT)
Dept: INFUSION CENTER | Facility: HOSPITAL | Age: 69
Discharge: HOME | End: 2024-07-08
Payer: MEDICARE

## 2024-07-08 VITALS
OXYGEN SATURATION: 99 % | HEART RATE: 74 BPM | TEMPERATURE: 97.2 F | SYSTOLIC BLOOD PRESSURE: 151 MMHG | RESPIRATION RATE: 17 BRPM | DIASTOLIC BLOOD PRESSURE: 89 MMHG

## 2024-07-08 PROCEDURE — 96360 HYDRATION IV INFUSION INIT: CPT

## 2024-07-08 PROCEDURE — 96361 HYDRATE IV INFUSION ADD-ON: CPT

## 2024-07-08 RX ADMIN — SODIUM CHLORIDE 250 ML/HR: 0.9 INJECTION, SOLUTION INTRAVENOUS at 08:45

## 2024-07-08 NOTE — PROGRESS NOTES
Patient tolerated IV hydration without issues.       Teresa Mao is aware of future appt on 7/9/24 at 0800.     AVS -  No (Declined by Teresa Mao) Patient has Mychart.    Patient ambulated off unit without incident.  All personal belongings taken with patient.

## 2024-07-09 ENCOUNTER — HOSPITAL ENCOUNTER (OUTPATIENT)
Dept: INFUSION CENTER | Facility: HOSPITAL | Age: 69
Discharge: HOME | End: 2024-07-09
Payer: MEDICARE

## 2024-07-09 ENCOUNTER — OFFICE VISIT (OUTPATIENT)
Dept: PHYSICAL THERAPY | Facility: CLINIC | Age: 69
End: 2024-07-09
Payer: MEDICARE

## 2024-07-09 VITALS
DIASTOLIC BLOOD PRESSURE: 67 MMHG | HEART RATE: 72 BPM | OXYGEN SATURATION: 98 % | TEMPERATURE: 99.5 F | SYSTOLIC BLOOD PRESSURE: 136 MMHG | RESPIRATION RATE: 18 BRPM

## 2024-07-09 DIAGNOSIS — Z98.890 STATUS POST LEFT ROTATOR CUFF REPAIR: Primary | ICD-10-CM

## 2024-07-09 PROCEDURE — 97140 MANUAL THERAPY 1/> REGIONS: CPT

## 2024-07-09 PROCEDURE — 97110 THERAPEUTIC EXERCISES: CPT

## 2024-07-09 PROCEDURE — 96361 HYDRATE IV INFUSION ADD-ON: CPT

## 2024-07-09 PROCEDURE — 96360 HYDRATION IV INFUSION INIT: CPT

## 2024-07-09 RX ORDER — SODIUM CHLORIDE 9 MG/ML
250 INJECTION, SOLUTION INTRAVENOUS ONCE
Status: COMPLETED | OUTPATIENT
Start: 2024-07-11 | End: 2024-07-11

## 2024-07-09 RX ADMIN — ALTEPLASE 2 MG: 2.2 INJECTION, POWDER, LYOPHILIZED, FOR SOLUTION INTRAVENOUS at 08:16

## 2024-07-09 RX ADMIN — SODIUM CHLORIDE 250 ML/HR: 0.9 INJECTION, SOLUTION INTRAVENOUS at 08:35

## 2024-07-09 NOTE — PROGRESS NOTES
Teresa Mao  tolerated treatment well with no complications.      Teresa Mao is aware of future appt on 7/11 at 8 am.     AVS declined by Teresa Mao.

## 2024-07-09 NOTE — PROGRESS NOTES
"Daily Note     Today's date: 2024  Patient name: Teresa Mao  : 1955  MRN: [id number]  Referring provider: Ilya Munoz*  Dx:   Encounter Diagnosis     ICD-10-CM    1. Status post left rotator cuff repair  Z98.890                      Subjective:  Pt. reports she had f/u with Ortho MD last week. Says MD was pleased with her progress to date.       Objective: See treatment diary below      Assessment: Tolerated treatment well.  Pt. progressing consistently thus far.  Patient exhibited good technique with therapeutic exercises and would benefit from continued PT.      Plan: Plan is for re-eval upon next treatment visit. Continue per plan of care.  Progress treament per protocol.              Precautions: see extensive PMH, ABRAMS,     FOLLOW PROTOCOL**, sling to be worn 6 weeks. Surgery date: 5/3/24                Re-eval Date: 24  DOS:  5/3/24  Date 6.13 6.18 6.25 7.9 6.6   Visit Count 11 12 13 14 10   FOTO   *completed 2024    *initial projected score met/exceeded     Pain In  4-5/10 with meds  3-4/10 ache w/o meds 3/10 w/meds 1-2/10   W/meds 4/10 w/meds   Pain Out 2/10 No negative change Good benefits from MHP and CP apps Less after CP applic Less after CP applic       Manuals 6.13 6.18 6.25 7.9 6.6   PROM LUE Supine MJD  10' Supine MJD 10' Supine MJD  10' Supine MJD  10' Supine MJD  10'                           Neuro Re-Ed                                                                Ther Ex 6.13 6.18 6.25 7.9 6.6   ultigrip D/C      Green 40x   Ball squeeze D/C    BLUE 30x   Wrist AROM 1# 30x ea dir 1# 30x ea dir   1# 40x ea dir 1# 45x ea dir 45x ea dir   L elbow ROM 1#X40 AROM flexion and ex 1# X45 AROM flexion and ext 1# X45 AROM flexion and ext 2# X40 AROM flexion and ext X45 AROM flexion and ex   Pendulum ex *HEP    X45 ea direction   UT/LS stretch Gentle self stretch UT/LS 4x30\" ea Gentle self stretch UT/LS 4x30\" ea Gentle self stretch UT/LS 5x30\" ea Gentle self stretch " "UT/LS 5x30\" ea Gentle self stretch UT/LS 4x30\" ea    **Isometrics  3 way 5x/5\" Isometrics  3 way 7x/5\" Isometrics  3 way 7x/5\" Isometrics  3 way 8x/5\"    Cerv ROM 15 reps ea dir 15 reps ea dir 20 reps ea dir 20 reps ea dir   10 reps ea dir   Scap isometrics  3x10 x 5\" 3x15/5\" 3x15/5\" 3x15 x 5\" 3x10 x 5\"    pulleys 40 reps AArom pulleys 60 reps AArom pulleys 60 reps AArom 2 min FF pulleys 30 reps AArom    finger ladder  #24  6 reps FF finger ladder  #26  6 reps FF finger ladder  #26  8 reps FF finger ladder  #26  8 reps FF finger ladder  #21  5 reps FF   Self care/Pt. Educ    Reviewed Reviewed                   Ther Activity                        Gait Training                        Modalities 6.13 6.18 6.25 7.9 6.6    Cryo 10 min to L shld with no adverse reaction Cryo 10 min to L shld with no adverse reaction Cryo 10 min with no adverse reaction Cryo 10 min with no adverse reaction Cryo 10 min with no adverse reaction    *MHP 10 min to L UT MHP 10 min to L UT MHP 10 min to L UT         Rotator Cuff Repair Rehabilitation Protocol  (adapted from Milana PJ et al. “Rehabilitation of the Rotator Cuff: An Evaluation Based Approach”. RISA 2006; 14:599-609)  Updated 10.14   Ilya Munoz MD  [facility]  [address] [place] [address]  [phone]  Phase 1 (Weeks 0-6)              Immediate Postoperative Period              Goals:                          Diminish Pain and Inflammation  Maintain and Protect Integrity of the Repair                          Gradually Increase Passive ROM (NO Active or Active Assist until Week 6)                          Become Independent with Modified ADLs              Precautions:  Maintain Arm in Abduction Sling, Remove Only for Directed Exercises (may remove Abduction Pillow after Day 21 for comfort)                          Keep Incisions Clean & Dry (okay to shower in 48 hours, band-aids over incisions)  No Immersion (pool) until Wounds " Totally Sealed (usually not prior to day #10)  Passive Shoulder Motion ONLY, No Lifting/Holding Objects, Reaching Behind Back  Okay to Type/Write at Desktop with Arm in Sling              Day 0-6                          Elbow, Wrist, Hand AROM Exercises                           Start Cervical AROM and Scapula Isometrics                          Cryotherapy/Ice for Pain and Inflammation                          Instruct in Hygiene, Posture, and Positioning               Day 7-28                          Continue Above  May Start Pendulum Exercises                          May Start Supine, Pain Free, PT assisted PROM  Forward Flexion to 90°, External Rotation to 35° (Elbow at side), Internal Rotation to Body, Abduction to 60°                          Can Introduce light Cardio (Walking, Stationary Bike)                          Aqua Therapy may begin at week 3 (day 21) as long as no wound problems              Day 29-42                          Continue Above  Progress PROM to Goal of full PROM by Week 6.  May add Gentle Mobilizations (GH and Scapulothoracic) to Regain full PROM if Needed.  May add Heat prior to PROM Exercises, Ice after Exercises  May Begin AAROM at Day 29 if ROM is Appropriate in Anticipation of AROM Starting at Week 6              Criteria to Progress to Phase 2                          Reasonable Passive Forward Flexion, Abduction , IR/ER                          Time  Phase 2 (Weeks 6-12)              Protection and Active Motion              Goals of Phase:                          Allow Healing of Soft Tissues                          Decrease Pain and Inflammation  Add ADLs and Regain AROM by End of Phase              Precautions:                          NO STRENGTHENING until Phase 3                          Repair is Most Prone to Failure during this Phase!                          No Lifting Objects > 2 lbs (Coffee Cup OK), no Sudden Motions                          Avoid Upper  Extremity Bike and Ergometer              Day 43-56                          Discontinue Sling  Initiate AROM Exercises (forward flexion, ER, IR and abduction), Rotator Cuff Isometrics                          Continue Periscapular Exercises, add Stretching if PROM Lacking   No Strengthening until Week 12 (Minimum Time Needed for Cuff Healing Sufficient to withstand Strengthening)                Phase 3 (Weeks 12-16)              Early Strengthening              Goal of Phase:                          Gain full AROM, Maintain PROM                          Gradual return of Shoulder Strength, Power and Endurance                          Gradual return to Functional Activities              Precautions:                          No Lifting > 10lbs, Sudden Lifting or Pushing activities, Overhead Lifting                          No Upper Extremity Bike or Ergometer              Week 12                          Initiate Strengthening Program (10 lb Maximum until Phase 4)                            ER/IR with Bands (Standing)                            ER in Lateral Decubitius Position                            Lateral Raises                            Full Can in Scapular Plane                            Prone Rowing, Horizontal Abduction, Extension                            Elbow Flexion/Extension              Week 14-16                          Initiate light Functional Activities as Permitted  Progress to Fundamental Shoulder Exercises  Phase 4 (Variable but Weeks 16-24)              Aggressive Rehab  Sport Specific or Activity Specific               Goals of Phase:                          Maintain Full Pain-free AROM                          Advanced Conditioning Exercises for enhanced Functional use                          Continue regaining Shoulder Strength, Power and Endurance                          Eventual return to full Functional Activities              Precautions:                          None               Week 16                          Continue ROM and stretching if appropriate                          Progress Strengthening, Proprioceptive and Neuromuscular Training                          Light Sports if Progressing Well (Chipping/Putting, easy ground strokes etc.)              Week 20                          Continue Strengthening and Stretching                          Initiate Interval Sports Program as Appropriate

## 2024-07-10 RX ORDER — SODIUM CHLORIDE 9 MG/ML
250 INJECTION, SOLUTION INTRAVENOUS ONCE
Status: COMPLETED | OUTPATIENT
Start: 2024-07-12 | End: 2024-07-12

## 2024-07-11 ENCOUNTER — EVALUATION (OUTPATIENT)
Dept: PHYSICAL THERAPY | Facility: CLINIC | Age: 69
End: 2024-07-11
Payer: MEDICARE

## 2024-07-11 ENCOUNTER — HOSPITAL ENCOUNTER (OUTPATIENT)
Dept: INFUSION CENTER | Facility: HOSPITAL | Age: 69
End: 2024-07-11
Attending: INTERNAL MEDICINE
Payer: MEDICARE

## 2024-07-11 ENCOUNTER — OFFICE VISIT (OUTPATIENT)
Age: 69
End: 2024-07-11
Payer: MEDICARE

## 2024-07-11 VITALS
HEIGHT: 61 IN | BODY MASS INDEX: 26.06 KG/M2 | HEART RATE: 83 BPM | SYSTOLIC BLOOD PRESSURE: 148 MMHG | DIASTOLIC BLOOD PRESSURE: 81 MMHG | WEIGHT: 138 LBS

## 2024-07-11 VITALS
RESPIRATION RATE: 17 BRPM | DIASTOLIC BLOOD PRESSURE: 70 MMHG | HEART RATE: 66 BPM | OXYGEN SATURATION: 98 % | TEMPERATURE: 98.5 F | SYSTOLIC BLOOD PRESSURE: 141 MMHG

## 2024-07-11 DIAGNOSIS — G89.4 CHRONIC PAIN SYNDROME: Primary | ICD-10-CM

## 2024-07-11 DIAGNOSIS — M54.42 CHRONIC BILATERAL LOW BACK PAIN WITH BILATERAL SCIATICA: ICD-10-CM

## 2024-07-11 DIAGNOSIS — G03.9 ARACHNOIDITIS: ICD-10-CM

## 2024-07-11 DIAGNOSIS — Z98.890 STATUS POST LEFT ROTATOR CUFF REPAIR: Primary | ICD-10-CM

## 2024-07-11 DIAGNOSIS — M54.12 CERVICAL RADICULOPATHY: ICD-10-CM

## 2024-07-11 DIAGNOSIS — M79.18 MYOFASCIAL PAIN SYNDROME: ICD-10-CM

## 2024-07-11 DIAGNOSIS — M54.41 CHRONIC BILATERAL LOW BACK PAIN WITH BILATERAL SCIATICA: ICD-10-CM

## 2024-07-11 DIAGNOSIS — G89.29 CHRONIC BILATERAL LOW BACK PAIN WITH BILATERAL SCIATICA: ICD-10-CM

## 2024-07-11 DIAGNOSIS — M54.16 LUMBAR RADICULOPATHY: ICD-10-CM

## 2024-07-11 DIAGNOSIS — M47.816 LUMBAR SPONDYLOSIS: ICD-10-CM

## 2024-07-11 DIAGNOSIS — M47.812 CERVICAL SPONDYLOSIS: ICD-10-CM

## 2024-07-11 PROCEDURE — 97110 THERAPEUTIC EXERCISES: CPT

## 2024-07-11 PROCEDURE — 96361 HYDRATE IV INFUSION ADD-ON: CPT

## 2024-07-11 PROCEDURE — 96360 HYDRATION IV INFUSION INIT: CPT

## 2024-07-11 PROCEDURE — 99214 OFFICE O/P EST MOD 30 MIN: CPT | Performed by: NURSE PRACTITIONER

## 2024-07-11 PROCEDURE — G2211 COMPLEX E/M VISIT ADD ON: HCPCS | Performed by: NURSE PRACTITIONER

## 2024-07-11 PROCEDURE — 97140 MANUAL THERAPY 1/> REGIONS: CPT

## 2024-07-11 RX ORDER — CYCLOBENZAPRINE HCL 5 MG
5 TABLET ORAL 3 TIMES DAILY PRN
Qty: 90 TABLET | Refills: 1 | Status: SHIPPED | OUTPATIENT
Start: 2024-07-11

## 2024-07-11 RX ORDER — SODIUM CHLORIDE 9 MG/ML
250 INJECTION, SOLUTION INTRAVENOUS ONCE
Status: COMPLETED | OUTPATIENT
Start: 2024-07-15 | End: 2024-07-15

## 2024-07-11 RX ORDER — HYDROCODONE BITARTRATE AND ACETAMINOPHEN 5; 325 MG/1; MG/1
1 TABLET ORAL 2 TIMES DAILY PRN
Qty: 60 TABLET | Refills: 0 | Status: SHIPPED | OUTPATIENT
Start: 2024-07-12

## 2024-07-11 RX ORDER — HYDROCODONE BITARTRATE AND ACETAMINOPHEN 5; 325 MG/1; MG/1
1 TABLET ORAL 2 TIMES DAILY PRN
Qty: 60 TABLET | Refills: 0 | Status: SHIPPED | OUTPATIENT
Start: 2024-08-09

## 2024-07-11 RX ADMIN — SODIUM CHLORIDE 250 ML/HR: 0.9 INJECTION, SOLUTION INTRAVENOUS at 08:11

## 2024-07-11 NOTE — PROGRESS NOTES
Assessment:  1. Chronic pain syndrome    2. Arachnoiditis    3. Cervical radiculopathy    4. Cervical spondylosis    5. Chronic bilateral low back pain with bilateral sciatica    6. Lumbar radiculopathy    7. Lumbar spondylosis    8. Myofascial pain syndrome        Plan:  While the patient was in the office today, I did have a thorough conversation regarding their chronic pain syndrome, medication management, and treatment plan options.  Patient is being seen for a follow-up visit.  Overall, she is reporting about 40% improvement in her symptoms with current medication regimen.  She denies side effects from it.    She recently had arthroscopic right shoulder surgery performed on 5/3/2024 and is doing well.  She is involved in physical therapy.    Renewed hydrocodone 5/325 twice daily if needed for pain.  The patient's opioid scripts were sent to their pharmacy electronically and was given a 2 month supply of prescriptions with a Do Not Fill date(s) of 7/12/2024, 8/9/2024.    Continue Flexeril 5 mg 3 times daily if needed for pain.  A prescription was sent to her pharmacy with a refill.    [place] Prescription Drug Monitoring Program report was reviewed and was appropriate     There are risks associated with opioid medications, including dependence, addiction and tolerance. The patient understands and agrees to use these medications only as prescribed. Potential side effects of the medications include, but are not limited to, constipation, drowsiness, addiction, impaired judgment and risk of fatal overdose if not taken as prescribed. The patient was warned against driving while taking sedation medications.  Sharing medications is a felony. At this point in time, the patient is showing no signs of addiction, abuse, diversion or suicidal ideation.    The patient will follow-up in 8 weeks for medication prescription refill and reevaluation. The patient was advised to contact the office should their symptoms worsen  in the interim. The patient was agreeable and verbalized an understanding.        History of Present Illness:    The patient is a 68 y.o. female last seen on 5/1/2024 who presents for a follow up office visit in regards to chronic pain secondary to chronic pain syndrome, arachnoiditis, chronic neck pain, cervical radiculopathy, cervical spondylosis, chronic low back pain, lumbar radiculopathy, lumbar spondylosis.  The patient currently reports complaints of neck and upper extremity pain, low back and lower extremity pain.  Current pain level is a 4/10.  Quality of pain is described as cramping and shooting.    Current pain medications includes: Hydrocodone 5/325 twice daily if needed for pain, Flexeril 5 mg 3 times daily if needed for spasms.  The patient reports that this regimen is providing 40% pain relief.  The patient is reporting no side effects from this pain medication regimen.    Pain Contract Signed: 5/1/24  Last Urine Drug Screen: 5/1/24    I have personally reviewed and/or updated the patient's past medical history, past surgical history, family history, social history, current medications, allergies, and vital signs today.       Review of Systems:    Review of Systems   Constitutional:  Negative for unexpected weight change.   HENT:  Negative for hearing loss.    Eyes:  Negative for visual disturbance.   Respiratory:  Positive for shortness of breath.    Cardiovascular:  Negative for leg swelling.   Gastrointestinal:  Positive for nausea. Negative for constipation.   Endocrine: Negative for polyuria.   Genitourinary:  Negative for difficulty urinating.   Musculoskeletal:  Negative for gait problem, joint swelling and myalgias.        Joint stiffness  Pain in extremity- sciatica L leg   Skin:  Negative for rash.   Neurological:  Positive for dizziness. Negative for weakness and headaches.        Memory loss - brain fog/fatigue   Psychiatric/Behavioral:  Negative for decreased concentration.    All other  "systems reviewed and are negative.        Past Medical History:   Diagnosis Date    Allergic rhinitis     Anxiety     Asthma     Back pain     Cardiac disease     Cardiopathy     EF 45%    Cough     Diverticulitis     Factor V Leiden (HCC)     Fibromyalgia     GERD (gastroesophageal reflux disease)     Hashimoto's thyroiditis     Hx of degenerative disc disease     Hypertension     Hypotension     pots - postural orthostatic hypotension    Interstitial cystitis     Irregular heart beat     LBBB    Irritable bowel syndrome     Migraines     Mitral valve disease     \"thickening\"    Myocardial infarction (HCC)     possible but not sure when    Neuropathy     bilateral legs    Osteoporosis     Postural orthostatic tachycardia syndrome     must drink a lot of water and salt    Pott's disease     Rheumatic fever 1967    Scoliosis     Sepsis (HCC)     associated with PICC line    Sjogren's syndrome ([facility])     TMJ (dislocation of temporomandibular joint)        Past Surgical History:   Procedure Laterality Date    ABLATION MICROWAVE Left     lumbar area    BACK SURGERY  10/1998     SECTION  1992    CHOLECYSTECTOMY  2016    COLONOSCOPY      DILATION AND CURETTAGE OF UTERUS      EGD  2016    FOOT SURGERY  1968    removal of bone and neuroma    HYSTERECTOMY N/A     age 39    IR OTHER  2022    IR OTHER  2022    IR OTHER  2023    IR OTHER  2024    IR PICC PLACEMENT SINGLE LUMEN  10/02/2020    IR PICC PLACEMENT SINGLE LUMEN  2021    IR PICC REPOSITION  2021    IR TUNNELED CENTRAL LINE PLACEMENT  2022    LAPAROSCOPY      endometriosis    MOUTH BIOPSY      lip    OOPHORECTOMY Bilateral     age 39    IN EGD TRANSORAL BIOPSY SINGLE/MULTIPLE N/A 2019    Procedure: ESOPHAGOGASTRODUODENOSCOPY (EGD) with multiple bx and dilation;  Surgeon: Peter Baldwin MD;  Location:  GI LAB;  Service: Gastroenterology    IN SURGICAL ARTHROSCOPY SHOULDER W/ROTATOR " CUFF RPR Right 04/06/2022    Procedure: SHOULDER ARTHROSCOPIC ROTATOR CUFF REPAIR Biceps Tenodisis;  Surgeon: Ilya Munoz MD;  Location: [facility] MA[facility];  Service: Orthopedics    WA SURGICAL ARTHROSCOPY SHOULDER W/ROTATOR CUFF RPR Left 5/3/2024    Procedure: SHOULDER ARTHROSCOPIC ROTATOR CUFF REPAIR, SUBACROMIAL DECOMPRESSION;  Surgeon: Ilya Munoz MD;  Location: [facility] MA[facility];  Service: Orthopedics    TUNNELED VENOUS CATHETER PLACEMENT  2016       Family History   Problem Relation Age of Onset    Cancer Mother         bladder    No Known Problems Father     Thyroid cancer Sister     Heart attack Sister     No Known Problems Sister     No Known Problems Sister     No Known Problems Sister     No Known Problems Sister     No Known Problems Daughter     No Known Problems Daughter     No Known Problems Maternal Grandmother     Colon cancer Maternal Grandfather     No Known Problems Paternal Grandmother     No Known Problems Paternal Grandfather     Breast cancer Maternal Aunt         over age 50    Endometrial cancer Maternal Aunt     Multiple myeloma Maternal Aunt     Hypertension Paternal Aunt     Brain cancer Niece     Lymphoma Niece     Breast cancer additional onset Neg Hx     BRCA2 Positive Neg Hx     Ovarian cancer Neg Hx     BRCA2 Negative Neg Hx     BRCA1 Positive Neg Hx     BRCA1 Negative Neg Hx     BRCA 1/2 Neg Hx        Social History     Occupational History    Not on file   Tobacco Use    Smoking status: Never    Smokeless tobacco: Never   Vaping Use    Vaping status: Never Used   Substance and Sexual Activity    Alcohol use: Never    Drug use: No    Sexual activity: Not on file         Current Outpatient Medications:     Alum Hydroxide-Mag Trisilicate (Gaviscon) 80-14.2 MG CHEW, Chew 1 tablet 4 (four) times a day as needed With meals and as needed, Disp: , Rfl:     aluminum hydroxide-magnesium carbonate (Gaviscon Extra Strength) 160-105 mg per chewable tablet, , Disp: , Rfl:      azelastine (ASTELIN) 0.1 % nasal spray, 2 sprays into each nostril 2 (two) times a day Use in each nostril as directed, Disp: , Rfl:     beclomethasone (QVAR) 40 MCG/ACT inhaler, Inhale 1 puff daily as needed (only when unable to nebulize pulmicort) Rinse mouth after use., Disp: , Rfl:     budesonide (PULMICORT) 0.5 mg/2 mL nebulizer solution, Take 0.5 mg by nebulization 2 (two) times a day Rinse mouth after use., Disp: , Rfl:     cromolyn (GASTROCROM) 100 MG/5ML solution, Take 100 mg by mouth 4 (four) times a day (before meals and at bedtime), Disp: , Rfl:     cyclobenzaprine (FLEXERIL) 5 mg tablet, Take 1 tablet (5 mg total) by mouth 3 (three) times a day as needed for muscle spasms, Disp: 90 tablet, Rfl: 1    docusate sodium (COLACE) 100 mg capsule, Take 100 mg by mouth daily as needed for constipation, Disp: , Rfl:     erythromycin (ILOTYCIN) ophthalmic ointment, Apply 1 application to eye daily at bedtime, Disp: , Rfl:     famotidine (PEPCID) 40 MG tablet, Twice a day, Disp: , Rfl:     fexofenadine (ALLEGRA) 180 MG tablet, Take 180 mg by mouth 2 (two) times a day , Disp: , Rfl:     fluticasone (FLONASE) 50 mcg/act nasal spray, 2 sprays into each nostril daily , Disp: , Rfl:     Galcanezumab-gnlm (Emgality) 120 MG/ML SOAJ, Inject under the skin every 30 (thirty) days , Disp: , Rfl:     guaiFENesin (Mucinex) 600 mg 12 hr tablet, Take 600 mg by mouth every 12 (twelve) hours, Disp: , Rfl:     Heating Pad PADS, Use if needed (pain), Disp: , Rfl:     [START ON 7/12/2024] HYDROcodone-acetaminophen (NORCO) 5-325 mg per tablet, Take 1 tablet by mouth 2 (two) times a day as needed for pain Max Daily Amount: 2 tablets Do not start before July 12, 2024., Disp: 60 tablet, Rfl: 0    [START ON 8/9/2024] HYDROcodone-acetaminophen (Norco) 5-325 mg per tablet, Take 1 tablet by mouth 2 (two) times a day as needed for pain for up to 60 doses Max Daily Amount: 2 tablets Do not start before August 9, 2024., Disp: 60 tablet, Rfl: 0     hydrOXYzine (ATARAX) 10 mg/5 mL syrup, , Disp: , Rfl:     ipratropium (ATROVENT) 0.02 % nebulizer solution, Take 0.5 mg by nebulization every 6 (six) hours as needed for wheezing or shortness of breath, Disp: , Rfl:     ipratropium (ATROVENT) 0.06 % nasal spray, PLEASE SEE ATTACHED FOR DETAILED DIRECTIONS, Disp: , Rfl:     ivabradine HCl (Corlanor) 5 MG tablet, 7.5 mg 2 (two) times a day, Disp: , Rfl:     KETOTIFEN FUMARATE OP, Take 1mg compounded capsule three times a day, Disp: , Rfl:     levalbuterol (XOPENEX HFA) 45 mcg/act inhaler, Inhale 2 puffs every 4 (four) hours as needed (when unable to nebulize), Disp: , Rfl:     levalbuterol (XOPENEX) 1.25 mg/3 mL nebulizer solution, Take 1.25 mg by nebulization every 6 (six) hours as needed , Disp: , Rfl: 2    Magnesium 400 MG CAPS, Take 1 capsule by mouth 2 (two) times a day , Disp: , Rfl:     methylPREDNISolone (MEDROL) 8 MG tablet, Take 24 mg by mouth 3 (three) times a day Taper pack, Disp: , Rfl:     MULTIPLE VITAMINS-CALCIUM PO, Take 1 capsule by mouth every morning , Disp: , Rfl:     naloxone (NARCAN) 4 mg/0.1 mL nasal spray, Administer 1 spray into a nostril. If no response after 2-3 minutes, give another dose in the other nostril using a new spray., Disp: 1 each, Rfl: 0    NON FORMULARY, Take 1 mg by mouth 3 (three) times a day Ketotifen 1 mg compounded capsule, Disp: , Rfl:     NON FORMULARY, LACTOBACILLUS COMBINATION NO.4 (PROBIOTIC) 3 BILLION CELL CAPSULE, Disp: , Rfl:     NON FORMULARY, Take 32 mg by mouth NATURE-THROID ORAL, Disp: , Rfl:     omega-3-acid ethyl esters (LOVAZA) 1 g capsule, Take 2 g by mouth 2 (two) times a day 1600mg daily, Disp: , Rfl:     polyethylene glycol (MIRALAX) 17 g packet, Take 17 g by mouth daily as needed , Disp: , Rfl:     Probiotic Product (PROBIOTIC DAILY PO), Take 1 tablet by mouth daily At lunch, Disp: , Rfl:     Qvar RediHaler 40 MCG/ACT inhaler, , Disp: , Rfl:     sodium chloride, Inject 1,500 mL into a catheter in a  vein, Disp: , Rfl:     sucralfate (CARAFATE) 1 g/10 mL suspension, Take 1 g by mouth 2 (two) times a day, Disp: , Rfl:     Thyroid, Porcine, POWD, Use 32 mg daily, Disp: , Rfl:     Ubrogepant (UBRELVY) 100 MG tablet, Take 100 mg by mouth Take 1 tablet (100 mg) one time as needed for migraine. May repeat one additional tablet (100 mg) at least two hours after the first dose. Do not use more than two doses per day, or for more than eight days per month., Disp: , Rfl:     verapamil (CALAN) 40 mg tablet, , Disp: , Rfl:   No current facility-administered medications for this visit.    Facility-Administered Medications Ordered in Other Visits:     [START ON 7/12/2024] sodium chloride 0.9 % infusion, 250 mL/hr, Intravenous, Once, Jan Doyle MD    [START ON 7/15/2024] sodium chloride 0.9 % infusion, 250 mL/hr, Intravenous, Once, Jan Doyle MD    Allergies   Allergen Reactions    Imipramine Confusion, Fatigue, Irritability, Palpitations, Shortness Of Breath, Tachycardia and Visual Disturbance    Lactose - Food Allergy Shortness Of Breath    Melatonin Shortness Of Breath    Mirtazapine Anxiety, Dizziness, GI Intolerance, Nausea Only, Palpitations and Shortness Of Breath    Nexium [Esomeprazole] Shortness Of Breath    Nsaids Shortness Of Breath, Cough, Other (See Comments), Nausea Only, GI Intolerance, Tachycardia and Wheezing    Propofol Cough, Other (See Comments) and Shortness Of Breath    Singulair [Montelukast] Shortness Of Breath and Cough    Zomig [Zolmitriptan] Shortness Of Breath    Dexilant [Dexlansoprazole] Nausea Only and Vomiting    Albuterol     Ambien [Zolpidem]     Amitriptyline Drowsiness    Ascorbic Acid GI Intolerance    Aspirin     Bactrim [Sulfamethoxazole-Trimethoprim] Hives    Banana - Food Allergy Dermatitis    Banana Extract Allergy Skin Test - Food Allergy Dermatitis    Bisoprolol Other (See Comments)    Ceftin [Cefuroxime]     Celebrex [Celecoxib]     Ciprofloxacin     Cranberry-C [Ascorbate - Food  "Allergy] GI Intolerance    Demerol [Meperidine]     Duloxetine Other (See Comments)     tachycardia    Epinephrine Dizziness    Ergotamine Nausea Only and Headache    Fludrocortisone Other (See Comments)    Keflex [Cephalexin]     Klonopin [Clonazepam] Nausea Only and Dizziness    Latex Hives    Levaquin [Levofloxacin]     Lexapro [Escitalopram]     Lyrica [Pregabalin] Fatigue    Macrodantin [Nitrofurantoin]     Metoprolol Other (See Comments)    Metronidazole Other (See Comments)    Molds & Smuts GI Intolerance    Morphine Nausea Only    Movantik [Naloxegol] Nausea Only    Mushroom Extract Complex - Food Allergy      Eye itchy, asthma  attack    Naprosyn [Naproxen]     Neurontin [Gabapentin] Dizziness    Pineapple - Food Allergy GI Intolerance    Plecanatide Abdominal Pain, Diarrhea and GI Intolerance    Prolia [Denosumab]     Prozac [Fluoxetine]     Serevent [Salmeterol] Dizziness    Soy Allergy - Food Allergy Vomiting    Sudafed [Pseudoephedrine] Hives    Sulfa Antibiotics     Tomato - Food Allergy GI Intolerance    Trazodone     Ultram [Tramadol] Nausea Only, Dizziness and Headache    Vilazodone Dizziness, GI Intolerance and Headache    Vilazodone Hcl Abdominal Pain, Dizziness, GI Intolerance, Headache and Other (See Comments)    Vioxx [Rofecoxib]     Vortioxetine Drowsiness, Fatigue, GI Intolerance and Irritability    Wheat Bran - Food Allergy Other (See Comments)     Stomach pain    Zithromax [Azithromycin] Hives    Zoloft [Sertraline]     Zantac [Ranitidine] Rash and Dizziness       Physical Exam:    /81   Pulse 83   Ht 5' 1\" (1.549 m)   Wt 62.6 kg (138 lb)   BMI 26.07 kg/m²     Constitutional:normal, well developed, well nourished, alert, in no distress and non-toxic and no overt pain behavior.  Eyes:anicteric  HEENT:grossly intact  Neck:supple, symmetric, trachea midline and no masses   Pulmonary:even and unlabored  Cardiovascular:No edema or pitting edema present  Skin:Normal without rashes or " lesions and well hydrated  Psychiatric:Mood and affect appropriate  Neurologic:Cranial Nerves II-XII grossly intact  Musculoskeletal:ambulates with cane      Imaging  No orders to display         No orders of the defined types were placed in this encounter.

## 2024-07-11 NOTE — PROGRESS NOTES
PT Evaluation     Today's date: 2024  Patient name: Teresa Mao  : 1955  MRN: [id number]  Referring provider: Ilya Munoz*  Dx:   Encounter Diagnosis     ICD-10-CM    1. Status post left rotator cuff repair  Z98.890                        Assessment  Impairments: abnormal or restricted ROM, abnormal movement, activity intolerance, impaired physical strength, lacks appropriate home exercise program and pain with function    Assessment details: Teresa Mao is a 68 y.o. female presenting to outpatient physical therapy with noted impairments including pain, impaired soft tissue mobility, reduced range of motion, reduced strength, reduced postural awareness, and reduced activity tolerance. Signs and symptoms at present are consistent with referring diagnosis of rotator cuff repair L shld. Due to noted impairments, the patient's present functional limitations include difficulty with ADLs with increased need for assistance, reliance on medication and/or modalities for pain relief, reduced tolerance for functional mobility and activity, and difficulty completing HH and self care responsibilities. Patient to benefit from skilled outpatient physical therapy 2x/week for 12 weeks in order to reduce pain, maximize pain free range of motion, increase strength and stability, and improve functional mobility/functional activity in order to maximize return to prior level of function with reduced limitations. Home exercise program was provided and all questions answered to patient's level of satisfaction. Thank you for your referral.    24 Pt notes she is doing well and very pleased with the status of her L shld. Pt notes she is functional and able to use the LUE for HH chores.Pt notes 80% improvement since SOC in pain reduction and AROM. Cont to note weakness LUE but strength also improving steadily. Pt has made excellent gains in AROM to date as seen in objective data. Pt will benefit from  "cont'd PT as per protocol to progress toward goals and strengthening ex beginning in 2 weeks.      Barriers to therapy: transportation  Understanding of Dx/Px/POC: good     Prognosis: good    Goals  STGs to be achieved in 4 weeks:  1. Pt to demonstrate reduced subjective pain rating \"at worst\" by at least 2-3 points from Initial Eval in order to allow for reduced pain with ADLs and improved functional activity tolerance. MET  2. Pt to demonstrate increased PROM of L shld  by at least 5-10 degrees in order to allow for greater ease and independence with ADLs and functional mobility. MET  3. Pt to demonstrate increased strength of L wrist and   in order to improve safety and stability with ADLs and functional mobility. MET    LTGs to be achieved in 6-8 weeks:  1. Pt will be I with HEP in order to continue to improve quality of life and independence and reduce risk for re-injury. MET  2. Pt to demonstrate return to indep self care and ADL's without limitations or restrictions. MET  3. Pt to demonstrate improved function as noted by achieving or exceeding predicted score on FOTO outcomes assessment tool. MET  4. Pt to demonstrate increased MMT of L shld by at least 1/2-1 grade in order to improve safety and stability with ADLs and functional mobility. Cont'd        Plan  Patient would benefit from: skilled physical therapy  Other planned modality interventions: Modalities prn for symptom management    Planned therapy interventions: manual therapy, neuromuscular re-education, therapeutic activities, therapeutic exercise, strengthening, stretching and home exercise program    Frequency: 2x week  Duration in weeks: 8  Plan of Care beginning date: 7/11/2024  Plan of Care expiration date: 9/5/2024  Treatment plan discussed with: PTA and patient    Subjective Evaluation    History of Present Illness  Mechanism of injury: Pt underwent L rot cuff repair on Fri. May 3rd with Dr Munoz. Notes her nerve block wore off Sunday " and she is in quite a bit of pain. Pt wearing sling and swathe and removed surgical dressing Sat. Was seen in the office yesterday and was told everything looks good. Has an aide coming in 2x/week to assist with bathing and HH chores.  Pt unable to lay in bed, cannot bathe indep, diff with HH chores, diff dressing, unable to drive, diff with most activities.  24 Pt notes she is doing well and very pleased with the status of her L shld. Pt notes she is functional and able to use the LUE for HH chores.Pt notes 80% improvement since SOC in pain reduction and AROM. Cont to note weakness LUE but strength also improving steadily.          Not a recurrent problem   Quality of life: poor    Patient Goals  Patient goals for therapy: decreased pain, increased motion, increased strength and independence with ADLs/IADLs  Patient goal: return to driving, cooking  Pain  Current pain ratin  At best pain ratin  At worst pain ratin  Quality: sharp, pressure and tight  Relieving factors: ice and medications  Exacerbated by: holding upper trap tight, reaching, movement.  Progression: improved    Social Support  Lives with: alone    Employment status: not working  Hand dominance: left    Treatments  Previous treatment: physical therapy, medication, injection treatment and home therapy  Current treatment: medication and physical therapy      Objective     Postural Observations  Seated posture: good  Standing posture: good      Observations   Left Shoulder   Negative for drainage, edema and incision (well healed).     Cervical/Thoracic Screen   Cervical range of motion within normal limits with the following exceptions: Sling is putting pressure on jugular catheter  AROM WFLs    Active Range of Motion   Left Shoulder   Flexion: 155 degrees   Extension: 62 degrees   Abduction: 165 degrees   External rotation 90°: 75 degrees   Internal rotation 90°: 50 degrees     Additional Active Range of Motion Details  No AROM as per  "protocol    Passive Range of Motion   Left Shoulder   Flexion: 165 degrees   Abduction: 180 degrees   Internal rotation 0°: Left shoulder passive internal rotation at 0 degrees: to body.     Strength/Myotome Testing     Left Shoulder     Planes of Motion   Flexion: 3+   Extension: 4+   Abduction: 3+   External rotation at 0°: 4   Internal rotation at 0°: 4     Additional Strength Details  Unable to assess at this time.             Precautions: see extensive PMH, ABRAMS,     FOLLOW PROTOCOL**, sling to be worn 6 weeks.    Re-eval Date: 6/7/24      Re-eval Date: 6/7/24  DOS:  5/3/24                                                                                                                           10 weeks  Date 6.13 6.18 6.25 7.9 7/11   Visit Count 11 12 13 14 15   FOTO   *completed 6.25.2024    *initial projected score met/exceeded     Pain In  4-5/10 with meds  3-4/10 ache w/o meds 3/10 w/meds 1-2/10   W/meds 1-2/10   Pain Out 2/10 No negative change Good benefits from MHP and CP apps Less after CP applic        Manuals 6.13 6.18 6.25 7.9 7/11   PROM LUE Supine MJD  10' Supine MJD 10' Supine MJD  10' Supine MJD  10'                            Neuro Re-Ed                                                                Ther Ex 6.13 6.18 6.25 7.9 7/11   ultigrip D/C         Ball squeeze D/C       Wrist AROM 1# 30x ea dir 1# 30x ea dir   1# 40x ea dir 1# 45x ea dir DC   L elbow ROM 1#X40 AROM flexion and ex 1# X45 AROM flexion and ext 1# X45 AROM flexion and ext 2# X40 AROM flexion and ext 3#   Pendulum ex *HEP       UT/LS stretch Gentle self stretch UT/LS 4x30\" ea Gentle self stretch UT/LS 4x30\" ea Gentle self stretch UT/LS 5x30\" ea Gentle self stretch UT/LS 5x30\" ea DC to HEP    **Isometrics  3 way 5x/5\" Isometrics  3 way 7x/5\" Isometrics  3 way 7x/5\" Isometrics  3 way 8x/5\" Isometrics  3 way 8x/5   Cerv ROM 15 reps ea dir 15 reps ea dir 20 reps ea dir 20 reps ea dir   DC to HEP   Scap isometrics  3x10 x 5\" " "3x15/5\" 3x15/5\" 3x15 x 5\" DC    pulleys 40 reps AArom pulleys 60 reps AArom pulleys 60 reps AArom 2 min FF pulleys 30 reps AArom    finger ladder  #24  6 reps FF finger ladder  #26  6 reps FF finger ladder  #26  8 reps FF finger ladder  #26  8 reps FF finger ladder  #26  8 reps FF   Wall slides     10x FF   10x cw/ccw  10x HA/ADD   Wand ex     Standing  FF, ABD, IR, EXT   10x ea   Active flex/abd        Full can        SL ER        Supine shld flex/ER w/wand     10 x 10\" ea                                                                           Ther Activity                        Gait Training                        Modalities 6.13 6.18 6.25 7.9 6.6    Cryo 10 min to L shld with no adverse reaction Cryo 10 min to L shld with no adverse reaction Cryo 10 min with no adverse reaction Cryo 10 min with no adverse reaction Cryo 10 min with no adverse reaction    *MHP 10 min to L UT MHP 10 min to L UT MHP 10 min to L UT         Rotator Cuff Repair Rehabilitation Protocol  (adapted from Milana OLMOS et al. “Rehabilitation of the Rotator Cuff: An Evaluation Based Approach”. [employer] 2006; 14:599-609)  Updated 10.14   Ilya Munoz MD  [facility]  [address] [place] [address]  [phone]  Phase 1 (Weeks 0-6)              Immediate Postoperative Period              Goals:                          Diminish Pain and Inflammation  Maintain and Protect Integrity of the Repair                          Gradually Increase Passive ROM (NO Active or Active Assist until Week 6)                          Become Independent with Modified ADLs              Precautions:  Maintain Arm in Abduction Sling, Remove Only for Directed Exercises (may remove Abduction Pillow after Day 21 for comfort)                          Keep Incisions Clean & Dry (okay to shower in 48 hours, band-aids over incisions)  No Immersion (pool) until Wounds Totally Sealed (usually not prior to day #10)  Passive Shoulder " Motion ONLY, No Lifting/Holding Objects, Reaching Behind Back  Okay to Type/Write at Desktop with Arm in Sling              Day 0-6                          Elbow, Wrist, Hand AROM Exercises                           Start Cervical AROM and Scapula Isometrics                          Cryotherapy/Ice for Pain and Inflammation                          Instruct in Hygiene, Posture, and Positioning               Day 7-28                          Continue Above  May Start Pendulum Exercises                          May Start Supine, Pain Free, PT assisted PROM  Forward Flexion to 90°, External Rotation to 35° (Elbow at side), Internal Rotation to Body, Abduction to 60°                          Can Introduce light Cardio (Walking, Stationary Bike)                          Aqua Therapy may begin at week 3 (day 21) as long as no wound problems              Day 29-42                          Continue Above  Progress PROM to Goal of full PROM by Week 6.  May add Gentle Mobilizations (GH and Scapulothoracic) to Regain full PROM if Needed.  May add Heat prior to PROM Exercises, Ice after Exercises  May Begin AAROM at Day 29 if ROM is Appropriate in Anticipation of AROM Starting at Week 6              Criteria to Progress to Phase 2                          Reasonable Passive Forward Flexion, Abduction , IR/ER                          Time  Phase 2 (Weeks 6-12)              Protection and Active Motion              Goals of Phase:                          Allow Healing of Soft Tissues                          Decrease Pain and Inflammation  Add ADLs and Regain AROM by End of Phase              Precautions:                          NO STRENGTHENING until Phase 3                          Repair is Most Prone to Failure during this Phase!                          No Lifting Objects > 2 lbs (Coffee Cup OK), no Sudden Motions                          Avoid Upper Extremity Bike and Ergometer              Day 43-56                           Discontinue Sling  Initiate AROM Exercises (forward flexion, ER, IR and abduction), Rotator Cuff Isometrics                          Continue Periscapular Exercises, add Stretching if PROM Lacking   No Strengthening until Week 12 (Minimum Time Needed for Cuff Healing Sufficient to withstand Strengthening)                Phase 3 (Weeks 12-16)              Early Strengthening              Goal of Phase:                          Gain full AROM, Maintain PROM                          Gradual return of Shoulder Strength, Power and Endurance                          Gradual return to Functional Activities              Precautions:                          No Lifting > 10lbs, Sudden Lifting or Pushing activities, Overhead Lifting                          No Upper Extremity Bike or Ergometer              Week 12                          Initiate Strengthening Program (10 lb Maximum until Phase 4)                            ER/IR with Bands (Standing)                            ER in Lateral Decubitius Position                            Lateral Raises                            Full Can in Scapular Plane                            Prone Rowing, Horizontal Abduction, Extension                            Elbow Flexion/Extension              Week 14-16                          Initiate light Functional Activities as Permitted  Progress to Fundamental Shoulder Exercises  Phase 4 (Variable but Weeks 16-24)              Aggressive Rehab  Sport Specific or Activity Specific               Goals of Phase:                          Maintain Full Pain-free AROM                          Advanced Conditioning Exercises for enhanced Functional use                          Continue regaining Shoulder Strength, Power and Endurance                          Eventual return to full Functional Activities              Precautions:                          None              Week 16                          Continue ROM and stretching if  appropriate                          Progress Strengthening, Proprioceptive and Neuromuscular Training                          Light Sports if Progressing Well (Chipping/Putting, easy ground strokes etc.)              Week 20                          Continue Strengthening and Stretching                          Initiate Interval Sports Program as Appropriate

## 2024-07-11 NOTE — PROGRESS NOTES
Teresa Mao tolerated IV hydration well with no complications.    Teresa Mao is aware of future appt tomorrow at 8:30AM.     AVS declined by Teresa Mao.

## 2024-07-12 ENCOUNTER — HOSPITAL ENCOUNTER (OUTPATIENT)
Dept: INFUSION CENTER | Facility: HOSPITAL | Age: 69
End: 2024-07-12
Payer: MEDICARE

## 2024-07-12 ENCOUNTER — APPOINTMENT (OUTPATIENT)
Dept: LAB | Facility: CLINIC | Age: 69
End: 2024-07-12
Payer: MEDICARE

## 2024-07-12 VITALS
DIASTOLIC BLOOD PRESSURE: 84 MMHG | HEART RATE: 64 BPM | SYSTOLIC BLOOD PRESSURE: 148 MMHG | TEMPERATURE: 97.5 F | OXYGEN SATURATION: 99 % | RESPIRATION RATE: 18 BRPM

## 2024-07-12 DIAGNOSIS — I10 HYPERTENSION, UNSPECIFIED TYPE: ICD-10-CM

## 2024-07-12 DIAGNOSIS — R53.83 FATIGUE, UNSPECIFIED TYPE: ICD-10-CM

## 2024-07-12 DIAGNOSIS — E03.9 HYPOTHYROIDISM, UNSPECIFIED TYPE: ICD-10-CM

## 2024-07-12 LAB
ALBUMIN SERPL BCG-MCNC: 4.1 G/DL (ref 3.5–5)
ALP SERPL-CCNC: 92 U/L (ref 34–104)
ALT SERPL W P-5'-P-CCNC: 26 U/L (ref 7–52)
ANION GAP SERPL CALCULATED.3IONS-SCNC: 11 MMOL/L (ref 4–13)
AST SERPL W P-5'-P-CCNC: 17 U/L (ref 13–39)
BASOPHILS # BLD MANUAL: 0 THOUSAND/UL (ref 0–0.1)
BASOPHILS NFR MAR MANUAL: 0 % (ref 0–1)
BILIRUB SERPL-MCNC: 0.64 MG/DL (ref 0.2–1)
BUN SERPL-MCNC: 20 MG/DL (ref 5–25)
CALCIUM SERPL-MCNC: 9.5 MG/DL (ref 8.4–10.2)
CHLORIDE SERPL-SCNC: 102 MMOL/L (ref 96–108)
CHOLEST SERPL-MCNC: 211 MG/DL
CO2 SERPL-SCNC: 28 MMOL/L (ref 21–32)
CREAT SERPL-MCNC: 0.83 MG/DL (ref 0.6–1.3)
EOSINOPHIL # BLD MANUAL: 0 THOUSAND/UL (ref 0–0.4)
EOSINOPHIL NFR BLD MANUAL: 0 % (ref 0–6)
ERYTHROCYTE [DISTWIDTH] IN BLOOD BY AUTOMATED COUNT: 13.7 % (ref 11.6–15.1)
GFR SERPL CREATININE-BSD FRML MDRD: 72 ML/MIN/1.73SQ M
GLUCOSE P FAST SERPL-MCNC: 82 MG/DL (ref 65–99)
HCT VFR BLD AUTO: 46.2 % (ref 34.8–46.1)
HDLC SERPL-MCNC: 101 MG/DL
HGB BLD-MCNC: 15.3 G/DL (ref 11.5–15.4)
INSULIN SERPL-ACNC: 8.15 UIU/ML (ref 1.9–23)
LDLC SERPL CALC-MCNC: 87 MG/DL (ref 0–100)
LYMPHOCYTES # BLD AUTO: 1.8 THOUSAND/UL (ref 0.6–4.47)
LYMPHOCYTES # BLD AUTO: 6 % (ref 14–44)
MCH RBC QN AUTO: 30.4 PG (ref 26.8–34.3)
MCHC RBC AUTO-ENTMCNC: 33.1 G/DL (ref 31.4–37.4)
MCV RBC AUTO: 92 FL (ref 82–98)
MONOCYTES # BLD AUTO: 0.6 THOUSAND/UL (ref 0–1.22)
MONOCYTES NFR BLD: 4 % (ref 4–12)
MYELOCYTE ABSOLUTE CT: 0.3 THOUSAND/UL (ref 0–0.1)
MYELOCYTES NFR BLD MANUAL: 2 % (ref 0–1)
NEUTROPHILS # BLD MANUAL: 12.27 THOUSAND/UL (ref 1.85–7.62)
NEUTS SEG NFR BLD AUTO: 82 % (ref 43–75)
NONHDLC SERPL-MCNC: 110 MG/DL
OVALOCYTES BLD QL SMEAR: PRESENT
PLATELET # BLD AUTO: 281 THOUSANDS/UL (ref 149–390)
PLATELET BLD QL SMEAR: ADEQUATE
PMV BLD AUTO: 9.6 FL (ref 8.9–12.7)
POIKILOCYTOSIS BLD QL SMEAR: PRESENT
POTASSIUM SERPL-SCNC: 3.8 MMOL/L (ref 3.5–5.3)
PROT SERPL-MCNC: 6.7 G/DL (ref 6.4–8.4)
RBC # BLD AUTO: 5.04 MILLION/UL (ref 3.81–5.12)
RBC MORPH BLD: PRESENT
SODIUM SERPL-SCNC: 141 MMOL/L (ref 135–147)
TRIGL SERPL-MCNC: 116 MG/DL
TSH SERPL DL<=0.05 MIU/L-ACNC: 1.01 UIU/ML (ref 0.45–4.5)
VARIANT LYMPHS # BLD AUTO: 6 %
WBC # BLD AUTO: 14.96 THOUSAND/UL (ref 4.31–10.16)

## 2024-07-12 PROCEDURE — 85027 COMPLETE CBC AUTOMATED: CPT

## 2024-07-12 PROCEDURE — 80053 COMPREHEN METABOLIC PANEL: CPT

## 2024-07-12 PROCEDURE — 83525 ASSAY OF INSULIN: CPT

## 2024-07-12 PROCEDURE — 96361 HYDRATE IV INFUSION ADD-ON: CPT

## 2024-07-12 PROCEDURE — 80061 LIPID PANEL: CPT

## 2024-07-12 PROCEDURE — 85007 BL SMEAR W/DIFF WBC COUNT: CPT

## 2024-07-12 PROCEDURE — 36415 COLL VENOUS BLD VENIPUNCTURE: CPT

## 2024-07-12 PROCEDURE — 84443 ASSAY THYROID STIM HORMONE: CPT

## 2024-07-12 PROCEDURE — 96360 HYDRATION IV INFUSION INIT: CPT

## 2024-07-12 RX ADMIN — SODIUM CHLORIDE 250 ML/HR: 0.9 INJECTION, SOLUTION INTRAVENOUS at 08:39

## 2024-07-12 NOTE — PROGRESS NOTES
Patient tolerated IV hydration without issues.       Teresa Mao is aware of future appt on 7/15/24 at 0830.     AVS -  No (Declined by Teresa Mao) Patient has Mychart.    Patient ambulated off unit without incident.  All personal belongings taken with patient.

## 2024-07-15 ENCOUNTER — HOSPITAL ENCOUNTER (OUTPATIENT)
Dept: INFUSION CENTER | Facility: HOSPITAL | Age: 69
Discharge: HOME | End: 2024-07-15
Payer: MEDICARE

## 2024-07-15 ENCOUNTER — OFFICE VISIT (OUTPATIENT)
Dept: PHYSICAL THERAPY | Facility: CLINIC | Age: 69
End: 2024-07-15
Payer: MEDICARE

## 2024-07-15 VITALS
HEART RATE: 71 BPM | DIASTOLIC BLOOD PRESSURE: 77 MMHG | OXYGEN SATURATION: 99 % | TEMPERATURE: 96.9 F | SYSTOLIC BLOOD PRESSURE: 139 MMHG | RESPIRATION RATE: 18 BRPM

## 2024-07-15 DIAGNOSIS — Z98.890 STATUS POST LEFT ROTATOR CUFF REPAIR: Primary | ICD-10-CM

## 2024-07-15 PROCEDURE — 97110 THERAPEUTIC EXERCISES: CPT

## 2024-07-15 PROCEDURE — 96360 HYDRATION IV INFUSION INIT: CPT

## 2024-07-15 PROCEDURE — 96361 HYDRATE IV INFUSION ADD-ON: CPT

## 2024-07-15 RX ADMIN — SODIUM CHLORIDE 250 ML/HR: 0.9 INJECTION, SOLUTION INTRAVENOUS at 08:55

## 2024-07-15 NOTE — PROGRESS NOTES
"Daily Note     Today's date: 7/15/2024  Patient name: Teresa Mao  : 1955  MRN: [id number]  Referring provider: Ilya Munoz*  Dx:   Encounter Diagnosis     ICD-10-CM    1. Status post left rotator cuff repair  Z98.890                      Subjective: Pt has no pain and says she has an occ little spasm in her bicep.      Objective: See treatment diary below      Assessment: Tolerated treatment well. Patient demonstrated fatigue post treatment, exhibited good technique with therapeutic exercises, and would benefit from continued PT  Able to advance ex program today adding prone ex, SL ER , and active flex, abd and full can ex. No issues with new ex. Pt doing very well and ahead of expected status.      Plan: Continue per plan of care.      Precautions: see extensive PMH, ABRAMS,     FOLLOW PROTOCOL**, sling to be worn 6 weeks.    Re-eval Date: 24      Re-eval Date: 24  DOS:  5/3/24                                                                                                                           10 weeks  Date 7/15 6.18 6.25 7.9    Visit Count 16 12 13 14 15   FOTO   *completed 2024    *initial projected score met/exceeded     Pain In 0  3-4/10 ache w/o meds 3/10 w/meds 1-2/10   W/meds 1-2/10   Pain Out 0 No negative change Good benefits from MHP and CP apps Less after CP applic        Manuals 7/15 6.18 6.25 7.9    PROM LUE Supine MJD  10' Supine MJD 10' Supine MJD  10' Supine MJD  10'                            Neuro Re-Ed                                                                Ther Ex 7/15 6.18 6.25 7.9    ultigrip D/C         Ball squeeze D/C       Wrist AROM DC 1# 30x ea dir   1# 40x ea dir 1# 45x ea dir DC   L elbow ROM 3#  X30 AROM flexion and ex 1# X45 AROM flexion and ext 1# X45 AROM flexion and ext 2# X40 AROM flexion and ext 3#   Pendulum ex *HEP       UT/LS stretch DC Gentle self stretch UT/LS 4x30\" ea Gentle self stretch UT/LS 5x30\" ea Gentle " "self stretch UT/LS 5x30\" ea DC to HEP    DC to HEP Isometrics  3 way 7x/5\" Isometrics  3 way 7x/5\" Isometrics  3 way 8x/5\" Isometrics  3 way 8x/5   Cerv ROM DC 15 reps ea dir 20 reps ea dir 20 reps ea dir   DC to HEP   Scap isometrics   3x15/5\" 3x15/5\" 3x15 x 5\" DC   pulleys pulleys    3 min FF pulleys 60 reps AArom pulleys 60 reps AArom 2 min FF pulleys 30 reps AArom   Finger ladder finger ladder  #27  8 reps FF finger ladder  #26  6 reps FF finger ladder  #26  8 reps FF finger ladder  #26  8 reps FF finger ladder  #26  8 reps FF   Wall slides  10x FF   10x cw/ccw  10x HA/ADD    10x FF   10x cw/ccw  10x HA/ADD   Wand ex Standing  FF, ABD, IR, EXT   10x ea    Standing  FF, ABD, IR, EXT   10x ea   Active flex/abd 2 x 10x ea       Full can 20x       SL ER 20x         Supine shld flex/ER w/wand 15x 5\" ea    10 x 10\" ea   Prone flex, HA, ext rows 10xea  1# rows- 10x                                                                       Ther Activity                        Gait Training                        Modalities 6.13 6.18 6.25 7.9 6.6    Cryo 10 min to L shld with no adverse reaction Cryo 10 min to L shld with no adverse reaction Cryo 10 min with no adverse reaction Cryo 10 min with no adverse reaction Cryo 10 min with no adverse reaction    *MHP 10 min to L UT MHP 10 min to L UT MHP 10 min to L UT         Rotator Cuff Repair Rehabilitation Protocol  (adapted from Milana PJ et al. “Rehabilitation of the Rotator Cuff: An Evaluation Based Approach”. JAAOS 2006; 14:599-609)  Updated 10.14   Ilya Munoz MD  [facility]  [address] [place] [address]  [phone]  Phase 1 (Weeks 0-6)              Immediate Postoperative Period              Goals:                          Diminish Pain and Inflammation  Maintain and Protect Integrity of the Repair                          Gradually Increase Passive ROM (NO Active or Active Assist until Week 6)                          Become " Independent with Modified ADLs              Precautions:  Maintain Arm in Abduction Sling, Remove Only for Directed Exercises (may remove Abduction Pillow after Day 21 for comfort)                          Keep Incisions Clean & Dry (okay to shower in 48 hours, band-aids over incisions)  No Immersion (pool) until Wounds Totally Sealed (usually not prior to day #10)  Passive Shoulder Motion ONLY, No Lifting/Holding Objects, Reaching Behind Back  Okay to Type/Write at Desktop with Arm in Sling              Day 0-6                          Elbow, Wrist, Hand AROM Exercises                           Start Cervical AROM and Scapula Isometrics                          Cryotherapy/Ice for Pain and Inflammation                          Instruct in Hygiene, Posture, and Positioning               Day 7-28                          Continue Above  May Start Pendulum Exercises                          May Start Supine, Pain Free, PT assisted PROM  Forward Flexion to 90°, External Rotation to 35° (Elbow at side), Internal Rotation to Body, Abduction to 60°                          Can Introduce light Cardio (Walking, Stationary Bike)                          Aqua Therapy may begin at week 3 (day 21) as long as no wound problems              Day 29-42                          Continue Above  Progress PROM to Goal of full PROM by Week 6.  May add Gentle Mobilizations (GH and Scapulothoracic) to Regain full PROM if Needed.  May add Heat prior to PROM Exercises, Ice after Exercises  May Begin AAROM at Day 29 if ROM is Appropriate in Anticipation of AROM Starting at Week 6              Criteria to Progress to Phase 2                          Reasonable Passive Forward Flexion, Abduction , IR/ER                          Time  Phase 2 (Weeks 6-12)              Protection and Active Motion              Goals of Phase:                          Allow Healing of Soft Tissues                          Decrease Pain and Inflammation  Add ADLs  and Regain AROM by End of Phase              Precautions:                          NO STRENGTHENING until Phase 3                          Repair is Most Prone to Failure during this Phase!                          No Lifting Objects > 2 lbs (Coffee Cup OK), no Sudden Motions                          Avoid Upper Extremity Bike and Ergometer              Day 43-56                          Discontinue Sling  Initiate AROM Exercises (forward flexion, ER, IR and abduction), Rotator Cuff Isometrics                          Continue Periscapular Exercises, add Stretching if PROM Lacking   No Strengthening until Week 12 (Minimum Time Needed for Cuff Healing Sufficient to withstand Strengthening)                Phase 3 (Weeks 12-16)              Early Strengthening              Goal of Phase:                          Gain full AROM, Maintain PROM                          Gradual return of Shoulder Strength, Power and Endurance                          Gradual return to Functional Activities              Precautions:                          No Lifting > 10lbs, Sudden Lifting or Pushing activities, Overhead Lifting                          No Upper Extremity Bike or Ergometer              Week 12                          Initiate Strengthening Program (10 lb Maximum until Phase 4)                            ER/IR with Bands (Standing)                            ER in Lateral Decubitius Position                            Lateral Raises                            Full Can in Scapular Plane                            Prone Rowing, Horizontal Abduction, Extension                            Elbow Flexion/Extension              Week 14-16                          Initiate light Functional Activities as Permitted  Progress to Fundamental Shoulder Exercises  Phase 4 (Variable but Weeks 16-24)              Aggressive Rehab  Sport Specific or Activity Specific               Goals of Phase:                          Maintain Full  Pain-free AROM                          Advanced Conditioning Exercises for enhanced Functional use                          Continue regaining Shoulder Strength, Power and Endurance                          Eventual return to full Functional Activities              Precautions:                          None              Week 16                          Continue ROM and stretching if appropriate                          Progress Strengthening, Proprioceptive and Neuromuscular Training                          Light Sports if Progressing Well (Chipping/Putting, easy ground strokes etc.)              Week 20                          Continue Strengthening and Stretching                          Initiate Interval Sports Program as Appropriate

## 2024-07-16 ENCOUNTER — HOSPITAL ENCOUNTER (OUTPATIENT)
Dept: INFUSION CENTER | Facility: HOSPITAL | Age: 69
Discharge: HOME | End: 2024-07-16
Payer: MEDICARE

## 2024-07-16 ENCOUNTER — APPOINTMENT (OUTPATIENT)
Dept: PHYSICAL THERAPY | Facility: CLINIC | Age: 69
End: 2024-07-16
Payer: MEDICARE

## 2024-07-16 VITALS
TEMPERATURE: 97 F | OXYGEN SATURATION: 99 % | HEART RATE: 76 BPM | DIASTOLIC BLOOD PRESSURE: 80 MMHG | SYSTOLIC BLOOD PRESSURE: 144 MMHG | RESPIRATION RATE: 16 BRPM

## 2024-07-16 PROCEDURE — 96360 HYDRATION IV INFUSION INIT: CPT

## 2024-07-16 PROCEDURE — 96361 HYDRATE IV INFUSION ADD-ON: CPT

## 2024-07-16 RX ORDER — SODIUM CHLORIDE 9 MG/ML
250 INJECTION, SOLUTION INTRAVENOUS ONCE
Status: COMPLETED | OUTPATIENT
Start: 2024-07-18 | End: 2024-07-18

## 2024-07-16 RX ADMIN — SODIUM CHLORIDE 1500 ML: 0.9 INJECTION, SOLUTION INTRAVENOUS at 08:52

## 2024-07-16 NOTE — PROGRESS NOTES
Teresa Mao  tolerated hydration treatment well with no complications.      Teresa Mao is aware of future appt on 7/18 at 8:30AM.     AVS printed and given to Teresa Mao: No (Declined by Teresa Mao).

## 2024-07-16 NOTE — PLAN OF CARE
Problem: Potential for Falls  Goal: Patient will remain free of falls  Description: INTERVENTIONS:  - Educate patient/family on patient safety including physical limitations  - Instruct patient to call for assistance with activity   - Consult OT/PT to assist with strengthening/mobility   - Keep Call bell within reach  - Keep bed low and locked with side rails adjusted as appropriate  - Keep care items and personal belongings within reach  - Initiate and maintain comfort rounds  - Make Fall Risk Sign visible to staff  - Apply yellow socks and bracelet for high fall risk patients  - Consider moving patient to room near nurses station  Outcome: Progressing      English

## 2024-07-17 RX ORDER — SODIUM CHLORIDE 9 MG/ML
250 INJECTION, SOLUTION INTRAVENOUS ONCE
Status: COMPLETED | OUTPATIENT
Start: 2024-07-22 | End: 2024-07-22

## 2024-07-17 RX ORDER — SODIUM CHLORIDE 9 MG/ML
250 INJECTION, SOLUTION INTRAVENOUS ONCE
Status: COMPLETED | OUTPATIENT
Start: 2024-07-19 | End: 2024-07-19

## 2024-07-18 ENCOUNTER — OFFICE VISIT (OUTPATIENT)
Dept: PHYSICAL THERAPY | Facility: CLINIC | Age: 69
End: 2024-07-18
Payer: MEDICARE

## 2024-07-18 ENCOUNTER — HOSPITAL ENCOUNTER (OUTPATIENT)
Dept: INFUSION CENTER | Facility: HOSPITAL | Age: 69
End: 2024-07-18
Attending: INTERNAL MEDICINE
Payer: MEDICARE

## 2024-07-18 VITALS
RESPIRATION RATE: 18 BRPM | TEMPERATURE: 96.9 F | DIASTOLIC BLOOD PRESSURE: 74 MMHG | OXYGEN SATURATION: 96 % | HEART RATE: 76 BPM | SYSTOLIC BLOOD PRESSURE: 132 MMHG

## 2024-07-18 DIAGNOSIS — Z98.890 STATUS POST LEFT ROTATOR CUFF REPAIR: Primary | ICD-10-CM

## 2024-07-18 PROCEDURE — 96360 HYDRATION IV INFUSION INIT: CPT

## 2024-07-18 PROCEDURE — 97140 MANUAL THERAPY 1/> REGIONS: CPT

## 2024-07-18 PROCEDURE — 96361 HYDRATE IV INFUSION ADD-ON: CPT

## 2024-07-18 PROCEDURE — 97110 THERAPEUTIC EXERCISES: CPT

## 2024-07-18 RX ORDER — AMOXICILLIN AND CLAVULANATE POTASSIUM 875; 125 MG/1; MG/1
1 TABLET, FILM COATED ORAL 2 TIMES DAILY
COMMUNITY
Start: 2024-07-05

## 2024-07-18 RX ADMIN — SODIUM CHLORIDE 250 ML/HR: 0.9 INJECTION, SOLUTION INTRAVENOUS at 08:41

## 2024-07-18 NOTE — PROGRESS NOTES
"Daily Note     Today's date: 2024  Patient name: Teresa Mao  : 1955  MRN: [id number]  Referring provider: Ilya uMnoz*  Dx:   Encounter Diagnosis     ICD-10-CM    1. Status post left rotator cuff repair  Z98.890                      Subjective: Pt. States she will do what she can today exercise-wise, as she is having a POTS flare.      Objective: See treatment diary below      Assessment: Tolerated treatment well with exer performed and MT/CP apps received.  Patient exhibited good technique with therapeutic exercises and would benefit from continued PT.      Plan: Continue per plan of care.  Progress treament per protocol.              Precautions: see extensive PMH, ABRAMS,     FOLLOW PROTOCOL**, sling to be worn 6 weeks.    Re-eval Date: 24      Re-eval Date: 24  DOS:  5/3/24                                                                                                                           10 weeks  Date 7/15 7.18 6.25 7.9    Visit Count 16 17 13 14 15   FOTO   *completed 2024    *initial projected score met/exceeded     Pain In 0 0/10 3/10 w/meds 1-2/10   W/meds 1-210   Pain Out 0 No negative change Good benefits from MHP and CP apps Less after CP applic        Manuals 7/15 7.18 6.25 7.9    PROM LUE Supine MJD  10' Supine MJD 10' Supine MJD  10' Supine MJD  10'                            Neuro Re-Ed                                                                Ther Ex 7/15 7.18 6.25 7.9    ultigrip D/C         Ball squeeze D/C       Wrist AROM DC    1# 40x ea dir 1# 45x ea dir DC   L elbow ROM 3#  X30 AROM flexion and ex 3# X40 AROM flexion and ext 1# X45 AROM flexion and ext 2# X40 AROM flexion and ext 3#   Pendulum ex *HEP       UT/LS stretch DC  Gentle self stretch UT/LS 5x30\" ea Gentle self stretch UT/LS 5x30\" ea DC to HEP    DC to HEP  Isometrics  3 way 7x/5\" Isometrics  3 way 8x/5\" Isometrics  3 way 8x/5   Cerv ROM DC  20 reps ea dir 20 reps ea " "dir   DC to HEP   Scap isometrics    3x15/5\" 3x15 x 5\" DC   pulleys pulleys    3 min FF pulleys    3 min FF pulleys 60 reps AArom 2 min FF pulleys 30 reps AArom   Finger ladder finger ladder  #27  8 reps FF finger ladder  #26  10 reps FF finger ladder  #26  8 reps FF finger ladder  #26  8 reps FF finger ladder  #26  8 reps FF   Wall slides  10x FF   10x cw/ccw  10x HA/ADD 10x FF   10x cw/ccw  10x HA/ADD   10x FF   10x cw/ccw  10x HA/ADD   Wand ex Standing  FF, ABD, IR, EXT   10x ea Standing  FF, ABD, IR, EXT   10x ea   Standing  FF, ABD, IR, EXT   10x ea   Active flex/abd 2 x 10x ea 2 x 10x ea      Full can 20x 20x        SL ER 20x 20x        Supine shld flex/ER w/wand 15x 5\" ea 15x 5\" ea   10 x 10\" ea   Prone flex, HA, ext rows  resume                                                                      Ther Activity                        Gait Training                        Modalities 6.13 7.18 6.25 7.9 6.6    Cryo 10 min to L shld with no adverse reaction Cryo 10 min to L shld with no adverse reaction Cryo 10 min with no adverse reaction Cryo 10 min with no adverse reaction Cryo 10 min with no adverse reaction    *MHP 10 min to L UT *Pt. deferred MHP 10 min to L UT         Rotator Cuff Repair Rehabilitation Protocol  (adapted from Milana PJ et al. “Rehabilitation of the Rotator Cuff: An Evaluation Based Approach”. JAAOS 2006; 14:599-609)  Updated 10.14   Ilya Munoz MD  [facility]  [address] [place] [id number]  [phone]  Phase 1 (Weeks 0-6)              Immediate Postoperative Period              Goals:                          Diminish Pain and Inflammation  Maintain and Protect Integrity of the Repair                          Gradually Increase Passive ROM (NO Active or Active Assist until Week 6)                          Become Independent with Modified ADLs              Precautions:  Maintain Arm in Abduction Sling, Remove Only for Directed Exercises (may " remove Abduction Pillow after Day 21 for comfort)                          Keep Incisions Clean & Dry (okay to shower in 48 hours, band-aids over incisions)  No Immersion (pool) until Wounds Totally Sealed (usually not prior to day #10)  Passive Shoulder Motion ONLY, No Lifting/Holding Objects, Reaching Behind Back  Okay to Type/Write at Desktop with Arm in Sling              Day 0-6                          Elbow, Wrist, Hand AROM Exercises                           Start Cervical AROM and Scapula Isometrics                          Cryotherapy/Ice for Pain and Inflammation                          Instruct in Hygiene, Posture, and Positioning               Day 7-28                          Continue Above  May Start Pendulum Exercises                          May Start Supine, Pain Free, PT assisted PROM  Forward Flexion to 90°, External Rotation to 35° (Elbow at side), Internal Rotation to Body, Abduction to 60°                          Can Introduce light Cardio (Walking, Stationary Bike)                          Aqua Therapy may begin at week 3 (day 21) as long as no wound problems              Day 29-42                          Continue Above  Progress PROM to Goal of full PROM by Week 6.  May add Gentle Mobilizations (GH and Scapulothoracic) to Regain full PROM if Needed.  May add Heat prior to PROM Exercises, Ice after Exercises  May Begin AAROM at Day 29 if ROM is Appropriate in Anticipation of AROM Starting at Week 6              Criteria to Progress to Phase 2                          Reasonable Passive Forward Flexion, Abduction , IR/ER                          Time  Phase 2 (Weeks 6-12)              Protection and Active Motion              Goals of Phase:                          Allow Healing of Soft Tissues                          Decrease Pain and Inflammation  Add ADLs and Regain AROM by End of Phase              Precautions:                          NO STRENGTHENING until Phase 3                           Repair is Most Prone to Failure during this Phase!                          No Lifting Objects > 2 lbs (Coffee Cup OK), no Sudden Motions                          Avoid Upper Extremity Bike and Ergometer              Day 43-56                          Discontinue Sling  Initiate AROM Exercises (forward flexion, ER, IR and abduction), Rotator Cuff Isometrics                          Continue Periscapular Exercises, add Stretching if PROM Lacking   No Strengthening until Week 12 (Minimum Time Needed for Cuff Healing Sufficient to withstand Strengthening)                Phase 3 (Weeks 12-16)              Early Strengthening              Goal of Phase:                          Gain full AROM, Maintain PROM                          Gradual return of Shoulder Strength, Power and Endurance                          Gradual return to Functional Activities              Precautions:                          No Lifting > 10lbs, Sudden Lifting or Pushing activities, Overhead Lifting                          No Upper Extremity Bike or Ergometer              Week 12                          Initiate Strengthening Program (10 lb Maximum until Phase 4)                            ER/IR with Bands (Standing)                            ER in Lateral Decubitius Position                            Lateral Raises                            Full Can in Scapular Plane                            Prone Rowing, Horizontal Abduction, Extension                            Elbow Flexion/Extension              Week 14-16                          Initiate light Functional Activities as Permitted  Progress to Fundamental Shoulder Exercises  Phase 4 (Variable but Weeks 16-24)              Aggressive Rehab  Sport Specific or Activity Specific               Goals of Phase:                          Maintain Full Pain-free AROM                          Advanced Conditioning Exercises for enhanced Functional use                          Continue  regaining Shoulder Strength, Power and Endurance                          Eventual return to full Functional Activities              Precautions:                          None              Week 16                          Continue ROM and stretching if appropriate                          Progress Strengthening, Proprioceptive and Neuromuscular Training                          Light Sports if Progressing Well (Chipping/Putting, easy ground strokes etc.)              Week 20                          Continue Strengthening and Stretching                          Initiate Interval Sports Program as Appropriate

## 2024-07-18 NOTE — PROGRESS NOTES
Patient tolerated IV hydration without issues.      Teresa Mao is aware of future appt on 7/19/24 at 0830.     AVS-  No (Declined by Teresa Mao) Patient has Mychart.    Patient ambulated off unit without incident.  All personal belongings taken with patient.

## 2024-07-19 ENCOUNTER — HOSPITAL ENCOUNTER (OUTPATIENT)
Dept: INFUSION CENTER | Facility: HOSPITAL | Age: 69
End: 2024-07-19
Attending: INTERNAL MEDICINE
Payer: MEDICARE

## 2024-07-19 VITALS
SYSTOLIC BLOOD PRESSURE: 154 MMHG | DIASTOLIC BLOOD PRESSURE: 88 MMHG | TEMPERATURE: 97.5 F | HEART RATE: 20 BPM | RESPIRATION RATE: 18 BRPM

## 2024-07-19 PROCEDURE — 96361 HYDRATE IV INFUSION ADD-ON: CPT

## 2024-07-19 PROCEDURE — 96360 HYDRATION IV INFUSION INIT: CPT

## 2024-07-19 RX ADMIN — SODIUM CHLORIDE 250 ML/HR: 0.9 INJECTION, SOLUTION INTRAVENOUS at 08:40

## 2024-07-19 NOTE — PROGRESS NOTES
Teresa Mao tolerated IV hydration well with no complications.    Teresa Mao is aware of future appt on 7/22 at 8:30AM.     AVS declined by Teresa Mao.

## 2024-07-22 ENCOUNTER — APPOINTMENT (EMERGENCY)
Dept: RADIOLOGY | Facility: HOSPITAL | Age: 69
End: 2024-07-22
Payer: MEDICARE

## 2024-07-22 ENCOUNTER — HOSPITAL ENCOUNTER (OUTPATIENT)
Dept: INFUSION CENTER | Facility: HOSPITAL | Age: 69
Discharge: HOME | End: 2024-07-22
Payer: MEDICARE

## 2024-07-22 ENCOUNTER — HOSPITAL ENCOUNTER (EMERGENCY)
Facility: HOSPITAL | Age: 69
Discharge: HOME | End: 2024-07-22
Attending: EMERGENCY MEDICINE | Admitting: EMERGENCY MEDICINE
Payer: MEDICARE

## 2024-07-22 VITALS
TEMPERATURE: 97.2 F | RESPIRATION RATE: 18 BRPM | HEART RATE: 18 BPM | OXYGEN SATURATION: 97 % | SYSTOLIC BLOOD PRESSURE: 143 MMHG | DIASTOLIC BLOOD PRESSURE: 83 MMHG

## 2024-07-22 VITALS
BODY MASS INDEX: 25.49 KG/M2 | HEIGHT: 61 IN | WEIGHT: 135 LBS | OXYGEN SATURATION: 96 % | HEART RATE: 73 BPM | SYSTOLIC BLOOD PRESSURE: 119 MMHG | TEMPERATURE: 98 F | DIASTOLIC BLOOD PRESSURE: 59 MMHG | RESPIRATION RATE: 18 BRPM

## 2024-07-22 DIAGNOSIS — Z87.09 HISTORY OF ASTHMA: ICD-10-CM

## 2024-07-22 DIAGNOSIS — R05.9 COUGH: ICD-10-CM

## 2024-07-22 DIAGNOSIS — Z86.2 HISTORY OF FACTOR V LEIDEN MUTATION: ICD-10-CM

## 2024-07-22 DIAGNOSIS — R06.02 SOB (SHORTNESS OF BREATH): Primary | ICD-10-CM

## 2024-07-22 LAB
ALBUMIN SERPL BCG-MCNC: 4 G/DL (ref 3.5–5)
ALP SERPL-CCNC: 82 U/L (ref 34–104)
ALT SERPL W P-5'-P-CCNC: 23 U/L (ref 7–52)
ANION GAP SERPL CALCULATED.3IONS-SCNC: 7 MMOL/L (ref 4–13)
APTT PPP: 24 SECONDS (ref 23–37)
AST SERPL W P-5'-P-CCNC: 21 U/L (ref 13–39)
BASOPHILS # BLD AUTO: 0.05 THOUSANDS/ÂΜL (ref 0–0.1)
BASOPHILS NFR BLD AUTO: 1 % (ref 0–1)
BILIRUB SERPL-MCNC: 0.45 MG/DL (ref 0.2–1)
BNP SERPL-MCNC: 26 PG/ML (ref 0–100)
BUN SERPL-MCNC: 15 MG/DL (ref 5–25)
CALCIUM SERPL-MCNC: 8.9 MG/DL (ref 8.4–10.2)
CARDIAC TROPONIN I PNL SERPL HS: 5 NG/L
CHLORIDE SERPL-SCNC: 106 MMOL/L (ref 96–108)
CO2 SERPL-SCNC: 27 MMOL/L (ref 21–32)
CREAT SERPL-MCNC: 0.96 MG/DL (ref 0.6–1.3)
D DIMER PPP FEU-MCNC: 0.37 UG/ML FEU
EOSINOPHIL # BLD AUTO: 0.25 THOUSAND/ÂΜL (ref 0–0.61)
EOSINOPHIL NFR BLD AUTO: 2 % (ref 0–6)
ERYTHROCYTE [DISTWIDTH] IN BLOOD BY AUTOMATED COUNT: 14.4 % (ref 11.6–15.1)
FLUAV RNA RESP QL NAA+PROBE: NEGATIVE
FLUBV RNA RESP QL NAA+PROBE: NEGATIVE
GFR SERPL CREATININE-BSD FRML MDRD: 60 ML/MIN/1.73SQ M
GLUCOSE SERPL-MCNC: 94 MG/DL (ref 65–140)
HCT VFR BLD AUTO: 45 % (ref 34.8–46.1)
HGB BLD-MCNC: 14.2 G/DL (ref 11.5–15.4)
IMM GRANULOCYTES # BLD AUTO: 0.15 THOUSAND/UL (ref 0–0.2)
IMM GRANULOCYTES NFR BLD AUTO: 1 % (ref 0–2)
INR PPP: 0.91 (ref 0.84–1.19)
LYMPHOCYTES # BLD AUTO: 1.75 THOUSANDS/ÂΜL (ref 0.6–4.47)
LYMPHOCYTES NFR BLD AUTO: 16 % (ref 14–44)
MCH RBC QN AUTO: 30 PG (ref 26.8–34.3)
MCHC RBC AUTO-ENTMCNC: 31.6 G/DL (ref 31.4–37.4)
MCV RBC AUTO: 95 FL (ref 82–98)
MONOCYTES # BLD AUTO: 1.05 THOUSAND/ÂΜL (ref 0.17–1.22)
MONOCYTES NFR BLD AUTO: 10 % (ref 4–12)
NEUTROPHILS # BLD AUTO: 7.5 THOUSANDS/ÂΜL (ref 1.85–7.62)
NEUTS SEG NFR BLD AUTO: 70 % (ref 43–75)
NRBC BLD AUTO-RTO: 0 /100 WBCS
PLATELET # BLD AUTO: 197 THOUSANDS/UL (ref 149–390)
PMV BLD AUTO: 10 FL (ref 8.9–12.7)
POTASSIUM SERPL-SCNC: 3.9 MMOL/L (ref 3.5–5.3)
PROT SERPL-MCNC: 6.5 G/DL (ref 6.4–8.4)
PROTHROMBIN TIME: 12.3 SECONDS (ref 11.6–14.5)
RBC # BLD AUTO: 4.73 MILLION/UL (ref 3.81–5.12)
RSV RNA RESP QL NAA+PROBE: NEGATIVE
SARS-COV-2 RNA RESP QL NAA+PROBE: NEGATIVE
SODIUM SERPL-SCNC: 140 MMOL/L (ref 135–147)
WBC # BLD AUTO: 10.75 THOUSAND/UL (ref 4.31–10.16)

## 2024-07-22 PROCEDURE — 99285 EMERGENCY DEPT VISIT HI MDM: CPT

## 2024-07-22 PROCEDURE — 96360 HYDRATION IV INFUSION INIT: CPT

## 2024-07-22 PROCEDURE — 96365 THER/PROPH/DIAG IV INF INIT: CPT

## 2024-07-22 PROCEDURE — 99284 EMERGENCY DEPT VISIT MOD MDM: CPT | Performed by: EMERGENCY MEDICINE

## 2024-07-22 PROCEDURE — 71046 X-RAY EXAM CHEST 2 VIEWS: CPT

## 2024-07-22 PROCEDURE — 85379 FIBRIN DEGRADATION QUANT: CPT

## 2024-07-22 PROCEDURE — 96361 HYDRATE IV INFUSION ADD-ON: CPT

## 2024-07-22 PROCEDURE — 94640 AIRWAY INHALATION TREATMENT: CPT

## 2024-07-22 PROCEDURE — 80053 COMPREHEN METABOLIC PANEL: CPT

## 2024-07-22 PROCEDURE — 36415 COLL VENOUS BLD VENIPUNCTURE: CPT

## 2024-07-22 PROCEDURE — 96375 TX/PRO/DX INJ NEW DRUG ADDON: CPT

## 2024-07-22 PROCEDURE — 85025 COMPLETE CBC W/AUTO DIFF WBC: CPT

## 2024-07-22 PROCEDURE — 83880 ASSAY OF NATRIURETIC PEPTIDE: CPT

## 2024-07-22 PROCEDURE — 0241U HB NFCT DS VIR RESP RNA 4 TRGT: CPT

## 2024-07-22 PROCEDURE — 84484 ASSAY OF TROPONIN QUANT: CPT

## 2024-07-22 PROCEDURE — 85610 PROTHROMBIN TIME: CPT

## 2024-07-22 PROCEDURE — 85730 THROMBOPLASTIN TIME PARTIAL: CPT

## 2024-07-22 PROCEDURE — 93005 ELECTROCARDIOGRAM TRACING: CPT

## 2024-07-22 RX ORDER — BENZONATATE 100 MG/1
100 CAPSULE ORAL 3 TIMES DAILY PRN
Qty: 21 CAPSULE | Refills: 0 | Status: SHIPPED | OUTPATIENT
Start: 2024-07-22

## 2024-07-22 RX ORDER — IPRATROPIUM BROMIDE AND ALBUTEROL SULFATE 2.5; .5 MG/3ML; MG/3ML
3 SOLUTION RESPIRATORY (INHALATION) ONCE
Status: COMPLETED | OUTPATIENT
Start: 2024-07-22 | End: 2024-07-22

## 2024-07-22 RX ORDER — MAGNESIUM SULFATE HEPTAHYDRATE 40 MG/ML
2 INJECTION, SOLUTION INTRAVENOUS ONCE
Status: COMPLETED | OUTPATIENT
Start: 2024-07-22 | End: 2024-07-22

## 2024-07-22 RX ORDER — PREDNISONE 20 MG/1
40 TABLET ORAL DAILY
Qty: 10 TABLET | Refills: 0 | Status: SHIPPED | OUTPATIENT
Start: 2024-07-22 | End: 2024-07-27

## 2024-07-22 RX ORDER — METHYLPREDNISOLONE SODIUM SUCCINATE 125 MG/2ML
125 INJECTION, POWDER, LYOPHILIZED, FOR SOLUTION INTRAMUSCULAR; INTRAVENOUS ONCE
Status: COMPLETED | OUTPATIENT
Start: 2024-07-22 | End: 2024-07-22

## 2024-07-22 RX ADMIN — MAGNESIUM SULFATE HEPTAHYDRATE 2 G: 40 INJECTION, SOLUTION INTRAVENOUS at 12:32

## 2024-07-22 RX ADMIN — SODIUM CHLORIDE 250 ML/HR: 0.9 INJECTION, SOLUTION INTRAVENOUS at 08:32

## 2024-07-22 RX ADMIN — IPRATROPIUM BROMIDE AND ALBUTEROL SULFATE 3 ML: 2.5; .5 SOLUTION RESPIRATORY (INHALATION) at 12:32

## 2024-07-22 RX ADMIN — METHYLPREDNISOLONE SODIUM SUCCINATE 125 MG: 125 INJECTION, POWDER, FOR SOLUTION INTRAMUSCULAR; INTRAVENOUS at 12:32

## 2024-07-22 NOTE — PROGRESS NOTES
Pt rang call bell asking to be disconnected from fluids.  She stated that she just got off the phone with her PCP and he would like her to proceed to ER to get her shortness of breath taken care of.  Pt stated that if she was done at a reasonable time, she would try to come back to finish her hydration.  She stated she would call if she were to come back.

## 2024-07-22 NOTE — ED PROVIDER NOTES
History  Chief Complaint   Patient presents with    Shortness of Breath     Ongoing for 1 month was on prednisone and doxy. Patient reports getting worse over the weekend and no improvement doing neb treatments every 3-4 hours.  Shaan Brito is a 68-year-old female with past medical history of POTS, factor V Leiden, severe asthma, presenting to the ED today with complaint of persisting shortness of breath for the past few months.  Patient states she had her left shoulder replaced months ago, and after being given propofol and undergoing surgery, experienced severe asthma exacerbation.  Patient states since that time she has been having severe shortness of breath with coughing spells consistent with her chronic asthma.  Patient has been taking DuoNeb treatments every 3-4 hours without relief.  Patient has also been on several courses of prednisone, and during these courses has had relief, but has persisting shortness of breath following courses of prednisone.  Patient is currently finishing a course of Augmentin. patient reports associated subjective fevers.  Patient talked to her pulmonologist today, who recommended her coming to the ER.  Patient reports significant lifelong secondhand smoke exposure, and denies regular alcohol intake or illicit drug use.  Patient has many reported drug allergies, listed in her chart.      Shortness of Breath  Associated symptoms: cough, fever and sore throat    Associated symptoms: no abdominal pain, no chest pain, no diaphoresis, no headaches, no neck pain, no rash and no vomiting        Prior to Admission Medications   Prescriptions Last Dose Informant Patient Reported? Taking?   Alum Hydroxide-Mag Trisilicate (Gaviscon) 80-14.2 MG CHEW  Self Yes No   Sig: Chew 1 tablet 4 (four) times a day as needed With meals and as needed   Galcanezumab-gnlm (Emgality) 120 MG/ML SOAJ  Self Yes No   Sig: Inject under the skin every 30 (thirty) days    HYDROcodone-acetaminophen  (NORCO) 5-325 mg per tablet   No No   Sig: Take 1 tablet by mouth 2 (two) times a day as needed for pain Max Daily Amount: 2 tablets Do not start before 2024.   HYDROcodone-acetaminophen (Norco) 5-325 mg per tablet   No No   Sig: Take 1 tablet by mouth 2 (two) times a day as needed for pain for up to 60 doses Max Daily Amount: 2 tablets Do not start before 2024.   Heating Pad PADS   No No   Sig: Use if needed (pain)   KETOTIFEN FUMARATE OP   Yes No   Sig: Take 1mg compounded capsule three times a day   MULTIPLE VITAMINS-CALCIUM PO  Self Yes No   Sig: Take 1 capsule by mouth every morning    Magnesium 400 MG CAPS  Self Yes No   Sig: Take 1 capsule by mouth 2 (two) times a day    NON FORMULARY   Yes No   Sig: Take 1 mg by mouth 3 (three) times a day Ketotifen 1 mg compounded capsule   NON FORMULARY   Yes No   Sig: LACTOBACILLUS COMBINATION NO.4 (PROBIOTIC) 3 BILLION CELL CAPSULE   NON FORMULARY   Yes No   Sig: Take 32 mg by mouth NATURE-THROID ORAL   Probiotic Product (PROBIOTIC DAILY PO)  Self Yes No   Sig: Take 1 tablet by mouth daily At lunch   Qvar RediHaler 40 MCG/ACT inhaler  Self Yes No   Thyroid, Porcine, POWD  Self Yes No   Sig: Use 32 mg daily   Ubrogepant (UBRELVY) 100 MG tablet  Self Yes No   Sig: Take 100 mg by mouth Take 1 tablet (100 mg) one time as needed for migraine. May repeat one additional tablet (100 mg) at least two hours after the first dose. Do not use more than two doses per day, or for more than eight days per month.   aluminum hydroxide-magnesium carbonate (Gaviscon Extra Strength) 160-105 mg per chewable tablet   Yes No   amoxicillin-clavulanate (AUGMENTIN) 875-125 mg per tablet   Yes No   Sig: Take 1 tablet by mouth 2 (two) times a day   azelastine (ASTELIN) 0.1 % nasal spray  Self Yes No   Si sprays into each nostril 2 (two) times a day Use in each nostril as directed   beclomethasone (QVAR) 40 MCG/ACT inhaler  Self Yes No   Sig: Inhale 1 puff daily as needed (only  when unable to nebulize pulmicort) Rinse mouth after use.   budesonide (PULMICORT) 0.5 mg/2 mL nebulizer solution  Self Yes No   Sig: Take 0.5 mg by nebulization 2 (two) times a day Rinse mouth after use.   cromolyn (GASTROCROM) 100 MG/5ML solution   Yes No   Sig: Take 100 mg by mouth 4 (four) times a day (before meals and at bedtime)   cyclobenzaprine (FLEXERIL) 5 mg tablet   No No   Sig: Take 1 tablet (5 mg total) by mouth 3 (three) times a day as needed for muscle spasms   docusate sodium (COLACE) 100 mg capsule  Self Yes No   Sig: Take 100 mg by mouth daily as needed for constipation   erythromycin (ILOTYCIN) ophthalmic ointment  Self Yes No   Sig: Apply 1 application to eye daily at bedtime   famotidine (PEPCID) 40 MG tablet  Self Yes No   Sig: Twice a day   fexofenadine (ALLEGRA) 180 MG tablet  Self Yes No   Sig: Take 180 mg by mouth 2 (two) times a day    fluticasone (FLONASE) 50 mcg/act nasal spray  Self Yes No   Si sprays into each nostril daily    guaiFENesin (Mucinex) 600 mg 12 hr tablet   Yes No   Sig: Take 600 mg by mouth every 12 (twelve) hours   hydrOXYzine (ATARAX) 10 mg/5 mL syrup   Yes No   ipratropium (ATROVENT) 0.02 % nebulizer solution   Yes No   Sig: Take 0.5 mg by nebulization every 6 (six) hours as needed for wheezing or shortness of breath   ipratropium (ATROVENT) 0.06 % nasal spray  Self Yes No   Sig: PLEASE SEE ATTACHED FOR DETAILED DIRECTIONS   ivabradine HCl (Corlanor) 5 MG tablet  Self Yes No   Si.5 mg 2 (two) times a day   levalbuterol (XOPENEX HFA) 45 mcg/act inhaler  Self Yes No   Sig: Inhale 2 puffs every 4 (four) hours as needed (when unable to nebulize)   levalbuterol (XOPENEX) 1.25 mg/3 mL nebulizer solution  Self Yes No   Sig: Take 1.25 mg by nebulization every 6 (six) hours as needed    methylPREDNISolone (MEDROL) 8 MG tablet   Yes No   Sig: Take 24 mg by mouth 3 (three) times a day Taper pack   naloxone (NARCAN) 4 mg/0.1 mL nasal spray   No No   Sig: Administer 1 spray  "into a nostril. If no response after 2-3 minutes, give another dose in the other nostril using a new spray.   omega-3-acid ethyl esters (LOVAZA) 1 g capsule  Self Yes No   Sig: Take 2 g by mouth 2 (two) times a day 1600mg daily   polyethylene glycol (MIRALAX) 17 g packet  Self Yes No   Sig: Take 17 g by mouth daily as needed    sodium chloride   Yes No   Sig: Inject 1,500 mL into a catheter in a vein   sucralfate (CARAFATE) 1 g/10 mL suspension   Yes No   Sig: Take 1 g by mouth 2 (two) times a day   verapamil (CALAN) 40 mg tablet   Yes No      Facility-Administered Medications: None       Past Medical History:   Diagnosis Date    Allergic rhinitis     Anxiety     Asthma     Back pain     Cardiac disease     Cardiopathy     EF 45%    Cough     Diverticulitis     Factor V Leiden (HCC)     Fibromyalgia     GERD (gastroesophageal reflux disease)     Hashimoto's thyroiditis     Hx of degenerative disc disease     Hypertension     Hypotension     pots - postural orthostatic hypotension    Interstitial cystitis     Irregular heart beat     LBBB    Irritable bowel syndrome     Migraines     Mitral valve disease     \"thickening\"    Myocardial infarction (HCC)     possible but not sure when    Neuropathy     bilateral legs    Osteoporosis     Postural orthostatic tachycardia syndrome     must drink a lot of water and salt    Pott's disease     Rheumatic fever 1967    Scoliosis     Sepsis (HCC)     associated with PICC line    Sjogren's syndrome (HCC)     TMJ (dislocation of temporomandibular joint)        Past Surgical History:   Procedure Laterality Date    ABLATION MICROWAVE Left     lumbar area    BACK SURGERY  10/1998     SECTION  1992    CHOLECYSTECTOMY  2016    COLONOSCOPY      DILATION AND CURETTAGE OF UTERUS      EGD      FOOT SURGERY  1968    removal of bone and neuroma    HYSTERECTOMY N/A     age 39    IR OTHER  2022    IR OTHER  2022    IR OTHER  2023    IR OTHER  " 01/04/2024    IR PICC PLACEMENT SINGLE LUMEN  10/02/2020    IR PICC PLACEMENT SINGLE LUMEN  08/12/2021    IR PICC REPOSITION  12/07/2021    IR TUNNELED CENTRAL LINE PLACEMENT  06/20/2022    LAPAROSCOPY  1995    endometriosis    MOUTH BIOPSY      lip    OOPHORECTOMY Bilateral 1995    age 39    SD EGD TRANSORAL BIOPSY SINGLE/MULTIPLE N/A 04/24/2019    Procedure: ESOPHAGOGASTRODUODENOSCOPY (EGD) with multiple bx and dilation;  Surgeon: Peter Baldwin MD;  Location:  GI LAB;  Service: Gastroenterology    SD SURGICAL ARTHROSCOPY SHOULDER W/ROTATOR CUFF RPR Right 04/06/2022    Procedure: SHOULDER ARTHROSCOPIC ROTATOR CUFF REPAIR Biceps Tenodisis;  Surgeon: Ilya Munoz MD;  Location: [facility];  Service: Orthopedics    SD SURGICAL ARTHROSCOPY SHOULDER W/ROTATOR CUFF RPR Left 5/3/2024    Procedure: SHOULDER ARTHROSCOPIC ROTATOR CUFF REPAIR, SUBACROMIAL DECOMPRESSION;  Surgeon: Ilya Munoz MD;  Location: [facility];  Service: Orthopedics    TUNNELED VENOUS CATHETER PLACEMENT  2016       Family History   Problem Relation Age of Onset    Cancer Mother         bladder    No Known Problems Father     Thyroid cancer Sister     Heart attack Sister     No Known Problems Sister     No Known Problems Sister     No Known Problems Sister     No Known Problems Sister     No Known Problems Daughter     No Known Problems Daughter     No Known Problems Maternal Grandmother     Colon cancer Maternal Grandfather     No Known Problems Paternal Grandmother     No Known Problems Paternal Grandfather     Breast cancer Maternal Aunt         over age 50    Endometrial cancer Maternal Aunt     Multiple myeloma Maternal Aunt     Hypertension Paternal Aunt     Brain cancer Niece     Lymphoma Niece     Breast cancer additional onset Neg Hx     BRCA2 Positive Neg Hx     Ovarian cancer Neg Hx     BRCA2 Negative Neg Hx     BRCA1 Positive Neg Hx     BRCA1 Negative Neg Hx     BRCA 1/2 Neg Hx      I have reviewed and  agree with the history as documented.    E-Cigarette/Vaping    E-Cigarette Use Never User      E-Cigarette/Vaping Substances    Nicotine No     THC No     CBD No     Flavoring No     Other No     Unknown No      Social History     Tobacco Use    Smoking status: Never    Smokeless tobacco: Never   Vaping Use    Vaping status: Never Used   Substance Use Topics    Alcohol use: Never    Drug use: No       Review of Systems   Constitutional:  Positive for fatigue and fever. Negative for appetite change, chills and diaphoresis.   HENT:  Positive for congestion, sinus pressure and sore throat. Negative for sinus pain and sneezing.    Respiratory:  Positive for cough, chest tightness and shortness of breath.    Cardiovascular:  Negative for chest pain, palpitations and leg swelling.   Gastrointestinal:  Negative for abdominal pain, nausea and vomiting.   Genitourinary:  Negative for dysuria, hematuria and urgency.   Musculoskeletal:  Negative for back pain and neck pain.   Skin:  Negative for pallor, rash and wound.   Neurological:  Negative for syncope and headaches.       Physical Exam  Physical Exam  Constitutional:       General: She is not in acute distress.     Appearance: She is well-developed and normal weight. She is not ill-appearing, toxic-appearing or diaphoretic.   Cardiovascular:      Rate and Rhythm: Normal rate and regular rhythm.      Heart sounds: No murmur heard.  Pulmonary:      Effort: Tachypnea and accessory muscle usage present.      Breath sounds: Normal breath sounds. No decreased breath sounds, wheezing, rhonchi or rales.   Chest:      Chest wall: Tenderness present.   Musculoskeletal:      Right lower leg: No tenderness. No edema.      Left lower leg: No tenderness. No edema.   Skin:     General: Skin is warm and dry.      Capillary Refill: Capillary refill takes less than 2 seconds.      Coloration: Skin is not cyanotic or pale.      Findings: No ecchymosis, erythema or rash.      Nails: There is  no clubbing.   Neurological:      Mental Status: She is alert and oriented to person, place, and time.         Vital Signs  ED Triage Vitals   Temperature Pulse Respirations Blood Pressure SpO2   07/22/24 1144 07/22/24 1144 07/22/24 1144 07/22/24 1144 07/22/24 1144   98 °F (36.7 °C) 70 22 (!) 174/85 95 %      Temp Source Heart Rate Source Patient Position - Orthostatic VS BP Location FiO2 (%)   07/22/24 1144 07/22/24 1144 07/22/24 1144 07/22/24 1144 --   Temporal Monitor Sitting Left arm       Pain Score       07/22/24 1200       No Pain           Vitals:    07/22/24 1230 07/22/24 1300 07/22/24 1330 07/22/24 1400   BP: 121/58 120/57 104/53 119/59   Pulse: 79 68 65 73   Patient Position - Orthostatic VS: Sitting Sitting Sitting Sitting         Visual Acuity      ED Medications  Medications   magnesium sulfate 2 g/50 mL IVPB (premix) 2 g (0 g Intravenous Stopped 7/22/24 1302)   methylPREDNISolone sodium succinate (Solu-MEDROL) injection 125 mg (125 mg Intravenous Given 7/22/24 1232)   ipratropium-albuterol (DUO-NEB) 0.5-2.5 mg/3 mL inhalation solution 3 mL (3 mL Nebulization Given 7/22/24 1232)       Diagnostic Studies  Results Reviewed       Procedure Component Value Units Date/Time    HS Troponin I 4hr [[id number]]     Lab Status: No result Specimen: Blood     FLU/RSV/COVID - if FLU/RSV clinically relevant [[id number]]  (Normal) Collected: 07/22/24 1230    Lab Status: Final result Specimen: Nares from Nose Updated: 07/22/24 1325     SARS-CoV-2 Negative     INFLUENZA A PCR Negative     INFLUENZA B PCR Negative     RSV PCR Negative    Narrative:      FOR PEDIATRIC PATIENTS - copy/paste COVID Guidelines URL to browser: [email or web address]    SARS-CoV-2 assay is a Nucleic Acid Amplification assay intended for the  qualitative detection of nucleic acid from SARS-CoV-2 in nasopharyngeal  swabs. Results are for the presumptive identification of SARS-CoV-2  RNA.    Positive results are indicative of infection with SARS-CoV-2, the virus  causing COVID-19, but do not rule out bacterial infection or co-infection  with other viruses. Laboratories within the [place] and its  territories are required to report all positive results to the appropriate  public health authorities. Negative results do not preclude SARS-CoV-2  infection and should not be used as the sole basis for treatment or other  patient management decisions. Negative results must be combined with  clinical observations, patient history, and epidemiological information.  This test has not been FDA cleared or approved.    This test has been authorized by FDA under an Emergency Use Authorization  (EUA). This test is only authorized for the duration of time the  declaration that circumstances exist justifying the authorization of the  emergency use of an in vitro diagnostic tests for detection of SARS-CoV-2  virus and/or diagnosis of COVID-19 infection under section 564(b)(1) of  the Act, 21 U.S.C. 360bbb-3(b)(1), unless the authorization is terminated  or revoked sooner. The test has been validated but independent review by FDA  and CLIA is pending.    Test performed using [employer] GeneXpert: This RT-PCR assay targets N2,  a region unique to SARS-CoV-2. A conserved region in the E-gene was chosen  for pan-Sarbecovirus detection which includes SARS-CoV-2.    According to CMS-2020-01-R, this platform meets the definition of high-throughput technology.    B-Type Natriuretic Peptide(BNP) [[id number]]  (Normal) Collected: 07/22/24 1230    Lab Status: Final result Specimen: Blood from Arm, Left Updated: 07/22/24 1315     BNP 26 pg/mL     Comprehensive metabolic panel [[id number]] Collected: 07/22/24 1230    Lab Status: Final result Specimen: Blood from Arm, Left Updated: 07/22/24 1305     Sodium 140 mmol/L      Potassium 3.9 mmol/L      Chloride 106 mmol/L      CO2 27 mmol/L      ANION GAP 7 mmol/L      BUN 15  mg/dL      Creatinine 0.96 mg/dL      Glucose 94 mg/dL      Calcium 8.9 mg/dL      AST 21 U/L      ALT 23 U/L      Alkaline Phosphatase 82 U/L      Total Protein 6.5 g/dL      Albumin 4.0 g/dL      Total Bilirubin 0.45 mg/dL      eGFR 60 ml/min/1.73sq m     Narrative:      National Kidney Disease Foundation guidelines for Chronic Kidney Disease (CKD):     Stage 1 with normal or high GFR (GFR > 90 mL/min/1.73 square meters)    Stage 2 Mild CKD (GFR = 60-89 mL/min/1.73 square meters)    Stage 3A Moderate CKD (GFR = 45-59 mL/min/1.73 square meters)    Stage 3B Moderate CKD (GFR = 30-44 mL/min/1.73 square meters)    Stage 4 Severe CKD (GFR = 15-29 mL/min/1.73 square meters)    Stage 5 End Stage CKD (GFR <15 mL/min/1.73 square meters)  Note: GFR calculation is accurate only with a steady state creatinine    HS Troponin 0hr (reflex protocol) [[id number]]  (Normal) Collected: 07/22/24 1230    Lab Status: Final result Specimen: Blood from Arm, Left Updated: 07/22/24 1304     hs TnI 0hr 5 ng/L     HS Troponin I 2hr [[id number]]     Lab Status: No result Specimen: Blood     D-Dimer [[id number]]  (Normal) Collected: 07/22/24 1230    Lab Status: Final result Specimen: Blood from Arm, Left Updated: 07/22/24 1301     D-Dimer, Quant 0.37 ug/ml FEU     Narrative:      In the evaluation for possible pulmonary embolism, in the appropriate (Well's Score of 4 or less) patient, the age adjusted d-dimer cutoff for this patient can be calculated as:    Age x 0.01 (in ug/mL) for Age-adjusted D-dimer exclusion threshold for a patient over 50 years.    Protime-INR [[id number]]  (Normal) Collected: 07/22/24 1230    Lab Status: Final result Specimen: Blood from Arm, Left Updated: 07/22/24 1259     Protime 12.3 seconds      INR 0.91    APTT [[id number]]  (Normal) Collected: 07/22/24 1230    Lab Status: Final result Specimen: Blood from Arm, Left Updated: 07/22/24 1259     PTT 24 seconds     CBC and differential [[id number]]  (Abnormal) Collected:  07/22/24 1230    Lab Status: Final result Specimen: Blood from Arm, Left Updated: 07/22/24 1246     WBC 10.75 Thousand/uL      RBC 4.73 Million/uL      Hemoglobin 14.2 g/dL      Hematocrit 45.0 %      MCV 95 fL      MCH 30.0 pg      MCHC 31.6 g/dL      RDW 14.4 %      MPV 10.0 fL      Platelets 197 Thousands/uL      nRBC 0 /100 WBCs      Segmented % 70 %      Immature Grans % 1 %      Lymphocytes % 16 %      Monocytes % 10 %      Eosinophils Relative 2 %      Basophils Relative 1 %      Absolute Neutrophils 7.50 Thousands/µL      Absolute Immature Grans 0.15 Thousand/uL      Absolute Lymphocytes 1.75 Thousands/µL      Absolute Monocytes 1.05 Thousand/µL      Eosinophils Absolute 0.25 Thousand/µL      Basophils Absolute 0.05 Thousands/µL                    XR chest 2 views   Final Result by Obdulio Tom MD (07/22 1521)      No focal consolidation, pleural effusion, or pneumothorax.            Resident: Kannan Segura I, the attending radiologist, have reviewed the images and agree with the final report above.      Workstation performed: [id number]                  ED Course  ED Course as of 07/22/24 1603   Mon Jul 22, 2024   1228 Patient evaluated, no apparent distress, complaining of ongoing shortness of breath believed to be an asthma exacerbation for the past few months, temporarily relieved by prednisone courses, DuoNebs Q 3-4 hours not providing relief.  Physical exam without wheezing, good airflow bilaterally.  Will provide DuoNeb, magnesium, and steroid for symptomatic relief, will workup to include cardiac workup and D-dimer.  Will reevaluate following treatment.   1348 Patient reports significant relief of shortness of breath following treatments provided.   1349 Chest XR without acute abnormality       SBIRT 22yo+      Flowsheet Row Most Recent Value   Initial Alcohol Screen: US AUDIT-C     1. How often do you have a drink containing alcohol? 0 Filed at: 07/22/2024 1147   2. How many drinks  containing alcohol do you have on a typical day you are drinking?  0 Filed at: 07/22/2024 1145   3a. Male UNDER 65: How often do you have five or more drinks on one occasion? 0 Filed at: 07/22/2024 1145   3b. FEMALE Any Age, or MALE 65+: How often do you have 4 or more drinks on one occassion? 0 Filed at: 07/22/2024 1145   Audit-C Score 0 Filed at: 07/22/2024 1145   [place]: How many times in the past year have you...    Used an illegal drug or used a prescription medication for non-medical reasons? Never Filed at: 07/22/2024 1146          Medical Decision Making  INITIAL EVALUATION: Patient presents independently with complaint of persistent shortness of breath for several months.  Vital signs notable for hypertension 174/85, improved to 120/57.  Patient is alert oriented x 3, in no apparent respiratory distress.  Physical exam notable for clear lung sounds bilaterally, regular rate and rhythm without murmur, normal peripheral vascular and skin exams.  Patient recently on several courses of steroid, which she said temporarily improved her shortness of breath.  Associated symptoms include cough and subjective fever.  Notable past medical history of factor V Leiden.  Initial impression is shortness of breath, with differential diagnosis including asthma exacerbation, CHF exacerbation, pneumonia, PE.  Will initiate workup to include CBC, CMP, D-dimer, BNP, depending on D-dimer result will proceed with either chest x-ray or CT PE study.  Will provide Solu-Medrol, DuoNeb, magnesium for symptomatic treatment at this time.    FINDINGS: Patient's lab work was completely normal including D-dimer.  Chest x-ray shows and his imaging study of choice, which revealed no abnormalities.  Patient reports feeling significant relief of her shortness of breath following treatments provided.    SUMMARY: Patient amenable to discharge home with pulmonary and PCP follow-up.  Patient educated about reasons to return to the emergency room.   All questions answered according to patient's satisfaction.  Patient prescribed short course of prednisone x 5 days and Tessalon Perles for cough.  Patient discharged home in stable condition.    Amount and/or Complexity of Data Reviewed  Labs: ordered.  Radiology: ordered.    Risk  Prescription drug management.                 Disposition  Final diagnoses:   SOB (shortness of breath)   History of asthma   History of factor V Leiden mutation   Cough     Time reflects when diagnosis was documented in both MDM as applicable and the Disposition within this note       Time User Action Codes Description Comment    7/22/2024  1:50 PM Vanessa Mendes [R06.02] SOB (shortness of breath)     7/22/2024  1:51 PM Vanessa Mendes [Z87.09] History of asthma     7/22/2024  1:51 PM Vanessa Mendes [Z86.2] History of factor V Leiden mutation     7/22/2024  2:11 PM Vanessa Mendes [R05.9] Cough           ED Disposition       ED Disposition   Discharge    Condition   Stable    Date/Time   Mon Jul 22, 2024 1403    Comment   Teresa Mao discharge to home/self care.                   Follow-up Information       Follow up With Specialties Details Why Contact Info    Jan Doyle MD Internal Medicine Schedule an appointment as soon as possible for a visit   [address] [facility] [place] [address]  [phone]      [facility] Pul Pulmonology Schedule an appointment as soon as possible for a visit   [address] [place] [id number]  [phone]              Discharge Medication List as of 7/22/2024  2:14 PM        START taking these medications    Details   benzonatate (TESSALON PERLES) 100 mg capsule Take 1 capsule (100 mg total) by mouth 3 (three) times a day as needed for cough, Starting Mon 7/22/2024, Normal      predniSONE 20 mg tablet Take 2 tablets (40 mg total) by mouth daily for 5 days, Starting Mon 7/22/2024, Until Sat 7/27/2024, Normal           CONTINUE these medications which have NOT CHANGED    Details    Alum Hydroxide-Mag Trisilicate (Gaviscon) 80-14.2 MG CHEW Chew 1 tablet 4 (four) times a day as needed With meals and as needed, Historical Med      aluminum hydroxide-magnesium carbonate (Gaviscon Extra Strength) 160-105 mg per chewable tablet Historical Med      amoxicillin-clavulanate (AUGMENTIN) 875-125 mg per tablet Take 1 tablet by mouth 2 (two) times a day, Starting Fri 7/5/2024, Historical Med      azelastine (ASTELIN) 0.1 % nasal spray 2 sprays into each nostril 2 (two) times a day Use in each nostril as directed, Historical Med      beclomethasone (QVAR) 40 MCG/ACT inhaler Inhale 1 puff daily as needed (only when unable to nebulize pulmicort) Rinse mouth after use., Historical Med      budesonide (PULMICORT) 0.5 mg/2 mL nebulizer solution Take 0.5 mg by nebulization 2 (two) times a day Rinse mouth after use., Historical Med      cromolyn (GASTROCROM) 100 MG/5ML solution Take 100 mg by mouth 4 (four) times a day (before meals and at bedtime), Historical Med      cyclobenzaprine (FLEXERIL) 5 mg tablet Take 1 tablet (5 mg total) by mouth 3 (three) times a day as needed for muscle spasms, Starting Thu 7/11/2024, Normal      docusate sodium (COLACE) 100 mg capsule Take 100 mg by mouth daily as needed for constipation, Historical Med      erythromycin (ILOTYCIN) ophthalmic ointment Apply 1 application to eye daily at bedtime, Starting Tue 10/25/2022, Historical Med      famotidine (PEPCID) 40 MG tablet Twice a day, Starting Mon 8/22/2022, Historical Med      fexofenadine (ALLEGRA) 180 MG tablet Take 180 mg by mouth 2 (two) times a day , Historical Med      fluticasone (FLONASE) 50 mcg/act nasal spray 2 sprays into each nostril daily , Historical Med      Galcanezumab-gnlm (Emgality) 120 MG/ML SOAJ Inject under the skin every 30 (thirty) days , Historical Med      guaiFENesin (Mucinex) 600 mg 12 hr tablet Take 600 mg by mouth every 12 (twelve) hours, Historical Med      Heating Pad PADS Use if needed (pain),  Starting Fri 4/26/2024, No Print      !! HYDROcodone-acetaminophen (NORCO) 5-325 mg per tablet Take 1 tablet by mouth 2 (two) times a day as needed for pain Max Daily Amount: 2 tablets Do not start before July 12, 2024., Starting Fri 7/12/2024, Normal      !! HYDROcodone-acetaminophen (Norco) 5-325 mg per tablet Take 1 tablet by mouth 2 (two) times a day as needed for pain for up to 60 doses Max Daily Amount: 2 tablets Do not start before August 9, 2024., Starting Fri 8/9/2024, Normal      hydrOXYzine (ATARAX) 10 mg/5 mL syrup Historical Med      ipratropium (ATROVENT) 0.02 % nebulizer solution Take 0.5 mg by nebulization every 6 (six) hours as needed for wheezing or shortness of breath, Historical Med      ipratropium (ATROVENT) 0.06 % nasal spray PLEASE SEE ATTACHED FOR DETAILED DIRECTIONS, Historical Med      ivabradine HCl (Corlanor) 5 MG tablet 7.5 mg 2 (two) times a day, Starting Thu 10/28/2021, Historical Med      KETOTIFEN FUMARATE OP Take 1mg compounded capsule three times a day, Historical Med      levalbuterol (XOPENEX HFA) 45 mcg/act inhaler Inhale 2 puffs every 4 (four) hours as needed (when unable to nebulize), Historical Med      levalbuterol (XOPENEX) 1.25 mg/3 mL nebulizer solution Take 1.25 mg by nebulization every 6 (six) hours as needed , Starting Mon 1/7/2019, Historical Med      Magnesium 400 MG CAPS Take 1 capsule by mouth 2 (two) times a day , Historical Med      methylPREDNISolone (MEDROL) 8 MG tablet Take 24 mg by mouth 3 (three) times a day Taper pack, Historical Med      MULTIPLE VITAMINS-CALCIUM PO Take 1 capsule by mouth every morning , Historical Med      naloxone (NARCAN) 4 mg/0.1 mL nasal spray Administer 1 spray into a nostril. If no response after 2-3 minutes, give another dose in the other nostril using a new spray., Normal      !! NON FORMULARY Take 1 mg by mouth 3 (three) times a day Ketotifen 1 mg compounded capsule, Historical Med      !! NON FORMULARY LACTOBACILLUS  COMBINATION NO.4 (PROBIOTIC) 3 BILLION CELL CAPSULE, Historical Med      !! NON FORMULARY Take 32 mg by mouth NATURE-THROID ORAL, Historical Med      omega-3-acid ethyl esters (LOVAZA) 1 g capsule Take 2 g by mouth 2 (two) times a day 1600mg daily, Historical Med      polyethylene glycol (MIRALAX) 17 g packet Take 17 g by mouth daily as needed , Historical Med      Probiotic Product (PROBIOTIC DAILY PO) Take 1 tablet by mouth daily At lunch, Historical Med      Qvar RediHaler 40 MCG/ACT inhaler Historical Med      sodium chloride Inject 1,500 mL into a catheter in a vein, Starting Sat 1/14/2023, Historical Med      sucralfate (CARAFATE) 1 g/10 mL suspension Take 1 g by mouth 2 (two) times a day, Historical Med      Thyroid, Porcine, POWD Use 32 mg daily, Historical Med      Ubrogepant (UBRELVY) 100 MG tablet Take 100 mg by mouth Take 1 tablet (100 mg) one time as needed for migraine. May repeat one additional tablet (100 mg) at least two hours after the first dose. Do not use more than two doses per day, or for more than eight days per month., Historical Med      verapamil (CALAN) 40 mg tablet Starting Thu 12/1/2022, Historical Med       !! - Potential duplicate medications found. Please discuss with provider.          No discharge procedures on file.    PDMP Review         Value Time User    PDMP Reviewed  Yes 7/11/2024  4:17 PM Barbara Kocher, CRNP            ED Provider  Electronically Signed by             Vanessa Mendes PA-C  07/22/24 [id number]

## 2024-07-22 NOTE — ED ATTENDING ATTESTATION
"7/22/2024  I, Tim Patel DO, saw and evaluated the patient. I have discussed the patient with the resident/non-physician practitioner and agree with the resident's/non-physician practitioner's findings, Plan of Care, and MDM as documented in the resident's/non-physician practitioner's note, except where noted. All available labs and Radiology studies were reviewed.  I was present for key portions of any procedure(s) performed by the resident/non-physician practitioner and I was immediately available to provide assistance.       At this point I agree with the current assessment done in the Emergency Department.  I have conducted an independent evaluation of this patient a history and physical is as follows:    Patient is a 67 yo F w/ hx of asthma, factor V leiden, ALIZA, who presents for SOB.  Symptoms have been present for \"a while.\"  Patient has been having \"issues with her asthma.\" She has had several prednisone courses and has been on augmentin. She was on the phone with her pulmonolgist from [facility] told her to come in for evaluation.  No acute breathing changes after fluid. She admits to some pleuritc chest pain.      Physical Exam  Vitals and nursing note reviewed.   Constitutional:       General: She is not in acute distress.     Appearance: She is well-developed.   HENT:      Head: Normocephalic and atraumatic.      Right Ear: External ear normal.      Left Ear: External ear normal.      Nose: Nose normal.      Mouth/Throat:      Mouth: Mucous membranes are moist.      Pharynx: No oropharyngeal exudate.   Eyes:      Conjunctiva/sclera: Conjunctivae normal.      Pupils: Pupils are equal, round, and reactive to light.   Cardiovascular:      Rate and Rhythm: Normal rate and regular rhythm.      Heart sounds: Normal heart sounds. No murmur heard.     No friction rub. No gallop.   Pulmonary:      Effort: Pulmonary effort is normal. No respiratory distress.      Breath sounds: Normal breath sounds. No wheezing or " rales.   Abdominal:      General: There is no distension.      Palpations: Abdomen is soft.      Tenderness: There is no abdominal tenderness. There is no guarding.   Musculoskeletal:         General: No swelling, tenderness or deformity. Normal range of motion.      Cervical back: Normal range of motion and neck supple.   Lymphadenopathy:      Cervical: No cervical adenopathy.   Skin:     General: Skin is warm and dry.   Neurological:      General: No focal deficit present.      Mental Status: She is alert and oriented to person, place, and time. Mental status is at baseline.      Cranial Nerves: No cranial nerve deficit.      Sensory: No sensory deficit.      Motor: No weakness or abnormal muscle tone.      Coordination: Coordination normal.          Plan: Cardiac workup. D dimer.  Breathing treatment    Labs WNL.  CXR shows no acute cardiopulmonary disease.  Believe these symptoms are chronic.  Will treat with steroid burst.  Will have patient follow up with pulmonologist.     ED Course         Critical Care Time  Procedures

## 2024-07-22 NOTE — DISCHARGE INSTRUCTIONS
Take tessalon perles three times daily as needed for cough.  Take methylprednisolone taper as prescribed: 3 tabs daily for 4 days, 2 tabs daily for 4 days, and 1 tab daily for 4 days.  Follow-up with PCP and Pulmonology as soon as able.  Return to ED with in the event of sudden worsening of breathing, chest pain, nausea/vomiting, abdominal pain, fevers/chills in the setting of worsening condition.

## 2024-07-23 ENCOUNTER — APPOINTMENT (OUTPATIENT)
Dept: PHYSICAL THERAPY | Facility: CLINIC | Age: 69
End: 2024-07-23
Payer: MEDICARE

## 2024-07-25 ENCOUNTER — OFFICE VISIT (OUTPATIENT)
Dept: PHYSICAL THERAPY | Facility: CLINIC | Age: 69
End: 2024-07-25
Payer: MEDICARE

## 2024-07-25 ENCOUNTER — HOSPITAL ENCOUNTER (OUTPATIENT)
Dept: INFUSION CENTER | Facility: HOSPITAL | Age: 69
End: 2024-07-25
Attending: INTERNAL MEDICINE
Payer: MEDICARE

## 2024-07-25 VITALS
HEART RATE: 77 BPM | OXYGEN SATURATION: 98 % | SYSTOLIC BLOOD PRESSURE: 155 MMHG | DIASTOLIC BLOOD PRESSURE: 77 MMHG | RESPIRATION RATE: 20 BRPM | TEMPERATURE: 97.3 F

## 2024-07-25 DIAGNOSIS — Z98.890 STATUS POST LEFT ROTATOR CUFF REPAIR: Primary | ICD-10-CM

## 2024-07-25 PROCEDURE — 96361 HYDRATE IV INFUSION ADD-ON: CPT

## 2024-07-25 PROCEDURE — 96360 HYDRATION IV INFUSION INIT: CPT

## 2024-07-25 PROCEDURE — 97110 THERAPEUTIC EXERCISES: CPT

## 2024-07-25 RX ORDER — SODIUM CHLORIDE 9 MG/ML
250 INJECTION, SOLUTION INTRAVENOUS ONCE
Status: COMPLETED | OUTPATIENT
Start: 2024-07-29 | End: 2024-07-29

## 2024-07-25 RX ADMIN — SODIUM CHLORIDE 1500 ML: 0.9 INJECTION, SOLUTION INTRAVENOUS at 08:19

## 2024-07-25 NOTE — PROGRESS NOTES
"Daily Note     Today's date: 2024  Patient name: Teresa Mao  : 1955  MRN: [id number]  Referring provider: Ilya Munoz*  Dx:   Encounter Diagnosis     ICD-10-CM    1. Status post left rotator cuff repair  Z98.890                      Subjective: Pt notes her asthma has been flared up and she does not know how she'll do.      Objective: See treatment diary below      Assessment: Tolerated treatment well. Patient demonstrated fatigue post treatment, exhibited good technique with therapeutic exercises, and would benefit from continued PT  Was able to initiate strengthening ex today as pt has reached 12 week post op status. Added resistance bands and weight to multiple ex as per flow chart. Cornelio advancements w/o issue.      Plan: Continue per plan of care.      Precautions: see extensive PMH, ABRAMS,     FOLLOW PROTOCOL**, sling to be worn 6 weeks.    Re-eval Date: 24      Re-eval Date: 24  DOS:  5/3/24                                                                              12 weeks                                          10 weeks  Date 7/15 7.18  7.9    Visit Count 16 17 13 14 15   FOTO   *completed 2024    *initial projected score met/exceeded     Pain In 0 0/10  1-2/10   W/meds 1-210   Pain Out 0 No negative change  Less after CP applic        Manuals 7/15 7.18  7.9 7   PROM LUE Supine MJD  10' Supine MJD 10'  Supine MJD  10'                            Neuro Re-Ed        MTPs/LTPs   Red 20x ea     punch   Red 20x     BUE ER   Red 20x     IR w/TB   Red 20x                             Ther Ex 7/15 7.18 7.25 7.9 7   ultigrip D/C         Ball squeeze D/C       Wrist AROM DC     1# 45x ea dir DC   L elbow ROM 3#  X30 AROM flexion and ex 3# X40 AROM flexion and ext 4# X30 AROM flexion and ext 2# X40 AROM flexion and ext 3#   Pendulum ex *HEP       UT/LS stretch DC   Gentle self stretch UT/LS 5x30\" ea DC to HEP    DC to HEP   Isometrics  3 way 8x/5\" " "Isometrics  3 way 8x/5   Cerv ROM DC   20 reps ea dir   DC to HEP   Scap isometrics     3x15 x 5\" DC   pulleys pulleys    3 min FF pulleys    3 min FF pulleys   3 min 2 min FF pulleys 30 reps AArom   Finger ladder finger ladder  #27  8 reps FF finger ladder  #26  10 reps FF finger ladder  #27  10 reps FF finger ladder  #26  8 reps FF finger ladder  #26  8 reps FF   Wall slides  10x FF   10x cw/ccw  10x HA/ADD 10x FF   10x cw/ccw  10x HA/ADD 10x FF   10x cw/ccw  10x HA/ADD  10x FF   10x cw/ccw  10x HA/ADD   Wand ex Standing  FF, ABD, IR, EXT   10x ea Standing  FF, ABD, IR, EXT   10x ea   Standing  FF, ABD, IR, EXT   10x ea   Active flex/abd 2 x 10x ea 2 x 10x ea 20x ea     Full can 20x 20x   20x     SL ER 20x 20x 2#  20x       Supine shld flex/ER w/wand 15x 5\" ea 15x 5\" ea 2#  15 x 5\" LUE only  10 x 10\" ea   Prone flex, HA, ext rows  resume                                                                      Ther Activity                        Gait Training                        Modalities 6.13 7.18 6.25 7.9 6.6    Cryo 10 min to L shld with no adverse reaction Cryo 10 min to L shld with no adverse reaction Cryo 10 min with no adverse reaction Cryo 10 min with no adverse reaction Cryo 10 min with no adverse reaction    *MHP 10 min to L UT *Pt. deferred MHP 10 min to L UT         Rotator Cuff Repair Rehabilitation Protocol  (adapted from Milana PJ et al. “Rehabilitation of the Rotator Cuff: An Evaluation Based Approach”. JAAOS 2006; 14:599-609)  Updated 10.14   Ilya Munoz MD  [facility]  [address] [place] [address]  [phone]  Phase 1 (Weeks 0-6)              Immediate Postoperative Period              Goals:                          Diminish Pain and Inflammation  Maintain and Protect Integrity of the Repair                          Gradually Increase Passive ROM (NO Active or Active Assist until Week 6)                          Become Independent with Modified " ADLs              Precautions:  Maintain Arm in Abduction Sling, Remove Only for Directed Exercises (may remove Abduction Pillow after Day 21 for comfort)                          Keep Incisions Clean & Dry (okay to shower in 48 hours, band-aids over incisions)  No Immersion (pool) until Wounds Totally Sealed (usually not prior to day #10)  Passive Shoulder Motion ONLY, No Lifting/Holding Objects, Reaching Behind Back  Okay to Type/Write at Desktop with Arm in Sling              Day 0-6                          Elbow, Wrist, Hand AROM Exercises                           Start Cervical AROM and Scapula Isometrics                          Cryotherapy/Ice for Pain and Inflammation                          Instruct in Hygiene, Posture, and Positioning               Day 7-28                          Continue Above  May Start Pendulum Exercises                          May Start Supine, Pain Free, PT assisted PROM  Forward Flexion to 90°, External Rotation to 35° (Elbow at side), Internal Rotation to Body, Abduction to 60°                          Can Introduce light Cardio (Walking, Stationary Bike)                          Aqua Therapy may begin at week 3 (day 21) as long as no wound problems              Day 29-42                          Continue Above  Progress PROM to Goal of full PROM by Week 6.  May add Gentle Mobilizations (GH and Scapulothoracic) to Regain full PROM if Needed.  May add Heat prior to PROM Exercises, Ice after Exercises  May Begin AAROM at Day 29 if ROM is Appropriate in Anticipation of AROM Starting at Week 6              Criteria to Progress to Phase 2                          Reasonable Passive Forward Flexion, Abduction , IR/ER                          Time  Phase 2 (Weeks 6-12)              Protection and Active Motion              Goals of Phase:                          Allow Healing of Soft Tissues                          Decrease Pain and Inflammation  Add ADLs and Regain AROM by End of  Phase              Precautions:                          NO STRENGTHENING until Phase 3                          Repair is Most Prone to Failure during this Phase!                          No Lifting Objects > 2 lbs (Coffee Cup OK), no Sudden Motions                          Avoid Upper Extremity Bike and Ergometer              Day 43-56                          Discontinue Sling  Initiate AROM Exercises (forward flexion, ER, IR and abduction), Rotator Cuff Isometrics                          Continue Periscapular Exercises, add Stretching if PROM Lacking   No Strengthening until Week 12 (Minimum Time Needed for Cuff Healing Sufficient to withstand Strengthening)                Phase 3 (Weeks 12-16)              Early Strengthening              Goal of Phase:                          Gain full AROM, Maintain PROM                          Gradual return of Shoulder Strength, Power and Endurance                          Gradual return to Functional Activities              Precautions:                          No Lifting > 10lbs, Sudden Lifting or Pushing activities, Overhead Lifting                          No Upper Extremity Bike or Ergometer              Week 12                          Initiate Strengthening Program (10 lb Maximum until Phase 4)                            ER/IR with Bands (Standing)                            ER in Lateral Decubitius Position                            Lateral Raises                            Full Can in Scapular Plane                            Prone Rowing, Horizontal Abduction, Extension                            Elbow Flexion/Extension              Week 14-16                          Initiate light Functional Activities as Permitted  Progress to Fundamental Shoulder Exercises  Phase 4 (Variable but Weeks 16-24)              Aggressive Rehab  Sport Specific or Activity Specific               Goals of Phase:                          Maintain Full Pain-free AROM                           Advanced Conditioning Exercises for enhanced Functional use                          Continue regaining Shoulder Strength, Power and Endurance                          Eventual return to full Functional Activities              Precautions:                          None              Week 16                          Continue ROM and stretching if appropriate                          Progress Strengthening, Proprioceptive and Neuromuscular Training                          Light Sports if Progressing Well (Chipping/Putting, easy ground strokes etc.)              Week 20                          Continue Strengthening and Stretching                          Initiate Interval Sports Program as Appropriate

## 2024-07-25 NOTE — PROGRESS NOTES
Teresa Mao  tolerated treatment well with no complications.      Teresa Mao is aware of future appt on 7/26 at 8:30 am.     AVS declined by Teresa Mao.

## 2024-07-26 ENCOUNTER — HOSPITAL ENCOUNTER (OUTPATIENT)
Dept: INFUSION CENTER | Facility: HOSPITAL | Age: 69
End: 2024-07-26
Attending: INTERNAL MEDICINE
Payer: MEDICARE

## 2024-07-26 VITALS
SYSTOLIC BLOOD PRESSURE: 150 MMHG | OXYGEN SATURATION: 97 % | TEMPERATURE: 99 F | RESPIRATION RATE: 19 BRPM | DIASTOLIC BLOOD PRESSURE: 73 MMHG | HEART RATE: 77 BPM

## 2024-07-26 LAB
ATRIAL RATE: 72 BPM
P AXIS: 59 DEGREES
PR INTERVAL: 150 MS
QRS AXIS: 6 DEGREES
QRSD INTERVAL: 112 MS
QT INTERVAL: 406 MS
QTC INTERVAL: 444 MS
T WAVE AXIS: 56 DEGREES
VENTRICULAR RATE: 72 BPM

## 2024-07-26 PROCEDURE — 96361 HYDRATE IV INFUSION ADD-ON: CPT

## 2024-07-26 PROCEDURE — 96360 HYDRATION IV INFUSION INIT: CPT

## 2024-07-26 PROCEDURE — 93010 ELECTROCARDIOGRAM REPORT: CPT | Performed by: INTERNAL MEDICINE

## 2024-07-26 RX ORDER — SODIUM CHLORIDE 9 MG/ML
250 INJECTION, SOLUTION INTRAVENOUS ONCE
Status: COMPLETED | OUTPATIENT
Start: 2024-07-30 | End: 2024-07-30

## 2024-07-26 RX ADMIN — SODIUM CHLORIDE 1500 ML: 0.9 INJECTION, SOLUTION INTRAVENOUS at 08:34

## 2024-07-26 NOTE — PROGRESS NOTES
Teresa Mao tolerated IV hydration well with no complications.    Teresa Mao is aware of future appt on 7/29 at 8:30AM.     AVS declined by Teresa Mao.

## 2024-07-29 ENCOUNTER — HOSPITAL ENCOUNTER (EMERGENCY)
Facility: HOSPITAL | Age: 69
Discharge: HOME | End: 2024-07-29
Attending: EMERGENCY MEDICINE
Payer: MEDICARE

## 2024-07-29 ENCOUNTER — APPOINTMENT (EMERGENCY)
Dept: RADIOLOGY | Facility: HOSPITAL | Age: 69
End: 2024-07-29
Payer: MEDICARE

## 2024-07-29 ENCOUNTER — HOSPITAL ENCOUNTER (OUTPATIENT)
Dept: INFUSION CENTER | Facility: HOSPITAL | Age: 69
Discharge: HOME | End: 2024-07-29
Payer: MEDICARE

## 2024-07-29 VITALS
SYSTOLIC BLOOD PRESSURE: 126 MMHG | RESPIRATION RATE: 22 BRPM | WEIGHT: 135 LBS | BODY MASS INDEX: 25.49 KG/M2 | TEMPERATURE: 98.4 F | HEIGHT: 61 IN | HEART RATE: 65 BPM | OXYGEN SATURATION: 98 % | DIASTOLIC BLOOD PRESSURE: 59 MMHG

## 2024-07-29 VITALS
RESPIRATION RATE: 18 BRPM | HEART RATE: 77 BPM | TEMPERATURE: 98.3 F | DIASTOLIC BLOOD PRESSURE: 72 MMHG | OXYGEN SATURATION: 96 % | SYSTOLIC BLOOD PRESSURE: 120 MMHG

## 2024-07-29 DIAGNOSIS — J45.901 ASTHMA EXACERBATION: ICD-10-CM

## 2024-07-29 DIAGNOSIS — R06.02 SOB (SHORTNESS OF BREATH): Primary | ICD-10-CM

## 2024-07-29 LAB
2HR DELTA HS TROPONIN: -1 NG/L
ALBUMIN SERPL BCG-MCNC: 3.8 G/DL (ref 3.5–5)
ALP SERPL-CCNC: 76 U/L (ref 34–104)
ALT SERPL W P-5'-P-CCNC: 37 U/L (ref 7–52)
ANION GAP SERPL CALCULATED.3IONS-SCNC: 6 MMOL/L (ref 4–13)
AST SERPL W P-5'-P-CCNC: 17 U/L (ref 13–39)
ATRIAL RATE: 72 BPM
BASOPHILS # BLD MANUAL: 0 THOUSAND/UL (ref 0–0.1)
BASOPHILS NFR MAR MANUAL: 0 % (ref 0–1)
BILIRUB SERPL-MCNC: 0.51 MG/DL (ref 0.2–1)
BNP SERPL-MCNC: 32 PG/ML (ref 0–100)
BUN SERPL-MCNC: 22 MG/DL (ref 5–25)
CALCIUM SERPL-MCNC: 8.9 MG/DL (ref 8.4–10.2)
CARDIAC TROPONIN I PNL SERPL HS: 5 NG/L
CARDIAC TROPONIN I PNL SERPL HS: 6 NG/L
CHLORIDE SERPL-SCNC: 104 MMOL/L (ref 96–108)
CO2 SERPL-SCNC: 30 MMOL/L (ref 21–32)
CREAT SERPL-MCNC: 1.21 MG/DL (ref 0.6–1.3)
D DIMER PPP FEU-MCNC: 0.33 UG/ML FEU
EOSINOPHIL # BLD MANUAL: 0.11 THOUSAND/UL (ref 0–0.4)
EOSINOPHIL NFR BLD MANUAL: 1 % (ref 0–6)
ERYTHROCYTE [DISTWIDTH] IN BLOOD BY AUTOMATED COUNT: 15.1 % (ref 11.6–15.1)
FLUAV RNA RESP QL NAA+PROBE: NEGATIVE
FLUBV RNA RESP QL NAA+PROBE: NEGATIVE
GFR SERPL CREATININE-BSD FRML MDRD: 45 ML/MIN/1.73SQ M
GLUCOSE SERPL-MCNC: 89 MG/DL (ref 65–140)
HCT VFR BLD AUTO: 43.6 % (ref 34.8–46.1)
HGB BLD-MCNC: 14.3 G/DL (ref 11.5–15.4)
LYMPHOCYTES # BLD AUTO: 18 % (ref 14–44)
LYMPHOCYTES # BLD AUTO: 2.29 THOUSAND/UL (ref 0.6–4.47)
MCH RBC QN AUTO: 30.6 PG (ref 26.8–34.3)
MCHC RBC AUTO-ENTMCNC: 32.8 G/DL (ref 31.4–37.4)
MCV RBC AUTO: 93 FL (ref 82–98)
MONOCYTES # BLD AUTO: 0.91 THOUSAND/UL (ref 0–1.22)
MONOCYTES NFR BLD: 8 % (ref 4–12)
MYELOCYTE ABSOLUTE CT: 0.57 THOUSAND/UL (ref 0–0.1)
MYELOCYTES NFR BLD MANUAL: 5 % (ref 0–1)
NEUTROPHILS # BLD MANUAL: 7.54 THOUSAND/UL (ref 1.85–7.62)
NEUTS SEG NFR BLD AUTO: 66 % (ref 43–75)
P AXIS: 14 DEGREES
PLATELET # BLD AUTO: 211 THOUSANDS/UL (ref 149–390)
PLATELET BLD QL SMEAR: ADEQUATE
PMV BLD AUTO: 8.9 FL (ref 8.9–12.7)
POTASSIUM SERPL-SCNC: 4 MMOL/L (ref 3.5–5.3)
PR INTERVAL: 142 MS
PROT SERPL-MCNC: 6 G/DL (ref 6.4–8.4)
QRS AXIS: -43 DEGREES
QRSD INTERVAL: 112 MS
QT INTERVAL: 394 MS
QTC INTERVAL: 431 MS
RBC # BLD AUTO: 4.67 MILLION/UL (ref 3.81–5.12)
RBC MORPH BLD: NORMAL
RSV RNA RESP QL NAA+PROBE: NEGATIVE
SARS-COV-2 RNA RESP QL NAA+PROBE: NEGATIVE
SODIUM SERPL-SCNC: 140 MMOL/L (ref 135–147)
T WAVE AXIS: 87 DEGREES
VARIANT LYMPHS # BLD AUTO: 2 %
VENTRICULAR RATE: 72 BPM
WBC # BLD AUTO: 11.43 THOUSAND/UL (ref 4.31–10.16)

## 2024-07-29 PROCEDURE — 85007 BL SMEAR W/DIFF WBC COUNT: CPT | Performed by: PHYSICIAN ASSISTANT

## 2024-07-29 PROCEDURE — 99285 EMERGENCY DEPT VISIT HI MDM: CPT | Performed by: PHYSICIAN ASSISTANT

## 2024-07-29 PROCEDURE — 99285 EMERGENCY DEPT VISIT HI MDM: CPT

## 2024-07-29 PROCEDURE — 94640 AIRWAY INHALATION TREATMENT: CPT

## 2024-07-29 PROCEDURE — 85027 COMPLETE CBC AUTOMATED: CPT | Performed by: PHYSICIAN ASSISTANT

## 2024-07-29 PROCEDURE — 83880 ASSAY OF NATRIURETIC PEPTIDE: CPT | Performed by: PHYSICIAN ASSISTANT

## 2024-07-29 PROCEDURE — 96361 HYDRATE IV INFUSION ADD-ON: CPT

## 2024-07-29 PROCEDURE — 96360 HYDRATION IV INFUSION INIT: CPT

## 2024-07-29 PROCEDURE — 80053 COMPREHEN METABOLIC PANEL: CPT | Performed by: PHYSICIAN ASSISTANT

## 2024-07-29 PROCEDURE — 85379 FIBRIN DEGRADATION QUANT: CPT | Performed by: PHYSICIAN ASSISTANT

## 2024-07-29 PROCEDURE — 71045 X-RAY EXAM CHEST 1 VIEW: CPT

## 2024-07-29 PROCEDURE — 96375 TX/PRO/DX INJ NEW DRUG ADDON: CPT

## 2024-07-29 PROCEDURE — 96365 THER/PROPH/DIAG IV INF INIT: CPT

## 2024-07-29 PROCEDURE — 0241U HB NFCT DS VIR RESP RNA 4 TRGT: CPT | Performed by: PHYSICIAN ASSISTANT

## 2024-07-29 PROCEDURE — 93010 ELECTROCARDIOGRAM REPORT: CPT | Performed by: INTERNAL MEDICINE

## 2024-07-29 PROCEDURE — 93005 ELECTROCARDIOGRAM TRACING: CPT

## 2024-07-29 PROCEDURE — 36415 COLL VENOUS BLD VENIPUNCTURE: CPT | Performed by: PHYSICIAN ASSISTANT

## 2024-07-29 PROCEDURE — 84484 ASSAY OF TROPONIN QUANT: CPT | Performed by: PHYSICIAN ASSISTANT

## 2024-07-29 RX ORDER — METHYLPREDNISOLONE 8 MG/1
32 TABLET ORAL DAILY
Qty: 20 TABLET | Refills: 0 | Status: SHIPPED | OUTPATIENT
Start: 2024-07-30 | End: 2024-08-04

## 2024-07-29 RX ORDER — LEVALBUTEROL INHALATION SOLUTION 1.25 MG/3ML
1.25 SOLUTION RESPIRATORY (INHALATION) ONCE
Status: COMPLETED | OUTPATIENT
Start: 2024-07-29 | End: 2024-07-29

## 2024-07-29 RX ORDER — MAGNESIUM SULFATE HEPTAHYDRATE 40 MG/ML
2 INJECTION, SOLUTION INTRAVENOUS ONCE
Status: COMPLETED | OUTPATIENT
Start: 2024-07-29 | End: 2024-07-29

## 2024-07-29 RX ORDER — METHYLPREDNISOLONE SODIUM SUCCINATE 125 MG/2ML
125 INJECTION, POWDER, LYOPHILIZED, FOR SOLUTION INTRAMUSCULAR; INTRAVENOUS ONCE
Status: COMPLETED | OUTPATIENT
Start: 2024-07-29 | End: 2024-07-29

## 2024-07-29 RX ADMIN — SODIUM CHLORIDE 250 ML/HR: 0.9 INJECTION, SOLUTION INTRAVENOUS at 08:26

## 2024-07-29 RX ADMIN — MAGNESIUM SULFATE HEPTAHYDRATE 2 G: 40 INJECTION, SOLUTION INTRAVENOUS at 18:21

## 2024-07-29 RX ADMIN — IPRATROPIUM BROMIDE 0.5 MG: 0.5 SOLUTION RESPIRATORY (INHALATION) at 18:27

## 2024-07-29 RX ADMIN — LEVALBUTEROL HYDROCHLORIDE 1.25 MG: 1.25 SOLUTION RESPIRATORY (INHALATION) at 18:27

## 2024-07-29 RX ADMIN — METHYLPREDNISOLONE SODIUM SUCCINATE 125 MG: 125 INJECTION, POWDER, FOR SOLUTION INTRAMUSCULAR; INTRAVENOUS at 18:24

## 2024-07-29 NOTE — ED PROVIDER NOTES
History  Chief Complaint   Patient presents with    Shortness of Breath     Patient reports continued shortness of breath since seen here on Monday. Patient reports that she has been having continued shortness of breath with exertion. Denies any relief with medications that were prescribed.      Patient is a 68-year-old female with a PMHx of anxiety, asthma, factor V Leiden, fibromyalgia, Hashimoto's thyroiditis, mast cell activation syndrome, POTS and Sjogren's syndrome, presenting to the ED for evaluation of shortness of breath.  Patient states that she has had a persistent nonproductive cough, shortness of breath and chest tightness for the past few months, worsening over the past week.  She states that these symptoms are consistent with her typical asthma symptoms.  She states that she typically has a frequent and persistent cough with asthma exacerbations rather than wheezing.  She was seen in the ED a week ago and states that she had significant improvement of symptoms after a DuoNeb and steroids.  She was discharged on a course of prednisone which seemed to improve her symptoms; however, her symptoms started to worsen again upon completion of steroids 2 days ago.  She states that she has been doing DuoNeb treatments every 3.5-4 hours with only mild, temporary relief.  She denies any known fevers.  She denies any chest pain or lower extremity edema.  She denies any pleuritic pain, hemoptysis, unilateral calf pain/swelling, recent travel, recent surgery, exogenous estrogen use or hx of PE/DVT.  She states that she is scheduled to see her pulmonologist at [facility] on Friday.        Prior to Admission Medications   Prescriptions Last Dose Informant Patient Reported? Taking?   Alum Hydroxide-Mag Trisilicate (Gaviscon) 80-14.2 MG CHEW  Self Yes No   Sig: Chew 1 tablet 4 (four) times a day as needed With meals and as needed   Galcanezumab-gnlm (Emgality) 120 MG/ML SOAJ  Self Yes No   Sig: Inject under the skin every 30  (thirty) days    HYDROcodone-acetaminophen (NORCO) 5-325 mg per tablet   No No   Sig: Take 1 tablet by mouth 2 (two) times a day as needed for pain Max Daily Amount: 2 tablets Do not start before 2024.   HYDROcodone-acetaminophen (Norco) 5-325 mg per tablet   No No   Sig: Take 1 tablet by mouth 2 (two) times a day as needed for pain for up to 60 doses Max Daily Amount: 2 tablets Do not start before 2024.   Heating Pad PADS   No No   Sig: Use if needed (pain)   KETOTIFEN FUMARATE OP   Yes No   Sig: Take 1mg compounded capsule three times a day   MULTIPLE VITAMINS-CALCIUM PO  Self Yes No   Sig: Take 1 capsule by mouth every morning    Magnesium 400 MG CAPS  Self Yes No   Sig: Take 1 capsule by mouth 2 (two) times a day    NON FORMULARY   Yes No   Sig: Take 1 mg by mouth 3 (three) times a day Ketotifen 1 mg compounded capsule   NON FORMULARY   Yes No   Sig: LACTOBACILLUS COMBINATION NO.4 (PROBIOTIC) 3 BILLION CELL CAPSULE   NON FORMULARY   Yes No   Sig: Take 32 mg by mouth NATURE-THROID ORAL   Probiotic Product (PROBIOTIC DAILY PO)  Self Yes No   Sig: Take 1 tablet by mouth daily At lunch   Qvar RediHaler 40 MCG/ACT inhaler  Self Yes No   Thyroid, Porcine, POWD  Self Yes No   Sig: Use 32 mg daily   Ubrogepant (UBRELVY) 100 MG tablet  Self Yes No   Sig: Take 100 mg by mouth Take 1 tablet (100 mg) one time as needed for migraine. May repeat one additional tablet (100 mg) at least two hours after the first dose. Do not use more than two doses per day, or for more than eight days per month.   aluminum hydroxide-magnesium carbonate (Gaviscon Extra Strength) 160-105 mg per chewable tablet   Yes No   amoxicillin-clavulanate (AUGMENTIN) 875-125 mg per tablet   Yes No   Sig: Take 1 tablet by mouth 2 (two) times a day   Patient not taking: Reported on 2024   azelastine (ASTELIN) 0.1 % nasal spray  Self Yes No   Si sprays into each nostril 2 (two) times a day Use in each nostril as directed    beclomethasone (QVAR) 40 MCG/ACT inhaler  Self Yes No   Sig: Inhale 1 puff daily as needed (only when unable to nebulize pulmicort) Rinse mouth after use.   benzonatate (TESSALON PERLES) 100 mg capsule   No No   Sig: Take 1 capsule (100 mg total) by mouth 3 (three) times a day as needed for cough   budesonide (PULMICORT) 0.5 mg/2 mL nebulizer solution  Self Yes No   Sig: Take 0.5 mg by nebulization 2 (two) times a day Rinse mouth after use.   cromolyn (GASTROCROM) 100 MG/5ML solution   Yes No   Sig: Take 100 mg by mouth 4 (four) times a day (before meals and at bedtime)   cyclobenzaprine (FLEXERIL) 5 mg tablet   No No   Sig: Take 1 tablet (5 mg total) by mouth 3 (three) times a day as needed for muscle spasms   docusate sodium (COLACE) 100 mg capsule  Self Yes No   Sig: Take 100 mg by mouth daily as needed for constipation   erythromycin (ILOTYCIN) ophthalmic ointment  Self Yes No   Sig: Apply 1 application to eye daily at bedtime   famotidine (PEPCID) 40 MG tablet  Self Yes No   Sig: Twice a day   fexofenadine (ALLEGRA) 180 MG tablet  Self Yes No   Sig: Take 180 mg by mouth 2 (two) times a day    fluticasone (FLONASE) 50 mcg/act nasal spray  Self Yes No   Si sprays into each nostril daily    guaiFENesin (Mucinex) 600 mg 12 hr tablet   Yes No   Sig: Take 600 mg by mouth every 12 (twelve) hours   hydrOXYzine (ATARAX) 10 mg/5 mL syrup   Yes No   ipratropium (ATROVENT) 0.02 % nebulizer solution   Yes No   Sig: Take 0.5 mg by nebulization every 6 (six) hours as needed for wheezing or shortness of breath   ipratropium (ATROVENT) 0.06 % nasal spray  Self Yes No   Sig: PLEASE SEE ATTACHED FOR DETAILED DIRECTIONS   ivabradine HCl (Corlanor) 5 MG tablet  Self Yes No   Si.5 mg 2 (two) times a day   levalbuterol (XOPENEX HFA) 45 mcg/act inhaler  Self Yes No   Sig: Inhale 2 puffs every 4 (four) hours as needed (when unable to nebulize)   levalbuterol (XOPENEX) 1.25 mg/3 mL nebulizer solution  Self Yes No   Sig: Take  "1.25 mg by nebulization every 6 (six) hours as needed    methylPREDNISolone (MEDROL) 8 MG tablet   Yes No   Sig: Take 24 mg by mouth 3 (three) times a day Taper pack   naloxone (NARCAN) 4 mg/0.1 mL nasal spray   No No   Sig: Administer 1 spray into a nostril. If no response after 2-3 minutes, give another dose in the other nostril using a new spray.   omega-3-acid ethyl esters (LOVAZA) 1 g capsule  Self Yes No   Sig: Take 2 g by mouth 2 (two) times a day 1600mg daily   polyethylene glycol (MIRALAX) 17 g packet  Self Yes No   Sig: Take 17 g by mouth daily as needed    sodium chloride   Yes No   Sig: Inject 1,500 mL into a catheter in a vein   sucralfate (CARAFATE) 1 g/10 mL suspension   Yes No   Sig: Take 1 g by mouth 2 (two) times a day   verapamil (CALAN) 40 mg tablet   Yes No      Facility-Administered Medications: None       Past Medical History:   Diagnosis Date    Allergic rhinitis     Anxiety     Asthma     Back pain     Cardiac disease     Cardiopathy     EF 45%    Cough     Diverticulitis     Factor V Leiden (HCC)     Fibromyalgia     GERD (gastroesophageal reflux disease)     Hashimoto's thyroiditis     Hx of degenerative disc disease     Hypertension     Hypotension     pots - postural orthostatic hypotension    Interstitial cystitis     Irregular heart beat     LBBB    Irritable bowel syndrome     Migraines     Mitral valve disease     \"thickening\"    Myocardial infarction ([facility])     possible but not sure when    Neuropathy     bilateral legs    Osteoporosis     Postural orthostatic tachycardia syndrome     must drink a lot of water and salt    Pott's disease     Rheumatic fever     Scoliosis     Sepsis ([facility])     associated with PICC line    Sjogren's syndrome ([facility])     TMJ (dislocation of temporomandibular joint)        Past Surgical History:   Procedure Laterality Date    ABLATION MICROWAVE Left     lumbar area    BACK SURGERY  10/1998     SECTION      CHOLECYSTECTOMY  2016    " COLONOSCOPY  2016    DILATION AND CURETTAGE OF UTERUS  1995    EGD  2016    FOOT SURGERY  1968    removal of bone and neuroma    HYSTERECTOMY N/A 1995    age 39    IR OTHER  01/07/2022    IR OTHER  11/22/2022    IR OTHER  05/01/2023    IR OTHER  01/04/2024    IR PICC PLACEMENT SINGLE LUMEN  10/02/2020    IR PICC PLACEMENT SINGLE LUMEN  08/12/2021    IR PICC REPOSITION  12/07/2021    IR TUNNELED CENTRAL LINE PLACEMENT  06/20/2022    LAPAROSCOPY  1995    endometriosis    MOUTH BIOPSY      lip    OOPHORECTOMY Bilateral 1995    age 39    DC EGD TRANSORAL BIOPSY SINGLE/MULTIPLE N/A 04/24/2019    Procedure: ESOPHAGOGASTRODUODENOSCOPY (EGD) with multiple bx and dilation;  Surgeon: Peter Baldwin MD;  Location:  GI LAB;  Service: Gastroenterology    DC SURGICAL ARTHROSCOPY SHOULDER W/ROTATOR CUFF RPR Right 04/06/2022    Procedure: SHOULDER ARTHROSCOPIC ROTATOR CUFF REPAIR Biceps Tenodisis;  Surgeon: Ilya Munoz MD;  Location: [facility];  Service: Orthopedics    DC SURGICAL ARTHROSCOPY SHOULDER W/ROTATOR CUFF RPR Left 5/3/2024    Procedure: SHOULDER ARTHROSCOPIC ROTATOR CUFF REPAIR, SUBACROMIAL DECOMPRESSION;  Surgeon: Ilya Munoz MD;  Location: [facility];  Service: Orthopedics    TUNNELED VENOUS CATHETER PLACEMENT  2016       Family History   Problem Relation Age of Onset    Cancer Mother         bladder    No Known Problems Father     Thyroid cancer Sister     Heart attack Sister     No Known Problems Sister     No Known Problems Sister     No Known Problems Sister     No Known Problems Sister     No Known Problems Daughter     No Known Problems Daughter     No Known Problems Maternal Grandmother     Colon cancer Maternal Grandfather     No Known Problems Paternal Grandmother     No Known Problems Paternal Grandfather     Breast cancer Maternal Aunt         over age 50    Endometrial cancer Maternal Aunt     Multiple myeloma Maternal Aunt     Hypertension Paternal Aunt     Brain  cancer Niece     Lymphoma Niece     Breast cancer additional onset Neg Hx     BRCA2 Positive Neg Hx     Ovarian cancer Neg Hx     BRCA2 Negative Neg Hx     BRCA1 Positive Neg Hx     BRCA1 Negative Neg Hx     BRCA 1/2 Neg Hx      I have reviewed and agree with the history as documented.    E-Cigarette/Vaping    E-Cigarette Use Never User      E-Cigarette/Vaping Substances    Nicotine No     THC No     CBD No     Flavoring No     Other No     Unknown No      Social History     Tobacco Use    Smoking status: Never    Smokeless tobacco: Never   Vaping Use    Vaping status: Never Used   Substance Use Topics    Alcohol use: Never    Drug use: No       Review of Systems   Constitutional:  Negative for chills and fever.   HENT:  Negative for congestion, rhinorrhea and sore throat.    Eyes:  Negative for visual disturbance.   Respiratory:  Positive for cough, chest tightness and shortness of breath.    Cardiovascular:  Negative for chest pain, palpitations and leg swelling.   Gastrointestinal:  Negative for abdominal pain, constipation, diarrhea, nausea and vomiting.   Genitourinary:  Negative for dysuria, flank pain, frequency and hematuria.   Musculoskeletal:  Negative for back pain and neck pain.   Skin:  Negative for rash.   Neurological:  Negative for dizziness, syncope, weakness and headaches.   All other systems reviewed and are negative.      Physical Exam  Physical Exam  Vitals and nursing note reviewed.   Constitutional:       General: She is awake.      Appearance: Normal appearance. She is well-developed. She is not toxic-appearing or diaphoretic.   HENT:      Head: Normocephalic and atraumatic.      Right Ear: External ear normal.      Left Ear: External ear normal.      Nose: Nose normal.      Mouth/Throat:      Lips: Pink.      Mouth: Mucous membranes are moist.   Eyes:      General: Lids are normal. No scleral icterus.     Conjunctiva/sclera: Conjunctivae normal.      Pupils: Pupils are equal, round, and  reactive to light.   Cardiovascular:      Rate and Rhythm: Normal rate and regular rhythm.      Pulses: Normal pulses.           Radial pulses are 2+ on the right side and 2+ on the left side.      Heart sounds: Normal heart sounds, S1 normal and S2 normal.   Pulmonary:      Effort: Pulmonary effort is normal. No accessory muscle usage.      Breath sounds: Normal breath sounds. No stridor. No decreased breath sounds, wheezing, rhonchi or rales.      Comments: Lungs clear to auscultation bilaterally.  No wheezing, rhonchi or rales.  She appears mildly tachypneic after ambulation or conversation but has no retractions or accessory muscle usage.  She is able to speak in full sentences without difficulty.  Abdominal:      General: Abdomen is flat. Bowel sounds are normal. There is no distension.      Palpations: Abdomen is soft.      Tenderness: There is no abdominal tenderness. There is no right CVA tenderness, left CVA tenderness, guarding or rebound.   Musculoskeletal:      Cervical back: Full passive range of motion without pain and neck supple. No signs of trauma. No pain with movement.      Right lower leg: No edema.      Left lower leg: No edema.   Lymphadenopathy:      Cervical: No cervical adenopathy.   Skin:     General: Skin is warm and dry.      Capillary Refill: Capillary refill takes less than 2 seconds.      Coloration: Skin is not cyanotic, jaundiced or pale.   Neurological:      Mental Status: She is alert and oriented to person, place, and time.      GCS: GCS eye subscore is 4. GCS verbal subscore is 5. GCS motor subscore is 6.      Gait: Gait normal.   Psychiatric:         Mood and Affect: Mood normal.         Speech: Speech normal.         Behavior: Behavior is cooperative.         Vital Signs  ED Triage Vitals   Temperature Pulse Respirations Blood Pressure SpO2   07/29/24 1618 07/29/24 1618 07/29/24 1618 07/29/24 1618 07/29/24 1618   98.4 °F (36.9 °C) 83 22 126/82 93 %      Temp Source Heart Rate  Source Patient Position - Orthostatic VS BP Location FiO2 (%)   07/29/24 1618 -- 07/29/24 1915 07/29/24 1915 --   Temporal  Sitting Left arm       Pain Score       07/29/24 1915       No Pain           Vitals:    07/29/24 1618 07/29/24 1846 07/29/24 1915   BP: 126/82 120/63 126/59   Pulse: 83 67 65   Patient Position - Orthostatic VS:   Sitting         Visual Acuity      ED Medications  Medications   levalbuterol (XOPENEX) inhalation solution 1.25 mg (1.25 mg Nebulization Given 7/29/24 1827)   ipratropium (ATROVENT) 0.02 % inhalation solution 0.5 mg (0.5 mg Nebulization Given 7/29/24 1827)   methylPREDNISolone sodium succinate (Solu-MEDROL) injection 125 mg (125 mg Intravenous Given 7/29/24 1824)   magnesium sulfate 2 g/50 mL IVPB (premix) 2 g (0 g Intravenous Stopped 7/29/24 1841)       Diagnostic Studies  Results Reviewed       Procedure Component Value Units Date/Time    HS Troponin I 2hr [[id number]]  (Normal) Collected: 07/29/24 0839    Lab Status: Final result Specimen: Blood Updated: 07/29/24 2110     hs TnI 2hr 5 ng/L      Delta 2hr hsTnI -1 ng/L     HS Troponin I 4hr [[id number]]     Lab Status: No result Specimen: Blood     RBC Morphology Reflex Test [[id number]] Collected: 07/29/24 1757    Lab Status: Final result Specimen: Blood from Arm, Right Updated: 07/29/24 1901    FLU/RSV/COVID - if FLU/RSV clinically relevant [[id number]]  (Normal) Collected: 07/29/24 1757    Lab Status: Final result Specimen: Nares from Nose Updated: 07/29/24 1840     SARS-CoV-2 Negative     INFLUENZA A PCR Negative     INFLUENZA B PCR Negative     RSV PCR Negative    Narrative:      FOR PEDIATRIC PATIENTS - copy/paste COVID Guidelines URL to browser: [email or web address]    SARS-CoV-2 assay is a Nucleic Acid Amplification assay intended for the  qualitative detection of nucleic acid from SARS-CoV-2 in nasopharyngeal  swabs. Results are for the presumptive identification of  SARS-CoV-2 RNA.    Positive results are indicative of infection with SARS-CoV-2, the virus  causing COVID-19, but do not rule out bacterial infection or co-infection  with other viruses. Laboratories within the [place] and its  territories are required to report all positive results to the appropriate  public health authorities. Negative results do not preclude SARS-CoV-2  infection and should not be used as the sole basis for treatment or other  patient management decisions. Negative results must be combined with  clinical observations, patient history, and epidemiological information.  This test has not been FDA cleared or approved.    This test has been authorized by FDA under an Emergency Use Authorization  (EUA). This test is only authorized for the duration of time the  declaration that circumstances exist justifying the authorization of the  emergency use of an in vitro diagnostic tests for detection of SARS-CoV-2  virus and/or diagnosis of COVID-19 infection under section 564(b)(1) of  the Act, 21 U.S.C. 360bbb-3(b)(1), unless the authorization is terminated  or revoked sooner. The test has been validated but independent review by FDA  and CLIA is pending.    Test performed using [employer] GeneXpert: This RT-PCR assay targets N2,  a region unique to SARS-CoV-2. A conserved region in the E-gene was chosen  for pan-Sarbecovirus detection which includes SARS-CoV-2.    According to CMS-2020-01-R, this platform meets the definition of high-throughput technology.    HS Troponin 0hr (reflex protocol) [[id number]]  (Normal) Collected: 07/29/24 1757    Lab Status: Final result Specimen: Blood from Arm, Right Updated: 07/29/24 1828     hs TnI 0hr 6 ng/L     B-Type Natriuretic Peptide(BNP) [[id number]]  (Normal) Collected: 07/29/24 1757    Lab Status: Final result Specimen: Blood from Arm, Right Updated: 07/29/24 1827     BNP 32 pg/mL     Comprehensive metabolic panel [[id number]]  (Abnormal) Collected: 07/29/24 1757     Lab Status: Final result Specimen: Blood from Arm, Right Updated: 07/29/24 1820     Sodium 140 mmol/L      Potassium 4.0 mmol/L      Chloride 104 mmol/L      CO2 30 mmol/L      ANION GAP 6 mmol/L      BUN 22 mg/dL      Creatinine 1.21 mg/dL      Glucose 89 mg/dL      Calcium 8.9 mg/dL      AST 17 U/L      ALT 37 U/L      Alkaline Phosphatase 76 U/L      Total Protein 6.0 g/dL      Albumin 3.8 g/dL      Total Bilirubin 0.51 mg/dL      eGFR 45 ml/min/1.73sq m     Narrative:      National Kidney Disease Foundation guidelines for Chronic Kidney Disease (CKD):     Stage 1 with normal or high GFR (GFR > 90 mL/min/1.73 square meters)    Stage 2 Mild CKD (GFR = 60-89 mL/min/1.73 square meters)    Stage 3A Moderate CKD (GFR = 45-59 mL/min/1.73 square meters)    Stage 3B Moderate CKD (GFR = 30-44 mL/min/1.73 square meters)    Stage 4 Severe CKD (GFR = 15-29 mL/min/1.73 square meters)    Stage 5 End Stage CKD (GFR <15 mL/min/1.73 square meters)  Note: GFR calculation is accurate only with a steady state creatinine    CBC and differential [[id number]]  (Abnormal) Collected: 07/29/24 1757    Lab Status: Final result Specimen: Blood from Arm, Right Updated: 07/29/24 1820     WBC 11.43 Thousand/uL      RBC 4.67 Million/uL      Hemoglobin 14.3 g/dL      Hematocrit 43.6 %      MCV 93 fL      MCH 30.6 pg      MCHC 32.8 g/dL      RDW 15.1 %      MPV 8.9 fL      Platelets 211 Thousands/uL     Narrative:      This is an appended report.  These results have been appended to a previously verified report.    Manual Differential(PHLEBS Do Not Order) [[id number]]  (Abnormal) Collected: 07/29/24 1757    Lab Status: Final result Specimen: Blood from Arm, Right Updated: 07/29/24 1820     Segmented % 66 %      Lymphocytes % 18 %      Monocytes % 8 %      Eosinophils % 1 %      Basophils % 0 %      Myelocytes % 5 %      Atypical Lymphocytes % 2 %      Absolute Neutrophils 7.54 Thousand/uL      Absolute Lymphocytes 2.29 Thousand/uL      Absolute  Monocytes 0.91 Thousand/uL      Absolute Eosinophils 0.11 Thousand/uL      Absolute Basophils 0.00 Thousand/uL      Absolute Myelocytes 0.57 Thousand/uL      Total Counted --     RBC Morphology Normal     Platelet Estimate Adequate    D-Dimer [[id number]]  (Normal) Collected: 07/29/24 1757    Lab Status: Final result Specimen: Blood from Arm, Right Updated: 07/29/24 1817     D-Dimer, Quant 0.33 ug/ml FEU     Narrative:      In the evaluation for possible pulmonary embolism, in the appropriate (Well's Score of 4 or less) patient, the age adjusted d-dimer cutoff for this patient can be calculated as:    Age x 0.01 (in ug/mL) for Age-adjusted D-dimer exclusion threshold for a patient over 50 years.                   XR chest 1 view portable   Final Result by Eve Frausto MD (07/29 2032)      No acute cardiopulmonary disease.            Workstation performed: [id number]                    Procedures  ECG 12 Lead Documentation Only    Date/Time: 7/29/2024 6:39 PM    Performed by: Vanessa Rodriguez PA-C  Authorized by: Vanessa Rodriguez PA-C    Indications / Diagnosis:  Sob  ECG reviewed by me, the ED Provider: yes    Patient location:  ED  Previous ECG:     Previous ECG:  Compared to current    Comparison ECG info:  2/9/22    Similarity:  No change    Comparison to cardiac monitor: Yes    Rate:     ECG rate:  72    ECG rate assessment: normal    Rhythm:     Rhythm: sinus rhythm    Ectopy:     Ectopy: none    QRS:     QRS axis:  Left  Comments:      No STEMI.           ED Course  ED Course as of 07/30/24 1037   Mon Jul 29, 2024   2008 Patient ambulated around the department and SpO2 did not drop below 93%. Patient was able to hold a conversation while ambulating.               HEART Risk Score      Flowsheet Row Most Recent Value   Heart Score Risk Calculator    History 0 Filed at: 07/30/2024 1036   ECG 1 Filed at: 07/30/2024 1036   Age 2 Filed at: 07/30/2024 1036   Risk Factors 1 Filed at: 07/30/2024 1036    Troponin 0 Filed at: 07/30/2024 1036   HEART Score 4 Filed at: 07/30/2024 1036                  PERC Rule for PE      Flowsheet Row Most Recent Value   PERC Rule for PE    Age >=50 1 Filed at: 07/30/2024 1036   HR >=100 0 Filed at: 07/30/2024 1036   O2 Sat on room air < 95% 1 Filed at: 07/30/2024 1036   History of PE or DVT 0 Filed at: 07/30/2024 1036   Recent trauma or surgery 0 Filed at: 07/30/2024 1036   Hemoptysis 0 Filed at: 07/30/2024 1036   Exogenous estrogen 0 Filed at: 07/30/2024 1036   Unilateral leg swelling 0 Filed at: 07/30/2024 1036   PERC Rule for PE Results 2 Filed at: 07/30/2024 1036                SBIRT 20yo+      Flowsheet Row Most Recent Value   Initial Alcohol Screen: US AUDIT-C     1. How often do you have a drink containing alcohol? 0 Filed at: 07/29/2024 1620   2. How many drinks containing alcohol do you have on a typical day you are drinking?  0 Filed at: 07/29/2024 1620   3a. Male UNDER 65: How often do you have five or more drinks on one occasion? 0 Filed at: 07/29/2024 1620   3b. FEMALE Any Age, or MALE 65+: How often do you have 4 or more drinks on one occassion? 0 Filed at: 07/29/2024 1620   Audit-C Score 0 Filed at: 07/29/2024 1620   [place]: How many times in the past year have you...    Used an illegal drug or used a prescription medication for non-medical reasons? Never Filed at: 07/29/2024 1620                      Medical Decision Making  Patient is a 68-year-old female with a PMHx of anxiety, asthma, factor V Leiden, fibromyalgia, Hashimoto's thyroiditis, mast cell activation syndrome, POTS and Sjogren's syndrome, presenting to the ED for evaluation of shortness of breath.    DDX including but not limited to: asthma exacerbation, bronchitis, bronchiolitis, pneumonia, pleural effusion, CHF, PE, PTX, ACS, MI, COVID 19, viral illness.    Patient's labs are unremarkable.  D-dimer negative, ruling out PE.  Chest x-ray shows no evidence of pneumonia, pulmonary edema, pleural effusion  or pneumothorax.  EKG normal sinus rhythm with no acute ischemic changes and troponin is negative x 2, ruling out ACS/MI.  Viral swab negative.  Patient's symptoms are consistent with her typical asthma exacerbation.  She reports significant improvement of symptoms after DuoNeb, steroids and magnesium.  She was able to ambulate throughout the department and maintain an SpO2 >93%.  She feels comfortable going home at this time and has a scheduled followed up with her pulmonologist in a few days.  She was given a prescription for a steroid burst (patient prefers methylprednisolone to prednisone which was sent to her pharmacy).  She was encouraged to continue her prescribed medications and DuoNeb treatments as needed.  Advised her to monitor her SpO2 levels at home and return if she developed any hypoxia, increased shortness of breath or any other new/worsening symptoms.    The management plan was discussed in detail with the patient at bedside and all questions were answered. Strict ED return instructions were discussed at bedside. Prior to discharge, both verbal and written instructions were provided. We discussed the signs and symptoms that should prompt the patient to return to the ED. All questions were answered and the patient was comfortable with the plan of care and discharged home. The patient agrees to return to the Emergency Department for concerns and/or progression of illness.     Amount and/or Complexity of Data Reviewed  Labs: ordered.  Radiology: ordered.    Risk  Prescription drug management.                 Disposition  Final diagnoses:   SOB (shortness of breath)   Asthma exacerbation     Time reflects when diagnosis was documented in both MDM as applicable and the Disposition within this note       Time User Action Codes Description Comment    7/29/2024  8:09 PM Vanessa Rodriguez Add [R06.02] SOB (shortness of breath)     7/29/2024  8:10 PM Vanessa Rodriguez Add [J45.901] Asthma exacerbation            ED Disposition       ED Disposition   Discharge    Condition   Stable    Date/Time   Mon Jul 29, 2024 2015    Comment   Teresa Mao discharge to home/self care.                   Follow-up Information       Follow up With Specialties Details Why Contact Info Additional Information    Jan Doyle MD Internal Medicine Schedule an appointment as soon as possible for a visit   [address] [facility] [id number] [phone]       [facility] Emergency Department Emergency Medicine  If symptoms worsen [address] [facility] [address] [phone] [facility] Emergency Department, [address] [facility], [place], [place] [address]            Discharge Medication List as of 7/29/2024  8:15 PM        START taking these medications    Details   !! methylPREDNISolone (MEDROL) 8 MG tablet Take 4 tablets (32 mg total) by mouth daily for 5 days Do not start before July 30, 2024., Starting Tue 7/30/2024, Until Sun 8/4/2024, Normal       !! - Potential duplicate medications found. Please discuss with provider.        CONTINUE these medications which have NOT CHANGED    Details   Alum Hydroxide-Mag Trisilicate (Gaviscon) 80-14.2 MG CHEW Chew 1 tablet 4 (four) times a day as needed With meals and as needed, Historical Med      aluminum hydroxide-magnesium carbonate (Gaviscon Extra Strength) 160-105 mg per chewable tablet Historical Med      amoxicillin-clavulanate (AUGMENTIN) 875-125 mg per tablet Take 1 tablet by mouth 2 (two) times a day, Starting Fri 7/5/2024, Historical Med      azelastine (ASTELIN) 0.1 % nasal spray 2 sprays into each nostril 2 (two) times a day Use in each nostril as directed, Historical Med      beclomethasone (QVAR) 40 MCG/ACT inhaler Inhale 1 puff daily as needed (only when unable to nebulize pulmicort) Rinse mouth after use., Historical Med      benzonatate (TESSALON PERLES) 100 mg capsule Take 1 capsule (100 mg total) by mouth 3 (three)  times a day as needed for cough, Starting Mon 7/22/2024, Normal      budesonide (PULMICORT) 0.5 mg/2 mL nebulizer solution Take 0.5 mg by nebulization 2 (two) times a day Rinse mouth after use., Historical Med      cromolyn (GASTROCROM) 100 MG/5ML solution Take 100 mg by mouth 4 (four) times a day (before meals and at bedtime), Historical Med      cyclobenzaprine (FLEXERIL) 5 mg tablet Take 1 tablet (5 mg total) by mouth 3 (three) times a day as needed for muscle spasms, Starting Thu 7/11/2024, Normal      docusate sodium (COLACE) 100 mg capsule Take 100 mg by mouth daily as needed for constipation, Historical Med      erythromycin (ILOTYCIN) ophthalmic ointment Apply 1 application to eye daily at bedtime, Starting Tue 10/25/2022, Historical Med      famotidine (PEPCID) 40 MG tablet Twice a day, Starting Mon 8/22/2022, Historical Med      fexofenadine (ALLEGRA) 180 MG tablet Take 180 mg by mouth 2 (two) times a day , Historical Med      fluticasone (FLONASE) 50 mcg/act nasal spray 2 sprays into each nostril daily , Historical Med      Galcanezumab-gnlm (Emgality) 120 MG/ML SOAJ Inject under the skin every 30 (thirty) days , Historical Med      guaiFENesin (Mucinex) 600 mg 12 hr tablet Take 600 mg by mouth every 12 (twelve) hours, Historical Med      Heating Pad PADS Use if needed (pain), Starting Fri 4/26/2024, No Print      !! HYDROcodone-acetaminophen (NORCO) 5-325 mg per tablet Take 1 tablet by mouth 2 (two) times a day as needed for pain Max Daily Amount: 2 tablets Do not start before July 12, 2024., Starting Fri 7/12/2024, Normal      !! HYDROcodone-acetaminophen (Norco) 5-325 mg per tablet Take 1 tablet by mouth 2 (two) times a day as needed for pain for up to 60 doses Max Daily Amount: 2 tablets Do not start before August 9, 2024., Starting Fri 8/9/2024, Normal      hydrOXYzine (ATARAX) 10 mg/5 mL syrup Historical Med      ipratropium (ATROVENT) 0.02 % nebulizer solution Take 0.5 mg by nebulization every 6  (six) hours as needed for wheezing or shortness of breath, Historical Med      ipratropium (ATROVENT) 0.06 % nasal spray PLEASE SEE ATTACHED FOR DETAILED DIRECTIONS, Historical Med      ivabradine HCl (Corlanor) 5 MG tablet 7.5 mg 2 (two) times a day, Starting Thu 10/28/2021, Historical Med      KETOTIFEN FUMARATE OP Take 1mg compounded capsule three times a day, Historical Med      levalbuterol (XOPENEX HFA) 45 mcg/act inhaler Inhale 2 puffs every 4 (four) hours as needed (when unable to nebulize), Historical Med      levalbuterol (XOPENEX) 1.25 mg/3 mL nebulizer solution Take 1.25 mg by nebulization every 6 (six) hours as needed , Starting Mon 1/7/2019, Historical Med      Magnesium 400 MG CAPS Take 1 capsule by mouth 2 (two) times a day , Historical Med      !! methylPREDNISolone (MEDROL) 8 MG tablet Take 24 mg by mouth 3 (three) times a day Taper pack, Historical Med      MULTIPLE VITAMINS-CALCIUM PO Take 1 capsule by mouth every morning , Historical Med      naloxone (NARCAN) 4 mg/0.1 mL nasal spray Administer 1 spray into a nostril. If no response after 2-3 minutes, give another dose in the other nostril using a new spray., Normal      !! NON FORMULARY Take 1 mg by mouth 3 (three) times a day Ketotifen 1 mg compounded capsule, Historical Med      !! NON FORMULARY LACTOBACILLUS COMBINATION NO.4 (PROBIOTIC) 3 BILLION CELL CAPSULE, Historical Med      !! NON FORMULARY Take 32 mg by mouth NATURE-THROID ORAL, Historical Med      omega-3-acid ethyl esters (LOVAZA) 1 g capsule Take 2 g by mouth 2 (two) times a day 1600mg daily, Historical Med      polyethylene glycol (MIRALAX) 17 g packet Take 17 g by mouth daily as needed , Historical Med      Probiotic Product (PROBIOTIC DAILY PO) Take 1 tablet by mouth daily At lunch, Historical Med      Qvar RediHaler 40 MCG/ACT inhaler Historical Med      sodium chloride Inject 1,500 mL into a catheter in a vein, Starting Sat 1/14/2023, Historical Med      sucralfate (CARAFATE)  1 g/10 mL suspension Take 1 g by mouth 2 (two) times a day, Historical Med      Thyroid, Porcine, POWD Use 32 mg daily, Historical Med      Ubrogepant (UBRELVY) 100 MG tablet Take 100 mg by mouth Take 1 tablet (100 mg) one time as needed for migraine. May repeat one additional tablet (100 mg) at least two hours after the first dose. Do not use more than two doses per day, or for more than eight days per month., Historical Med      verapamil (CALAN) 40 mg tablet Starting Thu 12/1/2022, Historical Med       !! - Potential duplicate medications found. Please discuss with provider.          No discharge procedures on file.    PDMP Review         Value Time User    PDMP Reviewed  Yes 7/11/2024  4:17 PM Barbara Kocher, CRNP            ED Provider  Electronically Signed by             Vanessa Rodriguez PA-C  07/30/24 1037

## 2024-07-29 NOTE — PROGRESS NOTES
Teresa Mao  tolerated IV hydration well with no complications.      Teresa Mao is aware of future appt tomorrow at 9AM.     AVS declined by Teresa Mao.

## 2024-07-30 ENCOUNTER — APPOINTMENT (OUTPATIENT)
Dept: PHYSICAL THERAPY | Facility: CLINIC | Age: 69
End: 2024-07-30
Payer: MEDICARE

## 2024-07-30 ENCOUNTER — HOSPITAL ENCOUNTER (OUTPATIENT)
Dept: INFUSION CENTER | Facility: HOSPITAL | Age: 69
Discharge: HOME | End: 2024-07-30
Attending: INTERNAL MEDICINE
Payer: MEDICARE

## 2024-07-30 VITALS
SYSTOLIC BLOOD PRESSURE: 136 MMHG | DIASTOLIC BLOOD PRESSURE: 77 MMHG | RESPIRATION RATE: 19 BRPM | TEMPERATURE: 99 F | OXYGEN SATURATION: 97 % | HEART RATE: 73 BPM

## 2024-07-30 PROCEDURE — 96361 HYDRATE IV INFUSION ADD-ON: CPT

## 2024-07-30 PROCEDURE — 96360 HYDRATION IV INFUSION INIT: CPT

## 2024-07-30 RX ORDER — SODIUM CHLORIDE 9 MG/ML
250 INJECTION, SOLUTION INTRAVENOUS ONCE
Status: COMPLETED | OUTPATIENT
Start: 2024-08-01 | End: 2024-08-01

## 2024-07-30 RX ADMIN — SODIUM CHLORIDE 1500 ML/HR: 0.9 INJECTION, SOLUTION INTRAVENOUS at 10:14

## 2024-07-30 NOTE — PROGRESS NOTES
Teresa Mao  tolerated hydration  well with no complications.      Teresa Mao is aware of future appt tomorrow at 9      AVS printed and given to Teresa Mao:  No (Declined by Teresa Mao)

## 2024-07-31 ENCOUNTER — HOSPITAL ENCOUNTER (OUTPATIENT)
Dept: INFUSION CENTER | Facility: HOSPITAL | Age: 69
Discharge: HOME | End: 2024-07-31
Payer: MEDICARE

## 2024-07-31 VITALS
DIASTOLIC BLOOD PRESSURE: 79 MMHG | OXYGEN SATURATION: 96 % | HEART RATE: 63 BPM | RESPIRATION RATE: 18 BRPM | SYSTOLIC BLOOD PRESSURE: 146 MMHG | TEMPERATURE: 97 F

## 2024-07-31 PROCEDURE — 96361 HYDRATE IV INFUSION ADD-ON: CPT

## 2024-07-31 PROCEDURE — 96360 HYDRATION IV INFUSION INIT: CPT

## 2024-07-31 RX ADMIN — SODIUM CHLORIDE 1500 ML: 0.9 INJECTION, SOLUTION INTRAVENOUS at 09:07

## 2024-07-31 NOTE — PROGRESS NOTES
Patient tolerated IV hydration without issues.      Teresa Mao is aware of future appt on 8/1/24 at 0830.     AVS -  No (Declined by Teresa Mao) Patient has Mychart.    Patient ambulated off unit without incident.  All personal belongings taken with patient.

## 2024-08-01 ENCOUNTER — APPOINTMENT (OUTPATIENT)
Dept: PHYSICAL THERAPY | Facility: CLINIC | Age: 69
End: 2024-08-01
Payer: MEDICARE

## 2024-08-01 ENCOUNTER — HOSPITAL ENCOUNTER (OUTPATIENT)
Dept: INFUSION CENTER | Facility: HOSPITAL | Age: 69
End: 2024-08-01
Attending: INTERNAL MEDICINE
Payer: MEDICARE

## 2024-08-01 VITALS
HEART RATE: 64 BPM | OXYGEN SATURATION: 97 % | RESPIRATION RATE: 18 BRPM | TEMPERATURE: 98.2 F | DIASTOLIC BLOOD PRESSURE: 74 MMHG | SYSTOLIC BLOOD PRESSURE: 128 MMHG

## 2024-08-01 DIAGNOSIS — Z00.6 ENCOUNTER FOR EXAMINATION FOR NORMAL COMPARISON OR CONTROL IN CLINICAL RESEARCH PROGRAM: ICD-10-CM

## 2024-08-01 PROCEDURE — 96361 HYDRATE IV INFUSION ADD-ON: CPT

## 2024-08-01 PROCEDURE — 96360 HYDRATION IV INFUSION INIT: CPT

## 2024-08-01 RX ORDER — SODIUM CHLORIDE 9 MG/ML
250 INJECTION, SOLUTION INTRAVENOUS ONCE
Status: COMPLETED | OUTPATIENT
Start: 2024-08-05 | End: 2024-08-05

## 2024-08-01 RX ADMIN — SODIUM CHLORIDE 250 ML/HR: 0.9 INJECTION, SOLUTION INTRAVENOUS at 09:08

## 2024-08-01 NOTE — PROGRESS NOTES
Teresa Mao  tolerated hydration well with no complications.      Teresa Mao is aware of future appt on 8-5-24 at 830    AVS declined

## 2024-08-05 ENCOUNTER — OFFICE VISIT (OUTPATIENT)
Dept: PHYSICAL THERAPY | Facility: CLINIC | Age: 69
End: 2024-08-05
Payer: MEDICARE

## 2024-08-05 ENCOUNTER — TELEPHONE (OUTPATIENT)
Age: 69
End: 2024-08-05

## 2024-08-05 ENCOUNTER — HOSPITAL ENCOUNTER (OUTPATIENT)
Dept: INFUSION CENTER | Facility: HOSPITAL | Age: 69
Discharge: HOME | End: 2024-08-05
Attending: INTERNAL MEDICINE
Payer: MEDICARE

## 2024-08-05 ENCOUNTER — TELEPHONE (OUTPATIENT)
Dept: NEPHROLOGY | Facility: CLINIC | Age: 69
End: 2024-08-05

## 2024-08-05 VITALS
HEART RATE: 74 BPM | DIASTOLIC BLOOD PRESSURE: 78 MMHG | RESPIRATION RATE: 16 BRPM | OXYGEN SATURATION: 98 % | TEMPERATURE: 98.7 F | SYSTOLIC BLOOD PRESSURE: 117 MMHG

## 2024-08-05 DIAGNOSIS — Z98.890 STATUS POST LEFT ROTATOR CUFF REPAIR: Primary | ICD-10-CM

## 2024-08-05 DIAGNOSIS — N18.31 STAGE 3A CHRONIC KIDNEY DISEASE (HCC): Primary | ICD-10-CM

## 2024-08-05 PROCEDURE — 96360 HYDRATION IV INFUSION INIT: CPT

## 2024-08-05 PROCEDURE — 97110 THERAPEUTIC EXERCISES: CPT

## 2024-08-05 PROCEDURE — 97112 NEUROMUSCULAR REEDUCATION: CPT

## 2024-08-05 PROCEDURE — 96361 HYDRATE IV INFUSION ADD-ON: CPT

## 2024-08-05 PROCEDURE — 97535 SELF CARE MNGMENT TRAINING: CPT

## 2024-08-05 RX ORDER — SODIUM CHLORIDE 9 MG/ML
250 INJECTION, SOLUTION INTRAVENOUS ONCE
Status: COMPLETED | OUTPATIENT
Start: 2024-08-07 | End: 2024-08-07

## 2024-08-05 RX ADMIN — SODIUM CHLORIDE 250 ML/HR: 0.9 INJECTION, SOLUTION INTRAVENOUS at 08:39

## 2024-08-05 NOTE — TELEPHONE ENCOUNTER
I called and informed Teresa that she does not require any more labs prior to her upcoming appt on 08/15/24.

## 2024-08-05 NOTE — PROGRESS NOTES
"Daily Note     Today's date: 2024  Patient name: Teresa Mao  : 1955  MRN: [id number]  Referring provider: Ilya Munoz*  Dx:   Encounter Diagnosis     ICD-10-CM    1. Status post left rotator cuff repair  Z98.890           Start Time: 1505  Stop Time: 1600  Total time in clinic (min): 55 minutes    Subjective:   Pt. States she will \"go light\" today re: performance with therapy. Says she is currently in a ABRAMS flare, and also dealing with other medical issues.      Objective: See treatment diary below      Assessment: Tolerated treatment Fairly Well overall. Patient exhibited good technique with therapeutic exercises and would benefit from continued PT.      Plan: Continue per plan of care.  Progress treament per protocol.            Precautions: see extensive PMH, ABRAMS,     FOLLOW PROTOCOL**, sling to be worn 6 weeks.    Re-eval Date: 24      Re-eval Date: 24  DOS:  5/3/24                                                                              12 weeks                                          10 weeks  Date 7/15 7.18  8.5    Visit Count 16 17 13 19 15   FOTO   *completed 2024    *initial projected score met/exceeded     Pain In 0 0/10  0/10   1-2/10   Pain Out 0 No negative change  No negative change        Manuals 7/15 7.18  8.5    PROM LUE Supine MJD  10' Supine MJD 10'                              Neuro Re-Ed    8.5    MTPs/LTPs   Red 20x ea Red 20x ea      punch   Red 20x Red 20x       BUE ER   Red 20x Red 20x       IR w/TB   Red 20x Red 20x                               Ther Ex 7/15 7.18 7. 8.5 7   ultigrip D/C         Ball squeeze D/C       Wrist AROM DC      DC   L elbow ROM 3#  X30 AROM flexion and ex 3# X40 AROM flexion and ext 4# X30 AROM flexion and ext 4# X30 AROM flexion and ext 3#   Pendulum ex *HEP       UT/LS stretch DC    DC to HEP    DC to HEP    Isometrics  3 way 8x/5   Cerv ROM DC    DC to HEP   Scap isometrics      DC " "  pulleys pulleys    3 min FF pulleys    3 min FF pulleys   3 min 4 min FF pulleys 30 reps AArom   Finger ladder finger ladder  #27  8 reps FF finger ladder  #26  10 reps FF finger ladder  #27  10 reps FF finger ladder  #27  10 reps FF finger ladder  #26  8 reps FF   Wall slides  10x FF   10x cw/ccw  10x HA/ADD 10x FF   10x cw/ccw  10x HA/ADD 10x FF   10x cw/ccw  10x HA/ADD 12x FF   12x cw/ccw  12x HA/ADD 10x FF   10x cw/ccw  10x HA/ADD   Wand ex Standing  FF, ABD, IR, EXT   10x ea Standing  FF, ABD, IR, EXT   10x ea  Standing  FF, ABD, IR, EXT   15x ea Standing  FF, ABD, IR, EXT   10x ea   Active flex/abd 2 x 10x ea 2 x 10x ea 20x ea 25x    Full can 20x 20x   20x 25x    SL ER 20x 20x 2#  20x   *resume NV,    Supine shld flex/ER w/wand 15x 5\" ea 15x 5\" ea 2#  15 x 5\" LUE only 2#  15 x 5\" LUE only 10 x 10\" ea   Prone flex, HA, ext rows  resume  *resume NV, if breathing is better                                                                    Ther Activity                        Gait Training            **Self Care/Pt. Educ/  Skilled convers  10 min            Modalities 6.13 7.18 6.25 8.5 6.6    Cryo 10 min to L shld with no adverse reaction Cryo 10 min to L shld with no adverse reaction Cryo 10 min with no adverse reaction *Pt. deferred Cryo 10 min with no adverse reaction    *MHP 10 min to L UT *Pt. deferred MHP 10 min to L UT         Rotator Cuff Repair Rehabilitation Protocol  (adapted from Milana PJ et al. “Rehabilitation of the Rotator Cuff: An Evaluation Based Approach”. JAAOS 2006; 14:599-609)  Updated 10.14   Ilya Munoz MD  [facility]  [address] [place] [id number]  [phone]  Phase 1 (Weeks 0-6)              Immediate Postoperative Period              Goals:                          Diminish Pain and Inflammation  Maintain and Protect Integrity of the Repair                          Gradually Increase Passive ROM (NO Active or Active Assist until Week " 6)                          Become Independent with Modified ADLs              Precautions:  Maintain Arm in Abduction Sling, Remove Only for Directed Exercises (may remove Abduction Pillow after Day 21 for comfort)                          Keep Incisions Clean & Dry (okay to shower in 48 hours, band-aids over incisions)  No Immersion (pool) until Wounds Totally Sealed (usually not prior to day #10)  Passive Shoulder Motion ONLY, No Lifting/Holding Objects, Reaching Behind Back  Okay to Type/Write at Desktop with Arm in Sling              Day 0-6                          Elbow, Wrist, Hand AROM Exercises                           Start Cervical AROM and Scapula Isometrics                          Cryotherapy/Ice for Pain and Inflammation                          Instruct in Hygiene, Posture, and Positioning               Day 7-28                          Continue Above  May Start Pendulum Exercises                          May Start Supine, Pain Free, PT assisted PROM  Forward Flexion to 90°, External Rotation to 35° (Elbow at side), Internal Rotation to Body, Abduction to 60°                          Can Introduce light Cardio (Walking, Stationary Bike)                          Aqua Therapy may begin at week 3 (day 21) as long as no wound problems              Day 29-42                          Continue Above  Progress PROM to Goal of full PROM by Week 6.  May add Gentle Mobilizations (GH and Scapulothoracic) to Regain full PROM if Needed.  May add Heat prior to PROM Exercises, Ice after Exercises  May Begin AAROM at Day 29 if ROM is Appropriate in Anticipation of AROM Starting at Week 6              Criteria to Progress to Phase 2                          Reasonable Passive Forward Flexion, Abduction , IR/ER                          Time  Phase 2 (Weeks 6-12)              Protection and Active Motion              Goals of Phase:                          Allow Healing of Soft Tissues                           Decrease Pain and Inflammation  Add ADLs and Regain AROM by End of Phase              Precautions:                          NO STRENGTHENING until Phase 3                          Repair is Most Prone to Failure during this Phase!                          No Lifting Objects > 2 lbs (Coffee Cup OK), no Sudden Motions                          Avoid Upper Extremity Bike and Ergometer              Day 43-56                          Discontinue Sling  Initiate AROM Exercises (forward flexion, ER, IR and abduction), Rotator Cuff Isometrics                          Continue Periscapular Exercises, add Stretching if PROM Lacking   No Strengthening until Week 12 (Minimum Time Needed for Cuff Healing Sufficient to withstand Strengthening)                Phase 3 (Weeks 12-16)              Early Strengthening              Goal of Phase:                          Gain full AROM, Maintain PROM                          Gradual return of Shoulder Strength, Power and Endurance                          Gradual return to Functional Activities              Precautions:                          No Lifting > 10lbs, Sudden Lifting or Pushing activities, Overhead Lifting                          No Upper Extremity Bike or Ergometer              Week 12                          Initiate Strengthening Program (10 lb Maximum until Phase 4)                            ER/IR with Bands (Standing)                            ER in Lateral Decubitius Position                            Lateral Raises                            Full Can in Scapular Plane                            Prone Rowing, Horizontal Abduction, Extension                            Elbow Flexion/Extension              Week 14-16                          Initiate light Functional Activities as Permitted  Progress to Fundamental Shoulder Exercises  Phase 4 (Variable but Weeks 16-24)              Aggressive Rehab  Sport Specific or Activity Specific               Goals of Phase:                           Maintain Full Pain-free AROM                          Advanced Conditioning Exercises for enhanced Functional use                          Continue regaining Shoulder Strength, Power and Endurance                          Eventual return to full Functional Activities              Precautions:                          None              Week 16                          Continue ROM and stretching if appropriate                          Progress Strengthening, Proprioceptive and Neuromuscular Training                          Light Sports if Progressing Well (Chipping/Putting, easy ground strokes etc.)              Week 20                          Continue Strengthening and Stretching                          Initiate Interval Sports Program as Appropriate

## 2024-08-05 NOTE — TELEPHONE ENCOUNTER
Patient calling because she had a CMP done on 7/29, and is asking if she has to get another one boyce prior to her 8/15 appt? She said it took them 4x to get her blood drawn, and she really does not want to have to get stuck again. Please call patient back to let her know if another one is needed.

## 2024-08-05 NOTE — PROGRESS NOTES
Teresa Mao  tolerated hydration treatment well with no complications.      Teresa Mao is aware of future appt on 8/7 at 8:30AM.     AVS printed and given to Teresa Mao: No (Declined by Teresa Mao).

## 2024-08-06 RX ORDER — SODIUM CHLORIDE 9 MG/ML
250 INJECTION, SOLUTION INTRAVENOUS ONCE
Status: COMPLETED | OUTPATIENT
Start: 2024-08-08 | End: 2024-08-08

## 2024-08-07 ENCOUNTER — HOSPITAL ENCOUNTER (OUTPATIENT)
Dept: INFUSION CENTER | Facility: HOSPITAL | Age: 69
Discharge: HOME | End: 2024-08-07
Attending: INTERNAL MEDICINE
Payer: MEDICARE

## 2024-08-07 VITALS
OXYGEN SATURATION: 99 % | TEMPERATURE: 99 F | SYSTOLIC BLOOD PRESSURE: 142 MMHG | RESPIRATION RATE: 18 BRPM | DIASTOLIC BLOOD PRESSURE: 91 MMHG | HEART RATE: 71 BPM

## 2024-08-07 PROCEDURE — 96361 HYDRATE IV INFUSION ADD-ON: CPT

## 2024-08-07 PROCEDURE — 96360 HYDRATION IV INFUSION INIT: CPT

## 2024-08-07 RX ADMIN — SODIUM CHLORIDE 250 ML/HR: 0.9 INJECTION, SOLUTION INTRAVENOUS at 08:47

## 2024-08-07 NOTE — PROGRESS NOTES
Teresa Mao  tolerated hydration  treatment well with no complications.      Teresa Mao is aware of future appt tomorrow    AVS printed and given to Teresa Mao:  No (Declined by Teresa Mao)

## 2024-08-08 ENCOUNTER — HOSPITAL ENCOUNTER (OUTPATIENT)
Dept: CT IMAGING | Facility: HOSPITAL | Age: 69
End: 2024-08-08
Payer: MEDICARE

## 2024-08-08 ENCOUNTER — HOSPITAL ENCOUNTER (OUTPATIENT)
Dept: INFUSION CENTER | Facility: HOSPITAL | Age: 69
End: 2024-08-08
Attending: INTERNAL MEDICINE
Payer: MEDICARE

## 2024-08-08 VITALS
OXYGEN SATURATION: 98 % | SYSTOLIC BLOOD PRESSURE: 146 MMHG | RESPIRATION RATE: 18 BRPM | TEMPERATURE: 98.2 F | DIASTOLIC BLOOD PRESSURE: 80 MMHG | HEART RATE: 69 BPM

## 2024-08-08 DIAGNOSIS — J34.89 SINUS PRESSURE: ICD-10-CM

## 2024-08-08 PROCEDURE — 96361 HYDRATE IV INFUSION ADD-ON: CPT

## 2024-08-08 PROCEDURE — 70486 CT MAXILLOFACIAL W/O DYE: CPT

## 2024-08-08 PROCEDURE — 96360 HYDRATION IV INFUSION INIT: CPT

## 2024-08-08 RX ORDER — SODIUM CHLORIDE 9 MG/ML
250 INJECTION, SOLUTION INTRAVENOUS ONCE
Status: COMPLETED | OUTPATIENT
Start: 2024-08-12 | End: 2024-08-12

## 2024-08-08 RX ADMIN — SODIUM CHLORIDE 250 ML/HR: 0.9 INJECTION, SOLUTION INTRAVENOUS at 09:01

## 2024-08-08 NOTE — PROGRESS NOTES
Teresa Mao  tolerated hydration well with no complications.      Teresa Mao is aware of future appt on 8-9-24 at 830    AVS declined

## 2024-08-09 ENCOUNTER — HOSPITAL ENCOUNTER (OUTPATIENT)
Dept: INFUSION CENTER | Facility: HOSPITAL | Age: 69
End: 2024-08-09
Attending: INTERNAL MEDICINE
Payer: MEDICARE

## 2024-08-09 VITALS
HEART RATE: 71 BPM | SYSTOLIC BLOOD PRESSURE: 134 MMHG | TEMPERATURE: 97.2 F | RESPIRATION RATE: 16 BRPM | DIASTOLIC BLOOD PRESSURE: 74 MMHG

## 2024-08-09 PROCEDURE — 96360 HYDRATION IV INFUSION INIT: CPT

## 2024-08-09 PROCEDURE — 96361 HYDRATE IV INFUSION ADD-ON: CPT

## 2024-08-09 RX ADMIN — SODIUM CHLORIDE 1500 ML: 0.9 INJECTION, SOLUTION INTRAVENOUS at 08:31

## 2024-08-09 NOTE — PROGRESS NOTES
Patient tolerated IV hydration without issues.       Teresa Mao is aware of future appt on 8/12/24 at 0900.     AVS -  No (Declined by Teresa Mao) Patient has Mychart.    Patient ambulated off unit without incident.  All personal belongings taken with patient.

## 2024-08-12 ENCOUNTER — HOSPITAL ENCOUNTER (OUTPATIENT)
Dept: INFUSION CENTER | Facility: HOSPITAL | Age: 69
Discharge: HOME | End: 2024-08-12
Attending: INTERNAL MEDICINE
Payer: MEDICARE

## 2024-08-12 ENCOUNTER — APPOINTMENT (OUTPATIENT)
Dept: LAB | Facility: CLINIC | Age: 69
End: 2024-08-12
Payer: MEDICARE

## 2024-08-12 VITALS
HEART RATE: 73 BPM | SYSTOLIC BLOOD PRESSURE: 128 MMHG | OXYGEN SATURATION: 96 % | DIASTOLIC BLOOD PRESSURE: 77 MMHG | TEMPERATURE: 99.2 F | RESPIRATION RATE: 18 BRPM

## 2024-08-12 DIAGNOSIS — N18.31 STAGE 3A CHRONIC KIDNEY DISEASE (HCC): ICD-10-CM

## 2024-08-12 DIAGNOSIS — K21.9 GASTRO-ESOPHAGEAL REFLUX DISEASE WITHOUT ESOPHAGITIS: ICD-10-CM

## 2024-08-12 DIAGNOSIS — K86.2 CYST OF PANCREAS: ICD-10-CM

## 2024-08-12 DIAGNOSIS — Z86.010 PERSONAL HISTORY OF COLONIC POLYPS: ICD-10-CM

## 2024-08-12 DIAGNOSIS — K44.9 DIAPHRAGMATIC HERNIA WITHOUT OBSTRUCTION AND WITHOUT GANGRENE: ICD-10-CM

## 2024-08-12 DIAGNOSIS — K29.70 GASTRITIS, PRESENCE OF BLEEDING UNSPECIFIED, UNSPECIFIED CHRONICITY, UNSPECIFIED GASTRITIS TYPE: ICD-10-CM

## 2024-08-12 DIAGNOSIS — Z00.6 ENCOUNTER FOR EXAMINATION FOR NORMAL COMPARISON OR CONTROL IN CLINICAL RESEARCH PROGRAM: ICD-10-CM

## 2024-08-12 DIAGNOSIS — Z80.0 FAMILY HISTORY OF MALIGNANT NEOPLASM OF DIGESTIVE ORGAN: ICD-10-CM

## 2024-08-12 PROCEDURE — 36415 COLL VENOUS BLD VENIPUNCTURE: CPT

## 2024-08-12 PROCEDURE — 96361 HYDRATE IV INFUSION ADD-ON: CPT

## 2024-08-12 PROCEDURE — 86301 IMMUNOASSAY TUMOR CA 19-9: CPT

## 2024-08-12 PROCEDURE — 96360 HYDRATION IV INFUSION INIT: CPT

## 2024-08-12 RX ADMIN — SODIUM CHLORIDE 250 ML/HR: 0.9 INJECTION, SOLUTION INTRAVENOUS at 08:54

## 2024-08-12 NOTE — PROGRESS NOTES
Patient tolerated IV hydration without issues.      Teresa Mao is aware of future appt on 8/13/24 at 0830.     AVS -  No (Declined by Teresa Mao) Patient has Mychart.    Patient ambulated off unit without incident.  All personal belongings taken with patient.

## 2024-08-13 ENCOUNTER — APPOINTMENT (OUTPATIENT)
Dept: PHYSICAL THERAPY | Facility: CLINIC | Age: 69
End: 2024-08-13
Payer: MEDICARE

## 2024-08-13 LAB — CANCER AG19-9 SERPL-ACNC: 32 U/ML (ref 0–35)

## 2024-08-15 ENCOUNTER — APPOINTMENT (EMERGENCY)
Dept: RADIOLOGY | Facility: HOSPITAL | Age: 69
End: 2024-08-15
Payer: MEDICARE

## 2024-08-15 ENCOUNTER — HOSPITAL ENCOUNTER (EMERGENCY)
Facility: HOSPITAL | Age: 69
Discharge: HOME | End: 2024-08-15
Attending: EMERGENCY MEDICINE
Payer: MEDICARE

## 2024-08-15 VITALS
TEMPERATURE: 97.9 F | WEIGHT: 135 LBS | OXYGEN SATURATION: 95 % | SYSTOLIC BLOOD PRESSURE: 123 MMHG | DIASTOLIC BLOOD PRESSURE: 60 MMHG | RESPIRATION RATE: 25 BRPM | BODY MASS INDEX: 25.51 KG/M2 | HEART RATE: 64 BPM

## 2024-08-15 DIAGNOSIS — J45.901 ASTHMA EXACERBATION: Primary | ICD-10-CM

## 2024-08-15 LAB
ALBUMIN SERPL BCG-MCNC: 3.7 G/DL (ref 3.5–5)
ALP SERPL-CCNC: 76 U/L (ref 34–104)
ALT SERPL W P-5'-P-CCNC: 35 U/L (ref 7–52)
ANION GAP SERPL CALCULATED.3IONS-SCNC: 8 MMOL/L (ref 4–13)
AST SERPL W P-5'-P-CCNC: 17 U/L (ref 13–39)
BASOPHILS # BLD AUTO: 0.04 THOUSANDS/ÂΜL (ref 0–0.1)
BASOPHILS NFR BLD AUTO: 0 % (ref 0–1)
BILIRUB SERPL-MCNC: 0.88 MG/DL (ref 0.2–1)
BUN SERPL-MCNC: 19 MG/DL (ref 5–25)
CALCIUM SERPL-MCNC: 9.3 MG/DL (ref 8.4–10.2)
CARDIAC TROPONIN I PNL SERPL HS: 6 NG/L
CHLORIDE SERPL-SCNC: 104 MMOL/L (ref 96–108)
CO2 SERPL-SCNC: 27 MMOL/L (ref 21–32)
CREAT SERPL-MCNC: 0.85 MG/DL (ref 0.6–1.3)
EOSINOPHIL # BLD AUTO: 0.15 THOUSAND/ÂΜL (ref 0–0.61)
EOSINOPHIL NFR BLD AUTO: 2 % (ref 0–6)
ERYTHROCYTE [DISTWIDTH] IN BLOOD BY AUTOMATED COUNT: 15.7 % (ref 11.6–15.1)
FLUAV RNA RESP QL NAA+PROBE: NEGATIVE
FLUBV RNA RESP QL NAA+PROBE: NEGATIVE
GFR SERPL CREATININE-BSD FRML MDRD: 70 ML/MIN/1.73SQ M
GLUCOSE SERPL-MCNC: 96 MG/DL (ref 65–140)
HCT VFR BLD AUTO: 40.1 % (ref 34.8–46.1)
HGB BLD-MCNC: 13.2 G/DL (ref 11.5–15.4)
IMM GRANULOCYTES # BLD AUTO: 0.19 THOUSAND/UL (ref 0–0.2)
IMM GRANULOCYTES NFR BLD AUTO: 2 % (ref 0–2)
LYMPHOCYTES # BLD AUTO: 1.02 THOUSANDS/ÂΜL (ref 0.6–4.47)
LYMPHOCYTES NFR BLD AUTO: 10 % (ref 14–44)
MCH RBC QN AUTO: 30.8 PG (ref 26.8–34.3)
MCHC RBC AUTO-ENTMCNC: 32.9 G/DL (ref 31.4–37.4)
MCV RBC AUTO: 94 FL (ref 82–98)
MONOCYTES # BLD AUTO: 0.74 THOUSAND/ÂΜL (ref 0.17–1.22)
MONOCYTES NFR BLD AUTO: 7 % (ref 4–12)
NEUTROPHILS # BLD AUTO: 7.94 THOUSANDS/ÂΜL (ref 1.85–7.62)
NEUTS SEG NFR BLD AUTO: 79 % (ref 43–75)
NRBC BLD AUTO-RTO: 0 /100 WBCS
PLATELET # BLD AUTO: 152 THOUSANDS/UL (ref 149–390)
PMV BLD AUTO: 9.5 FL (ref 8.9–12.7)
POTASSIUM SERPL-SCNC: 3.7 MMOL/L (ref 3.5–5.3)
PROT SERPL-MCNC: 5.9 G/DL (ref 6.4–8.4)
RBC # BLD AUTO: 4.28 MILLION/UL (ref 3.81–5.12)
RSV RNA RESP QL NAA+PROBE: NEGATIVE
SARS-COV-2 RNA RESP QL NAA+PROBE: NEGATIVE
SODIUM SERPL-SCNC: 139 MMOL/L (ref 135–147)
WBC # BLD AUTO: 10.08 THOUSAND/UL (ref 4.31–10.16)

## 2024-08-15 PROCEDURE — 99284 EMERGENCY DEPT VISIT MOD MDM: CPT | Performed by: EMERGENCY MEDICINE

## 2024-08-15 PROCEDURE — 80053 COMPREHEN METABOLIC PANEL: CPT | Performed by: EMERGENCY MEDICINE

## 2024-08-15 PROCEDURE — 84484 ASSAY OF TROPONIN QUANT: CPT | Performed by: PHYSICIAN ASSISTANT

## 2024-08-15 PROCEDURE — 36415 COLL VENOUS BLD VENIPUNCTURE: CPT

## 2024-08-15 PROCEDURE — 71045 X-RAY EXAM CHEST 1 VIEW: CPT

## 2024-08-15 PROCEDURE — 93005 ELECTROCARDIOGRAM TRACING: CPT

## 2024-08-15 PROCEDURE — 99284 EMERGENCY DEPT VISIT MOD MDM: CPT

## 2024-08-15 PROCEDURE — 94640 AIRWAY INHALATION TREATMENT: CPT

## 2024-08-15 PROCEDURE — 85025 COMPLETE CBC W/AUTO DIFF WBC: CPT | Performed by: EMERGENCY MEDICINE

## 2024-08-15 PROCEDURE — 0241U HB NFCT DS VIR RESP RNA 4 TRGT: CPT | Performed by: PHYSICIAN ASSISTANT

## 2024-08-15 RX ORDER — IPRATROPIUM BROMIDE AND ALBUTEROL SULFATE 2.5; .5 MG/3ML; MG/3ML
3 SOLUTION RESPIRATORY (INHALATION) ONCE
Status: COMPLETED | OUTPATIENT
Start: 2024-08-15 | End: 2024-08-15

## 2024-08-15 RX ORDER — PREDNISONE 20 MG/1
TABLET ORAL
Qty: 8 TABLET | Refills: 0 | Status: SHIPPED | OUTPATIENT
Start: 2024-08-15

## 2024-08-15 RX ORDER — PREDNISONE 20 MG/1
40 TABLET ORAL ONCE
Status: COMPLETED | OUTPATIENT
Start: 2024-08-15 | End: 2024-08-15

## 2024-08-15 RX ADMIN — IPRATROPIUM BROMIDE AND ALBUTEROL SULFATE 3 ML: 2.5; .5 SOLUTION RESPIRATORY (INHALATION) at 08:10

## 2024-08-15 RX ADMIN — PREDNISONE 40 MG: 20 TABLET ORAL at 08:49

## 2024-08-15 NOTE — ED PROVIDER NOTES
"History  Chief Complaint   Patient presents with    Asthma     Sees pulmonology in [place]. Unable to get new medications yet. Ran out of steroids     Hx asthma - getting worse - states she has cough variant asthma   Saw pulmonary last week - working to get her new meds.   Last breathing tx 5 a  Patient was seen here few weeks ago discharged on steroids which she finished up about a week ago.  She states that her symptoms are coming back she is using the albuterol inhaler/nebulizer more often.  She states she is supposed to use every 4-6 hours but she is needing every 2-3 when she feels like she needs the steroids again until she can get the new medication that pulmonary wants her to start.  She understands the risks of oral steroids but reports \"I just need to breathe\".  Hurts with coughing         Prior to Admission Medications   Prescriptions Last Dose Informant Patient Reported? Taking?   Alum Hydroxide-Mag Trisilicate (Gaviscon) 80-14.2 MG CHEW  Self Yes No   Sig: Chew 1 tablet 4 (four) times a day as needed With meals and as needed   Galcanezumab-gnlm (Emgality) 120 MG/ML SOAJ  Self Yes No   Sig: Inject under the skin every 30 (thirty) days    HYDROcodone-acetaminophen (NORCO) 5-325 mg per tablet   No No   Sig: Take 1 tablet by mouth 2 (two) times a day as needed for pain Max Daily Amount: 2 tablets Do not start before July 12, 2024.   HYDROcodone-acetaminophen (Norco) 5-325 mg per tablet   No No   Sig: Take 1 tablet by mouth 2 (two) times a day as needed for pain for up to 60 doses Max Daily Amount: 2 tablets Do not start before August 9, 2024.   Heating Pad PADS   No No   Sig: Use if needed (pain)   KETOTIFEN FUMARATE OP   Yes No   Sig: Take 1mg compounded capsule three times a day   MULTIPLE VITAMINS-CALCIUM PO  Self Yes No   Sig: Take 1 capsule by mouth every morning    Magnesium 400 MG CAPS  Self Yes No   Sig: Take 1 capsule by mouth 2 (two) times a day    NON FORMULARY   Yes No   Sig: Take 1 mg by " mouth 3 (three) times a day Ketotifen 1 mg compounded capsule   NON FORMULARY   Yes No   Sig: LACTOBACILLUS COMBINATION NO.4 (PROBIOTIC) 3 BILLION CELL CAPSULE   NON FORMULARY   Yes No   Sig: Take 32 mg by mouth NATURE-THROID ORAL   Probiotic Product (PROBIOTIC DAILY PO)  Self Yes No   Sig: Take 1 tablet by mouth daily At lunch   Qvar RediHaler 40 MCG/ACT inhaler  Self Yes No   Thyroid, Porcine, POWD  Self Yes No   Sig: Use 32 mg daily   Ubrogepant (UBRELVY) 100 MG tablet  Self Yes No   Sig: Take 100 mg by mouth Take 1 tablet (100 mg) one time as needed for migraine. May repeat one additional tablet (100 mg) at least two hours after the first dose. Do not use more than two doses per day, or for more than eight days per month.   aluminum hydroxide-magnesium carbonate (Gaviscon Extra Strength) 160-105 mg per chewable tablet   Yes No   amoxicillin-clavulanate (AUGMENTIN) 875-125 mg per tablet   Yes No   Sig: Take 1 tablet by mouth 2 (two) times a day   Patient not taking: Reported on 2024   azelastine (ASTELIN) 0.1 % nasal spray  Self Yes No   Si sprays into each nostril 2 (two) times a day Use in each nostril as directed   beclomethasone (QVAR) 40 MCG/ACT inhaler  Self Yes No   Sig: Inhale 1 puff daily as needed (only when unable to nebulize pulmicort) Rinse mouth after use.   benzonatate (TESSALON PERLES) 100 mg capsule   No No   Sig: Take 1 capsule (100 mg total) by mouth 3 (three) times a day as needed for cough   budesonide (PULMICORT) 0.5 mg/2 mL nebulizer solution  Self Yes No   Sig: Take 0.5 mg by nebulization 2 (two) times a day Rinse mouth after use.   cromolyn (GASTROCROM) 100 MG/5ML solution   Yes No   Sig: Take 100 mg by mouth 4 (four) times a day (before meals and at bedtime)   cyclobenzaprine (FLEXERIL) 5 mg tablet   No No   Sig: Take 1 tablet (5 mg total) by mouth 3 (three) times a day as needed for muscle spasms   docusate sodium (COLACE) 100 mg capsule  Self Yes No   Sig: Take 100 mg by mouth  daily as needed for constipation   erythromycin (ILOTYCIN) ophthalmic ointment  Self Yes No   Sig: Apply 1 application to eye daily at bedtime   famotidine (PEPCID) 40 MG tablet  Self Yes No   Sig: Twice a day   fexofenadine (ALLEGRA) 180 MG tablet  Self Yes No   Sig: Take 180 mg by mouth 2 (two) times a day    fluticasone (FLONASE) 50 mcg/act nasal spray  Self Yes No   Si sprays into each nostril daily    guaiFENesin (Mucinex) 600 mg 12 hr tablet   Yes No   Sig: Take 600 mg by mouth every 12 (twelve) hours   hydrOXYzine (ATARAX) 10 mg/5 mL syrup   Yes No   ipratropium (ATROVENT) 0.02 % nebulizer solution   Yes No   Sig: Take 0.5 mg by nebulization every 6 (six) hours as needed for wheezing or shortness of breath   ipratropium (ATROVENT) 0.06 % nasal spray  Self Yes No   Sig: PLEASE SEE ATTACHED FOR DETAILED DIRECTIONS   ivabradine HCl (Corlanor) 5 MG tablet  Self Yes No   Si.5 mg 2 (two) times a day   levalbuterol (XOPENEX HFA) 45 mcg/act inhaler  Self Yes No   Sig: Inhale 2 puffs every 4 (four) hours as needed (when unable to nebulize)   levalbuterol (XOPENEX) 1.25 mg/3 mL nebulizer solution  Self Yes No   Sig: Take 1.25 mg by nebulization every 6 (six) hours as needed    naloxone (NARCAN) 4 mg/0.1 mL nasal spray   No No   Sig: Administer 1 spray into a nostril. If no response after 2-3 minutes, give another dose in the other nostril using a new spray.   omega-3-acid ethyl esters (LOVAZA) 1 g capsule  Self Yes No   Sig: Take 2 g by mouth 2 (two) times a day 1600mg daily   polyethylene glycol (MIRALAX) 17 g packet  Self Yes No   Sig: Take 17 g by mouth daily as needed    sodium chloride   Yes No   Sig: Inject 1,500 mL into a catheter in a vein   sucralfate (CARAFATE) 1 g/10 mL suspension   Yes No   Sig: Take 1 g by mouth 2 (two) times a day   verapamil (CALAN) 40 mg tablet   Yes No      Facility-Administered Medications: None       Past Medical History:   Diagnosis Date    Allergic rhinitis     Anxiety      "Asthma     Back pain     Cardiac disease     Cardiopathy     EF 45%    Cough     Diverticulitis     Factor V Leiden (HCC)     Fibromyalgia     GERD (gastroesophageal reflux disease)     Hashimoto's thyroiditis     Hx of degenerative disc disease     Hypertension     Hypotension     pots - postural orthostatic hypotension    Interstitial cystitis     Irregular heart beat     LBBB    Irritable bowel syndrome     Migraines     Mitral valve disease     \"thickening\"    Myocardial infarction (HCC)     possible but not sure when    Neuropathy     bilateral legs    Osteoporosis     Postural orthostatic tachycardia syndrome     must drink a lot of water and salt    Pott's disease     Rheumatic fever 1967    Scoliosis     Sepsis (HCC)     associated with PICC line    Sjogren's syndrome ([facility])     TMJ (dislocation of temporomandibular joint)        Past Surgical History:   Procedure Laterality Date    ABLATION MICROWAVE Left     lumbar area    BACK SURGERY  10/1998     SECTION  1992    CHOLECYSTECTOMY  2016    COLONOSCOPY      DILATION AND CURETTAGE OF UTERUS      EGD  2016    FOOT SURGERY  1968    removal of bone and neuroma    HYSTERECTOMY N/A     age 39    IR OTHER  2022    IR OTHER  2022    IR OTHER  2023    IR OTHER  2024    IR PICC PLACEMENT SINGLE LUMEN  10/02/2020    IR PICC PLACEMENT SINGLE LUMEN  2021    IR PICC REPOSITION  2021    IR TUNNELED CENTRAL LINE PLACEMENT  2022    LAPAROSCOPY      endometriosis    MOUTH BIOPSY      lip    OOPHORECTOMY Bilateral     age 39    CO EGD TRANSORAL BIOPSY SINGLE/MULTIPLE N/A 2019    Procedure: ESOPHAGOGASTRODUODENOSCOPY (EGD) with multiple bx and dilation;  Surgeon: Peter Baldwin MD;  Location:  GI LAB;  Service: Gastroenterology    CO SURGICAL ARTHROSCOPY SHOULDER W/ROTATOR CUFF RPR Right 2022    Procedure: SHOULDER ARTHROSCOPIC ROTATOR CUFF REPAIR Biceps Tenodisis;  Surgeon: Ilya" Dirk Munoz MD;  Location: [facility] MAIN OR;  Service: Orthopedics    OK SURGICAL ARTHROSCOPY SHOULDER W/ROTATOR CUFF RPR Left 5/3/2024    Procedure: SHOULDER ARTHROSCOPIC ROTATOR CUFF REPAIR, SUBACROMIAL DECOMPRESSION;  Surgeon: Ilya Munoz MD;  Location: [facility] MAIN OR;  Service: Orthopedics    TUNNELED VENOUS CATHETER PLACEMENT  2016       Family History   Problem Relation Age of Onset    Cancer Mother         bladder    No Known Problems Father     Thyroid cancer Sister     Heart attack Sister     No Known Problems Sister     No Known Problems Sister     No Known Problems Sister     No Known Problems Sister     No Known Problems Daughter     No Known Problems Daughter     No Known Problems Maternal Grandmother     Colon cancer Maternal Grandfather     No Known Problems Paternal Grandmother     No Known Problems Paternal Grandfather     Breast cancer Maternal Aunt         over age 50    Endometrial cancer Maternal Aunt     Multiple myeloma Maternal Aunt     Hypertension Paternal Aunt     Brain cancer Niece     Lymphoma Niece     Breast cancer additional onset Neg Hx     BRCA2 Positive Neg Hx     Ovarian cancer Neg Hx     BRCA2 Negative Neg Hx     BRCA1 Positive Neg Hx     BRCA1 Negative Neg Hx     BRCA 1/2 Neg Hx      I have reviewed and agree with the history as documented.    E-Cigarette/Vaping    E-Cigarette Use Never User      E-Cigarette/Vaping Substances    Nicotine No     THC No     CBD No     Flavoring No     Other No     Unknown No      Social History     Tobacco Use    Smoking status: Never    Smokeless tobacco: Never   Vaping Use    Vaping status: Never Used   Substance Use Topics    Alcohol use: Never    Drug use: No       Review of Systems   Constitutional:  Negative for fever.   HENT:  Positive for congestion.    Respiratory:  Positive for cough, chest tightness and shortness of breath.    Cardiovascular:  Negative for palpitations and leg swelling.        Chest hurts with coughing     Genitourinary: Negative.    Musculoskeletal: Negative.    Skin: Negative.    All other systems reviewed and are negative.      Physical Exam  Physical Exam  Vitals and nursing note reviewed.   Constitutional:       Appearance: She is well-developed.   HENT:      Head: Normocephalic and atraumatic.      Right Ear: External ear normal.      Left Ear: External ear normal.   Eyes:      Conjunctiva/sclera: Conjunctivae normal.   Cardiovascular:      Rate and Rhythm: Normal rate and regular rhythm.      Heart sounds: Normal heart sounds.   Pulmonary:      Effort: Pulmonary effort is normal.      Breath sounds: Normal breath sounds.      Comments: Sig coughing - improved with neb tx  Chest:      Chest wall: Tenderness present.   Abdominal:      General: Bowel sounds are normal.      Palpations: Abdomen is soft.      Tenderness: There is no abdominal tenderness.   Musculoskeletal:         General: Normal range of motion.      Cervical back: Neck supple.   Lymphadenopathy:      Cervical: No cervical adenopathy.   Skin:     General: Skin is warm.      Findings: No rash.   Neurological:      Mental Status: She is alert.   Psychiatric:         Behavior: Behavior normal.         Vital Signs  ED Triage Vitals [08/15/24 0657]   Temperature Pulse Respirations Blood Pressure SpO2   97.9 °F (36.6 °C) 76 (!) 28 149/69 97 %      Temp Source Heart Rate Source Patient Position - Orthostatic VS BP Location FiO2 (%)   Tympanic Monitor Lying Left arm --      Pain Score       No Pain           Vitals:    08/15/24 0657 08/15/24 0730 08/15/24 0745 08/15/24 0845   BP: 149/69 111/53 129/60 123/60   Pulse: 76 66 68 64   Patient Position - Orthostatic VS: Lying Sitting Sitting Sitting         Visual Acuity      ED Medications  Medications   ipratropium-albuterol (DUO-NEB) 0.5-2.5 mg/3 mL inhalation solution 3 mL (3 mL Nebulization Given 8/15/24 0810)   predniSONE tablet 40 mg (40 mg Oral Given 8/15/24 0849)       Diagnostic Studies  Results  Reviewed       Procedure Component Value Units Date/Time    FLU/RSV/COVID - if FLU/RSV clinically relevant [[id number]]  (Normal) Collected: 08/15/24 0810    Lab Status: Final result Specimen: Nares from Nose Updated: 08/15/24 0931     SARS-CoV-2 Negative     INFLUENZA A PCR Negative     INFLUENZA B PCR Negative     RSV PCR Negative    Narrative:      This test has been performed using the CoV-2/Flu/RSV plus assay on the [employer] platform. This test has been validated by the  and verified by the performing laboratory.     This test is designed to amplify and detect the following: nucleocapsid (N), envelope (E), and RNA-dependent RNA polymerase (RdRP) genes of the SARS-CoV-2 genome; matrix (M), basic polymerase (PB2), and acidic protein (PA) segments of the influenza A genome; matrix (M) and non-structural protein (NS) segments of the influenza B genome, and the nucleocapsid genes of RSV A and RSV B.     Positive results are indicative of the presence of Flu A, Flu B, RSV, and/or SARS-CoV-2 RNA. Positive results for SARS-CoV-2 or suspected novel influenza should be reported to state, local, or federal health departments according to local reporting requirements.      All results should be assessed in conjunction with clinical presentation and other laboratory markers for clinical management.     FOR PEDIATRIC PATIENTS - copy/paste COVID Guidelines URL to browser: [email or web address]       HS Troponin 0hr (reflex protocol) [[id number]]  (Normal) Collected: 08/15/24 0814    Lab Status: Final result Specimen: Blood from Arm, Left Updated: 08/15/24 0841     hs TnI 0hr 6 ng/L     Comprehensive metabolic panel [[id number]]  (Abnormal) Collected: 08/15/24 0720    Lab Status: Final result Specimen: Blood from Arm, Left Updated: 08/15/24 0741     Sodium 139 mmol/L      Potassium 3.7 mmol/L      Chloride 104 mmol/L      CO2 27 mmol/L      ANION GAP 8  mmol/L      BUN 19 mg/dL      Creatinine 0.85 mg/dL      Glucose 96 mg/dL      Calcium 9.3 mg/dL      AST 17 U/L      ALT 35 U/L      Alkaline Phosphatase 76 U/L      Total Protein 5.9 g/dL      Albumin 3.7 g/dL      Total Bilirubin 0.88 mg/dL      eGFR 70 ml/min/1.73sq m     Narrative:      National Kidney Disease Foundation guidelines for Chronic Kidney Disease (CKD):     Stage 1 with normal or high GFR (GFR > 90 mL/min/1.73 square meters)    Stage 2 Mild CKD (GFR = 60-89 mL/min/1.73 square meters)    Stage 3A Moderate CKD (GFR = 45-59 mL/min/1.73 square meters)    Stage 3B Moderate CKD (GFR = 30-44 mL/min/1.73 square meters)    Stage 4 Severe CKD (GFR = 15-29 mL/min/1.73 square meters)    Stage 5 End Stage CKD (GFR <15 mL/min/1.73 square meters)  Note: GFR calculation is accurate only with a steady state creatinine    CBC and differential [[id number]]  (Abnormal) Collected: 08/15/24 0720    Lab Status: Final result Specimen: Blood from Arm, Left Updated: 08/15/24 0725     WBC 10.08 Thousand/uL      RBC 4.28 Million/uL      Hemoglobin 13.2 g/dL      Hematocrit 40.1 %      MCV 94 fL      MCH 30.8 pg      MCHC 32.9 g/dL      RDW 15.7 %      MPV 9.5 fL      Platelets 152 Thousands/uL      nRBC 0 /100 WBCs      Segmented % 79 %      Immature Grans % 2 %      Lymphocytes % 10 %      Monocytes % 7 %      Eosinophils Relative 2 %      Basophils Relative 0 %      Absolute Neutrophils 7.94 Thousands/µL      Absolute Immature Grans 0.19 Thousand/uL      Absolute Lymphocytes 1.02 Thousands/µL      Absolute Monocytes 0.74 Thousand/µL      Eosinophils Absolute 0.15 Thousand/µL      Basophils Absolute 0.04 Thousands/µL                    XR chest 1 view portable   ED Interpretation by Eri Tom PA-C (08/15 0820)   No consolidation - sign pneumonia       Final Result by Jaleesa Hood MD (08/15 1002)      No acute cardiopulmonary disease.            Resident: Austin Liao I, the attending radiologist, have  reviewed the images and agree with the final report above.      Workstation performed: [id number]                    Procedures  ECG 12 Lead Documentation Only    Date/Time: 8/15/2024 8:55 AM    Performed by: Eri Tom PA-C  Authorized by: Eri Tom PA-C    Indications / Diagnosis:  Cp  ECG reviewed by me, the ED Provider: yes    Patient location:  ED  Previous ECG:     Previous ECG:  Compared to current    Similarity:  Changes noted  Rate:     ECG rate:  68    ECG rate assessment: normal    Rhythm:     Rhythm: sinus rhythm    Ectopy:     Ectopy: none    QRS:     QRS axis:  Normal  Conduction:     Conduction: normal    ST segments:     ST segments:  Non-specific  T waves:     T waves: inverted      Inverted:  AVL           ED Course  ED Course as of 08/15/24 1917   Thu Aug 15, 2024   0830 Lungs clear with neb - reduced coughing pt feeling better  EKG - shows mild changes from prior - awaiting troponin    0851 hs TnI 0hr: 6  unchanged               HEART Risk Score      Flowsheet Row Most Recent Value   Heart Score Risk Calculator    History 0 Filed at: 08/15/2024 0856   ECG 1 Filed at: 08/15/2024 0856   Age 2 Filed at: 08/15/2024 0856   Risk Factors 1 Filed at: 08/15/2024 0856   Troponin 0 Filed at: 08/15/2024 0856   HEART Score 4 Filed at: 08/15/2024 0856                          SBIRT 20yo+      Flowsheet Row Most Recent Value   Initial Alcohol Screen: US AUDIT-C     1. How often do you have a drink containing alcohol? 0 Filed at: 08/15/2024 0704   2. How many drinks containing alcohol do you have on a typical day you are drinking?  0 Filed at: 08/15/2024 0704   3a. Male UNDER 65: How often do you have five or more drinks on one occasion? 0 Filed at: 08/15/2024 0704   3b. FEMALE Any Age, or MALE 65+: How often do you have 4 or more drinks on one occassion? 0 Filed at: 08/15/2024 0704   Audit-C Score 0 Filed at: 08/15/2024 0704   [place]: How many times in the past year have you...    Used an illegal  drug or used a prescription medication for non-medical reasons? Never Filed at: 08/15/2024 0704                      Medical Decision Making  Will get xray for pneumonia and give breathing tx     Amount and/or Complexity of Data Reviewed  External Data Reviewed: radiology, ECG and notes.     Details: Seen here in past for same  Labs: ordered. Decision-making details documented in ED Course.  Radiology: ordered and independent interpretation performed.  ECG/medicine tests: ordered and independent interpretation performed.     Details: EKG appears changed from prior will get troponin - non specific - troponin unchanged from prior - symptoms ongoing no delta needed - pt feeling better and wants to go home     Risk  Prescription drug management.                 Disposition  Final diagnoses:   Asthma exacerbation     Time reflects when diagnosis was documented in both MDM as applicable and the Disposition within this note       Time User Action Codes Description Comment    8/15/2024  8:51 AM Eri Tom Add [J45.901] Asthma exacerbation           ED Disposition       ED Disposition   Discharge    Condition   Stable    Date/Time   Thu Aug 15, 2024 0855    Comment   Teresa L Innerst discharge to home/self care.                   Follow-up Information       Follow up With Specialties Details Why Contact Info    Jan Doyle MD Internal Medicine   [address] [place] [id number] [phone]              Discharge Medication List as of 8/15/2024  8:55 AM        START taking these medications    Details   predniSONE 20 mg tablet 2 PO x 2 days 1 PO x 2 days 1/2 tab PO x 2 days, Normal           CONTINUE these medications which have NOT CHANGED    Details   Alum Hydroxide-Mag Trisilicate (Gaviscon) 80-14.2 MG CHEW Chew 1 tablet 4 (four) times a day as needed With meals and as needed, Historical Med      aluminum hydroxide-magnesium carbonate (Gaviscon Extra Strength) 160-105 mg per chewable tablet Historical Med       amoxicillin-clavulanate (AUGMENTIN) 875-125 mg per tablet Take 1 tablet by mouth 2 (two) times a day, Starting Fri 7/5/2024, Historical Med      azelastine (ASTELIN) 0.1 % nasal spray 2 sprays into each nostril 2 (two) times a day Use in each nostril as directed, Historical Med      beclomethasone (QVAR) 40 MCG/ACT inhaler Inhale 1 puff daily as needed (only when unable to nebulize pulmicort) Rinse mouth after use., Historical Med      benzonatate (TESSALON PERLES) 100 mg capsule Take 1 capsule (100 mg total) by mouth 3 (three) times a day as needed for cough, Starting Mon 7/22/2024, Normal      budesonide (PULMICORT) 0.5 mg/2 mL nebulizer solution Take 0.5 mg by nebulization 2 (two) times a day Rinse mouth after use., Historical Med      cromolyn (GASTROCROM) 100 MG/5ML solution Take 100 mg by mouth 4 (four) times a day (before meals and at bedtime), Historical Med      cyclobenzaprine (FLEXERIL) 5 mg tablet Take 1 tablet (5 mg total) by mouth 3 (three) times a day as needed for muscle spasms, Starting Thu 7/11/2024, Normal      docusate sodium (COLACE) 100 mg capsule Take 100 mg by mouth daily as needed for constipation, Historical Med      erythromycin (ILOTYCIN) ophthalmic ointment Apply 1 application to eye daily at bedtime, Starting Tue 10/25/2022, Historical Med      famotidine (PEPCID) 40 MG tablet Twice a day, Starting Mon 8/22/2022, Historical Med      fexofenadine (ALLEGRA) 180 MG tablet Take 180 mg by mouth 2 (two) times a day , Historical Med      fluticasone (FLONASE) 50 mcg/act nasal spray 2 sprays into each nostril daily , Historical Med      Galcanezumab-gnlm (Emgality) 120 MG/ML SOAJ Inject under the skin every 30 (thirty) days , Historical Med      guaiFENesin (Mucinex) 600 mg 12 hr tablet Take 600 mg by mouth every 12 (twelve) hours, Historical Med      Heating Pad PADS Use if needed (pain), Starting Fri 4/26/2024, No Print      !! HYDROcodone-acetaminophen (NORCO) 5-325 mg per tablet Take 1  tablet by mouth 2 (two) times a day as needed for pain Max Daily Amount: 2 tablets Do not start before July 12, 2024., Starting Fri 7/12/2024, Normal      !! HYDROcodone-acetaminophen (Norco) 5-325 mg per tablet Take 1 tablet by mouth 2 (two) times a day as needed for pain for up to 60 doses Max Daily Amount: 2 tablets Do not start before August 9, 2024., Starting Fri 8/9/2024, Normal      hydrOXYzine (ATARAX) 10 mg/5 mL syrup Historical Med      ipratropium (ATROVENT) 0.02 % nebulizer solution Take 0.5 mg by nebulization every 6 (six) hours as needed for wheezing or shortness of breath, Historical Med      ipratropium (ATROVENT) 0.06 % nasal spray PLEASE SEE ATTACHED FOR DETAILED DIRECTIONS, Historical Med      ivabradine HCl (Corlanor) 5 MG tablet 7.5 mg 2 (two) times a day, Starting Thu 10/28/2021, Historical Med      KETOTIFEN FUMARATE OP Take 1mg compounded capsule three times a day, Historical Med      levalbuterol (XOPENEX HFA) 45 mcg/act inhaler Inhale 2 puffs every 4 (four) hours as needed (when unable to nebulize), Historical Med      levalbuterol (XOPENEX) 1.25 mg/3 mL nebulizer solution Take 1.25 mg by nebulization every 6 (six) hours as needed , Starting Mon 1/7/2019, Historical Med      Magnesium 400 MG CAPS Take 1 capsule by mouth 2 (two) times a day , Historical Med      MULTIPLE VITAMINS-CALCIUM PO Take 1 capsule by mouth every morning , Historical Med      naloxone (NARCAN) 4 mg/0.1 mL nasal spray Administer 1 spray into a nostril. If no response after 2-3 minutes, give another dose in the other nostril using a new spray., Normal      !! NON FORMULARY Take 1 mg by mouth 3 (three) times a day Ketotifen 1 mg compounded capsule, Historical Med      !! NON FORMULARY LACTOBACILLUS COMBINATION NO.4 (PROBIOTIC) 3 BILLION CELL CAPSULE, Historical Med      !! NON FORMULARY Take 32 mg by mouth NATURE-THROID ORAL, Historical Med      omega-3-acid ethyl esters (LOVAZA) 1 g capsule Take 2 g by mouth 2 (two)  times a day 1600mg daily, Historical Med      polyethylene glycol (MIRALAX) 17 g packet Take 17 g by mouth daily as needed , Historical Med      Probiotic Product (PROBIOTIC DAILY PO) Take 1 tablet by mouth daily At lunch, Historical Med      Qvar RediHaler 40 MCG/ACT inhaler Historical Med      sodium chloride Inject 1,500 mL into a catheter in a vein, Starting Sat 1/14/2023, Historical Med      sucralfate (CARAFATE) 1 g/10 mL suspension Take 1 g by mouth 2 (two) times a day, Historical Med      Thyroid, Porcine, POWD Use 32 mg daily, Historical Med      Ubrogepant (UBRELVY) 100 MG tablet Take 100 mg by mouth Take 1 tablet (100 mg) one time as needed for migraine. May repeat one additional tablet (100 mg) at least two hours after the first dose. Do not use more than two doses per day, or for more than eight days per month., Historical Med      verapamil (CALAN) 40 mg tablet Starting u 12/1/2022, Historical Med       !! - Potential duplicate medications found. Please discuss with provider.          No discharge procedures on file.    PDMP Review         Value Time User    PDMP Reviewed  Yes 7/11/2024  4:17 PM Barbara Kocher, CRNP            ED Provider  Electronically Signed by             Eri Tom PA-C  08/15/24 1917

## 2024-08-15 NOTE — DISCHARGE INSTRUCTIONS
Continue your inhaler/nebulizer as directed. Steroids as directed. Follow up with your doctors. Return to the ED for worsening symptoms.

## 2024-08-16 ENCOUNTER — HOSPITAL ENCOUNTER (OUTPATIENT)
Dept: INFUSION CENTER | Facility: HOSPITAL | Age: 69
End: 2024-08-16
Attending: INTERNAL MEDICINE
Payer: MEDICARE

## 2024-08-16 VITALS
RESPIRATION RATE: 18 BRPM | SYSTOLIC BLOOD PRESSURE: 138 MMHG | HEART RATE: 65 BPM | TEMPERATURE: 97.7 F | OXYGEN SATURATION: 99 % | DIASTOLIC BLOOD PRESSURE: 61 MMHG

## 2024-08-16 LAB
ATRIAL RATE: 68 BPM
P AXIS: 15 DEGREES
PR INTERVAL: 142 MS
QRS AXIS: -3 DEGREES
QRSD INTERVAL: 114 MS
QT INTERVAL: 412 MS
QTC INTERVAL: 438 MS
T WAVE AXIS: 101 DEGREES
VENTRICULAR RATE: 68 BPM

## 2024-08-16 PROCEDURE — 96360 HYDRATION IV INFUSION INIT: CPT

## 2024-08-16 PROCEDURE — 93010 ELECTROCARDIOGRAM REPORT: CPT | Performed by: INTERNAL MEDICINE

## 2024-08-16 PROCEDURE — 96361 HYDRATE IV INFUSION ADD-ON: CPT

## 2024-08-16 RX ADMIN — SODIUM CHLORIDE 1500 ML: 0.9 INJECTION, SOLUTION INTRAVENOUS at 08:43

## 2024-08-16 NOTE — PROGRESS NOTES
Teresa Mao  tolerated hydration well with no complications.      Teresa Mao is aware of future appt on monday8/19/24     0930    AVS printed and given to Teresa Mao:  No (Declined by Teresa Mao)

## 2024-08-19 ENCOUNTER — HOSPITAL ENCOUNTER (OUTPATIENT)
Dept: INFUSION CENTER | Facility: HOSPITAL | Age: 69
Discharge: HOME | End: 2024-08-19
Attending: INTERNAL MEDICINE
Payer: MEDICARE

## 2024-08-19 ENCOUNTER — TELEPHONE (OUTPATIENT)
Age: 69
End: 2024-08-19

## 2024-08-19 VITALS
OXYGEN SATURATION: 98 % | TEMPERATURE: 97.7 F | DIASTOLIC BLOOD PRESSURE: 65 MMHG | HEART RATE: 70 BPM | SYSTOLIC BLOOD PRESSURE: 143 MMHG | RESPIRATION RATE: 18 BRPM

## 2024-08-19 DIAGNOSIS — M47.812 CERVICAL SPONDYLOSIS: ICD-10-CM

## 2024-08-19 DIAGNOSIS — G89.29 CHRONIC BILATERAL LOW BACK PAIN WITH BILATERAL SCIATICA: ICD-10-CM

## 2024-08-19 DIAGNOSIS — Z98.1 HISTORY OF LUMBAR FUSION: ICD-10-CM

## 2024-08-19 DIAGNOSIS — M47.816 LUMBAR SPONDYLOSIS: ICD-10-CM

## 2024-08-19 DIAGNOSIS — M54.2 NECK PAIN: ICD-10-CM

## 2024-08-19 DIAGNOSIS — M54.42 CHRONIC BILATERAL LOW BACK PAIN WITH BILATERAL SCIATICA: ICD-10-CM

## 2024-08-19 DIAGNOSIS — M54.16 LUMBAR RADICULOPATHY: ICD-10-CM

## 2024-08-19 DIAGNOSIS — M54.41 CHRONIC BILATERAL LOW BACK PAIN WITH BILATERAL SCIATICA: ICD-10-CM

## 2024-08-19 DIAGNOSIS — M54.12 CERVICAL RADICULOPATHY: ICD-10-CM

## 2024-08-19 DIAGNOSIS — G03.9 ARACHNOIDITIS: ICD-10-CM

## 2024-08-19 DIAGNOSIS — G89.4 CHRONIC PAIN DISORDER: Primary | ICD-10-CM

## 2024-08-19 PROCEDURE — 96361 HYDRATE IV INFUSION ADD-ON: CPT

## 2024-08-19 PROCEDURE — 96360 HYDRATION IV INFUSION INIT: CPT

## 2024-08-19 RX ORDER — HYDROCODONE BITARTRATE AND ACETAMINOPHEN 10; 325 MG/1; MG/1
0.5 TABLET ORAL 2 TIMES DAILY PRN
Qty: 30 TABLET | Refills: 0 | Status: SHIPPED | OUTPATIENT
Start: 2024-08-19

## 2024-08-19 RX ADMIN — SODIUM CHLORIDE 1500 ML: 0.9 INJECTION, SOLUTION INTRAVENOUS at 08:37

## 2024-08-19 NOTE — PROGRESS NOTES
Teresa Mao  tolerated hydration treatment well with no complications.      Teresa Mao is aware of future appt on 8/20 at 8AM.     AVS printed and given to Teresa Mao: No (Declined by Teresa Mao).

## 2024-08-19 NOTE — TELEPHONE ENCOUNTER
Caller: [facility]    Doctor: Kocher    Reason for call: pharmacy called stating the hydrocodone-acetaminophen (norco) 5-325 is on back order.  They would like to know if you can send a script for the  and the pt will break them in half.  They spoke with the pt yesterday and she was ok with it    Call back#: [phone]

## 2024-08-19 NOTE — TELEPHONE ENCOUNTER
Prescription sent to patient's pharmacy for hydrocodone 10/325.  0.5 tablet twice daily if needed for pain.

## 2024-08-20 ENCOUNTER — OFFICE VISIT (OUTPATIENT)
Dept: PHYSICAL THERAPY | Facility: CLINIC | Age: 69
End: 2024-08-20
Payer: MEDICARE

## 2024-08-20 ENCOUNTER — HOSPITAL ENCOUNTER (OUTPATIENT)
Dept: INFUSION CENTER | Facility: HOSPITAL | Age: 69
Discharge: HOME | End: 2024-08-20
Attending: INTERNAL MEDICINE
Payer: MEDICARE

## 2024-08-20 VITALS
RESPIRATION RATE: 18 BRPM | DIASTOLIC BLOOD PRESSURE: 82 MMHG | OXYGEN SATURATION: 98 % | SYSTOLIC BLOOD PRESSURE: 128 MMHG | HEART RATE: 74 BPM | TEMPERATURE: 96.5 F

## 2024-08-20 DIAGNOSIS — Z98.890 STATUS POST LEFT ROTATOR CUFF REPAIR: Primary | ICD-10-CM

## 2024-08-20 PROCEDURE — 96361 HYDRATE IV INFUSION ADD-ON: CPT

## 2024-08-20 PROCEDURE — 97110 THERAPEUTIC EXERCISES: CPT

## 2024-08-20 PROCEDURE — 96360 HYDRATION IV INFUSION INIT: CPT

## 2024-08-20 RX ORDER — SODIUM CHLORIDE 9 MG/ML
250 INJECTION, SOLUTION INTRAVENOUS ONCE
Status: COMPLETED | OUTPATIENT
Start: 2024-08-22 | End: 2024-08-22

## 2024-08-20 RX ADMIN — SODIUM CHLORIDE 1500 ML: 0.9 INJECTION, SOLUTION INTRAVENOUS at 08:02

## 2024-08-20 NOTE — PROGRESS NOTES
Patient tolerated IV hydration without issues.       Teresa Mao is aware of future appt on 8/22/24 at 0830.     AVS-  No (Declined by Teresa Mao) Patient has Mychart.    Patient ambulated off unit without incident.  All personal belongings taken with patient.

## 2024-08-20 NOTE — PROGRESS NOTES
"Daily Note     Today's date: 2024  Patient name: Teresa Mao  : 1955  MRN: [id number]  Referring provider: Ilya Munoz*  Dx:   Encounter Diagnosis     ICD-10-CM    1. Status post left rotator cuff repair  Z98.890                      Subjective:  Pt. states her L shld is \"good\", however \"it's everything else that's  not been good!\".  Says she had numerous health issues the past 15 days, incl ABRAMS flare.      Objective: See treatment diary below      Assessment: Tolerated treatment well.  Patient exhibited good technique with therapeutic exercises and would benefit from continued PT.      Plan: Continue per plan of care.  Progress treament per protocol.              Precautions: see extensive PMH, ABRAMS,     FOLLOW PROTOCOL**, sling to be worn 6 weeks.    Re-eval Date: 24      Re-eval Date: 24  DOS:  5/3/24                                                                              12 weeks                                          10 weeks  Date 7/15 7.18  8.5 8.20   Visit Count 16 17 13 19 20   FOTO   *completed 2024    *initial projected score met/exceeded     Pain In 0 0/10  0/10   0/10   Pain Out 0 No negative change  No negative change 0/10       Manuals 7/15 7.18  8.5 8.20   PROM LUE Supine MJD  10' Supine MJD 10'                              Neuro Re-Ed    8.5 8.20   MTPs/LTPs   Red 20x ea Red 20x ea   Red 25x ea   punch   Red 20x Red 20x    Red 25x ea   BUE ER   Red 20x Red 20x    Red 25x ea   IR w/TB   Red 20x Red 20x    Red 25x ea                           Ther Ex 7/15 7. 7. 8.5 8.20   ultigrip D/C         Ball squeeze D/C       Wrist AROM DC      DC   L elbow ROM 3#  X30 AROM flexion and ex 3# X40 AROM flexion and ext 4# X30 AROM flexion and ext 4# X30 AROM flexion and ext 4# x40 AROM flexion and ext   Pendulum ex *HEP       UT/LS stretch DC    DC to HEP    DC to HEP       Cerv ROM DC    DC to HEP   Scap isometrics      DC   pulleys pulleys    3 min " "FF pulleys    3 min FF pulleys   3 min 4 min FF 4 min FF   Finger ladder finger ladder  #27  8 reps FF finger ladder  #26  10 reps FF finger ladder  #27  10 reps FF finger ladder  #27  10 reps FF finger ladder  #27  10 reps FF   Wall slides  10x FF   10x cw/ccw  10x HA/ADD 10x FF   10x cw/ccw  10x HA/ADD 10x FF   10x cw/ccw  10x HA/ADD 12x FF   12x cw/ccw  12x HA/ADD 20x FF   20x cw/ccw  12x HA/ADD   Wand ex Standing  FF, ABD, IR, EXT   10x ea Standing  FF, ABD, IR, EXT   10x ea  Standing  FF, ABD, IR, EXT   15x ea Standing  FF, ABD, IR, EXT   15x ea   Active flex/abd 2 x 10x ea 2 x 10x ea 20x ea 25x 25x   Full can 20x 20x   20x 25x 25x   SL ER 20x 20x 2#  20x   *resume NV,    Supine shld flex/ER w/wand 15x 5\" ea 15x 5\" ea 2#  15 x 5\" LUE only 2#  15 x 5\" LUE only 2#  15 x 5\" LUE only   Prone flex, HA, ext rows  resume  *resume NV, if breathing is better NP                                                                   Ther Activity                        Gait Training            **Self Care/Pt. Educ/  Skilled convers  10 min            Modalities 6.13 7.18 6.25 8.5 8.20    Cryo 10 min to L shld with no adverse reaction Cryo 10 min to L shld with no adverse reaction Cryo 10 min with no adverse reaction *Pt. deferred Pt. deferred    *MHP 10 min to L UT *Pt. deferred MHP 10 min to L UT         Rotator Cuff Repair Rehabilitation Protocol  (adapted from Milana PJ et al. “Rehabilitation of the Rotator Cuff: An Evaluation Based Approach”. JAAOS 2006; 14:599-609)  Updated 10.14   Ilya Munoz MD  [facility]  [address] [place] [id number]  [phone]  Phase 1 (Weeks 0-6)              Immediate Postoperative Period              Goals:                          Diminish Pain and Inflammation  Maintain and Protect Integrity of the Repair                          Gradually Increase Passive ROM (NO Active or Active Assist until Week 6)                          Become Independent " with Modified ADLs              Precautions:  Maintain Arm in Abduction Sling, Remove Only for Directed Exercises (may remove Abduction Pillow after Day 21 for comfort)                          Keep Incisions Clean & Dry (okay to shower in 48 hours, band-aids over incisions)  No Immersion (pool) until Wounds Totally Sealed (usually not prior to day #10)  Passive Shoulder Motion ONLY, No Lifting/Holding Objects, Reaching Behind Back  Okay to Type/Write at Desktop with Arm in Sling              Day 0-6                          Elbow, Wrist, Hand AROM Exercises                           Start Cervical AROM and Scapula Isometrics                          Cryotherapy/Ice for Pain and Inflammation                          Instruct in Hygiene, Posture, and Positioning               Day 7-28                          Continue Above  May Start Pendulum Exercises                          May Start Supine, Pain Free, PT assisted PROM  Forward Flexion to 90°, External Rotation to 35° (Elbow at side), Internal Rotation to Body, Abduction to 60°                          Can Introduce light Cardio (Walking, Stationary Bike)                          Aqua Therapy may begin at week 3 (day 21) as long as no wound problems              Day 29-42                          Continue Above  Progress PROM to Goal of full PROM by Week 6.  May add Gentle Mobilizations (GH and Scapulothoracic) to Regain full PROM if Needed.  May add Heat prior to PROM Exercises, Ice after Exercises  May Begin AAROM at Day 29 if ROM is Appropriate in Anticipation of AROM Starting at Week 6              Criteria to Progress to Phase 2                          Reasonable Passive Forward Flexion, Abduction , IR/ER                          Time  Phase 2 (Weeks 6-12)              Protection and Active Motion              Goals of Phase:                          Allow Healing of Soft Tissues                          Decrease Pain and Inflammation  Add ADLs and Regain  AROM by End of Phase              Precautions:                          NO STRENGTHENING until Phase 3                          Repair is Most Prone to Failure during this Phase!                          No Lifting Objects > 2 lbs (Coffee Cup OK), no Sudden Motions                          Avoid Upper Extremity Bike and Ergometer              Day 43-56                          Discontinue Sling  Initiate AROM Exercises (forward flexion, ER, IR and abduction), Rotator Cuff Isometrics                          Continue Periscapular Exercises, add Stretching if PROM Lacking   No Strengthening until Week 12 (Minimum Time Needed for Cuff Healing Sufficient to withstand Strengthening)                Phase 3 (Weeks 12-16)              Early Strengthening              Goal of Phase:                          Gain full AROM, Maintain PROM                          Gradual return of Shoulder Strength, Power and Endurance                          Gradual return to Functional Activities              Precautions:                          No Lifting > 10lbs, Sudden Lifting or Pushing activities, Overhead Lifting                          No Upper Extremity Bike or Ergometer              Week 12                          Initiate Strengthening Program (10 lb Maximum until Phase 4)                            ER/IR with Bands (Standing)                            ER in Lateral Decubitius Position                            Lateral Raises                            Full Can in Scapular Plane                            Prone Rowing, Horizontal Abduction, Extension                            Elbow Flexion/Extension              Week 14-16                          Initiate light Functional Activities as Permitted  Progress to Fundamental Shoulder Exercises  Phase 4 (Variable but Weeks 16-24)              Aggressive Rehab  Sport Specific or Activity Specific               Goals of Phase:                          Maintain Full Pain-free AROM                           Advanced Conditioning Exercises for enhanced Functional use                          Continue regaining Shoulder Strength, Power and Endurance                          Eventual return to full Functional Activities              Precautions:                          None              Week 16                          Continue ROM and stretching if appropriate                          Progress Strengthening, Proprioceptive and Neuromuscular Training                          Light Sports if Progressing Well (Chipping/Putting, easy ground strokes etc.)              Week 20                          Continue Strengthening and Stretching                          Initiate Interval Sports Program as Appropriate

## 2024-08-21 RX ORDER — SODIUM CHLORIDE 9 MG/ML
250 INJECTION, SOLUTION INTRAVENOUS ONCE
Status: COMPLETED | OUTPATIENT
Start: 2024-08-23 | End: 2024-08-23

## 2024-08-22 ENCOUNTER — OFFICE VISIT (OUTPATIENT)
Age: 69
End: 2024-08-22
Payer: MEDICARE

## 2024-08-22 ENCOUNTER — HOSPITAL ENCOUNTER (OUTPATIENT)
Dept: INFUSION CENTER | Facility: HOSPITAL | Age: 69
End: 2024-08-22
Attending: INTERNAL MEDICINE
Payer: MEDICARE

## 2024-08-22 VITALS
HEART RATE: 73 BPM | DIASTOLIC BLOOD PRESSURE: 80 MMHG | BODY MASS INDEX: 25.11 KG/M2 | TEMPERATURE: 98 F | SYSTOLIC BLOOD PRESSURE: 124 MMHG | HEIGHT: 61 IN | OXYGEN SATURATION: 93 % | WEIGHT: 133 LBS

## 2024-08-22 VITALS
RESPIRATION RATE: 18 BRPM | SYSTOLIC BLOOD PRESSURE: 138 MMHG | OXYGEN SATURATION: 98 % | TEMPERATURE: 98.1 F | DIASTOLIC BLOOD PRESSURE: 64 MMHG | HEART RATE: 70 BPM

## 2024-08-22 DIAGNOSIS — N18.31 STAGE 3A CHRONIC KIDNEY DISEASE (HCC): Primary | ICD-10-CM

## 2024-08-22 DIAGNOSIS — I42.9 CARDIOMYOPATHY, UNSPECIFIED TYPE (HCC): ICD-10-CM

## 2024-08-22 LAB
APOB+LDLR+PCSK9 GENE MUT ANL BLD/T: NOT DETECTED
BRCA1+BRCA2 DEL+DUP + FULL MUT ANL BLD/T: NOT DETECTED
MLH1+MSH2+MSH6+PMS2 GN DEL+DUP+FUL M: NOT DETECTED

## 2024-08-22 PROCEDURE — 96361 HYDRATE IV INFUSION ADD-ON: CPT

## 2024-08-22 PROCEDURE — 99214 OFFICE O/P EST MOD 30 MIN: CPT | Performed by: NURSE PRACTITIONER

## 2024-08-22 PROCEDURE — 96360 HYDRATION IV INFUSION INIT: CPT

## 2024-08-22 RX ADMIN — SODIUM CHLORIDE 250 ML/HR: 0.9 INJECTION, SOLUTION INTRAVENOUS at 08:38

## 2024-08-22 NOTE — PROGRESS NOTES
Nephrology   Office Follow-Up  Teresa Mao 68 y.o. female MRN: [id number]    Encounter: [id number]        Assessment & Plan    Teresa Mao was seen in the [place] office today. All diagnoses and orders for visit:     1. Stage 3a chronic kidney disease (HCC)  Baseline creatinine 0.8-1.1 mg/dL. Most recent creatinine 0.85 mg/dL with eGFR 70 mil/min. UA bland earlier this year. NM function and flow study significant for left kidney 65.5% and right kidney 34.5% function. Etiology presumed chronic TIN from vasomotor dysfunction/autonomic dysfunction, nephrosclerosis from HTN, hx obstruction. She has POTS maintained on IVF 4 times weekly, corlanor   -     Comprehensive metabolic panel; Future; Expected date: 02/10/2025  -     CBC and differential; Future; Expected date: 02/10/2025  -     Magnesium; Future; Expected date: 02/10/2025  -     Urinalysis with microscopic; Future; Expected date: 02/10/2025  -     Phosphorus; Future; Expected date: 02/10/2025  -     Protein / creatinine ratio, urine; Future; Expected date: 02/10/2025  -     Albumin / creatinine urine ratio; Future; Expected date: 02/10/2025  2. Cardiomyopathy, unspecified type (HCC)  Examines compensated follows with [facility] cardiologist. Receives slow IVF 4 times a week and states any faster she will develop edema. Advanced management per [facility]       HPI: Teresa Mao is a 68 y.o. female who is here for scheduled follow-up regarding CKD 3a, HTN and history of hydronephrosis    Patient does have complicated medical history with previous interstitial cystitis diagnosis 1993 by Dr. Parmar s/p unsuccessful urethral dilations. S/p BSO with hysterectomy 1996. Followed by Dr. Bloch at [facility] 2021 for bladder spasms. Had renal function and flow significant for left kidney 65.5% and right kidney 34.5% function. Right kidney was positive for stasis without obstruction. She was diagnosed with POTS/vasovagal syncope and follows with [facility]  Cardiologist. She receives IVF four times a week over 6 hours. She has a right IJ tunneled catheter. CT scan 3/2/23 significant for right proximal and mid renal collecting system prominence. She underwent shoulder surgery and was given propofol leading to asthma exacerbation/dyspnea/reactive airway. Pt also with idiopathic mast cell activation syndrome. Currently on steroids per her report.    Last nephrology visit with Dr. Garcia in August 2023 no changes made.    Kidney function stable and at higher side of baseline on most recent check. No changes indication from our perspective and will continue to follow on a yearly basis, sooner if indicated. Repeat labs in 6 months with urine studies.       ROS:   Review of Systems   Constitutional:  Positive for activity change and fatigue. Negative for chills and fever.   HENT:  Negative for ear pain and sore throat.    Eyes:  Negative for pain and visual disturbance.   Respiratory:  Positive for shortness of breath. Negative for cough.    Cardiovascular:  Negative for chest pain and palpitations.   Gastrointestinal:  Negative for abdominal pain and vomiting.   Genitourinary:  Negative for dysuria and hematuria.   Musculoskeletal:  Positive for arthralgias. Negative for back pain.   Skin:  Negative for color change and rash.   Neurological:  Negative for seizures and syncope.   All other systems reviewed and are negative.      Allergies: Imipramine, Lactose - food allergy, Melatonin, Mirtazapine, Nexium [esomeprazole], Nsaids, Propofol, Singulair [montelukast], Zomig [zolmitriptan], Dexilant [dexlansoprazole], Albuterol, Ambien [zolpidem], Amitriptyline, Ascorbic acid, Aspirin, Bactrim [sulfamethoxazole-trimethoprim], Banana - food allergy, Banana extract allergy skin test - food allergy, Bisoprolol, Ceftin [cefuroxime], Celebrex [celecoxib], Ciprofloxacin, Cranberry-c [ascorbate - food allergy], Demerol [meperidine], Duloxetine, Epinephrine, Ergotamine,  Fludrocortisone, Keflex [cephalexin], Klonopin [clonazepam], Latex, Levaquin [levofloxacin], Lexapro [escitalopram], Lyrica [pregabalin], Macrodantin [nitrofurantoin], Metoprolol, Metronidazole, Molds & smuts, Morphine, Movantik [naloxegol], Mushroom extract complex - food allergy, Naprosyn [naproxen], Neurontin [gabapentin], Pineapple - food allergy, Plecanatide, Prolia [denosumab], Prozac [fluoxetine], Serevent [salmeterol], Soy allergy - food allergy, Sudafed [pseudoephedrine], Sulfa antibiotics, Tomato - food allergy, Trazodone, Ultram [tramadol], Vilazodone, Vilazodone hcl, Vioxx [rofecoxib], Vortioxetine, Wheat bran - food allergy, Zithromax [azithromycin], Zoloft [sertraline], and Zantac [ranitidine]    Medications:   Current Outpatient Medications:     aluminum hydroxide-magnesium carbonate (Gaviscon Extra Strength) 160-105 mg per chewable tablet, , Disp: , Rfl:     azelastine (ASTELIN) 0.1 % nasal spray, 2 sprays into each nostril 2 (two) times a day Use in each nostril as directed, Disp: , Rfl:     beclomethasone (QVAR) 40 MCG/ACT inhaler, Inhale 1 puff daily as needed (only when unable to nebulize pulmicort) Rinse mouth after use., Disp: , Rfl:     budesonide (PULMICORT) 0.5 mg/2 mL nebulizer solution, Take 0.5 mg by nebulization 2 (two) times a day Rinse mouth after use., Disp: , Rfl:     cromolyn (GASTROCROM) 100 MG/5ML solution, Take 100 mg by mouth 4 (four) times a day (before meals and at bedtime), Disp: , Rfl:     cyclobenzaprine (FLEXERIL) 5 mg tablet, Take 1 tablet (5 mg total) by mouth 3 (three) times a day as needed for muscle spasms, Disp: 90 tablet, Rfl: 1    docusate sodium (COLACE) 100 mg capsule, Take 100 mg by mouth daily as needed for constipation, Disp: , Rfl:     erythromycin (ILOTYCIN) ophthalmic ointment, Apply 1 application to eye daily at bedtime, Disp: , Rfl:     famotidine (PEPCID) 40 MG tablet, Twice a day, Disp: , Rfl:     fexofenadine (ALLEGRA) 180 MG tablet, Take 180 mg by  mouth 2 (two) times a day , Disp: , Rfl:     fluticasone (FLONASE) 50 mcg/act nasal spray, 2 sprays into each nostril daily , Disp: , Rfl:     Galcanezumab-gnlm (Emgality) 120 MG/ML SOAJ, Inject under the skin every 30 (thirty) days , Disp: , Rfl:     guaiFENesin (Mucinex) 600 mg 12 hr tablet, Take 600 mg by mouth every 12 (twelve) hours, Disp: , Rfl:     Heating Pad PADS, Use if needed (pain), Disp: , Rfl:     HYDROcodone-acetaminophen (NORCO)  mg per tablet, Take 0.5 tablets by mouth 2 (two) times a day as needed for moderate pain Max Daily Amount: 1 tablet, Disp: 30 tablet, Rfl: 0    HYDROcodone-acetaminophen (NORCO) 5-325 mg per tablet, Take 1 tablet by mouth 2 (two) times a day as needed for pain Max Daily Amount: 2 tablets Do not start before July 12, 2024., Disp: 60 tablet, Rfl: 0    hydrOXYzine (ATARAX) 10 mg/5 mL syrup, , Disp: , Rfl:     ipratropium (ATROVENT) 0.02 % nebulizer solution, Take 0.5 mg by nebulization every 6 (six) hours as needed for wheezing or shortness of breath, Disp: , Rfl:     ipratropium (ATROVENT) 0.06 % nasal spray, PLEASE SEE ATTACHED FOR DETAILED DIRECTIONS, Disp: , Rfl:     ivabradine HCl (Corlanor) 5 MG tablet, 7.5 mg 2 (two) times a day, Disp: , Rfl:     KETOTIFEN FUMARATE OP, Take 1mg compounded capsule three times a day, Disp: , Rfl:     levalbuterol (XOPENEX HFA) 45 mcg/act inhaler, Inhale 2 puffs every 4 (four) hours as needed (when unable to nebulize), Disp: , Rfl:     levalbuterol (XOPENEX) 1.25 mg/3 mL nebulizer solution, Take 1.25 mg by nebulization every 6 (six) hours as needed , Disp: , Rfl: 2    Magnesium 400 MG CAPS, Take 1 capsule by mouth 2 (two) times a day , Disp: , Rfl:     MULTIPLE VITAMINS-CALCIUM PO, Take 1 capsule by mouth every morning , Disp: , Rfl:     naloxone (NARCAN) 4 mg/0.1 mL nasal spray, Administer 1 spray into a nostril. If no response after 2-3 minutes, give another dose in the other nostril using a new spray., Disp: 1 each, Rfl: 0    NON  FORMULARY, Take 1 mg by mouth 3 (three) times a day Ketotifen 1 mg compounded capsule, Disp: , Rfl:     NON FORMULARY, LACTOBACILLUS COMBINATION NO.4 (PROBIOTIC) 3 BILLION CELL CAPSULE, Disp: , Rfl:     NON FORMULARY, Take 32 mg by mouth NATURE-THROID ORAL, Disp: , Rfl:     omega-3-acid ethyl esters (LOVAZA) 1 g capsule, Take 2 g by mouth 2 (two) times a day 1600mg daily, Disp: , Rfl:     polyethylene glycol (MIRALAX) 17 g packet, Take 17 g by mouth daily as needed , Disp: , Rfl:     predniSONE 20 mg tablet, 2 PO x 2 days 1 PO x 2 days 1/2 tab PO x 2 days, Disp: 8 tablet, Rfl: 0    Probiotic Product (PROBIOTIC DAILY PO), Take 1 tablet by mouth daily At lunch, Disp: , Rfl:     Qvar RediHaler 40 MCG/ACT inhaler, , Disp: , Rfl:     sodium chloride, Inject 1,500 mL into a catheter in a vein, Disp: , Rfl:     sucralfate (CARAFATE) 1 g/10 mL suspension, Take 1 g by mouth 2 (two) times a day, Disp: , Rfl:     Thyroid, Porcine, POWD, Use 32 mg daily, Disp: , Rfl:     Ubrogepant (UBRELVY) 100 MG tablet, Take 100 mg by mouth Take 1 tablet (100 mg) one time as needed for migraine. May repeat one additional tablet (100 mg) at least two hours after the first dose. Do not use more than two doses per day, or for more than eight days per month., Disp: , Rfl:     verapamil (CALAN) 40 mg tablet, , Disp: , Rfl:   No current facility-administered medications for this visit.    Facility-Administered Medications Ordered in Other Visits:     [START ON 8/26/2024] sodium chloride 0.9 % bolus 1,500 mL, 1,500 mL, Intravenous, Once, Jan Doyle MD    sodium chloride 0.9 % infusion, 250 mL/hr, Intravenous, Once, Jan Doyle MD    Past Medical History:   Diagnosis Date    Allergic rhinitis     Anxiety     Asthma     Back pain     Cardiac disease     Cardiopathy     EF 45%    Cough     Diverticulitis     Factor V Leiden (HCC)     Fibromyalgia     GERD (gastroesophageal reflux disease)     Hashimoto's thyroiditis     Hx of degenerative disc disease   "   Hypertension     Hypotension     pots - postural orthostatic hypotension    Interstitial cystitis     Irregular heart beat     LBBB    Irritable bowel syndrome     Migraines     Mitral valve disease     \"thickening\"    Myocardial infarction (HCC)     possible but not sure when    Neuropathy     bilateral legs    Osteoporosis     Postural orthostatic tachycardia syndrome     must drink a lot of water and salt    Pott's disease     Rheumatic fever 1967    Scoliosis     Sepsis (HCC)     associated with PICC line    Sjogren's syndrome (HCC)     TMJ (dislocation of temporomandibular joint)      Past Surgical History:   Procedure Laterality Date    ABLATION MICROWAVE Left     lumbar area    BACK SURGERY  10/1998     SECTION  1992    CHOLECYSTECTOMY  2016    COLONOSCOPY      DILATION AND CURETTAGE OF UTERUS      EGD  2016    FOOT SURGERY  1968    removal of bone and neuroma    HYSTERECTOMY N/A     age 39    IR OTHER  2022    IR OTHER  2022    IR OTHER  2023    IR OTHER  2024    IR PICC PLACEMENT SINGLE LUMEN  10/02/2020    IR PICC PLACEMENT SINGLE LUMEN  2021    IR PICC REPOSITION  2021    IR TUNNELED CENTRAL LINE PLACEMENT  2022    LAPAROSCOPY      endometriosis    MOUTH BIOPSY      lip    OOPHORECTOMY Bilateral     age 39    MT EGD TRANSORAL BIOPSY SINGLE/MULTIPLE N/A 2019    Procedure: ESOPHAGOGASTRODUODENOSCOPY (EGD) with multiple bx and dilation;  Surgeon: Peter Baldwin MD;  Location:  GI LAB;  Service: Gastroenterology    MT SURGICAL ARTHROSCOPY SHOULDER W/ROTATOR CUFF RPR Right 2022    Procedure: SHOULDER ARTHROSCOPIC ROTATOR CUFF REPAIR Biceps Tenodisis;  Surgeon: Ilya Munoz MD;  Location: [facility] MAIN OR;  Service: Orthopedics    MT SURGICAL ARTHROSCOPY SHOULDER W/ROTATOR CUFF RPR Left 5/3/2024    Procedure: SHOULDER ARTHROSCOPIC ROTATOR CUFF REPAIR, SUBACROMIAL DECOMPRESSION;  Surgeon: Ilya Cavazos " "MD Tammy;  Location: [facility] MAIN OR;  Service: Orthopedics    TUNNELED VENOUS CATHETER PLACEMENT  2016     Family History   Problem Relation Age of Onset    Cancer Mother         bladder    No Known Problems Father     Thyroid cancer Sister     Heart attack Sister     No Known Problems Sister     No Known Problems Sister     No Known Problems Sister     No Known Problems Sister     No Known Problems Daughter     No Known Problems Daughter     No Known Problems Maternal Grandmother     Colon cancer Maternal Grandfather     No Known Problems Paternal Grandmother     No Known Problems Paternal Grandfather     Breast cancer Maternal Aunt         over age 50    Endometrial cancer Maternal Aunt     Multiple myeloma Maternal Aunt     Hypertension Paternal Aunt     Brain cancer Niece     Lymphoma Niece     Breast cancer additional onset Neg Hx     BRCA2 Positive Neg Hx     Ovarian cancer Neg Hx     BRCA2 Negative Neg Hx     BRCA1 Positive Neg Hx     BRCA1 Negative Neg Hx     BRCA 1/2 Neg Hx       reports that she has never smoked. She has never used smokeless tobacco. She reports that she does not drink alcohol and does not use drugs.      Physical Exam:   Vitals:    08/22/24 1525   BP: 124/80   BP Location: Left arm   Patient Position: Sitting   Cuff Size: Standard   Pulse: 73   Temp: 98 °F (36.7 °C)   SpO2: 93%   Weight: 60.3 kg (133 lb)   Height: 5' 1\" (1.549 m)     Body mass index is 25.13 kg/m².    General: conscious, cooperative, in no acute distress, appears stated age  Eyes: conjunctivae pale, anicteric sclerae  ENT: lips and mucous membranes moist  Neck: supple, no JVD, no masses  Chest:  essentially clear breath sounds bilaterally, no crackles, ronchus or wheezings  CVS: S1 & S2, normal rate, regular rhythm  Abdomen: soft, non-tender, non-distended, normoactive bowel sounds, rounded  Extremities: no edema of both legs  Skin: no rash   Neuro: awake, alert, oriented       Diagnostic Data:  Lab: I have personally " "reviewed pertinent lab results.,   CBC:       CMP: No results found for: \"SODIUM\", \"K\", \"CL\", \"CO2\", \"ANIONGAP\", \"BUN\", \"CREATININE\", \"GLUCOSE\", \"CALCIUM\", \"AST\", \"ALT\", \"ALKPHOS\", \"PROT\", \"BILITOT\", \"EGFR\",   PT/INR: No results found for: \"PT\", \"INR\",   Magnesium: No components found for: \"MAG\",  Phosphorous: No results found for: \"PHOS\"    Patient Instructions   It was nice to see you today. Please repeat labs in 6 months and return in 1 year    Portions of the record may have been created with voice recognition software. Occasional wrong word or \"sound a like\" substitutions may have occurred due to the inherent limitations of voice recognition software. Read the chart carefully and recognize, using context, where substitutions have occurred.    If you have any questions, please contact the dictating provider.    "

## 2024-08-22 NOTE — PROGRESS NOTES
Teresa Mao  tolerated hydration well with no complications.      Teresa Mao is aware of future appt on 8-23-24 0900    AVS declined.

## 2024-08-23 ENCOUNTER — HOSPITAL ENCOUNTER (OUTPATIENT)
Dept: INFUSION CENTER | Facility: HOSPITAL | Age: 69
End: 2024-08-23
Payer: MEDICARE

## 2024-08-23 VITALS
RESPIRATION RATE: 18 BRPM | HEART RATE: 73 BPM | DIASTOLIC BLOOD PRESSURE: 67 MMHG | OXYGEN SATURATION: 97 % | TEMPERATURE: 98.2 F | SYSTOLIC BLOOD PRESSURE: 135 MMHG

## 2024-08-23 PROCEDURE — 96361 HYDRATE IV INFUSION ADD-ON: CPT

## 2024-08-23 PROCEDURE — 96360 HYDRATION IV INFUSION INIT: CPT

## 2024-08-23 RX ADMIN — SODIUM CHLORIDE 250 ML/HR: 0.9 INJECTION, SOLUTION INTRAVENOUS at 09:05

## 2024-08-23 NOTE — PLAN OF CARE
Problem: Knowledge Deficit  Goal: Patient/family/caregiver demonstrates understanding of disease process, treatment plan, medications, and discharge instructions  Description: Complete learning assessment and assess knowledge base.  Interventions:  - Provide teaching at level of understanding  - Provide teaching via preferred learning methods  Outcome: Progressing     
1

## 2024-08-23 NOTE — PROGRESS NOTES
Teresa Mao  tolerated hydration well with no complications.      Teresa Mao is aware of future appt Monday 0800    AVS printed and given to Teresa Mao:  No (Declined by Teresa Mao)

## 2024-08-26 ENCOUNTER — OFFICE VISIT (OUTPATIENT)
Dept: OBGYN CLINIC | Facility: OTHER | Age: 69
End: 2024-08-26
Payer: MEDICARE

## 2024-08-26 ENCOUNTER — HOSPITAL ENCOUNTER (OUTPATIENT)
Dept: INFUSION CENTER | Facility: HOSPITAL | Age: 69
Discharge: HOME | End: 2024-08-26
Attending: INTERNAL MEDICINE
Payer: MEDICARE

## 2024-08-26 VITALS — WEIGHT: 138.6 LBS | HEIGHT: 61 IN | BODY MASS INDEX: 26.17 KG/M2

## 2024-08-26 VITALS
OXYGEN SATURATION: 100 % | TEMPERATURE: 97.4 F | DIASTOLIC BLOOD PRESSURE: 65 MMHG | HEART RATE: 73 BPM | SYSTOLIC BLOOD PRESSURE: 143 MMHG | RESPIRATION RATE: 18 BRPM

## 2024-08-26 DIAGNOSIS — Z98.890 S/P LEFT ROTATOR CUFF REPAIR: ICD-10-CM

## 2024-08-26 DIAGNOSIS — M75.102 TEAR OF LEFT SUPRASPINATUS TENDON: Primary | ICD-10-CM

## 2024-08-26 PROCEDURE — 99213 OFFICE O/P EST LOW 20 MIN: CPT | Performed by: PHYSICIAN ASSISTANT

## 2024-08-26 PROCEDURE — 96361 HYDRATE IV INFUSION ADD-ON: CPT

## 2024-08-26 PROCEDURE — 96360 HYDRATION IV INFUSION INIT: CPT

## 2024-08-26 RX ADMIN — SODIUM CHLORIDE 1500 ML: 0.9 INJECTION, SOLUTION INTRAVENOUS at 08:13

## 2024-08-26 NOTE — PROGRESS NOTES
Teresa Mao  tolerated hydration well with no complications.      Teresa Mao is aware of future appt on  8/27 0830    AVS printed and given to Teresa Mao:  No (Declined by Teresa Mao)

## 2024-08-26 NOTE — PROGRESS NOTES
"  Assessment  Diagnoses and all orders for this visit:    Tear of left supraspinatus tendon    S/P left rotator cuff repair        Discussion and Plan:    eTresa's left shoulder is healthy on exam.  She has excellent ROM and good strength.  She will continue with formal PT and transition to an independent HEP when appropriate.  She may increase activities to her tolerance.  We will see her back as scheduled.  She may cancel is she is happy with the outcome of the shoulder.    Note was provided for her Apartment complex as they want her to move a 70lb washer by herself.  She is not cleared to do that at this time.    Subjective:   Patient ID: Teresa Mao is a 68 y.o. female    Teresa presents in follow up of the left shoulder.  She is nearly 4 months s.p arthroscopic rotator cuff repair and subacromial decompression.  She is making good progress with the left shoulder.  She is doing more around the house.  She notes some soreness after lifting a 24pk of water but it resolved.  She has been compliant with formal PT and her HEP.  Denies new injury or trauma.    The following portions of the patient's history were reviewed and updated as appropriate: allergies, current medications, past family history, past medical history, past social history, past surgical history and problem list.      Objective:  Ht 5' 1\" (1.549 m)   Wt 62.9 kg (138 lb 9.6 oz)   BMI 26.19 kg/m²       Left Shoulder Exam     Tenderness   The patient is experiencing no tenderness.     Range of Motion   The patient has normal left shoulder ROM.    Muscle Strength   External rotation: 4/5   Supraspinatus: 5/5     Tests   Hahn test: negative  Impingement: positive  Drop arm: negative    Other   Erythema: absent  Sensation: normal  Pulse: present             Physical Exam  Constitutional:       General: She is not in acute distress.     Appearance: She is well-developed.   HENT:      Head: Normocephalic.   Pulmonary:      Breath sounds: No " stridor. No wheezing.   Musculoskeletal:      Cervical back: Normal range of motion.   Skin:     General: Skin is warm and dry.   Neurological:      Mental Status: She is alert and oriented to person, place, and time.   Psychiatric:         Behavior: Behavior normal.         Thought Content: Thought content normal.         Judgment: Judgment normal.

## 2024-08-26 NOTE — LETTER
August 26, 2024     Patient: Teresa Mao  YOB: 1955  Date of Visit: 8/26/2024      To Whom it May Concern:    Teresa Mao is under Dr Munoz's professional care. Teresa was seen in my office on 8/26/2024. Teresa is unable to lift push or pull more than 10lbs.    If you have any questions or concerns, please don't hesitate to call.         Sincerely,          Yesenia Pacheco PA-C        CC: No Recipients

## 2024-08-27 ENCOUNTER — OFFICE VISIT (OUTPATIENT)
Dept: PHYSICAL THERAPY | Facility: CLINIC | Age: 69
End: 2024-08-27
Payer: MEDICARE

## 2024-08-27 ENCOUNTER — HOSPITAL ENCOUNTER (OUTPATIENT)
Dept: INFUSION CENTER | Facility: HOSPITAL | Age: 69
Discharge: HOME | End: 2024-08-27
Attending: INTERNAL MEDICINE
Payer: MEDICARE

## 2024-08-27 VITALS
OXYGEN SATURATION: 98 % | RESPIRATION RATE: 18 BRPM | DIASTOLIC BLOOD PRESSURE: 83 MMHG | SYSTOLIC BLOOD PRESSURE: 125 MMHG | TEMPERATURE: 98.5 F | HEART RATE: 70 BPM

## 2024-08-27 DIAGNOSIS — Z98.890 STATUS POST LEFT ROTATOR CUFF REPAIR: Primary | ICD-10-CM

## 2024-08-27 PROCEDURE — 97110 THERAPEUTIC EXERCISES: CPT

## 2024-08-27 PROCEDURE — 96360 HYDRATION IV INFUSION INIT: CPT

## 2024-08-27 PROCEDURE — 96361 HYDRATE IV INFUSION ADD-ON: CPT

## 2024-08-27 RX ADMIN — SODIUM CHLORIDE 1500 ML: 0.9 INJECTION, SOLUTION INTRAVENOUS at 08:38

## 2024-08-27 NOTE — PROGRESS NOTES
"Daily Note     Today's date: 2024  Patient name: Teresa Mao  : 1955  MRN: [id number]  Referring provider: Ilya Munoz*  Dx:   Encounter Diagnosis     ICD-10-CM    1. Status post left rotator cuff repair  Z98.890                      Subjective:  Pt. states she had f/u w/PA yesterday.  Says PA/MD both pleased with pt's progress made to date, and current overall status . Pt. states she'd like to \"take it easy\" today due to her having a busy weekend, and also a full day today.       Objective: See treatment diary below      Assessment: Tolerated treatment well. Patient exhibited good technique with therapeutic exercises and would benefit from continued PT.   *Pt. requested to hold on some of the exercises today and resume NV, as she wanted to take it easy and not overload her  LUE, or herself.       Plan: Progress treament per protocol.                Precautions: see extensive PMH, ABRAMS,     FOLLOW PROTOCOL**, sling to be worn 6 weeks.    Re-eval Date: 24      Re-eval Date: 24  DOS:  5/3/24                                                                              12 weeks                                          10 weeks  Date 8. 7. 8.5 8.20   Visit Count 21 17 13 19 20   FOTO   *completed 2024    *initial projected score met/exceeded     Pain In 0/10 0/10  0/10   0/10   Pain Out 0/10 No negative change  No negative change 0/10       Manuals 8.27 7.18  8.5 8.20   PROM LUE  Supine MJD 10'                              Neuro Re-Ed 8.   8.5 8.20   MTPs/LTPs Red 25x ea  Red 20x ea Red 20x ea   Red 25x ea   punch Red 25x ea  Red 20x Red 20x    Red 25x ea   BUE ER Red 25x ea  Red 20x Red 20x    Red 25x ea   IR w/TB Red 25x ea  Red 20x Red 20x    Red 25x ea                           Ther Ex 8.27 7.18 7. 8.5 8.20   ultigrip D/C         Ball squeeze D/C       Wrist AROM DC      DC   L elbow ROM 4#  x40 AROM flexion and ext 3# X40 AROM flexion and ext 4# X30 AROM " "flexion and ext 4# X30 AROM flexion and ext 4# x40 AROM flexion and ext   Pendulum ex *HEP       UT/LS stretch DC    DC to HEP    DC to HEP       Cerv ROM DC    DC to HEP   Scap isometrics      DC   pulleys pulleys    4 min FF pulleys    3 min FF pulleys   3 min 4 min FF 4 min FF   Finger ladder finger ladder  #27  10 reps FF finger ladder  #26  10 reps FF finger ladder  #27  10 reps FF finger ladder  #27  10 reps FF finger ladder  #27  10 reps FF   Wall slides  20x FF   20x cw/ccw  15x HA/ADD 10x FF   10x cw/ccw  10x HA/ADD 10x FF   10x cw/ccw  10x HA/ADD 12x FF   12x cw/ccw  12x HA/ADD 20x FF   20x cw/ccw  12x HA/ADD   Wand ex *Will Resume NV, as per pt request Standing  FF, ABD, IR, EXT   10x ea  Standing  FF, ABD, IR, EXT   15x ea Standing  FF, ABD, IR, EXT   15x ea   Active flex/abd 3 x 10 ea 2 x 10x ea 20x ea 25x 25x   Full can *Will Resume NV, as per pt request 20x   20x 25x 25x   SL ER  20x 2#  20x   *resume NV,    Supine shld flex/ER w/wand *Will Resume NV, as per pt request 15x 5\" ea 2#  15 x 5\" LUE only 2#  15 x 5\" LUE only 2#  15 x 5\" LUE only   Prone flex, HA, ext rows  resume  *resume NV, if breathing is better NP                                                                   Ther Activity                        Gait Training            **Self Care/Pt. Educ/  Skilled convers  10 min            Modalities 8.27 7.18 6.25 8.5 8.20    Pt. Deferred offer for CP applic Cryo 10 min to L shld with no adverse reaction Cryo 10 min with no adverse reaction *Pt. deferred Pt. deferred     *Pt. deferred MHP 10 min to L UT         Rotator Cuff Repair Rehabilitation Protocol  (adapted from Milana OLMOS et al. “Rehabilitation of the Rotator Cuff: An Evaluation Based Approach”. JAAOS 2006; 14:599-609)  Updated 10.14   Ilya Munoz MD  [facility]  [address] [place] [address]-  [place] [address]  [phone]  Phase 1 (Weeks 0-6)              Immediate Postoperative Period              Goals:    "                       Diminish Pain and Inflammation  Maintain and Protect Integrity of the Repair                          Gradually Increase Passive ROM (NO Active or Active Assist until Week 6)                          Become Independent with Modified ADLs              Precautions:  Maintain Arm in Abduction Sling, Remove Only for Directed Exercises (may remove Abduction Pillow after Day 21 for comfort)                          Keep Incisions Clean & Dry (okay to shower in 48 hours, band-aids over incisions)  No Immersion (pool) until Wounds Totally Sealed (usually not prior to day #10)  Passive Shoulder Motion ONLY, No Lifting/Holding Objects, Reaching Behind Back  Okay to Type/Write at Desktop with Arm in Sling              Day 0-6                          Elbow, Wrist, Hand AROM Exercises                           Start Cervical AROM and Scapula Isometrics                          Cryotherapy/Ice for Pain and Inflammation                          Instruct in Hygiene, Posture, and Positioning               Day 7-28                          Continue Above  May Start Pendulum Exercises                          May Start Supine, Pain Free, PT assisted PROM  Forward Flexion to 90°, External Rotation to 35° (Elbow at side), Internal Rotation to Body, Abduction to 60°                          Can Introduce light Cardio (Walking, Stationary Bike)                          Aqua Therapy may begin at week 3 (day 21) as long as no wound problems              Day 29-42                          Continue Above  Progress PROM to Goal of full PROM by Week 6.  May add Gentle Mobilizations (GH and Scapulothoracic) to Regain full PROM if Needed.  May add Heat prior to PROM Exercises, Ice after Exercises  May Begin AAROM at Day 29 if ROM is Appropriate in Anticipation of AROM Starting at Week 6              Criteria to Progress to Phase 2                          Reasonable Passive Forward Flexion, Abduction , IR/ER                           Time  Phase 2 (Weeks 6-12)              Protection and Active Motion              Goals of Phase:                          Allow Healing of Soft Tissues                          Decrease Pain and Inflammation  Add ADLs and Regain AROM by End of Phase              Precautions:                          NO STRENGTHENING until Phase 3                          Repair is Most Prone to Failure during this Phase!                          No Lifting Objects > 2 lbs (Coffee Cup OK), no Sudden Motions                          Avoid Upper Extremity Bike and Ergometer              Day 43-56                          Discontinue Sling  Initiate AROM Exercises (forward flexion, ER, IR and abduction), Rotator Cuff Isometrics                          Continue Periscapular Exercises, add Stretching if PROM Lacking   No Strengthening until Week 12 (Minimum Time Needed for Cuff Healing Sufficient to withstand Strengthening)                Phase 3 (Weeks 12-16)              Early Strengthening              Goal of Phase:                          Gain full AROM, Maintain PROM                          Gradual return of Shoulder Strength, Power and Endurance                          Gradual return to Functional Activities              Precautions:                          No Lifting > 10lbs, Sudden Lifting or Pushing activities, Overhead Lifting                          No Upper Extremity Bike or Ergometer              Week 12                          Initiate Strengthening Program (10 lb Maximum until Phase 4)                            ER/IR with Bands (Standing)                            ER in Lateral Decubitius Position                            Lateral Raises                            Full Can in Scapular Plane                            Prone Rowing, Horizontal Abduction, Extension                            Elbow Flexion/Extension              Week 14-16                          Initiate light Functional Activities as  Permitted  Progress to Fundamental Shoulder Exercises  Phase 4 (Variable but Weeks 16-24)              Aggressive Rehab  Sport Specific or Activity Specific               Goals of Phase:                          Maintain Full Pain-free AROM                          Advanced Conditioning Exercises for enhanced Functional use                          Continue regaining Shoulder Strength, Power and Endurance                          Eventual return to full Functional Activities              Precautions:                          None              Week 16                          Continue ROM and stretching if appropriate                          Progress Strengthening, Proprioceptive and Neuromuscular Training                          Light Sports if Progressing Well (Chipping/Putting, easy ground strokes etc.)              Week 20                          Continue Strengthening and Stretching                          Initiate Interval Sports Program as Appropriate

## 2024-08-28 RX ORDER — SODIUM CHLORIDE 9 MG/ML
250 INJECTION, SOLUTION INTRAVENOUS ONCE
Status: COMPLETED | OUTPATIENT
Start: 2024-08-30 | End: 2024-08-30

## 2024-08-28 RX ORDER — SODIUM CHLORIDE 9 MG/ML
250 INJECTION, SOLUTION INTRAVENOUS ONCE
Status: DISCONTINUED | OUTPATIENT
Start: 2024-08-29 | End: 2024-09-02 | Stop reason: HOSPADM

## 2024-08-29 ENCOUNTER — HOSPITAL ENCOUNTER (OUTPATIENT)
Dept: INFUSION CENTER | Facility: HOSPITAL | Age: 69
Discharge: HOME | End: 2024-08-29
Attending: INTERNAL MEDICINE

## 2024-08-29 ENCOUNTER — OFFICE VISIT (OUTPATIENT)
Dept: PHYSICAL THERAPY | Facility: CLINIC | Age: 69
End: 2024-08-29
Payer: MEDICARE

## 2024-08-29 VITALS
RESPIRATION RATE: 18 BRPM | HEART RATE: 79 BPM | SYSTOLIC BLOOD PRESSURE: 139 MMHG | OXYGEN SATURATION: 96 % | DIASTOLIC BLOOD PRESSURE: 78 MMHG | TEMPERATURE: 98.6 F

## 2024-08-29 DIAGNOSIS — Z98.890 STATUS POST LEFT ROTATOR CUFF REPAIR: Primary | ICD-10-CM

## 2024-08-29 PROCEDURE — 97110 THERAPEUTIC EXERCISES: CPT | Performed by: PHYSICAL MEDICINE & REHABILITATION

## 2024-08-29 NOTE — PROGRESS NOTES
Daily Note     Today's date: 2024  Patient name: Teresa Mao  : 1955  MRN: [id number]  Referring provider: Ilya Munoz*  Dx:   Encounter Diagnosis     ICD-10-CM    1. Status post left rotator cuff repair  Z98.890                      Subjective: Pt states she was back to the doctor on Monday who was very pleased with the pt's status.  States she was told that she may DC PT when she feels ready to.  Pt states she had a migraine today and her asthma is making her SOB.      Objective: See treatment diary below      Assessment: Tolerated treatment well. Patient demonstrated fatigue post treatment, exhibited good technique with therapeutic exercises, and would benefit from continued PT      Plan: Continue per plan of care.      Precautions: see extensive PMH, ABRAMS,     FOLLOW PROTOCOL**, sling to be worn 6 weeks.    Re-eval Date: 24      Re-eval Date: 24  DOS:  5/3/24                                                                              12 weeks                                          10 weeks  Date  8.5 8.20   Visit Count 21 22 13 19 20   FOTO   *completed 2024    *initial projected score met/exceeded     Pain In 0/10 0/10  0/10   0/10   Pain Out 0/10 No negative change  No negative change 0/10       Manuals 8. 8.5 8.20   PROM LUE                                Neuro Re-Ed 8.   8.5 8.20   MTPs/LTPs Red 25x ea Green 25x ea Red 20x ea Red 20x ea   Red 25x ea   punch Red 25x ea Green 25x Red 20x Red 20x    Red 25x ea   BUE ER Red 25x ea Green 25x Red 20x Red 20x    Red 25x ea   IR w/TB Red 25x ea Green 25x Red 20x Red 20x    Red 25x ea                           Ther Ex 8.. 8.5 8.20   ultigrip D/C         Ball squeeze D/C       Wrist AROM DC      DC   L elbow ROM 4#  x40 AROM flexion and ext 4# X40 AROM flexion and ext 4# X30 AROM flexion and ext 4# X30 AROM flexion and ext 4# x40 AROM flexion and ext   Pendulum ex *HEP       UT/LS  "stretch DC    DC to HEP    DC to HEP       Cerv ROM DC    DC to HEP   Scap isometrics      DC   pulleys pulleys    4 min FF pulleys     4 min FF pulleys   3 min 4 min FF 4 min FF   Finger ladder finger ladder  #27  10 reps FF finger ladder  #27  10 reps FF finger ladder  #27  10 reps FF finger ladder  #27  10 reps FF finger ladder  #27  10 reps FF   Wall slides  20x FF   20x cw/ccw  15x HA/ADD 20x FF   20x cw/ccw  20x HA/ADD 10x FF   10x cw/ccw  10x HA/ADD 12x FF   12x cw/ccw  12x HA/ADD 20x FF   20x cw/ccw  12x HA/ADD   Wand ex *Will Resume NV, as per pt request Standing  FF, ABD, IR, EXT   15x ea  Standing  FF, ABD, IR, EXT   15x ea Standing  FF, ABD, IR, EXT   15x ea   Active flex/abd 3 x 10 ea 3x 10x ea 20x ea 25x 25x   Full can *Will Resume NV, as per pt request    20x 25x 25x   SL ER   2#  20x   *resume NV,    Supine shld flex/ER w/wand *Will Resume NV, as per pt request  2#  15 x 5\" LUE only 2#  15 x 5\" LUE only 2#  15 x 5\" LUE only   Prone flex, HA, ext rows    *resume NV, if breathing is better NP                                                                   Ther Activity                        Gait Training            **Self Care/Pt. Educ/  Skilled convers  10 min            Modalities 8.27  6.25 8.5 8.20    Pt. Deferred offer for CP applic  Cryo 10 min with no adverse reaction *Pt. deferred Pt. deferred      MHP 10 min to L UT         Rotator Cuff Repair Rehabilitation Protocol  (adapted from Milana PJ et al. “Rehabilitation of the Rotator Cuff: An Evaluation Based Approach”. JAAOS 2006; 14:599-609)  Updated 10.14   Ilya Munoz MD  [facility]  [address] [place] [id number]  [phone]  Phase 1 (Weeks 0-6)              Immediate Postoperative Period              Goals:                          Diminish Pain and Inflammation  Maintain and Protect Integrity of the Repair                          Gradually Increase Passive ROM (NO Active or Active Assist " until Week 6)                          Become Independent with Modified ADLs              Precautions:  Maintain Arm in Abduction Sling, Remove Only for Directed Exercises (may remove Abduction Pillow after Day 21 for comfort)                          Keep Incisions Clean & Dry (okay to shower in 48 hours, band-aids over incisions)  No Immersion (pool) until Wounds Totally Sealed (usually not prior to day #10)  Passive Shoulder Motion ONLY, No Lifting/Holding Objects, Reaching Behind Back  Okay to Type/Write at Desktop with Arm in Sling              Day 0-6                          Elbow, Wrist, Hand AROM Exercises                           Start Cervical AROM and Scapula Isometrics                          Cryotherapy/Ice for Pain and Inflammation                          Instruct in Hygiene, Posture, and Positioning               Day 7-28                          Continue Above  May Start Pendulum Exercises                          May Start Supine, Pain Free, PT assisted PROM  Forward Flexion to 90°, External Rotation to 35° (Elbow at side), Internal Rotation to Body, Abduction to 60°                          Can Introduce light Cardio (Walking, Stationary Bike)                          Aqua Therapy may begin at week 3 (day 21) as long as no wound problems              Day 29-42                          Continue Above  Progress PROM to Goal of full PROM by Week 6.  May add Gentle Mobilizations (GH and Scapulothoracic) to Regain full PROM if Needed.  May add Heat prior to PROM Exercises, Ice after Exercises  May Begin AAROM at Day 29 if ROM is Appropriate in Anticipation of AROM Starting at Week 6              Criteria to Progress to Phase 2                          Reasonable Passive Forward Flexion, Abduction , IR/ER                          Time  Phase 2 (Weeks 6-12)              Protection and Active Motion              Goals of Phase:                          Allow Healing of Soft Tissues                           Decrease Pain and Inflammation  Add ADLs and Regain AROM by End of Phase              Precautions:                          NO STRENGTHENING until Phase 3                          Repair is Most Prone to Failure during this Phase!                          No Lifting Objects > 2 lbs (Coffee Cup OK), no Sudden Motions                          Avoid Upper Extremity Bike and Ergometer              Day 43-56                          Discontinue Sling  Initiate AROM Exercises (forward flexion, ER, IR and abduction), Rotator Cuff Isometrics                          Continue Periscapular Exercises, add Stretching if PROM Lacking   No Strengthening until Week 12 (Minimum Time Needed for Cuff Healing Sufficient to withstand Strengthening)                Phase 3 (Weeks 12-16)              Early Strengthening              Goal of Phase:                          Gain full AROM, Maintain PROM                          Gradual return of Shoulder Strength, Power and Endurance                          Gradual return to Functional Activities              Precautions:                          No Lifting > 10lbs, Sudden Lifting or Pushing activities, Overhead Lifting                          No Upper Extremity Bike or Ergometer              Week 12                          Initiate Strengthening Program (10 lb Maximum until Phase 4)                            ER/IR with Bands (Standing)                            ER in Lateral Decubitius Position                            Lateral Raises                            Full Can in Scapular Plane                            Prone Rowing, Horizontal Abduction, Extension                            Elbow Flexion/Extension              Week 14-16                          Initiate light Functional Activities as Permitted  Progress to Fundamental Shoulder Exercises  Phase 4 (Variable but Weeks 16-24)              Aggressive Rehab  Sport Specific or Activity Specific               Goals of  Phase:                          Maintain Full Pain-free AROM                          Advanced Conditioning Exercises for enhanced Functional use                          Continue regaining Shoulder Strength, Power and Endurance                          Eventual return to full Functional Activities              Precautions:                          None              Week 16                          Continue ROM and stretching if appropriate                          Progress Strengthening, Proprioceptive and Neuromuscular Training                          Light Sports if Progressing Well (Chipping/Putting, easy ground strokes etc.)              Week 20                          Continue Strengthening and Stretching                          Initiate Interval Sports Program as Appropriate

## 2024-08-29 NOTE — PROGRESS NOTES
Teresa Mao  tolerated hydration  well with no complications.      Teresa Mao is aware of future appt tomorrow  AVS printed and given to Teresa Mao:  No (Declined by Teresa Mao)

## 2024-08-30 ENCOUNTER — HOSPITAL ENCOUNTER (OUTPATIENT)
Dept: INFUSION CENTER | Facility: HOSPITAL | Age: 69
End: 2024-08-30
Attending: INTERNAL MEDICINE
Payer: MEDICARE

## 2024-08-30 VITALS
OXYGEN SATURATION: 99 % | SYSTOLIC BLOOD PRESSURE: 146 MMHG | HEART RATE: 71 BPM | DIASTOLIC BLOOD PRESSURE: 73 MMHG | RESPIRATION RATE: 18 BRPM | TEMPERATURE: 98.2 F

## 2024-08-30 PROCEDURE — 96360 HYDRATION IV INFUSION INIT: CPT

## 2024-08-30 PROCEDURE — 96361 HYDRATE IV INFUSION ADD-ON: CPT

## 2024-08-30 RX ORDER — SODIUM CHLORIDE 9 MG/ML
250 INJECTION, SOLUTION INTRAVENOUS ONCE
Status: COMPLETED | OUTPATIENT
Start: 2024-09-04 | End: 2024-09-04

## 2024-08-30 RX ADMIN — SODIUM CHLORIDE 250 ML/HR: 0.9 INJECTION, SOLUTION INTRAVENOUS at 08:26

## 2024-08-30 NOTE — PROGRESS NOTES
Teresa Mao  tolerated central line dressing change and IV hydration well with no complications.    Teresa Mao is aware of future appt on 9/4 at 8:30AM.     AVS declined by Teresa Mao.

## 2024-09-03 ENCOUNTER — APPOINTMENT (OUTPATIENT)
Dept: PHYSICAL THERAPY | Facility: CLINIC | Age: 69
End: 2024-09-03
Payer: MEDICARE

## 2024-09-04 ENCOUNTER — OFFICE VISIT (OUTPATIENT)
Age: 69
End: 2024-09-04
Payer: MEDICARE

## 2024-09-04 ENCOUNTER — HOSPITAL ENCOUNTER (OUTPATIENT)
Dept: INFUSION CENTER | Facility: HOSPITAL | Age: 69
Discharge: HOME | End: 2024-09-04
Attending: INTERNAL MEDICINE
Payer: MEDICARE

## 2024-09-04 VITALS
SYSTOLIC BLOOD PRESSURE: 119 MMHG | DIASTOLIC BLOOD PRESSURE: 74 MMHG | OXYGEN SATURATION: 99 % | HEART RATE: 71 BPM | TEMPERATURE: 98.7 F | RESPIRATION RATE: 18 BRPM

## 2024-09-04 VITALS
SYSTOLIC BLOOD PRESSURE: 148 MMHG | HEIGHT: 61 IN | WEIGHT: 136.6 LBS | HEART RATE: 66 BPM | BODY MASS INDEX: 25.79 KG/M2 | DIASTOLIC BLOOD PRESSURE: 78 MMHG

## 2024-09-04 DIAGNOSIS — M54.42 CHRONIC BILATERAL LOW BACK PAIN WITH BILATERAL SCIATICA: ICD-10-CM

## 2024-09-04 DIAGNOSIS — M47.816 LUMBAR SPONDYLOSIS: ICD-10-CM

## 2024-09-04 DIAGNOSIS — M54.16 LUMBAR RADICULOPATHY: ICD-10-CM

## 2024-09-04 DIAGNOSIS — M54.2 NECK PAIN: ICD-10-CM

## 2024-09-04 DIAGNOSIS — M79.18 MYOFASCIAL PAIN SYNDROME: ICD-10-CM

## 2024-09-04 DIAGNOSIS — M54.12 CERVICAL RADICULOPATHY: ICD-10-CM

## 2024-09-04 DIAGNOSIS — G89.4 CHRONIC PAIN SYNDROME: Primary | ICD-10-CM

## 2024-09-04 DIAGNOSIS — Z79.891 LONG-TERM CURRENT USE OF OPIATE ANALGESIC: ICD-10-CM

## 2024-09-04 DIAGNOSIS — G03.9 ARACHNOIDITIS: ICD-10-CM

## 2024-09-04 DIAGNOSIS — G89.29 CHRONIC BILATERAL LOW BACK PAIN WITH BILATERAL SCIATICA: ICD-10-CM

## 2024-09-04 DIAGNOSIS — M54.41 CHRONIC BILATERAL LOW BACK PAIN WITH BILATERAL SCIATICA: ICD-10-CM

## 2024-09-04 DIAGNOSIS — F11.20 UNCOMPLICATED OPIOID DEPENDENCE (HCC): ICD-10-CM

## 2024-09-04 DIAGNOSIS — M47.812 CERVICAL SPONDYLOSIS: ICD-10-CM

## 2024-09-04 DIAGNOSIS — Z98.1 HISTORY OF LUMBAR FUSION: ICD-10-CM

## 2024-09-04 PROCEDURE — 96360 HYDRATION IV INFUSION INIT: CPT

## 2024-09-04 PROCEDURE — G2211 COMPLEX E/M VISIT ADD ON: HCPCS | Performed by: NURSE PRACTITIONER

## 2024-09-04 PROCEDURE — 99214 OFFICE O/P EST MOD 30 MIN: CPT | Performed by: NURSE PRACTITIONER

## 2024-09-04 PROCEDURE — 96361 HYDRATE IV INFUSION ADD-ON: CPT

## 2024-09-04 RX ORDER — HYDROCODONE BITARTRATE AND ACETAMINOPHEN 10; 325 MG/1; MG/1
0.5 TABLET ORAL 2 TIMES DAILY PRN
Qty: 30 TABLET | Refills: 0 | Status: SHIPPED | OUTPATIENT
Start: 2024-10-14

## 2024-09-04 RX ORDER — CYCLOBENZAPRINE HCL 5 MG
5 TABLET ORAL 3 TIMES DAILY PRN
Qty: 90 TABLET | Refills: 1 | Status: SHIPPED | OUTPATIENT
Start: 2024-09-04

## 2024-09-04 RX ORDER — HYDROCODONE BITARTRATE AND ACETAMINOPHEN 5; 325 MG/1; MG/1
1 TABLET ORAL 2 TIMES DAILY PRN
Qty: 60 TABLET | Refills: 0 | Status: SHIPPED | OUTPATIENT
Start: 2024-09-16

## 2024-09-04 RX ORDER — SODIUM CHLORIDE 9 MG/ML
250 INJECTION, SOLUTION INTRAVENOUS ONCE
Status: DISCONTINUED | OUTPATIENT
Start: 2024-09-06 | End: 2024-09-10 | Stop reason: HOSPADM

## 2024-09-04 RX ADMIN — SODIUM CHLORIDE 250 ML/HR: 0.9 INJECTION, SOLUTION INTRAVENOUS at 08:28

## 2024-09-04 NOTE — PROGRESS NOTES
Assessment:  1. Chronic pain syndrome    2. Arachnoiditis    3. History of lumbar fusion    4. Chronic bilateral low back pain with bilateral sciatica    5. Lumbar spondylosis    6. Lumbar radiculopathy    7. Neck pain    8. Cervical radiculopathy    9. Cervical spondylosis    10. Myofascial pain syndrome    11. Long-term current use of opiate analgesic    12. Uncomplicated opioid dependence (HCC)        Plan:  While the patient was in the office today, I did have a thorough conversation regarding their chronic pain syndrome, medication management, and treatment plan options.  Patient is being seen for a follow-up visit.  She is still reporting about 50% improvement in her low back following bilateral L2-3 and L3-4 radiofrequency ablations.  The left was performed on 3/29/2024.  The right was performed on 3/5/2024.    Renewed hydrocodone 5/325 twice daily if needed for pain.  The patient's opioid scripts were sent to their pharmacy electronically and was given a 2 month supply of prescriptions with a Do Not Fill date(s) of 9/16/2024, 10/14/2024.    Continue Flexeril 5 mg 3 times daily if needed for spasms.  A prescription was sent to her pharmacy with a refill.    [place] Prescription Drug Monitoring Program report was reviewed and was appropriate     A urine drug screen was collected at today's office visit as part of our medication management protocol. The point of care testing results were appropriate for what was being prescribed. The specimen will be sent for confirmatory testing. The drug screen is medically necessary because the patient is either dependent on opioid medication or is being considered for opioid medication therapy and the results could impact ongoing or future treatment. The drug screen is to evaluate for the presences or absence of prescribed, non-prescribed, and/or illicit drugs/substances.    There are risks associated with opioid medications, including dependence, addiction and  tolerance. The patient understands and agrees to use these medications only as prescribed. Potential side effects of the medications include, but are not limited to, constipation, drowsiness, addiction, impaired judgment and risk of fatal overdose if not taken as prescribed. The patient was warned against driving while taking sedation medications.  Sharing medications is a felony. At this point in time, the patient is showing no signs of addiction, abuse, diversion or suicidal ideation.    The patient will follow-up in 8 weeks for medication prescription refill and reevaluation. The patient was advised to contact the office should their symptoms worsen in the interim. The patient was agreeable and verbalized an understanding.      The patient will follow-up in 2 months for medication prescription refill and reevaluation. The patient was advised to contact the office should their symptoms worsen in the interim. The patient was agreeable and verbalized an understanding.    History of Present Illness:    The patient is a 68 y.o. female last seen on 7/11/2024 who presents for a follow up office visit in regards to chronic pain secondary to chronic pain syndrome, arachnoiditis, chronic low back pain, lumbar spondylosis, history of lumbar fusion, neck pain, cervical radiculopathy, cervical spondylosis.  The patient currently reports complaints of neck pain and upper extremity pain, mid back pain, low back pain and lower extremity pain.  Current pain level is a 5/10.  Quality of pain is described as burning, cramping, pressure-like, shooting, numb, pins-and-needles.    Current pain medications includes: Hydrocodone 5/325 twice daily if needed for pain, Flexeril 5 mg 3 times daily if needed for spasms.  The patient reports that this regimen is providing 40-50% pain relief.  The patient is reporting no side effects from this pain medication regimen.    Pain Contract Signed: 5/1/24  Last Urine Drug Screen:9/4/24    I have  "personally reviewed and/or updated the patient's past medical history, past surgical history, family history, social history, current medications, allergies, and vital signs today.       Review of Systems:    Review of Systems   Constitutional:  Negative for unexpected weight change.   HENT:  Negative for hearing loss.    Eyes:  Negative for visual disturbance.   Respiratory:  Positive for shortness of breath.    Cardiovascular:  Negative for leg swelling.   Gastrointestinal:  Positive for diarrhea and nausea. Negative for constipation.   Endocrine: Negative for polyuria.   Genitourinary:  Negative for difficulty urinating.   Musculoskeletal:  Negative for gait problem, joint swelling and myalgias.        Decreased range of motion  Joint stiffness  Pain in extremity- R leg to calf, L leg to calf     Skin:  Negative for rash.   Neurological:  Positive for dizziness. Negative for weakness and headaches.        Memory loss   Psychiatric/Behavioral:  Negative for decreased concentration.    All other systems reviewed and are negative.        Past Medical History:   Diagnosis Date    Allergic rhinitis     Anxiety     Asthma     Back pain     Cardiac disease     Cardiopathy     EF 45%    Cough     Diverticulitis     Factor V Leiden (HCC)     Fibromyalgia     GERD (gastroesophageal reflux disease)     Hashimoto's thyroiditis     Hx of degenerative disc disease     Hypertension     Hypotension     pots - postural orthostatic hypotension    Interstitial cystitis     Irregular heart beat     LBBB    Irritable bowel syndrome     Migraines     Mitral valve disease     \"thickening\"    Myocardial infarction ([facility])     possible but not sure when    Neuropathy     bilateral legs    Osteoporosis     Postural orthostatic tachycardia syndrome     must drink a lot of water and salt    Pott's disease     Rheumatic fever 1967    Scoliosis     Sepsis ([facility]) 2021    associated with PICC line    Sjogren's syndrome ([facility])     TMJ (dislocation " of temporomandibular joint)        Past Surgical History:   Procedure Laterality Date    ABLATION MICROWAVE Left     lumbar area    BACK SURGERY  10/1998     SECTION  1992    CHOLECYSTECTOMY  2016    COLONOSCOPY      DILATION AND CURETTAGE OF UTERUS      EGD  2016    FOOT SURGERY  1968    removal of bone and neuroma    HYSTERECTOMY N/A     age 39    IR OTHER  2022    IR OTHER  2022    IR OTHER  2023    IR OTHER  2024    IR PICC PLACEMENT SINGLE LUMEN  10/02/2020    IR PICC PLACEMENT SINGLE LUMEN  2021    IR PICC REPOSITION  2021    IR TUNNELED CENTRAL LINE PLACEMENT  2022    LAPAROSCOPY      endometriosis    MOUTH BIOPSY      lip    OOPHORECTOMY Bilateral     age 39    UT EGD TRANSORAL BIOPSY SINGLE/MULTIPLE N/A 2019    Procedure: ESOPHAGOGASTRODUODENOSCOPY (EGD) with multiple bx and dilation;  Surgeon: Peter Baldwin MD;  Location:  GI LAB;  Service: Gastroenterology    UT SURGICAL ARTHROSCOPY SHOULDER W/ROTATOR CUFF RPR Right 2022    Procedure: SHOULDER ARTHROSCOPIC ROTATOR CUFF REPAIR Biceps Tenodisis;  Surgeon: Ilya Munoz MD;  Location: [facility] MAIN OR;  Service: Orthopedics    UT SURGICAL ARTHROSCOPY SHOULDER W/ROTATOR CUFF RPR Left 5/3/2024    Procedure: SHOULDER ARTHROSCOPIC ROTATOR CUFF REPAIR, SUBACROMIAL DECOMPRESSION;  Surgeon: Ilya Munoz MD;  Location: [facility] MAIN OR;  Service: Orthopedics    TUNNELED VENOUS CATHETER PLACEMENT         Family History   Problem Relation Age of Onset    Cancer Mother         bladder    No Known Problems Father     Thyroid cancer Sister     Heart attack Sister     No Known Problems Sister     No Known Problems Sister     No Known Problems Sister     No Known Problems Sister     No Known Problems Daughter     No Known Problems Daughter     No Known Problems Maternal Grandmother     Colon cancer Maternal Grandfather     No Known Problems Paternal Grandmother      No Known Problems Paternal Grandfather     Breast cancer Maternal Aunt         over age 50    Endometrial cancer Maternal Aunt     Multiple myeloma Maternal Aunt     Hypertension Paternal Aunt     Brain cancer Niece     Lymphoma Niece     Breast cancer additional onset Neg Hx     BRCA2 Positive Neg Hx     Ovarian cancer Neg Hx     BRCA2 Negative Neg Hx     BRCA1 Positive Neg Hx     BRCA1 Negative Neg Hx     BRCA 1/2 Neg Hx        Social History     Occupational History    Not on file   Tobacco Use    Smoking status: Never    Smokeless tobacco: Never   Vaping Use    Vaping status: Never Used   Substance and Sexual Activity    Alcohol use: Never    Drug use: No    Sexual activity: Not on file         Current Outpatient Medications:     aluminum hydroxide-magnesium carbonate (Gaviscon Extra Strength) 160-105 mg per chewable tablet, , Disp: , Rfl:     azelastine (ASTELIN) 0.1 % nasal spray, 2 sprays into each nostril 2 (two) times a day Use in each nostril as directed, Disp: , Rfl:     beclomethasone (QVAR) 40 MCG/ACT inhaler, Inhale 1 puff daily as needed (only when unable to nebulize pulmicort) Rinse mouth after use., Disp: , Rfl:     budesonide (PULMICORT) 0.5 mg/2 mL nebulizer solution, Take 0.5 mg by nebulization 2 (two) times a day Rinse mouth after use., Disp: , Rfl:     cromolyn (GASTROCROM) 100 MG/5ML solution, Take 100 mg by mouth 4 (four) times a day (before meals and at bedtime), Disp: , Rfl:     cyclobenzaprine (FLEXERIL) 5 mg tablet, Take 1 tablet (5 mg total) by mouth 3 (three) times a day as needed for muscle spasms, Disp: 90 tablet, Rfl: 1    docusate sodium (COLACE) 100 mg capsule, Take 100 mg by mouth daily as needed for constipation, Disp: , Rfl:     erythromycin (ILOTYCIN) ophthalmic ointment, Apply 1 application to eye daily at bedtime, Disp: , Rfl:     famotidine (PEPCID) 40 MG tablet, Twice a day, Disp: , Rfl:     fexofenadine (ALLEGRA) 180 MG tablet, Take 180 mg by mouth 2 (two) times a day ,  Disp: , Rfl:     fluticasone (FLONASE) 50 mcg/act nasal spray, 2 sprays into each nostril daily , Disp: , Rfl:     Galcanezumab-gnlm (Emgality) 120 MG/ML SOAJ, Inject under the skin every 30 (thirty) days , Disp: , Rfl:     guaiFENesin (Mucinex) 600 mg 12 hr tablet, Take 600 mg by mouth every 12 (twelve) hours, Disp: , Rfl:     Heating Pad PADS, Use if needed (pain), Disp: , Rfl:     [START ON 10/14/2024] HYDROcodone-acetaminophen (NORCO)  mg per tablet, Take 0.5 tablets by mouth 2 (two) times a day as needed for moderate pain Max Daily Amount: 1 tablet Do not start before October 14, 2024., Disp: 30 tablet, Rfl: 0    [START ON 9/16/2024] HYDROcodone-acetaminophen (NORCO) 5-325 mg per tablet, Take 1 tablet by mouth 2 (two) times a day as needed for pain Max Daily Amount: 2 tablets Do not start before September 16, 2024., Disp: 60 tablet, Rfl: 0    hydrOXYzine (ATARAX) 10 mg/5 mL syrup, , Disp: , Rfl:     ipratropium (ATROVENT) 0.02 % nebulizer solution, Take 0.5 mg by nebulization every 6 (six) hours as needed for wheezing or shortness of breath, Disp: , Rfl:     ipratropium (ATROVENT) 0.06 % nasal spray, PLEASE SEE ATTACHED FOR DETAILED DIRECTIONS, Disp: , Rfl:     ivabradine HCl (Corlanor) 5 MG tablet, 7.5 mg 2 (two) times a day, Disp: , Rfl:     KETOTIFEN FUMARATE OP, Take 1mg compounded capsule three times a day, Disp: , Rfl:     levalbuterol (XOPENEX HFA) 45 mcg/act inhaler, Inhale 2 puffs every 4 (four) hours as needed (when unable to nebulize), Disp: , Rfl:     levalbuterol (XOPENEX) 1.25 mg/3 mL nebulizer solution, Take 1.25 mg by nebulization every 6 (six) hours as needed , Disp: , Rfl: 2    Magnesium 400 MG CAPS, Take 1 capsule by mouth 2 (two) times a day , Disp: , Rfl:     MULTIPLE VITAMINS-CALCIUM PO, Take 1 capsule by mouth every morning , Disp: , Rfl:     naloxone (NARCAN) 4 mg/0.1 mL nasal spray, Administer 1 spray into a nostril. If no response after 2-3 minutes, give another dose in the other  nostril using a new spray., Disp: 1 each, Rfl: 0    NON FORMULARY, Take 1 mg by mouth 3 (three) times a day Ketotifen 1 mg compounded capsule, Disp: , Rfl:     NON FORMULARY, LACTOBACILLUS COMBINATION NO.4 (PROBIOTIC) 3 BILLION CELL CAPSULE, Disp: , Rfl:     NON FORMULARY, Take 32 mg by mouth NATURE-THROID ORAL, Disp: , Rfl:     omega-3-acid ethyl esters (LOVAZA) 1 g capsule, Take 2 g by mouth 2 (two) times a day 1600mg daily, Disp: , Rfl:     polyethylene glycol (MIRALAX) 17 g packet, Take 17 g by mouth daily as needed , Disp: , Rfl:     Probiotic Product (PROBIOTIC DAILY PO), Take 1 tablet by mouth daily At lunch, Disp: , Rfl:     Qvar RediHaler 40 MCG/ACT inhaler, , Disp: , Rfl:     sodium chloride, Inject 1,500 mL into a catheter in a vein, Disp: , Rfl:     sucralfate (CARAFATE) 1 g/10 mL suspension, Take 1 g by mouth 2 (two) times a day, Disp: , Rfl:     Thyroid, Porcine, POWD, Use 32 mg daily, Disp: , Rfl:     Ubrogepant (UBRELVY) 100 MG tablet, Take 100 mg by mouth Take 1 tablet (100 mg) one time as needed for migraine. May repeat one additional tablet (100 mg) at least two hours after the first dose. Do not use more than two doses per day, or for more than eight days per month., Disp: , Rfl:     verapamil (CALAN) 40 mg tablet, , Disp: , Rfl:   No current facility-administered medications for this visit.    Facility-Administered Medications Ordered in Other Visits:     [START ON 9/5/2024] sodium chloride 0.9 % bolus 1,500 mL, 1,500 mL, Intravenous, Once, Jan Doyle MD    sodium chloride 0.9 % infusion, 250 mL/hr, Intravenous, Once, Jan Doyle MD    Allergies   Allergen Reactions    Imipramine Confusion, Fatigue, Irritability, Palpitations, Shortness Of Breath, Tachycardia and Visual Disturbance    Lactose - Food Allergy Shortness Of Breath    Melatonin Shortness Of Breath    Mirtazapine Anxiety, Dizziness, GI Intolerance, Nausea Only, Palpitations and Shortness Of Breath    Nexium [Esomeprazole] Shortness Of  Breath    Nsaids Shortness Of Breath, Cough, Other (See Comments), Nausea Only, GI Intolerance, Tachycardia and Wheezing    Propofol Cough, Other (See Comments) and Shortness Of Breath    Singulair [Montelukast] Shortness Of Breath and Cough    Zomig [Zolmitriptan] Shortness Of Breath    Dexilant [Dexlansoprazole] Nausea Only and Vomiting    Albuterol     Ambien [Zolpidem]     Amitriptyline Drowsiness    Ascorbic Acid GI Intolerance    Aspirin     Bactrim [Sulfamethoxazole-Trimethoprim] Hives    Banana - Food Allergy Dermatitis    Banana Extract Allergy Skin Test - Food Allergy Dermatitis    Bisoprolol Other (See Comments)    Ceftin [Cefuroxime]     Celebrex [Celecoxib]     Ciprofloxacin     Cranberry-C [Ascorbate - Food Allergy] GI Intolerance    Demerol [Meperidine]     Duloxetine Other (See Comments)     tachycardia    Epinephrine Dizziness    Ergotamine Nausea Only and Headache    Fludrocortisone Other (See Comments)    Keflex [Cephalexin]     Klonopin [Clonazepam] Nausea Only and Dizziness    Latex Hives    Levaquin [Levofloxacin]     Lexapro [Escitalopram]     Lyrica [Pregabalin] Fatigue    Macrodantin [Nitrofurantoin]     Metoprolol Other (See Comments)    Metronidazole Other (See Comments)    Molds & Smuts GI Intolerance    Morphine Nausea Only    Movantik [Naloxegol] Nausea Only    Mushroom Extract Complex - Food Allergy      Eye itchy, asthma  attack    Naprosyn [Naproxen]     Neurontin [Gabapentin] Dizziness    Pineapple - Food Allergy GI Intolerance    Plecanatide Abdominal Pain, Diarrhea and GI Intolerance    Prolia [Denosumab]     Prozac [Fluoxetine]     Serevent [Salmeterol] Dizziness    Soy Allergy - Food Allergy Vomiting    Sudafed [Pseudoephedrine] Hives    Sulfa Antibiotics     Tomato - Food Allergy GI Intolerance    Trazodone     Ultram [Tramadol] Nausea Only, Dizziness and Headache    Vilazodone Dizziness, GI Intolerance and Headache    Vilazodone Hcl Abdominal Pain, Dizziness, GI Intolerance,  "Headache and Other (See Comments)    Vioxx [Rofecoxib]     Vortioxetine Drowsiness, Fatigue, GI Intolerance and Irritability    Wheat Bran - Food Allergy Other (See Comments)     Stomach pain    Zithromax [Azithromycin] Hives    Zoloft [Sertraline]     Zantac [Ranitidine] Rash and Dizziness       Physical Exam:    /78 (BP Location: Right arm, Patient Position: Sitting, Cuff Size: Standard)   Pulse 66   Ht 5' 1\" (1.549 m)   Wt 62 kg (136 lb 9.6 oz)   BMI 25.81 kg/m²     Constitutional:normal, well developed, well nourished, alert, in no distress and non-toxic and no overt pain behavior.  Eyes:anicteric  HEENT:grossly intact  Neck:supple, symmetric, trachea midline and no masses   Pulmonary:even and unlabored  Cardiovascular:No edema or pitting edema present  Skin:Normal without rashes or lesions and well hydrated  Psychiatric:Mood and affect appropriate  Neurologic:Cranial Nerves II-XII grossly intact  Musculoskeletal: It is slow and guarded.      Imaging  No orders to display         Orders Placed This Encounter   Procedures    MM DT_Alprazolam Definitive Test    MM DT_Amphetamine Definitive Test    MM DT_Aripiprazole Definitive Test    MM ALL_Prescribed Meds and Special Instructions    MM DT_Buprenorphine Definitive Test    MM DT_Butalbital Definitive Test    MM DT_Clonazepam Definitive Test    MM DT_Clozapine Definitive Test    MM DT_Cocaine Definitive Test    MM DT_Codeine Definitive Test    MM DT_Desipramine Definitive Test    MM Diazepam Definitive Test    MM DT_Ethyl Glucuronide/Ethyl Sulfate Definitive Test    MM DT_Fentanyl Definitive Test    MM DT_Haloperidol Definitive Test    MM DT_Heroin Definitive Test    MM DT_Hydrocodone Definitive Test    MM DT_Hydromorphone Definitive Test    MM DT_Kratom Definitive Test    MM DT_Levorphanol Definitive Test    MM Lorazepam Definitive Test    MM DT_MDMA Definitive Test    MM DT_Meperidine Definitive Test    MM DT_Methadone Definitive Test    MM " DT_Methamphetamine Definitive Test    MM DT_Methylphenidate Definitive Test    MM DT_Morphine Definitive Test    MM DT_Olanzapine Definitive Test    MM DT_Oxazepam Definitive Test    MM DT_Oxycodone Definitive Test    MM DT_Oxymorphone Definitive Test    MM DT_Phencyclidine Definitive Test    MM DT_Phenobarbital Definitive Test    MM DT_Phentermine Definitive Test    MM DT_Pregablin Definitive    MM DT_Quetiapine Definitive Test    MM DT_Risperidone Definitive Test    MM DT_Secobarbital Definitive Test    MM DT_Tapentadol Definitive Test    MM DT_Temazapam Definitive Test    MM DT_THC Definitive Test    MM DT_Tramadol Definitive Test    MM DT_Validity Creatinine    MM DT_Validity Oxidant    MM DT_Validity pH    MM DT_Validity Specific

## 2024-09-04 NOTE — PROGRESS NOTES
Teresa Mao  tolerated IV hydration well with no complications.      Teresa Mao is aware of future appt tomorrow at 8:30AM    AVS declined by Teresa Mao.  
No

## 2024-09-05 ENCOUNTER — OFFICE VISIT (OUTPATIENT)
Dept: PHYSICAL THERAPY | Facility: CLINIC | Age: 69
End: 2024-09-05
Payer: MEDICARE

## 2024-09-05 ENCOUNTER — HOSPITAL ENCOUNTER (OUTPATIENT)
Dept: INFUSION CENTER | Facility: HOSPITAL | Age: 69
End: 2024-09-05
Attending: INTERNAL MEDICINE
Payer: MEDICARE

## 2024-09-05 VITALS
OXYGEN SATURATION: 97 % | HEART RATE: 66 BPM | DIASTOLIC BLOOD PRESSURE: 65 MMHG | TEMPERATURE: 97.3 F | RESPIRATION RATE: 18 BRPM | SYSTOLIC BLOOD PRESSURE: 144 MMHG

## 2024-09-05 DIAGNOSIS — Z98.890 STATUS POST LEFT ROTATOR CUFF REPAIR: Primary | ICD-10-CM

## 2024-09-05 PROCEDURE — 97110 THERAPEUTIC EXERCISES: CPT

## 2024-09-05 PROCEDURE — 96360 HYDRATION IV INFUSION INIT: CPT

## 2024-09-05 PROCEDURE — 96361 HYDRATE IV INFUSION ADD-ON: CPT

## 2024-09-05 RX ADMIN — SODIUM CHLORIDE 1500 ML: 0.9 INJECTION, SOLUTION INTRAVENOUS at 08:56

## 2024-09-05 NOTE — PROGRESS NOTES
Daily Note     Today's date: 2024  Patient name: Teresa Mao  : 1955  MRN: [id number]  Referring provider: Ilya Munoz*  Dx:   Encounter Diagnosis     ICD-10-CM    1. Status post left rotator cuff repair  Z98.890                      Subjective:  Pt. states she just had a breathing treatment before she came for her therapy visit.  States she will do what she can today re: performing exercises.      Objective: See treatment diary below      Assessment: Tolerated treatment Fairly Well overall.  Select exer adjusted to pt's tolerance.  Patient would benefit from continued PT.      Plan: Progress treament per protocol.              Precautions: see extensive PMH, ABRAMS,     FOLLOW PROTOCOL**, sling to be worn 6 weeks.    Re-eval Date: 24      Re-eval Date: 24  DOS:  5/3/24                                                                              12 weeks                                          10 weeks  Date 8. 9.5 8.5 8.20   Visit Count 21 22 23 19 20   FOTO        Pain In 0/10 0/10 6-7/10   L shld/ UT /scap 0/10   0/10   Pain Out 0/10 No negative change Less after CP applic 1-2/10 No negative change 0/10       Manuals 8. 9.5 8.5 8.20   PROM LUE                                Neuro Re-Ed 8.  9.5 8.5 8.20   MTPs/LTPs Red 25x ea Green 25x ea Green 15x ea Red 20x ea   Red 25x ea   punch Red 25x ea Green 25x Green 15x ea Red 20x    Red 25x ea   BUE ER Red 25x ea Green 25x Green 15x ea Red 20x    Red 25x ea   IR w/TB Red 25x ea Green 25x Green 15x ea Red 20x    Red 25x ea                           Ther Ex 8. 9.5 8.5 8.20   ultigrip D/C         Ball squeeze D/C       Wrist AROM DC      DC   L elbow ROM 4#  x40 AROM flexion and ext 4# X40 AROM flexion and ext 4# x20 AROM flexion and ext 4# X30 AROM flexion and ext 4# x40 AROM flexion and ext   Pendulum ex *HEP       UT/LS stretch DC    DC to HEP    DC to HEP       Cerv ROM DC    DC to HEP   Scap isometrics       "DC   pulleys pulleys    4 min FF pulleys     4 min FF pulleys   4.5 min 4 min FF 4 min FF   Finger ladder finger ladder  #27  10 reps FF finger ladder  #27  10 reps FF finger ladder  #27  10 reps FF finger ladder  #27  10 reps FF finger ladder  #27  10 reps FF   Wall slides  20x FF   20x cw/ccw  15x HA/ADD 20x FF   20x cw/ccw  20x HA/ADD 15x FF   15x cw/ccw  15x HA/ADD 12x FF   12x cw/ccw  12x HA/ADD 20x FF   20x cw/ccw  12x HA/ADD   Wand ex *Will Resume NV, as per pt request Standing  FF, ABD, IR, EXT   15x ea  Standing  FF, ABD, IR, EXT   15x ea Standing  FF, ABD, IR, EXT   15x ea   Active flex/abd 3 x 10 ea 3x 10x ea 20x ea 25x 25x   Full can *Will Resume NV, as per pt request *Held   *Held 25x 25x   SL ER      *resume NV,    Supine shld flex/ER w/wand *Will Resume NV, as per pt request *Held  2#  15 x 5\" LUE only 2#  15 x 5\" LUE only   Prone flex, HA, ext rows    *resume NV, if breathing is better NP                                                                   Ther Activity                        Gait Training            **Self Care/Pt. Educ/  Skilled convers  10 min            Modalities 8.27  9.5 8.5 8.20    Pt. Deferred offer for CP applic  Cryo 10 min with no adverse reaction *Pt. deferred Pt. deferred               Rotator Cuff Repair Rehabilitation Protocol  (adapted from Milana PJ et al. “Rehabilitation of the Rotator Cuff: An Evaluation Based Approach”. JAAOS 2006; 14:599-609)  Updated 10.14   Ilya Munoz MD  [facility]  [address] [place] [address] [place] [address]  [phone]  Phase 1 (Weeks 0-6)              Immediate Postoperative Period              Goals:                          Diminish Pain and Inflammation  Maintain and Protect Integrity of the Repair                          Gradually Increase Passive ROM (NO Active or Active Assist until Week 6)                          Become Independent with Modified ADLs              Precautions:  Maintain Arm in " Abduction Sling, Remove Only for Directed Exercises (may remove Abduction Pillow after Day 21 for comfort)                          Keep Incisions Clean & Dry (okay to shower in 48 hours, band-aids over incisions)  No Immersion (pool) until Wounds Totally Sealed (usually not prior to day #10)  Passive Shoulder Motion ONLY, No Lifting/Holding Objects, Reaching Behind Back  Okay to Type/Write at Desktop with Arm in Sling              Day 0-6                          Elbow, Wrist, Hand AROM Exercises                           Start Cervical AROM and Scapula Isometrics                          Cryotherapy/Ice for Pain and Inflammation                          Instruct in Hygiene, Posture, and Positioning               Day 7-28                          Continue Above  May Start Pendulum Exercises                          May Start Supine, Pain Free, PT assisted PROM  Forward Flexion to 90°, External Rotation to 35° (Elbow at side), Internal Rotation to Body, Abduction to 60°                          Can Introduce light Cardio (Walking, Stationary Bike)                          Aqua Therapy may begin at week 3 (day 21) as long as no wound problems              Day 29-42                          Continue Above  Progress PROM to Goal of full PROM by Week 6.  May add Gentle Mobilizations (GH and Scapulothoracic) to Regain full PROM if Needed.  May add Heat prior to PROM Exercises, Ice after Exercises  May Begin AAROM at Day 29 if ROM is Appropriate in Anticipation of AROM Starting at Week 6              Criteria to Progress to Phase 2                          Reasonable Passive Forward Flexion, Abduction , IR/ER                          Time  Phase 2 (Weeks 6-12)              Protection and Active Motion              Goals of Phase:                          Allow Healing of Soft Tissues                          Decrease Pain and Inflammation  Add ADLs and Regain AROM by End of Phase              Precautions:                           NO STRENGTHENING until Phase 3                          Repair is Most Prone to Failure during this Phase!                          No Lifting Objects > 2 lbs (Coffee Cup OK), no Sudden Motions                          Avoid Upper Extremity Bike and Ergometer              Day 43-56                          Discontinue Sling  Initiate AROM Exercises (forward flexion, ER, IR and abduction), Rotator Cuff Isometrics                          Continue Periscapular Exercises, add Stretching if PROM Lacking   No Strengthening until Week 12 (Minimum Time Needed for Cuff Healing Sufficient to withstand Strengthening)                Phase 3 (Weeks 12-16)              Early Strengthening              Goal of Phase:                          Gain full AROM, Maintain PROM                          Gradual return of Shoulder Strength, Power and Endurance                          Gradual return to Functional Activities              Precautions:                          No Lifting > 10lbs, Sudden Lifting or Pushing activities, Overhead Lifting                          No Upper Extremity Bike or Ergometer              Week 12                          Initiate Strengthening Program (10 lb Maximum until Phase 4)                            ER/IR with Bands (Standing)                            ER in Lateral Decubitius Position                            Lateral Raises                            Full Can in Scapular Plane                            Prone Rowing, Horizontal Abduction, Extension                            Elbow Flexion/Extension              Week 14-16                          Initiate light Functional Activities as Permitted  Progress to Fundamental Shoulder Exercises  Phase 4 (Variable but Weeks 16-24)              Aggressive Rehab  Sport Specific or Activity Specific               Goals of Phase:                          Maintain Full Pain-free AROM                          Advanced Conditioning Exercises for  enhanced Functional use                          Continue regaining Shoulder Strength, Power and Endurance                          Eventual return to full Functional Activities              Precautions:                          None              Week 16                          Continue ROM and stretching if appropriate                          Progress Strengthening, Proprioceptive and Neuromuscular Training                          Light Sports if Progressing Well (Chipping/Putting, easy ground strokes etc.)              Week 20                          Continue Strengthening and Stretching                          Initiate Interval Sports Program as Appropriate

## 2024-09-05 NOTE — PROGRESS NOTES
Teresa Mao  tolerated IV hydration well with no complications.      Teresa Mao is aware of future appt on 9/6 at 8AM.     AVS declined by Teresa Mao.

## 2024-09-06 ENCOUNTER — HOSPITAL ENCOUNTER (OUTPATIENT)
Dept: INFUSION CENTER | Facility: HOSPITAL | Age: 69
Discharge: HOME | End: 2024-09-06
Attending: INTERNAL MEDICINE

## 2024-09-06 VITALS
SYSTOLIC BLOOD PRESSURE: 150 MMHG | DIASTOLIC BLOOD PRESSURE: 73 MMHG | HEART RATE: 68 BPM | RESPIRATION RATE: 18 BRPM | OXYGEN SATURATION: 99 % | TEMPERATURE: 97.7 F

## 2024-09-06 LAB
6MAM UR QL CFM: NEGATIVE NG/ML
7AMINOCLONAZEPAM UR QL CFM: NEGATIVE NG/ML
A-OH ALPRAZ UR QL CFM: NEGATIVE NG/ML
ACCEPTABLE CREAT UR QL: ABNORMAL MG/DL
ACCEPTIBLE SP GR UR QL: NORMAL
AMPHET UR QL CFM: NEGATIVE NG/ML
AMPHET UR QL CFM: NEGATIVE NG/ML
BUPRENORPHINE UR QL CFM: NEGATIVE NG/ML
BUTALBITAL UR QL CFM: NEGATIVE NG/ML
BZE UR QL CFM: NEGATIVE NG/ML
CODEINE UR QL CFM: NEGATIVE NG/ML
DESIPRAMINE UR QL CFM: NEGATIVE NG/ML
EDDP UR QL CFM: NEGATIVE NG/ML
ETHYL GLUCURONIDE UR QL CFM: NEGATIVE NG/ML
ETHYL SULFATE UR QL SCN: NEGATIVE NG/ML
FENTANYL UR QL CFM: NEGATIVE NG/ML
GLIADIN IGG SER IA-ACNC: NEGATIVE NG/ML
GLUCOSE 30M P 50 G LAC PO SERPL-MCNC: NEGATIVE NG/ML
HYDROCODONE UR QL CFM: NORMAL NG/ML
HYDROCODONE UR QL CFM: NORMAL NG/ML
HYDROMORPHONE UR QL CFM: NORMAL NG/ML
LORAZEPAM UR QL CFM: NEGATIVE NG/ML
MDMA UR QL CFM: NEGATIVE NG/ML
ME-PHENIDATE UR QL CFM: NEGATIVE NG/ML
MEPERIDINE UR QL CFM: NEGATIVE NG/ML
METHADONE UR QL CFM: NEGATIVE NG/ML
METHAMPHET UR QL CFM: NEGATIVE NG/ML
MORPHINE UR QL CFM: NEGATIVE NG/ML
MORPHINE UR QL CFM: NEGATIVE NG/ML
NITRITE UR QL: NORMAL UG/ML
NORBUPRENORPHINE UR QL CFM: NEGATIVE NG/ML
NORDIAZEPAM UR QL CFM: NEGATIVE NG/ML
NORFENTANYL UR QL CFM: NEGATIVE NG/ML
NORHYDROCODONE UR QL CFM: NORMAL NG/ML
NORHYDROCODONE UR QL CFM: NORMAL NG/ML
NORMEPERIDINE UR QL CFM: NEGATIVE NG/ML
NOROXYCODONE UR QL CFM: NEGATIVE NG/ML
OLANZAPINE QUANTIFICATION: NEGATIVE NG/ML
OPC-3373 QUANTIFICATION: NEGATIVE NG/ML
OXAZEPAM UR QL CFM: NEGATIVE NG/ML
OXYCODONE UR QL CFM: NEGATIVE NG/ML
OXYMORPHONE UR QL CFM: NEGATIVE NG/ML
OXYMORPHONE UR QL CFM: NEGATIVE NG/ML
PARA-FLUOROFENTANYL QUANTIFICATION: NORMAL NG/ML
PCP UR QL CFM: NEGATIVE NG/ML
PHENOBARB UR QL CFM: NEGATIVE NG/ML
RESULT ALL_PRESCRIBED MEDS AND SPECIAL INSTRUCTIONS: NORMAL
SECOBARBITAL UR QL CFM: NEGATIVE NG/ML
SL AMB 7-OH-MITRAGYNINE (KRATOM ALKALOID) QUANTIFICATION: NEGATIVE NG/ML
SL AMB ACETYL FENTANYL QUANTIFICATION: NORMAL NG/ML
SL AMB ACETYL NORFENTANYL QUANTIFICATION: NORMAL NG/ML
SL AMB ACRYL FENTANYL QUANTIFICATION: NORMAL NG/ML
SL AMB CARFENTANIL QUANTIFICATION: NORMAL NG/ML
SL AMB CLOZAPINE QUANTIFICATION: NEGATIVE NG/ML
SL AMB CTHC (MARIJUANA METABOLITE) QUANTIFICATION: NEGATIVE NG/ML
SL AMB DEXTRORPHAN (DEXTROMETHORPHAN METABOLITE) QUANT: ABNORMAL NG/ML
SL AMB HALOPERIDOL  QUANTIFICATION: NEGATIVE NG/ML
SL AMB HALOPERIDOL METABOLITE QUANTIFICATION: NEGATIVE NG/ML
SL AMB HYDROXYRISPERIDONE QUANTIFICATION: NEGATIVE NG/ML
SL AMB N-DESMETHYL-TRAMADOL QUANTIFICATION: NEGATIVE NG/ML
SL AMB N-DESMETHYLCLOZAPINE QUANTIFICATION: NEGATIVE NG/ML
SL AMB NORQUETIAPINE QUANTIFICATION: NEGATIVE NG/ML
SL AMB PHENTERMINE QUANTIFICATION: NEGATIVE NG/ML
SL AMB PREGABALIN QUANTIFICATION: NEGATIVE NG/ML
SL AMB QUETIAPINE QUANTIFICATION: NEGATIVE NG/ML
SL AMB RISPERIDONE QUANTIFICATION: NEGATIVE NG/ML
SL AMB RITALINIC ACID QUANTIFICATION: NEGATIVE NG/ML
SPECIMEN PH ACCEPTABLE UR: NORMAL
TAPENTADOL UR QL CFM: NEGATIVE NG/ML
TEMAZEPAM UR QL CFM: NEGATIVE NG/ML
TEMAZEPAM UR QL CFM: NEGATIVE NG/ML
TRAMADOL UR QL CFM: NEGATIVE NG/ML
URATE/CREAT 24H UR: NEGATIVE NG/ML

## 2024-09-06 NOTE — PROGRESS NOTES
Teresa Mao  tolerated hydration well with no complications.      Teresa Mao is aware of future appt on 9/11 0830    AVS printed and given to Teresa Mao:   No (Declined by Teresa Mao)

## 2024-09-09 RX ORDER — SODIUM CHLORIDE 9 MG/ML
250 INJECTION, SOLUTION INTRAVENOUS ONCE
Status: COMPLETED | OUTPATIENT
Start: 2024-09-11 | End: 2024-09-11

## 2024-09-10 RX ORDER — SODIUM CHLORIDE 9 MG/ML
250 INJECTION, SOLUTION INTRAVENOUS ONCE
Status: COMPLETED | OUTPATIENT
Start: 2024-09-12 | End: 2024-09-12

## 2024-09-11 ENCOUNTER — HOSPITAL ENCOUNTER (OUTPATIENT)
Dept: INFUSION CENTER | Facility: HOSPITAL | Age: 69
Discharge: HOME | End: 2024-09-11
Attending: INTERNAL MEDICINE
Payer: MEDICARE

## 2024-09-11 VITALS
RESPIRATION RATE: 18 BRPM | TEMPERATURE: 97.9 F | SYSTOLIC BLOOD PRESSURE: 134 MMHG | OXYGEN SATURATION: 98 % | DIASTOLIC BLOOD PRESSURE: 87 MMHG | HEART RATE: 84 BPM

## 2024-09-11 PROCEDURE — 96361 HYDRATE IV INFUSION ADD-ON: CPT

## 2024-09-11 PROCEDURE — 96360 HYDRATION IV INFUSION INIT: CPT

## 2024-09-11 RX ORDER — SODIUM CHLORIDE 9 MG/ML
250 INJECTION, SOLUTION INTRAVENOUS ONCE
Status: COMPLETED | OUTPATIENT
Start: 2024-09-13 | End: 2024-09-13

## 2024-09-11 RX ADMIN — SODIUM CHLORIDE 250 ML/HR: 0.9 INJECTION, SOLUTION INTRAVENOUS at 08:38

## 2024-09-11 NOTE — PROGRESS NOTES
Teresa Mao  tolerated hydration treatment well with no complications.      Teresa Mao is aware of future appt on 9/12 at 8:30AM.     AVS printed and given to Teresa Mao: No (Declined by Teresa Mao).

## 2024-09-12 ENCOUNTER — OFFICE VISIT (OUTPATIENT)
Dept: PHYSICAL THERAPY | Facility: CLINIC | Age: 69
End: 2024-09-12
Payer: MEDICARE

## 2024-09-12 ENCOUNTER — HOSPITAL ENCOUNTER (OUTPATIENT)
Dept: INFUSION CENTER | Facility: HOSPITAL | Age: 69
End: 2024-09-12
Attending: INTERNAL MEDICINE
Payer: MEDICARE

## 2024-09-12 VITALS
TEMPERATURE: 97.6 F | SYSTOLIC BLOOD PRESSURE: 147 MMHG | HEART RATE: 76 BPM | RESPIRATION RATE: 18 BRPM | DIASTOLIC BLOOD PRESSURE: 81 MMHG | OXYGEN SATURATION: 98 %

## 2024-09-12 DIAGNOSIS — Z98.890 STATUS POST LEFT ROTATOR CUFF REPAIR: Primary | ICD-10-CM

## 2024-09-12 PROCEDURE — 96361 HYDRATE IV INFUSION ADD-ON: CPT

## 2024-09-12 PROCEDURE — 97110 THERAPEUTIC EXERCISES: CPT

## 2024-09-12 PROCEDURE — 96360 HYDRATION IV INFUSION INIT: CPT

## 2024-09-12 RX ORDER — SODIUM CHLORIDE 9 MG/ML
250 INJECTION, SOLUTION INTRAVENOUS ONCE
Status: COMPLETED | OUTPATIENT
Start: 2024-09-16 | End: 2024-09-16

## 2024-09-12 RX ADMIN — SODIUM CHLORIDE 250 ML/HR: 0.9 INJECTION, SOLUTION INTRAVENOUS at 08:31

## 2024-09-12 NOTE — PROGRESS NOTES
"Daily Note     Today's date: 2024  Patient name: Teresa Mao  : 1955  MRN: [id number]  Referring provider: Ilya Munoz*  Dx:   Encounter Diagnosis     ICD-10-CM    1. Status post left rotator cuff repair  Z98.890                      Subjective:   Pt. has c/o Mm pain/soreness @ L bicep/deltoid region (\"8\"/10), and tightness @ L cerv/UT region.        Objective: See treatment diary below      Assessment: Tolerated treatment Fairly Well overall with exer performed.  Pain level > to \"9\" with some of the exer. *Select exer held this visit due to S: c/o.  \"Much Better\" after CP katy.   Patient would benefit from continued PT.      Plan: Progress treament per protocol.            Precautions: see extensive PMH, ABRAMS,     FOLLOW PROTOCOL**, sling to be worn 6 weeks.    Re-eval Date: 24      Re-eval Date: 24  DOS:  5/3/24                                                                              12 weeks                                          10 weeks  Date  9.5 9.12 8.20   Visit Count 21 22 23 24 20   FOTO        Pain In 0/10 0/10 6-7/10   L shld/ UT /scap L bicep/deltoid region (\"8\"/10)    tightness @ L cerv/UT 0/10   Pain Out 0/10 No negative change Less after CP applic 1-2/10 \"Much Better\" after CP katy. 0/10       Manuals 8. 9.5 9.12 8.20   PROM LUE                                Neuro Re-Ed 8.  9.5 9.12 8.20   MTPs/LTPs Red 25x ea Green 25x ea Green 15x ea Green 15x ea Red 25x ea   punch Red 25x ea Green 25x Green 15x ea Green 15x ea   Red 25x ea   BUE ER Red 25x ea Green 25x Green 15x ea *Held this visit   Red 25x ea   IR w/TB Red 25x ea Green 25x Green 15x ea *Held this visit Red 25x ea                           Ther Ex 8. 9.5 9.12 8.20   ultigrip D/C         Ball squeeze D/C       Wrist AROM DC      DC   L elbow ROM 4#  x40 AROM flexion and ext 4# X40 AROM flexion and ext 4# x20 AROM flexion and ext *Held this visit 4# x40 AROM flexion and ext " "  Pendulum ex *HEP       UT/LS stretch DC    DC to HEP    DC to HEP       Cerv ROM DC    DC to HEP   Scap isometrics      DC   pulleys pulleys    4 min FF pulleys     4 min FF pulleys   4.5 min 4.5 min FF 4 min FF   Finger ladder finger ladder  #27  10 reps FF finger ladder  #27  10 reps FF finger ladder  #27  10 reps FF finger ladder  #27  10 reps FF finger ladder  #27  10 reps FF   Wall slides  20x FF   20x cw/ccw  15x HA/ADD 20x FF   20x cw/ccw  20x HA/ADD 15x FF   15x cw/ccw  15x HA/ADD 10x FF   10x cw/ccw  10x HA/ADD 20x FF   20x cw/ccw  12x HA/ADD   Wand ex *Will Resume NV, as per pt request Standing  FF, ABD, IR, EXT   15x ea  *Held this visit Standing  FF, ABD, IR, EXT   15x ea   Active flex/abd 3 x 10 ea 3x 10x ea 20x ea  25x   Full can *Will Resume NV, as per pt request *Held   *Held  25x   SL ER          Supine shld flex/ER w/wand *Will Resume NV, as per pt request *Held   2#  15 x 5\" LUE only   Prone flex, HA, ext rows     NP                                                                   Ther Activity                        Gait Training                        Modalities 8.27  9.5 9.12 8.20    Pt. Deferred offer for CP applic  Cryo 10 min with no adverse reaction Cryo x10 min to L bicep/deltoid with no adverse reaction Pt. deferred       **MHP to L Cerv/UT  with no adverse reaction        Rotator Cuff Repair Rehabilitation Protocol  (adapted from Milana PJ et al. “Rehabilitation of the Rotator Cuff: An Evaluation Based Approach”. JAAOS 2006; 14:599-609)  Updated 10.14   Ilya Munoz MD  [facility]  [address] [place] [address]  [place] [address]  [phone]  Phase 1 (Weeks 0-6)              Immediate Postoperative Period              Goals:                          Diminish Pain and Inflammation  Maintain and Protect Integrity of the Repair                          Gradually Increase Passive ROM (NO Active or Active Assist until Week 6)                          Become " Independent with Modified ADLs              Precautions:  Maintain Arm in Abduction Sling, Remove Only for Directed Exercises (may remove Abduction Pillow after Day 21 for comfort)                          Keep Incisions Clean & Dry (okay to shower in 48 hours, band-aids over incisions)  No Immersion (pool) until Wounds Totally Sealed (usually not prior to day #10)  Passive Shoulder Motion ONLY, No Lifting/Holding Objects, Reaching Behind Back  Okay to Type/Write at Desktop with Arm in Sling              Day 0-6                          Elbow, Wrist, Hand AROM Exercises                           Start Cervical AROM and Scapula Isometrics                          Cryotherapy/Ice for Pain and Inflammation                          Instruct in Hygiene, Posture, and Positioning               Day 7-28                          Continue Above  May Start Pendulum Exercises                          May Start Supine, Pain Free, PT assisted PROM  Forward Flexion to 90°, External Rotation to 35° (Elbow at side), Internal Rotation to Body, Abduction to 60°                          Can Introduce light Cardio (Walking, Stationary Bike)                          Aqua Therapy may begin at week 3 (day 21) as long as no wound problems              Day 29-42                          Continue Above  Progress PROM to Goal of full PROM by Week 6.  May add Gentle Mobilizations (GH and Scapulothoracic) to Regain full PROM if Needed.  May add Heat prior to PROM Exercises, Ice after Exercises  May Begin AAROM at Day 29 if ROM is Appropriate in Anticipation of AROM Starting at Week 6              Criteria to Progress to Phase 2                          Reasonable Passive Forward Flexion, Abduction , IR/ER                          Time  Phase 2 (Weeks 6-12)              Protection and Active Motion              Goals of Phase:                          Allow Healing of Soft Tissues                          Decrease Pain and Inflammation  Add ADLs  and Regain AROM by End of Phase              Precautions:                          NO STRENGTHENING until Phase 3                          Repair is Most Prone to Failure during this Phase!                          No Lifting Objects > 2 lbs (Coffee Cup OK), no Sudden Motions                          Avoid Upper Extremity Bike and Ergometer              Day 43-56                          Discontinue Sling  Initiate AROM Exercises (forward flexion, ER, IR and abduction), Rotator Cuff Isometrics                          Continue Periscapular Exercises, add Stretching if PROM Lacking   No Strengthening until Week 12 (Minimum Time Needed for Cuff Healing Sufficient to withstand Strengthening)                Phase 3 (Weeks 12-16)              Early Strengthening              Goal of Phase:                          Gain full AROM, Maintain PROM                          Gradual return of Shoulder Strength, Power and Endurance                          Gradual return to Functional Activities              Precautions:                          No Lifting > 10lbs, Sudden Lifting or Pushing activities, Overhead Lifting                          No Upper Extremity Bike or Ergometer              Week 12                          Initiate Strengthening Program (10 lb Maximum until Phase 4)                            ER/IR with Bands (Standing)                            ER in Lateral Decubitius Position                            Lateral Raises                            Full Can in Scapular Plane                            Prone Rowing, Horizontal Abduction, Extension                            Elbow Flexion/Extension              Week 14-16                          Initiate light Functional Activities as Permitted  Progress to Fundamental Shoulder Exercises  Phase 4 (Variable but Weeks 16-24)              Aggressive Rehab  Sport Specific or Activity Specific               Goals of Phase:                          Maintain Full  Pain-free AROM                          Advanced Conditioning Exercises for enhanced Functional use                          Continue regaining Shoulder Strength, Power and Endurance                          Eventual return to full Functional Activities              Precautions:                          None              Week 16                          Continue ROM and stretching if appropriate                          Progress Strengthening, Proprioceptive and Neuromuscular Training                          Light Sports if Progressing Well (Chipping/Putting, easy ground strokes etc.)              Week 20                          Continue Strengthening and Stretching                          Initiate Interval Sports Program as Appropriate

## 2024-09-13 ENCOUNTER — HOSPITAL ENCOUNTER (OUTPATIENT)
Dept: INFUSION CENTER | Facility: HOSPITAL | Age: 69
End: 2024-09-13
Attending: INTERNAL MEDICINE
Payer: MEDICARE

## 2024-09-13 ENCOUNTER — TELEPHONE (OUTPATIENT)
Age: 69
End: 2024-09-13

## 2024-09-13 VITALS
TEMPERATURE: 97.6 F | HEART RATE: 76 BPM | OXYGEN SATURATION: 99 % | SYSTOLIC BLOOD PRESSURE: 133 MMHG | DIASTOLIC BLOOD PRESSURE: 84 MMHG | RESPIRATION RATE: 18 BRPM

## 2024-09-13 DIAGNOSIS — G89.4 CHRONIC PAIN SYNDROME: ICD-10-CM

## 2024-09-13 PROCEDURE — 96360 HYDRATION IV INFUSION INIT: CPT

## 2024-09-13 PROCEDURE — 96361 HYDRATE IV INFUSION ADD-ON: CPT

## 2024-09-13 RX ORDER — SODIUM CHLORIDE 9 MG/ML
250 INJECTION, SOLUTION INTRAVENOUS ONCE
Status: COMPLETED | OUTPATIENT
Start: 2024-09-17 | End: 2024-09-17

## 2024-09-13 RX ADMIN — SODIUM CHLORIDE 250 ML/HR: 0.9 INJECTION, SOLUTION INTRAVENOUS at 08:43

## 2024-09-13 NOTE — TELEPHONE ENCOUNTER
Caller: pharmacy    Doctor: kocher    Reason for call: pharmacy doesn't have the hydrocodone 5-325 can they do  and cut in half?    Call back#: [phone]

## 2024-09-13 NOTE — PROGRESS NOTES
Teresa Mao  tolerated hydration treatment well with no complications.      Teresa Mao is aware of future appt on 9/16 at 8:30AM.     AVS printed and given to Teresa Mao: No (Declined by Teresa Mao).

## 2024-09-16 ENCOUNTER — TELEPHONE (OUTPATIENT)
Dept: PAIN MEDICINE | Facility: MEDICAL CENTER | Age: 69
End: 2024-09-16

## 2024-09-16 ENCOUNTER — HOSPITAL ENCOUNTER (OUTPATIENT)
Dept: INFUSION CENTER | Facility: HOSPITAL | Age: 69
Discharge: HOME | End: 2024-09-16
Attending: INTERNAL MEDICINE
Payer: MEDICARE

## 2024-09-16 VITALS
HEART RATE: 83 BPM | OXYGEN SATURATION: 99 % | SYSTOLIC BLOOD PRESSURE: 144 MMHG | DIASTOLIC BLOOD PRESSURE: 82 MMHG | TEMPERATURE: 97.6 F | RESPIRATION RATE: 18 BRPM

## 2024-09-16 DIAGNOSIS — M47.816 LUMBAR SPONDYLOSIS: ICD-10-CM

## 2024-09-16 DIAGNOSIS — Z98.1 HISTORY OF LUMBAR FUSION: ICD-10-CM

## 2024-09-16 DIAGNOSIS — M54.16 LUMBAR RADICULOPATHY: ICD-10-CM

## 2024-09-16 DIAGNOSIS — G89.29 CHRONIC BILATERAL LOW BACK PAIN WITH BILATERAL SCIATICA: ICD-10-CM

## 2024-09-16 DIAGNOSIS — M54.42 CHRONIC BILATERAL LOW BACK PAIN WITH BILATERAL SCIATICA: ICD-10-CM

## 2024-09-16 DIAGNOSIS — M54.41 CHRONIC BILATERAL LOW BACK PAIN WITH BILATERAL SCIATICA: ICD-10-CM

## 2024-09-16 DIAGNOSIS — G89.4 CHRONIC PAIN DISORDER: Primary | ICD-10-CM

## 2024-09-16 DIAGNOSIS — G03.9 ARACHNOIDITIS: ICD-10-CM

## 2024-09-16 PROCEDURE — 96360 HYDRATION IV INFUSION INIT: CPT

## 2024-09-16 PROCEDURE — 96361 HYDRATE IV INFUSION ADD-ON: CPT

## 2024-09-16 RX ORDER — HYDROCODONE BITARTRATE AND ACETAMINOPHEN 10; 325 MG/1; MG/1
0.5 TABLET ORAL 2 TIMES DAILY PRN
Qty: 30 TABLET | Refills: 0 | Status: SHIPPED | OUTPATIENT
Start: 2024-09-16

## 2024-09-16 RX ORDER — HYDROCODONE BITARTRATE AND ACETAMINOPHEN 5; 325 MG/1; MG/1
0.5 TABLET ORAL 2 TIMES DAILY PRN
Qty: 30 TABLET | Refills: 0 | Status: SHIPPED | OUTPATIENT
Start: 2024-09-16 | End: 2024-09-16

## 2024-09-16 RX ADMIN — SODIUM CHLORIDE 250 ML/HR: 0.9 INJECTION, SOLUTION INTRAVENOUS at 08:48

## 2024-09-16 NOTE — TELEPHONE ENCOUNTER
Caller: liliana Moore    Doctor: Cecilio    Reason for call: pt called stating the pharmacy only received the script for the 5-325 mg of the hydocodone.  Pharmacy does not have the 5 mg they only have the 10 mg for pt to cut it half    If someone can please reach out to the pharmacy.  Pt stated she just spoke with them 10 min ago    Pharmacy phone # [phone]    Call back#: [phone]

## 2024-09-16 NOTE — TELEPHONE ENCOUNTER
Pls re-send script for norco 10-325mg. Looks like one for September is still written as 5-325mg. RN s/w pharm and the only 10-325mg script they have is for October.     Pls advise. Thank you!

## 2024-09-16 NOTE — TELEPHONE ENCOUNTER
Updated prescription sent to patient's pharmacy for hydrocodone 10/325.  1/2 tablet twice daily if needed for pain.

## 2024-09-16 NOTE — TELEPHONE ENCOUNTER
Patient called the RX Refill Line. Message is being forwarded to the office.     Patient's pharmacy  is requesting a change on the script for her HYDROcodone-acetaminophen (NORCO)  mg per tablet from  to 10/325    Please contact patient at  [phone]

## 2024-09-16 NOTE — PROGRESS NOTES
Teresa Mao  tolerated treatment well with no complications.  Hydration infusion done today via central line an pt had no complications at this time.  Pt was AAOx3 at time of discharge.     Teresa Mao is aware of future appt on 09/18/24 at 0830.     AVS No (Declined by Teresa Mao)

## 2024-09-17 ENCOUNTER — HOSPITAL ENCOUNTER (OUTPATIENT)
Dept: INFUSION CENTER | Facility: HOSPITAL | Age: 69
Discharge: HOME | End: 2024-09-17
Attending: INTERNAL MEDICINE
Payer: MEDICARE

## 2024-09-17 ENCOUNTER — OFFICE VISIT (OUTPATIENT)
Dept: PHYSICAL THERAPY | Facility: CLINIC | Age: 69
End: 2024-09-17
Payer: MEDICARE

## 2024-09-17 VITALS
SYSTOLIC BLOOD PRESSURE: 139 MMHG | RESPIRATION RATE: 18 BRPM | TEMPERATURE: 97.7 F | OXYGEN SATURATION: 98 % | DIASTOLIC BLOOD PRESSURE: 63 MMHG | HEART RATE: 69 BPM

## 2024-09-17 DIAGNOSIS — Z98.890 STATUS POST LEFT ROTATOR CUFF REPAIR: Primary | ICD-10-CM

## 2024-09-17 PROCEDURE — 96360 HYDRATION IV INFUSION INIT: CPT

## 2024-09-17 PROCEDURE — 97110 THERAPEUTIC EXERCISES: CPT

## 2024-09-17 PROCEDURE — 96361 HYDRATE IV INFUSION ADD-ON: CPT

## 2024-09-17 PROCEDURE — 97112 NEUROMUSCULAR REEDUCATION: CPT

## 2024-09-17 RX ORDER — SODIUM CHLORIDE 9 MG/ML
250 INJECTION, SOLUTION INTRAVENOUS ONCE
Status: COMPLETED | OUTPATIENT
Start: 2024-09-19 | End: 2024-09-19

## 2024-09-17 RX ADMIN — SODIUM CHLORIDE 250 ML/HR: 0.9 INJECTION, SOLUTION INTRAVENOUS at 08:45

## 2024-09-17 NOTE — PROGRESS NOTES
"Daily Note     Today's date: 2024  Patient name: Teresa Mao  : 1955  MRN: [id number]  Referring provider: Ilya Munoz*  Dx:   Encounter Diagnosis     ICD-10-CM    1. Status post left rotator cuff repair  Z98.890                      Subjective:  Pt. states her L bicep region is bothersome today. Says she would like to go light with therapy today.  Also notes she may have sprained her R ankle and will f/u with Ortho MD on thurs., 24.      Objective: See treatment diary below      Assessment: Tolerated treatment well.   Pt. progressing consistently to date, and has less sx/pain to date @ L shld, as well.  Patient would benefit from continued PT.      Plan: Progress treament per protocol.              Precautions: see extensive PMH, ABRAMS,     FOLLOW PROTOCOL**, sling to be worn 6 weeks.    Re-eval Date: 24      Re-eval Date: 24  DOS:  5/3/24                                                                              12 weeks                                          10 weeks  Date  9.5 9.12 9.17   Visit Count 21  24 25   FOTO        Pain In 0/10 0/10 6-7/10   L shld/ UT /scap L bicep/deltoid region (\"8\"/10)    tightness @ L cerv/UT L bicep region sore and painful   Pain Out 0/10 No negative change Less after CP applic 1-2/10 \"Much Better\" after CP katy. \"Better\" after CP applic.       Manuals . 9.5 9.12 9.17   PROM LUE                                Neuro Re-Ed   9.5 9.12 9.17   MTPs/LTPs Red 25x ea Green 25x ea Green 15x ea Green 15x ea Green 15x ea   punch Red 25x ea Green 25x Green 15x ea Green 15x ea   Green 15x ea   BUE ER Red 25x ea Green 25x Green 15x ea *Held this visit   Green 15x ea   IR w/TB Red 25x ea Green 25x Green 15x ea *Held this visit Green 15x ea                           Ther Ex  9.5 9.12 9.17   ultigrip D/C         Ball squeeze D/C       Wrist AROM DC      DC   L elbow ROM 4#  x40 AROM flexion and ext 4# X40 AROM " flexion and ext 4# x20 AROM flexion and ext *Held this visit 4# x20 AROM flexion and ext   Pendulum ex *HEP       UT/LS stretch DC    DC to HEP    DC to HEP       Cerv ROM DC    DC to HEP   Scap isometrics      DC   pulleys pulleys    4 min FF pulleys     4 min FF pulleys   4.5 min 4.5 min FF 4.5 min FF  **2.5 min Abd   Finger ladder finger ladder  #27  10 reps FF finger ladder  #27  10 reps FF finger ladder  #27  10 reps FF finger ladder  #27  10 reps FF finger ladder  #27  10 reps FF   Wall slides  20x FF   20x cw/ccw  15x HA/ADD 20x FF   20x cw/ccw  20x HA/ADD 15x FF   15x cw/ccw  15x HA/ADD 10x FF   10x cw/ccw  10x HA/ADD 10x FF   10x cw/ccw  10x HA/ADD   Wand ex *Will Resume NV, as per pt request Standing  FF, ABD, IR, EXT   15x ea  *Held this visit Held this visit   Active flex/abd 3 x 10 ea 3x 10x ea 20x ea     Full can *Will Resume NV, as per pt request *Held   *Held     SL ER          Supine shld flex/ER w/wand *Will Resume NV, as per pt request *Held      Prone flex, HA, ext rows                                                                        Ther Activity                        Gait Training                        Modalities 8.27  9.5 9.12 9.17    Pt. Deferred offer for CP applic  Cryo 10 min with no adverse reaction Cryo x10 min to L bicep/deltoid with no adverse reaction Cryo x10 min to L bicep with no adverse reaction       **MHP to L Cerv/UT  with no adverse reaction MHP to L Cerv/UT  with no adverse reaction       Rotator Cuff Repair Rehabilitation Protocol  (adapted from Milana OLMOS et al. “Rehabilitation of the Rotator Cuff: An Evaluation Based Approach”. JAAOS 2006; 14:599-609)  Updated 10.14   Ilya Munoz MD  [facility]  [address] [place] [id number]  [phone]  Phase 1 (Weeks 0-6)              Immediate Postoperative Period              Goals:                          Diminish Pain and Inflammation  Maintain and Protect Integrity of the  Repair                          Gradually Increase Passive ROM (NO Active or Active Assist until Week 6)                          Become Independent with Modified ADLs              Precautions:  Maintain Arm in Abduction Sling, Remove Only for Directed Exercises (may remove Abduction Pillow after Day 21 for comfort)                          Keep Incisions Clean & Dry (okay to shower in 48 hours, band-aids over incisions)  No Immersion (pool) until Wounds Totally Sealed (usually not prior to day #10)  Passive Shoulder Motion ONLY, No Lifting/Holding Objects, Reaching Behind Back  Okay to Type/Write at Desktop with Arm in Sling              Day 0-6                          Elbow, Wrist, Hand AROM Exercises                           Start Cervical AROM and Scapula Isometrics                          Cryotherapy/Ice for Pain and Inflammation                          Instruct in Hygiene, Posture, and Positioning               Day 7-28                          Continue Above  May Start Pendulum Exercises                          May Start Supine, Pain Free, PT assisted PROM  Forward Flexion to 90°, External Rotation to 35° (Elbow at side), Internal Rotation to Body, Abduction to 60°                          Can Introduce light Cardio (Walking, Stationary Bike)                          Aqua Therapy may begin at week 3 (day 21) as long as no wound problems              Day 29-42                          Continue Above  Progress PROM to Goal of full PROM by Week 6.  May add Gentle Mobilizations (GH and Scapulothoracic) to Regain full PROM if Needed.  May add Heat prior to PROM Exercises, Ice after Exercises  May Begin AAROM at Day 29 if ROM is Appropriate in Anticipation of AROM Starting at Week 6              Criteria to Progress to Phase 2                          Reasonable Passive Forward Flexion, Abduction , IR/ER                          Time  Phase 2 (Weeks 6-12)              Protection and Active Motion               Goals of Phase:                          Allow Healing of Soft Tissues                          Decrease Pain and Inflammation  Add ADLs and Regain AROM by End of Phase              Precautions:                          NO STRENGTHENING until Phase 3                          Repair is Most Prone to Failure during this Phase!                          No Lifting Objects > 2 lbs (Coffee Cup OK), no Sudden Motions                          Avoid Upper Extremity Bike and Ergometer              Day 43-56                          Discontinue Sling  Initiate AROM Exercises (forward flexion, ER, IR and abduction), Rotator Cuff Isometrics                          Continue Periscapular Exercises, add Stretching if PROM Lacking   No Strengthening until Week 12 (Minimum Time Needed for Cuff Healing Sufficient to withstand Strengthening)                Phase 3 (Weeks 12-16)              Early Strengthening              Goal of Phase:                          Gain full AROM, Maintain PROM                          Gradual return of Shoulder Strength, Power and Endurance                          Gradual return to Functional Activities              Precautions:                          No Lifting > 10lbs, Sudden Lifting or Pushing activities, Overhead Lifting                          No Upper Extremity Bike or Ergometer              Week 12                          Initiate Strengthening Program (10 lb Maximum until Phase 4)                            ER/IR with Bands (Standing)                            ER in Lateral Decubitius Position                            Lateral Raises                            Full Can in Scapular Plane                            Prone Rowing, Horizontal Abduction, Extension                            Elbow Flexion/Extension              Week 14-16                          Initiate light Functional Activities as Permitted  Progress to Fundamental Shoulder Exercises  Phase 4 (Variable but Weeks 16-24)               Aggressive Rehab  Sport Specific or Activity Specific               Goals of Phase:                          Maintain Full Pain-free AROM                          Advanced Conditioning Exercises for enhanced Functional use                          Continue regaining Shoulder Strength, Power and Endurance                          Eventual return to full Functional Activities              Precautions:                          None              Week 16                          Continue ROM and stretching if appropriate                          Progress Strengthening, Proprioceptive and Neuromuscular Training                          Light Sports if Progressing Well (Chipping/Putting, easy ground strokes etc.)              Week 20                          Continue Strengthening and Stretching                          Initiate Interval Sports Program as Appropriate

## 2024-09-17 NOTE — PROGRESS NOTES
Teresa Mao  tolerated IV hydration well with no complications.    Teresa Mao is aware of future appt on 9/19 at 8:30AM.     AVS declined by Teresa Mao.

## 2024-09-19 ENCOUNTER — APPOINTMENT (OUTPATIENT)
Dept: RADIOLOGY | Facility: CLINIC | Age: 69
End: 2024-09-19
Payer: MEDICARE

## 2024-09-19 ENCOUNTER — OFFICE VISIT (OUTPATIENT)
Dept: OBGYN CLINIC | Facility: CLINIC | Age: 69
End: 2024-09-19
Payer: MEDICARE

## 2024-09-19 ENCOUNTER — APPOINTMENT (OUTPATIENT)
Dept: PHYSICAL THERAPY | Facility: CLINIC | Age: 69
End: 2024-09-19
Payer: MEDICARE

## 2024-09-19 ENCOUNTER — HOSPITAL ENCOUNTER (OUTPATIENT)
Dept: INFUSION CENTER | Facility: HOSPITAL | Age: 69
End: 2024-09-19
Attending: INTERNAL MEDICINE
Payer: MEDICARE

## 2024-09-19 VITALS
DIASTOLIC BLOOD PRESSURE: 67 MMHG | RESPIRATION RATE: 18 BRPM | TEMPERATURE: 98.2 F | HEART RATE: 72 BPM | OXYGEN SATURATION: 98 % | SYSTOLIC BLOOD PRESSURE: 144 MMHG

## 2024-09-19 VITALS
DIASTOLIC BLOOD PRESSURE: 85 MMHG | HEART RATE: 78 BPM | SYSTOLIC BLOOD PRESSURE: 152 MMHG | BODY MASS INDEX: 26.09 KG/M2 | HEIGHT: 61 IN | TEMPERATURE: 100.1 F | WEIGHT: 138.2 LBS

## 2024-09-19 DIAGNOSIS — M25.571 PAIN, JOINT, ANKLE AND FOOT, RIGHT: Primary | ICD-10-CM

## 2024-09-19 DIAGNOSIS — S86.309A PERONEAL TENDON INJURY, INITIAL ENCOUNTER: ICD-10-CM

## 2024-09-19 DIAGNOSIS — M76.821 POSTERIOR TIBIAL TENDONITIS, RIGHT: ICD-10-CM

## 2024-09-19 DIAGNOSIS — M25.571 PAIN, JOINT, ANKLE AND FOOT, RIGHT: ICD-10-CM

## 2024-09-19 PROCEDURE — 73610 X-RAY EXAM OF ANKLE: CPT

## 2024-09-19 PROCEDURE — 96360 HYDRATION IV INFUSION INIT: CPT

## 2024-09-19 PROCEDURE — 96361 HYDRATE IV INFUSION ADD-ON: CPT

## 2024-09-19 PROCEDURE — 99213 OFFICE O/P EST LOW 20 MIN: CPT | Performed by: FAMILY MEDICINE

## 2024-09-19 RX ADMIN — SODIUM CHLORIDE 250 ML/HR: 0.9 INJECTION, SOLUTION INTRAVENOUS at 08:59

## 2024-09-19 NOTE — PATIENT INSTRUCTIONS
F/u 4 wks  Begin physical therapy  Home exercises.  Icing/heat/OTC pain meds as needed.  Begin wearing boot. Continue as needed.  Take off boot 4x for range of motion exercises.

## 2024-09-19 NOTE — PROGRESS NOTES
Teresa Mao  tolerated treatment well with no complications.      Teresa Mao is aware of future appt on 9-20-24 at 830    AVS declined

## 2024-09-19 NOTE — PROGRESS NOTES
"[facility] [address] [address] [phone]      Assessment:  1. Pain, joint, ankle and foot, right  -     XR ankle 3+ vw right; Future; Expected date: 09/19/2024  -     Ambulatory Referral to Physical Therapy; Future  2. Peroneal tendon injury, initial encounter  -     Ambulatory Referral to Physical Therapy; Future  3. Posterior tibial tendonitis, right  -     Ambulatory Referral to Physical Therapy; Future      Plan:  Patient Instructions   F/u 4 wks  Begin physical therapy  Home exercises.  Icing/heat/OTC pain meds as needed.  Begin wearing boot. Continue as needed.  Take off boot 4x for range of motion exercises.   Return in about 4 weeks (around 10/17/2024) for Recheck.    Chief Complaint:  Chief Complaint   Patient presents with    Right Ankle - Follow-up       Subjective:   HPI    Patient ID: Teresa Mao is a 69 y.o. female   Hre c/o R ankle pain  Last week drove 5 hrs and came back home with swollen ankle/pain  Swelling has dec  Elevation/icing.  Vicodin PRN- helps take the edge off.  Pain walking-hobbling today  But less pain today      Review of Systems   Constitutional:  Negative for fatigue and fever.   Respiratory:  Negative for shortness of breath.    Cardiovascular:  Negative for chest pain.   Gastrointestinal:  Negative for abdominal pain and nausea.   Genitourinary:  Negative for dysuria.   Musculoskeletal:  Positive for arthralgias.   Skin:  Negative for rash and wound.   Neurological:  Negative for weakness and headaches.       Objective:  Vitals:  /85 (BP Location: Right arm, Patient Position: Sitting, Cuff Size: Standard)   Pulse 78   Temp 100.1 °F (37.8 °C) (Tympanic)   Ht 5' 1\" (1.549 m)   Wt 62.7 kg (138 lb 3.2 oz)   BMI 26.11 kg/m²     The following portions of the patient's history were reviewed and updated as appropriate: allergies, current medications, past family history, past medical " history, past social history, past surgical history, and problem list.    Physical exam:  Physical Exam  Constitutional:       Appearance: Normal appearance. She is normal weight.   HENT:      Head: Normocephalic.   Eyes:      Extraocular Movements: Extraocular movements intact.   Pulmonary:      Effort: Pulmonary effort is normal.   Musculoskeletal:      Cervical back: Normal range of motion.   Skin:     General: Skin is warm and dry.   Neurological:      General: No focal deficit present.      Mental Status: She is alert and oriented to person, place, and time. Mental status is at baseline.   Psychiatric:         Mood and Affect: Mood normal.         Behavior: Behavior normal.         Thought Content: Thought content normal.         Judgment: Judgment normal.       Right Ankle Exam     Tenderness   The patient is experiencing tenderness in the ATF and deltoid.  Swelling: mild    Range of Motion   The patient has normal right ankle ROM.    Muscle Strength   The patient has normal right ankle strength.    Tests   Varus tilt: positive     Comments:  Pain with ROM  TMT joint TTP          Strength/Myotome Testing     Right Ankle/Foot   Normal strength      I have personally reviewed pertinent films in PACS and my interpretation is XR  R ankle- nml study.no fx.      Jose Antonio Marley MD

## 2024-09-20 ENCOUNTER — HOSPITAL ENCOUNTER (OUTPATIENT)
Dept: INFUSION CENTER | Facility: HOSPITAL | Age: 69
End: 2024-09-20
Attending: INTERNAL MEDICINE
Payer: MEDICARE

## 2024-09-20 VITALS
OXYGEN SATURATION: 99 % | RESPIRATION RATE: 18 BRPM | HEART RATE: 80 BPM | TEMPERATURE: 99.2 F | SYSTOLIC BLOOD PRESSURE: 135 MMHG | DIASTOLIC BLOOD PRESSURE: 76 MMHG

## 2024-09-20 DIAGNOSIS — L03.313 CELLULITIS OF CHEST WALL: Primary | ICD-10-CM

## 2024-09-20 PROCEDURE — 96361 HYDRATE IV INFUSION ADD-ON: CPT

## 2024-09-20 PROCEDURE — 96360 HYDRATION IV INFUSION INIT: CPT

## 2024-09-20 RX ORDER — SODIUM CHLORIDE 9 MG/ML
250 INJECTION, SOLUTION INTRAVENOUS ONCE
Status: COMPLETED | OUTPATIENT
Start: 2024-09-24 | End: 2024-09-24

## 2024-09-20 RX ADMIN — SODIUM CHLORIDE 1500 ML: 0.9 INJECTION, SOLUTION INTRAVENOUS at 08:35

## 2024-09-20 NOTE — PROGRESS NOTES
Teresa Mao  tolerated hydration without incident.    Teresa Mao is aware of future appt on 9-24-24 at 830   AVS printed and given to Teresa Mao

## 2024-09-23 ENCOUNTER — HOSPITAL ENCOUNTER (OUTPATIENT)
Dept: INTERVENTIONAL RADIOLOGY/VASCULAR | Facility: HOSPITAL | Age: 69
Discharge: HOME | End: 2024-09-23
Payer: MEDICARE

## 2024-09-23 DIAGNOSIS — L03.313 CELLULITIS OF CHEST WALL: ICD-10-CM

## 2024-09-23 PROCEDURE — 99214 OFFICE O/P EST MOD 30 MIN: CPT | Performed by: PHYSICIAN ASSISTANT

## 2024-09-23 RX ORDER — CEFDINIR 300 MG/1
300 CAPSULE ORAL EVERY 12 HOURS SCHEDULED
Qty: 14 CAPSULE | Refills: 0 | Status: SHIPPED | OUTPATIENT
Start: 2024-09-23 | End: 2024-09-30

## 2024-09-23 NOTE — QUICK NOTE
Patient seen and evaluated for tunneled central line site check.  Possible superficial cellulitis at the site.  Otherwise well-appearing.  Will trial cefdinir.  Extensive allergy list noted.

## 2024-09-24 ENCOUNTER — HOSPITAL ENCOUNTER (OUTPATIENT)
Dept: INFUSION CENTER | Facility: HOSPITAL | Age: 69
Discharge: HOME | End: 2024-09-24
Attending: INTERNAL MEDICINE
Payer: MEDICARE

## 2024-09-24 ENCOUNTER — OFFICE VISIT (OUTPATIENT)
Dept: PHYSICAL THERAPY | Facility: CLINIC | Age: 69
End: 2024-09-24
Payer: MEDICARE

## 2024-09-24 VITALS
SYSTOLIC BLOOD PRESSURE: 141 MMHG | OXYGEN SATURATION: 98 % | HEART RATE: 81 BPM | RESPIRATION RATE: 18 BRPM | DIASTOLIC BLOOD PRESSURE: 67 MMHG | TEMPERATURE: 98.1 F

## 2024-09-24 DIAGNOSIS — Z98.890 STATUS POST LEFT ROTATOR CUFF REPAIR: Primary | ICD-10-CM

## 2024-09-24 PROCEDURE — 97110 THERAPEUTIC EXERCISES: CPT

## 2024-09-24 PROCEDURE — 96360 HYDRATION IV INFUSION INIT: CPT

## 2024-09-24 PROCEDURE — 96361 HYDRATE IV INFUSION ADD-ON: CPT

## 2024-09-24 PROCEDURE — 97112 NEUROMUSCULAR REEDUCATION: CPT

## 2024-09-24 RX ORDER — CYCLOSPORINE 0.5 MG/ML
1 EMULSION OPHTHALMIC 2 TIMES DAILY
COMMUNITY

## 2024-09-24 RX ORDER — SODIUM CHLORIDE 9 MG/ML
250 INJECTION, SOLUTION INTRAVENOUS ONCE
Status: COMPLETED | OUTPATIENT
Start: 2024-09-26 | End: 2024-09-26

## 2024-09-24 RX ADMIN — SODIUM CHLORIDE 250 ML/HR: 0.9 INJECTION, SOLUTION INTRAVENOUS at 09:00

## 2024-09-24 NOTE — PROGRESS NOTES
"Daily Note     Today's date: 2024  Patient name: Teresa Mao  : 1955  MRN: [id number]  Referring provider: Ilya Munoz*  Dx:   Encounter Diagnosis     ICD-10-CM    1. Status post left rotator cuff repair  Z98.890                      Subjective:  Pt. reports her L cerv/UT/Bicep region is \"Tight\".  *Also notes she will be donning High Tide sprain walker boot, as per Ortho MD direction, due to R Peroneal tendon injury,   Posterior tibial tendonitis.    Objective: See treatment diary below      Assessment: Tolerated treatment well. Patient exhibited good technique with therapeutic exercises and would benefit from continued PT.      Plan: Progress treament per protocol.                Precautions: see extensive PMH, ABRAMS,     FOLLOW PROTOCOL**, sling to be worn 6 weeks.    Re-eval Date: 24      Re-eval Date: 24  DOS:  5/3/24                                                                              12 weeks                                          10 weeks  Date  9.5 9.12 9.17   Visit Count 26 22 23 24 25   FOTO        Pain In L UT/Bicep/cerv \"Tight\" 0/10 6-7/10   L shld/ UT /scap L bicep/deltoid region (\"8\"/10)    tightness @ L cerv/UT L bicep region sore and painful   Pain Out Less tight after MHP applic No negative change Less after CP applic 1-2/10 \"Much Better\" after CP katy. \"Better\" after CP applic.       Manuals . 9.5 9.12 9.17   PROM LUE                                Neuro Re-Ed   9.5 9.12 9.17   MTPs/LTPs Green 15x ea Green 25x ea Green 15x ea Green 15x ea Green 15x ea   punch Green 20x ea Green 25x Green 15x ea Green 15x ea   Green 15x ea   BUE ER Green 20x ea Green 25x Green 15x ea *Held this visit   Green 15x ea   IR w/TB Green 20x ea Green 25x Green 15x ea *Held this visit Green 15x ea                           Ther Ex  9.5 9.12 9.17   ultigrip D/C         Ball squeeze D/C       Wrist AROM DC      DC   L elbow ROM 4#  x40 AROM flexion " and ext 4# X40 AROM flexion and ext 4# x20 AROM flexion and ext *Held this visit 4# x20 AROM flexion and ext   Pendulum ex *HEP       UT/LS stretch DC    DC to HEP    DC to HEP       Cerv ROM DC    DC to HEP   Scap isometrics      DC   pulleys pulleys    4.5 min FF  2.5 min Abd pulleys     4 min FF pulleys   4.5 min 4.5 min FF 4.5 min FF  **2.5 min Abd   Finger ladder finger ladder  #26  15 reps FF finger ladder  #27  10 reps FF finger ladder  #27  10 reps FF finger ladder  #27  10 reps FF finger ladder  #27  10 reps FF   Wall slides  15x FF   15x cw/ccw  15x HA/ADD 20x FF   20x cw/ccw  20x HA/ADD 15x FF   15x cw/ccw  15x HA/ADD 10x FF   10x cw/ccw  10x HA/ADD 10x FF   10x cw/ccw  10x HA/ADD   Wand ex  Standing  FF, ABD, IR, EXT   15x ea  *Held this visit Held this visit   Active flex/abd 3 x 10 ea 3x 10x ea 20x ea     Full can  *Held   *Held     SL ER          Supine shld flex/ER w/wand  *Held      Prone flex, HA, ext rows                                                                        Ther Activity                        Gait Training                        Modalities 9.24  9.5 9.12 9.17      Cryo 10 min with no adverse reaction Cryo x10 min to L bicep/deltoid with no adverse reaction Cryo x10 min to L bicep with no adverse reaction    MHP to L Cerv/UT/  Bicep  with no adverse reaction   **MHP to L Cerv/UT  with no adverse reaction MHP to L Cerv/UT  with no adverse reaction       Rotator Cuff Repair Rehabilitation Protocol  (adapted from Milana OLMOS et al. “Rehabilitation of the Rotator Cuff: An Evaluation Based Approach”. JAAOS 2006; 14:599-609)  Updated 10.14   Ilya Munoz MD  [facility]  [address] [place] [address]  [phone]  Phase 1 (Weeks 0-6)              Immediate Postoperative Period              Goals:                          Diminish Pain and Inflammation  Maintain and Protect Integrity of the Repair                          Gradually Increase  Passive ROM (NO Active or Active Assist until Week 6)                          Become Independent with Modified ADLs              Precautions:  Maintain Arm in Abduction Sling, Remove Only for Directed Exercises (may remove Abduction Pillow after Day 21 for comfort)                          Keep Incisions Clean & Dry (okay to shower in 48 hours, band-aids over incisions)  No Immersion (pool) until Wounds Totally Sealed (usually not prior to day #10)  Passive Shoulder Motion ONLY, No Lifting/Holding Objects, Reaching Behind Back  Okay to Type/Write at Desktop with Arm in Sling              Day 0-6                          Elbow, Wrist, Hand AROM Exercises                           Start Cervical AROM and Scapula Isometrics                          Cryotherapy/Ice for Pain and Inflammation                          Instruct in Hygiene, Posture, and Positioning               Day 7-28                          Continue Above  May Start Pendulum Exercises                          May Start Supine, Pain Free, PT assisted PROM  Forward Flexion to 90°, External Rotation to 35° (Elbow at side), Internal Rotation to Body, Abduction to 60°                          Can Introduce light Cardio (Walking, Stationary Bike)                          Aqua Therapy may begin at week 3 (day 21) as long as no wound problems              Day 29-42                          Continue Above  Progress PROM to Goal of full PROM by Week 6.  May add Gentle Mobilizations (GH and Scapulothoracic) to Regain full PROM if Needed.  May add Heat prior to PROM Exercises, Ice after Exercises  May Begin AAROM at Day 29 if ROM is Appropriate in Anticipation of AROM Starting at Week 6              Criteria to Progress to Phase 2                          Reasonable Passive Forward Flexion, Abduction , IR/ER                          Time  Phase 2 (Weeks 6-12)              Protection and Active Motion              Goals of Phase:                          Allow  Healing of Soft Tissues                          Decrease Pain and Inflammation  Add ADLs and Regain AROM by End of Phase              Precautions:                          NO STRENGTHENING until Phase 3                          Repair is Most Prone to Failure during this Phase!                          No Lifting Objects > 2 lbs (Coffee Cup OK), no Sudden Motions                          Avoid Upper Extremity Bike and Ergometer              Day 43-56                          Discontinue Sling  Initiate AROM Exercises (forward flexion, ER, IR and abduction), Rotator Cuff Isometrics                          Continue Periscapular Exercises, add Stretching if PROM Lacking   No Strengthening until Week 12 (Minimum Time Needed for Cuff Healing Sufficient to withstand Strengthening)                Phase 3 (Weeks 12-16)              Early Strengthening              Goal of Phase:                          Gain full AROM, Maintain PROM                          Gradual return of Shoulder Strength, Power and Endurance                          Gradual return to Functional Activities              Precautions:                          No Lifting > 10lbs, Sudden Lifting or Pushing activities, Overhead Lifting                          No Upper Extremity Bike or Ergometer              Week 12                          Initiate Strengthening Program (10 lb Maximum until Phase 4)                            ER/IR with Bands (Standing)                            ER in Lateral Decubitius Position                            Lateral Raises                            Full Can in Scapular Plane                            Prone Rowing, Horizontal Abduction, Extension                            Elbow Flexion/Extension              Week 14-16                          Initiate light Functional Activities as Permitted  Progress to Fundamental Shoulder Exercises  Phase 4 (Variable but Weeks 16-24)              Aggressive Rehab  Sport Specific or  Activity Specific               Goals of Phase:                          Maintain Full Pain-free AROM                          Advanced Conditioning Exercises for enhanced Functional use                          Continue regaining Shoulder Strength, Power and Endurance                          Eventual return to full Functional Activities              Precautions:                          None              Week 16                          Continue ROM and stretching if appropriate                          Progress Strengthening, Proprioceptive and Neuromuscular Training                          Light Sports if Progressing Well (Chipping/Putting, easy ground strokes etc.)              Week 20                          Continue Strengthening and Stretching                          Initiate Interval Sports Program as Appropriate

## 2024-09-25 ENCOUNTER — HOSPITAL ENCOUNTER (OUTPATIENT)
Dept: INFUSION CENTER | Facility: HOSPITAL | Age: 69
Discharge: HOME | End: 2024-09-25
Payer: MEDICARE

## 2024-09-25 VITALS
RESPIRATION RATE: 18 BRPM | HEART RATE: 73 BPM | DIASTOLIC BLOOD PRESSURE: 65 MMHG | TEMPERATURE: 98.1 F | OXYGEN SATURATION: 96 % | SYSTOLIC BLOOD PRESSURE: 132 MMHG

## 2024-09-25 PROCEDURE — 96360 HYDRATION IV INFUSION INIT: CPT

## 2024-09-25 PROCEDURE — 96361 HYDRATE IV INFUSION ADD-ON: CPT

## 2024-09-25 RX ORDER — SODIUM CHLORIDE 9 MG/ML
250 INJECTION, SOLUTION INTRAVENOUS ONCE
Status: COMPLETED | OUTPATIENT
Start: 2024-09-27 | End: 2024-09-27

## 2024-09-25 RX ORDER — SODIUM CHLORIDE 9 MG/ML
250 INJECTION, SOLUTION INTRAVENOUS ONCE
Status: COMPLETED | OUTPATIENT
Start: 2024-09-25 | End: 2024-09-25

## 2024-09-25 RX ADMIN — SODIUM CHLORIDE 250 ML/HR: 0.9 INJECTION, SOLUTION INTRAVENOUS at 09:00

## 2024-09-25 NOTE — PROGRESS NOTES
Teresa Mao  tolerated hydration treatment well with no complications.      Teresa Mao is aware of future appt on 9/26 at 8:30AM.     AVS printed and given to Teresa Mao: No (Declined by Teresa Mao).

## 2024-09-26 ENCOUNTER — OFFICE VISIT (OUTPATIENT)
Dept: PHYSICAL THERAPY | Facility: CLINIC | Age: 69
End: 2024-09-26
Payer: MEDICARE

## 2024-09-26 ENCOUNTER — HOSPITAL ENCOUNTER (OUTPATIENT)
Dept: INFUSION CENTER | Facility: HOSPITAL | Age: 69
End: 2024-09-26
Attending: INTERNAL MEDICINE
Payer: MEDICARE

## 2024-09-26 VITALS
OXYGEN SATURATION: 99 % | TEMPERATURE: 99.4 F | DIASTOLIC BLOOD PRESSURE: 80 MMHG | RESPIRATION RATE: 18 BRPM | HEART RATE: 76 BPM | SYSTOLIC BLOOD PRESSURE: 137 MMHG

## 2024-09-26 DIAGNOSIS — Z98.890 STATUS POST LEFT ROTATOR CUFF REPAIR: Primary | ICD-10-CM

## 2024-09-26 PROCEDURE — 97110 THERAPEUTIC EXERCISES: CPT

## 2024-09-26 PROCEDURE — 97112 NEUROMUSCULAR REEDUCATION: CPT

## 2024-09-26 PROCEDURE — 96360 HYDRATION IV INFUSION INIT: CPT

## 2024-09-26 PROCEDURE — 96361 HYDRATE IV INFUSION ADD-ON: CPT

## 2024-09-26 RX ADMIN — SODIUM CHLORIDE 250 ML/HR: 0.9 INJECTION, SOLUTION INTRAVENOUS at 08:41

## 2024-09-26 NOTE — PROGRESS NOTES
"Daily Note     Today's date: 2024  Patient name: Teresa Mao  : 1955  MRN: [id number]  Referring provider: Ilya Munoz*  Dx:   Encounter Diagnosis     ICD-10-CM    1. Status post left rotator cuff repair  Z98.890                      Subjective:  Same c/o re: pain/sx @ L bicep/UT and cerv regions.      Objective: See treatment diary below      Assessment: Tolerated treatment Fairly Well overall.  Less sx after MHP apps.  Patient would benefit from continued PT.      Plan: Progress treament per protocol.            Precautions: see extensive PMH, ABRAMS,     FOLLOW PROTOCOL**, sling to be worn 6 weeks.    Re-eval Date: 24      Re-eval Date: 24  DOS:  5/3/24                                                                              12 weeks                                          10 weeks  Date  9. 9.5 9.12 9.17   Visit Count 26 27 23 24 25   FOTO        Pain In L UT/Bicep/cerv \"Tight\" Same c/o soreness/tightness @ L bicep 6-10   L shld/ UT /scap L bicep/deltoid region (\"8\"/10)    tightness @ L cerv/UT L bicep region sore and painful   Pain Out Less tight after MHP applic No negative change Less after CP applic -2/10 \"Much Better\" after CP katy. \"Better\" after CP applic.       Manuals . 9.26 9.5 9.12 9.17   PROM LUE                                Neuro Re-Ed . 9.26 9.5 9.12 9.17   MTPs/LTPs Green 15x ea Green 20x ea Green 15x ea Green 15x ea Green 15x ea   punch Green 20x ea Green 25x Green 15x ea Green 15x ea   Green 15x ea   BUE ER Green 20x ea Green 25x Green 15x ea *Held this visit   Green 15x ea   IR w/TB Green 20x ea Green 25x Green 15x ea *Held this visit Green 15x ea                           Ther Ex . 9.26 9.5 9.12 9.17   ultigrip D/C         Ball squeeze D/C       Wrist AROM DC      DC   L elbow ROM 4#  x40 AROM flexion and ext *held 4# x20 AROM flexion and ext *Held this visit 4# x20 AROM flexion and ext   Pendulum ex *HEP       UT/LS stretch DC    DC " to HEP    DC to HEP       Cerv ROM DC    DC to HEP   Scap isometrics      DC   pulleys pulleys    4.5 min FF  2.5 min Abd pulleys    4.5 min FF  2.5 min Abd pulleys   4.5 min 4.5 min FF 4.5 min FF  **2.5 min Abd   Finger ladder finger ladder  #26  15 reps FF finger ladder  #27  10 reps FF finger ladder  #27  10 reps FF finger ladder  #27  10 reps FF finger ladder  #27  10 reps FF   Wall slides  15x FF   15x cw/ccw  15x HA/ADD 15x FF   15x cw/ccw  15x HA/ADD 15x FF   15x cw/ccw  15x HA/ADD 10x FF   10x cw/ccw  10x HA/ADD 10x FF   10x cw/ccw  10x HA/ADD   Wand ex  Standing  FF, ABD, IR, EXT   15x ea  *Held this visit Held this visit   Active flex/abd 3 x 10 ea 3x 10x ea 20x ea     Full can  *Held   *Held     SL ER          Supine shld flex/ER w/wand  *Held      Prone flex, HA, ext rows                                                                        Ther Activity                        Gait Training                        Modalities 9.24 9.26 9.5 9.12 9.17      Cryo 10 min with no adverse reaction Cryo x10 min to L bicep/deltoid with no adverse reaction Cryo x10 min to L bicep with no adverse reaction    MHP to L Cerv/UT/  Bicep  with no adverse reaction MHP to L Cerv/UT/  Bicep  with no adverse reaction  **MHP to L Cerv/UT  with no adverse reaction MHP to L Cerv/UT  with no adverse reaction       Rotator Cuff Repair Rehabilitation Protocol  (adapted from Milana PJ et al. “Rehabilitation of the Rotator Cuff: An Evaluation Based Approach”. JAAOS 2006; 14:599-609)  Updated 10.14   Ilya Munoz MD  [facility]  [address] [place] [id number]  [phone]  Phase 1 (Weeks 0-6)              Immediate Postoperative Period              Goals:                          Diminish Pain and Inflammation  Maintain and Protect Integrity of the Repair                          Gradually Increase Passive ROM (NO Active or Active Assist until Week 6)                          Become  Independent with Modified ADLs              Precautions:  Maintain Arm in Abduction Sling, Remove Only for Directed Exercises (may remove Abduction Pillow after Day 21 for comfort)                          Keep Incisions Clean & Dry (okay to shower in 48 hours, band-aids over incisions)  No Immersion (pool) until Wounds Totally Sealed (usually not prior to day #10)  Passive Shoulder Motion ONLY, No Lifting/Holding Objects, Reaching Behind Back  Okay to Type/Write at Desktop with Arm in Sling              Day 0-6                          Elbow, Wrist, Hand AROM Exercises                           Start Cervical AROM and Scapula Isometrics                          Cryotherapy/Ice for Pain and Inflammation                          Instruct in Hygiene, Posture, and Positioning               Day 7-28                          Continue Above  May Start Pendulum Exercises                          May Start Supine, Pain Free, PT assisted PROM  Forward Flexion to 90°, External Rotation to 35° (Elbow at side), Internal Rotation to Body, Abduction to 60°                          Can Introduce light Cardio (Walking, Stationary Bike)                          Aqua Therapy may begin at week 3 (day 21) as long as no wound problems              Day 29-42                          Continue Above  Progress PROM to Goal of full PROM by Week 6.  May add Gentle Mobilizations (GH and Scapulothoracic) to Regain full PROM if Needed.  May add Heat prior to PROM Exercises, Ice after Exercises  May Begin AAROM at Day 29 if ROM is Appropriate in Anticipation of AROM Starting at Week 6              Criteria to Progress to Phase 2                          Reasonable Passive Forward Flexion, Abduction , IR/ER                          Time  Phase 2 (Weeks 6-12)              Protection and Active Motion              Goals of Phase:                          Allow Healing of Soft Tissues                          Decrease Pain and Inflammation  Add ADLs  and Regain AROM by End of Phase              Precautions:                          NO STRENGTHENING until Phase 3                          Repair is Most Prone to Failure during this Phase!                          No Lifting Objects > 2 lbs (Coffee Cup OK), no Sudden Motions                          Avoid Upper Extremity Bike and Ergometer              Day 43-56                          Discontinue Sling  Initiate AROM Exercises (forward flexion, ER, IR and abduction), Rotator Cuff Isometrics                          Continue Periscapular Exercises, add Stretching if PROM Lacking   No Strengthening until Week 12 (Minimum Time Needed for Cuff Healing Sufficient to withstand Strengthening)                Phase 3 (Weeks 12-16)              Early Strengthening              Goal of Phase:                          Gain full AROM, Maintain PROM                          Gradual return of Shoulder Strength, Power and Endurance                          Gradual return to Functional Activities              Precautions:                          No Lifting > 10lbs, Sudden Lifting or Pushing activities, Overhead Lifting                          No Upper Extremity Bike or Ergometer              Week 12                          Initiate Strengthening Program (10 lb Maximum until Phase 4)                            ER/IR with Bands (Standing)                            ER in Lateral Decubitius Position                            Lateral Raises                            Full Can in Scapular Plane                            Prone Rowing, Horizontal Abduction, Extension                            Elbow Flexion/Extension              Week 14-16                          Initiate light Functional Activities as Permitted  Progress to Fundamental Shoulder Exercises  Phase 4 (Variable but Weeks 16-24)              Aggressive Rehab  Sport Specific or Activity Specific               Goals of Phase:                          Maintain Full  Pain-free AROM                          Advanced Conditioning Exercises for enhanced Functional use                          Continue regaining Shoulder Strength, Power and Endurance                          Eventual return to full Functional Activities              Precautions:                          None              Week 16                          Continue ROM and stretching if appropriate                          Progress Strengthening, Proprioceptive and Neuromuscular Training                          Light Sports if Progressing Well (Chipping/Putting, easy ground strokes etc.)              Week 20                          Continue Strengthening and Stretching                          Initiate Interval Sports Program as Appropriate

## 2024-09-26 NOTE — PLAN OF CARE
Problem: Potential for Falls  Goal: Patient will remain free of falls  Description: INTERVENTIONS:  - Educate patient/family on patient safety including physical limitations  - Instruct patient to call for assistance with activity   - Consult OT/PT to assist with strengthening/mobility   - Keep Call bell within reach  - Keep bed low and locked with side rails adjusted as appropriate  - Keep care items and personal belongings within reach  - Initiate and maintain comfort rounds  - Make Fall Risk Sign visible to staff  - Apply yellow socks and bracelet for high fall risk patients  - Consider moving patient to room near nurses station  9/26/2024 1539 by Vanessa Arreaga, RN  Outcome: Progressing  9/26/2024 0843 by Vanessa Arreaga, RN  Outcome: Progressing

## 2024-09-26 NOTE — PROGRESS NOTES
Teresa Mao  tolerated hydration treatment well with no complications.      Teresa Mao is aware of future appt on 9/27 at 8:30AM.     AVS printed and given to Teresa Mao: No (Declined by Teresa Mao).

## 2024-09-27 ENCOUNTER — HOSPITAL ENCOUNTER (OUTPATIENT)
Dept: INFUSION CENTER | Facility: HOSPITAL | Age: 69
End: 2024-09-27
Attending: INTERNAL MEDICINE
Payer: MEDICARE

## 2024-09-27 VITALS
OXYGEN SATURATION: 99 % | DIASTOLIC BLOOD PRESSURE: 84 MMHG | TEMPERATURE: 98.6 F | HEART RATE: 75 BPM | RESPIRATION RATE: 18 BRPM | SYSTOLIC BLOOD PRESSURE: 137 MMHG

## 2024-09-27 PROCEDURE — 96360 HYDRATION IV INFUSION INIT: CPT

## 2024-09-27 PROCEDURE — 96361 HYDRATE IV INFUSION ADD-ON: CPT

## 2024-09-27 RX ADMIN — SODIUM CHLORIDE 250 ML/HR: 0.9 INJECTION, SOLUTION INTRAVENOUS at 08:41

## 2024-09-30 RX ORDER — SODIUM CHLORIDE 9 MG/ML
250 INJECTION, SOLUTION INTRAVENOUS ONCE
Status: COMPLETED | OUTPATIENT
Start: 2024-10-02 | End: 2024-10-02

## 2024-10-01 RX ORDER — SODIUM CHLORIDE 9 MG/ML
250 INJECTION, SOLUTION INTRAVENOUS ONCE
Status: COMPLETED | OUTPATIENT
Start: 2024-10-03 | End: 2024-10-03

## 2024-10-02 ENCOUNTER — HOSPITAL ENCOUNTER (OUTPATIENT)
Dept: INFUSION CENTER | Facility: HOSPITAL | Age: 69
Discharge: HOME | End: 2024-10-02
Attending: INTERNAL MEDICINE
Payer: MEDICARE

## 2024-10-02 PROCEDURE — 96361 HYDRATE IV INFUSION ADD-ON: CPT

## 2024-10-02 PROCEDURE — 96360 HYDRATION IV INFUSION INIT: CPT

## 2024-10-02 RX ORDER — SODIUM CHLORIDE 9 MG/ML
250 INJECTION, SOLUTION INTRAVENOUS ONCE
Status: COMPLETED | OUTPATIENT
Start: 2024-10-04 | End: 2024-10-04

## 2024-10-02 RX ADMIN — SODIUM CHLORIDE 250 ML/HR: 0.9 INJECTION, SOLUTION INTRAVENOUS at 08:58

## 2024-10-02 NOTE — PROGRESS NOTES
Teresa Mao  tolerated iv hydration treatment well with no complications.      Teresa Mao is aware of future appt on friday    AVS printed and given to Teresa Mao:  No (Declined by Teresa Mao)

## 2024-10-03 ENCOUNTER — HOSPITAL ENCOUNTER (OUTPATIENT)
Dept: INFUSION CENTER | Facility: HOSPITAL | Age: 69
End: 2024-10-03
Attending: INTERNAL MEDICINE
Payer: MEDICARE

## 2024-10-03 ENCOUNTER — OFFICE VISIT (OUTPATIENT)
Dept: PHYSICAL THERAPY | Facility: CLINIC | Age: 69
End: 2024-10-03
Payer: MEDICARE

## 2024-10-03 VITALS
DIASTOLIC BLOOD PRESSURE: 77 MMHG | RESPIRATION RATE: 18 BRPM | HEART RATE: 88 BPM | SYSTOLIC BLOOD PRESSURE: 117 MMHG | TEMPERATURE: 97.2 F

## 2024-10-03 DIAGNOSIS — Z98.890 STATUS POST LEFT ROTATOR CUFF REPAIR: Primary | ICD-10-CM

## 2024-10-03 PROCEDURE — 97112 NEUROMUSCULAR REEDUCATION: CPT

## 2024-10-03 PROCEDURE — 97110 THERAPEUTIC EXERCISES: CPT

## 2024-10-03 PROCEDURE — 96360 HYDRATION IV INFUSION INIT: CPT

## 2024-10-03 PROCEDURE — 96361 HYDRATE IV INFUSION ADD-ON: CPT

## 2024-10-03 RX ORDER — SODIUM CHLORIDE 9 MG/ML
250 INJECTION, SOLUTION INTRAVENOUS ONCE
Status: COMPLETED | OUTPATIENT
Start: 2024-10-07 | End: 2024-10-07

## 2024-10-03 RX ADMIN — SODIUM CHLORIDE 250 ML/HR: 0.9 INJECTION, SOLUTION INTRAVENOUS at 08:43

## 2024-10-03 NOTE — PROGRESS NOTES
"Daily Note     Today's date: 10/3/2024  Patient name: Teresa Mao  : 1955  MRN: [id number]  Referring provider: Ilya Munoz*  Dx:   Encounter Diagnosis     ICD-10-CM    1. Status post left rotator cuff repair  Z98.890                      Subjective:  Pt. states her L shld is \"extra sore\" today due to hanging wash, and also a long drive.      Objective: See treatment diary below      Assessment: Tolerated treatment Fairly Well/Well overall. Patient would benefit from continued PT.      Plan: Continue per plan of care.                Precautions: see extensive PMH, ABRAMS,     FOLLOW PROTOCOL**, sling to be worn 6 weeks.    Re-eval Date: 24      Re-eval Date: 24  DOS:  5/3/24                                                                              12 weeks                                          10 weeks  Date  10.3 9.12 9.17   Visit Count 26 27 28 24 25   FOTO        Pain In L UT/Bicep/cerv \"Tight\" Same c/o soreness/tightness @ L bicep L shld   \"Extra sore\" L bicep/deltoid region (\"8\"/10)    tightness @ L cerv/UT L bicep region sore and painful   Pain Out Less tight after MHP applic No negative change No negative change \"Much Better\" after CP katy. \"Better\" after CP applic.       Manuals  10.3 9.12 9.17   PROM LUE                                Neuro Re-Ed  10.3 9.12 9.17   MTPs/LTPs Green 15x ea Green 20x ea Green 20x ea  *seated Green 15x ea Green 15x ea   punch Green 20x ea Green 25x Green 25x ea Green 15x ea   Green 15x ea   BUE ER Green 20x ea Green 25x Green 25x ea *Held this visit   Green 15x ea   IR w/TB Green 20x ea Green 25x Green 20x ea *Held this visit Green 15x ea                           Ther Ex  10.3 9.12 9.17   ultigrip D/C         Ball squeeze D/C       Wrist AROM DC      DC   L elbow ROM 4#  x40 AROM flexion and ext *held 2# x30 AROM flexion and ext *Held this visit 4# x20 AROM flexion and ext   Pendulum ex *HEP       UT/LS " stretch DC    DC to HEP    DC to HEP       Cerv ROM DC    DC to HEP   Scap isometrics      DC   pulleys pulleys    4.5 min FF  2.5 min Abd pulleys    4.5 min FF  2.5 min Abd pulleys    4.5 min FF  2.5 min Abd 4.5 min FF 4.5 min FF  **2.5 min Abd   Finger ladder finger ladder  #26  15 reps FF finger ladder  #27  10 reps FF finger ladder  #27  10 reps FF finger ladder  #27  10 reps FF finger ladder  #27  10 reps FF   Wall slides  15x FF   15x cw/ccw  15x HA/ADD 15x FF   15x cw/ccw  15x HA/ADD 10x FF   10x cw/ccw  10x HA/ADD 10x FF   10x cw/ccw  10x HA/ADD 10x FF   10x cw/ccw  10x HA/ADD   Wand ex  Standing  FF, ABD, IR, EXT   15x ea  *Held this visit Held this visit   Active flex/abd 3 x 10 ea 3x 10x ea 3x10 ea     Full can  *Held   *Held     SL ER          Supine shld flex/ER w/wand  *Held      Prone flex, HA, ext rows                                                                        Ther Activity                        Gait Training                        Modalities 9.24 9.26 10.3 9.12 9.17         _______ Cryo x10 min to L bicep/deltoid with no adverse reaction Cryo x10 min to L bicep with no adverse reaction    MHP to L Cerv/UT/  Bicep  with no adverse reaction MHP to L Cerv/UT/  Bicep  with no adverse reaction MHP to L Cerv/UT/  Bicep  with no adverse reaction **MHP to L Cerv/UT  with no adverse reaction MHP to L Cerv/UT  with no adverse reaction       Rotator Cuff Repair Rehabilitation Protocol  (adapted from Milana PJ et al. “Rehabilitation of the Rotator Cuff: An Evaluation Based Approach”. JAAOS 2006; 14:599-609)  Updated 10.14   Ilya Munoz MD  [facility]  [address] [place] [address]  [place] [address]  [phone]  Phase 1 (Weeks 0-6)              Immediate Postoperative Period              Goals:                          Diminish Pain and Inflammation  Maintain and Protect Integrity of the Repair                          Gradually Increase Passive ROM (NO Active or Active  Assist until Week 6)                          Become Independent with Modified ADLs              Precautions:  Maintain Arm in Abduction Sling, Remove Only for Directed Exercises (may remove Abduction Pillow after Day 21 for comfort)                          Keep Incisions Clean & Dry (okay to shower in 48 hours, band-aids over incisions)  No Immersion (pool) until Wounds Totally Sealed (usually not prior to day #10)  Passive Shoulder Motion ONLY, No Lifting/Holding Objects, Reaching Behind Back  Okay to Type/Write at Desktop with Arm in Sling              Day 0-6                          Elbow, Wrist, Hand AROM Exercises                           Start Cervical AROM and Scapula Isometrics                          Cryotherapy/Ice for Pain and Inflammation                          Instruct in Hygiene, Posture, and Positioning               Day 7-28                          Continue Above  May Start Pendulum Exercises                          May Start Supine, Pain Free, PT assisted PROM  Forward Flexion to 90°, External Rotation to 35° (Elbow at side), Internal Rotation to Body, Abduction to 60°                          Can Introduce light Cardio (Walking, Stationary Bike)                          Aqua Therapy may begin at week 3 (day 21) as long as no wound problems              Day 29-42                          Continue Above  Progress PROM to Goal of full PROM by Week 6.  May add Gentle Mobilizations (GH and Scapulothoracic) to Regain full PROM if Needed.  May add Heat prior to PROM Exercises, Ice after Exercises  May Begin AAROM at Day 29 if ROM is Appropriate in Anticipation of AROM Starting at Week 6              Criteria to Progress to Phase 2                          Reasonable Passive Forward Flexion, Abduction , IR/ER                          Time  Phase 2 (Weeks 6-12)              Protection and Active Motion              Goals of Phase:                          Allow Healing of Soft Tissues                           Decrease Pain and Inflammation  Add ADLs and Regain AROM by End of Phase              Precautions:                          NO STRENGTHENING until Phase 3                          Repair is Most Prone to Failure during this Phase!                          No Lifting Objects > 2 lbs (Coffee Cup OK), no Sudden Motions                          Avoid Upper Extremity Bike and Ergometer              Day 43-56                          Discontinue Sling  Initiate AROM Exercises (forward flexion, ER, IR and abduction), Rotator Cuff Isometrics                          Continue Periscapular Exercises, add Stretching if PROM Lacking   No Strengthening until Week 12 (Minimum Time Needed for Cuff Healing Sufficient to withstand Strengthening)                Phase 3 (Weeks 12-16)              Early Strengthening              Goal of Phase:                          Gain full AROM, Maintain PROM                          Gradual return of Shoulder Strength, Power and Endurance                          Gradual return to Functional Activities              Precautions:                          No Lifting > 10lbs, Sudden Lifting or Pushing activities, Overhead Lifting                          No Upper Extremity Bike or Ergometer              Week 12                          Initiate Strengthening Program (10 lb Maximum until Phase 4)                            ER/IR with Bands (Standing)                            ER in Lateral Decubitius Position                            Lateral Raises                            Full Can in Scapular Plane                            Prone Rowing, Horizontal Abduction, Extension                            Elbow Flexion/Extension              Week 14-16                          Initiate light Functional Activities as Permitted  Progress to Fundamental Shoulder Exercises  Phase 4 (Variable but Weeks 16-24)              Aggressive Rehab  Sport Specific or Activity Specific               Goals of  Phase:                          Maintain Full Pain-free AROM                          Advanced Conditioning Exercises for enhanced Functional use                          Continue regaining Shoulder Strength, Power and Endurance                          Eventual return to full Functional Activities              Precautions:                          None              Week 16                          Continue ROM and stretching if appropriate                          Progress Strengthening, Proprioceptive and Neuromuscular Training                          Light Sports if Progressing Well (Chipping/Putting, easy ground strokes etc.)              Week 20                          Continue Strengthening and Stretching                          Initiate Interval Sports Program as Appropriate

## 2024-10-03 NOTE — PROGRESS NOTES
Signed         Teresa Mao  tolerated iv hydration treatment well with no complications.       Teresa Mao is aware of future appt on friday     AVS printed and given to Teresa Mao:  No (Declined by Teresa Mao)

## 2024-10-04 ENCOUNTER — HOSPITAL ENCOUNTER (OUTPATIENT)
Dept: INFUSION CENTER | Facility: HOSPITAL | Age: 69
End: 2024-10-04
Attending: INTERNAL MEDICINE
Payer: MEDICARE

## 2024-10-04 VITALS
SYSTOLIC BLOOD PRESSURE: 133 MMHG | TEMPERATURE: 97.6 F | OXYGEN SATURATION: 98 % | DIASTOLIC BLOOD PRESSURE: 63 MMHG | RESPIRATION RATE: 18 BRPM | HEART RATE: 72 BPM

## 2024-10-04 PROCEDURE — 96360 HYDRATION IV INFUSION INIT: CPT

## 2024-10-04 PROCEDURE — 96361 HYDRATE IV INFUSION ADD-ON: CPT

## 2024-10-04 RX ADMIN — SODIUM CHLORIDE 250 ML/HR: 0.9 INJECTION, SOLUTION INTRAVENOUS at 08:46

## 2024-10-04 NOTE — PROGRESS NOTES
Teresa Mao  tolerated treatment well with no complications.    Iv hydration done today and pt did not have any complications and her dressing is clean dry and intact at this time.  Pt was AAOX3  at time of discharge.   Teresa Mao is aware of future appt on 10/07/24 at 0900.     AVS No (Declined by Teresa Mao)

## 2024-10-07 ENCOUNTER — HOSPITAL ENCOUNTER (OUTPATIENT)
Dept: INFUSION CENTER | Facility: HOSPITAL | Age: 69
Discharge: HOME | End: 2024-10-07
Attending: INTERNAL MEDICINE
Payer: MEDICARE

## 2024-10-07 ENCOUNTER — APPOINTMENT (OUTPATIENT)
Dept: LAB | Facility: CLINIC | Age: 69
End: 2024-10-07
Payer: MEDICARE

## 2024-10-07 ENCOUNTER — OFFICE VISIT (OUTPATIENT)
Dept: PHYSICAL THERAPY | Facility: CLINIC | Age: 69
End: 2024-10-07
Payer: MEDICARE

## 2024-10-07 VITALS
OXYGEN SATURATION: 100 % | RESPIRATION RATE: 16 BRPM | SYSTOLIC BLOOD PRESSURE: 124 MMHG | HEART RATE: 94 BPM | TEMPERATURE: 97.9 F | DIASTOLIC BLOOD PRESSURE: 84 MMHG

## 2024-10-07 DIAGNOSIS — E03.9 HYPOTHYROIDISM, UNSPECIFIED TYPE: ICD-10-CM

## 2024-10-07 DIAGNOSIS — Z98.890 STATUS POST LEFT ROTATOR CUFF REPAIR: Primary | ICD-10-CM

## 2024-10-07 DIAGNOSIS — R53.83 FATIGUE, UNSPECIFIED TYPE: ICD-10-CM

## 2024-10-07 LAB
ALBUMIN SERPL BCG-MCNC: 4.4 G/DL (ref 3.5–5)
ALP SERPL-CCNC: 94 U/L (ref 34–104)
ALT SERPL W P-5'-P-CCNC: 21 U/L (ref 7–52)
ANION GAP SERPL CALCULATED.3IONS-SCNC: 10 MMOL/L (ref 4–13)
AST SERPL W P-5'-P-CCNC: 22 U/L (ref 13–39)
BILIRUB SERPL-MCNC: 0.61 MG/DL (ref 0.2–1)
BUN SERPL-MCNC: 24 MG/DL (ref 5–25)
CALCIUM SERPL-MCNC: 9.2 MG/DL (ref 8.4–10.2)
CHLORIDE SERPL-SCNC: 102 MMOL/L (ref 96–108)
CO2 SERPL-SCNC: 26 MMOL/L (ref 21–32)
CREAT SERPL-MCNC: 1.07 MG/DL (ref 0.6–1.3)
ERYTHROCYTE [DISTWIDTH] IN BLOOD BY AUTOMATED COUNT: 12.2 % (ref 11.6–15.1)
GFR SERPL CREATININE-BSD FRML MDRD: 52 ML/MIN/1.73SQ M
GLUCOSE P FAST SERPL-MCNC: 93 MG/DL (ref 65–99)
HCT VFR BLD AUTO: 47 % (ref 34.8–46.1)
HGB BLD-MCNC: 15.3 G/DL (ref 11.5–15.4)
MCH RBC QN AUTO: 31.2 PG (ref 26.8–34.3)
MCHC RBC AUTO-ENTMCNC: 32.6 G/DL (ref 31.4–37.4)
MCV RBC AUTO: 96 FL (ref 82–98)
PLATELET # BLD AUTO: 251 THOUSANDS/UL (ref 149–390)
PMV BLD AUTO: 10.6 FL (ref 8.9–12.7)
POTASSIUM SERPL-SCNC: 4.1 MMOL/L (ref 3.5–5.3)
PROT SERPL-MCNC: 6.5 G/DL (ref 6.4–8.4)
RBC # BLD AUTO: 4.91 MILLION/UL (ref 3.81–5.12)
SODIUM SERPL-SCNC: 138 MMOL/L (ref 135–147)
TSH SERPL DL<=0.05 MIU/L-ACNC: 2.32 UIU/ML (ref 0.45–4.5)
WBC # BLD AUTO: 6.69 THOUSAND/UL (ref 4.31–10.16)

## 2024-10-07 PROCEDURE — 97110 THERAPEUTIC EXERCISES: CPT

## 2024-10-07 PROCEDURE — 97112 NEUROMUSCULAR REEDUCATION: CPT

## 2024-10-07 PROCEDURE — 85027 COMPLETE CBC AUTOMATED: CPT

## 2024-10-07 PROCEDURE — 96360 HYDRATION IV INFUSION INIT: CPT

## 2024-10-07 PROCEDURE — 96361 HYDRATE IV INFUSION ADD-ON: CPT

## 2024-10-07 PROCEDURE — 80053 COMPREHEN METABOLIC PANEL: CPT

## 2024-10-07 PROCEDURE — 36415 COLL VENOUS BLD VENIPUNCTURE: CPT

## 2024-10-07 PROCEDURE — 84443 ASSAY THYROID STIM HORMONE: CPT

## 2024-10-07 RX ORDER — SODIUM CHLORIDE 9 MG/ML
250 INJECTION, SOLUTION INTRAVENOUS ONCE
Status: COMPLETED | OUTPATIENT
Start: 2024-10-09 | End: 2024-10-09

## 2024-10-07 RX ADMIN — SODIUM CHLORIDE 250 ML/HR: 0.9 INJECTION, SOLUTION INTRAVENOUS at 09:02

## 2024-10-07 NOTE — PROGRESS NOTES
"Daily Note     Today's date: 10/7/2024  Patient name: Teresa Mao  : 1955  MRN: [id number]  Referring provider: Ilya Munoz*  Dx:   Encounter Diagnosis     ICD-10-CM    1. Status post left rotator cuff repair  Z98.890                      Subjective:  Pt. states her L bicep is still sore and achey.       Objective: See treatment diary below      Assessment: Tolerated treatment well.  Patient exhibited good technique with therapeutic exercises and would benefit from continued PT.      Plan: Progress treament per protocol.            Precautions: see extensive PMH, ABRAMS,     FOLLOW PROTOCOL**, sling to be worn 6 weeks.      Re-eval Date: 24  DOS:  5/3/24                                                                              12 weeks                                          10 weeks  Date  10.3 10.7 9.17   Visit Count 26 27 28 29 25   FOTO        Pain In L UT/Bicep/cerv \"Tight\" Same c/o soreness/tightness @ L bicep L shld   \"Extra sore\" Achey 3-10 L bicep L bicep region sore and painful   Pain Out Less tight after MHP applic No negative change No negative change No negative change \"Better\" after CP applic.       Manuals . 10.3 10.7 9.17   PROM LUE                                Neuro Re-Ed . 10.3 10.7 9.17   MTPs/LTPs Green 15x ea Green 20x ea Green 20x ea  *seated Green 25x ea Green 15x ea   punch Green 20x ea Green 25x Green 25x ea Green 15x ea   Green 15x ea   BUE ER Green 20x ea Green 25x Green 25x ea Green 25x ea Green 15x ea   IR w/TB Green 20x ea Green 25x Green 20x ea Green 25x ea Green 15x ea                           Ther Ex . 10.3 10.7 9.17   ultigrip D/C         Ball squeeze D/C       Wrist AROM DC      DC   L elbow ROM 4#  x40 AROM flexion and ext *held 2# x30 AROM flexion and ext *Held this visit 4# x20 AROM flexion and ext   Pendulum ex *HEP       UT/LS stretch DC    DC to HEP    DC to HEP       Cerv ROM DC    DC to HEP   Scap " isometrics      DC   pulleys pulleys    4.5 min FF  2.5 min Abd pulleys    4.5 min FF  2.5 min Abd pulleys    4.5 min FF  2.5 min Abd 4.5 min FF  3 min Abd 4.5 min FF  **2.5 min Abd   Finger ladder finger ladder  #26  15 reps FF finger ladder  #27  10 reps FF finger ladder  #27  10 reps FF finger ladder  #27  10 reps FF finger ladder  #27  10 reps FF   Wall slides  15x FF   15x cw/ccw  15x HA/ADD 15x FF   15x cw/ccw  15x HA/ADD 10x FF   10x cw/ccw  10x HA/ADD 15x FF   15x cw/ccw  15x HA/ADD 10x FF   10x cw/ccw  10x HA/ADD   Wand ex  Standing  FF, ABD, IR, EXT   15x ea   Held this visit   Active flex/abd 3 x 10 ea 3x 10x ea 3x10 ea 3x10 ea    Full can  *Held   *Held     SL ER          Supine shld flex/ER w/wand  *Held      Prone flex, HA, ext rows                                                                        Ther Activity                        Gait Training                        Modalities 9.24 9.26 10.3 10.7 9.17         _______  Cryo x10 min to L bicep with no adverse reaction    MHP to L Cerv/UT/  Bicep  with no adverse reaction MHP to L Cerv/UT/  Bicep  with no adverse reaction MHP to L Cerv/UT/  Bicep  with no adverse reaction MHP to L Cerv/UT/  bicep  with no adverse reaction MHP to L Cerv/UT  with no adverse reaction       Rotator Cuff Repair Rehabilitation Protocol  (adapted from Milana PJ et al. “Rehabilitation of the Rotator Cuff: An Evaluation Based Approach”. JAAOS 2006; 14:599-609)  Updated 10.14   Ilya Munoz MD  [facility]  [address] [place] [id number]  [phone]  Phase 1 (Weeks 0-6)              Immediate Postoperative Period              Goals:                          Diminish Pain and Inflammation  Maintain and Protect Integrity of the Repair                          Gradually Increase Passive ROM (NO Active or Active Assist until Week 6)                          Become Independent with Modified ADLs              Precautions:  Maintain  Arm in Abduction Sling, Remove Only for Directed Exercises (may remove Abduction Pillow after Day 21 for comfort)                          Keep Incisions Clean & Dry (okay to shower in 48 hours, band-aids over incisions)  No Immersion (pool) until Wounds Totally Sealed (usually not prior to day #10)  Passive Shoulder Motion ONLY, No Lifting/Holding Objects, Reaching Behind Back  Okay to Type/Write at Desktop with Arm in Sling              Day 0-6                          Elbow, Wrist, Hand AROM Exercises                           Start Cervical AROM and Scapula Isometrics                          Cryotherapy/Ice for Pain and Inflammation                          Instruct in Hygiene, Posture, and Positioning               Day 7-28                          Continue Above  May Start Pendulum Exercises                          May Start Supine, Pain Free, PT assisted PROM  Forward Flexion to 90°, External Rotation to 35° (Elbow at side), Internal Rotation to Body, Abduction to 60°                          Can Introduce light Cardio (Walking, Stationary Bike)                          Aqua Therapy may begin at week 3 (day 21) as long as no wound problems              Day 29-42                          Continue Above  Progress PROM to Goal of full PROM by Week 6.  May add Gentle Mobilizations (GH and Scapulothoracic) to Regain full PROM if Needed.  May add Heat prior to PROM Exercises, Ice after Exercises  May Begin AAROM at Day 29 if ROM is Appropriate in Anticipation of AROM Starting at Week 6              Criteria to Progress to Phase 2                          Reasonable Passive Forward Flexion, Abduction , IR/ER                          Time  Phase 2 (Weeks 6-12)              Protection and Active Motion              Goals of Phase:                          Allow Healing of Soft Tissues                          Decrease Pain and Inflammation  Add ADLs and Regain AROM by End of Phase              Precautions:                           NO STRENGTHENING until Phase 3                          Repair is Most Prone to Failure during this Phase!                          No Lifting Objects > 2 lbs (Coffee Cup OK), no Sudden Motions                          Avoid Upper Extremity Bike and Ergometer              Day 43-56                          Discontinue Sling  Initiate AROM Exercises (forward flexion, ER, IR and abduction), Rotator Cuff Isometrics                          Continue Periscapular Exercises, add Stretching if PROM Lacking   No Strengthening until Week 12 (Minimum Time Needed for Cuff Healing Sufficient to withstand Strengthening)                Phase 3 (Weeks 12-16)              Early Strengthening              Goal of Phase:                          Gain full AROM, Maintain PROM                          Gradual return of Shoulder Strength, Power and Endurance                          Gradual return to Functional Activities              Precautions:                          No Lifting > 10lbs, Sudden Lifting or Pushing activities, Overhead Lifting                          No Upper Extremity Bike or Ergometer              Week 12                          Initiate Strengthening Program (10 lb Maximum until Phase 4)                            ER/IR with Bands (Standing)                            ER in Lateral Decubitius Position                            Lateral Raises                            Full Can in Scapular Plane                            Prone Rowing, Horizontal Abduction, Extension                            Elbow Flexion/Extension              Week 14-16                          Initiate light Functional Activities as Permitted  Progress to Fundamental Shoulder Exercises  Phase 4 (Variable but Weeks 16-24)              Aggressive Rehab  Sport Specific or Activity Specific               Goals of Phase:                          Maintain Full Pain-free AROM                          Advanced Conditioning Exercises  for enhanced Functional use                          Continue regaining Shoulder Strength, Power and Endurance                          Eventual return to full Functional Activities              Precautions:                          None              Week 16                          Continue ROM and stretching if appropriate                          Progress Strengthening, Proprioceptive and Neuromuscular Training                          Light Sports if Progressing Well (Chipping/Putting, easy ground strokes etc.)              Week 20                          Continue Strengthening and Stretching                          Initiate Interval Sports Program as Appropriate

## 2024-10-09 ENCOUNTER — HOSPITAL ENCOUNTER (OUTPATIENT)
Dept: INFUSION CENTER | Facility: HOSPITAL | Age: 69
Discharge: HOME | End: 2024-10-09
Payer: MEDICARE

## 2024-10-09 VITALS
DIASTOLIC BLOOD PRESSURE: 70 MMHG | RESPIRATION RATE: 16 BRPM | SYSTOLIC BLOOD PRESSURE: 123 MMHG | TEMPERATURE: 99.1 F | HEART RATE: 74 BPM | OXYGEN SATURATION: 100 %

## 2024-10-09 PROCEDURE — 96360 HYDRATION IV INFUSION INIT: CPT

## 2024-10-09 PROCEDURE — 96361 HYDRATE IV INFUSION ADD-ON: CPT

## 2024-10-09 RX ORDER — SODIUM CHLORIDE 9 MG/ML
250 INJECTION, SOLUTION INTRAVENOUS ONCE
Status: COMPLETED | OUTPATIENT
Start: 2024-10-11 | End: 2024-10-11

## 2024-10-09 RX ADMIN — SODIUM CHLORIDE 250 ML/HR: 0.9 INJECTION, SOLUTION INTRAVENOUS at 08:18

## 2024-10-09 NOTE — PROGRESS NOTES
Teresa Mao  tolerated hydration x 6 hours  well with no complications.      Teresa Mao is aware of future appt tomorrow    AVS printed and given to Teresa Mao:   No (Declined by Teresa Mao)

## 2024-10-10 ENCOUNTER — APPOINTMENT (OUTPATIENT)
Dept: PHYSICAL THERAPY | Facility: CLINIC | Age: 69
End: 2024-10-10
Payer: MEDICARE

## 2024-10-11 ENCOUNTER — HOSPITAL ENCOUNTER (OUTPATIENT)
Dept: INFUSION CENTER | Facility: HOSPITAL | Age: 69
End: 2024-10-11
Payer: MEDICARE

## 2024-10-11 VITALS — TEMPERATURE: 97.3 F | DIASTOLIC BLOOD PRESSURE: 63 MMHG | RESPIRATION RATE: 18 BRPM | SYSTOLIC BLOOD PRESSURE: 134 MMHG

## 2024-10-11 PROCEDURE — 96360 HYDRATION IV INFUSION INIT: CPT

## 2024-10-11 PROCEDURE — 96361 HYDRATE IV INFUSION ADD-ON: CPT

## 2024-10-11 RX ADMIN — SODIUM CHLORIDE 250 ML/HR: 0.9 INJECTION, SOLUTION INTRAVENOUS at 08:40

## 2024-10-11 NOTE — PROGRESS NOTES
Teresa Mao  tolerated hydration x 6 hours  well with no complications.       Teresa Mao is aware of future appt monday     AVS printed and given to Teresa Mao:   No (Declined by Teresa Mao)

## 2024-10-14 ENCOUNTER — ANESTHESIA (OUTPATIENT)
Dept: PERIOP | Facility: HOSPITAL | Age: 69
End: 2024-10-14
Payer: MEDICARE

## 2024-10-14 ENCOUNTER — APPOINTMENT (OUTPATIENT)
Dept: PHYSICAL THERAPY | Facility: CLINIC | Age: 69
End: 2024-10-14
Payer: MEDICARE

## 2024-10-14 ENCOUNTER — TELEPHONE (OUTPATIENT)
Age: 69
End: 2024-10-14

## 2024-10-14 ENCOUNTER — HOSPITAL ENCOUNTER (OUTPATIENT)
Dept: PERIOP | Facility: HOSPITAL | Age: 69
Setting detail: OUTPATIENT SURGERY
Discharge: HOME | End: 2024-10-14
Attending: INTERNAL MEDICINE
Payer: MEDICARE

## 2024-10-14 ENCOUNTER — ANESTHESIA EVENT (OUTPATIENT)
Dept: PERIOP | Facility: HOSPITAL | Age: 69
End: 2024-10-14
Payer: MEDICARE

## 2024-10-14 VITALS
BODY MASS INDEX: 26.06 KG/M2 | RESPIRATION RATE: 20 BRPM | HEART RATE: 77 BPM | DIASTOLIC BLOOD PRESSURE: 63 MMHG | SYSTOLIC BLOOD PRESSURE: 127 MMHG | OXYGEN SATURATION: 98 % | HEIGHT: 61 IN | TEMPERATURE: 97.2 F | WEIGHT: 138 LBS

## 2024-10-14 DIAGNOSIS — R13.10 DYSPHAGIA, UNSPECIFIED: ICD-10-CM

## 2024-10-14 DIAGNOSIS — K86.2 CYST OF PANCREAS: ICD-10-CM

## 2024-10-14 DIAGNOSIS — Z80.0 FAMILY HISTORY OF MALIGNANT NEOPLASM OF DIGESTIVE ORGANS: ICD-10-CM

## 2024-10-14 DIAGNOSIS — K21.9 GASTRO-ESOPHAGEAL REFLUX DISEASE WITHOUT ESOPHAGITIS: ICD-10-CM

## 2024-10-14 DIAGNOSIS — Z86.0100 PERSONAL HISTORY OF COLON POLYPS, UNSPECIFIED: ICD-10-CM

## 2024-10-14 DIAGNOSIS — K44.9 DIAPHRAGMATIC HERNIA WITHOUT OBSTRUCTION OR GANGRENE: ICD-10-CM

## 2024-10-14 PROCEDURE — 88305 TISSUE EXAM BY PATHOLOGIST: CPT | Performed by: STUDENT IN AN ORGANIZED HEALTH CARE EDUCATION/TRAINING PROGRAM

## 2024-10-14 PROCEDURE — 88184 FLOWCYTOMETRY/ TC 1 MARKER: CPT

## 2024-10-14 PROCEDURE — 88185 FLOWCYTOMETRY/TC ADD-ON: CPT | Performed by: INTERNAL MEDICINE

## 2024-10-14 RX ORDER — PROPOFOL 10 MG/ML
INJECTION, EMULSION INTRAVENOUS AS NEEDED
Status: DISCONTINUED | OUTPATIENT
Start: 2024-10-14 | End: 2024-10-14

## 2024-10-14 RX ORDER — LIDOCAINE HYDROCHLORIDE 20 MG/ML
INJECTION, SOLUTION EPIDURAL; INFILTRATION; INTRACAUDAL; PERINEURAL AS NEEDED
Status: DISCONTINUED | OUTPATIENT
Start: 2024-10-14 | End: 2024-10-14

## 2024-10-14 RX ORDER — SODIUM CHLORIDE, SODIUM LACTATE, POTASSIUM CHLORIDE, CALCIUM CHLORIDE 600; 310; 30; 20 MG/100ML; MG/100ML; MG/100ML; MG/100ML
INJECTION, SOLUTION INTRAVENOUS CONTINUOUS PRN
Status: DISCONTINUED | OUTPATIENT
Start: 2024-10-14 | End: 2024-10-14

## 2024-10-14 RX ADMIN — PROPOFOL 130 MG: 10 INJECTION, EMULSION INTRAVENOUS at 07:41

## 2024-10-14 RX ADMIN — PROPOFOL 50 MG: 10 INJECTION, EMULSION INTRAVENOUS at 07:52

## 2024-10-14 RX ADMIN — PROPOFOL 140 MCG/KG/MIN: 10 INJECTION, EMULSION INTRAVENOUS at 07:51

## 2024-10-14 RX ADMIN — LIDOCAINE HYDROCHLORIDE 100 MG: 20 INJECTION, SOLUTION EPIDURAL; INFILTRATION; INTRACAUDAL; PERINEURAL at 07:41

## 2024-10-14 RX ADMIN — PROPOFOL 50 MG: 10 INJECTION, EMULSION INTRAVENOUS at 07:48

## 2024-10-14 RX ADMIN — PROPOFOL 20 MG: 10 INJECTION, EMULSION INTRAVENOUS at 07:45

## 2024-10-14 RX ADMIN — SODIUM CHLORIDE, SODIUM LACTATE, POTASSIUM CHLORIDE, AND CALCIUM CHLORIDE: .6; .31; .03; .02 INJECTION, SOLUTION INTRAVENOUS at 06:59

## 2024-10-14 NOTE — DISCHARGE SUMMARY
Discharge Summary - Gastroenterology   Name: Teresa Mao 69 y.o. female I MRN: [id number]  Unit/Bed#:  I Date of Admission: 10/14/2024   Date of Service: 10/14/2024 I Hospital Day: 0      Patient had EGD and colonoscopy. She will follow up in GI clinic for biopsy results and repeat colonoscopy scheduling due to poor prep.

## 2024-10-14 NOTE — ANESTHESIA PREPROCEDURE EVALUATION
Procedure:  EGD  COLONOSCOPY    Relevant Problems   CARDIO   (+) Hyperlipidemia   (+) LBBB (left bundle branch block)   (+) Migraine without aura and without status migrainosus, not intractable   (+) Mitral valve prolapse      ENDO   (+) Hypothyroidism      GI/HEPATIC   (+) Biliary dyskinesia   (+) Gastroesophageal reflux disease without esophagitis      /RENAL   (+) Stage 3a chronic kidney disease (HCC)      MUSCULOSKELETAL   (+) Biliary dyskinesia   (+) Cervical spondylosis   (+) Chronic bilateral low back pain with bilateral sciatica   (+) Degeneration of intervertebral disc   (+) Fibromyalgia   (+) Lumbar spondylosis   (+) Myofascial pain syndrome   (+) Osteoarthritis   (+) Scoliosis (and kyphoscoliosis), idiopathic      NEURO/PSYCH   (+) Anxiety and depression   (+) Chronic bilateral low back pain with bilateral sciatica   (+) Chronic pain disorder   (+) Fibromyalgia   (+) Migraine without aura and without status migrainosus, not intractable   (+) Myofascial pain syndrome      PULMONARY   (+) Asthma   (+) Moderate persistent asthma   (+) Obstructive sleep apnea syndrome        Physical Exam    Airway    Mallampati score: I  TM Distance: >3 FB  Neck ROM: full     Dental       Cardiovascular  Cardiovascular exam normal    Pulmonary  Pulmonary exam normal     Other Findings  post-pubertal.      Anesthesia Plan  ASA Score- 3     Anesthesia Type- IV sedation with anesthesia with ASA Monitors.         Additional Monitors:     Airway Plan:            Plan Factors-Exercise tolerance (METS): >4 METS.    Chart reviewed. EKG reviewed. Imaging results reviewed. Existing labs reviewed. Patient summary reviewed.                  Induction- intravenous.    Postoperative Plan- Plan for postoperative opioid use. Planned trial extubation        Informed Consent- Anesthetic plan and risks discussed with patient.  I personally reviewed this patient with the CRNA. Discussed and agreed on the Anesthesia Plan with the  CRNA..

## 2024-10-14 NOTE — TELEPHONE ENCOUNTER
PA for cyclobenzaprine (FLEXERIL) 5 mg tablet               SUBMITTED     via  Key: [id number]  [x]CMM-KEY:  []Surescripts-Case ID #   []Availity-Auth ID # ND #   []Faxed to plan   []Other website   []Phone call Case ID #     Office notes sent, clinical questions answered. Awaiting determination    Turnaround time for your insurance to make a decision on your Prior Authorization can take 7-21 business days.

## 2024-10-14 NOTE — ANESTHESIA POSTPROCEDURE EVALUATION
Post-Op Assessment Note    CV Status:  Stable    Pain management: adequate    Multimodal analgesia used between 6 hours prior to anesthesia start to PACU discharge    Mental Status:  Sleepy   PONV Controlled:  Controlled   Airway Patency:  Patent  Two or more mitigation strategies used for obstructive sleep apnea   Post Op Vitals Reviewed: Yes    No anethesia notable event occurred.    Staff: Anesthesiologist           Last Filed PACU Vitals:  Vitals Value Taken Time   Temp 97.2 °F (36.2 °C) 10/14/24 0809   Pulse 76 10/14/24 0842   /63 10/14/24 0840   Resp 28 10/14/24 0842   SpO2 99 % 10/14/24 0842   Vitals shown include unfiled device data.    Modified Rasta:  Activity: 2 (10/14/2024  8:50 AM)  Respiration: 2 (10/14/2024  8:50 AM)  Circulation: 2 (10/14/2024  8:50 AM)  Consciousness: 2 (10/14/2024  8:50 AM)  Oxygen Saturation: 2 (10/14/2024  8:50 AM)  Modified Rasta Score: 10 (10/14/2024  8:50 AM)

## 2024-10-14 NOTE — INTERVAL H&P NOTE
H&P reviewed. After examining the patient I find no changes in the patients condition since the H&P had been written.    Vitals:    10/14/24 0701   BP: 133/82   Pulse: 92   Resp: 19   Temp: 98.5 °F (36.9 °C)   SpO2: 99%

## 2024-10-14 NOTE — ANESTHESIA POSTPROCEDURE EVALUATION
Post-Op Assessment Note    CV Status:  Stable    Pain management: adequate       Mental Status:  Sleepy and arousable   Hydration Status:  Euvolemic   PONV Controlled:  Controlled   Airway Patency:  Patent      Post Op Vitals Reviewed: No    No anethesia notable event occurred.            Last Filed PACU Vitals:  Vitals Value Taken Time   Temp 97.2 °F (36.2 °C) 10/14/24 0809   Pulse 80 10/14/24 0810   /54 10/14/24 0811   Resp 18 10/14/24 0809   SpO2 98 % 10/14/24 0810   Vitals shown include unfiled device data.    Modified Rasta:  No data recorded

## 2024-10-14 NOTE — TELEPHONE ENCOUNTER
Caller: liliana Moore     Doctor: Cecilio     Reason for call: pt stated [facility] stated  prior authorization is needed for the cyclobenzaprine (FLEXERIL) 5 mg tablet . Please Advise       Call back#: [phone]

## 2024-10-15 RX ORDER — SODIUM CHLORIDE 9 MG/ML
250 INJECTION, SOLUTION INTRAVENOUS ONCE
Status: COMPLETED | OUTPATIENT
Start: 2024-10-16 | End: 2024-10-16

## 2024-10-15 RX ORDER — SODIUM CHLORIDE 9 MG/ML
250 INJECTION, SOLUTION INTRAVENOUS ONCE
Status: COMPLETED | OUTPATIENT
Start: 2024-10-17 | End: 2024-10-17

## 2024-10-15 NOTE — TELEPHONE ENCOUNTER
PA for cyclobenzaprine  APPROVED     Date(s) approved 1/12/2025      Patient advised by          [x][employer] Message  []Phone call   [x]LMOM  []L/M to call office as no active Communication consent on file  []Unable to leave detailed message as VM not approved on Communication consent       Pharmacy advised by    [x]Fax  []Phone call    Approval letter scanned into Media YES  Your request has been approved  Authorization Expiration Date: 1/12/2025  Drug

## 2024-10-16 ENCOUNTER — HOSPITAL ENCOUNTER (OUTPATIENT)
Dept: INFUSION CENTER | Facility: HOSPITAL | Age: 69
Discharge: HOME | End: 2024-10-16
Payer: MEDICARE

## 2024-10-16 VITALS
SYSTOLIC BLOOD PRESSURE: 146 MMHG | HEART RATE: 62 BPM | TEMPERATURE: 98 F | DIASTOLIC BLOOD PRESSURE: 90 MMHG | OXYGEN SATURATION: 99 % | RESPIRATION RATE: 18 BRPM

## 2024-10-16 PROCEDURE — 96361 HYDRATE IV INFUSION ADD-ON: CPT

## 2024-10-16 PROCEDURE — 96360 HYDRATION IV INFUSION INIT: CPT

## 2024-10-16 RX ADMIN — SODIUM CHLORIDE 250 ML/HR: 0.9 INJECTION, SOLUTION INTRAVENOUS at 08:21

## 2024-10-16 NOTE — PROGRESS NOTES
Teresa Mao  tolerated hydration treatment well with no complications.      Teresa Mao is aware of future appt on 10/17/24     AVS printed and given to Teresa Mao:  No (Declined by Teresa Mao)

## 2024-10-17 ENCOUNTER — OFFICE VISIT (OUTPATIENT)
Dept: OBGYN CLINIC | Facility: CLINIC | Age: 69
End: 2024-10-17
Payer: MEDICARE

## 2024-10-17 ENCOUNTER — HOSPITAL ENCOUNTER (OUTPATIENT)
Dept: INFUSION CENTER | Facility: HOSPITAL | Age: 69
End: 2024-10-17
Payer: MEDICARE

## 2024-10-17 VITALS
HEIGHT: 61 IN | DIASTOLIC BLOOD PRESSURE: 77 MMHG | TEMPERATURE: 99.8 F | HEART RATE: 76 BPM | BODY MASS INDEX: 26.24 KG/M2 | WEIGHT: 139 LBS | SYSTOLIC BLOOD PRESSURE: 148 MMHG

## 2024-10-17 DIAGNOSIS — M76.821 POSTERIOR TIBIAL TENDONITIS, RIGHT: ICD-10-CM

## 2024-10-17 DIAGNOSIS — M25.571 PAIN, JOINT, ANKLE AND FOOT, RIGHT: Primary | ICD-10-CM

## 2024-10-17 DIAGNOSIS — S86.301D PERONEAL TENDON INJURY, RIGHT, SUBSEQUENT ENCOUNTER: ICD-10-CM

## 2024-10-17 LAB — SCAN RESULT: NORMAL

## 2024-10-17 PROCEDURE — 96361 HYDRATE IV INFUSION ADD-ON: CPT

## 2024-10-17 PROCEDURE — 99213 OFFICE O/P EST LOW 20 MIN: CPT | Performed by: FAMILY MEDICINE

## 2024-10-17 PROCEDURE — 96360 HYDRATION IV INFUSION INIT: CPT

## 2024-10-17 RX ORDER — SODIUM CHLORIDE 9 MG/ML
250 INJECTION, SOLUTION INTRAVENOUS ONCE
Status: COMPLETED | OUTPATIENT
Start: 2024-10-21 | End: 2024-10-21

## 2024-10-17 RX ADMIN — SODIUM CHLORIDE 250 ML/HR: 0.9 INJECTION, SOLUTION INTRAVENOUS at 09:18

## 2024-10-17 NOTE — PATIENT INSTRUCTIONS
F/u as needed  Home exercises  Physical therapy if possible.  Boot as needed- take off 4 x per day for range of motion exercises.  Icing/heat/OTC pain meds as needed.

## 2024-10-17 NOTE — PROGRESS NOTES
"[facility] [address] [address] [phone]      Assessment:  1. Pain, joint, ankle and foot, right  2. Posterior tibial tendonitis, right  3. Peroneal tendon injury, right, subsequent encounter      Plan:  Patient Instructions   F/u as needed  Home exercises  Physical therapy if possible.  Boot as needed- take off 4 x per day for range of motion exercises.  Icing/heat/OTC pain meds as needed.     Return if symptoms worsen or fail to improve.    Chief Complaint:  Chief Complaint   Patient presents with   • Right Ankle - Follow-up       Subjective:   HPI    Patient ID: Teresa Mao is a 69 y.o. female     Here for f/u  R ankle pain  She was doing well- wore boot for a few wks and improved  Stopped wearing boot- ace bandage  Pain started again this week after driving to [facility].  Vicodin PRN- helps   Pain walking         Review of Systems   Constitutional:  Negative for fatigue and fever.   Respiratory:  Negative for shortness of breath.    Cardiovascular:  Negative for chest pain.   Gastrointestinal:  Negative for abdominal pain and nausea.   Genitourinary:  Negative for dysuria.   Musculoskeletal:  Positive for arthralgias.   Skin:  Negative for rash and wound.   Neurological:  Negative for weakness and headaches.       Objective:  Vitals:  /77 (BP Location: Right arm, Patient Position: Sitting, Cuff Size: Standard)   Pulse 76   Temp 99.8 °F (37.7 °C) (Tympanic)   Ht 5' 1\" (1.549 m)   Wt 63 kg (139 lb)   BMI 26.26 kg/m²     The following portions of the patient's history were reviewed and updated as appropriate: allergies, current medications, past family history, past medical history, past social history, past surgical history, and problem list.    Physical exam:  Physical Exam  Constitutional:       Appearance: Normal appearance. She is normal weight.   HENT:      Head: Normocephalic.   Eyes:      " Extraocular Movements: Extraocular movements intact.   Pulmonary:      Effort: Pulmonary effort is normal.   Musculoskeletal:      Cervical back: Normal range of motion.   Skin:     General: Skin is warm and dry.   Neurological:      General: No focal deficit present.      Mental Status: She is alert and oriented to person, place, and time. Mental status is at baseline.   Psychiatric:         Mood and Affect: Mood normal.         Behavior: Behavior normal.         Thought Content: Thought content normal.         Judgment: Judgment normal.     Right Ankle Exam     Tenderness   The patient is experiencing tenderness in the ATF and deltoid.  Swelling: none    Range of Motion   The patient has normal right ankle ROM.    Muscle Strength   The patient has normal right ankle strength.     Comments:  Pain with ROM  Neg jenkins test/squeeze test.          Strength/Myotome Testing     Right Ankle/Foot   Normal strength            Jose Antonio Marley MD

## 2024-10-18 PROCEDURE — 88305 TISSUE EXAM BY PATHOLOGIST: CPT | Performed by: STUDENT IN AN ORGANIZED HEALTH CARE EDUCATION/TRAINING PROGRAM

## 2024-10-19 LAB — SCAN RESULT: NORMAL

## 2024-10-20 NOTE — PROGRESS NOTES
"Daily Note     Today's date: 10/21/2024  Patient name: Teresa Mao  : 1955  MRN: [id number]  Referring provider: Ilya Munoz*  Dx:   Encounter Diagnosis     ICD-10-CM    1. Status post left rotator cuff repair  Z98.890                      Subjective:       Objective: See treatment diary below      Assessment: Tolerated treatment {Tolerated treatment :[id number]}. Patient {assessment:[id number]}      Plan: Continue per plan of care.                Precautions: see extensive PMH, ABRAMS,     FOLLOW PROTOCOL**, sling to be worn 6 weeks.      Re-eval Date: 24  DOS:  5/3/24                                                                              12 weeks                                          10 weeks  Date  10.3 10.7 10.21   Visit Count 26 27 28 29 30   FOTO        Pain In L UT/Bicep/cerv \"Tight\" Same c/o soreness/tightness @ L bicep L shld   \"Extra sore\" Achey 3-4/10 L bicep    Pain Out Less tight after MHP applic No negative change No negative change No negative change        Manuals . 10.3 10.7 10.21   PROM LUE                                Neuro Re-Ed . 10.3 10.7 10.21   MTPs/LTPs Green 15x ea Green 20x ea Green 20x ea  *seated Green 25x ea Green 25x ea   punch Green 20x ea Green 25x Green 25x ea Green 15x ea   Green 20x ea   BUE ER Green 20x ea Green 25x Green 25x ea Green 25x ea Green 30x ea   IR w/TB Green 20x ea Green 25x Green 20x ea Green 25x ea Green 25x ea                           Ther Ex . 10.3 10.7 10.21   ultigrip D/C         Ball squeeze D/C       Wrist AROM DC      DC   L elbow ROM 4#  x40 AROM flexion and ext *held 2# x30 AROM flexion and ext *Held this visit    Pendulum ex *HEP       UT/LS stretch DC    DC to HEP    DC to HEP       Cerv ROM DC    DC to HEP   Scap isometrics      DC   pulleys pulleys    4.5 min FF  2.5 min Abd pulleys    4.5 min FF  2.5 min Abd pulleys    4.5 min FF  2.5 min Abd 4.5 min FF  3 min Abd 4.5 min FF  3 " min Abd   Finger ladder finger ladder  #26  15 reps FF finger ladder  #27  10 reps FF finger ladder  #27  10 reps FF finger ladder  #27  10 reps FF finger ladder  #27  10 reps FF   Wall slides  15x FF   15x cw/ccw  15x HA/ADD 15x FF   15x cw/ccw  15x HA/ADD 10x FF   10x cw/ccw  10x HA/ADD 15x FF   15x cw/ccw  15x HA/ADD 15x FF   15x cw/ccw  15x HA/ADD   Wand ex  Standing  FF, ABD, IR, EXT   15x ea      Active flex/abd 3 x 10 ea 3x 10x ea 3x10 ea 3x10 ea 3x10 ea   Full can  *Held   *Held     SL ER          Supine shld flex/ER w/wand  *Held      Prone flex, HA, ext rows                                                                        Ther Activity                        Gait Training                        Modalities 9.24 9.26 10.3 10.7 10.21         _______      MHP to L Cerv/UT/  Bicep  with no adverse reaction MHP to L Cerv/UT/  Bicep  with no adverse reaction MHP to L Cerv/UT/  Bicep  with no adverse reaction MHP to L Cerv/UT/  bicep  with no adverse reaction MHP to L Cerv/UT  with no adverse reaction       Rotator Cuff Repair Rehabilitation Protocol  (adapted from Milana PJ et al. “Rehabilitation of the Rotator Cuff: An Evaluation Based Approach”. JAAOS 2006; 14:599-609)  Updated 10.14   Ilya Munoz MD  [facility]  [address] [place] [address]  [phone]  Phase 1 (Weeks 0-6)              Immediate Postoperative Period              Goals:                          Diminish Pain and Inflammation  Maintain and Protect Integrity of the Repair                          Gradually Increase Passive ROM (NO Active or Active Assist until Week 6)                          Become Independent with Modified ADLs              Precautions:  Maintain Arm in Abduction Sling, Remove Only for Directed Exercises (may remove Abduction Pillow after Day 21 for comfort)                          Keep Incisions Clean & Dry (okay to shower in 48 hours, band-aids over incisions)  No  Immersion (pool) until Wounds Totally Sealed (usually not prior to day #10)  Passive Shoulder Motion ONLY, No Lifting/Holding Objects, Reaching Behind Back  Okay to Type/Write at Desktop with Arm in Sling              Day 0-6                          Elbow, Wrist, Hand AROM Exercises                           Start Cervical AROM and Scapula Isometrics                          Cryotherapy/Ice for Pain and Inflammation                          Instruct in Hygiene, Posture, and Positioning               Day 7-28                          Continue Above  May Start Pendulum Exercises                          May Start Supine, Pain Free, PT assisted PROM  Forward Flexion to 90°, External Rotation to 35° (Elbow at side), Internal Rotation to Body, Abduction to 60°                          Can Introduce light Cardio (Walking, Stationary Bike)                          Aqua Therapy may begin at week 3 (day 21) as long as no wound problems              Day 29-42                          Continue Above  Progress PROM to Goal of full PROM by Week 6.  May add Gentle Mobilizations (GH and Scapulothoracic) to Regain full PROM if Needed.  May add Heat prior to PROM Exercises, Ice after Exercises  May Begin AAROM at Day 29 if ROM is Appropriate in Anticipation of AROM Starting at Week 6              Criteria to Progress to Phase 2                          Reasonable Passive Forward Flexion, Abduction , IR/ER                          Time  Phase 2 (Weeks 6-12)              Protection and Active Motion              Goals of Phase:                          Allow Healing of Soft Tissues                          Decrease Pain and Inflammation  Add ADLs and Regain AROM by End of Phase              Precautions:                          NO STRENGTHENING until Phase 3                          Repair is Most Prone to Failure during this Phase!                          No Lifting Objects > 2 lbs (Coffee Cup OK), no Sudden Motions                           Avoid Upper Extremity Bike and Ergometer              Day 43-56                          Discontinue Sling  Initiate AROM Exercises (forward flexion, ER, IR and abduction), Rotator Cuff Isometrics                          Continue Periscapular Exercises, add Stretching if PROM Lacking   No Strengthening until Week 12 (Minimum Time Needed for Cuff Healing Sufficient to withstand Strengthening)                Phase 3 (Weeks 12-16)              Early Strengthening              Goal of Phase:                          Gain full AROM, Maintain PROM                          Gradual return of Shoulder Strength, Power and Endurance                          Gradual return to Functional Activities              Precautions:                          No Lifting > 10lbs, Sudden Lifting or Pushing activities, Overhead Lifting                          No Upper Extremity Bike or Ergometer              Week 12                          Initiate Strengthening Program (10 lb Maximum until Phase 4)                            ER/IR with Bands (Standing)                            ER in Lateral Decubitius Position                            Lateral Raises                            Full Can in Scapular Plane                            Prone Rowing, Horizontal Abduction, Extension                            Elbow Flexion/Extension              Week 14-16                          Initiate light Functional Activities as Permitted  Progress to Fundamental Shoulder Exercises  Phase 4 (Variable but Weeks 16-24)              Aggressive Rehab  Sport Specific or Activity Specific               Goals of Phase:                          Maintain Full Pain-free AROM                          Advanced Conditioning Exercises for enhanced Functional use                          Continue regaining Shoulder Strength, Power and Endurance                          Eventual return to full Functional Activities              Precautions:                           None              Week 16                          Continue ROM and stretching if appropriate                          Progress Strengthening, Proprioceptive and Neuromuscular Training                          Light Sports if Progressing Well (Chipping/Putting, easy ground strokes etc.)              Week 20                          Continue Strengthening and Stretching                          Initiate Interval Sports Program as Appropriate

## 2024-10-21 ENCOUNTER — HOSPITAL ENCOUNTER (OUTPATIENT)
Dept: INFUSION CENTER | Facility: HOSPITAL | Age: 69
Discharge: HOME | End: 2024-10-21
Attending: INTERNAL MEDICINE
Payer: MEDICARE

## 2024-10-21 ENCOUNTER — APPOINTMENT (OUTPATIENT)
Dept: PHYSICAL THERAPY | Facility: CLINIC | Age: 69
End: 2024-10-21
Payer: MEDICARE

## 2024-10-21 VITALS
DIASTOLIC BLOOD PRESSURE: 73 MMHG | TEMPERATURE: 99.1 F | HEART RATE: 71 BPM | SYSTOLIC BLOOD PRESSURE: 133 MMHG | OXYGEN SATURATION: 99 % | RESPIRATION RATE: 18 BRPM

## 2024-10-21 DIAGNOSIS — Z98.890 STATUS POST LEFT ROTATOR CUFF REPAIR: Primary | ICD-10-CM

## 2024-10-21 PROCEDURE — 96360 HYDRATION IV INFUSION INIT: CPT

## 2024-10-21 PROCEDURE — 96361 HYDRATE IV INFUSION ADD-ON: CPT

## 2024-10-21 RX ORDER — SODIUM CHLORIDE 9 MG/ML
250 INJECTION, SOLUTION INTRAVENOUS ONCE
Status: COMPLETED | OUTPATIENT
Start: 2024-10-24 | End: 2024-10-24

## 2024-10-21 RX ORDER — SODIUM CHLORIDE 9 MG/ML
250 INJECTION, SOLUTION INTRAVENOUS ONCE
Status: COMPLETED | OUTPATIENT
Start: 2024-10-23 | End: 2024-10-23

## 2024-10-21 RX ADMIN — SODIUM CHLORIDE 250 ML/HR: 0.9 INJECTION, SOLUTION INTRAVENOUS at 09:04

## 2024-10-21 NOTE — PROGRESS NOTES
Patient tolerated IV hydration without issues.      Teresa Mao is aware of future appt on 10/23/24 at 0800.     AVS -  No (Declined by Teresa Mao) Patient has mychart.    Patient ambulated off unit without incident.  All personal belongings taken with patient.

## 2024-10-23 ENCOUNTER — HOSPITAL ENCOUNTER (OUTPATIENT)
Dept: INFUSION CENTER | Facility: HOSPITAL | Age: 69
Discharge: HOME | End: 2024-10-23
Payer: MEDICARE

## 2024-10-23 VITALS
DIASTOLIC BLOOD PRESSURE: 86 MMHG | TEMPERATURE: 98 F | HEART RATE: 77 BPM | RESPIRATION RATE: 18 BRPM | SYSTOLIC BLOOD PRESSURE: 130 MMHG | OXYGEN SATURATION: 99 %

## 2024-10-23 PROCEDURE — 96360 HYDRATION IV INFUSION INIT: CPT

## 2024-10-23 PROCEDURE — 96361 HYDRATE IV INFUSION ADD-ON: CPT

## 2024-10-23 RX ADMIN — SODIUM CHLORIDE 250 ML/HR: 0.9 INJECTION, SOLUTION INTRAVENOUS at 08:33

## 2024-10-23 NOTE — PROGRESS NOTES
Teresa Mao  tolerated hydration treatment well with no complications.      Teresa Mao is aware of future appt on 10/24 at 9AM.     AVS printed and given to Teresa Mao: No (Declined by Teresa Mao).

## 2024-10-24 ENCOUNTER — HOSPITAL ENCOUNTER (OUTPATIENT)
Dept: INFUSION CENTER | Facility: HOSPITAL | Age: 69
End: 2024-10-24
Attending: INTERNAL MEDICINE
Payer: MEDICARE

## 2024-10-24 ENCOUNTER — OFFICE VISIT (OUTPATIENT)
Dept: OBGYN CLINIC | Facility: OTHER | Age: 69
End: 2024-10-24
Payer: MEDICARE

## 2024-10-24 VITALS
HEART RATE: 73 BPM | BODY MASS INDEX: 26.28 KG/M2 | HEIGHT: 61 IN | SYSTOLIC BLOOD PRESSURE: 145 MMHG | DIASTOLIC BLOOD PRESSURE: 81 MMHG | WEIGHT: 139.2 LBS

## 2024-10-24 VITALS
SYSTOLIC BLOOD PRESSURE: 117 MMHG | RESPIRATION RATE: 16 BRPM | OXYGEN SATURATION: 99 % | HEART RATE: 71 BPM | DIASTOLIC BLOOD PRESSURE: 68 MMHG | TEMPERATURE: 98.3 F

## 2024-10-24 DIAGNOSIS — M75.22 BICEPS TENDINITIS OF LEFT UPPER EXTREMITY: Primary | ICD-10-CM

## 2024-10-24 DIAGNOSIS — Z98.890 S/P LEFT ROTATOR CUFF REPAIR: ICD-10-CM

## 2024-10-24 PROCEDURE — 96360 HYDRATION IV INFUSION INIT: CPT

## 2024-10-24 PROCEDURE — 96361 HYDRATE IV INFUSION ADD-ON: CPT

## 2024-10-24 PROCEDURE — 99214 OFFICE O/P EST MOD 30 MIN: CPT | Performed by: ORTHOPAEDIC SURGERY

## 2024-10-24 RX ORDER — SODIUM CHLORIDE 9 MG/ML
250 INJECTION, SOLUTION INTRAVENOUS ONCE
Status: COMPLETED | OUTPATIENT
Start: 2024-10-28 | End: 2024-10-28

## 2024-10-24 RX ADMIN — SODIUM CHLORIDE 250 ML/HR: 0.9 INJECTION, SOLUTION INTRAVENOUS at 09:32

## 2024-10-24 NOTE — PROGRESS NOTES
"  Assessment  Diagnoses and all orders for this visit:    Internal derangement of left shoulder    S/P left rotator cuff repair    Discussion and Plan:    Patient is now 6 months s/p left shoulder arthroscopic rotator cuff repair with subacromial decompression performed on 5/3/24.   On examination today, she does have good resistance when the rotator cuff is tested.  She does also have symptoms localized to her long head biceps tendon which may have been injured with her incident that she reports picking up a case of water 2 separate times and having the acute onset of pain in the long head biceps tendon region  As she localizes her symptoms about her biceps, we will order an US guided cortisone injection into her LEFT long head biceps tendon sheath to see if this helps her continued symptoms.   If her symptoms persist then we could consider an MRI of her left shoulder to further evaluate the rotator cuff.   Follow up on an as needed basis    Subjective:   Patient ID: Teresa Mao is a 69 y.o. female presents today for a follow up visit for her left shoulder. She is now 6 months s/p arthroscopic rotator cuff repair with subacromial decompression on 5/3/24. She reports that about 2 months ago she did lift a case of water and noted some soreness about the shoulder. Today, she notes persistent \"soreness and muscle spasms\" about the the biceps. Pain is worse at night. She continues to perform PT/HEP. No numbness or tingling. No fevers or chills.     The following portions of the patient's history were reviewed and updated as appropriate: allergies, current medications, past family history, past medical history, past social history, past surgical history and problem list.    Objective:  /81   Pulse 73   Ht 5' 1\" (1.549 m)   Wt 63.1 kg (139 lb 3.2 oz)   BMI 26.30 kg/m²       Left Shoulder Exam     Range of Motion   The patient has normal left shoulder ROM.    Other   Erythema: absent  Scars: present (Well " healed arthroscopic portals)  Sensation: normal  Pulse: present             Physical Exam  Constitutional:       Appearance: She is well-developed.   Eyes:      Pupils: Pupils are equal, round, and reactive to light.   Pulmonary:      Effort: Pulmonary effort is normal.      Breath sounds: Normal breath sounds.   Skin:     General: Skin is warm and dry.   Neurological:      Mental Status: She is alert and oriented to person, place, and time.   Psychiatric:         Behavior: Behavior normal.         Thought Content: Thought content normal.         Judgment: Judgment normal.       Scribe Attestation      I,:  Nick Hunt am acting as a scribe while in the presence of the attending physician.:       I,:  Ilya Munoz MD personally performed the services described in this documentation    as scribed in my presence.:

## 2024-10-24 NOTE — PROGRESS NOTES
Patient tolerated IV hydration without issues.      Teresa Mao is aware of future appt on 10/25/24 at 0900.     AVS -  No (Declined by Teresa Mao) Patient has Mychart.    Patient ambulated off unit without incident.  All personal belongings taken with patient.

## 2024-10-25 ENCOUNTER — HOSPITAL ENCOUNTER (OUTPATIENT)
Dept: INFUSION CENTER | Facility: HOSPITAL | Age: 69
End: 2024-10-25
Payer: MEDICARE

## 2024-10-25 VITALS
TEMPERATURE: 99.1 F | HEART RATE: 95 BPM | DIASTOLIC BLOOD PRESSURE: 80 MMHG | OXYGEN SATURATION: 98 % | SYSTOLIC BLOOD PRESSURE: 120 MMHG | RESPIRATION RATE: 18 BRPM

## 2024-10-25 PROCEDURE — 96361 HYDRATE IV INFUSION ADD-ON: CPT

## 2024-10-25 PROCEDURE — 96360 HYDRATION IV INFUSION INIT: CPT

## 2024-10-25 RX ORDER — SODIUM CHLORIDE 9 MG/ML
250 INJECTION, SOLUTION INTRAVENOUS ONCE
Status: COMPLETED | OUTPATIENT
Start: 2024-10-29 | End: 2024-10-29

## 2024-10-25 RX ADMIN — SODIUM CHLORIDE 1500 ML: 0.9 INJECTION, SOLUTION INTRAVENOUS at 09:06

## 2024-10-25 NOTE — PROGRESS NOTES
Patient tolerated IV hydration without issues.      Teresa Mao is aware of future appt on 10/28/24 at 0800.     AVS -  No (Declined by Teresa Mao) Patient has Mychart.    Patient ambulated off unit without incident.  All personal belongings taken with patient.

## 2024-10-28 ENCOUNTER — OFFICE VISIT (OUTPATIENT)
Dept: PHYSICAL THERAPY | Facility: CLINIC | Age: 69
End: 2024-10-28
Payer: MEDICARE

## 2024-10-28 ENCOUNTER — HOSPITAL ENCOUNTER (OUTPATIENT)
Dept: INFUSION CENTER | Facility: HOSPITAL | Age: 69
Discharge: HOME | End: 2024-10-28
Payer: MEDICARE

## 2024-10-28 VITALS
HEART RATE: 71 BPM | RESPIRATION RATE: 16 BRPM | DIASTOLIC BLOOD PRESSURE: 72 MMHG | TEMPERATURE: 97.6 F | SYSTOLIC BLOOD PRESSURE: 145 MMHG

## 2024-10-28 DIAGNOSIS — Z98.890 STATUS POST LEFT ROTATOR CUFF REPAIR: Primary | ICD-10-CM

## 2024-10-28 PROCEDURE — 97110 THERAPEUTIC EXERCISES: CPT

## 2024-10-28 PROCEDURE — 96361 HYDRATE IV INFUSION ADD-ON: CPT

## 2024-10-28 PROCEDURE — 96360 HYDRATION IV INFUSION INIT: CPT

## 2024-10-28 RX ADMIN — SODIUM CHLORIDE 250 ML/HR: 0.9 INJECTION, SOLUTION INTRAVENOUS at 08:10

## 2024-10-28 NOTE — PROGRESS NOTES
Patient tolerated IV hydration without issues.      Teresa Mao is aware of future appt on 10/29/24 at 0900.     AVS -  No (Declined by Teresa Mao) Patient has Mychart.    Patient ambulated off unit without incident.  All personal belongings taken with patient.

## 2024-10-28 NOTE — PROGRESS NOTES
"Daily Note     Today's date: 10/28/2024  Patient name: Teresa Mao  : 1955  MRN: [id number]  Referring provider: Ilya Munoz*  Dx:   Encounter Diagnosis     ICD-10-CM    1. Status post left rotator cuff repair  Z98.890                      Subjective:   Pt. had f/u w/Ortho MD 10.24.24.  States she will monitor sx with her bicep region issue, and is scheduled for an US assisted injection to long head of bicep tendon on 24.      Objective: See treatment diary below      Assessment:  Tolerated treatment  Well with exer performed.  Held on/deferred select exercises that have potential to aggravate bicep region, as per pt discretion.    Patient would benefit from continued PT.      Plan: Continue per plan of care.              Precautions: see extensive PMH, ABRAMS,     FOLLOW PROTOCOL**, sling to be worn 6 weeks.      Re-eval Date: 24  DOS:  5/3/24                                                                              12 weeks                                          10 weeks  Date 10.28 9.26 10.3 10.7 10.21   Visit Count 31 27 28 29 30   FOTO        Pain In L UT/cerv \"Tight\"    L bicep \"sore\" Same c/o soreness/tightness @ L bicep L shld   \"Extra sore\" Achey 3-4/10 L bicep    Pain Out Less tight after MHP applic    Less sore after CP applic No negative change No negative change No negative change        Manuals 10.28 9. 10.3 10.7 10.21   PROM LUE                                Neuro Re-Ed 10.28 9. 10.3 10.7 10.21   MTPs/LTPs *deferred by pt Green 20x ea Green 20x ea  *seated Green 25x ea Green 25x ea   punch *deferred by pt Green 25x Green 25x ea Green 15x ea   Green 20x ea   BUE ER *deferred by pt Green 25x Green 25x ea Green 25x ea Green 30x ea   IR w/TB *deferred by pt Green 25x Green 20x ea Green 25x ea Green 25x ea                           Ther Ex 10.28 9. 10.3 10.7 10.21   ultigrip D/C         Ball squeeze D/C       Wrist AROM DC      DC   L elbow ROM *deferred by pt " *held 2# x30 AROM flexion and ext *Held this visit    Pendulum ex *HEP       UT/LS stretch DC    DC to HEP    DC to HEP       Cerv ROM DC    DC to HEP   Scap isometrics      DC   pulleys pulleys    5 min FF  3 min Abd pulleys    4.5 min FF  2.5 min Abd pulleys    4.5 min FF  2.5 min Abd 4.5 min FF  3 min Abd 4.5 min FF  3 min Abd   Finger ladder finger ladder  #26  10 reps FF finger ladder  #27  10 reps FF finger ladder  #27  10 reps FF finger ladder  #27  10 reps FF finger ladder  #27  10 reps FF   Wall slides  20x FF   20x cw/ccw  20x HA/ADD 15x FF   15x cw/ccw  15x HA/ADD 10x FF   10x cw/ccw  10x HA/ADD 15x FF   15x cw/ccw  15x HA/ADD 15x FF   15x cw/ccw  15x HA/ADD   Wand ex *deferred by pt Standing  FF, ABD, IR, EXT   15x ea      Active flex/abd *deferred by pt 3x 10x ea 3x10 ea 3x10 ea 3x10 ea   Full can  *Held   *Held     SL ER          Supine shld flex/ER w/wand  *Held      Prone flex, HA, ext rows                                                                        Ther Activity                        Gait Training                        Modalities 10.28 9.26 10.3 10.7 10.21         _______      MHP to L Cerv/UT    CP to L   Bicep    All with no adverse reaction MHP to L Cerv/UT/  Bicep  with no adverse reaction MHP to L Cerv/UT/  Bicep  with no adverse reaction MHP to L Cerv/UT/  bicep  with no adverse reaction MHP to L Cerv/UT  with no adverse reaction       Rotator Cuff Repair Rehabilitation Protocol  (adapted from Milana OLMOS et al. “Rehabilitation of the Rotator Cuff: An Evaluation Based Approach”. JAAOS 2006; 14:599-609)  Updated 10.14   Ilya Munoz MD  [facility]  [address] [place] [id number]  [phone]  Phase 1 (Weeks 0-6)              Immediate Postoperative Period              Goals:                          Diminish Pain and Inflammation  Maintain and Protect Integrity of the Repair                          Gradually Increase Passive ROM (NO  Active or Active Assist until Week 6)                          Become Independent with Modified ADLs              Precautions:  Maintain Arm in Abduction Sling, Remove Only for Directed Exercises (may remove Abduction Pillow after Day 21 for comfort)                          Keep Incisions Clean & Dry (okay to shower in 48 hours, band-aids over incisions)  No Immersion (pool) until Wounds Totally Sealed (usually not prior to day #10)  Passive Shoulder Motion ONLY, No Lifting/Holding Objects, Reaching Behind Back  Okay to Type/Write at Desktop with Arm in Sling              Day 0-6                          Elbow, Wrist, Hand AROM Exercises                           Start Cervical AROM and Scapula Isometrics                          Cryotherapy/Ice for Pain and Inflammation                          Instruct in Hygiene, Posture, and Positioning               Day 7-28                          Continue Above  May Start Pendulum Exercises                          May Start Supine, Pain Free, PT assisted PROM  Forward Flexion to 90°, External Rotation to 35° (Elbow at side), Internal Rotation to Body, Abduction to 60°                          Can Introduce light Cardio (Walking, Stationary Bike)                          Aqua Therapy may begin at week 3 (day 21) as long as no wound problems              Day 29-42                          Continue Above  Progress PROM to Goal of full PROM by Week 6.  May add Gentle Mobilizations (GH and Scapulothoracic) to Regain full PROM if Needed.  May add Heat prior to PROM Exercises, Ice after Exercises  May Begin AAROM at Day 29 if ROM is Appropriate in Anticipation of AROM Starting at Week 6              Criteria to Progress to Phase 2                          Reasonable Passive Forward Flexion, Abduction , IR/ER                          Time  Phase 2 (Weeks 6-12)              Protection and Active Motion              Goals of Phase:                          Allow Healing of Soft  Tissues                          Decrease Pain and Inflammation  Add ADLs and Regain AROM by End of Phase              Precautions:                          NO STRENGTHENING until Phase 3                          Repair is Most Prone to Failure during this Phase!                          No Lifting Objects > 2 lbs (Coffee Cup OK), no Sudden Motions                          Avoid Upper Extremity Bike and Ergometer              Day 43-56                          Discontinue Sling  Initiate AROM Exercises (forward flexion, ER, IR and abduction), Rotator Cuff Isometrics                          Continue Periscapular Exercises, add Stretching if PROM Lacking   No Strengthening until Week 12 (Minimum Time Needed for Cuff Healing Sufficient to withstand Strengthening)                Phase 3 (Weeks 12-16)              Early Strengthening              Goal of Phase:                          Gain full AROM, Maintain PROM                          Gradual return of Shoulder Strength, Power and Endurance                          Gradual return to Functional Activities              Precautions:                          No Lifting > 10lbs, Sudden Lifting or Pushing activities, Overhead Lifting                          No Upper Extremity Bike or Ergometer              Week 12                          Initiate Strengthening Program (10 lb Maximum until Phase 4)                            ER/IR with Bands (Standing)                            ER in Lateral Decubitius Position                            Lateral Raises                            Full Can in Scapular Plane                            Prone Rowing, Horizontal Abduction, Extension                            Elbow Flexion/Extension              Week 14-16                          Initiate light Functional Activities as Permitted  Progress to Fundamental Shoulder Exercises  Phase 4 (Variable but Weeks 16-24)              Aggressive Rehab  Sport Specific or Activity Specific                Goals of Phase:                          Maintain Full Pain-free AROM                          Advanced Conditioning Exercises for enhanced Functional use                          Continue regaining Shoulder Strength, Power and Endurance                          Eventual return to full Functional Activities              Precautions:                          None              Week 16                          Continue ROM and stretching if appropriate                          Progress Strengthening, Proprioceptive and Neuromuscular Training                          Light Sports if Progressing Well (Chipping/Putting, easy ground strokes etc.)              Week 20                          Continue Strengthening and Stretching                          Initiate Interval Sports Program as Appropriate

## 2024-10-29 ENCOUNTER — APPOINTMENT (OUTPATIENT)
Dept: PHYSICAL THERAPY | Facility: CLINIC | Age: 69
End: 2024-10-29
Payer: MEDICARE

## 2024-10-29 ENCOUNTER — HOSPITAL ENCOUNTER (OUTPATIENT)
Dept: INFUSION CENTER | Facility: HOSPITAL | Age: 69
Discharge: HOME | End: 2024-10-29
Payer: MEDICARE

## 2024-10-29 VITALS
TEMPERATURE: 97.7 F | HEART RATE: 66 BPM | DIASTOLIC BLOOD PRESSURE: 92 MMHG | SYSTOLIC BLOOD PRESSURE: 144 MMHG | RESPIRATION RATE: 18 BRPM

## 2024-10-29 PROCEDURE — 96361 HYDRATE IV INFUSION ADD-ON: CPT

## 2024-10-29 PROCEDURE — 96360 HYDRATION IV INFUSION INIT: CPT

## 2024-10-29 RX ADMIN — SODIUM CHLORIDE 250 ML/HR: 0.9 INJECTION, SOLUTION INTRAVENOUS at 09:16

## 2024-10-29 NOTE — PROGRESS NOTES
Teresa Mao  tolerated saline well with no complications.      Teresa Mao is aware of future appt  tomorrow at 0800    AVS printed and given to Teresa Mao:   No (Declined by Teresa Mao)

## 2024-10-30 RX ORDER — SODIUM CHLORIDE 9 MG/ML
250 INJECTION, SOLUTION INTRAVENOUS ONCE
Status: COMPLETED | OUTPATIENT
Start: 2024-11-01 | End: 2024-11-01

## 2024-10-31 NOTE — NURSING NOTE
Call placed to patient to discuss upcoming appointment at [facility] radiology department and complete consultation with patient. Patient is having a left bicep CSI utilizing US guidance. Reviewed patient's allergies, no current anticoagulant medication present per patient, also discussed the pre and post procedure expectations. Patient made aware of need for  post procedure if anti anxiety medication is taken, per patient she will not be taking any medications to prevent her from driving. Reminded patient of location and time expected for procedure, Patient expressed understanding by verbalizing and repeating instructions.

## 2024-11-01 ENCOUNTER — HOSPITAL ENCOUNTER (OUTPATIENT)
Dept: INFUSION CENTER | Facility: HOSPITAL | Age: 69
End: 2024-11-01
Attending: INTERNAL MEDICINE
Payer: MEDICARE

## 2024-11-01 VITALS
TEMPERATURE: 97.6 F | OXYGEN SATURATION: 99 % | DIASTOLIC BLOOD PRESSURE: 70 MMHG | HEART RATE: 73 BPM | RESPIRATION RATE: 20 BRPM | SYSTOLIC BLOOD PRESSURE: 139 MMHG

## 2024-11-01 PROCEDURE — 96361 HYDRATE IV INFUSION ADD-ON: CPT

## 2024-11-01 PROCEDURE — 96360 HYDRATION IV INFUSION INIT: CPT

## 2024-11-01 RX ORDER — SODIUM CHLORIDE 9 MG/ML
250 INJECTION, SOLUTION INTRAVENOUS ONCE
Status: COMPLETED | OUTPATIENT
Start: 2024-11-05 | End: 2024-11-05

## 2024-11-01 RX ADMIN — SODIUM CHLORIDE 250 ML/HR: 9 INJECTION, SOLUTION INTRAVENOUS at 09:00

## 2024-11-01 NOTE — PROGRESS NOTES
Pt here for hydration. Pt offered no acute complaints. Vitals stable. Pt resting comfortably in bed with call bell in place.

## 2024-11-01 NOTE — PLAN OF CARE
Problem: Potential for Falls  Goal: Patient will remain free of falls  Description: INTERVENTIONS:  - Educate patient/family on patient safety including physical limitations    - Keep Call bell within reach    Outcome: Progressing     
No

## 2024-11-04 RX ORDER — SODIUM CHLORIDE 9 MG/ML
250 INJECTION, SOLUTION INTRAVENOUS ONCE
Status: COMPLETED | OUTPATIENT
Start: 2024-11-06 | End: 2024-11-06

## 2024-11-05 ENCOUNTER — HOSPITAL ENCOUNTER (OUTPATIENT)
Dept: INFUSION CENTER | Facility: HOSPITAL | Age: 69
Discharge: HOME | End: 2024-11-05
Payer: MEDICARE

## 2024-11-05 ENCOUNTER — EVALUATION (OUTPATIENT)
Dept: PHYSICAL THERAPY | Facility: CLINIC | Age: 69
End: 2024-11-05
Payer: MEDICARE

## 2024-11-05 VITALS
RESPIRATION RATE: 17 BRPM | DIASTOLIC BLOOD PRESSURE: 82 MMHG | TEMPERATURE: 97.5 F | SYSTOLIC BLOOD PRESSURE: 137 MMHG | HEART RATE: 77 BPM | OXYGEN SATURATION: 98 %

## 2024-11-05 DIAGNOSIS — Z98.890 STATUS POST LEFT ROTATOR CUFF REPAIR: Primary | ICD-10-CM

## 2024-11-05 PROCEDURE — 96361 HYDRATE IV INFUSION ADD-ON: CPT

## 2024-11-05 PROCEDURE — 96360 HYDRATION IV INFUSION INIT: CPT

## 2024-11-05 PROCEDURE — 97110 THERAPEUTIC EXERCISES: CPT | Performed by: PHYSICAL MEDICINE & REHABILITATION

## 2024-11-05 RX ORDER — SODIUM CHLORIDE 9 MG/ML
250 INJECTION, SOLUTION INTRAVENOUS ONCE
Status: COMPLETED | OUTPATIENT
Start: 2024-11-07 | End: 2024-11-07

## 2024-11-05 RX ADMIN — SODIUM CHLORIDE 250 ML/HR: 0.9 INJECTION, SOLUTION INTRAVENOUS at 09:08

## 2024-11-05 NOTE — PROGRESS NOTES
Teresa Mao  tolerated treatment well with no complications.      Treesa Mao is aware of future appt tomorrow     AVS printed and given to Teresa Mao:   No (Declined by Teresa Mao)

## 2024-11-05 NOTE — PROGRESS NOTES
Pt here for hydration. Pt offered no acute complaints today. Rash below central line looks improved. Dressing changed with sterile technique. Pt can only tolerate transparent dressing.  Good blood return noted. No drainage or swelling noted.

## 2024-11-05 NOTE — PROGRESS NOTES
PT Re-Evaluation  and PT Discharge    Today's date: 2024  Patient name: Teresa Mao  : 1955  MRN: [id number]  Referring provider: Ilya Munoz*  Dx:   Encounter Diagnosis     ICD-10-CM    1. Status post left rotator cuff repair  Z98.890                          Assessment  Impairments: abnormal or restricted ROM, abnormal movement, activity intolerance, impaired physical strength, lacks appropriate home exercise program and pain with function    Assessment details: Teresa Mao is a 68 y.o. female presenting to outpatient physical therapy with noted impairments including pain, impaired soft tissue mobility, reduced range of motion, reduced strength, reduced postural awareness, and reduced activity tolerance. Signs and symptoms at present are consistent with referring diagnosis of rotator cuff repair L shld. Due to noted impairments, the patient's present functional limitations include difficulty with ADLs with increased need for assistance, reliance on medication and/or modalities for pain relief, reduced tolerance for functional mobility and activity, and difficulty completing HH and self care responsibilities. Patient to benefit from skilled outpatient physical therapy 2x/week for 12 weeks in order to reduce pain, maximize pain free range of motion, increase strength and stability, and improve functional mobility/functional activity in order to maximize return to prior level of function with reduced limitations. Home exercise program was provided and all questions answered to patient's level of satisfaction. Thank you for your referral.    24 Pt notes she is doing well and very pleased with the status of her L shld. Pt notes she is functional and able to use the LUE for HH chores.Pt notes 80% improvement since SOC in pain reduction and AROM. Cont to note weakness LUE but strength also improving steadily. Pt has made excellent gains in AROM to date as seen in objective data.  "Pt will benefit from cont'd PT as per protocol to progress toward goals and strengthening ex beginning in 2 weeks.  11/5/24 UPDATE: Pt has had 32 OPPT visits for her L shld s/p L rot cuff repair. Pt is agreeable to DC from PT at this time as she appears to have reached her max potential for this time and expect cont'd strength gains as time goes on. Pt has no shldr pain but notes pain along the long head of the bicep tendon which is being treated by her ortho doctor. Pt has made good ROM and strength gains since SOC and appears ready for DC to HEP at this time.    Barriers to therapy: transportation  Understanding of Dx/Px/POC: good     Prognosis: good    Goals  STGs to be achieved in 4 weeks:  1. Pt to demonstrate reduced subjective pain rating \"at worst\" by at least 2-3 points from Initial Eval in order to allow for reduced pain with ADLs and improved functional activity tolerance. MET  2. Pt to demonstrate increased PROM of L shld  by at least 5-10 degrees in order to allow for greater ease and independence with ADLs and functional mobility. MET  3. Pt to demonstrate increased strength of L wrist and   in order to improve safety and stability with ADLs and functional mobility. MET    LTGs to be achieved in 6-8 weeks:  1. Pt will be I with HEP in order to continue to improve quality of life and independence and reduce risk for re-injury. MET  2. Pt to demonstrate return to indep self care and ADL's without limitations or restrictions. MET  3. Pt to demonstrate improved function as noted by achieving or exceeding predicted score on FOTO outcomes assessment tool. MET  4. Pt to demonstrate increased MMT of L shld by at least 1/2-1 grade in order to improve safety and stability with ADLs and functional mobility.  MET        Plan  Patient would benefit from: skilled physical therapy  Other planned modality interventions: Modalities prn for symptom management    Planned therapy interventions: manual therapy, " neuromuscular re-education, therapeutic activities, therapeutic exercise, strengthening, stretching and home exercise program    Frequency: 2x week  Duration in weeks: 9  Plan of Care beginning date: 2024  Plan of Care expiration date: 2024  Treatment plan discussed with: PTA and patient    Subjective Evaluation    History of Present Illness  Mechanism of injury: Pt underwent L rot cuff repair on Fri. May 3rd with Dr Munoz. Notes her nerve block wore off  and she is in quite a bit of pain. Pt wearing sling and swathe and removed surgical dressing Sat. Was seen in the office yesterday and was told everything looks good. Has an aide coming in 2x/week to assist with bathing and HH chores.  Pt unable to lay in bed, cannot bathe indep, diff with HH chores, diff dressing, unable to drive, diff with most activities.  24 Pt notes she is doing well and very pleased with the status of her L shld. Pt notes she is functional and able to use the LUE for HH chores.Pt notes 80% improvement since SOC in pain reduction and AROM. Cont to note weakness LUE but strength also improving steadily.    24 UPDATE: Pt has had 32 OPPT visits for her L shld s/p L rot cuff repair. Pt is agreeable to DC from PT at this time as she appears to have reached her max potential for this time and expect cont'd strength gains as time goes on. Pt has no shldr pain but notes pain along the long head of the bicep tendon which is being treated by her ortho doctor.          Not a recurrent problem   Quality of life: poor    Patient Goals  Patient goals for therapy: decreased pain, increased motion, increased strength and independence with ADLs/IADLs  Patient goal: return to driving, cooking  Pain  Current pain ratin  At best pain ratin (bicep tendon)  At worst pain ratin  Quality: sharp, pressure and tight  Relieving factors: ice and medications  Exacerbated by: holding upper trap tight, reaching, movement.  Progression:  improved    Social Support  Lives with: alone    Employment status: not working  Hand dominance: left    Treatments  Previous treatment: physical therapy, medication, injection treatment and home therapy  Current treatment: medication and physical therapy      Objective     Postural Observations  Seated posture: good  Standing posture: good      Observations   Left Shoulder   Negative for drainage, edema and incision (well healed).     Cervical/Thoracic Screen   Cervical range of motion within normal limits with the following exceptions: Sling is putting pressure on jugular catheter  AROM WFLs    Active Range of Motion   Left Shoulder   Flexion: 155 degrees   Extension: 62 degrees   Abduction: 80 degrees   External rotation 90°: 75 degrees   Internal rotation 90°: 57 degrees     Passive Range of Motion   Left Shoulder   Flexion: 170 degrees   Extension: 60 degrees   Abduction: 180 degrees   External rotation 90°: 77 degrees   Internal rotation 0°: Left shoulder passive internal rotation at 0 degrees: to body.   Internal rotation 90°: 60 degrees     Strength/Myotome Testing     Left Shoulder     Planes of Motion   Flexion: 4   Extension: 4   Abduction: 4-   External rotation at 0°: 4   Internal rotation at 0°: 4     Additional Strength Details  Unable to assess at this time.             Precautions: see extensive PMH, ABRAMS,     FOLLOW PROTOCOL**, sling to be worn 6 weeks.    Re-eval Date: 6/7/24      Re-eval Date: 6/7/24  DOS:  5/3/24                                                                                                                           10 weeks                          Start Cervical AROM and Scapula Isometrics                          Cryotherapy/Ice for Pain and Inflammation                          Instruct in Hygiene, Posture, and Positioning               Day 7-28                          Continue Above  May Start Pendulum Exercises                          May Start Supine, Pain Free, PT  assisted PROM  Forward Flexion to 90°, External Rotation to 35° (Elbow at side), Internal Rotation to Body, Abduction to 60°                          Can Introduce light Cardio (Walking, Stationary Bike)                          Aqua Therapy may begin at week 3 (day 21) as long as no wound problems              Day 29-42                          Continue Above  Progress PROM to Goal of full PROM by Week 6.  May add Gentle Mobilizations (GH and Scapulothoracic) to Regain full PROM if Needed.  May add Heat prior to PROM Exercises, Ice after Exercises  May Begin AAROM at Day 29 if ROM is Appropriate in Anticipation of AROM Starting at Week 6              Criteria to Progress to Phase 2                          Reasonable Passive Forward Flexion, Abduction , IR/ER                          Time  Phase 2 (Weeks 6-12)              Protection and Active Motion              Goals of Phase:                          Allow Healing of Soft Tissues                          Decrease Pain and Inflammation  Add ADLs and Regain AROM by End of Phase              Precautions:                          NO STRENGTHENING until Phase 3                          Repair is Most Prone to Failure during this Phase!                          No Lifting Objects > 2 lbs (Coffee Cup OK), no Sudden Motions                          Avoid Upper Extremity Bike and Ergometer              Day 43-56                          Discontinue Sling  Initiate AROM Exercises (forward flexion, ER, IR and abduction), Rotator Cuff Isometrics                          Continue Periscapular Exercises, add Stretching if PROM Lacking   No Strengthening until Week 12 (Minimum Time Needed for Cuff Healing Sufficient to withstand Strengthening)                Phase 3 (Weeks 12-16)              Early Strengthening              Goal of Phase:                          Gain full AROM, Maintain PROM                          Gradual return of Shoulder Strength, Power and Endurance    "                       Gradual return to Functional Activities              Precautions:                          No Lifting > 10lbs, Sudden Lifting or Pushing activities, Overhead Lifting                          No Upper Extremity Bike or Ergometer              Week 12                          Initiate Strengthening Program (10 lb Maximum until Phase 4)                            ER/IR with Bands (Standing)                            ER in Lateral Decubitius Position                            Lateral Raises                            Full Can in Scapular Plane                            Prone Rowing, Horizontal Abduction, Extension                            Elbow Flexion/Extension              Week 14-16                          Initiate light Functional Activities as Permitted  Progress to Fundamental Shoulder Exercises  Phase 4 (Variable but Weeks 16-24)              Aggressive Rehab  Sport Specific or Activity Specific               Goals of Phase:                          Maintain Full Pain-free AROM                          Advanced Conditioning Exercises for enhanced Functional use                          Continue regaining Shoulder Strength, Power and Endurance                          Eventual return to full Functional Activities              Precautions:                          None              Week 16                          Continue ROM and stretching if appropriate                          Progress Strengthening, Proprioceptive and Neuromuscular Training                          Light Sports if Progressing Well (Chipping/Putting, easy ground strokes etc.)              Week 20                          Continue Strengthening and Stretching                          Initiate Interval Sports Program as Appropriate      Date 10.28  11/5      Visit Count 31  32      FOTO        Pain In L UT/cerv \"Tight\"    L bicep \"sore\" Same c/o soreness/tightness @ L bicep      Pain Out Less tight after MHP " applic    Less sore after CP applic           Manuals 10.28 11/5      PROM LUE                                Neuro Re-Ed 10.28 11/5      MTPs/LTPs *deferred by pt deferred by pt      punch *deferred by pt deferred by pt      BUE ER *deferred by pt deferred by pt      IR w/TB *deferred by pt deferred by pt                              Ther Ex 10.28       ultigrip D/C         Ball squeeze D/C       Wrist AROM DC         L elbow ROM *deferred by pt       Pendulum ex *HEP       UT/LS stretch DC        DC to HEP       Cerv ROM DC       Scap isometrics         pulleys pulleys    5 min FF  3 min Abd pulleys    5 min FF        Finger ladder finger ladder  #26  10 reps FF finger ladder  #27  10 reps FF      Wall slides  20x FF   20x cw/ccw  20x HA/ADD 20x FF   20x cw/ccw  20x HA/ADD      Wand ex *deferred by pt Standing  FF, ABD, IR, EXT   15x ea      Active flex/abd *deferred by pt 3x 10x ea      Full can  *Held        SL ER          Supine shld flex/ER w/wand  *Held      Prone flex, HA, ext rows                                                                        Ther Activity                        Gait Training                        Modalities 10.28                 MHP to L Cerv/UT    CP to L   Bicep    All with no adverse reaction

## 2024-11-06 ENCOUNTER — HOSPITAL ENCOUNTER (OUTPATIENT)
Dept: INFUSION CENTER | Facility: HOSPITAL | Age: 69
Discharge: HOME | End: 2024-11-06
Payer: MEDICARE

## 2024-11-06 VITALS
OXYGEN SATURATION: 99 % | HEART RATE: 68 BPM | TEMPERATURE: 97.7 F | SYSTOLIC BLOOD PRESSURE: 132 MMHG | RESPIRATION RATE: 18 BRPM | DIASTOLIC BLOOD PRESSURE: 76 MMHG

## 2024-11-06 PROCEDURE — 96361 HYDRATE IV INFUSION ADD-ON: CPT

## 2024-11-06 PROCEDURE — 96360 HYDRATION IV INFUSION INIT: CPT

## 2024-11-06 RX ADMIN — SODIUM CHLORIDE 250 ML/HR: 0.9 INJECTION, SOLUTION INTRAVENOUS at 09:15

## 2024-11-06 NOTE — PROGRESS NOTES
Teresa Mao  tolerated treatment well with no complications.      Teresa Mao is aware of future appt on 11/7 at 0830.     AVS printed and given to Teresa Mao:  No (Declined by Teresa Mao)

## 2024-11-07 ENCOUNTER — APPOINTMENT (OUTPATIENT)
Dept: PHYSICAL THERAPY | Facility: CLINIC | Age: 69
End: 2024-11-07
Payer: MEDICARE

## 2024-11-07 ENCOUNTER — HOSPITAL ENCOUNTER (OUTPATIENT)
Dept: INFUSION CENTER | Facility: HOSPITAL | Age: 69
End: 2024-11-07
Attending: INTERNAL MEDICINE
Payer: MEDICARE

## 2024-11-07 VITALS
HEART RATE: 72 BPM | OXYGEN SATURATION: 99 % | TEMPERATURE: 97.9 F | SYSTOLIC BLOOD PRESSURE: 146 MMHG | DIASTOLIC BLOOD PRESSURE: 80 MMHG | RESPIRATION RATE: 16 BRPM

## 2024-11-07 PROCEDURE — 96361 HYDRATE IV INFUSION ADD-ON: CPT

## 2024-11-07 PROCEDURE — 96360 HYDRATION IV INFUSION INIT: CPT

## 2024-11-07 RX ORDER — SODIUM CHLORIDE 9 MG/ML
250 INJECTION, SOLUTION INTRAVENOUS ONCE
Status: DISCONTINUED | OUTPATIENT
Start: 2024-11-11 | End: 2024-11-15 | Stop reason: HOSPADM

## 2024-11-07 RX ADMIN — SODIUM CHLORIDE 250 ML/HR: 0.9 INJECTION, SOLUTION INTRAVENOUS at 08:44

## 2024-11-07 NOTE — PROGRESS NOTES
Teresa Mao  tolerated treatment well with no complications.    Pt was accessed today with blood return noted and flushed with out any complications.  Pts dressing was intact and did not need any changes.     Pt was disconnected after her 6 hour infusion and a new green cap was placed on the end of her line and her line was clamped.   Pt was AAOx3 at time of discharge   Teresa Mao is aware of future appt on 11/08/24 at 0900.     AVS No (Declined by Teresa Mao)

## 2024-11-08 DIAGNOSIS — M79.18 MYOFASCIAL PAIN SYNDROME: ICD-10-CM

## 2024-11-08 DIAGNOSIS — G03.9 ARACHNOIDITIS: ICD-10-CM

## 2024-11-08 RX ORDER — CYCLOBENZAPRINE HCL 5 MG
5 TABLET ORAL 3 TIMES DAILY PRN
Qty: 90 TABLET | Refills: 1 | Status: SHIPPED | OUTPATIENT
Start: 2024-11-08

## 2024-11-11 ENCOUNTER — HOSPITAL ENCOUNTER (OUTPATIENT)
Dept: INFUSION CENTER | Facility: HOSPITAL | Age: 69
Discharge: HOME | End: 2024-11-11

## 2024-11-12 ENCOUNTER — APPOINTMENT (OUTPATIENT)
Dept: PHYSICAL THERAPY | Facility: CLINIC | Age: 69
End: 2024-11-12
Payer: MEDICARE

## 2024-11-14 ENCOUNTER — HOSPITAL ENCOUNTER (OUTPATIENT)
Dept: RADIOLOGY | Facility: HOSPITAL | Age: 69
Discharge: HOME | End: 2024-11-14
Attending: ORTHOPAEDIC SURGERY

## 2024-11-15 DIAGNOSIS — M47.816 LUMBAR SPONDYLOSIS: ICD-10-CM

## 2024-11-15 DIAGNOSIS — M54.41 CHRONIC BILATERAL LOW BACK PAIN WITH BILATERAL SCIATICA: ICD-10-CM

## 2024-11-15 DIAGNOSIS — G89.29 CHRONIC BILATERAL LOW BACK PAIN WITH BILATERAL SCIATICA: ICD-10-CM

## 2024-11-15 DIAGNOSIS — M47.812 CERVICAL SPONDYLOSIS: ICD-10-CM

## 2024-11-15 DIAGNOSIS — M54.42 CHRONIC BILATERAL LOW BACK PAIN WITH BILATERAL SCIATICA: ICD-10-CM

## 2024-11-15 DIAGNOSIS — M54.2 NECK PAIN: ICD-10-CM

## 2024-11-15 DIAGNOSIS — G03.9 ARACHNOIDITIS: ICD-10-CM

## 2024-11-15 DIAGNOSIS — M54.16 LUMBAR RADICULOPATHY: ICD-10-CM

## 2024-11-15 DIAGNOSIS — Z98.1 HISTORY OF LUMBAR FUSION: ICD-10-CM

## 2024-11-15 DIAGNOSIS — M54.12 CERVICAL RADICULOPATHY: ICD-10-CM

## 2024-11-15 RX ORDER — HYDROCODONE BITARTRATE AND ACETAMINOPHEN 10; 325 MG/1; MG/1
0.5 TABLET ORAL 2 TIMES DAILY PRN
Qty: 6 TABLET | Refills: 0 | Status: SHIPPED | OUTPATIENT
Start: 2024-11-15 | End: 2024-11-21 | Stop reason: SDUPTHER

## 2024-11-15 RX ORDER — HYDROCODONE BITARTRATE AND ACETAMINOPHEN 10; 325 MG/1; MG/1
0.5 TABLET ORAL 2 TIMES DAILY PRN
Qty: 30 TABLET | Refills: 0 | Status: CANCELLED | OUTPATIENT
Start: 2024-11-15

## 2024-11-15 NOTE — TELEPHONE ENCOUNTER
S/w pt, she is requesting a weeks worth of her [place]  mg [facility]. Pt said she has 2 tabs left, RN confirmed last fill 10/14/24 and next apt with Yodit is 11/21/24. Pt doing well on medication, no issues.

## 2024-11-15 NOTE — TELEPHONE ENCOUNTER
Reason for call:   [x] Refill   [] Prior Auth  [] Other:     Office:   [] PCP/Provider -   [x] Specialty/Provider -   HYDROcodone-acetaminophen (NORCO)  mg per tablet 0.5 tablet, Oral, 2 times daily PRN       Quantity: 30    [facility]    Does the patient have enough for 3 days?   [] Yes   [x] No - Send as HP to POD

## 2024-11-15 NOTE — TELEPHONE ENCOUNTER
10/14/2024 09/04/2024 HYDROcodone BITARTRATE 10 MG ORAL TABLET/ACETAMINOPHEN 325 MG (Tablet) 30.0 30 325 MG-10 MG 10.0 BARBARA KOCHER [facility]  09/16/2024 09/16/2024 HYDROcodone BITARTRATE 10 MG ORAL TABLET/ACETAMINOPHEN 325 MG (Tablet) 30.0 30 325 MG-10 MG 10.0 BARBARA KOCHER

## 2024-11-19 ENCOUNTER — APPOINTMENT (OUTPATIENT)
Dept: PHYSICAL THERAPY | Facility: CLINIC | Age: 69
End: 2024-11-19
Payer: MEDICARE

## 2024-11-20 DIAGNOSIS — M54.12 CERVICAL RADICULOPATHY: ICD-10-CM

## 2024-11-20 DIAGNOSIS — M54.16 LUMBAR RADICULOPATHY: ICD-10-CM

## 2024-11-20 DIAGNOSIS — Z98.1 HISTORY OF LUMBAR FUSION: ICD-10-CM

## 2024-11-20 DIAGNOSIS — M54.42 CHRONIC BILATERAL LOW BACK PAIN WITH BILATERAL SCIATICA: ICD-10-CM

## 2024-11-20 DIAGNOSIS — M47.816 LUMBAR SPONDYLOSIS: ICD-10-CM

## 2024-11-20 DIAGNOSIS — M54.2 NECK PAIN: ICD-10-CM

## 2024-11-20 DIAGNOSIS — G89.29 CHRONIC BILATERAL LOW BACK PAIN WITH BILATERAL SCIATICA: ICD-10-CM

## 2024-11-20 DIAGNOSIS — G03.9 ARACHNOIDITIS: ICD-10-CM

## 2024-11-20 DIAGNOSIS — M47.812 CERVICAL SPONDYLOSIS: ICD-10-CM

## 2024-11-20 DIAGNOSIS — M54.41 CHRONIC BILATERAL LOW BACK PAIN WITH BILATERAL SCIATICA: ICD-10-CM

## 2024-11-20 NOTE — TELEPHONE ENCOUNTER
I placed pt in 1030 appt spot for tomorrow 11/21/24 and sent msg to julio cesar @ [facility] in case anything further was rqrd for VV.     Are you able to send script today for pharm to fill or will pt need to wait for VV tomorrow?  Pls advise. Thank you!

## 2024-11-20 NOTE — TELEPHONE ENCOUNTER
BK would like pt sched for VV tomorrow 11/21. RN placed pt in 1030 spot for VV. RN unsure if anything further needs to be done for this to be a VV. Thank you!

## 2024-11-20 NOTE — TELEPHONE ENCOUNTER
RN attempted to call pt but phone call could not be completed at this time.     BK--> Pt has covid pneumonia and is unable to come for her appt. She is rs for 12/19/24 BK.

## 2024-11-20 NOTE — TELEPHONE ENCOUNTER
[id number] 11/15/2024 11/15/2024 HYDROcodone BITARTRATE 10 MG ORAL TABLET/ACETAMINOPHEN 325 MG (Tablet) 6.0 6 325 MG-10 MG 10.0 BARBARA KOCHER [facility] [place] 0 / 0 [place]    1 [id number] 10/14/2024 09/04/2024 HYDROcodone BITARTRATE 10 MG ORAL TABLET/ACETAMINOPHEN 325 MG (Tablet) 30.0 30 325 MG-10 MG 10.0 BARBARA KOCHER

## 2024-11-20 NOTE — TELEPHONE ENCOUNTER
Reason for call:   [x] Refill   [] Prior Auth  [] Other:     Office:   [] PCP/Provider -   [x] Specialty/Provider - [facility]/ Kocher, CRNP    Medication: HYDROcodone-acetaminophen (NORCO)  mg per tablet     Dose/Frequency: Take 0.5 tablets by mouth 2 (two) times a day as needed for moderate pain Max Daily Amount: 1 tablet    Quantity: 60    Pharmacy: [facility]/pharmacy #[id number] - [place], [place] - [address] [phone]    Does the patient have enough for 3 days?   [] Yes   [x] No - Send as HP to POD'

## 2024-11-21 ENCOUNTER — APPOINTMENT (EMERGENCY)
Dept: RADIOLOGY | Facility: HOSPITAL | Age: 69
End: 2024-11-21
Payer: MEDICARE

## 2024-11-21 ENCOUNTER — APPOINTMENT (OUTPATIENT)
Dept: PHYSICAL THERAPY | Facility: CLINIC | Age: 69
End: 2024-11-21
Payer: MEDICARE

## 2024-11-21 ENCOUNTER — APPOINTMENT (EMERGENCY)
Dept: CT IMAGING | Facility: HOSPITAL | Age: 69
End: 2024-11-21
Payer: MEDICARE

## 2024-11-21 ENCOUNTER — TELEMEDICINE (OUTPATIENT)
Age: 69
End: 2024-11-21
Payer: MEDICARE

## 2024-11-21 ENCOUNTER — HOSPITAL ENCOUNTER (EMERGENCY)
Facility: HOSPITAL | Age: 69
Discharge: HOME | End: 2024-11-21
Attending: EMERGENCY MEDICINE
Payer: MEDICARE

## 2024-11-21 VITALS
DIASTOLIC BLOOD PRESSURE: 56 MMHG | RESPIRATION RATE: 15 BRPM | SYSTOLIC BLOOD PRESSURE: 112 MMHG | TEMPERATURE: 97.7 F | HEART RATE: 73 BPM | OXYGEN SATURATION: 97 %

## 2024-11-21 DIAGNOSIS — Z98.1 HISTORY OF LUMBAR FUSION: ICD-10-CM

## 2024-11-21 DIAGNOSIS — M47.816 LUMBAR SPONDYLOSIS: ICD-10-CM

## 2024-11-21 DIAGNOSIS — M54.12 CERVICAL RADICULOPATHY: ICD-10-CM

## 2024-11-21 DIAGNOSIS — G03.9 ARACHNOIDITIS: ICD-10-CM

## 2024-11-21 DIAGNOSIS — M54.2 NECK PAIN: ICD-10-CM

## 2024-11-21 DIAGNOSIS — M47.812 CERVICAL SPONDYLOSIS: ICD-10-CM

## 2024-11-21 DIAGNOSIS — M54.42 CHRONIC BILATERAL LOW BACK PAIN WITH BILATERAL SCIATICA: ICD-10-CM

## 2024-11-21 DIAGNOSIS — G89.29 CHRONIC BILATERAL LOW BACK PAIN WITH BILATERAL SCIATICA: ICD-10-CM

## 2024-11-21 DIAGNOSIS — R05.9 COUGH: ICD-10-CM

## 2024-11-21 DIAGNOSIS — M54.16 LUMBAR RADICULOPATHY: ICD-10-CM

## 2024-11-21 DIAGNOSIS — R06.00 DYSPNEA: Primary | ICD-10-CM

## 2024-11-21 DIAGNOSIS — M54.41 CHRONIC BILATERAL LOW BACK PAIN WITH BILATERAL SCIATICA: ICD-10-CM

## 2024-11-21 DIAGNOSIS — G89.4 CHRONIC PAIN DISORDER: ICD-10-CM

## 2024-11-21 LAB
2HR DELTA HS TROPONIN: -1 NG/L
ANION GAP SERPL CALCULATED.3IONS-SCNC: 8 MMOL/L (ref 4–13)
BASOPHILS # BLD AUTO: 0.04 THOUSANDS/ÂΜL (ref 0–0.1)
BASOPHILS NFR BLD AUTO: 0 % (ref 0–1)
BUN SERPL-MCNC: 23 MG/DL (ref 5–25)
CALCIUM SERPL-MCNC: 9.1 MG/DL (ref 8.4–10.2)
CARDIAC TROPONIN I PNL SERPL HS: 3 NG/L (ref ?–50)
CARDIAC TROPONIN I PNL SERPL HS: 4 NG/L (ref ?–50)
CHLORIDE SERPL-SCNC: 104 MMOL/L (ref 96–108)
CO2 SERPL-SCNC: 26 MMOL/L (ref 21–32)
CREAT SERPL-MCNC: 0.96 MG/DL (ref 0.6–1.3)
D DIMER PPP FEU-MCNC: 1.2 UG/ML FEU
EOSINOPHIL # BLD AUTO: 0.07 THOUSAND/ÂΜL (ref 0–0.61)
EOSINOPHIL NFR BLD AUTO: 1 % (ref 0–6)
ERYTHROCYTE [DISTWIDTH] IN BLOOD BY AUTOMATED COUNT: 11.9 % (ref 11.6–15.1)
GFR SERPL CREATININE-BSD FRML MDRD: 60 ML/MIN/1.73SQ M
GLUCOSE SERPL-MCNC: 84 MG/DL (ref 65–140)
HCT VFR BLD AUTO: 46.2 % (ref 34.8–46.1)
HGB BLD-MCNC: 15.3 G/DL (ref 11.5–15.4)
IMM GRANULOCYTES # BLD AUTO: 0.1 THOUSAND/UL (ref 0–0.2)
IMM GRANULOCYTES NFR BLD AUTO: 1 % (ref 0–2)
LYMPHOCYTES # BLD AUTO: 2.3 THOUSANDS/ÂΜL (ref 0.6–4.47)
LYMPHOCYTES NFR BLD AUTO: 22 % (ref 14–44)
MCH RBC QN AUTO: 29.9 PG (ref 26.8–34.3)
MCHC RBC AUTO-ENTMCNC: 33.1 G/DL (ref 31.4–37.4)
MCV RBC AUTO: 90 FL (ref 82–98)
MONOCYTES # BLD AUTO: 0.95 THOUSAND/ÂΜL (ref 0.17–1.22)
MONOCYTES NFR BLD AUTO: 9 % (ref 4–12)
NEUTROPHILS # BLD AUTO: 6.83 THOUSANDS/ÂΜL (ref 1.85–7.62)
NEUTS SEG NFR BLD AUTO: 67 % (ref 43–75)
NRBC BLD AUTO-RTO: 0 /100 WBCS
PLATELET # BLD AUTO: 242 THOUSANDS/UL (ref 149–390)
PMV BLD AUTO: 9.7 FL (ref 8.9–12.7)
POTASSIUM SERPL-SCNC: 3.4 MMOL/L (ref 3.5–5.3)
RBC # BLD AUTO: 5.12 MILLION/UL (ref 3.81–5.12)
SODIUM SERPL-SCNC: 138 MMOL/L (ref 135–147)
WBC # BLD AUTO: 10.29 THOUSAND/UL (ref 4.31–10.16)

## 2024-11-21 PROCEDURE — 71275 CT ANGIOGRAPHY CHEST: CPT

## 2024-11-21 PROCEDURE — 36415 COLL VENOUS BLD VENIPUNCTURE: CPT | Performed by: EMERGENCY MEDICINE

## 2024-11-21 PROCEDURE — 93005 ELECTROCARDIOGRAM TRACING: CPT

## 2024-11-21 PROCEDURE — 85025 COMPLETE CBC W/AUTO DIFF WBC: CPT | Performed by: EMERGENCY MEDICINE

## 2024-11-21 PROCEDURE — 99285 EMERGENCY DEPT VISIT HI MDM: CPT

## 2024-11-21 PROCEDURE — 99285 EMERGENCY DEPT VISIT HI MDM: CPT | Performed by: EMERGENCY MEDICINE

## 2024-11-21 PROCEDURE — 85379 FIBRIN DEGRADATION QUANT: CPT | Performed by: EMERGENCY MEDICINE

## 2024-11-21 PROCEDURE — 80048 BASIC METABOLIC PNL TOTAL CA: CPT | Performed by: EMERGENCY MEDICINE

## 2024-11-21 PROCEDURE — 96374 THER/PROPH/DIAG INJ IV PUSH: CPT

## 2024-11-21 PROCEDURE — 99214 OFFICE O/P EST MOD 30 MIN: CPT | Performed by: NURSE PRACTITIONER

## 2024-11-21 PROCEDURE — G2211 COMPLEX E/M VISIT ADD ON: HCPCS | Performed by: NURSE PRACTITIONER

## 2024-11-21 PROCEDURE — 84484 ASSAY OF TROPONIN QUANT: CPT | Performed by: EMERGENCY MEDICINE

## 2024-11-21 PROCEDURE — 71045 X-RAY EXAM CHEST 1 VIEW: CPT

## 2024-11-21 RX ORDER — SODIUM CHLORIDE 9 MG/ML
3 INJECTION INTRAVENOUS
Status: DISCONTINUED | OUTPATIENT
Start: 2024-11-21 | End: 2024-11-21 | Stop reason: HOSPADM

## 2024-11-21 RX ORDER — HYDROCODONE BITARTRATE AND ACETAMINOPHEN 10; 325 MG/1; MG/1
0.5 TABLET ORAL 2 TIMES DAILY PRN
Qty: 60 TABLET | Refills: 0 | OUTPATIENT
Start: 2024-11-21

## 2024-11-21 RX ORDER — IPRATROPIUM BROMIDE AND ALBUTEROL SULFATE 2.5; .5 MG/3ML; MG/3ML
3 SOLUTION RESPIRATORY (INHALATION) ONCE
Status: COMPLETED | OUTPATIENT
Start: 2024-11-21 | End: 2024-11-21

## 2024-11-21 RX ORDER — DEXAMETHASONE SODIUM PHOSPHATE 4 MG/ML
4 INJECTION, SOLUTION INTRA-ARTICULAR; INTRALESIONAL; INTRAMUSCULAR; INTRAVENOUS; SOFT TISSUE ONCE
Status: COMPLETED | OUTPATIENT
Start: 2024-11-21 | End: 2024-11-21

## 2024-11-21 RX ORDER — HYDROCODONE BITARTRATE AND ACETAMINOPHEN 10; 325 MG/1; MG/1
0.5 TABLET ORAL 2 TIMES DAILY PRN
Qty: 30 TABLET | Refills: 0 | Status: SHIPPED | OUTPATIENT
Start: 2024-12-18

## 2024-11-21 RX ADMIN — IPRATROPIUM BROMIDE AND ALBUTEROL SULFATE 3 ML: 2.5; .5 SOLUTION RESPIRATORY (INHALATION) at 15:43

## 2024-11-21 RX ADMIN — DEXAMETHASONE SODIUM PHOSPHATE 4 MG: 4 INJECTION INTRA-ARTICULAR; INTRALESIONAL; INTRAMUSCULAR; INTRAVENOUS; SOFT TISSUE at 16:27

## 2024-11-21 RX ADMIN — IOHEXOL 85 ML: 350 INJECTION, SOLUTION INTRAVENOUS at 16:53

## 2024-11-21 NOTE — ASSESSMENT & PLAN NOTE
Orders:    HYDROcodone-acetaminophen (NORCO)  mg per tablet; Take 0.5 tablets by mouth 2 (two) times a day as needed for moderate pain Max Daily Amount: 1 tablet Do not start before December 18, 2024.

## 2024-11-21 NOTE — PROGRESS NOTES
Virtual Regular Visit  Name: Teresa Mao      : 1955      MRN: [id number]  Encounter Provider: Barbara Kocher, CRNP  Encounter Date: 2024   Encounter department: [facility]      Verification of patient location:  Patient is located at Home in the following state in which I hold an active license [place] :  Assessment & Plan  Arachnoiditis    Orders:    HYDROcodone-acetaminophen (NORCO)  mg per tablet; Take 0.5 tablets by mouth 2 (two) times a day as needed for moderate pain Max Daily Amount: 1 tablet Do not start before 2024.    Chronic bilateral low back pain with bilateral sciatica    Orders:    HYDROcodone-acetaminophen (NORCO)  mg per tablet; Take 0.5 tablets by mouth 2 (two) times a day as needed for moderate pain Max Daily Amount: 1 tablet Do not start before 2024.    Lumbar radiculopathy    Orders:    HYDROcodone-acetaminophen (NORCO)  mg per tablet; Take 0.5 tablets by mouth 2 (two) times a day as needed for moderate pain Max Daily Amount: 1 tablet Do not start before 2024.    Cervical radiculopathy    Orders:    HYDROcodone-acetaminophen (NORCO)  mg per tablet; Take 0.5 tablets by mouth 2 (two) times a day as needed for moderate pain Max Daily Amount: 1 tablet Do not start before 2024.    Cervical spondylosis    Orders:    HYDROcodone-acetaminophen (NORCO)  mg per tablet; Take 0.5 tablets by mouth 2 (two) times a day as needed for moderate pain Max Daily Amount: 1 tablet Do not start before 2024.    Neck pain    Orders:    HYDROcodone-acetaminophen (NORCO)  mg per tablet; Take 0.5 tablets by mouth 2 (two) times a day as needed for moderate pain Max Daily Amount: 1 tablet Do not start before 2024.    History of lumbar fusion    Orders:    HYDROcodone-acetaminophen (NORCO)  mg per tablet; Take 0.5 tablets by mouth 2 (two) times a day as needed for  moderate pain Max Daily Amount: 1 tablet Do not start before December 18, 2024.    Lumbar spondylosis    Orders:    HYDROcodone-acetaminophen (NORCO)  mg per tablet; Take 0.5 tablets by mouth 2 (two) times a day as needed for moderate pain Max Daily Amount: 1 tablet Do not start before December 18, 2024.    Chronic pain disorder         Arachnoiditis         Chronic bilateral low back pain with bilateral sciatica         Lumbar radiculopathy         Cervical radiculopathy         Cervical spondylosis         Neck pain         History of lumbar fusion         Lumbar spondylosis         Chronic pain disorder               Encounter provider Barbara Kocher, CRNP    The patient was identified by name and date of birth. Teresa Mao was informed that this is a telemedicine visit and that the visit is being conducted through the Epic Embedded platform. She agrees to proceed..  My office door was closed. No one else was in the room.  She acknowledged consent and understanding of privacy and security of the video platform. The patient has agreed to participate and understands they can discontinue the visit at any time.    Patient is aware this is a billable service.     History of Present Illness     Patient is being seen for a virtual visit.  She requested a virtual visit due to current COVID infection.  She continues with neck and low back pain which is reasonably controlled with current medication regimen including hydrocodone 5/325 twice daily and Flexeril 5 mg 3 times daily if needed for spasms.    Patient underwent bilateral L2-3 and L3-4 radiofrequency ablations.  The left side was performed on 3/29/2024.  The right side was performed on 3/5/2024.  Low back pain remains 50% improved.      Review of Systems   Constitutional:  Positive for appetite change, chills and fatigue.   Respiratory:  Positive for cough, chest tightness and wheezing.    Gastrointestinal:  Positive for diarrhea.   Musculoskeletal:   Positive for back pain, neck pain and neck stiffness.   Neurological:  Positive for weakness and numbness.       Objective   There were no vitals taken for this visit.    Physical Exam  Constitutional:       General: She is not in acute distress.     Appearance: She is ill-appearing.   Pulmonary:      Comments: Warm moist cough noted  Neurological:      Mental Status: She is alert and oriented to person, place, and time.   Psychiatric:         Mood and Affect: Mood normal.         Behavior: Behavior normal.         Thought Content: Thought content normal.         Judgment: Judgment normal.       Assessment/plan:    Continue hydrocodone 5/325 twice daily if needed for pain.  A prescription was sent to her pharmacy which was filled yesterday.  I sent an additional prescription with a do not fill date of 12/18/2024.    Continue Flexeril 5 mg 3 times daily if needed for spasms.  She did not require refill of this medication during today's visit.  Visit Time  Total Visit Duration: 15

## 2024-11-22 LAB
ATRIAL RATE: 75 BPM
ATRIAL RATE: 89 BPM
P AXIS: 55 DEGREES
P AXIS: 67 DEGREES
PR INTERVAL: 150 MS
PR INTERVAL: 158 MS
QRS AXIS: -41 DEGREES
QRS AXIS: -53 DEGREES
QRSD INTERVAL: 122 MS
QRSD INTERVAL: 128 MS
QT INTERVAL: 398 MS
QT INTERVAL: 444 MS
QTC INTERVAL: 484 MS
QTC INTERVAL: 495 MS
T WAVE AXIS: 103 DEGREES
T WAVE AXIS: 96 DEGREES
VENTRICULAR RATE: 75 BPM
VENTRICULAR RATE: 89 BPM

## 2024-11-22 PROCEDURE — 93010 ELECTROCARDIOGRAM REPORT: CPT | Performed by: INTERNAL MEDICINE

## 2024-11-27 NOTE — ED PROVIDER NOTES
Time reflects when diagnosis was documented in both MDM as applicable and the Disposition within this note       Time User Action Codes Description Comment    11/21/2024  6:11 PM Mario Lepe Add [R06.00] Dyspnea     11/21/2024  6:12 PM Mario Lepe Add [R05.9] Cough           ED Disposition       ED Disposition   Discharge    Condition   Stable    Date/Time   Thu Nov 21, 2024  6:11 PM    Comment   Teresa L Innerst discharge to home/self care.                   Assessment & Plan       Medical Decision Making  Patient with dyspnea and cough persistently after COVID.  Likely resolving COVID.  CT PE study reviewed and negative.  Otherwise blood work unremarkable.  Advised outpatient follow-up and strict return precautions.    Amount and/or Complexity of Data Reviewed  Labs: ordered.  Radiology: ordered.    Risk  Prescription drug management.             Medications   ipratropium-albuterol (DUO-NEB) 0.5-2.5 mg/3 mL inhalation solution 3 mL (3 mL Nebulization Given 11/21/24 1543)   dexamethasone (DECADRON) injection 4 mg (4 mg Intravenous Given 11/21/24 1627)   iohexol (OMNIPAQUE) 350 MG/ML injection (MULTI-DOSE) 100 mL (85 mL Intravenous Given 11/21/24 1653)       ED Risk Strat Scores                           SBIRT 22yo+      Flowsheet Row Most Recent Value   Initial Alcohol Screen: US AUDIT-C     1. How often do you have a drink containing alcohol? 0 Filed at: 11/21/2024 1523   2. How many drinks containing alcohol do you have on a typical day you are drinking?  0 Filed at: 11/21/2024 1523   3a. Male UNDER 65: How often do you have five or more drinks on one occasion? 0 Filed at: 11/21/2024 1523   3b. FEMALE Any Age, or MALE 65+: How often do you have 4 or more drinks on one occassion? 0 Filed at: 11/21/2024 1523   Audit-C Score 0 Filed at: 11/21/2024 1523   [place]: How many times in the past year have you...    Used an illegal drug or used a prescription medication for non-medical reasons? Never Filed at:  "2024 1523                            History of Present Illness       Chief Complaint   Patient presents with    Shortness of Breath     Covid positive for 2 weeks. Symptoms getting worse. Cough/ sob/ thick mucous  HA, congestion, diarrhea, runny nose, throat, coughing, rib pain, nausea, chest pain,fevers and sweats.  On amoxicillin since 11/15 and steroid pack.         Past Medical History:   Diagnosis Date    Allergic rhinitis     Anxiety     Asthma     Back pain     Breathing difficulty     Cardiac disease     Cardiopathy     EF 45%    Cough     Diverticulitis     Factor V Leiden (HCC)     Fibromyalgia     GERD (gastroesophageal reflux disease)     Hashimoto's thyroiditis     Hx of degenerative disc disease     Hypertension     Hypotension     pots - postural orthostatic hypotension    Interstitial cystitis     Irregular heart beat     LBBB    Irritable bowel syndrome     Migraines     Mitral valve disease     \"thickening\"    Myocardial infarction (HCC)     possible but not sure when    Neuropathy     bilateral legs    Osteoporosis     PONV (postoperative nausea and vomiting)     Postural orthostatic tachycardia syndrome     must drink a lot of water and salt    Pott's disease     Rheumatic fever 1967    Scoliosis     Sepsis (HCC)     associated with PICC line    Sjogren's syndrome ([facility])     TMJ (dislocation of temporomandibular joint)       Past Surgical History:   Procedure Laterality Date    ABLATION MICROWAVE Left     lumbar area    BACK SURGERY  10/1998     SECTION  1992    CHOLECYSTECTOMY  2016    COLONOSCOPY  2016    DILATION AND CURETTAGE OF UTERUS      EGD      FOOT SURGERY  1968    removal of bone and neuroma    HYSTERECTOMY N/A     age 39    IR OTHER  2022    IR OTHER  2022    IR OTHER  2023    IR OTHER  2024    IR OTHER  2024    IR PICC PLACEMENT SINGLE LUMEN  10/02/2020    IR PICC PLACEMENT SINGLE LUMEN  2021    IR PICC REPOSITION  " 12/07/2021    IR TUNNELED CENTRAL LINE PLACEMENT  06/20/2022    LAPAROSCOPY  1995    endometriosis    MOUTH BIOPSY      lip    OOPHORECTOMY Bilateral 1995    age 39    DE EGD TRANSORAL BIOPSY SINGLE/MULTIPLE N/A 04/24/2019    Procedure: ESOPHAGOGASTRODUODENOSCOPY (EGD) with multiple bx and dilation;  Surgeon: Peter Baldwin MD;  Location:  GI LAB;  Service: Gastroenterology    DE SURGICAL ARTHROSCOPY SHOULDER W/ROTATOR CUFF RPR Right 04/06/2022    Procedure: SHOULDER ARTHROSCOPIC ROTATOR CUFF REPAIR Biceps Tenodisis;  Surgeon: Ilya Munoz MD;  Location: [facility] MAIN OR;  Service: Orthopedics    DE SURGICAL ARTHROSCOPY SHOULDER W/ROTATOR CUFF RPR Left 5/3/2024    Procedure: SHOULDER ARTHROSCOPIC ROTATOR CUFF REPAIR, SUBACROMIAL DECOMPRESSION;  Surgeon: Ilya Munoz MD;  Location: [facility] MAIN OR;  Service: Orthopedics    TUNNELED VENOUS CATHETER PLACEMENT  2016      Family History   Problem Relation Age of Onset    Cancer Mother         bladder    No Known Problems Father     Thyroid cancer Sister     Heart attack Sister     No Known Problems Sister     No Known Problems Sister     No Known Problems Sister     No Known Problems Sister     No Known Problems Daughter     No Known Problems Daughter     No Known Problems Maternal Grandmother     Colon cancer Maternal Grandfather     No Known Problems Paternal Grandmother     No Known Problems Paternal Grandfather     Breast cancer Maternal Aunt         over age 50    Endometrial cancer Maternal Aunt     Multiple myeloma Maternal Aunt     Hypertension Paternal Aunt     Brain cancer Niece     Lymphoma Niece     Breast cancer additional onset Neg Hx     BRCA2 Positive Neg Hx     Ovarian cancer Neg Hx     BRCA2 Negative Neg Hx     BRCA1 Positive Neg Hx     BRCA1 Negative Neg Hx     BRCA 1/2 Neg Hx       Social History     Tobacco Use    Smoking status: Never    Smokeless tobacco: Never   Vaping Use    Vaping status: Never Used   Substance Use  Topics    Alcohol use: Never    Drug use: No      E-Cigarette/Vaping    E-Cigarette Use Never User       E-Cigarette/Vaping Substances    Nicotine No     THC No     CBD No     Flavoring No     Other No     Unknown No       I have reviewed and agree with the history as documented.     Patient is a 69-year-old female who presents for evaluation of cough.  She was diagnosed about 2 weeks ago with COVID.  She says that she has been treated with steroids, Paxlovid, antibiotics recently without much improvement.  She endorsed some moderate exertional dyspnea.  No chest pain nausea or vomiting.  Denies vomiting urinary or bowel symptoms.        Review of Systems   Constitutional:  Negative for fever.   HENT:  Negative for sore throat.    Respiratory:  Positive for cough and shortness of breath.    Cardiovascular:  Negative for chest pain.   Gastrointestinal:  Negative for abdominal pain.   Genitourinary:  Negative for dysuria.   Musculoskeletal:  Negative for back pain.   Skin:  Negative for rash.   Neurological:  Negative for light-headedness.   Psychiatric/Behavioral:  Negative for agitation.    All other systems reviewed and are negative.          Objective       ED Triage Vitals [11/21/24 1523]   Temperature Pulse Blood Pressure Respirations SpO2 Patient Position - Orthostatic VS   97.7 °F (36.5 °C) 83 126/74 20 98 % Sitting      Temp Source Heart Rate Source BP Location FiO2 (%) Pain Score    Temporal Monitor Right arm -- --      Vitals      Date and Time Temp Pulse SpO2 Resp BP Pain Score FACES Pain Rating User   11/21/24 1815 -- 73 97 % 15 112/56 -- -- JW   11/21/24 1730 -- 76 97 % 24 105/53 -- --    11/21/24 1700 -- 79 95 % 21 128/57 -- --    11/21/24 1523 97.7 °F (36.5 °C) 83 98 % 20 126/74 -- -- EM            Physical Exam  Vitals reviewed.   Constitutional:       General: She is not in acute distress.     Appearance: She is well-developed.   HENT:      Head: Normocephalic.   Eyes:      Pupils: Pupils are  equal, round, and reactive to light.   Cardiovascular:      Rate and Rhythm: Normal rate and regular rhythm.      Heart sounds: Normal heart sounds.   Pulmonary:      Effort: Pulmonary effort is normal.      Breath sounds: Normal breath sounds.   Abdominal:      General: Bowel sounds are normal. There is no distension.      Palpations: Abdomen is soft.      Tenderness: There is no abdominal tenderness. There is no guarding.   Musculoskeletal:         General: No tenderness or deformity. Normal range of motion.      Cervical back: Normal range of motion and neck supple.   Skin:     General: Skin is warm and dry.      Capillary Refill: Capillary refill takes less than 2 seconds.   Neurological:      Mental Status: She is alert and oriented to person, place, and time.      Cranial Nerves: No cranial nerve deficit.      Sensory: No sensory deficit.   Psychiatric:         Behavior: Behavior normal.         Thought Content: Thought content normal.         Judgment: Judgment normal.         Results Reviewed       Procedure Component Value Units Date/Time    HS Troponin I 2hr [[id number]]  (Normal) Collected: 11/21/24 1735    Lab Status: Final result Specimen: Blood from Arm, Right Updated: 11/21/24 1802     hs TnI 2hr 3 ng/L      Delta 2hr hsTnI -1 ng/L     Basic metabolic panel [[id number]]  (Abnormal) Collected: 11/21/24 1541    Lab Status: Final result Specimen: Blood from Arm, Right Updated: 11/21/24 1612     Sodium 138 mmol/L      Potassium 3.4 mmol/L      Chloride 104 mmol/L      CO2 26 mmol/L      ANION GAP 8 mmol/L      BUN 23 mg/dL      Creatinine 0.96 mg/dL      Glucose 84 mg/dL      Calcium 9.1 mg/dL      eGFR 60 ml/min/1.73sq m     Narrative:      National Kidney Disease Foundation guidelines for Chronic Kidney Disease (CKD):     Stage 1 with normal or high GFR (GFR > 90 mL/min/1.73 square meters)    Stage 2 Mild CKD (GFR = 60-89 mL/min/1.73 square meters)    Stage 3A Moderate CKD (GFR = 45-59 mL/min/1.73 square  meters)    Stage 3B Moderate CKD (GFR = 30-44 mL/min/1.73 square meters)    Stage 4 Severe CKD (GFR = 15-29 mL/min/1.73 square meters)    Stage 5 End Stage CKD (GFR <15 mL/min/1.73 square meters)  Note: GFR calculation is accurate only with a steady state creatinine    HS Troponin 0hr (reflex protocol) [[id number]]  (Normal) Collected: 11/21/24 1541    Lab Status: Final result Specimen: Blood from Arm, Right Updated: 11/21/24 1611     hs TnI 0hr 4 ng/L     D-dimer, quantitative [[id number]]  (Abnormal) Collected: 11/21/24 1541    Lab Status: Final result Specimen: Blood from Arm, Right Updated: 11/21/24 1607     D-Dimer, Quant 1.20 ug/ml FEU     Narrative:      In the evaluation for possible pulmonary embolism, in the appropriate (Well's Score of 4 or less) patient, the age adjusted d-dimer cutoff for this patient can be calculated as:    Age x 0.01 (in ug/mL) for Age-adjusted D-dimer exclusion threshold for a patient over 50 years.    CBC and differential [[id number]]  (Abnormal) Collected: 11/21/24 1541    Lab Status: Final result Specimen: Blood from Arm, Right Updated: 11/21/24 1549     WBC 10.29 Thousand/uL      RBC 5.12 Million/uL      Hemoglobin 15.3 g/dL      Hematocrit 46.2 %      MCV 90 fL      MCH 29.9 pg      MCHC 33.1 g/dL      RDW 11.9 %      MPV 9.7 fL      Platelets 242 Thousands/uL      nRBC 0 /100 WBCs      Segmented % 67 %      Immature Grans % 1 %      Lymphocytes % 22 %      Monocytes % 9 %      Eosinophils Relative 1 %      Basophils Relative 0 %      Absolute Neutrophils 6.83 Thousands/µL      Absolute Immature Grans 0.10 Thousand/uL      Absolute Lymphocytes 2.30 Thousands/µL      Absolute Monocytes 0.95 Thousand/µL      Eosinophils Absolute 0.07 Thousand/µL      Basophils Absolute 0.04 Thousands/µL             CTA chest pe study   Final Interpretation by Kash Ribeiro DO (11/21 1736)      No evidence of acute pulmonary embolism.                  Workstation performed: [id number]          X-ray chest 1 view portable   Final Interpretation by Allan Wisdom MD (11/21 3387)      No acute cardiopulmonary disease.            Workstation performed: [id number]             ECG 12 Lead Documentation Only    Date/Time: 11/26/2024 10:59 PM    Performed by: Mario Lepe MD  Authorized by: Mario Lepe MD    ECG reviewed by me, the ED Provider: yes    Patient location:  ED  Previous ECG:     Previous ECG:  Unavailable    Comparison to cardiac monitor: Yes    Interpretation:     Interpretation: abnormal    Rate:     ECG rate assessment: normal    Rhythm:     Rhythm: sinus rhythm    Ectopy:     Ectopy: PVCs    QRS:     QRS axis:  Normal    QRS intervals:  Wide  Conduction:     Conduction: abnormal      Abnormal conduction: non-specific intraventricular conduction delay        ED Medication and Procedure Management   Prior to Admission Medications   Prescriptions Last Dose Informant Patient Reported? Taking?   Dupilumab (DUPIXENT SC)  Self Yes No   Sig: Inject under the skin Pt unsure of dose   Galcanezumab-gnlm (Emgality) 120 MG/ML SOAJ  Self Yes No   Sig: Inject under the skin every 30 (thirty) days    HYDROcodone-acetaminophen (NORCO)  mg per tablet  Self No No   Sig: Take 0.5 tablets by mouth 2 (two) times a day as needed for moderate pain Max Daily Amount: 1 tablet   HYDROcodone-acetaminophen (NORCO)  mg per tablet   No No   Sig: Take 0.5 tablets by mouth 2 (two) times a day as needed for moderate pain Max Daily Amount: 1 tablet Do not start before December 18, 2024.   Heating Pad PADS  Self No No   Sig: Use if needed (pain)   KETOTIFEN FUMARATE OP  Self Yes No   Sig: Take 1mg compounded capsule three times a day   Patient not taking: Reported on 10/17/2024   MULTIPLE VITAMINS-CALCIUM PO  Self Yes No   Sig: Take 1 capsule by mouth every morning    Magnesium 400 MG CAPS  Self Yes No   Sig: Take 1 capsule by mouth 2 (two) times a day    NON FORMULARY  Self Yes No   Sig: Take 1 mg by  mouth 3 (three) times a day Ketotifen 1 mg compounded capsule   NON FORMULARY  Self Yes No   Sig: LACTOBACILLUS COMBINATION NO.4 (PROBIOTIC) 3 BILLION CELL CAPSULE   NON FORMULARY  Self Yes No   Sig: Take 32 mg by mouth NATURE-THROID ORAL   NON FORMULARY  Self Yes No   Si.25 % X-DEMVY eye drop   Probiotic Product (PROBIOTIC DAILY PO)  Self Yes No   Sig: Take 1 tablet by mouth daily At lunch   Qvar RediHaler 40 MCG/ACT inhaler  Self Yes No   Thyroid, Porcine, POWD  Self Yes No   Sig: Use 32 mg daily   Ubrogepant (UBRELVY) 100 MG tablet  Self Yes No   Sig: Take 100 mg by mouth Take 1 tablet (100 mg) one time as needed for migraine. May repeat one additional tablet (100 mg) at least two hours after the first dose. Do not use more than two doses per day, or for more than eight days per month.   aluminum hydroxide-magnesium carbonate (Gaviscon Extra Strength) 160-105 mg per chewable tablet  Self Yes No   azelastine (ASTELIN) 0.1 % nasal spray  Self Yes No   Si sprays into each nostril 2 (two) times a day Use in each nostril as directed   beclomethasone (QVAR) 40 MCG/ACT inhaler  Self Yes No   Sig: Inhale 1 puff daily as needed (only when unable to nebulize pulmicort) Rinse mouth after use.   budesonide (PULMICORT) 0.5 mg/2 mL nebulizer solution  Self Yes No   Sig: Take 0.5 mg by nebulization 2 (two) times a day Rinse mouth after use.   cromolyn (GASTROCROM) 100 MG/5ML solution  Self Yes No   Sig: Take 100 mg by mouth 4 (four) times a day (before meals and at bedtime)   cycloSPORINE (RESTASIS) 0.05 % ophthalmic emulsion   Yes No   Sig: Administer 1 drop to both eyes 2 (two) times a day   cyclobenzaprine (FLEXERIL) 5 mg tablet   No No   Sig: TAKE 1 TABLET BY MOUTH THREE TIMES A DAY AS NEEDED FOR MUSCLE SPASMS   docusate sodium (COLACE) 100 mg capsule  Self Yes No   Sig: Take 100 mg by mouth daily as needed for constipation   erythromycin (ILOTYCIN) ophthalmic ointment  Self Yes No   Sig: Apply 1 application to eye  daily at bedtime   famotidine (PEPCID) 40 MG tablet  Self Yes No   Sig: Twice a day   fexofenadine (ALLEGRA) 180 MG tablet  Self Yes No   Sig: Take 180 mg by mouth 2 (two) times a day    fluticasone (FLONASE) 50 mcg/act nasal spray  Self Yes No   Si sprays into each nostril daily    guaiFENesin (Mucinex) 600 mg 12 hr tablet  Self Yes No   Sig: Take 600 mg by mouth every 12 (twelve) hours   hydrOXYzine (ATARAX) 10 mg/5 mL syrup  Self Yes No   ipratropium (ATROVENT) 0.02 % nebulizer solution  Self Yes No   Sig: Take 0.5 mg by nebulization every 6 (six) hours as needed for wheezing or shortness of breath   ipratropium (ATROVENT) 0.06 % nasal spray  Self Yes No   Sig: PLEASE SEE ATTACHED FOR DETAILED DIRECTIONS   ivabradine HCl (Corlanor) 5 MG tablet  Self Yes No   Si.5 mg 2 (two) times a day   levalbuterol (XOPENEX HFA) 45 mcg/act inhaler  Self Yes No   Sig: Inhale 2 puffs every 4 (four) hours as needed (when unable to nebulize)   levalbuterol (XOPENEX) 1.25 mg/3 mL nebulizer solution  Self Yes No   Sig: Take 1.25 mg by nebulization every 6 (six) hours as needed    naloxone (NARCAN) 4 mg/0.1 mL nasal spray  Self No No   Sig: Administer 1 spray into a nostril. If no response after 2-3 minutes, give another dose in the other nostril using a new spray.   omega-3-acid ethyl esters (LOVAZA) 1 g capsule  Self Yes No   Sig: Take 2 g by mouth 2 (two) times a day 1600mg daily   polyethylene glycol (MIRALAX) 17 g packet  Self Yes No   Sig: Take 17 g by mouth daily as needed    sodium chloride  Self Yes No   Sig: Inject 1,500 mL into a catheter in a vein   sucralfate (CARAFATE) 1 g/10 mL suspension  Self Yes No   Sig: Take 1 g by mouth 2 (two) times a day   verapamil (CALAN) 40 mg tablet  Self Yes No      Facility-Administered Medications: None     Discharge Medication List as of 2024  6:16 PM        CONTINUE these medications which have NOT CHANGED    Details   aluminum hydroxide-magnesium carbonate (Gaviscon Extra  Strength) 160-105 mg per chewable tablet Historical Med      azelastine (ASTELIN) 0.1 % nasal spray 2 sprays into each nostril 2 (two) times a day Use in each nostril as directed, Historical Med      beclomethasone (QVAR) 40 MCG/ACT inhaler Inhale 1 puff daily as needed (only when unable to nebulize pulmicort) Rinse mouth after use., Historical Med      budesonide (PULMICORT) 0.5 mg/2 mL nebulizer solution Take 0.5 mg by nebulization 2 (two) times a day Rinse mouth after use., Historical Med      cromolyn (GASTROCROM) 100 MG/5ML solution Take 100 mg by mouth 4 (four) times a day (before meals and at bedtime), Historical Med      cyclobenzaprine (FLEXERIL) 5 mg tablet TAKE 1 TABLET BY MOUTH THREE TIMES A DAY AS NEEDED FOR MUSCLE SPASMS, Starting Fri 11/8/2024, Normal      cycloSPORINE (RESTASIS) 0.05 % ophthalmic emulsion Administer 1 drop to both eyes 2 (two) times a day, Historical Med      docusate sodium (COLACE) 100 mg capsule Take 100 mg by mouth daily as needed for constipation, Historical Med      Dupilumab (DUPIXENT SC) Inject under the skin Pt unsure of dose, Historical Med      erythromycin (ILOTYCIN) ophthalmic ointment Apply 1 application to eye daily at bedtime, Starting Tue 10/25/2022, Historical Med      famotidine (PEPCID) 40 MG tablet Twice a day, Starting Mon 8/22/2022, Historical Med      fexofenadine (ALLEGRA) 180 MG tablet Take 180 mg by mouth 2 (two) times a day , Historical Med      fluticasone (FLONASE) 50 mcg/act nasal spray 2 sprays into each nostril daily , Historical Med      Galcanezumab-gnlm (Emgality) 120 MG/ML SOAJ Inject under the skin every 30 (thirty) days , Historical Med      guaiFENesin (Mucinex) 600 mg 12 hr tablet Take 600 mg by mouth every 12 (twelve) hours, Historical Med      Heating Pad PADS Use if needed (pain), Starting Fri 4/26/2024, No Print      !! HYDROcodone-acetaminophen (NORCO)  mg per tablet Take 0.5 tablets by mouth 2 (two) times a day as needed for  moderate pain Max Daily Amount: 1 tablet, Starting Mon 9/16/2024, Normal      !! HYDROcodone-acetaminophen (NORCO)  mg per tablet Take 0.5 tablets by mouth 2 (two) times a day as needed for moderate pain Max Daily Amount: 1 tablet Do not start before December 18, 2024., Starting Wed 12/18/2024, Normal      hydrOXYzine (ATARAX) 10 mg/5 mL syrup Historical Med      ipratropium (ATROVENT) 0.02 % nebulizer solution Take 0.5 mg by nebulization every 6 (six) hours as needed for wheezing or shortness of breath, Historical Med      ipratropium (ATROVENT) 0.06 % nasal spray PLEASE SEE ATTACHED FOR DETAILED DIRECTIONS, Historical Med      ivabradine HCl (Corlanor) 5 MG tablet 7.5 mg 2 (two) times a day, Starting Thu 10/28/2021, Historical Med      KETOTIFEN FUMARATE OP Take 1mg compounded capsule three times a day, Historical Med      levalbuterol (XOPENEX HFA) 45 mcg/act inhaler Inhale 2 puffs every 4 (four) hours as needed (when unable to nebulize), Historical Med      levalbuterol (XOPENEX) 1.25 mg/3 mL nebulizer solution Take 1.25 mg by nebulization every 6 (six) hours as needed , Starting Mon 1/7/2019, Historical Med      Magnesium 400 MG CAPS Take 1 capsule by mouth 2 (two) times a day , Historical Med      MULTIPLE VITAMINS-CALCIUM PO Take 1 capsule by mouth every morning , Historical Med      naloxone (NARCAN) 4 mg/0.1 mL nasal spray Administer 1 spray into a nostril. If no response after 2-3 minutes, give another dose in the other nostril using a new spray., Normal      !! NON FORMULARY Take 1 mg by mouth 3 (three) times a day Ketotifen 1 mg compounded capsule, Historical Med      !! NON FORMULARY LACTOBACILLUS COMBINATION NO.4 (PROBIOTIC) 3 BILLION CELL CAPSULE, Historical Med      !! NON FORMULARY Take 32 mg by mouth NATURE-THROID ORAL, Historical Med      !! NON FORMULARY 0.25 % X-DEMVY eye drop, Historical Med      omega-3-acid ethyl esters (LOVAZA) 1 g capsule Take 2 g by mouth 2 (two) times a day 1600mg  daily, Historical Med      polyethylene glycol (MIRALAX) 17 g packet Take 17 g by mouth daily as needed , Historical Med      Probiotic Product (PROBIOTIC DAILY PO) Take 1 tablet by mouth daily At lunch, Historical Med      Qvar RediHaler 40 MCG/ACT inhaler Historical Med      sodium chloride Inject 1,500 mL into a catheter in a vein, Starting Sat 1/14/2023, Historical Med      sucralfate (CARAFATE) 1 g/10 mL suspension Take 1 g by mouth 2 (two) times a day, Historical Med      Thyroid, Porcine, POWD Use 32 mg daily, Historical Med      Ubrogepant (UBRELVY) 100 MG tablet Take 100 mg by mouth Take 1 tablet (100 mg) one time as needed for migraine. May repeat one additional tablet (100 mg) at least two hours after the first dose. Do not use more than two doses per day, or for more than eight days per month., Historical Med      verapamil (CALAN) 40 mg tablet Starting Thu 12/1/2022, Historical Med       !! - Potential duplicate medications found. Please discuss with provider.        No discharge procedures on file.  ED SEPSIS DOCUMENTATION   Time reflects when diagnosis was documented in both MDM as applicable and the Disposition within this note       Time User Action Codes Description Comment    11/21/2024  6:11 PM Mario Lepe Add [R06.00] Dyspnea     11/21/2024  6:12 PM Mario Lepe Add [R05.9] Cough                  Mario Lepe MD  11/26/24 2227

## 2024-11-29 RX ORDER — SODIUM CHLORIDE 9 MG/ML
250 INJECTION, SOLUTION INTRAVENOUS ONCE
Status: COMPLETED | OUTPATIENT
Start: 2024-12-03 | End: 2024-12-03

## 2024-12-02 RX ORDER — SODIUM CHLORIDE 9 MG/ML
250 INJECTION, SOLUTION INTRAVENOUS ONCE
Status: COMPLETED | OUTPATIENT
Start: 2024-12-04 | End: 2024-12-04

## 2024-12-02 RX ORDER — LOTILANER OPHTHALMIC SOLUTION 2.5 MG/ML
1 SOLUTION/ DROPS OPHTHALMIC
COMMUNITY
Start: 2024-09-10

## 2024-12-03 ENCOUNTER — HOSPITAL ENCOUNTER (OUTPATIENT)
Dept: INFUSION CENTER | Facility: HOSPITAL | Age: 69
Discharge: HOME | End: 2024-12-03
Payer: MEDICARE

## 2024-12-03 VITALS
TEMPERATURE: 97.9 F | HEART RATE: 80 BPM | DIASTOLIC BLOOD PRESSURE: 77 MMHG | SYSTOLIC BLOOD PRESSURE: 125 MMHG | OXYGEN SATURATION: 100 % | RESPIRATION RATE: 16 BRPM

## 2024-12-03 PROCEDURE — 96360 HYDRATION IV INFUSION INIT: CPT

## 2024-12-03 PROCEDURE — 96361 HYDRATE IV INFUSION ADD-ON: CPT

## 2024-12-03 RX ORDER — SODIUM CHLORIDE 9 MG/ML
250 INJECTION, SOLUTION INTRAVENOUS ONCE
Status: COMPLETED | OUTPATIENT
Start: 2024-12-05 | End: 2024-12-05

## 2024-12-03 RX ADMIN — SODIUM CHLORIDE 250 ML/HR: 0.9 INJECTION, SOLUTION INTRAVENOUS at 09:16

## 2024-12-03 NOTE — PROGRESS NOTES
Patient tolerated IV hydration without issues.      Teresa Mao is aware of future appt on 12/4/24 at 0830.     AVS -  No (Declined by Teresa Mao) Patient has Mychart.    Patient ambulated off unit without incident.  All personal belongings taken with patient.

## 2024-12-04 ENCOUNTER — HOSPITAL ENCOUNTER (OUTPATIENT)
Dept: INFUSION CENTER | Facility: HOSPITAL | Age: 69
Discharge: HOME | End: 2024-12-04
Attending: INTERNAL MEDICINE
Payer: MEDICARE

## 2024-12-04 VITALS
SYSTOLIC BLOOD PRESSURE: 146 MMHG | OXYGEN SATURATION: 95 % | DIASTOLIC BLOOD PRESSURE: 66 MMHG | TEMPERATURE: 97.1 F | HEART RATE: 69 BPM | RESPIRATION RATE: 18 BRPM

## 2024-12-04 PROCEDURE — 96360 HYDRATION IV INFUSION INIT: CPT

## 2024-12-04 PROCEDURE — 96361 HYDRATE IV INFUSION ADD-ON: CPT

## 2024-12-04 RX ADMIN — SODIUM CHLORIDE 250 ML/HR: 0.9 INJECTION, SOLUTION INTRAVENOUS at 09:10

## 2024-12-04 NOTE — PROGRESS NOTES
Teresa Mao  tolerated hydration treatment well with no complications.      Teresa Mao is aware of future appt on 12/5 at 9:30AM.     AVS printed and given to Teresa Mao: No (Declined by Teresa Mao).

## 2024-12-05 ENCOUNTER — OFFICE VISIT (OUTPATIENT)
Dept: FAMILY MEDICINE CLINIC | Facility: CLINIC | Age: 69
End: 2024-12-05
Payer: MEDICARE

## 2024-12-05 ENCOUNTER — HOSPITAL ENCOUNTER (OUTPATIENT)
Dept: INFUSION CENTER | Facility: HOSPITAL | Age: 69
End: 2024-12-05
Attending: INTERNAL MEDICINE
Payer: MEDICARE

## 2024-12-05 ENCOUNTER — TELEPHONE (OUTPATIENT)
Age: 69
End: 2024-12-05

## 2024-12-05 VITALS
DIASTOLIC BLOOD PRESSURE: 70 MMHG | HEART RATE: 75 BPM | HEIGHT: 61 IN | BODY MASS INDEX: 25.49 KG/M2 | OXYGEN SATURATION: 98 % | SYSTOLIC BLOOD PRESSURE: 136 MMHG | WEIGHT: 135 LBS

## 2024-12-05 VITALS
SYSTOLIC BLOOD PRESSURE: 156 MMHG | RESPIRATION RATE: 18 BRPM | OXYGEN SATURATION: 98 % | DIASTOLIC BLOOD PRESSURE: 73 MMHG | TEMPERATURE: 98 F | HEART RATE: 65 BPM

## 2024-12-05 DIAGNOSIS — G43.009 MIGRAINE WITHOUT AURA AND WITHOUT STATUS MIGRAINOSUS, NOT INTRACTABLE: ICD-10-CM

## 2024-12-05 DIAGNOSIS — M54.16 LUMBAR RADICULOPATHY: ICD-10-CM

## 2024-12-05 DIAGNOSIS — J45.40 MODERATE PERSISTENT ASTHMA WITHOUT COMPLICATION: ICD-10-CM

## 2024-12-05 DIAGNOSIS — M81.8 OTHER OSTEOPOROSIS WITHOUT CURRENT PATHOLOGICAL FRACTURE: ICD-10-CM

## 2024-12-05 DIAGNOSIS — I44.7 LBBB (LEFT BUNDLE BRANCH BLOCK): ICD-10-CM

## 2024-12-05 DIAGNOSIS — D83.9 CVID (COMMON VARIABLE IMMUNODEFICIENCY) (HCC): ICD-10-CM

## 2024-12-05 DIAGNOSIS — E06.3 HYPOTHYROIDISM DUE TO HASHIMOTO THYROIDITIS: ICD-10-CM

## 2024-12-05 DIAGNOSIS — Z88.9 H/O MULTIPLE ALLERGIES: ICD-10-CM

## 2024-12-05 DIAGNOSIS — D89.40 MAST CELL ACTIVATION (HCC): ICD-10-CM

## 2024-12-05 DIAGNOSIS — I42.9 CARDIOMYOPATHY, UNSPECIFIED TYPE (HCC): ICD-10-CM

## 2024-12-05 DIAGNOSIS — Z23 ENCOUNTER FOR IMMUNIZATION: Primary | ICD-10-CM

## 2024-12-05 PROCEDURE — 99205 OFFICE O/P NEW HI 60 MIN: CPT | Performed by: STUDENT IN AN ORGANIZED HEALTH CARE EDUCATION/TRAINING PROGRAM

## 2024-12-05 PROCEDURE — 96360 HYDRATION IV INFUSION INIT: CPT

## 2024-12-05 PROCEDURE — 96361 HYDRATE IV INFUSION ADD-ON: CPT

## 2024-12-05 RX ORDER — THYROID,PORK 100 % USP
32 POWDER (GRAM) MISCELLANEOUS DAILY
Qty: 50000 G | Refills: 0 | Status: SHIPPED | OUTPATIENT
Start: 2024-12-05

## 2024-12-05 RX ORDER — DOXYCYCLINE 100 MG/1
100 TABLET ORAL 2 TIMES DAILY
COMMUNITY

## 2024-12-05 RX ADMIN — SODIUM CHLORIDE 250 ML/HR: 0.9 INJECTION, SOLUTION INTRAVENOUS at 09:23

## 2024-12-05 NOTE — ASSESSMENT & PLAN NOTE
Present since a child   Multiple allergies   Follows with Dr. Ball [facility] neuro   On emgality and ubrevyl PRN

## 2024-12-05 NOTE — TELEPHONE ENCOUNTER
Honestly I do not know this medication was previously prescribed by Dr. Aguirre, I have never prescribed this form of medication.  Can we call pharmacy and just refill what ever the previous prescription was that was sent by Dr. Cortez.

## 2024-12-05 NOTE — ASSESSMENT & PLAN NOTE
"Follows with spine and pain   Reports \"severe\" scoliosis   S/p back surgery   Reports worsening herniation, non surgical candidate   On norco, follows pain management.  Dr. Hernandes          "

## 2024-12-05 NOTE — PROGRESS NOTES
"Name: Teresa Mao      : 1955      MRN: [id number]  Encounter Provider: Ricky Harper MD  Encounter Date: 2024   Encounter department: [facility] PRIMARY CARE  :  Assessment & Plan  Encounter for immunization         H/O multiple allergies         Hypothyroidism due to Hashimoto thyroiditis    Orders:    Thyroid, Porcine, POWD; Use 32 mg daily    Cardiomyopathy, unspecified type (HCC)  Follows with [facility] Cardiology. Dr Dunn  Recent notes and labs reviewed   Corlonor prescribed by then as well as verapamin   Reports needing NaCl 1500cc over 6hours 4x a week          LBBB (left bundle branch block)  Follows with cardiology            Migraine without aura and without status migrainosus, not intractable  Present since a child   Multiple allergies   Follows with Dr. Ball [facility] neuro   On emgality and ubrevyl PRN          Moderate persistent asthma without complication  Present since early    Reports a history of CVID   Follows with allergy at [place]   Follows with pulm, on dupixent   As needed MDIs and Nebs Xopenex, atrovent, budesonide           CVID (common variable immunodeficiency) (HCC)  Follows with [facility] allergy/immunology            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HCC)  Follows with heme at [facility]   On cromolyn, ketonafin and pepcid and allegra           Lumbar radiculopathy  Follows with spine and pain   Reports \"severe\" scoliosis   S/p back surgery   Reports worsening herniation, non surgical candidate   On norco, follows pain management.  Dr. Hernandes          Other osteoporosis without current pathological fracture  Reports intolerance of bisphos and prolia  Unwilling to try reclast  Continue with Vit D, Ca and weight bearing activities            Greater than 60 minutes were spent face-to-face with the patient.  This time was used to obtain history, review labs/imaging, review medication as well as to discuss diagnoses.  Time was also spent discussing treatment/management plan " directly with the patient         History of Present Illness     HPI        This 69-year-old female presents the office as a new patient.  She has an extremely complicated past medical history which we reviewed please see above report for further documentation    Review of Systems   Constitutional:  Positive for fatigue. Negative for activity change, appetite change, chills and fever.   HENT:  Negative for congestion, dental problem, drooling, ear discharge, ear pain, facial swelling, postnasal drip, rhinorrhea and sinus pain.    Eyes:  Negative for photophobia, pain, discharge and itching.   Respiratory:  Negative for apnea, cough, chest tightness and shortness of breath.    Cardiovascular:  Negative for chest pain and leg swelling.   Gastrointestinal:  Negative for abdominal distention, abdominal pain, anal bleeding, constipation, diarrhea and nausea.   Endocrine: Negative for cold intolerance, heat intolerance and polydipsia.   Genitourinary:  Negative for difficulty urinating.   Musculoskeletal:  Positive for arthralgias and back pain. Negative for gait problem, joint swelling and myalgias.   Skin:  Negative for color change and pallor.   Allergic/Immunologic: Negative for immunocompromised state.   Neurological:  Positive for weakness. Negative for dizziness, seizures, facial asymmetry, light-headedness, numbness and headaches.   Psychiatric/Behavioral:  Negative for agitation, behavioral problems, confusion, decreased concentration and dysphoric mood.    All other systems reviewed and are negative.        Past Medical History   Past Medical History:   Diagnosis Date    Allergic rhinitis     Anxiety     Asthma     Back pain     Breathing difficulty     Cardiac disease     Cardiopathy     EF 45%    Cough     Diverticulitis     Factor V Leiden (HCC)     Fibromyalgia     GERD (gastroesophageal reflux disease)     Hashimoto's thyroiditis     Hx of degenerative disc disease     Hypertension     Hypotension     pots  "- postural orthostatic hypotension    Interstitial cystitis     Irregular heart beat     LBBB    Irritable bowel syndrome     Migraines     Mitral valve disease     \"thickening\"    Myocardial infarction (HCC)     possible but not sure when    Neuropathy     bilateral legs    Osteoporosis     PONV (postoperative nausea and vomiting)     Postural orthostatic tachycardia syndrome     must drink a lot of water and salt    Pott's disease     Rheumatic fever 1967    Scoliosis     Sepsis (HCC)     associated with PICC line    Sjogren's syndrome (HCC)     TMJ (dislocation of temporomandibular joint)      Past Surgical History:   Procedure Laterality Date    ABLATION MICROWAVE Left     lumbar area    BACK SURGERY  10/1998     SECTION  1992    CHOLECYSTECTOMY  2016    COLONOSCOPY      DILATION AND CURETTAGE OF UTERUS      EGD  2016    FOOT SURGERY  1968    removal of bone and neuroma    HYSTERECTOMY N/A     age 39    IR OTHER  2022    IR OTHER  2022    IR OTHER  2023    IR OTHER  2024    IR OTHER  2024    IR PICC PLACEMENT SINGLE LUMEN  10/02/2020    IR PICC PLACEMENT SINGLE LUMEN  2021    IR PICC REPOSITION  2021    IR TUNNELED CENTRAL LINE PLACEMENT  2022    LAPAROSCOPY      endometriosis    MOUTH BIOPSY      lip    OOPHORECTOMY Bilateral     age 39    MD EGD TRANSORAL BIOPSY SINGLE/MULTIPLE N/A 2019    Procedure: ESOPHAGOGASTRODUODENOSCOPY (EGD) with multiple bx and dilation;  Surgeon: Peter Baldwin MD;  Location:  GI LAB;  Service: Gastroenterology    MD SURGICAL ARTHROSCOPY SHOULDER W/ROTATOR CUFF RPR Right 2022    Procedure: SHOULDER ARTHROSCOPIC ROTATOR CUFF REPAIR Biceps Tenodisis;  Surgeon: Ilya Munoz MD;  Location: [facility] MAIN OR;  Service: Orthopedics    MD SURGICAL ARTHROSCOPY SHOULDER W/ROTATOR CUFF RPR Left 5/3/2024    Procedure: SHOULDER ARTHROSCOPIC ROTATOR CUFF REPAIR, SUBACROMIAL DECOMPRESSION;  " Surgeon: Ilya Munoz MD;  Location: [facility] MAIN OR;  Service: Orthopedics    TUNNELED VENOUS CATHETER PLACEMENT  2016     Family History   Problem Relation Age of Onset    Cancer Mother         bladder    No Known Problems Father     Thyroid cancer Sister     Heart attack Sister     No Known Problems Sister     No Known Problems Sister     No Known Problems Sister     No Known Problems Sister     No Known Problems Daughter     No Known Problems Daughter     No Known Problems Maternal Grandmother     Colon cancer Maternal Grandfather     No Known Problems Paternal Grandmother     No Known Problems Paternal Grandfather     Breast cancer Maternal Aunt         over age 50    Endometrial cancer Maternal Aunt     Multiple myeloma Maternal Aunt     Hypertension Paternal Aunt     Brain cancer Niece     Lymphoma Niece     Breast cancer additional onset Neg Hx     BRCA2 Positive Neg Hx     Ovarian cancer Neg Hx     BRCA2 Negative Neg Hx     BRCA1 Positive Neg Hx     BRCA1 Negative Neg Hx     BRCA 1/2 Neg Hx       reports that she has never smoked. She has never used smokeless tobacco. She reports that she does not drink alcohol and does not use drugs.  Current Outpatient Medications on File Prior to Visit   Medication Sig Dispense Refill    aluminum hydroxide-magnesium carbonate (Gaviscon Extra Strength) 160-105 mg per chewable tablet  (Patient taking differently: Chew 1 tablet 3 (three) times a day as needed)      azelastine (ASTELIN) 0.1 % nasal spray 2 sprays into each nostril 2 (two) times a day Use in each nostril as directed      beclomethasone (QVAR) 40 MCG/ACT inhaler Inhale 1 puff daily as needed (only when unable to nebulize pulmicort) Rinse mouth after use.      budesonide (PULMICORT) 0.5 mg/2 mL nebulizer solution Take 0.5 mg by nebulization 2 (two) times a day Rinse mouth after use.      cromolyn (GASTROCROM) 100 MG/5ML solution Take 100 mg by mouth 4 (four) times a day (before meals and at bedtime)       cyclobenzaprine (FLEXERIL) 5 mg tablet TAKE 1 TABLET BY MOUTH THREE TIMES A DAY AS NEEDED FOR MUSCLE SPASMS 90 tablet 1    cycloSPORINE (RESTASIS) 0.05 % ophthalmic emulsion Administer 1 drop to both eyes 2 (two) times a day      docusate sodium (COLACE) 100 mg capsule Take 100 mg by mouth daily as needed for constipation      doxycycline (ADOXA) 100 MG tablet Take 100 mg by mouth 2 (two) times a day      Dupilumab (DUPIXENT SC) Inject under the skin Pt unsure of dose      erythromycin (ILOTYCIN) ophthalmic ointment Apply 1 application to eye daily at bedtime      famotidine (PEPCID) 40 MG tablet Twice a day      fexofenadine (ALLEGRA) 180 MG tablet Take 180 mg by mouth 2 (two) times a day       fluticasone (FLONASE) 50 mcg/act nasal spray 2 sprays into each nostril daily       Galcanezumab-gnlm (Emgality) 120 MG/ML SOAJ Inject under the skin every 30 (thirty) days       guaiFENesin (Mucinex) 600 mg 12 hr tablet Take 600 mg by mouth every 12 (twelve) hours      Heating Pad PADS Use if needed (pain)      HYDROcodone-acetaminophen (NORCO)  mg per tablet Take 0.5 tablets by mouth 2 (two) times a day as needed for moderate pain Max Daily Amount: 1 tablet 30 tablet 0    hydrOXYzine (ATARAX) 10 mg/5 mL syrup       ipratropium (ATROVENT) 0.02 % nebulizer solution Take 0.5 mg by nebulization every 6 (six) hours as needed for wheezing or shortness of breath      ipratropium (ATROVENT) 0.06 % nasal spray PLEASE SEE ATTACHED FOR DETAILED DIRECTIONS      ivabradine HCl (Corlanor) 5 MG tablet 7.5 mg 2 (two) times a day      levalbuterol (XOPENEX HFA) 45 mcg/act inhaler Inhale 2 puffs every 4 (four) hours as needed (when unable to nebulize)      levalbuterol (XOPENEX) 1.25 mg/3 mL nebulizer solution Take 1.25 mg by nebulization every 6 (six) hours as needed   2    Magnesium 400 MG CAPS Take 1 capsule by mouth 2 (two) times a day       MULTIPLE VITAMINS-CALCIUM PO Take 1 capsule by mouth every morning       naloxone  (NARCAN) 4 mg/0.1 mL nasal spray Administer 1 spray into a nostril. If no response after 2-3 minutes, give another dose in the other nostril using a new spray. 1 each 0    NON FORMULARY Take 1 mg by mouth 3 (three) times a day Ketotifen 1 mg compounded capsule      NON FORMULARY LACTOBACILLUS COMBINATION NO.4 (PROBIOTIC) 3 BILLION CELL CAPSULE      NON FORMULARY 0.25 % X-DEMVY eye drop (Patient taking differently: 0.25 % every 6 (six) months X-DEMVY eye drop)      omega-3-acid ethyl esters (LOVAZA) 1 g capsule Take 2 g by mouth 2 (two) times a day 1600mg daily      polyethylene glycol (MIRALAX) 17 g packet Take 17 g by mouth daily as needed       Probiotic Product (PROBIOTIC DAILY PO) Take 1 tablet by mouth daily At lunch      Qvar RediHaler 40 MCG/ACT inhaler       sodium chloride Inject 1,500 mL into a catheter in a vein      sucralfate (CARAFATE) 1 g/10 mL suspension Take 1 g by mouth 2 (two) times a day      Ubrogepant (UBRELVY) 100 MG tablet Take 100 mg by mouth Take 1 tablet (100 mg) one time as needed for migraine. May repeat one additional tablet (100 mg) at least two hours after the first dose. Do not use more than two doses per day, or for more than eight days per month.      verapamil (CALAN) 40 mg tablet       [DISCONTINUED] Thyroid, Porcine, POWD Use 32 mg daily      [START ON 12/18/2024] HYDROcodone-acetaminophen (NORCO)  mg per tablet Take 0.5 tablets by mouth 2 (two) times a day as needed for moderate pain Max Daily Amount: 1 tablet Do not start before December 18, 2024. (Patient not taking: Reported on 12/5/2024 Do not start before December 18, 2024.) 30 tablet 0    NON FORMULARY Take 32 mg by mouth NATURE-THROID ORAL (Patient not taking: Reported on 12/5/2024)      Xdemvy 0.25 % SOLN Apply 1 drop to eye (Patient not taking: Reported on 12/5/2024)       Current Facility-Administered Medications on File Prior to Visit   Medication Dose Route Frequency Provider Last Rate Last Admin     [COMPLETED] sodium chloride 0.9 % infusion  250 mL/hr Intravenous Once Jan Doyle MD   Stopped at 12/04/24 1518    sodium chloride 0.9 % infusion  250 mL/hr Intravenous Once Jan Doyle MD         Allergies   Allergen Reactions    Imipramine Confusion, Fatigue, Irritability, Palpitations, Shortness Of Breath, Tachycardia and Visual Disturbance    Lactose - Food Allergy Shortness Of Breath    Melatonin Shortness Of Breath    Mirtazapine Anxiety, Dizziness, GI Intolerance, Nausea Only, Palpitations and Shortness Of Breath    Nexium [Esomeprazole] Shortness Of Breath    Nsaids Shortness Of Breath, Cough, Other (See Comments), Nausea Only, GI Intolerance, Tachycardia and Wheezing    Propofol Cough, Other (See Comments), Shortness Of Breath and Nasal Congestion    Salmeterol Dizziness, Other (See Comments), Shortness Of Breath and Tachycardia    Singulair [Montelukast] Shortness Of Breath and Cough    Zomig [Zolmitriptan] Shortness Of Breath    Dexilant [Dexlansoprazole] Nausea Only and Vomiting    Ambien [Zolpidem]     Amitriptyline Drowsiness    Ascorbate - Food Allergy GI Intolerance    Ascorbic Acid GI Intolerance    Aspirin     Azithromycin Hives and Itching     In December 2018 or 2019, she was put on a course and developed a fully body rash on the 2nd day within a couple of hours of taking the medication that day. The rash was raised, itchy, and red. Rash lasted for a week after medication was stopped. Denies fever, joint swelling, or mucosal peeling. Prior to this, she had tolerated doses of azithromycin. CBC/d and CMP (1/2/20) were normal    Bactrim [Sulfamethoxazole-Trimethoprim] Hives    Banana - Food Allergy Dermatitis    Banana Extract Allergy Skin Test - Food Allergy Dermatitis    Bisoprolol Other (See Comments)    Ceftin [Cefuroxime]     Celebrex [Celecoxib]     Chocolate - Food Allergy Headache    Ciprofloxacin Dizziness, Hives, Other (See Comments), Itching and Nausea Only    Demerol [Meperidine]      "Duloxetine Other (See Comments)     tachycardia    Epinephrine Dizziness    Ergotamine Nausea Only and Headache    Fludrocortisone Other (See Comments)    Keflex [Cephalexin]     Klonopin [Clonazepam] Nausea Only and Dizziness    Latex Hives    Levofloxacin Other (See Comments)    Lexapro [Escitalopram]     Lyrica [Pregabalin] Fatigue    Macrodantin [Nitrofurantoin]     Metoprolol Other (See Comments)    Metronidazole Other (See Comments)    Molds & Smuts GI Intolerance and Other (See Comments)    Morphine Nausea Only    Movantik [Naloxegol] Nausea Only    Mushroom Extract Complex - Food Allergy      Eye itchy, asthma  attack    Naprosyn [Naproxen]     Neurontin [Gabapentin] Dizziness    Pineapple - Food Allergy GI Intolerance    Plecanatide Abdominal Pain, Diarrhea and GI Intolerance    Prolia [Denosumab]     Prozac [Fluoxetine]     Soy Allergy - Food Allergy Vomiting    Sudafed [Pseudoephedrine] Hives    Sulfa Antibiotics     Tomato - Food Allergy GI Intolerance    Trazodone     Ultram [Tramadol] Nausea Only, Dizziness and Headache    Vilazodone Dizziness, GI Intolerance, Headache, Abdominal Pain and Other (See Comments)    Vilazodone Hcl Abdominal Pain, Dizziness, GI Intolerance, Headache, Other (See Comments), Fatigue and Nausea Only    Vioxx [Rofecoxib]     Vortioxetine Drowsiness, Fatigue, GI Intolerance and Irritability    Wheat Bran - Food Allergy Other (See Comments)     Stomach pain    Zinc Other (See Comments), GI Intolerance and Nausea Only    Zoloft [Sertraline]     Albuterol Palpitations, Dizziness, Other (See Comments) and Tachycardia     Dizziness, allergy note 12/11/19 for more specific comments    Cyclobenzaprine Hcl Er Rash    Terfenadine Dizziness and Palpitations    Zantac [Ranitidine] Rash and Dizziness           Objective   /70 (BP Location: Left arm, Patient Position: Sitting, Cuff Size: Adult)   Pulse 75   Ht 5' 1\" (1.549 m)   Wt 61.2 kg (135 lb)   SpO2 98%   BMI 25.51 kg/m²    "   Physical Exam  Vitals and nursing note reviewed.   Constitutional:       General: She is not in acute distress.     Appearance: She is well-developed.   HENT:      Head: Normocephalic and atraumatic.   Eyes:      Conjunctiva/sclera: Conjunctivae normal.      Pupils: Pupils are equal, round, and reactive to light.   Cardiovascular:      Rate and Rhythm: Normal rate and regular rhythm.      Heart sounds: Normal heart sounds. No murmur heard.     No friction rub.   Pulmonary:      Effort: Pulmonary effort is normal.      Breath sounds: Normal breath sounds.   Abdominal:      General: Bowel sounds are normal.      Palpations: Abdomen is soft.   Musculoskeletal:         General: Normal range of motion.      Cervical back: Normal range of motion and neck supple.   Skin:     General: Skin is warm.      Capillary Refill: Capillary refill takes less than 2 seconds.   Neurological:      Mental Status: She is alert and oriented to person, place, and time.      Motor: No abnormal muscle tone.      Coordination: Coordination normal.   Psychiatric:         Behavior: Behavior normal.         Thought Content: Thought content normal.

## 2024-12-05 NOTE — ASSESSMENT & PLAN NOTE
[facility]   On cromolyn, ketonafin and pepcid and allegra

## 2024-12-05 NOTE — PROGRESS NOTES
Teresa Mao  tolerated hydration  treatment well with no complications.      Teresa Mao is aware of future appt on 12/6/24     AVS printed and given to Teresa Mao:    No (Declined by Teresa Mao)

## 2024-12-05 NOTE — ASSESSMENT & PLAN NOTE
Reports intolerance of bisphos and prolia  Unwilling to try reclast  Continue with Vit D, Ca and weight bearing activities

## 2024-12-05 NOTE — TELEPHONE ENCOUNTER
Called [facility] and previous script from Dr Aguirre was for a 2 month supply. Will fill for #60capsules and 1 refill.

## 2024-12-05 NOTE — TELEPHONE ENCOUNTER
Pharmacist at [facility] requests clarification about Thyroid, Porcine, POWD. He asks what quantity should be dispensed-- one month supply or larger amount.    Pharmacist requests return call.

## 2024-12-05 NOTE — ASSESSMENT & PLAN NOTE
Present since early 20's   Reports a history of CVID   Follows with allergy at [place]   Follows with pulm, on dupixent   As needed MDIs and Nebs Xopenex, atrovent, budesonide

## 2024-12-05 NOTE — ASSESSMENT & PLAN NOTE
Follows with [facility]. Dr Dunn  Recent notes and labs reviewed   Corlonor prescribed by then as well as verapamin   Reports needing NaCl 1500cc over 6hours 4x a week

## 2024-12-09 ENCOUNTER — HOSPITAL ENCOUNTER (OUTPATIENT)
Dept: INFUSION CENTER | Facility: HOSPITAL | Age: 69
End: 2024-12-09
Attending: INTERNAL MEDICINE

## 2024-12-09 ENCOUNTER — HOSPITAL ENCOUNTER (OUTPATIENT)
Dept: INFUSION CENTER | Facility: HOSPITAL | Age: 69
Discharge: HOME | End: 2024-12-09
Payer: MEDICARE

## 2024-12-09 VITALS
RESPIRATION RATE: 18 BRPM | TEMPERATURE: 98.3 F | DIASTOLIC BLOOD PRESSURE: 80 MMHG | OXYGEN SATURATION: 98 % | SYSTOLIC BLOOD PRESSURE: 147 MMHG | HEART RATE: 88 BPM

## 2024-12-09 DIAGNOSIS — G90.A POSTURAL ORTHOSTATIC TACHYCARDIA SYNDROME: Primary | ICD-10-CM

## 2024-12-09 PROCEDURE — 96360 HYDRATION IV INFUSION INIT: CPT

## 2024-12-09 PROCEDURE — 96361 HYDRATE IV INFUSION ADD-ON: CPT

## 2024-12-09 RX ADMIN — SODIUM CHLORIDE 1500 ML: 0.9 INJECTION, SOLUTION INTRAVENOUS at 09:03

## 2024-12-09 NOTE — PROGRESS NOTES
Teresa Mao  tolerated treatment well with no complications.      Teresa Mao is aware of future appt on 12/10 at 9 am.     AVS declined by Teresa Mao.

## 2024-12-10 ENCOUNTER — HOSPITAL ENCOUNTER (OUTPATIENT)
Dept: INFUSION CENTER | Facility: HOSPITAL | Age: 69
Discharge: HOME | End: 2024-12-10
Payer: MEDICARE

## 2024-12-10 ENCOUNTER — HOSPITAL ENCOUNTER (OUTPATIENT)
Dept: INFUSION CENTER | Facility: HOSPITAL | Age: 69
Discharge: HOME | End: 2024-12-10

## 2024-12-10 VITALS
SYSTOLIC BLOOD PRESSURE: 135 MMHG | TEMPERATURE: 97.6 F | RESPIRATION RATE: 17 BRPM | HEART RATE: 76 BPM | OXYGEN SATURATION: 98 % | DIASTOLIC BLOOD PRESSURE: 74 MMHG

## 2024-12-10 DIAGNOSIS — G90.A POSTURAL ORTHOSTATIC TACHYCARDIA SYNDROME: Primary | ICD-10-CM

## 2024-12-10 PROCEDURE — 96361 HYDRATE IV INFUSION ADD-ON: CPT

## 2024-12-10 PROCEDURE — 96360 HYDRATION IV INFUSION INIT: CPT

## 2024-12-10 RX ADMIN — SODIUM CHLORIDE 1500 ML: 0.9 INJECTION, SOLUTION INTRAVENOUS at 08:59

## 2024-12-10 NOTE — PROGRESS NOTES
Teresa Mao  tolerated hydration treatment well with no complications.      Teresa Mao is aware of future appt on 12/12 at 9AM.     AVS printed and given to Teresa Mao: No (Declined by Teresa Mao).

## 2024-12-10 NOTE — PROGRESS NOTES
Pt here today for hydration infusion.  Pt has a PICC line in place and it was accessed with out any complications today.  Pt was also due for a dressing change.  Per pt chlorhexidine allergy dressing was changed and cleaned with alcohol swabs and dressed with 2x2 gauze pads under and over the port site then dressing placed over the port.   Pt tolerated treatment well with out any complications and was AAOX3 at the time of discharge.  Pt took all belongings and was able to ambulate on her own off the floor.         Teresa Mao is aware of future appt on 12/12/24 at 0900.     AVS  No (Declined by Teresa Mao) pt has mychart and did not need a print out.

## 2024-12-12 ENCOUNTER — HOSPITAL ENCOUNTER (OUTPATIENT)
Dept: INFUSION CENTER | Facility: HOSPITAL | Age: 69
End: 2024-12-12
Payer: MEDICARE

## 2024-12-12 VITALS
RESPIRATION RATE: 17 BRPM | HEART RATE: 85 BPM | TEMPERATURE: 97 F | SYSTOLIC BLOOD PRESSURE: 138 MMHG | DIASTOLIC BLOOD PRESSURE: 74 MMHG | OXYGEN SATURATION: 96 %

## 2024-12-12 DIAGNOSIS — G90.A POSTURAL ORTHOSTATIC TACHYCARDIA SYNDROME: Primary | ICD-10-CM

## 2024-12-12 PROCEDURE — 96360 HYDRATION IV INFUSION INIT: CPT

## 2024-12-12 PROCEDURE — 96361 HYDRATE IV INFUSION ADD-ON: CPT

## 2024-12-12 RX ADMIN — SODIUM CHLORIDE 1500 ML: 0.9 INJECTION, SOLUTION INTRAVENOUS at 09:18

## 2024-12-12 NOTE — PROGRESS NOTES
Teresa Mao  tolerated hydration treatment well with no complications.      Teresa Mao is aware of future appt on 12/13 at 8:30AM.     AVS printed and given to Teresa Mao: No (Declined by Teresa Mao).

## 2024-12-13 ENCOUNTER — HOSPITAL ENCOUNTER (OUTPATIENT)
Dept: INFUSION CENTER | Facility: HOSPITAL | Age: 69
End: 2024-12-13
Payer: MEDICARE

## 2024-12-13 VITALS
HEART RATE: 75 BPM | OXYGEN SATURATION: 95 % | DIASTOLIC BLOOD PRESSURE: 77 MMHG | RESPIRATION RATE: 20 BRPM | TEMPERATURE: 98.3 F | SYSTOLIC BLOOD PRESSURE: 168 MMHG

## 2024-12-13 DIAGNOSIS — G90.A POSTURAL ORTHOSTATIC TACHYCARDIA SYNDROME: Primary | ICD-10-CM

## 2024-12-13 PROCEDURE — 96360 HYDRATION IV INFUSION INIT: CPT

## 2024-12-13 PROCEDURE — 96361 HYDRATE IV INFUSION ADD-ON: CPT

## 2024-12-13 RX ADMIN — SODIUM CHLORIDE 1500 ML: 0.9 INJECTION, SOLUTION INTRAVENOUS at 08:28

## 2024-12-13 NOTE — PROGRESS NOTES
Teresa Mao  tolerated treatment well with no complications.      Teresa Mao is aware of future appt on 12/16 at 8 am.     AVS declined by Teresa Mao.

## 2024-12-16 ENCOUNTER — HOSPITAL ENCOUNTER (OUTPATIENT)
Dept: INFUSION CENTER | Facility: HOSPITAL | Age: 69
End: 2024-12-16

## 2024-12-17 ENCOUNTER — HOSPITAL ENCOUNTER (OUTPATIENT)
Dept: INFUSION CENTER | Facility: HOSPITAL | Age: 69
Discharge: HOME | End: 2024-12-17
Payer: MEDICARE

## 2024-12-17 ENCOUNTER — HOSPITAL ENCOUNTER (OUTPATIENT)
Dept: INFUSION CENTER | Facility: HOSPITAL | Age: 69
End: 2024-12-17

## 2024-12-17 VITALS
RESPIRATION RATE: 17 BRPM | DIASTOLIC BLOOD PRESSURE: 78 MMHG | SYSTOLIC BLOOD PRESSURE: 131 MMHG | HEART RATE: 76 BPM | OXYGEN SATURATION: 100 % | TEMPERATURE: 96.9 F

## 2024-12-17 DIAGNOSIS — G90.A POSTURAL ORTHOSTATIC TACHYCARDIA SYNDROME: Primary | ICD-10-CM

## 2024-12-17 PROCEDURE — 96361 HYDRATE IV INFUSION ADD-ON: CPT

## 2024-12-17 PROCEDURE — 96360 HYDRATION IV INFUSION INIT: CPT

## 2024-12-17 RX ADMIN — SODIUM CHLORIDE 1500 ML: 0.9 INJECTION, SOLUTION INTRAVENOUS at 09:03

## 2024-12-17 NOTE — PROGRESS NOTES
Teresa Mao  tolerated hydration treatment well with no complications.      Teresa Mao is aware of future appt on 12/19 at 9AM.     AVS printed and given to Teresa Mao: No (Declined by Teresa Mao).

## 2024-12-19 ENCOUNTER — HOSPITAL ENCOUNTER (OUTPATIENT)
Dept: INFUSION CENTER | Facility: HOSPITAL | Age: 69
End: 2024-12-19
Payer: MEDICARE

## 2024-12-19 DIAGNOSIS — G90.A POSTURAL ORTHOSTATIC TACHYCARDIA SYNDROME: Primary | ICD-10-CM

## 2024-12-19 PROCEDURE — 96360 HYDRATION IV INFUSION INIT: CPT

## 2024-12-19 PROCEDURE — 96361 HYDRATE IV INFUSION ADD-ON: CPT

## 2024-12-19 RX ADMIN — SODIUM CHLORIDE 1500 ML: 0.9 INJECTION, SOLUTION INTRAVENOUS at 09:10

## 2024-12-19 NOTE — PROGRESS NOTES
Teresa Mao  tolerated treatment well with no complications.      Teresa Mao is aware of future appt on 12/20 at 8:30AM.     AVS printed and given to Teresa Mao: No (Declined by Teresa Mao).

## 2024-12-20 ENCOUNTER — HOSPITAL ENCOUNTER (OUTPATIENT)
Dept: INFUSION CENTER | Facility: HOSPITAL | Age: 69
End: 2024-12-20
Payer: MEDICARE

## 2024-12-20 VITALS
HEART RATE: 70 BPM | SYSTOLIC BLOOD PRESSURE: 126 MMHG | RESPIRATION RATE: 16 BRPM | DIASTOLIC BLOOD PRESSURE: 70 MMHG | TEMPERATURE: 98 F | OXYGEN SATURATION: 96 %

## 2024-12-20 DIAGNOSIS — G90.A POSTURAL ORTHOSTATIC TACHYCARDIA SYNDROME: Primary | ICD-10-CM

## 2024-12-20 RX ADMIN — SODIUM CHLORIDE 1500 ML: 0.9 INJECTION, SOLUTION INTRAVENOUS at 08:49

## 2024-12-20 NOTE — PROGRESS NOTES
Patient tolerated NSS IV hydration without issues.      Teresa Mao is aware of future appt on 12/23/24 at 0800.     AVS -  No (Declined by Teresa Mao) Patient has Mychart.    Patient ambulated off unit without incident.  All personal belongings taken with patient.

## 2024-12-23 ENCOUNTER — HOSPITAL ENCOUNTER (OUTPATIENT)
Dept: INFUSION CENTER | Facility: HOSPITAL | Age: 69
Discharge: HOME | End: 2024-12-23
Payer: MEDICARE

## 2024-12-23 VITALS
OXYGEN SATURATION: 100 % | HEART RATE: 79 BPM | SYSTOLIC BLOOD PRESSURE: 142 MMHG | RESPIRATION RATE: 18 BRPM | TEMPERATURE: 97.5 F | DIASTOLIC BLOOD PRESSURE: 85 MMHG

## 2024-12-23 DIAGNOSIS — G90.A POSTURAL ORTHOSTATIC TACHYCARDIA SYNDROME: Primary | ICD-10-CM

## 2024-12-23 RX ADMIN — SODIUM CHLORIDE 1500 ML: 0.9 INJECTION, SOLUTION INTRAVENOUS at 08:25

## 2024-12-23 NOTE — PROGRESS NOTES
Teresa Mao  tolerated hydration treatment well with no complications.      Teresa Mao is aware of future appt on 12/24 at 8PM.     AVS printed and given to Teresa Mao: No (Declined by Teresa Mao).

## 2024-12-24 ENCOUNTER — HOSPITAL ENCOUNTER (OUTPATIENT)
Dept: INFUSION CENTER | Facility: HOSPITAL | Age: 69
Discharge: HOME | End: 2024-12-24
Payer: MEDICARE

## 2024-12-24 VITALS
RESPIRATION RATE: 18 BRPM | TEMPERATURE: 98.2 F | OXYGEN SATURATION: 99 % | HEART RATE: 67 BPM | DIASTOLIC BLOOD PRESSURE: 77 MMHG | SYSTOLIC BLOOD PRESSURE: 136 MMHG

## 2024-12-24 DIAGNOSIS — G90.A POSTURAL ORTHOSTATIC TACHYCARDIA SYNDROME: Primary | ICD-10-CM

## 2024-12-24 RX ADMIN — SODIUM CHLORIDE 1500 ML: 0.9 INJECTION, SOLUTION INTRAVENOUS at 08:28

## 2024-12-24 NOTE — PROGRESS NOTES
Teresa Mao  tolerated hydration treatment well with no complications.      Teresa Mao is aware of future appt on 12/26 at 8:30AM.     AVS printed and given to Teresa Mao: No (Declined by Teresa Mao).

## 2024-12-26 ENCOUNTER — HOSPITAL ENCOUNTER (OUTPATIENT)
Dept: INFUSION CENTER | Facility: HOSPITAL | Age: 69
End: 2024-12-26
Payer: MEDICARE

## 2024-12-26 VITALS
OXYGEN SATURATION: 97 % | RESPIRATION RATE: 18 BRPM | DIASTOLIC BLOOD PRESSURE: 67 MMHG | TEMPERATURE: 98.3 F | HEART RATE: 69 BPM | SYSTOLIC BLOOD PRESSURE: 148 MMHG

## 2024-12-26 DIAGNOSIS — G90.A POSTURAL ORTHOSTATIC TACHYCARDIA SYNDROME: Primary | ICD-10-CM

## 2024-12-26 PROCEDURE — 96361 HYDRATE IV INFUSION ADD-ON: CPT

## 2024-12-26 PROCEDURE — 96360 HYDRATION IV INFUSION INIT: CPT

## 2024-12-26 RX ADMIN — SODIUM CHLORIDE 1500 ML: 0.9 INJECTION, SOLUTION INTRAVENOUS at 09:02

## 2024-12-26 NOTE — PROGRESS NOTES
Patient tolerated IV hydration without issues.      Teresa Mao is aware of future appt on 12/27/24 at 0900.     AVS -  No (Declined by Teresa Mao) Patient has Mychart.    Patient ambulated off unit without incident.  All personal belongings taken with patient.

## 2024-12-26 NOTE — PROGRESS NOTES
Pt here today for IV hydration.  Pts single lumen central line was accessed today without any complications.   Pt came In today and stated that her central line dressing was irritating her yesterday and that the sutures were poking her.  She was also C/O some skin irritation where the dressing was touching her skin.  There was some noted redness from the dressing but there was no s/s of a sever allergic reaction or a rash that would have prevented another dressing being placed.  Pt stated that she did take her dressing off yesterday and she redressed it at home yesterday by her self.    No s/s of infection at this time.   I took off Pts dressing and cleaned the area well with alcohol swabs as pt can not tolerated the chlorahexidine swabs or skin prep.    New 2x2 drain sponge dressings were placed under and over the sutures and new sterile dressing placed over central line.  Pt states that the sutures and dressing feel so much better at this time.   Green alcohol cap placed on hub after infusion to help protect from infection.

## 2024-12-27 ENCOUNTER — HOSPITAL ENCOUNTER (OUTPATIENT)
Dept: INFUSION CENTER | Facility: HOSPITAL | Age: 69
End: 2024-12-27
Payer: MEDICARE

## 2024-12-27 VITALS
DIASTOLIC BLOOD PRESSURE: 77 MMHG | SYSTOLIC BLOOD PRESSURE: 163 MMHG | TEMPERATURE: 98.9 F | RESPIRATION RATE: 20 BRPM | OXYGEN SATURATION: 97 % | HEART RATE: 74 BPM

## 2024-12-27 DIAGNOSIS — G90.A POSTURAL ORTHOSTATIC TACHYCARDIA SYNDROME: Primary | ICD-10-CM

## 2024-12-27 PROCEDURE — 96360 HYDRATION IV INFUSION INIT: CPT

## 2024-12-27 PROCEDURE — 96361 HYDRATE IV INFUSION ADD-ON: CPT

## 2024-12-27 RX ADMIN — SODIUM CHLORIDE 1500 ML: 0.9 INJECTION, SOLUTION INTRAVENOUS at 08:51

## 2024-12-27 NOTE — PROGRESS NOTES
Teresa Mao  tolerated 6 hours of hydration  treatment well with no complications.      Teresa Mao is aware of future appt on  12/30    AVS printed and given to Teresa Mao:  Yes

## 2024-12-30 ENCOUNTER — HOSPITAL ENCOUNTER (OUTPATIENT)
Dept: INFUSION CENTER | Facility: HOSPITAL | Age: 69
Discharge: HOME | End: 2024-12-30
Payer: MEDICARE

## 2024-12-30 VITALS
SYSTOLIC BLOOD PRESSURE: 137 MMHG | TEMPERATURE: 98 F | OXYGEN SATURATION: 96 % | DIASTOLIC BLOOD PRESSURE: 65 MMHG | HEART RATE: 72 BPM | RESPIRATION RATE: 18 BRPM

## 2024-12-30 DIAGNOSIS — G90.A POSTURAL ORTHOSTATIC TACHYCARDIA SYNDROME: Primary | ICD-10-CM

## 2024-12-30 PROCEDURE — 96361 HYDRATE IV INFUSION ADD-ON: CPT

## 2024-12-30 PROCEDURE — 96360 HYDRATION IV INFUSION INIT: CPT

## 2024-12-30 RX ADMIN — SODIUM CHLORIDE 1500 ML: 0.9 INJECTION, SOLUTION INTRAVENOUS at 08:30

## 2024-12-30 NOTE — PROGRESS NOTES
Teresa Mao  tolerated 6 hours of iv hydration  treatment well with no complications.      Teresa Mao is aware of future appt tomorrow    AVS printed and given to Teresa Mao:   No (Declined by Teresa Mao)

## 2024-12-31 ENCOUNTER — TELEPHONE (OUTPATIENT)
Age: 69
End: 2024-12-31

## 2024-12-31 ENCOUNTER — HOSPITAL ENCOUNTER (OUTPATIENT)
Dept: INFUSION CENTER | Facility: HOSPITAL | Age: 69
Discharge: HOME | End: 2024-12-31
Payer: MEDICARE

## 2024-12-31 VITALS
HEART RATE: 71 BPM | TEMPERATURE: 96.9 F | OXYGEN SATURATION: 98 % | SYSTOLIC BLOOD PRESSURE: 141 MMHG | DIASTOLIC BLOOD PRESSURE: 76 MMHG | RESPIRATION RATE: 18 BRPM

## 2024-12-31 DIAGNOSIS — G90.A POSTURAL ORTHOSTATIC TACHYCARDIA SYNDROME: Primary | ICD-10-CM

## 2024-12-31 PROCEDURE — 96360 HYDRATION IV INFUSION INIT: CPT

## 2024-12-31 PROCEDURE — 96361 HYDRATE IV INFUSION ADD-ON: CPT

## 2024-12-31 RX ADMIN — SODIUM CHLORIDE 1500 ML: 0.9 INJECTION, SOLUTION INTRAVENOUS at 08:05

## 2024-12-31 NOTE — TELEPHONE ENCOUNTER
Spoke with patient states is still having edema in ankles some shortness of breath . She has a phc into dr ashley about this she just wanted to make you aware states that you place orders just wanted to make you aware they might be changing them

## 2024-12-31 NOTE — PROGRESS NOTES
Teresa Mao  tolerated hydration well with no complications.      Teresa Mao is aware of future appt on 1/2 at 8:30AM.     AVS printed and given to Teresa Mao: No (Declined by Teresa Mao).

## 2024-12-31 NOTE — TELEPHONE ENCOUNTER
Patient said having some reaction from her IV treatments and her cardiologist has not returned her calls.She wants her IV treatments adjusted and said that she may need Dr Harper to change the orders if her cardiologist does not answer. She just wanted the office to be aware if she calls back because of no response from the cardiologist.

## 2025-01-02 ENCOUNTER — HOSPITAL ENCOUNTER (OUTPATIENT)
Dept: INFUSION CENTER | Facility: HOSPITAL | Age: 70
End: 2025-01-02
Payer: MEDICARE

## 2025-01-02 ENCOUNTER — TELEPHONE (OUTPATIENT)
Age: 70
End: 2025-01-02

## 2025-01-02 VITALS
HEART RATE: 80 BPM | SYSTOLIC BLOOD PRESSURE: 139 MMHG | DIASTOLIC BLOOD PRESSURE: 82 MMHG | TEMPERATURE: 97.6 F | OXYGEN SATURATION: 98 % | RESPIRATION RATE: 17 BRPM

## 2025-01-02 DIAGNOSIS — G90.A POSTURAL ORTHOSTATIC TACHYCARDIA SYNDROME: Primary | ICD-10-CM

## 2025-01-02 PROCEDURE — 96360 HYDRATION IV INFUSION INIT: CPT

## 2025-01-02 PROCEDURE — 96361 HYDRATE IV INFUSION ADD-ON: CPT

## 2025-01-02 RX ADMIN — SODIUM CHLORIDE 1500 ML: 0.9 INJECTION, SOLUTION INTRAVENOUS at 08:47

## 2025-01-02 NOTE — TELEPHONE ENCOUNTER
Patient called back for an update. Wants to know if the office can reach out to cardiology if they don't hear from Cardiology. Please advise.      Teresa: [phone]    Cardiology: [phone] (Katie)

## 2025-01-02 NOTE — TELEPHONE ENCOUNTER
Patient said she was able to get a hold of her cardiologist. They will be sending the office a request for a new script to have IV orders changed to 1000cc bag  at 250cc per hour - 1 bag  And to [facility]  Please call patient when orders have be received and placed   Thank you

## 2025-01-02 NOTE — PROGRESS NOTES
Teresa Mao  tolerated hydration treatment well with no complications.  Patient refused 500ml bag of saline after speaking to her cardiologist about increased peripheral edema.     Teresa Mao is aware of future appt on 1/3 at 8:30AM.     AVS printed and given to Teresa Mao: No (Declined by Teresa Mao).

## 2025-01-06 ENCOUNTER — HOSPITAL ENCOUNTER (OUTPATIENT)
Dept: INFUSION CENTER | Facility: HOSPITAL | Age: 70
Discharge: HOME | End: 2025-01-06

## 2025-01-06 DIAGNOSIS — M79.18 MYOFASCIAL PAIN SYNDROME: ICD-10-CM

## 2025-01-06 DIAGNOSIS — G03.9 ARACHNOIDITIS: ICD-10-CM

## 2025-01-07 RX ORDER — CYCLOBENZAPRINE HCL 5 MG
5 TABLET ORAL 3 TIMES DAILY PRN
Qty: 90 TABLET | Refills: 1 | Status: SHIPPED | OUTPATIENT
Start: 2025-01-07

## 2025-01-09 ENCOUNTER — HOSPITAL ENCOUNTER (OUTPATIENT)
Dept: INFUSION CENTER | Facility: HOSPITAL | Age: 70
End: 2025-01-09
Payer: MEDICARE

## 2025-01-09 ENCOUNTER — OFFICE VISIT (OUTPATIENT)
Age: 70
End: 2025-01-09
Payer: MEDICARE

## 2025-01-09 VITALS — HEIGHT: 61 IN | BODY MASS INDEX: 26.06 KG/M2 | WEIGHT: 138 LBS

## 2025-01-09 VITALS
HEART RATE: 78 BPM | DIASTOLIC BLOOD PRESSURE: 94 MMHG | TEMPERATURE: 97.2 F | SYSTOLIC BLOOD PRESSURE: 146 MMHG | RESPIRATION RATE: 18 BRPM

## 2025-01-09 DIAGNOSIS — G89.29 CHRONIC BILATERAL LOW BACK PAIN WITH BILATERAL SCIATICA: ICD-10-CM

## 2025-01-09 DIAGNOSIS — M54.41 CHRONIC BILATERAL LOW BACK PAIN WITH BILATERAL SCIATICA: ICD-10-CM

## 2025-01-09 DIAGNOSIS — G90.A POSTURAL ORTHOSTATIC TACHYCARDIA SYNDROME: Primary | ICD-10-CM

## 2025-01-09 DIAGNOSIS — Z79.891 LONG-TERM CURRENT USE OF OPIATE ANALGESIC: ICD-10-CM

## 2025-01-09 DIAGNOSIS — M54.42 CHRONIC BILATERAL LOW BACK PAIN WITH BILATERAL SCIATICA: ICD-10-CM

## 2025-01-09 DIAGNOSIS — M54.12 CERVICAL RADICULOPATHY: ICD-10-CM

## 2025-01-09 DIAGNOSIS — M47.816 LUMBAR SPONDYLOSIS: ICD-10-CM

## 2025-01-09 DIAGNOSIS — G89.4 CHRONIC PAIN DISORDER: Primary | ICD-10-CM

## 2025-01-09 DIAGNOSIS — Z98.1 HISTORY OF LUMBAR FUSION: ICD-10-CM

## 2025-01-09 DIAGNOSIS — M54.2 NECK PAIN: ICD-10-CM

## 2025-01-09 DIAGNOSIS — G03.9 ARACHNOIDITIS: ICD-10-CM

## 2025-01-09 DIAGNOSIS — M54.16 LUMBAR RADICULOPATHY: ICD-10-CM

## 2025-01-09 DIAGNOSIS — M47.812 CERVICAL SPONDYLOSIS: ICD-10-CM

## 2025-01-09 DIAGNOSIS — F11.20 UNCOMPLICATED OPIOID DEPENDENCE (HCC): ICD-10-CM

## 2025-01-09 PROCEDURE — 99214 OFFICE O/P EST MOD 30 MIN: CPT | Performed by: NURSE PRACTITIONER

## 2025-01-09 PROCEDURE — 96361 HYDRATE IV INFUSION ADD-ON: CPT

## 2025-01-09 PROCEDURE — 96360 HYDRATION IV INFUSION INIT: CPT

## 2025-01-09 RX ORDER — HYDROCODONE BITARTRATE AND ACETAMINOPHEN 10; 325 MG/1; MG/1
0.5 TABLET ORAL 2 TIMES DAILY PRN
Qty: 30 TABLET | Refills: 0 | Status: SHIPPED | OUTPATIENT
Start: 2025-01-17

## 2025-01-09 RX ORDER — HYDROCODONE BITARTRATE AND ACETAMINOPHEN 10; 325 MG/1; MG/1
0.5 TABLET ORAL 2 TIMES DAILY PRN
Qty: 30 TABLET | Refills: 0 | Status: SHIPPED | OUTPATIENT
Start: 2025-02-14

## 2025-01-09 RX ADMIN — SODIUM CHLORIDE 1500 ML: 0.9 INJECTION, SOLUTION INTRAVENOUS at 08:48

## 2025-01-09 NOTE — PROGRESS NOTES
Teresa Mao  tolerated 1L NSS hydration well with no complications.    Teresa Mao is aware of future appt tomorrow at 9AM.    AVS declined by Teresa Mao.

## 2025-01-09 NOTE — PROGRESS NOTES
Pt verbalized that d/t b/l lower leg edema, her cardiologist had suggested a change to her hydration tx plan. No changes had been made to tx plan thus far. Dr Harper was contacted and order received to infuse 1L NSS over 4 hours 4 times weekly, at the suggestion of cardiology. Pt made aware and in agreement. Pharmacy made aware.

## 2025-01-09 NOTE — PROGRESS NOTES
Assessment:  1. Chronic pain disorder    2. Chronic bilateral low back pain with bilateral sciatica    3. Lumbar radiculopathy    4. History of lumbar fusion    5. Lumbar spondylosis    6. Cervical radiculopathy    7. Cervical spondylosis    8. Neck pain    9. Arachnoiditis    10. Long-term current use of opiate analgesic    11. Uncomplicated opioid dependence (HCC)        Plan:  While the patient was in the office today, I did have a thorough conversation regarding their chronic pain syndrome, medication management, and treatment plan options.  Patient is being seen for a follow-up visit.  Patient has been experiencing increased low back pain.  She previously underwent left L2-3 and L3-4 radiofrequency ablations.  Left side was performed on 3/29/2024.  Right side was performed on 3/5/2024.  Pain was at least 50% improved until recently.    At this point, we will plan to repeat bilateral L2-3 and L3-4 radiofrequency ablations.  Left side to be performed on or after 3/29/2025.  The right side to be performed on or after 3/5/2025.    Renewed hydrocodone 10/325.  1/2 tablet twice daily if needed for pain.  The patient's opioid scripts were sent to their pharmacy electronically and was given a 2 month supply of prescriptions with a Do Not Fill date(s) of 1/17/2025, 2/14/2025.    Continue Flexeril 5 mg 3 times daily if needed for spasms.    There are risks associated with opioid medications, including dependence, addiction and tolerance. The patient understands and agrees to use these medications only as prescribed. Potential side effects of the medications include, but are not limited to, constipation, drowsiness, addiction, impaired judgment and risk of fatal overdose if not taken as prescribed. The patient was warned against driving while taking sedation medications.  Sharing medications is a felony. At this point in time, the patient is showing no signs of addiction, abuse, diversion or suicidal ideation.    A urine  drug screen was collected at today's office visit as part of our medication management protocol. The point of care testing results were appropriate for what was being prescribed. The specimen will be sent for confirmatory testing. The drug screen is medically necessary because the patient is either dependent on opioid medication or is being considered for opioid medication therapy and the results could impact ongoing or future treatment. The drug screen is to evaluate for the presences or absence of prescribed, non-prescribed, and/or illicit drugs/substances.    [place] Prescription Drug Monitoring Program report was reviewed and was appropriate     The patient will follow-up in 2 months for medication prescription refill and reevaluation. The patient was advised to contact the office should their symptoms worsen in the interim. The patient was agreeable and verbalized an understanding.    My impressions and treatment recommendations were discussed in detail with the patient who verbalized understanding and had no further questions.  Discharge instructions were provided. I personally saw and examined the patient and I agree with the above discussed plan of care.    Orders Placed This Encounter   Procedures    FL spine and pain procedure     Standing Status:   Future     Expected Date:   1/9/2025     Expiration Date:   1/9/2029     Reason for Exam::   right L2-3 and L3-4 RFA on or after 3//5/2025     Anticoagulant hold needed?:   denies    FL spine and pain procedure     Standing Status:   Future     Expected Date:   1/9/2025     Expiration Date:   1/9/2029     Reason for Exam::   right L2-3 and L3-4 RFA on or after 3//5/2025     Anticoagulant hold needed?:   denies    MM ALL_Prescribed Meds and Special Instructions     [facility] Is CYCLOBENZAPRINE Prescribed?:   Yes     [facility] Is HYDROCODONE/APAP prescribed?:   Yes     [facility] Is NALOXONE Prescribed?:   Yes    MM DT_Alprazolam Definitive Test    CHIKIS  DT_Amphetamine Definitive Test    MM DT_Aripiprazole Definitive Test    MM DT_Bath Salts Definitive Test    MM DT_Buprenorphine Definitive Test    MM DT_Butalbital Definitive Test    MM DT_Clonazepam Definitive Test    MM DT_Clozapine Definitive Test    MM DT_Cocaine Definitive Test    MM DT_Codeine Definitive Test    MM DT_Desipramine Definitive Test    MM DT_Dextromethorphan Definitive Test    MM Diazepam Definitive Test    MM DT_Ethyl Glucuronide/Ethyl Sulfate Definitive Test    MM DT_Fentanyl Definitive Test    MM DT_Haloperidol Definitive Test    MM DT_Heroin Definitive Test    MM DT_Hydrocodone Definitive Test    MM DT_Imipramine Definitive Test    MM DT_Kratom Definitive Test    MM DT_Levorphanol Definitive Test    MM DT_MDMA Definitive Test    MM DT_Meperidine Definitive Test    MM DT_Methadone Definitive Test    MM DT_Methamphetamine Definitive Test    MM DT_Methylphenidate Definitive Test    MM DT_Morphine Definitive Test    MM Lorazepam Definitive Test    MM DT_Olanzapine Definitive Test    MM DT_Oxazepam Definitive Test    MM DT_Oxycodone Definitive Test    MM DT_Oxymorphone Definitive Test    MM DT_Phencyclidine Definitive Test    MM DT_Phenobarbital Definitive Test    MM DT_Phentermine Definitive Test    MM DT_Quetiapine Definitive Test    MM DT_Risperidone Definitive Test    MM DT_Secobarbital Definitive Test    MM DT_Spice Definitive Test    MM DT_Tapentadol Definitive Test    MM DT_Temazapam Definitive Test    MM DT_THC Definitive Test    MM DT_Tramadol Definitive Test     New Medications Ordered This Visit   Medications    HYDROcodone-acetaminophen (NORCO)  mg per tablet     Sig: Take 0.5 tablets by mouth 2 (two) times a day as needed for moderate pain Max Daily Amount: 1 tablet Do not start before January 17, 2025.     Dispense:  30 tablet     Refill:  0    HYDROcodone-acetaminophen (NORCO)  mg per tablet     Sig: Take 0.5 tablets by mouth 2 (two) times a day as needed for moderate  pain Max Daily Amount: 1 tablet Do not start before February 14, 2025.     Dispense:  30 tablet     Refill:  0       History of Present Illness:  Teresa Mao is a 69 y.o. female who presents for a follow up office visit in regards to Hip Pain, Shoulder Pain, Foot Pain, Knee Pain, Hand Pain, and Back Pain.   The patient’s current symptoms include plaints of low back pain, neck pain that can radiate to both upper extremities.  Current pain level is an 8/10.  Quality pain is described as burning, sharp, throbbing, cramping, pressure-like, shooting, numb, pins-and-needles.    I have personally reviewed and/or updated the patient's past medical history, past surgical history, family history, social history, current medications, allergies, and vital signs today.     Review of Systems   Constitutional:  Negative for fever.   HENT:  Negative for sinus pain.    Eyes:  Negative for redness.   Respiratory:  Positive for shortness of breath.    Cardiovascular:  Positive for chest pain. Negative for palpitations.   Gastrointestinal:  Positive for constipation and nausea. Negative for abdominal pain.   Endocrine: Negative for polydipsia.   Genitourinary:  Negative for difficulty urinating.   Musculoskeletal:  Positive for arthralgias and gait problem. Negative for myalgias.        Pain in extremity-arms and legs  Swelling-Face,abdomen,ankles   Skin:  Negative for rash.   Neurological:  Positive for dizziness. Negative for seizures and headaches.        Memory loss   Psychiatric/Behavioral:  Negative for decreased concentration. The patient is not nervous/anxious.        Patient Active Problem List   Diagnosis    LBBB (left bundle branch block)    Fibromyalgia    Postural orthostatic tachycardia syndrome    Sleep-related breathing disorder    Other insomnia    Restless leg syndrome    Bruxism    Anxiety and depression    Chronic rhinitis    Moderate persistent asthma    Gastroesophageal reflux disease without esophagitis     Chronic pain disorder    Factor V Leiden mutation ([facility])    Hypothyroidism    Weakness    Traumatic tear of supraspinatus tendon of right shoulder    Sicca syndrome ([facility])    S/P left rotator cuff repair    Generalized pain    Myofascial pain syndrome    Arachnoiditis    Cervical radiculopathy    Chronic bilateral low back pain with bilateral sciatica    Lumbar radiculopathy    Encounter for long-term opiate analgesic use    Uncomplicated opioid dependence ([facility])    Cardiomyopathy, unspecified type ([facility])    Stage 3a chronic kidney disease ([facility])    Urinary incontinence    Abnormal electrocardiography    Abnormal TSH    Asthma    Biliary dyskinesia    Blepharitis of both eyes    Degeneration of intervertebral disc    Diverticulosis of colon    Hashimoto's disease    Herniated nucleus pulposus, L3-4    Hyperlipidemia    Irritable bowel syndrome    Migraine without aura and without status migrainosus, not intractable    Mitral valve prolapse    Obstructive sleep apnea syndrome    Other osteoporosis without current pathological fracture    Rheumatic fever with cardiac involvement    Scoliosis (and kyphoscoliosis), idiopathic    Sjogren's syndrome ([facility])    Osteoarthritis    TMJ (temporomandibular joint syndrome)    Vitamin D deficiency    Chronic interstitial cystitis    Radiculopathy, multiple sites in spine    CVID (common variable immunodeficiency) ([facility])    Cellulitis    Cervical spondylosis    Neck pain    Sprain of deltoid ligament of right ankle    Sprain of anterior talofibular ligament of right ankle    Ankle injury, right, initial encounter    History of lumbar fusion    Lumbar spondylosis    Tear of left supraspinatus tendon    Disorder of autonomic nervous system    Biceps tendinitis of left upper extremity    H/O multiple allergies    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[facility])       Past Medical History:   Diagnosis Date    Allergic rhinitis     Anxiety     Asthma     Back pain     Breathing difficulty     Cardiac disease      "Cardiopathy     EF 45%    Cough     Diverticulitis     Factor V Leiden (HCC)     Fibromyalgia     GERD (gastroesophageal reflux disease)     Hashimoto's thyroiditis     Hx of degenerative disc disease     Hypertension     Hypotension     pots - postural orthostatic hypotension    Interstitial cystitis     Irregular heart beat     LBBB    Irritable bowel syndrome     Migraines     Mitral valve disease     \"thickening\"    Myocardial infarction (HCC)     possible but not sure when    Neuropathy     bilateral legs    Osteoporosis     PONV (postoperative nausea and vomiting)     Postural orthostatic tachycardia syndrome     must drink a lot of water and salt    Pott's disease     Rheumatic fever 1967    Scoliosis     Sepsis ([facility])     associated with PICC line    Sjogren's syndrome ([facility])     TMJ (dislocation of temporomandibular joint)        Past Surgical History:   Procedure Laterality Date    ABLATION MICROWAVE Left     lumbar area    BACK SURGERY  10/1998     SECTION  1992    CHOLECYSTECTOMY  2016    COLONOSCOPY      DILATION AND CURETTAGE OF UTERUS      EGD  2016    FOOT SURGERY  1968    removal of bone and neuroma    HYSTERECTOMY N/A     age 39    IR OTHER  2022    IR OTHER  2022    IR OTHER  2023    IR OTHER  2024    IR OTHER  2024    IR PICC PLACEMENT SINGLE LUMEN  10/02/2020    IR PICC PLACEMENT SINGLE LUMEN  2021    IR PICC REPOSITION  2021    IR TUNNELED CENTRAL LINE PLACEMENT  2022    LAPAROSCOPY      endometriosis    MOUTH BIOPSY      lip    OOPHORECTOMY Bilateral     age 39    MI EGD TRANSORAL BIOPSY SINGLE/MULTIPLE N/A 2019    Procedure: ESOPHAGOGASTRODUODENOSCOPY (EGD) with multiple bx and dilation;  Surgeon: Peter Baldwin MD;  Location:  GI LAB;  Service: Gastroenterology    MI SURGICAL ARTHROSCOPY SHOULDER W/ROTATOR CUFF RPR Right 2022    Procedure: SHOULDER ARTHROSCOPIC ROTATOR CUFF REPAIR Biceps " Tenodisis;  Surgeon: Ilya Munoz MD;  Location: [facility] MAIN OR;  Service: Orthopedics    MN SURGICAL ARTHROSCOPY SHOULDER W/ROTATOR CUFF RPR Left 5/3/2024    Procedure: SHOULDER ARTHROSCOPIC ROTATOR CUFF REPAIR, SUBACROMIAL DECOMPRESSION;  Surgeon: Ilya Munoz MD;  Location: [facility] MAIN OR;  Service: Orthopedics    TUNNELED VENOUS CATHETER PLACEMENT  2016       Family History   Problem Relation Age of Onset    Cancer Mother         bladder    No Known Problems Father     Thyroid cancer Sister     Heart attack Sister     No Known Problems Sister     No Known Problems Sister     No Known Problems Sister     No Known Problems Sister     No Known Problems Daughter     No Known Problems Daughter     No Known Problems Maternal Grandmother     Colon cancer Maternal Grandfather     No Known Problems Paternal Grandmother     No Known Problems Paternal Grandfather     Breast cancer Maternal Aunt         over age 50    Endometrial cancer Maternal Aunt     Multiple myeloma Maternal Aunt     Hypertension Paternal Aunt     Brain cancer Niece     Lymphoma Niece     Breast cancer additional onset Neg Hx     BRCA2 Positive Neg Hx     Ovarian cancer Neg Hx     BRCA2 Negative Neg Hx     BRCA1 Positive Neg Hx     BRCA1 Negative Neg Hx     BRCA 1/2 Neg Hx        Social History     Occupational History    Not on file   Tobacco Use    Smoking status: Never    Smokeless tobacco: Never   Vaping Use    Vaping status: Never Used   Substance and Sexual Activity    Alcohol use: Never    Drug use: No    Sexual activity: Not on file       Current Outpatient Medications on File Prior to Visit   Medication Sig    azelastine (ASTELIN) 0.1 % nasal spray 2 sprays into each nostril 2 (two) times a day Use in each nostril as directed    beclomethasone (QVAR) 40 MCG/ACT inhaler Inhale 1 puff daily as needed (only when unable to nebulize pulmicort) Rinse mouth after use.    budesonide (PULMICORT) 0.5 mg/2 mL nebulizer solution  Take 0.5 mg by nebulization 2 (two) times a day Rinse mouth after use.    cromolyn (GASTROCROM) 100 MG/5ML solution Take 100 mg by mouth 4 (four) times a day (before meals and at bedtime)    cyclobenzaprine (FLEXERIL) 5 mg tablet TAKE 1 TABLET BY MOUTH THREE TIMES A DAY AS NEEDED FOR MUSCLE SPASMS    cycloSPORINE (RESTASIS) 0.05 % ophthalmic emulsion Administer 1 drop to both eyes 2 (two) times a day    docusate sodium (COLACE) 100 mg capsule Take 100 mg by mouth daily as needed for constipation    Dupilumab (DUPIXENT SC) Inject under the skin Pt unsure of dose    famotidine (PEPCID) 40 MG tablet Twice a day    fexofenadine (ALLEGRA) 180 MG tablet Take 180 mg by mouth 2 (two) times a day     fluticasone (FLONASE) 50 mcg/act nasal spray 2 sprays into each nostril daily     Galcanezumab-gnlm (Emgality) 120 MG/ML SOAJ Inject under the skin every 30 (thirty) days     guaiFENesin (Mucinex) 600 mg 12 hr tablet Take 600 mg by mouth every 12 (twelve) hours    Heating Pad PADS Use if needed (pain)    hydrOXYzine (ATARAX) 10 mg/5 mL syrup     ipratropium (ATROVENT) 0.02 % nebulizer solution Take 0.5 mg by nebulization every 6 (six) hours as needed for wheezing or shortness of breath    ipratropium (ATROVENT) 0.06 % nasal spray PLEASE SEE ATTACHED FOR DETAILED DIRECTIONS    ivabradine HCl (Corlanor) 5 MG tablet 7.5 mg 2 (two) times a day    levalbuterol (XOPENEX HFA) 45 mcg/act inhaler Inhale 2 puffs every 4 (four) hours as needed (when unable to nebulize)    levalbuterol (XOPENEX) 1.25 mg/3 mL nebulizer solution Take 1.25 mg by nebulization every 6 (six) hours as needed     Magnesium 400 MG CAPS Take 1 capsule by mouth 2 (two) times a day     MULTIPLE VITAMINS-CALCIUM PO Take 1 capsule by mouth every morning     naloxone (NARCAN) 4 mg/0.1 mL nasal spray Administer 1 spray into a nostril. If no response after 2-3 minutes, give another dose in the other nostril using a new spray.    NON FORMULARY Take 1 mg by mouth 3 (three)  times a day Ketotifen 1 mg compounded capsule    NON FORMULARY LACTOBACILLUS COMBINATION NO.4 (PROBIOTIC) 3 BILLION CELL CAPSULE    NON FORMULARY 0.25 % X-DEMVY eye drop    omega-3-acid ethyl esters (LOVAZA) 1 g capsule Take 2 g by mouth 2 (two) times a day 1600mg daily    polyethylene glycol (MIRALAX) 17 g packet Take 17 g by mouth daily as needed     Probiotic Product (PROBIOTIC DAILY PO) Take 1 tablet by mouth daily At lunch    Qvar RediHaler 40 MCG/ACT inhaler     sodium chloride Inject 1,500 mL into a catheter in a vein    sucralfate (CARAFATE) 1 g/10 mL suspension Take 1 g by mouth 2 (two) times a day    Thyroid, Porcine, POWD Use 32 mg daily    Ubrogepant (UBRELVY) 100 MG tablet Take 100 mg by mouth Take 1 tablet (100 mg) one time as needed for migraine. May repeat one additional tablet (100 mg) at least two hours after the first dose. Do not use more than two doses per day, or for more than eight days per month.    verapamil (CALAN) 40 mg tablet     [DISCONTINUED] HYDROcodone-acetaminophen (NORCO)  mg per tablet Take 0.5 tablets by mouth 2 (two) times a day as needed for moderate pain Max Daily Amount: 1 tablet    aluminum hydroxide-magnesium carbonate (Gaviscon Extra Strength) 160-105 mg per chewable tablet  (Patient taking differently: Chew 1 tablet 3 (three) times a day as needed)    doxycycline (ADOXA) 100 MG tablet Take 100 mg by mouth 2 (two) times a day    erythromycin (ILOTYCIN) ophthalmic ointment Apply 1 application to eye daily at bedtime    NON FORMULARY Take 32 mg by mouth NATURE-THROID ORAL (Patient not taking: Reported on 12/5/2024)    Xdemvy 0.25 % SOLN Apply 1 drop to eye (Patient not taking: Reported on 12/5/2024)    [DISCONTINUED] HYDROcodone-acetaminophen (NORCO)  mg per tablet Take 0.5 tablets by mouth 2 (two) times a day as needed for moderate pain Max Daily Amount: 1 tablet Do not start before December 18, 2024. (Patient not taking: Reported on 12/5/2024)     No current  facility-administered medications on file prior to visit.       Allergies   Allergen Reactions    Imipramine Confusion, Fatigue, Irritability, Palpitations, Shortness Of Breath, Tachycardia and Visual Disturbance    Lactose - Food Allergy Shortness Of Breath    Melatonin Shortness Of Breath    Mirtazapine Anxiety, Dizziness, GI Intolerance, Nausea Only, Palpitations and Shortness Of Breath    Nexium [Esomeprazole] Shortness Of Breath    Nsaids Shortness Of Breath, Cough, Other (See Comments), Nausea Only, GI Intolerance, Tachycardia and Wheezing    Propofol Cough, Other (See Comments), Shortness Of Breath and Nasal Congestion    Salmeterol Dizziness, Other (See Comments), Shortness Of Breath and Tachycardia    Singulair [Montelukast] Shortness Of Breath and Cough    Zomig [Zolmitriptan] Shortness Of Breath    Dexilant [Dexlansoprazole] Nausea Only and Vomiting    Ambien [Zolpidem]     Amitriptyline Drowsiness    Ascorbate - Food Allergy GI Intolerance    Ascorbic Acid GI Intolerance    Aspirin     Azithromycin Hives and Itching     In December 2018 or 2019, she was put on a course and developed a fully body rash on the 2nd day within a couple of hours of taking the medication that day. The rash was raised, itchy, and red. Rash lasted for a week after medication was stopped. Denies fever, joint swelling, or mucosal peeling. Prior to this, she had tolerated doses of azithromycin. CBC/d and CMP (1/2/20) were normal    Bactrim [Sulfamethoxazole-Trimethoprim] Hives    Banana - Food Allergy Dermatitis    Banana Extract Allergy Skin Test - Food Allergy Dermatitis    Bisoprolol Other (See Comments)    Ceftin [Cefuroxime]     Celebrex [Celecoxib]     Chocolate - Food Allergy Headache    Ciprofloxacin Dizziness, Hives, Other (See Comments), Itching and Nausea Only    Demerol [Meperidine]     Duloxetine Other (See Comments)     tachycardia    Epinephrine Dizziness    Ergotamine Nausea Only and Headache    Fludrocortisone Other  "(See Comments)    Keflex [Cephalexin]     Klonopin [Clonazepam] Nausea Only and Dizziness    Latex Hives    Levofloxacin Other (See Comments)    Lexapro [Escitalopram]     Lyrica [Pregabalin] Fatigue    Macrodantin [Nitrofurantoin]     Metoprolol Other (See Comments)    Metronidazole Other (See Comments)    Molds & Smuts GI Intolerance and Other (See Comments)    Morphine Nausea Only    Movantik [Naloxegol] Nausea Only    Mushroom Extract Complex - Food Allergy      Eye itchy, asthma  attack    Naprosyn [Naproxen]     Neurontin [Gabapentin] Dizziness    Pineapple - Food Allergy GI Intolerance    Plecanatide Abdominal Pain, Diarrhea and GI Intolerance    Prolia [Denosumab]     Prozac [Fluoxetine]     Soy Allergy - Food Allergy Vomiting    Sudafed [Pseudoephedrine] Hives    Sulfa Antibiotics     Tomato - Food Allergy GI Intolerance    Trazodone     Ultram [Tramadol] Nausea Only, Dizziness and Headache    Vilazodone Dizziness, GI Intolerance, Headache, Abdominal Pain and Other (See Comments)    Vilazodone Hcl Abdominal Pain, Dizziness, GI Intolerance, Headache, Other (See Comments), Fatigue and Nausea Only    Vioxx [Rofecoxib]     Vortioxetine Drowsiness, Fatigue, GI Intolerance and Irritability    Wheat Bran - Food Allergy Other (See Comments)     Stomach pain    Zinc Other (See Comments), GI Intolerance and Nausea Only    Zoloft [Sertraline]     Albuterol Palpitations, Dizziness, Other (See Comments) and Tachycardia     Dizziness, allergy note 12/11/19 for more specific comments    Cyclobenzaprine Hcl Er Rash    Terfenadine Dizziness and Palpitations    Zantac [Ranitidine] Rash and Dizziness       Physical Exam:    Ht 5' 1\" (1.549 m)   Wt 62.6 kg (138 lb)   BMI 26.07 kg/m²     Constitutional:normal, well developed, well nourished, alert, in no distress and non-toxic and no overt pain behavior.  Eyes:anicteric  HEENT:grossly intact  Neck:supple, symmetric, trachea midline and no masses   Pulmonary:even and " unlabored  Cardiovascular:No edema or pitting edema present  Skin:Normal without rashes or lesions and well hydrated  Psychiatric:Mood and affect appropriate  Neurologic:Cranial Nerves II-XII grossly intact  Musculoskeletal: Range of motion of the lumbar spine is limited in all planes.  There is tenderness bilaterally L4-S1.  Gait is slow and guarded.    Imaging

## 2025-01-10 ENCOUNTER — HOSPITAL ENCOUNTER (OUTPATIENT)
Dept: INFUSION CENTER | Facility: HOSPITAL | Age: 70
End: 2025-01-10
Payer: MEDICARE

## 2025-01-10 VITALS
SYSTOLIC BLOOD PRESSURE: 166 MMHG | RESPIRATION RATE: 16 BRPM | HEART RATE: 84 BPM | DIASTOLIC BLOOD PRESSURE: 83 MMHG | TEMPERATURE: 97.1 F

## 2025-01-10 DIAGNOSIS — G90.A POSTURAL ORTHOSTATIC TACHYCARDIA SYNDROME: Primary | ICD-10-CM

## 2025-01-10 PROCEDURE — 96360 HYDRATION IV INFUSION INIT: CPT

## 2025-01-10 PROCEDURE — 96361 HYDRATE IV INFUSION ADD-ON: CPT

## 2025-01-10 RX ADMIN — SODIUM CHLORIDE 1000 ML: 0.9 INJECTION, SOLUTION INTRAVENOUS at 09:26

## 2025-01-10 NOTE — PROGRESS NOTES
Patient tolerated NSS IV hydration without issues.      Teresa Mao is aware of future appt on 1/13/25 at 0900.     AVS -  No (Declined by Teresa Mao) Patient has Mychart.    Patient ambulated off unit without incident.  All personal belongings taken with patient.

## 2025-01-13 ENCOUNTER — HOSPITAL ENCOUNTER (OUTPATIENT)
Dept: INFUSION CENTER | Facility: HOSPITAL | Age: 70
Discharge: HOME | End: 2025-01-13
Payer: MEDICARE

## 2025-01-13 VITALS
HEART RATE: 91 BPM | SYSTOLIC BLOOD PRESSURE: 135 MMHG | OXYGEN SATURATION: 98 % | RESPIRATION RATE: 18 BRPM | TEMPERATURE: 98.4 F | DIASTOLIC BLOOD PRESSURE: 78 MMHG

## 2025-01-13 DIAGNOSIS — G90.A POSTURAL ORTHOSTATIC TACHYCARDIA SYNDROME: Primary | ICD-10-CM

## 2025-01-13 PROCEDURE — 96361 HYDRATE IV INFUSION ADD-ON: CPT

## 2025-01-13 PROCEDURE — 96360 HYDRATION IV INFUSION INIT: CPT

## 2025-01-13 RX ADMIN — SODIUM CHLORIDE 1000 ML: 0.9 INJECTION, SOLUTION INTRAVENOUS at 09:05

## 2025-01-14 ENCOUNTER — HOSPITAL ENCOUNTER (OUTPATIENT)
Dept: INFUSION CENTER | Facility: HOSPITAL | Age: 70
Discharge: HOME | End: 2025-01-14
Payer: MEDICARE

## 2025-01-14 VITALS
RESPIRATION RATE: 18 BRPM | HEART RATE: 89 BPM | SYSTOLIC BLOOD PRESSURE: 142 MMHG | DIASTOLIC BLOOD PRESSURE: 66 MMHG | TEMPERATURE: 98.3 F | OXYGEN SATURATION: 98 %

## 2025-01-14 DIAGNOSIS — G90.A POSTURAL ORTHOSTATIC TACHYCARDIA SYNDROME: Primary | ICD-10-CM

## 2025-01-14 LAB
6MAM UR QL CFM: NEGATIVE NG/ML
7AMINOCLONAZEPAM UR QL CFM: NEGATIVE NG/ML
A-OH ALPRAZ UR QL CFM: NEGATIVE NG/ML
AMPHET UR QL CFM: NEGATIVE NG/ML
AMPHET UR QL CFM: NEGATIVE NG/ML
BUPRENORPHINE UR QL CFM: NEGATIVE NG/ML
BUTALBITAL UR QL CFM: NEGATIVE NG/ML
BZE UR QL CFM: NEGATIVE NG/ML
CODEINE UR QL CFM: NEGATIVE NG/ML
DESIPRAMINE UR QL CFM: NEGATIVE NG/ML
DESIPRAMINE UR QL CFM: NEGATIVE NG/ML
EDDP UR QL CFM: NEGATIVE NG/ML
ETHYL GLUCURONIDE UR QL CFM: NEGATIVE NG/ML
ETHYL SULFATE UR QL SCN: NEGATIVE NG/ML
EUTYLONE UR QL: NEGATIVE NG/ML
FENTANYL UR QL CFM: NEGATIVE NG/ML
GLIADIN IGG SER IA-ACNC: NEGATIVE NG/ML
GLUCOSE 30M P 50 G LAC PO SERPL-MCNC: NEGATIVE NG/ML
HYDROCODONE UR QL CFM: NORMAL NG/ML
HYDROCODONE UR QL CFM: NORMAL NG/ML
HYDROMORPHONE UR QL CFM: NORMAL NG/ML
IMIPRAMINE UR QL CFM: NEGATIVE NG/ML
LORAZEPAM UR QL CFM: NEGATIVE NG/ML
MDMA UR QL CFM: NEGATIVE NG/ML
ME-PHENIDATE UR QL CFM: NEGATIVE NG/ML
MEPERIDINE UR QL CFM: NEGATIVE NG/ML
METHADONE UR QL CFM: NEGATIVE NG/ML
METHAMPHET UR QL CFM: NEGATIVE NG/ML
MORPHINE UR QL CFM: NEGATIVE NG/ML
MORPHINE UR QL CFM: NEGATIVE NG/ML
NORBUPRENORPHINE UR QL CFM: NEGATIVE NG/ML
NORDIAZEPAM UR QL CFM: NEGATIVE NG/ML
NORFENTANYL UR QL CFM: NEGATIVE NG/ML
NORHYDROCODONE UR QL CFM: NORMAL NG/ML
NORHYDROCODONE UR QL CFM: NORMAL NG/ML
NORMEPERIDINE UR QL CFM: NEGATIVE NG/ML
NOROXYCODONE UR QL CFM: NEGATIVE NG/ML
OLANZAPINE QUANTIFICATION: NEGATIVE NG/ML
OPC-3373 QUANTIFICATION: NEGATIVE NG/ML
OXAZEPAM UR QL CFM: NEGATIVE NG/ML
OXYCODONE UR QL CFM: NEGATIVE NG/ML
OXYMORPHONE UR QL CFM: NEGATIVE NG/ML
OXYMORPHONE UR QL CFM: NEGATIVE NG/ML
PARA-FLUOROFENTANYL QUANTIFICATION: NORMAL NG/ML
PCP UR QL CFM: NEGATIVE NG/ML
PHENOBARB UR QL CFM: NEGATIVE NG/ML
RESULT ALL_PRESCRIBED MEDS AND SPECIAL INSTRUCTIONS: NORMAL
SECOBARBITAL UR QL CFM: NEGATIVE NG/ML
SL AMB 5F-ADB-M7 METABOLITE QUANTIFICATION: NEGATIVE NG/ML
SL AMB 7-OH-MITRAGYNINE (KRATOM ALKALOID) QUANTIFICATION: NEGATIVE NG/ML
SL AMB AB-FUBINACA-M3 METABOLITE QUANTIFICATION: NEGATIVE NG/ML
SL AMB ACETYL FENTANYL QUANTIFICATION: NORMAL NG/ML
SL AMB ACETYL NORFENTANYL QUANTIFICATION: NORMAL NG/ML
SL AMB ACRYL FENTANYL QUANTIFICATION: NORMAL NG/ML
SL AMB CARFENTANIL QUANTIFICATION: NORMAL NG/ML
SL AMB CLOZAPINE QUANTIFICATION: NEGATIVE NG/ML
SL AMB CTHC (MARIJUANA METABOLITE) QUANTIFICATION: NEGATIVE NG/ML
SL AMB DEXTROMETHORPHAN QUANTIFICATION: ABNORMAL NG/ML
SL AMB DEXTRORPHAN (DEXTROMETHORPHAN METABOLITE) QUANT: ABNORMAL NG/ML
SL AMB DEXTRORPHAN (DEXTROMETHORPHAN METABOLITE) QUANT: ABNORMAL NG/ML
SL AMB HALOPERIDOL  QUANTIFICATION: NEGATIVE NG/ML
SL AMB HALOPERIDOL METABOLITE QUANTIFICATION: NEGATIVE NG/ML
SL AMB HYDROXYRISPERIDONE QUANTIFICATION: NEGATIVE NG/ML
SL AMB JWH018 METABOLITE QUANTIFICATION: NEGATIVE NG/ML
SL AMB JWH073 METABOLITE QUANTIFICATION: NEGATIVE NG/ML
SL AMB MDMB-FUBINACA-M1 METABOLITE QUANTIFICATION: NEGATIVE NG/ML
SL AMB METHYLONE QUANTIFICATION: NEGATIVE NG/ML
SL AMB N-DESMETHYL-TRAMADOL QUANTIFICATION: NEGATIVE NG/ML
SL AMB N-DESMETHYLCLOZAPINE QUANTIFICATION: NEGATIVE NG/ML
SL AMB NORQUETIAPINE QUANTIFICATION: NEGATIVE NG/ML
SL AMB PHENTERMINE QUANTIFICATION: NEGATIVE NG/ML
SL AMB QUETIAPINE QUANTIFICATION: NEGATIVE NG/ML
SL AMB RCS4 METABOLITE QUANTIFICATION: NEGATIVE NG/ML
SL AMB RISPERIDONE QUANTIFICATION: NEGATIVE NG/ML
SL AMB RITALINIC ACID QUANTIFICATION: NEGATIVE NG/ML
SPECIMEN DRAWN SERPL: NEGATIVE NG/ML
TAPENTADOL UR QL CFM: NEGATIVE NG/ML
TEMAZEPAM UR QL CFM: NEGATIVE NG/ML
TEMAZEPAM UR QL CFM: NEGATIVE NG/ML
TRAMADOL UR QL CFM: NEGATIVE NG/ML
URATE/CREAT 24H UR: NEGATIVE NG/ML

## 2025-01-14 PROCEDURE — 96360 HYDRATION IV INFUSION INIT: CPT

## 2025-01-14 PROCEDURE — 96361 HYDRATE IV INFUSION ADD-ON: CPT

## 2025-01-14 RX ADMIN — SODIUM CHLORIDE 1000 ML: 0.9 INJECTION, SOLUTION INTRAVENOUS at 09:35

## 2025-01-14 NOTE — PROGRESS NOTES
Teresa Mao  tolerated treatment well with no complications.      Teresa Mao is aware of future appt on 1/16 at 9 am.     AVS declined by Teresa Mao.

## 2025-01-16 ENCOUNTER — HOSPITAL ENCOUNTER (OUTPATIENT)
Dept: INFUSION CENTER | Facility: HOSPITAL | Age: 70
End: 2025-01-16
Payer: MEDICARE

## 2025-01-16 VITALS
TEMPERATURE: 97.9 F | HEART RATE: 79 BPM | OXYGEN SATURATION: 100 % | DIASTOLIC BLOOD PRESSURE: 75 MMHG | RESPIRATION RATE: 16 BRPM | SYSTOLIC BLOOD PRESSURE: 117 MMHG

## 2025-01-16 DIAGNOSIS — G90.A POSTURAL ORTHOSTATIC TACHYCARDIA SYNDROME: Primary | ICD-10-CM

## 2025-01-16 PROCEDURE — 96360 HYDRATION IV INFUSION INIT: CPT

## 2025-01-16 PROCEDURE — 96361 HYDRATE IV INFUSION ADD-ON: CPT

## 2025-01-16 RX ADMIN — SODIUM CHLORIDE 1000 ML: 0.9 INJECTION, SOLUTION INTRAVENOUS at 08:51

## 2025-01-16 NOTE — PROGRESS NOTES
Teresa Mao  tolerated hydration treatment well with no complications.      Teresa Mao is aware of future appt on 1/17 at 9AM.     AVS printed and given to Teresa Mao: No (Declined by Teresa Mao).

## 2025-02-06 ENCOUNTER — HOSPITAL ENCOUNTER (OUTPATIENT)
Dept: INFUSION CENTER | Facility: HOSPITAL | Age: 70
Discharge: HOME | End: 2025-02-06

## 2025-02-11 ENCOUNTER — HOSPITAL ENCOUNTER (OUTPATIENT)
Dept: INFUSION CENTER | Facility: HOSPITAL | Age: 70
Discharge: HOME | End: 2025-02-11
Payer: MEDICARE

## 2025-02-11 VITALS
SYSTOLIC BLOOD PRESSURE: 142 MMHG | TEMPERATURE: 98.9 F | OXYGEN SATURATION: 97 % | HEART RATE: 68 BPM | DIASTOLIC BLOOD PRESSURE: 72 MMHG | RESPIRATION RATE: 18 BRPM

## 2025-02-11 DIAGNOSIS — G90.A POSTURAL ORTHOSTATIC TACHYCARDIA SYNDROME: Primary | ICD-10-CM

## 2025-02-11 PROCEDURE — 96361 HYDRATE IV INFUSION ADD-ON: CPT

## 2025-02-11 PROCEDURE — 96360 HYDRATION IV INFUSION INIT: CPT

## 2025-02-11 RX ADMIN — SODIUM CHLORIDE 1000 ML: 0.9 INJECTION, SOLUTION INTRAVENOUS at 08:18

## 2025-02-11 NOTE — PROGRESS NOTES
Teresa Mao  tolerated iv hydration treatment well with no complications.  Line dressing change done today and pt did not have any complaints on dressing change.     Teresa Mao is aware of future appt on 02/13/25 at 0900.     AVS p No (Declined by Teresa Mao) pt has my chart.

## 2025-02-14 ENCOUNTER — HOSPITAL ENCOUNTER (OUTPATIENT)
Dept: INFUSION CENTER | Facility: HOSPITAL | Age: 70
End: 2025-02-14
Payer: MEDICARE

## 2025-02-14 DIAGNOSIS — G90.A POSTURAL ORTHOSTATIC TACHYCARDIA SYNDROME: Primary | ICD-10-CM

## 2025-02-14 PROCEDURE — 96360 HYDRATION IV INFUSION INIT: CPT

## 2025-02-14 PROCEDURE — 96361 HYDRATE IV INFUSION ADD-ON: CPT

## 2025-02-14 RX ADMIN — SODIUM CHLORIDE 1000 ML: 0.9 INJECTION, SOLUTION INTRAVENOUS at 08:39

## 2025-02-14 NOTE — PROGRESS NOTES
Teresa Mao  tolerated hydration treatment well with no complications.      Teresa Mao is aware of future appt on 2/17 at 9AM.     AVS printed and given to Teresa Mao: No (Declined by Teresa Mao).

## 2025-02-26 NOTE — ANESTHESIA POSTPROCEDURE EVALUATION
Post-Op Assessment Note    CV Status:  Stable  Pain Score: 0    Pain management: adequate       Mental Status:  Alert and awake   Hydration Status:  Euvolemic   PONV Controlled:  Controlled   Airway Patency:  Patent     Post Op Vitals Reviewed: Yes    No anethesia notable event occurred.    Staff: CRNA   Comments: patient coughing alot following procedure; patient did state she has cough variant related asthma and albuterol treatment ordered in PACU            BP   165/80   Temp      Pulse  82   Resp   18   SpO2   98%   
Vaccine status unknown

## 2025-03-01 ENCOUNTER — OFFICE VISIT (OUTPATIENT)
Dept: URGENT CARE | Facility: CLINIC | Age: 70
End: 2025-03-01
Payer: MEDICARE

## 2025-03-01 VITALS
RESPIRATION RATE: 18 BRPM | HEART RATE: 89 BPM | SYSTOLIC BLOOD PRESSURE: 130 MMHG | DIASTOLIC BLOOD PRESSURE: 84 MMHG | TEMPERATURE: 98.5 F | OXYGEN SATURATION: 97 %

## 2025-03-01 DIAGNOSIS — J01.90 ACUTE SINUSITIS, RECURRENCE NOT SPECIFIED, UNSPECIFIED LOCATION: Primary | ICD-10-CM

## 2025-03-01 PROCEDURE — 99213 OFFICE O/P EST LOW 20 MIN: CPT | Performed by: PHYSICIAN ASSISTANT

## 2025-03-01 PROCEDURE — G0463 HOSPITAL OUTPT CLINIC VISIT: HCPCS | Performed by: PHYSICIAN ASSISTANT

## 2025-03-01 RX ORDER — METHYLPREDNISOLONE 4 MG/1
TABLET ORAL
Qty: 21 TABLET | Refills: 0 | Status: SHIPPED | OUTPATIENT
Start: 2025-03-01

## 2025-03-01 NOTE — PROGRESS NOTES
[facility]    NAME: Teresa Mao is a 69 y.o. female  : 1955    MRN: [id number]  DATE: 2025  TIME: 11:36 AM    Assessment and Plan   Acute sinusitis, recurrence not specified, unspecified location [J01.90]  1. Acute sinusitis, recurrence not specified, unspecified location  amoxicillin-clavulanate (AUGMENTIN) 875-125 mg per tablet    methylprednisolone (MEDROL) 4 mg tablet          Patient Instructions     Patient Instructions   I have prescribed an antibiotic for the infection.  Please take the antibiotic as prescribed and finish the entire prescription.  Take an over the counter probiotic or eat yogurt with live cultures in it (activia) to keep good bacteria in the gut and help prevent diarrhea.  Wash hands frequently to prevent the spread of infection.  Can use over the counter cough and cold medications to help with symptoms.  Ibuprofen and/or tylenol as needed for pain or fever.  If not improving over the next 7-10 days, follow up with PCP.          Chief Complaint     Chief Complaint   Patient presents with    Cold Like Symptoms     Sinus pressure congestion sore throat fatigue over 10 days        History of Present Illness   69-year-old female here with complaint of ongoing congestion, cough for the last 3 weeks.  Not improving with over-the-counter medications.  Feels that she is using her nebulizers more frequently.  Has been wheezing.  Low-grade fevers at night.        Review of Systems   Review of Systems   Constitutional:  Positive for fever. Negative for appetite change and chills.   HENT:  Positive for congestion, postnasal drip, sinus pressure and sore throat. Negative for ear discharge, ear pain, facial swelling and sneezing.    Respiratory:  Positive for cough and wheezing. Negative for shortness of breath.    Neurological:  Positive for headaches.       Current Medications     Current Outpatient Medications:     aluminum hydroxide-magnesium carbonate (Gaviscon Extra  Strength) 160-105 mg per chewable tablet, , Disp: , Rfl:     amoxicillin-clavulanate (AUGMENTIN) 875-125 mg per tablet, Take 1 tablet by mouth every 12 (twelve) hours for 10 days, Disp: 20 tablet, Rfl: 0    azelastine (ASTELIN) 0.1 % nasal spray, 2 sprays into each nostril 2 (two) times a day Use in each nostril as directed, Disp: , Rfl:     beclomethasone (QVAR) 40 MCG/ACT inhaler, Inhale 1 puff daily as needed (only when unable to nebulize pulmicort) Rinse mouth after use., Disp: , Rfl:     budesonide (PULMICORT) 0.5 mg/2 mL nebulizer solution, Take 0.5 mg by nebulization 2 (two) times a day Rinse mouth after use., Disp: , Rfl:     cromolyn (GASTROCROM) 100 MG/5ML solution, Take 100 mg by mouth 4 (four) times a day (before meals and at bedtime), Disp: , Rfl:     cyclobenzaprine (FLEXERIL) 5 mg tablet, TAKE 1 TABLET BY MOUTH THREE TIMES A DAY AS NEEDED FOR MUSCLE SPASMS, Disp: 90 tablet, Rfl: 1    cycloSPORINE (RESTASIS) 0.05 % ophthalmic emulsion, Administer 1 drop to both eyes 2 (two) times a day, Disp: , Rfl:     docusate sodium (COLACE) 100 mg capsule, Take 100 mg by mouth daily as needed for constipation, Disp: , Rfl:     Dupilumab (DUPIXENT SC), Inject under the skin Pt unsure of dose, Disp: , Rfl:     erythromycin (ILOTYCIN) ophthalmic ointment, Apply 1 application to eye daily at bedtime, Disp: , Rfl:     famotidine (PEPCID) 40 MG tablet, Twice a day, Disp: , Rfl:     fexofenadine (ALLEGRA) 180 MG tablet, Take 180 mg by mouth 2 (two) times a day , Disp: , Rfl:     fluticasone (FLONASE) 50 mcg/act nasal spray, 2 sprays into each nostril daily , Disp: , Rfl:     Galcanezumab-gnlm (Emgality) 120 MG/ML SOAJ, Inject under the skin every 30 (thirty) days , Disp: , Rfl:     guaiFENesin (Mucinex) 600 mg 12 hr tablet, Take 600 mg by mouth every 12 (twelve) hours, Disp: , Rfl:     Heating Pad PADS, Use if needed (pain), Disp: , Rfl:     HYDROcodone-acetaminophen (NORCO)  mg per tablet, Take 0.5 tablets by mouth  2 (two) times a day as needed for moderate pain Max Daily Amount: 1 tablet Do not start before January 17, 2025., Disp: 30 tablet, Rfl: 0    HYDROcodone-acetaminophen (NORCO)  mg per tablet, Take 0.5 tablets by mouth 2 (two) times a day as needed for moderate pain Max Daily Amount: 1 tablet Do not start before February 14, 2025., Disp: 30 tablet, Rfl: 0    hydrOXYzine (ATARAX) 10 mg/5 mL syrup, , Disp: , Rfl:     ipratropium (ATROVENT) 0.02 % nebulizer solution, Take 0.5 mg by nebulization every 6 (six) hours as needed for wheezing or shortness of breath, Disp: , Rfl:     ipratropium (ATROVENT) 0.06 % nasal spray, PLEASE SEE ATTACHED FOR DETAILED DIRECTIONS, Disp: , Rfl:     ivabradine HCl (Corlanor) 5 MG tablet, 7.5 mg 2 (two) times a day, Disp: , Rfl:     KETOTIFEN FUMARATE OP, Take 1mg compounded capsule three times a day, Disp: , Rfl:     levalbuterol (XOPENEX HFA) 45 mcg/act inhaler, Inhale 2 puffs every 4 (four) hours as needed (when unable to nebulize), Disp: , Rfl:     levalbuterol (XOPENEX) 1.25 mg/3 mL nebulizer solution, Take 1.25 mg by nebulization every 6 (six) hours as needed , Disp: , Rfl: 2    Magnesium 400 MG CAPS, Take 1 capsule by mouth 2 (two) times a day , Disp: , Rfl:     methylprednisolone (MEDROL) 4 mg tablet, Medrol dosepak, take as directed, Disp: 21 tablet, Rfl: 0    MULTIPLE VITAMINS-CALCIUM PO, Take 1 capsule by mouth every morning , Disp: , Rfl:     naloxone (NARCAN) 4 mg/0.1 mL nasal spray, Administer 1 spray into a nostril. If no response after 2-3 minutes, give another dose in the other nostril using a new spray., Disp: 1 each, Rfl: 0    NON FORMULARY, Take 1 mg by mouth 3 (three) times a day Ketotifen 1 mg compounded capsule, Disp: , Rfl:     NON FORMULARY, LACTOBACILLUS COMBINATION NO.4 (PROBIOTIC) 3 BILLION CELL CAPSULE, Disp: , Rfl:     NON FORMULARY, 0.25 % X-DEMVY eye drop, Disp: , Rfl:     omega-3-acid ethyl esters (LOVAZA) 1 g capsule, Take 2 g by mouth 2 (two) times a  day 1600mg daily, Disp: , Rfl:     polyethylene glycol (MIRALAX) 17 g packet, Take 17 g by mouth daily as needed , Disp: , Rfl:     Probiotic Product (PROBIOTIC DAILY PO), Take 1 tablet by mouth daily At lunch, Disp: , Rfl:     Qvar RediHaler 40 MCG/ACT inhaler, , Disp: , Rfl:     sodium chloride, Inject 1,500 mL into a catheter in a vein, Disp: , Rfl:     sucralfate (CARAFATE) 1 g/10 mL suspension, Take 1 g by mouth 2 (two) times a day, Disp: , Rfl:     Thyroid, Porcine, POWD, Use 32 mg daily, Disp: 21393 g, Rfl: 0    Ubrogepant (UBRELVY) 100 MG tablet, Take 100 mg by mouth Take 1 tablet (100 mg) one time as needed for migraine. May repeat one additional tablet (100 mg) at least two hours after the first dose. Do not use more than two doses per day, or for more than eight days per month., Disp: , Rfl:     verapamil (CALAN) 40 mg tablet, , Disp: , Rfl:     Xdemvy 0.25 % SOLN, Apply 1 drop to eye, Disp: , Rfl:     doxycycline (ADOXA) 100 MG tablet, Take 100 mg by mouth 2 (two) times a day, Disp: , Rfl:     NON FORMULARY, Take 32 mg by mouth NATURE-THROID ORAL (Patient not taking: Reported on 12/5/2024), Disp: , Rfl:     Current Allergies     Allergies as of 03/01/2025 - Reviewed 03/01/2025   Allergen Reaction Noted    Imipramine Confusion, Fatigue, Irritability, Palpitations, Shortness Of Breath, Tachycardia, and Visual Disturbance 09/21/2021    Lactose - food allergy Shortness Of Breath 03/16/2016    Melatonin Shortness Of Breath 03/25/2019    Mirtazapine Anxiety, Dizziness, GI Intolerance, Nausea Only, Palpitations, and Shortness Of Breath 12/14/2020    Nexium [esomeprazole] Shortness Of Breath 03/25/2019    Nsaids Shortness Of Breath, Cough, Other (See Comments), Nausea Only, GI Intolerance, Tachycardia, and Wheezing 01/01/1977    Propofol Cough, Other (See Comments), Shortness Of Breath, and Nasal Congestion 05/25/2022    Salmeterol Dizziness, Other (See Comments), Shortness Of Breath, and Tachycardia 01/01/1999     Singulair [montelukast] Shortness Of Breath and Cough 03/25/2019    Zomig [zolmitriptan] Shortness Of Breath 03/25/2019    Dexilant [dexlansoprazole] Nausea Only and Vomiting 03/25/2019    Ambien [zolpidem]  01/24/2019    Amitriptyline Drowsiness 03/25/2019    Ascorbate - food allergy GI Intolerance 08/11/2021    Ascorbic acid GI Intolerance 08/11/2021    Aspirin  01/19/2016    Azithromycin Hives and Itching 04/01/2018    Bactrim [sulfamethoxazole-trimethoprim] Hives 03/25/2019    Banana - food allergy Dermatitis 08/11/2021    Banana extract allergy skin test - food allergy Dermatitis 08/11/2021    Bisoprolol Other (See Comments) 01/14/2020    Ceftin [cefuroxime]  01/19/2016    Celebrex [celecoxib]  03/25/2019    Chocolate - food allergy Headache 10/15/2024    Ciprofloxacin Dizziness, Hives, Other (See Comments), Itching, and Nausea Only 01/19/2016    Demerol [meperidine]  01/19/2016    Duloxetine Other (See Comments) 03/16/2016    Epinephrine Dizziness 03/25/2019    Ergotamine Nausea Only and Headache 03/25/2019    Fludrocortisone Other (See Comments) 09/09/2019    Keflex [cephalexin]  01/19/2016    Klonopin [clonazepam] Nausea Only and Dizziness 03/25/2019    Latex Hives 05/25/2021    Levofloxacin Other (See Comments) 01/19/2016    Lexapro [escitalopram]  03/25/2019    Lyrica [pregabalin] Fatigue 03/25/2019    Macrodantin [nitrofurantoin]  01/19/2016    Metoprolol Other (See Comments) 01/14/2020    Metronidazole Other (See Comments) 07/08/2020    Molds & smuts GI Intolerance and Other (See Comments) 03/29/2016    Morphine Nausea Only 03/25/2019    Movantik [naloxegol] Nausea Only 03/25/2019    Mushroom extract complex - food allergy  03/16/2016    Naprosyn [naproxen]  01/19/2016    Neurontin [gabapentin] Dizziness 03/25/2019    Pineapple - food allergy GI Intolerance 08/11/2021    Plecanatide Abdominal Pain, Diarrhea, and GI Intolerance 01/14/2021    Prolia [denosumab]  01/24/2019    Prozac [fluoxetine]   "03/25/2019    Soy allergy - food allergy Vomiting 03/16/2016    Sudafed [pseudoephedrine] Hives 03/25/2019    Sulfa antibiotics  01/19/2016    Tomato - food allergy GI Intolerance 08/11/2021    Trazodone  03/25/2019    Ultram [tramadol] Nausea Only, Dizziness, and Headache 03/25/2019    Vilazodone Dizziness, GI Intolerance, Headache, Abdominal Pain, and Other (See Comments) 12/14/2020    Vilazodone hcl Abdominal Pain, Dizziness, GI Intolerance, Headache, Other (See Comments), Fatigue, and Nausea Only 11/25/2020    Vioxx [rofecoxib]  03/25/2019    Vortioxetine Drowsiness, Fatigue, GI Intolerance, and Irritability 10/19/2020    Wheat bran - food allergy Other (See Comments) 03/29/2016    Zinc Other (See Comments), GI Intolerance, and Nausea Only 08/11/2021    Zoloft [sertraline]  01/24/2019    Albuterol Palpitations, Dizziness, Other (See Comments), and Tachycardia 01/01/1975    Cyclobenzaprine hcl er Rash 03/20/2019    Terfenadine Dizziness and Palpitations 01/01/2000    Zantac [ranitidine] Rash and Dizziness 03/25/2019          The following portions of the patient's history were reviewed and updated as appropriate: allergies, current medications, past family history, past medical history, past social history, past surgical history and problem list.   Past Medical History:   Diagnosis Date    Allergic rhinitis     Anxiety     Asthma     Back pain     Breathing difficulty     Cardiac disease     Cardiopathy     EF 45%    Cough     Diverticulitis     Factor V Leiden (HCC)     Fibromyalgia     GERD (gastroesophageal reflux disease)     Hashimoto's thyroiditis     Hx of degenerative disc disease     Hypertension     Hypotension     pots - postural orthostatic hypotension    Interstitial cystitis     Irregular heart beat     LBBB    Irritable bowel syndrome     Migraines     Mitral valve disease     \"thickening\"    Myocardial infarction (HCC)     possible but not sure when    Neuropathy     bilateral legs    Osteoporosis "     PONV (postoperative nausea and vomiting)     Postural orthostatic tachycardia syndrome     must drink a lot of water and salt    Pott's disease     Rheumatic fever 1967    Scoliosis     Sepsis (HCC)     associated with PICC line    Sjogren's syndrome (HCC)     TMJ (dislocation of temporomandibular joint)      Past Surgical History:   Procedure Laterality Date    ABLATION MICROWAVE Left     lumbar area    BACK SURGERY  10/1998     SECTION  1992    CHOLECYSTECTOMY  2016    COLONOSCOPY      DILATION AND CURETTAGE OF UTERUS      EGD  2016    FOOT SURGERY  1968    removal of bone and neuroma    HYSTERECTOMY N/A     age 39    IR OTHER  2022    IR OTHER  2022    IR OTHER  2023    IR OTHER  2024    IR OTHER  2024    IR PICC PLACEMENT SINGLE LUMEN  10/02/2020    IR PICC PLACEMENT SINGLE LUMEN  2021    IR PICC REPOSITION  2021    IR TUNNELED CENTRAL LINE PLACEMENT  2022    LAPAROSCOPY      endometriosis    MOUTH BIOPSY      lip    OOPHORECTOMY Bilateral     age 39    HI EGD TRANSORAL BIOPSY SINGLE/MULTIPLE N/A 2019    Procedure: ESOPHAGOGASTRODUODENOSCOPY (EGD) with multiple bx and dilation;  Surgeon: Peter Baldwin MD;  Location:  GI LAB;  Service: Gastroenterology    HI SURGICAL ARTHROSCOPY SHOULDER W/ROTATOR CUFF RPR Right 2022    Procedure: SHOULDER ARTHROSCOPIC ROTATOR CUFF REPAIR Biceps Tenodisis;  Surgeon: Ilya Munoz MD;  Location: [facility] MA[facility] OR;  Service: Orthopedics    HI SURGICAL ARTHROSCOPY SHOULDER W/ROTATOR CUFF RPR Left 5/3/2024    Procedure: SHOULDER ARTHROSCOPIC ROTATOR CUFF REPAIR, SUBACROMIAL DECOMPRESSION;  Surgeon: Ilya Munoz MD;  [facility];  Service: Orthopedics    TUNNELED VENOUS CATHETER PLACEMENT       Family History   Problem Relation Age of Onset    Cancer Mother         bladder    No Known Problems Father     Thyroid cancer Sister     Heart attack  Sister     No Known Problems Sister     No Known Problems Sister     No Known Problems Sister     No Known Problems Sister     No Known Problems Daughter     No Known Problems Daughter     No Known Problems Maternal Grandmother     Colon cancer Maternal Grandfather     No Known Problems Paternal Grandmother     No Known Problems Paternal Grandfather     Breast cancer Maternal Aunt         over age 50    Endometrial cancer Maternal Aunt     Multiple myeloma Maternal Aunt     Hypertension Paternal Aunt     Brain cancer Niece     Lymphoma Niece     Breast cancer additional onset Neg Hx     BRCA2 Positive Neg Hx     Ovarian cancer Neg Hx     BRCA2 Negative Neg Hx     BRCA1 Positive Neg Hx     BRCA1 Negative Neg Hx     BRCA 1/2 Neg Hx      Social History     Socioeconomic History    Marital status:      Spouse name: Not on file    Number of children: Not on file    Years of education: Not on file    Highest education level: Not on file   Occupational History    Not on file   Tobacco Use    Smoking status: Never    Smokeless tobacco: Never   Vaping Use    Vaping status: Never Used   Substance and Sexual Activity    Alcohol use: Never    Drug use: No    Sexual activity: Not on file   Other Topics Concern    Not on file   Social History Narrative    Not on file     Social Drivers of Health     Financial Resource Strain: Not At Risk (4/17/2024)    [facility]    Financial Resource Strain     In the last 12 months did you skip medications to save money?: No     In the last 12 months, was there a time when you needed to see a doctor but could not because of cost?: No   Food Insecurity: Food Insecurity Present (2/20/2025)    Hunger Vital Sign     Worried About Running Out of Food in the Last Year: Sometimes true     Ran Out of Food in the Last Year: Sometimes true   Transportation Needs: No Transportation Needs (2/20/2025)    PRAPARE -  Transportation     Lack of Transportation (Medical): No     Lack of Transportation (Non-Medical): No   Physical Activity: Not on file   Stress: Not on file   Social Connections: Unknown (6/18/2024)    Received from [employer]     How often do you feel lonely or isolated from those around you? (Adult - for ages 18 years and over): Not on file   Intimate Partner Violence: Not on file   Housing Stability: Low Risk  (2/20/2025)    Housing Stability Vital Sign     Unable to Pay for Housing in the Last Year: No     Number of Times Moved in the Last Year: 0     Homeless in the Last Year: No     Medications have been verified.    Objective   /84   Pulse 89   Temp 98.5 °F (36.9 °C)   Resp 18   SpO2 97%      Physical Exam   Physical Exam  Vitals and nursing note reviewed.   Constitutional:       General: She is not in acute distress.     Appearance: She is well-developed.   HENT:      Head: Normocephalic and atraumatic.      Right Ear: Tympanic membrane normal.      Left Ear: Tympanic membrane normal.      Nose: Mucosal edema and congestion present.      Right Sinus: No maxillary sinus tenderness or frontal sinus tenderness.      Left Sinus: No maxillary sinus tenderness or frontal sinus tenderness.      Mouth/Throat:      Pharynx: Posterior oropharyngeal erythema present. No oropharyngeal exudate.   Eyes:      Conjunctiva/sclera: Conjunctivae normal.   Cardiovascular:      Rate and Rhythm: Normal rate and regular rhythm.      Heart sounds: Normal heart sounds. No murmur heard.  Pulmonary:      Effort: Pulmonary effort is normal.      Breath sounds: Wheezing present.

## 2025-03-03 ENCOUNTER — TELEPHONE (OUTPATIENT)
Age: 70
End: 2025-03-03

## 2025-03-03 NOTE — TELEPHONE ENCOUNTER
Caller: liliana Moore     Doctor: Dr. Hernandes     Reason for call: pt has upcoming procedure on 3/14/2025 pt currently has a sinus infection and has been prescribed an antibiotic for 10 days, pt last day of the antibiotic will be 3/13/25. Pt would like a call back to r/s the procedure.     Call back#: [phone]

## 2025-03-10 ENCOUNTER — HOSPITAL ENCOUNTER (OUTPATIENT)
Dept: INFUSION CENTER | Facility: HOSPITAL | Age: 70
Discharge: HOME | End: 2025-03-10
Payer: MEDICARE

## 2025-03-10 VITALS
RESPIRATION RATE: 18 BRPM | SYSTOLIC BLOOD PRESSURE: 158 MMHG | HEART RATE: 76 BPM | OXYGEN SATURATION: 98 % | DIASTOLIC BLOOD PRESSURE: 96 MMHG | TEMPERATURE: 97 F

## 2025-03-10 DIAGNOSIS — G90.A POSTURAL ORTHOSTATIC TACHYCARDIA SYNDROME: Primary | ICD-10-CM

## 2025-03-10 PROCEDURE — 36593 DECLOT VASCULAR DEVICE: CPT

## 2025-03-10 PROCEDURE — 96361 HYDRATE IV INFUSION ADD-ON: CPT

## 2025-03-10 PROCEDURE — 96360 HYDRATION IV INFUSION INIT: CPT

## 2025-03-10 RX ADMIN — SODIUM CHLORIDE 1000 ML: 0.9 INJECTION, SOLUTION INTRAVENOUS at 11:47

## 2025-03-10 RX ADMIN — ALTEPLASE 2 MG: 2.2 INJECTION, POWDER, LYOPHILIZED, FOR SOLUTION INTRAVENOUS at 10:39

## 2025-03-11 ENCOUNTER — HOSPITAL ENCOUNTER (OUTPATIENT)
Dept: INFUSION CENTER | Facility: HOSPITAL | Age: 70
Discharge: HOME | End: 2025-03-11
Payer: MEDICARE

## 2025-03-11 VITALS
DIASTOLIC BLOOD PRESSURE: 63 MMHG | HEART RATE: 66 BPM | TEMPERATURE: 97.1 F | RESPIRATION RATE: 18 BRPM | OXYGEN SATURATION: 96 % | SYSTOLIC BLOOD PRESSURE: 135 MMHG

## 2025-03-11 DIAGNOSIS — G90.A POSTURAL ORTHOSTATIC TACHYCARDIA SYNDROME: Primary | ICD-10-CM

## 2025-03-11 PROCEDURE — 96361 HYDRATE IV INFUSION ADD-ON: CPT

## 2025-03-11 PROCEDURE — 96360 HYDRATION IV INFUSION INIT: CPT

## 2025-03-11 RX ADMIN — SODIUM CHLORIDE 1000 ML: 0.9 INJECTION, SOLUTION INTRAVENOUS at 08:36

## 2025-03-11 NOTE — PROGRESS NOTES
Patient tolerated IV hydration without issues.      Teresa Mao is aware of future appt on 3/13/25 at 0900.     AVS - No (Declined by Teresa Mao) Patient has Mychart.    Patient ambulated off unit without incident.  All personal belongings taken with patient.

## 2025-03-13 ENCOUNTER — HOSPITAL ENCOUNTER (OUTPATIENT)
Dept: INFUSION CENTER | Facility: HOSPITAL | Age: 70
End: 2025-03-13
Payer: MEDICARE

## 2025-03-13 ENCOUNTER — OFFICE VISIT (OUTPATIENT)
Age: 70
End: 2025-03-13
Payer: MEDICARE

## 2025-03-13 VITALS
TEMPERATURE: 98.1 F | SYSTOLIC BLOOD PRESSURE: 154 MMHG | HEART RATE: 69 BPM | DIASTOLIC BLOOD PRESSURE: 76 MMHG | OXYGEN SATURATION: 97 % | RESPIRATION RATE: 18 BRPM

## 2025-03-13 VITALS — HEIGHT: 61 IN | WEIGHT: 138 LBS | BODY MASS INDEX: 26.06 KG/M2

## 2025-03-13 DIAGNOSIS — G89.29 CHRONIC BILATERAL LOW BACK PAIN WITH BILATERAL SCIATICA: ICD-10-CM

## 2025-03-13 DIAGNOSIS — G90.A POSTURAL ORTHOSTATIC TACHYCARDIA SYNDROME: Primary | ICD-10-CM

## 2025-03-13 DIAGNOSIS — M54.16 LUMBAR RADICULOPATHY: ICD-10-CM

## 2025-03-13 DIAGNOSIS — Z98.1 HISTORY OF LUMBAR FUSION: ICD-10-CM

## 2025-03-13 DIAGNOSIS — G89.4 CHRONIC PAIN DISORDER: Primary | ICD-10-CM

## 2025-03-13 DIAGNOSIS — M79.18 MYOFASCIAL PAIN SYNDROME: ICD-10-CM

## 2025-03-13 DIAGNOSIS — M54.12 CERVICAL RADICULOPATHY: ICD-10-CM

## 2025-03-13 DIAGNOSIS — M47.816 LUMBAR SPONDYLOSIS: ICD-10-CM

## 2025-03-13 DIAGNOSIS — M54.42 CHRONIC BILATERAL LOW BACK PAIN WITH BILATERAL SCIATICA: ICD-10-CM

## 2025-03-13 DIAGNOSIS — G03.9 ARACHNOIDITIS: ICD-10-CM

## 2025-03-13 DIAGNOSIS — M47.812 CERVICAL SPONDYLOSIS: ICD-10-CM

## 2025-03-13 DIAGNOSIS — M54.41 CHRONIC BILATERAL LOW BACK PAIN WITH BILATERAL SCIATICA: ICD-10-CM

## 2025-03-13 DIAGNOSIS — M54.2 NECK PAIN: ICD-10-CM

## 2025-03-13 PROCEDURE — 96360 HYDRATION IV INFUSION INIT: CPT

## 2025-03-13 PROCEDURE — G2211 COMPLEX E/M VISIT ADD ON: HCPCS | Performed by: NURSE PRACTITIONER

## 2025-03-13 PROCEDURE — 96361 HYDRATE IV INFUSION ADD-ON: CPT

## 2025-03-13 PROCEDURE — 99214 OFFICE O/P EST MOD 30 MIN: CPT | Performed by: NURSE PRACTITIONER

## 2025-03-13 RX ORDER — HYDROCODONE BITARTRATE AND ACETAMINOPHEN 10; 325 MG/1; MG/1
0.5 TABLET ORAL 2 TIMES DAILY PRN
Qty: 30 TABLET | Refills: 0 | Status: SHIPPED | OUTPATIENT
Start: 2025-04-11

## 2025-03-13 RX ORDER — CYCLOBENZAPRINE HCL 5 MG
5 TABLET ORAL 3 TIMES DAILY PRN
Qty: 90 TABLET | Refills: 1 | Status: CANCELLED | OUTPATIENT
Start: 2025-03-13

## 2025-03-13 RX ORDER — HYDROCODONE BITARTRATE AND ACETAMINOPHEN 10; 325 MG/1; MG/1
0.5 TABLET ORAL 2 TIMES DAILY PRN
Qty: 30 TABLET | Refills: 0 | Status: SHIPPED | OUTPATIENT
Start: 2025-03-14

## 2025-03-13 RX ADMIN — SODIUM CHLORIDE 1000 ML: 0.9 INJECTION, SOLUTION INTRAVENOUS at 09:44

## 2025-03-13 NOTE — PROGRESS NOTES
Teresa Mao  tolerated hydration treatment well with no complications.      Teresa Mao is aware of future appt on 3/14 at 8:30AM.     AVS printed and given to Teresa Mao: No (Declined by Teresa Mao).

## 2025-03-13 NOTE — PROGRESS NOTES
Assessment:  1. Chronic pain disorder    2. Chronic bilateral low back pain with bilateral sciatica    3. Lumbar radiculopathy    4. History of lumbar fusion    5. Lumbar spondylosis    6. Arachnoiditis    7. Neck pain    8. Cervical radiculopathy    9. Cervical spondylosis    10. Myofascial pain syndrome        Plan:  While the patient was in the office today, I did have a thorough conversation regarding their chronic pain syndrome, medication management, and treatment plan options.  Patient is being seen for a follow-up visit.  She is scheduled for bilateral L2-3 and L3-4 radiofrequency ablations early next month.    Renewed hydrocodone 10/325.  1/2 tablet twice daily if needed for pain.  The patient's opioid scripts were sent to their pharmacy electronically and was given a 2 month supply of prescriptions with a Do Not Fill date(s) of 3/14/2025, 4/11/2025.    Continue Flexeril 5 mg 3 times daily if needed for spasms.  She did not require refill of this medication during today's visit.    There are risks associated with opioid medications, including dependence, addiction and tolerance. The patient understands and agrees to use these medications only as prescribed. Potential side effects of the medications include, but are not limited to, constipation, drowsiness, addiction, impaired judgment and risk of fatal overdose if not taken as prescribed. The patient was warned against driving while taking sedation medications.  Sharing medications is a felony. At this point in time, the patient is showing no signs of addiction, abuse, diversion or suicidal ideation.    [place] Prescription Drug Monitoring Program report was reviewed and was appropriate     The patient will follow-up in 2 months for medication prescription refill and reevaluation. The patient was advised to contact the office should their symptoms worsen in the interim. The patient was agreeable and verbalized an understanding.    My impressions and  treatment recommendations were discussed in detail with the patient who verbalized understanding and had no further questions.  Discharge instructions were provided. I personally saw and examined the patient and I agree with the above discussed plan of care.    No orders of the defined types were placed in this encounter.    New Medications Ordered This Visit   Medications    HYDROcodone-acetaminophen (NORCO)  mg per tablet     Sig: Take 0.5 tablets by mouth 2 (two) times a day as needed for moderate pain Max Daily Amount: 1 tablet Do not start before March 14, 2025.     Dispense:  30 tablet     Refill:  0    HYDROcodone-acetaminophen (NORCO)  mg per tablet     Sig: Take 0.5 tablets by mouth 2 (two) times a day as needed for moderate pain Max Daily Amount: 1 tablet Do not start before April 11, 2025.     Dispense:  30 tablet     Refill:  0       History of Present Illness:  Teresa Mao is a 69 y.o. female who presents for a follow up office visit in regards to Back Pain.   The patient’s current symptoms include complaints of neck pain that radiates to both upper extremities and low back pain that can radiate to both lower extremities.  Current pain level is an 8/10.  Quality pain is described as sharp, throbbing, cramping, pressure-like, shooting, numb, pins-and-needles.  Current medications for pain include hydrocodone 10/325.  1/2 tablet twice daily if needed for pain, Flexeril 5 mg 3 times daily if needed for spasms.  He denies side effects from medications.      I have personally reviewed and/or updated the patient's past medical history, past surgical history, family history, social history, current medications, allergies, and vital signs today.     Review of Systems   Gastrointestinal:  Positive for constipation.   Musculoskeletal:  Positive for arthralgias, gait problem, myalgias and neck pain.        - Difficulty walking  - Decreased range of motion  - Chest Pain   - Constipation  - Joint  stiffness  - Pain in extremity  - Memory Loss        Patient Active Problem List   Diagnosis    LBBB (left bundle branch block)    Fibromyalgia    Postural orthostatic tachycardia syndrome    Sleep-related breathing disorder    Other insomnia    Restless leg syndrome    Bruxism    Anxiety and depression    Chronic rhinitis    Moderate persistent asthma    Gastroesophageal reflux disease without esophagitis    Chronic pain disorder    Factor V Leiden mutation ([facility])    Hypothyroidism    Weakness    Traumatic tear of supraspinatus tendon of right shoulder    Sicca syndrome ([facility])    S/P left rotator cuff repair    Generalized pain    Myofascial pain syndrome    Arachnoiditis    Cervical radiculopathy    Chronic bilateral low back pain with bilateral sciatica    Lumbar radiculopathy    Encounter for long-term opiate analgesic use    Uncomplicated opioid dependence ([facility])    Cardiomyopathy, unspecified type ([facility])    Stage 3a chronic kidney disease ([facility])    Urinary incontinence    Abnormal electrocardiography    Abnormal TSH    Asthma    Biliary dyskinesia    Blepharitis of both eyes    Degeneration of intervertebral disc    Diverticulosis of colon    Hashimoto's disease    Herniated nucleus pulposus, L3-4    Hyperlipidemia    Irritable bowel syndrome    Migraine without aura and without status migrainosus, not intractable    Mitral valve prolapse    Obstructive sleep apnea syndrome    Other osteoporosis without current pathological fracture    Rheumatic fever with cardiac involvement    Scoliosis (and kyphoscoliosis), idiopathic    Sjogren's syndrome ([facility])    Osteoarthritis    TMJ (temporomandibular joint syndrome)    Vitamin D deficiency    Chronic interstitial cystitis    Radiculopathy, multiple sites in spine    CVID (common variable immunodeficiency) ([facility])    Cellulitis    Cervical spondylosis    Neck pain    Sprain of deltoid ligament of right ankle    Sprain of anterior talofibular ligament of right ankle    Ankle  "injury, right, initial encounter    History of lumbar fusion    Lumbar spondylosis    Tear of left supraspinatus tendon    Disorder of autonomic nervous system    Biceps tendinitis of left upper extremity    H/O multiple allergies    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[facility])       Past Medical History:   Diagnosis Date    Allergic rhinitis     Anxiety     Asthma     Back pain     Breathing difficulty     Cardiac disease     Cardiopathy     EF 45%    Cough     Diverticulitis     Factor V Leiden ([facility])     Fibromyalgia     GERD (gastroesophageal reflux disease)     Hashimoto's thyroiditis     Hx of degenerative disc disease     Hypertension     Hypotension     pots - postural orthostatic hypotension    Interstitial cystitis     Irregular heart beat     LBBB    Irritable bowel syndrome     Migraines     Mitral valve disease     \"thickening\"    Myocardial infarction ([facility])     possible but not sure when    Neuropathy     bilateral legs    Osteoporosis     PONV (postoperative nausea and vomiting)     Postural orthostatic tachycardia syndrome     must drink a lot of water and salt    Pott's disease     Rheumatic fever 1967    Scoliosis     Sepsis ([facility])     associated with PICC line    Sjogren's syndrome ([facility])     TMJ (dislocation of temporomandibular joint)        Past Surgical History:   Procedure Laterality Date    ABLATION MICROWAVE Left     lumbar area    BACK SURGERY  10/1998     SECTION  1992    CHOLECYSTECTOMY  2016    COLONOSCOPY      DILATION AND CURETTAGE OF UTERUS      EGD  2016    FOOT SURGERY  1968    removal of bone and neuroma    HYSTERECTOMY N/A     age 39    IR OTHER  2022    IR OTHER  2022    IR OTHER  2023    IR OTHER  2024    IR OTHER  2024    IR PICC PLACEMENT SINGLE LUMEN  10/02/2020    IR PICC PLACEMENT SINGLE LUMEN  2021    IR PICC REPOSITION  2021    IR TUNNELED CENTRAL LINE PLACEMENT  2022    LAPAROSCOPY      endometriosis    MOUTH " BIOPSY      lip    OOPHORECTOMY Bilateral 1995    age 39    NV EGD TRANSORAL BIOPSY SINGLE/MULTIPLE N/A 04/24/2019    Procedure: ESOPHAGOGASTRODUODENOSCOPY (EGD) with multiple bx and dilation;  Surgeon: Peter Baldwin MD;  Location:  GI LAB;  Service: Gastroenterology    NV SURGICAL ARTHROSCOPY SHOULDER W/ROTATOR CUFF RPR Right 04/06/2022    Procedure: SHOULDER ARTHROSCOPIC ROTATOR CUFF REPAIR Biceps Tenodisis;  Surgeon: Ilya Munoz MD;  Location: [facility] MA[facility] OR;  Service: Orthopedics    NV SURGICAL ARTHROSCOPY SHOULDER W/ROTATOR CUFF RPR Left 5/3/2024    Procedure: SHOULDER ARTHROSCOPIC ROTATOR CUFF REPAIR, SUBACROMIAL DECOMPRESSION;  Surgeon: Ilya Munoz MD;  Location: [facility] MAIN OR;  Service: Orthopedics    TUNNELED VENOUS CATHETER PLACEMENT  2016       Family History   Problem Relation Age of Onset    Cancer Mother         bladder    No Known Problems Father     Thyroid cancer Sister     Heart attack Sister     No Known Problems Sister     No Known Problems Sister     No Known Problems Sister     No Known Problems Sister     No Known Problems Daughter     No Known Problems Daughter     No Known Problems Maternal Grandmother     Colon cancer Maternal Grandfather     No Known Problems Paternal Grandmother     No Known Problems Paternal Grandfather     Breast cancer Maternal Aunt         over age 50    Endometrial cancer Maternal Aunt     Multiple myeloma Maternal Aunt     Hypertension Paternal Aunt     Brain cancer Niece     Lymphoma Niece     Breast cancer additional onset Neg Hx     BRCA2 Positive Neg Hx     Ovarian cancer Neg Hx     BRCA2 Negative Neg Hx     BRCA1 Positive Neg Hx     BRCA1 Negative Neg Hx     BRCA 1/2 Neg Hx        Social History     Occupational History    Not on file   Tobacco Use    Smoking status: Never    Smokeless tobacco: Never   Vaping Use    Vaping status: Never Used   Substance and Sexual Activity    Alcohol use: Never    Drug use: No    Sexual activity:  Not on file       Current Outpatient Medications on File Prior to Visit   Medication Sig    aluminum hydroxide-magnesium carbonate (Gaviscon Extra Strength) 160-105 mg per chewable tablet     azelastine (ASTELIN) 0.1 % nasal spray 2 sprays into each nostril 2 (two) times a day Use in each nostril as directed    beclomethasone (QVAR) 40 MCG/ACT inhaler Inhale 1 puff daily as needed (only when unable to nebulize pulmicort) Rinse mouth after use.    budesonide (PULMICORT) 0.5 mg/2 mL nebulizer solution Take 0.5 mg by nebulization 2 (two) times a day Rinse mouth after use.    cromolyn (GASTROCROM) 100 MG/5ML solution Take 100 mg by mouth 4 (four) times a day (before meals and at bedtime)    cyclobenzaprine (FLEXERIL) 5 mg tablet TAKE 1 TABLET BY MOUTH THREE TIMES A DAY AS NEEDED FOR MUSCLE SPASMS    cycloSPORINE (RESTASIS) 0.05 % ophthalmic emulsion Administer 1 drop to both eyes 2 (two) times a day    docusate sodium (COLACE) 100 mg capsule Take 100 mg by mouth daily as needed for constipation    Dupilumab (DUPIXENT SC) Inject under the skin Pt unsure of dose    erythromycin (ILOTYCIN) ophthalmic ointment Apply 1 application to eye daily at bedtime    famotidine (PEPCID) 40 MG tablet Twice a day    fexofenadine (ALLEGRA) 180 MG tablet Take 180 mg by mouth 2 (two) times a day     fluticasone (FLONASE) 50 mcg/act nasal spray 2 sprays into each nostril daily     Galcanezumab-gnlm (Emgality) 120 MG/ML SOAJ Inject under the skin every 30 (thirty) days     guaiFENesin (Mucinex) 600 mg 12 hr tablet Take 600 mg by mouth every 12 (twelve) hours    Heating Pad PADS Use if needed (pain)    hydrOXYzine (ATARAX) 10 mg/5 mL syrup     ipratropium (ATROVENT) 0.02 % nebulizer solution Take 0.5 mg by nebulization every 6 (six) hours as needed for wheezing or shortness of breath    ipratropium (ATROVENT) 0.06 % nasal spray PLEASE SEE ATTACHED FOR DETAILED DIRECTIONS    ivabradine HCl (Corlanor) 5 MG tablet 7.5 mg 2 (two) times a day     KETOTIFEN FUMARATE OP Take 1mg compounded capsule three times a day    levalbuterol (XOPENEX HFA) 45 mcg/act inhaler Inhale 2 puffs every 4 (four) hours as needed (when unable to nebulize)    levalbuterol (XOPENEX) 1.25 mg/3 mL nebulizer solution Take 1.25 mg by nebulization every 6 (six) hours as needed     Magnesium 400 MG CAPS Take 1 capsule by mouth 2 (two) times a day     methylprednisolone (MEDROL) 4 mg tablet Medrol dosepak, take as directed    MULTIPLE VITAMINS-CALCIUM PO Take 1 capsule by mouth every morning     naloxone (NARCAN) 4 mg/0.1 mL nasal spray Administer 1 spray into a nostril. If no response after 2-3 minutes, give another dose in the other nostril using a new spray.    NON FORMULARY Take 1 mg by mouth 3 (three) times a day Ketotifen 1 mg compounded capsule    NON FORMULARY LACTOBACILLUS COMBINATION NO.4 (PROBIOTIC) 3 BILLION CELL CAPSULE    NON FORMULARY 0.25 % X-DEMVY eye drop    omega-3-acid ethyl esters (LOVAZA) 1 g capsule Take 2 g by mouth 2 (two) times a day 1600mg daily    polyethylene glycol (MIRALAX) 17 g packet Take 17 g by mouth daily as needed     Probiotic Product (PROBIOTIC DAILY PO) Take 1 tablet by mouth daily At lunch    Qvar RediHaler 40 MCG/ACT inhaler     sodium chloride Inject 1,500 mL into a catheter in a vein    sucralfate (CARAFATE) 1 g/10 mL suspension Take 1 g by mouth 2 (two) times a day    Thyroid, Porcine, POWD Use 32 mg daily    Ubrogepant (UBRELVY) 100 MG tablet Take 100 mg by mouth Take 1 tablet (100 mg) one time as needed for migraine. May repeat one additional tablet (100 mg) at least two hours after the first dose. Do not use more than two doses per day, or for more than eight days per month.    verapamil (CALAN) 40 mg tablet     Xdemvy 0.25 % SOLN Apply 1 drop to eye    [DISCONTINUED] HYDROcodone-acetaminophen (NORCO)  mg per tablet Take 0.5 tablets by mouth 2 (two) times a day as needed for moderate pain Max Daily Amount: 1 tablet Do not start before  January 17, 2025.    [DISCONTINUED] HYDROcodone-acetaminophen (NORCO)  mg per tablet Take 0.5 tablets by mouth 2 (two) times a day as needed for moderate pain Max Daily Amount: 1 tablet Do not start before February 14, 2025.    doxycycline (ADOXA) 100 MG tablet Take 100 mg by mouth 2 (two) times a day    NON FORMULARY Take 32 mg by mouth NATURE-THROID ORAL (Patient not taking: Reported on 12/5/2024)     Current Facility-Administered Medications on File Prior to Visit   Medication    alteplase (CATHFLO) injection 2 mg    alteplase (CATHFLO) injection 2 mg       Allergies   Allergen Reactions    Imipramine Confusion, Fatigue, Irritability, Palpitations, Shortness Of Breath, Tachycardia and Visual Disturbance    Lactose - Food Allergy Shortness Of Breath    Melatonin Shortness Of Breath    Mirtazapine Anxiety, Dizziness, GI Intolerance, Nausea Only, Palpitations and Shortness Of Breath    Nexium [Esomeprazole] Shortness Of Breath    Nsaids Shortness Of Breath, Cough, Other (See Comments), Nausea Only, GI Intolerance, Tachycardia and Wheezing    Propofol Cough, Other (See Comments), Shortness Of Breath and Nasal Congestion    Salmeterol Dizziness, Other (See Comments), Shortness Of Breath and Tachycardia    Singulair [Montelukast] Shortness Of Breath and Cough    Zomig [Zolmitriptan] Shortness Of Breath    Dexilant [Dexlansoprazole] Nausea Only and Vomiting    Ambien [Zolpidem]     Amitriptyline Drowsiness    Ascorbate - Food Allergy GI Intolerance    Ascorbic Acid GI Intolerance    Aspirin     Azithromycin Hives and Itching     In December 2018 or 2019, she was put on a course and developed a fully body rash on the 2nd day within a couple of hours of taking the medication that day. The rash was raised, itchy, and red. Rash lasted for a week after medication was stopped. Denies fever, joint swelling, or mucosal peeling. Prior to this, she had tolerated doses of azithromycin. CBC/d and CMP (1/2/20) were normal     Bactrim [Sulfamethoxazole-Trimethoprim] Hives    Banana - Food Allergy Dermatitis    Banana Extract Allergy Skin Test - Food Allergy Dermatitis    Bisoprolol Other (See Comments)    Ceftin [Cefuroxime]     Celebrex [Celecoxib]     Chocolate - Food Allergy Headache    Ciprofloxacin Dizziness, Hives, Other (See Comments), Itching and Nausea Only    Demerol [Meperidine]     Duloxetine Other (See Comments)     tachycardia    Epinephrine Dizziness    Ergotamine Nausea Only and Headache    Fludrocortisone Other (See Comments)    Keflex [Cephalexin]     Klonopin [Clonazepam] Nausea Only and Dizziness    Latex Hives    Levofloxacin Other (See Comments)    Lexapro [Escitalopram]     Lyrica [Pregabalin] Fatigue    Macrodantin [Nitrofurantoin]     Metoprolol Other (See Comments)    Metronidazole Other (See Comments)    Molds & Smuts GI Intolerance and Other (See Comments)    Morphine Nausea Only    Movantik [Naloxegol] Nausea Only    Mushroom Extract Complex - Food Allergy      Eye itchy, asthma  attack    Naprosyn [Naproxen]     Neurontin [Gabapentin] Dizziness    Pineapple - Food Allergy GI Intolerance    Plecanatide Abdominal Pain, Diarrhea and GI Intolerance    Prolia [Denosumab]     Prozac [Fluoxetine]     Soy Allergy - Food Allergy Vomiting    Sudafed [Pseudoephedrine] Hives    Sulfa Antibiotics     Tomato - Food Allergy GI Intolerance    Trazodone     Ultram [Tramadol] Nausea Only, Dizziness and Headache    Vilazodone Dizziness, GI Intolerance, Headache, Abdominal Pain and Other (See Comments)    Vilazodone Hcl Abdominal Pain, Dizziness, GI Intolerance, Headache, Other (See Comments), Fatigue and Nausea Only    Vioxx [Rofecoxib]     Vortioxetine Drowsiness, Fatigue, GI Intolerance and Irritability    Wheat Bran - Food Allergy Other (See Comments)     Stomach pain    Zinc Other (See Comments), GI Intolerance and Nausea Only    Zoloft [Sertraline]     Albuterol Palpitations, Dizziness, Other (See Comments) and Tachycardia  "    Dizziness, allergy note 12/11/19 for more specific comments    Cyclobenzaprine Hcl Er Rash    Terfenadine Dizziness and Palpitations    Zantac [Ranitidine] Rash and Dizziness       Physical Exam:    Ht 5' 1\" (1.549 m)   Wt 62.6 kg (138 lb)   BMI 26.07 kg/m²     Constitutional:normal, well developed, well nourished, alert, in no distress and non-toxic and no overt pain behavior.  Eyes:anicteric  HEENT:grossly intact  Neck:supple, symmetric, trachea midline and no masses   Pulmonary:even and unlabored  Cardiovascular:No edema or pitting edema present  Skin:Normal without rashes or lesions and well hydrated  Psychiatric:Mood and affect appropriate  Neurologic:Cranial Nerves II-XII grossly intact  Musculoskeletal:ambulates with cane and gait is slow and guarded.    Imaging    "

## 2025-03-13 NOTE — H&P (VIEW-ONLY)
Assessment:  1. Chronic pain disorder    2. Chronic bilateral low back pain with bilateral sciatica    3. Lumbar radiculopathy    4. History of lumbar fusion    5. Lumbar spondylosis    6. Arachnoiditis    7. Neck pain    8. Cervical radiculopathy    9. Cervical spondylosis    10. Myofascial pain syndrome        Plan:  While the patient was in the office today, I did have a thorough conversation regarding their chronic pain syndrome, medication management, and treatment plan options.  Patient is being seen for a follow-up visit.  She is scheduled for bilateral L2-3 and L3-4 radiofrequency ablations early next month.    Renewed hydrocodone 10/325.  1/2 tablet twice daily if needed for pain.  The patient's opioid scripts were sent to their pharmacy electronically and was given a 2 month supply of prescriptions with a Do Not Fill date(s) of 3/14/2025, 4/11/2025.    Continue Flexeril 5 mg 3 times daily if needed for spasms.  She did not require refill of this medication during today's visit.    There are risks associated with opioid medications, including dependence, addiction and tolerance. The patient understands and agrees to use these medications only as prescribed. Potential side effects of the medications include, but are not limited to, constipation, drowsiness, addiction, impaired judgment and risk of fatal overdose if not taken as prescribed. The patient was warned against driving while taking sedation medications.  Sharing medications is a felony. At this point in time, the patient is showing no signs of addiction, abuse, diversion or suicidal ideation.    [place] Prescription Drug Monitoring Program report was reviewed and was appropriate     The patient will follow-up in 2 months for medication prescription refill and reevaluation. The patient was advised to contact the office should their symptoms worsen in the interim. The patient was agreeable and verbalized an understanding.    My impressions and  treatment recommendations were discussed in detail with the patient who verbalized understanding and had no further questions.  Discharge instructions were provided. I personally saw and examined the patient and I agree with the above discussed plan of care.    No orders of the defined types were placed in this encounter.    New Medications Ordered This Visit   Medications    HYDROcodone-acetaminophen (NORCO)  mg per tablet     Sig: Take 0.5 tablets by mouth 2 (two) times a day as needed for moderate pain Max Daily Amount: 1 tablet Do not start before March 14, 2025.     Dispense:  30 tablet     Refill:  0    HYDROcodone-acetaminophen (NORCO)  mg per tablet     Sig: Take 0.5 tablets by mouth 2 (two) times a day as needed for moderate pain Max Daily Amount: 1 tablet Do not start before April 11, 2025.     Dispense:  30 tablet     Refill:  0       History of Present Illness:  Teresa Mao is a 69 y.o. female who presents for a follow up office visit in regards to Back Pain.   The patient’s current symptoms include complaints of neck pain that radiates to both upper extremities and low back pain that can radiate to both lower extremities.  Current pain level is an 8/10.  Quality pain is described as sharp, throbbing, cramping, pressure-like, shooting, numb, pins-and-needles.  Current medications for pain include hydrocodone 10/325.  1/2 tablet twice daily if needed for pain, Flexeril 5 mg 3 times daily if needed for spasms.  He denies side effects from medications.      I have personally reviewed and/or updated the patient's past medical history, past surgical history, family history, social history, current medications, allergies, and vital signs today.     Review of Systems   Gastrointestinal:  Positive for constipation.   Musculoskeletal:  Positive for arthralgias, gait problem, myalgias and neck pain.        - Difficulty walking  - Decreased range of motion  - Chest Pain   - Constipation  - Joint  stiffness  - Pain in extremity  - Memory Loss        Patient Active Problem List   Diagnosis    LBBB (left bundle branch block)    Fibromyalgia    Postural orthostatic tachycardia syndrome    Sleep-related breathing disorder    Other insomnia    Restless leg syndrome    Bruxism    Anxiety and depression    Chronic rhinitis    Moderate persistent asthma    Gastroesophageal reflux disease without esophagitis    Chronic pain disorder    Factor V Leiden mutation ([facility])    Hypothyroidism    Weakness    Traumatic tear of supraspinatus tendon of right shoulder    Sicca syndrome ([facility])    S/P left rotator cuff repair    Generalized pain    Myofascial pain syndrome    Arachnoiditis    Cervical radiculopathy    Chronic bilateral low back pain with bilateral sciatica    Lumbar radiculopathy    Encounter for long-term opiate analgesic use    Uncomplicated opioid dependence ([facility])    Cardiomyopathy, unspecified type ([facility])    Stage 3a chronic kidney disease ([facility])    Urinary incontinence    Abnormal electrocardiography    Abnormal TSH    Asthma    Biliary dyskinesia    Blepharitis of both eyes    Degeneration of intervertebral disc    Diverticulosis of colon    Hashimoto's disease    Herniated nucleus pulposus, L3-4    Hyperlipidemia    Irritable bowel syndrome    Migraine without aura and without status migrainosus, not intractable    Mitral valve prolapse    Obstructive sleep apnea syndrome    Other osteoporosis without current pathological fracture    Rheumatic fever with cardiac involvement    Scoliosis (and kyphoscoliosis), idiopathic    Sjogren's syndrome ([facility])    Osteoarthritis    TMJ (temporomandibular joint syndrome)    Vitamin D deficiency    Chronic interstitial cystitis    Radiculopathy, multiple sites in spine    CVID (common variable immunodeficiency) ([facility])    Cellulitis    Cervical spondylosis    Neck pain    Sprain of deltoid ligament of right ankle    Sprain of anterior talofibular ligament of right ankle    Ankle  "injury, right, initial encounter    History of lumbar fusion    Lumbar spondylosis    Tear of left supraspinatus tendon    Disorder of autonomic nervous system    Biceps tendinitis of left upper extremity    H/O multiple allergies    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[facility])       Past Medical History:   Diagnosis Date    Allergic rhinitis     Anxiety     Asthma     Back pain     Breathing difficulty     Cardiac disease     Cardiopathy     EF 45%    Cough     Diverticulitis     Factor V Leiden ([facility])     Fibromyalgia     GERD (gastroesophageal reflux disease)     Hashimoto's thyroiditis     Hx of degenerative disc disease     Hypertension     Hypotension     pots - postural orthostatic hypotension    Interstitial cystitis     Irregular heart beat     LBBB    Irritable bowel syndrome     Migraines     Mitral valve disease     \"thickening\"    Myocardial infarction ([facility])     possible but not sure when    Neuropathy     bilateral legs    Osteoporosis     PONV (postoperative nausea and vomiting)     Postural orthostatic tachycardia syndrome     must drink a lot of water and salt    Pott's disease     Rheumatic fever 1967    Scoliosis     Sepsis ([facility])     associated with PICC line    Sjogren's syndrome ([facility])     TMJ (dislocation of temporomandibular joint)        Past Surgical History:   Procedure Laterality Date    ABLATION MICROWAVE Left     lumbar area    BACK SURGERY  10/1998     SECTION  1992    CHOLECYSTECTOMY  2016    COLONOSCOPY      DILATION AND CURETTAGE OF UTERUS      EGD  2016    FOOT SURGERY  1968    removal of bone and neuroma    HYSTERECTOMY N/A     age 39    IR OTHER  2022    IR OTHER  2022    IR OTHER  2023    IR OTHER  2024    IR OTHER  2024    IR PICC PLACEMENT SINGLE LUMEN  10/02/2020    IR PICC PLACEMENT SINGLE LUMEN  2021    IR PICC REPOSITION  2021    IR TUNNELED CENTRAL LINE PLACEMENT  2022    LAPAROSCOPY      endometriosis    MOUTH " BIOPSY      lip    OOPHORECTOMY Bilateral 1995    age 39    OK EGD TRANSORAL BIOPSY SINGLE/MULTIPLE N/A 04/24/2019    Procedure: ESOPHAGOGASTRODUODENOSCOPY (EGD) with multiple bx and dilation;  Surgeon: Peter Baldwin MD;  Location:  GI LAB;  Service: Gastroenterology    OK SURGICAL ARTHROSCOPY SHOULDER W/ROTATOR CUFF RPR Right 04/06/2022    Procedure: SHOULDER ARTHROSCOPIC ROTATOR CUFF REPAIR Biceps Tenodisis;  Surgeon: Ilya Munoz MD;  Location: [facility];  Service: Orthopedics    OK SURGICAL ARTHROSCOPY SHOULDER W/ROTATOR CUFF RPR Left 5/3/2024    Procedure: SHOULDER ARTHROSCOPIC ROTATOR CUFF REPAIR, SUBACROMIAL DECOMPRESSION;  Surgeon: Ilya Munoz MD;  Location: [facility] MA[facility] OR;  Service: Orthopedics    TUNNELED VENOUS CATHETER PLACEMENT  2016       Family History   Problem Relation Age of Onset    Cancer Mother         bladder    No Known Problems Father     Thyroid cancer Sister     Heart attack Sister     No Known Problems Sister     No Known Problems Sister     No Known Problems Sister     No Known Problems Sister     No Known Problems Daughter     No Known Problems Daughter     No Known Problems Maternal Grandmother     Colon cancer Maternal Grandfather     No Known Problems Paternal Grandmother     No Known Problems Paternal Grandfather     Breast cancer Maternal Aunt         over age 50    Endometrial cancer Maternal Aunt     Multiple myeloma Maternal Aunt     Hypertension Paternal Aunt     Brain cancer Niece     Lymphoma Niece     Breast cancer additional onset Neg Hx     BRCA2 Positive Neg Hx     Ovarian cancer Neg Hx     BRCA2 Negative Neg Hx     BRCA1 Positive Neg Hx     BRCA1 Negative Neg Hx     BRCA 1/2 Neg Hx        Social History     Occupational History    Not on file   Tobacco Use    Smoking status: Never    Smokeless tobacco: Never   Vaping Use    Vaping status: Never Used   Substance and Sexual Activity    Alcohol use: Never    Drug use: No    Sexual activity:  Not on file       Current Outpatient Medications on File Prior to Visit   Medication Sig    aluminum hydroxide-magnesium carbonate (Gaviscon Extra Strength) 160-105 mg per chewable tablet     azelastine (ASTELIN) 0.1 % nasal spray 2 sprays into each nostril 2 (two) times a day Use in each nostril as directed    beclomethasone (QVAR) 40 MCG/ACT inhaler Inhale 1 puff daily as needed (only when unable to nebulize pulmicort) Rinse mouth after use.    budesonide (PULMICORT) 0.5 mg/2 mL nebulizer solution Take 0.5 mg by nebulization 2 (two) times a day Rinse mouth after use.    cromolyn (GASTROCROM) 100 MG/5ML solution Take 100 mg by mouth 4 (four) times a day (before meals and at bedtime)    cyclobenzaprine (FLEXERIL) 5 mg tablet TAKE 1 TABLET BY MOUTH THREE TIMES A DAY AS NEEDED FOR MUSCLE SPASMS    cycloSPORINE (RESTASIS) 0.05 % ophthalmic emulsion Administer 1 drop to both eyes 2 (two) times a day    docusate sodium (COLACE) 100 mg capsule Take 100 mg by mouth daily as needed for constipation    Dupilumab (DUPIXENT SC) Inject under the skin Pt unsure of dose    erythromycin (ILOTYCIN) ophthalmic ointment Apply 1 application to eye daily at bedtime    famotidine (PEPCID) 40 MG tablet Twice a day    fexofenadine (ALLEGRA) 180 MG tablet Take 180 mg by mouth 2 (two) times a day     fluticasone (FLONASE) 50 mcg/act nasal spray 2 sprays into each nostril daily     Galcanezumab-gnlm (Emgality) 120 MG/ML SOAJ Inject under the skin every 30 (thirty) days     guaiFENesin (Mucinex) 600 mg 12 hr tablet Take 600 mg by mouth every 12 (twelve) hours    Heating Pad PADS Use if needed (pain)    hydrOXYzine (ATARAX) 10 mg/5 mL syrup     ipratropium (ATROVENT) 0.02 % nebulizer solution Take 0.5 mg by nebulization every 6 (six) hours as needed for wheezing or shortness of breath    ipratropium (ATROVENT) 0.06 % nasal spray PLEASE SEE ATTACHED FOR DETAILED DIRECTIONS    ivabradine HCl (Corlanor) 5 MG tablet 7.5 mg 2 (two) times a day     KETOTIFEN FUMARATE OP Take 1mg compounded capsule three times a day    levalbuterol (XOPENEX HFA) 45 mcg/act inhaler Inhale 2 puffs every 4 (four) hours as needed (when unable to nebulize)    levalbuterol (XOPENEX) 1.25 mg/3 mL nebulizer solution Take 1.25 mg by nebulization every 6 (six) hours as needed     Magnesium 400 MG CAPS Take 1 capsule by mouth 2 (two) times a day     methylprednisolone (MEDROL) 4 mg tablet Medrol dosepak, take as directed    MULTIPLE VITAMINS-CALCIUM PO Take 1 capsule by mouth every morning     naloxone (NARCAN) 4 mg/0.1 mL nasal spray Administer 1 spray into a nostril. If no response after 2-3 minutes, give another dose in the other nostril using a new spray.    NON FORMULARY Take 1 mg by mouth 3 (three) times a day Ketotifen 1 mg compounded capsule    NON FORMULARY LACTOBACILLUS COMBINATION NO.4 (PROBIOTIC) 3 BILLION CELL CAPSULE    NON FORMULARY 0.25 % X-DEMVY eye drop    omega-3-acid ethyl esters (LOVAZA) 1 g capsule Take 2 g by mouth 2 (two) times a day 1600mg daily    polyethylene glycol (MIRALAX) 17 g packet Take 17 g by mouth daily as needed     Probiotic Product (PROBIOTIC DAILY PO) Take 1 tablet by mouth daily At lunch    Qvar RediHaler 40 MCG/ACT inhaler     sodium chloride Inject 1,500 mL into a catheter in a vein    sucralfate (CARAFATE) 1 g/10 mL suspension Take 1 g by mouth 2 (two) times a day    Thyroid, Porcine, POWD Use 32 mg daily    Ubrogepant (UBRELVY) 100 MG tablet Take 100 mg by mouth Take 1 tablet (100 mg) one time as needed for migraine. May repeat one additional tablet (100 mg) at least two hours after the first dose. Do not use more than two doses per day, or for more than eight days per month.    verapamil (CALAN) 40 mg tablet     Xdemvy 0.25 % SOLN Apply 1 drop to eye    [DISCONTINUED] HYDROcodone-acetaminophen (NORCO)  mg per tablet Take 0.5 tablets by mouth 2 (two) times a day as needed for moderate pain Max Daily Amount: 1 tablet Do not start before  January 17, 2025.    [DISCONTINUED] HYDROcodone-acetaminophen (NORCO)  mg per tablet Take 0.5 tablets by mouth 2 (two) times a day as needed for moderate pain Max Daily Amount: 1 tablet Do not start before February 14, 2025.    doxycycline (ADOXA) 100 MG tablet Take 100 mg by mouth 2 (two) times a day    NON FORMULARY Take 32 mg by mouth NATURE-THROID ORAL (Patient not taking: Reported on 12/5/2024)     Current Facility-Administered Medications on File Prior to Visit   Medication    alteplase (CATHFLO) injection 2 mg    alteplase (CATHFLO) injection 2 mg       Allergies   Allergen Reactions    Imipramine Confusion, Fatigue, Irritability, Palpitations, Shortness Of Breath, Tachycardia and Visual Disturbance    Lactose - Food Allergy Shortness Of Breath    Melatonin Shortness Of Breath    Mirtazapine Anxiety, Dizziness, GI Intolerance, Nausea Only, Palpitations and Shortness Of Breath    Nexium [Esomeprazole] Shortness Of Breath    Nsaids Shortness Of Breath, Cough, Other (See Comments), Nausea Only, GI Intolerance, Tachycardia and Wheezing    Propofol Cough, Other (See Comments), Shortness Of Breath and Nasal Congestion    Salmeterol Dizziness, Other (See Comments), Shortness Of Breath and Tachycardia    Singulair [Montelukast] Shortness Of Breath and Cough    Zomig [Zolmitriptan] Shortness Of Breath    Dexilant [Dexlansoprazole] Nausea Only and Vomiting    Ambien [Zolpidem]     Amitriptyline Drowsiness    Ascorbate - Food Allergy GI Intolerance    Ascorbic Acid GI Intolerance    Aspirin     Azithromycin Hives and Itching     In December 2018 or 2019, she was put on a course and developed a fully body rash on the 2nd day within a couple of hours of taking the medication that day. The rash was raised, itchy, and red. Rash lasted for a week after medication was stopped. Denies fever, joint swelling, or mucosal peeling. Prior to this, she had tolerated doses of azithromycin. CBC/d and CMP (1/2/20) were normal     Bactrim [Sulfamethoxazole-Trimethoprim] Hives    Banana - Food Allergy Dermatitis    Banana Extract Allergy Skin Test - Food Allergy Dermatitis    Bisoprolol Other (See Comments)    Ceftin [Cefuroxime]     Celebrex [Celecoxib]     Chocolate - Food Allergy Headache    Ciprofloxacin Dizziness, Hives, Other (See Comments), Itching and Nausea Only    Demerol [Meperidine]     Duloxetine Other (See Comments)     tachycardia    Epinephrine Dizziness    Ergotamine Nausea Only and Headache    Fludrocortisone Other (See Comments)    Keflex [Cephalexin]     Klonopin [Clonazepam] Nausea Only and Dizziness    Latex Hives    Levofloxacin Other (See Comments)    Lexapro [Escitalopram]     Lyrica [Pregabalin] Fatigue    Macrodantin [Nitrofurantoin]     Metoprolol Other (See Comments)    Metronidazole Other (See Comments)    Molds & Smuts GI Intolerance and Other (See Comments)    Morphine Nausea Only    Movantik [Naloxegol] Nausea Only    Mushroom Extract Complex - Food Allergy      Eye itchy, asthma  attack    Naprosyn [Naproxen]     Neurontin [Gabapentin] Dizziness    Pineapple - Food Allergy GI Intolerance    Plecanatide Abdominal Pain, Diarrhea and GI Intolerance    Prolia [Denosumab]     Prozac [Fluoxetine]     Soy Allergy - Food Allergy Vomiting    Sudafed [Pseudoephedrine] Hives    Sulfa Antibiotics     Tomato - Food Allergy GI Intolerance    Trazodone     Ultram [Tramadol] Nausea Only, Dizziness and Headache    Vilazodone Dizziness, GI Intolerance, Headache, Abdominal Pain and Other (See Comments)    Vilazodone Hcl Abdominal Pain, Dizziness, GI Intolerance, Headache, Other (See Comments), Fatigue and Nausea Only    Vioxx [Rofecoxib]     Vortioxetine Drowsiness, Fatigue, GI Intolerance and Irritability    Wheat Bran - Food Allergy Other (See Comments)     Stomach pain    Zinc Other (See Comments), GI Intolerance and Nausea Only    Zoloft [Sertraline]     Albuterol Palpitations, Dizziness, Other (See Comments) and Tachycardia  "    Dizziness, allergy note 12/11/19 for more specific comments    Cyclobenzaprine Hcl Er Rash    Terfenadine Dizziness and Palpitations    Zantac [Ranitidine] Rash and Dizziness       Physical Exam:    Ht 5' 1\" (1.549 m)   Wt 62.6 kg (138 lb)   BMI 26.07 kg/m²     Constitutional:normal, well developed, well nourished, alert, in no distress and non-toxic and no overt pain behavior.  Eyes:anicteric  HEENT:grossly intact  Neck:supple, symmetric, trachea midline and no masses   Pulmonary:even and unlabored  Cardiovascular:No edema or pitting edema present  Skin:Normal without rashes or lesions and well hydrated  Psychiatric:Mood and affect appropriate  Neurologic:Cranial Nerves II-XII grossly intact  Musculoskeletal:ambulates with cane and gait is slow and guarded.    Imaging    "

## 2025-03-14 ENCOUNTER — HOSPITAL ENCOUNTER (OUTPATIENT)
Dept: INFUSION CENTER | Facility: HOSPITAL | Age: 70
End: 2025-03-14
Payer: MEDICARE

## 2025-03-14 VITALS
DIASTOLIC BLOOD PRESSURE: 66 MMHG | RESPIRATION RATE: 18 BRPM | TEMPERATURE: 97.4 F | HEART RATE: 76 BPM | SYSTOLIC BLOOD PRESSURE: 136 MMHG

## 2025-03-14 DIAGNOSIS — G90.A POSTURAL ORTHOSTATIC TACHYCARDIA SYNDROME: Primary | ICD-10-CM

## 2025-03-14 PROCEDURE — 96360 HYDRATION IV INFUSION INIT: CPT

## 2025-03-14 PROCEDURE — 96361 HYDRATE IV INFUSION ADD-ON: CPT

## 2025-03-14 RX ADMIN — SODIUM CHLORIDE 1000 ML: 0.9 INJECTION, SOLUTION INTRAVENOUS at 08:50

## 2025-03-14 NOTE — PROGRESS NOTES
Teresa Mao  tolerated hydration treatment well with no complications.      Teresa Mao is aware of future appt on Monday    AVS printed and given to Teresa Mao:  No (Declined by Teresa Mao)

## 2025-03-17 ENCOUNTER — HOSPITAL ENCOUNTER (OUTPATIENT)
Dept: INFUSION CENTER | Facility: HOSPITAL | Age: 70
Discharge: HOME | End: 2025-03-17
Payer: MEDICARE

## 2025-03-17 VITALS
OXYGEN SATURATION: 97 % | TEMPERATURE: 97.9 F | RESPIRATION RATE: 18 BRPM | DIASTOLIC BLOOD PRESSURE: 61 MMHG | SYSTOLIC BLOOD PRESSURE: 128 MMHG | HEART RATE: 70 BPM

## 2025-03-17 DIAGNOSIS — G90.A POSTURAL ORTHOSTATIC TACHYCARDIA SYNDROME: Primary | ICD-10-CM

## 2025-03-17 PROCEDURE — 96361 HYDRATE IV INFUSION ADD-ON: CPT

## 2025-03-17 PROCEDURE — 96360 HYDRATION IV INFUSION INIT: CPT

## 2025-03-17 RX ADMIN — SODIUM CHLORIDE 1000 ML: 0.9 INJECTION, SOLUTION INTRAVENOUS at 08:42

## 2025-03-17 NOTE — PROGRESS NOTES
Patient tolerated NSS IV hydration without issues.      Teresa Mao is aware of future appt on 3/18/25 at 0830.     AVS -  No (Declined by Teresa Mao) Patient has Mychart.    Patient ambulated off unit without incident.  All personal belongings taken with patient.

## 2025-03-20 ENCOUNTER — HOSPITAL ENCOUNTER (OUTPATIENT)
Dept: INFUSION CENTER | Facility: HOSPITAL | Age: 70
Discharge: HOME | End: 2025-03-20
Payer: MEDICARE

## 2025-03-20 DIAGNOSIS — G90.A POSTURAL ORTHOSTATIC TACHYCARDIA SYNDROME: Primary | ICD-10-CM

## 2025-03-20 PROCEDURE — 96360 HYDRATION IV INFUSION INIT: CPT

## 2025-03-20 PROCEDURE — 96361 HYDRATE IV INFUSION ADD-ON: CPT

## 2025-03-20 RX ADMIN — SODIUM CHLORIDE 1000 ML: 0.9 INJECTION, SOLUTION INTRAVENOUS at 08:27

## 2025-03-21 ENCOUNTER — HOSPITAL ENCOUNTER (OUTPATIENT)
Dept: INFUSION CENTER | Facility: HOSPITAL | Age: 70
End: 2025-03-21
Payer: MEDICARE

## 2025-03-21 VITALS
OXYGEN SATURATION: 97 % | HEART RATE: 77 BPM | DIASTOLIC BLOOD PRESSURE: 67 MMHG | RESPIRATION RATE: 18 BRPM | TEMPERATURE: 98.1 F | SYSTOLIC BLOOD PRESSURE: 145 MMHG

## 2025-03-21 DIAGNOSIS — G90.A POSTURAL ORTHOSTATIC TACHYCARDIA SYNDROME: Primary | ICD-10-CM

## 2025-03-21 PROCEDURE — 96361 HYDRATE IV INFUSION ADD-ON: CPT

## 2025-03-21 PROCEDURE — 96360 HYDRATION IV INFUSION INIT: CPT

## 2025-03-21 RX ADMIN — SODIUM CHLORIDE 1000 ML: 0.9 INJECTION, SOLUTION INTRAVENOUS at 08:44

## 2025-03-25 ENCOUNTER — TELEPHONE (OUTPATIENT)
Dept: INFUSION CENTER | Facility: HOSPITAL | Age: 70
End: 2025-03-25

## 2025-03-25 ENCOUNTER — HOSPITAL ENCOUNTER (OUTPATIENT)
Dept: INFUSION CENTER | Facility: HOSPITAL | Age: 70
Discharge: HOME | End: 2025-03-25
Payer: MEDICARE

## 2025-03-25 DIAGNOSIS — G90.A POSTURAL ORTHOSTATIC TACHYCARDIA SYNDROME: Primary | ICD-10-CM

## 2025-03-25 PROCEDURE — 96360 HYDRATION IV INFUSION INIT: CPT

## 2025-03-25 PROCEDURE — 96361 HYDRATE IV INFUSION ADD-ON: CPT

## 2025-03-25 RX ADMIN — SODIUM CHLORIDE 1000 ML: 0.9 INJECTION, SOLUTION INTRAVENOUS at 08:44

## 2025-03-25 NOTE — TELEPHONE ENCOUNTER
[facility] wanting to start patient on IVIG.  Called provider privileges - Dr. Tang Bowen does not have privileges with [facility].     - Waiting for call back from [facility] to make them aware we are unable to accept orders from this provider.

## 2025-03-25 NOTE — TELEPHONE ENCOUNTER
Spoke with central scheduling staff at [facility]  - they will get this message to office nurse.

## 2025-03-25 NOTE — PROGRESS NOTES
Teresa Mao  tolerated hydration and dressing change well with no complications.      Teresa Mao is aware of future appt on 3/27 at 8:30AM.     AVS printed and given to Teresa Mao: No (Declined by Teresa Mao).

## 2025-03-27 ENCOUNTER — HOSPITAL ENCOUNTER (OUTPATIENT)
Dept: INFUSION CENTER | Facility: HOSPITAL | Age: 70
End: 2025-03-27
Payer: MEDICARE

## 2025-03-27 VITALS
OXYGEN SATURATION: 98 % | RESPIRATION RATE: 18 BRPM | DIASTOLIC BLOOD PRESSURE: 67 MMHG | HEART RATE: 87 BPM | SYSTOLIC BLOOD PRESSURE: 146 MMHG | TEMPERATURE: 98.2 F

## 2025-03-27 DIAGNOSIS — N18.31 STAGE 3A CHRONIC KIDNEY DISEASE (HCC): ICD-10-CM

## 2025-03-27 DIAGNOSIS — G90.A POSTURAL ORTHOSTATIC TACHYCARDIA SYNDROME: Primary | ICD-10-CM

## 2025-03-27 LAB
ALBUMIN SERPL BCG-MCNC: 4.5 G/DL (ref 3.5–5)
ALP SERPL-CCNC: 113 U/L (ref 34–104)
ALT SERPL W P-5'-P-CCNC: 37 U/L (ref 7–52)
ANION GAP SERPL CALCULATED.3IONS-SCNC: 8 MMOL/L (ref 4–13)
AST SERPL W P-5'-P-CCNC: 31 U/L (ref 13–39)
BACTERIA UR QL AUTO: ABNORMAL /HPF
BASOPHILS # BLD AUTO: 0.05 THOUSANDS/ÂΜL (ref 0–0.1)
BASOPHILS NFR BLD AUTO: 1 % (ref 0–1)
BILIRUB SERPL-MCNC: 0.5 MG/DL (ref 0.2–1)
BILIRUB UR QL STRIP: NEGATIVE
BUN SERPL-MCNC: 14 MG/DL (ref 5–25)
CALCIUM SERPL-MCNC: 9.6 MG/DL (ref 8.4–10.2)
CHLORIDE SERPL-SCNC: 105 MMOL/L (ref 96–108)
CLARITY UR: CLEAR
CO2 SERPL-SCNC: 27 MMOL/L (ref 21–32)
COLOR UR: YELLOW
CREAT SERPL-MCNC: 1 MG/DL (ref 0.6–1.3)
CREAT UR-MCNC: 39 MG/DL
CREAT UR-MCNC: 39.5 MG/DL
EOSINOPHIL # BLD AUTO: 0.22 THOUSAND/ÂΜL (ref 0–0.61)
EOSINOPHIL NFR BLD AUTO: 3 % (ref 0–6)
ERYTHROCYTE [DISTWIDTH] IN BLOOD BY AUTOMATED COUNT: 12.5 % (ref 11.6–15.1)
GFR SERPL CREATININE-BSD FRML MDRD: 57 ML/MIN/1.73SQ M
GLUCOSE P FAST SERPL-MCNC: 92 MG/DL (ref 65–99)
GLUCOSE SERPL-MCNC: 92 MG/DL (ref 65–140)
GLUCOSE UR STRIP-MCNC: NEGATIVE MG/DL
HCT VFR BLD AUTO: 44.7 % (ref 34.8–46.1)
HGB BLD-MCNC: 14.7 G/DL (ref 11.5–15.4)
HGB UR QL STRIP.AUTO: NEGATIVE
IMM GRANULOCYTES # BLD AUTO: 0.03 THOUSAND/UL (ref 0–0.2)
IMM GRANULOCYTES NFR BLD AUTO: 0 % (ref 0–2)
KETONES UR STRIP-MCNC: NEGATIVE MG/DL
LEUKOCYTE ESTERASE UR QL STRIP: NEGATIVE
LYMPHOCYTES # BLD AUTO: 2.27 THOUSANDS/ÂΜL (ref 0.6–4.47)
LYMPHOCYTES NFR BLD AUTO: 32 % (ref 14–44)
MAGNESIUM SERPL-MCNC: 2.2 MG/DL (ref 1.9–2.7)
MCH RBC QN AUTO: 29.9 PG (ref 26.8–34.3)
MCHC RBC AUTO-ENTMCNC: 32.9 G/DL (ref 31.4–37.4)
MCV RBC AUTO: 91 FL (ref 82–98)
MICROALBUMIN UR-MCNC: <7 MG/L
MONOCYTES # BLD AUTO: 0.54 THOUSAND/ÂΜL (ref 0.17–1.22)
MONOCYTES NFR BLD AUTO: 8 % (ref 4–12)
NEUTROPHILS # BLD AUTO: 3.9 THOUSANDS/ÂΜL (ref 1.85–7.62)
NEUTS SEG NFR BLD AUTO: 56 % (ref 43–75)
NITRITE UR QL STRIP: NEGATIVE
NON-SQ EPI CELLS URNS QL MICRO: ABNORMAL /HPF
NRBC BLD AUTO-RTO: 0 /100 WBCS
PH UR STRIP.AUTO: 6 [PH]
PHOSPHATE SERPL-MCNC: 4.3 MG/DL (ref 2.3–4.1)
PLATELET # BLD AUTO: 266 THOUSANDS/UL (ref 149–390)
PMV BLD AUTO: 10.1 FL (ref 8.9–12.7)
POTASSIUM SERPL-SCNC: 4.3 MMOL/L (ref 3.5–5.3)
PROT SERPL-MCNC: 6.9 G/DL (ref 6.4–8.4)
PROT UR STRIP-MCNC: NEGATIVE MG/DL
PROT UR-MCNC: <4 MG/DL
RBC # BLD AUTO: 4.92 MILLION/UL (ref 3.81–5.12)
RBC #/AREA URNS AUTO: ABNORMAL /HPF
SODIUM SERPL-SCNC: 140 MMOL/L (ref 135–147)
SP GR UR STRIP.AUTO: 1.01
UROBILINOGEN UR QL STRIP.AUTO: 0.2 E.U./DL
WBC # BLD AUTO: 7.01 THOUSAND/UL (ref 4.31–10.16)
WBC #/AREA URNS AUTO: ABNORMAL /HPF

## 2025-03-27 PROCEDURE — 84156 ASSAY OF PROTEIN URINE: CPT

## 2025-03-27 PROCEDURE — 82570 ASSAY OF URINE CREATININE: CPT

## 2025-03-27 PROCEDURE — 96360 HYDRATION IV INFUSION INIT: CPT

## 2025-03-27 PROCEDURE — 81001 URINALYSIS AUTO W/SCOPE: CPT

## 2025-03-27 PROCEDURE — 82043 UR ALBUMIN QUANTITATIVE: CPT

## 2025-03-27 PROCEDURE — 85025 COMPLETE CBC W/AUTO DIFF WBC: CPT

## 2025-03-27 PROCEDURE — 84100 ASSAY OF PHOSPHORUS: CPT

## 2025-03-27 PROCEDURE — 83735 ASSAY OF MAGNESIUM: CPT

## 2025-03-27 PROCEDURE — 96361 HYDRATE IV INFUSION ADD-ON: CPT

## 2025-03-27 PROCEDURE — 80053 COMPREHEN METABOLIC PANEL: CPT

## 2025-03-27 RX ADMIN — SODIUM CHLORIDE 1000 ML: 0.9 INJECTION, SOLUTION INTRAVENOUS at 08:43

## 2025-03-27 NOTE — PROGRESS NOTES
Teresa Mao  tolerated hydration treatment well with no complications.      Teresa Mao is aware of future appt on 3/28 at 8:30AM.     AVS printed and given to Teresa Mao: No (Declined by Teresa Mao).

## 2025-03-28 ENCOUNTER — RESULTS FOLLOW-UP (OUTPATIENT)
Dept: NEPHROLOGY | Facility: CLINIC | Age: 70
End: 2025-03-28

## 2025-03-31 ENCOUNTER — HOSPITAL ENCOUNTER (OUTPATIENT)
Dept: INFUSION CENTER | Facility: HOSPITAL | Age: 70
Discharge: HOME | End: 2025-03-31
Payer: MEDICARE

## 2025-03-31 VITALS
SYSTOLIC BLOOD PRESSURE: 142 MMHG | HEART RATE: 78 BPM | RESPIRATION RATE: 18 BRPM | OXYGEN SATURATION: 97 % | TEMPERATURE: 97.5 F | DIASTOLIC BLOOD PRESSURE: 72 MMHG

## 2025-03-31 DIAGNOSIS — G90.A POSTURAL ORTHOSTATIC TACHYCARDIA SYNDROME: Primary | ICD-10-CM

## 2025-03-31 PROCEDURE — 96361 HYDRATE IV INFUSION ADD-ON: CPT

## 2025-03-31 PROCEDURE — 96360 HYDRATION IV INFUSION INIT: CPT

## 2025-03-31 RX ADMIN — SODIUM CHLORIDE 1000 ML: 0.9 INJECTION, SOLUTION INTRAVENOUS at 09:18

## 2025-03-31 NOTE — PROGRESS NOTES
Teresa Mao  tolerated iv hydration treatment well with no complications.      Teresa Mao is aware of future appt on 04/01/25 at 0900.     AVS  No (Declined by Teresa Mao) pt has mychart   Pt AAOx3 at time of discharge

## 2025-03-31 NOTE — PLAN OF CARE
Problem: Potential for Falls  Goal: Patient will remain free of falls  Description: INTERVENTIONS:- Educate patient/family on patient safety including physical limitations- Instruct patient to call for assistance with activity - Consult OT/PT to assist with strengthening/mobility - Keep Call bell within reach- Keep bed low and locked with side rails adjusted as appropriate- Keep care items and personal belongings within reach- Initiate and maintain comfort rounds- Make Fall Risk Sign visible to staff-   Outcome: Progressing

## 2025-04-01 ENCOUNTER — HOSPITAL ENCOUNTER (OUTPATIENT)
Dept: INFUSION CENTER | Facility: HOSPITAL | Age: 70
Discharge: HOME | End: 2025-04-01
Payer: MEDICARE

## 2025-04-01 ENCOUNTER — HOSPITAL ENCOUNTER (OUTPATIENT)
Dept: RADIOLOGY | Facility: HOSPITAL | Age: 70
Discharge: HOME | End: 2025-04-01
Payer: MEDICARE

## 2025-04-01 ENCOUNTER — TELEPHONE (OUTPATIENT)
Dept: RADIOLOGY | Facility: HOSPITAL | Age: 70
End: 2025-04-01

## 2025-04-01 VITALS
HEART RATE: 70 BPM | TEMPERATURE: 97.9 F | DIASTOLIC BLOOD PRESSURE: 78 MMHG | OXYGEN SATURATION: 98 % | RESPIRATION RATE: 18 BRPM | SYSTOLIC BLOOD PRESSURE: 129 MMHG

## 2025-04-01 VITALS
OXYGEN SATURATION: 98 % | RESPIRATION RATE: 18 BRPM | SYSTOLIC BLOOD PRESSURE: 158 MMHG | DIASTOLIC BLOOD PRESSURE: 79 MMHG | HEART RATE: 80 BPM | TEMPERATURE: 98.6 F

## 2025-04-01 DIAGNOSIS — M47.816 LUMBAR SPONDYLOSIS: ICD-10-CM

## 2025-04-01 DIAGNOSIS — G90.A POSTURAL ORTHOSTATIC TACHYCARDIA SYNDROME: Primary | ICD-10-CM

## 2025-04-01 PROCEDURE — 96360 HYDRATION IV INFUSION INIT: CPT

## 2025-04-01 PROCEDURE — 96361 HYDRATE IV INFUSION ADD-ON: CPT

## 2025-04-01 PROCEDURE — 64636 DESTROY L/S FACET JNT ADDL: CPT | Performed by: ANESTHESIOLOGY

## 2025-04-01 PROCEDURE — 64635 DESTROY LUMB/SAC FACET JNT: CPT | Performed by: ANESTHESIOLOGY

## 2025-04-01 RX ORDER — LIDOCAINE HYDROCHLORIDE 10 MG/ML
5 INJECTION, SOLUTION EPIDURAL; INFILTRATION; INTRACAUDAL; PERINEURAL ONCE
Status: COMPLETED | OUTPATIENT
Start: 2025-04-01 | End: 2025-04-01

## 2025-04-01 RX ADMIN — SODIUM CHLORIDE 1000 ML: 0.9 INJECTION, SOLUTION INTRAVENOUS at 09:02

## 2025-04-01 RX ADMIN — LIDOCAINE HYDROCHLORIDE 5 ML: 10 INJECTION, SOLUTION EPIDURAL; INFILTRATION; INTRACAUDAL; PERINEURAL at 08:16

## 2025-04-01 RX ADMIN — Medication 4 ML: at 08:27

## 2025-04-01 NOTE — INTERVAL H&P NOTE
Update: (This section must be completed if the H&P was completed greater than 24 hrs to procedure or admission)    H&P reviewed. After examining the patient, I find no changed to the H&P since it had been written.    Patient re-evaluated. Accept as history and physical.    Hu Hernandes MD/April 1, 2025/8:40 AM

## 2025-04-01 NOTE — PROGRESS NOTES
Teresa Mao  tolerated hydration and central iv line dressing change  treatment well with no complications.      Teresa Mao is aware of future appt on thursday    AVS printed and given to Teresa Mao:  No (Declined by Teresa Mao)

## 2025-04-01 NOTE — DISCHARGE INSTR - LAB

## 2025-04-02 ENCOUNTER — TELEPHONE (OUTPATIENT)
Age: 70
End: 2025-04-02

## 2025-04-02 NOTE — TELEPHONE ENCOUNTER
Citlalli..    S/w pt regarding msg below in task. Pt states she had muscle spasms post Lt Lumbar RFA 4/1/25 RM @ . Pt states she went back to bed around 2am, woke at 8am took thyroid med and laid back down. Pt states when she got up she took bp which measured 87/55 w/ HR in the 80's. Pt attributed to POTS dx and incr fluid/sodium intake. Pt took BP again after salt water intake and /91, confirming bp responsive to sodium/fluid incr. Pt states doing chores around home and took BP again after eating breakfast and bp cont to be 's/90's HR of 88. Pt states she feels at this time it is a POTS flare and will cont to monitor throughout the day. RN advised pt that if unable to maintain BP she will need to be seen in ED. Pt verbalized understanding and states she has VV w/ cardio on Friday.     In regards to Lt RFA.  Pt states doing well at this time only muscle spasms/ min sorness. Pt states needle sites are WNL and denies ss of infect at this time. Pt has Rt Lumbar RFA on 4/18/25 RM @ .     Caller: Patient      Doctor: Dr. Hernandes      Reason for call: Patient had procedure yesterday. Took BP and it is currently at 87/55. Patient states they do have POTS but her BP has never been this low. She is having her other POTS symptoms of lightheaded and dizzy. She is not sure if this would all be related to the procedure or not.         Please assist     Call back#: [phone]

## 2025-04-02 NOTE — TELEPHONE ENCOUNTER
Caller: Patient     Doctor: Dr. Hernandes     Reason for call: Patient had procedure yesterday. Took BP and it is currently at 87/55. Patient states they do have POTS but her BP has never been this low. She is having her other POTS symptoms of lightheaded and dizzy. She is not sure if this would all be related to the procedure or not.       Please assist    Call back#: [phone]

## 2025-04-03 ENCOUNTER — HOSPITAL ENCOUNTER (OUTPATIENT)
Dept: INFUSION CENTER | Facility: HOSPITAL | Age: 70
End: 2025-04-03
Payer: MEDICARE

## 2025-04-03 VITALS
RESPIRATION RATE: 16 BRPM | SYSTOLIC BLOOD PRESSURE: 117 MMHG | OXYGEN SATURATION: 99 % | TEMPERATURE: 97.7 F | DIASTOLIC BLOOD PRESSURE: 16 MMHG | HEART RATE: 74 BPM

## 2025-04-03 DIAGNOSIS — G90.A POSTURAL ORTHOSTATIC TACHYCARDIA SYNDROME: Primary | ICD-10-CM

## 2025-04-03 PROCEDURE — 96361 HYDRATE IV INFUSION ADD-ON: CPT

## 2025-04-03 PROCEDURE — 96360 HYDRATION IV INFUSION INIT: CPT

## 2025-04-03 RX ADMIN — SODIUM CHLORIDE 1000 ML: 0.9 INJECTION, SOLUTION INTRAVENOUS at 09:36

## 2025-04-04 ENCOUNTER — TELEPHONE (OUTPATIENT)
Age: 70
End: 2025-04-04

## 2025-04-04 ENCOUNTER — HOSPITAL ENCOUNTER (OUTPATIENT)
Dept: INFUSION CENTER | Facility: HOSPITAL | Age: 70
End: 2025-04-04
Payer: MEDICARE

## 2025-04-04 VITALS
OXYGEN SATURATION: 97 % | DIASTOLIC BLOOD PRESSURE: 67 MMHG | TEMPERATURE: 98 F | HEART RATE: 75 BPM | SYSTOLIC BLOOD PRESSURE: 142 MMHG | RESPIRATION RATE: 18 BRPM

## 2025-04-04 DIAGNOSIS — G90.A POSTURAL ORTHOSTATIC TACHYCARDIA SYNDROME: Primary | ICD-10-CM

## 2025-04-04 PROCEDURE — 96361 HYDRATE IV INFUSION ADD-ON: CPT

## 2025-04-04 PROCEDURE — 96360 HYDRATION IV INFUSION INIT: CPT

## 2025-04-04 RX ADMIN — SODIUM CHLORIDE 1000 ML: 0.9 INJECTION, SOLUTION INTRAVENOUS at 08:29

## 2025-04-04 NOTE — PROGRESS NOTES
Patient tolerated IV hydration without issues.      Teresa Mao is aware of future appt on 4/7/25 at 0930.     AVS -  No (Declined by Teresa Mao) Patient has Mychart.    Patient ambulated off unit without incident.  All personal belongings taken with patient.

## 2025-04-04 NOTE — TELEPHONE ENCOUNTER
Received a call from Virgie with [facility] calling to inform PCP that they wanted to initiate patiens IVIG infusion however, the patient has stated that she would rather have it done where she receives her IV Immunoglobulin infusion.     They are unable to place the order since they do not have privileges at the [facility].       They are requesting for PCP to place the order.       Virgie can be reached at [phone]  Or [phone]

## 2025-04-04 NOTE — TELEPHONE ENCOUNTER
Called Virgie back from [facility] and notified her of this. She reports she will call [facility] again to clarify this. States she was told by them they are unable to place the order since they do not have privileges.

## 2025-04-07 ENCOUNTER — HOSPITAL ENCOUNTER (OUTPATIENT)
Dept: INFUSION CENTER | Facility: HOSPITAL | Age: 70
Discharge: HOME | End: 2025-04-07
Payer: MEDICARE

## 2025-04-07 DIAGNOSIS — G90.A POSTURAL ORTHOSTATIC TACHYCARDIA SYNDROME: Primary | ICD-10-CM

## 2025-04-07 PROCEDURE — 96361 HYDRATE IV INFUSION ADD-ON: CPT

## 2025-04-07 PROCEDURE — 96360 HYDRATION IV INFUSION INIT: CPT

## 2025-04-07 RX ADMIN — SODIUM CHLORIDE 1000 ML: 0.9 INJECTION, SOLUTION INTRAVENOUS at 09:41

## 2025-04-07 NOTE — PLAN OF CARE
Problem: Potential for Falls  Goal: Patient will remain free of falls  Description: INTERVENTIONS:- Educate patient/family on patient safety including physical limitations- I  Outcome: Progressing

## 2025-04-07 NOTE — TELEPHONE ENCOUNTER
Patient called, and she stated she needs to have an updated IV order placed for her.   She met with her [facility] Cardiologist, Dr. Mccauley on Friday 4/4/25. They would the patient to do 1500 cc of normal saline over 6 hrs when she has a medical or surgical procedure scheduled.     She is asking if we can contact Dr. Mccauley office, best number: [phone].    Please advise, thank you

## 2025-04-07 NOTE — PROGRESS NOTES
Teresa Mao  tolerated hydration treatment well with no complications.      Teresa Mao is aware of future appt on 04/08/25 at 0830.     AVS  No (Declined by Teresa Mao) pt has mychart     Pt took all belongings and was AAOx3 at time of discharge.

## 2025-04-07 NOTE — TELEPHONE ENCOUNTER
Ema with Dr. Mccauley at [facility] called to request a call back from  Dr. Harper. She states patient reached out to their office to discuss change in saline infusions. They need to discuss with  Dr. Harper, but are having difficulty using EPIC messaging. She states when he calls he can ask to speak directly with Ema, Dr. Mccauley' nurse.

## 2025-04-07 NOTE — TELEPHONE ENCOUNTER
Ema from Dr. Mccauley office called back in regards to patient. I spoke to her and notified her that a Virgie from [facility] at [facility] called originally on patient about IVIG orders and that I notified Virgie from Allergy that Dr. Harper does not feel comfortable ordering that it is out of his scope and that Allergy would have to reach out to [facility]. Then said how she called in regards to patient and then patient called asking for updated IV orders for 1500 cc of normal saline over 6 hrs when she has a medical or surgical procedure which Ema said there are already orders on this. She is not sure why patient keeps reaching out to our office. She states the [facility] Cardiology would not be able to order that either and  I gave her [facility]'s number. So not really sure what else we need to do from here.

## 2025-04-07 NOTE — TELEPHONE ENCOUNTER
Please contact Dr. Dunn office and let him know he can order the infusion himself and please provide him the number for the infusion center.

## 2025-04-08 ENCOUNTER — HOSPITAL ENCOUNTER (OUTPATIENT)
Dept: INFUSION CENTER | Facility: HOSPITAL | Age: 70
Discharge: HOME | End: 2025-04-08
Payer: MEDICARE

## 2025-04-08 VITALS
OXYGEN SATURATION: 99 % | RESPIRATION RATE: 16 BRPM | HEART RATE: 67 BPM | DIASTOLIC BLOOD PRESSURE: 77 MMHG | TEMPERATURE: 97.4 F | SYSTOLIC BLOOD PRESSURE: 120 MMHG

## 2025-04-08 DIAGNOSIS — G90.A POSTURAL ORTHOSTATIC TACHYCARDIA SYNDROME: Primary | ICD-10-CM

## 2025-04-08 PROCEDURE — 96361 HYDRATE IV INFUSION ADD-ON: CPT

## 2025-04-08 PROCEDURE — 96360 HYDRATION IV INFUSION INIT: CPT

## 2025-04-08 RX ADMIN — SODIUM CHLORIDE 1000 ML: 0.9 INJECTION, SOLUTION INTRAVENOUS at 08:42

## 2025-04-08 NOTE — PROGRESS NOTES
Teresa Mao  tolerated hydration treatment well with no complications.      Teresa Mao is aware of future appt on 4/10 at 8:30AM.     AVS printed and given to Teresa Mao: No (Declined by Teresa Mao).

## 2025-04-08 NOTE — TELEPHONE ENCOUNTER
I spoke with patient, Dr Harper will place the order for saline infusion 1500cc over 6 hours for post procedure days, will notify patient once order is placed

## 2025-04-09 DIAGNOSIS — E06.3 HYPOTHYROIDISM DUE TO HASHIMOTO THYROIDITIS: ICD-10-CM

## 2025-04-09 RX ORDER — THYROID,PORK 100 % USP
32 POWDER (GRAM) MISCELLANEOUS DAILY
Qty: 50000 G | Refills: 0 | Status: SHIPPED | OUTPATIENT
Start: 2025-04-09

## 2025-04-09 NOTE — TELEPHONE ENCOUNTER
Reason for call:   [x] Refill   [] Prior Auth  [] Other:     Office:   [x] PCP/Provider - Ricky Harper   [] Specialty/Provider -     Medication: Thyroid, Porcine, POWD     Dose/Frequency: Use 32 mg daily     Quantity: 50,000 g     Pharmacy: [facility] - [facility]     Local Pharmacy   Does the patient have enough for 3 days?   [] Yes   [x] No - Send as HP to POD

## 2025-04-10 ENCOUNTER — HOSPITAL ENCOUNTER (OUTPATIENT)
Dept: INFUSION CENTER | Facility: HOSPITAL | Age: 70
End: 2025-04-10
Payer: MEDICARE

## 2025-04-10 VITALS
SYSTOLIC BLOOD PRESSURE: 154 MMHG | HEART RATE: 80 BPM | RESPIRATION RATE: 18 BRPM | OXYGEN SATURATION: 99 % | DIASTOLIC BLOOD PRESSURE: 95 MMHG | TEMPERATURE: 98.4 F

## 2025-04-10 DIAGNOSIS — G90.A POSTURAL ORTHOSTATIC TACHYCARDIA SYNDROME: Primary | ICD-10-CM

## 2025-04-10 PROCEDURE — 96360 HYDRATION IV INFUSION INIT: CPT

## 2025-04-10 PROCEDURE — 96361 HYDRATE IV INFUSION ADD-ON: CPT

## 2025-04-10 RX ADMIN — SODIUM CHLORIDE 1000 ML: 0.9 INJECTION, SOLUTION INTRAVENOUS at 08:28

## 2025-04-10 NOTE — PROGRESS NOTES
Teresa Mao  tolerated hydration via picc treatment well with no complications.      Teresa Mao is aware of future appt tomorrow    AVS printed and given to Teresa Mao:  No (Declined by Teresa Mao)

## 2025-04-11 ENCOUNTER — HOSPITAL ENCOUNTER (OUTPATIENT)
Dept: INFUSION CENTER | Facility: HOSPITAL | Age: 70
End: 2025-04-11
Payer: MEDICARE

## 2025-04-11 DIAGNOSIS — G90.A POSTURAL ORTHOSTATIC TACHYCARDIA SYNDROME: Primary | ICD-10-CM

## 2025-04-11 PROCEDURE — 96361 HYDRATE IV INFUSION ADD-ON: CPT

## 2025-04-11 PROCEDURE — 96360 HYDRATION IV INFUSION INIT: CPT

## 2025-04-11 RX ADMIN — SODIUM CHLORIDE 1000 ML: 0.9 INJECTION, SOLUTION INTRAVENOUS at 09:00

## 2025-04-11 NOTE — PROGRESS NOTES
Teresa Mao  tolerated hydration well with no complications.      Teresa Mao is aware of future appt on 4-14-24 at 830    AVS declined

## 2025-04-14 ENCOUNTER — HOSPITAL ENCOUNTER (OUTPATIENT)
Dept: INFUSION CENTER | Facility: HOSPITAL | Age: 70
Discharge: HOME | End: 2025-04-14
Payer: MEDICARE

## 2025-04-14 VITALS
DIASTOLIC BLOOD PRESSURE: 87 MMHG | RESPIRATION RATE: 18 BRPM | SYSTOLIC BLOOD PRESSURE: 128 MMHG | OXYGEN SATURATION: 98 % | TEMPERATURE: 97.9 F | HEART RATE: 79 BPM

## 2025-04-14 DIAGNOSIS — G90.A POSTURAL ORTHOSTATIC TACHYCARDIA SYNDROME: Primary | ICD-10-CM

## 2025-04-14 PROCEDURE — 96360 HYDRATION IV INFUSION INIT: CPT

## 2025-04-14 PROCEDURE — 96361 HYDRATE IV INFUSION ADD-ON: CPT

## 2025-04-14 RX ADMIN — SODIUM CHLORIDE 1000 ML: 0.9 INJECTION, SOLUTION INTRAVENOUS at 08:32

## 2025-04-15 ENCOUNTER — HOSPITAL ENCOUNTER (OUTPATIENT)
Dept: INFUSION CENTER | Facility: HOSPITAL | Age: 70
Discharge: HOME | End: 2025-04-15
Payer: MEDICARE

## 2025-04-15 ENCOUNTER — TRANSCRIBE ORDERS (OUTPATIENT)
Dept: ADMINISTRATIVE | Facility: HOSPITAL | Age: 70
End: 2025-04-15

## 2025-04-15 VITALS
SYSTOLIC BLOOD PRESSURE: 132 MMHG | DIASTOLIC BLOOD PRESSURE: 70 MMHG | OXYGEN SATURATION: 98 % | RESPIRATION RATE: 18 BRPM | TEMPERATURE: 98.5 F | HEART RATE: 78 BPM

## 2025-04-15 DIAGNOSIS — Z12.31 ENCOUNTER FOR SCREENING MAMMOGRAM FOR MALIGNANT NEOPLASM OF BREAST: Primary | ICD-10-CM

## 2025-04-15 DIAGNOSIS — Z12.31 ENCOUNTER FOR SCREENING MAMMOGRAM FOR BREAST CANCER: Primary | ICD-10-CM

## 2025-04-15 DIAGNOSIS — G90.A POSTURAL ORTHOSTATIC TACHYCARDIA SYNDROME: Primary | ICD-10-CM

## 2025-04-15 DIAGNOSIS — G90.A POSTURAL ORTHOSTATIC TACHYCARDIA SYNDROME: ICD-10-CM

## 2025-04-15 PROCEDURE — 96361 HYDRATE IV INFUSION ADD-ON: CPT

## 2025-04-15 PROCEDURE — 96360 HYDRATION IV INFUSION INIT: CPT

## 2025-04-15 RX ADMIN — SODIUM CHLORIDE 1000 ML: 0.9 INJECTION, SOLUTION INTRAVENOUS at 08:42

## 2025-04-15 NOTE — PROGRESS NOTES
Teresa Mao  tolerated hydrartion and picc redress treatment well with no complications.      Teresa Mao is aware of future appt on thursday    AVS printed and given to Teresa Mao:   No (Declined by Teresa Mao)

## 2025-04-17 ENCOUNTER — HOSPITAL ENCOUNTER (OUTPATIENT)
Dept: INFUSION CENTER | Facility: HOSPITAL | Age: 70
End: 2025-04-17
Payer: MEDICARE

## 2025-04-17 VITALS
RESPIRATION RATE: 17 BRPM | DIASTOLIC BLOOD PRESSURE: 63 MMHG | SYSTOLIC BLOOD PRESSURE: 139 MMHG | OXYGEN SATURATION: 97 % | HEART RATE: 70 BPM | TEMPERATURE: 98.2 F

## 2025-04-17 DIAGNOSIS — G90.A POSTURAL ORTHOSTATIC TACHYCARDIA SYNDROME: Primary | ICD-10-CM

## 2025-04-17 PROCEDURE — 96360 HYDRATION IV INFUSION INIT: CPT

## 2025-04-17 PROCEDURE — 96361 HYDRATE IV INFUSION ADD-ON: CPT

## 2025-04-17 RX ADMIN — SODIUM CHLORIDE 1000 ML: 0.9 INJECTION, SOLUTION INTRAVENOUS at 08:54

## 2025-04-17 NOTE — PROGRESS NOTES
Teresa Mao  tolerated hydration well with no complications.      Teresa Mao is aware of future appt on 4-18-25 at 830    AVS declined

## 2025-04-18 ENCOUNTER — HOSPITAL ENCOUNTER (OUTPATIENT)
Dept: INFUSION CENTER | Facility: HOSPITAL | Age: 70
End: 2025-04-18
Attending: STUDENT IN AN ORGANIZED HEALTH CARE EDUCATION/TRAINING PROGRAM
Payer: MEDICARE

## 2025-04-18 ENCOUNTER — TELEPHONE (OUTPATIENT)
Dept: PAIN MEDICINE | Facility: CLINIC | Age: 70
End: 2025-04-18

## 2025-04-18 ENCOUNTER — HOSPITAL ENCOUNTER (OUTPATIENT)
Dept: RADIOLOGY | Facility: HOSPITAL | Age: 70
End: 2025-04-18
Payer: MEDICARE

## 2025-04-18 VITALS
SYSTOLIC BLOOD PRESSURE: 143 MMHG | DIASTOLIC BLOOD PRESSURE: 80 MMHG | OXYGEN SATURATION: 98 % | HEART RATE: 73 BPM | RESPIRATION RATE: 20 BRPM | TEMPERATURE: 97 F

## 2025-04-18 VITALS
TEMPERATURE: 97.5 F | DIASTOLIC BLOOD PRESSURE: 75 MMHG | SYSTOLIC BLOOD PRESSURE: 121 MMHG | RESPIRATION RATE: 18 BRPM | OXYGEN SATURATION: 100 % | HEART RATE: 66 BPM

## 2025-04-18 DIAGNOSIS — G90.A POSTURAL ORTHOSTATIC TACHYCARDIA SYNDROME: Primary | ICD-10-CM

## 2025-04-18 DIAGNOSIS — M47.816 LUMBAR SPONDYLOSIS: ICD-10-CM

## 2025-04-18 PROCEDURE — 96361 HYDRATE IV INFUSION ADD-ON: CPT

## 2025-04-18 PROCEDURE — 64635 DESTROY LUMB/SAC FACET JNT: CPT | Performed by: ANESTHESIOLOGY

## 2025-04-18 PROCEDURE — 96360 HYDRATION IV INFUSION INIT: CPT

## 2025-04-18 PROCEDURE — 64636 DESTROY L/S FACET JNT ADDL: CPT | Performed by: ANESTHESIOLOGY

## 2025-04-18 RX ORDER — LIDOCAINE HYDROCHLORIDE 10 MG/ML
5 INJECTION, SOLUTION EPIDURAL; INFILTRATION; INTRACAUDAL; PERINEURAL ONCE
Status: COMPLETED | OUTPATIENT
Start: 2025-04-18 | End: 2025-04-18

## 2025-04-18 RX ORDER — BUPIVACAINE HCL/PF 2.5 MG/ML
5 VIAL (ML) INJECTION ONCE
Status: COMPLETED | OUTPATIENT
Start: 2025-04-18 | End: 2025-04-18

## 2025-04-18 RX ADMIN — LIDOCAINE HYDROCHLORIDE 5 ML: 10 INJECTION, SOLUTION EPIDURAL; INFILTRATION; INTRACAUDAL; PERINEURAL at 08:28

## 2025-04-18 RX ADMIN — SODIUM CHLORIDE 1500 ML: 9 INJECTION, SOLUTION INTRAVENOUS at 09:20

## 2025-04-18 RX ADMIN — Medication 3 ML: at 08:33

## 2025-04-18 NOTE — PLAN OF CARE
Problem: Potential for Falls  Goal: Patient will remain free of falls  Description: INTERVENTIONS:- Educate patient/family on patient safety including physical limitations- Instruct patient to call for assistance with activity - Consult OT/PT to assist with strengthening/mobility - Keep Call bell within reach- Keep bed low and locked with side rails adjusted as appropriate- Keep care items and personal belongings within reach- Initiate and maintain comfort rounds- Make Fall Risk Sign visible to spatients- Consider moving patient to room near nurses station  Outcome: Progressing

## 2025-04-18 NOTE — H&P
Assessment:  1. Lumbar spondylosis  FL spine and pain procedure    FL spine and pain procedure          Plan:  Teresa Mao is a 69 y.o. female with complaints of low back pain presents to surgical center for procedure.  We will perform a right L2-L3 and L3-L4  2. Follow-up 1 month after injection    Complete risks and benefits including bleeding, infection, tissue reaction, nerve injury and allergic reaction were discussed. The approach was demonstrated using models and literature was provided. Verbal and written consent was obtained.    My impressions and treatment recommendations were discussed in detail with the patient who verbalized understanding and had no further questions.  Discharge instructions were provided. I personally saw and examined the patient and I agree with the above discussed plan of care.    Orders Placed This Encounter   Procedures    FL spine and pain procedure     Standing Status:   Standing     Number of Occurrences:   1     Reason for Exam::   right L2-3 and L3-4 RFA     Anticoagulant hold needed?:   no     New Medications Ordered This Visit   Medications    lidocaine (PF) (XYLOCAINE-MPF) 1 % injection 5 mL    lidocaine (PF) (XYLOCAINE-MPF) 2 % injection 4 mL    bupivacaine (PF) (MARCAINE) 0.25 % injection 5 mL       History of Present Illness:  Teresa Mao is a 69 y.o. female who presents for a follow up office visit in regards to low back pain.   The patient’s current symptoms include intermittent sharp, stabbing, throbbing pain without particular time pattern.      I have personally reviewed and/or updated the patient's past medical history, past surgical history, family history, social history, current medications, allergies, and vital signs today.     Review of Systems   Musculoskeletal:  Positive for arthralgias and back pain.   All other systems reviewed and are negative.      Patient Active Problem List   Diagnosis    LBBB (left bundle branch block)    Fibromyalgia     Postural orthostatic tachycardia syndrome    Sleep-related breathing disorder    Other insomnia    Restless leg syndrome    Bruxism    Anxiety and depression    Chronic rhinitis    Moderate persistent asthma    Gastroesophageal reflux disease without esophagitis    Chronic pain disorder    Factor V Leiden mutation ([facility])    Hypothyroidism    Weakness    Traumatic tear of supraspinatus tendon of right shoulder    Sicca syndrome ([facility])    S/P left rotator cuff repair    Generalized pain    Myofascial pain syndrome    Arachnoiditis    Cervical radiculopathy    Chronic bilateral low back pain with bilateral sciatica    Lumbar radiculopathy    Encounter for long-term opiate analgesic use    Uncomplicated opioid dependence ([facility])    Cardiomyopathy, unspecified type ([facility])    Stage 3a chronic kidney disease ([facility])    Urinary incontinence    Abnormal electrocardiography    Abnormal TSH    Asthma    Biliary dyskinesia    Blepharitis of both eyes    Degeneration of intervertebral disc    Diverticulosis of colon    Hashimoto's disease    Herniated nucleus pulposus, L3-4    Hyperlipidemia    Irritable bowel syndrome    Migraine without aura and without status migrainosus, not intractable    Mitral valve prolapse    Obstructive sleep apnea syndrome    Other osteoporosis without current pathological fracture    Rheumatic fever with cardiac involvement    Scoliosis (and kyphoscoliosis), idiopathic    Sjogren's syndrome ([facility])    Osteoarthritis    TMJ (temporomandibular joint syndrome)    Vitamin D deficiency    Chronic interstitial cystitis    Radiculopathy, multiple sites in spine    CVID (common variable immunodeficiency) ([facility])    Cellulitis    Cervical spondylosis    Neck pain    Sprain of deltoid ligament of right ankle    Sprain of anterior talofibular ligament of right ankle    Ankle injury, right, initial encounter    History of lumbar fusion    Lumbar spondylosis    Tear of left supraspinatus tendon    Disorder of autonomic  "nervous system    Biceps tendinitis of left upper extremity    H/O multiple allergies    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HCC)       Past Medical History:   Diagnosis Date    Allergic rhinitis     Anxiety     Asthma     Back pain     Breathing difficulty     Cardiac disease     Cardiopathy     EF 45%    Cough     Diverticulitis     Factor V Leiden ([facility])     Fibromyalgia     GERD (gastroesophageal reflux disease)     Hashimoto's thyroiditis     Hx of degenerative disc disease     Hypertension     Hypotension     pots - postural orthostatic hypotension    Interstitial cystitis     Irregular heart beat     LBBB    Irritable bowel syndrome     Migraines     Mitral valve disease     \"thickening\"    Myocardial infarction ([facility])     possible but not sure when    Neuropathy     bilateral legs    Osteoporosis     PONV (postoperative nausea and vomiting)     Postural orthostatic tachycardia syndrome     must drink a lot of water and salt    Pott's disease     Rheumatic fever 1967    Scoliosis     Sepsis ([facility])     associated with PICC line    Sjogren's syndrome ([facility])     TMJ (dislocation of temporomandibular joint)        Past Surgical History:   Procedure Laterality Date    ABLATION MICROWAVE Left     lumbar area    BACK SURGERY  10/1998     SECTION  1992    CHOLECYSTECTOMY  2016    COLONOSCOPY      DILATION AND CURETTAGE OF UTERUS      EGD  2016    FOOT SURGERY  1968    removal of bone and neuroma    HYSTERECTOMY N/A     age 39    IR OTHER  2022    IR OTHER  2022    IR OTHER  2023    IR OTHER  2024    IR OTHER  2024    IR PICC PLACEMENT SINGLE LUMEN  10/02/2020    IR PICC PLACEMENT SINGLE LUMEN  2021    IR PICC REPOSITION  2021    IR TUNNELED CENTRAL LINE PLACEMENT  2022    LAPAROSCOPY      endometriosis    MOUTH BIOPSY      lip    OOPHORECTOMY Bilateral     age 39    VT EGD TRANSORAL BIOPSY SINGLE/MULTIPLE N/A 2019    Procedure: " ESOPHAGOGASTRODUODENOSCOPY (EGD) with multiple bx and dilation;  Surgeon: Peter Baldwin MD;  Location:  GI LAB;  Service: Gastroenterology    AZ SURGICAL ARTHROSCOPY SHOULDER W/ROTATOR CUFF RPR Right 04/06/2022    Procedure: SHOULDER ARTHROSCOPIC ROTATOR CUFF REPAIR Biceps Tenodisis;  Surgeon: Ilya Munoz MD;  Location: [facility];  Service: Orthopedics    AZ SURGICAL ARTHROSCOPY SHOULDER W/ROTATOR CUFF RPR Left 5/3/2024    Procedure: SHOULDER ARTHROSCOPIC ROTATOR CUFF REPAIR, SUBACROMIAL DECOMPRESSION;  Surgeon: Ilya Munoz MD;  Location: [facility] OR;  Service: Orthopedics    TUNNELED VENOUS CATHETER PLACEMENT  2016       Family History   Problem Relation Age of Onset    Cancer Mother         bladder    No Known Problems Father     Thyroid cancer Sister     Heart attack Sister     No Known Problems Sister     No Known Problems Sister     No Known Problems Sister     No Known Problems Sister     No Known Problems Daughter     No Known Problems Daughter     No Known Problems Maternal Grandmother     Colon cancer Maternal Grandfather     No Known Problems Paternal Grandmother     No Known Problems Paternal Grandfather     Breast cancer Maternal Aunt         over age 50    Endometrial cancer Maternal Aunt     Multiple myeloma Maternal Aunt     Hypertension Paternal Aunt     Brain cancer Niece     Lymphoma Niece     Breast cancer additional onset Neg Hx     BRCA2 Positive Neg Hx     Ovarian cancer Neg Hx     BRCA2 Negative Neg Hx     BRCA1 Positive Neg Hx     BRCA1 Negative Neg Hx     BRCA 1/2 Neg Hx        Social History     Occupational History    Not on file   Tobacco Use    Smoking status: Never    Smokeless tobacco: Never   Vaping Use    Vaping status: Never Used   Substance and Sexual Activity    Alcohol use: Never    Drug use: No    Sexual activity: Not on file       Current Outpatient Medications on File Prior to Encounter   Medication Sig    aluminum hydroxide-magnesium  carbonate (Gaviscon Extra Strength) 160-105 mg per chewable tablet     azelastine (ASTELIN) 0.1 % nasal spray 2 sprays into each nostril 2 (two) times a day Use in each nostril as directed    beclomethasone (QVAR) 40 MCG/ACT inhaler Inhale 1 puff daily as needed (only when unable to nebulize pulmicort) Rinse mouth after use.    budesonide (PULMICORT) 0.5 mg/2 mL nebulizer solution Take 0.5 mg by nebulization 2 (two) times a day Rinse mouth after use.    cromolyn (GASTROCROM) 100 MG/5ML solution Take 100 mg by mouth 4 (four) times a day (before meals and at bedtime)    cyclobenzaprine (FLEXERIL) 5 mg tablet TAKE 1 TABLET BY MOUTH THREE TIMES A DAY AS NEEDED FOR MUSCLE SPASMS    cycloSPORINE (RESTASIS) 0.05 % ophthalmic emulsion Administer 1 drop to both eyes 2 (two) times a day    docusate sodium (COLACE) 100 mg capsule Take 100 mg by mouth daily as needed for constipation    doxycycline (ADOXA) 100 MG tablet Take 100 mg by mouth 2 (two) times a day    Dupilumab (DUPIXENT SC) Inject under the skin Pt unsure of dose    erythromycin (ILOTYCIN) ophthalmic ointment Apply 1 application to eye daily at bedtime    famotidine (PEPCID) 40 MG tablet Twice a day    fexofenadine (ALLEGRA) 180 MG tablet Take 180 mg by mouth 2 (two) times a day     fluticasone (FLONASE) 50 mcg/act nasal spray 2 sprays into each nostril daily     Galcanezumab-gnlm (Emgality) 120 MG/ML SOAJ Inject under the skin every 30 (thirty) days     guaiFENesin (Mucinex) 600 mg 12 hr tablet Take 600 mg by mouth every 12 (twelve) hours    Heating Pad PADS Use if needed (pain)    HYDROcodone-acetaminophen (NORCO)  mg per tablet Take 0.5 tablets by mouth 2 (two) times a day as needed for moderate pain Max Daily Amount: 1 tablet Do not start before March 14, 2025.    HYDROcodone-acetaminophen (NORCO)  mg per tablet Take 0.5 tablets by mouth 2 (two) times a day as needed for moderate pain Max Daily Amount: 1 tablet Do not start before April 11, 2025.     hydrOXYzine (ATARAX) 10 mg/5 mL syrup     ipratropium (ATROVENT) 0.02 % nebulizer solution Take 0.5 mg by nebulization every 6 (six) hours as needed for wheezing or shortness of breath    ipratropium (ATROVENT) 0.06 % nasal spray PLEASE SEE ATTACHED FOR DETAILED DIRECTIONS    ivabradine HCl (Corlanor) 5 MG tablet 7.5 mg 2 (two) times a day    KETOTIFEN FUMARATE OP Take 1mg compounded capsule three times a day    levalbuterol (XOPENEX HFA) 45 mcg/act inhaler Inhale 2 puffs every 4 (four) hours as needed (when unable to nebulize)    levalbuterol (XOPENEX) 1.25 mg/3 mL nebulizer solution Take 1.25 mg by nebulization every 6 (six) hours as needed     Magnesium 400 MG CAPS Take 1 capsule by mouth 2 (two) times a day     methylprednisolone (MEDROL) 4 mg tablet Medrol dosepak, take as directed    MULTIPLE VITAMINS-CALCIUM PO Take 1 capsule by mouth every morning     naloxone (NARCAN) 4 mg/0.1 mL nasal spray Administer 1 spray into a nostril. If no response after 2-3 minutes, give another dose in the other nostril using a new spray.    NON FORMULARY Take 1 mg by mouth 3 (three) times a day Ketotifen 1 mg compounded capsule    NON FORMULARY LACTOBACILLUS COMBINATION NO.4 (PROBIOTIC) 3 BILLION CELL CAPSULE    NON FORMULARY 0.25 % X-DEMVY eye drop    omega-3-acid ethyl esters (LOVAZA) 1 g capsule Take 2 g by mouth 2 (two) times a day 1600mg daily    polyethylene glycol (MIRALAX) 17 g packet Take 17 g by mouth daily as needed     Probiotic Product (PROBIOTIC DAILY PO) Take 1 tablet by mouth daily At lunch    Qvar RediHaler 40 MCG/ACT inhaler     sodium chloride Inject 1,500 mL into a catheter in a vein    sucralfate (CARAFATE) 1 g/10 mL suspension Take 1 g by mouth 2 (two) times a day    Thyroid, Porcine, POWD Use 32 mg daily    Ubrogepant (UBRELVY) 100 MG tablet Take 100 mg by mouth Take 1 tablet (100 mg) one time as needed for migraine. May repeat one additional tablet (100 mg) at least two hours after the first dose. Do not  use more than two doses per day, or for more than eight days per month.    verapamil (CALAN) 40 mg tablet     Xdemvy 0.25 % SOLN Apply 1 drop to eye     Current Facility-Administered Medications on File Prior to Encounter   Medication    alteplase (CATHFLO) injection 2 mg    alteplase (CATHFLO) injection 2 mg    [COMPLETED] sodium chloride 0.9 % bolus 1,000 mL       Allergies   Allergen Reactions    Imipramine Confusion, Fatigue, Irritability, Palpitations, Shortness Of Breath, Tachycardia and Visual Disturbance    Lactose - Food Allergy Shortness Of Breath    Melatonin Shortness Of Breath    Mirtazapine Anxiety, Dizziness, GI Intolerance, Nausea Only, Palpitations and Shortness Of Breath    Nexium [Esomeprazole] Shortness Of Breath    Nsaids Shortness Of Breath, Cough, Other (See Comments), Nausea Only, GI Intolerance, Tachycardia and Wheezing    Propofol Cough, Other (See Comments), Shortness Of Breath and Nasal Congestion    Salmeterol Dizziness, Other (See Comments), Shortness Of Breath and Tachycardia    Singulair [Montelukast] Shortness Of Breath and Cough    Zomig [Zolmitriptan] Shortness Of Breath    Dexilant [Dexlansoprazole] Nausea Only and Vomiting    Ambien [Zolpidem]     Amitriptyline Drowsiness    Ascorbate - Food Allergy GI Intolerance    Ascorbic Acid GI Intolerance    Aspirin     Azithromycin Hives and Itching     In December 2018 or 2019, she was put on a course and developed a fully body rash on the 2nd day within a couple of hours of taking the medication that day. The rash was raised, itchy, and red. Rash lasted for a week after medication was stopped. Denies fever, joint swelling, or mucosal peeling. Prior to this, she had tolerated doses of azithromycin. CBC/d and CMP (1/2/20) were normal    Bactrim [Sulfamethoxazole-Trimethoprim] Hives    Banana - Food Allergy Dermatitis    Banana Extract Allergy Skin Test - Food Allergy Dermatitis    Bisoprolol Other (See Comments)    Ceftin [Cefuroxime]      Celebrex [Celecoxib]     Chocolate - Food Allergy Headache    Ciprofloxacin Dizziness, Hives, Other (See Comments), Itching and Nausea Only    Demerol [Meperidine]     Duloxetine Other (See Comments)     tachycardia    Epinephrine Dizziness    Ergotamine Nausea Only and Headache    Fludrocortisone Other (See Comments)    Keflex [Cephalexin]     Klonopin [Clonazepam] Nausea Only and Dizziness    Latex Hives    Levofloxacin Other (See Comments)    Lexapro [Escitalopram]     Lyrica [Pregabalin] Fatigue    Macrodantin [Nitrofurantoin]     Metoprolol Other (See Comments)    Metronidazole Other (See Comments)    Molds & Smuts GI Intolerance and Other (See Comments)    Morphine Nausea Only    Movantik [Naloxegol] Nausea Only    Mushroom Extract Complex - Food Allergy      Eye itchy, asthma  attack    Naprosyn [Naproxen]     Neurontin [Gabapentin] Dizziness    Pineapple - Food Allergy GI Intolerance    Plecanatide Abdominal Pain, Diarrhea and GI Intolerance    Prolia [Denosumab]     Prozac [Fluoxetine]     Soy Allergy - Food Allergy Vomiting    Sudafed [Pseudoephedrine] Hives    Sulfa Antibiotics     Tomato - Food Allergy GI Intolerance    Trazodone     Ultram [Tramadol] Nausea Only, Dizziness and Headache    Vilazodone Dizziness, GI Intolerance, Headache, Abdominal Pain and Other (See Comments)    Vilazodone Hcl Abdominal Pain, Dizziness, GI Intolerance, Headache, Other (See Comments), Fatigue and Nausea Only    Vioxx [Rofecoxib]     Vortioxetine Drowsiness, Fatigue, GI Intolerance and Irritability    Wheat Bran - Food Allergy Other (See Comments)     Stomach pain    Zinc Other (See Comments), GI Intolerance and Nausea Only    Zoloft [Sertraline]     Albuterol Palpitations, Dizziness, Other (See Comments) and Tachycardia     Dizziness, allergy note 12/11/19 for more specific comments    Cyclobenzaprine Hcl Er Rash    Terfenadine Dizziness and Palpitations    Zantac [Ranitidine] Rash and Dizziness       Physical Exam:    BP  125/75   Pulse 79   Temp (!) 97 °F (36.1 °C) (Tympanic)   Resp 20   SpO2 98%     Constitutional:normal, well developed, well nourished, alert, in no distress and non-toxic and no overt pain behavior.  Eyes:anicteric  HEENT:grossly intact  Neck:supple, symmetric, trachea midline and no masses   Pulmonary:even and unlabored  Cardiovascular:No edema or pitting edema present  Skin:Normal without rashes or lesions and well hydrated  Psychiatric:Mood and affect appropriate  Neurologic:Cranial Nerves II-XII grossly intact  Musculoskeletal:normal

## 2025-04-22 ENCOUNTER — HOSPITAL ENCOUNTER (OUTPATIENT)
Dept: INFUSION CENTER | Facility: HOSPITAL | Age: 70
Discharge: HOME | End: 2025-04-22
Payer: MEDICARE

## 2025-04-22 VITALS
TEMPERATURE: 98.2 F | DIASTOLIC BLOOD PRESSURE: 93 MMHG | OXYGEN SATURATION: 98 % | RESPIRATION RATE: 18 BRPM | HEART RATE: 84 BPM | SYSTOLIC BLOOD PRESSURE: 140 MMHG

## 2025-04-22 DIAGNOSIS — G90.A POSTURAL ORTHOSTATIC TACHYCARDIA SYNDROME: Primary | ICD-10-CM

## 2025-04-22 PROCEDURE — 96360 HYDRATION IV INFUSION INIT: CPT

## 2025-04-22 PROCEDURE — 96361 HYDRATE IV INFUSION ADD-ON: CPT

## 2025-04-22 RX ADMIN — SODIUM CHLORIDE 1000 ML: 0.9 INJECTION, SOLUTION INTRAVENOUS at 08:46

## 2025-04-22 NOTE — PROGRESS NOTES
Tersea Mao  tolerated hydration and  central line dressing change treatment well with no complications.      Teresa Mao is aware of future appt on thursday    AVS printed and given to Teresa Mao:   No (Declined by Teresa Mao)

## 2025-04-22 NOTE — TELEPHONE ENCOUNTER
Caller: Teresa    Doctor: Dr. Dupont     Reason for call: She has been in horrible pain all week today she is feeling better   The pain medication is kicking in and using ice paks that helps     Call back#: [phone]

## 2025-04-22 NOTE — PLAN OF CARE
Problem: Potential for Falls  Goal: Patient will remain free of falls  Description: INTERVENTIONS:- Educate patient/family on patient safety including physical limitations- Instruct patient to call for assistance with activity - Consult OT/PT to assist with strengthening/mobility - Keep Call bell within reach- Keep bed low and locked with side rails adjusted as appropriate- Keep care items and personal belongings within reach- Initiate and maintain comfort rounds- Make Fall Risk Sign visible to patients- Consider moving patient to room near nurses station  Outcome: Progressing

## 2025-04-24 ENCOUNTER — HOSPITAL ENCOUNTER (OUTPATIENT)
Dept: INFUSION CENTER | Facility: HOSPITAL | Age: 70
End: 2025-04-24
Payer: MEDICARE

## 2025-04-24 VITALS
DIASTOLIC BLOOD PRESSURE: 63 MMHG | HEART RATE: 65 BPM | RESPIRATION RATE: 16 BRPM | SYSTOLIC BLOOD PRESSURE: 141 MMHG | TEMPERATURE: 97.3 F

## 2025-04-24 DIAGNOSIS — G90.A POSTURAL ORTHOSTATIC TACHYCARDIA SYNDROME: Primary | ICD-10-CM

## 2025-04-24 PROCEDURE — 96360 HYDRATION IV INFUSION INIT: CPT

## 2025-04-24 PROCEDURE — 96361 HYDRATE IV INFUSION ADD-ON: CPT

## 2025-04-24 RX ADMIN — SODIUM CHLORIDE 1000 ML: 0.9 INJECTION, SOLUTION INTRAVENOUS at 08:41

## 2025-04-25 ENCOUNTER — HOSPITAL ENCOUNTER (OUTPATIENT)
Dept: INFUSION CENTER | Facility: HOSPITAL | Age: 70
End: 2025-04-25
Payer: MEDICARE

## 2025-04-25 VITALS
SYSTOLIC BLOOD PRESSURE: 133 MMHG | HEART RATE: 71 BPM | DIASTOLIC BLOOD PRESSURE: 67 MMHG | OXYGEN SATURATION: 98 % | TEMPERATURE: 97.4 F | RESPIRATION RATE: 18 BRPM

## 2025-04-25 DIAGNOSIS — G90.A POSTURAL ORTHOSTATIC TACHYCARDIA SYNDROME: Primary | ICD-10-CM

## 2025-04-25 PROCEDURE — 96360 HYDRATION IV INFUSION INIT: CPT

## 2025-04-25 PROCEDURE — 96361 HYDRATE IV INFUSION ADD-ON: CPT

## 2025-04-25 RX ADMIN — SODIUM CHLORIDE 1000 ML: 0.9 INJECTION, SOLUTION INTRAVENOUS at 08:45

## 2025-04-25 NOTE — PROGRESS NOTES
Teresa Mao  tolerated hydration well with no complications.      Teresa Mao is aware of future appt on 4-25-25 at 830    AVS declined

## 2025-04-28 ENCOUNTER — HOSPITAL ENCOUNTER (OUTPATIENT)
Dept: INFUSION CENTER | Facility: HOSPITAL | Age: 70
Discharge: HOME | End: 2025-04-28
Payer: MEDICARE

## 2025-04-28 VITALS
TEMPERATURE: 97.3 F | SYSTOLIC BLOOD PRESSURE: 146 MMHG | OXYGEN SATURATION: 99 % | HEART RATE: 64 BPM | DIASTOLIC BLOOD PRESSURE: 78 MMHG | RESPIRATION RATE: 18 BRPM

## 2025-04-28 DIAGNOSIS — G90.A POSTURAL ORTHOSTATIC TACHYCARDIA SYNDROME: Primary | ICD-10-CM

## 2025-04-28 PROCEDURE — 96361 HYDRATE IV INFUSION ADD-ON: CPT

## 2025-04-28 PROCEDURE — 96360 HYDRATION IV INFUSION INIT: CPT

## 2025-04-28 RX ADMIN — SODIUM CHLORIDE 1000 ML: 0.9 INJECTION, SOLUTION INTRAVENOUS at 08:45

## 2025-04-29 ENCOUNTER — HOSPITAL ENCOUNTER (OUTPATIENT)
Dept: INFUSION CENTER | Facility: HOSPITAL | Age: 70
Discharge: HOME | End: 2025-04-29
Attending: STUDENT IN AN ORGANIZED HEALTH CARE EDUCATION/TRAINING PROGRAM
Payer: MEDICARE

## 2025-04-29 VITALS
SYSTOLIC BLOOD PRESSURE: 137 MMHG | DIASTOLIC BLOOD PRESSURE: 75 MMHG | OXYGEN SATURATION: 98 % | HEART RATE: 74 BPM | TEMPERATURE: 97.4 F | RESPIRATION RATE: 18 BRPM

## 2025-04-29 DIAGNOSIS — G90.A POSTURAL ORTHOSTATIC TACHYCARDIA SYNDROME: Primary | ICD-10-CM

## 2025-04-29 PROCEDURE — 96360 HYDRATION IV INFUSION INIT: CPT

## 2025-04-29 PROCEDURE — 96361 HYDRATE IV INFUSION ADD-ON: CPT

## 2025-04-29 RX ADMIN — SODIUM CHLORIDE 1000 ML: 0.9 INJECTION, SOLUTION INTRAVENOUS at 08:29

## 2025-04-29 NOTE — PROGRESS NOTES
Teresa Mao tolerated IV hydration well with no complications. Central line dressing changed per protocol without issue. Transparent dressing used d/t chlorhexidine allergy, with sterile gauze underneath to protect sutures (per pt request).    Teresa Mao is aware of future appt on 5/1 at 8:30AM.     AVS declined by Teresa Mao.

## 2025-04-29 NOTE — PLAN OF CARE
Problem: Potential for Falls  Goal: Patient will remain free of falls  Description: INTERVENTIONS:- Educate patient/family on patient safety including physical limitations- Instruct patient to call for assistance with activity - Consult OT/PT to assist with strengthening/mobility - Keep Call bell within reach- Keep bed low and locked with side rails adjusted as appropriate- Keep care items and personal belongings within reach- Initiate and maintain comfort rounds- Make Fall Risk Sign visible to staff-  Apply yellow socks and bracelet for high fall risk patients- Consider moving patient to room near nurses station  Outcome: Progressing     Problem: Knowledge Deficit  Goal: Patient/family/caregiver demonstrates understanding of disease process, treatment plan, medications, and discharge instructions  Description: Complete learning assessment and assess knowledge base.Interventions:- Provide teaching at level of understanding- Provide teaching via preferred learning methods  Outcome: Progressing

## 2025-05-01 ENCOUNTER — HOSPITAL ENCOUNTER (OUTPATIENT)
Dept: INFUSION CENTER | Facility: HOSPITAL | Age: 70
End: 2025-05-01
Payer: MEDICARE

## 2025-05-01 VITALS
HEART RATE: 80 BPM | DIASTOLIC BLOOD PRESSURE: 77 MMHG | RESPIRATION RATE: 18 BRPM | OXYGEN SATURATION: 98 % | SYSTOLIC BLOOD PRESSURE: 146 MMHG | TEMPERATURE: 97.3 F

## 2025-05-01 DIAGNOSIS — G90.A POSTURAL ORTHOSTATIC TACHYCARDIA SYNDROME: Primary | ICD-10-CM

## 2025-05-01 PROCEDURE — 96360 HYDRATION IV INFUSION INIT: CPT

## 2025-05-01 PROCEDURE — 96361 HYDRATE IV INFUSION ADD-ON: CPT

## 2025-05-01 RX ADMIN — SODIUM CHLORIDE 1000 ML: 0.9 INJECTION, SOLUTION INTRAVENOUS at 09:09

## 2025-05-01 NOTE — PROGRESS NOTES
Patient tolerated IV hydration without issues.     Teresa Mao is aware of future appt on 5/2/25 at 0830.     AVS -  No (Declined by Teresa Mao) Patient has Mychart.    Patient ambulated off unit without incident.  All personal belongings taken with patient.

## 2025-05-02 ENCOUNTER — HOSPITAL ENCOUNTER (OUTPATIENT)
Dept: INFUSION CENTER | Facility: HOSPITAL | Age: 70
End: 2025-05-02
Payer: MEDICARE

## 2025-05-02 VITALS
HEART RATE: 70 BPM | SYSTOLIC BLOOD PRESSURE: 144 MMHG | OXYGEN SATURATION: 100 % | RESPIRATION RATE: 18 BRPM | TEMPERATURE: 98.3 F | DIASTOLIC BLOOD PRESSURE: 67 MMHG

## 2025-05-02 DIAGNOSIS — G90.A POSTURAL ORTHOSTATIC TACHYCARDIA SYNDROME: Primary | ICD-10-CM

## 2025-05-02 PROCEDURE — 96361 HYDRATE IV INFUSION ADD-ON: CPT

## 2025-05-02 PROCEDURE — 96360 HYDRATION IV INFUSION INIT: CPT

## 2025-05-02 RX ADMIN — SODIUM CHLORIDE 1000 ML: 0.9 INJECTION, SOLUTION INTRAVENOUS at 08:37

## 2025-05-02 NOTE — PROGRESS NOTES
Teresa Mao  tolerated hydration treatment well with no complications.      Teresa Mao is aware of future appt on 5/5 at 8:30AM.     AVS printed and given to Teresa Mao: No (Declined by Teresa Mao).

## 2025-05-06 ENCOUNTER — HOSPITAL ENCOUNTER (OUTPATIENT)
Dept: INFUSION CENTER | Facility: HOSPITAL | Age: 70
Discharge: HOME | End: 2025-05-06
Attending: STUDENT IN AN ORGANIZED HEALTH CARE EDUCATION/TRAINING PROGRAM
Payer: MEDICARE

## 2025-05-06 VITALS
TEMPERATURE: 96.8 F | DIASTOLIC BLOOD PRESSURE: 78 MMHG | SYSTOLIC BLOOD PRESSURE: 150 MMHG | OXYGEN SATURATION: 98 % | HEART RATE: 71 BPM | RESPIRATION RATE: 18 BRPM

## 2025-05-06 DIAGNOSIS — G90.A POSTURAL ORTHOSTATIC TACHYCARDIA SYNDROME: Primary | ICD-10-CM

## 2025-05-06 PROCEDURE — 96360 HYDRATION IV INFUSION INIT: CPT

## 2025-05-06 PROCEDURE — 96361 HYDRATE IV INFUSION ADD-ON: CPT

## 2025-05-06 RX ADMIN — SODIUM CHLORIDE 1000 ML: 0.9 INJECTION, SOLUTION INTRAVENOUS at 08:57

## 2025-05-06 NOTE — PROGRESS NOTES
Teresa Mao  tolerated hydration treatment well with no complications.      Teresa Mao is aware of future appt on 5/8 at 8:30AM.     AVS printed and given to Teresa Mao: No (Declined by Teresa Mao).

## 2025-05-09 ENCOUNTER — HOSPITAL ENCOUNTER (OUTPATIENT)
Dept: MAMMOGRAPHY | Facility: HOSPITAL | Age: 70
End: 2025-05-09
Attending: STUDENT IN AN ORGANIZED HEALTH CARE EDUCATION/TRAINING PROGRAM
Payer: MEDICARE

## 2025-05-09 ENCOUNTER — HOSPITAL ENCOUNTER (OUTPATIENT)
Dept: INFUSION CENTER | Facility: HOSPITAL | Age: 70
End: 2025-05-09
Attending: STUDENT IN AN ORGANIZED HEALTH CARE EDUCATION/TRAINING PROGRAM
Payer: MEDICARE

## 2025-05-09 VITALS
SYSTOLIC BLOOD PRESSURE: 145 MMHG | TEMPERATURE: 96.3 F | RESPIRATION RATE: 16 BRPM | DIASTOLIC BLOOD PRESSURE: 77 MMHG | HEART RATE: 97 BPM

## 2025-05-09 DIAGNOSIS — Z12.31 ENCOUNTER FOR SCREENING MAMMOGRAM FOR MALIGNANT NEOPLASM OF BREAST: ICD-10-CM

## 2025-05-09 DIAGNOSIS — G90.A POSTURAL ORTHOSTATIC TACHYCARDIA SYNDROME: Primary | ICD-10-CM

## 2025-05-09 DIAGNOSIS — Z12.31 ENCOUNTER FOR SCREENING MAMMOGRAM FOR BREAST CANCER: ICD-10-CM

## 2025-05-09 PROCEDURE — 77067 SCR MAMMO BI INCL CAD: CPT

## 2025-05-09 PROCEDURE — 96361 HYDRATE IV INFUSION ADD-ON: CPT

## 2025-05-09 PROCEDURE — 96360 HYDRATION IV INFUSION INIT: CPT

## 2025-05-09 PROCEDURE — 77063 BREAST TOMOSYNTHESIS BI: CPT

## 2025-05-09 RX ADMIN — SODIUM CHLORIDE 1000 ML: 0.9 INJECTION, SOLUTION INTRAVENOUS at 08:11

## 2025-05-09 NOTE — PROGRESS NOTES
Teresa Mao  tolerated nss hydration treatment well with no complications.      Teresa Mao is aware of future appt on Monday at 0830    AVS printed and given to Teresa Mao:  No (Declined by Teresa Mao)

## 2025-05-12 ENCOUNTER — HOSPITAL ENCOUNTER (OUTPATIENT)
Dept: INFUSION CENTER | Facility: HOSPITAL | Age: 70
Discharge: HOME | End: 2025-05-12
Payer: MEDICARE

## 2025-05-12 VITALS
HEART RATE: 69 BPM | RESPIRATION RATE: 17 BRPM | TEMPERATURE: 97.5 F | OXYGEN SATURATION: 98 % | SYSTOLIC BLOOD PRESSURE: 140 MMHG | DIASTOLIC BLOOD PRESSURE: 60 MMHG

## 2025-05-12 DIAGNOSIS — G90.A POSTURAL ORTHOSTATIC TACHYCARDIA SYNDROME: Primary | ICD-10-CM

## 2025-05-12 PROCEDURE — 96360 HYDRATION IV INFUSION INIT: CPT

## 2025-05-12 PROCEDURE — 96361 HYDRATE IV INFUSION ADD-ON: CPT

## 2025-05-12 RX ADMIN — SODIUM CHLORIDE 1000 ML: 0.9 INJECTION, SOLUTION INTRAVENOUS at 08:55

## 2025-05-12 NOTE — PROGRESS NOTES
Teresa Mao  tolerated hydration treatment well with no complications.      Teresa Mao is aware of future appt on 5/13 at 8:30AM.     AVS printed and given to Teresa Mao: No (Declined by Teresa Mao).

## 2025-05-16 ENCOUNTER — HOSPITAL ENCOUNTER (OUTPATIENT)
Dept: INFUSION CENTER | Facility: HOSPITAL | Age: 70
End: 2025-05-16
Payer: MEDICARE

## 2025-05-16 ENCOUNTER — APPOINTMENT (OUTPATIENT)
Dept: LAB | Facility: CLINIC | Age: 70
End: 2025-05-16
Payer: MEDICARE

## 2025-05-16 VITALS
RESPIRATION RATE: 20 BRPM | OXYGEN SATURATION: 97 % | TEMPERATURE: 97.8 F | HEART RATE: 76 BPM | DIASTOLIC BLOOD PRESSURE: 70 MMHG | SYSTOLIC BLOOD PRESSURE: 157 MMHG

## 2025-05-16 DIAGNOSIS — D89.42 IDIOPATHIC MAST CELL ACTIVATION SYNDROME (HCC): ICD-10-CM

## 2025-05-16 DIAGNOSIS — G90.A POSTURAL ORTHOSTATIC TACHYCARDIA SYNDROME: Primary | ICD-10-CM

## 2025-05-16 LAB
ALBUMIN SERPL BCG-MCNC: 4.6 G/DL (ref 3.5–5)
ALP SERPL-CCNC: 135 U/L (ref 34–104)
ALT SERPL W P-5'-P-CCNC: 31 U/L (ref 7–52)
ANION GAP SERPL CALCULATED.3IONS-SCNC: 9 MMOL/L (ref 4–13)
AST SERPL W P-5'-P-CCNC: 25 U/L (ref 13–39)
BILIRUB SERPL-MCNC: 0.54 MG/DL (ref 0.2–1)
BUN SERPL-MCNC: 17 MG/DL (ref 5–25)
CALCIUM SERPL-MCNC: 10 MG/DL (ref 8.4–10.2)
CHLORIDE SERPL-SCNC: 101 MMOL/L (ref 96–108)
CO2 SERPL-SCNC: 30 MMOL/L (ref 21–32)
CREAT SERPL-MCNC: 1.12 MG/DL (ref 0.6–1.3)
GFR SERPL CREATININE-BSD FRML MDRD: 49 ML/MIN/1.73SQ M
GLUCOSE SERPL-MCNC: 88 MG/DL (ref 65–140)
IGA SERPL-MCNC: 111 MG/DL (ref 66–433)
IGG SERPL-MCNC: 708 MG/DL (ref 635–1741)
IGM SERPL-MCNC: 108 MG/DL (ref 45–281)
POTASSIUM SERPL-SCNC: 4.4 MMOL/L (ref 3.5–5.3)
PROT SERPL-MCNC: 6.7 G/DL (ref 6.4–8.4)
SODIUM SERPL-SCNC: 140 MMOL/L (ref 135–147)

## 2025-05-16 PROCEDURE — 82784 ASSAY IGA/IGD/IGG/IGM EACH: CPT

## 2025-05-16 RX ADMIN — SODIUM CHLORIDE 1000 ML: 0.9 INJECTION, SOLUTION INTRAVENOUS at 08:59

## 2025-05-16 NOTE — PROGRESS NOTES
Teresa Mao  tolerated hydration treatment well with no complications.      Teresa Mao is aware of future appt on monday    AVS printed and given to Teresa Mao:  No (Declined by Teresa Mao)  she is aware of her schedule

## 2025-05-18 DIAGNOSIS — M79.18 MYOFASCIAL PAIN SYNDROME: ICD-10-CM

## 2025-05-18 DIAGNOSIS — G03.9 ARACHNOIDITIS: ICD-10-CM

## 2025-05-19 ENCOUNTER — RESULTS FOLLOW-UP (OUTPATIENT)
Dept: OTHER | Facility: HOSPITAL | Age: 70
End: 2025-05-19

## 2025-05-19 ENCOUNTER — HOSPITAL ENCOUNTER (OUTPATIENT)
Dept: INFUSION CENTER | Facility: HOSPITAL | Age: 70
Discharge: HOME | End: 2025-05-19
Payer: MEDICARE

## 2025-05-19 VITALS
HEART RATE: 73 BPM | SYSTOLIC BLOOD PRESSURE: 135 MMHG | DIASTOLIC BLOOD PRESSURE: 80 MMHG | OXYGEN SATURATION: 98 % | TEMPERATURE: 96.9 F | RESPIRATION RATE: 17 BRPM

## 2025-05-19 DIAGNOSIS — G90.A POSTURAL ORTHOSTATIC TACHYCARDIA SYNDROME: Primary | ICD-10-CM

## 2025-05-19 RX ORDER — CYCLOBENZAPRINE HCL 5 MG
5 TABLET ORAL 3 TIMES DAILY PRN
Qty: 90 TABLET | Refills: 1 | Status: SHIPPED | OUTPATIENT
Start: 2025-05-19

## 2025-05-19 RX ADMIN — SODIUM CHLORIDE 1000 ML: 0.9 INJECTION, SOLUTION INTRAVENOUS at 08:29

## 2025-05-19 NOTE — PROGRESS NOTES
Patient tolerated IV NSS hydration without issues.      Teresa Mao is aware of future appt on 5/20/25 at 0830.     AVS -  No (Declined by Teresa Mao) Patient has Mychart.    Patient ambulated off unit without incident.  All personal belongings taken with patient.

## 2025-05-20 ENCOUNTER — HOSPITAL ENCOUNTER (OUTPATIENT)
Dept: INFUSION CENTER | Facility: HOSPITAL | Age: 70
Discharge: HOME | End: 2025-05-20
Attending: STUDENT IN AN ORGANIZED HEALTH CARE EDUCATION/TRAINING PROGRAM
Payer: MEDICARE

## 2025-05-20 VITALS
DIASTOLIC BLOOD PRESSURE: 63 MMHG | TEMPERATURE: 98.5 F | SYSTOLIC BLOOD PRESSURE: 128 MMHG | HEART RATE: 64 BPM | RESPIRATION RATE: 16 BRPM | OXYGEN SATURATION: 99 %

## 2025-05-20 DIAGNOSIS — G90.A POSTURAL ORTHOSTATIC TACHYCARDIA SYNDROME: Primary | ICD-10-CM

## 2025-05-20 RX ADMIN — SODIUM CHLORIDE 1000 ML: 0.9 INJECTION, SOLUTION INTRAVENOUS at 08:39

## 2025-05-20 NOTE — PROGRESS NOTES
Teresa Mao  tolerated IV hydration well with no complications.      Teresa Mao is aware of future appt on 5/22 at 8am.     AVS Declined    Left unit in stable condition.

## 2025-05-21 ENCOUNTER — APPOINTMENT (OUTPATIENT)
Dept: LAB | Facility: HOSPITAL | Age: 70
End: 2025-05-21
Payer: MEDICARE

## 2025-05-21 ENCOUNTER — RA CDI HCC (OUTPATIENT)
Dept: OTHER | Facility: HOSPITAL | Age: 70
End: 2025-05-21

## 2025-05-21 DIAGNOSIS — K86.2 CYST OF PANCREAS: ICD-10-CM

## 2025-05-21 PROCEDURE — 84080 ASSAY ALKALINE PHOSPHATASES: CPT

## 2025-05-21 PROCEDURE — 36415 COLL VENOUS BLD VENIPUNCTURE: CPT

## 2025-05-21 PROCEDURE — 84075 ASSAY ALKALINE PHOSPHATASE: CPT

## 2025-05-22 ENCOUNTER — OFFICE VISIT (OUTPATIENT)
Age: 70
End: 2025-05-22
Payer: MEDICARE

## 2025-05-22 ENCOUNTER — HOSPITAL ENCOUNTER (OUTPATIENT)
Dept: INFUSION CENTER | Facility: HOSPITAL | Age: 70
End: 2025-05-22
Payer: MEDICARE

## 2025-05-22 VITALS
HEART RATE: 71 BPM | DIASTOLIC BLOOD PRESSURE: 84 MMHG | OXYGEN SATURATION: 99 % | SYSTOLIC BLOOD PRESSURE: 131 MMHG | TEMPERATURE: 97.2 F | RESPIRATION RATE: 17 BRPM

## 2025-05-22 VITALS — WEIGHT: 138 LBS | HEIGHT: 61 IN | BODY MASS INDEX: 26.06 KG/M2

## 2025-05-22 DIAGNOSIS — M47.812 CERVICAL SPONDYLOSIS: ICD-10-CM

## 2025-05-22 DIAGNOSIS — M54.16 LUMBAR RADICULOPATHY: ICD-10-CM

## 2025-05-22 DIAGNOSIS — M47.816 LUMBAR SPONDYLOSIS: ICD-10-CM

## 2025-05-22 DIAGNOSIS — G89.29 CHRONIC BILATERAL LOW BACK PAIN WITH BILATERAL SCIATICA: ICD-10-CM

## 2025-05-22 DIAGNOSIS — Z79.891 LONG-TERM CURRENT USE OF OPIATE ANALGESIC: ICD-10-CM

## 2025-05-22 DIAGNOSIS — M54.41 CHRONIC BILATERAL LOW BACK PAIN WITH BILATERAL SCIATICA: ICD-10-CM

## 2025-05-22 DIAGNOSIS — Z98.1 HISTORY OF LUMBAR FUSION: ICD-10-CM

## 2025-05-22 DIAGNOSIS — M54.42 CHRONIC BILATERAL LOW BACK PAIN WITH BILATERAL SCIATICA: ICD-10-CM

## 2025-05-22 DIAGNOSIS — F11.90 UNCOMPLICATED OPIOID USE: ICD-10-CM

## 2025-05-22 DIAGNOSIS — M54.12 CERVICAL RADICULOPATHY: ICD-10-CM

## 2025-05-22 DIAGNOSIS — G90.A POSTURAL ORTHOSTATIC TACHYCARDIA SYNDROME: Primary | ICD-10-CM

## 2025-05-22 DIAGNOSIS — M54.2 NECK PAIN: ICD-10-CM

## 2025-05-22 DIAGNOSIS — G89.4 CHRONIC PAIN DISORDER: Primary | ICD-10-CM

## 2025-05-22 DIAGNOSIS — G03.9 ARACHNOIDITIS: ICD-10-CM

## 2025-05-22 PROCEDURE — G2211 COMPLEX E/M VISIT ADD ON: HCPCS | Performed by: NURSE PRACTITIONER

## 2025-05-22 PROCEDURE — 99214 OFFICE O/P EST MOD 30 MIN: CPT | Performed by: NURSE PRACTITIONER

## 2025-05-22 PROCEDURE — 96361 HYDRATE IV INFUSION ADD-ON: CPT

## 2025-05-22 PROCEDURE — 96360 HYDRATION IV INFUSION INIT: CPT

## 2025-05-22 RX ORDER — HYDROCODONE BITARTRATE AND ACETAMINOPHEN 10; 325 MG/1; MG/1
0.5 TABLET ORAL 2 TIMES DAILY PRN
Qty: 30 TABLET | Refills: 0 | Status: SHIPPED | OUTPATIENT
Start: 2025-05-22

## 2025-05-22 RX ORDER — HYDROCODONE BITARTRATE AND ACETAMINOPHEN 10; 325 MG/1; MG/1
0.5 TABLET ORAL 2 TIMES DAILY PRN
Qty: 30 TABLET | Refills: 0 | Status: SHIPPED | OUTPATIENT
Start: 2025-06-19

## 2025-05-22 RX ADMIN — SODIUM CHLORIDE 1000 ML: 0.9 INJECTION, SOLUTION INTRAVENOUS at 08:14

## 2025-05-22 NOTE — PROGRESS NOTES
Assessment:  1. Chronic pain disorder    2. Chronic bilateral low back pain with bilateral sciatica    3. History of lumbar fusion    4. Lumbar radiculopathy    5. Lumbar spondylosis    6. Arachnoiditis    7. Neck pain    8. Cervical radiculopathy    9. Cervical spondylosis        Plan:  While the patient was in the office today, I did have a thorough conversation regarding their chronic pain syndrome, medication management, and treatment plan options.  Patient is being seen for a follow-up visit.  She recently underwent bilateral L2-3 and L3-4 radiofrequency ablations.  The right side was performed on 4/18/2025.  Left side was performed on 4/1/2025.  Overall, she is reporting about 75% improvement in her pain.    Continue hydrocodone 10/325.  1/2 tablet twice daily if needed for pain.  The patient's opioid scripts were sent to their pharmacy electronically and was given a 2 month supply of prescriptions with a Do Not Fill date(s) of 5/22/2025, 6/19/2025.    Continue Flexeril 5 mg 3 times daily if needed for spasms.  She did not require refill of this medication during today's visit.    [place] Prescription Drug Monitoring Program report was reviewed and was appropriate     A urine drug screen was collected at today's office visit as part of our medication management protocol. The point of care testing results were appropriate for what was being prescribed. The specimen will be sent for confirmatory testing. The drug screen is medically necessary because the patient is either dependent on opioid medication or is being considered for opioid medication therapy and the results could impact ongoing or future treatment. The drug screen is to evaluate for the presences or absence of prescribed, non-prescribed, and/or illicit drugs/substances.    There are risks associated with opioid medications, including dependence, addiction and tolerance. The patient understands and agrees to use these medications only as  prescribed. Potential side effects of the medications include, but are not limited to, constipation, drowsiness, addiction, impaired judgment and risk of fatal overdose if not taken as prescribed. The patient was warned against driving while taking sedation medications.  Sharing medications is a felony. At this point in time, the patient is showing no signs of addiction, abuse, diversion or suicidal ideation.    While the patient was in the office today an opioid contract was thoroughly reviewed and signed by the patient. The patient was given adequate time to ask questions in regards to the contract and a signed copy was sent home for his/her records.    The patient will follow-up in 8 weeks for medication prescription refill and reevaluation. The patient was advised to contact the office should their symptoms worsen in the interim. The patient was agreeable and verbalized an understanding.        History of Present Illness:    The patient is a 69 y.o. female last seen on 3/13/2025 who presents for a follow up office visit in regards to chronic pain secondary to chronic pain syndrome, chronic low back pain, history of lumbar fusion, lumbar radiculopathy, lumbar spondylosis, neck pain, cervical spondylosis, cervical radiculopathy.  The patient currently reports complaints of neck pain that can radiate to the left upper extremity, low back pain that can radiate to both hips and both lower extremities.  Current pain level is an 8/10.  Quality of pain is described as cramping, electrical shocks    Current pain medications includes: Hydrocodone 10/325.  1/2 tablet twice daily if needed for pain, Flexeril 5 mg 3 times daily if needed for spasms.  The patient reports that this regimen is providing up to 75% pain relief.  The patient is reporting no side effects from this pain medication regimen.    Pain Contract Signed: 5/22/2025  Last Urine Drug Screen: 5/22/2025    I have personally reviewed and/or updated the patient's  "past medical history, past surgical history, family history, social history, current medications, allergies, and vital signs today.       Review of Systems:    Review of Systems   Constitutional:  Negative for chills and fever.   HENT:  Negative for ear pain and sore throat.    Eyes:  Negative for pain and visual disturbance.   Respiratory:  Positive for shortness of breath. Negative for cough.    Cardiovascular:  Positive for chest pain. Negative for palpitations.   Gastrointestinal:  Positive for constipation, diarrhea and nausea. Negative for abdominal pain and vomiting.   Genitourinary:  Negative for dysuria and hematuria.   Musculoskeletal:  Negative for arthralgias and back pain.        Decreased ROM, joint stiffness, pain in right ankle   Skin:  Negative for color change and rash.   Neurological:  Positive for dizziness. Negative for seizures and syncope.   All other systems reviewed and are negative.        Past Medical History[1]    Past Surgical History[2]    Family History[3]    Social History     Occupational History    Not on file   Tobacco Use    Smoking status: Never    Smokeless tobacco: Never   Vaping Use    Vaping status: Never Used   Substance and Sexual Activity    Alcohol use: Never    Drug use: No    Sexual activity: Not on file       Current Medications[4]    Allergies[5]    Physical Exam:    Ht 5' 1\" (1.549 m)   Wt 62.6 kg (138 lb)   BMI 26.07 kg/m²     Constitutional:normal, well developed, well nourished, alert, in no distress and non-toxic and no overt pain behavior.  Eyes:anicteric  HEENT:grossly intact  Neck:supple, symmetric, trachea midline and no masses   Pulmonary:even and unlabored  Cardiovascular:No edema or pitting edema present  Skin:Normal without rashes or lesions and well hydrated  Psychiatric:Mood and affect appropriate  Neurologic:Cranial Nerves II-XII grossly intact  Musculoskeletal:ambulates with cane gait is slow and guarded.      Imaging  No orders to display " "        No orders of the defined types were placed in this encounter.           [1]   Past Medical History:  Diagnosis Date    Allergic rhinitis     Anxiety     Asthma     Back pain     Breathing difficulty     Cardiac disease     Cardiopathy     EF 45%    Cough     Diverticulitis     Factor V Leiden (HCC)     Fibromyalgia     GERD (gastroesophageal reflux disease)     Hashimoto's thyroiditis     Hx of degenerative disc disease     Hypertension     Hypotension     pots - postural orthostatic hypotension    Interstitial cystitis     Irregular heart beat     LBBB    Irritable bowel syndrome     Migraines     Mitral valve disease     \"thickening\"    Myocardial infarction (HCC)     possible but not sure when    Neuropathy     bilateral legs    Osteoporosis     PONV (postoperative nausea and vomiting)     Postural orthostatic tachycardia syndrome     must drink a lot of water and salt    Pott's disease     Rheumatic fever 1967    Scoliosis     Sepsis (HCC)     associated with PICC line    Sjogren's syndrome (HCC)     TMJ (dislocation of temporomandibular joint)    [2]   Past Surgical History:  Procedure Laterality Date    ABLATION MICROWAVE Left     lumbar area    BACK SURGERY  10/1998     SECTION  1992    CHOLECYSTECTOMY  2016    COLONOSCOPY      DILATION AND CURETTAGE OF UTERUS      EGD      FOOT SURGERY  1968    removal of bone and neuroma    HYSTERECTOMY N/A     age 39    IR OTHER  2022    IR OTHER  2022    IR OTHER  2023    IR OTHER  2024    IR OTHER  2024    IR PICC PLACEMENT SINGLE LUMEN  10/02/2020    IR PICC PLACEMENT SINGLE LUMEN  2021    IR PICC REPOSITION  2021    IR TUNNELED CENTRAL LINE PLACEMENT  2022    LAPAROSCOPY      endometriosis    MOUTH BIOPSY      lip    OOPHORECTOMY Bilateral     age 39    NM EGD TRANSORAL BIOPSY SINGLE/MULTIPLE N/A 2019    Procedure: ESOPHAGOGASTRODUODENOSCOPY (EGD) with multiple bx and " dilation;  Surgeon: Peter Baldwin MD;  Location:  GI LAB;  Service: Gastroenterology    MN SURGICAL ARTHROSCOPY SHOULDER W/ROTATOR CUFF RPR Right 04/06/2022    Procedure: SHOULDER ARTHROSCOPIC ROTATOR CUFF REPAIR Biceps Tenodisis;  Surgeon: Ilya Munoz MD;  Location: [facility] MAIN OR;  Service: Orthopedics    MN SURGICAL ARTHROSCOPY SHOULDER W/ROTATOR CUFF RPR Left 5/3/2024    Procedure: SHOULDER ARTHROSCOPIC ROTATOR CUFF REPAIR, SUBACROMIAL DECOMPRESSION;  Surgeon: Ilya Munoz MD;  Location: [facility] MAIN OR;  Service: Orthopedics    TUNNELED VENOUS CATHETER PLACEMENT  2016   [3]   Family History  Problem Relation Name Age of Onset    Cancer Mother          bladder    No Known Problems Father      Thyroid cancer Sister bhanu     Heart attack Sister      No Known Problems Sister jonathan     No Known Problems Sister estrada     No Known Problems Sister miguelina     No Known Problems Sister adán     No Known Problems Daughter nick     No Known Problems Daughter shay     No Known Problems Maternal Grandmother      Colon cancer Maternal Grandfather      No Known Problems Paternal Grandmother      No Known Problems Paternal Grandfather      Breast cancer Maternal Aunt munira         over age 50    Endometrial cancer Maternal Aunt jennifer     Multiple myeloma Maternal Aunt diandra     Hypertension Paternal Aunt      Brain cancer Niece      Lymphoma Niece      Breast cancer additional onset Neg Hx      BRCA2 Positive Neg Hx      Ovarian cancer Neg Hx      BRCA2 Negative Neg Hx      BRCA1 Positive Neg Hx      BRCA1 Negative Neg Hx      BRCA 1/2 Neg Hx     [4]   Current Outpatient Medications:     aluminum hydroxide-magnesium carbonate (Gaviscon Extra Strength) 160-105 mg per chewable tablet, , Disp: , Rfl:     azelastine (ASTELIN) 0.1 % nasal spray, 2 sprays into each nostril in the morning and 2 sprays in the evening. Use in each nostril as directed ., Disp: , Rfl:     beclomethasone (QVAR) 40 MCG/ACT  inhaler, Inhale 1 puff daily as needed (only when unable to nebulize pulmicort) Rinse mouth after use., Disp: , Rfl:     budesonide (PULMICORT) 0.5 mg/2 mL nebulizer solution, Take 0.5 mg by nebulization in the morning and 0.5 mg in the evening. Rinse mouth after use. ., Disp: , Rfl:     cromolyn (GASTROCROM) 100 MG/5ML solution, Take 100 mg by mouth 4 (four) times a day (before meals and at bedtime), Disp: , Rfl:     cyclobenzaprine (FLEXERIL) 5 mg tablet, TAKE 1 TABLET BY MOUTH THREE TIMES A DAY AS NEEDED FOR MUSCLE SPASMS, Disp: 90 tablet, Rfl: 1    cycloSPORINE (RESTASIS) 0.05 % ophthalmic emulsion, Administer 1 drop to both eyes in the morning and 1 drop in the evening., Disp: , Rfl:     docusate sodium (COLACE) 100 mg capsule, Take 100 mg by mouth daily as needed for constipation, Disp: , Rfl:     Dupilumab (DUPIXENT SC), Inject under the skin Pt unsure of dose, Disp: , Rfl:     erythromycin (ILOTYCIN) ophthalmic ointment, Apply 1 application. to eye daily at bedtime, Disp: , Rfl:     famotidine (PEPCID) 40 MG tablet, Twice a day, Disp: , Rfl:     fexofenadine (ALLEGRA) 180 MG tablet, Take 180 mg by mouth in the morning and 180 mg in the evening., Disp: , Rfl:     fluticasone (FLONASE) 50 mcg/act nasal spray, 2 sprays into each nostril in the morning., Disp: , Rfl:     Galcanezumab-gnlm (Emgality) 120 MG/ML SOAJ, Inject under the skin every 30 (thirty) days, Disp: , Rfl:     guaiFENesin (Mucinex) 600 mg 12 hr tablet, Take 600 mg by mouth every 12 (twelve) hours, Disp: , Rfl:     Heating Pad PADS, Use if needed (pain), Disp: , Rfl:     [START ON 6/19/2025] HYDROcodone-acetaminophen (NORCO)  mg per tablet, Take 0.5 tablets by mouth 2 (two) times a day as needed for moderate pain Max Daily Amount: 1 tablet Do not start before June 19, 2025., Disp: 30 tablet, Rfl: 0    HYDROcodone-acetaminophen (NORCO)  mg per tablet, Take 0.5 tablets by mouth 2 (two) times a day as needed for moderate pain Max Daily  Amount: 1 tablet, Disp: 30 tablet, Rfl: 0    hydrOXYzine (ATARAX) 10 mg/5 mL syrup, , Disp: , Rfl:     ipratropium (ATROVENT) 0.02 % nebulizer solution, Take 0.5 mg by nebulization every 6 (six) hours as needed for wheezing or shortness of breath, Disp: , Rfl:     ipratropium (ATROVENT) 0.06 % nasal spray, , Disp: , Rfl:     ivabradine HCl (Corlanor) 5 MG tablet, 7.5 mg in the morning and 7.5 mg before bedtime., Disp: , Rfl:     KETOTIFEN FUMARATE OP, , Disp: , Rfl:     levalbuterol (XOPENEX HFA) 45 mcg/act inhaler, Inhale 2 puffs every 4 (four) hours as needed (when unable to nebulize), Disp: , Rfl:     levalbuterol (XOPENEX) 1.25 mg/3 mL nebulizer solution, Take 1.25 mg by nebulization every 6 (six) hours as needed, Disp: , Rfl: 2    Magnesium 400 MG CAPS, Take 1 capsule by mouth in the morning and 1 capsule in the evening., Disp: , Rfl:     MULTIPLE VITAMINS-CALCIUM PO, Take 1 capsule by mouth every morning, Disp: , Rfl:     NON FORMULARY, Take 1 mg by mouth in the morning and 1 mg in the evening and 1 mg before bedtime. Ketotifen 1 mg compounded capsule ., Disp: , Rfl:     NON FORMULARY, LACTOBACILLUS COMBINATION NO.4 (PROBIOTIC) 3 BILLION CELL CAPSULE, Disp: , Rfl:     NON FORMULARY, 0.25 % X-DEMVY eye drop, Disp: , Rfl:     omega-3-acid ethyl esters (LOVAZA) 1 g capsule, Take 2 g by mouth in the morning and 2 g in the evening. 1600mg daily ., Disp: , Rfl:     polyethylene glycol (MIRALAX) 17 g packet, Take 17 g by mouth daily as needed, Disp: , Rfl:     Probiotic Product (PROBIOTIC DAILY PO), Take 1 tablet by mouth in the morning. At lunch., Disp: , Rfl:     Qvar RediHaler 40 MCG/ACT inhaler, , Disp: , Rfl:     sodium chloride, Inject 1,500 mL into a catheter in a vein, Disp: , Rfl:     sucralfate (CARAFATE) 1 g/10 mL suspension, Take 1 g by mouth in the morning and 1 g in the evening., Disp: , Rfl:     Thyroid, Porcine, POWD, Use 32 mg daily, Disp: 20405 g, Rfl: 0    Ubrogepant (UBRELVY) 100 MG tablet, Take  100 mg by mouth Take 1 tablet (100 mg) one time as needed for migraine. May repeat one additional tablet (100 mg) at least two hours after the first dose. Do not use more than two doses per day, or for more than eight days per month., Disp: , Rfl:     verapamil (CALAN) 40 mg tablet, , Disp: , Rfl:     Xdemvy 0.25 % SOLN, Apply 1 drop to eye, Disp: , Rfl:     doxycycline (ADOXA) 100 MG tablet, Take 100 mg by mouth 2 (two) times a day, Disp: , Rfl:     methylprednisolone (MEDROL) 4 mg tablet, Medrol dosepak, take as directed, Disp: 21 tablet, Rfl: 0    naloxone (NARCAN) 4 mg/0.1 mL nasal spray, Administer 1 spray into a nostril. If no response after 2-3 minutes, give another dose in the other nostril using a new spray., Disp: 1 each, Rfl: 0  No current facility-administered medications for this visit.    Facility-Administered Medications Ordered in Other Visits:     alteplase (CATHFLO) injection 2 mg, 2 mg, Intracatheter, Q1MIN PRN, Ricky Harper MD    alteplase (CATHFLO) injection 2 mg, 2 mg, Intracatheter, Q1MIN PRN, Ricky Harper MD    alteplase (CATHFLO) injection 2 mg, 2 mg, Intracatheter, Q1MIN PRN, Ricky Harper MD  [5]   Allergies  Allergen Reactions    Imipramine Confusion, Fatigue, Irritability, Palpitations, Shortness Of Breath, Tachycardia and Visual Disturbance    Lactose - Food Allergy Shortness Of Breath    Melatonin Shortness Of Breath    Mirtazapine Anxiety, Dizziness, GI Intolerance, Nausea Only, Palpitations and Shortness Of Breath    Nexium [Esomeprazole] Shortness Of Breath    Nsaids Shortness Of Breath, Cough, Other (See Comments), Nausea Only, GI Intolerance, Tachycardia and Wheezing    Propofol Cough, Other (See Comments), Shortness Of Breath and Nasal Congestion    Salmeterol Dizziness, Other (See Comments), Shortness Of Breath and Tachycardia    Singulair [Montelukast] Shortness Of Breath and Cough    Zomig [Zolmitriptan] Shortness Of Breath    Dexilant [Dexlansoprazole] Nausea Only and  Vomiting    Ambien [Zolpidem]     Amitriptyline Drowsiness    Ascorbate - Food Allergy GI Intolerance    Ascorbic Acid GI Intolerance    Aspirin     Azithromycin Hives and Itching     In December 2018 or 2019, she was put on a course and developed a fully body rash on the 2nd day within a couple of hours of taking the medication that day. The rash was raised, itchy, and red. Rash lasted for a week after medication was stopped. Denies fever, joint swelling, or mucosal peeling. Prior to this, she had tolerated doses of azithromycin. CBC/d and CMP (1/2/20) were normal    Bactrim [Sulfamethoxazole-Trimethoprim] Hives    Banana - Food Allergy Dermatitis    Banana Extract Allergy Skin Test - Food Allergy Dermatitis    Bisoprolol Other (See Comments)    Ceftin [Cefuroxime]     Celebrex [Celecoxib]     Chocolate - Food Allergy Headache    Ciprofloxacin Dizziness, Hives, Other (See Comments), Itching and Nausea Only    Demerol [Meperidine]     Duloxetine Other (See Comments)     tachycardia    Epinephrine Dizziness    Ergotamine Nausea Only and Headache    Fludrocortisone Other (See Comments)    Keflex [Cephalexin]     Klonopin [Clonazepam] Nausea Only and Dizziness    Latex Hives    Levofloxacin Other (See Comments)    Lexapro [Escitalopram]     Lyrica [Pregabalin] Fatigue    Macrodantin [Nitrofurantoin]     Metoprolol Other (See Comments)    Metronidazole Other (See Comments)    Molds & Smuts GI Intolerance and Other (See Comments)    Morphine Nausea Only    Movantik [Naloxegol] Nausea Only    Mushroom Extract Complex - Food Allergy      Eye itchy, asthma  attack    Naprosyn [Naproxen]     Neurontin [Gabapentin] Dizziness    Pineapple - Food Allergy GI Intolerance    Plecanatide Abdominal Pain, Diarrhea and GI Intolerance    Prolia [Denosumab]     Prozac [Fluoxetine]     Soy Allergy - Food Allergy Vomiting    Sudafed [Pseudoephedrine] Hives    Sulfa Antibiotics     Tomato - Food Allergy GI Intolerance    Trazodone      Ultram [Tramadol] Nausea Only, Dizziness and Headache    Vilazodone Dizziness, GI Intolerance, Headache, Abdominal Pain and Other (See Comments)    Vilazodone Hcl Abdominal Pain, Dizziness, GI Intolerance, Headache, Other (See Comments), Fatigue and Nausea Only    Vioxx [Rofecoxib]     Vortioxetine Drowsiness, Fatigue, GI Intolerance and Irritability    Wheat Bran - Food Allergy Other (See Comments)     Stomach pain    Zinc Other (See Comments), GI Intolerance and Nausea Only    Zoloft [Sertraline]     Albuterol Palpitations, Dizziness, Other (See Comments) and Tachycardia     Dizziness, allergy note 12/11/19 for more specific comments    Cyclobenzaprine Hcl Er Rash    Terfenadine Dizziness and Palpitations    Zantac [Ranitidine] Rash and Dizziness

## 2025-05-23 ENCOUNTER — HOSPITAL ENCOUNTER (OUTPATIENT)
Dept: INFUSION CENTER | Facility: HOSPITAL | Age: 70
End: 2025-05-23
Attending: STUDENT IN AN ORGANIZED HEALTH CARE EDUCATION/TRAINING PROGRAM
Payer: MEDICARE

## 2025-05-23 VITALS
HEART RATE: 62 BPM | TEMPERATURE: 97.1 F | OXYGEN SATURATION: 99 % | RESPIRATION RATE: 17 BRPM | SYSTOLIC BLOOD PRESSURE: 137 MMHG | DIASTOLIC BLOOD PRESSURE: 78 MMHG

## 2025-05-23 DIAGNOSIS — G90.A POSTURAL ORTHOSTATIC TACHYCARDIA SYNDROME: Primary | ICD-10-CM

## 2025-05-23 PROCEDURE — 96360 HYDRATION IV INFUSION INIT: CPT

## 2025-05-23 PROCEDURE — 96361 HYDRATE IV INFUSION ADD-ON: CPT

## 2025-05-23 RX ADMIN — SODIUM CHLORIDE 1000 ML: 0.9 INJECTION, SOLUTION INTRAVENOUS at 08:28

## 2025-05-23 NOTE — PROGRESS NOTES
Teresa Mao  tolerated treatment well with no complications.      Teresa Mao is aware of future appt on 5/28 at 0800.     AVS printed and given to Teresa Mao:   No (Declined by Teresa Mao)

## 2025-05-25 LAB
6MAM UR QL CFM: NEGATIVE NG/ML
7AMINOCLONAZEPAM UR QL CFM: NEGATIVE NG/ML
A-OH ALPRAZ UR QL CFM: NEGATIVE NG/ML
ALP BONE CFR SERPL: 45 % (ref 14–68)
ALP INTEST CFR SERPL: 1 % (ref 0–18)
ALP LIVER CFR SERPL: 54 % (ref 18–85)
ALP SERPL-CCNC: 145 IU/L (ref 44–121)
AMPHET UR QL CFM: NEGATIVE NG/ML
AMPHET UR QL CFM: NEGATIVE NG/ML
BUPRENORPHINE UR QL CFM: NEGATIVE NG/ML
BUTALBITAL UR QL CFM: NEGATIVE NG/ML
BZE UR QL CFM: NEGATIVE NG/ML
CODEINE UR QL CFM: NEGATIVE NG/ML
DESIPRAMINE UR QL CFM: NEGATIVE NG/ML
DESIPRAMINE UR QL CFM: NEGATIVE NG/ML
EDDP UR QL CFM: NEGATIVE NG/ML
ETHYL GLUCURONIDE UR QL CFM: NEGATIVE NG/ML
ETHYL SULFATE UR QL SCN: NEGATIVE NG/ML
EUTYLONE UR QL: NEGATIVE NG/ML
FENTANYL UR QL CFM: NEGATIVE NG/ML
GLIADIN IGG SER IA-ACNC: NEGATIVE NG/ML
GLUCOSE 30M P 50 G LAC PO SERPL-MCNC: NEGATIVE NG/ML
HYDROCODONE UR QL CFM: NORMAL NG/ML
HYDROCODONE UR QL CFM: NORMAL NG/ML
HYDROMORPHONE UR QL CFM: NORMAL NG/ML
IMIPRAMINE UR QL CFM: NEGATIVE NG/ML
LORAZEPAM UR QL CFM: NEGATIVE NG/ML
MDMA UR QL CFM: NEGATIVE NG/ML
ME-PHENIDATE UR QL CFM: NEGATIVE NG/ML
MEPERIDINE UR QL CFM: NEGATIVE NG/ML
METHADONE UR QL CFM: NEGATIVE NG/ML
METHAMPHET UR QL CFM: NEGATIVE NG/ML
MORPHINE UR QL CFM: NEGATIVE NG/ML
MORPHINE UR QL CFM: NEGATIVE NG/ML
NORBUPRENORPHINE UR QL CFM: NEGATIVE NG/ML
NORDIAZEPAM UR QL CFM: NEGATIVE NG/ML
NORFENTANYL UR QL CFM: NEGATIVE NG/ML
NORHYDROCODONE UR QL CFM: NORMAL NG/ML
NORHYDROCODONE UR QL CFM: NORMAL NG/ML
NORMEPERIDINE UR QL CFM: NEGATIVE NG/ML
NOROXYCODONE UR QL CFM: NEGATIVE NG/ML
OLANZAPINE QUANTIFICATION: NEGATIVE NG/ML
OPC-3373 QUANTIFICATION: NEGATIVE NG/ML
OXAZEPAM UR QL CFM: NEGATIVE NG/ML
OXYCODONE UR QL CFM: NEGATIVE NG/ML
OXYMORPHONE UR QL CFM: NEGATIVE NG/ML
OXYMORPHONE UR QL CFM: NEGATIVE NG/ML
PARA-FLUOROFENTANYL QUANTIFICATION: NORMAL NG/ML
PCP UR QL CFM: NEGATIVE NG/ML
PHENOBARB UR QL CFM: NEGATIVE NG/ML
RESULT ALL_PRESCRIBED MEDS AND SPECIAL INSTRUCTIONS: NORMAL
SECOBARBITAL UR QL CFM: NEGATIVE NG/ML
SL AMB 5F-ADB-M7 METABOLITE QUANTIFICATION: NEGATIVE NG/ML
SL AMB 7-OH-MITRAGYNINE (KRATOM ALKALOID) QUANTIFICATION: NEGATIVE NG/ML
SL AMB AB-FUBINACA-M3 METABOLITE QUANTIFICATION: NEGATIVE NG/ML
SL AMB ACETYL FENTANYL QUANTIFICATION: NORMAL NG/ML
SL AMB ACETYL NORFENTANYL QUANTIFICATION: NORMAL NG/ML
SL AMB ACRYL FENTANYL QUANTIFICATION: NORMAL NG/ML
SL AMB CARFENTANIL QUANTIFICATION: NORMAL NG/ML
SL AMB CLOZAPINE QUANTIFICATION: NEGATIVE NG/ML
SL AMB CTHC (MARIJUANA METABOLITE) QUANTIFICATION: NEGATIVE NG/ML
SL AMB DEXTROMETHORPHAN QUANTIFICATION: NEGATIVE NG/ML
SL AMB DEXTRORPHAN (DEXTROMETHORPHAN METABOLITE) QUANT: NEGATIVE NG/ML
SL AMB DEXTRORPHAN (DEXTROMETHORPHAN METABOLITE) QUANT: NEGATIVE NG/ML
SL AMB HALOPERIDOL  QUANTIFICATION: NEGATIVE NG/ML
SL AMB HALOPERIDOL METABOLITE QUANTIFICATION: NEGATIVE NG/ML
SL AMB HYDROXYRISPERIDONE QUANTIFICATION: NEGATIVE NG/ML
SL AMB JWH018 METABOLITE QUANTIFICATION: NEGATIVE NG/ML
SL AMB JWH073 METABOLITE QUANTIFICATION: NEGATIVE NG/ML
SL AMB MDMB-FUBINACA-M1 METABOLITE QUANTIFICATION: NEGATIVE NG/ML
SL AMB METHYLONE QUANTIFICATION: NEGATIVE NG/ML
SL AMB N-DESMETHYL-TRAMADOL QUANTIFICATION: NEGATIVE NG/ML
SL AMB N-DESMETHYLCLOZAPINE QUANTIFICATION: NEGATIVE NG/ML
SL AMB NORQUETIAPINE QUANTIFICATION: NEGATIVE NG/ML
SL AMB PHENTERMINE QUANTIFICATION: NEGATIVE NG/ML
SL AMB QUETIAPINE QUANTIFICATION: NEGATIVE NG/ML
SL AMB RCS4 METABOLITE QUANTIFICATION: NEGATIVE NG/ML
SL AMB RISPERIDONE QUANTIFICATION: NEGATIVE NG/ML
SL AMB RITALINIC ACID QUANTIFICATION: NEGATIVE NG/ML
SPECIMEN DRAWN SERPL: NEGATIVE NG/ML
TAPENTADOL UR QL CFM: NEGATIVE NG/ML
TEMAZEPAM UR QL CFM: NEGATIVE NG/ML
TEMAZEPAM UR QL CFM: NEGATIVE NG/ML
TRAMADOL UR QL CFM: NEGATIVE NG/ML
URATE/CREAT 24H UR: NEGATIVE NG/ML

## 2025-05-28 ENCOUNTER — HOSPITAL ENCOUNTER (OUTPATIENT)
Dept: INFUSION CENTER | Facility: HOSPITAL | Age: 70
Discharge: HOME | End: 2025-05-28
Attending: STUDENT IN AN ORGANIZED HEALTH CARE EDUCATION/TRAINING PROGRAM
Payer: MEDICARE

## 2025-05-28 VITALS
HEART RATE: 89 BPM | SYSTOLIC BLOOD PRESSURE: 154 MMHG | DIASTOLIC BLOOD PRESSURE: 81 MMHG | RESPIRATION RATE: 18 BRPM | OXYGEN SATURATION: 99 % | TEMPERATURE: 97.8 F

## 2025-05-28 DIAGNOSIS — G90.A POSTURAL ORTHOSTATIC TACHYCARDIA SYNDROME: Primary | ICD-10-CM

## 2025-05-28 PROCEDURE — 96361 HYDRATE IV INFUSION ADD-ON: CPT

## 2025-05-28 PROCEDURE — 96360 HYDRATION IV INFUSION INIT: CPT

## 2025-05-28 RX ADMIN — SODIUM CHLORIDE 1000 ML: 0.9 INJECTION, SOLUTION INTRAVENOUS at 08:22

## 2025-05-28 NOTE — PROGRESS NOTES
Teresa Mao  tolerated hydration treatment well with no complications.      Teresa Mao is aware of future appt on 5/29 at 8:30AM.     AVS printed and given to Teresa Mao: No (Declined by Teresa Mao).

## 2025-05-29 ENCOUNTER — HOSPITAL ENCOUNTER (OUTPATIENT)
Dept: INFUSION CENTER | Facility: HOSPITAL | Age: 70
Discharge: HOME | End: 2025-05-29
Attending: STUDENT IN AN ORGANIZED HEALTH CARE EDUCATION/TRAINING PROGRAM
Payer: MEDICARE

## 2025-05-29 VITALS
TEMPERATURE: 98 F | SYSTOLIC BLOOD PRESSURE: 138 MMHG | DIASTOLIC BLOOD PRESSURE: 84 MMHG | RESPIRATION RATE: 18 BRPM | OXYGEN SATURATION: 97 % | HEART RATE: 71 BPM

## 2025-05-29 DIAGNOSIS — G90.A POSTURAL ORTHOSTATIC TACHYCARDIA SYNDROME: Primary | ICD-10-CM

## 2025-05-29 PROCEDURE — 96361 HYDRATE IV INFUSION ADD-ON: CPT

## 2025-05-29 PROCEDURE — 96360 HYDRATION IV INFUSION INIT: CPT

## 2025-05-29 RX ADMIN — SODIUM CHLORIDE 1000 ML: 0.9 INJECTION, SOLUTION INTRAVENOUS at 09:03

## 2025-05-29 NOTE — PROGRESS NOTES
Teresa Mao  tolerated hydration treatment well with no complications.      Teresa Mao is aware of future appt on 5/30 at 8:30AM.     AVS printed and given to Teresa Mao: No (Declined by Teresa Mao).

## 2025-05-30 ENCOUNTER — OFFICE VISIT (OUTPATIENT)
Dept: FAMILY MEDICINE CLINIC | Facility: CLINIC | Age: 70
End: 2025-05-30
Payer: MEDICARE

## 2025-05-30 ENCOUNTER — HOSPITAL ENCOUNTER (OUTPATIENT)
Dept: INFUSION CENTER | Facility: HOSPITAL | Age: 70
Discharge: HOME | End: 2025-05-30
Payer: MEDICARE

## 2025-05-30 VITALS
OXYGEN SATURATION: 97 % | TEMPERATURE: 97.6 F | HEIGHT: 61 IN | HEART RATE: 57 BPM | SYSTOLIC BLOOD PRESSURE: 124 MMHG | DIASTOLIC BLOOD PRESSURE: 72 MMHG | WEIGHT: 139 LBS | BODY MASS INDEX: 26.24 KG/M2

## 2025-05-30 VITALS
OXYGEN SATURATION: 98 % | DIASTOLIC BLOOD PRESSURE: 68 MMHG | TEMPERATURE: 98 F | HEART RATE: 66 BPM | RESPIRATION RATE: 16 BRPM | SYSTOLIC BLOOD PRESSURE: 115 MMHG

## 2025-05-30 DIAGNOSIS — G90.A POSTURAL ORTHOSTATIC TACHYCARDIA SYNDROME: Primary | ICD-10-CM

## 2025-05-30 DIAGNOSIS — Z00.00 ENCOUNTER FOR ANNUAL WELLNESS VISIT (AWV) IN MEDICARE PATIENT: Primary | ICD-10-CM

## 2025-05-30 PROCEDURE — G0439 PPPS, SUBSEQ VISIT: HCPCS | Performed by: STUDENT IN AN ORGANIZED HEALTH CARE EDUCATION/TRAINING PROGRAM

## 2025-05-30 PROCEDURE — 96360 HYDRATION IV INFUSION INIT: CPT

## 2025-05-30 PROCEDURE — 96361 HYDRATE IV INFUSION ADD-ON: CPT

## 2025-05-30 RX ORDER — IVABRADINE 7.5 MG/1
1 TABLET, FILM COATED ORAL 2 TIMES DAILY WITH MEALS
COMMUNITY
Start: 2025-05-09

## 2025-05-30 RX ADMIN — SODIUM CHLORIDE 1000 ML: 0.9 INJECTION, SOLUTION INTRAVENOUS at 08:43

## 2025-05-30 NOTE — PROGRESS NOTES
Name: Teresa Mao      : 1955      MRN: [id number]  Encounter Provider: Ricky Harper MD  Encounter Date: 2025   Encounter department: [facility] PRIMARY CARE  :  Assessment & Plan  Encounter for annual wellness visit (AWV) in Medicare patient            Preventive health issues were discussed with patient, and age appropriate screening tests were ordered as noted in patient's After Visit Summary. Personalized health advice and appropriate referrals for health education or preventive services given if needed, as noted in patient's After Visit Summary.    History of Present Illness     HPI   Patient Care Team:  Ricky Harper MD as PCP - General (Family Medicine)  MD Jose Antonio Beebe MD Brittany Cataldo, DO (Nephrology)  Jairo Garcia DO (Nephrology)  Arsalan Ashraf PA-C as Medical Student (Nephrology)    Review of Systems  Medical History Reviewed by provider this encounter:  Tobacco  Allergies  Meds  Problems  Med Hx  Surg Hx  Fam Hx       Annual Wellness Visit Questionnaire   Teresa is here for her Subsequent Wellness visit.     Health Risk Assessment:   Patient rates overall health as good. Patient feels that their physical health rating is slightly worse. Patient is satisfied with their life. Eyesight was rated as slightly worse. Hearing was rated as same. Patient feels that their emotional and mental health rating is same. Patients states they are never, rarely angry. Patient states they are always unusually tired/fatigued. Pain experienced in the last 7 days has been a lot. Patient's pain rating has been 6/10. Patient states that she has experienced no weight loss or gain in last 6 months.     Depression Screening:   PHQ-9 Score: 0      Fall Risk Screening:   In the past year, patient has experienced: no history of falling in past year      Urinary Incontinence Screening:   Patient has leaked urine accidently in the last six months.     Home  Safety:  Patient has trouble with stairs inside or outside of their home. Patient has working smoke alarms and has no working carbon monoxide detector. Home safety hazards include: medications that cause fatigue.     Nutrition:   Current diet is Other (please comment). Low [employer] diet    Medications:   Patient is currently taking over-the-counter supplements. OTC medications include: see medication list. Patient is able to manage medications.     Activities of Daily Living (ADLs)/Instrumental Activities of Daily Living (IADLs):   Walk and transfer into and out of bed and chair?: Yes  Dress and groom yourself?: Yes    Bathe or shower yourself?: Yes    Feed yourself? Yes  Do your laundry/housekeeping?: Yes  Manage your money, pay your bills and track your expenses?: Yes  Make your own meals?: Yes    Do your own shopping?: Yes    Previous Hospitalizations:   Any hospitalizations or ED visits within the last 12 months?: Yes    How many hospitalizations have you had in the last year?: 1-2    Hospitalization Comments: ER visit in November, 2024 due to chest psin, severe asthma symptoms, ruled out PE    Advance Care Planning:   Living will: Yes    Durable POA for healthcare: Yes    Advanced directive: Yes      Preventive Screenings      Cardiovascular Screening:    General: Screening Not Indicated and History Lipid Disorder      Diabetes Screening:     General: Screening Current      Colorectal Cancer Screening:     General: Screening Current      Breast Cancer Screening:     General: Screening Current      Cervical Cancer Screening:    General: Screening Not Indicated      Osteoporosis Screening:    General: Screening Not Indicated and History Osteoporosis      Lung Cancer Screening:     General: Screening Not Indicated      Hepatitis C Screening:    General: Screening Current    Screening, Brief Intervention, and Referral to Treatment (SBIRT)     Screening  Typical number of drinks in a day: 0  Typical number of drinks  in a week: 0  Interpretation: Low risk drinking behavior.    AUDIT-C Screenin) How often did you have a drink containing alcohol in the past year? never  2) How many drinks did you have on a typical day when you were drinking in the past year? 0  3) How often did you have 6 or more drinks on one occasion in the past year? never    AUDIT-C Score: 0  Interpretation: Score 0-2 (female): Negative screen for alcohol misuse    Single Item Drug Screening:  How often have you used an illegal drug (including marijuana) or a prescription medication for non-medical reasons in the past year? never    Single Item Drug Screen Score: 0  Interpretation: Negative screen for possible drug use disorder    SDOH Risk Assessment  Social determinants of health (SDOH) risk assesment tool was completed. The tool at a minimum covered housing stability, food insecurity, transportation needs, and utility difficulty. Patient had at risk responses for the following SDOH domains: food insecurity and housing stability.     Review of Current Opioid Use    Opioid Risk Tool (ORT) Interpretation: Complete Opioid Risk Tool (ORT)    Social Drivers of Health     Financial Resource Strain: Not At Risk (2024)    [facility]    Financial Resource Strain     In the last 12 months did you skip medications to save money?: No     In the last 12 months, was there a time when you needed to see a doctor but could not because of cost?: No   Food Insecurity: Food Insecurity Present (2025)    Nursing - Inadequate Food Risk Classification     Worried About Running Out of Food in the Last Year: Often true     Ran Out of Food in the Last Year: Often true   Transportation Needs: No Transportation Needs (2025)    PRAPARE - Transportation     Lack of Transportation (Medical): No     Lack of Transportation (Non-Medical): No   Housing Stability: High Risk (2025)    Housing Stability Vital Sign     Unable to Pay  "for Housing in the Last Year: Yes     Number of Times Moved in the Last Year: 0     Homeless in the Last Year: No   Utilities: Not At Risk (5/28/2025)    [facility] Utilities     Threatened with loss of utilities: No     No results found.    Objective   /72 (BP Location: Left arm, Patient Position: Sitting, Cuff Size: Standard)   Pulse 57   Temp 97.6 °F (36.4 °C) (Tympanic)   Ht 5' 1\" (1.549 m)   Wt 63 kg (139 lb)   SpO2 97%   BMI 26.26 kg/m²     Physical Exam  Vitals and nursing note reviewed.   Constitutional:       General: She is not in acute distress.     Appearance: She is well-developed.   HENT:      Head: Normocephalic and atraumatic.     Eyes:      Conjunctiva/sclera: Conjunctivae normal.      Pupils: Pupils are equal, round, and reactive to light.       Cardiovascular:      Rate and Rhythm: Normal rate and regular rhythm.      Heart sounds: Normal heart sounds. No murmur heard.     No friction rub.   Pulmonary:      Effort: Pulmonary effort is normal.      Breath sounds: Normal breath sounds.   Abdominal:      General: Bowel sounds are normal.      Palpations: Abdomen is soft.     Musculoskeletal:         General: Normal range of motion.      Cervical back: Normal range of motion and neck supple.     Skin:     General: Skin is warm.      Capillary Refill: Capillary refill takes less than 2 seconds.     Neurological:      Mental Status: She is alert and oriented to person, place, and time.      Motor: No abnormal muscle tone.      Coordination: Coordination normal.     Psychiatric:         Behavior: Behavior normal.         Thought Content: Thought content normal.         "

## 2025-05-30 NOTE — PATIENT INSTRUCTIONS
Medicare Preventive Visit Patient Instructions  Thank you for completing your Welcome to Medicare Visit or Medicare Annual Wellness Visit today. Your next wellness visit will be due in one year (5/31/2026).  The screening/preventive services that you may require over the next 5-10 years are detailed below. Some tests may not apply to you based off risk factors and/or age. Screening tests ordered at today's visit but not completed yet may show as past due. Also, please note that scanned in results may not display below.  Preventive Screenings:  Service Recommendations Previous Testing/Comments   Colorectal Cancer Screening  * Colonoscopy    * Fecal Occult Blood Test (FOBT)/Fecal Immunochemical Test (FIT)  * Fecal DNA/Cologuard Test  * Flexible Sigmoidoscopy Age: 45-75 years old   Colonoscopy: every 10 years (may be performed more frequently if at higher risk)  OR  FOBT/FIT: every 1 year  OR  Cologuard: every 3 years  OR  Sigmoidoscopy: every 5 years  Screening may be recommended earlier than age 45 if at higher risk for colorectal cancer. Also, an individualized decision between you and your healthcare provider will decide whether screening between the ages of 76-85 would be appropriate. Colonoscopy: 10/14/2024  FOBT/FIT: Not on file  Cologuard: Not on file  Sigmoidoscopy: Not on file    Screening Current     Breast Cancer Screening Age: 40+ years old  Frequency: every 1-2 years  Not required if history of left and right mastectomy Mammogram: 05/09/2025    Screening Current   Cervical Cancer Screening Between the ages of 21-29, pap smear recommended once every 3 years.   Between the ages of 30-65, can perform pap smear with HPV co-testing every 5 years.   Recommendations may differ for women with a history of total hysterectomy, cervical cancer, or abnormal pap smears in past. Pap Smear: Not on file    Screening Not Indicated   Hepatitis C Screening Once for adults born between 1945 and 1965  More frequently in  patients at high risk for Hepatitis C Hep C Antibody: 08/12/2021    Screening Current   Diabetes Screening 1-2 times per year if you're at risk for diabetes or have pre-diabetes Fasting glucose: 92 mg/dL (3/27/2025)  A1C: No results in last 5 years (No results in last 5 years)  Screening Current   Cholesterol Screening Once every 5 years if you don't have a lipid disorder. May order more often based on risk factors. Lipid panel: 07/12/2024    Screening Not Indicated  History Lipid Disorder     Other Preventive Screenings Covered by Medicare:  Abdominal Aortic Aneurysm (AAA) Screening: covered once if your at risk. You're considered to be at risk if you have a family history of AAA.  Lung Cancer Screening: covers low dose CT scan once per year if you meet all of the following conditions: (1) Age 55-77; (2) No signs or symptoms of lung cancer; (3) Current smoker or have quit smoking within the last 15 years; (4) You have a tobacco smoking history of at least 20 pack years (packs per day multiplied by number of years you smoked); (5) You get a written order from a healthcare provider.  Glaucoma Screening: covered annually if you're considered high risk: (1) You have diabetes OR (2) Family history of glaucoma OR (3)  aged 50 and older OR (4)  American aged 65 and older  Osteoporosis Screening: covered every 2 years if you meet one of the following conditions: (1) You're estrogen deficient and at risk for osteoporosis based off medical history and other findings; (2) Have a vertebral abnormality; (3) On glucocorticoid therapy for more than 3 months; (4) Have primary hyperparathyroidism; (5) On osteoporosis medications and need to assess response to drug therapy.   Last bone density test (DXA Scan): 02/21/2020.  HIV Screening: covered annually if you're between the age of 15-65. Also covered annually if you are younger than 15 and older than 65 with risk factors for HIV infection. For pregnant  patients, it is covered up to 3 times per pregnancy.    Immunizations:  Immunization Recommendations   Influenza Vaccine Annual influenza vaccination during flu season is recommended for all persons aged >= 6 months who do not have contraindications   Pneumococcal Vaccine   * Pneumococcal conjugate vaccine = PCV13 (Prevnar 13), PCV15 (Vaxneuvance), PCV20 (Prevnar 20)  * Pneumococcal polysaccharide vaccine = PPSV23 (Pneumovax) Adults 19-65 yo with certain risk factors or if 65+ yo  If never received any pneumonia vaccine: recommend Prevnar 20 (PCV20)  Give PCV20 if previously received 1 dose of PCV13 or PPSV23   Hepatitis B Vaccine 3 dose series if at intermediate or high risk (ex: diabetes, end stage renal disease, liver disease)   Respiratory syncytial virus (RSV) Vaccine - COVERED BY MEDICARE PART D  * RSVPreF3 (Arexvy) CDC recommends that adults 60 years of age and older may receive a single dose of RSV vaccine using shared clinical decision-making (SCDM)   Tetanus (Td) Vaccine - COST NOT COVERED BY MEDICARE PART B Following completion of primary series, a booster dose should be given every 10 years to maintain immunity against tetanus. Td may also be given as tetanus wound prophylaxis.   Tdap Vaccine - COST NOT COVERED BY MEDICARE PART B Recommended at least once for all adults. For pregnant patients, recommended with each pregnancy.   Shingles Vaccine (Shingrix) - COST NOT COVERED BY MEDICARE PART B  2 shot series recommended in those 19 years and older who have or will have weakened immune systems or those 50 years and older     Health Maintenance Due:      Topic Date Due   • Colorectal Cancer Screening  04/12/2025   • Breast Cancer Screening: Mammogram  05/09/2026   • Hepatitis C Screening  Completed     Immunizations Due:      Topic Date Due   • IPV Vaccine (2 of 3 - Adult catch-up series) 04/22/2021   • COVID-19 Vaccine (5 - 2024-25 season) 09/01/2024     Advance Directives   What are advance directives?   Advance directives are legal documents that state your wishes and plans for medical care. These plans are made ahead of time in case you lose your ability to make decisions for yourself. Advance directives can apply to any medical decision, such as the treatments you want, and if you want to donate organs.   What are the types of advance directives?  There are many types of advance directives, and each state has rules about how to use them. You may choose a combination of any of the following:  Living will:  This is a written record of the treatment you want. You can also choose which treatments you do not want, which to limit, and which to stop at a certain time. This includes surgery, medicine, IV fluid, and tube feedings.   Durable power of  for healthcare (DPAHC):  This is a written record that states who you want to make healthcare choices for you when you are unable to make them for yourself. This person, called a proxy, is usually a family member or a friend. You may choose more than 1 proxy.  Do not resuscitate (DNR) order:  A DNR order is used in case your heart stops beating or you stop breathing. It is a request not to have certain forms of treatment, such as CPR. A DNR order may be included in other types of advance directives.  Medical directive:  This covers the care that you want if you are in a coma, near death, or unable to make decisions for yourself. You can list the treatments you want for each condition. Treatment may include pain medicine, surgery, blood transfusions, dialysis, IV or tube feedings, and a ventilator (breathing machine).  Values history:  This document has questions about your views, beliefs, and how you feel and think about life. This information can help others choose the care that you would choose.  Why are advance directives important?  An advance directive helps you control your care. Although spoken wishes may be used, it is better to have your wishes written down.  Spoken wishes can be misunderstood, or not followed. Treatments may be given even if you do not want them. An advance directive may make it easier for your family to make difficult choices about your care.   Urinary Incontinence   Urinary incontinence (UI)  is when you lose control of your bladder. UI develops because your bladder cannot store or empty urine properly. The 3 most common types of UI are stress incontinence, urge incontinence, or both.  Medicines:   May be given to help strengthen your bladder control. Report any side effects of medication to your healthcare provider.  Do pelvic muscle exercises often:  Your pelvic muscles help you stop urinating. Squeeze these muscles tight for 5 seconds, then relax for 5 seconds. Gradually work up to squeezing for 10 seconds. Do 3 sets of 15 repetitions a day, or as directed. This will help strengthen your pelvic muscles and improve bladder control.  Train your bladder:  Go to the bathroom at set times, such as every 2 hours, even if you do not feel the urge to go. You can also try to hold your urine when you feel the urge to go. For example, hold your urine for 5 minutes when you feel the urge to go. As that becomes easier, hold your urine for 10 minutes.   Self-care:   Keep a UI record.  Write down how often you leak urine and how much you leak. Make a note of what you were doing when you leaked urine.  Drink liquids as directed. You may need to limit the amount of liquid you drink to help control your urine leakage. Do not drink any liquid right before you go to bed. Limit or do not have drinks that contain caffeine or alcohol.   Prevent constipation.  Eat a variety of high-fiber foods. Good examples are high-fiber cereals, beans, vegetables, and whole-grain breads. Walking is the best way to trigger your intestines to have a bowel movement.  Exercise regularly and maintain a healthy weight.  Weight loss and exercise will decrease pressure on your bladder and help  you control your leakage.   Use a catheter as directed  to help empty your bladder. A catheter is a tiny, plastic tube that is put into your bladder to drain your urine.   Go to behavior therapy as directed.  Behavior therapy may be used to help you learn to control your urge to urinate.    Weight Management   Why it is important to manage your weight:  Being overweight increases your risk of health conditions such as heart disease, high blood pressure, type 2 diabetes, and certain types of cancer. It can also increase your risk for osteoarthritis, sleep apnea, and other respiratory problems. Aim for a slow, steady weight loss. Even a small amount of weight loss can lower your risk of health problems.  How to lose weight safely:  A safe and healthy way to lose weight is to eat fewer calories and get regular exercise. You can lose up about 1 pound a week by decreasing the number of calories you eat by 500 calories each day.   Healthy meal plan for weight management:  A healthy meal plan includes a variety of foods, contains fewer calories, and helps you stay healthy. A healthy meal plan includes the following:  Eat whole-grain foods more often.  A healthy meal plan should contain fiber. Fiber is the part of grains, fruits, and vegetables that is not broken down by your body. Whole-grain foods are healthy and provide extra fiber in your diet. Some examples of whole-grain foods are whole-wheat breads and pastas, oatmeal, brown rice, and bulgur.  Eat a variety of vegetables every day.  Include dark, leafy greens such as spinach, kale, sandeep greens, and mustard greens. Eat yellow and orange vegetables such as carrots, sweet potatoes, and winter squash.   Eat a variety of fruits every day.  Choose fresh or canned fruit (canned in its own juice or light syrup) instead of juice. Fruit juice has very little or no fiber.  Eat low-fat dairy foods.  Drink fat-free (skim) milk or 1% milk. Eat fat-free yogurt and low-fat  cottage cheese. Try low-fat cheeses such as mozzarella and other reduced-fat cheeses.  Choose meat and other protein foods that are low in fat.  Choose beans or other legumes such as split peas or lentils. Choose fish, skinless poultry (chicken or turkey), or lean cuts of red meat (beef or pork). Before you cook meat or poultry, cut off any visible fat.   Use less fat and oil.  Try baking foods instead of frying them. Add less fat, such as margarine, sour cream, regular salad dressing and mayonnaise to foods. Eat fewer high-fat foods. Some examples of high-fat foods include french fries, doughnuts, ice cream, and cakes.  Eat fewer sweets.  Limit foods and drinks that are high in sugar. This includes candy, cookies, regular soda, and sweetened drinks.  Exercise:  Exercise at least 30 minutes per day on most days of the week. Some examples of exercise include walking, biking, dancing, and swimming. You can also fit in more physical activity by taking the stairs instead of the elevator or parking farther away from stores. Ask your healthcare provider about the best exercise plan for you.   Narcotic (Opioid) Safety    Use narcotics safely:  Take prescribed narcotics exactly as directed  Do not give narcotics to others or take narcotics that belong to someone else  Do not mix narcotics without medicines or alcohol  Do not drive or operate heavy machinery after you take the narcotic  Monitor for side effects and notify your healthcare provider if you experienced side effects such as nausea, sleepiness, itching, or trouble thinking clearly.    Manage constipation:    Constipation is the most common side effect of narcotic medicine. Constipation is when you have hard, dry bowel movements, or you go longer than usual between bowel movements. Tell your healthcare provider about all changes in your bowel movements while you are taking narcotics. He or she may recommend laxative medicine to help you have a bowel movement. He or  she may also change the kind of narcotic you are taking, or change when you take it. The following are more ways you can prevent or relieve constipation:    Drink liquids as directed.  You may need to drink extra liquids to help soften and move your bowels. Ask how much liquid to drink each day and which liquids are best for you.  Eat high-fiber foods.  This may help decrease constipation by adding bulk to your bowel movements. High-fiber foods include fruits, vegetables, whole-grain breads and cereals, and beans. Your healthcare provider or dietitian can help you create a high-fiber meal plan. Your provider may also recommend a fiber supplement if you cannot get enough fiber from food.  Exercise regularly.  Regular physical activity can help stimulate your intestines. Walking is a good exercise to prevent or relieve constipation. Ask which exercises are best for you.  Schedule a time each day to have a bowel movement.  This may help train your body to have regular bowel movements. Bend forward while you are on the toilet to help move the bowel movement out. Sit on the toilet for at least 10 minutes, even if you do not have a bowel movement.    Store narcotics safely:   Store narcotics where others cannot easily get them.  Keep them in a locked cabinet or secure area. Do not  keep them in a purse or other bag you carry with you. A person may be looking for something else and find the narcotics.  Make sure narcotics are stored out of the reach of children.  A child can easily overdose on narcotics. Narcotics may look like candy to a small child.    The best way to dispose of narcotics:      The laws vary by country and area. In the [place], the best way is to return the narcotics through a take-back program. This program is offered by the [place] Drug Enforcement Agency ([employer]). The following are options for using the program:  Take the narcotics to a [employer] collection site.  The site is often a law enforcement center.  Call your local law enforcement center for scheduled take-back days in your area. You will be given information on where to go if the collection site is in a different location.  Take the narcotics to an approved pharmacy or hospital.  A pharmacy or hospital may be set up as a collection site. You will need to ask if it is a [employer] collection site if you were not directed there. A pharmacy or doctor's office may not be able to take back narcotics unless it is a [employer] site.  Use a mail-back system.  This means you are given containers to put the narcotics into. You will then mail them in the containers.  Use a take-back drop box.  This is a place to leave the narcotics at any time. People and animals will not be able to get into the box. Your local law enforcement agency can tell you where to find a drop box in your area.    Other ways to manage pain:   Ask your healthcare provider about non-narcotic medicines to control pain.  Nonprescription medicines include NSAIDs (such as ibuprofen) and acetaminophen. Prescription medicines include muscle relaxers, antidepressants, and steroids.  Pain may be managed without any medicines.  Some ways to relieve pain include massage, aromatherapy, or meditation. Physical or occupational therapy may also help.    For more information:   Drug Enforcement Administration  [address] [place] [address]  Phone: 8- 511 - 798-2467  Web Address: https://www.deadiversion.[employer].gov/drug_disposal/    [place] Food and Drug Administration  [address] [place] [address]  Phone: 3- 999 - 671-6401  Web Address: http://www.fda.gov   © Copyright [employer] 2018 Information is for End User's use only and may not be sold, redistributed or otherwise used for commercial purposes. All illustrations and images included in CareNotes® are the copyrighted property of A.D.A.M., Inc. or [employer]

## 2025-05-30 NOTE — PROGRESS NOTES
Teresa Mao  tolerated hydration treatment well with no complications.      Teresa Mao is aware of future appt on 06/03/025 at 800.     AVS No (Declined by Teresa Mao) [facility]

## 2025-06-03 ENCOUNTER — HOSPITAL ENCOUNTER (OUTPATIENT)
Dept: INFUSION CENTER | Facility: HOSPITAL | Age: 70
Discharge: HOME | End: 2025-06-03
Payer: MEDICARE

## 2025-06-03 VITALS
TEMPERATURE: 97.9 F | RESPIRATION RATE: 18 BRPM | OXYGEN SATURATION: 100 % | SYSTOLIC BLOOD PRESSURE: 148 MMHG | HEART RATE: 66 BPM | DIASTOLIC BLOOD PRESSURE: 75 MMHG

## 2025-06-03 DIAGNOSIS — G90.A POSTURAL ORTHOSTATIC TACHYCARDIA SYNDROME: Primary | ICD-10-CM

## 2025-06-03 PROCEDURE — 96361 HYDRATE IV INFUSION ADD-ON: CPT

## 2025-06-03 PROCEDURE — 96360 HYDRATION IV INFUSION INIT: CPT

## 2025-06-03 RX ADMIN — SODIUM CHLORIDE 1500 ML: 0.9 INJECTION, SOLUTION INTRAVENOUS at 08:09

## 2025-06-03 NOTE — PROGRESS NOTES
Teresa Mao  tolerated treatment well with no complications.      Teresa Mao is aware of future appt on 6/5 at 830.     AVS printed and given to Teresa Mao:   No (Declined by Teresa Mao)

## 2025-06-05 ENCOUNTER — HOSPITAL ENCOUNTER (OUTPATIENT)
Dept: INFUSION CENTER | Facility: HOSPITAL | Age: 70
End: 2025-06-05
Payer: MEDICARE

## 2025-06-05 VITALS
RESPIRATION RATE: 17 BRPM | TEMPERATURE: 97 F | SYSTOLIC BLOOD PRESSURE: 131 MMHG | DIASTOLIC BLOOD PRESSURE: 78 MMHG | OXYGEN SATURATION: 98 % | HEART RATE: 64 BPM

## 2025-06-05 DIAGNOSIS — G90.A POSTURAL ORTHOSTATIC TACHYCARDIA SYNDROME: Primary | ICD-10-CM

## 2025-06-05 PROCEDURE — 96361 HYDRATE IV INFUSION ADD-ON: CPT

## 2025-06-05 PROCEDURE — 96360 HYDRATION IV INFUSION INIT: CPT

## 2025-06-05 RX ADMIN — SODIUM CHLORIDE 1000 ML: 0.9 INJECTION, SOLUTION INTRAVENOUS at 08:56

## 2025-06-06 ENCOUNTER — HOSPITAL ENCOUNTER (OUTPATIENT)
Dept: INFUSION CENTER | Facility: HOSPITAL | Age: 70
End: 2025-06-06
Payer: MEDICARE

## 2025-06-06 VITALS
RESPIRATION RATE: 17 BRPM | DIASTOLIC BLOOD PRESSURE: 80 MMHG | SYSTOLIC BLOOD PRESSURE: 135 MMHG | TEMPERATURE: 98.1 F | OXYGEN SATURATION: 98 % | HEART RATE: 81 BPM

## 2025-06-06 DIAGNOSIS — G90.A POSTURAL ORTHOSTATIC TACHYCARDIA SYNDROME: Primary | ICD-10-CM

## 2025-06-06 PROCEDURE — 96361 HYDRATE IV INFUSION ADD-ON: CPT

## 2025-06-06 PROCEDURE — 96360 HYDRATION IV INFUSION INIT: CPT

## 2025-06-06 RX ADMIN — SODIUM CHLORIDE 1000 ML: 0.9 INJECTION, SOLUTION INTRAVENOUS at 08:06

## 2025-06-06 NOTE — PROGRESS NOTES
Teresa Mao  tolerated treatment well with no complications.      Teresa Mao is aware of future appt on 6/9 at 9 am.     AVS declined by Teresa Mao.

## 2025-06-09 ENCOUNTER — HOSPITAL ENCOUNTER (OUTPATIENT)
Dept: INFUSION CENTER | Facility: HOSPITAL | Age: 70
Discharge: HOME | End: 2025-06-09
Payer: MEDICARE

## 2025-06-09 VITALS
TEMPERATURE: 97.7 F | HEART RATE: 72 BPM | RESPIRATION RATE: 18 BRPM | DIASTOLIC BLOOD PRESSURE: 84 MMHG | OXYGEN SATURATION: 98 % | SYSTOLIC BLOOD PRESSURE: 153 MMHG

## 2025-06-09 DIAGNOSIS — G90.A POSTURAL ORTHOSTATIC TACHYCARDIA SYNDROME: Primary | ICD-10-CM

## 2025-06-09 PROCEDURE — 96361 HYDRATE IV INFUSION ADD-ON: CPT

## 2025-06-09 PROCEDURE — 96360 HYDRATION IV INFUSION INIT: CPT

## 2025-06-09 RX ADMIN — SODIUM CHLORIDE 1000 ML: 0.9 INJECTION, SOLUTION INTRAVENOUS at 09:10

## 2025-06-09 NOTE — PROGRESS NOTES
Teresa Mao  tolerated hydration well with no complications.      Teresa Mao is aware of future appt on 6-10-25 at 8 am     AVS declined

## 2025-06-12 ENCOUNTER — HOSPITAL ENCOUNTER (OUTPATIENT)
Dept: INFUSION CENTER | Facility: HOSPITAL | Age: 70
End: 2025-06-12
Payer: MEDICARE

## 2025-06-12 VITALS
RESPIRATION RATE: 16 BRPM | OXYGEN SATURATION: 98 % | HEART RATE: 66 BPM | DIASTOLIC BLOOD PRESSURE: 74 MMHG | SYSTOLIC BLOOD PRESSURE: 134 MMHG | TEMPERATURE: 98.4 F

## 2025-06-12 DIAGNOSIS — G90.A POSTURAL ORTHOSTATIC TACHYCARDIA SYNDROME: Primary | ICD-10-CM

## 2025-06-12 PROCEDURE — 96361 HYDRATE IV INFUSION ADD-ON: CPT

## 2025-06-12 PROCEDURE — 96360 HYDRATION IV INFUSION INIT: CPT

## 2025-06-12 RX ADMIN — SODIUM CHLORIDE 1000 ML: 0.9 INJECTION, SOLUTION INTRAVENOUS at 09:08

## 2025-06-12 NOTE — PLAN OF CARE
Problem: Knowledge Deficit  Goal: Patient/family/caregiver demonstrates understanding of disease process, treatment plan, medications, and discharge instructions  Description: Complete learning assessment and assess knowledge base.  Interventions:  - Provide teaching at level of understanding  - Provide teaching via preferred learning methods  6/12/2025 0919 by Nury Clark RN  Outcome: Progressing  6/12/2025 0916 by Nury Clark RN  Outcome: Progressing     Problem: Knowledge Deficit  Goal: Patient/family/caregiver demonstrates understanding of disease process, treatment plan, medications, and discharge instructions  Description: Complete learning assessment and assess knowledge base.  Interventions:  - Provide teaching at level of understanding  - Provide teaching via preferred learning methods  6/12/2025 0919 by Nury Clark RN  Outcome: Progressing  6/12/2025 0916 by Nury Clark RN  Outcome: Progressing

## 2025-06-13 ENCOUNTER — HOSPITAL ENCOUNTER (OUTPATIENT)
Dept: INFUSION CENTER | Facility: HOSPITAL | Age: 70
End: 2025-06-13
Attending: STUDENT IN AN ORGANIZED HEALTH CARE EDUCATION/TRAINING PROGRAM
Payer: MEDICARE

## 2025-06-13 VITALS
DIASTOLIC BLOOD PRESSURE: 83 MMHG | HEART RATE: 65 BPM | OXYGEN SATURATION: 98 % | TEMPERATURE: 97.2 F | SYSTOLIC BLOOD PRESSURE: 144 MMHG | RESPIRATION RATE: 17 BRPM

## 2025-06-13 DIAGNOSIS — G90.A POSTURAL ORTHOSTATIC TACHYCARDIA SYNDROME: Primary | ICD-10-CM

## 2025-06-13 PROCEDURE — 96360 HYDRATION IV INFUSION INIT: CPT

## 2025-06-13 PROCEDURE — 96361 HYDRATE IV INFUSION ADD-ON: CPT

## 2025-06-13 RX ADMIN — SODIUM CHLORIDE 1000 ML: 0.9 INJECTION, SOLUTION INTRAVENOUS at 09:23

## 2025-06-13 NOTE — PROGRESS NOTES
Teresa Mao  tolerated hydration treatment well with no complications.      Teresa Mao is aware of future appt on 6/16 at 8:30AM.     AVS printed and given to Teresa Mao: No (Declined by Teresa Mao).

## 2025-06-16 ENCOUNTER — HOSPITAL ENCOUNTER (OUTPATIENT)
Dept: INFUSION CENTER | Facility: HOSPITAL | Age: 70
Discharge: HOME | End: 2025-06-16
Payer: MEDICARE

## 2025-06-16 VITALS
TEMPERATURE: 98.4 F | DIASTOLIC BLOOD PRESSURE: 63 MMHG | HEART RATE: 100 BPM | RESPIRATION RATE: 18 BRPM | SYSTOLIC BLOOD PRESSURE: 134 MMHG

## 2025-06-16 DIAGNOSIS — G90.A POSTURAL ORTHOSTATIC TACHYCARDIA SYNDROME: Primary | ICD-10-CM

## 2025-06-16 PROCEDURE — 96361 HYDRATE IV INFUSION ADD-ON: CPT

## 2025-06-16 PROCEDURE — 96360 HYDRATION IV INFUSION INIT: CPT

## 2025-06-16 RX ADMIN — SODIUM CHLORIDE 1000 ML: 0.9 INJECTION, SOLUTION INTRAVENOUS at 08:38

## 2025-06-17 ENCOUNTER — HOSPITAL ENCOUNTER (OUTPATIENT)
Dept: INFUSION CENTER | Facility: HOSPITAL | Age: 70
End: 2025-06-17

## 2025-06-19 ENCOUNTER — HOSPITAL ENCOUNTER (OUTPATIENT)
Dept: INFUSION CENTER | Facility: HOSPITAL | Age: 70
End: 2025-06-19
Attending: STUDENT IN AN ORGANIZED HEALTH CARE EDUCATION/TRAINING PROGRAM
Payer: MEDICARE

## 2025-06-19 VITALS — RESPIRATION RATE: 12 BRPM | TEMPERATURE: 97.6 F | OXYGEN SATURATION: 99 % | HEART RATE: 73 BPM

## 2025-06-19 DIAGNOSIS — G90.A POSTURAL ORTHOSTATIC TACHYCARDIA SYNDROME: Primary | ICD-10-CM

## 2025-06-19 PROCEDURE — 96361 HYDRATE IV INFUSION ADD-ON: CPT

## 2025-06-19 PROCEDURE — 96360 HYDRATION IV INFUSION INIT: CPT

## 2025-06-19 RX ADMIN — SODIUM CHLORIDE 1000 ML: 0.9 INJECTION, SOLUTION INTRAVENOUS at 08:21

## 2025-06-19 NOTE — PROGRESS NOTES
Teresa Mao  tolerated HYDRATION treatment well with no complications.      Teresa Mao is aware of future appt on 6/20 at 0830.     AVS printed and given to Teresa Mao:    No (Declined by Teresa Mao)

## 2025-06-20 ENCOUNTER — NURSE TRIAGE (OUTPATIENT)
Age: 70
End: 2025-06-20

## 2025-06-20 ENCOUNTER — OFFICE VISIT (OUTPATIENT)
Dept: URGENT CARE | Facility: CLINIC | Age: 70
End: 2025-06-20
Payer: MEDICARE

## 2025-06-20 VITALS
RESPIRATION RATE: 18 BRPM | DIASTOLIC BLOOD PRESSURE: 88 MMHG | SYSTOLIC BLOOD PRESSURE: 149 MMHG | HEART RATE: 79 BPM | TEMPERATURE: 98.6 F | OXYGEN SATURATION: 98 %

## 2025-06-20 DIAGNOSIS — J40 BRONCHITIS: Primary | ICD-10-CM

## 2025-06-20 PROCEDURE — G0463 HOSPITAL OUTPT CLINIC VISIT: HCPCS | Performed by: PHYSICIAN ASSISTANT

## 2025-06-20 PROCEDURE — 99212 OFFICE O/P EST SF 10 MIN: CPT | Performed by: PHYSICIAN ASSISTANT

## 2025-06-20 RX ORDER — METHYLPREDNISOLONE 32 MG/1
32 TABLET ORAL DAILY
Qty: 7 TABLET | Refills: 0 | Status: SHIPPED | OUTPATIENT
Start: 2025-06-20 | End: 2025-06-27

## 2025-06-20 NOTE — PROGRESS NOTES
[facility]        NAME: Teresa Mao is a 69 y.o. female  : 1955    MRN: [id number]  DATE: 2025  TIME: 1:56 PM    Assessment and Plan   Bronchitis [J40]  1. Bronchitis  methylPREDNISolone (MEDROL) 32 MG tablet            Patient Instructions       Follow up with PCP in 3-5 days.  Proceed to  ER if symptoms worsen.    If tests have been performed at [facility], our office will contact you with results if changes need to be made to the care plan discussed with you at the visit.  [facility]    Chief Complaint     Chief Complaint   Patient presents with    Cough     Runny nose onset 1 week ago also with rash to both arms believes the rash maybe from antibiotics she took 5 days ago pre-procedure has solorzano right side for infusions secondary to POTS          History of Present Illness       Cough  This is a new problem. The current episode started in the past 7 days. The problem has been rapidly worsening. The problem occurs constantly. The cough is Non-productive. Associated symptoms include shortness of breath. Pertinent negatives include no chills, ear pain, fever, headaches, myalgias, nasal congestion, postnasal drip, rash, rhinorrhea, sore throat or wheezing. The symptoms are aggravated by lying down. She has tried a beta-agonist inhaler, ipratropium inhaler and leukotriene antagonists for the symptoms. The treatment provided no relief. Her past medical history is significant for asthma, bronchitis and environmental allergies.       Review of Systems   Review of Systems   Constitutional:  Negative for activity change, appetite change, chills, fatigue and fever.   HENT:  Negative for congestion, ear discharge, ear pain, postnasal drip, rhinorrhea, sinus pressure, sinus pain, sneezing, sore throat and trouble swallowing.    Respiratory:  Positive for cough and shortness of breath. Negative for chest tightness and wheezing.    Gastrointestinal:   Negative for abdominal pain, diarrhea, nausea and vomiting.   Musculoskeletal:  Negative for myalgias.   Skin:  Negative for rash.   Allergic/Immunologic: Positive for environmental allergies.   Neurological:  Negative for light-headedness and headaches.         Current Medications     Current Medications[1]    Current Allergies     Allergies as of 06/20/2025 - Reviewed 06/20/2025   Allergen Reaction Noted    Imipramine Confusion, Fatigue, Irritability, Palpitations, Shortness Of Breath, Tachycardia, and Visual Disturbance 09/21/2021    Lactose - food allergy Shortness Of Breath 03/16/2016    Melatonin Shortness Of Breath 03/25/2019    Mirtazapine Anxiety, Dizziness, GI Intolerance, Nausea Only, Palpitations, and Shortness Of Breath 12/14/2020    Nexium [esomeprazole] Shortness Of Breath 03/25/2019    Nsaids Shortness Of Breath, Cough, Other (See Comments), Nausea Only, GI Intolerance, Tachycardia, and Wheezing 01/01/1977    Propofol Cough, Other (See Comments), Shortness Of Breath, and Nasal Congestion 05/25/2022    Salmeterol Dizziness, Other (See Comments), Shortness Of Breath, and Tachycardia 01/01/1999    Singulair [montelukast] Shortness Of Breath and Cough 03/25/2019    Zomig [zolmitriptan] Shortness Of Breath 03/25/2019    Dexilant [dexlansoprazole] Nausea Only and Vomiting 03/25/2019    Ambien [zolpidem]  01/24/2019    Amitriptyline Drowsiness 03/25/2019    Ascorbate - food allergy GI Intolerance 08/11/2021    Ascorbic acid GI Intolerance 08/11/2021    Aspirin  01/19/2016    Azithromycin Hives and Itching 04/01/2018    Bactrim [sulfamethoxazole-trimethoprim] Hives 03/25/2019    Banana - food allergy Dermatitis 08/11/2021    Banana extract allergy skin test - food allergy Dermatitis 08/11/2021    Bisoprolol Other (See Comments) 01/14/2020    Ceftin [cefuroxime]  01/19/2016    Celebrex [celecoxib]  03/25/2019    Chocolate - food allergy Headache 10/15/2024    Ciprofloxacin Dizziness, Hives, Other (See  Comments), Itching, and Nausea Only 01/19/2016    Demerol [meperidine]  01/19/2016    Duloxetine Other (See Comments) 03/16/2016    Epinephrine Dizziness 03/25/2019    Ergotamine Nausea Only and Headache 03/25/2019    Fludrocortisone Other (See Comments) 09/09/2019    Keflex [cephalexin]  01/19/2016    Klonopin [clonazepam] Nausea Only and Dizziness 03/25/2019    Latex Hives 05/25/2021    Levofloxacin Other (See Comments) 01/19/2016    Lexapro [escitalopram]  03/25/2019    Lyrica [pregabalin] Fatigue 03/25/2019    Macrodantin [nitrofurantoin]  01/19/2016    Metoprolol Other (See Comments) 01/14/2020    Metronidazole Other (See Comments) 07/08/2020    Molds & smuts GI Intolerance and Other (See Comments) 03/29/2016    Morphine Nausea Only 03/25/2019    Movantik [naloxegol] Nausea Only 03/25/2019    Mushroom extract complex - food allergy  03/16/2016    Naprosyn [naproxen]  01/19/2016    Neurontin [gabapentin] Dizziness 03/25/2019    Pineapple - food allergy GI Intolerance 08/11/2021    Plecanatide Abdominal Pain, Diarrhea, and GI Intolerance 01/14/2021    Prolia [denosumab]  01/24/2019    Prozac [fluoxetine]  03/25/2019    Soy allergy - food allergy Vomiting 03/16/2016    Sudafed [pseudoephedrine] Hives 03/25/2019    Sulfa antibiotics  01/19/2016    Tomato - food allergy GI Intolerance 08/11/2021    Trazodone  03/25/2019    Ultram [tramadol] Nausea Only, Dizziness, and Headache 03/25/2019    Vilazodone Dizziness, GI Intolerance, Headache, Abdominal Pain, and Other (See Comments) 12/14/2020    Vilazodone hcl Abdominal Pain, Dizziness, GI Intolerance, Headache, Other (See Comments), Fatigue, and Nausea Only 11/25/2020    Vioxx [rofecoxib]  03/25/2019    Vortioxetine Drowsiness, Fatigue, GI Intolerance, and Irritability 10/19/2020    Wheat bran - food allergy Other (See Comments) 03/29/2016    Zinc Other (See Comments), GI Intolerance, and Nausea Only 08/11/2021    Zoloft [sertraline]  01/24/2019    Albuterol  Palpitations, Dizziness, Other (See Comments), and Tachycardia 01/01/1975    Cyclobenzaprine hcl er Rash 03/20/2019    Terfenadine Dizziness and Palpitations 01/01/2000    Zantac [ranitidine] Rash and Dizziness 03/25/2019            The following portions of the patient's history were reviewed and updated as appropriate: allergies, current medications, past family history, past medical history, past social history, past surgical history and problem list.     Past Medical History[2]    Past Surgical History[3]    Family History[4]      Medications have been verified.        Objective   /88   Pulse 79   Temp 98.6 °F (37 °C)   Resp 18   SpO2 98%   No LMP recorded. Patient has had a hysterectomy.       Physical Exam     Physical Exam  Vitals reviewed.   Constitutional:       General: She is not in acute distress.     Appearance: She is well-developed. She is not diaphoretic.   HENT:      Head: Normocephalic.      Right Ear: Tympanic membrane, ear canal and external ear normal.      Left Ear: Tympanic membrane, ear canal and external ear normal.      Nose: Nose normal.      Mouth/Throat:      Pharynx: No oropharyngeal exudate or posterior oropharyngeal erythema.     Eyes:      Conjunctiva/sclera: Conjunctivae normal.       Cardiovascular:      Rate and Rhythm: Normal rate and regular rhythm.      Heart sounds: Normal heart sounds. No murmur heard.     No friction rub. No gallop.   Pulmonary:      Effort: Pulmonary effort is normal. No respiratory distress.      Breath sounds: Stridor present. Examination of the left-upper field reveals wheezing and rhonchi. Examination of the left-lower field reveals wheezing. Wheezing and rhonchi present. No rales.   Lymphadenopathy:      Cervical: No cervical adenopathy.     Skin:     General: Skin is warm.     Neurological:      Mental Status: She is alert and oriented to person, place, and time.     Psychiatric:         Behavior: Behavior normal.         Thought Content:  Thought content normal.         Judgment: Judgment normal.                          [1]   Current Outpatient Medications:     aluminum hydroxide-magnesium carbonate (Gaviscon Extra Strength) 160-105 mg per chewable tablet, , Disp: , Rfl:     methylPREDNISolone (MEDROL) 32 MG tablet, Take 1 tablet (32 mg total) by mouth daily for 7 days, Disp: 7 tablet, Rfl: 0    azelastine (ASTELIN) 0.1 % nasal spray, 2 sprays into each nostril in the morning and 2 sprays in the evening. Use in each nostril as directed ., Disp: , Rfl:     beclomethasone (QVAR) 40 MCG/ACT inhaler, Inhale 1 puff daily as needed (only when unable to nebulize pulmicort) Rinse mouth after use., Disp: , Rfl:     budesonide (PULMICORT) 0.5 mg/2 mL nebulizer solution, Take 0.5 mg by nebulization in the morning and 0.5 mg in the evening. Rinse mouth after use. ., Disp: , Rfl:     cromolyn (GASTROCROM) 100 MG/5ML solution, Take 100 mg by mouth 4 (four) times a day (before meals and at bedtime), Disp: , Rfl:     cyclobenzaprine (FLEXERIL) 5 mg tablet, TAKE 1 TABLET BY MOUTH THREE TIMES A DAY AS NEEDED FOR MUSCLE SPASMS, Disp: 90 tablet, Rfl: 1    cycloSPORINE (RESTASIS) 0.05 % ophthalmic emulsion, Administer 1 drop to both eyes in the morning and 1 drop in the evening., Disp: , Rfl:     docusate sodium (COLACE) 100 mg capsule, Take 100 mg by mouth daily as needed for constipation, Disp: , Rfl:     doxycycline (ADOXA) 100 MG tablet, Take 100 mg by mouth 2 (two) times a day, Disp: , Rfl:     Dupilumab (DUPIXENT SC), Inject under the skin Pt unsure of dose, Disp: , Rfl:     erythromycin (ILOTYCIN) ophthalmic ointment, Apply 1 application. to eye daily at bedtime, Disp: , Rfl:     famotidine (PEPCID) 40 MG tablet, Twice a day, Disp: , Rfl:     fexofenadine (ALLEGRA) 180 MG tablet, Take 180 mg by mouth in the morning and 180 mg in the evening., Disp: , Rfl:     fluticasone (FLONASE) 50 mcg/act nasal spray, 2 sprays into each nostril in the morning., Disp: , Rfl:      Galcanezumab-gnlm (Emgality) 120 MG/ML SOAJ, Inject under the skin every 30 (thirty) days, Disp: , Rfl:     guaiFENesin (Mucinex) 600 mg 12 hr tablet, Take 600 mg by mouth every 12 (twelve) hours, Disp: , Rfl:     Heating Pad PADS, Use if needed (pain), Disp: , Rfl:     HYDROcodone-acetaminophen (NORCO)  mg per tablet, Take 0.5 tablets by mouth 2 (two) times a day as needed for moderate pain Max Daily Amount: 1 tablet Do not start before June 19, 2025., Disp: 30 tablet, Rfl: 0    HYDROcodone-acetaminophen (NORCO)  mg per tablet, Take 0.5 tablets by mouth 2 (two) times a day as needed for moderate pain Max Daily Amount: 1 tablet, Disp: 30 tablet, Rfl: 0    hydrOXYzine (ATARAX) 10 mg/5 mL syrup, , Disp: , Rfl:     ipratropium (ATROVENT) 0.02 % nebulizer solution, Take 0.5 mg by nebulization every 6 (six) hours as needed for wheezing or shortness of breath, Disp: , Rfl:     ipratropium (ATROVENT) 0.06 % nasal spray, , Disp: , Rfl:     ivabradine HCl (Corlanor) 5 MG tablet, 7.5 mg in the morning and 7.5 mg before bedtime., Disp: , Rfl:     Ivabradine HCl 7.5 MG TABS, Take 1 tablet by mouth 2 (two) times a day with meals, Disp: , Rfl:     KETOTIFEN FUMARATE OP, , Disp: , Rfl:     levalbuterol (XOPENEX HFA) 45 mcg/act inhaler, Inhale 2 puffs every 4 (four) hours as needed (when unable to nebulize), Disp: , Rfl:     levalbuterol (XOPENEX) 1.25 mg/3 mL nebulizer solution, Take 1.25 mg by nebulization every 6 (six) hours as needed, Disp: , Rfl: 2    Magnesium 400 MG CAPS, Take 1 capsule by mouth in the morning and 1 capsule in the evening., Disp: , Rfl:     methylprednisolone (MEDROL) 4 mg tablet, Medrol dosepak, take as directed, Disp: 21 tablet, Rfl: 0    MULTIPLE VITAMINS-CALCIUM PO, Take 1 capsule by mouth every morning, Disp: , Rfl:     naloxone (NARCAN) 4 mg/0.1 mL nasal spray, Administer 1 spray into a nostril. If no response after 2-3 minutes, give another dose in the other nostril using a new spray.,  Disp: 1 each, Rfl: 0    NON FORMULARY, Take 1 mg by mouth in the morning and 1 mg in the evening and 1 mg before bedtime. Ketotifen 1 mg compounded capsule ., Disp: , Rfl:     NON FORMULARY, LACTOBACILLUS COMBINATION NO.4 (PROBIOTIC) 3 BILLION CELL CAPSULE, Disp: , Rfl:     NON FORMULARY, 0.25 % X-DEMVY eye drop, Disp: , Rfl:     omega-3-acid ethyl esters (LOVAZA) 1 g capsule, Take 2 g by mouth in the morning and 2 g in the evening. 1600mg daily ., Disp: , Rfl:     polyethylene glycol (MIRALAX) 17 g packet, Take 17 g by mouth daily as needed, Disp: , Rfl:     Probiotic Product (PROBIOTIC DAILY PO), Take 1 tablet by mouth in the morning. At lunch., Disp: , Rfl:     Qvar RediHaler 40 MCG/ACT inhaler, , Disp: , Rfl:     sodium chloride, Inject 1,500 mL into a catheter in a vein, Disp: , Rfl:     sucralfate (CARAFATE) 1 g/10 mL suspension, Take 1 g by mouth in the morning and 1 g in the evening., Disp: , Rfl:     Thyroid, Porcine, POWD, Use 32 mg daily, Disp: 76265 g, Rfl: 0    Ubrogepant (UBRELVY) 100 MG tablet, Take 100 mg by mouth Take 1 tablet (100 mg) one time as needed for migraine. May repeat one additional tablet (100 mg) at least two hours after the first dose. Do not use more than two doses per day, or for more than eight days per month., Disp: , Rfl:     verapamil (CALAN) 40 mg tablet, , Disp: , Rfl:     Xdemvy 0.25 % SOLN, Apply 1 drop to eye, Disp: , Rfl:   No current facility-administered medications for this visit.    Facility-Administered Medications Ordered in Other Visits:     alteplase (CATHFLO) injection 2 mg, 2 mg, Intracatheter, Q1MIN PRN, Ricky Harper MD  [2]   Past Medical History:  Diagnosis Date    Allergic 1960    As a child    Allergic rhinitis     Anxiety     Arthritis 1990    Osteoarthritis    Asthma     Back pain     Breast cyst 1970’s    Breathing difficulty     Cardiac disease     Cardiopathy     EF 45%    Cataract 2015    Cerebrovascular disease 1970’s    Migraines related to POTS,  "[facility]    CHF (congestive heart failure) ([facility]) 2019    [facility] Admission    Chronic kidney disease 2021    Kink in right ureter, Enlarged right kidney    Clotting disorder ([facility]) 1990    Factor V Leiden    COPD (chronic obstructive pulmonary disease) ([facility]) 1970's    Brochitis with infections. Dx with severe asthma    Coronary artery disease 2019    Left Bundle Branch Block, Cardiomyopathy, POTS hx of rheumatic fever    Cough     Disease of thyroid gland 2019    Hashimoto    Diverticulitis     Factor V Leiden ([facility])     Fatigue 1970’s    Dx Chronic Fatigue Syndrome    Fibrocystic breast 1970’s    Fibroid 1996    Hysterectomy for bleeding  x 2 years    Fibromyalgia     Fibromyalgia, primary 1993    Diagnosed by rheumatologist    GERD (gastroesophageal reflux disease)     Hashimoto's thyroiditis     Headache(784.0) 1960’s    Had migraines since childhood    Heart disease 2019    POTS dx Hospitalized at [facility]    Heart murmur 1967    Rheumatic Fever    Hx of degenerative disc disease     Hyperlipidemia 1980’s    Labs of 7/2024 show normal    Hypertension     Hypotension     pots - postural orthostatic hypotension    Interstitial cystitis     Irregular heart beat     LBBB    Irritable bowel syndrome     Liver disease 1980’s    Elevated liver enzymes from medications    Migraines     Mitral valve disease     \"thickening\"    Myocardial infarction ([facility])     possible but not sure when    Nasal congestion     Neuromuscular disorder ([facility]) 1970's    Neuropathy     bilateral legs    Osteoarthritis 2019    Pain in joints and spine    Osteoporosis     Peptic ulceration 2016 weight down to 84lbs, Adams cath fir    Adams cath for TPN x 5 months    Peripheral vascular disease (HCC)     POTS, hands and feet turn blue, cold. Numbness in legs and feet, possible small fiber neuropathy    Pneumonia 1979    Hospitalized with Mycoplasma    PONV (postoperative nausea and vomiting)     Postural orthostatic " tachycardia syndrome     must drink a lot of water and salt    Pott's disease     Rheumatic fever 1967    Scoliosis     Sepsis (HCC)     associated with PICC line    Sinusitis ’s    Worse 2024    Sjogren's syndrome (HCC)     Sleep apnea 2016    Sleep study showed Central and obstructive apnea    Sleep difficulties 1970’s    Worked night shift,  trouble staying asleep    Stomach disorder 2016    Nausea/ Vomiting, Weight loss, Cholycystectomy, TPN    TMJ (dislocation of temporomandibular joint)     Urinary incontinence     Following birth of 3rd child    Urinary retention 2022    Urgency with difficulty starting stream. Have to strain to empty bladder    Urinary tract infection     Dx of interstitial Cystitis in ; [facility]AS dx 10/2023    Vaginal infection 1967    Rheumatic Fever: Penicillin x 6 years caused Candida    Visual impairment     Needed glasses   [3]   Past Surgical History:  Procedure Laterality Date    ABLATION MICROWAVE Left     lumbar area    APPENDECTOMY      TAHysterctomy with Appendectomy    BACK SURGERY  10/1998    BLADDER SURGERY  2021; 2023    Cystoscopy with hydro extension, ligation and cauterization of lining     SECTION      CHOLECYSTECTOMY  2016    COLONOSCOPY  2016    CYSTOSCOPY  2021    Showed lining developed cords, pulling areas of bladder, areas of bladder wall missing lining.    DILATION AND CURETTAGE OF UTERUS      EGD      EPIDURAL BLOCK INJECTION  Multiple    Since ’s    FOOT SURGERY  1968    removal of bone and neuroma    HYSTERECTOMY N/A     age 39    IR OTHER  2022    IR OTHER  2022    IR OTHER  2023    IR OTHER  2024    IR OTHER  2024    IR PICC PLACEMENT SINGLE LUMEN  10/02/2020    IR PICC PLACEMENT SINGLE LUMEN  2021    IR PICC REPOSITION  2021    IR TUNNELED CENTRAL LINE PLACEMENT  2022    LAMINECTOMY      LAPAROSCOPY      endometriosis    MOUTH BIOPSY       lip    OOPHORECTOMY Bilateral     age 39    ORTHOPEDIC SURGERY  2022; 5/3/2024    Left Rotator Cuff Repair;Right rotator cuff repair with long head biceps tenodenesis    [place] EGD TRANSORAL BIOPSY SINGLE/MULTIPLE N/A 2019    Procedure: ESOPHAGOGASTRODUODENOSCOPY (EGD) with multiple bx and dilation;  Surgeon: Peter Baldwin MD;  Location:  GI LAB;  Service: Gastroenterology    [place] SURGICAL ARTHROSCOPY SHOULDER W/ROTATOR CUFF RPR Right 2022    Procedure: SHOULDER ARTHROSCOPIC ROTATOR CUFF REPAIR Biceps Tenodisis;  Surgeon: Ilya Munoz MD;  Location: [facility] MAIN OR;  Service: Orthopedics    [place] SURGICAL ARTHROSCOPY SHOULDER W/ROTATOR CUFF RPR Left 2024    Procedure: SHOULDER ARTHROSCOPIC ROTATOR CUFF REPAIR, SUBACROMIAL DECOMPRESSION;  Surgeon: Ilya Munoz MD;  Location: [facility] MAIN OR;  Service: Orthopedics    SHOULDER SURGERY  2022    Repair of Supraspinatus, Biceps    SPINAL FUSION      Myltiple Radio Frequency Ablations    TRIGGER POINT INJECTION  ’s    Multiple injections did not give lasting relief    TUNNELED VENOUS CATHETER PLACEMENT     [4]   Family History  Problem Relation Name Age of Onset    Cancer Mother Mahsa         Bladder    Asthma Mother Mahsa     Heart attack Mother Mahsa         CABG x4, also had 7 stents. Around 8 MI's    Heart disease Mother Mahsa         9 MIs, CABG x4, 7 stents; Paraplegic stroke-     Heart failure Mother Mahsa         Er visit in May, 2022    Hyperlipidemia Mother Mahsa     Hypertension Mother Mahsa     Depression Mother Mahsa     Dementia Mother Mahsa     Coronary artery disease Mother Mahsa         Several MIs, CABG x4; 7 stents    Stroke Mother Mahsa         in her 20's, weakness in leg ;   stroke  Left side    Diabetes Mother Mahsa         Insulin dependent    Thyroid disease Mother Mahsa         Hypothyroid    Kidney disease Mother Mahsa         Stage 4    Migraines  Mother aMhsa      problems Mother Mahsa         Multiple UTIs, bladder cancer    Osteoporosis Mother Mahsa     Scoliosis Mother Mahsa     Asthma Father Bill         Asthma, COPD    Hyperlipidemia Father Bill     Hypertension Father Bill     Alcohol abuse Father Bill     Depression Father Bill     Dementia Father Bill     Coronary artery disease Father Bill         Asthma COPD    COPD Father Bill         COPD/ Emphysema/ Pulmonary Emboli    Arthritis Father Bill         Osteoarthritis    Hearing loss Father Bill     Osteoarthritis Father Bill         Degenerative lumbar verebrae, both knees    Rashes / Skin problems Father Bill         Chronic hives, psoriasis of forehead    GI problems Father Bill         Colitis in his 20’s    Osteoporosis Father Bill     Scoliosis Father Bill     Pulmonary embolism Father Bill         Multiple, tx with coumadin    Thyroid cancer Sister bhanu     Thyroid disease Sister bhanu         Thyroid cancer    Cancer Sister bhanu         Thyroid Cancer    Hypertension Sister bhanu         Positive Factor V Leiden    GI problems Sister bhanu         Cyclic vomiting, tolerated a very limited diet    Arthritis Sister bhanu         Lupus, Fibromyalgia    Depression Sister bhanu     Lupus Sister bhanu         Lupus    Heart attack Sister Luci          age 49, from MI    No Known Problems Sister jonathan     No Known Problems Sister estrada     Migraines Sister miguelina     No Known Problems Sister adán     Hypertension Daughter nick     Migraines Daughter nick     Migraines Daughter shay     Heart attack Maternal Grandmother Adán Almaguer         MI and stroke    Osteoporosis Maternal Grandmother Adán Almaguer     Arthritis Maternal Grandmother Adán Almaguer         Rheumatoid Arthritis    Stroke Maternal Grandmother Adán Almaguer     Colon cancer Maternal Grandfather Wilmar         Maternal grandfather m.p    Cancer Maternal Grandfather Wilmar         colon    Heart attack Paternal Grandmother Joby          MI and stroke    Early death Paternal Grandmother Joby         Stroke, Pulmonary Hypertension    No Known Problems Paternal Grandfather      Breast cancer Maternal Aunt munira         over age 50    Heart disease Maternal Aunt munira     Arthritis Maternal Aunt munira         Fibromyalgia    Cancer Maternal Aunt munira         breast Ca    Vision loss Maternal Aunt munira         Macular Degeneration    Osteoporosis Maternal Aunt munira     Endometrial cancer Maternal Aunt jennifer     Hypertension Maternal Aunt jennifer         Stroke    Stroke Maternal Aunt jennifer     Diabetes Maternal Aunt jennifer         Insulin dependent    Osteoporosis Maternal Aunt jennifer     Multiple myeloma Maternal Aunt diandra     Heart disease Maternal Aunt diandra         Stroke-     Hypertension Maternal Aunt diandra         Stroke    Diabetes Maternal Aunt diandra         Insulin dependent    Osteoporosis Maternal Aunt diandra     Hypertension Paternal Aunt Christine S.         Pulmonary Hypertension    Heart disease Paternal Aunt Christine S.         Pulmonary Hypertension, Diabetes,  from massive bleed    Brain cancer Niece      Lymphoma Niece      Arthritis Sister Ema         Rheumatoid Arthritis, Fibromyalgia    Depression Sister Ema     Rheum arthritis Sister Ema     Asthma Sister Kandace         Asthma    Asthma Sister Nkechi         Asthma    Heart disease Sister Noemi         Mi-  age 49    Hyperlipidemia Sister Noemi     Depression Sister Noemi     Rashes / Skin problems Sister Noemi         Rosacea    Early death Sister Noemi         Massive MI age 49    Migraines Son Stef     Learning disabilities Son Stef         ADHD    Learning disabilities Brother Cooper         ADHD    Deep vein thrombosis Brother Cooper         calf    Mental illness Daughter Rand         Depression    Hypertension Brother Devyn         Thrombophlebitis    Migraines Brother Devyn         Had to stop working as a navy     Deep vein  thrombosis Brother Devyn         DVT- thigh    Vision loss Maternal Aunt Noemi         Legally Blind    Vision loss Maternal Aunt Christine         Corneal transplant    Vision loss Maternal Aunt Nkechi         will need cornea transplant    Heart attack Maternal Aunt Aunt Ceil         MI caused death    Heart disease Maternal Aunt Aunt Ceil         MI -     Hypertension Maternal Aunt Aunt Ceil         Stroke    Diabetes Maternal Aunt Aunt Ceil         Insulin dependent    Osteoporosis Maternal Aunt Aunt Ceil     Hypertension Brother Jan         Thrombophlebitis    GI problems Brother Jan         Ulcerative Colitis    Breast cancer additional onset Neg Hx      BRCA2 Positive Neg Hx      Ovarian cancer Neg Hx      BRCA2 Negative Neg Hx      BRCA1 Positive Neg Hx      BRCA1 Negative Neg Hx      BRCA 1/2 Neg Hx

## 2025-06-20 NOTE — TELEPHONE ENCOUNTER
"REASON FOR CONVERSATION: Cough    SYMPTOMS: Patient c/o dry, continuous cough for week now. Pt is using her nebulizer treatments and having minimal relief. Patient denies fevers and no phlegm production at this time. Patient denies sob at this time and denies chest pain, just hurts from coughing all over.    OTHER HEALTH INFORMATION: Patient also has some diarrhea since starting her coloscopy  but she also started abx amoxacillin. That could be contributing to this. Patient couldn't state how many bms in 24hrs but states stool is smooth and frequent.   PROTOCOL DISPOSITION: See Today in Office    CARE ADVICE PROVIDED: Triage nurse attempted to make pt an appt but nothing was available. Triage nurse called clerical to if they could fit patient in and nothing was available, per staff states PCP left for the day. Patient was advised to go to .     PRACTICE FOLLOW-UP: none        Reason for Disposition   Continuous (nonstop) coughing that keeps patient from working or sleeping, and not improved after 2 or 3 inhaler or nebulizer treatments given 20 minutes apart    Answer Assessment - Initial Assessment Questions  1. RESPIRATORY STATUS: \"Describe your breathing?\" (e.g., wheezing, shortness of breath, unable to speak, severe coughing)       Severe coughing when the asthma acts up  2. ONSET: \"When did this asthma attack begin?\"       wed  3. TRIGGER: \"What do you think triggered this attack?\" (e.g., URI, exposure to pollen or other allergen, tobacco smoke)       Humidity  4. PEAK EXPIRATORY FLOW RATE (PEFR): \"Do you use a peak flow meter?\" If Yes, ask: \"What's the current peak flow? What's your personal best peak flow?\"       N/a  5. SEVERITY: \"How bad is this attack?\"       Bad at night but not during the day  6. ASTHMA MEDICINES:  \"What treatments have you tried?\"       neb  7. INHALED QUICK-RELIEF TREATMENTS FOR THIS ATTACK: \"What treatments have you given yourself so far?\" and \"How many and how often?\" If using an " "inhaler, ask, \"How many puffs?\" Note: Routine treatments are 2 puffs every 4 hours as needed. Rescue treatments are 4 puffs repeated every 20 minutes, up to three times as needed.       yes  8. OTHER SYMPTOMS: \"Do you have any other symptoms?\" (e.g., chest pain, coughing up yellow sputum, fever, runny nose)      Fibromyalgia so patient always has chest pain  9. O2 SATURATION MONITOR:  \"Do you use an oxygen saturation monitor (pulse oximeter) at home?\" If Yes, \"What is your reading (oxygen level) today?\" \"What is your usual oxygen saturation reading?\" (e.g., 95%)      97 to 98%  10. PREGNANCY: \"Is there any chance you are pregnant?\" \"When was your last menstrual period?\"        N/a    Protocols used: Asthma Attack-Adult-[place]    "

## 2025-06-20 NOTE — TELEPHONE ENCOUNTER
Not sure where the miscommunication was but I am in the office all day today… And nobody discussed this with me or asked if we can squeeze her in.  Nonetheless if she is going to the urgent care they will evaluate her.  We can certainly see her in the office if she wishes at the next available opening

## 2025-06-23 ENCOUNTER — HOSPITAL ENCOUNTER (OUTPATIENT)
Dept: INFUSION CENTER | Facility: HOSPITAL | Age: 70
Discharge: HOME | End: 2025-06-23
Payer: MEDICARE

## 2025-06-23 VITALS
OXYGEN SATURATION: 98 % | SYSTOLIC BLOOD PRESSURE: 139 MMHG | DIASTOLIC BLOOD PRESSURE: 74 MMHG | TEMPERATURE: 98.3 F | HEART RATE: 80 BPM | RESPIRATION RATE: 17 BRPM

## 2025-06-23 DIAGNOSIS — G90.A POSTURAL ORTHOSTATIC TACHYCARDIA SYNDROME: Primary | ICD-10-CM

## 2025-06-23 PROCEDURE — 96360 HYDRATION IV INFUSION INIT: CPT

## 2025-06-23 PROCEDURE — 96361 HYDRATE IV INFUSION ADD-ON: CPT

## 2025-06-23 RX ADMIN — SODIUM CHLORIDE 1000 ML: 0.9 INJECTION, SOLUTION INTRAVENOUS at 08:39

## 2025-06-23 NOTE — PROGRESS NOTES
Teresa Mao  tolerated treatment well with no complications.      Teresa Mao is aware of future appt on 6/24 at 8 am.     AVS printed and given to Teresa Mao:  No (Declined by Teresa Mao)

## 2025-06-24 ENCOUNTER — HOSPITAL ENCOUNTER (OUTPATIENT)
Dept: INFUSION CENTER | Facility: HOSPITAL | Age: 70
Discharge: HOME | End: 2025-06-24
Payer: MEDICARE

## 2025-06-24 VITALS
RESPIRATION RATE: 18 BRPM | SYSTOLIC BLOOD PRESSURE: 149 MMHG | HEART RATE: 80 BPM | TEMPERATURE: 97.4 F | OXYGEN SATURATION: 98 % | DIASTOLIC BLOOD PRESSURE: 80 MMHG

## 2025-06-24 DIAGNOSIS — G90.A POSTURAL ORTHOSTATIC TACHYCARDIA SYNDROME: Primary | ICD-10-CM

## 2025-06-24 PROCEDURE — 96361 HYDRATE IV INFUSION ADD-ON: CPT

## 2025-06-24 PROCEDURE — 96360 HYDRATION IV INFUSION INIT: CPT

## 2025-06-24 RX ADMIN — SODIUM CHLORIDE 1000 ML: 0.9 INJECTION, SOLUTION INTRAVENOUS at 08:10

## 2025-06-24 NOTE — PROGRESS NOTES
Teresa Mao  tolerated hydration well with no complications.      Teresa Mao is aware of future appt on 6-26-25 at 0830    AVS declined

## 2025-06-26 ENCOUNTER — HOSPITAL ENCOUNTER (OUTPATIENT)
Dept: INFUSION CENTER | Facility: HOSPITAL | Age: 70
End: 2025-06-26
Payer: MEDICARE

## 2025-06-26 VITALS
HEART RATE: 80 BPM | OXYGEN SATURATION: 97 % | TEMPERATURE: 97.8 F | SYSTOLIC BLOOD PRESSURE: 166 MMHG | RESPIRATION RATE: 18 BRPM | DIASTOLIC BLOOD PRESSURE: 74 MMHG

## 2025-06-26 DIAGNOSIS — G90.A POSTURAL ORTHOSTATIC TACHYCARDIA SYNDROME: Primary | ICD-10-CM

## 2025-06-26 PROCEDURE — 96360 HYDRATION IV INFUSION INIT: CPT

## 2025-06-26 PROCEDURE — 96361 HYDRATE IV INFUSION ADD-ON: CPT

## 2025-06-26 RX ADMIN — SODIUM CHLORIDE 1000 ML: 0.9 INJECTION, SOLUTION INTRAVENOUS at 08:40

## 2025-06-26 NOTE — PROGRESS NOTES
Teresa Mao  tolerated HYDRATION  treatment well with no complications.      Teresa Mao is aware of future appt on 6/27.     AVS printed and given to Teresa Mao:    No (Declined by Teresa Mao)

## 2025-06-27 ENCOUNTER — HOSPITAL ENCOUNTER (OUTPATIENT)
Dept: INFUSION CENTER | Facility: HOSPITAL | Age: 70
End: 2025-06-27
Attending: STUDENT IN AN ORGANIZED HEALTH CARE EDUCATION/TRAINING PROGRAM
Payer: MEDICARE

## 2025-06-27 VITALS
HEART RATE: 78 BPM | DIASTOLIC BLOOD PRESSURE: 71 MMHG | RESPIRATION RATE: 18 BRPM | SYSTOLIC BLOOD PRESSURE: 160 MMHG | TEMPERATURE: 97.3 F

## 2025-06-27 DIAGNOSIS — G90.A POSTURAL ORTHOSTATIC TACHYCARDIA SYNDROME: Primary | ICD-10-CM

## 2025-06-27 PROCEDURE — 96360 HYDRATION IV INFUSION INIT: CPT

## 2025-06-27 PROCEDURE — 96361 HYDRATE IV INFUSION ADD-ON: CPT

## 2025-06-27 RX ADMIN — SODIUM CHLORIDE 1000 ML: 0.9 INJECTION, SOLUTION INTRAVENOUS at 09:08

## 2025-06-27 NOTE — PROGRESS NOTES
Anesthesia Start and Stop Event Times       Date Time Event    6/27/2025 0849 Ready for Procedure     0856 Anesthesia Start     0919 Anesthesia Stop          Responsible Staff  06/27/25      Name Role Begin End    Forrest Joiner M.D. Anesth 0856 0919          Overtime Reason:  no overtime (within assigned shift)    Comments:                                                           Pt tolerated 4 hr IV hydration without incident  Brisk blood return noted from PICC, flushed & normal saline locked  AVS declined  Pt is aware of her next appt

## 2025-06-27 NOTE — PROGRESS NOTES
Teresa Mao  tolerated hydration well with no complications.      Teresa Mao is aware of future appt on 6-30-25 at 830.    AVS declined

## 2025-06-30 ENCOUNTER — HOSPITAL ENCOUNTER (OUTPATIENT)
Dept: INFUSION CENTER | Facility: HOSPITAL | Age: 70
Discharge: HOME | End: 2025-06-30
Payer: MEDICARE

## 2025-06-30 VITALS
HEART RATE: 77 BPM | RESPIRATION RATE: 20 BRPM | TEMPERATURE: 98.5 F | DIASTOLIC BLOOD PRESSURE: 82 MMHG | SYSTOLIC BLOOD PRESSURE: 151 MMHG | OXYGEN SATURATION: 97 %

## 2025-06-30 DIAGNOSIS — G90.A POSTURAL ORTHOSTATIC TACHYCARDIA SYNDROME: Primary | ICD-10-CM

## 2025-06-30 PROCEDURE — 96360 HYDRATION IV INFUSION INIT: CPT

## 2025-06-30 PROCEDURE — 96361 HYDRATE IV INFUSION ADD-ON: CPT

## 2025-06-30 RX ADMIN — SODIUM CHLORIDE 1000 ML: 0.9 INJECTION, SOLUTION INTRAVENOUS at 08:49

## 2025-06-30 NOTE — PROGRESS NOTES
Teresa Mao  tolerated HYDRATION  treatment well with no complications.      Teresa Mao is aware of future appt on 7/1 at 0800.     AVS printed and given to Teresa Mao:    No (Declined by Teresa Mao)

## 2025-07-01 ENCOUNTER — HOSPITAL ENCOUNTER (OUTPATIENT)
Dept: INFUSION CENTER | Facility: HOSPITAL | Age: 70
Discharge: HOME | End: 2025-07-01
Payer: MEDICARE

## 2025-07-01 VITALS
RESPIRATION RATE: 16 BRPM | TEMPERATURE: 98.3 F | DIASTOLIC BLOOD PRESSURE: 81 MMHG | OXYGEN SATURATION: 98 % | SYSTOLIC BLOOD PRESSURE: 136 MMHG | HEART RATE: 69 BPM

## 2025-07-01 DIAGNOSIS — G90.A POSTURAL ORTHOSTATIC TACHYCARDIA SYNDROME: Primary | ICD-10-CM

## 2025-07-01 PROCEDURE — 96361 HYDRATE IV INFUSION ADD-ON: CPT

## 2025-07-01 PROCEDURE — 96360 HYDRATION IV INFUSION INIT: CPT

## 2025-07-01 RX ADMIN — SODIUM CHLORIDE 1000 ML: 0.9 INJECTION, SOLUTION INTRAVENOUS at 08:26

## 2025-07-01 NOTE — PROGRESS NOTES
Teresa Mao  tolerated hydration well with no complications.      Teresa Mao is aware of future appt on 7-3-25 at 9am     AVS declined

## 2025-07-07 ENCOUNTER — HOSPITAL ENCOUNTER (OUTPATIENT)
Dept: INFUSION CENTER | Facility: HOSPITAL | Age: 70
Discharge: HOME | End: 2025-07-07
Attending: STUDENT IN AN ORGANIZED HEALTH CARE EDUCATION/TRAINING PROGRAM
Payer: MEDICARE

## 2025-07-07 ENCOUNTER — APPOINTMENT (OUTPATIENT)
Dept: RADIOLOGY | Facility: CLINIC | Age: 70
End: 2025-07-07
Payer: MEDICARE

## 2025-07-07 ENCOUNTER — HOSPITAL ENCOUNTER (OUTPATIENT)
Dept: MRI IMAGING | Facility: HOSPITAL | Age: 70
Discharge: HOME | End: 2025-07-07
Attending: PHYSICIAN ASSISTANT
Payer: MEDICARE

## 2025-07-07 ENCOUNTER — APPOINTMENT (EMERGENCY)
Dept: CT IMAGING | Facility: HOSPITAL | Age: 70
End: 2025-07-07
Payer: MEDICARE

## 2025-07-07 ENCOUNTER — HOSPITAL ENCOUNTER (EMERGENCY)
Facility: HOSPITAL | Age: 70
Discharge: HOME | End: 2025-07-07
Attending: EMERGENCY MEDICINE
Payer: MEDICARE

## 2025-07-07 ENCOUNTER — OFFICE VISIT (OUTPATIENT)
Dept: URGENT CARE | Facility: CLINIC | Age: 70
End: 2025-07-07
Payer: MEDICARE

## 2025-07-07 VITALS
OXYGEN SATURATION: 97 % | SYSTOLIC BLOOD PRESSURE: 114 MMHG | DIASTOLIC BLOOD PRESSURE: 59 MMHG | HEART RATE: 87 BPM | RESPIRATION RATE: 18 BRPM | TEMPERATURE: 98.4 F

## 2025-07-07 VITALS
RESPIRATION RATE: 17 BRPM | SYSTOLIC BLOOD PRESSURE: 135 MMHG | DIASTOLIC BLOOD PRESSURE: 85 MMHG | TEMPERATURE: 98.1 F | OXYGEN SATURATION: 99 % | HEART RATE: 77 BPM

## 2025-07-07 VITALS
RESPIRATION RATE: 18 BRPM | DIASTOLIC BLOOD PRESSURE: 83 MMHG | TEMPERATURE: 98.4 F | SYSTOLIC BLOOD PRESSURE: 159 MMHG | OXYGEN SATURATION: 95 % | HEART RATE: 97 BPM

## 2025-07-07 DIAGNOSIS — Z80.0 FAMILY HISTORY OF MALIGNANT NEOPLASM OF DIGESTIVE ORGAN: ICD-10-CM

## 2025-07-07 DIAGNOSIS — G90.A POSTURAL ORTHOSTATIC TACHYCARDIA SYNDROME: Primary | ICD-10-CM

## 2025-07-07 DIAGNOSIS — R05.1 ACUTE COUGH: ICD-10-CM

## 2025-07-07 DIAGNOSIS — R74.8 ABNORMAL LEVELS OF OTHER SERUM ENZYMES: ICD-10-CM

## 2025-07-07 DIAGNOSIS — Z86.0100 PERSONAL HISTORY OF COLON POLYPS, UNSPECIFIED: ICD-10-CM

## 2025-07-07 DIAGNOSIS — K44.9 DIAPHRAGMATIC HERNIA WITHOUT OBSTRUCTION OR GANGRENE: ICD-10-CM

## 2025-07-07 DIAGNOSIS — R06.02 SHORTNESS OF BREATH: Primary | ICD-10-CM

## 2025-07-07 DIAGNOSIS — K21.9 GASTROESOPHAGEAL REFLUX DISEASE WITHOUT ESOPHAGITIS: ICD-10-CM

## 2025-07-07 DIAGNOSIS — R05.3 CHRONIC COUGH: Primary | ICD-10-CM

## 2025-07-07 DIAGNOSIS — K86.2 CYST OF PANCREAS: ICD-10-CM

## 2025-07-07 LAB
ALBUMIN SERPL BCG-MCNC: 4.4 G/DL (ref 3.5–5)
ALP SERPL-CCNC: 109 U/L (ref 34–104)
ALT SERPL W P-5'-P-CCNC: 38 U/L (ref 7–52)
ANION GAP SERPL CALCULATED.3IONS-SCNC: 9 MMOL/L (ref 4–13)
APTT PPP: 24 SECONDS (ref 23–34)
AST SERPL W P-5'-P-CCNC: 19 U/L (ref 13–39)
BASOPHILS # BLD AUTO: 0.08 THOUSANDS/ÂΜL (ref 0–0.1)
BASOPHILS NFR BLD AUTO: 1 % (ref 0–1)
BILIRUB SERPL-MCNC: 0.45 MG/DL (ref 0.2–1)
BUN SERPL-MCNC: 22 MG/DL (ref 5–25)
CALCIUM SERPL-MCNC: 9.7 MG/DL (ref 8.4–10.2)
CHLORIDE SERPL-SCNC: 103 MMOL/L (ref 96–108)
CO2 SERPL-SCNC: 25 MMOL/L (ref 21–32)
CREAT SERPL-MCNC: 1.08 MG/DL (ref 0.6–1.3)
EOSINOPHIL # BLD AUTO: 0.02 THOUSAND/ÂΜL (ref 0–0.61)
EOSINOPHIL NFR BLD AUTO: 0 % (ref 0–6)
ERYTHROCYTE [DISTWIDTH] IN BLOOD BY AUTOMATED COUNT: 13.1 % (ref 11.6–15.1)
GFR SERPL CREATININE-BSD FRML MDRD: 52 ML/MIN/1.73SQ M
GLUCOSE SERPL-MCNC: 145 MG/DL (ref 65–140)
HCT VFR BLD AUTO: 46 % (ref 34.8–46.1)
HGB BLD-MCNC: 15.2 G/DL (ref 11.5–15.4)
IMM GRANULOCYTES # BLD AUTO: 0.26 THOUSAND/UL (ref 0–0.2)
IMM GRANULOCYTES NFR BLD AUTO: 2 % (ref 0–2)
INR PPP: 0.85 (ref 0.85–1.19)
LYMPHOCYTES # BLD AUTO: 1.23 THOUSANDS/ÂΜL (ref 0.6–4.47)
LYMPHOCYTES NFR BLD AUTO: 9 % (ref 14–44)
MCH RBC QN AUTO: 29.9 PG (ref 26.8–34.3)
MCHC RBC AUTO-ENTMCNC: 33 G/DL (ref 31.4–37.4)
MCV RBC AUTO: 91 FL (ref 82–98)
MONOCYTES # BLD AUTO: 0.47 THOUSAND/ÂΜL (ref 0.17–1.22)
MONOCYTES NFR BLD AUTO: 3 % (ref 4–12)
NEUTROPHILS # BLD AUTO: 12.21 THOUSANDS/ÂΜL (ref 1.85–7.62)
NEUTS SEG NFR BLD AUTO: 85 % (ref 43–75)
NRBC BLD AUTO-RTO: 0 /100 WBCS
PLATELET # BLD AUTO: 295 THOUSANDS/UL (ref 149–390)
PMV BLD AUTO: 9.6 FL (ref 8.9–12.7)
POTASSIUM SERPL-SCNC: 4.5 MMOL/L (ref 3.5–5.3)
PROT SERPL-MCNC: 7 G/DL (ref 6.4–8.4)
PROTHROMBIN TIME: 12.1 SECONDS (ref 12.3–15)
RBC # BLD AUTO: 5.08 MILLION/UL (ref 3.81–5.12)
SODIUM SERPL-SCNC: 137 MMOL/L (ref 135–147)
WBC # BLD AUTO: 14.27 THOUSAND/UL (ref 4.31–10.16)

## 2025-07-07 PROCEDURE — 85025 COMPLETE CBC W/AUTO DIFF WBC: CPT | Performed by: EMERGENCY MEDICINE

## 2025-07-07 PROCEDURE — G0463 HOSPITAL OUTPT CLINIC VISIT: HCPCS

## 2025-07-07 PROCEDURE — 74183 MRI ABD W/O CNTR FLWD CNTR: CPT

## 2025-07-07 PROCEDURE — 96360 HYDRATION IV INFUSION INIT: CPT

## 2025-07-07 PROCEDURE — 85730 THROMBOPLASTIN TIME PARTIAL: CPT | Performed by: EMERGENCY MEDICINE

## 2025-07-07 PROCEDURE — A9585 GADOBUTROL INJECTION: HCPCS | Performed by: PHYSICIAN ASSISTANT

## 2025-07-07 PROCEDURE — 36415 COLL VENOUS BLD VENIPUNCTURE: CPT | Performed by: EMERGENCY MEDICINE

## 2025-07-07 PROCEDURE — 85610 PROTHROMBIN TIME: CPT | Performed by: EMERGENCY MEDICINE

## 2025-07-07 PROCEDURE — 99214 OFFICE O/P EST MOD 30 MIN: CPT

## 2025-07-07 PROCEDURE — 96361 HYDRATE IV INFUSION ADD-ON: CPT

## 2025-07-07 PROCEDURE — 99284 EMERGENCY DEPT VISIT MOD MDM: CPT | Performed by: EMERGENCY MEDICINE

## 2025-07-07 PROCEDURE — 80053 COMPREHEN METABOLIC PANEL: CPT | Performed by: EMERGENCY MEDICINE

## 2025-07-07 PROCEDURE — 99284 EMERGENCY DEPT VISIT MOD MDM: CPT

## 2025-07-07 PROCEDURE — 71250 CT THORAX DX C-: CPT

## 2025-07-07 PROCEDURE — 71046 X-RAY EXAM CHEST 2 VIEWS: CPT

## 2025-07-07 RX ORDER — ALBUTEROL SULFATE 5 MG/ML
5 SOLUTION RESPIRATORY (INHALATION) ONCE
Status: COMPLETED | OUTPATIENT
Start: 2025-07-07 | End: 2025-07-07

## 2025-07-07 RX ORDER — GADOBUTROL 604.72 MG/ML
6 INJECTION INTRAVENOUS
Status: COMPLETED | OUTPATIENT
Start: 2025-07-07 | End: 2025-07-07

## 2025-07-07 RX ADMIN — GADOBUTROL 6 ML: 604.72 INJECTION INTRAVENOUS at 08:08

## 2025-07-07 RX ADMIN — ALBUTEROL SULFATE 5 MG: 2.5 SOLUTION RESPIRATORY (INHALATION) at 15:44

## 2025-07-07 RX ADMIN — SODIUM CHLORIDE 1000 ML: 0.9 INJECTION, SOLUTION INTRAVENOUS at 08:52

## 2025-07-07 NOTE — PROGRESS NOTES
Teresa Mao  tolerated hydration well with no complications.      Teresa Mao is aware of future appt on 7-10-25 at 8am     AVS declined

## 2025-07-07 NOTE — DISCHARGE INSTRUCTIONS
Continue with the medication prescribed to you by your specialist at the [facility].

## 2025-07-07 NOTE — PROGRESS NOTES
[facility]  Name: Teresa Mao      : 1955      MRN: [id number]  Encounter Provider: JAIR GOOD  Encounter Date: 2025   Encounter department: [facility] [place]  :  Assessment & Plan  Acute cough    Orders:  •  XR chest pa and lateral; Future  •  Transfer to other facility    XR imaging reviewed, compared to prior imaging in  and negative for any acute cardiopulmonary abnormality or consolidation. Pending final read from radiology.    Shortness of breath    Orders:  •  Transfer to other facility    Given severity and worsening of symptoms with no improvement on multiple courses of steroids, recommend further evaluation and treatment at the ED.  Patient reports she will self transfer via private vehicle to [facility] ED. Report called to CAROL Arredondo RN.      Patient Instructions  Follow up with PCP in 3-5 days.  Proceed to  ER if symptoms worsen.    If tests are performed, our office will contact you with results only if changes need to made to the care plan discussed with you at the visit. You can review your full results on [facility].    Chief Complaint:   Chief Complaint   Patient presents with   • Cough     And asthma started over 1 month still not feeling better      History of Present Illness {?Quick Links Encounters * My Last Note * Last Note in Specialty * Snapshot * Since Last Visit * History :71110}  69-year-old female presents to the clinic for evaluation of cough x 1 month.  Patient states she has been in contact with her pulmonologist and she has been prescribed 2 separate courses of steroids.  She reports she does have a history of asthma and the initially thought it was an exacerbation.  She is currently on a steroid taper at this time however states they have not been working at all.  She reports associated symptoms including some postnasal drip, wheezing, shortness of breath with exertion and at nighttime while laying down.   Patient denies any fevers, chills, chest pain, palpitations, nausea, vomiting, diarrhea, dizziness, body aches or headaches.  She reports she has been taking previously prescribed Allegra, cromolyn, albuterol inhalers and nebulizers and performing nasal rinses with mild relief of symptoms.  She states she took her last treatment in our prior to coming here.  Patient states she was at her infusion center today and she came straight here afterwards because they were unsure if she is having an asthma exacerbation.  Of note, patient does have a history of mast cell activation and she has an extensive list of allergies.    Shortness of Breath  The current episode started in the past 7 days. The problem occurs constantly. The problem has been rapidly worsening since onset. Associated symptoms include coughing (dry), orthopnea, palpitations (occasional, chronic) and wheezing. Pertinent negatives include no chest pain, dizziness or rhinorrhea. The symptoms are aggravated by smoke, animal exposure, exercise, odors, URIs, any activity, fumes, pollens, weather changes and lying flat.         Review of Systems   Constitutional:  Negative for chills and fever.   HENT:  Positive for postnasal drip. Negative for congestion and rhinorrhea.    Respiratory:  Positive for cough (dry), chest tightness, shortness of breath and wheezing.    Cardiovascular:  Positive for palpitations (occasional, chronic) and orthopnea. Negative for chest pain.   Gastrointestinal:  Negative for abdominal pain, diarrhea, nausea and vomiting.   Musculoskeletal:  Negative for arthralgias and myalgias.   Neurological:  Negative for dizziness and headaches.   All other systems reviewed and are negative.    Past Medical History   Past Medical History[1]  Past Surgical History[2]  Family History[3]  she reports that she has never smoked. She has never used smokeless tobacco. She reports that she does not drink alcohol and does not use drugs.  Current Outpatient  Medications   Medication Instructions   • aluminum hydroxide-magnesium carbonate (Gaviscon Extra Strength) 160-105 mg per chewable tablet    • azelastine (ASTELIN) 0.1 % nasal spray 2 sprays, 2 times daily   • beclomethasone (QVAR) 40 MCG/ACT inhaler 1 puff, Daily PRN   • budesonide (PULMICORT) 0.5 mg, 2 times daily   • Corlanor 7.5 mg, 2 times daily   • cromolyn (GASTROCROM) 100 mg, 4 times daily (before meals and at bedtime)   • cyclobenzaprine (FLEXERIL) 5 mg, Oral, 3 times daily PRN   • cycloSPORINE (RESTASIS) 0.05 % ophthalmic emulsion 1 drop, 2 times daily   • docusate sodium (COLACE) 100 mg, Daily PRN   • doxycycline (ADOXA) 100 mg, Oral, 2 times daily   • Dupilumab (DUPIXENT SC) Inject under the skin Pt unsure of dose   • erythromycin (ILOTYCIN) ophthalmic ointment Daily at bedtime   • famotidine (PEPCID) 40 MG tablet Twice a day   • fexofenadine (ALLEGRA) 180 mg, 2 times daily   • fluticasone (FLONASE) 50 mcg/act nasal spray 2 sprays, Daily   • Galcanezumab-gnlm (Emgality) 120 MG/ML SOAJ Every 30 days   • guaiFENesin (MUCINEX) 600 mg, Every 12 hours scheduled   • Heating Pad PADS Does not apply, As needed   • HYDROcodone-acetaminophen (NORCO)  mg per tablet 0.5 tablets, Oral, 2 times daily PRN   • HYDROcodone-acetaminophen (NORCO)  mg per tablet 0.5 tablets, Oral, 2 times daily PRN   • hydrOXYzine (ATARAX) 10 mg/5 mL syrup    • ipratropium (ATROVENT) 0.06 % nasal spray    • ipratropium (ATROVENT) 0.5 mg, Every 6 hours PRN   • Ivabradine HCl 7.5 MG TABS 1 tablet, 2 times daily with meals   • KETOTIFEN FUMARATE OP    • levalbuterol (XOPENEX HFA) 45 mcg/act inhaler 2 puffs, Every 4 hours PRN   • levalbuterol (XOPENEX) 1.25 mg, Every 6 hours PRN   • Magnesium 400 MG CAPS 1 capsule, 2 times daily   • methylprednisolone (MEDROL) 4 mg tablet Medrol dosepak, take as directed   • MULTIPLE VITAMINS-CALCIUM PO 1 capsule, Every morning   • naloxone (NARCAN) 4 mg/0.1 mL nasal spray Administer 1 spray into a  nostril. If no response after 2-3 minutes, give another dose in the other nostril using a new spray.   • NON FORMULARY 1 mg, 3 times daily   • NON FORMULARY LACTOBACILLUS COMBINATION NO.4 (PROBIOTIC) 3 BILLION CELL CAPSULE   • NON FORMULARY 0.25 %   • omega-3-acid ethyl esters (LOVAZA) 2 g, 2 times daily   • polyethylene glycol (MIRALAX) 17 g, Daily PRN   • Probiotic Product (PROBIOTIC DAILY PO) 1 tablet, Daily   • Qvar RediHaler 40 MCG/ACT inhaler    • sodium chloride 1,500 mL   • sucralfate (CARAFATE) 1 g, 2 times daily   • Thyroid, Porcine, POWD 32 mg, Does not apply, Daily   • Ubrogepant (UBRELVY) 100 mg   • verapamil (CALAN) 40 mg tablet No dose, route, or frequency recorded.   • Xdemvy 0.25 % SOLN 1 drop   Allergies[4]     Objective {?Quick Links Trend Vitals * Enter New Vitals * Results Review * Timeline (Adult) * Labs * Imaging * Cardiology * Procedures * Lung Cancer Screening * Surgical eConsent :05634}  /83   Pulse 97   Temp 98.4 °F (36.9 °C)   Resp 18   SpO2 95%      Physical Exam  Vitals and nursing note reviewed.   Constitutional:       General: She is not in acute distress.     Appearance: Normal appearance.   HENT:      Head: Normocephalic and atraumatic.      Right Ear: Tympanic membrane, ear canal and external ear normal.      Left Ear: Tympanic membrane, ear canal and external ear normal.      Nose: Nose normal. No congestion or rhinorrhea.      Mouth/Throat:      Mouth: Mucous membranes are moist.      Pharynx: Oropharynx is clear. Uvula midline. Posterior oropharyngeal erythema (mild) and postnasal drip present. No pharyngeal swelling, oropharyngeal exudate or uvula swelling.     Cardiovascular:      Rate and Rhythm: Normal rate and regular rhythm.      Heart sounds: Normal heart sounds. No murmur heard.     No friction rub. No gallop.   Pulmonary:      Effort: Pulmonary effort is normal. No respiratory distress.      Breath sounds: Normal breath sounds. No stridor. No wheezing, rhonchi  "or rales.      Comments: Persistent, dry cough noted on exam  Lymphadenopathy:      Cervical: No cervical adenopathy.     Skin:     General: Skin is warm and dry.     Neurological:      General: No focal deficit present.      Mental Status: She is alert and oriented to person, place, and time.     Psychiatric:         Mood and Affect: Mood normal.         Behavior: Behavior normal.         Portions of the record may have been created with voice recognition software.  Occasional wrong word or \"sound a like\" substitutions may have occurred due to the inherent limitations of voice recognition software.  Read the chart carefully and recognize, using context, where substitutions have occurred.         [1]  Past Medical History:  Diagnosis Date   • Allergic 1960    As a child   • Allergic rhinitis    • Anxiety    • Arthritis 1990    Osteoarthritis   • Asthma    • Back pain    • Breast cyst 1970’s   • Breathing difficulty    • Cardiac disease    • Cardiopathy     EF 45%   • Cataract 2015   • Cerebrovascular disease 1970’s    Migraines related to POTS, MCAS   • CHF (congestive heart failure) ([facility]) 2019    [facility] Admission   • Chronic kidney disease 2021    Kink in right ureter, Enlarged right kidney   • Clotting disorder ([facility]) 1990    Factor V Leiden   • COPD (chronic obstructive pulmonary disease) ([facility]) 1970's    Brochitis with infections. Dx with severe asthma   • Coronary artery disease 2019    Left Bundle Branch Block, Cardiomyopathy, POTS hx of rheumatic fever   • Cough    • Disease of thyroid gland 2019    Hashimoto   • Diverticulitis    • Factor V Leiden ([facility])    • Fatigue 1970’s    Dx Chronic Fatigue Syndrome   • Fibrocystic breast 1970’s   • Fibroid 1996    Hysterectomy for bleeding  x 2 years   • Fibromyalgia    • Fibromyalgia, primary 1993    Diagnosed by rheumatologist   • GERD (gastroesophageal reflux disease)    • Hashimoto's thyroiditis    • Headache(784.0) 1960’s    Had migraines " "since childhood   • Heart disease 2019    POTS dx Hospitalized at [facility]   • Heart murmur 1967    Rheumatic Fever   • Hx of degenerative disc disease    • Hyperlipidemia 1980’s    Labs of 7/2024 show normal   • Hypertension    • Hypotension     pots - postural orthostatic hypotension   • Interstitial cystitis    • Irregular heart beat     LBBB   • Irritable bowel syndrome    • Liver disease 1980’s    Elevated liver enzymes from medications   • Migraines    • Mitral valve disease     \"thickening\"   • Myocardial infarction (HCC)     possible but not sure when   • Nasal congestion    • Neuromuscular disorder (HCC) 1970's   • Neuropathy     bilateral legs   • Osteoarthritis 2019    Pain in joints and spine   • Osteoporosis    • Peptic ulceration 2016 weight down to 84lbs, Adams cath fir    Adams cath for TPN x 5 months   • Peripheral vascular disease (HCC)     POTS, hands and feet turn blue, cold. Numbness in legs and feet, possible small fiber neuropathy   • Pneumonia 1979    Hospitalized with Mycoplasma   • PONV (postoperative nausea and vomiting)    • Postural orthostatic tachycardia syndrome     must drink a lot of water and salt   • Pott's disease    • Rheumatic fever 1967   • Scoliosis    • Sepsis (HCC) 2021    associated with PICC line   • Sinusitis 1990’s    Worse 6/2024   • Sjogren's syndrome ([facility])    • Sleep apnea 2016    Sleep study showed Central and obstructive apnea   • Sleep difficulties 1970’s    Worked night shift,  trouble staying asleep   • Stomach disorder 2016    Nausea/ Vomiting, Weight loss, Cholycystectomy, TPN   • TMJ (dislocation of temporomandibular joint)    • Urinary incontinence 1992    Following birth of 3rd child   • Urinary retention 8/17/2022    Urgency with difficulty starting stream. Have to strain to empty bladder   • Urinary tract infection 1975    [facility]   • Vaginal infection 1967    Rheumatic Fever: Penicillin x 6 years caused " Candida   • Visual impairment     Needed glasses   [2]  Past Surgical History:  Procedure Laterality Date   • ABLATION MICROWAVE Left     lumbar area   • APPENDECTOMY      TAHysterctomy with Appendectomy   • BACK SURGERY  10/1998   • BLADDER SURGERY  2021; 2023    Cystoscopy with hydro extension, ligation and cauterization of lining   •  SECTION     • CHOLECYSTECTOMY  2016   • COLONOSCOPY     • CYSTOSCOPY  2021    Showed lining developed cords, pulling areas of bladder, areas of bladder wall missing lining.   • DILATION AND CURETTAGE OF UTERUS     • EGD     • EPIDURAL BLOCK INJECTION  Multiple    Since    • FOOT SURGERY      removal of bone and neuroma   • HYSTERECTOMY N/A     age 39   • IR OTHER  2022   • IR OTHER  2022   • IR OTHER  2023   • IR OTHER  2024   • IR OTHER  2024   • IR PICC PLACEMENT SINGLE LUMEN  10/02/2020   • IR PICC PLACEMENT SINGLE LUMEN  2021   • IR PICC REPOSITION  2021   • IR TUNNELED CENTRAL LINE PLACEMENT  2022   • LAMINECTOMY     • LAPAROSCOPY      endometriosis   • MOUTH BIOPSY      lip   • OOPHORECTOMY Bilateral     age 39   • ORTHOPEDIC SURGERY  2022; 5/3/2024    Left Rotator Cuff Repair;Right rotator cuff repair with long head biceps tenodenesis   • RI EGD TRANSORAL BIOPSY SINGLE/MULTIPLE N/A 2019    Procedure: ESOPHAGOGASTRODUODENOSCOPY (EGD) with multiple bx and dilation;  Surgeon: Peter Baldwin MD;  Location:  GI LAB;  Service: Gastroenterology   • RI SURGICAL ARTHROSCOPY SHOULDER W/ROTATOR CUFF RPR Right 2022    Procedure: SHOULDER ARTHROSCOPIC ROTATOR CUFF REPAIR Biceps Tenodisis;  Surgeon: Ilya Munoz MD;  Location: [facility] MAIN OR;  Service: Orthopedics   • RI SURGICAL ARTHROSCOPY SHOULDER W/ROTATOR CUFF RPR Left 2024    Procedure: SHOULDER ARTHROSCOPIC ROTATOR CUFF REPAIR, SUBACROMIAL DECOMPRESSION;  Surgeon: Ilya Cavazos  MD Tammy;  [facility];  Service: Orthopedics   • SHOULDER SURGERY  2022    Repair of Supraspinatus, Biceps   • SPINAL FUSION      Myltiple Radio Frequency Ablations   • TRIGGER POINT INJECTION  ’s    Multiple injections did not give lasting relief   • TUNNELED VENOUS CATHETER PLACEMENT     [3]  Family History  Problem Relation Name Age of Onset   • Cancer Mother Mahsa         Bladder   • Asthma Mother Mahsa    • Heart attack Mother Mahsa         CABG x4, also had 7 stents. Around 8 MI's   • Heart disease Mother Mahsa         9 MIs, CABG x4, 7 stents; Paraplegic stroke-    • Heart failure Mother Mahsa         Er visit in May, 2022   • Hyperlipidemia Mother Mahsa    • Hypertension Mother Mahsa    • Depression Mother Mahsa    • Dementia Mother Mahsa    • Coronary artery disease Mother Mahsa         Several MIs, CABG x4; 7 stents   • Stroke Mother Mahsa         in her 20's, weakness in leg ;   stroke  Left side   • Diabetes Mother Mahsa         Insulin dependent   • Thyroid disease Mother Mahsa         Hypothyroid   • Kidney disease Mother Mahsa         Stage 4   • Migraines Mother Mahsa    •  problems Mother Mahsa         Multiple UTIs, bladder cancer   • Osteoporosis Mother Mahsa    • Scoliosis Mother Mahsa    • Asthma Father Bill         Asthma, COPD   • Hyperlipidemia Father Bill    • Hypertension Father Bill    • Alcohol abuse Father Bill    • Depression Father Bill    • Dementia Father Bill    • Coronary artery disease Father Bill         Asthma COPD   • COPD Father Bill         COPD/ Emphysema/ Pulmonary Emboli   • Arthritis Father Bill         Osteoarthritis   • Hearing loss Father Bill    • Osteoarthritis Father Bill         Degenerative lumbar verebrae, both knees   • Rashes / Skin problems Father Bill         Chronic hives, psoriasis of forehead   • GI problems Father Bill         Colitis in his 20’s   • Osteoporosis Father Bill    •  Scoliosis Father Devyn    • Pulmonary embolism Father Devyn         Multiple, tx with coumadin   • Thyroid cancer Sister bhanu    • Thyroid disease Sister bhanu         Thyroid cancer   • Cancer Sister bhanu         Thyroid Cancer   • Hypertension Sister bhanu         Positive Factor V Leiden   • GI problems Sister bhanu         Cyclic vomiting, tolerated a very limited diet   • Arthritis Sister bhanu         Lupus, Fibromyalgia   • Depression Sister bhanu    • Lupus Sister bhanu         Lupus   • Heart attack Sister Luci          age 49, from MI   • No Known Problems Sister jonathan    • No Known Problems Sister estrada    • Migraines Sister miguelina    • No Known Problems Sister adán    • Hypertension Daughter nick    • Migraines Daughter nick    • Migraines Daughter shay    • Heart attack Maternal Grandmother Adán Almaguer         MI and stroke   • Osteoporosis Maternal Grandmother Adán Almaguer    • Arthritis Maternal Grandmother Adán Almaguer         Rheumatoid Arthritis   • Stroke Maternal Grandmother Adán Almaguer    • Colon cancer Maternal Grandfather Wilmar         Maternal grandfather m.p   • Cancer Maternal Grandfather Wilmar         colon   • Heart attack Paternal Grandmother Joby         MI and stroke   • Early death Paternal Grandmother Tyronede         Stroke, Pulmonary Hypertension   • No Known Problems Paternal Grandfather     • Breast cancer Maternal Aunt munira         over age 50   • Heart disease Maternal Aunt munira    • Arthritis Maternal Aunt munira         Fibromyalgia   • Cancer Maternal Aunt munira         breast Ca   • Vision loss Maternal Aunt munira         Macular Degeneration   • Osteoporosis Maternal Aunt munira    • Endometrial cancer Maternal Aunt jennifer    • Hypertension Maternal Aunt jennifer         Stroke   • Stroke Maternal Aunt jennifer    • Diabetes Maternal Aunt jennifer         Insulin dependent   • Osteoporosis Maternal Aunt jennifer    • Multiple myeloma Maternal Aunt diandra    • Heart disease  Maternal Aunt diandra         Stroke-    • Hypertension Maternal Aunt diandra         Stroke   • Diabetes Maternal Aunt diandra         Insulin dependent   • Osteoporosis Maternal Aunt diandra    • Hypertension Paternal Aunt Christine MARIE         Pulmonary Hypertension   • Heart disease Paternal Aunt Christine BAY.         Pulmonary Hypertension, Diabetes,  from massive bleed   • Brain cancer Niece     • Lymphoma Niece     • Arthritis Sister Ema         Rheumatoid Arthritis, Fibromyalgia   • Depression Sister Ema    • Rheum arthritis Sister Ema    • Asthma Sister Kandace         Asthma   • Asthma Sister Nkechi         Asthma   • Heart disease Sister Noemi         Mi-  age 49   • Hyperlipidemia Sister Noemi    • Depression Sister Noemi    • Rashes / Skin problems Sister Noemi         Rosacea   • Early death Sister Noemi         Massive MI age 49   • Migraines Son Stef    • Learning disabilities Son Stef         ADHD   • Learning disabilities Brother Cooper         ADHD   • Deep vein thrombosis Brother Cooper         calf   • Mental illness Daughter Rand         Depression   • Hypertension Brother Devyn         Thrombophlebitis   • Migraines Brother Devyn         Had to stop working as a navy    • Deep vein thrombosis Brother Devyn         DVT- thigh   • Vision loss Maternal Aunt Noemi         Legally Blind   • Vision loss Maternal Aunt Christine         Corneal transplant   • Vision loss Maternal Aunt Nkechi         will need cornea transplant   • Heart attack Maternal Aunt Aunt Esil         MI caused death   • Heart disease Maternal Aunt Aunt Luci         MI -    • Hypertension Maternal Aunt Aunt Ceil         Stroke   • Diabetes Maternal Aunt Aunt Esil         Insulin dependent   • Osteoporosis Maternal Aunt Aunt Esil    • Hypertension Brother Jan         Thrombophlebitis   • GI problems Brother Jan         Ulcerative Colitis   • Breast cancer additional onset Neg Hx     • BRCA2 Positive Neg Hx      • Ovarian cancer Neg Hx     • BRCA2 Negative Neg Hx     • BRCA1 Positive Neg Hx     • BRCA1 Negative Neg Hx     • BRCA 1/2 Neg Hx     [4]  Allergies  Allergen Reactions   • Imipramine Confusion, Fatigue, Irritability, Palpitations, Shortness Of Breath, Tachycardia and Visual Disturbance   • Lactose - Food Allergy Shortness Of Breath   • Melatonin Shortness Of Breath   • Mirtazapine Anxiety, Dizziness, GI Intolerance, Nausea Only, Palpitations and Shortness Of Breath   • Nexium [Esomeprazole] Shortness Of Breath   • Nsaids Shortness Of Breath, Cough, Other (See Comments), Nausea Only, GI Intolerance, Tachycardia and Wheezing   • Propofol Cough, Other (See Comments), Shortness Of Breath and Nasal Congestion   • Salmeterol Dizziness, Other (See Comments), Shortness Of Breath and Tachycardia   • Singulair [Montelukast] Shortness Of Breath and Cough   • Zomig [Zolmitriptan] Shortness Of Breath   • Dexilant [Dexlansoprazole] Nausea Only and Vomiting   • Ambien [Zolpidem]    • Amitriptyline Drowsiness   • Ascorbate - Food Allergy GI Intolerance   • Ascorbic Acid GI Intolerance   • Aspirin    • Azithromycin Hives and Itching     In December 2018 or 2019, she was put on a course and developed a fully body rash on the 2nd day within a couple of hours of taking the medication that day. The rash was raised, itchy, and red. Rash lasted for a week after medication was stopped. Denies fever, joint swelling, or mucosal peeling. Prior to this, she had tolerated doses of azithromycin. CBC/d and CMP (1/2/20) were normal   • Bactrim [Sulfamethoxazole-Trimethoprim] Hives   • Banana - Food Allergy Dermatitis   • Banana Extract Allergy Skin Test - Food Allergy Dermatitis   • Bisoprolol Other (See Comments)   • Ceftin [Cefuroxime]    • Celebrex [Celecoxib]    • Chocolate - Food Allergy Headache   • Ciprofloxacin Dizziness, Hives, Other (See Comments), Itching and Nausea Only   • Demerol [Meperidine]    • Duloxetine Other (See Comments)      tachycardia   • Epinephrine Dizziness   • Ergotamine Nausea Only and Headache   • Fludrocortisone Other (See Comments)   • Keflex [Cephalexin]    • Klonopin [Clonazepam] Nausea Only and Dizziness   • Latex Hives   • Levofloxacin Other (See Comments)   • Lexapro [Escitalopram]    • Lyrica [Pregabalin] Fatigue   • Macrodantin [Nitrofurantoin]    • Metoprolol Other (See Comments)   • Metronidazole Other (See Comments)   • Molds & Smuts GI Intolerance and Other (See Comments)   • Morphine Nausea Only   • Movantik [Naloxegol] Nausea Only   • Mushroom Extract Complex - Food Allergy      Eye itchy, asthma  attack   • Naprosyn [Naproxen]    • Neurontin [Gabapentin] Dizziness   • Pineapple - Food Allergy GI Intolerance   • Plecanatide Abdominal Pain, Diarrhea and GI Intolerance   • Prolia [Denosumab]    • Prozac [Fluoxetine]    • Soy Allergy - Food Allergy Vomiting   • Sudafed [Pseudoephedrine] Hives   • Sulfa Antibiotics    • Tomato - Food Allergy GI Intolerance   • Trazodone    • Ultram [Tramadol] Nausea Only, Dizziness and Headache   • Vilazodone Dizziness, GI Intolerance, Headache, Abdominal Pain and Other (See Comments)   • Vilazodone Hcl Abdominal Pain, Dizziness, GI Intolerance, Headache, Other (See Comments), Fatigue and Nausea Only   • Vioxx [Rofecoxib]    • Vortioxetine Drowsiness, Fatigue, GI Intolerance and Irritability   • Wheat Bran - Food Allergy Other (See Comments)     Stomach pain   • Zinc Other (See Comments), GI Intolerance and Nausea Only   • Zoloft [Sertraline]    • Albuterol Palpitations, Dizziness, Other (See Comments) and Tachycardia     Dizziness, allergy note 12/11/19 for more specific comments   • Amoxicillin Rash   • Cyclobenzaprine Hcl Er Rash   • Terfenadine Dizziness and Palpitations   • Zantac [Ranitidine] Rash and Dizziness

## 2025-07-07 NOTE — ED PROVIDER NOTES
Problem: Discharge Planning  Goal: Discharge to home or other facility with appropriate resources  10/18/2024 1118 by Jannette Alcaraz, RN  Outcome: Not Progressing     Problem: Discharge Planning  Goal: Discharge to home or other facility with appropriate resources  10/18/2024 1118 by Jannette Alcaraz, RN  Outcome: Not Progressing      Time reflects when diagnosis was documented in both MDM as applicable and the Disposition within this note       Time User Action Codes Description Comment    7/7/2025  5:14 PM David Sagastume Add [J45.901] Asthma exacerbation     7/7/2025  5:15 PM David Sagastume Remove [J45.901] Asthma exacerbation     7/7/2025  5:16 PM David Sagastume Add [R05.3] Chronic cough           ED Disposition       ED Disposition   Discharge    Condition   Stable    Date/Time   Mon Jul 7, 2025  5:04 PM    Comment   Teresa L Innerst discharge to home/self care.                   Assessment & Plan       Medical Decision Making  Amount and/or Complexity of Data Reviewed  Labs: ordered.  Radiology: ordered and independent interpretation performed.    Risk  Prescription drug management.      Hx of Idiopathic mast cell activation syndrome followed at [facility], asthma followed by [facility], currently on a steroid taper from her pulmonologist, presents with worsening cough, shortness of breath over the last month worse over the last 7 days.  Mating at urgent care where the patient was referred to the emergency department was unremarkable regarding the chest x-ray, will proceed with CT of the chest without contrast low clinical suspicion for pulmonary embolism due to the coughing wheezing in nature patient is not hypoxic 96%, also do not want to have to dye loads in 24 hours patient just had a MRI of the abdomen with and without IV contrast today.      Focused differential diagnosis in this patient is as follows: Viral illness versus chronic colitis versus asthma exacerbation currently rates by her pulmonologist at [facility], no chest pain, no exertional symptoms, no exertional dyspnea or exercise intolerance to suggest atypical cardiac presentation.    After an reading treatment, patient is feeling better, the patient is on multiple courses of steroids advised patient to follow-up with her  "pulmonologist at [facility] for further manage terms of specifics, I attempted to contact her pulmonologist at [facility], office was closed patient is aware of this.  Patient's vital signs are stable at the time of discharge.  Hold off any antibiotics since the patient has multiple allergies, see documentation in ED course.    Portions of the record may have been created with voice recognition software. Occasional wrong word or \"sound a like\" substitutions may have occurred due to the inherent limitations of voice recognition software. Read the chart carefully and recognize, using context, where substitutions have occurred.     ED Course as of 07/07/25 1919   Mon Jul 07, 2025   1639 Reassessment after breathing treatment, patient reports feeling better, patient is aware of leukocytosis of 14, etiology of this is unclear if patient is currently on steroids which could artificially increase the patient's white count, other considerations could be an infectious etiology, patient is aware that waiting for CT results of the chest without contrast.   1709 At the patient's request, patient wanted her pulmonologist at [facility] contacted Dr. Rosenbaum, @ [phone], no answer at this phone number, appears that the office has been closed for the day.  Patient is stable, pulse ox is 95%, labs reassuring, white count slightly elevated again that is most likely demargination from recent steroid use, no focal pneumonia, otherwise patient is stable for discharge.  Patient is currently afebrile, do not feel antibiotics are appropriate obvious pneumonia on CT imaging, patient also has 64 documented allergies.         Medications   albuterol inhalation solution 5 mg (5 mg Nebulization Given 7/7/25 1544)       ED Risk Strat Scores                    No data recorded        SBIRT 22yo+      Flowsheet Row Most Recent Value   Initial Alcohol Screen: US AUDIT-C     1. How often do you have a drink containing alcohol? 0 Filed " at: 07/07/2025 1503   2. How many drinks containing alcohol do you have on a typical day you are drinking?  0 Filed at: 07/07/2025 1503   3a. Male UNDER 65: How often do you have five or more drinks on one occasion? 0 Filed at: 07/07/2025 1503   3b. FEMALE Any Age, or MALE 65+: How often do you have 4 or more drinks on one occassion? 0 Filed at: 07/07/2025 1503   Audit-C Score 0 Filed at: 07/07/2025 1503   [place]: How many times in the past year have you...    Used an illegal drug or used a prescription medication for non-medical reasons? Never Filed at: 07/07/2025 1503                            History of Present Illness       Chief Complaint   Patient presents with    Asthma     Patient reports asthma exacerbation. Patient reports using nebs, inhalers, and is on steriods at home. Pt ambulatory from . Patient had last inhaler at 1400.        Past Medical History[1]   Past Surgical History[2]   Family History[3]   Social History[4]   E-Cigarette/Vaping    E-Cigarette Use Never User       E-Cigarette/Vaping Substances    Nicotine No     THC No     CBD No     Flavoring No     Other No     Unknown No       I have reviewed and agree with the history as documented.     HPI    This is a 69-year-old female presents to the emergency department w/ hx of idiopathic mast cell activation syndrome (followed at [place]), POTS: R sided chest port for IVF, chronic pain disorder followed by pain management, presents from the local urgent care for a chronic cough for approximately 1 month, worse over the last 7 days.  Patient has been in contact with her [facility] pulmonology provider: Dr. Magdalena Rosenbaum, placed pt on a tapering dose of methylprednisolone: 3 day of 16 days of steroids, previously patient has been on 2 separate courses of steroids.    Reported associated postnasal drip wheezing denies any fever, chills, chest pain, palpitations, nausea, vomiting, diarrhea body aches or headaches. taking previously  prescribed Allegra, cromolyn, albuterol inhalers and nebulizers and performing nasal rinses with mild relief of symptoms.    Patient had MRI of abdomen w/ and w/o contrast today.    Review of Systems   Constitutional: Negative.  Negative for chills, diaphoresis, fatigue and fever.   HENT: Negative.  Negative for dental problem, ear pain, facial swelling, sinus pressure, sinus pain, sore throat and trouble swallowing.    Eyes: Negative.    Respiratory:  Positive for cough, chest tightness and wheezing. Negative for shortness of breath.    Cardiovascular: Negative.  Negative for chest pain, palpitations and leg swelling.   Gastrointestinal: Negative.  Negative for abdominal pain, diarrhea and vomiting.   Endocrine: Negative.    Genitourinary: Negative.  Negative for dysuria.   Musculoskeletal: Negative.    Skin: Negative.    Allergic/Immunologic: Negative.    Neurological: Negative.    Hematological: Negative.    Psychiatric/Behavioral: Negative.             Objective       ED Triage Vitals   Temperature Pulse Blood Pressure Respirations SpO2 Patient Position - Orthostatic VS   07/07/25 1504 07/07/25 1504 07/07/25 1504 07/07/25 1504 07/07/25 1504 07/07/25 1504   99.3 °F (37.4 °C) 90 142/76 16 96 % Sitting      Temp Source Heart Rate Source BP Location FiO2 (%) Pain Score    07/07/25 1718 07/07/25 1504 07/07/25 1504 -- --    Temporal Monitor Left arm        Vitals      Date and Time Temp Pulse SpO2 Resp BP Pain Score FACES Pain Rating User   07/07/25 1718 98.4 °F (36.9 °C) 87 97 % 18 114/59 -- -- OP   07/07/25 1504 99.3 °F (37.4 °C) 90 96 % 16 142/76 -- -- AM            Physical Exam  Vitals and nursing note reviewed.   Constitutional:       General: She is not in acute distress.     Appearance: Normal appearance. She is normal weight. She is not ill-appearing, toxic-appearing or diaphoretic.      Comments: Patient wearing a blue face mask.   HENT:      Head: Normocephalic and atraumatic.      Right Ear: External ear  normal.      Left Ear: External ear normal.      Nose: Nose normal.      Mouth/Throat:      Mouth: Mucous membranes are moist.     Eyes:      Conjunctiva/sclera: Conjunctivae normal.      Pupils: Pupils are equal, round, and reactive to light.       Cardiovascular:      Rate and Rhythm: Normal rate and regular rhythm.      Pulses: Normal pulses.      Heart sounds: Normal heart sounds.   Pulmonary:      Effort: Pulmonary effort is normal. No respiratory distress.      Breath sounds: No stridor. Wheezing and rhonchi present.   Abdominal:      General: Abdomen is flat.      Palpations: Abdomen is soft.     Musculoskeletal:         General: Normal range of motion.     Skin:     General: Skin is warm.      Capillary Refill: Capillary refill takes less than 2 seconds.     Neurological:      General: No focal deficit present.      Mental Status: She is alert and oriented to person, place, and time. Mental status is at baseline.     Psychiatric:         Mood and Affect: Mood normal.         Behavior: Behavior normal.         Thought Content: Thought content normal.         Judgment: Judgment normal.         Results Reviewed       Procedure Component Value Units Date/Time    APTT [[id number]]  (Normal) Collected: 07/07/25 1525    Lab Status: Final result Specimen: Blood from Arm, Left Updated: 07/07/25 1559     PTT 24 seconds     Protime-INR [[id number]]  (Abnormal) Collected: 07/07/25 1525    Lab Status: Final result Specimen: Blood from Arm, Left Updated: 07/07/25 1559     Protime 12.1 seconds      INR 0.85    Narrative:      INR Therapeutic Range    Indication                                             INR Range      Atrial Fibrillation                                               2.0-3.0  Hypercoagulable State                                    2.0.2.3  Left Ventricular Asist Device                            2.0-3.0  Mechanical Heart Valve                                  -    Aortic(with afib, MI, embolism, HF, LA  enlargement,    and/or coagulopathy)                                     2.0-3.0 (2.5-3.5)     Mitral                                                             2.5-3.5  Prosthetic/Bioprosthetic Heart Valve               2.0-3.0  Venous thromboembolism (VTE: VT, PE        2.0-3.0    Comprehensive metabolic panel [[id number]]  (Abnormal) Collected: 07/07/25 1525    Lab Status: Final result Specimen: Blood from Arm, Left Updated: 07/07/25 1547     Sodium 137 mmol/L      Potassium 4.5 mmol/L      Chloride 103 mmol/L      CO2 25 mmol/L      ANION GAP 9 mmol/L      BUN 22 mg/dL      Creatinine 1.08 mg/dL      Glucose 145 mg/dL      Calcium 9.7 mg/dL      AST 19 U/L      ALT 38 U/L      Alkaline Phosphatase 109 U/L      Total Protein 7.0 g/dL      Albumin 4.4 g/dL      Total Bilirubin 0.45 mg/dL      eGFR 52 ml/min/1.73sq m     Narrative:      National Kidney Disease Foundation guidelines for Chronic Kidney Disease (CKD):     Stage 1 with normal or high GFR (GFR > 90 mL/min/1.73 square meters)    Stage 2 Mild CKD (GFR = 60-89 mL/min/1.73 square meters)    Stage 3A Moderate CKD (GFR = 45-59 mL/min/1.73 square meters)    Stage 3B Moderate CKD (GFR = 30-44 mL/min/1.73 square meters)    Stage 4 Severe CKD (GFR = 15-29 mL/min/1.73 square meters)    Stage 5 End Stage CKD (GFR <15 mL/min/1.73 square meters)  Note: GFR calculation is accurate only with a steady state creatinine    CBC and differential [[id number]]  (Abnormal) Collected: 07/07/25 1525    Lab Status: Final result Specimen: Blood from Arm, Left Updated: 07/07/25 1532     WBC 14.27 Thousand/uL      RBC 5.08 Million/uL      Hemoglobin 15.2 g/dL      Hematocrit 46.0 %      MCV 91 fL      MCH 29.9 pg      MCHC 33.0 g/dL      RDW 13.1 %      MPV 9.6 fL      Platelets 295 Thousands/uL      nRBC 0 /100 WBCs      Segmented % 85 %      Immature Grans % 2 %      Lymphocytes % 9 %      Monocytes % 3 %      Eosinophils Relative 0 %      Basophils Relative 1 %      Absolute  Neutrophils 12.21 Thousands/µL      Absolute Immature Grans 0.26 Thousand/uL      Absolute Lymphocytes 1.23 Thousands/µL      Absolute Monocytes 0.47 Thousand/µL      Eosinophils Absolute 0.02 Thousand/µL      Basophils Absolute 0.08 Thousands/µL             CT chest without contrast   ED Interpretation by David Sagastume III, DO (07/07 1641)   CT of the chest without contrast shows no obvious pneumothorax or focal consolidation.      Final Interpretation by Rishi Dimas MD (07/07 1700)      1.  No identifiable acute abnormality to account for the patient's clinical presentation. Please refer to the report body for description of other incidental, chronic and/or benign findings.            Computerized Assisted Algorithm (CAA) may have aided analysis of applicable images.      Workstation performed: [id number]             Procedures    ED Medication and Procedure Management   Prior to Admission Medications   Prescriptions Last Dose Informant Patient Reported? Taking?   Dupilumab (DUPIXENT SC)  Self Yes No   Sig: Inject under the skin Pt unsure of dose   Galcanezumab-gnlm (Emgality) 120 MG/ML SOAJ  Self Yes No   Sig: Inject under the skin every 30 (thirty) days   HYDROcodone-acetaminophen (NORCO)  mg per tablet   No No   Sig: Take 0.5 tablets by mouth 2 (two) times a day as needed for moderate pain Max Daily Amount: 1 tablet Do not start before June 19, 2025.   HYDROcodone-acetaminophen (NORCO)  mg per tablet   No No   Sig: Take 0.5 tablets by mouth 2 (two) times a day as needed for moderate pain Max Daily Amount: 1 tablet   Heating Pad PADS  Self No No   Sig: Use if needed (pain)   Ivabradine HCl 7.5 MG TABS   Yes No   Sig: Take 1 tablet by mouth 2 (two) times a day with meals   KETOTIFEN FUMARATE OP   Yes No   MULTIPLE VITAMINS-CALCIUM PO  Self Yes No   Sig: Take 1 capsule by mouth every morning   Magnesium 400 MG CAPS  Self Yes No   Sig: Take 1 capsule by mouth in the morning and 1  capsule in the evening.   NON FORMULARY  Self Yes No   Sig: Take 1 mg by mouth in the morning and 1 mg in the evening and 1 mg before bedtime. Ketotifen 1 mg compounded capsule .   NON FORMULARY  Self Yes No   Sig: LACTOBACILLUS COMBINATION NO.4 (PROBIOTIC) 3 BILLION CELL CAPSULE   NON FORMULARY  Self Yes No   Si.25 % X-DEMVY eye drop   Probiotic Product (PROBIOTIC DAILY PO)  Self Yes No   Sig: Take 1 tablet by mouth in the morning. At lunch.   Qvar RediHaler 40 MCG/ACT inhaler  Self Yes No   Thyroid, Porcine, POWD   No No   Sig: Use 32 mg daily   Ubrogepant (UBRELVY) 100 MG tablet  Self Yes No   Sig: Take 100 mg by mouth Take 1 tablet (100 mg) one time as needed for migraine. May repeat one additional tablet (100 mg) at least two hours after the first dose. Do not use more than two doses per day, or for more than eight days per month.   Xdemvy 0.25 % SOLN  Self Yes No   Sig: Apply 1 drop to eye   aluminum hydroxide-magnesium carbonate (Gaviscon Extra Strength) 160-105 mg per chewable tablet  Self Yes No   azelastine (ASTELIN) 0.1 % nasal spray  Self Yes No   Si sprays into each nostril in the morning and 2 sprays in the evening. Use in each nostril as directed .   beclomethasone (QVAR) 40 MCG/ACT inhaler  Self Yes No   Sig: Inhale 1 puff daily as needed (only when unable to nebulize pulmicort) Rinse mouth after use.   budesonide (PULMICORT) 0.5 mg/2 mL nebulizer solution  Self Yes No   Sig: Take 0.5 mg by nebulization in the morning and 0.5 mg in the evening. Rinse mouth after use. .   cromolyn (GASTROCROM) 100 MG/5ML solution  Self Yes No   Sig: Take 100 mg by mouth 4 (four) times a day (before meals and at bedtime)   cycloSPORINE (RESTASIS) 0.05 % ophthalmic emulsion  Self Yes No   Sig: Administer 1 drop to both eyes in the morning and 1 drop in the evening.   cyclobenzaprine (FLEXERIL) 5 mg tablet   No No   Sig: TAKE 1 TABLET BY MOUTH THREE TIMES A DAY AS NEEDED FOR MUSCLE SPASMS   docusate sodium  (COLACE) 100 mg capsule  Self Yes No   Sig: Take 100 mg by mouth daily as needed for constipation   doxycycline (ADOXA) 100 MG tablet  Self Yes No   Sig: Take 100 mg by mouth 2 (two) times a day   erythromycin (ILOTYCIN) ophthalmic ointment  Self Yes No   Sig: Apply 1 application. to eye daily at bedtime   famotidine (PEPCID) 40 MG tablet  Self Yes No   Sig: Twice a day   fexofenadine (ALLEGRA) 180 MG tablet  Self Yes No   Sig: Take 180 mg by mouth in the morning and 180 mg in the evening.   fluticasone (FLONASE) 50 mcg/act nasal spray  Self Yes No   Si sprays into each nostril in the morning.   guaiFENesin (Mucinex) 600 mg 12 hr tablet  Self Yes No   Sig: Take 600 mg by mouth every 12 (twelve) hours   hydrOXYzine (ATARAX) 10 mg/5 mL syrup  Self Yes No   ipratropium (ATROVENT) 0.02 % nebulizer solution  Self Yes No   Sig: Take 0.5 mg by nebulization every 6 (six) hours as needed for wheezing or shortness of breath   ipratropium (ATROVENT) 0.06 % nasal spray  Self Yes No   ivabradine HCl (Corlanor) 5 MG tablet  Self Yes No   Si.5 mg in the morning and 7.5 mg before bedtime.   levalbuterol (XOPENEX HFA) 45 mcg/act inhaler  Self Yes No   Sig: Inhale 2 puffs every 4 (four) hours as needed (when unable to nebulize)   levalbuterol (XOPENEX) 1.25 mg/3 mL nebulizer solution  Self Yes No   Sig: Take 1.25 mg by nebulization every 6 (six) hours as needed   methylprednisolone (MEDROL) 4 mg tablet   No No   Sig: Medrol dosepak, take as directed   naloxone (NARCAN) 4 mg/0.1 mL nasal spray  Self No No   Sig: Administer 1 spray into a nostril. If no response after 2-3 minutes, give another dose in the other nostril using a new spray.   omega-3-acid ethyl esters (LOVAZA) 1 g capsule  Self Yes No   Sig: Take 2 g by mouth in the morning and 2 g in the evening. 1600mg daily .   polyethylene glycol (MIRALAX) 17 g packet  Self Yes No   Sig: Take 17 g by mouth daily as needed   sodium chloride  Self Yes No   Sig: Inject 1,500 mL  into a catheter in a vein   sucralfate (CARAFATE) 1 g/10 mL suspension  Self Yes No   Sig: Take 1 g by mouth in the morning and 1 g in the evening.   verapamil (CALAN) 40 mg tablet  Self Yes No      Facility-Administered Medications: None     Discharge Medication List as of 7/7/2025  5:17 PM        CONTINUE these medications which have NOT CHANGED    Details   aluminum hydroxide-magnesium carbonate (Gaviscon Extra Strength) 160-105 mg per chewable tablet Historical Med      azelastine (ASTELIN) 0.1 % nasal spray 2 sprays into each nostril in the morning and 2 sprays in the evening. Use in each nostril as directed ., Historical Med      beclomethasone (QVAR) 40 MCG/ACT inhaler Inhale 1 puff daily as needed (only when unable to nebulize pulmicort) Rinse mouth after use., Historical Med      budesonide (PULMICORT) 0.5 mg/2 mL nebulizer solution Take 0.5 mg by nebulization in the morning and 0.5 mg in the evening. Rinse mouth after use. ., Historical Med      cromolyn (GASTROCROM) 100 MG/5ML solution Take 100 mg by mouth 4 (four) times a day (before meals and at bedtime), Historical Med      cyclobenzaprine (FLEXERIL) 5 mg tablet TAKE 1 TABLET BY MOUTH THREE TIMES A DAY AS NEEDED FOR MUSCLE SPASMS, Starting Mon 5/19/2025, Normal      cycloSPORINE (RESTASIS) 0.05 % ophthalmic emulsion Administer 1 drop to both eyes in the morning and 1 drop in the evening., Historical Med      docusate sodium (COLACE) 100 mg capsule Take 100 mg by mouth daily as needed for constipation, Historical Med      doxycycline (ADOXA) 100 MG tablet Take 100 mg by mouth 2 (two) times a day, Historical Med      Dupilumab (DUPIXENT SC) Inject under the skin Pt unsure of dose, Historical Med      erythromycin (ILOTYCIN) ophthalmic ointment Apply 1 application. to eye daily at bedtime, Starting Tue 10/25/2022, Historical Med      famotidine (PEPCID) 40 MG tablet Twice a day, Starting Mon 8/22/2022, Historical Med      fexofenadine (ALLEGRA) 180 MG  tablet Take 180 mg by mouth in the morning and 180 mg in the evening., Historical Med      fluticasone (FLONASE) 50 mcg/act nasal spray 2 sprays into each nostril in the morning., Historical Med      Galcanezumab-gnlm (Emgality) 120 MG/ML SOAJ Inject under the skin every 30 (thirty) days, Historical Med      guaiFENesin (Mucinex) 600 mg 12 hr tablet Take 600 mg by mouth every 12 (twelve) hours, Historical Med      Heating Pad PADS Use if needed (pain), Starting Fri 4/26/2024, No Print      !! HYDROcodone-acetaminophen (NORCO)  mg per tablet Take 0.5 tablets by mouth 2 (two) times a day as needed for moderate pain Max Daily Amount: 1 tablet Do not start before June 19, 2025., Starting Thu 6/19/2025, Normal      !! HYDROcodone-acetaminophen (NORCO)  mg per tablet Take 0.5 tablets by mouth 2 (two) times a day as needed for moderate pain Max Daily Amount: 1 tablet, Starting Thu 5/22/2025, Normal      hydrOXYzine (ATARAX) 10 mg/5 mL syrup Historical Med      ipratropium (ATROVENT) 0.02 % nebulizer solution Take 0.5 mg by nebulization every 6 (six) hours as needed for wheezing or shortness of breath, Historical Med      ipratropium (ATROVENT) 0.06 % nasal spray Historical Med      !! ivabradine HCl (Corlanor) 5 MG tablet 7.5 mg in the morning and 7.5 mg before bedtime., Starting Thu 10/28/2021, Historical Med      !! Ivabradine HCl 7.5 MG TABS Take 1 tablet by mouth 2 (two) times a day with meals, Starting Fri 5/9/2025, Historical Med      KETOTIFEN FUMARATE OP Historical Med      levalbuterol (XOPENEX HFA) 45 mcg/act inhaler Inhale 2 puffs every 4 (four) hours as needed (when unable to nebulize), Historical Med      levalbuterol (XOPENEX) 1.25 mg/3 mL nebulizer solution Take 1.25 mg by nebulization every 6 (six) hours as needed, Starting Mon 1/7/2019, Historical Med      Magnesium 400 MG CAPS Take 1 capsule by mouth in the morning and 1 capsule in the evening., Historical Med      methylprednisolone (MEDROL)  4 mg tablet Medrol dosepak, take as directed, Normal      MULTIPLE VITAMINS-CALCIUM PO Take 1 capsule by mouth every morning, Historical Med      naloxone (NARCAN) 4 mg/0.1 mL nasal spray Administer 1 spray into a nostril. If no response after 2-3 minutes, give another dose in the other nostril using a new spray., Normal      !! NON FORMULARY Take 1 mg by mouth in the morning and 1 mg in the evening and 1 mg before bedtime. Ketotifen 1 mg compounded capsule ., Historical Med      !! NON FORMULARY LACTOBACILLUS COMBINATION NO.4 (PROBIOTIC) 3 BILLION CELL CAPSULE, Historical Med      !! NON FORMULARY 0.25 % X-DEMVY eye drop, Historical Med      omega-3-acid ethyl esters (LOVAZA) 1 g capsule Take 2 g by mouth in the morning and 2 g in the evening. 1600mg daily ., Historical Med      polyethylene glycol (MIRALAX) 17 g packet Take 17 g by mouth daily as needed, Historical Med      Probiotic Product (PROBIOTIC DAILY PO) Take 1 tablet by mouth in the morning. At lunch., Historical Med      Qvar RediHaler 40 MCG/ACT inhaler Historical Med      sodium chloride Inject 1,500 mL into a catheter in a vein, Starting Sat 1/14/2023, Historical Med      sucralfate (CARAFATE) 1 g/10 mL suspension Take 1 g by mouth in the morning and 1 g in the evening., Historical Med      Thyroid, Porcine, POWD Use 32 mg daily, Starting Wed 4/9/2025, Normal      Ubrogepant (UBRELVY) 100 MG tablet Take 100 mg by mouth Take 1 tablet (100 mg) one time as needed for migraine. May repeat one additional tablet (100 mg) at least two hours after the first dose. Do not use more than two doses per day, or for more than eight days per month., Historical Med      verapamil (CALAN) 40 mg tablet Starting Thu 12/1/2022, Historical Med      Xdemvy 0.25 % SOLN Apply 1 drop to eye, Starting Tue 9/10/2024, Historical Med       !! - Potential duplicate medications found. Please discuss with provider.        No discharge procedures on file.  ED SEPSIS DOCUMENTATION    Time reflects when diagnosis was documented in both MDM as applicable and the Disposition within this note       Time User Action Codes Description Comment    7/7/2025  5:14 PM David Sagastume Add [J45.901] Asthma exacerbation     7/7/2025  5:15 PM David Sagastume Remove [J45.901] Asthma exacerbation     7/7/2025  5:16 PM David Sagastume Add [R05.3] Chronic cough                    [1]   Past Medical History:  Diagnosis Date    Allergic 1960    As a child    Allergic rhinitis     Anxiety     Arthritis 1990    Osteoarthritis    Asthma     Back pain     Breast cyst 1970’s    Breathing difficulty     Cardiac disease     Cardiopathy     EF 45%    Cataract 2015    Cerebrovascular disease 1970’s    Migraines related to POTS, [facility]    CHF (congestive heart failure) ([facility]) 2019    [facility] Admission    Chronic kidney disease 2021    Kink in right ureter, Enlarged right kidney    Clotting disorder ([facility]) 1990    Factor V Leiden    COPD (chronic obstructive pulmonary disease) ([facility]) 1970's    Brochitis with infections. Dx with severe asthma    Coronary artery disease 2019    Left Bundle Branch Block, Cardiomyopathy, POTS hx of rheumatic fever    Cough     Disease of thyroid gland 2019    Hashimoto    Diverticulitis     Factor V Leiden ([facility])     Fatigue 1970’s    Dx Chronic Fatigue Syndrome    Fibrocystic breast 1970’s    Fibroid 1996    Hysterectomy for bleeding  x 2 years    Fibromyalgia     Fibromyalgia, primary 1993    Diagnosed by rheumatologist    GERD (gastroesophageal reflux disease)     Hashimoto's thyroiditis     Headache(784.0) 1960’s    Had migraines since childhood    Heart disease 2019    POTS dx Hospitalized at [facility]    Heart murmur 1967    Rheumatic Fever    Hx of degenerative disc disease     Hyperlipidemia 1980’s    Labs of 7/2024 show normal    Hypertension     Hypotension     pots - postural orthostatic hypotension    Interstitial cystitis     Irregular heart beat     LBBB     "Irritable bowel syndrome     Liver disease ’s    Elevated liver enzymes from medications    Migraines     Mitral valve disease     \"thickening\"    Myocardial infarction (HCC)     possible but not sure when    Nasal congestion     Neuromuscular disorder (HCC) 's    Neuropathy     bilateral legs    Osteoarthritis 2019    Pain in joints and spine    Osteoporosis     Peptic ulceration 2016 weight down to 84lbs, Adams cath fir    Adams cath for TPN x 5 months    Peripheral vascular disease (HCC)     POTS, hands and feet turn blue, cold. Numbness in legs and feet, possible small fiber neuropathy    Pneumonia     Hospitalized with Mycoplasma    PONV (postoperative nausea and vomiting)     Postural orthostatic tachycardia syndrome     must drink a lot of water and salt    Pott's disease     Rheumatic fever 1967    Scoliosis     Sepsis (HCC)     associated with PICC line    Sinusitis ’s    Worse 2024    Sjogren's syndrome (HCC)     Sleep apnea     Sleep study showed Central and obstructive apnea    Sleep difficulties ’s    Worked night shift,  trouble staying asleep    Stomach disorder 2016    Nausea/ Vomiting, Weight loss, Cholycystectomy, TPN    TMJ (dislocation of temporomandibular joint)     Urinary incontinence     Following birth of 3rd child    Urinary retention 2022    Urgency with difficulty starting stream. Have to strain to empty bladder    Urinary tract infection 1975    Dx of interstitial Cystitis in ; [facility] dx 10/2023    Vaginal infection 1967    Rheumatic Fever: Penicillin x 6 years caused Candida    Visual impairment 1970    Needed glasses   [2]   Past Surgical History:  Procedure Laterality Date    ABLATION MICROWAVE Left     lumbar area    APPENDECTOMY      TAHysterctomy with Appendectomy    BACK SURGERY  10/1998    BLADDER SURGERY  2021; 2023    Cystoscopy with hydro extension, ligation and cauterization of lining     SECTION      " CHOLECYSTECTOMY  04/2016    COLONOSCOPY  2016    CYSTOSCOPY  5/25/2021    Showed lining developed cords, pulling areas of bladder, areas of bladder wall missing lining.    DILATION AND CURETTAGE OF UTERUS  1995    EGD  2016    EPIDURAL BLOCK INJECTION  Multiple    Since 1990’s    FOOT SURGERY  1968    removal of bone and neuroma    HYSTERECTOMY N/A 1995    age 39    IR OTHER  01/07/2022    IR OTHER  11/22/2022    IR OTHER  05/01/2023    IR OTHER  01/04/2024    IR OTHER  09/23/2024    IR PICC PLACEMENT SINGLE LUMEN  10/02/2020    IR PICC PLACEMENT SINGLE LUMEN  08/12/2021    IR PICC REPOSITION  12/07/2021    IR TUNNELED CENTRAL LINE PLACEMENT  06/20/2022    LAMINECTOMY      LAPAROSCOPY  1995    endometriosis    MOUTH BIOPSY      lip    OOPHORECTOMY Bilateral 1995    age 39    ORTHOPEDIC SURGERY  4/6/2022; 5/3/2024    Left Rotator Cuff Repair;Right rotator cuff repair with long head biceps tenodenesis    NV EGD TRANSORAL BIOPSY SINGLE/MULTIPLE N/A 04/24/2019    Procedure: ESOPHAGOGASTRODUODENOSCOPY (EGD) with multiple bx and dilation;  Surgeon: Peter Baldwin MD;  Location:  GI LAB;  Service: Gastroenterology    NV SURGICAL ARTHROSCOPY SHOULDER W/ROTATOR CUFF RPR Right 04/06/2022    Procedure: SHOULDER ARTHROSCOPIC ROTATOR CUFF REPAIR Biceps Tenodisis;  Surgeon: Ilya Munoz MD;  Location: [facility];  Service: Orthopedics    NV SURGICAL ARTHROSCOPY SHOULDER W/ROTATOR CUFF RPR Left 05/03/2024    Procedure: SHOULDER ARTHROSCOPIC ROTATOR CUFF REPAIR, SUBACROMIAL DECOMPRESSION;  Surgeon: Ilya Munoz MD;  Location: [facility];  Service: Orthopedics    SHOULDER SURGERY  4/6/2022    Repair of Supraspinatus, Biceps    SPINAL FUSION  1998    Myltiple Radio Frequency Ablations    TRIGGER POINT INJECTION  1990’s    Multiple injections did not give lasting relief    TUNNELED VENOUS CATHETER PLACEMENT  2016   [3]   Family History  Problem Relation Name Age of Onset    Cancer Mother Mahsa          Bladder    Asthma Mother Mahsa     Heart attack Mother Mahsa         CABG x4, also had 7 stents. Around 8 MI's    Heart disease Mother Mahsa         9 MIs, CABG x4, 7 stents; Paraplegic stroke-     Heart failure Mother Mahsa         Er visit in May, 2022    Hyperlipidemia Mother Mahsa     Hypertension Mother Mahsa     Depression Mother Mahsa     Dementia Mother Mahsa     Coronary artery disease Mother Mahsa         Several MIs, CABG x4; 7 stents    Stroke Mother Mahsa         in her 20's, weakness in leg ;   stroke  Left side    Diabetes Mother Mahsa         Insulin dependent    Thyroid disease Mother Mahsa         Hypothyroid    Kidney disease Mother Mahsa         Stage 4    Migraines Mother Mahsa      problems Mother Mahsa         Multiple UTIs, bladder cancer    Osteoporosis Mother Mahsa     Scoliosis Mother Mahsa     Asthma Father Bill         Asthma, COPD    Hyperlipidemia Father Bill     Hypertension Father Bill     Alcohol abuse Father Bill     Depression Father Bill     Dementia Father Bill     Coronary artery disease Father Bill         Asthma COPD    COPD Father Bill         COPD/ Emphysema/ Pulmonary Emboli    Arthritis Father Bill         Osteoarthritis    Hearing loss Father Bill     Osteoarthritis Father Bill         Degenerative lumbar verebrae, both knees    Rashes / Skin problems Father Bill         Chronic hives, psoriasis of forehead    GI problems Father Bill         Colitis in his 20’s    Osteoporosis Father Bill     Scoliosis Father Bill     Pulmonary embolism Father Bill         Multiple, tx with coumadin    Thyroid cancer Sister bhanu     Thyroid disease Sister bhanu         Thyroid cancer    Cancer Sister bhanu         Thyroid Cancer    Hypertension Sister bhanu         Positive Factor V Leiden    GI problems Sister bhanu         Cyclic vomiting, tolerated a very limited diet    Arthritis Sister bhanu         Lupus, Fibromyalgia    Depression Sister bhanu      Lupus Sister bhanu         Lupus    Heart attack Sister Luci          age 49, from MI    No Known Problems Sister jonathan     No Known Problems Sister estrada     Migraines Sister miguelina     No Known Problems Sister adán     Hypertension Daughter nick     Migraines Daughter nick     Migraines Daughter shay     Heart attack Maternal Grandmother Adán Almaguer         MI and stroke    Osteoporosis Maternal Grandmother Adán Almaguer     Arthritis Maternal Grandmother Adán Almaguer         Rheumatoid Arthritis    Stroke Maternal Grandmother Adán Almaguer     Colon cancer Maternal Grandfather Wilmar         Maternal grandfather m.p    Cancer Maternal Grandfather Wilmar         colon    Heart attack Paternal Grandmother Joby         MI and stroke    Early death Paternal Grandmother Joby         Stroke, Pulmonary Hypertension    No Known Problems Paternal Grandfather      Breast cancer Maternal Aunt munira         over age 50    Heart disease Maternal Aunt munira     Arthritis Maternal Aunt munira         Fibromyalgia    Cancer Maternal Aunt munira         breast Ca    Vision loss Maternal Aunt munira         Macular Degeneration    Osteoporosis Maternal Aunt munira     Endometrial cancer Maternal Aunt jennifer     Hypertension Maternal Aunt jennifer         Stroke    Stroke Maternal Aunt jennifer     Diabetes Maternal Aunt jenniefr         Insulin dependent    Osteoporosis Maternal Aunt jennifer     Multiple myeloma Maternal Aunt diandra     Heart disease Maternal Aunt diandra         Stroke-     Hypertension Maternal Aunt diandra         Stroke    Diabetes Maternal Aunt diandra         Insulin dependent    Osteoporosis Maternal Aunt diandra     Hypertension Paternal Aunt Bhanu MARIE         Pulmonary Hypertension    Heart disease Paternal Aunt Bhanu BAY.         Pulmonary Hypertension, Diabetes,  from massive bleed    Brain cancer Niece      Lymphoma Niece      Arthritis Sister Adán         Rheumatoid Arthritis, Fibromyalgia    Depression  Sister Ema     Rheum arthritis Sister Ema     Asthma Sister Kandace         Asthma    Asthma Sister Nkechi         Asthma    Heart disease Sister Noemi         Mi-  age 49    Hyperlipidemia Sister Noemi     Depression Sister Noemi     Rashes / Skin problems Sister Noemi         Rosacea    Early death Sister Noemi         Massive MI age 49    Migraines Son Stef     Learning disabilities Son Stef         ADHD    Learning disabilities Brother Cooper         ADHD    Deep vein thrombosis Brother Cooper         calf    Mental illness Daughter Rand         Depression    Hypertension Brother Devyn         Thrombophlebitis    Migraines Brother Devyn         Had to stop working as a navy     Deep vein thrombosis Brother Devyn         DVT- thigh    Vision loss Maternal Aunt Noemi         Legally Blind    Vision loss Maternal Aunt Christine         Corneal transplant    Vision loss Maternal Aunt Nkechi         will need cornea transplant    Heart attack Maternal Aunt Aunt Ceil         MI caused death    Heart disease Maternal Aunt Aunt Ceil         MI -     Hypertension Maternal Aunt Aunt Ceil         Stroke    Diabetes Maternal Aunt Aunt Ceil         Insulin dependent    Osteoporosis Maternal Aunt Aunt Ceil     Hypertension Brother Jan         Thrombophlebitis    GI problems Brother Jan         Ulcerative Colitis    Breast cancer additional onset Neg Hx      BRCA2 Positive Neg Hx      Ovarian cancer Neg Hx      BRCA2 Negative Neg Hx      BRCA1 Positive Neg Hx      BRCA1 Negative Neg Hx      BRCA 1/2 Neg Hx     [4]   Social History  Tobacco Use    Smoking status: Never    Smokeless tobacco: Never   Vaping Use    Vaping status: Never Used   Substance Use Topics    Alcohol use: No    Drug use: No        David Sagastume III, DO  25

## 2025-07-07 NOTE — PATIENT INSTRUCTIONS
I am concerned that we do not have everything you need in our urgent care clinic to fully assess what is going on.  Because of this, I recommend we send you to the ED now for further evaluation and treatment.  When you get to the ED, your ED provider will assess you just like I did and decide about further testing and/or imaging based on their examination and how your condition progresses.    Please proceed to the ED for further evaluation and treatment.     
14-May-2021 07:42

## 2025-07-08 ENCOUNTER — PATIENT OUTREACH (OUTPATIENT)
Dept: CASE MANAGEMENT | Facility: OTHER | Age: 70
End: 2025-07-08

## 2025-07-08 ENCOUNTER — VBI (OUTPATIENT)
Dept: FAMILY MEDICINE CLINIC | Facility: CLINIC | Age: 70
End: 2025-07-08

## 2025-07-08 NOTE — TELEPHONE ENCOUNTER
07/08/25 8:56 AM    Patient contacted post ED visit, [facility] department spoke with patient/caregiver and outreach was successful.    Thank you.  ZAKIYA HAINES MA  [facility]

## 2025-07-08 NOTE — TELEPHONE ENCOUNTER
07/08/25 8:45 AM    Patient contacted post ED visit, first outreach attempt made. Message was left for patient to return a call to the [facility] Department at [facility]: Phone [phone].    Thank you.  ZAKIYA HAINES MA  [facility]

## 2025-07-09 ENCOUNTER — OFFICE VISIT (OUTPATIENT)
Dept: FAMILY MEDICINE CLINIC | Facility: CLINIC | Age: 70
End: 2025-07-09
Payer: MEDICARE

## 2025-07-09 ENCOUNTER — PATIENT OUTREACH (OUTPATIENT)
Dept: CASE MANAGEMENT | Facility: OTHER | Age: 70
End: 2025-07-09

## 2025-07-09 VITALS
HEART RATE: 76 BPM | OXYGEN SATURATION: 98 % | SYSTOLIC BLOOD PRESSURE: 128 MMHG | TEMPERATURE: 99.7 F | HEIGHT: 61 IN | BODY MASS INDEX: 25.49 KG/M2 | DIASTOLIC BLOOD PRESSURE: 78 MMHG | WEIGHT: 135 LBS

## 2025-07-09 DIAGNOSIS — J45.40 MODERATE PERSISTENT ASTHMA, UNSPECIFIED WHETHER COMPLICATED: Chronic | ICD-10-CM

## 2025-07-09 DIAGNOSIS — I50.9 CONGESTIVE HEART FAILURE, UNSPECIFIED HF CHRONICITY, UNSPECIFIED HEART FAILURE TYPE (HCC): ICD-10-CM

## 2025-07-09 DIAGNOSIS — D83.9 CVID (COMMON VARIABLE IMMUNODEFICIENCY) (HCC): ICD-10-CM

## 2025-07-09 DIAGNOSIS — D89.40 MAST CELL ACTIVATION (HCC): ICD-10-CM

## 2025-07-09 DIAGNOSIS — F41.9 ANXIETY: ICD-10-CM

## 2025-07-09 DIAGNOSIS — J01.10 ACUTE NON-RECURRENT FRONTAL SINUSITIS: Primary | ICD-10-CM

## 2025-07-09 PROCEDURE — G2211 COMPLEX E/M VISIT ADD ON: HCPCS | Performed by: NURSE PRACTITIONER

## 2025-07-09 PROCEDURE — 99214 OFFICE O/P EST MOD 30 MIN: CPT | Performed by: NURSE PRACTITIONER

## 2025-07-09 RX ORDER — METHYLPREDNISOLONE 4 MG/1
TABLET ORAL
Qty: 21 EACH | Refills: 0 | Status: SHIPPED | OUTPATIENT
Start: 2025-07-09

## 2025-07-09 RX ORDER — DOXYCYCLINE HYCLATE 100 MG
100 TABLET ORAL 2 TIMES DAILY
Qty: 14 TABLET | Refills: 0 | Status: SHIPPED | OUTPATIENT
Start: 2025-07-09 | End: 2025-07-16

## 2025-07-09 NOTE — PROGRESS NOTES
"Name: Teresa Mao      : 1955      MRN: [id number]  Encounter Provider: TEETEE Gonzalez  Encounter Date: 2025   Encounter department: [facility] PRIMARY CARE  :  Assessment & Plan  Acute non-recurrent frontal sinusitis    Orders:    doxycycline hyclate (VIBRA-TABS) 100 mg tablet; Take 1 tablet (100 mg total) by mouth 2 (two) times a day for 7 days    methylPREDNISolone 4 MG tablet therapy pack; Use as directed on package  I did drawl Dosepak but I I did stressed that any further steroids would have to be obtained from pulmonologist and they may want to discuss a chronic dose for her but that is not something that should come from them not us.  I try to explain the potential side effects of ongoing steroids and she stated \"yeah I have had the steroid lecture several times \"  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HCC)    Orders:    methylPREDNISolone 4 MG tablet therapy pack; Use as directed on package    Moderate persistent asthma, unspecified whether complicated    Orders:    methylPREDNISolone 4 MG tablet therapy pack; Use as directed on package    Congestive heart failure, unspecified HF chronicity, unspecified heart failure type (HCC)  Wt Readings from Last 3 Encounters:   25 61.2 kg (135 lb)   25 63 kg (139 lb)   25 62.6 kg (138 lb)                    CVID (common variable immunodeficiency) (HCC)         Anxiety  Exacerbation due to stress and medications.  She does not feel she is able to address any medical interventions or even psychological or nonmedical interventions such as deep breathing relaxation counseling etc.              History of Present Illness   Patient is here for ER follow-up for asthma exacerbation.  2nd, called in second round methyl pred. Always takes nebs, xopenex, ... She is here for continued symptoms.  She has 67 drug allergies/intolerances.  She states she has a mast cell disorder, asthma flare, this typically triggers her POTS.  She has a set " "routine that she feels is the only way to approach her illness it is how she is navigated these disorders thus far.  Appears understandably anxious today and she is aware of that.  I did try and delve into anything that might be able to help her but she states she has failed on many different trials of medicines she already takes an Atarax at night does not think she could tolerate an increase of the Atarax because of her many other daily medications such as muscle relaxers        Review of Systems    Objective   /78 (BP Location: Right arm, Patient Position: Sitting, Cuff Size: Standard)   Pulse 76   Temp 99.7 °F (37.6 °C) (Tympanic)   Ht 5' 1\" (1.549 m)   Wt 61.2 kg (135 lb)   SpO2 98%   BMI 25.51 kg/m²      Physical Exam    Cardiovascular:      Rate and Rhythm: Normal rate and regular rhythm.   Pulmonary:      Effort: Pulmonary effort is normal.      Comments: Breath sounds lightly decreased but otherwise clear    Neurological:      Mental Status: She is alert.     Psychiatric:      Comments: She is direct she has a certain amount of intensity GI believe is form of anxiety related to her multiple medical issues.  The experience of them and also the medication she takes to treat seem to be keeping her in a state of anxiety.  But she really does not want to discuss that issue evidently it emphasizes I do not 4-minute think that she does not have any medical issues it was just trying to demonstrate how uncontrolled anxiety could make the symptoms worse.         "

## 2025-07-09 NOTE — PROGRESS NOTES
Outpatient Care Management Note:    New CM referral received. Patient was in ER on 7/7/25 with worsening cough. She is currently on steroids from her pulmonologist at [facility]. She was given breathing treatment and pulse ox=95%. She is to follow up with her PCP.     Voice mail message left for Teresa, with my contact information, introducing myself and requesting a call back.

## 2025-07-10 ENCOUNTER — OFFICE VISIT (OUTPATIENT)
Age: 70
End: 2025-07-10
Payer: MEDICARE

## 2025-07-10 ENCOUNTER — HOSPITAL ENCOUNTER (OUTPATIENT)
Dept: INFUSION CENTER | Facility: HOSPITAL | Age: 70
End: 2025-07-10
Payer: MEDICARE

## 2025-07-10 VITALS
DIASTOLIC BLOOD PRESSURE: 66 MMHG | SYSTOLIC BLOOD PRESSURE: 133 MMHG | HEART RATE: 70 BPM | TEMPERATURE: 96.6 F | RESPIRATION RATE: 16 BRPM

## 2025-07-10 VITALS — HEIGHT: 61 IN | WEIGHT: 135 LBS | BODY MASS INDEX: 25.49 KG/M2

## 2025-07-10 DIAGNOSIS — M54.2 NECK PAIN: ICD-10-CM

## 2025-07-10 DIAGNOSIS — M54.42 CHRONIC BILATERAL LOW BACK PAIN WITH BILATERAL SCIATICA: ICD-10-CM

## 2025-07-10 DIAGNOSIS — G89.29 CHRONIC BILATERAL LOW BACK PAIN WITH BILATERAL SCIATICA: ICD-10-CM

## 2025-07-10 DIAGNOSIS — G90.A POSTURAL ORTHOSTATIC TACHYCARDIA SYNDROME: Primary | ICD-10-CM

## 2025-07-10 DIAGNOSIS — M79.18 MYOFASCIAL PAIN SYNDROME: ICD-10-CM

## 2025-07-10 DIAGNOSIS — M47.816 LUMBAR SPONDYLOSIS: ICD-10-CM

## 2025-07-10 DIAGNOSIS — G89.4 CHRONIC PAIN DISORDER: ICD-10-CM

## 2025-07-10 DIAGNOSIS — M54.41 CHRONIC BILATERAL LOW BACK PAIN WITH BILATERAL SCIATICA: ICD-10-CM

## 2025-07-10 DIAGNOSIS — M54.16 LUMBAR RADICULOPATHY: ICD-10-CM

## 2025-07-10 DIAGNOSIS — M47.812 CERVICAL SPONDYLOSIS: ICD-10-CM

## 2025-07-10 DIAGNOSIS — Z98.1 HISTORY OF LUMBAR FUSION: ICD-10-CM

## 2025-07-10 PROCEDURE — 99214 OFFICE O/P EST MOD 30 MIN: CPT | Performed by: NURSE PRACTITIONER

## 2025-07-10 PROCEDURE — 96361 HYDRATE IV INFUSION ADD-ON: CPT

## 2025-07-10 PROCEDURE — G2211 COMPLEX E/M VISIT ADD ON: HCPCS | Performed by: NURSE PRACTITIONER

## 2025-07-10 PROCEDURE — 96360 HYDRATION IV INFUSION INIT: CPT

## 2025-07-10 RX ORDER — CYCLOBENZAPRINE HCL 5 MG
5 TABLET ORAL 3 TIMES DAILY PRN
Qty: 90 TABLET | Refills: 1 | Status: CANCELLED | OUTPATIENT
Start: 2025-07-10

## 2025-07-10 RX ORDER — HYDROCODONE BITARTRATE AND ACETAMINOPHEN 10; 325 MG/1; MG/1
0.5 TABLET ORAL 2 TIMES DAILY PRN
Qty: 30 TABLET | Refills: 0 | Status: SHIPPED | OUTPATIENT
Start: 2025-07-17

## 2025-07-10 RX ORDER — HYDROCODONE BITARTRATE AND ACETAMINOPHEN 10; 325 MG/1; MG/1
0.5 TABLET ORAL 2 TIMES DAILY PRN
Qty: 30 TABLET | Refills: 0 | Status: SHIPPED | OUTPATIENT
Start: 2025-08-14

## 2025-07-10 RX ADMIN — SODIUM CHLORIDE 1000 ML: 0.9 INJECTION, SOLUTION INTRAVENOUS at 08:12

## 2025-07-10 NOTE — PROGRESS NOTES
Name: Teresa Mao      : 1955      MRN: [id number]  Encounter Provider: Barbara Kocher, CRNP  Encounter Date: 7/10/2025   Encounter department: [facility] [place]  :  Assessment & Plan  Chronic pain disorder  Chronic bilateral low back pain with bilateral sciatica  History of lumbar fusion  Lumbar radiculopathy  Lumbar spondylosis  Myofascial pain syndrome  Orders:    HYDROcodone-acetaminophen (NORCO)  mg per tablet; Take 0.5 tablets by mouth 2 (two) times a day as needed for moderate pain Max Daily Amount: 1 tablet Do not start before 2025.    HYDROcodone-acetaminophen (NORCO)  mg per tablet; Take 0.5 tablets by mouth 2 (two) times a day as needed for moderate pain Max Daily Amount: 1 tablet Do not start before 2025.    FL spine and pain procedure; Future    Neck pain  Cervical spondylosis  Orders:    HYDROcodone-acetaminophen (NORCO)  mg per tablet; Take 0.5 tablets by mouth 2 (two) times a day as needed for moderate pain Max Daily Amount: 1 tablet Do not start before 2025.    FL spine and pain procedure; Future    While the patient was in the office today, I did have a thorough conversation regarding their chronic pain syndrome, medication management, and treatment plan options.  Patient is being seen for a follow-up visit.  Her low back pain remains about 75% improved following bilateral L2-3 and L3-4 radiofrequency ablations.  Right side was performed on 2025.  Left side was performed on 2025.  His complaint today is recurrent left sided neck pain.  She underwent a left-sided C2-3 and C3-4 radiofrequency ablation on 3/23/2023.  Patient reports that their pain improved by more than 80-90% and their ability to perform activities and ADLs improved by more than 50% for more than 1 year.  At this point, we will plan to repeat left sided C2-3 and C3-4 radiofrequency ablation.    Continue hydrocodone 10/325.  1/2 tablet twice  daily if needed for pain.  The patient's opioid scripts were sent to their pharmacy electronically and was given a 2 month supply of prescriptions with a Do Not Fill date(s) of 7/17/2025, 8/14/2025.    Continue Flexeril 5 mg 3 times daily if needed for spasms.    There are risks associated with opioid medications, including dependence, addiction and tolerance. The patient understands and agrees to use these medications only as prescribed. Potential side effects of the medications include, but are not limited to, constipation, drowsiness, addiction, impaired judgment and risk of fatal overdose if not taken as prescribed. The patient was warned against driving while taking sedation medications.  Sharing medications is a felony. At this point in time, the patient is showing no signs of addiction, abuse, diversion or suicidal ideation.  [place] Prescription Drug Monitoring Program report was reviewed and was appropriate      The patient will follow-up in 2 months for medication prescription refill and reevaluation. The patient was advised to contact the office should their symptoms worsen in the interim. The patient was agreeable and verbalized an understanding.    My impressions and treatment recommendations were discussed in detail with the patient who verbalized understanding and had no further questions.  Discharge instructions were provided. I personally saw and examined the patient and I agree with the above discussed plan of care.    History of Present Illness     Teresa Mao is a 69 y.o. female who presents for a follow up office visit in regards to Back Pain. The patient’s current symptoms include complaints of neck pain, predominantly left-sided, low back and lower extremity pain.  Current pain level is an 8/10.  Quality pain is described as burning, sharp, shooting, numb, pins-and-needles.    Current pain medications includes:  Hydrocodone 10/325.  1/2 tablet twice daily if needed for pain,  "Flexeril 5 mg 3 times daily if needed for spasms.  The patient reports that this regimen is providing up to 75% pain relief.  The patient is reporting no side effects from this pain medication regimen.    Opioid contract date 5/22/2025    Last UDS Date: 5/22/2025    Review of Systems   Constitutional:  Negative for chills and fever.   HENT:  Negative for sinus pain.    Eyes:  Negative for pain.   Respiratory:  Negative for wheezing.    Cardiovascular:  Negative for palpitations.   Gastrointestinal:  Negative for abdominal pain.   Endocrine: Negative for polyuria.   Musculoskeletal:  Negative for gait problem and neck stiffness.        - Difficulty walking   Skin:  Negative for rash.   Allergic/Immunologic: Negative for environmental allergies.   Neurological:  Negative for dizziness and seizures.   Psychiatric/Behavioral:  Negative for confusion and sleep disturbance.    All other systems reviewed and are negative.      Medical History Reviewed by provider this encounter:  Tobacco  Allergies  Meds  Problems  Med Hx  Surg Hx  Fam Hx     .  Medications Ordered Prior to Encounter[1]      Objective   Ht 5' 1\" (1.549 m)   Wt 61.2 kg (135 lb)   BMI 25.51 kg/m²      Pain Score:   8  Physical Exam  Constitutional: normal, well developed, well nourished, alert, in no distress and non-toxic and no overt pain behavior.  Eyes: anicteric  HEENT: grossly intact  Neck: supple, symmetric, trachea midline and no masses   Pulmonary: even and unlabored  Cardiovascular: No edema or pitting edema present  Skin: Normal without rashes or lesions and well hydrated  Psychiatric: Mood and affect appropriate  Neurologic: Cranial Nerves II-XII grossly intact  Musculoskeletal: Limited range of motion of the cervical spine in all planes.  There is tenderness on the left from C2-C6.  There are cervical paraspinal muscle spasms, bilaterally.         [1]   Current Outpatient Medications on File Prior to Visit   Medication Sig Dispense " Refill    aluminum hydroxide-magnesium carbonate (Gaviscon Extra Strength) 160-105 mg per chewable tablet       azelastine (ASTELIN) 0.1 % nasal spray 2 sprays into each nostril in the morning and 2 sprays in the evening. Use in each nostril as directed .      beclomethasone (QVAR) 40 MCG/ACT inhaler Inhale 1 puff daily as needed (only when unable to nebulize pulmicort) Rinse mouth after use.      budesonide (PULMICORT) 0.5 mg/2 mL nebulizer solution Take 0.5 mg by nebulization in the morning and 0.5 mg in the evening. Rinse mouth after use. .      cromolyn (GASTROCROM) 100 MG/5ML solution Take 100 mg by mouth 4 (four) times a day (before meals and at bedtime)      cyclobenzaprine (FLEXERIL) 5 mg tablet TAKE 1 TABLET BY MOUTH THREE TIMES A DAY AS NEEDED FOR MUSCLE SPASMS 90 tablet 1    cycloSPORINE (RESTASIS) 0.05 % ophthalmic emulsion Administer 1 drop to both eyes in the morning and 1 drop in the evening.      docusate sodium (COLACE) 100 mg capsule Take 100 mg by mouth daily as needed for constipation      doxycycline hyclate (VIBRA-TABS) 100 mg tablet Take 1 tablet (100 mg total) by mouth 2 (two) times a day for 7 days 14 tablet 0    Dupilumab (DUPIXENT SC) Inject under the skin Pt unsure of dose      erythromycin (ILOTYCIN) ophthalmic ointment Apply 1 application. to eye daily at bedtime      famotidine (PEPCID) 40 MG tablet Twice a day      fexofenadine (ALLEGRA) 180 MG tablet Take 180 mg by mouth in the morning and 180 mg in the evening.      fluticasone (FLONASE) 50 mcg/act nasal spray 2 sprays into each nostril in the morning.      Galcanezumab-gnlm (Emgality) 120 MG/ML SOAJ Inject under the skin every 30 (thirty) days      guaiFENesin (Mucinex) 600 mg 12 hr tablet Take 600 mg by mouth every 12 (twelve) hours      Heating Pad PADS Use if needed (pain)      hydrOXYzine (ATARAX) 10 mg/5 mL syrup       ipratropium (ATROVENT) 0.02 % nebulizer solution Take 0.5 mg by nebulization every 6 (six) hours as needed  for wheezing or shortness of breath      ipratropium (ATROVENT) 0.06 % nasal spray       ivabradine HCl (Corlanor) 5 MG tablet 7.5 mg in the morning and 7.5 mg before bedtime.      Ivabradine HCl 7.5 MG TABS Take 1 tablet by mouth 2 (two) times a day with meals      KETOTIFEN FUMARATE OP       levalbuterol (XOPENEX HFA) 45 mcg/act inhaler Inhale 2 puffs every 4 (four) hours as needed (when unable to nebulize)      levalbuterol (XOPENEX) 1.25 mg/3 mL nebulizer solution Take 1.25 mg by nebulization every 6 (six) hours as needed  2    Magnesium 400 MG CAPS Take 1 capsule by mouth in the morning and 1 capsule in the evening.      methylPREDNISolone 4 MG tablet therapy pack Use as directed on package 21 each 0    MULTIPLE VITAMINS-CALCIUM PO Take 1 capsule by mouth every morning      NON FORMULARY Take 1 mg by mouth in the morning and 1 mg in the evening and 1 mg before bedtime. Ketotifen 1 mg compounded capsule .      NON FORMULARY LACTOBACILLUS COMBINATION NO.4 (PROBIOTIC) 3 BILLION CELL CAPSULE      NON FORMULARY 0.25 % X-DEMVY eye drop      omega-3-acid ethyl esters (LOVAZA) 1 g capsule Take 2 g by mouth in the morning and 2 g in the evening. 1600mg daily .      polyethylene glycol (MIRALAX) 17 g packet Take 17 g by mouth daily as needed      Probiotic Product (PROBIOTIC DAILY PO) Take 1 tablet by mouth in the morning. At lunch.      Qvar RediHaler 40 MCG/ACT inhaler       sodium chloride Inject 1,500 mL into a catheter in a vein      sucralfate (CARAFATE) 1 g/10 mL suspension Take 1 g by mouth in the morning and 1 g in the evening.      Thyroid, Porcine, POWD Use 32 mg daily 82727 g 0    Ubrogepant (UBRELVY) 100 MG tablet Take 100 mg by mouth Take 1 tablet (100 mg) one time as needed for migraine. May repeat one additional tablet (100 mg) at least two hours after the first dose. Do not use more than two doses per day, or for more than eight days per month.      verapamil (CALAN) 40 mg tablet       Xdemvy 0.25 % SOLN  Apply 1 drop to eye      [DISCONTINUED] HYDROcodone-acetaminophen (NORCO)  mg per tablet Take 0.5 tablets by mouth 2 (two) times a day as needed for moderate pain Max Daily Amount: 1 tablet Do not start before June 19, 2025. 30 tablet 0    [DISCONTINUED] HYDROcodone-acetaminophen (NORCO)  mg per tablet Take 0.5 tablets by mouth 2 (two) times a day as needed for moderate pain Max Daily Amount: 1 tablet 30 tablet 0    doxycycline (ADOXA) 100 MG tablet Take 100 mg by mouth 2 (two) times a day      methylprednisolone (MEDROL) 4 mg tablet Medrol dosepak, take as directed 21 tablet 0    naloxone (NARCAN) 4 mg/0.1 mL nasal spray Administer 1 spray into a nostril. If no response after 2-3 minutes, give another dose in the other nostril using a new spray. 1 each 0     Current Facility-Administered Medications on File Prior to Visit   Medication Dose Route Frequency Provider Last Rate Last Admin    alteplase (CATHFLO) injection 2 mg  2 mg Intracatheter Q1MIN PRN Ricky Harper MD        alteplase (CATHFLO) injection 2 mg  2 mg Intracatheter Q1MIN PRN Ricky Harper MD        [COMPLETED] sodium chloride 0.9 % bolus 1,000 mL  1,000 mL Intravenous Once Ricky Harper MD   Stopped at 07/10/25 9523

## 2025-07-10 NOTE — ASSESSMENT & PLAN NOTE
Orders:    HYDROcodone-acetaminophen (NORCO)  mg per tablet; Take 0.5 tablets by mouth 2 (two) times a day as needed for moderate pain Max Daily Amount: 1 tablet Do not start before August 14, 2025.    FL spine and pain procedure; Future    While the patient was in the office today, I did have a thorough conversation regarding their chronic pain syndrome, medication management, and treatment plan options.  Patient is being seen for a follow-up visit.  Her low back pain remains about 75% improved following bilateral L2-3 and L3-4 radiofrequency ablations.  Right side was performed on 4/18/2025.  Left side was performed on 4/1/2025.  His complaint today is recurrent left sided neck pain.  She underwent a left-sided C2-3 and C3-4 radiofrequency ablation on 3/23/2023.  Patient reports that their pain improved by more than 80-90% and their ability to perform activities and ADLs improved by more than 50% for more than 1 year.  At this point, we will plan to repeat left sided C2-3 and C3-4 radiofrequency ablation.    Continue hydrocodone 10/325.  1/2 tablet twice daily if needed for pain.  The patient's opioid scripts were sent to their pharmacy electronically and was given a 2 month supply of prescriptions with a Do Not Fill date(s) of 7/17/2025, 8/14/2025.    Continue Flexeril 5 mg 3 times daily if needed for spasms.

## 2025-07-10 NOTE — ASSESSMENT & PLAN NOTE
Wt Readings from Last 3 Encounters:   07/09/25 61.2 kg (135 lb)   05/30/25 63 kg (139 lb)   05/22/25 62.6 kg (138 lb)

## 2025-07-10 NOTE — ASSESSMENT & PLAN NOTE
Orders:    HYDROcodone-acetaminophen (NORCO)  mg per tablet; Take 0.5 tablets by mouth 2 (two) times a day as needed for moderate pain Max Daily Amount: 1 tablet Do not start before July 17, 2025.    HYDROcodone-acetaminophen (NORCO)  mg per tablet; Take 0.5 tablets by mouth 2 (two) times a day as needed for moderate pain Max Daily Amount: 1 tablet Do not start before August 14, 2025.    FL spine and pain procedure; Future

## 2025-07-11 ENCOUNTER — PATIENT OUTREACH (OUTPATIENT)
Dept: CASE MANAGEMENT | Facility: OTHER | Age: 70
End: 2025-07-11

## 2025-07-11 ENCOUNTER — HOSPITAL ENCOUNTER (OUTPATIENT)
Dept: INFUSION CENTER | Facility: HOSPITAL | Age: 70
End: 2025-07-11
Payer: MEDICARE

## 2025-07-11 VITALS
OXYGEN SATURATION: 99 % | TEMPERATURE: 98.1 F | HEART RATE: 78 BPM | RESPIRATION RATE: 17 BRPM | DIASTOLIC BLOOD PRESSURE: 83 MMHG | SYSTOLIC BLOOD PRESSURE: 137 MMHG

## 2025-07-11 DIAGNOSIS — G90.A POSTURAL ORTHOSTATIC TACHYCARDIA SYNDROME: Primary | ICD-10-CM

## 2025-07-11 PROCEDURE — 96361 HYDRATE IV INFUSION ADD-ON: CPT

## 2025-07-11 PROCEDURE — 96360 HYDRATION IV INFUSION INIT: CPT

## 2025-07-11 RX ADMIN — SODIUM CHLORIDE 1000 ML: 0.9 INJECTION, SOLUTION INTRAVENOUS at 08:33

## 2025-07-11 NOTE — PROGRESS NOTES
Outpatient Care Management Note:    CM called Teresa. She states she is doing much better since starting the doxycycline. She states that she did not start the steroid dose pack, because the PCP told her to hold it for the weekend and only use it if needed. She is using her nebulizer as ordered and feels it is helping.     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 She declined completing a med review stating she has everything.      She drives independently and denies any issues getting to appts.     She denies any needs and is self managing her care. CM will close the referral.

## 2025-07-11 NOTE — PROGRESS NOTES
Teresa Mao  tolerated hydration treatment well with no complications.      Teresa Mao is aware of future appt on 7/16 at 0830.     AVS printed and given to Teresa Mao:    No (Declined by Teresa Mao)

## 2025-07-14 ENCOUNTER — HOSPITAL ENCOUNTER (OUTPATIENT)
Dept: INFUSION CENTER | Facility: HOSPITAL | Age: 70
Discharge: HOME | End: 2025-07-14
Payer: MEDICARE

## 2025-07-14 VITALS
DIASTOLIC BLOOD PRESSURE: 72 MMHG | TEMPERATURE: 99 F | HEART RATE: 96 BPM | SYSTOLIC BLOOD PRESSURE: 120 MMHG | OXYGEN SATURATION: 100 % | RESPIRATION RATE: 18 BRPM

## 2025-07-14 DIAGNOSIS — G90.A POSTURAL ORTHOSTATIC TACHYCARDIA SYNDROME: Primary | ICD-10-CM

## 2025-07-14 PROCEDURE — 96360 HYDRATION IV INFUSION INIT: CPT

## 2025-07-14 PROCEDURE — 96361 HYDRATE IV INFUSION ADD-ON: CPT

## 2025-07-14 RX ADMIN — SODIUM CHLORIDE 1000 ML: 0.9 INJECTION, SOLUTION INTRAVENOUS at 08:43

## 2025-07-14 NOTE — PROGRESS NOTES
Teresa Mao  tolerated hydration well with no complications.      Teresa Mao is aware of future appt on 7-15-25 at 9am    AVS declined

## 2025-07-15 ENCOUNTER — HOSPITAL ENCOUNTER (OUTPATIENT)
Dept: INFUSION CENTER | Facility: HOSPITAL | Age: 70
Discharge: HOME | End: 2025-07-15
Payer: MEDICARE

## 2025-07-15 VITALS
OXYGEN SATURATION: 98 % | TEMPERATURE: 99.4 F | DIASTOLIC BLOOD PRESSURE: 75 MMHG | HEART RATE: 76 BPM | SYSTOLIC BLOOD PRESSURE: 134 MMHG | RESPIRATION RATE: 18 BRPM

## 2025-07-15 DIAGNOSIS — G90.A POSTURAL ORTHOSTATIC TACHYCARDIA SYNDROME: Primary | ICD-10-CM

## 2025-07-15 PROCEDURE — 96361 HYDRATE IV INFUSION ADD-ON: CPT

## 2025-07-15 PROCEDURE — 96360 HYDRATION IV INFUSION INIT: CPT

## 2025-07-15 RX ADMIN — SODIUM CHLORIDE 1000 ML: 0.9 INJECTION, SOLUTION INTRAVENOUS at 09:18

## 2025-07-17 ENCOUNTER — HOSPITAL ENCOUNTER (OUTPATIENT)
Dept: INFUSION CENTER | Facility: HOSPITAL | Age: 70
End: 2025-07-17
Payer: MEDICARE

## 2025-07-17 VITALS
SYSTOLIC BLOOD PRESSURE: 134 MMHG | HEART RATE: 75 BPM | TEMPERATURE: 98 F | RESPIRATION RATE: 18 BRPM | DIASTOLIC BLOOD PRESSURE: 62 MMHG | OXYGEN SATURATION: 96 %

## 2025-07-17 DIAGNOSIS — G90.A POSTURAL ORTHOSTATIC TACHYCARDIA SYNDROME: Primary | ICD-10-CM

## 2025-07-17 PROCEDURE — 96360 HYDRATION IV INFUSION INIT: CPT

## 2025-07-17 PROCEDURE — 96361 HYDRATE IV INFUSION ADD-ON: CPT

## 2025-07-17 RX ADMIN — SODIUM CHLORIDE 1000 ML: 0.9 INJECTION, SOLUTION INTRAVENOUS at 08:49

## 2025-07-17 NOTE — PROGRESS NOTES
Teresa Mao  tolerated hydration treatment well with no complications.      Teresa Mao is aware of future appt on 7/18.     AVS printed and given to Teresa Mao:    No (Declined by Teresa Mao)

## 2025-07-18 ENCOUNTER — HOSPITAL ENCOUNTER (OUTPATIENT)
Dept: INFUSION CENTER | Facility: HOSPITAL | Age: 70
End: 2025-07-18
Payer: MEDICARE

## 2025-07-18 VITALS
RESPIRATION RATE: 18 BRPM | HEART RATE: 77 BPM | OXYGEN SATURATION: 99 % | SYSTOLIC BLOOD PRESSURE: 144 MMHG | DIASTOLIC BLOOD PRESSURE: 77 MMHG | TEMPERATURE: 97.3 F

## 2025-07-18 DIAGNOSIS — G90.A POSTURAL ORTHOSTATIC TACHYCARDIA SYNDROME: Primary | ICD-10-CM

## 2025-07-18 PROCEDURE — 96361 HYDRATE IV INFUSION ADD-ON: CPT

## 2025-07-18 PROCEDURE — 96360 HYDRATION IV INFUSION INIT: CPT

## 2025-07-18 RX ADMIN — SODIUM CHLORIDE 1000 ML: 0.9 INJECTION, SOLUTION INTRAVENOUS at 08:34

## 2025-07-18 NOTE — PROGRESS NOTES
Teresa Mao  tolerated hydration treatment well with no complications.      Teresa aMo is aware of future appt on 7/21 at 8:30AM.     AVS printed and given to Teresa Mao: No (Declined by Teresa Mao).

## 2025-07-18 NOTE — PLAN OF CARE
Problem: Potential for Falls  Goal: Patient will remain free of falls  Description: INTERVENTIONS:  - Educate patient/family on patient safety including physical limitations    Outcome: Progressing

## 2025-07-21 ENCOUNTER — HOSPITAL ENCOUNTER (OUTPATIENT)
Dept: INFUSION CENTER | Facility: HOSPITAL | Age: 70
Discharge: HOME | End: 2025-07-21
Payer: MEDICARE

## 2025-07-21 VITALS
HEART RATE: 81 BPM | DIASTOLIC BLOOD PRESSURE: 76 MMHG | RESPIRATION RATE: 18 BRPM | SYSTOLIC BLOOD PRESSURE: 144 MMHG | TEMPERATURE: 98.4 F | OXYGEN SATURATION: 99 %

## 2025-07-21 DIAGNOSIS — G90.A POSTURAL ORTHOSTATIC TACHYCARDIA SYNDROME: Primary | ICD-10-CM

## 2025-07-21 PROCEDURE — 96360 HYDRATION IV INFUSION INIT: CPT

## 2025-07-21 PROCEDURE — 96361 HYDRATE IV INFUSION ADD-ON: CPT

## 2025-07-21 RX ADMIN — SODIUM CHLORIDE 1000 ML: 0.9 INJECTION, SOLUTION INTRAVENOUS at 08:38

## 2025-07-21 NOTE — PROGRESS NOTES
Teresa Mao  tolerated hydration and dressing change well with no complications.      Teresa Mao is aware of future appt on 7/22 at 8:30AM.     AVS printed and given to Teresa Mao: No (Declined by Teresa Mao).

## 2025-07-22 ENCOUNTER — HOSPITAL ENCOUNTER (OUTPATIENT)
Dept: INFUSION CENTER | Facility: HOSPITAL | Age: 70
Discharge: HOME | End: 2025-07-22
Payer: MEDICARE

## 2025-07-22 VITALS
TEMPERATURE: 97.9 F | OXYGEN SATURATION: 98 % | HEART RATE: 68 BPM | SYSTOLIC BLOOD PRESSURE: 145 MMHG | DIASTOLIC BLOOD PRESSURE: 84 MMHG | RESPIRATION RATE: 18 BRPM

## 2025-07-22 DIAGNOSIS — G90.A POSTURAL ORTHOSTATIC TACHYCARDIA SYNDROME: Primary | ICD-10-CM

## 2025-07-22 PROCEDURE — 96360 HYDRATION IV INFUSION INIT: CPT

## 2025-07-22 PROCEDURE — 96361 HYDRATE IV INFUSION ADD-ON: CPT

## 2025-07-22 RX ADMIN — SODIUM CHLORIDE 1000 ML: 0.9 INJECTION, SOLUTION INTRAVENOUS at 08:41

## 2025-07-22 NOTE — PLAN OF CARE
Problem: INFECTION - ADULT  Goal: Absence or prevention of progression during hospitalization  Description: INTERVENTIONS:  - Assess and monitor for signs and symptoms of infection  - Monitor lab/diagnostic results  - Monitor all insertion sites, i.e. indwelling lines, tubes, and drains  - Monitor endotracheal if appropriate and nasal secretions for changes in amount and color  - [place] appropriate cooling/warming therapies per order  - Administer medications as ordered  - Instruct and encourage patient and family to use good hand hygiene technique  - Identify and instruct in appropriate isolation precautions for identified infection/condition  Outcome: Progressing

## 2025-07-22 NOTE — PROGRESS NOTES
Teresa Mao  tolerated hydration for 4 hours treatment well with no complications.      Teresa Mao is aware of future appt on Thursday     AVS printed and given to Teresa Mao:   No (Declined by Teresa Mao)

## 2025-07-24 ENCOUNTER — HOSPITAL ENCOUNTER (OUTPATIENT)
Dept: INFUSION CENTER | Facility: HOSPITAL | Age: 70
End: 2025-07-24
Payer: MEDICARE

## 2025-07-24 VITALS
RESPIRATION RATE: 18 BRPM | SYSTOLIC BLOOD PRESSURE: 139 MMHG | TEMPERATURE: 97.8 F | DIASTOLIC BLOOD PRESSURE: 80 MMHG | HEART RATE: 83 BPM | OXYGEN SATURATION: 98 %

## 2025-07-24 DIAGNOSIS — G90.A POSTURAL ORTHOSTATIC TACHYCARDIA SYNDROME: Primary | ICD-10-CM

## 2025-07-24 PROCEDURE — 96360 HYDRATION IV INFUSION INIT: CPT

## 2025-07-24 PROCEDURE — 96361 HYDRATE IV INFUSION ADD-ON: CPT

## 2025-07-24 RX ADMIN — SODIUM CHLORIDE 1000 ML: 0.9 INJECTION, SOLUTION INTRAVENOUS at 08:26

## 2025-07-24 NOTE — PROGRESS NOTES
Teresa Mao  tolerated hydration treatment well with no complications.      Teresa Mao is aware of future appt on 7/25 at 8:30AM.     AVS printed and given to Teresa Mao: No (Declined by Teresa Mao).

## 2025-07-25 ENCOUNTER — HOSPITAL ENCOUNTER (OUTPATIENT)
Dept: INFUSION CENTER | Facility: HOSPITAL | Age: 70
End: 2025-07-25
Payer: MEDICARE

## 2025-07-25 VITALS
DIASTOLIC BLOOD PRESSURE: 88 MMHG | TEMPERATURE: 97.5 F | SYSTOLIC BLOOD PRESSURE: 136 MMHG | HEART RATE: 76 BPM | RESPIRATION RATE: 16 BRPM

## 2025-07-25 DIAGNOSIS — G90.A POSTURAL ORTHOSTATIC TACHYCARDIA SYNDROME: Primary | ICD-10-CM

## 2025-07-25 PROCEDURE — 96361 HYDRATE IV INFUSION ADD-ON: CPT

## 2025-07-25 PROCEDURE — 96360 HYDRATION IV INFUSION INIT: CPT

## 2025-07-25 RX ADMIN — SODIUM CHLORIDE 1000 ML: 0.9 INJECTION, SOLUTION INTRAVENOUS at 08:33

## 2025-07-25 NOTE — PROGRESS NOTES
Teresa Mao  tolerated hydration x 4 hours well with no complications.      Teresa Mao is aware of future appt on 7/28/25    AVS printed and given to Teresa Mao:  No (Declined by Teresa Mao)

## 2025-07-28 ENCOUNTER — HOSPITAL ENCOUNTER (OUTPATIENT)
Dept: INFUSION CENTER | Facility: HOSPITAL | Age: 70
Discharge: HOME | End: 2025-07-28
Payer: MEDICARE

## 2025-07-28 VITALS
TEMPERATURE: 97.8 F | OXYGEN SATURATION: 98 % | SYSTOLIC BLOOD PRESSURE: 144 MMHG | DIASTOLIC BLOOD PRESSURE: 80 MMHG | RESPIRATION RATE: 12 BRPM | HEART RATE: 86 BPM

## 2025-07-28 DIAGNOSIS — G90.A POSTURAL ORTHOSTATIC TACHYCARDIA SYNDROME: Primary | ICD-10-CM

## 2025-07-28 PROCEDURE — 96360 HYDRATION IV INFUSION INIT: CPT

## 2025-07-28 PROCEDURE — 96361 HYDRATE IV INFUSION ADD-ON: CPT

## 2025-07-28 RX ADMIN — SODIUM CHLORIDE 1000 ML: 0.9 INJECTION, SOLUTION INTRAVENOUS at 09:15

## 2025-07-31 ENCOUNTER — HOSPITAL ENCOUNTER (OUTPATIENT)
Dept: INFUSION CENTER | Facility: HOSPITAL | Age: 70
Discharge: HOME | End: 2025-07-31
Payer: MEDICARE

## 2025-07-31 VITALS
TEMPERATURE: 97.5 F | RESPIRATION RATE: 17 BRPM | SYSTOLIC BLOOD PRESSURE: 136 MMHG | HEART RATE: 74 BPM | OXYGEN SATURATION: 97 % | DIASTOLIC BLOOD PRESSURE: 71 MMHG

## 2025-07-31 DIAGNOSIS — G90.A POSTURAL ORTHOSTATIC TACHYCARDIA SYNDROME: Primary | ICD-10-CM

## 2025-07-31 PROCEDURE — 96361 HYDRATE IV INFUSION ADD-ON: CPT

## 2025-07-31 PROCEDURE — 96360 HYDRATION IV INFUSION INIT: CPT

## 2025-07-31 RX ADMIN — SODIUM CHLORIDE 1000 ML: 0.9 INJECTION, SOLUTION INTRAVENOUS at 09:10

## 2025-08-01 ENCOUNTER — HOSPITAL ENCOUNTER (OUTPATIENT)
Dept: INFUSION CENTER | Facility: HOSPITAL | Age: 70
Discharge: HOME | End: 2025-08-01
Payer: MEDICARE

## 2025-08-01 VITALS
TEMPERATURE: 97 F | HEART RATE: 72 BPM | SYSTOLIC BLOOD PRESSURE: 128 MMHG | OXYGEN SATURATION: 99 % | DIASTOLIC BLOOD PRESSURE: 67 MMHG | RESPIRATION RATE: 16 BRPM

## 2025-08-01 DIAGNOSIS — G90.A POSTURAL ORTHOSTATIC TACHYCARDIA SYNDROME: Primary | ICD-10-CM

## 2025-08-01 PROCEDURE — 96360 HYDRATION IV INFUSION INIT: CPT

## 2025-08-01 PROCEDURE — 96361 HYDRATE IV INFUSION ADD-ON: CPT

## 2025-08-01 RX ADMIN — SODIUM CHLORIDE 1000 ML: 0.9 INJECTION, SOLUTION INTRAVENOUS at 08:43

## 2025-08-04 ENCOUNTER — HOSPITAL ENCOUNTER (OUTPATIENT)
Dept: INFUSION CENTER | Facility: HOSPITAL | Age: 70
Discharge: HOME | End: 2025-08-04
Payer: MEDICARE

## 2025-08-07 ENCOUNTER — HOSPITAL ENCOUNTER (OUTPATIENT)
Dept: INFUSION CENTER | Facility: HOSPITAL | Age: 70
End: 2025-08-07
Payer: MEDICARE

## 2025-08-08 ENCOUNTER — HOSPITAL ENCOUNTER (OUTPATIENT)
Dept: INFUSION CENTER | Facility: HOSPITAL | Age: 70
End: 2025-08-08
Payer: MEDICARE

## 2025-08-11 ENCOUNTER — TRANSCRIBE ORDERS (OUTPATIENT)
Age: 70
End: 2025-08-11

## 2025-08-11 ENCOUNTER — HOSPITAL ENCOUNTER (OUTPATIENT)
Dept: INFUSION CENTER | Facility: HOSPITAL | Age: 70
Discharge: HOME | End: 2025-08-11
Payer: MEDICARE

## 2025-08-12 ENCOUNTER — HOSPITAL ENCOUNTER (OUTPATIENT)
Dept: INFUSION CENTER | Facility: HOSPITAL | Age: 70
Discharge: HOME | End: 2025-08-12
Payer: MEDICARE

## 2025-08-12 ENCOUNTER — OFFICE VISIT (OUTPATIENT)
Dept: PULMONOLOGY | Facility: CLINIC | Age: 70
End: 2025-08-12
Payer: MEDICARE

## 2025-08-12 PROBLEM — D80.6 ANTIBODY DEFICIENCY SYNDROME (HCC): Status: ACTIVE | Noted: 2025-08-12

## 2025-08-12 PROBLEM — D89.42 IDIOPATHIC MAST CELL ACTIVATION SYNDROME (HCC): Chronic | Status: ACTIVE | Noted: 2023-10-12

## 2025-08-14 ENCOUNTER — HOSPITAL ENCOUNTER (OUTPATIENT)
Dept: INFUSION CENTER | Facility: HOSPITAL | Age: 70
End: 2025-08-14
Attending: STUDENT IN AN ORGANIZED HEALTH CARE EDUCATION/TRAINING PROGRAM
Payer: MEDICARE

## 2025-08-15 ENCOUNTER — HOSPITAL ENCOUNTER (OUTPATIENT)
Dept: INFUSION CENTER | Facility: HOSPITAL | Age: 70
End: 2025-08-15
Payer: MEDICARE

## 2025-08-15 DIAGNOSIS — M79.18 MYOFASCIAL PAIN SYNDROME: ICD-10-CM

## 2025-08-15 DIAGNOSIS — G03.9 ARACHNOIDITIS: ICD-10-CM

## 2025-08-18 ENCOUNTER — HOSPITAL ENCOUNTER (OUTPATIENT)
Dept: INFUSION CENTER | Facility: HOSPITAL | Age: 70
Discharge: HOME | End: 2025-08-18
Payer: MEDICARE

## 2025-08-18 ENCOUNTER — TELEPHONE (OUTPATIENT)
Age: 70
End: 2025-08-18

## 2025-08-18 VITALS
TEMPERATURE: 98.4 F | SYSTOLIC BLOOD PRESSURE: 149 MMHG | OXYGEN SATURATION: 97 % | DIASTOLIC BLOOD PRESSURE: 78 MMHG | RESPIRATION RATE: 18 BRPM | HEART RATE: 80 BPM

## 2025-08-18 DIAGNOSIS — G90.A POSTURAL ORTHOSTATIC TACHYCARDIA SYNDROME: Primary | ICD-10-CM

## 2025-08-18 PROCEDURE — 96360 HYDRATION IV INFUSION INIT: CPT

## 2025-08-18 PROCEDURE — 96361 HYDRATE IV INFUSION ADD-ON: CPT

## 2025-08-18 RX ADMIN — SODIUM CHLORIDE 1000 ML: 0.9 INJECTION, SOLUTION INTRAVENOUS at 08:45

## 2025-08-19 ENCOUNTER — TELEPHONE (OUTPATIENT)
Age: 70
End: 2025-08-19

## 2025-08-19 DIAGNOSIS — M62.40 INTRACTABLE MUSCLE SPASM: Primary | ICD-10-CM

## 2025-08-19 DIAGNOSIS — G03.9 ARACHNOIDITIS: ICD-10-CM

## 2025-08-19 DIAGNOSIS — M79.18 MYOFASCIAL PAIN SYNDROME: ICD-10-CM

## 2025-08-19 RX ORDER — CYCLOBENZAPRINE HCL 5 MG
5 TABLET ORAL 3 TIMES DAILY PRN
Qty: 90 TABLET | Refills: 0 | Status: SHIPPED | OUTPATIENT
Start: 2025-08-19

## 2025-08-19 RX ORDER — TIZANIDINE 2 MG/1
TABLET ORAL
Qty: 1 TABLET | Refills: 0 | OUTPATIENT
Start: 2025-08-19

## (undated) DEVICE — SKN PRP WNG SPNGE PVP SCRB STR: Brand: MEDLINE INDUSTRIES, INC.

## (undated) DEVICE — ALL PURPOSE SPONGES,NON-WOVEN, 4 PLY: Brand: CURITY

## (undated) DEVICE — INTENDED FOR TISSUE SEPARATION, AND OTHER PROCEDURES THAT REQUIRE A SHARP SURGICAL BLADE TO PUNCTURE OR CUT.: Brand: BARD-PARKER ® CARBON RIB-BACK BLADES

## (undated) DEVICE — 3M™ STERI-STRIP™ BLEND TONE SKIN CLOSURES, B1557, TAN, 1/2 IN X 4 IN (12MM X 100MM), 6 STRIPS/ENVELOPE: Brand: 3M™ STERI-STRIP™

## (undated) DEVICE — BITE BLOCK MAXI 60FR LF STRAP

## (undated) DEVICE — BLADE SHAVER DISSECTOR 4MM 13CM COOLCUT

## (undated) DEVICE — 3M™ IOBAN™ 2 ANTIMICROBIAL INCISE DRAPE 6650EZ: Brand: IOBAN™ 2

## (undated) DEVICE — DISPOSABLE EQUIPMENT COVER: Brand: SMALL TOWEL DRAPE

## (undated) DEVICE — 3M™ STERI-STRIP™ REINFORCED ADHESIVE SKIN CLOSURES, R1547, 1/2 IN X 4 IN (12 MM X 100 MM), 6 STRIPS/ENVELOPE: Brand: 3M™ STERI-STRIP™

## (undated) DEVICE — DRESSING MEPILEX AG BORDER 4 X 4 IN

## (undated) DEVICE — EXPRESSEW III SUTURE NEEDLE FOR USE WITH EXPRESSEW II OR III SUTURE PASSER: Brand: EXPRESSEW

## (undated) DEVICE — THREADED CLEAR CANNULA WITH OBTURATOR 7MM X 75MM

## (undated) DEVICE — THREADED CLEAR CANNULA WITH OBTURATOR 8.5MM X 75MM

## (undated) DEVICE — TRANSPOSAL ULTRAFLEX DUO/QUAD ULTRA CART MANIFOLD

## (undated) DEVICE — SUT MONOCRYL 4-0 PS-2 27 IN Y426H

## (undated) DEVICE — THE BIOSHIELD BIOPSY VALVE - STERILE IS USED TO COVER THE OPENING TO THE BIOPSY/SUCTION CHANNEL INLET OF A GASTROINTESTINAL ENDOSCOPE. IT PROVIDES ACCESS FOR ENDOSCOPIC DEVICE PASSAGE AND EXCHANGE, HELPS MAINTAIN INSUFFLATION AND MINIMIZES LEAKAGE OF BIOMATERIAL FROM THE BIOPSY PORT THROUGHOUT THE GASTROINTESTINAL ENDOSCOPIC PROCEDURE.: Brand: BIOSHIELD

## (undated) DEVICE — GLOVE SRG BIOGEL ECLIPSE 7

## (undated) DEVICE — PACK PBDS SHOULDER ARTHROSCOPY RF

## (undated) DEVICE — BLADE SHAVER DISSECTOR 3.5MM 13CM COOLCUT

## (undated) DEVICE — VAPR COOLPULSE 90 ELECTRODE 90 DEGREES SUCTION WITH INTEGRATED HANDPIECE: Brand: VAPR COOLPULSE

## (undated) DEVICE — GLOVE INDICATOR PI UNDERGLOVE SZ 7.5 BLUE

## (undated) DEVICE — SUT MONOCRYL 4-0 PS-2 18 IN Y496G

## (undated) DEVICE — ESOPHAGEAL/PYLORIC/COLONIC/BILIARY WIREGUIDED BALLOON DILATATION CATHETER: Brand: CRE™ PRO

## (undated) DEVICE — TUBING SUCTION 5MM X 12 FT

## (undated) DEVICE — SPONGE PVP SCRUB WING STERILE

## (undated) DEVICE — SHOULDER SUSPENSION KIT 6 PER BOX

## (undated) DEVICE — SINGLE-USE SYRINGE/GAUGE ASSEMBLY: Brand: ALLIANCE II

## (undated) DEVICE — DISPOSABLE BIOPSY FORCEPS: Brand: DISPOSABLE BIOPSY FORCEPS

## (undated) DEVICE — GLOVE SRG BIOGEL 7.5

## (undated) DEVICE — ADHESIVE SKIN HIGH VISCOSITY EXOFIN 1ML

## (undated) DEVICE — THE SINGLE USE BIOGUARD AIR WATER VALVE, OLYMPUS IS USED TO CONTROL THE AIR WATER FUNCTION OF AN ENDOSCOPE DURING GI ENDOSCOPIC PROCEDURES.: Brand: BIOGUARD